# Patient Record
Sex: FEMALE | Race: WHITE | HISPANIC OR LATINO | Employment: FULL TIME | URBAN - METROPOLITAN AREA
[De-identification: names, ages, dates, MRNs, and addresses within clinical notes are randomized per-mention and may not be internally consistent; named-entity substitution may affect disease eponyms.]

---

## 2017-01-03 ENCOUNTER — APPOINTMENT (OUTPATIENT)
Dept: PHYSICAL THERAPY | Facility: CLINIC | Age: 62
End: 2017-01-03
Payer: COMMERCIAL

## 2017-01-03 ENCOUNTER — GENERIC CONVERSION - ENCOUNTER (OUTPATIENT)
Dept: OTHER | Facility: OTHER | Age: 62
End: 2017-01-03

## 2017-01-03 PROCEDURE — 97110 THERAPEUTIC EXERCISES: CPT

## 2017-01-03 PROCEDURE — 97112 NEUROMUSCULAR REEDUCATION: CPT

## 2017-01-05 ENCOUNTER — APPOINTMENT (OUTPATIENT)
Dept: PHYSICAL THERAPY | Facility: CLINIC | Age: 62
End: 2017-01-05
Payer: COMMERCIAL

## 2017-01-06 ENCOUNTER — ALLSCRIPTS OFFICE VISIT (OUTPATIENT)
Dept: OTHER | Facility: OTHER | Age: 62
End: 2017-01-06

## 2017-01-09 ENCOUNTER — APPOINTMENT (OUTPATIENT)
Dept: PHYSICAL THERAPY | Facility: CLINIC | Age: 62
End: 2017-01-09
Payer: COMMERCIAL

## 2017-01-09 PROCEDURE — 97110 THERAPEUTIC EXERCISES: CPT

## 2017-01-09 PROCEDURE — 97112 NEUROMUSCULAR REEDUCATION: CPT

## 2017-01-12 ENCOUNTER — APPOINTMENT (OUTPATIENT)
Dept: PHYSICAL THERAPY | Facility: CLINIC | Age: 62
End: 2017-01-12
Payer: COMMERCIAL

## 2017-01-13 ENCOUNTER — APPOINTMENT (OUTPATIENT)
Dept: PHYSICAL THERAPY | Facility: CLINIC | Age: 62
End: 2017-01-13
Payer: COMMERCIAL

## 2017-01-13 PROCEDURE — 97110 THERAPEUTIC EXERCISES: CPT

## 2017-01-16 ENCOUNTER — APPOINTMENT (OUTPATIENT)
Dept: PHYSICAL THERAPY | Facility: CLINIC | Age: 62
End: 2017-01-16
Payer: COMMERCIAL

## 2017-01-17 ENCOUNTER — APPOINTMENT (OUTPATIENT)
Dept: PHYSICAL THERAPY | Facility: CLINIC | Age: 62
End: 2017-01-17
Payer: COMMERCIAL

## 2017-01-19 ENCOUNTER — APPOINTMENT (OUTPATIENT)
Dept: PHYSICAL THERAPY | Facility: CLINIC | Age: 62
End: 2017-01-19
Payer: COMMERCIAL

## 2017-01-21 ENCOUNTER — APPOINTMENT (OUTPATIENT)
Dept: LAB | Facility: HOSPITAL | Age: 62
End: 2017-01-21
Payer: COMMERCIAL

## 2017-01-21 ENCOUNTER — TRANSCRIBE ORDERS (OUTPATIENT)
Dept: ADMINISTRATIVE | Facility: HOSPITAL | Age: 62
End: 2017-01-21

## 2017-01-21 DIAGNOSIS — E11.29 TYPE 2 DIABETES MELLITUS WITH OTHER DIABETIC KIDNEY COMPLICATION, WITHOUT LONG-TERM CURRENT USE OF INSULIN (HCC): ICD-10-CM

## 2017-01-21 DIAGNOSIS — E11.9 DIABETES MELLITUS WITHOUT COMPLICATION (HCC): Primary | ICD-10-CM

## 2017-01-21 DIAGNOSIS — E11.9 DIABETES MELLITUS WITHOUT COMPLICATION (HCC): ICD-10-CM

## 2017-01-21 LAB
ANION GAP SERPL CALCULATED.3IONS-SCNC: 9 MMOL/L (ref 4–13)
BUN SERPL-MCNC: 33 MG/DL (ref 5–25)
CALCIUM SERPL-MCNC: 9 MG/DL (ref 8.3–10.1)
CHLORIDE SERPL-SCNC: 102 MMOL/L (ref 100–108)
CO2 SERPL-SCNC: 26 MMOL/L (ref 21–32)
CREAT SERPL-MCNC: 1.62 MG/DL (ref 0.6–1.3)
GFR SERPL CREATININE-BSD FRML MDRD: 32.5 ML/MIN/1.73SQ M
GLUCOSE SERPL-MCNC: 81 MG/DL (ref 65–140)
POTASSIUM SERPL-SCNC: 4.7 MMOL/L (ref 3.5–5.3)
SODIUM SERPL-SCNC: 137 MMOL/L (ref 136–145)

## 2017-01-21 PROCEDURE — 80048 BASIC METABOLIC PNL TOTAL CA: CPT

## 2017-01-21 PROCEDURE — 36415 COLL VENOUS BLD VENIPUNCTURE: CPT

## 2017-01-23 ENCOUNTER — GENERIC CONVERSION - ENCOUNTER (OUTPATIENT)
Dept: OTHER | Facility: OTHER | Age: 62
End: 2017-01-23

## 2017-03-27 ENCOUNTER — ALLSCRIPTS OFFICE VISIT (OUTPATIENT)
Dept: OTHER | Facility: OTHER | Age: 62
End: 2017-03-27

## 2017-04-01 DIAGNOSIS — N18.30 CHRONIC KIDNEY DISEASE, STAGE III (MODERATE) (HCC): ICD-10-CM

## 2017-04-01 DIAGNOSIS — N83.209 CYST OF OVARY: ICD-10-CM

## 2017-04-01 DIAGNOSIS — R80.9 PROTEINURIA: ICD-10-CM

## 2017-04-01 DIAGNOSIS — N17.9 ACUTE KIDNEY FAILURE (HCC): ICD-10-CM

## 2017-04-01 DIAGNOSIS — E11.9 TYPE 2 DIABETES MELLITUS WITHOUT COMPLICATIONS (HCC): ICD-10-CM

## 2017-04-03 ENCOUNTER — APPOINTMENT (OUTPATIENT)
Dept: LAB | Facility: HOSPITAL | Age: 62
End: 2017-04-03
Payer: COMMERCIAL

## 2017-04-03 ENCOUNTER — TRANSCRIBE ORDERS (OUTPATIENT)
Dept: ADMINISTRATIVE | Facility: HOSPITAL | Age: 62
End: 2017-04-03

## 2017-04-03 DIAGNOSIS — N17.9 ACUTE KIDNEY FAILURE, UNSPECIFIED (HCC): Primary | ICD-10-CM

## 2017-04-03 DIAGNOSIS — N17.9 ACUTE KIDNEY FAILURE, UNSPECIFIED (HCC): ICD-10-CM

## 2017-04-03 DIAGNOSIS — E11.9 TYPE 2 DIABETES MELLITUS WITHOUT COMPLICATIONS (HCC): ICD-10-CM

## 2017-04-03 DIAGNOSIS — E11.9 DIABETES MELLITUS WITHOUT COMPLICATION (HCC): Primary | ICD-10-CM

## 2017-04-03 LAB
ALBUMIN SERPL BCP-MCNC: 3 G/DL (ref 3.5–5)
ALP SERPL-CCNC: 85 U/L (ref 46–116)
ALT SERPL W P-5'-P-CCNC: 26 U/L (ref 12–78)
ANION GAP SERPL CALCULATED.3IONS-SCNC: 8 MMOL/L (ref 4–13)
AST SERPL W P-5'-P-CCNC: 17 U/L (ref 5–45)
BILIRUB SERPL-MCNC: 1 MG/DL (ref 0.2–1)
BUN SERPL-MCNC: 32 MG/DL (ref 5–25)
CALCIUM SERPL-MCNC: 9 MG/DL (ref 8.3–10.1)
CHLORIDE SERPL-SCNC: 104 MMOL/L (ref 100–108)
CO2 SERPL-SCNC: 28 MMOL/L (ref 21–32)
CREAT SERPL-MCNC: 1.84 MG/DL (ref 0.6–1.3)
CREAT UR-MCNC: 110 MG/DL
ERYTHROCYTE [DISTWIDTH] IN BLOOD BY AUTOMATED COUNT: 16.3 % (ref 11.6–15.1)
EST. AVERAGE GLUCOSE BLD GHB EST-MCNC: 200 MG/DL
GFR SERPL CREATININE-BSD FRML MDRD: 28 ML/MIN/1.73SQ M
GLUCOSE P FAST SERPL-MCNC: 89 MG/DL (ref 65–99)
HBA1C MFR BLD: 8.6 % (ref 4.2–6.3)
HCT VFR BLD AUTO: 38.5 % (ref 37–47)
HGB BLD-MCNC: 12.5 G/DL (ref 12–16)
MCH RBC QN AUTO: 27.2 PG (ref 27–31)
MCHC RBC AUTO-ENTMCNC: 32.6 G/DL (ref 31.4–37.4)
MCV RBC AUTO: 84 FL (ref 82–98)
PLATELET # BLD AUTO: 196 THOUSANDS/UL (ref 130–400)
PMV BLD AUTO: 10.3 FL (ref 8.9–12.7)
POTASSIUM SERPL-SCNC: 5.1 MMOL/L (ref 3.5–5.3)
PROT SERPL-MCNC: 7.2 G/DL (ref 6.4–8.2)
PROT UR-MCNC: 574 MG/DL
PROT/CREAT UR: 5.22 MG/G{CREAT} (ref 0–0.1)
RBC # BLD AUTO: 4.6 MILLION/UL (ref 4.2–5.4)
SODIUM SERPL-SCNC: 140 MMOL/L (ref 136–145)
WBC # BLD AUTO: 8.9 THOUSAND/UL (ref 4.8–10.8)

## 2017-04-03 PROCEDURE — 82570 ASSAY OF URINE CREATININE: CPT | Performed by: INTERNAL MEDICINE

## 2017-04-03 PROCEDURE — 80053 COMPREHEN METABOLIC PANEL: CPT | Performed by: INTERNAL MEDICINE

## 2017-04-03 PROCEDURE — 84156 ASSAY OF PROTEIN URINE: CPT | Performed by: INTERNAL MEDICINE

## 2017-04-03 PROCEDURE — 83036 HEMOGLOBIN GLYCOSYLATED A1C: CPT

## 2017-04-03 PROCEDURE — 85027 COMPLETE CBC AUTOMATED: CPT

## 2017-04-03 PROCEDURE — 36415 COLL VENOUS BLD VENIPUNCTURE: CPT | Performed by: INTERNAL MEDICINE

## 2017-04-04 ENCOUNTER — GENERIC CONVERSION - ENCOUNTER (OUTPATIENT)
Dept: OTHER | Facility: OTHER | Age: 62
End: 2017-04-04

## 2017-04-13 ENCOUNTER — ALLSCRIPTS OFFICE VISIT (OUTPATIENT)
Dept: OTHER | Facility: OTHER | Age: 62
End: 2017-04-13

## 2017-04-14 ENCOUNTER — ALLSCRIPTS OFFICE VISIT (OUTPATIENT)
Dept: OTHER | Facility: OTHER | Age: 62
End: 2017-04-14

## 2017-04-29 ENCOUNTER — HOSPITAL ENCOUNTER (OUTPATIENT)
Dept: RADIOLOGY | Facility: HOSPITAL | Age: 62
Discharge: HOME | End: 2017-04-29
Payer: COMMERCIAL

## 2017-04-29 DIAGNOSIS — N83.209 CYST OF OVARY: ICD-10-CM

## 2017-04-29 PROCEDURE — 76856 US EXAM PELVIC COMPLETE: CPT

## 2017-04-29 PROCEDURE — 76830 TRANSVAGINAL US NON-OB: CPT

## 2017-05-01 ENCOUNTER — GENERIC CONVERSION - ENCOUNTER (OUTPATIENT)
Dept: OTHER | Facility: OTHER | Age: 62
End: 2017-05-01

## 2017-05-12 ENCOUNTER — GENERIC CONVERSION - ENCOUNTER (OUTPATIENT)
Dept: OTHER | Facility: OTHER | Age: 62
End: 2017-05-12

## 2017-05-15 ENCOUNTER — ALLSCRIPTS OFFICE VISIT (OUTPATIENT)
Dept: OTHER | Facility: OTHER | Age: 62
End: 2017-05-15

## 2017-05-15 ENCOUNTER — GENERIC CONVERSION - ENCOUNTER (OUTPATIENT)
Dept: OTHER | Facility: OTHER | Age: 62
End: 2017-05-15

## 2017-05-16 ENCOUNTER — GENERIC CONVERSION - ENCOUNTER (OUTPATIENT)
Dept: OTHER | Facility: OTHER | Age: 62
End: 2017-05-16

## 2017-06-02 ENCOUNTER — ALLSCRIPTS OFFICE VISIT (OUTPATIENT)
Dept: OTHER | Facility: OTHER | Age: 62
End: 2017-06-02

## 2017-06-02 DIAGNOSIS — E11.29 TYPE 2 DIABETES MELLITUS WITH OTHER DIABETIC KIDNEY COMPLICATION (HCC): ICD-10-CM

## 2017-06-02 DIAGNOSIS — E11.65 TYPE 2 DIABETES MELLITUS WITH HYPERGLYCEMIA (HCC): ICD-10-CM

## 2017-07-08 ENCOUNTER — APPOINTMENT (OUTPATIENT)
Dept: LAB | Facility: HOSPITAL | Age: 62
End: 2017-07-08
Attending: INTERNAL MEDICINE
Payer: COMMERCIAL

## 2017-07-08 ENCOUNTER — TRANSCRIBE ORDERS (OUTPATIENT)
Dept: ADMINISTRATIVE | Facility: HOSPITAL | Age: 62
End: 2017-07-08

## 2017-07-08 DIAGNOSIS — R80.9 PROTEINURIA, UNSPECIFIED TYPE: ICD-10-CM

## 2017-07-08 DIAGNOSIS — R80.9 PROTEINURIA, UNSPECIFIED TYPE: Primary | ICD-10-CM

## 2017-07-08 LAB
ANION GAP SERPL CALCULATED.3IONS-SCNC: 8 MMOL/L (ref 4–13)
BUN SERPL-MCNC: 35 MG/DL (ref 5–25)
C3 SERPL-MCNC: 130 MG/DL (ref 90–180)
C4 SERPL-MCNC: 43 MG/DL (ref 10–40)
CALCIUM SERPL-MCNC: 9.1 MG/DL (ref 8.3–10.1)
CHLORIDE SERPL-SCNC: 105 MMOL/L (ref 100–108)
CO2 SERPL-SCNC: 24 MMOL/L (ref 21–32)
CREAT SERPL-MCNC: 1.82 MG/DL (ref 0.6–1.3)
GFR SERPL CREATININE-BSD FRML MDRD: 28.3 ML/MIN/1.73SQ M
GLUCOSE P FAST SERPL-MCNC: 127 MG/DL (ref 65–99)
POTASSIUM SERPL-SCNC: 4.6 MMOL/L (ref 3.5–5.3)
SODIUM SERPL-SCNC: 137 MMOL/L (ref 136–145)

## 2017-07-08 PROCEDURE — 86038 ANTINUCLEAR ANTIBODIES: CPT

## 2017-07-08 PROCEDURE — 86162 COMPLEMENT TOTAL (CH50): CPT

## 2017-07-08 PROCEDURE — 86160 COMPLEMENT ANTIGEN: CPT

## 2017-07-08 PROCEDURE — 80048 BASIC METABOLIC PNL TOTAL CA: CPT

## 2017-07-08 PROCEDURE — 36415 COLL VENOUS BLD VENIPUNCTURE: CPT

## 2017-07-08 PROCEDURE — 84165 PROTEIN E-PHORESIS SERUM: CPT

## 2017-07-08 PROCEDURE — 86255 FLUORESCENT ANTIBODY SCREEN: CPT

## 2017-07-08 PROCEDURE — 83883 ASSAY NEPHELOMETRY NOT SPEC: CPT

## 2017-07-10 LAB
ALBUMIN SERPL ELPH-MCNC: 3.46 G/DL (ref 3.5–5)
ALBUMIN SERPL ELPH-MCNC: 53.3 % (ref 52–65)
ALPHA1 GLOB SERPL ELPH-MCNC: 0.31 G/DL (ref 0.1–0.4)
ALPHA1 GLOB SERPL ELPH-MCNC: 4.7 % (ref 2.5–5)
ALPHA2 GLOB SERPL ELPH-MCNC: 1.01 G/DL (ref 0.4–1.2)
ALPHA2 GLOB SERPL ELPH-MCNC: 15.6 % (ref 7–13)
BETA GLOB ABNORMAL SERPL ELPH-MCNC: 0.44 G/DL (ref 0.4–0.8)
BETA1 GLOB SERPL ELPH-MCNC: 6.7 % (ref 5–13)
BETA2 GLOB SERPL ELPH-MCNC: 6.8 % (ref 2–8)
BETA2+GAMMA GLOB SERPL ELPH-MCNC: 0.44 G/DL (ref 0.2–0.5)
CH50 SERPL-ACNC: 61 U/ML (ref 42–60)
GAMMA GLOB ABNORMAL SERPL ELPH-MCNC: 0.84 G/DL (ref 0.5–1.6)
GAMMA GLOB SERPL ELPH-MCNC: 12.9 % (ref 12–22)
IGG/ALB SER: 1.14 {RATIO} (ref 1.1–1.8)
PROT PATTERN SERPL ELPH-IMP: ABNORMAL
PROT SERPL-MCNC: 6.5 G/DL (ref 6.4–8.2)
RYE IGE QN: NEGATIVE

## 2017-07-11 LAB
KAPPA LC FREE SER-MCNC: 44.6 MG/L (ref 3.3–19.4)
KAPPA LC FREE/LAMBDA FREE SER: 1.04 {RATIO} (ref 0.26–1.65)
LAMBDA LC FREE SERPL-MCNC: 43 MG/L (ref 5.7–26.3)

## 2017-07-12 ENCOUNTER — ALLSCRIPTS OFFICE VISIT (OUTPATIENT)
Dept: OTHER | Facility: OTHER | Age: 62
End: 2017-07-12

## 2017-07-12 DIAGNOSIS — E11.29 TYPE 2 DIABETES MELLITUS WITH OTHER DIABETIC KIDNEY COMPLICATION (HCC): ICD-10-CM

## 2017-07-12 DIAGNOSIS — E11.65 TYPE 2 DIABETES MELLITUS WITH HYPERGLYCEMIA (HCC): ICD-10-CM

## 2017-07-12 LAB
C-ANCA TITR SER IF: NORMAL TITER
MYELOPEROXIDASE AB SER IA-ACNC: <9 U/ML (ref 0–9)
P-ANCA ATYPICAL TITR SER IF: NORMAL TITER
P-ANCA TITR SER IF: NORMAL TITER
PROTEINASE3 AB SER IA-ACNC: <3.5 U/ML (ref 0–3.5)

## 2017-09-01 DIAGNOSIS — N18.30 CHRONIC KIDNEY DISEASE, STAGE III (MODERATE) (HCC): ICD-10-CM

## 2017-09-19 ENCOUNTER — GENERIC CONVERSION - ENCOUNTER (OUTPATIENT)
Dept: OTHER | Facility: OTHER | Age: 62
End: 2017-09-19

## 2017-09-26 ENCOUNTER — GENERIC CONVERSION - ENCOUNTER (OUTPATIENT)
Dept: OTHER | Facility: OTHER | Age: 62
End: 2017-09-26

## 2017-10-18 ENCOUNTER — APPOINTMENT (OUTPATIENT)
Dept: LAB | Facility: HOSPITAL | Age: 62
End: 2017-10-18
Payer: COMMERCIAL

## 2017-10-18 ENCOUNTER — ALLSCRIPTS OFFICE VISIT (OUTPATIENT)
Dept: OTHER | Facility: OTHER | Age: 62
End: 2017-10-18

## 2017-10-18 DIAGNOSIS — E11.29 TYPE 2 DIABETES MELLITUS WITH OTHER DIABETIC KIDNEY COMPLICATION (CODE): ICD-10-CM

## 2017-10-18 DIAGNOSIS — E11.65 TYPE 2 DIABETES MELLITUS WITH HYPERGLYCEMIA (HCC): ICD-10-CM

## 2017-10-18 DIAGNOSIS — E78.5 HYPERLIPIDEMIA: ICD-10-CM

## 2017-10-18 DIAGNOSIS — N18.30 CHRONIC KIDNEY DISEASE, STAGE III (MODERATE) (HCC): ICD-10-CM

## 2017-10-18 LAB
ALBUMIN SERPL BCP-MCNC: 3 G/DL (ref 3.5–5)
ALP SERPL-CCNC: 87 U/L (ref 46–116)
ALT SERPL W P-5'-P-CCNC: 21 U/L (ref 12–78)
ANION GAP SERPL CALCULATED.3IONS-SCNC: 6 MMOL/L (ref 4–13)
AST SERPL W P-5'-P-CCNC: 14 U/L (ref 5–45)
BILIRUB SERPL-MCNC: 0.83 MG/DL (ref 0.2–1)
BUN SERPL-MCNC: 33 MG/DL (ref 5–25)
CALCIUM SERPL-MCNC: 8.6 MG/DL (ref 8.3–10.1)
CHLORIDE SERPL-SCNC: 110 MMOL/L (ref 100–108)
CHOLEST SERPL-MCNC: 172 MG/DL (ref 50–200)
CO2 SERPL-SCNC: 25 MMOL/L (ref 21–32)
CREAT SERPL-MCNC: 2 MG/DL (ref 0.6–1.3)
EST. AVERAGE GLUCOSE BLD GHB EST-MCNC: 200 MG/DL
GFR SERPL CREATININE-BSD FRML MDRD: 27 ML/MIN/1.73SQ M
GLUCOSE P FAST SERPL-MCNC: 108 MG/DL (ref 65–99)
HBA1C MFR BLD: 8.6 % (ref 4.2–6.3)
HDLC SERPL-MCNC: 69 MG/DL (ref 40–60)
LDLC SERPL CALC-MCNC: 78 MG/DL (ref 0–100)
POTASSIUM SERPL-SCNC: 4.8 MMOL/L (ref 3.5–5.3)
PROT SERPL-MCNC: 6.4 G/DL (ref 6.4–8.2)
SODIUM SERPL-SCNC: 141 MMOL/L (ref 136–145)
TRIGL SERPL-MCNC: 124 MG/DL

## 2017-10-18 PROCEDURE — 80061 LIPID PANEL: CPT

## 2017-10-18 PROCEDURE — 83036 HEMOGLOBIN GLYCOSYLATED A1C: CPT

## 2017-10-18 PROCEDURE — 36415 COLL VENOUS BLD VENIPUNCTURE: CPT

## 2017-10-18 PROCEDURE — 80053 COMPREHEN METABOLIC PANEL: CPT
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
## 2017-11-13 ENCOUNTER — ALLSCRIPTS OFFICE VISIT (OUTPATIENT)
Dept: OTHER | Facility: OTHER | Age: 62
End: 2017-11-13

## 2017-11-14 NOTE — PROGRESS NOTES
Assessment  1  Chronic kidney disease, stage 3 (585 3) (N18 3)   2  Proteinuria (791 0) (R80 9)   3  Benign hypertension with chronic kidney disease, stage III (403 10,585 3) (I12 9,N18 3)    Plan  Chronic kidney disease, stage 3    · (1) CBC/ PLT (NO DIFF); Status:Active; Requested for:01Feb2018; Perform:[facility]; QQR:87XXB5629;ERYNJBW; For:Chronic kidney disease, stage 3; Ordered By:Maria Teresa Lozano;   · (1) COMPREHENSIVE METABOLIC PANEL; Status:Active; Requested for:01Feb2018; Perform:[facility]; OVM:93WFC7755;WJEDDJJ; For:Chronic kidney disease, stage 3; Ordered By:Maria Teresa Lozano;   · (1) URINE PROTEIN CREATININE RATIO; Status:Active; Requested for:01Feb2018; Perform:[facility]; KCO:31JMX1093;KFYBJPK;YSMAZA disease, stage 3; Ordered By:Maria Teresa Lozano;   · Do not take anti-inflammatory medicines other than aspirin ; Status:Complete;   Done:13Nov2017   Ordered;kidney disease, stage 3; Ordered By:Maria Teresa Lozano;   · Restrict the salt in your diet by avoiding highly salted foods ; Status:Complete;   Done:13Nov2017   Ordered;kidney disease, stage 3; Ordered By:Maria Teresa Lozano; Discussion/Summary    1 ) Chronic kidney disease stage IIIB with proteinuriaRenal function currently stable at baseline at 2 0 mg/dL  Presumed etiology is diabetic nephropathyBaseline creatinine is between 1 7-2 2 mg/dLDiabetic and blood pressure controlContinue valsartan to 160 mg twice a dayHer complements C3 and C4 were not lowANCA titer were negativeFree light chain assay ratio was 1 04   Serum protein electrophoresis showed no monoclonal bandsANA was negativeMay need to find an alternative medication to metformin if her GFR is below 30 mL/min  ProteinuriaSee above, secondary to diabetic nephropathyThe new blood pressure and diabetic control especially with an ARBCheck a urine protein creatinine ratio at the next visit  HypertensionAim to keep systolic blood pressure less than 130 given the proteinuriaPatient is close to goal she slightly above a 138 but she is in some pain and cough that was used was smaller than her arm suggesting that it might be over estimating her blood pressureContinue valsartan 160 mg twice a dayLow 2 g sodium dietFurosemide 40 mg dailyMetoprolol 25 mg twice a day  Diabetes mellitus type 2Management as per endocrinologyHemoglobin A1c is 8 6  The patient was counseled regarding diagnostic results,-- instructions for management,-- risk factor reductions,-- prognosis,-- patient and family education,-- impressions,-- risks and benefits of treatment options,-- importance of compliance with treatment  total time of encounter was 30 minutes  Possible side effects of new medications were reviewed with the patient/guardian today  The treatment plan was reviewed with the patient/guardian  The patient/guardian understands and agrees with the treatment plan   She has no barriers to learning  CKD Teaching includes NFK Guidelines, hypertension management, Avoid nephrotoxic medication, dietician counseling, sodium restriction and fluid management  Current Patient Status: no worsening of renal function  She needs a follow up session in three months   Discussed with the patient      Reason For Visit  Chronic kidney disease      History of Present Illness  I the pleasure of seeing Isabela Mcgee today in the renal clinic for continued management of her chronic kidney disease  She has developed some paresthesias pain of right arm after a cervical epidural  She is not seeing a new orthopedic doctor who prescribed her naproxen 500 mg twice a day starting today  She has not started the dose as of yet  I explained to her given her chronic kidney disease this could cause acute kidney injury  Her most recent blood work was from October 18 and a creatinine was stable at baseline 2 mg/dL  At her last visit I increased her valsartan for better blood pressure control   Aside from her paresthesias she's feeling fine no shortness of breath no chest pain and no swelling of legs  Review of Systems   Constitutional: fatigue-- and-- recent weight gain, but-- no fever,-- no chills-- and-- no anorexia  Integumentary: No complaints of skin rash  Gastrointestinal: No complains of abdominal pain, no constipation or diarrhea, no nausea or vomiting  Respiratory: No complaints of shortness of breath, no cough, no productive sputum  Cardiovascular: lower extremity edema, but-- no orthopnea,-- no PND,-- no chest pain-- and-- no palpitations  Musculoskeletal: No complaints of joint pain or swelling  Neurological: No complaints of headache, no lightheadedness or dizziness  Genitourinary: No dysuria, no hematuria, no nocturia, no urinary frequency, no incomplete emptying of bladder, no foamy urine  Eyes: No complaints of eyesight problems or dryness of eyes  ROS reviewed  Past Medical History    The active problems and past medical history were reviewed and updated today  Surgical History    The surgical history was reviewed and updated today  Family History    The family history was reviewed and updated today  Social History  The social history was reviewed and updated today  The social history was reviewed and is unchanged  Current Meds   1  BD Pen Needle Mini U/F 31G X 5 MM Miscellaneous; use 4 per day; Therapy: 00PEP7689 to (Evaluate:43Aga1802); Last Rx:24Bmc1998 Ordered   2  Furosemide 40 MG Oral Tablet; Take 1 tablet daily; Therapy: 96KBP8205 to (Last Rx:48Stb4720)  Requested for: 96Bbg1774 Ordered   3  HumaLOG Mix 75/25 KwikPen (75-25) 100 UNIT/ML Subcutaneous Suspension Pen-injector; take 40 units before breakfast and 50 units before supper and 15 at bedtime; Therapy: (Recorded:91Rgg8606) to Recorded   4  Metoprolol Tartrate 25 MG Oral Tablet; take 1 tablet twice a day; Therapy: 18JKM6463 to (Last Rx:03Yis3561)  Requested for: 68Cbq0121 Ordered   5  OneTouch Ultra Blue In [employer];  Test 3 times daily; Therapy: 53HPV7915 to (Last YE:16XNB4146)  Requested for: 02Jan2017 Ordered   6  OneTouch Ultra System w/Device Kit; USE AS DIRECTED; Therapy: 52PJB4323 to (Last Rx:92Ant2579)  Requested for: 87Kro2933 Ordered   7  Valsartan 160 MG Oral Tablet; 1 tab twice per day *dose increased by nephrologist*; Therapy: 62JPU6362 to (Evaluate:31Oct2017)  Requested for: 99KJD1675; Last FS:34OQK5419 Ordered    The medication list was reviewed and updated today  Allergies  1  No Known Drug Allergies    Vitals  Vital Signs    Recorded: 85AXD1392 04:15PM Recorded: 35RQO9776 41:62QM   Systolic 996    Diastolic 70    Height  5 ft    Weight  229 lb    BMI Calculated  44 72   BSA Calculated  1 98       Physical Exam   Constitutional: General appearance: No acute distress, well appearing and well nourished  ENT: External ears and nose appear normal     Eyes: Anicteric sclerae  Neck: No bruit heard over either carotid  JVD:  No JVD present  Pulmonary: Respiratory effort: No increased work of breathing or signs of respiratory distress  -- Auscultation of lungs: Clear to auscultation  Cardiovascular: Auscultation of heart: Normal rate and rhythm, normal S1 and S2, without murmurs  Abdomen: Non-tender, no masses  Extremities: No cyanosis, clubbing or edema  Pulses: Dorsalis Pedis and Posterior Tibial pulses normal   Rash: No rash present  Neurologic: Non Focal     Psychiatric: Orientation to person, place, and time: Normal  -- and-- Mood and affect: Normal    Back: No CVA tenderness  Results/Data  Diagnostic Studies Reviewed: I personally reviewed the films/images/results in the office today  My interpretation follows  Health Management  Encounter for screening mammogram for malignant neoplasm of breast   * MAMMO SCREENING BILATERAL W CAD; every 2 years; Last 93GCV5066; Next Due: 44TJL0062; Active    Thank you very much for seeing this patient  If you have any questions, please do not hesitate to contact me  Future Appointments    Date/Time Provider Specialty Site   01/23/2018 11:30 AM Mary Craig [facility]       Signatures   Electronically signed by : CASSIUS Church ; Nov 13 2017  4:22PM EST                       (Author)

## 2018-01-09 NOTE — PROGRESS NOTES
History of Present Illness  Care Coordination Encounter Information:   Type of Encounter: Telephonic   Contact: Initial Contact   Last Office Visit:    Spoke to Patient  Care Coordination  Nurse [address] [place] [address] [address]:   The reason for call is to discuss outreach for follow up/needed services and coordination of meeting care plan treatment goals  63year old women who works full time, drives and is head of her household that consists of elderly mother, son and infant grand child   Nephrologist increased Valsartan to 160mg twice daily  Metformin is now to be taken with the evening Humulog as per Dr Palak Vaughan  She says this is increasing her glucose in the AM  Antwan Brewer is not taking her Lasix on most days as she is not able to use a bathroom easily at work  I have encouraged her to take this as her BP is rising 160-180/100-120  She states her nephrologist is aware of these high numbers  Have encouraged she alert him if symptoms occur  Have encouraged she eat breakfast in the AM after taking insulin, before leaving for work  Have encouraged she carry a snack  Rising glucose, BP and weight  Have provided nutrition videos  Have discussed carbs, inflammation, damage of low fat/ high carb diet  Have encouraged she call provider if symptoms occur as BP is high  We agree to speak Monday 5/15/17  Active Problems    1  Acute kidney injury (nontraumatic) (584 9) (N17 9)   2  Benign essential hypertension (401 1) (I10)   3  Benign hypertension with chronic kidney disease, stage III (403 10,585 3) (I12 9,N18 3)   4  BMI 40 0-44 9, adult (V85 41) (Z68 41)   5  Calcaneal spur (726 73) (M77 30)   6  Chronic kidney disease, stage 3 (585 3) (N18 3)   7  Cyst of ovary (620 2) (N83 209)   8  De Quervain's tenosynovitis (727 04) (M65 4)   9  Diabetes mellitus, type 2 (250 00) (E11 9)   10  Edema (782 3) (R60 9)   11  Encounter for routine pelvic examination (V72 31) (Z01 419)   12   Encounter for screening for malignant neoplasm of colon (V76 51) (Z12 11)   13  Encounter for screening mammogram for malignant neoplasm of breast (V76 12)    (Z12 31)   14  Encounter for screening mammogram for malignant neoplasm of breast (V76 12)    (Z12 31)   15  Gout (274 9) (M10 9)   16  Hyperlipidemia (272 4) (E78 5)   17  Hypothyroidism (244 9) (E03 9)   18  Long-term insulin use (V58 67) (Z79 4)   19  Lumbago (724 2) (M54 5)   20  Noncompliance with treatment plan (V15 81) (Z91 11)   21  Obesity (278 00) (E66 9)   22  Obstructive sleep apnea (327 23) (G47 33)   23  Pain in joint of left wrist (719 43) (M25 532)   24  Proteinuria (791 0) (R80 9)   25  Shortness of breath (786 05) (R06 02)   26  Type 2 diabetes, uncontrolled, with renal manifestation (250 42) (E11 29,E11 65)   27  Whiplash injury to neck (847 0) (S13 4XXA)    Past Medical History    1  Acute upper respiratory infection (465 9) (J06 9)   2  History of Difficulty breathing (786 09) (R06 89)   3  History of acute bronchitis (V12 69) (Z87 09)   4  History of shortness of breath (V13 89) (Z87 898)   5  History of shortness of breath (V13 89) (Z87 898)   6  Need for vaccination for DTP (V06 1) (Z23)   7  History of Pneumonia (V12 61)   8  History of Tracheobronchitis (490) (J40)   9  History of Urinary Tract Infection (V13 02)    Surgical History    1  History of  Section   2  History of  Section   3  History of Nose Surgery    Family History  Mother    1  Family history of Diabetes Mellitus (V18 0)   2  Family history of Generalized Anxiety Disorder   3  Family history of Hypertension (V17 49)  Father    4  Family history of Renal Failure   5  Family history of Reported Family History Of Kidney Disease  Family History    6  Family history of Heart Disease (V17 49)    Social History    · Denied: History of Alcohol Use (History)   · Caffeine Use   · Denied: History of Drug Use   · Never a smoker    Current Meds    1   Furosemide 40 MG Oral Tablet (Lasix); take one tablet by mouth every day; Therapy: 66MJF4961 to (Evaluate:10Gah3986)  Requested for: 25Mar2016; Last   Rx:25Mar2016 Ordered   2  Metoprolol Tartrate 25 MG Oral Tablet; take 1/2 tablet by mouth twice daily; Therapy: 52ALL0950 to ([phone])  Requested for: 14Apr2017 Recorded   3  Valsartan 160 MG Oral Tablet; 1 tab twice per day *dose increased by nephrologist*;   Therapy: 00LJU9263 to (Evaluate:31Oct2017)  Requested for: 84ZIZ9518; Last   WH:29MHH6555 Ordered    4  BD Pen Needle Mini U/F 31G X 5 MM Miscellaneous; use 4 per day; Therapy: 85WJW9120 to (Evaluate:30Qar1670); Last Rx:13Qgi1937 Ordered   5  MetFORMIN HCl  MG Oral Tablet Extended Release 24 Hour; TAKE 2 TABLETS   DAILY WITH THE EVENING MEAL; Therapy: 20Piq2624 to (Evaluate:44Gln2853)  Requested for: 72Msx7294; Last   Rx:18Zyr5188 Ordered   6  [place] Ultra Blue In [employer]; Test 3 times daily; Therapy: 96QXJ1158 to (Last JX:37QGV9678)  Requested for: 02Jan2017 Ordered   7  OneTouch Ultra System w/Device Kit; USE AS DIRECTED; Therapy: 61HBE4728 to (Last Rx:03Soq4685)  Requested for: 30Gcb8965 Ordered    8  HumaLOG Mix 75/25 KwikPen (75-25) 100 UNIT/ML Subcutaneous Suspension   Pen-injector; take 40 units before breakfast and 50 units before supper; Therapy: (Recorded:10Nov2016) to Recorded    Allergies    1  No Known Drug Allergies    Health Management   * MAMMO SCREENING BILATERAL W CAD; every 2 years; Last 79WDF1645; Next Due: 15SYS0301; Active    End of Encounter Meds    1  Furosemide 40 MG Oral Tablet (Lasix); take one tablet by mouth every day; Therapy: 55MVN4395 to (Evaluate:23Rog2774)  Requested for: 25Mar2016; Last   Rx:25Mar2016 Ordered   2  Metoprolol Tartrate 25 MG Oral Tablet; take 1/2 tablet by mouth twice daily; Therapy: 47YDF8805 to ([phone])  Requested for: 14Apr2017 Recorded   3   Valsartan 160 MG Oral Tablet; 1 tab twice per day *dose increased by nephrologist*;   Therapy: 11GDB3307 to (Evaluate:31Oct2017)  Requested for: 97LGN7105; Last   HZ:95YXO7607 Ordered    4  BD Pen Needle Mini U/F 31G X 5 MM Miscellaneous; use 4 per day; Therapy: 59VQE3523 to (Evaluate:66Gzn0407); Last Rx:57Zmu4090 Ordered   5  MetFORMIN HCl  MG Oral Tablet Extended Release 24 Hour; TAKE 2 TABLETS   DAILY WITH THE EVENING MEAL; Therapy: 57Erj2717 to (Evaluate:55Hof0959)  Requested for: 89Kww8605; Last   Rx:01Hlx3867 Ordered   6  OneTouch Ultra Blue In [employer]; Test 3 times daily; Therapy: 55GXF5020 to (Last DN:91QNT9323)  Requested for: 02Jan2017 Ordered   7  OneTouch Ultra System w/Device Kit; USE AS DIRECTED; Therapy: 21HHZ9861 to (Last Rx:63Hsc5054)  Requested for: 50Wpe8524 Ordered    8  HumaLOG Mix 75/25 KwikPen (75-25) 100 UNIT/ML Subcutaneous Suspension   Pen-injector; take 40 units before breakfast and 50 units before supper; Therapy: (Recorded:10Nov2016) to Recorded    Future Appointments    Date/Time Provider Specialty Site   07/12/2017 03:30 PM Bernita Kanner, [address] [facility] END[facility]CRINOLOG[facility]     Patient Care Team    Care Team Member Role Specialty Office Number   Vincent CHEEK  Specialist Endocrinology [phone]     Signatures   Electronically signed by :  Madelaine Yeh RN; May 12 2017 11:04AM EST                       (Author)

## 2018-01-11 ENCOUNTER — GENERIC CONVERSION - ENCOUNTER (OUTPATIENT)
Dept: OTHER | Facility: OTHER | Age: 63
End: 2018-01-11

## 2018-01-12 NOTE — RESULT NOTES
Message   A1C remains high at 8 6- please take insulin as directed and send glucose log for review     Verified Results  (1) HEMOGLOBIN A1C 03Apr2017 11:38AM Osbaldo Mathews Order Number: [id number]     Test Name Result Flag Reference   HEMOGLOBIN A1C 8 6 % H 4 2-6 3   EST  AVG   GLUCOSE 200 mg/dl

## 2018-01-12 NOTE — RESULT NOTES
Verified Results  * MAMMO SCREENING BILATERAL W CAD 94IMJ1873 02:16PM Skylar Bullock Order Number: [id number]    - Patient Instructions: To schedule this appointment, please contact Central Scheduling at [phone]  Do not wear any perfume, powder, lotion or deodorant on breast or underarm area  Please bring your doctors order, referral (if needed) and insurance information with you on the day of the test  Failure to bring this information may result in this test being rescheduled  Arrive 15 minutes prior to your appointment time to register  On the day of your test, please bring any prior mammogram or breast studies with you that were not performed at a [facility]  Failure to bring prior exams may result in your test needing to be rescheduled   Order Number: [id number]    - Patient Instructions: To schedule this appointment, please contact Central Scheduling at [phone]  Do not wear any perfume, powder, lotion or deodorant on breast or underarm area  Please bring your doctors order, referral (if needed) and insurance information with you on the day of the test  Failure to bring this information may result in this test being rescheduled  Arrive 15 minutes prior to your appointment time to register  On the day of your test, please bring any prior mammogram or breast studies with you that were not performed at a [facility]  Failure to bring prior exams may result in your test needing to be rescheduled  Test Name Result Flag Reference   MAMMO SCREENING BILATERAL W CAD (Report)     Patient History:   Patient is postmenopausal and is nulliparous  Family history of ovarian cancer in paternal aunt at age 48 or    over and breast cancer in maternal aunt at age 48 or over  Patient has never smoked  Patient's BMI is 43 9  Reason for exam: screening (asymptomatic)       Mammo Screening Bilateral W CAD: November 25, 2016 - Check In #:    [de-identified]   Bilateral CC and MLO view(s) were taken  Technologist: ROBBIE Arroyo   Prior study comparison: November 8, 2014, bilateral screening    mammogram performed at [place] [address]  November 8, 2012, bilateral screening mammogram performed at [place] [address]  June 11, 2011, bilateral    screening mammogram performed at [facility]  There are scattered fibroglandular densities  No new dominant    soft tissue mass, architectural distortion or suspicious    calcifications are noted  The skin and nipple structures are    within normal limits  Benign appearing calcifications are noted  No mammographic evidence of malignancy  No    significant changes when compared with prior studies  ASSESSMENT: BiRad:2 - Benign     Recommendation:   Routine screening mammogram of both breasts in 1 year  Analyzed by CAD     8-10% of cancers will be missed on mammography  Management of a    palpable abnormality must be based on clinical grounds  Patients   will be notified of their results via letter from our facility  Accredited by [employer] of Radiology and FDA  Transcription Location: [address]: [id number]     Risk Value(s):   Tyrer-Cuzick 10 Year: 5 449%, Tyrer-Cuzick Lifetime: 13 787%,    Myriad Table: 3 0%, VARUN 5 Year: 1 4%, NCI Lifetime: 7 6%   Signed by:   Carmen Lugo MD   11/25/16       Plan  Encounter for screening mammogram for malignant neoplasm of breast    · Mammo Screen B/L ; every 1 year; Last 16CBP2611;  Next 40GHW9225; Status:Active    Discussion/Summary   William Anes,   Your mammogram is normal    Dr Shivam Calles

## 2018-01-13 VITALS
DIASTOLIC BLOOD PRESSURE: 80 MMHG | SYSTOLIC BLOOD PRESSURE: 130 MMHG | HEART RATE: 72 BPM | BODY MASS INDEX: 44.57 KG/M2 | HEIGHT: 60 IN | WEIGHT: 227 LBS | RESPIRATION RATE: 18 BRPM

## 2018-01-13 VITALS
HEART RATE: 64 BPM | RESPIRATION RATE: 20 BRPM | DIASTOLIC BLOOD PRESSURE: 80 MMHG | BODY MASS INDEX: 45.35 KG/M2 | TEMPERATURE: 96.7 F | HEIGHT: 60 IN | SYSTOLIC BLOOD PRESSURE: 110 MMHG | WEIGHT: 231 LBS

## 2018-01-13 VITALS
DIASTOLIC BLOOD PRESSURE: 94 MMHG | HEART RATE: 90 BPM | SYSTOLIC BLOOD PRESSURE: 142 MMHG | BODY MASS INDEX: 45.23 KG/M2 | WEIGHT: 230.38 LBS | HEIGHT: 60 IN

## 2018-01-13 NOTE — RESULT NOTES
Message   Diabetes is still not a target  Encouraged to check her sugars and send them in to us  Add jardiance 25 mg per day to the regimen  Side effects include increased risk of urinary tract infection, yeast infection and dehydration  Be sure you are drinking enough water  Kidney function has decreased as well  Follow-up with nephrology as scheduled  Verified Results  (1) BASIC METABOLIC PROFILE 39SVK8490 04:58PM Pam Haywoodite     Test Name Result Flag Reference   GLUCOSE,RANDM 153 mg/dL H    If the patient is fasting, the ADA then defines impaired fasting glucose as > 100 mg/dL and diabetes as > or equal to 123 mg/dL  SODIUM 140 mmol/L  136-145   POTASSIUM 4 8 mmol/L  3 5-5 3   CHLORIDE 105 mmol/L  100-108   CARBON DIOXIDE 27 mmol/L  21-32   ANION GAP (CALC) 8 mmol/L  4-13   BLOOD UREA NITROGEN 37 mg/dL H 5-25   CREATININE 1 82 mg/dL H 0 60-1 30   Standardized to IDMS reference method   CALCIUM 9 1 mg/dL  8 3-10 1   eGFR Non-African American 28 4 ml/min/1 [facility] [employer] Disease Education Program recommendations are as follows:  GFR calculation is accurate only with a steady state creatinine  Chronic Kidney disease less than 60 ml/min/1 73 sq  meters  Kidney failure less than 15 ml/min/1 73 sq  meters  (1) HEMOGLOBIN A1C 20Jun2016 04:58PM Pam Favorite     Test Name Result Flag Reference   HEMOGLOBIN A1C 8 6 % H 4 2-6 3   EST  AVG   GLUCOSE 200 mg/dl

## 2018-01-13 NOTE — RESULT NOTES
Message    A1C 7 7- Goal <7   Blood sugars were both high and low  take insulin as directed and send BG readings to office  Thyroid level looks okay off the levothyroxine, can remain off the levothyroxine for now and repeat labs again in 3 months  Cholesterol looks good, continue atorvastatin  Please schedule f/u with nephrology, and schedule diabetes education  Verified Results  (1) T4, FREE 26HQD6888 04:16PM Bernita Kanner     Test Name Result Flag Reference   T4,FREE 0 98 ng/dL  0 76-1 46     (1) COMPREHENSIVE METABOLIC PANEL 80CSD2199 94:93GL Bernita Kanner   National Kidney Disease Education Program recommendations are as follows:  GFR calculation is accurate only with a steady state creatinine  Chronic Kidney disease less than 60 ml/min/1 73 sq  meters  Kidney failure less than 15 ml/min/1 73 sq  meters  Test Name Result Flag Reference   GLUCOSE,RANDM 88 mg/dL     If the patient is fasting, the ADA then defines impaired fasting glucose as > 100 mg/dL and diabetes as > or equal to 123 mg/dL     SODIUM 142 mmol/L  136-145   POTASSIUM 4 4 mmol/L  3 5-5 3   CHLORIDE 109 mmol/L H 100-108   CARBON DIOXIDE 26 mmol/L  21-32   ANION GAP (CALC) 7 mmol/L  4-13   BLOOD UREA NITROGEN 35 mg/dL H 5-25   CREATININE 1 74 mg/dL H 0 60-1 30   CALCIUM 8 4 mg/dL  8 3-10 1   BILI, TOTAL 0 79 mg/dL  0 20-1 00   ALK PHOSPHATAS 98 U/L     ALT (SGPT) 23 U/L  12-78   AST(SGOT) 16 U/L  5-45   ALBUMIN 3 0 g/dL L 3 5-5 0   TOTAL PROTEIN 6 7 g/dL  6 4-8 2   eGFR Non-African American 29 9 ml/min/1 73sq m       (1) LIPID PANEL, FASTING 51VQY8922 04:16PM Noah Rivera   Triglyceride:         Normal              <150 mg/dl       Borderline High    150-199 mg/dl       High               200-499 mg/dl       Very High          >499 mg/dl  Cholesterol:         Desirable        <200 mg/dl      Borderline High  200-239 mg/dl      High             >239 mg/dl  HDL Cholesterol:        High    >59 mg/dL Low     <41 mg/dL  LDL CALCULATED:    This screening LDL is a calculated result  It does not have the accuracy of the Direct Measured LDL in the monitoring of patients with hyperlipidemia and/or statin therapy  Direct Measure LDL (XNV679) must be ordered separately in these patients  Test Name Result Flag Reference   CHOLESTEROL 188 mg/dL     HDL,DIRECT 60 mg/dL  40-60   LDL CHOLESTEROL CALCULATED 98 mg/dL  0-100   TRIGLYCERIDES 150 mg/dL  <=150     (1) TSH 15BYV8142 04:16PM Noah Rivera   Patients undergoing fluorescein dye angiography may retain small amounts of fluorescein in the body for 48-72 hours post procedure  Samples containing fluorescein can produce falsely depressed TSH values  If the patient had this procedure,a specimen should be resubmitted post fluorescein clearance  The recommended reference ranges for TSH during pregnancy are as follows:  First trimester 0 1 to 2 5 uIU/mL  Second trimester  0 2 to 3 0 uIU/mL  Third trimester 0 3 to 3 0 uIU/m     Test Name Result Flag Reference   TSH 3 020 uIU/mL  0 358-3 740     (1) HEMOGLOBIN A1C 32QDO8389 04:16PM Noah Rivera   5 7-6 4% impaired fasting glucose  >=6 5% diagnosis of diabetes    Falsely low levels are seen in conditions linked to short RBC life span-  hemolytic anemia, and splenomegaly  Falsely elevated levels are seen in situations where there is an increased production of RBC- receipt of erythropoietin or blood transfusions  Adopted from ADA-Clinical Practice Recommendations     Test Name Result Flag Reference   HEMOGLOBIN A1C 7 7 % H 4 0-5 6   EST  AVG  GLUCOSE 174 mg/dl         Plan  Diabetes mellitus, type 2    · (1) BASIC METABOLIC PROFILE; Status:Active; Requested YKB:36MSC9140;    · (1) HEMOGLOBIN A1C; Status:Active; Requested CBR:70TCI2473;   Hypothyroidism    · (1) T4, FREE; Status:Active; Requested NANDINI:46XTL9509;    · (1) TSH; Status:Active;  Requested LIZANDRO:33CYS9961;     Signatures   Electronically signed by : NIKOLAS Pisano; Mar  7 2016  9:55AM EST                       (Author)

## 2018-01-13 NOTE — RESULT NOTES
Message   The kidney function appears to be back at baseline  Remain off the Jardiance  Follow up with nephrologist as scheduled  Glucose is 93  Please take the insulin as directed and send log for review     Verified Results  (1) Bhanu 50CVM6004 10:43AM Huey Keith Order Number: [id number]  TW Order Number: [id number]     Test Name Result Flag Reference   GLUCOSE,RANDM 93 mg/dL     If the patient is fasting, the ADA then defines impaired fasting glucose as > 100 mg/dL and diabetes as > or equal to 123 mg/dL  SODIUM 140 mmol/L  136-145   POTASSIUM 4 5 mmol/L  3 5-5 3   CHLORIDE 104 mmol/L  100-108   CARBON DIOXIDE 26 mmol/L  21-32   ANION GAP (CALC) 10 mmol/L  4-13   BLOOD UREA NITROGEN 42 mg/dL H 5-25   CREATININE 1 80 mg/dL H 0 60-1 30   Standardized to IDMS reference method   CALCIUM 9 4 mg/dL  8 3-10 1   eGFR Non-African American 28 8 ml/min/1 [facility] [employer] Disease Education Program recommendations are as follows:  GFR calculation is accurate only with a steady state creatinine  Chronic Kidney disease less than 60 ml/min/1 73 sq  meters  Kidney failure less than 15 ml/min/1 73 sq  meters

## 2018-01-13 NOTE — PROGRESS NOTES
History of Present Illness  Care Coordination Encounter Information:   Type of Encounter: Telephonic        Care Coordination  Nurse St Turnerke:   The reason for call is to discuss outreach for follow up/needed services and coordination of meeting care plan treatment goals  Her BP, glucose are high  Meaghan Dyer is c/o new onset leg cramps  She c/o dizziness episode over the weekend  Diet is out of control  Request she call nephrologist immediately to be seen  /100, glucose 400 over the weekend  Explained this is further damaging her kidneys  Active Problems    1  Acute kidney injury (nontraumatic) (584 9) (N17 9)   2  Benign essential hypertension (401 1) (I10)   3  Benign hypertension with chronic kidney disease, stage III (403 10,585 3) (I12 9,N18 3)   4  BMI 40 0-44 9, adult (V85 41) (Z68 41)   5  Calcaneal spur (726 73) (M77 30)   6  Chronic kidney disease, stage 3 (585 3) (N18 3)   7  Cyst of ovary (620 2) (N83 209)   8  De Quervain's tenosynovitis (727 04) (M65 4)   9  Diabetes mellitus, type 2 (250 00) (E11 9)   10  Edema (782 3) (R60 9)   11  Encounter for routine pelvic examination (V72 31) (Z01 419)   12  Encounter for screening for malignant neoplasm of colon (V76 51) (Z12 11)   13  Encounter for screening mammogram for malignant neoplasm of breast (V76 12)    (Z12 31)   14  Encounter for screening mammogram for malignant neoplasm of breast (V76 12)    (Z12 31)   15  Gout (274 9) (M10 9)   16  Hyperlipidemia (272 4) (E78 5)   17  Hypothyroidism (244 9) (E03 9)   18  Long-term insulin use (V58 67) (Z79 4)   19  Lumbago (724 2) (M54 5)   20  Noncompliance with treatment plan (V15 81) (Z91 11)   21  Obesity (278 00) (E66 9)   22  Obstructive sleep apnea (327 23) (G47 33)   23  Pain in joint of left wrist (719 43) (M25 532)   24  Proteinuria (791 0) (R80 9)   25  Shortness of breath (786 05) (R06 02)   26  Type 2 diabetes, uncontrolled, with renal manifestation (250 42) (E11 29,E11 65)   27   Whiplash injury to neck (847 0) (S13 4XXA)    Past Medical History    1  Acute upper respiratory infection (465 9) (J06 9)   2  History of Difficulty breathing (786 09) (R06 89)   3  History of acute bronchitis (V12 69) (Z87 09)   4  History of shortness of breath (V13 89) (Z87 898)   5  History of shortness of breath (V13 89) (Z87 898)   6  Need for vaccination for DTP (V06 1) (Z23)   7  History of Pneumonia (V12 61)   8  History of Tracheobronchitis (490) (J40)   9  History of Urinary Tract Infection (V13 02)    Surgical History    1  History of  Section   2  History of  Section   3  History of Nose Surgery    Family History  Mother    1  Family history of Diabetes Mellitus (V18 0)   2  Family history of Generalized Anxiety Disorder   3  Family history of Hypertension (V17 49)  Father    4  Family history of Renal Failure   5  Family history of Reported Family History Of Kidney Disease  Family History    6  Family history of Heart Disease (V17 49)    Social History    · Denied: History of Alcohol Use (History)   · Caffeine Use   · Denied: History of Drug Use   · Never a smoker    Current Meds    1  Furosemide 40 MG Oral Tablet (Lasix); take one tablet by mouth every day; Therapy: 61VYY7702 to (Evaluate:96Xra7506)  Requested for: 2016; Last   Rx:2016 Ordered   2  Metoprolol Tartrate 25 MG Oral Tablet; take 1/2 tablet by mouth twice daily; Therapy: 47FUJ6528 to ([phone])  Requested for: 49Qbk4606 Recorded   3  Valsartan 160 MG Oral Tablet; 1 tab twice per day *dose increased by nephrologist*;   Therapy: 74KIU4704 to (Evaluate:2017)  Requested for: 94TZK6388; Last   II:35QPC4507 Ordered    4  BD Pen Needle Mini U/F 31G X 5 MM Miscellaneous; use 4 per day; Therapy: 96RPA2925 to (Evaluate:78Vou5059); Last Rx:95Wak1708 Ordered   5  MetFORMIN HCl  MG Oral Tablet Extended Release 24 Hour; TAKE 2 TABLETS   DAILY WITH THE EVENING MEAL;    Therapy: 22Etu1217 to (Evaluate:13Fre6663) Requested for: 80Eik3842; Last   Rx:97Mud7874 Ordered   6  OneTouch Ultra Blue In [employer]; Test 3 times daily; Therapy: 68XOE3237 to (Last P53UUZ5563)  Requested for: 2017 Ordered   7  OneTouch Ultra System w/Device Kit; USE AS DIRECTED; Therapy: 98IPY3213 to (Last Rx:43Xqy6595)  Requested for: 43Rul4093 Ordered    8  HumaLOG Mix 75/25 KwikPen (75-25) 100 UNIT/ML Subcutaneous Suspension   Pen-injector; take 40 units before breakfast and 50 units before supper; Therapy: (Recorded:2016) to Recorded    Allergies    1  No Known Drug Allergies    Health Management   * MAMMO SCREENING BILATERAL W CAD; every 2 years; Last 35ADR9160; Next Due: 19BUH6151; Active    End of Encounter Meds    1  Furosemide 40 MG Oral Tablet (Lasix); take one tablet by mouth every day; Therapy: 05DAF4813 to (Evaluate:42Aes4302)  Requested for: 2016; Last   Rx:2016 Ordered   2  Metoprolol Tartrate 25 MG Oral Tablet; take 1/2 tablet by mouth twice daily; Therapy: 17BNZ4869 to (486 8704)  Requested for: 2017 Recorded   3  Valsartan 160 MG Oral Tablet; 1 tab twice per day *dose increased by nephrologist*;   Therapy: 09QXS2502 to (Evaluate:2017)  Requested for: 31UZD8801; Last   QY:41HBO8987 Ordered    4  BD Pen Needle Mini U/F 31G X 5 MM Miscellaneous; use 4 per day; Therapy: 01BGG7579 to (Evaluate:70Chy5456); Last Rx:10Ovf9728 Ordered   5  MetFORMIN HCl  MG Oral Tablet Extended Release 24 Hour; TAKE 2 TABLETS   DAILY WITH THE EVENING MEAL; Therapy: 76Pit5140 to (Evaluate:91Jfy4076)  Requested for: 99Rli3702; Last   Rx:07Pxs8431 Ordered   6  OneTouch Ultra Blue In [employer]; Test 3 times daily; Therapy: 27SHV5588 to (Last OU:22WOQ2519)  Requested for: 2017 Ordered   7  OneTouch Ultra System w/Device Kit; USE AS DIRECTED; Therapy: 90UGP6179 to (Last Rx:57Sko2592)  Requested for: 86Knk2898 Ordered    8   HumaLOG Mix 75/25 KwikPen (75-25) 100 UNIT/ML Subcutaneous Suspension Pen-injector; take 40 units before breakfast and 50 units before supper; Therapy: (Recorded:10Nov2016) to Recorded    Future Appointments    Date/Time Provider Specialty Site   07/12/2017 03:30 PM [facility]     Patient Care Team    Care Team Member Role Specialty Office Number   Torri CHEEK  Specialist Endocrinology [phone]     Signatures   Electronically signed by :  Roger Arrington RN; May 15 2017 11:13AM EST                       (Author)

## 2018-01-14 VITALS
TEMPERATURE: 98.6 F | SYSTOLIC BLOOD PRESSURE: 140 MMHG | HEIGHT: 60 IN | DIASTOLIC BLOOD PRESSURE: 90 MMHG | WEIGHT: 230 LBS | RESPIRATION RATE: 20 BRPM | HEART RATE: 72 BPM | BODY MASS INDEX: 45.16 KG/M2

## 2018-01-14 VITALS
HEIGHT: 60 IN | DIASTOLIC BLOOD PRESSURE: 86 MMHG | SYSTOLIC BLOOD PRESSURE: 130 MMHG | WEIGHT: 228.01 LBS | HEART RATE: 74 BPM | BODY MASS INDEX: 44.76 KG/M2

## 2018-01-14 VITALS
BODY MASS INDEX: 45.55 KG/M2 | SYSTOLIC BLOOD PRESSURE: 144 MMHG | DIASTOLIC BLOOD PRESSURE: 100 MMHG | HEART RATE: 88 BPM | HEIGHT: 60 IN | WEIGHT: 232.01 LBS

## 2018-01-14 VITALS
WEIGHT: 230 LBS | BODY MASS INDEX: 45.16 KG/M2 | SYSTOLIC BLOOD PRESSURE: 136 MMHG | DIASTOLIC BLOOD PRESSURE: 74 MMHG | HEIGHT: 60 IN

## 2018-01-14 VITALS
HEIGHT: 60 IN | BODY MASS INDEX: 45.35 KG/M2 | SYSTOLIC BLOOD PRESSURE: 142 MMHG | DIASTOLIC BLOOD PRESSURE: 80 MMHG | WEIGHT: 231 LBS

## 2018-01-14 NOTE — CONSULTS
I am referring Kirt Fillers to you for further evaluation  My most recent evaluation follows:      Reason For Visit  Chronic kidney disease      History of Present Illness  I the pleasure of seeing Krysten Jasmine today in the renal clinic for continued management of her chronic kidney disease  She has developed some paresthesias pain of right arm after a cervical epidural  She is not seeing a new orthopedic doctor who prescribed her naproxen 500 mg twice a day starting today  She has not started the dose as of yet  I explained to her given her chronic kidney disease this could cause acute kidney injury  Her most recent blood work was from October 18 and a creatinine was stable at baseline 2 mg/dL  At her last visit I increased her valsartan for better blood pressure control  Aside from her paresthesias she's feeling fine no shortness of breath no chest pain and no swelling of legs  Review of Systems    Constitutional: fatigue and recent weight gain, but no fever, no chills and no anorexia  Integumentary: No complaints of skin rash  Gastrointestinal: No complains of abdominal pain, no constipation or diarrhea, no nausea or vomiting  Respiratory: No complaints of shortness of breath, no cough, no productive sputum  Cardiovascular: lower extremity edema, but no orthopnea, no PND, no chest pain and no palpitations  Musculoskeletal: No complaints of joint pain or swelling  Neurological: No complaints of headache, no lightheadedness or dizziness  Genitourinary: No dysuria, no hematuria, no nocturia, no urinary frequency, no incomplete emptying of bladder, no foamy urine  Eyes: No complaints of eyesight problems or dryness of eyes  ROS reviewed  Past Medical History    The active problems and past medical history were reviewed and updated today  Surgical History    The surgical history was reviewed and updated today  Family History    The family history was reviewed and updated today  Social History  The social history was reviewed and updated today  The social history was reviewed and is unchanged  Current Meds   1  BD Pen Needle Mini U/F 31G X 5 MM Miscellaneous; use 4 per day; Therapy: 15TMQ8459 to (Evaluate:30Zei1118); Last Rx:43Brn8512 Ordered   2  Furosemide 40 MG Oral Tablet; Take 1 tablet daily; Therapy: 00IEQ0437 to (Last Rx:29Gcc8203)  Requested for: 96Xak4027 Ordered   3  HumaLOG Mix 75/25 KwikPen (75-25) 100 UNIT/ML Subcutaneous Suspension   Pen-injector; take 40 units before breakfast and 50 units before supper   and 15 at bedtime; Therapy: (Recorded:13Gxk4409) to Recorded   4  Metoprolol Tartrate 25 MG Oral Tablet; take 1 tablet twice a day; Therapy: 33PXP8120 to (Last Rx:12Rcl8979)  Requested for: 84Etk6401 Ordered   5  [employer] Blue In [employer]; Test 3 times daily; Therapy: 20HWO2855 to (Last AT:51CZR6600)  Requested for: 02Jan2017 Ordered   6  OneTouch Ultra System w/Device Kit; USE AS DIRECTED; Therapy: 00REX5724 to (Last Rx:96Hwz3281)  Requested for: 61Opg8385 Ordered   7  Valsartan 160 MG Oral Tablet; 1 tab twice per day *dose increased by nephrologist*;   Therapy: 62QPE4662 to (Evaluate:31Oct2017)  Requested for: 17YYR4050; Last   NT:72HAC7767 Ordered    The medication list was reviewed and updated today  Allergies    1  No Known Drug Allergies    Vitals   Recorded: 43TBU7936 04:15PM Recorded: 80WZB9368 10:26MZ   Systolic 048    Diastolic 70    Height  5 ft    Weight  229 lb    BMI Calculated  44 72   BSA Calculated  1 98     Physical Exam    Constitutional: General appearance: No acute distress, well appearing and well nourished  ENT: External ears and nose appear normal      Eyes: Anicteric sclerae  Neck: No bruit heard over either carotid  JVD:  No JVD present  Pulmonary: Respiratory effort: No increased work of breathing or signs of respiratory distress  Auscultation of lungs: Clear to auscultation      Cardiovascular: Auscultation of heart: Normal rate and rhythm, normal S1 and S2, without murmurs  Abdomen: Non-tender, no masses  Extremities: No cyanosis, clubbing or edema  Pulses: Dorsalis Pedis and Posterior Tibial pulses normal    Rash: No rash present  Neurologic: Non Focal      Psychiatric: Orientation to person, place, and time: Normal   and Mood and affect: Normal     Back: No CVA tenderness  Results/Data  I personally reviewed the films/images/results in the office today  My interpretation follows  Assessment    1  Chronic kidney disease, stage 3 (585 3) (N18 3)   2  Proteinuria (791 0) (R80 9)   3  Benign hypertension with chronic kidney disease, stage III (403 10,585 3) (I12 9,N18 3)    Plan  Chronic kidney disease, stage 3    · (1) CBC/ PLT (NO DIFF); Status:Active; Requested for:01Feb2018; Perform:[facility]; RTY:50OKZ8494;MUCEJLZ; For:Chronic kidney disease, stage 3; Ordered By:Maria Teresa Lozano;   · (1) COMPREHENSIVE METABOLIC PANEL; Status:Active; Requested for:01Feb2018; Perform:[facility]; TKB:08TZG9927;AKSFAOB; For:Chronic kidney disease, stage 3; Ordered By:Maria Teresa Lozano;   · (1) URINE PROTEIN CREATININE RATIO; Status:Active; Requested for:01Feb2018; Perform:[facility]; TNE:57TUF3549;EXQVOHG; For:Chronic kidney disease, stage 3; Ordered By:Maria Teresa Lozano;   · Do not take anti-inflammatory medicines other than aspirin ; Status:Complete;   Done:  15ZWO7492   Ordered; For:Chronic kidney disease, stage 3; Ordered By:Maria Teresa Lozano;   · Restrict the salt in your diet by avoiding highly salted foods ; Status:Complete;   Done:  92EVG7870   Ordered; For:Chronic kidney disease, stage 3; Ordered By:Maria Teresa Lozano; Discussion/Summary    1 ) Chronic kidney disease stage IIIB with proteinuria  - Renal function currently stable at baseline at 2 0 mg/dL    - Presumed etiology is diabetic nephropathy  - Baseline creatinine is between 1 7-2 2 mg/dL  - Diabetic and blood pressure control  - Continue valsartan to 160 mg twice a day  - Her complements C3 and C4 were not low  - ANCA titer were negative  - Free light chain assay ratio was 1 04  Serum protein electrophoresis showed no monoclonal bands  - NADEEN was negative  - May need to find an alternative medication to metformin if her GFR is below 30 mL/min    2 ) Proteinuria  - See above, secondary to diabetic nephropathy  - The new blood pressure and diabetic control especially with an ARB  - Check a urine protein creatinine ratio at the next visit    3 ) Hypertension  - Aim to keep systolic blood pressure less than 130 given the proteinuria  - Patient is close to goal she slightly above a 138 but she is in some pain and cough that was used was smaller than her arm suggesting that it might be over estimating her blood pressure  - Continue valsartan 160 mg twice a day  - Low 2 g sodium diet  - Furosemide 40 mg daily  - Metoprolol 25 mg twice a day    4 ) Diabetes mellitus type 2  - Management as per endocrinology  - Hemoglobin A1c is 8 6  The patient was counseled regarding diagnostic results, instructions for management, risk factor reductions, prognosis, patient and family education, impressions, risks and benefits of treatment options, importance of compliance with treatment  total time of encounter was 30 minutes  Possible side effects of new medications were reviewed with the patient/guardian today  The treatment plan was reviewed with the patient/guardian  The patient/guardian understands and agrees with the treatment plan   She has no barriers to learning  CKD Teaching includes NFK Guidelines, hypertension management, Avoid nephrotoxic medication, dietician counseling, sodium restriction and fluid management  Current Patient Status: no worsening of renal function  She needs a follow up session in three months   Discussed with the patient      Health Management   * MAMMO SCREENING BILATERAL W CAD; every 2 years;  Last 58VSE3468; Next Due: 41JCK2975; Active    Thank you very much for seeing this patient  If you have any questions, please do not hesitate to contact me        Future Appointments    Signatures   Electronically signed by : CASSIUS Cabezas ; Nov 13 2017  4:22PM EST                       (Author)

## 2018-01-14 NOTE — RESULT NOTES
Verified Results  ([facility]) Pap IG, HPV-hr 13GKC6089 12:00AM James, Aura Melany   No  of containers  Marialuisa Carpio 01 CYTYC Thin Prep Vial     Test Name Result Flag Reference   DIAGNOSIS: Comment     NEGATIVE FOR INTRAEPITHELIAL LESION AND MALIGNANCY  THE CYTOLOGY PROCESSING WAS PERFORMED AT THE [facility] LOCATED AT  [facility], [address] [place] [address], [address] [place] [id number]  Specimen adequacy: Comment     Satisfactory for evaluation  Endocervical and/or squamous metaplastic  cells (endocervical component) are present  Clinician provided ICD10: Comment     Z01 419   Performed by: Cherelle Avalos, Cytotechnologist (ASCP)     Marialuisa Carpio Note: Comment     The Pap smear is a screening test designed to aid in the detection of  premalignant and malignant conditions of the uterine cervix  It is not a  diagnostic procedure and should not be used as the sole means of detecting  cervical cancer  Both false-positive and false-negative reports do occur  Test Methodology: Comment     This liquid based ThinPrep(R) pap test was screened with the  use of an image guided system  HPV, high-risk Negative  Negative   This high-risk HPV test detects thirteen high-risk types  (16/18/31/33/35/39/45/51/52/56/58/59/68) without differentiation  Discussion/Summary   Rolinda Morning,   Your pap smear is normal and HPV negative     Dr Cathleen Harris

## 2018-01-15 VITALS
DIASTOLIC BLOOD PRESSURE: 70 MMHG | SYSTOLIC BLOOD PRESSURE: 138 MMHG | BODY MASS INDEX: 44.96 KG/M2 | HEIGHT: 60 IN | WEIGHT: 229 LBS

## 2018-01-15 NOTE — RESULT NOTES
Message   Creatinine is stable  Otherwise normal lab work  Verified Results  (1) BASIC METABOLIC PROFILE 51OLS8569 09:10AM Amy Edwards     Test Name Result Flag Reference   GLUCOSE,RANDM 81 mg/dL     If the patient is fasting, the ADA then defines impaired fasting glucose as > 100 mg/dL and diabetes as > or equal to 123 mg/dL  SODIUM 137 mmol/L  136-145   POTASSIUM 4 7 mmol/L  3 5-5 3   CHLORIDE 102 mmol/L  100-108   CARBON DIOXIDE 26 mmol/L  21-32   ANION GAP (CALC) 9 mmol/L  4-13   BLOOD UREA NITROGEN 33 mg/dL H 5-25   CREATININE 1 62 mg/dL H 0 60-1 30   Standardized to IDMS reference method   CALCIUM 9 0 mg/dL  8 3-10 1   eGFR Non-African American 32 5 ml/min/1 73sq [facility] [employer] Disease Education Program recommendations are as follows:  GFR calculation is accurate only with a steady state creatinine  Chronic Kidney disease less than 60 ml/min/1 73 sq  meters  Kidney failure less than 15 ml/min/1 73 sq  meters
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
## 2018-01-15 NOTE — PROGRESS NOTES
Assessment    1  Encounter for preventive health examination (V70 0) (Z00 00)   2  Encounter for routine pelvic examination (V72 31) (Z01 419)   3  Need for vaccination for DTP (V06 1) (Z23)   4  Never a smoker   5  Obesity (278 00) (E66 9)   6  Encounter for screening mammogram for malignant neoplasm of breast (V76 12)   (Z12 31)   7  Encounter for screening for malignant neoplasm of colon (V76 51) (Z12 11)    Plan  Benign essential hypertension    · Furosemide 40 MG Oral Tablet (Lasix); take one tablet by mouth every day   · Metoprolol Tartrate 25 MG Oral Tablet; TAKE ONE TABLET BY MOUTH TWICE A  DAY   · Follow-up visit in 6 months Evaluation and Treatment  Follow-up  Status: Hold For -  Scheduling  Requested for: 27UKH6984  Encounter for routine pelvic examination    · (LC) Pap IG, HPV-hr; Status: In Progress - Specimen/Data Collected;   Done: 04ZTM6192  Encounter for screening for malignant neoplasm of colon    · DIGITAL RECTAL EXAM; Status:Complete;   Done: 06LPR8162 03:54PM   · Hemoccult Screening - POC; Status:Complete;   Done: 10LWY6884 03:53PM  Encounter for screening mammogram for malignant neoplasm of breast    · * MAMMO SCREENING BILATERAL W CAD; Status:Active; Requested for:25Mar2016;   Need for vaccination for DTP    · Adacel 5-2-15 5 LF-MCG/0 5 Intramuscular Suspension  Obesity    · Begin a limited exercise program ; Status:Complete;   Done: 80QUF5525   · We recommend that you bring your body mass index down to 26 ; Status:Complete;    Done: 16GEJ3874    Discussion/Summary  health maintenance visit healthy adult female HPV and Pap Co-testing Done Today The immunizations will be given as outlined in the orders  Chief Complaint  cpe      History of Present Illness  HM, Adult Female: The patient is being seen for a health maintenance and gynecology evaluation  General Health: The patient's health since the last visit is described as good  Lifestyle:  She does not have a healthy diet   She has weight concerns  She exercises regularly  walking regularly  Reproductive health: the patient is postmenopausal    Screening:      Review of Systems    Constitutional: No fever, no chills, feels well, no tiredness, no recent weight gain or weight loss  ENT: no complaints of earache, no loss of hearing, no nose bleeds, no nasal discharge, no sore throat, no hoarseness  Cardiovascular: No complaints of slow heart rate, no fast heart rate, no chest pain, no palpitations, no leg claudication, no lower extremity edema  Musculoskeletal: No complaints of arthralgias, no myalgias, no joint swelling or stiffness, no limb pain or swelling  Integumentary: a rash  Active Problems    1  Benign essential hypertension (401 1) (I10)   2  Benign hypertension with chronic kidney disease, stage III (403 10,585 3) (I12 9,N18 3)   3  Calcaneal spur (726 73) (M77 30)   4  Chronic kidney disease, stage 3 (585 3) (N18 3)   5  De Quervain's tenosynovitis (727 04) (M65 4)   6  Diabetes mellitus, type 2 (250 00) (E11 9)   7  Edema (782 3) (R60 9)   8  Encounter for routine pelvic examination (V72 31) (Z01 419)   9  Encounter for screening for malignant neoplasm of colon (V76 51) (Z12 11)   10  Encounter for screening mammogram for malignant neoplasm of breast (V76 12)    (Z12 31)   11  Encounter for screening mammogram for malignant neoplasm of breast (V76 12)    (Z12 31)   12  Gout (274 9) (M10 9)   13  Hyperlipidemia (272 4) (E78 5)   14  Hypothyroidism (244 9) (E03 9)   15  Noncompliance with treatment plan (V15 81) (Z91 11)   16  Obesity (278 00) (E66 9)   17  Obstructive sleep apnea (327 23) (G47 33)   18  Pain in joint of left wrist (719 43) (M25 532)   19  Proteinuria (791 0) (R80 9)   20  Shortness of breath (786 05) (R06 02)   21   Type 2 diabetes, uncontrolled, with renal manifestation (250 42) (E11 29,E11 65)    Past Medical History    · History of Difficulty breathing (786 09) (R06 89)   · History of acute bronchitis (V12 69) (Z87 09)   · History of shortness of breath (V13 89) (Z87 898)   · History of shortness of breath (V13 89) (Z87 898)   · History of Pneumonia (V12 61)   · History of Tracheobronchitis (490) (J40)   · History of Urinary Tract Infection (V13 02)    Surgical History    · History of  Section   · History of  Section   · History of Nose Surgery    Family History    · Family history of Diabetes Mellitus (V18 0)   · Family history of Generalized Anxiety Disorder   · Family history of Hypertension (V17 49)    · Family history of Renal Failure   · Family history of Reported Family History Of Kidney Disease    · Family history of Heart Disease (V17 49)    Social History    · Denied: History of Alcohol Use (History)   · Caffeine Use   · Denied: History of Drug Use   · Never a smoker    Current Meds   1  Atorvastatin Calcium 20 MG Oral Tablet; Take 1 tablet daily; Therapy: 64ZMD1076 to (Evaluate:2016); Last Rx:2015 Ordered   2  BD Pen Needle Mini U/F 31G X 5 MM Miscellaneous; use 4 per day; Therapy: 15YBM0208 to (Evaluate:44Xmz5463); Last Rx:42Fdu5476 Ordered   3  Furosemide 40 MG Oral Tablet; take one tablet by mouth every day; Therapy: 44UMQ4157 to (Jacoby Rubinstein)  Requested for: 33JVH9447; Last   Rx:87Nyp0077 Ordered   4  HumaLOG Mix 75/25 KwikPen (75-25) 100 UNIT/ML Subcutaneous Suspension   Pen-injector; 25 units before breakfast and supper; Therapy: 2014 to  Requested for: 82RKB4684 Recorded   5  Metoprolol Tartrate 25 MG Oral Tablet; TAKE ONE TABLET BY MOUTH TWICE A DAY; Therapy: 47WLH3361 to (Joo Arshad)  Requested for: 94BHI4276; Last   Rx:80Udl5851 Ordered   6  [employer]; Test 3 times daily; Therapy: 90RFJ7791 to (Last Rx:93Rgg5880)  Requested for: 13Hyy8943 Ordered   7  OneTouch Ultra System w/Device Kit; USE AS DIRECTED; Therapy: 85MYI4163 to (Last Rx:14Uio1682)  Requested for: 60Rvg7307 Ordered   8   Valsartan 160 MG Oral Tablet; Take 1 tablet daily; Therapy: 63YVL5502 to (Last Rx:07Mar2016)  Requested for: 24OVU5658 Ordered    Allergies    1  No Known Drug Allergies    Vitals   Recorded: 55NGB3063 03:09PM   Temperature 98 5 F   Heart Rate 84   Respiration 20   Systolic 763   Diastolic 86   Height 5 ft    Weight 227 lb    BMI Calculated 44 33   BSA Calculated 1 96     Physical Exam    Constitutional   General appearance: No acute distress, well appearing and well nourished  Ears, Nose, Mouth, and Throat   External inspection of ears and nose: Normal     Otoscopic examination: Tympanic membranes translucent with normal light reflex  Canals patent without erythema  Oropharynx: Normal with no erythema, edema, exudate or lesions  Neck   Neck: Supple, symmetric, trachea midline, no masses  Pulmonary   Auscultation of lungs: Clear to auscultation  Cardiovascular   Auscultation of heart: Normal rate and rhythm, normal S1 and S2, no murmurs  Abdomen   Abdomen: Non-tender, no masses  Results/Data  DIGITAL RECTAL EXAM 22SGR4916 03:54PM [facility]     Test Name Result Flag Reference   DIGITAL RECTAL EXAM [id number]       Hemoccult Screening - POC 40XDX0310 03:53PM [facility]     Test Name Result Flag Reference   Hemoccult Negative       (1) T4, FREE 91VGZ4748 04:16PM Rachid Goldberg     Test Name Result Flag Reference   T4,FREE 0 98 ng/dL  0 76-1 46     (1) COMPREHENSIVE METABOLIC PANEL 02VSI0191 59:64PU Rachid Hung   National Kidney Disease Education Program recommendations are as follows:  GFR calculation is accurate only with a steady state creatinine  Chronic Kidney disease less than 60 ml/min/1 73 sq  meters  Kidney failure less than 15 ml/min/1 73 sq  meters  Test Name Result Flag Reference   GLUCOSE,RANDM 88 mg/dL     If the patient is fasting, the ADA then defines impaired fasting glucose as > 100 mg/dL and diabetes as > or equal to 123 mg/dL     SODIUM 142 mmol/L  136-145   POTASSIUM 4 4 mmol/L  3 5-5 3   CHLORIDE 109 mmol/L H 100-108   CARBON DIOXIDE 26 mmol/L  21-32   ANION GAP (CALC) 7 mmol/L  4-13   BLOOD UREA NITROGEN 35 mg/dL H 5-25   CREATININE 1 74 mg/dL H 0 60-1 30   CALCIUM 8 4 mg/dL  8 3-10 1   BILI, TOTAL 0 79 mg/dL  0 20-1 00   ALK PHOSPHATAS 98 U/L     ALT (SGPT) 23 U/L  12-78   AST(SGOT) 16 U/L  5-45   ALBUMIN 3 0 g/dL L 3 5-5 0   TOTAL PROTEIN 6 7 g/dL  6 4-8 2   eGFR Non-African American 29 9 ml/min/1 73sq m       (1) LIPID PANEL, FASTING 60MCR0425 04:16PM Noah Rivera   Triglyceride:         Normal              <150 mg/dl       Borderline High    150-199 mg/dl       High               200-499 mg/dl       Very High          >499 mg/dl  Cholesterol:         Desirable        <200 mg/dl      Borderline High  200-239 mg/dl      High             >239 mg/dl  HDL Cholesterol:        High    >59 mg/dL      Low     <41 mg/dL  LDL CALCULATED:    This screening LDL is a calculated result  It does not have the accuracy of the Direct Measured LDL in the monitoring of patients with hyperlipidemia and/or statin therapy  Direct Measure LDL (SGU844) must be ordered separately in these patients  Test Name Result Flag Reference   CHOLESTEROL 188 mg/dL     HDL,DIRECT 60 mg/dL  40-60   LDL CHOLESTEROL CALCULATED 98 mg/dL  0-100   TRIGLYCERIDES 150 mg/dL  <=150     (1) TSH 50YHO1590 04:16PM Noah Rivera   Patients undergoing fluorescein dye angiography may retain small amounts of fluorescein in the body for 48-72 hours post procedure  Samples containing fluorescein can produce falsely depressed TSH values  If the patient had this procedure,a specimen should be resubmitted post fluorescein clearance          The recommended reference ranges for TSH during pregnancy are as follows:  First trimester 0 1 to 2 5 uIU/mL  Second trimester  0 2 to 3 0 uIU/mL  Third trimester 0 3 to 3 0 uIU/m     Test Name Result Flag Reference   TSH 3 020 uIU/mL  0 358-3 740     (1) HEMOGLOBIN A1C 11YZS0804 04:16PM Kandice RiveraKeriLorene   5 7-6 4% impaired fasting glucose  >=6 5% diagnosis of diabetes    Falsely low levels are seen in conditions linked to short RBC life span-  hemolytic anemia, and splenomegaly  Falsely elevated levels are seen in situations where there is an increased production of RBC- receipt of erythropoietin or blood transfusions  Adopted from ADA-Clinical Practice Recommendations     Test Name Result Flag Reference   HEMOGLOBIN A1C 7 7 % H 4 0-5 6   EST  AVG  GLUCOSE 174 mg/dl         Procedure    Procedure: Indication: routine screening  Results: 20/25 in both eyes with corrective device, 20/25 in the right eye with corrective device, 20/30 in the left eye with corrective device   Color vision was and the results were normal       Health Management  Encounter for screening mammogram for malignant neoplasm of breast   Mammo Screen B/L; every 1 year; Last 78FDF5433; Next Due: 09ARM0504; Overdue    Future Appointments    Date/Time Provider Specialty Site   06/20/2016 10:30 AM CASSIUS Jama   Endocrinology [address] [facility] ENDOCRINOLOGY     Signatures   Electronically signed by : Paula Steele DO; Mar 25 2016  5:00PM EST                       (Author)
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
## 2018-01-17 NOTE — RESULT NOTES
Message   A1C has improved a little bit  LDL is at goal   Creatinine has increased  Recommend increasing water intake by 8 oz per day  Check BMP in 10 days  Verified Results  (1) COMPREHENSIVE METABOLIC PANEL 23PYU7517 83:37YT Shashank Beal     Test Name Result Flag Reference   GLUCOSE,RANDM 86 mg/dL     If the patient is fasting, the ADA then defines impaired fasting glucose as > 100 mg/dL and diabetes as > or equal to 123 mg/dL  SODIUM 140 mmol/L  136-145   POTASSIUM 4 4 mmol/L  3 5-5 3   CHLORIDE 104 mmol/L  100-108   CARBON DIOXIDE 27 mmol/L  21-32   ANION GAP (CALC) 9 mmol/L  4-13   BLOOD UREA NITROGEN 52 mg/dL H 5-25   CREATININE 2 52 mg/dL H 0 60-1 30   Standardized to IDMS reference method   CALCIUM 8 4 mg/dL  8 3-10 1   BILI, TOTAL 0 70 mg/dL  0 20-1 00   ALK PHOSPHATAS 91 U/L     ALT (SGPT) 26 U/L  12-78   AST(SGOT) 16 U/L  5-45   ALBUMIN 2 9 g/dL L 3 5-5 0   TOTAL PROTEIN 6 9 g/dL  6 4-8 2   eGFR Non-African American 19 5 ml/min/1 73sq m     [facility] Energy Disease Education Program recommendations are as follows:  GFR calculation is accurate only with a steady state creatinine  Chronic Kidney disease less than 60 ml/min/1 73 sq  meters  Kidney failure less than 15 ml/min/1 73 sq  meters  (1) LIPID PANEL, FASTING 77Tor3612 09:35AM Shashank Beal     Test Name Result Flag Reference   CHOLESTEROL 132 mg/dL     HDL,DIRECT 63 mg/dL H 40-60   Specimen collection should occur prior to Metamizole administration due to the potential for falsely depressed results     LDL CHOLESTEROL CALCULATED 51 mg/dL  0-100   Triglyceride:         Normal              <150 mg/dl       Borderline High    150-199 mg/dl       High               200-499 mg/dl       Very High          >499 mg/dl  Cholesterol:         Desirable        <200 mg/dl      Borderline High  200-239 mg/dl      High             >239 mg/dl  HDL Cholesterol:        High    >59 mg/dL      Low     <41 mg/dL  LDL CALCULATED:    This screening LDL is a calculated result  It does not have the accuracy of the Direct Measured LDL in the monitoring of patients with hyperlipidemia and/or statin therapy  Direct Measure LDL (QBY116) must be ordered separately in these patients  TRIGLYCERIDES 89 mg/dL  <=150   Specimen collection should occur prior to N-Acetylcysteine or Metamizole administration due to the potential for falsely depressed results  (1) HEMOGLOBIN A1C 35NKQ3470 09:35AM Shashank Beal     Test Name Result Flag Reference   HEMOGLOBIN A1C 8 4 % H 4 2-6 3   EST  AVG   GLUCOSE 194 mg/dl

## 2018-01-18 NOTE — RESULT NOTES
Discussion/Summary   Gail Snyder,   Your left ovary shows a 1 cm simple cyst and it does not require any follow up  Dr Blanca Echavarria ([address] [place] [address]) 67Rvb6062 08:08AM Eliana Masters Order Number: [id number]    - Patient Instructions: To schedule this appointment, please contact Central Scheduling at [phone]  Test Name Result Flag Reference   US PELVIS COMPLETE (TRANSABDOMINAL AND TRANSVAGINAL) (Report)     PELVIC ULTRASOUND, COMPLETE     INDICATION: 71-year-old female for follow-up ovarian cyst seen on outside study  Patient is postmenopausal      COMPARISON: None  TECHNIQUE:  Transabdominal pelvic ultrasound was performed in sagittal and transverse planes with a curvilinear transducer  Additional transvaginal imaging was performed to better evaluate the endometrium and ovaries  Imaging included volumetric    sweeps as well as traditional still imaging technique  FINDINGS:     UTERUS:   The uterus is anteverted in position, measuring 6 0 x 3 3 x 3 5 cm  Contour is normal but echotexture is diffusely heterogeneous  The cervix shows no suspicious abnormality  ENDOMETRIUM:    Normal caliber of 4 3 mm  Homogenous and normal in appearance  OVARIES/ADNEXA:   Right ovary: 2 4 x 1 5 x 2 1 cm  No suspicious right ovarian abnormality  Doppler flow within normal limits  Left ovary: 2 6 x 1 1 x 1 6 cm  No suspicious left ovarian abnormality  A tiny simple cyst in the periphery ovary measures 1 cm  According to the consensus conference statement from the Society of Radiologists in Ultrasound (Radiology:Volume 256: Number 3-September 2010  pp [phone] )    this lesion has imaging features of a simple cyst  In this late postmenopausal woman, this is almost certainly benign and requires no followup  Doppler flow within normal limits  No suspicious adnexal mass or loculated collections  There is no free fluid  IMPRESSION:        1  Heterogeneous uterine echotexture with no discrete masses  2  Normal endometrium  3  1 cm simple left ovarian cyst, a benign finding  No further surveillance required            Workstation performed: [id number]     Signed by:   Jovan Patel MD   5/1/17

## 2018-01-24 VITALS
BODY MASS INDEX: 43.59 KG/M2 | TEMPERATURE: 98 F | HEART RATE: 86 BPM | DIASTOLIC BLOOD PRESSURE: 90 MMHG | HEIGHT: 60 IN | WEIGHT: 222 LBS | RESPIRATION RATE: 20 BRPM | SYSTOLIC BLOOD PRESSURE: 146 MMHG

## 2018-01-29 DIAGNOSIS — E11.65 TYPE 2 DIABETES MELLITUS WITH HYPERGLYCEMIA, WITH LONG-TERM CURRENT USE OF INSULIN (HCC): Primary | ICD-10-CM

## 2018-01-29 DIAGNOSIS — Z79.4 TYPE 2 DIABETES MELLITUS WITH HYPERGLYCEMIA, WITH LONG-TERM CURRENT USE OF INSULIN (HCC): Primary | ICD-10-CM

## 2018-01-30 RX ORDER — INSULIN LISPRO 100 [IU]/ML
INJECTION, SUSPENSION SUBCUTANEOUS
Qty: 150 ML | Refills: 2 | Status: SHIPPED | OUTPATIENT
Start: 2018-01-30 | End: 2018-02-07 | Stop reason: SDUPTHER

## 2018-02-01 DIAGNOSIS — N18.30 CHRONIC KIDNEY DISEASE, STAGE III (MODERATE) (HCC): ICD-10-CM

## 2018-02-07 DIAGNOSIS — Z79.4 TYPE 2 DIABETES MELLITUS WITH HYPERGLYCEMIA, WITH LONG-TERM CURRENT USE OF INSULIN (HCC): ICD-10-CM

## 2018-02-07 DIAGNOSIS — E11.65 TYPE 2 DIABETES MELLITUS WITH HYPERGLYCEMIA, WITH LONG-TERM CURRENT USE OF INSULIN (HCC): ICD-10-CM

## 2018-02-07 RX ORDER — INSULIN LISPRO 100 [IU]/ML
40 INJECTION, SUSPENSION SUBCUTANEOUS EVERY 6 HOURS
Qty: 150 ML | Refills: 5 | Status: SHIPPED | OUTPATIENT
Start: 2018-02-07 | End: 2018-02-08 | Stop reason: SDUPTHER

## 2018-02-08 ENCOUNTER — TELEPHONE (OUTPATIENT)
Dept: ENDOCRINOLOGY | Facility: CLINIC | Age: 63
End: 2018-02-08

## 2018-02-08 DIAGNOSIS — Z79.4 TYPE 2 DIABETES MELLITUS WITH HYPERGLYCEMIA, WITH LONG-TERM CURRENT USE OF INSULIN (HCC): ICD-10-CM

## 2018-02-08 DIAGNOSIS — E11.65 TYPE 2 DIABETES MELLITUS WITH HYPERGLYCEMIA, WITH LONG-TERM CURRENT USE OF INSULIN (HCC): ICD-10-CM

## 2018-02-08 RX ORDER — INSULIN LISPRO 100 [IU]/ML
40 INJECTION, SUSPENSION SUBCUTANEOUS EVERY 6 HOURS
Qty: 150 ML | Refills: 0 | Status: SHIPPED | OUTPATIENT
Start: 2018-02-08 | End: 2018-02-15 | Stop reason: SDUPTHER

## 2018-02-08 NOTE — TELEPHONE ENCOUNTER
PT CALLED YESTERDAY FOR REFILL ON HUMOLOG 75/25 AND WAS SENT TO [facility]  74  IT SHOULD BE SENT TO [facility]

## 2018-02-08 NOTE — TELEPHONE ENCOUNTER
I [facility] earlier today when you sent me the msg this morning  Can you let patient know?  Thanks

## 2018-02-15 DIAGNOSIS — Z79.4 TYPE 2 DIABETES MELLITUS WITH HYPERGLYCEMIA, WITH LONG-TERM CURRENT USE OF INSULIN (HCC): ICD-10-CM

## 2018-02-15 DIAGNOSIS — E11.65 TYPE 2 DIABETES MELLITUS WITH HYPERGLYCEMIA, WITH LONG-TERM CURRENT USE OF INSULIN (HCC): ICD-10-CM

## 2018-02-15 RX ORDER — INSULIN LISPRO 100 [IU]/ML
40 INJECTION, SUSPENSION SUBCUTANEOUS EVERY 6 HOURS
Qty: 150 ML | Refills: 0 | Status: ON HOLD | OUTPATIENT
Start: 2018-02-15 | End: 2018-07-15

## 2018-02-15 NOTE — TELEPHONE ENCOUNTER
Patient called back stating [facility] never received the refill  I verified that we made a mistake and sent the script to print and not electronic submission  Let patient know that we rectified the refill and offered samples in the meantime

## 2018-03-09 DIAGNOSIS — N18.31 CHRONIC KIDNEY DISEASE (CKD) STAGE G3A/A2, MODERATELY DECREASED GLOMERULAR FILTRATION RATE (GFR) BETWEEN 45-59 ML/MIN/1.73 SQUARE METER AND ALBUMINURIA CREATININE RATIO BETWEEN 30-299 MG/G (HCC): Primary | ICD-10-CM

## 2018-03-09 DIAGNOSIS — R80.9 PROTEINURIA, UNSPECIFIED TYPE: ICD-10-CM

## 2018-03-13 ENCOUNTER — TELEPHONE (OUTPATIENT)
Dept: FAMILY MEDICINE CLINIC | Facility: CLINIC | Age: 63
End: 2018-03-13

## 2018-03-13 DIAGNOSIS — Z12.31 SCREENING MAMMOGRAM, ENCOUNTER FOR: Primary | ICD-10-CM

## 2018-03-13 DIAGNOSIS — I10 HYPERTENSION, UNSPECIFIED TYPE: Primary | ICD-10-CM

## 2018-03-13 RX ORDER — VALSARTAN 160 MG/1
TABLET ORAL
Qty: 180 TABLET | Refills: 1 | Status: SHIPPED | OUTPATIENT
Start: 2018-03-13 | End: 2018-04-13 | Stop reason: SDUPTHER

## 2018-03-31 ENCOUNTER — APPOINTMENT (OUTPATIENT)
Dept: LAB | Facility: HOSPITAL | Age: 63
End: 2018-03-31
Attending: INTERNAL MEDICINE
Payer: COMMERCIAL

## 2018-03-31 ENCOUNTER — TRANSCRIBE ORDERS (OUTPATIENT)
Dept: ADMINISTRATIVE | Facility: HOSPITAL | Age: 63
End: 2018-03-31

## 2018-03-31 DIAGNOSIS — N18.30 CHRONIC KIDNEY DISEASE, STAGE III (MODERATE) (HCC): ICD-10-CM

## 2018-03-31 DIAGNOSIS — R80.9 PROTEINURIA, UNSPECIFIED TYPE: ICD-10-CM

## 2018-03-31 DIAGNOSIS — N18.30 CHRONIC KIDNEY DISEASE, STAGE III (MODERATE) (HCC): Primary | ICD-10-CM

## 2018-03-31 LAB
ANION GAP SERPL CALCULATED.3IONS-SCNC: 9 MMOL/L (ref 4–13)
BUN SERPL-MCNC: 37 MG/DL (ref 5–25)
CALCIUM SERPL-MCNC: 9.1 MG/DL (ref 8.3–10.1)
CHLORIDE SERPL-SCNC: 105 MMOL/L (ref 100–108)
CO2 SERPL-SCNC: 27 MMOL/L (ref 21–32)
CREAT SERPL-MCNC: 2.48 MG/DL (ref 0.6–1.3)
CREAT UR-MCNC: 92.4 MG/DL
ERYTHROCYTE [DISTWIDTH] IN BLOOD BY AUTOMATED COUNT: 16.4 % (ref 11.6–15.1)
GFR SERPL CREATININE-BSD FRML MDRD: 20 ML/MIN/1.73SQ M
GLUCOSE P FAST SERPL-MCNC: 98 MG/DL (ref 65–99)
HCT VFR BLD AUTO: 38 % (ref 37–47)
HGB BLD-MCNC: 12.2 G/DL (ref 12–16)
MCH RBC QN AUTO: 26.9 PG (ref 27–31)
MCHC RBC AUTO-ENTMCNC: 32.2 G/DL (ref 31.4–37.4)
MCV RBC AUTO: 84 FL (ref 82–98)
PLATELET # BLD AUTO: 205 THOUSANDS/UL (ref 130–400)
PMV BLD AUTO: 9.9 FL (ref 8.9–12.7)
POTASSIUM SERPL-SCNC: 4.1 MMOL/L (ref 3.5–5.3)
PROT UR-MCNC: 606 MG/DL
PROT/CREAT UR: 6.56 MG/G{CREAT} (ref 0–0.1)
RBC # BLD AUTO: 4.55 MILLION/UL (ref 4.2–5.4)
SODIUM SERPL-SCNC: 141 MMOL/L (ref 136–145)
WBC # BLD AUTO: 7.4 THOUSAND/UL (ref 4.8–10.8)

## 2018-03-31 PROCEDURE — 80048 BASIC METABOLIC PNL TOTAL CA: CPT

## 2018-03-31 PROCEDURE — 84156 ASSAY OF PROTEIN URINE: CPT

## 2018-03-31 PROCEDURE — 36415 COLL VENOUS BLD VENIPUNCTURE: CPT

## 2018-03-31 PROCEDURE — 85027 COMPLETE CBC AUTOMATED: CPT

## 2018-03-31 PROCEDURE — 82570 ASSAY OF URINE CREATININE: CPT

## 2018-04-07 ENCOUNTER — HOSPITAL ENCOUNTER (OUTPATIENT)
Dept: RADIOLOGY | Facility: HOSPITAL | Age: 63
Discharge: HOME | End: 2018-04-07
Payer: COMMERCIAL

## 2018-04-07 DIAGNOSIS — Z12.31 SCREENING MAMMOGRAM, ENCOUNTER FOR: ICD-10-CM

## 2018-04-07 PROCEDURE — 77067 SCR MAMMO BI INCL CAD: CPT

## 2018-04-13 ENCOUNTER — OFFICE VISIT (OUTPATIENT)
Dept: NEPHROLOGY | Facility: CLINIC | Age: 63
End: 2018-04-13
Payer: COMMERCIAL

## 2018-04-13 VITALS
WEIGHT: 221 LBS | SYSTOLIC BLOOD PRESSURE: 118 MMHG | HEIGHT: 60 IN | BODY MASS INDEX: 43.39 KG/M2 | DIASTOLIC BLOOD PRESSURE: 68 MMHG

## 2018-04-13 DIAGNOSIS — R80.9 NEPHROTIC RANGE PROTEINURIA: ICD-10-CM

## 2018-04-13 DIAGNOSIS — N18.30 CKD (CHRONIC KIDNEY DISEASE) STAGE 3, GFR 30-59 ML/MIN (HCC): ICD-10-CM

## 2018-04-13 DIAGNOSIS — I10 ESSENTIAL HYPERTENSION: Primary | ICD-10-CM

## 2018-04-13 DIAGNOSIS — R79.89 ELEVATED SERUM CREATININE: ICD-10-CM

## 2018-04-13 PROCEDURE — 99214 OFFICE O/P EST MOD 30 MIN: CPT | Performed by: INTERNAL MEDICINE

## 2018-04-13 RX ORDER — VALSARTAN 160 MG/1
1 TABLET ORAL 2 TIMES DAILY
COMMUNITY
Start: 2015-06-26 | End: 2018-07-15 | Stop reason: HOSPADM

## 2018-04-13 RX ORDER — ALBUTEROL SULFATE 90 UG/1
2 AEROSOL, METERED RESPIRATORY (INHALATION) EVERY 4 HOURS PRN
COMMUNITY
Start: 2017-06-02 | End: 2018-07-05 | Stop reason: SDUPTHER

## 2018-04-13 RX ORDER — FUROSEMIDE 40 MG/1
1 TABLET ORAL AS NEEDED
COMMUNITY
Start: 2014-09-22 | End: 2018-07-15 | Stop reason: HOSPADM

## 2018-04-13 RX ORDER — INSULIN LISPRO 100 [IU]/ML
INJECTION, SUSPENSION SUBCUTANEOUS
COMMUNITY
End: 2018-04-13 | Stop reason: ALTCHOICE

## 2018-04-13 NOTE — PATIENT INSTRUCTIONS
1  )  Low 2 g sodium diet    2 )  Monitor weights at home    3 )  Avoid NSAIDs    4 )  Monitor blood pressure at home, call if blood pressure greater than 150/90 persistently    5 ) Repeat Blood work and urine test in few weeks

## 2018-04-13 NOTE — PROGRESS NOTES
NEPHROLOGY OFFICE VISIT   Cornelio Flowers 66 y o  female MRN: [id number]  4/13/2018    Reason for Visit: CKD    ASSESSMENT and PLAN:    I had the pleasure of seeing [facility] today in the renal clinic for the continued management of chronic kidney disease  Since our last visit, there has been no ER visits or hospitilizations  He currently has no complaints at this time and is feeling well  Patient denies any chest pain, shortness of breath and swelling  The last blood work was done on March 2018, which we have reviewed together  She is currently having a cough with some congestion which she thinks may be a viral illness  Currently not on any antibiotics  No fevers  I reviewed her last endocrinology evaluation which was done last year where her diabetes was not controlled and thought to be secondary to noncompliance  The patient does admit to not being compliant with her medications but has been better at doing so in the last couple of weeks  Diet is not completely an ADA diet  She does report she is compliant with the valsartan  She does check her blood pressure at home but reports may not be accurate as it is high at home but well controlled at other physician's office  Her swelling has improved and she has lost 8 lb since her last visit  1 ) Chronic kidney disease stage IIIB with proteinuria  · Renal function is slightly worse with a creatinine up to 2 4, with worsening proteinuria  · Presumed etiology is diabetic nephropathy  I did discuss with her about obtaining a renal biopsy  She will think about it and will discussed at the next visit  · Baseline creatinine is between 1 7-2 2 mg/dL  · Diabetic and blood pressure control  · Continue valsartan to 160 mg twice a day  · Her complements C3 and C4 were not low  · ANCA titer were negative  · Free light chain assay ratio was 1 04   Serum protein electrophoresis showed no monoclonal bands  · NADEEN was negative  · Avoid metformin if the GFR is below 30 cc/min  · Repeat urine protein creatinine ratio and basic metabolic panel in 2 weeks     2 )  Nephrotic range Proteinuria  · Presumed to be secondary to diabetic nephropathy  Serologies have been negative  · Blood pressure at target  · Worsening proteinuria  Repeat  Discussed about obtaining a renal biopsy to confirm the diagnosis and to rule out any other renal pathologies     3 ) Hypertension  · Aim to keep systolic blood pressure less than 130 given the proteinuria  · Blood pressure at goal  · Continue valsartan 160 mg twice a day  · Low 2 g sodium diet  · Furosemide 40 mg daily  · Metoprolol 25 mg twice a day     4 ) Diabetes mellitus type 2  · Management as per endocrinology  · Hemoglobin A1c is 8 6  · Not at goal, medical noncompliance    PATIENT INSTRUCTIONS:    Patient Instructions   1 )  Low 2 g sodium diet    2 )  Monitor weights at home    3 )  Avoid NSAIDs    4 )  Monitor blood pressure at home, call if blood pressure greater than 150/90 persistently    5 ) Repeat Blood work and urine test in few weeks          Orders Placed This Encounter   Procedures    Protein / creatinine ratio, urine     Standing Status:   Future     Standing Expiration Date:   4/13/2019    Basic metabolic panel     This is a patient instruction: Patient fasting for 8 hours or longer recommended  Standing Status:   Future     Standing Expiration Date:   4/13/2019       OBJECTIVE:  Current Weight: Weight - Scale: 100 kg (221 lb)  Vitals:    04/13/18 1347   BP: 118/68   Weight: 100 kg (221 lb)   Height: 5' (1 524 m)    Body mass index is 43 16 kg/m²  REVIEW OF SYSTEMS:    Review of Systems   Constitutional: Positive for fatigue  Negative for activity change and fever  Respiratory: Positive for cough  Negative for chest tightness, shortness of breath and wheezing  Cardiovascular: Negative for chest pain and leg swelling  Gastrointestinal: Negative for abdominal pain, diarrhea, nausea and vomiting     Endocrine: Negative for polyuria  Genitourinary: Negative for difficulty urinating, dysuria, flank pain, frequency and urgency  Skin: Negative for rash  Neurological: Negative for dizziness, syncope, light-headedness and headaches  PHYSICAL EXAM:      Physical Exam   Constitutional: She is oriented to person, place, and time  She appears well-developed and well-nourished  No distress  HENT:   Head: Normocephalic and atraumatic  Eyes: Pupils are equal, round, and reactive to light  No scleral icterus  Neck: Normal range of motion  Neck supple  Cardiovascular: Normal rate, regular rhythm and normal heart sounds  Exam reveals no gallop and no friction rub  No murmur heard  Pulmonary/Chest: Effort normal  No respiratory distress  She has wheezes  She has no rales  She exhibits no tenderness  Abdominal: Soft  Bowel sounds are normal  She exhibits no distension  There is no tenderness  There is no rebound  Musculoskeletal: Normal range of motion  She exhibits edema  Neurological: She is alert and oriented to person, place, and time  Skin: No rash noted  She is not diaphoretic  Psychiatric: She has a normal mood and affect         Medications:    Current Outpatient Prescriptions:     albuterol (PROAIR HFA) 90 mcg/act inhaler, Inhale 2 puffs every 4 (four) hours as needed, Disp: , Rfl:     furosemide (LASIX) 40 mg tablet, Take 1 tablet by mouth as needed, Disp: , Rfl:     Insulin Lispro Prot & Lispro (HUMALOG MIX 75/25 KWIKPEN) (75-25) 100 UNIT/ML SUPN, Inject 40 Units as directed every 6 (six) hours, Disp: 150 mL, Rfl: 0    metoprolol tartrate (LOPRESSOR) 25 mg tablet, Take 1 tablet by mouth 2 (two) times a day, Disp: , Rfl:     ONE TOUCH ULTRA TEST test strip, , Disp: , Rfl:     valsartan (DIOVAN) 160 mg tablet, Take 1 tablet by mouth 2 (two) times a day, Disp: , Rfl:     Laboratory Results:        Results for orders placed or performed in visit on 28/78/54   Basic metabolic panel   Result Value Ref Range    Sodium 141 136 - 145 mmol/L    Potassium 4 1 3 5 - 5 3 mmol/L    Chloride 105 100 - 108 mmol/L    CO2 27 21 - 32 mmol/L    Anion Gap 9 4 - 13 mmol/L    BUN 37 (H) 5 - 25 mg/dL    Creatinine 2 48 (H) 0 60 - 1 30 mg/dL    Glucose, Fasting 98 65 - 99 mg/dL    Calcium 9 1 8 3 - 10 1 mg/dL    eGFR 20 ml/min/1 73sq m   CBC and Platelet   Result Value Ref Range    WBC 7 40 4 80 - 10 80 Thousand/uL    RBC 4 55 4 20 - 5 40 Million/uL    Hemoglobin 12 2 12 0 - 16 0 g/dL    Hematocrit 38 0 37 0 - 47 0 %    MCV 84 82 - 98 fL    MCH 26 9 (L) 27 0 - 31 0 pg    MCHC 32 2 31 4 - 37 4 g/dL    RDW 16 4 (H) 11 6 - 15 1 %    Platelets 786 423 - 650 Thousands/uL    MPV 9 9 8 9 - 12 7 fL   Protein / creatinine ratio, urine   Result Value Ref Range    Creatinine, Ur 92 4 mg/dL    Protein Urine Random 606 mg/dL    Prot/Creat Ratio, Ur 6 56 (H) 0 00 - 0 10

## 2018-07-05 ENCOUNTER — HOSPITAL ENCOUNTER (INPATIENT)
Facility: HOSPITAL | Age: 63
LOS: 10 days | Discharge: HOME | DRG: 871 | End: 2018-07-15
Attending: EMERGENCY MEDICINE | Admitting: INTERNAL MEDICINE
Payer: COMMERCIAL

## 2018-07-05 ENCOUNTER — TELEPHONE (OUTPATIENT)
Dept: FAMILY MEDICINE CLINIC | Facility: CLINIC | Age: 63
End: 2018-07-05

## 2018-07-05 ENCOUNTER — TRANSCRIBE ORDERS (OUTPATIENT)
Dept: ADMINISTRATIVE | Facility: HOSPITAL | Age: 63
End: 2018-07-05

## 2018-07-05 ENCOUNTER — HOSPITAL ENCOUNTER (OUTPATIENT)
Dept: RADIOLOGY | Facility: HOSPITAL | Age: 63
Discharge: HOME | DRG: 871 | End: 2018-07-05
Payer: COMMERCIAL

## 2018-07-05 ENCOUNTER — OFFICE VISIT (OUTPATIENT)
Dept: FAMILY MEDICINE CLINIC | Facility: CLINIC | Age: 63
End: 2018-07-05
Payer: COMMERCIAL

## 2018-07-05 VITALS
DIASTOLIC BLOOD PRESSURE: 100 MMHG | WEIGHT: 218 LBS | HEIGHT: 60 IN | TEMPERATURE: 100.2 F | RESPIRATION RATE: 20 BRPM | HEART RATE: 100 BPM | SYSTOLIC BLOOD PRESSURE: 178 MMHG | BODY MASS INDEX: 42.8 KG/M2

## 2018-07-05 DIAGNOSIS — N17.9 ACUTE RENAL FAILURE (HCC): ICD-10-CM

## 2018-07-05 DIAGNOSIS — R50.9 FEVER, UNSPECIFIED FEVER CAUSE: ICD-10-CM

## 2018-07-05 DIAGNOSIS — Z79.4 TYPE 2 DIABETES MELLITUS WITH HYPERGLYCEMIA, WITH LONG-TERM CURRENT USE OF INSULIN (HCC): ICD-10-CM

## 2018-07-05 DIAGNOSIS — E11.65 TYPE 2 DIABETES MELLITUS WITH HYPERGLYCEMIA, WITH LONG-TERM CURRENT USE OF INSULIN (HCC): ICD-10-CM

## 2018-07-05 DIAGNOSIS — R06.02 SHORTNESS OF BREATH: ICD-10-CM

## 2018-07-05 DIAGNOSIS — N17.9 ACUTE RENAL FAILURE (ARF) (HCC): ICD-10-CM

## 2018-07-05 DIAGNOSIS — R00.0 TACHYCARDIA: ICD-10-CM

## 2018-07-05 DIAGNOSIS — J20.9 ACUTE BRONCHITIS, UNSPECIFIED ORGANISM: Primary | ICD-10-CM

## 2018-07-05 DIAGNOSIS — N18.30 CKD (CHRONIC KIDNEY DISEASE) STAGE 3, GFR 30-59 ML/MIN (HCC): ICD-10-CM

## 2018-07-05 DIAGNOSIS — I26.99 PULMONARY EMBOLISM (HCC): ICD-10-CM

## 2018-07-05 DIAGNOSIS — R50.9 FEVER: Primary | ICD-10-CM

## 2018-07-05 PROBLEM — N83.209 CYST OF OVARY: Status: ACTIVE | Noted: 2017-04-13

## 2018-07-05 PROBLEM — E11.9 DIABETES (HCC): Status: ACTIVE | Noted: 2018-07-05

## 2018-07-05 PROBLEM — R79.89 ELEVATED BRAIN NATRIURETIC PEPTIDE (BNP) LEVEL: Status: ACTIVE | Noted: 2018-07-05

## 2018-07-05 PROBLEM — A41.9 SEPSIS (HCC): Status: ACTIVE | Noted: 2018-07-05

## 2018-07-05 PROBLEM — R06.00 DYSPNEA: Status: ACTIVE | Noted: 2018-07-05

## 2018-07-05 LAB
ALBUMIN SERPL BCP-MCNC: 2.9 G/DL (ref 3.5–5)
ALP SERPL-CCNC: 108 U/L (ref 46–116)
ALT SERPL W P-5'-P-CCNC: 21 U/L (ref 12–78)
ANION GAP SERPL CALCULATED.3IONS-SCNC: 7 MMOL/L (ref 4–13)
APTT PPP: 29 SECONDS (ref 24–33)
AST SERPL W P-5'-P-CCNC: 17 U/L (ref 5–45)
BACTERIA UR QL AUTO: ABNORMAL /HPF
BASOPHILS # BLD AUTO: 0.03 THOUSANDS/ΜL (ref 0–0.1)
BASOPHILS NFR BLD AUTO: 0 % (ref 0–1)
BILIRUB SERPL-MCNC: 1.4 MG/DL (ref 0.2–1)
BILIRUB UR QL STRIP: NEGATIVE
BUN SERPL-MCNC: 33 MG/DL (ref 5–25)
CALCIUM SERPL-MCNC: 8.8 MG/DL (ref 8.3–10.1)
CHLORIDE SERPL-SCNC: 102 MMOL/L (ref 100–108)
CLARITY UR: CLEAR
CO2 SERPL-SCNC: 28 MMOL/L (ref 21–32)
COLOR UR: ABNORMAL
CREAT SERPL-MCNC: 2.92 MG/DL (ref 0.6–1.3)
EOSINOPHIL # BLD AUTO: 0.17 THOUSAND/ΜL (ref 0–0.61)
EOSINOPHIL NFR BLD AUTO: 2 % (ref 0–6)
ERYTHROCYTE [DISTWIDTH] IN BLOOD BY AUTOMATED COUNT: 15.5 % (ref 11.6–15.1)
GFR SERPL CREATININE-BSD FRML MDRD: 17 ML/MIN/1.73SQ M
GLUCOSE SERPL-MCNC: 212 MG/DL (ref 65–140)
GLUCOSE UR STRIP-MCNC: ABNORMAL MG/DL
HCT VFR BLD AUTO: 40 % (ref 34.8–46.1)
HGB BLD-MCNC: 12.5 G/DL (ref 11.5–15.4)
HGB UR QL STRIP.AUTO: ABNORMAL
IMM GRANULOCYTES # BLD AUTO: 0.02 THOUSAND/UL (ref 0–0.2)
IMM GRANULOCYTES NFR BLD AUTO: 0 % (ref 0–2)
INR PPP: 1.02 (ref 0.86–1.16)
KETONES UR STRIP-MCNC: NEGATIVE MG/DL
LACTATE SERPL-SCNC: 1.1 MMOL/L (ref 0.5–2)
LEUKOCYTE ESTERASE UR QL STRIP: NEGATIVE
LYMPHOCYTES # BLD AUTO: 0.63 THOUSANDS/ΜL (ref 0.6–4.47)
LYMPHOCYTES NFR BLD AUTO: 9 % (ref 14–44)
MCH RBC QN AUTO: 26.9 PG (ref 26.8–34.3)
MCHC RBC AUTO-ENTMCNC: 31.3 G/DL (ref 31.4–37.4)
MCV RBC AUTO: 86 FL (ref 82–98)
MONOCYTES # BLD AUTO: 1.25 THOUSAND/ΜL (ref 0.17–1.22)
MONOCYTES NFR BLD AUTO: 17 % (ref 4–12)
NEUTROPHILS # BLD AUTO: 5.3 THOUSANDS/ΜL (ref 1.85–7.62)
NEUTS SEG NFR BLD AUTO: 72 % (ref 43–75)
NITRITE UR QL STRIP: NEGATIVE
NON-SQ EPI CELLS URNS QL MICRO: ABNORMAL /HPF
NRBC BLD AUTO-RTO: 0 /100 WBCS
NT-PROBNP SERPL-MCNC: 1873 PG/ML
PH UR STRIP.AUTO: 7 [PH] (ref 5–9)
PLATELET # BLD AUTO: 188 THOUSANDS/UL (ref 149–390)
PMV BLD AUTO: 11.9 FL (ref 8.9–12.7)
POTASSIUM SERPL-SCNC: 3.9 MMOL/L (ref 3.5–5.3)
PROT SERPL-MCNC: 7.2 G/DL (ref 6.4–8.2)
PROT UR STRIP-MCNC: ABNORMAL MG/DL
PROTHROMBIN TIME: 10.7 SECONDS (ref 9.4–11.7)
RBC # BLD AUTO: 4.64 MILLION/UL (ref 3.81–5.12)
RBC #/AREA URNS AUTO: ABNORMAL /HPF
S PYO AG THROAT QL: NEGATIVE
SODIUM SERPL-SCNC: 137 MMOL/L (ref 136–145)
SP GR UR STRIP.AUTO: 1.02 (ref 1–1.03)
TROPONIN I SERPL-MCNC: <0.02 NG/ML
TROPONIN I SERPL-MCNC: <0.02 NG/ML
TSH SERPL DL<=0.05 MIU/L-ACNC: 3.03 UIU/ML (ref 0.36–3.74)
UROBILINOGEN UR QL STRIP.AUTO: 0.2 E.U./DL
WBC # BLD AUTO: 7.4 THOUSAND/UL (ref 4.31–10.16)
WBC #/AREA URNS AUTO: ABNORMAL /HPF

## 2018-07-05 PROCEDURE — 99214 OFFICE O/P EST MOD 30 MIN: CPT | Performed by: NURSE PRACTITIONER

## 2018-07-05 PROCEDURE — A9540 TC99M MAA: HCPCS

## 2018-07-05 PROCEDURE — 87633 RESP VIRUS 12-25 TARGETS: CPT | Performed by: STUDENT IN AN ORGANIZED HEALTH CARE EDUCATION/TRAINING PROGRAM

## 2018-07-05 PROCEDURE — 99223 1ST HOSP IP/OBS HIGH 75: CPT | Performed by: STUDENT IN AN ORGANIZED HEALTH CARE EDUCATION/TRAINING PROGRAM

## 2018-07-05 PROCEDURE — 83880 ASSAY OF NATRIURETIC PEPTIDE: CPT | Performed by: EMERGENCY MEDICINE

## 2018-07-05 PROCEDURE — 83605 ASSAY OF LACTIC ACID: CPT | Performed by: EMERGENCY MEDICINE

## 2018-07-05 PROCEDURE — 94760 N-INVAS EAR/PLS OXIMETRY 1: CPT

## 2018-07-05 PROCEDURE — 93005 ELECTROCARDIOGRAM TRACING: CPT

## 2018-07-05 PROCEDURE — 87081 CULTURE SCREEN ONLY: CPT | Performed by: STUDENT IN AN ORGANIZED HEALTH CARE EDUCATION/TRAINING PROGRAM

## 2018-07-05 PROCEDURE — 96360 HYDRATION IV INFUSION INIT: CPT

## 2018-07-05 PROCEDURE — 85610 PROTHROMBIN TIME: CPT | Performed by: EMERGENCY MEDICINE

## 2018-07-05 PROCEDURE — 87430 STREP A AG IA: CPT | Performed by: EMERGENCY MEDICINE

## 2018-07-05 PROCEDURE — 36415 COLL VENOUS BLD VENIPUNCTURE: CPT | Performed by: EMERGENCY MEDICINE

## 2018-07-05 PROCEDURE — 84145 PROCALCITONIN (PCT): CPT | Performed by: STUDENT IN AN ORGANIZED HEALTH CARE EDUCATION/TRAINING PROGRAM

## 2018-07-05 PROCEDURE — 83036 HEMOGLOBIN GLYCOSYLATED A1C: CPT | Performed by: STUDENT IN AN ORGANIZED HEALTH CARE EDUCATION/TRAINING PROGRAM

## 2018-07-05 PROCEDURE — A9539 TC99M PENTETATE: HCPCS

## 2018-07-05 PROCEDURE — 87449 NOS EACH ORGANISM AG IA: CPT | Performed by: STUDENT IN AN ORGANIZED HEALTH CARE EDUCATION/TRAINING PROGRAM

## 2018-07-05 PROCEDURE — 94664 DEMO&/EVAL PT USE INHALER: CPT

## 2018-07-05 PROCEDURE — 81001 URINALYSIS AUTO W/SCOPE: CPT | Performed by: EMERGENCY MEDICINE

## 2018-07-05 PROCEDURE — 84484 ASSAY OF TROPONIN QUANT: CPT | Performed by: EMERGENCY MEDICINE

## 2018-07-05 PROCEDURE — 99285 EMERGENCY DEPT VISIT HI MDM: CPT

## 2018-07-05 PROCEDURE — 85730 THROMBOPLASTIN TIME PARTIAL: CPT | Performed by: EMERGENCY MEDICINE

## 2018-07-05 PROCEDURE — 71046 X-RAY EXAM CHEST 2 VIEWS: CPT

## 2018-07-05 PROCEDURE — 84484 ASSAY OF TROPONIN QUANT: CPT | Performed by: STUDENT IN AN ORGANIZED HEALTH CARE EDUCATION/TRAINING PROGRAM

## 2018-07-05 PROCEDURE — 80053 COMPREHEN METABOLIC PANEL: CPT | Performed by: EMERGENCY MEDICINE

## 2018-07-05 PROCEDURE — 85025 COMPLETE CBC W/AUTO DIFF WBC: CPT | Performed by: EMERGENCY MEDICINE

## 2018-07-05 PROCEDURE — 78582 LUNG VENTILAT&PERFUS IMAGING: CPT

## 2018-07-05 PROCEDURE — 1111F DSCHRG MED/CURRENT MED MERGE: CPT | Performed by: NURSE PRACTITIONER

## 2018-07-05 PROCEDURE — 87040 BLOOD CULTURE FOR BACTERIA: CPT | Performed by: EMERGENCY MEDICINE

## 2018-07-05 PROCEDURE — 84443 ASSAY THYROID STIM HORMONE: CPT | Performed by: STUDENT IN AN ORGANIZED HEALTH CARE EDUCATION/TRAINING PROGRAM

## 2018-07-05 RX ORDER — IPRATROPIUM BROMIDE AND ALBUTEROL SULFATE 2.5; .5 MG/3ML; MG/3ML
3 SOLUTION RESPIRATORY (INHALATION) EVERY 4 HOURS PRN
Status: DISCONTINUED | OUTPATIENT
Start: 2018-07-05 | End: 2018-07-06

## 2018-07-05 RX ORDER — VALSARTAN 160 MG/1
160 TABLET ORAL 2 TIMES DAILY
Status: DISCONTINUED | OUTPATIENT
Start: 2018-07-05 | End: 2018-07-12

## 2018-07-05 RX ORDER — IPRATROPIUM BROMIDE AND ALBUTEROL SULFATE 2.5; .5 MG/3ML; MG/3ML
3 SOLUTION RESPIRATORY (INHALATION)
Status: DISCONTINUED | OUTPATIENT
Start: 2018-07-05 | End: 2018-07-06

## 2018-07-05 RX ORDER — HEPARIN SODIUM 10000 [USP'U]/100ML
3-30 INJECTION, SOLUTION INTRAVENOUS
Status: DISCONTINUED | OUTPATIENT
Start: 2018-07-05 | End: 2018-07-12

## 2018-07-05 RX ORDER — ONDANSETRON 2 MG/ML
4 INJECTION INTRAMUSCULAR; INTRAVENOUS EVERY 6 HOURS PRN
Status: DISCONTINUED | OUTPATIENT
Start: 2018-07-05 | End: 2018-07-15 | Stop reason: HOSPADM

## 2018-07-05 RX ORDER — AZITHROMYCIN 250 MG/1
500 TABLET, FILM COATED ORAL EVERY 24 HOURS
Status: DISCONTINUED | OUTPATIENT
Start: 2018-07-05 | End: 2018-07-07

## 2018-07-05 RX ORDER — HEPARIN SODIUM 1000 [USP'U]/ML
8000 INJECTION, SOLUTION INTRAVENOUS; SUBCUTANEOUS ONCE
Status: COMPLETED | OUTPATIENT
Start: 2018-07-05 | End: 2018-07-05

## 2018-07-05 RX ORDER — IPRATROPIUM BROMIDE AND ALBUTEROL SULFATE 2.5; .5 MG/3ML; MG/3ML
3 SOLUTION RESPIRATORY (INHALATION) ONCE
Status: DISCONTINUED | OUTPATIENT
Start: 2018-07-05 | End: 2018-07-05 | Stop reason: HOSPADM

## 2018-07-05 RX ORDER — ACETAMINOPHEN 325 MG/1
650 TABLET ORAL EVERY 6 HOURS PRN
Status: DISCONTINUED | OUTPATIENT
Start: 2018-07-05 | End: 2018-07-15 | Stop reason: HOSPADM

## 2018-07-05 RX ORDER — DOXYCYCLINE HYCLATE 100 MG/1
100 CAPSULE ORAL EVERY 12 HOURS SCHEDULED
Qty: 20 CAPSULE | Refills: 0 | Status: SHIPPED | OUTPATIENT
Start: 2018-07-05 | End: 2018-07-05

## 2018-07-05 RX ORDER — FUROSEMIDE 10 MG/ML
40 INJECTION INTRAMUSCULAR; INTRAVENOUS ONCE
Status: COMPLETED | OUTPATIENT
Start: 2018-07-05 | End: 2018-07-05

## 2018-07-05 RX ORDER — ALBUTEROL SULFATE 90 UG/1
2 AEROSOL, METERED RESPIRATORY (INHALATION) EVERY 4 HOURS PRN
Qty: 1 INHALER | Refills: 0 | Status: SHIPPED | OUTPATIENT
Start: 2018-07-05 | End: 2018-11-12 | Stop reason: SDUPTHER

## 2018-07-05 RX ORDER — ACETAMINOPHEN 325 MG/1
650 TABLET ORAL ONCE
Status: COMPLETED | OUTPATIENT
Start: 2018-07-05 | End: 2018-07-05

## 2018-07-05 RX ORDER — METHYLPREDNISOLONE SODIUM SUCCINATE 40 MG/ML
40 INJECTION, POWDER, LYOPHILIZED, FOR SOLUTION INTRAMUSCULAR; INTRAVENOUS EVERY 8 HOURS
Status: DISCONTINUED | OUTPATIENT
Start: 2018-07-05 | End: 2018-07-06

## 2018-07-05 RX ADMIN — ACETAMINOPHEN 650 MG: 325 TABLET ORAL at 19:22

## 2018-07-05 RX ADMIN — AZITHROMYCIN MONOHYDRATE 500 MG: 250 TABLET ORAL at 22:47

## 2018-07-05 RX ADMIN — VALSARTAN 160 MG: 160 TABLET ORAL at 22:47

## 2018-07-05 RX ADMIN — HEPARIN SODIUM 8000 UNITS: 1000 INJECTION, SOLUTION INTRAVENOUS; SUBCUTANEOUS at 20:43

## 2018-07-05 RX ADMIN — FUROSEMIDE 40 MG: 10 INJECTION, SOLUTION INTRAMUSCULAR; INTRAVENOUS at 22:47

## 2018-07-05 RX ADMIN — METHYLPREDNISOLONE SODIUM SUCCINATE 40 MG: 40 INJECTION, POWDER, FOR SOLUTION INTRAMUSCULAR; INTRAVENOUS at 22:47

## 2018-07-05 RX ADMIN — HEPARIN SODIUM AND DEXTROSE 18 UNITS/KG/HR: 10000; 5 INJECTION INTRAVENOUS at 20:42

## 2018-07-05 RX ADMIN — IPRATROPIUM BROMIDE AND ALBUTEROL SULFATE 3 ML: .5; 3 SOLUTION RESPIRATORY (INHALATION) at 22:59

## 2018-07-05 RX ADMIN — CEFTRIAXONE 1000 MG: 1 INJECTION, SOLUTION INTRAVENOUS at 22:48

## 2018-07-05 RX ADMIN — IPRATROPIUM BROMIDE AND ALBUTEROL SULFATE 3 ML: 2.5; .5 SOLUTION RESPIRATORY (INHALATION) at 14:49

## 2018-07-05 RX ADMIN — SODIUM CHLORIDE 1000 ML: 0.9 INJECTION, SOLUTION INTRAVENOUS at 19:22

## 2018-07-05 NOTE — TELEPHONE ENCOUNTER
Radiology called with a reading on patient's V/Q scan  This was read as intermediate probability of PE  Patient was sent to the ER for evaluation

## 2018-07-05 NOTE — ED PROVIDER NOTES
History  Chief Complaint   Patient presents with    Shortness of Breath     short of breath for a few days, seen a PMD, sent for out patient ct scan, pt has blood clot in her lung  pt recently flew home from [facility]  pt has a sore throat and chills      Patient is a 82-year-old female who presents from her primary care doctor's office  Patient had a recent trip to [place] when she came home she has been complained about increasing shortness of breath and dyspnea on exertion  Patient was sent for outpatient V/Q scan study because he has underlying kidney disease  The results showed a moderate possibility of a PE  Patient also complaining of increasing swelling of her lower extremities for approximately 1 week  Patient has a fever in the emergency room which she was unaware of  Patient states she does have a sore throat, a mild cough that is nonproductive  Patient denies any other sick contacts  Patient denies any chest pain, no abdominal pain, no nausea vomiting or diarrhea  Patient denies any urinary symptoms  Prior to Admission Medications   Prescriptions Last Dose Informant Patient Reported? Taking?    Insulin Lispro Prot & Lispro (HUMALOG MIX 75/25 KWIKPEN) (75-25) 100 UNIT/ML SUPN  Self No No   Sig: Inject 40 Units as directed every 6 (six) hours   ONE TOUCH ULTRA TEST test strip  Self Yes No   albuterol (PROAIR HFA) 90 mcg/act inhaler   No No   Sig: Inhale 2 puffs every 4 (four) hours as needed for wheezing or shortness of breath   furosemide (LASIX) 40 mg tablet  Self Yes No   Sig: Take 1 tablet by mouth as needed   valsartan (DIOVAN) 160 mg tablet  Self Yes No   Sig: Take 1 tablet by mouth 2 (two) times a day      Facility-Administered Medications Last Administration Doses Remaining   ipratropium-albuterol (DUO-NEB) 0 5-2 5 mg/3 mL inhalation solution 3 mL 7/5/2018  2:49 PM 0          Past Medical History:   Diagnosis Date    Cardiac disease     Diabetes mellitus ([facility] [address] )     Hyperlipidemia     Hypertension     Renal disorder        History reviewed  No pertinent surgical history  Family History   Problem Relation Age of Onset    No Known Problems Mother     Kidney disease Father     Ulcerative colitis Sister      I have reviewed and agree with the history as documented  Social History   Substance Use Topics    Smoking status: Never Smoker    Smokeless tobacco: Never Used    Alcohol use No        Review of Systems   Constitutional: Positive for fatigue and fever  Negative for chills  HENT: Positive for sore throat  Negative for facial swelling, rhinorrhea and trouble swallowing  Eyes: Negative for photophobia, pain and discharge  Respiratory: Positive for cough and shortness of breath  Negative for chest tightness  Cardiovascular: Positive for leg swelling  Negative for chest pain  Gastrointestinal: Negative for abdominal pain, nausea and vomiting  Genitourinary: Negative for difficulty urinating, dysuria and flank pain  Musculoskeletal: Negative for back pain and neck pain  Skin: Negative  Neurological: Negative for weakness and numbness  Hematological: Negative  Psychiatric/Behavioral: Negative  Physical Exam  Physical Exam   Constitutional: She is oriented to person, place, and time  She appears well-developed and well-nourished  She appears distressed  HENT:   Head: Normocephalic and atraumatic  Eyes: EOM are normal  Pupils are equal, round, and reactive to light  Neck: Normal range of motion  Cardiovascular: Regular rhythm  Tachycardia present  Pulmonary/Chest: She is in respiratory distress  She has decreased breath sounds in the right lower field and the left lower field  Abdominal: Soft  Bowel sounds are normal  She exhibits no distension  There is no tenderness  Musculoskeletal: Normal range of motion  She exhibits edema  Neurological: She is alert and oriented to person, place, and time  Skin: Skin is warm  Psychiatric: She has a normal mood and affect  Nursing note and vitals reviewed        Vital Signs  ED Triage Vitals   Temperature Pulse Respirations Blood Pressure SpO2   07/05/18 1838 07/05/18 1838 07/05/18 1838 07/05/18 1846 07/05/18 1838   (!) 101 9 °F (38 8 °C) (!) 110 20 (!) 191/94 93 %      Temp Source Heart Rate Source Patient Position - Orthostatic VS BP Location FiO2 (%)   07/05/18 1838 07/05/18 1838 07/05/18 1838 07/05/18 1838 07/05/18 2200   Tympanic Monitor Sitting Right arm 2      Pain Score       07/05/18 1838       4           Vitals:    07/06/18 2322 07/07/18 0407 07/07/18 0735 07/07/18 1120   BP: 167/80 135/75 159/88 132/70   Pulse: 100 98 81 76   Patient Position - Orthostatic VS: Lying Lying Sitting Sitting       Visual Acuity      ED Medications  Medications   heparin (porcine) 25,000 units in 250 mL infusion (premix) (13 Units/kg/hr × 100 kg (Order-Specific) Intravenous New Bag 7/7/18 0925)   valsartan (DIOVAN) tablet 160 mg (160 mg Oral Given 7/7/18 0931)   acetaminophen (TYLENOL) tablet 650 mg ( Oral MAR Unhold 7/6/18 2036)   ondansetron (ZOFRAN) injection 4 mg ( Intravenous MAR Unhold 7/6/18 2036)   cefTRIAXone (ROCEPHIN) IVPB (premix) 1,000 mg (1,000 mg Intravenous New Bag 7/6/18 2118)     And   azithromycin (ZITHROMAX) tablet 500 mg (500 mg Oral Given 7/6/18 2256)   insulin aspart protamine-insulin aspart (NovoLOG 70/30) 100 units/mL subcutaneous injection 40 Units (40 Units Subcutaneous Given 7/7/18 0928)   methylPREDNISolone sodium succinate (Solu-MEDROL) injection 20 mg (20 mg Intravenous Given 7/7/18 0602)   ipratropium-albuterol (DUO-NEB) 0 5-2 5 mg/3 mL inhalation solution 3 mL (3 mL Nebulization Given 7/7/18 0735)     And   ipratropium-albuterol (DUO-NEB) 0 5-2 5 mg/3 mL inhalation solution 3 mL (not administered)   insulin lispro (HumaLOG) 100 units/mL subcutaneous injection 1-5 Units (5 Units Subcutaneous Given 7/7/18 5874)   insulin lispro (HumaLOG) 100 units/mL subcutaneous injection 1-5 Units (not administered)   sodium chloride 0 9 % bolus 1,000 mL (0 mL Intravenous Stopped 7/5/18 2130)   acetaminophen (TYLENOL) tablet 650 mg (650 mg Oral Given 7/5/18 1922)   heparin (porcine) injection 8,000 Units (8,000 Units Intravenous Given 7/5/18 2043)   furosemide (LASIX) injection 40 mg (40 mg Intravenous Given 7/5/18 2247)   magnesium sulfate 2 g/50 mL IVPB (premix) 2 g (0 g Intravenous Stopped 7/6/18 1249)   insulin lispro (HumaLOG) 100 units/mL subcutaneous injection 10 Units (10 Units Subcutaneous Given 7/7/18 0410)       Diagnostic Studies  Results Reviewed     Procedure Component Value Units Date/Time    Blood culture #1 [[id number]] Collected:  07/05/18 1916    Lab Status:  Preliminary result Specimen:  Blood from Arm, Left Updated:  07/06/18 2201     Blood Culture No Growth at 24 hrs  Blood culture #2 [[id number]] Collected:  07/05/18 1916    Lab Status:  Preliminary result Specimen:  Blood from Arm, Left Updated:  07/06/18 2201     Blood Culture No Growth at 24 hrs  Strep Pneumoniae, Urine [[id number]]  (Normal) Collected:  07/05/18 1933    Lab Status:  Final result Specimen:  Urine from Urine, Clean Catch Updated:  07/06/18 1114     Strep pneumoniae antigen, urine Negative    TSH, 3rd generation [[id number]]  (Normal) Collected:  07/05/18 1916    Lab Status:  Final result Specimen:  Blood from Arm, Left Updated:  07/05/18 2237     TSH 3RD GENERATON 3 026 uIU/mL     Narrative:         Patients undergoing fluorescein dye angiography may retain small amounts of fluorescein in the body for 48-72 hours post procedure  Samples containing fluorescein can produce falsely depressed TSH values  If the patient had this procedure,a specimen should be resubmitted post fluorescein clearance            The recommended reference ranges for TSH during pregnancy are as follows:  First trimester 0 1 to 2 5 uIU/mL  Second trimester  0 2 to 3 0 uIU/mL  Third trimester 0 3 to 3 0 uIU/m      Urine Microscopic [[id number]]  (Abnormal) Collected:  07/05/18 1933    Lab Status:  Final result Specimen:  Urine from Urine, Clean Catch Updated:  07/05/18 1948     RBC, UA 2-4 (A) /hpf      WBC, UA 1-2 (A) /hpf      Epithelial Cells Occasional /hpf      Bacteria, UA None Seen /hpf     B-type natriuretic peptide [[id number]]  (Abnormal) Collected:  07/05/18 1916    Lab Status:  Final result Specimen:  Blood from Arm, Left Updated:  07/05/18 1946     NT-proBNP 1,873 (H) pg/mL     Troponin I [[id number]]  (Normal) Collected:  07/05/18 1916    Lab Status:  Final result Specimen:  Blood from Arm, Left Updated:  07/05/18 1944     Troponin I <0 02 ng/mL     Lactic acid, plasma [[id number]]  (Normal) Collected:  07/05/18 1916    Lab Status:  Final result Specimen:  Blood from Arm, Left Updated:  07/05/18 1944     LACTIC ACID 1 1 mmol/L     Narrative:         Result may be elevated if tourniquet was used during collection  Comprehensive metabolic panel [[id number]]  (Abnormal) Collected:  07/05/18 1916    Lab Status:  Final result Specimen:  Blood from Arm, Left Updated:  07/05/18 1940     Sodium 137 mmol/L      Potassium 3 9 mmol/L      Chloride 102 mmol/L      CO2 28 mmol/L      Anion Gap 7 mmol/L      BUN 33 (H) mg/dL      Creatinine 2 92 (H) mg/dL      Glucose 212 (H) mg/dL      Calcium 8 8 mg/dL      AST 17 U/L      ALT 21 U/L      Alkaline Phosphatase 108 U/L      Total Protein 7 2 g/dL      Albumin 2 9 (L) g/dL      Total Bilirubin 1 40 (H) mg/dL      eGFR 17 ml/min/1 73sq m     Narrative:         National Kidney Disease Education Program recommendations are as follows:  GFR calculation is accurate only with a steady state creatinine  Chronic Kidney disease less than 60 ml/min/1 73 sq  meters  Kidney failure less than 15 ml/min/1 73 sq  meters      Joanna Stinson [[id number]]  (Normal) Collected:  07/05/18 1916    Lab Status:  Final result Specimen:  Blood from Arm, Left Updated:  07/05/18 1940     Protime 10 7 seconds      INR 1 02 APTT [[id number]]  (Normal) Collected:  07/05/18 1916    Lab Status:  Final result Specimen:  Blood from Arm, Left Updated:  07/05/18 1940     PTT 29 seconds     UA w Reflex to Microscopic w Reflex to Culture [[id number]]  (Abnormal) Collected:  07/05/18 1933    Lab Status:  Final result Specimen:  Urine from Urine, Clean Catch Updated:  07/05/18 1939     Color, UA Light Yellow     Clarity, UA Clear     Specific [place], UA 1 020     pH, UA 7 0     Leukocytes, UA Negative     Nitrite, UA Negative     Protein,  (2+) (A) mg/dl      Glucose,  (1/10%) (A) mg/dl      Ketones, UA Negative mg/dl      Urobilinogen, UA 0 2 E U /dl      Bilirubin, UA Negative     Blood, UA Moderate (A)    Rapid Strep A Screen Only, Adults [[id number]]  (Normal) Collected:  07/05/18 1925    Lab Status:  Final result Specimen:  Throat from Throat Updated:  07/05/18 1936     Rapid Strep A Screen Negative    CBC and differential [[id number]]  (Abnormal) Collected:  07/05/18 1916    Lab Status:  Final result Specimen:  Blood from Arm, Left Updated:  07/05/18 1922     WBC 7 40 Thousand/uL      RBC 4 64 Million/uL      Hemoglobin 12 5 g/dL      Hematocrit 40 0 %      MCV 86 fL      MCH 26 9 pg      MCHC 31 3 (L) g/dL      RDW 15 5 (H) %      MPV 11 9 fL      Platelets 504 Thousands/uL      nRBC 0 /100 WBCs      Neutrophils Relative 72 %      Immat GRANS % 0 %      Lymphocytes Relative 9 (L) %      Monocytes Relative 17 (H) %      Eosinophils Relative 2 %      Basophils Relative 0 %      Neutrophils Absolute 5 30 Thousands/µL      Immature Grans Absolute 0 02 Thousand/uL      Lymphocytes Absolute 0 63 Thousands/µL      Monocytes Absolute 1 25 (H) Thousand/µL      Eosinophils Absolute 0 17 Thousand/µL      Basophils Absolute 0 03 Thousands/µL                  CT chest wo contrast   Final Result by Vince Brown MD (07/07 1207)      No acute pathology  Enlarged main pulmonary artery suggestive of pulmonary hypertension        Mosaic attenuation in the lungs, indicative of small airways or small vessel disease  Workstation performed: [id number]         VAS lower limb venous duplex study, complete bilateral   Final Result by Pj Juarez MD (07/06 4900)                 Procedures  ECG 12 Lead Documentation  Date/Time: 7/5/2018 7:24 PM  Performed by: Blayne Mays by: Gisella Cox     Indications / Diagnosis:  Shortness of breath  Patient location:  ED  Previous ECG:     Previous ECG:  Unavailable  Interpretation:     Interpretation: abnormal    Rate:     ECG rate:  104    ECG rate assessment: tachycardic    Rhythm:     Rhythm: sinus rhythm    Ectopy:     Ectopy: none    QRS:     QRS axis:  Left  Conduction:     Conduction: normal    ST segments:     ST segments:  Normal  T waves:     T waves: normal             Phone Contacts  ED Phone Contact    ED Course                               MDM  Number of Diagnoses or Management Options  Acute renal failure (ARF) ([facility] [address] ):   Fever:   Pulmonary embolism ([facility] [address] ):   Shortness of breath:   Diagnosis management comments: Patient's symptoms could be multifactorial   Given the fact the patient was not able to get a CAT scan want to go by the PET results  Patient was started on heparin for prophylaxis for PE  Patient also shows possible infectious process because the fever  Patient also might have some fluid overload or congestive heart failure issues  Patient is in a worsening of her chronic kidney condition  Patient needs to be admitted to the hospital for further workup and evaluation and treatment  Patient states understanding is in agreement the assessment plan         Amount and/or Complexity of Data Reviewed  Clinical lab tests: ordered and reviewed  Tests in the radiology section of CPT®: ordered and reviewed      The patient presented with a condition in which there was a high probability of imminent or life-threatening deterioration, and critical care services (excluding separately billable procedures) totalled 30-74 minutes  Disposition  Final diagnoses:   Fever   Shortness of breath   Pulmonary embolism ([facility] [address] )   Acute renal failure (ARF) ([facility] [address] )     Time reflects when diagnosis was documented in both MDM as applicable and the Disposition within this note     Time User Action Codes Description Comment    7/5/2018  8:31 PM Jacqulin Osman Add [R50 9] Fever     7/5/2018  8:31 PM Jacqulin Osman Add [R06 02] Shortness of breath     7/5/2018  8:31 PM Jacqulin Osman Add [I26 99] Pulmonary embolism ([facility] [address] )     7/5/2018  8:31 PM Jacqulin Osman Add [N17 9] Acute renal failure (ARF) [facility])       ED Disposition     ED Disposition Condition Comment    Admit  Case was discussed with Dr Rajan Hsu and the patient's admission status was agreed to be Admission Status: inpatient status to the service of Dr Rajan Hsu     Follow-up Information    None         Current Discharge Medication List      CONTINUE these medications which have NOT CHANGED    Details   albuterol (PROAIR HFA) 90 mcg/act inhaler Inhale 2 puffs every 4 (four) hours as needed for wheezing or shortness of breath  Qty: 1 Inhaler, Refills: 0    Associated Diagnoses: Acute bronchitis, unspecified organism      furosemide (LASIX) 40 mg tablet Take 1 tablet by mouth as needed      Insulin Lispro Prot & Lispro (HUMALOG MIX 75/25 KWIKPEN) (75-25) 100 UNIT/ML SUPN Inject 40 Units as directed every 6 (six) hours  Qty: 150 mL, Refills: 0    Associated Diagnoses: Type 2 diabetes mellitus with hyperglycemia, with long-term current use of insulin ([facility])      ONE TOUCH ULTRA TEST test strip       valsartan (DIOVAN) 160 mg tablet Take 1 tablet by mouth 2 (two) times a day           No discharge procedures on file      ED Provider  Electronically Signed by           Rm Seymour MD  07/07/18 8249

## 2018-07-05 NOTE — PROGRESS NOTES
Assessment/Plan:    SpO2 92%   V/Q scan ordered to r/o PE in the setting of shortness of breath, fever, and tachycardia  SpO2 not improved after nebulizer  Problem List Items Addressed This Visit        Genitourinary    CKD (chronic kidney disease) stage 3, GFR 30-59 ml/min      Other Visit Diagnoses     Acute bronchitis, unspecified organism    -  Primary    Relevant Medications    ipratropium-albuterol (DUO-NEB) 0 5-2 5 mg/3 mL inhalation solution 3 mL (Completed)    Shortness of breath        Relevant Orders    NM lung ventilation / perfusion    XR chest pa & lateral    Fever, unspecified fever cause        Relevant Orders    NM lung ventilation / perfusion    XR chest pa & lateral    Tachycardia        Relevant Orders    NM lung ventilation / perfusion    XR chest pa & lateral          There are no Patient Instructions on file for this visit  Return if symptoms worsen or fail to improve  Subjective:      Patient ID: Josy Kaplan is a 66 y o  female  Chief Complaint   Patient presents with    Nasal Congestion      for 3 days prcma    Fever    Shortness of Breath     for  2 months        She has had a cough and shortness of breath  This started after she returned home from a trip to [place]  She is wheezing, especially at night  Now congested with a sore throat, chills, and low grade fever  Not taking anything OTC for symptoms  She ran out of her inhaler  LE edema, which is improved from norm  Felt slightly better after Lasix  No calf pain  No prior history of DVT or PE  The following portions of the patient's history were reviewed and updated as appropriate: allergies, current medications, past family history, past medical history, past social history, past surgical history and problem list     Review of Systems   Constitutional: Positive for chills and fatigue  Negative for fever  HENT: Positive for congestion and sore throat   Negative for ear pain, postnasal drip, rhinorrhea and sinus pressure  Respiratory: Positive for cough, shortness of breath and wheezing  Cardiovascular: Negative for chest pain  Gastrointestinal: Negative for abdominal pain, diarrhea, nausea and vomiting  Musculoskeletal: Negative for arthralgias  Skin: Negative for rash  Neurological: Negative for headaches  Current Outpatient Prescriptions   Medication Sig Dispense Refill    furosemide (LASIX) 40 mg tablet Take 1 tablet by mouth as needed      Insulin Lispro Prot & Lispro (HUMALOG MIX 75/25 KWIKPEN) (75-25) 100 UNIT/ML SUPN Inject 40 Units as directed every 6 (six) hours 150 mL 0    ONE TOUCH ULTRA TEST test strip       valsartan (DIOVAN) 160 mg tablet Take 1 tablet by mouth 2 (two) times a day      albuterol (PROAIR HFA) 90 mcg/act inhaler Inhale 2 puffs every 4 (four) hours as needed       No current facility-administered medications for this visit  Objective:    BP (!) 178/100   Pulse 100   Temp 100 2 °F (37 9 °C)   Resp 20   Ht 5' (1 524 m)   Wt 98 9 kg (218 lb)   BMI 42 58 kg/m²        Physical Exam   Constitutional: She appears well-developed and well-nourished  HENT:   Right Ear: Tympanic membrane, external ear and ear canal normal    Left Ear: Tympanic membrane, external ear and ear canal normal    Nose: Mucosal edema and rhinorrhea present  Mouth/Throat: Oropharynx is clear and moist and mucous membranes are normal    Eyes: Conjunctivae are normal    Cardiovascular: Normal rate, regular rhythm and normal heart sounds  Pulmonary/Chest: She has no decreased breath sounds  She has wheezes  Appears short of breath   Abdominal: Bowel sounds are normal  She exhibits no distension  There is no splenomegaly or hepatomegaly  There is no tenderness  Lymphadenopathy:        Right cervical: No superficial cervical adenopathy present  Left cervical: No superficial cervical adenopathy present  Skin: No rash noted     Psychiatric: She has a normal mood and affect  Nursing note and vitals reviewed               Esteban Revel

## 2018-07-06 ENCOUNTER — APPOINTMENT (INPATIENT)
Dept: RADIOLOGY | Facility: HOSPITAL | Age: 63
DRG: 871 | End: 2018-07-06
Payer: COMMERCIAL

## 2018-07-06 ENCOUNTER — APPOINTMENT (INPATIENT)
Dept: NON INVASIVE DIAGNOSTICS | Facility: HOSPITAL | Age: 63
DRG: 871 | End: 2018-07-06
Payer: COMMERCIAL

## 2018-07-06 LAB
ANION GAP SERPL CALCULATED.3IONS-SCNC: 13 MMOL/L (ref 4–13)
APTT PPP: 107 SECONDS (ref 24–36)
APTT PPP: 84 SECONDS (ref 24–36)
APTT PPP: >153 SECONDS (ref 24–33)
ATRIAL RATE: 104 BPM
BASOPHILS # BLD AUTO: 0.03 THOUSANDS/ΜL (ref 0–0.1)
BASOPHILS NFR BLD AUTO: 1 % (ref 0–1)
BUN SERPL-MCNC: 31 MG/DL (ref 5–25)
CALCIUM SERPL-MCNC: 8.6 MG/DL (ref 8.3–10.1)
CHLORIDE SERPL-SCNC: 102 MMOL/L (ref 100–108)
CO2 SERPL-SCNC: 24 MMOL/L (ref 21–32)
CREAT SERPL-MCNC: 2.81 MG/DL (ref 0.6–1.3)
EOSINOPHIL # BLD AUTO: 0.07 THOUSAND/ΜL (ref 0–0.61)
EOSINOPHIL NFR BLD AUTO: 1 % (ref 0–6)
ERYTHROCYTE [DISTWIDTH] IN BLOOD BY AUTOMATED COUNT: 15.6 % (ref 11.6–15.1)
EST. AVERAGE GLUCOSE BLD GHB EST-MCNC: 206 MG/DL
GFR SERPL CREATININE-BSD FRML MDRD: 17 ML/MIN/1.73SQ M
GLUCOSE SERPL-MCNC: 190 MG/DL (ref 65–140)
GLUCOSE SERPL-MCNC: 260 MG/DL (ref 65–140)
GLUCOSE SERPL-MCNC: 398 MG/DL (ref 65–140)
GLUCOSE SERPL-MCNC: 499 MG/DL (ref 65–140)
GLUCOSE SERPL-MCNC: >500 MG/DL (ref 65–140)
HBA1C MFR BLD: 8.8 % (ref 4.2–6.3)
HCT VFR BLD AUTO: 39 % (ref 34.8–46.1)
HGB BLD-MCNC: 12.2 G/DL (ref 11.5–15.4)
IMM GRANULOCYTES # BLD AUTO: 0.02 THOUSAND/UL (ref 0–0.2)
IMM GRANULOCYTES NFR BLD AUTO: 0 % (ref 0–2)
L PNEUMO1 AG UR QL IA.RAPID: NEGATIVE
LYMPHOCYTES # BLD AUTO: 0.53 THOUSANDS/ΜL (ref 0.6–4.47)
LYMPHOCYTES NFR BLD AUTO: 8 % (ref 14–44)
MAGNESIUM SERPL-MCNC: 1.6 MG/DL (ref 1.6–2.6)
MCH RBC QN AUTO: 27.3 PG (ref 26.8–34.3)
MCHC RBC AUTO-ENTMCNC: 31.3 G/DL (ref 31.4–37.4)
MCV RBC AUTO: 87 FL (ref 82–98)
MONOCYTES # BLD AUTO: 0.53 THOUSAND/ΜL (ref 0.17–1.22)
MONOCYTES NFR BLD AUTO: 8 % (ref 4–12)
NEUTROPHILS # BLD AUTO: 5.17 THOUSANDS/ΜL (ref 1.85–7.62)
NEUTS SEG NFR BLD AUTO: 82 % (ref 43–75)
NRBC BLD AUTO-RTO: 0 /100 WBCS
P AXIS: 70 DEGREES
PLATELET # BLD AUTO: 174 THOUSANDS/UL (ref 149–390)
PMV BLD AUTO: 12.7 FL (ref 8.9–12.7)
POTASSIUM SERPL-SCNC: 3.7 MMOL/L (ref 3.5–5.3)
PR INTERVAL: 136 MS
PROCALCITONIN SERPL-MCNC: 0.14 NG/ML
QRS AXIS: -35 DEGREES
QRSD INTERVAL: 88 MS
QT INTERVAL: 348 MS
QTC INTERVAL: 457 MS
RBC # BLD AUTO: 4.47 MILLION/UL (ref 3.81–5.12)
S PNEUM AG UR QL: NEGATIVE
SODIUM SERPL-SCNC: 139 MMOL/L (ref 136–145)
T WAVE AXIS: 60 DEGREES
VENTRICULAR RATE: 104 BPM
WBC # BLD AUTO: 6.35 THOUSAND/UL (ref 4.31–10.16)

## 2018-07-06 PROCEDURE — 94640 AIRWAY INHALATION TREATMENT: CPT

## 2018-07-06 PROCEDURE — 93010 ELECTROCARDIOGRAM REPORT: CPT | Performed by: INTERNAL MEDICINE

## 2018-07-06 PROCEDURE — 94760 N-INVAS EAR/PLS OXIMETRY 1: CPT

## 2018-07-06 PROCEDURE — 80048 BASIC METABOLIC PNL TOTAL CA: CPT | Performed by: STUDENT IN AN ORGANIZED HEALTH CARE EDUCATION/TRAINING PROGRAM

## 2018-07-06 PROCEDURE — 82948 REAGENT STRIP/BLOOD GLUCOSE: CPT

## 2018-07-06 PROCEDURE — 83735 ASSAY OF MAGNESIUM: CPT | Performed by: STUDENT IN AN ORGANIZED HEALTH CARE EDUCATION/TRAINING PROGRAM

## 2018-07-06 PROCEDURE — 99232 SBSQ HOSP IP/OBS MODERATE 35: CPT | Performed by: FAMILY MEDICINE

## 2018-07-06 PROCEDURE — 93970 EXTREMITY STUDY: CPT | Performed by: SURGERY

## 2018-07-06 PROCEDURE — 87449 NOS EACH ORGANISM AG IA: CPT | Performed by: STUDENT IN AN ORGANIZED HEALTH CARE EDUCATION/TRAINING PROGRAM

## 2018-07-06 PROCEDURE — 93306 TTE W/DOPPLER COMPLETE: CPT

## 2018-07-06 PROCEDURE — 85730 THROMBOPLASTIN TIME PARTIAL: CPT | Performed by: STUDENT IN AN ORGANIZED HEALTH CARE EDUCATION/TRAINING PROGRAM

## 2018-07-06 PROCEDURE — 85025 COMPLETE CBC W/AUTO DIFF WBC: CPT | Performed by: STUDENT IN AN ORGANIZED HEALTH CARE EDUCATION/TRAINING PROGRAM

## 2018-07-06 PROCEDURE — 93970 EXTREMITY STUDY: CPT

## 2018-07-06 PROCEDURE — 82947 ASSAY GLUCOSE BLOOD QUANT: CPT | Performed by: FAMILY MEDICINE

## 2018-07-06 PROCEDURE — 85730 THROMBOPLASTIN TIME PARTIAL: CPT | Performed by: FAMILY MEDICINE

## 2018-07-06 PROCEDURE — 94762 N-INVAS EAR/PLS OXIMTRY CONT: CPT

## 2018-07-06 RX ORDER — IPRATROPIUM BROMIDE AND ALBUTEROL SULFATE 2.5; .5 MG/3ML; MG/3ML
3 SOLUTION RESPIRATORY (INHALATION)
Status: DISCONTINUED | OUTPATIENT
Start: 2018-07-07 | End: 2018-07-15

## 2018-07-06 RX ORDER — MAGNESIUM SULFATE HEPTAHYDRATE 40 MG/ML
2 INJECTION, SOLUTION INTRAVENOUS ONCE
Status: COMPLETED | OUTPATIENT
Start: 2018-07-06 | End: 2018-07-06

## 2018-07-06 RX ORDER — IPRATROPIUM BROMIDE AND ALBUTEROL SULFATE 2.5; .5 MG/3ML; MG/3ML
3 SOLUTION RESPIRATORY (INHALATION) EVERY 4 HOURS PRN
Status: DISCONTINUED | OUTPATIENT
Start: 2018-07-06 | End: 2018-07-15

## 2018-07-06 RX ORDER — METHYLPREDNISOLONE SODIUM SUCCINATE 40 MG/ML
20 INJECTION, POWDER, LYOPHILIZED, FOR SOLUTION INTRAMUSCULAR; INTRAVENOUS EVERY 8 HOURS
Status: DISCONTINUED | OUTPATIENT
Start: 2018-07-06 | End: 2018-07-07

## 2018-07-06 RX ORDER — INSULIN ASPART 100 [IU]/ML
40 INJECTION, SUSPENSION SUBCUTANEOUS
Status: DISCONTINUED | OUTPATIENT
Start: 2018-07-06 | End: 2018-07-07

## 2018-07-06 RX ADMIN — IPRATROPIUM BROMIDE AND ALBUTEROL SULFATE 3 ML: .5; 3 SOLUTION RESPIRATORY (INHALATION) at 07:20

## 2018-07-06 RX ADMIN — VALSARTAN 160 MG: 160 TABLET ORAL at 08:52

## 2018-07-06 RX ADMIN — IPRATROPIUM BROMIDE AND ALBUTEROL SULFATE 3 ML: .5; 3 SOLUTION RESPIRATORY (INHALATION) at 02:14

## 2018-07-06 RX ADMIN — IPRATROPIUM BROMIDE AND ALBUTEROL SULFATE 3 ML: .5; 3 SOLUTION RESPIRATORY (INHALATION) at 14:58

## 2018-07-06 RX ADMIN — METHYLPREDNISOLONE SODIUM SUCCINATE 40 MG: 40 INJECTION, POWDER, FOR SOLUTION INTRAMUSCULAR; INTRAVENOUS at 13:46

## 2018-07-06 RX ADMIN — INSULIN ASPART 40 UNITS: 100 INJECTION, SUSPENSION SUBCUTANEOUS at 17:14

## 2018-07-06 RX ADMIN — IPRATROPIUM BROMIDE AND ALBUTEROL SULFATE 3 ML: .5; 3 SOLUTION RESPIRATORY (INHALATION) at 20:00

## 2018-07-06 RX ADMIN — METHYLPREDNISOLONE SODIUM SUCCINATE 20 MG: 40 INJECTION, POWDER, FOR SOLUTION INTRAMUSCULAR; INTRAVENOUS at 21:11

## 2018-07-06 RX ADMIN — HEPARIN SODIUM AND DEXTROSE 13 UNITS/KG/HR: 10000; 5 INJECTION INTRAVENOUS at 13:43

## 2018-07-06 RX ADMIN — INSULIN LISPRO 3 UNITS: 100 INJECTION, SOLUTION INTRAVENOUS; SUBCUTANEOUS at 08:51

## 2018-07-06 RX ADMIN — METHYLPREDNISOLONE SODIUM SUCCINATE 40 MG: 40 INJECTION, POWDER, FOR SOLUTION INTRAMUSCULAR; INTRAVENOUS at 05:03

## 2018-07-06 RX ADMIN — VALSARTAN 160 MG: 160 TABLET ORAL at 18:19

## 2018-07-06 RX ADMIN — AZITHROMYCIN MONOHYDRATE 500 MG: 250 TABLET ORAL at 22:56

## 2018-07-06 RX ADMIN — CEFTRIAXONE 1000 MG: 1 INJECTION, SOLUTION INTRAVENOUS at 21:18

## 2018-07-06 RX ADMIN — INSULIN LISPRO 6 UNITS: 100 INJECTION, SOLUTION INTRAVENOUS; SUBCUTANEOUS at 12:09

## 2018-07-06 RX ADMIN — MAGNESIUM SULFATE HEPTAHYDRATE 2 G: 40 INJECTION, SOLUTION INTRAVENOUS at 10:49

## 2018-07-06 RX ADMIN — INSULIN LISPRO 6 UNITS: 100 INJECTION, SOLUTION INTRAVENOUS; SUBCUTANEOUS at 17:14

## 2018-07-06 NOTE — ASSESSMENT & PLAN NOTE
Status post 1 dose of IV Lasix  Does not appear to be in overt heart failure  Leg edema at baseline as per patient  Echo showed EF of 55 % to 60 % with no regional wall motion abnormalities    Edema improved with Lasix 40 milligram p o  daily

## 2018-07-06 NOTE — ASSESSMENT & PLAN NOTE
· Multifactorial, from acute respiratory infection, possible heart failure and PE  · Treatment as noted  · Tele monitor  · Serial troponins  · Query CHANDRA with sleep study once acute issues resolved

## 2018-07-06 NOTE — PROGRESS NOTES
[facility] Progress Note  Patient: Shruthi Pena 66 y o  female   MRN: [id number]  PCP: Johnathan Ortiz DO  Unit/Bed#: ICU 07 Encounter: [id number]  Date Of Visit: 07/06/18    Problem List:    Principal Problem:    PE (pulmonary thromboembolism) ([facility] )  Active Problems:    Acute bronchitis    Elevated brain natriuretic peptide (BNP) level    Sepsis ([facility] )    Essential hypertension    CKD (chronic kidney disease) stage 3, GFR 30-59 ml/min    Type 2 diabetes, uncontrolled, with renal manifestation ([facility] )    Dyspnea    Hypothyroidism      Assessment & Plan:    * PE (pulmonary thromboembolism) ([facility] )   Assessment & Plan    · Patient with recent airplane travel  Moderate probability as noted on V/Q scan done today as outpt  Cannot get CTA given poor renal function  · has low GFR so lovenox not a good option  continue heparin gtt  · bilateral LE dopplers negative for DVTs  · Discussed with patient regarding Coumadin versus NOAC on discharge  Pros and cons of both discussed with patient and patient chooses to be started on NOAC on discharge        Sepsis ([facility] )   Assessment & Plan    · Likely SIRS response from PE  · CXR showed NAD  · UA bland  · Blood cx pending  · Rapid strep negative  · Respiratory panel ordered  · Proclination 0 14        Elevated brain natriuretic peptide (BNP) level   Assessment & Plan    Status post 1 dose of IV Lasix   Follow up pending echo        Acute bronchitis   Assessment & Plan    Rapid strep negative  · respiratory panel pending  urine strep and legionella Ag negative  procalcitonin 0 14  · Continue IV rocephin/Azithro for now nebulizers  · IS and respiratory protocol        Dyspnea   Assessment & Plan    · Multifactorial from acute respiratory infection, possible PE  · Breathing is improving  Patient stable on room air  · troponins negative  · Query CHANDRA with sleep study once acute issues resolved           Type 2 diabetes, uncontrolled, with renal manifestation ([facility])   Assessment & Plan    Lab Results   Component Value Date    HGBA1C 8 8 (H) 2018       Recent Labs      18   0722  18   1143   POCGLU  260*  398*       Blood Sugar Average: Last 72 hrs:  (P) 329     · Blood sugars are elevated partly due to steroid use  Patient is on Humalog 75/25 at home b i d and uses about 35-50 units     Patient started on NovoLog 70/30 40 units b i d  continue sliding scale insulin  · HA1c 8 8        CKD (chronic kidney disease) stage 3, GFR 30-59 ml/min   Assessment & Plan    · Baseline creatinine 1 7-2 2 per nephrology outpt note from this year, but creatinine on 3/2018 was 2 4 so CKD may be progressing  · Status post 1 dose of IV Lasix   · Repeat lab work in the a m  Essential hypertension   Assessment & Plan    Cont Diovan        Hypothyroidism   Assessment & Plan    TSH within normal limits  Not on any medication               VTE Pharmacologic Prophylaxis:   Pharmacologic: Heparin Drip  Mechanical VTE Prophylaxis in Place: No    Patient Centered Rounds: I have performed bedside rounds with nursing staff today  Discussions with Specialists or Other Care Team Provider: Yes    Education and Discussions with Family / Patient:Yes    Time Spent for Care: 30 minutes  More than 50% of total time spent on counseling and coordination of care as described above  Current Length of Stay: 1 day(s)    Current Patient Status: Inpatient     Discharge Plan: Home    Code Status: Level 1 - Full Code    Certification Statement: The patient will continue to require additional inpatient hospital stay due to PE      Subjective:   Feels much better today  Reports improvement in her breathing    Objective:     Vitals:   Temp (24hrs), Av 8 °F (36 6 °C), Min:97 3 °F (36 3 °C), Max:98 1 °F (36 7 °C)    HR:  [] 101  Resp:  [14-32] 22  BP: (139-176)/() 157/74  SpO2:  [91 %-99 %] 93 %  Body mass index is 42 41 kg/m²  Input and Output Summary (last 24 hours):        Intake/Output Summary (Last 24 hours) at 07/06/18 1922  Last data filed at 07/06/18 1801   Gross per 24 hour   Intake           464 68 ml   Output                0 ml   Net           464 68 ml       Physical Exam:     Physical Exam   Constitutional: She is oriented to person, place, and time  She appears well-developed and well-nourished  No distress  HENT:   Head: Normocephalic and atraumatic  Mouth/Throat: Oropharynx is clear and moist    Eyes: EOM are normal  Right eye exhibits no discharge  Left eye exhibits no discharge  No scleral icterus  Neck: Neck supple  No tracheal deviation present  Cardiovascular: Normal rate and regular rhythm  Pulmonary/Chest: Effort normal  No respiratory distress  She has no wheezes  She has no rales  Decreased breath sounds   Abdominal: Soft  Bowel sounds are normal  She exhibits no distension  There is no tenderness  Musculoskeletal: She exhibits edema (mild - at baseline as per patient)  Neurological: She is alert and oriented to person, place, and time  No cranial nerve deficit  Skin: Skin is dry  She is not diaphoretic  Psychiatric: She has a normal mood and affect         Additional Data:     Labs:      Results from last 7 days  Lab Units 07/06/18  0428   WBC Thousand/uL 6 35   HEMOGLOBIN g/dL 12 2   HEMATOCRIT % 39 0   PLATELETS Thousands/uL 174   NEUTROS PCT % 82*   LYMPHS PCT % 8*   MONOS PCT % 8   EOS PCT % 1       Results from last 7 days  Lab Units 07/06/18  1650 07/06/18  0302 07/05/18 1916   SODIUM mmol/L  --  139 137   POTASSIUM mmol/L  --  3 7 3 9   CHLORIDE mmol/L  --  102 102   CO2 mmol/L  --  24 28   BUN mg/dL  --  31* 33*   CREATININE mg/dL  --  2 81* 2 92*   CALCIUM mg/dL  --  8 6 8 8   TOTAL PROTEIN g/dL  --   --  7 2   BILIRUBIN TOTAL mg/dL  --   --  1 40*   ALK PHOS U/L  --   --  108   ALT U/L  --   --  21   AST U/L  --   --  17   GLUCOSE RANDOM mg/dL 499* 190* 212*       Results from last 7 days  Lab Units 07/05/18 1916   INR  1 02       * I Have Reviewed All Lab Data Listed Above  * Additional Pertinent Lab Tests Reviewed: [facility] Admission Reviewed    Imaging:  Xr Chest Pa & Lateral    Result Date: 7/5/2018  Narrative: CHEST INDICATION:   R06 02: Shortness of breath R50 9: Fever, unspecified R00 0: Tachycardia, unspecified  COMPARISON:  Chest radiograph 12/27/2013 EXAM PERFORMED/VIEWS:  XR CHEST PA & LATERAL FINDINGS: Mild cardiomegaly is unchanged  Perivascular is within normal limits    The lungs are clear  No pneumothorax or pleural effusion  Osseous structures appear within normal limits for patient age  Impression: No acute cardiopulmonary disease  Workstation performed: [id number]     Nm Lung Ventilation / Perfusion    Result Date: 7/5/2018  Narrative: VENTILATION AND PERFUSION SCAN INDICATION:  Chest pain, shortness of breath  R06 02: Shortness of breath  R50 9: Fever, unspecified  R00 0: Tachycardia, unspecified  COMPARISON:  Chest radiograph  also on this date TECHNIQUE:  Posterior ventilation imaging was performed after the inhalation of 33 mCi Xe-133 gas  Multiplanar perfusion imaging was next performed following the intravenous administration of 4 2 mCi Tc-99m labeled MAA  FINDINGS:  Ventilation imaging demonstrates heterogeneous distribution of radiopharmaceutical throughout both lungs with some clumping of radiotracer noted in the central airways  Perfusion imaging demonstrates a moderate to large matched perfusion/ventilation defect in the posterior left upper lung zone  There is probably also a linear subsegmental matched defect in the posterior left mid to lower lung zone  Subsegmental matched defect in the right lower lung zone  Impression: Intermediate probability for pulmonary embolus  The study was marked in [facility] for immediate notification   Workstation performed: [id number]     Vas Lower Limb Venous Duplex Study, Complete Bilateral    Result Date: 7/6/2018  Narrative:  [facility] REPORT CLINICAL: Indications: Patient presents with recent discovery of pulmonary embolism and physician wants to determine potential source  Patient reports having left calf tenderness 1 day ago  Risk Factors The patient has history of Obesity, HTN, Diabetes (Yes) and CKD  The patient current BMI is 42 38, Weight is 217 lb and height is 60 in  CONCLUSION:  Impression: RIGHT LOWER LIMB: No evidence of acute or chronic deep vein thrombosis  No evidence of superficial thrombophlebitis noted  Doppler evaluation shows a normal response to augmentation maneuvers  Popliteal, posterior tibial and anterior tibial arterial Doppler waveforms are triphasic  LEFT LOWER LIMB: No evidence of acute or chronic deep vein thrombosis  No evidence of superficial thrombophlebitis noted  Doppler evaluation shows a normal response to augmentation maneuvers  Popliteal, posterior tibial and anterior tibial arterial Doppler waveforms are triphasic  Tech note: Bilateral calf veins were not well visualized secondary to body habitus    Technical findings were given to Jana May RN at 9:15 am   SIGNATURE: Electronically Signed by: Kelly Friedman MD on 2018-07-06 01:57:08 PM    Imaging Reports Reviewed by myself    Cultures:   Blood Culture: No results found for: Willie Powell  Urine Culture: No results found for: URINECX  Sputum Culture: No components found for: SPUTUMCX  Wound Culture: No results found for: WOUNDCULT    Last 24 Hours Medication List:     Current Facility-Administered Medications:  acetaminophen 650 mg Oral Q6H PRN Clarice Edwards MD    cefTRIAXone 1,000 mg Intravenous Q24H Clarice Edwards MD Last Rate: Stopped (07/05/18 2318)   And        azithromycin 500 mg Oral Q24H Clarice Edwards MD    heparin (porcine) 3-30 Units/kg/hr (Order-Specific) Intravenous Titrated Larisa Patino MD Last Rate: 13 Units/kg/hr (07/06/18 1343)   insulin aspart protamine-insulin aspart 40 Units Subcutaneous BID AC Danni Rob DO    insulin lispro 1-6 Units Subcutaneous TID AC Macy William MD    ipratropium-albuterol 3 mL Nebulization Q6H Macy William MD    And        ipratropium-albuterol 3 mL Nebulization Q4H PRN Macy William MD    methylPREDNISolone sodium succinate 20 mg Intravenous Q8H Danni Rob DO    ondansetron 4 mg Intravenous Q6H PRN Macy William MD    valsartan 160 mg Oral BID Macy William MD         Today, Patient Was Seen By: Juliana Craig DO    ** Please Note: Dragon 360 Dictation voice to text software may have been used in the creation of this document   **

## 2018-07-06 NOTE — ASSESSMENT & PLAN NOTE
· Chronic kidney disease stage III   · Baseline creatinine 1 7-2 2 per nephrology outpt note from this year, but creatinine on 3/2018 was 2 4 so CKD may be progressing     · Creatinine continues to remain stable

## 2018-07-06 NOTE — ASSESSMENT & PLAN NOTE
· Patient with recent airplane travel  Moderate probability as noted on V/Q scan done today as outpt   Cannot get CTA given poor renal function  · has low GFR so lovenox not a good option, Started on heparin gtt  · Check bilateral LE dopplers for DVTs  · Check 2D echo for right heart strain  · Supplemental O2

## 2018-07-06 NOTE — ASSESSMENT & PLAN NOTE
· Likely SIRS response from PE, viral infection  Fevers resolved  Antibiotics discontinued  · CXR showed NAD  CT chest with no signs of pneumonia or pleural effusions  · UA bland  Blood cx negative  · Rapid strep negative    Respiratory panel was positive for rhino virus

## 2018-07-06 NOTE — ASSESSMENT & PLAN NOTE
Lab Results   Component Value Date    HGBA1C 8 8 (H) 07/05/2018       Recent Labs      07/08/18   1612  07/08/18   2043  07/09/18   0718  07/09/18   1108   POCGLU  409*  401*  134  190*       Blood Sugar Average: Last 72 hrs:  (P) 329     · Blood sugars improved today  Patient is on Humalog 75/25 at home b i d and uses about 35-50 units  Continue NovoLog 70/30, 50 units b i d  continue sliding scale insulin  Start Lantus 12 units at night   Patient received a dose last night  · HA1c 8 8

## 2018-07-06 NOTE — CASE MANAGEMENT
Initial Clinical Review    Admission: Date/Time/Statement: 7/5/18 @ 2033     Orders Placed This Encounter   Procedures    Inpatient Admission (expected length of stay for this patient is greater than two midnights)     Standing Status:   Standing     Number of Occurrences:   1     Order Specific Question:   Admitting Physician     Answer:   Alessandro Márquez     Order Specific Question:   Level of Care     Answer:   Med Surg [16]     Order Specific Question:   Estimated length of stay     Answer:   More than 2 Midnights     Order Specific Question:   Certification     Answer:   I certify that inpatient services are medically necessary for this patient for a duration of greater than two midnights  See H&P and MD Progress Notes for additional information about the patient's course of treatment  ED: Date/Time/Mode of Arrival:   ED Arrival Information     Expected Arrival Acuity Means of Arrival Escorted By Service Admission Type    - 7/5/2018 18:30 Emergent Wheelchair Other General Medicine Emergency    Arrival Complaint    abnormal lung scan          Chief Complaint:   Chief Complaint   Patient presents with    Shortness of Breath     short of breath for a few days, seen a PMD, sent for out patient ct scan, pt has blood clot in her lung  pt recently flew home from [facility]  pt has a sore throat and chills        History of Illness: Alexander Bowie is a 66 y o  female with a PMH of T2DM on insulin, CKD III, HTN, peripheral edema on lasix, noncompliance who presents with acute on chronic dyspnea  States shes had SOB for several months, but last week flew to [place] and when she was there developed a worsening in her SOB  It occurs with exertion and now at rest, she also has orthopnea  She has  LE edema  No calf pain  Last week she also developed a dry cough and fevers on return from her trip  No known sick contacts  Has nausea with coughing fits  No emesis  Has chronic peripheral edema on lasix   Denies any known heart failure  Acutely ill appearing She appears distressed  Pulmonary/Chest: Accessory muscle usage present  Tachypnea noted  She is in respiratory distress  She has wheezes  She has rales  Increased work of breathing, conversational dyspnea She is diaphoretic  ED Vital Signs:   ED Triage Vitals   Temperature Pulse Respirations Blood Pressure SpO2   07/05/18 1838 07/05/18 1838 07/05/18 1838 07/05/18 1846 07/05/18 1838   (!) 101 9 °F (38 8 °C) (!) 110 20 (!) 191/94 93 %      Temp Source Heart Rate Source Patient Position - Orthostatic VS BP Location FiO2 (%)   07/05/18 1838 07/05/18 1838 07/05/18 1838 07/05/18 1838 07/05/18 2200   Tympanic Monitor Sitting Right arm 2      Pain Score       07/05/18 1838       4        Wt Readings from Last 1 Encounters:   07/06/18 98 5 kg (217 lb 2 5 oz)         Abnormal Labs/Diagnostic Test Results:   BUN mg/dL 33*   CREATININE mg/dL 2 92*     BILIRUBIN TOTAL mg/dL 1 40*     GLUCOSE RANDOM mg/dL 212*     Albumin 3 5 - 5 0 g/dL 2 9   L      NT-proBNP <125 pg/mL 1,873   H      VQ SCAN  Intermediate probability for pulmonary embolus  CXR No acute cardiopulmonary disease               ED Treatment:   Medication Administration from 07/05/2018 1830 to 07/05/2018 2145       Date/Time Order Dose Route Action Action by Comments     07/05/2018 2130 sodium chloride 0 9 % bolus 1,000 mL 0 mL Intravenous Stopped Katie Gimenez RN      07/05/2018 2030 sodium chloride 0 9 % bolus 1,000 mL 0 mL Intravenous Stopped Jenny Varghese RN      07/05/2018 1922 sodium chloride 0 9 % bolus 1,000 mL 1,000 mL Intravenous Devyn 37 Megha Rodgers RN      07/05/2018 1922 acetaminophen (TYLENOL) tablet 650 mg 650 mg Oral Given Megha Rodgers  9     07/05/2018 2043 heparin (porcine) injection 8,000 Units 8,000 Units Intravenous Given Albert Pete RN      07/05/2018 2042 heparin (porcine) 25,000 units in 250 mL infusion (premix) 18 Units/kg/hr Intravenous New Bag Albert Pete RN Past Medical/Surgical History: Active Ambulatory Problems     Diagnosis Date Noted    Essential hypertension 04/13/2018    CKD (chronic kidney disease) stage 3, GFR 30-59 ml/min 04/13/2018    Nephrotic range proteinuria 04/13/2018    Cyst of ovary 04/13/2017    Hypothyroidism 12/31/2013    Hyperlipidemia 01/31/2014    Type 2 diabetes, uncontrolled, with renal manifestation ([place] [address] ) 06/05/2013    Obstructive sleep apnea 09/12/2012     Resolved Ambulatory Problems     Diagnosis Date Noted    No Resolved Ambulatory Problems     Past Medical History:   Diagnosis Date    Cardiac disease     Diabetes mellitus ([place] [address] )     Hyperlipidemia     Hypertension     Renal disorder        Admitting Diagnosis: Shortness of breath [R06 02]  Pulmonary embolism (HCC) [I26 99]  Fever [R50 9]  Acute renal failure (ARF) (HCC) [N17 9]    Age/Sex: 66 y o  female    Assessment/Plan:   PE (pulmonary thromboembolism) ([place] [address] )   Assessment & Plan     · Patient with recent airplane travel  Moderate probability as noted on V/Q scan done today as outpt  Cannot get CTA given poor renal function  · has low GFR so lovenox not a good option, Started on heparin gtt  · Check bilateral LE dopplers for DVTs  · Check 2D echo for right heart strain  · Supplemental O2   Sepsis ([place] [address] )   Assessment & Plan     · As evidence by fever and tachycardia  May be 2/2 bronchitis/PNA or may be SIRS response from PE  · CXR showed NAD  · UA bland  · Blood cx pending  · Rapid strep negative  · Respiratory panel ordered  · Urine strep/legionalla ordered  · Proclination pending  · Otherwise Treatment as noted   Elevated brain natriuretic peptide (BNP) level   Assessment & Plan     · Hx of peripheral edema, on lasix at home  Elevation may be from HR but patient also has CKD  · No echo on file  · CXR shows NAD  · Appears fluid overloaded on PE   Will give one time IV lasix now  · monitor I/O and daily weights  · Check 2D echo      Acute bronchitis   Assessment & Plan     · Acute bronchitis vs early PNA  As evidence by SOB, cough, fever, and rhonchi/wheezing on PE    · Rapid strep negative  · Check respiratory panel, urine strep and legionella Ag and procalcionin  · Start empiric IV steroids, rocephin/Azithro, nebulizers  · IS and respiratory protocol   Dyspnea   Assessment & Plan     · Multifactorial, from acute respiratory infection, possible heart failure and PE  · Treatment as noted  · Tele monitor  · Serial troponins  · Query CHANDRA with sleep study once acute issues resolved  Type 2 diabetes, uncontrolled, with renal manifestation (HCC)   Assessment & Plan             Lab Results   Component Value Date     HGBA1C 8 6 (H) 10/18/2017        Hypothyroidism   Assessment & Plan     Check TSH  Not on any medication    CKD (chronic kidney disease) stage 3, GFR 30-59 ml/min   Assessment & Plan     · Baseline creatinine 1 7-2 2 per nephrology outpt note from this year, but creatinine on 3/2018 was 2 4 so CKD may be progressing  · Appears fluid overloaded  · Lasix for diuresis tonight  · Serial labs to monitor  · Monitor UOP   Essential hypertension   Assessment & Plan     Cont home med        VTE Prophylaxis: Heparin Drip  / sequential compression device   Code Status: FULL CODE  Anticipated Length of Stay:  Patient will be admitted on an Inpatient basis with an anticipated length of stay of  > 2 midnights     Justification for Hospital Stay: IV anticoagulation, IV diuretics, IV steroids and IV Abx         Admission Orders:  TELE MON  RESPIRATORY PROTOCOL  DAILY WEIGHT I/O  ELEVATE HOB  VAS LOER LIMB VENOUS DUPLEX STUDY  APTT Q6  PROCALCITONIN  ECHO  Scheduled Meds:   Current Facility-Administered Medications:  acetaminophen 650 mg Oral Q6H PRN Yuriy Moraes MD    cefTRIAXone 1,000 mg Intravenous Q24H Yuriy Moraes MD Last Rate: Stopped (07/05/18 3540)   And        azithromycin 500 mg Oral Q24H Yuriy Moraes MD    heparin (porcine) 3-30 Units/kg/hr (Order-Specific) Intravenous Titrated Herb Larson MD Last Rate: 15 Units/kg/hr (07/06/18 0456)   insulin lispro 1-6 Units Subcutaneous TID AC Radha Lee MD    ipratropium-albuterol 3 mL Nebulization Q6H Radha Lee MD    And        ipratropium-albuterol 3 mL Nebulization Q4H PRN Radha Lee MD    methylPREDNISolone sodium succinate 40 mg Intravenous Q8H Radha Lee MD    ondansetron 4 mg Intravenous Q6H PRN Radha Lee MD    valsartan 160 mg Oral BID Radha Lee MD      Continuous Infusions:   heparin (porcine) 3-30 Units/kg/hr (Order-Specific) Last Rate: 15 Units/kg/hr (07/06/18 0456)     PRN Meds:   acetaminophen    ipratropium-albuterol **AND** ipratropium-albuterol    ondansetron

## 2018-07-06 NOTE — ASSESSMENT & PLAN NOTE
· As evidence by fever and tachycardia  No leukocytosis   May be 2/2 bronchitis/PNA or may be SIRS response from PE  · CXR showed NAD  · UA bland  · Blood cx pending  · Rapid strep negative  · Respiratory panel ordered  · Urine strep/legionalla ordered  · Proclination pending  · Otherwise Treatment as noted

## 2018-07-06 NOTE — ASSESSMENT & PLAN NOTE
Viral in nature  respiratory panel positive for rhino virus  urine strep and legionella Ag negative  procalcitonin 0 14 and on repeat is 0 21  Rapid strep negative  Symptoms have improved    discontinued IV rocephin/Azithro  Continue prednisone taper

## 2018-07-06 NOTE — CASE MANAGEMENT
Initial Clinical Review    Admission: Date/Time/Statement: 7/5/18 @ 2033     Orders Placed This Encounter   Procedures    Inpatient Admission (expected length of stay for this patient is greater than two midnights)     Standing Status:   Standing     Number of Occurrences:   1     Order Specific Question:   Admitting Physician     Answer:   Monica Arvizu     Order Specific Question:   Level of Care     Answer:   Med Surg [16]     Order Specific Question:   Estimated length of stay     Answer:   More than 2 Midnights     Order Specific Question:   Certification     Answer:   I certify that inpatient services are medically necessary for this patient for a duration of greater than two midnights  See H&P and MD Progress Notes for additional information about the patient's course of treatment  ED: Date/Time/Mode of Arrival:   ED Arrival Information     Expected Arrival Acuity Means of Arrival Escorted By Service Admission Type    - 7/5/2018 18:30 Emergent Wheelchair Other General Medicine Emergency    Arrival Complaint    abnormal lung scan          Chief Complaint:   Chief Complaint   Patient presents with    Shortness of Breath     short of breath for a few days, seen a PMD, sent for out patient ct scan, pt has blood clot in her lung  pt recently flew home from [facility]  pt has a sore throat and chills        History of Illness: Cornelio Flowers is a 66 y o  female with a PMH of T2DM on insulin, CKD III, HTN, peripheral edema on lasix, noncompliance who presents with acute on chronic dyspnea  States shes had SOB for several months, but last week flew to [place] and when she was there developed a worsening in her SOB  It occurs with exertion and now at rest, she also has orthopnea  She has  LE edema  No calf pain  Last week she also developed a dry cough and fevers on return from her trip  No known sick contacts  Has nausea with coughing fits  No emesis  Has chronic peripheral edema on lasix   Denies any known heart failure  Acutely ill appearing She appears distressed  Pulmonary/Chest: Accessory muscle usage present  Tachypnea noted  She is in respiratory distress  She has wheezes  She has rales  Increased work of breathing, conversational dyspnea She is diaphoretic  ED Vital Signs:   ED Triage Vitals   Temperature Pulse Respirations Blood Pressure SpO2   07/05/18 1838 07/05/18 1838 07/05/18 1838 07/05/18 1846 07/05/18 1838   (!) 101 9 °F (38 8 °C) (!) 110 20 (!) 191/94 93 %      Temp Source Heart Rate Source Patient Position - Orthostatic VS BP Location FiO2 (%)   07/05/18 1838 07/05/18 1838 07/05/18 1838 07/05/18 1838 07/05/18 2200   Tympanic Monitor Sitting Right arm 2      Pain Score       07/05/18 1838       4          Wt Readings from Last 1 Encounters:   07/06/18 98 5 kg (217 lb 2 5 oz)         Abnormal Labs/Diagnostic Test Results:   BUN mg/dL 33*   CREATININE mg/dL 2 92*     BILIRUBIN TOTAL mg/dL 1 40*     GLUCOSE RANDOM mg/dL 212*     Albumin 3 5 - 5 0 g/dL 2 9   L      NT-proBNP <125 pg/mL 1,873   H      VQ SCAN  Intermediate probability for pulmonary embolus  CXR No acute cardiopulmonary disease               ED Treatment:   Medication Administration from 07/05/2018 1830 to 07/05/2018 2145       Date/Time Order Dose Route Action Action by Comments     07/05/2018 2130 sodium chloride 0 9 % bolus 1,000 mL 0 mL Intravenous Stopped Orlando Alatorre RN      07/05/2018 2030 sodium chloride 0 9 % bolus 1,000 mL 0 mL Intravenous Stopped Ellen Golden RN      07/05/2018 1922 sodium chloride 0 9 % bolus 1,000 mL 1,000 mL Intravenous Devyn 37 Maura Up RN      07/05/2018 1922 acetaminophen (TYLENOL) tablet 650 mg 650 mg Oral Given Maura Up  9     07/05/2018 2043 heparin (porcine) injection 8,000 Units 8,000 Units Intravenous Given Fany Alejandra RN      07/05/2018 2042 heparin (porcine) 25,000 units in 250 mL infusion (premix) 18 Units/kg/hr Intravenous New Bag Fany Alejandra RN Past Medical/Surgical History: Active Ambulatory Problems     Diagnosis Date Noted    Essential hypertension 04/13/2018    CKD (chronic kidney disease) stage 3, GFR 30-59 ml/min 04/13/2018    Nephrotic range proteinuria 04/13/2018    Cyst of ovary 04/13/2017    Hypothyroidism 12/31/2013    Hyperlipidemia 01/31/2014    Type 2 diabetes, uncontrolled, with renal manifestation ([place] [id number] ) 06/05/2013    Obstructive sleep apnea 09/12/2012     Resolved Ambulatory Problems     Diagnosis Date Noted    No Resolved Ambulatory Problems     Past Medical History:   Diagnosis Date    Cardiac disease     Diabetes mellitus ([place] [address] )     Hyperlipidemia     Hypertension     Renal disorder        Admitting Diagnosis: Shortness of breath [R06 02]  Pulmonary embolism (HCC) [I26 99]  Fever [R50 9]  Acute renal failure (ARF) (HCC) [N17 9]    Age/Sex: 66 y o  female    Assessment/Plan:   PE (pulmonary thromboembolism) ([place] [address] )   Assessment & Plan     · Patient with recent airplane travel  Moderate probability as noted on V/Q scan done today as outpt  Cannot get CTA given poor renal function  · has low GFR so lovenox not a good option, Started on heparin gtt  · Check bilateral LE dopplers for DVTs  · Check 2D echo for right heart strain  · Supplemental O2   Sepsis ([place] [address] )   Assessment & Plan     · As evidence by fever and tachycardia  May be 2/2 bronchitis/PNA or may be SIRS response from PE  · CXR showed NAD  · UA bland  · Blood cx pending  · Rapid strep negative  · Respiratory panel ordered  · Urine strep/legionalla ordered  · Proclination pending  · Otherwise Treatment as noted   Elevated brain natriuretic peptide (BNP) level   Assessment & Plan     · Hx of peripheral edema, on lasix at home  Elevation may be from HR but patient also has CKD  · No echo on file  · CXR shows NAD  · Appears fluid overloaded on PE   Will give one time IV lasix now  · monitor I/O and daily weights  · Check 2D echo      Acute bronchitis   Assessment & Plan     · Acute bronchitis vs early PNA  As evidence by SOB, cough, fever, and rhonchi/wheezing on PE    · Rapid strep negative  · Check respiratory panel, urine strep and legionella Ag and procalcionin  · Start empiric IV steroids, rocephin/Azithro, nebulizers  · IS and respiratory protocol   Dyspnea   Assessment & Plan     · Multifactorial, from acute respiratory infection, possible heart failure and PE  · Treatment as noted  · Tele monitor  · Serial troponins  · Query CHANDRA with sleep study once acute issues resolved  Type 2 diabetes, uncontrolled, with renal manifestation (HCC)   Assessment & Plan             Lab Results   Component Value Date     HGBA1C 8 6 (H) 10/18/2017        Hypothyroidism   Assessment & Plan     Check TSH  Not on any medication    CKD (chronic kidney disease) stage 3, GFR 30-59 ml/min   Assessment & Plan     · Baseline creatinine 1 7-2 2 per nephrology outpt note from this year, but creatinine on 3/2018 was 2 4 so CKD may be progressing  · Appears fluid overloaded  · Lasix for diuresis tonight  · Serial labs to monitor  · Monitor UOP   Essential hypertension   Assessment & Plan     Cont home med        VTE Prophylaxis: Heparin Drip  / sequential compression device   Code Status: FULL CODE  Anticipated Length of Stay:  Patient will be admitted on an Inpatient basis with an anticipated length of stay of  > 2 midnights     Justification for Hospital Stay: IV anticoagulation, IV diuretics, IV steroids and IV Abx         Admission Orders:  TELE MON  RESPIRATORY PROTOCOL  DAILY WEIGHT I/O  ELEVATE HOB  VAS LOER LIMB VENOUS DUPLEX STUDY  APTT Q6  PROCALCITONIN  ECHO  Scheduled Meds:     Current Facility-Administered Medications:  acetaminophen 650 mg Oral Q6H PRN Kika Acevedo MD    cefTRIAXone 1,000 mg Intravenous Q24H Kika Acevedo MD Last Rate: Stopped (07/05/18 4252)   And        azithromycin 500 mg Oral Q24H Kika Acevedo MD    heparin (porcine) 3-30 Units/kg/hr (Order-Specific) Intravenous Titrated Larisa Patino MD Last Rate: 15 Units/kg/hr (07/06/18 0456)   insulin lispro 1-6 Units Subcutaneous TID AC Clarice Edwards MD    ipratropium-albuterol 3 mL Nebulization Q6H Clarice Edwards MD    And        ipratropium-albuterol 3 mL Nebulization Q4H PRN Clarice Edwards MD    methylPREDNISolone sodium succinate 40 mg Intravenous Q8H Clarice Edwards MD    ondansetron 4 mg Intravenous Q6H PRN Clarice Edwards MD    valsartan 160 mg Oral BID Clarice Edwards MD      Continuous Infusions:     heparin (porcine) 3-30 Units/kg/hr (Order-Specific) Last Rate: 15 Units/kg/hr (07/06/18 0456)     PRN Meds:   acetaminophen    ipratropium-albuterol **AND** ipratropium-albuterol    ondansetron    Pending Lauren Warren [phone]

## 2018-07-06 NOTE — ASSESSMENT & PLAN NOTE
· Acute bronchitis vs early PNA   As evidence by SOB, cough, fever, and rhonchi/wheezing on PE    · Rapid strep negative  · Check respiratory panel, urine strep and legionella Ag and procalcionin  · Start empiric IV steroids, rocephin/Azithro, nebulizers  · IS and respiratory protocol

## 2018-07-06 NOTE — ASSESSMENT & PLAN NOTE
· Multifactorial from acute respiratory infection, possible PE  · Breathing improving  Patient stable on room air  · troponins negative    · Query CHANDRA  sleep study once acute issues resolved

## 2018-07-06 NOTE — PLAN OF CARE
DISCHARGE PLANNING     Discharge to home or other facility with appropriate resources Progressing        INFECTION - ADULT     Absence or prevention of progression during hospitalization Progressing     Absence of fever/infection during neutropenic period Progressing        Potential for Falls     Patient will remain free of falls Progressing        RESPIRATORY - ADULT     Achieves optimal ventilation and oxygenation Progressing        SAFETY ADULT     Maintain or return to baseline ADL function Progressing     Maintain or return mobility status to optimal level Progressing

## 2018-07-06 NOTE — ASSESSMENT & PLAN NOTE
· Possible PE  Patient with recent airplane travel, desk job at work, complaining of dyspnea, tachycardic in the ER which fits the picture of PE  Moderate probability as noted on V/Q scan done  Patient reports improvement in her symptoms on after heparin drip started  Cannot get CTA given poor renal function  · Continue heparin gtt/coumadin  · bilateral LE dopplers negative for DVTs  · Due to patient's GFR, patient started on Coumadin     · INR is up to with Coumadin 10 milligram p o  daily

## 2018-07-06 NOTE — ASSESSMENT & PLAN NOTE
Lab Results   Component Value Date    HGBA1C 8 6 (H) 10/18/2017       No results for input(s): POCGLU in the last 72 hours      Blood Sugar Average: Last 72 hrs:     ·

## 2018-07-06 NOTE — ASSESSMENT & PLAN NOTE
· Baseline creatinine 1 7-2 2 per nephrology outpt note from this year, but creatinine on 3/2018 was 2 4 so CKD may be progressing  · Appears fluid overloaded     · Lasix for diuresis tonight  · Serial labs to monitor  · Monitor UOP

## 2018-07-06 NOTE — MALNUTRITION/BMI
This medical record reflects one or more clinical indicators suggestive of morbid obesity  BMI Findings:  BMI Classifications: Morbid Obesity 40-44 9     Body mass index is 42 41 kg/m²  See Nutrition note dated 7/6/18 for additional details  Completed nutrition assessment is viewable in the nutrition documentation

## 2018-07-06 NOTE — H&P
H&P- Humera Nassar 1955, 66 y o  female MRN: [id number]    Unit/Bed#: ED 07 Encounter: [id number]    Primary Care Provider: Bishop Schwab DO   Date and time admitted to hospital: 7/5/2018  6:52 PM        * PE (pulmonary thromboembolism) ([facility] )   Assessment & Plan    · Patient with recent airplane travel  Moderate probability as noted on V/Q scan done today as outpt  Cannot get CTA given poor renal function  · has low GFR so lovenox not a good option, Started on heparin gtt  · Check bilateral LE dopplers for DVTs  · Check 2D echo for right heart strain  · Supplemental O2        Sepsis ([facility] )   Assessment & Plan    · As evidence by fever and tachycardia  No leukocytosis  May be 2/2 bronchitis/PNA or may be SIRS response from PE  · CXR showed NAD  · UA bland  · Blood cx pending  · Rapid strep negative  · Respiratory panel ordered  · Urine strep/legionalla ordered  · Proclination pending  · Otherwise Treatment as noted        Elevated brain natriuretic peptide (BNP) level   Assessment & Plan    · Hx of peripheral edema, on lasix at home  Elevation may be from HR but patient also has CKD  · No echo on file  · CXR shows NAD  · Appears fluid overloaded on PE  Will give one time IV lasix now  · monitor I/O and daily weights  · Check 2D echo        Acute bronchitis   Assessment & Plan    · Acute bronchitis vs early PNA  As evidence by SOB, cough, fever, and rhonchi/wheezing on PE    · Rapid strep negative  · Check respiratory panel, urine strep and legionella Ag and procalcionin  · Start empiric IV steroids, rocephin/Azithro, nebulizers  · IS and respiratory protocol        Dyspnea   Assessment & Plan    · Multifactorial, from acute respiratory infection, possible heart failure and PE  · Treatment as noted  · Tele monitor  · Serial troponins  · Query CHANDRA with sleep study once acute issues resolved           Type 2 diabetes, uncontrolled, with renal manifestation [facility])   Assessment & Plan    Lab Results   Component Value Date    HGBA1C 8 6 (H) 10/18/2017       No results for input(s): POCGLU in the last 72 hours  Blood Sugar Average: Last 72 hrs:       · Patient on short acting insulin but has no specific regimen, guesses based on what she thinks she needs  Takes ~30 units with breakfast and 30-50 with dinner  · For now place on ISS and hold her home insulin  Titrate her regimen based on her POC  · Check HA1c, last one was in 2017        Hypothyroidism   Assessment & Plan    Check TSH  Not on any medication         CKD (chronic kidney disease) stage 3, GFR 30-59 ml/min   Assessment & Plan    · Baseline creatinine 1 7-2 2 per nephrology outpt note from this year, but creatinine on 3/2018 was 2 4 so CKD may be progressing  · Appears fluid overloaded  · Lasix for diuresis tonight  · Serial labs to monitor  · Monitor UOP        Essential hypertension   Assessment & Plan    Cont home med              VTE Prophylaxis: Heparin Drip  / sequential compression device   Code Status: FULL CODE    Anticipated Length of Stay:  Patient will be admitted on an Inpatient basis with an anticipated length of stay of  > 2 midnights  Justification for Hospital Stay: IV anticoagulation, IV diuretics, IV steroids and IV Abx    Total Time for Visit, including Counseling / Coordination of Care: 1 hour  Greater than 50% of this total time spent on direct patient counseling and coordination of care  Chief Complaint:   Shortness of Breath (short of breath for a few days, seen a PMD, sent for out patient ct scan, pt has blood clot in her lung  pt recently flew home from [facility]  pt has a sore throat and chills )      History of Present Illness:    Beatriz Robbins is a 66 y o  female with a PMH of T2DM on insulin, CKD III, HTN, peripheral edema on lasix, noncompliance who presents with acute on chronic dyspnea  States shes had SOB for several months, but last week flew to [place] and when she was there developed a worsening in her SOB   It occurs with exertion and now at rest, she also has orthopnea  She has chronic LE edema, no change from baseline  No calf pain  Last week she also developed a dry cough and fevers on return from her trip  No known sick contacts  Has nausea with coughing fits  No emesis  Has chronic peripheral edema on lasix  Denies any known heart failure  She went to her PCP today who did a V/Q scan (coulg not get CTA given CKD) which showed moderate risk of PE so she was sent to ED for eval  In the ED she had fever of 101 9  CXR showed NAD  BNP elevated at 1873  WBC is wnl  Rapid strep negative  Review of Systems:    Review of Systems   Constitutional: Positive for chills and fever  Negative for appetite change and unexpected weight change  HENT: Positive for sore throat  Negative for postnasal drip, rhinorrhea, sinus pain, sinus pressure, trouble swallowing and voice change  Eyes: Negative  Respiratory: Positive for cough and shortness of breath  Negative for choking and chest tightness  Cardiovascular: Positive for leg swelling  Negative for chest pain and palpitations  Gastrointestinal: Positive for constipation and nausea (with coughing fits)  Negative for abdominal pain, diarrhea and vomiting  Endocrine: Negative  Genitourinary: Negative for decreased urine volume, dysuria, flank pain, frequency and urgency  Musculoskeletal: Positive for back pain  Skin: Negative  Neurological: Negative for syncope, speech difficulty, weakness, light-headedness and headaches  Psychiatric/Behavioral: Negative  Past Medical and Surgical History:     Past Medical History:   Diagnosis Date    Cardiac disease     Diabetes mellitus ([address] )     Hyperlipidemia     Hypertension     Renal disorder        History reviewed  No pertinent surgical history  Meds/Allergies:    Prior to Admission medications    Medication Sig Start Date End Date Taking?  Authorizing Provider   albuterol [facility] HFA) 90 mcg/act inhaler Inhale 2 puffs every 4 (four) hours as needed for wheezing or shortness of breath 7/5/18   MICHAEL Weber   doxycycline hyclate (VIBRAMYCIN) 100 mg capsule Take 1 capsule (100 mg total) by mouth every 12 (twelve) hours for 10 days 7/5/18 7/15/18  MICHAEL Weber   furosemide (LASIX) 40 mg tablet Take 1 tablet by mouth as needed 9/22/14   Historical Provider, MD   Insulin Lispro Prot & Lispro (HUMALOG MIX 75/25 KWIKPEN) (75-25) 100 UNIT/ML SUPN Inject 40 Units as directed every 6 (six) hours 2/15/18   Noah Rivera PA-C   ONE TOUCH ULTRA TEST test strip  1/19/18   Historical Provider, MD   valsartan (DIOVAN) 160 mg tablet Take 1 tablet by mouth 2 (two) times a day 6/26/15   Historical Provider, MD   albuterol (PROAIR HFA) 90 mcg/act inhaler Inhale 2 puffs every 4 (four) hours as needed 6/2/17 7/5/18  Historical Provider, MD   metoprolol tartrate (LOPRESSOR) 25 mg tablet Take 1 tablet by mouth 2 (two) times a day 10/28/14 7/5/18  Historical Provider, MD       Allergies: No Known Allergies    Social History:     Marital Status: Single   Substance Use History:   History   Alcohol Use No     History   Smoking Status    Never Smoker   Smokeless Tobacco    Never Used     History   Drug Use No       Family History:    non-contributory    Physical Exam:     Vitals:   Blood Pressure: (!) 173/85 (07/05/18 2048)  Pulse: 88 (07/05/18 2048)  Temperature: (!) 101 9 °F (38 8 °C) (07/05/18 1838)  Temp Source: Tympanic (07/05/18 1838)  Respirations: (!) 24 (07/05/18 2048)  Weight - Scale: 98 9 kg (218 lb) (07/05/18 1840)  SpO2: 95 % (07/05/18 2048)    Physical Exam   Constitutional: She is oriented to person, place, and time  She appears well-developed  She appears distressed  Acutely ill appearing   HENT:   Head: Normocephalic and atraumatic  Mouth/Throat: No oropharyngeal exudate  Eyes: Conjunctivae and EOM are normal  Pupils are equal, round, and reactive to light  No scleral icterus  Neck: JVD present  No thyromegaly present  Cardiovascular: Normal rate, regular rhythm and normal heart sounds  No murmur heard  Pulmonary/Chest: Accessory muscle usage present  Tachypnea noted  She is in respiratory distress  She has wheezes  She has rales  Increased work of breathing, conversational dyspnea   Abdominal: Soft  Bowel sounds are normal  She exhibits no distension  There is no tenderness  There is no rebound and no guarding  Musculoskeletal: She exhibits edema  She exhibits no tenderness or deformity  Negative homans sign, or calf TTP   Lymphadenopathy:     She has no cervical adenopathy  Neurological: She is alert and oriented to person, place, and time  Skin: Skin is warm  She is diaphoretic  Psychiatric: Her behavior is normal    anxious   Nursing note and vitals reviewed  Additional Data:     Lab Results: I have personally reviewed pertinent reports  Results from last 7 days  Lab Units 07/05/18 1916   WBC Thousand/uL 7 40   HEMOGLOBIN g/dL 12 5   HEMATOCRIT % 40 0   PLATELETS Thousands/uL 188   NEUTROS PCT % 72   LYMPHS PCT % 9*   MONOS PCT % 17*   EOS PCT % 2       Results from last 7 days  Lab Units 07/05/18 1916   SODIUM mmol/L 137   POTASSIUM mmol/L 3 9   CHLORIDE mmol/L 102   CO2 mmol/L 28   BUN mg/dL 33*   CREATININE mg/dL 2 92*   CALCIUM mg/dL 8 8   TOTAL PROTEIN g/dL 7 2   BILIRUBIN TOTAL mg/dL 1 40*   ALK PHOS U/L 108   ALT U/L 21   AST U/L 17   GLUCOSE RANDOM mg/dL 212*       Results from last 7 days  Lab Units 07/05/18 1916   INR  1 02       Imaging: I have personally reviewed pertinent reports  No orders to display       No orders to display       EKG, Pathology, and Other Studies Reviewed on Admission:   · EKG: sinus tachycardia, 104 bpm, no ST elevations    Allscripts / Epic Records Reviewed: Yes     ** Please Note: This note has been constructed using a voice recognition system   **

## 2018-07-06 NOTE — SOCIAL WORK
DASH discussion completed  Discussed goals of making sure pt's needs are met upon discharge, pt's preferences are taken into account, pt understands her health condition, medications and symptoms to watch for after returning home and pt is aware of any follow up appointments recommended by hospital physician  Spoke to the pt at the bedside  Pt lives with her family and has a cane but no needs to use it  She is generally independent with her own care, no HHC at this time  Pt does drive and noted that she may have a neb machine but is not sure how old it is  Pt used the [facility] for her medications and noted that she has [facility] in [place], [place] for her one time meds

## 2018-07-06 NOTE — ASSESSMENT & PLAN NOTE
Lab Results   Component Value Date    HGBA1C 8 6 (H) 10/18/2017       No results for input(s): POCGLU in the last 72 hours  Blood Sugar Average: Last 72 hrs:       · Patient on short acting insulin but has no specific regimen, guesses based on what she thinks she needs  Takes ~30 units with breakfast and 30-50 with dinner  · For now place on ISS and hold her home insulin  Titrate her regimen based on her POC     · Check HA1c, last one was in 2017

## 2018-07-06 NOTE — ASSESSMENT & PLAN NOTE
· Hx of peripheral edema, on lasix at home  Elevation may be from HR but patient also has CKD  · No echo on file  · CXR shows NAD  · Appears fluid overloaded on PE   Will give one time IV lasix now  · monitor I/O and daily weights  · Check 2D echo

## 2018-07-07 ENCOUNTER — APPOINTMENT (INPATIENT)
Dept: RADIOLOGY | Facility: HOSPITAL | Age: 63
DRG: 871 | End: 2018-07-07
Payer: COMMERCIAL

## 2018-07-07 PROBLEM — N17.9 ACUTE KIDNEY INJURY SUPERIMPOSED ON CKD (HCC): Status: ACTIVE | Noted: 2018-04-13

## 2018-07-07 PROBLEM — R65.10 SIRS (SYSTEMIC INFLAMMATORY RESPONSE SYNDROME) (HCC): Status: ACTIVE | Noted: 2018-07-05

## 2018-07-07 PROBLEM — N18.9 ACUTE KIDNEY INJURY SUPERIMPOSED ON CKD (HCC): Status: ACTIVE | Noted: 2018-04-13

## 2018-07-07 LAB
ANION GAP SERPL CALCULATED.3IONS-SCNC: 13 MMOL/L (ref 4–13)
APTT PPP: 72 SECONDS (ref 24–36)
BUN SERPL-MCNC: 48 MG/DL (ref 5–25)
CALCIUM SERPL-MCNC: 8.6 MG/DL (ref 8.3–10.1)
CHLORIDE SERPL-SCNC: 98 MMOL/L (ref 100–108)
CO2 SERPL-SCNC: 21 MMOL/L (ref 21–32)
CREAT SERPL-MCNC: 2.84 MG/DL (ref 0.6–1.3)
GFR SERPL CREATININE-BSD FRML MDRD: 17 ML/MIN/1.73SQ M
GLUCOSE SERPL-MCNC: 344 MG/DL (ref 65–140)
GLUCOSE SERPL-MCNC: 371 MG/DL (ref 65–140)
GLUCOSE SERPL-MCNC: 388 MG/DL (ref 65–140)
GLUCOSE SERPL-MCNC: 413 MG/DL (ref 65–140)
GLUCOSE SERPL-MCNC: 436 MG/DL (ref 65–140)
GLUCOSE SERPL-MCNC: 441 MG/DL (ref 65–140)
GLUCOSE SERPL-MCNC: >500 MG/DL (ref 65–140)
MAGNESIUM SERPL-MCNC: 2.3 MG/DL (ref 1.6–2.6)
MRSA NOSE QL CULT: NORMAL
POTASSIUM SERPL-SCNC: 4.6 MMOL/L (ref 3.5–5.3)
PROCALCITONIN SERPL-MCNC: 0.21 NG/ML
SODIUM SERPL-SCNC: 132 MMOL/L (ref 136–145)

## 2018-07-07 PROCEDURE — 84145 PROCALCITONIN (PCT): CPT | Performed by: FAMILY MEDICINE

## 2018-07-07 PROCEDURE — 83735 ASSAY OF MAGNESIUM: CPT | Performed by: FAMILY MEDICINE

## 2018-07-07 PROCEDURE — 85730 THROMBOPLASTIN TIME PARTIAL: CPT | Performed by: FAMILY MEDICINE

## 2018-07-07 PROCEDURE — 82948 REAGENT STRIP/BLOOD GLUCOSE: CPT

## 2018-07-07 PROCEDURE — 93306 TTE W/DOPPLER COMPLETE: CPT | Performed by: INTERNAL MEDICINE

## 2018-07-07 PROCEDURE — 94640 AIRWAY INHALATION TREATMENT: CPT

## 2018-07-07 PROCEDURE — 71250 CT THORAX DX C-: CPT

## 2018-07-07 PROCEDURE — 94760 N-INVAS EAR/PLS OXIMETRY 1: CPT

## 2018-07-07 PROCEDURE — 99232 SBSQ HOSP IP/OBS MODERATE 35: CPT | Performed by: FAMILY MEDICINE

## 2018-07-07 PROCEDURE — 80048 BASIC METABOLIC PNL TOTAL CA: CPT | Performed by: FAMILY MEDICINE

## 2018-07-07 RX ORDER — METHYLPREDNISOLONE SODIUM SUCCINATE 40 MG/ML
20 INJECTION, POWDER, LYOPHILIZED, FOR SOLUTION INTRAMUSCULAR; INTRAVENOUS EVERY 12 HOURS SCHEDULED
Status: DISCONTINUED | OUTPATIENT
Start: 2018-07-07 | End: 2018-07-08

## 2018-07-07 RX ORDER — INSULIN ASPART 100 [IU]/ML
45 INJECTION, SUSPENSION SUBCUTANEOUS
Status: DISCONTINUED | OUTPATIENT
Start: 2018-07-07 | End: 2018-07-08

## 2018-07-07 RX ADMIN — METHYLPREDNISOLONE SODIUM SUCCINATE 20 MG: 40 INJECTION, POWDER, FOR SOLUTION INTRAMUSCULAR; INTRAVENOUS at 21:23

## 2018-07-07 RX ADMIN — IPRATROPIUM BROMIDE AND ALBUTEROL SULFATE 3 ML: .5; 3 SOLUTION RESPIRATORY (INHALATION) at 14:27

## 2018-07-07 RX ADMIN — INSULIN ASPART 45 UNITS: 100 INJECTION, SUSPENSION SUBCUTANEOUS at 16:52

## 2018-07-07 RX ADMIN — METHYLPREDNISOLONE SODIUM SUCCINATE 20 MG: 40 INJECTION, POWDER, FOR SOLUTION INTRAMUSCULAR; INTRAVENOUS at 06:02

## 2018-07-07 RX ADMIN — INSULIN LISPRO 12 UNITS: 100 INJECTION, SOLUTION INTRAVENOUS; SUBCUTANEOUS at 21:22

## 2018-07-07 RX ADMIN — IPRATROPIUM BROMIDE AND ALBUTEROL SULFATE 3 ML: .5; 3 SOLUTION RESPIRATORY (INHALATION) at 07:35

## 2018-07-07 RX ADMIN — IPRATROPIUM BROMIDE AND ALBUTEROL SULFATE 3 ML: .5; 3 SOLUTION RESPIRATORY (INHALATION) at 20:16

## 2018-07-07 RX ADMIN — INSULIN LISPRO 4 UNITS: 100 INJECTION, SOLUTION INTRAVENOUS; SUBCUTANEOUS at 16:52

## 2018-07-07 RX ADMIN — VALSARTAN 160 MG: 160 TABLET ORAL at 09:31

## 2018-07-07 RX ADMIN — INSULIN LISPRO 5 UNITS: 100 INJECTION, SOLUTION INTRAVENOUS; SUBCUTANEOUS at 11:54

## 2018-07-07 RX ADMIN — INSULIN LISPRO 10 UNITS: 100 INJECTION, SOLUTION INTRAVENOUS; SUBCUTANEOUS at 04:10

## 2018-07-07 RX ADMIN — INSULIN LISPRO 4 UNITS: 100 INJECTION, SOLUTION INTRAVENOUS; SUBCUTANEOUS at 09:29

## 2018-07-07 RX ADMIN — HEPARIN SODIUM AND DEXTROSE 13 UNITS/KG/HR: 10000; 5 INJECTION INTRAVENOUS at 09:25

## 2018-07-07 RX ADMIN — INSULIN ASPART 40 UNITS: 100 INJECTION, SUSPENSION SUBCUTANEOUS at 09:28

## 2018-07-07 RX ADMIN — VALSARTAN 160 MG: 160 TABLET ORAL at 18:04

## 2018-07-07 NOTE — PLAN OF CARE
DISCHARGE PLANNING     Discharge to home or other facility with appropriate resources Progressing        DISCHARGE PLANNING - CARE MANAGEMENT     Discharge to post-acute care or home with appropriate resources Progressing        INFECTION - ADULT     Absence or prevention of progression during hospitalization Progressing     Absence of fever/infection during neutropenic period Progressing        Potential for Falls     Patient will remain free of falls Progressing        RESPIRATORY - ADULT     Achieves optimal ventilation and oxygenation Progressing        SAFETY ADULT     Maintain or return to baseline ADL function Progressing     Maintain or return mobility status to optimal level Progressing

## 2018-07-08 LAB
ANION GAP SERPL CALCULATED.3IONS-SCNC: 10 MMOL/L (ref 4–13)
APTT PPP: 70 SECONDS (ref 24–36)
BUN SERPL-MCNC: 57 MG/DL (ref 5–25)
CALCIUM SERPL-MCNC: 8.3 MG/DL (ref 8.3–10.1)
CHLORIDE SERPL-SCNC: 101 MMOL/L (ref 100–108)
CO2 SERPL-SCNC: 22 MMOL/L (ref 21–32)
CREAT SERPL-MCNC: 2.92 MG/DL (ref 0.6–1.3)
GFR SERPL CREATININE-BSD FRML MDRD: 17 ML/MIN/1.73SQ M
GLUCOSE SERPL-MCNC: 304 MG/DL (ref 65–140)
GLUCOSE SERPL-MCNC: 304 MG/DL (ref 65–140)
GLUCOSE SERPL-MCNC: 305 MG/DL (ref 65–140)
GLUCOSE SERPL-MCNC: 328 MG/DL (ref 65–140)
GLUCOSE SERPL-MCNC: 401 MG/DL (ref 65–140)
GLUCOSE SERPL-MCNC: 409 MG/DL (ref 65–140)
POTASSIUM SERPL-SCNC: 4.8 MMOL/L (ref 3.5–5.3)
SODIUM SERPL-SCNC: 133 MMOL/L (ref 136–145)

## 2018-07-08 PROCEDURE — 82948 REAGENT STRIP/BLOOD GLUCOSE: CPT

## 2018-07-08 PROCEDURE — 80048 BASIC METABOLIC PNL TOTAL CA: CPT | Performed by: FAMILY MEDICINE

## 2018-07-08 PROCEDURE — 99232 SBSQ HOSP IP/OBS MODERATE 35: CPT | Performed by: FAMILY MEDICINE

## 2018-07-08 PROCEDURE — 94760 N-INVAS EAR/PLS OXIMETRY 1: CPT

## 2018-07-08 PROCEDURE — 85730 THROMBOPLASTIN TIME PARTIAL: CPT | Performed by: FAMILY MEDICINE

## 2018-07-08 PROCEDURE — 94640 AIRWAY INHALATION TREATMENT: CPT

## 2018-07-08 RX ORDER — INSULIN ASPART 100 [IU]/ML
50 INJECTION, SUSPENSION SUBCUTANEOUS
Status: DISCONTINUED | OUTPATIENT
Start: 2018-07-09 | End: 2018-07-15 | Stop reason: HOSPADM

## 2018-07-08 RX ORDER — FUROSEMIDE 40 MG/1
40 TABLET ORAL DAILY
Refills: 0 | Status: CANCELLED
Start: 2018-07-08

## 2018-07-08 RX ORDER — FUROSEMIDE 40 MG/1
40 TABLET ORAL DAILY
Status: DISCONTINUED | OUTPATIENT
Start: 2018-07-09 | End: 2018-07-12

## 2018-07-08 RX ORDER — WARFARIN SODIUM 5 MG/1
10 TABLET ORAL
Status: DISCONTINUED | OUTPATIENT
Start: 2018-07-08 | End: 2018-07-11

## 2018-07-08 RX ORDER — PREDNISONE 20 MG/1
40 TABLET ORAL DAILY
Status: DISCONTINUED | OUTPATIENT
Start: 2018-07-09 | End: 2018-07-11

## 2018-07-08 RX ORDER — INSULIN LISPRO 100 [IU]/ML
45 INJECTION, SUSPENSION SUBCUTANEOUS 2 TIMES DAILY WITH MEALS
Qty: 150 ML | Refills: 0 | Status: CANCELLED
Start: 2018-07-08

## 2018-07-08 RX ORDER — INSULIN GLARGINE 100 [IU]/ML
12 INJECTION, SOLUTION SUBCUTANEOUS
Status: COMPLETED | OUTPATIENT
Start: 2018-07-08 | End: 2018-07-08

## 2018-07-08 RX ADMIN — INSULIN LISPRO 5 UNITS: 100 INJECTION, SOLUTION INTRAVENOUS; SUBCUTANEOUS at 17:11

## 2018-07-08 RX ADMIN — HEPARIN SODIUM AND DEXTROSE 13 UNITS/KG/HR: 10000; 5 INJECTION INTRAVENOUS at 07:02

## 2018-07-08 RX ADMIN — IPRATROPIUM BROMIDE AND ALBUTEROL SULFATE 3 ML: .5; 3 SOLUTION RESPIRATORY (INHALATION) at 07:42

## 2018-07-08 RX ADMIN — VALSARTAN 160 MG: 160 TABLET ORAL at 17:18

## 2018-07-08 RX ADMIN — METHYLPREDNISOLONE SODIUM SUCCINATE 20 MG: 40 INJECTION, POWDER, FOR SOLUTION INTRAMUSCULAR; INTRAVENOUS at 09:05

## 2018-07-08 RX ADMIN — INSULIN LISPRO 3 UNITS: 100 INJECTION, SOLUTION INTRAVENOUS; SUBCUTANEOUS at 09:05

## 2018-07-08 RX ADMIN — WARFARIN SODIUM 10 MG: 5 TABLET ORAL at 17:15

## 2018-07-08 RX ADMIN — INSULIN LISPRO 4 UNITS: 100 INJECTION, SOLUTION INTRAVENOUS; SUBCUTANEOUS at 00:21

## 2018-07-08 RX ADMIN — VALSARTAN 160 MG: 160 TABLET ORAL at 09:05

## 2018-07-08 RX ADMIN — INSULIN ASPART 45 UNITS: 100 INJECTION, SUSPENSION SUBCUTANEOUS at 17:12

## 2018-07-08 RX ADMIN — IPRATROPIUM BROMIDE AND ALBUTEROL SULFATE 3 ML: .5; 3 SOLUTION RESPIRATORY (INHALATION) at 19:43

## 2018-07-08 RX ADMIN — IPRATROPIUM BROMIDE AND ALBUTEROL SULFATE 3 ML: .5; 3 SOLUTION RESPIRATORY (INHALATION) at 13:02

## 2018-07-08 RX ADMIN — INSULIN LISPRO 5 UNITS: 100 INJECTION, SOLUTION INTRAVENOUS; SUBCUTANEOUS at 23:27

## 2018-07-08 RX ADMIN — INSULIN LISPRO 3 UNITS: 100 INJECTION, SOLUTION INTRAVENOUS; SUBCUTANEOUS at 12:03

## 2018-07-08 RX ADMIN — INSULIN ASPART 45 UNITS: 100 INJECTION, SUSPENSION SUBCUTANEOUS at 09:06

## 2018-07-08 RX ADMIN — INSULIN GLARGINE 12 UNITS: 100 INJECTION, SOLUTION SUBCUTANEOUS at 23:43

## 2018-07-08 NOTE — PROGRESS NOTES
[address] Internal Medicine Progress Note  Patient: Tiki Bhat 66 y o  female   MRN: [id number]  PCP: Adebayo Chan DO  Unit/Bed#: [address] [place] [address] Encounter: [id number]  Date Of Visit: 07/08/18    Problem List:    Principal Problem:    PE (pulmonary thromboembolism) ([facility] )  Active Problems:    Acute bronchitis    Elevated brain natriuretic peptide (BNP) level    SIRS (systemic inflammatory response syndrome) ([place] [address] )    Essential hypertension    Acute kidney injury superimposed on CKD ([place] [address] )    Type 2 diabetes, uncontrolled, with renal manifestation (HCC)    Dyspnea    Hypothyroidism      Assessment & Plan:    * PE (pulmonary thromboembolism) ([facility] )   Assessment & Plan    · Patient with recent airplane travel  She was complaining of dyspnea and was tachycardic in the ER  Moderate probability as noted on V/Q scan done  Cannot get CTA given poor renal function  · Continue heparin gtt  · bilateral LE dopplers negative for DVTs  · Due to patient's GFR, patient started on Coumadin  Patient understands she will need periodic blood work while on it        SIRS (systemic inflammatory response syndrome) ([facility])   Assessment & Plan    · Likely SIRS response from PE  Fevers resolved  Antibiotics discontinued  · CXR showed NAD  CT chest with no signs of pneumonia or pleural effusions  · UA bland  Blood cx negative  · Rapid strep negative  · Respiratory panel pending  Elevated brain natriuretic peptide (BNP) level   Assessment & Plan    Status post 1 dose of IV Lasix  Does not appear to be in overt heart failure  Echo showed EF of 55 % to 60 % with no regional wall motion abnormalities  Restart home Lasix           Acute bronchitis   Assessment & Plan    Rapid strep negative  respiratory panel pending  urine strep and legionella Ag negative  procalcitonin 0 14 and on repeat is 0 21  Likely viral in nature  Symptoms have improved    discontinued Continue IV rocephin/Azithro  IV Solu-Medrol discontinued and patient started on prednisone  IS and respiratory protocol        Dyspnea   Assessment & Plan    · Multifactorial from acute respiratory infection, possible PE  · Breathing improving  Patient stable on room air  · troponins negative  · Query CHANDRA  sleep study once acute issues resolved  Type 2 diabetes, uncontrolled, with renal manifestation [facility])   Assessment & Plan    Lab Results   Component Value Date    HGBA1C 8 8 (H) 07/05/2018       Recent Labs      07/08/18   0000  07/08/18   0734  07/08/18   1200  07/08/18   1612   POCGLU  328*  305*  304*  409*       Blood Sugar Average: Last 72 hrs:  (P) 329     · Blood sugars are elevated partly due to steroid use  Patient is on Humalog 75/25 at home b i d and uses about 35-50 units  Increase NovoLog 70/30 to 50 units b i d  continue sliding scale insulin  Start Lantus 12 units at night  · HA1c 8 8        Acute kidney injury superimposed on CKD [facility])   Assessment & Plan    · Chronic kidney disease stage III   · Baseline creatinine 1 7-2 2 per nephrology outpt note from this year, but creatinine on 3/2018 was 2 4 so CKD may be progressing  · Creatinine during this admission is elevated compared to prior value  Unclear if this is acute in nature or progression of her chronic kidney disease  Creatinine trended up today likely due to receiving IV Lasix  · Repeat lab work in the a m  Essential hypertension   Assessment & Plan    Continue Diovan  Hypothyroidism   Assessment & Plan    TSH within normal limits  Not on any medication              VTE Pharmacologic Prophylaxis:   Pharmacologic: Heparin Drip  Mechanical VTE Prophylaxis in Place: No    Patient Centered Rounds: I have performed bedside rounds with nursing staff today  Discussions with Specialists or Other Care Team Provider: Yes - daughter, mother    Education and Discussions with Family / Patient:Yes    Time Spent for Care: 35 min    More than 50% of total time spent on counseling and coordination of care as described above  Current Length of Stay: 3 day(s)    Current Patient Status: Inpatient     Discharge Plan: Home    Code Status: Level 1 - Full Code    Certification Statement: The patient will continue to require additional inpatient hospital stay due to PE      Subjective:     Reports improvement in her breathing    Objective:     Vitals:   Temp (24hrs), Av 6 °F (36 4 °C), Min:97 5 °F (36 4 °C), Max:97 8 °F (36 6 °C)    HR:  [88-98] 94  Resp:  [16-18] 18  BP: (134-160)/(65-81) 156/80  SpO2:  [90 %-99 %] 99 %  Body mass index is 43 4 kg/m²  Input and Output Summary (last 24 hours):     No intake or output data in the 24 hours ending 18    Physical Exam:     Physical Exam   Constitutional: She is oriented to person, place, and time  She appears well-developed and well-nourished  No distress  HENT:   Head: Normocephalic and atraumatic  Mouth/Throat: Oropharynx is clear and moist    Eyes: Conjunctivae are normal  Right eye exhibits no discharge  Left eye exhibits no discharge  Neck: Neck supple  No tracheal deviation present  Cardiovascular: Normal rate and regular rhythm  Pulmonary/Chest: Effort normal  No respiratory distress  She has no wheezes  She has no rales  Abdominal: Soft  Bowel sounds are normal  She exhibits no distension  There is no tenderness  Musculoskeletal: She exhibits edema (mild - at baseline as per patient)  Neurological: She is alert and oriented to person, place, and time  No cranial nerve deficit  Skin: Skin is dry  She is not diaphoretic  Psychiatric: She has a normal mood and affect         Additional Data:     Labs:      Results from last 7 days  Lab Units 18  0428   WBC Thousand/uL 6 35   HEMOGLOBIN g/dL 12 2   HEMATOCRIT % 39 0   PLATELETS Thousands/uL 174   NEUTROS PCT % 82*   LYMPHS PCT % 8*   MONOS PCT % 8   EOS PCT % 1       Results from last 7 days  Lab Units 18  0654  18  1916   SODIUM mmol/L 133*  < > 137 POTASSIUM mmol/L 4 8  < > 3 9   CHLORIDE mmol/L 101  < > 102   CO2 mmol/L 22  < > 28   BUN mg/dL 57*  < > 33*   CREATININE mg/dL 2 92*  < > 2 92*   CALCIUM mg/dL 8 3  < > 8 8   TOTAL PROTEIN g/dL  --   --  7 2   BILIRUBIN TOTAL mg/dL  --   --  1 40*   ALK PHOS U/L  --   --  108   ALT U/L  --   --  21   AST U/L  --   --  17   GLUCOSE RANDOM mg/dL 304*  < > 212*   < > = values in this interval not displayed  Results from last 7 days  Lab Units 07/05/18  1916   INR  1 02       * I Have Reviewed All Lab Data Listed Above  * Additional Pertinent Lab Tests Reviewed: [address] [facility] Admission Reviewed    Imaging:  Xr Chest Pa & Lateral    Result Date: 7/5/2018  Narrative: CHEST INDICATION:   R06 02: Shortness of breath R50 9: Fever, unspecified R00 0: Tachycardia, unspecified  COMPARISON:  Chest radiograph 12/27/2013 EXAM PERFORMED/VIEWS:  XR CHEST PA & LATERAL FINDINGS: Mild cardiomegaly is unchanged  Perivascular is within normal limits    The lungs are clear  No pneumothorax or pleural effusion  Osseous structures appear within normal limits for patient age  Impression: No acute cardiopulmonary disease  Workstation performed: [id number]     Nm Lung Ventilation / Perfusion    Result Date: 7/5/2018  Narrative: VENTILATION AND PERFUSION SCAN INDICATION:  Chest pain, shortness of breath  R06 02: Shortness of breath  R50 9: Fever, unspecified  R00 0: Tachycardia, unspecified  COMPARISON:  Chest radiograph  also on this date TECHNIQUE:  Posterior ventilation imaging was performed after the inhalation of 33 mCi Xe-133 gas  Multiplanar perfusion imaging was next performed following the intravenous administration of 4 2 mCi Tc-99m labeled MAA  FINDINGS:  Ventilation imaging demonstrates heterogeneous distribution of radiopharmaceutical throughout both lungs with some clumping of radiotracer noted in the central airways   Perfusion imaging demonstrates a moderate to large matched perfusion/ventilation defect in the posterior left upper lung zone  There is probably also a linear subsegmental matched defect in the posterior left mid to lower lung zone  Subsegmental matched defect in the right lower lung zone  Impression: Intermediate probability for pulmonary embolus  The study was marked in [facility] for immediate notification  Workstation performed: [id number]     Vas Lower Limb Venous Duplex Study, Complete Bilateral    Result Date: 7/6/2018  Narrative:  [facility] REPORT CLINICAL: Indications: Patient presents with recent discovery of pulmonary embolism and physician wants to determine potential source  Patient reports having left calf tenderness 1 day ago  Risk Factors The patient has history of Obesity, HTN, Diabetes (Yes) and CKD  The patient current BMI is 42 38, Weight is 217 lb and height is 60 in  CONCLUSION:  Impression: RIGHT LOWER LIMB: No evidence of acute or chronic deep vein thrombosis  No evidence of superficial thrombophlebitis noted  Doppler evaluation shows a normal response to augmentation maneuvers  Popliteal, posterior tibial and anterior tibial arterial Doppler waveforms are triphasic  LEFT LOWER LIMB: No evidence of acute or chronic deep vein thrombosis  No evidence of superficial thrombophlebitis noted  Doppler evaluation shows a normal response to augmentation maneuvers  Popliteal, posterior tibial and anterior tibial arterial Doppler waveforms are triphasic  Tech note: Bilateral calf veins were not well visualized secondary to body habitus  Technical findings were given to Hector Gale RN at 9:15 am   SIGNATURE: Electronically Signed by: Kirsten Ramirez MD on 2018-07-06 01:57:08 PM    Imaging Reports Reviewed by myself    Cultures:   Blood Culture:   Lab Results   Component Value Date    BLOODCX No Growth at 48 hrs  07/05/2018    BLOODCX No Growth at 48 hrs   07/05/2018     Urine Culture: No results found for: URINECX  Sputum Culture: No components found for: SPUTUMCX  Wound Culture: No results found for: WOUNDCULT    Last 24 Hours Medication List:     Current Facility-Administered Medications:  acetaminophen 650 mg Oral Q6H PRN Luiz Stroud MD    [START ON 7/9/2018] furosemide 40 mg Oral Daily Danni Thayerar, DO    heparin (porcine) 3-30 Units/kg/hr (Order-Specific) Intravenous Titrated Selina Polanco MD Last Rate: 13 Units/kg/hr (07/08/18 0702)   [START ON 7/9/2018] insulin aspart protamine-insulin aspart 50 Units Subcutaneous BID AC Danni Rob, DO    insulin lispro 1-5 Units Subcutaneous TID AC Luiz Stroud MD    insulin lispro 1-5 Units Subcutaneous HS Luiz Stroud MD    insulin lispro 12 Units Subcutaneous HS Danni Rob DO    ipratropium-albuterol 3 mL Nebulization TID Danni Rob DO    And        ipratropium-albuterol 3 mL Nebulization Q4H PRN Danni Rob,     ondansetron 4 mg Intravenous Q6H PRN Luiz Stroud MD    [START ON 7/9/2018] predniSONE 40 mg Oral Daily Danni Rob,     valsartan 160 mg Oral BID Luiz Stroud MD    warfarin 10 mg Oral Daily (warfarin) Danni Rob DO         Today, Patient Was Seen By: Dana Hernandez DO    ** Please Note: Dragon 360 Dictation voice to text software may have been used in the creation of this document   **

## 2018-07-09 LAB
ADENOVIRUS: NOT DETECTED
ANION GAP SERPL CALCULATED.3IONS-SCNC: 11 MMOL/L (ref 4–13)
APTT PPP: 72 SECONDS (ref 24–33)
BASOPHILS # BLD AUTO: 0.02 THOUSANDS/ΜL (ref 0–0.1)
BASOPHILS NFR BLD AUTO: 0 % (ref 0–1)
BUN SERPL-MCNC: 58 MG/DL (ref 5–25)
C PNEUM DNA SPEC QL NAA+PROBE: NOT DETECTED
CALCIUM SERPL-MCNC: 8.4 MG/DL (ref 8.3–10.1)
CHLORIDE SERPL-SCNC: 103 MMOL/L (ref 100–108)
CO2 SERPL-SCNC: 22 MMOL/L (ref 21–32)
CREAT SERPL-MCNC: 2.76 MG/DL (ref 0.6–1.3)
EOSINOPHIL # BLD AUTO: 0.04 THOUSAND/ΜL (ref 0–0.61)
EOSINOPHIL NFR BLD AUTO: 0 % (ref 0–6)
ERYTHROCYTE [DISTWIDTH] IN BLOOD BY AUTOMATED COUNT: 15.2 % (ref 11.6–15.1)
FLUAV H1 RNA SPEC QL NAA+PROBE: NOT DETECTED
FLUAV H3 RNA SPEC QL NAA+PROBE: NOT DETECTED
FLUAV RNA SPEC QL NAA+PROBE: NOT DETECTED
FLUBV RNA SPEC QL NAA+PROBE: NOT DETECTED
GFR SERPL CREATININE-BSD FRML MDRD: 18 ML/MIN/1.73SQ M
GLUCOSE SERPL-MCNC: 134 MG/DL (ref 65–140)
GLUCOSE SERPL-MCNC: 146 MG/DL (ref 65–140)
GLUCOSE SERPL-MCNC: 190 MG/DL (ref 65–140)
GLUCOSE SERPL-MCNC: 378 MG/DL (ref 65–140)
GLUCOSE SERPL-MCNC: 480 MG/DL (ref 65–140)
HBOV DNA SPEC QL NAA+PROBE: NOT DETECTED
HCOV 229E RNA SPEC QL NAA+PROBE: NOT DETECTED
HCOV HKU1 RNA SPEC QL NAA+PROBE: NOT DETECTED
HCOV NL63 RNA SPEC QL NAA+PROBE: NOT DETECTED
HCOV OC43 RNA SPEC QL NAA+PROBE: NOT DETECTED
HCT VFR BLD AUTO: 37.7 % (ref 34.8–46.1)
HGB BLD-MCNC: 11.7 G/DL (ref 11.5–15.4)
HPIV1 RNA SPEC QL NAA+PROBE: NOT DETECTED
HPIV2 RNA SPEC QL NAA+PROBE: NOT DETECTED
HPIV3 RNA SPEC QL NAA+PROBE: NOT DETECTED
HPIV4 RNA SPEC QL NAA+PROBE: NOT DETECTED
IMM GRANULOCYTES # BLD AUTO: 0.11 THOUSAND/UL (ref 0–0.2)
IMM GRANULOCYTES NFR BLD AUTO: 1 % (ref 0–2)
INR PPP: 1 (ref 0.86–1.16)
LYMPHOCYTES # BLD AUTO: 2.14 THOUSANDS/ΜL (ref 0.6–4.47)
LYMPHOCYTES NFR BLD AUTO: 18 % (ref 14–44)
M PNEUMO DNA SPEC QL NAA+PROBE: NOT DETECTED
MCH RBC QN AUTO: 26.8 PG (ref 26.8–34.3)
MCHC RBC AUTO-ENTMCNC: 31 G/DL (ref 31.4–37.4)
MCV RBC AUTO: 86 FL (ref 82–98)
METAPNEUMOVIRUS: NOT DETECTED
MONOCYTES # BLD AUTO: 1.15 THOUSAND/ΜL (ref 0.17–1.22)
MONOCYTES NFR BLD AUTO: 9 % (ref 4–12)
NEUTROPHILS # BLD AUTO: 8.74 THOUSANDS/ΜL (ref 1.85–7.62)
NEUTS SEG NFR BLD AUTO: 72 % (ref 43–75)
NRBC BLD AUTO-RTO: 0 /100 WBCS
PLATELET # BLD AUTO: 156 THOUSANDS/UL (ref 149–390)
PMV BLD AUTO: 11.7 FL (ref 8.9–12.7)
POTASSIUM SERPL-SCNC: 4.2 MMOL/L (ref 3.5–5.3)
PROTHROMBIN TIME: 10.5 SECONDS (ref 9.4–11.7)
RBC # BLD AUTO: 4.37 MILLION/UL (ref 3.81–5.12)
RHINOVIRUS RNA SPEC QL NAA+PROBE: DETECTED
RSV A RNA SPEC QL NAA+PROBE: NOT DETECTED
RSV B RNA SPEC QL NAA+PROBE: NOT DETECTED
SODIUM SERPL-SCNC: 136 MMOL/L (ref 136–145)
WBC # BLD AUTO: 12.2 THOUSAND/UL (ref 4.31–10.16)

## 2018-07-09 PROCEDURE — 82948 REAGENT STRIP/BLOOD GLUCOSE: CPT

## 2018-07-09 PROCEDURE — 85025 COMPLETE CBC W/AUTO DIFF WBC: CPT | Performed by: STUDENT IN AN ORGANIZED HEALTH CARE EDUCATION/TRAINING PROGRAM

## 2018-07-09 PROCEDURE — 80048 BASIC METABOLIC PNL TOTAL CA: CPT | Performed by: FAMILY MEDICINE

## 2018-07-09 PROCEDURE — 94760 N-INVAS EAR/PLS OXIMETRY 1: CPT

## 2018-07-09 PROCEDURE — 85730 THROMBOPLASTIN TIME PARTIAL: CPT | Performed by: FAMILY MEDICINE

## 2018-07-09 PROCEDURE — 85610 PROTHROMBIN TIME: CPT | Performed by: FAMILY MEDICINE

## 2018-07-09 PROCEDURE — 99232 SBSQ HOSP IP/OBS MODERATE 35: CPT | Performed by: FAMILY MEDICINE

## 2018-07-09 PROCEDURE — 94640 AIRWAY INHALATION TREATMENT: CPT

## 2018-07-09 RX ORDER — INSULIN GLARGINE 100 [IU]/ML
12 INJECTION, SOLUTION SUBCUTANEOUS
Status: DISCONTINUED | OUTPATIENT
Start: 2018-07-09 | End: 2018-07-12

## 2018-07-09 RX ADMIN — HEPARIN SODIUM AND DEXTROSE 13 UNITS/KG/HR: 10000; 5 INJECTION INTRAVENOUS at 02:29

## 2018-07-09 RX ADMIN — HEPARIN SODIUM AND DEXTROSE 13 UNITS/KG/HR: 10000; 5 INJECTION INTRAVENOUS at 21:49

## 2018-07-09 RX ADMIN — INSULIN GLARGINE 12 UNITS: 100 INJECTION, SOLUTION SUBCUTANEOUS at 21:49

## 2018-07-09 RX ADMIN — INSULIN ASPART 50 UNITS: 100 INJECTION, SUSPENSION SUBCUTANEOUS at 08:36

## 2018-07-09 RX ADMIN — FUROSEMIDE 40 MG: 40 TABLET ORAL at 09:06

## 2018-07-09 RX ADMIN — VALSARTAN 160 MG: 160 TABLET ORAL at 17:41

## 2018-07-09 RX ADMIN — WARFARIN SODIUM 10 MG: 5 TABLET ORAL at 17:41

## 2018-07-09 RX ADMIN — VALSARTAN 160 MG: 160 TABLET ORAL at 09:06

## 2018-07-09 RX ADMIN — IPRATROPIUM BROMIDE AND ALBUTEROL SULFATE 3 ML: .5; 3 SOLUTION RESPIRATORY (INHALATION) at 08:00

## 2018-07-09 RX ADMIN — INSULIN ASPART 50 UNITS: 100 INJECTION, SUSPENSION SUBCUTANEOUS at 16:14

## 2018-07-09 RX ADMIN — IPRATROPIUM BROMIDE AND ALBUTEROL SULFATE 3 ML: .5; 3 SOLUTION RESPIRATORY (INHALATION) at 19:36

## 2018-07-09 RX ADMIN — INSULIN LISPRO 5 UNITS: 100 INJECTION, SOLUTION INTRAVENOUS; SUBCUTANEOUS at 16:14

## 2018-07-09 RX ADMIN — PREDNISONE 40 MG: 20 TABLET ORAL at 09:06

## 2018-07-09 RX ADMIN — INSULIN LISPRO 4 UNITS: 100 INJECTION, SOLUTION INTRAVENOUS; SUBCUTANEOUS at 21:49

## 2018-07-09 RX ADMIN — INSULIN LISPRO 1 UNITS: 100 INJECTION, SOLUTION INTRAVENOUS; SUBCUTANEOUS at 11:45

## 2018-07-09 RX ADMIN — IPRATROPIUM BROMIDE AND ALBUTEROL SULFATE 3 ML: .5; 3 SOLUTION RESPIRATORY (INHALATION) at 13:54

## 2018-07-09 NOTE — PLAN OF CARE
DISCHARGE PLANNING     Discharge to home or other facility with appropriate resources Progressing        DISCHARGE PLANNING - CARE MANAGEMENT     Discharge to post-acute care or home with appropriate resources Progressing        GASTROINTESTINAL - ADULT     Maintains adequate nutritional intake Progressing        INFECTION - ADULT     Absence or prevention of progression during hospitalization Progressing     Absence of fever/infection during neutropenic period Progressing        METABOLIC, FLUID AND ELECTROLYTES - ADULT     Electrolytes maintained within normal limits Progressing     Fluid balance maintained Progressing        Potential for Falls     Patient will remain free of falls Progressing        RESPIRATORY - ADULT     Achieves optimal ventilation and oxygenation Progressing        SAFETY ADULT     Maintain or return to baseline ADL function Progressing     Maintain or return mobility status to optimal level Progressing

## 2018-07-09 NOTE — CASE MANAGEMENT
Continued Stay Review    Date: 7/9/18    Vital Signs: /85 (BP Location: Right arm)   Pulse 83   Temp (!) 97 4 °F (36 3 °C) (Tympanic)   Resp 18   Ht 5' (1 524 m)   Wt 99 6 kg (219 lb 9 3 oz)   SpO2 99%   BMI 42 88 kg/m²       aptt   Bmp    Medications:   Scheduled Meds:   Current Facility-Administered Medications:  acetaminophen 650 mg Oral Q6H PRN Cindy Luna MD    furosemide 40 mg Oral Daily Danni Revdaniellear, DO    heparin (porcine) 3-30 Units/kg/hr (Order-Specific) Intravenous Titrated Dianne San MD Last Rate: 13 Units/kg/hr (07/09/18 0229)   insulin aspart protamine-insulin aspart 50 Units Subcutaneous BID AC Danni Revankar, DO    insulin glargine 12 Units Subcutaneous HS Danni Silvaankar, DO    insulin lispro 1-5 Units Subcutaneous TID AC Cindy Luna MD    insulin lispro 1-5 Units Subcutaneous HS Cindy Luna MD    ipratropium-albuterol 3 mL Nebulization TID Danni Rob, DO    And        ipratropium-albuterol 3 mL Nebulization Q4H PRN Danni Rob, DO    ondansetron 4 mg Intravenous Q6H PRN Cindy Luna MD    predniSONE 40 mg Oral Daily Danni Rob, DO    valsartan 160 mg Oral BID Cindy Luna MD    warfarin 10 mg Oral Daily (warfarin) Danni Rob, DO      Continuous Infusions:   heparin (porcine) 3-30 Units/kg/hr (Order-Specific) Last Rate: 13 Units/kg/hr (07/09/18 0229)     PRN Meds:   acetaminophen    ipratropium-albuterol **AND** ipratropium-albuterol    ondansetron    Abnormal Labs/Diagnostic Results: bun/cr 58/2 76 glucose 146 wbc 12 20   gfr 18 pt/inr 10 5/1 00    Age/Sex: 66 y o  female     ATTENDING  * PE (pulmonary thromboembolism) (HCC)  ·  Moderate probability as noted on V/Q scan done  Patient reports improvement in her symptoms on after heparin drip started  Cannot get CTA given poor renal function  · Continue heparin gtt/coumadin  · bilateral LE dopplers negative for DVTs  · Due to patient's GFR, patient started on Coumadin    Patient understands she will need periodic blood work while on it    PER MD GFR LOW WILL NEED TO TRANSITION TO COUMADIN , PT INR STILL SUBTHERAPEUTIC 10 5/1 00 CONTINUE HEP COUMADIN   WBC ELEVATED TODAY WILL CONTINUE TO MONITOR FEVER AND CBC

## 2018-07-09 NOTE — PROGRESS NOTES
[facility] Progress Note  Patient: Negar Valladares 66 y o  female   MRN: [id number]  PCP: Archie Umana DO  Unit/Bed#: [address] [place] [address] Encounter: [id number]  Date Of Visit: 07/09/18    Problem List:    Principal Problem:    PE (pulmonary thromboembolism) ([place] [id number] )  Active Problems:    Acute bronchitis    Elevated brain natriuretic peptide (BNP) level    SIRS (systemic inflammatory response syndrome) ([place] [address] )    Essential hypertension    Acute kidney injury superimposed on CKD ([place] [address] )    Type 2 diabetes, uncontrolled, with renal manifestation (HCC)    Dyspnea    Hypothyroidism      Assessment & Plan:    * PE (pulmonary thromboembolism) ([place] [address] )   Assessment & Plan    · Possible PE  Patient with recent airplane travel, desk job at work, complaining of dyspnea, tachycardic in the ER which fits the picture of PE  Moderate probability as noted on V/Q scan done  Patient reports improvement in her symptoms on after heparin drip started  Cannot get CTA given poor renal function  · Continue heparin gtt/coumadin  · bilateral LE dopplers negative for DVTs  · Due to patient's GFR, patient started on Coumadin  Patient understands she will need periodic blood work while on it        SIRS (systemic inflammatory response syndrome) ([facility])   Assessment & Plan    · Likely SIRS response from PE, viral infection  Fevers resolved  Antibiotics discontinued  · CXR showed NAD  CT chest with no signs of pneumonia or pleural effusions  · UA bland  Blood cx negative  · Rapid strep negative        Elevated brain natriuretic peptide (BNP) level   Assessment & Plan    Status post 1 dose of IV Lasix  Does not appear to be in overt heart failure  Leg edema at baseline as per patient  Echo showed EF of 55 % to 60 % with no regional wall motion abnormalities  Restarted home Lasix           Acute bronchitis   Assessment & Plan    Viral in nature  respiratory panel positive for rhino virus  urine strep and legionella Ag negative    procalcitonin 0 14 and on repeat is 0 21  Rapid strep negative  Symptoms have improved  discontinued IV rocephin/Azithro  IV Solu-Medrol discontinued and patient started on prednisone  IS and respiratory protocol        Dyspnea   Assessment & Plan    · Multifactorial from acute respiratory infection, possible PE  · Breathing improving  Patient stable on room air  · troponins negative  · Query CHANDRA  sleep study once acute issues resolved        Type 2 diabetes, uncontrolled, with renal manifestation [facility])   Assessment & Plan    Lab Results   Component Value Date    HGBA1C 8 8 (H) 07/05/2018       Recent Labs      07/08/18   1612  07/08/18   2043  07/09/18   0718  07/09/18   1108   POCGLU  409*  401*  134  190*       Blood Sugar Average: Last 72 hrs:  (P) 329     · Blood sugars improved today  Patient is on Humalog 75/25 at home b i d and uses about 35-50 units  Continue NovoLog 70/30, 50 units b i d  continue sliding scale insulin  Start Lantus 12 units at night  Patient received a dose last night  · HA1c 8 8        Acute kidney injury superimposed on CKD [facility])   Assessment & Plan    · Chronic kidney disease stage III   · Baseline creatinine 1 7-2 2 per nephrology outpt note from this year, but creatinine on 3/2018 was 2 4 so CKD may be progressing  · Creatinine during this admission is elevated compared to prior value  Unclear if this is acute in nature or progression of her chronic kidney disease  Creatinine trending down today  · Repeat lab work in the a m  Essential hypertension   Assessment & Plan    Continue Diovan        Hypothyroidism   Assessment & Plan    TSH within normal limits  Not on any medication              VTE Pharmacologic Prophylaxis:   Pharmacologic: Heparin Drip  Mechanical VTE Prophylaxis in Place: No    Patient Centered Rounds: I have performed bedside rounds with nursing staff today      Discussions with Specialists or Other Care Team Provider: Yes     Education and Discussions with Family / Patient:Yes    Time Spent for Care: 35 min  More than 50% of total time spent on counseling and coordination of care as described above  Current Length of Stay: 4 day(s)    Current Patient Status: Inpatient     Discharge Plan: Home    Code Status: Level 1 - Full Code    Certification Statement: The patient will continue to require additional inpatient hospital stay due to PE      Subjective:     States breathing has improved compared to before  Objective:     Vitals:   Temp (24hrs), Av 6 °F (36 4 °C), Min:97 4 °F (36 3 °C), Max:97 8 °F (36 6 °C)    HR:  [83-94] 83  Resp:  [18] 18  BP: (111-163)/(74-85) 163/85  SpO2:  [94 %-100 %] 100 %  Body mass index is 42 88 kg/m²  Input and Output Summary (last 24 hours):     No intake or output data in the 24 hours ending 18 1259    Physical Exam:     Physical Exam   Constitutional: She is oriented to person, place, and time  She appears well-developed and well-nourished  No distress  HENT:   Head: Normocephalic and atraumatic  Mouth/Throat: Oropharynx is clear and moist    Eyes: Conjunctivae are normal  Right eye exhibits no discharge  Left eye exhibits no discharge  Neck: Neck supple  No tracheal deviation present  Cardiovascular: Normal rate and regular rhythm  Pulmonary/Chest: Effort normal  No respiratory distress  She has no wheezes  She has no rales  Abdominal: Soft  Bowel sounds are normal  She exhibits no distension  There is no tenderness  Musculoskeletal: She exhibits edema (mild - at baseline as per patient)  Neurological: She is alert and oriented to person, place, and time  No cranial nerve deficit  Skin: Skin is dry  She is not diaphoretic  Psychiatric: She has a normal mood and affect         Additional Data:     Labs:      Results from last 7 days  Lab Units 18  0628   WBC Thousand/uL 12 20*   HEMOGLOBIN g/dL 11 7   HEMATOCRIT % 37 7   PLATELETS Thousands/uL 156   NEUTROS PCT % 72   LYMPHS PCT % 18   MONOS PCT % 9   EOS PCT % 0       Results from last 7 days  Lab Units 07/09/18 0628 07/05/18  1916   SODIUM mmol/L 136  < > 137   POTASSIUM mmol/L 4 2  < > 3 9   CHLORIDE mmol/L 103  < > 102   CO2 mmol/L 22  < > 28   BUN mg/dL 58*  < > 33*   CREATININE mg/dL 2 76*  < > 2 92*   CALCIUM mg/dL 8 4  < > 8 8   TOTAL PROTEIN g/dL  --   --  7 2   BILIRUBIN TOTAL mg/dL  --   --  1 40*   ALK PHOS U/L  --   --  108   ALT U/L  --   --  21   AST U/L  --   --  17   GLUCOSE RANDOM mg/dL 146*  < > 212*   < > = values in this interval not displayed  Results from last 7 days  Lab Units 07/09/18 0628   INR  1 00       * I Have Reviewed All Lab Data Listed Above  * Additional Pertinent Lab Tests Reviewed: [facility] Admission Reviewed    Imaging:  Xr Chest Pa & Lateral    Result Date: 7/5/2018  Narrative: CHEST INDICATION:   R06 02: Shortness of breath R50 9: Fever, unspecified R00 0: Tachycardia, unspecified  COMPARISON:  Chest radiograph 12/27/2013 EXAM PERFORMED/VIEWS:  XR CHEST PA & LATERAL FINDINGS: Mild cardiomegaly is unchanged  Perivascular is within normal limits    The lungs are clear  No pneumothorax or pleural effusion  Osseous structures appear within normal limits for patient age  Impression: No acute cardiopulmonary disease  Workstation performed: [id number]     Nm Lung Ventilation / Perfusion    Result Date: 7/5/2018  Narrative: VENTILATION AND PERFUSION SCAN INDICATION:  Chest pain, shortness of breath  R06 02: Shortness of breath  R50 9: Fever, unspecified  R00 0: Tachycardia, unspecified  COMPARISON:  Chest radiograph  also on this date TECHNIQUE:  Posterior ventilation imaging was performed after the inhalation of 33 mCi Xe-133 gas  Multiplanar perfusion imaging was next performed following the intravenous administration of 4 2 mCi Tc-99m labeled MAA   FINDINGS:  Ventilation imaging demonstrates heterogeneous distribution of radiopharmaceutical throughout both lungs with some clumping of radiotracer noted in the central airways  Perfusion imaging demonstrates a moderate to large matched perfusion/ventilation defect in the posterior left upper lung zone  There is probably also a linear subsegmental matched defect in the posterior left mid to lower lung zone  Subsegmental matched defect in the right lower lung zone  Impression: Intermediate probability for pulmonary embolus  The study was marked in [facility] for immediate notification  Workstation performed: [id number]     Vas Lower Limb Venous Duplex Study, Complete Bilateral    Result Date: 7/6/2018  Narrative:  [facility] REPORT CLINICAL: Indications: Patient presents with recent discovery of pulmonary embolism and physician wants to determine potential source  Patient reports having left calf tenderness 1 day ago  Risk Factors The patient has history of Obesity, HTN, Diabetes (Yes) and CKD  The patient current BMI is 42 38, Weight is 217 lb and height is 60 in  CONCLUSION:  Impression: RIGHT LOWER LIMB: No evidence of acute or chronic deep vein thrombosis  No evidence of superficial thrombophlebitis noted  Doppler evaluation shows a normal response to augmentation maneuvers  Popliteal, posterior tibial and anterior tibial arterial Doppler waveforms are triphasic  LEFT LOWER LIMB: No evidence of acute or chronic deep vein thrombosis  No evidence of superficial thrombophlebitis noted  Doppler evaluation shows a normal response to augmentation maneuvers  Popliteal, posterior tibial and anterior tibial arterial Doppler waveforms are triphasic  Tech note: Bilateral calf veins were not well visualized secondary to body habitus    Technical findings were given to Casey Hendricks RN at 9:15 am   SIGNATURE: Electronically Signed by: Herlinda Neff MD on 2018-07-06 01:57:08 PM    Imaging Reports Reviewed by myself    Cultures:   Blood Culture:   Lab Results   Component Value Date    BLOODCX No Growth at 72 hrs  07/05/2018    BLOODCX No Growth at 72 hrs  07/05/2018     Urine Culture: No results found for: URINECX  Sputum Culture: No components found for: SPUTUMCX  Wound Culture: No results found for: WOUNDCULT    Last 24 Hours Medication List:     Current Facility-Administered Medications:  acetaminophen 650 mg Oral Q6H PRN Cindy Luna, MD    furosemide 40 mg Oral Daily Danni Revankar, DO    heparin (porcine) 3-30 Units/kg/hr (Order-Specific) Intravenous Titrated Dianne San MD Last Rate: 13 Units/kg/hr (07/09/18 0229)   insulin aspart protamine-insulin aspart 50 Units Subcutaneous BID AC Danni Revankar, DO    insulin glargine 12 Units Subcutaneous HS Danni Rob, DO    insulin lispro 1-5 Units Subcutaneous TID AC Cindy Luna MD    insulin lispro 1-5 Units Subcutaneous HS Cindy Luna MD    ipratropium-albuterol 3 mL Nebulization TID Danni Rob DO    And        ipratropium-albuterol 3 mL Nebulization Q4H PRN Danni Rob,     ondansetron 4 mg Intravenous Q6H PRN Cindy Luna MD    predniSONE 40 mg Oral Daily Danni Rob,     valsartan 160 mg Oral BID Cindy Luna MD    warfarin 10 mg Oral Daily (warfarin) Makayla Britt DO         Today, Patient Was Seen By: Makayla Britt DO    ** Please Note: Dragon 360 Dictation voice to text software may have been used in the creation of this document   **

## 2018-07-10 LAB
ANION GAP SERPL CALCULATED.3IONS-SCNC: 10 MMOL/L (ref 4–13)
APTT PPP: 111 SECONDS (ref 24–36)
APTT PPP: 77 SECONDS (ref 24–36)
BACTERIA BLD CULT: NORMAL
BACTERIA BLD CULT: NORMAL
BUN SERPL-MCNC: 59 MG/DL (ref 5–25)
CALCIUM SERPL-MCNC: 8.2 MG/DL (ref 8.3–10.1)
CHLORIDE SERPL-SCNC: 103 MMOL/L (ref 100–108)
CO2 SERPL-SCNC: 23 MMOL/L (ref 21–32)
CREAT SERPL-MCNC: 2.6 MG/DL (ref 0.6–1.3)
GFR SERPL CREATININE-BSD FRML MDRD: 19 ML/MIN/1.73SQ M
GLUCOSE SERPL-MCNC: 126 MG/DL (ref 65–140)
GLUCOSE SERPL-MCNC: 149 MG/DL (ref 65–140)
GLUCOSE SERPL-MCNC: 356 MG/DL (ref 65–140)
GLUCOSE SERPL-MCNC: 362 MG/DL (ref 65–140)
GLUCOSE SERPL-MCNC: 99 MG/DL (ref 65–140)
INR PPP: 1.56 (ref 0.86–1.16)
POTASSIUM SERPL-SCNC: 4.5 MMOL/L (ref 3.5–5.3)
PROTHROMBIN TIME: 16 SECONDS (ref 9.4–11.7)
SODIUM SERPL-SCNC: 136 MMOL/L (ref 136–145)

## 2018-07-10 PROCEDURE — 85610 PROTHROMBIN TIME: CPT | Performed by: INTERNAL MEDICINE

## 2018-07-10 PROCEDURE — 85730 THROMBOPLASTIN TIME PARTIAL: CPT | Performed by: STUDENT IN AN ORGANIZED HEALTH CARE EDUCATION/TRAINING PROGRAM

## 2018-07-10 PROCEDURE — 99232 SBSQ HOSP IP/OBS MODERATE 35: CPT | Performed by: INTERNAL MEDICINE

## 2018-07-10 PROCEDURE — 82948 REAGENT STRIP/BLOOD GLUCOSE: CPT

## 2018-07-10 PROCEDURE — 94760 N-INVAS EAR/PLS OXIMETRY 1: CPT

## 2018-07-10 PROCEDURE — 80048 BASIC METABOLIC PNL TOTAL CA: CPT | Performed by: FAMILY MEDICINE

## 2018-07-10 PROCEDURE — 94640 AIRWAY INHALATION TREATMENT: CPT

## 2018-07-10 RX ADMIN — IPRATROPIUM BROMIDE AND ALBUTEROL SULFATE 3 ML: .5; 3 SOLUTION RESPIRATORY (INHALATION) at 19:22

## 2018-07-10 RX ADMIN — INSULIN ASPART 50 UNITS: 100 INJECTION, SUSPENSION SUBCUTANEOUS at 17:34

## 2018-07-10 RX ADMIN — IPRATROPIUM BROMIDE AND ALBUTEROL SULFATE 3 ML: .5; 3 SOLUTION RESPIRATORY (INHALATION) at 13:38

## 2018-07-10 RX ADMIN — IPRATROPIUM BROMIDE AND ALBUTEROL SULFATE 3 ML: .5; 3 SOLUTION RESPIRATORY (INHALATION) at 07:46

## 2018-07-10 RX ADMIN — INSULIN LISPRO 4 UNITS: 100 INJECTION, SOLUTION INTRAVENOUS; SUBCUTANEOUS at 22:15

## 2018-07-10 RX ADMIN — VALSARTAN 160 MG: 160 TABLET ORAL at 17:41

## 2018-07-10 RX ADMIN — HEPARIN SODIUM AND DEXTROSE 13 UNITS/KG/HR: 10000; 5 INJECTION INTRAVENOUS at 18:19

## 2018-07-10 RX ADMIN — FUROSEMIDE 40 MG: 40 TABLET ORAL at 10:07

## 2018-07-10 RX ADMIN — WARFARIN SODIUM 10 MG: 5 TABLET ORAL at 17:41

## 2018-07-10 RX ADMIN — INSULIN LISPRO 4 UNITS: 100 INJECTION, SOLUTION INTRAVENOUS; SUBCUTANEOUS at 17:41

## 2018-07-10 RX ADMIN — VALSARTAN 160 MG: 160 TABLET ORAL at 10:07

## 2018-07-10 RX ADMIN — INSULIN ASPART 50 UNITS: 100 INJECTION, SUSPENSION SUBCUTANEOUS at 10:07

## 2018-07-10 RX ADMIN — INSULIN GLARGINE 12 UNITS: 100 INJECTION, SOLUTION SUBCUTANEOUS at 22:14

## 2018-07-10 RX ADMIN — PREDNISONE 40 MG: 20 TABLET ORAL at 10:06

## 2018-07-10 NOTE — CASE MANAGEMENT
Continued Stay Review    Date: 7/10/18      Vital Signs: /87 (BP Location: Left arm)   Pulse 79   Temp 97 9 °F (36 6 °C) (Temporal)   Resp 18   Ht 5' (1 524 m)   Wt 102 kg (224 lb 6 9 oz)   SpO2 95%   BMI 43 83 kg/m²       ACUTE LOC  APTT  PTINE  Medications:   Scheduled Meds:   Current Facility-Administered Medications:  acetaminophen 650 mg Oral Q6H PRN Verna Hinojosa MD    furosemide 40 mg Oral Daily Danni Revankar, DO    heparin (porcine) 3-30 Units/kg/hr (Order-Specific) Intravenous Titrated Latonya Hernandez MD Last Rate: 13 Units/kg/hr (07/09/18 2149)   insulin aspart protamine-insulin aspart 50 Units Subcutaneous BID AC Danni Revankar, DO    insulin glargine 12 Units Subcutaneous HS Danni Revankar, DO    insulin lispro 1-5 Units Subcutaneous TID AC Verna Hinojosa MD    insulin lispro 1-5 Units Subcutaneous HS Verna Hinojosa MD    ipratropium-albuterol 3 mL Nebulization TID Danni Revankar, DO    And        ipratropium-albuterol 3 mL Nebulization Q4H PRN Danni Revankar, DO    ondansetron 4 mg Intravenous Q6H PRN Verna Hinojosa MD    predniSONE 40 mg Oral Daily Danni Revankar, DO    valsartan 160 mg Oral BID Verna Hinojosa MD    warfarin 10 mg Oral Daily (warfarin) Danni Revankar, DO      Continuous Infusions:   heparin (porcine) 3-30 Units/kg/hr (Order-Specific) Last Rate: 13 Units/kg/hr (07/09/18 2149)     PRN Meds:   acetaminophen    ipratropium-albuterol **AND** ipratropium-albuterol    ondansetron    Abnormal Labs/Diagnostic Results: BUN/CR 59/2 60 GLUCOSE 149   PT/INR 16/1 56    Age/Sex: 66 y o  female     ATTENDING  PE (pulmonary thromboembolism) (HCC)  · Continue heparin gtt/coumadin  · bilateral LE dopplers negative for DVTs  · Due to patient's GFR, patient started on Coumadin     · INR is up to with Coumadin 10 milligram p o  daily   Acute bronchitis  Continue prednisone taper  Certification Statement: The patient will continue to require additional inpatient hospital stay due to Possible PE with subtherapeutic INR

## 2018-07-10 NOTE — PROGRESS NOTES
Progress Note - Annamarie Marquez 1955, 66 y o  female MRN: [id number]    Unit/Bed#: [address] [place] [address] Encounter: [id number]    Primary Care Provider: Jessica Pearson DO   Date and time admitted to hospital: 7/5/2018  6:52 PM        * PE (pulmonary thromboembolism) ([address] )   Assessment & Plan    · Possible PE  Patient with recent airplane travel, desk job at work, complaining of dyspnea, tachycardic in the ER which fits the picture of PE  Moderate probability as noted on V/Q scan done  Patient reports improvement in her symptoms on after heparin drip started  Cannot get CTA given poor renal function  · Continue heparin gtt/coumadin  · bilateral LE dopplers negative for DVTs  · Due to patient's GFR, patient started on Coumadin  · INR is up to with Coumadin 10 milligram p o  daily         Type 2 diabetes, uncontrolled, with renal manifestation [facility])   Assessment & Plan    Lab Results   Component Value Date    HGBA1C 8 8 (H) 07/05/2018       Recent Labs      07/08/18   1612  07/08/18   2043  07/09/18   0718  07/09/18   1108   POCGLU  409*  401*  134  190*       Blood Sugar Average: Last 72 hrs:  (P) 329     · Blood sugars improved today  Patient is on Humalog 75/25 at home b i d and uses about 35-50 units  Continue NovoLog 70/30, 50 units b i d  continue sliding scale insulin  Start Lantus 12 units at night  Patient received a dose last night  · HA1c 8 8        Acute kidney injury superimposed on CKD [facility])   Assessment & Plan    · Chronic kidney disease stage III   · Baseline creatinine 1 7-2 2 per nephrology outpt note from this year, but creatinine on 3/2018 was 2 4 so CKD may be progressing  · Creatinine continues to remain stable        Dyspnea   Assessment & Plan    · Multifactorial from acute respiratory infection, possible PE  · Breathing improving  Patient stable on room air  · troponins negative    · Query CHANDRA  sleep study once acute issues resolved        Acute bronchitis   Assessment & Plan    Viral in nature  respiratory panel positive for rhino virus  urine strep and legionella Ag negative  procalcitonin 0 14 and on repeat is 0 21  Rapid strep negative  Symptoms have improved  discontinued IV rocephin/Azithro  Continue prednisone taper        Essential hypertension   Assessment & Plan    Continue Diovan        Hypothyroidism   Assessment & Plan    TSH within normal limits  Not on any medication        Elevated brain natriuretic peptide (BNP) level   Assessment & Plan    Status post 1 dose of IV Lasix  Does not appear to be in overt heart failure  Leg edema at baseline as per patient  Echo showed EF of 55 % to 60 % with no regional wall motion abnormalities  Edema improved with Lasix 40 milligram p o  daily          SIRS (systemic inflammatory response syndrome) (HCC)   Assessment & Plan    · Likely SIRS response from PE, viral infection  Fevers resolved  Antibiotics discontinued  · CXR showed NAD  CT chest with no signs of pneumonia or pleural effusions  · UA bland  Blood cx negative  · Rapid strep negative  Respiratory panel was positive for rhino virus            VTE Pharmacologic Prophylaxis:   Pharmacologic: Heparin Drip  Mechanical VTE Prophylaxis in Place: Yes    Patient Centered Rounds: I have performed bedside rounds with nursing staff today  Discussions with Specialists or Other Care Team Provider:     Education and Discussions with Family / Patient: yes    Time Spent for Care: 30 minutes  More than 50% of total time spent on counseling and coordination of care as described above  Current Length of Stay: 5 day(s)    Current Patient Status: Inpatient   Certification Statement: The patient will continue to require additional inpatient hospital stay due to Possible PE with subtherapeutic INR    Discharge Plan: Home    Code Status: Level 1 - Full Code      Subjective:   Patient denies any shortness of breath, chest pain  Improved leg swelling      Objective:     Vitals:   Temp (24hrs), Av °F (36 7 °C), Min:97 8 °F (36 6 °C), Max:98 2 °F (36 8 °C)    HR:  [76-88] 88  Resp:  [18-20] 20  BP: (126-163)/(66-87) 126/66  SpO2:  [92 %-98 %] 92 %  Body mass index is 43 83 kg/m²  Input and Output Summary (last 24 hours): Intake/Output Summary (Last 24 hours) at 07/10/18 1819  Last data filed at 07/10/18 0827   Gross per 24 hour   Intake                0 ml   Output             3000 ml   Net            -3000 ml       Physical Exam:     Physical Exam   Constitutional: No distress  HENT:   Head: Normocephalic and atraumatic  Nose: Nose normal    Eyes: Conjunctivae and EOM are normal  Pupils are equal, round, and reactive to light  Neck: Normal range of motion  Neck supple  No JVD present  Cardiovascular: Normal rate, regular rhythm and normal heart sounds  Exam reveals no gallop and no friction rub  No murmur heard  Pulmonary/Chest: Effort normal and breath sounds normal  No respiratory distress  She has no wheezes  She has no rales  She exhibits no tenderness  Abdominal: Soft  Bowel sounds are normal  She exhibits no distension  There is no tenderness  There is no rebound and no guarding  Musculoskeletal: She exhibits edema  Neurological: She is alert  No cranial nerve deficit  Skin: Skin is warm and dry  No rash noted  Psychiatric: She has a normal mood and affect         Additional Data:     Labs:      Results from last 7 days  Lab Units 18  0628   WBC Thousand/uL 12 20*   HEMOGLOBIN g/dL 11 7   HEMATOCRIT % 37 7   PLATELETS Thousands/uL 156   NEUTROS PCT % 72   LYMPHS PCT % 18   MONOS PCT % 9   EOS PCT % 0       Results from last 7 days  Lab Units 07/10/18  0617  18  1916   SODIUM mmol/L 136  < > 137   POTASSIUM mmol/L 4 5  < > 3 9   CHLORIDE mmol/L 103  < > 102   CO2 mmol/L 23  < > 28   BUN mg/dL 59*  < > 33*   CREATININE mg/dL 2 60*  < > 2 92*   CALCIUM mg/dL 8 2*  < > 8 8   TOTAL PROTEIN g/dL  --   --  7 2   BILIRUBIN TOTAL mg/dL  --   --  1 40*   ALK PHOS U/L  --   --  108   ALT U/L  --   --  21   AST U/L  --   --  17   GLUCOSE RANDOM mg/dL 149*  < > 212*   < > = values in this interval not displayed  Results from last 7 days  Lab Units 07/10/18  1115   INR  1 56*       * I Have Reviewed All Lab Data Listed Above  * Additional Pertinent Lab Tests Reviewed: [facility] Admission Reviewed        Recent Cultures (last 7 days):       Results from last 7 days  Lab Units 07/06/18  0001 07/05/18  1916   BLOOD CULTURE   --  No Growth After 4 Days  No Growth After 4 Days  LEGIONELLA URINARY ANTIGEN  Negative  --        Last 24 Hours Medication List:     Current Facility-Administered Medications:  acetaminophen 650 mg Oral Q6H PRN Siva Bobo MD    furosemide 40 mg Oral Daily Danni Rob, DO    heparin (porcine) 3-30 Units/kg/hr (Order-Specific) Intravenous Titrated Gina Womack MD Last Rate: 13 Units/kg/hr (07/09/18 2149)   insulin aspart protamine-insulin aspart 50 Units Subcutaneous BID AC Danni Rob, DO    insulin glargine 12 Units Subcutaneous HS Danni Rob, DO    insulin lispro 1-5 Units Subcutaneous TID AC Siva Bobo MD    insulin lispro 1-5 Units Subcutaneous HS Siva Bobo MD    ipratropium-albuterol 3 mL Nebulization TID Danni Rob DO    And        ipratropium-albuterol 3 mL Nebulization Q4H PRN Danni Rob,     ondansetron 4 mg Intravenous Q6H PRN Siva Bobo MD    predniSONE 40 mg Oral Daily Danni Rob, DO    valsartan 160 mg Oral BID Siva Bobo MD    warfarin 10 mg Oral Daily (warfarin) Keara Snell DO         Today, Patient Was Seen By: Belinda Del Cid MD    ** Please Note: Dictation voice to text software may have been used in the creation of this document   **

## 2018-07-11 LAB
APTT PPP: 112 SECONDS (ref 24–33)
APTT PPP: 70 SECONDS (ref 24–33)
GLUCOSE SERPL-MCNC: 100 MG/DL (ref 65–140)
GLUCOSE SERPL-MCNC: 358 MG/DL (ref 65–140)
GLUCOSE SERPL-MCNC: 382 MG/DL (ref 65–140)
GLUCOSE SERPL-MCNC: 447 MG/DL (ref 65–140)
GLUCOSE SERPL-MCNC: 93 MG/DL (ref 65–140)
INR PPP: 2.97 (ref 0.86–1.16)
PROTHROMBIN TIME: 29.3 SECONDS (ref 9.4–11.7)

## 2018-07-11 PROCEDURE — 85730 THROMBOPLASTIN TIME PARTIAL: CPT | Performed by: INTERNAL MEDICINE

## 2018-07-11 PROCEDURE — 82948 REAGENT STRIP/BLOOD GLUCOSE: CPT

## 2018-07-11 PROCEDURE — 85730 THROMBOPLASTIN TIME PARTIAL: CPT | Performed by: STUDENT IN AN ORGANIZED HEALTH CARE EDUCATION/TRAINING PROGRAM

## 2018-07-11 PROCEDURE — 99232 SBSQ HOSP IP/OBS MODERATE 35: CPT | Performed by: INTERNAL MEDICINE

## 2018-07-11 PROCEDURE — 94640 AIRWAY INHALATION TREATMENT: CPT

## 2018-07-11 PROCEDURE — 85610 PROTHROMBIN TIME: CPT | Performed by: INTERNAL MEDICINE

## 2018-07-11 PROCEDURE — 94760 N-INVAS EAR/PLS OXIMETRY 1: CPT

## 2018-07-11 RX ORDER — FUROSEMIDE 10 MG/ML
40 INJECTION INTRAMUSCULAR; INTRAVENOUS ONCE
Status: COMPLETED | OUTPATIENT
Start: 2018-07-11 | End: 2018-07-11

## 2018-07-11 RX ORDER — ECHINACEA PURPUREA EXTRACT 125 MG
1 TABLET ORAL AS NEEDED
Status: DISCONTINUED | OUTPATIENT
Start: 2018-07-11 | End: 2018-07-15 | Stop reason: HOSPADM

## 2018-07-11 RX ORDER — PREDNISONE 20 MG/1
20 TABLET ORAL DAILY
Status: DISCONTINUED | OUTPATIENT
Start: 2018-07-12 | End: 2018-07-13

## 2018-07-11 RX ADMIN — VALSARTAN 160 MG: 160 TABLET ORAL at 17:13

## 2018-07-11 RX ADMIN — IPRATROPIUM BROMIDE AND ALBUTEROL SULFATE 3 ML: .5; 3 SOLUTION RESPIRATORY (INHALATION) at 13:49

## 2018-07-11 RX ADMIN — PREDNISONE 40 MG: 20 TABLET ORAL at 08:23

## 2018-07-11 RX ADMIN — FUROSEMIDE 40 MG: 40 TABLET ORAL at 08:23

## 2018-07-11 RX ADMIN — INSULIN LISPRO 4 UNITS: 100 INJECTION, SOLUTION INTRAVENOUS; SUBCUTANEOUS at 17:14

## 2018-07-11 RX ADMIN — IPRATROPIUM BROMIDE AND ALBUTEROL SULFATE 3 ML: .5; 3 SOLUTION RESPIRATORY (INHALATION) at 20:11

## 2018-07-11 RX ADMIN — INSULIN ASPART 50 UNITS: 100 INJECTION, SUSPENSION SUBCUTANEOUS at 08:23

## 2018-07-11 RX ADMIN — FUROSEMIDE 40 MG: 10 INJECTION, SOLUTION INTRAMUSCULAR; INTRAVENOUS at 12:35

## 2018-07-11 RX ADMIN — VALSARTAN 160 MG: 160 TABLET ORAL at 08:23

## 2018-07-11 RX ADMIN — INSULIN GLARGINE 12 UNITS: 100 INJECTION, SOLUTION SUBCUTANEOUS at 21:45

## 2018-07-11 RX ADMIN — INSULIN ASPART 50 UNITS: 100 INJECTION, SUSPENSION SUBCUTANEOUS at 17:12

## 2018-07-11 RX ADMIN — IPRATROPIUM BROMIDE AND ALBUTEROL SULFATE 3 ML: .5; 3 SOLUTION RESPIRATORY (INHALATION) at 07:46

## 2018-07-11 RX ADMIN — INSULIN LISPRO 4 UNITS: 100 INJECTION, SOLUTION INTRAVENOUS; SUBCUTANEOUS at 21:46

## 2018-07-11 RX ADMIN — HEPARIN SODIUM AND DEXTROSE 10 UNITS/KG/HR: 10000; 5 INJECTION INTRAVENOUS at 16:17

## 2018-07-11 NOTE — ASSESSMENT & PLAN NOTE
Lab Results   Component Value Date    HGBA1C 8 8 (H) 07/05/2018       Recent Labs      07/10/18   2212  07/11/18   0728  07/11/18   1125  07/11/18   1626   POCGLU  356*  93  100  358*       Blood Sugar Average: Last 72 hrs:  (P) 276 4375     · Blood sugars are labile  Patient is on Humalog 75/25 at home b i d and uses about 35-50 units  Continue NovoLog 70/30, 50 units b i d  continue sliding scale insulin     Monitor blood sugars with decreased dose of prednisone  · HA1c 8 8

## 2018-07-11 NOTE — CASE MANAGEMENT
Continued Stay Review  Date: 7/11/18  Vital Signs: /83 (BP Location: Left arm)   Pulse 84   Temp 97 8 °F (36 6 °C) (Oral)   Resp 18   Ht 5' (1 524 m)   Wt 102 kg (224 lb 3 3 oz)   SpO2 95%   BMI 43 79 kg/m²   Medications:   Scheduled Meds:   Current Facility-Administered Medications:  acetaminophen 650 mg Oral Q6H PRN Katrina Patel MD    furosemide 40 mg Oral Daily Danni Revankar, DO    heparin (porcine) 3-30 Units/kg/hr (Order-Specific) Intravenous Titrated Anna Marie Potter MD Last Rate: Stopped (07/11/18 1022)   insulin aspart protamine-insulin aspart 50 Units Subcutaneous BID AC Danni Rob, DO    insulin glargine 12 Units Subcutaneous HS Danni Rob, DO    insulin lispro 1-5 Units Subcutaneous TID AC Katrina Patel MD    insulin lispro 1-5 Units Subcutaneous HS Katrina Patel MD    ipratropium-albuterol 3 mL Nebulization TID Dannilidia Rob, DO    And        ipratropium-albuterol 3 mL Nebulization Q4H PRN Danni Rob, DO    ondansetron 4 mg Intravenous Q6H PRN Katrina Patel MD    predniSONE 40 mg Oral Daily Danni Rob, DO    valsartan 160 mg Oral BID Katrina Patel MD    Continuous Infusions:   heparin (porcine) 3-30 Units/kg/hr (Order-Specific) Last Rate: Stopped (07/11/18 1022)   PRN Meds:   acetaminophen    ipratropium-albuterol **AND** ipratropium-albuterol    ondansetron  Abnormal Labs/Diagnostic Results:    PT 29 3 INR 2 97   Age/Sex: 66 y o  female   Assessment/Plan:   ANTICOAGULATION WITH IV HEPARIN GTT FOR PE  INR JUMP FROM 1 5-2 9, COUMADIN ON HOLD WITH IV HEPARIN OVERLAP  BLE EDEMA @2+ PERSISTS  Discharge Plan: TBD  Thank you,  [address]  Network Utilization Review Department  Phone: [phone]; Fax [phone]  ATTENTION: The Network Utilization Review Department is now centralized for our 9 Facilities  Make a note that we have a new phone and fax numbers for our Department   Please call with any questions or concerns to [phone] and carefully follow the prompts so that you are directed to the right person  All voicemails are confidential  Fax any determinations, approvals, denials, and requests for initial or continue stay review clinical to [phone]  Due to HIGH CALL volume, it would be easier if you could please send faxed requests to expedite your requests and in part, help us provide discharge notifications faster

## 2018-07-11 NOTE — ASSESSMENT & PLAN NOTE
· Possible PE  Patient with recent airplane travel, desk job at work, complaining of dyspnea, tachycardic in the ER which fits the picture of PE  Moderate probability as noted on V/Q scan done  Patient reports improvement in her symptoms on after heparin drip started  Cannot get CTA given poor renal function  · Continue heparin gtt/coumadin  · bilateral LE dopplers negative for DVTs  · Due to patient's GFR, patient started on Coumadin     · INR jumped from 1 5-2 9  · Continue heparin drip for overlap  · Hold Coumadin today

## 2018-07-11 NOTE — PROGRESS NOTES
Progress Note - Lasha Martines 1955, 66 y o  female MRN: [id number]    Unit/Bed#: [address] [place] [address] Encounter: [id number]    Primary Care Provider: Harjinder Dewitt DO   Date and time admitted to hospital: 7/5/2018  6:52 PM        * PE (pulmonary thromboembolism) ([address] )   Assessment & Plan    · Possible PE  Patient with recent airplane travel, desk job at work, complaining of dyspnea, tachycardic in the ER which fits the picture of PE  Moderate probability as noted on V/Q scan done  Patient reports improvement in her symptoms on after heparin drip started  Cannot get CTA given poor renal function  · Continue heparin gtt/coumadin  · bilateral LE dopplers negative for DVTs  · Due to patient's GFR, patient started on Coumadin  · INR jumped from 1 5-2 9  · Continue heparin drip for overlap  · Hold Coumadin today        Type 2 diabetes, uncontrolled, with renal manifestation [facility])   Assessment & Plan    Lab Results   Component Value Date    HGBA1C 8 8 (H) 07/05/2018       Recent Labs      07/10/18   2212  07/11/18   0728  07/11/18   1125  07/11/18   1626   POCGLU  356*  93  100  358*       Blood Sugar Average: Last 72 hrs:  (P) 276 4375     · Blood sugars are labile  Patient is on Humalog 75/25 at home b i d and uses about 35-50 units  Continue NovoLog 70/30, 50 units b i d  continue sliding scale insulin  Monitor blood sugars with decreased dose of prednisone  · HA1c 8 8        Acute kidney injury superimposed on CKD [facility])   Assessment & Plan    · Chronic kidney disease stage III   · Baseline creatinine 1 7-2 2 per nephrology outpt note from this year, but creatinine on 3/2018 was 2 4 so CKD may be progressing  · Creatinine continues to remain stable        Dyspnea   Assessment & Plan    · Multifactorial from acute respiratory infection, possible PE  · Breathing improving  Patient stable on room air  · troponins negative    · Query CHANDRA  sleep study once acute issues resolved        Acute bronchitis   Assessment & Plan    Viral in nature  respiratory panel positive for rhino virus  urine strep and legionella Ag negative  procalcitonin 0 14 and on repeat is 0 21  Rapid strep negative  Symptoms have improved  discontinued IV rocephin/Azithro  Continue prednisone taper        Essential hypertension   Assessment & Plan    Continue Diovan and Lasix 40 milligram p o  daily        Hypothyroidism   Assessment & Plan    TSH within normal limits  Not on any medication        SIRS (systemic inflammatory response syndrome) (HCC)   Assessment & Plan    · Likely SIRS response from PE, viral infection  Fevers resolved  Antibiotics discontinued  · CXR showed NAD  CT chest with no signs of pneumonia or pleural effusions  · UA bland  Blood cx negative  · Rapid strep negative  Respiratory panel was positive for rhino virus            VTE Pharmacologic Prophylaxis:   Pharmacologic: Warfarin (Coumadin)  Mechanical VTE Prophylaxis in Place: No    Patient Centered Rounds: I have performed bedside rounds with nursing staff today  Discussions with Specialists or Other Care Team Provider:     Education and Discussions with Family / Patient: yes    Time Spent for Care: 30 minutes  More than 50% of total time spent on counseling and coordination of care as described above  Current Length of Stay: 6 day(s)    Current Patient Status: Inpatient   Certification Statement: The patient will continue to require additional inpatient hospital stay due to Possible PE    Discharge Plan: Home    Code Status: Level 1 - Full Code      Subjective:   Patient denies any shortness of breath or cough  Patient reports some stuffiness and dryness of the nose  Objective:     Vitals:   Temp (24hrs), Av 8 °F (36 6 °C), Min:97 3 °F (36 3 °C), Max:98 4 °F (36 9 °C)    HR:  [76-99] 83  Resp:  [18] 18  BP: (120-185)/(68-88) 120/68  SpO2:  [94 %-97 %] 94 %  Body mass index is 43 79 kg/m²  Input and Output Summary (last 24 hours):        Intake/Output Summary (Last 24 hours) at 07/11/18 1722  Last data filed at 07/11/18 1619   Gross per 24 hour   Intake                0 ml   Output             1900 ml   Net            -1900 ml       Physical Exam:     Physical Exam   Constitutional: No distress  HENT:   Head: Normocephalic and atraumatic  Nose: Nose normal    Eyes: Conjunctivae and EOM are normal  Pupils are equal, round, and reactive to light  Neck: Normal range of motion  Neck supple  No JVD present  Cardiovascular: Normal rate, regular rhythm and normal heart sounds  Exam reveals no gallop and no friction rub  No murmur heard  Pulmonary/Chest: Effort normal and breath sounds normal  No respiratory distress  She has no wheezes  She has no rales  She exhibits no tenderness  Abdominal: Soft  Bowel sounds are normal  She exhibits no distension  There is no tenderness  There is no rebound and no guarding  Musculoskeletal: She exhibits edema  Bilateral +2 pedal edema   Neurological: She is alert  No cranial nerve deficit  Skin: Skin is warm and dry  No rash noted  Psychiatric: She has a normal mood and affect  Additional Data:     Labs:      Results from last 7 days  Lab Units 07/09/18  0628   WBC Thousand/uL 12 20*   HEMOGLOBIN g/dL 11 7   HEMATOCRIT % 37 7   PLATELETS Thousands/uL 156   NEUTROS PCT % 72   LYMPHS PCT % 18   MONOS PCT % 9   EOS PCT % 0       Results from last 7 days  Lab Units 07/10/18  0617  07/05/18  1916   SODIUM mmol/L 136  < > 137   POTASSIUM mmol/L 4 5  < > 3 9   CHLORIDE mmol/L 103  < > 102   CO2 mmol/L 23  < > 28   BUN mg/dL 59*  < > 33*   CREATININE mg/dL 2 60*  < > 2 92*   CALCIUM mg/dL 8 2*  < > 8 8   TOTAL PROTEIN g/dL  --   --  7 2   BILIRUBIN TOTAL mg/dL  --   --  1 40*   ALK PHOS U/L  --   --  108   ALT U/L  --   --  21   AST U/L  --   --  17   GLUCOSE RANDOM mg/dL 149*  < > 212*   < > = values in this interval not displayed      Results from last 7 days  Lab Units 07/11/18  0628   INR  2 97*       * I Have Reviewed All Lab Data Listed Above  * Additional Pertinent Lab Tests Reviewed: [facility] Admission Reviewed        Recent Cultures (last 7 days):       Results from last 7 days  Lab Units 07/06/18  0001 07/05/18  1916   BLOOD CULTURE   --  No Growth After 5 Days  No Growth After 5 Days  LEGIONELLA URINARY ANTIGEN  Negative  --        Last 24 Hours Medication List:     Current Facility-Administered Medications:  acetaminophen 650 mg Oral Q6H PRN Ander Pearson MD    furosemide 40 mg Oral Daily Danni Revankar, DO    heparin (porcine) 3-30 Units/kg/hr (Order-Specific) Intravenous Titrated Ridge Bhat MD Last Rate: 10 Units/kg/hr (07/11/18 1617)   insulin aspart protamine-insulin aspart 50 Units Subcutaneous BID AC Danni Rob, DO    insulin glargine 12 Units Subcutaneous HS Danni Revdaniellear, DO    insulin lispro 1-5 Units Subcutaneous TID AC Ander Pearson MD    insulin lispro 1-5 Units Subcutaneous HS Ander Pearson MD    ipratropium-albuterol 3 mL Nebulization TID Danni Revdaniellear, DO    And        ipratropium-albuterol 3 mL Nebulization Q4H PRN Danni Thayerar, DO    ondansetron 4 mg Intravenous Q6H PRN Ander Pearson MD    [START ON 7/12/2018] predniSONE 20 mg Oral Daily Joel Sam MD    sodium chloride 1 spray Each Nare PRN Joel Sam MD    valsartan 160 mg Oral BID Ander Pearson MD         Today, Patient Was Seen By: Joel Sam MD    ** Please Note: Dictation voice to text software may have been used in the creation of this document   **

## 2018-07-12 LAB
ANION GAP SERPL CALCULATED.3IONS-SCNC: 11 MMOL/L (ref 4–13)
APTT PPP: 62 SECONDS (ref 24–33)
APTT PPP: 65 SECONDS (ref 24–36)
BACTERIA UR QL AUTO: ABNORMAL /HPF
BILIRUB UR QL STRIP: NEGATIVE
BUN SERPL-MCNC: 77 MG/DL (ref 5–25)
CALCIUM SERPL-MCNC: 8.6 MG/DL (ref 8.3–10.1)
CHLORIDE SERPL-SCNC: 102 MMOL/L (ref 100–108)
CLARITY UR: CLEAR
CO2 SERPL-SCNC: 25 MMOL/L (ref 21–32)
COLOR UR: YELLOW
CREAT SERPL-MCNC: 3.1 MG/DL (ref 0.6–1.3)
GFR SERPL CREATININE-BSD FRML MDRD: 16 ML/MIN/1.73SQ M
GLUCOSE SERPL-MCNC: 128 MG/DL (ref 65–140)
GLUCOSE SERPL-MCNC: 362 MG/DL (ref 65–140)
GLUCOSE SERPL-MCNC: 433 MG/DL (ref 65–140)
GLUCOSE SERPL-MCNC: 60 MG/DL (ref 65–140)
GLUCOSE SERPL-MCNC: 93 MG/DL (ref 65–140)
GLUCOSE UR STRIP-MCNC: NEGATIVE MG/DL
HGB UR QL STRIP.AUTO: ABNORMAL
INR PPP: 3.72 (ref 0.86–1.16)
KETONES UR STRIP-MCNC: NEGATIVE MG/DL
LEUKOCYTE ESTERASE UR QL STRIP: NEGATIVE
NITRITE UR QL STRIP: NEGATIVE
NON-SQ EPI CELLS URNS QL MICRO: ABNORMAL /HPF
PH UR STRIP.AUTO: 5.5 [PH] (ref 5–9)
POTASSIUM SERPL-SCNC: 4.8 MMOL/L (ref 3.5–5.3)
PROT UR STRIP-MCNC: ABNORMAL MG/DL
PROTHROMBIN TIME: 36.3 SECONDS (ref 9.4–11.7)
RBC #/AREA URNS AUTO: ABNORMAL /HPF
SODIUM SERPL-SCNC: 138 MMOL/L (ref 136–145)
SP GR UR STRIP.AUTO: 1.02 (ref 1–1.03)
UROBILINOGEN UR QL STRIP.AUTO: 0.2 E.U./DL
WBC #/AREA URNS AUTO: ABNORMAL /HPF

## 2018-07-12 PROCEDURE — 99232 SBSQ HOSP IP/OBS MODERATE 35: CPT | Performed by: INTERNAL MEDICINE

## 2018-07-12 PROCEDURE — 85610 PROTHROMBIN TIME: CPT | Performed by: INTERNAL MEDICINE

## 2018-07-12 PROCEDURE — 85730 THROMBOPLASTIN TIME PARTIAL: CPT | Performed by: INTERNAL MEDICINE

## 2018-07-12 PROCEDURE — 94760 N-INVAS EAR/PLS OXIMETRY 1: CPT

## 2018-07-12 PROCEDURE — 94640 AIRWAY INHALATION TREATMENT: CPT

## 2018-07-12 PROCEDURE — 80048 BASIC METABOLIC PNL TOTAL CA: CPT | Performed by: INTERNAL MEDICINE

## 2018-07-12 PROCEDURE — 99255 IP/OBS CONSLTJ NEW/EST HI 80: CPT | Performed by: INTERNAL MEDICINE

## 2018-07-12 PROCEDURE — 81001 URINALYSIS AUTO W/SCOPE: CPT | Performed by: INTERNAL MEDICINE

## 2018-07-12 PROCEDURE — 82948 REAGENT STRIP/BLOOD GLUCOSE: CPT

## 2018-07-12 RX ORDER — FUROSEMIDE 10 MG/ML
40 INJECTION INTRAMUSCULAR; INTRAVENOUS
Status: DISCONTINUED | OUTPATIENT
Start: 2018-07-12 | End: 2018-07-14

## 2018-07-12 RX ADMIN — FUROSEMIDE 40 MG: 40 TABLET ORAL at 09:05

## 2018-07-12 RX ADMIN — INSULIN LISPRO 4 UNITS: 100 INJECTION, SOLUTION INTRAVENOUS; SUBCUTANEOUS at 17:04

## 2018-07-12 RX ADMIN — INSULIN ASPART 50 UNITS: 100 INJECTION, SUSPENSION SUBCUTANEOUS at 17:00

## 2018-07-12 RX ADMIN — INSULIN LISPRO 4 UNITS: 100 INJECTION, SOLUTION INTRAVENOUS; SUBCUTANEOUS at 21:42

## 2018-07-12 RX ADMIN — FUROSEMIDE 40 MG: 10 INJECTION, SOLUTION INTRAMUSCULAR; INTRAVENOUS at 17:02

## 2018-07-12 RX ADMIN — IPRATROPIUM BROMIDE AND ALBUTEROL SULFATE 3 ML: .5; 3 SOLUTION RESPIRATORY (INHALATION) at 08:00

## 2018-07-12 RX ADMIN — IPRATROPIUM BROMIDE AND ALBUTEROL SULFATE 3 ML: .5; 3 SOLUTION RESPIRATORY (INHALATION) at 20:09

## 2018-07-12 RX ADMIN — VALSARTAN 160 MG: 160 TABLET ORAL at 09:05

## 2018-07-12 RX ADMIN — IPRATROPIUM BROMIDE AND ALBUTEROL SULFATE 3 ML: .5; 3 SOLUTION RESPIRATORY (INHALATION) at 14:31

## 2018-07-12 RX ADMIN — PREDNISONE 20 MG: 20 TABLET ORAL at 09:05

## 2018-07-12 NOTE — ASSESSMENT & PLAN NOTE
Lab Results   Component Value Date    HGBA1C 8 8 (H) 07/05/2018       Recent Labs      07/11/18   2144  07/12/18   0705  07/12/18   1113  07/12/18   1553   POCGLU  382*  60*  128  362*       Blood Sugar Average: Last 72 hrs:  (P) 253 4375     · Blood sugars are labile  Patient is on Humalog 75/25 at home b i d and uses about 35-50 units  Continue NovoLog 70/30, 50 units b i d  continue sliding scale insulin     Monitor blood sugars with decreased dose of prednisone  · HA1c 8 8

## 2018-07-12 NOTE — ASSESSMENT & PLAN NOTE
· Chronic kidney disease stage III   · Baseline creatinine 1 7-2 2 per nephrology outpt note from this year, but has raised to 3 1 today  Nephrology consult was requested  · Likely secondary to effective decrease in circulating blood volume  · Patient appears to be fluid overloaded  · Continue Lasix 40 milligram IV q 12 hours  · Hold ARB  · Follow-up BMP in a m

## 2018-07-12 NOTE — PROGRESS NOTES
Progress Note - Jose De Oliveira 1955, 66 y o  female MRN: [id number]    Unit/Bed#: [address] [place] [address] Encounter: [id number]    Primary Care Provider: Aundrea Mena DO   Date and time admitted to hospital: 7/5/2018  6:52 PM        * PE (pulmonary thromboembolism) ([facility] )   Assessment & Plan    · Possible PE  Patient with recent airplane travel, desk job at work, complaining of dyspnea, tachycardic in the ER which fits the picture of PE  Moderate probability as noted on V/Q scan done  Patient reports improvement in her symptoms on after heparin drip started  Cannot get CTA given poor renal function  · bilateral LE dopplers negative for DVTs  · Due to patient's GFR, patient started on Coumadin  · INR is continuing to raise  · Hold Coumadin and will also stop heparin drip        Type 2 diabetes, uncontrolled, with renal manifestation [facility])   Assessment & Plan    Lab Results   Component Value Date    HGBA1C 8 8 (H) 07/05/2018       Recent Labs      07/11/18   2144  07/12/18   0705  07/12/18   1113  07/12/18   1553   POCGLU  382*  60*  128  362*       Blood Sugar Average: Last 72 hrs:  (P) 253 4375     · Blood sugars are labile  Patient is on Humalog 75/25 at home b i d and uses about 35-50 units  Continue NovoLog 70/30, 50 units b i d  continue sliding scale insulin  Monitor blood sugars with decreased dose of prednisone  · HA1c 8 8        Acute kidney injury superimposed on CKD (HCC)   Assessment & Plan    · Chronic kidney disease stage III   · Baseline creatinine 1 7-2 2 per nephrology outpt note from this year, but has raised to 3 1 today  Nephrology consult was requested  · Likely secondary to effective decrease in circulating blood volume  · Patient appears to be fluid overloaded  · Continue Lasix 40 milligram IV q 12 hours  · Hold ARB  · Follow-up BMP in a m  Dyspnea   Assessment & Plan    · Multifactorial from acute respiratory infection, possible PE  · Breathing improving    Patient stable on room air  · troponins negative  · Query CHANDRA  sleep study once acute issues resolved        Acute bronchitis   Assessment & Plan    Viral in nature  respiratory panel positive for rhino virus  urine strep and legionella Ag negative  procalcitonin 0 14 and on repeat is 0 21  Rapid strep negative  Symptoms have improved  discontinued IV rocephin/Azithro  Continue prednisone taper        Essential hypertension   Assessment & Plan    Diovan was held secondary to acute kidney injury  Continue Lasix        Hypothyroidism   Assessment & Plan    TSH within normal limits  Not on any medication            VTE Pharmacologic Prophylaxis:   Pharmacologic: Warfarin (Coumadin)  Mechanical VTE Prophylaxis in Place: Yes    Patient Centered Rounds: I have performed bedside rounds with nursing staff today  Discussions with Specialists or Other Care Team Provider: Dr Robbin Hernandez    Education and Discussions with Family / Patient: yes    Time Spent for Care: 30 minutes  More than 50% of total time spent on counseling and coordination of care as described above  Current Length of Stay: 7 day(s)    Current Patient Status: Inpatient   Certification Statement: The patient will continue to require additional inpatient hospital stay due to [address]    Discharge Plan: Home    Code Status: Level 1 - Full Code      Subjective:   Patient denies any shortness of breath, cough or chest pain  The patient still has this persistent bilateral leg swelling but better than before  Objective:     Vitals:   Temp (24hrs), Av 5 °F (36 4 °C), Min:97 2 °F (36 2 °C), Max:97 8 °F (36 6 °C)    HR:  [72-86] 73  Resp:  [18] 18  BP: (109-174)/(58-88) 109/58  SpO2:  [93 %-99 %] 93 %  Body mass index is 43 44 kg/m²  Input and Output Summary (last 24 hours):        Intake/Output Summary (Last 24 hours) at 18 1616  Last data filed at 18 1201   Gross per 24 hour   Intake                0 ml   Output             2825 ml   Net            -2825 ml Physical Exam:     Physical Exam   Constitutional: No distress  HENT:   Head: Normocephalic and atraumatic  Nose: Nose normal    Eyes: Conjunctivae and EOM are normal  Pupils are equal, round, and reactive to light  Neck: Normal range of motion  Neck supple  No JVD present  Cardiovascular: Normal rate, regular rhythm and normal heart sounds  Exam reveals no gallop and no friction rub  No murmur heard  Pulmonary/Chest: Effort normal and breath sounds normal  No respiratory distress  She has no wheezes  She has no rales  She exhibits no tenderness  Abdominal: Soft  Bowel sounds are normal  She exhibits no distension  There is no tenderness  There is no rebound and no guarding  Musculoskeletal: She exhibits edema  Bilateral +2 edema   Neurological: She is alert  No cranial nerve deficit  Skin: Skin is warm and dry  No rash noted  Psychiatric: She has a normal mood and affect  Additional Data:     Labs:      Results from last 7 days  Lab Units 07/09/18  0628   WBC Thousand/uL 12 20*   HEMOGLOBIN g/dL 11 7   HEMATOCRIT % 37 7   PLATELETS Thousands/uL 156   NEUTROS PCT % 72   LYMPHS PCT % 18   MONOS PCT % 9   EOS PCT % 0       Results from last 7 days  Lab Units 07/12/18  0519  07/05/18  1916   SODIUM mmol/L 138  < > 137   POTASSIUM mmol/L 4 8  < > 3 9   CHLORIDE mmol/L 102  < > 102   CO2 mmol/L 25  < > 28   BUN mg/dL 77*  < > 33*   CREATININE mg/dL 3 10*  < > 2 92*   CALCIUM mg/dL 8 6  < > 8 8   TOTAL PROTEIN g/dL  --   --  7 2   BILIRUBIN TOTAL mg/dL  --   --  1 40*   ALK PHOS U/L  --   --  108   ALT U/L  --   --  21   AST U/L  --   --  17   GLUCOSE RANDOM mg/dL 93  < > 212*   < > = values in this interval not displayed  Results from last 7 days  Lab Units 07/12/18  0519   INR  3 72*       * I Have Reviewed All Lab Data Listed Above  * Additional Pertinent Lab Tests Reviewed:  [facility]        Recent Cultures (last 7 days):       Results from last 7 days  Lab Units 07/06/18  0001 07/05/18  1916   BLOOD CULTURE   --  No Growth After 5 Days  No Growth After 5 Days  LEGIONELLA URINARY ANTIGEN  Negative  --        Last 24 Hours Medication List:     Current Facility-Administered Medications:  acetaminophen 650 mg Oral Q6H PRN Merline Sans, MD   furosemide 40 mg Intravenous BID (diuretic) Jamal Cabrera MD   insulin aspart protamine-insulin aspart 50 Units Subcutaneous BID AC Danni Rob DO   insulin glargine 12 Units Subcutaneous HS Danni Rob, DO   insulin lispro 1-5 Units Subcutaneous TID AC Merline Sans, MD   insulin lispro 1-5 Units Subcutaneous HS Emily Duff MD   ipratropium-albuterol 3 mL Nebulization TID Danni Rob,    And       ipratropium-albuterol 3 mL Nebulization Q4H PRN Danni Rob, DO   ondansetron 4 mg Intravenous Q6H PRN Merline Sans, MD   predniSONE 20 mg Oral Daily Mt Pinzon MD   sodium chloride 1 spray Each Nare PRN Mt Pinzon MD        Today, Patient Was Seen By: Mt Pinzon MD    ** Please Note: Dictation voice to text software may have been used in the creation of this document   **

## 2018-07-12 NOTE — CASE MANAGEMENT
Continued Stay Review    Date: 7/12/18    Vital Signs: /63 (BP Location: Left arm)   Pulse 78   Temp (!) 97 2 °F (36 2 °C) (Temporal)   Resp 18   Ht 5' (1 524 m)   Wt 101 kg (222 lb 7 1 oz)   SpO2 98%   BMI 43 44 kg/m²       GMF  RESPIRATORY PROTOCOL  DAILY WEIGHT I/O  IS  Medications:   Scheduled Meds:   Current Facility-Administered Medications:  acetaminophen 650 mg Oral Q6H PRN Nba Bar MD   furosemide 40 mg Intravenous BID (diuretic) Khloe Santiago MD   insulin aspart protamine-insulin aspart 50 Units Subcutaneous BID AC Danni Revankar, DO   insulin glargine 12 Units Subcutaneous HS Danni Revankar, DO   insulin lispro 1-5 Units Subcutaneous TID AC Nba Bar MD   insulin lispro 1-5 Units Subcutaneous HS Ama Smith MD   ipratropium-albuterol 3 mL Nebulization TID Danni Revankar, DO   And       ipratropium-albuterol 3 mL Nebulization Q4H PRN Danni Revankar, DO   ondansetron 4 mg Intravenous Q6H PRN Nba Bar MD   predniSONE 20 mg Oral Daily Lianna Beebe MD   sodium chloride 1 spray Each Nare PRN Lianna Beebe MD     Continuous Infusions:    PRN Meds:   acetaminophen    ipratropium-albuterol **AND** ipratropium-albuterol    ondansetron    sodium chloride    Abnormal Labs/Diagnostic Results: BUN/CR 77/3 10 PT INR 36 3/3 72 PTT 65 GLUCOSE 60    Age/Sex: 66 y o  female     NEPHROLOGY  1  Creatinine bumped up 3 10 today  At this time, she appears fluid overloaded and there could be a prerenal component on the basis of decreased effective circulating volume  Will check a UA and a PVR to explore intrarenal and postrenal etiologies  A renal US might be necessary if there is no improvement  Lastly, there could be an element of progression of renal disease in the setting of nephrotic range proteinuria  Will stop Valsartan for now and start IV diuresis  2  CKD III: Purported baseline is 1 7 to 2 2 but likely closer to 2 5   Follows Dr Anthony Ray    3  Volume overload: Start Furosemide 40 mg IV BID  Keep O>I  Continue daily weights  4  PE: on anticoagulation  5  HTN: BP acceptable  6  MBD: check phos  ATTENDING  * PE (pulmonary thromboembolism) ([facility])   Assessment & Plan     · Possible PE  Patient with recent airplane travel, desk job at work, complaining of dyspnea, tachycardic in the ER which fits the picture of PE  Moderate probability as noted on V/Q scan done  Patient reports improvement in her symptoms on after heparin drip started  Cannot get CTA given poor renal function  · bilateral LE dopplers negative for DVTs  · Due to patient's GFR, patient started on Coumadin  · INR is continuing to raise  · Hold Coumadin and will also stop heparin drip   Type 2 diabetes, uncontrolled, with renal manifestation   · Blood sugars are labile  Patient is on Humalog 75/25 at home b i d and uses about 35-50 units  Continue NovoLog 70/30, 50 units b i d  continue sliding scale insulin  Monitor blood sugars with decreased dose of prednisone  · HA1c 8 8  Acute kidney injury superimposed on CKD ([facility])   Assessment & Plan     · Chronic kidney disease stage III   · Baseline creatinine 1 7-2 2 per nephrology outpt note from this year, but has raised to 3 1 today  Nephrology consult was requested  · Likely secondary to effective decrease in circulating blood volume  · Patient appears to be fluid overloaded  · Continue Lasix 40 milligram IV q 12 hours  · Hold ARB  · Follow-up BMP in a m     Certification Statement: The patient will continue to require additional inpatient hospital stay due to [address]  ·

## 2018-07-12 NOTE — ASSESSMENT & PLAN NOTE
· Possible PE  Patient with recent airplane travel, desk job at work, complaining of dyspnea, tachycardic in the ER which fits the picture of PE  Moderate probability as noted on V/Q scan done  Patient reports improvement in her symptoms on after heparin drip started  Cannot get CTA given poor renal function  · bilateral LE dopplers negative for DVTs  · Due to patient's GFR, patient started on Coumadin     · INR is continuing to raise  · Hold Coumadin and will also stop heparin drip

## 2018-07-13 ENCOUNTER — APPOINTMENT (INPATIENT)
Dept: RADIOLOGY | Facility: HOSPITAL | Age: 63
DRG: 871 | End: 2018-07-13
Payer: COMMERCIAL

## 2018-07-13 LAB
ALBUMIN SERPL BCP-MCNC: 2.8 G/DL (ref 3.5–5)
ALP SERPL-CCNC: 75 U/L (ref 46–116)
ALT SERPL W P-5'-P-CCNC: 51 U/L (ref 12–78)
ANION GAP SERPL CALCULATED.3IONS-SCNC: 12 MMOL/L (ref 4–13)
AST SERPL W P-5'-P-CCNC: 18 U/L (ref 5–45)
BILIRUB SERPL-MCNC: 0.4 MG/DL (ref 0.2–1)
BUN SERPL-MCNC: 80 MG/DL (ref 5–25)
CALCIUM SERPL-MCNC: 8.4 MG/DL (ref 8.3–10.1)
CHLORIDE SERPL-SCNC: 99 MMOL/L (ref 100–108)
CO2 SERPL-SCNC: 24 MMOL/L (ref 21–32)
CREAT SERPL-MCNC: 3.19 MG/DL (ref 0.6–1.3)
ERYTHROCYTE [DISTWIDTH] IN BLOOD BY AUTOMATED COUNT: 15.6 % (ref 11.6–15.1)
GFR SERPL CREATININE-BSD FRML MDRD: 15 ML/MIN/1.73SQ M
GLUCOSE SERPL-MCNC: 159 MG/DL (ref 65–140)
GLUCOSE SERPL-MCNC: 269 MG/DL (ref 65–140)
GLUCOSE SERPL-MCNC: 275 MG/DL (ref 65–140)
GLUCOSE SERPL-MCNC: 74 MG/DL (ref 65–140)
GLUCOSE SERPL-MCNC: 77 MG/DL (ref 65–140)
HCT VFR BLD AUTO: 39.6 % (ref 34.8–46.1)
HGB BLD-MCNC: 12.3 G/DL (ref 11.5–15.4)
INR PPP: 2.05 (ref 0.86–1.16)
MAGNESIUM SERPL-MCNC: 2 MG/DL (ref 1.6–2.6)
MCH RBC QN AUTO: 26.4 PG (ref 26.8–34.3)
MCHC RBC AUTO-ENTMCNC: 31.1 G/DL (ref 31.4–37.4)
MCV RBC AUTO: 85 FL (ref 82–98)
PHOSPHATE SERPL-MCNC: 5.7 MG/DL (ref 2.3–4.1)
PLATELET # BLD AUTO: 226 THOUSANDS/UL (ref 149–390)
PMV BLD AUTO: 12.4 FL (ref 8.9–12.7)
POTASSIUM SERPL-SCNC: 4.5 MMOL/L (ref 3.5–5.3)
PROT SERPL-MCNC: 6.5 G/DL (ref 6.4–8.2)
PROTHROMBIN TIME: 20.7 SECONDS (ref 9.4–11.7)
RBC # BLD AUTO: 4.66 MILLION/UL (ref 3.81–5.12)
SODIUM SERPL-SCNC: 135 MMOL/L (ref 136–145)
WBC # BLD AUTO: 15.75 THOUSAND/UL (ref 4.31–10.16)

## 2018-07-13 PROCEDURE — 94640 AIRWAY INHALATION TREATMENT: CPT

## 2018-07-13 PROCEDURE — 80053 COMPREHEN METABOLIC PANEL: CPT | Performed by: INTERNAL MEDICINE

## 2018-07-13 PROCEDURE — 84100 ASSAY OF PHOSPHORUS: CPT | Performed by: INTERNAL MEDICINE

## 2018-07-13 PROCEDURE — 99232 SBSQ HOSP IP/OBS MODERATE 35: CPT | Performed by: INTERNAL MEDICINE

## 2018-07-13 PROCEDURE — 82948 REAGENT STRIP/BLOOD GLUCOSE: CPT

## 2018-07-13 PROCEDURE — 85610 PROTHROMBIN TIME: CPT | Performed by: INTERNAL MEDICINE

## 2018-07-13 PROCEDURE — 83735 ASSAY OF MAGNESIUM: CPT | Performed by: INTERNAL MEDICINE

## 2018-07-13 PROCEDURE — 76770 US EXAM ABDO BACK WALL COMP: CPT

## 2018-07-13 PROCEDURE — 85027 COMPLETE CBC AUTOMATED: CPT | Performed by: INTERNAL MEDICINE

## 2018-07-13 PROCEDURE — 94760 N-INVAS EAR/PLS OXIMETRY 1: CPT

## 2018-07-13 RX ORDER — WARFARIN SODIUM 4 MG/1
4 TABLET ORAL
Status: DISCONTINUED | OUTPATIENT
Start: 2018-07-13 | End: 2018-07-14

## 2018-07-13 RX ADMIN — FUROSEMIDE 40 MG: 10 INJECTION, SOLUTION INTRAMUSCULAR; INTRAVENOUS at 08:23

## 2018-07-13 RX ADMIN — IPRATROPIUM BROMIDE AND ALBUTEROL SULFATE 3 ML: .5; 3 SOLUTION RESPIRATORY (INHALATION) at 14:09

## 2018-07-13 RX ADMIN — INSULIN ASPART 50 UNITS: 100 INJECTION, SUSPENSION SUBCUTANEOUS at 11:47

## 2018-07-13 RX ADMIN — INSULIN LISPRO 3 UNITS: 100 INJECTION, SOLUTION INTRAVENOUS; SUBCUTANEOUS at 17:19

## 2018-07-13 RX ADMIN — INSULIN LISPRO 2 UNITS: 100 INJECTION, SOLUTION INTRAVENOUS; SUBCUTANEOUS at 21:29

## 2018-07-13 RX ADMIN — FUROSEMIDE 40 MG: 10 INJECTION, SOLUTION INTRAMUSCULAR; INTRAVENOUS at 16:41

## 2018-07-13 RX ADMIN — IPRATROPIUM BROMIDE AND ALBUTEROL SULFATE 3 ML: .5; 3 SOLUTION RESPIRATORY (INHALATION) at 19:29

## 2018-07-13 RX ADMIN — IPRATROPIUM BROMIDE AND ALBUTEROL SULFATE 3 ML: .5; 3 SOLUTION RESPIRATORY (INHALATION) at 07:45

## 2018-07-13 RX ADMIN — WARFARIN SODIUM 4 MG: 4 TABLET ORAL at 17:44

## 2018-07-13 RX ADMIN — PREDNISONE 20 MG: 20 TABLET ORAL at 08:23

## 2018-07-13 RX ADMIN — INSULIN ASPART 50 UNITS: 100 INJECTION, SUSPENSION SUBCUTANEOUS at 17:18

## 2018-07-13 RX ADMIN — INSULIN LISPRO 1 UNITS: 100 INJECTION, SOLUTION INTRAVENOUS; SUBCUTANEOUS at 11:49

## 2018-07-13 NOTE — ASSESSMENT & PLAN NOTE
· Chronic kidney disease stage III   · Baseline creatinine 1 7-2 2 per nephrology outpt note from this year, but has raised to 3 2 today  Likely secondary to effective decrease in circulating blood volume versus progression of chronic kidney disease  · Patient appears to be fluid overloaded  · Continue Lasix 40 milligram IV q 12 hours with which patient is having good diuresis  · Continue to hold Diovan  · Follow-up BMP in [facility]

## 2018-07-13 NOTE — PROGRESS NOTES
Progress Note - Amy Clamp 1955, 66 y o  female MRN: [id number]    Unit/Bed#: [address] [place] [address] Encounter: [id number]    Primary Care Provider: Nusrat Barnett,    Date and time admitted to hospital: 7/5/2018  6:52 PM        * PE (pulmonary thromboembolism) ([facility] [address] )   Assessment & Plan    · Possible PE  Patient with recent airplane travel, desk job at work, complaining of dyspnea, tachycardic in the ER which fits the picture of PE  Moderate probability as noted on V/Q scan done  Patient reports improvement in her symptoms on after heparin drip started  Cannot get CTA given poor renal function  · bilateral LE dopplers negative for DVTs  · Due to patient's GFR, patient started on Coumadin  · INR is therapeutic  · Resume Coumadin at lower dose 4 milligram p o  daily        Type 2 diabetes, uncontrolled, with renal manifestation [facility])   Assessment & Plan    Lab Results   Component Value Date    HGBA1C 8 8 (H) 07/05/2018       Recent Labs      07/12/18   2042  07/13/18   0820  07/13/18   1141  07/13/18   1648   POCGLU  433*  77  159*  275*       Blood Sugar Average: Last 72 hrs:  (P) 238 5625     · Blood sugars are labile  Patient is on Humalog 75/25 at home b i d and uses about 35-50 units  Continue NovoLog 70/30, 50 units b i d  continue sliding scale insulin  Monitor blood sugars with decreased dose of prednisone  · HA1c 8 8        Acute kidney injury superimposed on CKD (HCC)   Assessment & Plan    · Chronic kidney disease stage III   · Baseline creatinine 1 7-2 2 per nephrology outpt note from this year, but has raised to 3 2 today  Likely secondary to effective decrease in circulating blood volume versus progression of chronic kidney disease  · Patient appears to be fluid overloaded  · Continue Lasix 40 milligram IV q 12 hours with which patient is having good diuresis  · Continue to hold Diovan  · Follow-up BMP in a m            Dyspnea   Assessment & Plan    · Multifactorial from acute respiratory infection, possible PE  · Breathing improving  Patient stable on room air  · troponins negative  · Query CHANDRA  sleep study once acute issues resolved        Acute bronchitis   Assessment & Plan    Viral in nature  respiratory panel positive for rhino virus  urine strep and legionella Ag negative  procalcitonin 0 14 and on repeat is 0 21  Rapid strep negative  Symptoms have improved  discontinued IV rocephin/Azithro  Patient received prednisone taper        Essential hypertension   Assessment & Plan    Diovan was held secondary to acute kidney injury  Continue Lasix 40 milligram IV q 12 hours        Hypothyroidism   Assessment & Plan    TSH within normal limits  Not on any medication        SIRS (systemic inflammatory response syndrome) (HCC)   Assessment & Plan    · Likely SIRS response from PE, viral infection  Fevers resolved  Antibiotics discontinued  · CXR showed NAD  CT chest with no signs of pneumonia or pleural effusions  · UA bland  Blood cx negative  · Rapid strep negative  Respiratory panel was positive for rhino virus            VTE Pharmacologic Prophylaxis:   Pharmacologic: Warfarin (Coumadin)  Mechanical VTE Prophylaxis in Place: No    Patient Centered Rounds: I have performed bedside rounds with nursing staff today  Discussions with Specialists or Other Care Team Provider:     Education and Discussions with Family / Patient: Yes    Time Spent for Care: 30 minutes  More than 50% of total time spent on counseling and coordination of care as described above  Current Length of Stay: 8 day(s)    Current Patient Status: Inpatient   Certification Statement: The patient will continue to require additional inpatient hospital stay due to Acute kidney injury, fluid overload    Discharge Plan: Home    Code Status: Level 1 - Full Code      Subjective:   Patient is still having some leg swelling  Patient denies any shortness of breath, chest pain, palpitations    Objective:     Vitals:   Temp (24hrs), Av 5 °F (36 4 °C), Min:97 5 °F (36 4 °C), Max:97 5 °F (36 4 °C)    HR:  [75-78] 75  Resp:  [14-18] 18  BP: (126-151)/(75-79) 126/75  SpO2:  [96 %-98 %] 96 %  Body mass index is 42 93 kg/m²  Input and Output Summary (last 24 hours): Intake/Output Summary (Last 24 hours) at 18 1729  Last data filed at 18 1638   Gross per 24 hour   Intake                0 ml   Output             1000 ml   Net            -1000 ml       Physical Exam:     Physical Exam   Constitutional: No distress  HENT:   Head: Normocephalic and atraumatic  Nose: Nose normal    Eyes: Conjunctivae and EOM are normal  Pupils are equal, round, and reactive to light  Neck: Normal range of motion  Neck supple  No JVD present  Cardiovascular: Normal rate, regular rhythm and normal heart sounds  Exam reveals no gallop and no friction rub  No murmur heard  Pulmonary/Chest: Effort normal and breath sounds normal  No respiratory distress  She has no wheezes  She has no rales  She exhibits no tenderness  Abdominal: Soft  Bowel sounds are normal  She exhibits no distension  There is no tenderness  There is no rebound and no guarding  Musculoskeletal: She exhibits edema  Bilateral +2 pedal edema   Neurological: She is alert  No cranial nerve deficit  Skin: Skin is warm and dry  No rash noted  Psychiatric: She has a normal mood and affect         Additional Data:     Labs:      Results from last 7 days  Lab Units 18  0636 18  0628   WBC Thousand/uL 15 75* 12 20*   HEMOGLOBIN g/dL 12 3 11 7   HEMATOCRIT % 39 6 37 7   PLATELETS Thousands/uL 226 156   NEUTROS PCT %  --  72   LYMPHS PCT %  --  18   MONOS PCT %  --  9   EOS PCT %  --  0       Results from last 7 days  Lab Units 18  0636   SODIUM mmol/L 135*   POTASSIUM mmol/L 4 5   CHLORIDE mmol/L 99*   CO2 mmol/L 24   BUN mg/dL 80*   CREATININE mg/dL 3 19*   CALCIUM mg/dL 8 4   TOTAL PROTEIN g/dL 6 5   BILIRUBIN TOTAL mg/dL 0 40   ALK PHOS U/L 75 ALT U/L 51   AST U/L 18   GLUCOSE RANDOM mg/dL 74       Results from last 7 days  Lab Units 07/13/18  0636   INR  2 05*       * I Have Reviewed All Lab Data Listed Above  * Additional Pertinent Lab Tests Reviewed: Dar[facility] Admission Reviewed        Recent Cultures (last 7 days):           Last 24 Hours Medication List:     Current Facility-Administered Medications:  acetaminophen 650 mg Oral Q6H PRN Jacob Walker MD   furosemide 40 mg Intravenous BID (diuretic) Delma Monteiro MD   insulin aspart protamine-insulin aspart 50 Units Subcutaneous BID AC Danni Revankar, DO   insulin lispro 1-5 Units Subcutaneous TID AC Jacob Walker MD   insulin lispro 1-5 Units Subcutaneous HS Becca Robledo MD   ipratropium-albuterol 3 mL Nebulization TID Danni Revankar, DO   And       ipratropium-albuterol 3 mL Nebulization Q4H PRN Danni Revankar, DO   ondansetron 4 mg Intravenous Q6H PRN Jacob Walker MD   sodium chloride 1 spray Each Nare PRN Jocelin Marrufo MD   warfarin 4 mg Oral Daily (warfarin) Jocelin Marrufo MD        Today, Patient Was Seen By: Jocelin Marrufo MD    ** Please Note: Dictation voice to text software may have been used in the creation of this document   **

## 2018-07-13 NOTE — ASSESSMENT & PLAN NOTE
· Possible PE  Patient with recent airplane travel, desk job at work, complaining of dyspnea, tachycardic in the ER which fits the picture of PE  Moderate probability as noted on V/Q scan done  Patient reports improvement in her symptoms on after heparin drip started  Cannot get CTA given poor renal function  · bilateral LE dopplers negative for DVTs  · Due to patient's GFR, patient started on Coumadin     · INR is therapeutic  · Resume Coumadin at lower dose 4 milligram p o  daily

## 2018-07-13 NOTE — CASE MANAGEMENT
Continued Stay Review    Date: 7/13/18    Vital Signs: /75 (BP Location: Right arm)   Pulse 75   Temp 97 5 °F (36 4 °C) (Oral)   Resp 18   Ht 5' (1 524 m)   Wt 99 7 kg (219 lb 12 8 oz)   SpO2 98%   BMI 42 93 kg/m²       PT INR  US KIDNEY BLADDER   BMP  Medications:   Scheduled Meds:   Current Facility-Administered Medications:  acetaminophen 650 mg Oral Q6H PRN Elias Escalera MD   furosemide 40 mg Intravenous BID (diuretic) Vana Mcardle, MD   insulin aspart protamine-insulin aspart 50 Units Subcutaneous BID AC Danni Revankar, DO   insulin lispro 1-5 Units Subcutaneous TID AC Elias Escalera MD   insulin lispro 1-5 Units Subcutaneous HS Clemetine MD Wong   ipratropium-albuterol 3 mL Nebulization TID Danni Revankar, DO   And       ipratropium-albuterol 3 mL Nebulization Q4H PRN Danni Revdaniellear, DO   ondansetron 4 mg Intravenous Q6H PRN Elias Escalera MD   predniSONE 20 mg Oral Daily Joanna Roland MD   sodium chloride 1 spray Each Nare PRN Joanna Roland MD   warfarin 4 mg Oral Daily (warfarin) Joanna Roland MD     Continuous Infusions:    PRN Meds:   acetaminophen    ipratropium-albuterol **AND** ipratropium-albuterol    ondansetron    sodium chloride    Abnormal Labs/Diagnostic Results:  CL 99 BUN/CR80/3 19 ALB 2 8 PHOS 5 7 WBC 15 75 PT/INR 20 7/2  05 GLUCOSE 675>86>944  US KIDNEY BLADDER No hydronephrosis      Minimal bladder distention  Age/Sex: 66 y o  female     ATTENDING  * PE (pulmonary thromboembolism) ([facility])   Assessment & Plan     · Possible PE  Patient with recent airplane travel, desk job at work, complaining of dyspnea, tachycardic in the ER which fits the picture of PE  Moderate probability as noted on V/Q scan done  Patient reports improvement in her symptoms on after heparin drip started  Cannot get CTA given poor renal function  · bilateral LE dopplers negative for DVTs  · Due to patient's GFR, patient started on Coumadin     · INR is therapeutic  · Resume Coumadin at lower dose 4 milligram p o  daily   · Blood sugars are labile  Patient is on Humalog 75/25 at home b i d and uses about 35-50 units  Continue NovoLog 70/30, 50 units b i d  continue sliding scale insulin  Monitor blood sugars with decreased dose of prednisone  · HA1c 8 8  Acute kidney injury superimposed on CKD (HCC)   Assessment & Plan     · Chronic kidney disease stage III   · Baseline creatinine 1 7-2 2 per nephrology outpt note from this year, but has raised to 3 2 today  Likely secondary to effective decrease in circulating blood volume versus progression of chronic kidney disease  · Patient appears to be fluid overloaded  · Continue Lasix 40 milligram IV q 12 hours with which patient is having good diuresis  · Continue to hold Diovan  · Follow-up BMP in a m     Essential hypertension   Assessment & Plan     Diovan was held secondary to acute kidney injury  Continue Lasix 40 milligram IV C 44 hours      Certification Statement: The patient will continue to require additional inpatient hospital stay due to Acute kidney injury, fluid overload    NEPHROLOGY  ASSESSMENT and PLAN:  1  PORSHA: True PORSHA vs progression of disease  Creatinine is slightly up to 3 2 today with appropriate diuresis  UA is bland  PVR is pending  Will check renal US for completeness  Continue Furosemide 40 mg IV BID to keep O>I    2  CKD III: Purported baseline is 1 7 to 2 2 but likely closer to 2 5 based on March 2018 labs  Follows Dr Carmen Everett    3  Volume overload: Continue Furosemide 40 mg IV BID  Keep O>I  Losing weight  4  PE: on anticoagulation  5  HTN: BP acceptable  6  MBD: Phos is slightly up - low phos diet       SUMMARY OF RECOMMENDATIONS:  · Continue Furosemide 40 mg IV BID  · Check renal US  · Check PVR  · Low phos diet

## 2018-07-13 NOTE — PROGRESS NOTES
In the computer the last charted IV was on the 5th from the ED, but according to IV label  And patient statement the IV was change on the 9th by Yadiel ESCOBEDO RN  Procedure for IV change and insertion explained  Patient agreeable and verbalized understanding  Old IV removed from patient's right hand and new IV started using aseptic technique with 1 attempt to the left AC using number 20 gauge needle  Patient tolerated procedure well

## 2018-07-13 NOTE — ASSESSMENT & PLAN NOTE
Lab Results   Component Value Date    HGBA1C 8 8 (H) 07/05/2018       Recent Labs      07/12/18   2042  07/13/18   0820  07/13/18   1141  07/13/18   1648   POCGLU  433*  77  159*  275*       Blood Sugar Average: Last 72 hrs:  (P) 238 5625     · Blood sugars are labile  Patient is on Humalog 75/25 at home b i d and uses about 35-50 units  Continue NovoLog 70/30, 50 units b i d  continue sliding scale insulin     Monitor blood sugars with decreased dose of prednisone  · HA1c 8 8

## 2018-07-13 NOTE — ASSESSMENT & PLAN NOTE
Viral in nature  respiratory panel positive for rhino virus  urine strep and legionella Ag negative  procalcitonin 0 14 and on repeat is 0 21  Rapid strep negative  Symptoms have improved    discontinued IV rocephin/Azithro  Patient received prednisone taper

## 2018-07-13 NOTE — PROGRESS NOTES
[address] [facility] NOTE   Jacqueline Eller 66 y o  female MRN: [id number]  Unit/Bed#: [address] [place] [address] Encounter: [id number]  Reason for Consult: PORSHA, CKD    ASSESSMENT and PLAN:  1  PORSHA: True PORSHA vs progression of disease  Creatinine is slightly up to 3 2 today with appropriate diuresis  UA is bland  PVR is pending  Will check renal US for completeness  Continue Furosemide 40 mg IV BID to keep O>I    2  CKD III: Purported baseline is 1 7 to 2 2 but likely closer to 2 5 based on March 2018 labs  Follows Dr Avril Mckeon    3  Volume overload: Continue Furosemide 40 mg IV BID  Keep O>I  Losing weight  4  PE: on anticoagulation  5  HTN: BP acceptable  6  MBD: Phos is slightly up - low phos diet  SUMMARY OF RECOMMENDATIONS:  · Continue Furosemide 40 mg IV BID  · Check renal US  · Check PVR  · Low phos diet  Discussed with Dr Espana Early  SUBJECTIVE / INTERVAL HISTORY:  Good UO  Breathing stable  Losing weight  No bladder scan done? OBJECTIVE:  Current Weight: Weight - Scale: 99 7 kg (219 lb 12 8 oz)  Vitals:    07/13/18 0044 07/13/18 0600 07/13/18 0733 07/13/18 0814   BP: 151/79   126/75   BP Location: Left arm   Right arm   Pulse: 78  77 75   Resp: 18  14 18   Temp: 97 5 °F (36 4 °C)   97 5 °F (36 4 °C)   TempSrc: Temporal   Oral   SpO2: 96%  98% 98%   Weight:  99 7 kg (219 lb 12 8 oz)     Height:         No intake or output data in the 24 hours ending 07/13/18 1450  General: conscious, coherent, cooperative, no distress  Chest/Lungs: crackles on the R base  Occasional ronchi  CVS: distinct heart sounds, normal rate  Abdomen: soft  Extremities: (+) bilateral leg edema       Medications:    Current Facility-Administered Medications:     acetaminophen (TYLENOL) tablet 650 mg, 650 mg, Oral, Q6H PRN, Ander Pearson MD    furosemide (LASIX) injection 40 mg, 40 mg, Intravenous, BID (diuretic), Cali Anna MD, 40 mg at 07/13/18 0823    insulin aspart protamine-insulin aspart (NovoLOG 70/30) 100 units/mL subcutaneous injection 50 Units, 50 Units, Subcutaneous, BID AC, Danni Rob DO, 50 Units at 07/13/18 1147    insulin lispro (HumaLOG) 100 units/mL subcutaneous injection 1-5 Units, 1-5 Units, Subcutaneous, TID AC, 1 Units at 07/13/18 1149 **AND** Fingerstick Glucose (POCT), , , TID AC, Siva Bobo MD    insulin lispro (HumaLOG) 100 units/mL subcutaneous injection 1-5 Units, 1-5 Units, Subcutaneous, HS, Radha Borrego MD, 4 Units at 07/12/18 2142    ipratropium-albuterol (DUO-NEB) 0 5-2 5 mg/3 mL inhalation solution 3 mL, 3 mL, Nebulization, TID, 3 mL at 07/13/18 1409 **AND** ipratropium-albuterol (DUO-NEB) 0 5-2 5 mg/3 mL inhalation solution 3 mL, 3 mL, Nebulization, Q4H PRN, Danni Rob DO    ondansetron (ZOFRAN) injection 4 mg, 4 mg, Intravenous, Q6H PRN, Siva Bobo MD    predniSONE tablet 20 mg, 20 mg, Oral, Daily, Belinda Del Cid MD, 20 mg at 07/13/18 0823    sodium chloride (OCEAN) 0 65 % nasal spray 1 spray, 1 spray, Each Nare, PRN, Belinda Del Cid MD    warfarin (COUMADIN) tablet 4 mg, 4 mg, Oral, Daily (warfarin), Belinda Del Cid MD    Laboratory Results:    Results from last 7 days  Lab Units 07/13/18  0636 07/12/18  0519 07/10/18  0617 07/09/18  8587 07/08/18  0654 07/07/18  0555 07/06/18  1650   WBC Thousand/uL 15 75*  --   --  12 20*  --   --   --    HEMOGLOBIN g/dL 12 3  --   --  11 7  --   --   --    HEMATOCRIT % 39 6  --   --  37 7  --   --   --    PLATELETS Thousands/uL 226  --   --  156  --   --   --    SODIUM mmol/L 135* 138 136 136 133* 132*  --    POTASSIUM mmol/L 4 5 4 8 4 5 4 2 4 8 4 6  --    CHLORIDE mmol/L 99* 102 103 103 101 98*  --    CO2 mmol/L 24 25 23 22 22 21  --    BUN mg/dL 80* 77* 59* 58* 57* 48*  --    CREATININE mg/dL 3 19* 3 10* 2 60* 2 76* 2 92* 2 84*  --    CALCIUM mg/dL 8 4 8 6 8 2* 8 4 8 3 8 6  --    MAGNESIUM mg/dL 2 0  --   --   --   --  2 3  --    PHOSPHORUS mg/dL 5 7*  --   --   --   --   --   --    TOTAL PROTEIN g/dL 6 5  --   --   --   -- --   --    GLUCOSE RANDOM mg/dL 74 93 149* 146* 304* 371* 499*     Previous work up:

## 2018-07-14 LAB
ANION GAP SERPL CALCULATED.3IONS-SCNC: 12 MMOL/L (ref 4–13)
BUN SERPL-MCNC: 94 MG/DL (ref 5–25)
CALCIUM SERPL-MCNC: 8.5 MG/DL (ref 8.3–10.1)
CHLORIDE SERPL-SCNC: 100 MMOL/L (ref 100–108)
CO2 SERPL-SCNC: 26 MMOL/L (ref 21–32)
CREAT SERPL-MCNC: 3.46 MG/DL (ref 0.6–1.3)
GFR SERPL CREATININE-BSD FRML MDRD: 14 ML/MIN/1.73SQ M
GLUCOSE SERPL-MCNC: 106 MG/DL (ref 65–140)
GLUCOSE SERPL-MCNC: 152 MG/DL (ref 65–140)
GLUCOSE SERPL-MCNC: 233 MG/DL (ref 65–140)
GLUCOSE SERPL-MCNC: 68 MG/DL (ref 65–140)
GLUCOSE SERPL-MCNC: 74 MG/DL (ref 65–140)
INR PPP: 1.57 (ref 0.86–1.16)
POTASSIUM SERPL-SCNC: 4.5 MMOL/L (ref 3.5–5.3)
PROTHROMBIN TIME: 16.1 SECONDS (ref 9.4–11.7)
SODIUM SERPL-SCNC: 138 MMOL/L (ref 136–145)

## 2018-07-14 PROCEDURE — 82948 REAGENT STRIP/BLOOD GLUCOSE: CPT

## 2018-07-14 PROCEDURE — 99232 SBSQ HOSP IP/OBS MODERATE 35: CPT | Performed by: INTERNAL MEDICINE

## 2018-07-14 PROCEDURE — 94640 AIRWAY INHALATION TREATMENT: CPT

## 2018-07-14 PROCEDURE — 80048 BASIC METABOLIC PNL TOTAL CA: CPT | Performed by: INTERNAL MEDICINE

## 2018-07-14 PROCEDURE — 85610 PROTHROMBIN TIME: CPT | Performed by: INTERNAL MEDICINE

## 2018-07-14 PROCEDURE — 94760 N-INVAS EAR/PLS OXIMETRY 1: CPT

## 2018-07-14 RX ORDER — WARFARIN SODIUM 6 MG/1
6 TABLET ORAL
Status: COMPLETED | OUTPATIENT
Start: 2018-07-14 | End: 2018-07-14

## 2018-07-14 RX ADMIN — IPRATROPIUM BROMIDE AND ALBUTEROL SULFATE 3 ML: .5; 3 SOLUTION RESPIRATORY (INHALATION) at 07:47

## 2018-07-14 RX ADMIN — IPRATROPIUM BROMIDE AND ALBUTEROL SULFATE 3 ML: .5; 3 SOLUTION RESPIRATORY (INHALATION) at 14:27

## 2018-07-14 RX ADMIN — IPRATROPIUM BROMIDE AND ALBUTEROL SULFATE 3 ML: .5; 3 SOLUTION RESPIRATORY (INHALATION) at 19:45

## 2018-07-14 RX ADMIN — INSULIN ASPART 50 UNITS: 100 INJECTION, SUSPENSION SUBCUTANEOUS at 13:13

## 2018-07-14 RX ADMIN — WARFARIN SODIUM 6 MG: 6 TABLET ORAL at 17:37

## 2018-07-14 RX ADMIN — INSULIN LISPRO 2 UNITS: 100 INJECTION, SOLUTION INTRAVENOUS; SUBCUTANEOUS at 21:31

## 2018-07-14 RX ADMIN — INSULIN LISPRO 1 UNITS: 100 INJECTION, SOLUTION INTRAVENOUS; SUBCUTANEOUS at 13:11

## 2018-07-14 NOTE — ASSESSMENT & PLAN NOTE
Lab Results   Component Value Date    HGBA1C 8 8 (H) 07/05/2018       Recent Labs      07/13/18   1648  07/13/18   2057  07/14/18   0737  07/14/18   1120   POCGLU  275*  269*  74  152*       Blood Sugar Average: Last 72 hrs:  (P) 224 6     · Blood sugars are better off steroids  Patient is on Humalog 75/25 at home b i d and uses about 35-50 units  Continue NovoLog 70/30, 50 units b i d  continue sliding scale insulin     Monitor blood sugars with decreased dose of prednisone  · HA1c 8 8

## 2018-07-14 NOTE — ASSESSMENT & PLAN NOTE
Diovan was held secondary to acute kidney injury  Hold Lasix and possible transition to [facility] in am

## 2018-07-14 NOTE — ASSESSMENT & PLAN NOTE
· Likely SIRS response from PE, viral infection  Fevers resolved  Antibiotics discontinued  · CXR showed NAD  CT chest with no signs of pneumonia or pleural effusions  · UA bland  Blood cx negative  · Rapid strep negative  Respiratory panel was positive for rhino virus  · White count was slightly high yesterday which is likely secondary to steroids  · Follow-up CBC in a m

## 2018-07-14 NOTE — PROGRESS NOTES
Progress Note - Kasandra Carter 1955, 66 y o  female MRN: [id number]    Unit/Bed#: [address] [place] [address] Encounter: [id number]    Primary Care Provider: Ilene Beard DO   Date and time admitted to hospital: 7/5/2018  6:52 PM        * PE (pulmonary thromboembolism) ([facility] [address] )   Assessment & Plan    · Possible PE  Patient with recent airplane travel, desk job at work, complaining of dyspnea, tachycardic in the ER which fits the picture of PE  Moderate probability as noted on V/Q scan done  Patient reports improvement in her symptoms on after heparin drip started  Cannot get CTA given poor renal function  · bilateral LE dopplers negative for DVTs  · Due to patient's GFR, patient started on Coumadin  · INR is subtherapeutic  · Coumadin 6 milligram p o  today        Type 2 diabetes, uncontrolled, with renal manifestation [facility])   Assessment & Plan    Lab Results   Component Value Date    HGBA1C 8 8 (H) 07/05/2018       Recent Labs      07/13/18   1648  07/13/18   2057  07/14/18   0737  07/14/18   1120   POCGLU  275*  269*  74  152*       Blood Sugar Average: Last 72 hrs:  (P) 224 6     · Blood sugars are better off steroids  Patient is on Humalog 75/25 at home b i d and uses about 35-50 units  Continue NovoLog 70/30, 50 units b i d  continue sliding scale insulin  Monitor blood sugars with decreased dose of prednisone  · HA1c 8 8        Acute kidney injury superimposed on CKD (HCC)   Assessment & Plan    · Chronic kidney disease stage III   · Baseline creatinine 1 7-2 2 per nephrology outpt note from this year, but has raised to 3 2 today   Likely secondary to effective decrease in circulating blood volume versus progression of chronic kidney disease  Patient was given IV Lasix for fluid overload and creatinine is slightly worse at 3 4 today  Fluid status is better  Stop IV Lasix  Possible discharge on torsemide 20 milligram p o  daily creatinine remains stable tomorrow        Dyspnea   Assessment & Plan · Multifactorial from acute respiratory infection, possible PE  · Breathing improving  Patient stable on room air  · troponins negative  · Query CHANDRA  sleep study once acute issues resolved        Acute bronchitis   Assessment & Plan    Viral in nature  respiratory panel positive for rhino virus  urine strep and legionella Ag negative  procalcitonin 0 14 and on repeat is 0 21  Rapid strep negative  Symptoms have improved  discontinued IV rocephin/Azithro  Patient received prednisone taper        Essential hypertension   Assessment & Plan    Diovan was held secondary to acute kidney injury  Hold Lasix and possible transition to Demadex in am        Hypothyroidism   Assessment & Plan    TSH within normal limits  Not on any medication        SIRS (systemic inflammatory response syndrome) (HCC)   Assessment & Plan    · Likely SIRS response from PE, viral infection  Fevers resolved  Antibiotics discontinued  · CXR showed NAD  CT chest with no signs of pneumonia or pleural effusions  · UA bland  Blood cx negative  · Rapid strep negative  Respiratory panel was positive for rhino virus  · White count was slightly high yesterday which is likely secondary to steroids  · Follow-up CBC in a m  VTE Pharmacologic Prophylaxis:   Pharmacologic: Warfarin (Coumadin)  Mechanical VTE Prophylaxis in Place: No    Patient Centered Rounds: I have performed bedside rounds with nursing staff today  Discussions with Specialists or Other Care Team Provider: Dr Jihan Majano    Education and Discussions with Family / Patient: Yes    Time Spent for Care: 30 minutes  More than 50% of total time spent on counseling and coordination of care as described above      Current Length of Stay: 9 day(s)    Current Patient Status: Inpatient   Certification Statement: The patient will continue to require additional inpatient hospital stay due to Acute kidney injury    Discharge Plan: Home    Code Status: Level 1 - Full Code      Subjective: Patient has improved leg swelling  Patient denies any chest pain, shortness of breath, abdominal pain, nausea or vomiting  Objective:     Vitals:   Temp (24hrs), Av 1 °F (36 7 °C), Min:97 4 °F (36 3 °C), Max:98 7 °F (37 1 °C)    HR:  [71-86] 81  Resp:  [18] 18  BP: (108-130)/(57-78) 109/63  SpO2:  [95 %-99 %] 95 %  Body mass index is 42 5 kg/m²  Input and Output Summary (last 24 hours): Intake/Output Summary (Last 24 hours) at 18 1527  Last data filed at 18 1301   Gross per 24 hour   Intake              550 ml   Output             2300 ml   Net            -1750 ml       Physical Exam:     Physical Exam   Constitutional: No distress  HENT:   Head: Normocephalic and atraumatic  Nose: Nose normal    Eyes: Conjunctivae and EOM are normal  Pupils are equal, round, and reactive to light  Neck: Normal range of motion  Neck supple  No JVD present  Cardiovascular: Normal rate, regular rhythm and normal heart sounds  Exam reveals no gallop and no friction rub  No murmur heard  Pulmonary/Chest: Effort normal and breath sounds normal  No respiratory distress  She has no wheezes  She has no rales  She exhibits no tenderness  Abdominal: Soft  Bowel sounds are normal  She exhibits no distension  There is no tenderness  There is no rebound and no guarding  Musculoskeletal: She exhibits edema  Bilateral +1 pedal edema   Neurological: She is alert  No cranial nerve deficit  Skin: Skin is warm and dry  No rash noted  Psychiatric: She has a normal mood and affect         Additional Data:     Labs:      Results from last 7 days  Lab Units 18  0636 18  0628   WBC Thousand/uL 15 75* 12 20*   HEMOGLOBIN g/dL 12 3 11 7   HEMATOCRIT % 39 6 37 7   PLATELETS Thousands/uL 226 156   NEUTROS PCT %  --  72   LYMPHS PCT %  --  18   MONOS PCT %  --  9   EOS PCT %  --  0       Results from last 7 days  Lab Units 18  0615 18  0636   SODIUM mmol/L 138 135*   POTASSIUM mmol/L 4 5 4 5   CHLORIDE mmol/L 100 99*   CO2 mmol/L 26 24   BUN mg/dL 94* 80*   CREATININE mg/dL 3 46* 3 19*   CALCIUM mg/dL 8 5 8 4   TOTAL PROTEIN g/dL  --  6 5   BILIRUBIN TOTAL mg/dL  --  0 40   ALK PHOS U/L  --  75   ALT U/L  --  51   AST U/L  --  18   GLUCOSE RANDOM mg/dL 68 74       Results from last 7 days  Lab Units 07/14/18  0615   INR  1 57*       * I Have Reviewed All Lab Data Listed Above  * Additional Pertinent Lab Tests Reviewed: Darcy [facility] Admission Reviewed      Recent Cultures (last 7 days):           Last 24 Hours Medication List:     Current Facility-Administered Medications:  acetaminophen 650 mg Oral Q6H PRN Jacky Mijares MD   insulin aspart protamine-insulin aspart 50 Units Subcutaneous BID AC Danni Revankar, DO   insulin lispro 1-5 Units Subcutaneous TID AC Jacky Mijares MD   insulin lispro 1-5 Units Subcutaneous HS Mary Shaikh MD   ipratropium-albuterol 3 mL Nebulization TID Danni Revankar, DO   And       ipratropium-albuterol 3 mL Nebulization Q4H PRN Danni Revankar, DO   ondansetron 4 mg Intravenous Q6H PRN Jacky Mijares MD   sodium chloride 1 spray Each Nare PRN Angelita Duff MD   warfarin 6 mg Oral Once (warfarin) Angelita Duff MD        Today, Patient Was Seen By: Angelita Duff MD    ** Please Note: Dictation voice to text software may have been used in the creation of this document   **

## 2018-07-14 NOTE — ASSESSMENT & PLAN NOTE
· Possible PE  Patient with recent airplane travel, desk job at work, complaining of dyspnea, tachycardic in the ER which fits the picture of PE  Moderate probability as noted on V/Q scan done  Patient reports improvement in her symptoms on after heparin drip started  Cannot get CTA given poor renal function  · bilateral LE dopplers negative for DVTs  · Due to patient's GFR, patient started on Coumadin     · INR is subtherapeutic  · Coumadin 6 milligram p o  today

## 2018-07-14 NOTE — ASSESSMENT & PLAN NOTE
· Chronic kidney disease stage III   · Baseline creatinine 1 7-2 2 per nephrology outpt note from this year, but has raised to 3 2 today   Likely secondary to effective decrease in circulating blood volume versus progression of chronic kidney disease  Patient was given IV Lasix for fluid overload and creatinine is slightly worse at 3 4 today  Fluid status is better  Stop IV Lasix  Possible discharge on torsemide 20 milligram p o  daily creatinine remains stable tomorrow

## 2018-07-14 NOTE — PROGRESS NOTES
[address] [facility] NOTE   Anabel Olsen 66 y o  female MRN: [id number]  Unit/Bed#: [address] [place] [address] Encounter: [id number]  Reason for Consult: PORSHA, CKD    ASSESSMENT and PLAN:  1  PORSHA: True PORSHA vs progression of disease  Creatinine is worse today in the face of diuresis  Volume status is better and will hold Furosemide for now  UA is bland  PVR is negative  Renal US is negative  2  CKD III: Purported baseline is 1 7 to 2 2 but likely closer to 2 5 based on March 2018 labs  Follows Dr Eduardo Toledo    3  Volume overload: Stop IV Furosemide  Possible Torsemide 20 mg daily on discharge  4  PE: on anticoagulation  5  HTN: BP acceptable  6  MBD: Low phos diet for hyperphosphatemia  SUMMARY OF RECOMMENDATIONS:  · Stop Furosemide  · Will plan for Torsemide 20 mg daily on discharge  Discussed with Dr Effie Epstein and RN  SUBJECTIVE / INTERVAL HISTORY:  Feels better overall  Edema is better  OBJECTIVE:  Current Weight: Weight - Scale: 98 7 kg (217 lb 9 5 oz)  Vitals:    07/14/18 0057 07/14/18 0600 07/14/18 0719 07/14/18 0747   BP: 125/65  130/78    BP Location: Right arm  Right arm    Pulse: 83  75 71   Resp: 18 18 18   Temp: 98 °F (36 7 °C)  98 2 °F (36 8 °C)    TempSrc: Oral  Oral    SpO2: 95%  97% 97%   Weight:  98 7 kg (217 lb 9 5 oz)     Height:           Intake/Output Summary (Last 24 hours) at 07/14/18 0841  Last data filed at 07/14/18 0540   Gross per 24 hour   Intake              250 ml   Output             1950 ml   Net            -1700 ml     General: conscious, coherent, cooperative, no distress  Chest/Lungs: clear breath sounds  CVS: distinct heart sounds, normal rate, regular  Abdomen: soft  Extremities: improved edema  : no west catheter  Neuro: awake, alert       Medications:    Current Facility-Administered Medications:     acetaminophen (TYLENOL) tablet 650 mg, 650 mg, Oral, Q6H PRN, Concepción Adkins MD    furosemide (LASIX) injection 40 mg, 40 mg, Intravenous, BID (diuretic), [facility] Matheus Sue MD, 40 mg at 07/14/18 0825    insulin aspart protamine-insulin aspart (NovoLOG 70/30) 100 units/mL subcutaneous injection 50 Units, 50 Units, Subcutaneous, BID AC, Danni Rob DO, 50 Units at 07/13/18 1718    insulin lispro (HumaLOG) 100 units/mL subcutaneous injection 1-5 Units, 1-5 Units, Subcutaneous, TID AC, 3 Units at 07/13/18 1719 **AND** Fingerstick Glucose (POCT), , , TID AC, Tyrel Reyes MD    insulin lispro (HumaLOG) 100 units/mL subcutaneous injection 1-5 Units, 1-5 Units, Subcutaneous, HS, Memory Kawasaki, MD, 2 Units at 07/13/18 2129    ipratropium-albuterol (DUO-NEB) 0 5-2 5 mg/3 mL inhalation solution 3 mL, 3 mL, Nebulization, TID, 3 mL at 07/14/18 0747 **AND** ipratropium-albuterol (DUO-NEB) 0 5-2 5 mg/3 mL inhalation solution 3 mL, 3 mL, Nebulization, Q4H PRN, Danni Rob DO    ondansetron (ZOFRAN) injection 4 mg, 4 mg, Intravenous, Q6H PRN, Tyrel Reyes MD    sodium chloride (OCEAN) 0 65 % nasal spray 1 spray, 1 spray, Each Nare, PRN, Viktoriya Pires MD    warfarin (COUMADIN) tablet 4 mg, 4 mg, Oral, Daily (warfarin), Viktoriya Pires MD, 4 mg at 07/13/18 1744    Laboratory Results:    Results from last 7 days  Lab Units 07/14/18  0615 07/13/18  0636 07/12/18  0519 07/10/18  0617 07/09/18  0628 07/08/18  0654   WBC Thousand/uL  --  15 75*  --   --  12 20*  --    HEMOGLOBIN g/dL  --  12 3  --   --  11 7  --    HEMATOCRIT %  --  39 6  --   --  37 7  --    PLATELETS Thousands/uL  --  226  --   --  156  --    SODIUM mmol/L 138 135* 138 136 136 133*   POTASSIUM mmol/L 4 5 4 5 4 8 4 5 4 2 4 8   CHLORIDE mmol/L 100 99* 102 103 103 101   CO2 mmol/L 26 24 25 23 22 22   BUN mg/dL 94* 80* 77* 59* 58* 57*   CREATININE mg/dL 3 46* 3 19* 3 10* 2 60* 2 76* 2 92*   CALCIUM mg/dL 8 5 8 4 8 6 8 2* 8 4 8 3   MAGNESIUM mg/dL  --  2 0  --   --   --   --    PHOSPHORUS mg/dL  --  5 7*  --   --   --   --    TOTAL PROTEIN g/dL  --  6 5  --   --   --   --    GLUCOSE RANDOM mg/dL 68 74 93 149* 146* 304*     Previous work up:

## 2018-07-15 VITALS
TEMPERATURE: 98.1 F | RESPIRATION RATE: 16 BRPM | HEIGHT: 60 IN | DIASTOLIC BLOOD PRESSURE: 91 MMHG | BODY MASS INDEX: 43.11 KG/M2 | SYSTOLIC BLOOD PRESSURE: 158 MMHG | HEART RATE: 84 BPM | WEIGHT: 219.58 LBS | OXYGEN SATURATION: 98 %

## 2018-07-15 PROBLEM — R65.10 SIRS (SYSTEMIC INFLAMMATORY RESPONSE SYNDROME) (HCC): Status: RESOLVED | Noted: 2018-07-05 | Resolved: 2018-07-15

## 2018-07-15 LAB
ANION GAP SERPL CALCULATED.3IONS-SCNC: 9 MMOL/L (ref 4–13)
BASOPHILS # BLD MANUAL: 0 THOUSAND/UL (ref 0–0.1)
BASOPHILS NFR MAR MANUAL: 0 % (ref 0–1)
BUN SERPL-MCNC: 93 MG/DL (ref 5–25)
CALCIUM SERPL-MCNC: 8.3 MG/DL (ref 8.3–10.1)
CHLORIDE SERPL-SCNC: 104 MMOL/L (ref 100–108)
CO2 SERPL-SCNC: 25 MMOL/L (ref 21–32)
CREAT SERPL-MCNC: 3.33 MG/DL (ref 0.6–1.3)
EOSINOPHIL # BLD MANUAL: 0.25 THOUSAND/UL (ref 0–0.4)
EOSINOPHIL NFR BLD MANUAL: 2 % (ref 0–6)
ERYTHROCYTE [DISTWIDTH] IN BLOOD BY AUTOMATED COUNT: 16 % (ref 11.6–15.1)
GFR SERPL CREATININE-BSD FRML MDRD: 14 ML/MIN/1.73SQ M
GLUCOSE SERPL-MCNC: 175 MG/DL (ref 65–140)
GLUCOSE SERPL-MCNC: 86 MG/DL (ref 65–140)
GLUCOSE SERPL-MCNC: 89 MG/DL (ref 65–140)
HCT VFR BLD AUTO: 39.3 % (ref 34.8–46.1)
HGB BLD-MCNC: 12.2 G/DL (ref 11.5–15.4)
INR PPP: 1.73 (ref 0.86–1.16)
LYMPHOCYTES # BLD AUTO: 2.64 THOUSAND/UL (ref 0.6–4.47)
LYMPHOCYTES # BLD AUTO: 21 % (ref 14–44)
MCH RBC QN AUTO: 27 PG (ref 26.8–34.3)
MCHC RBC AUTO-ENTMCNC: 31 G/DL (ref 31.4–37.4)
MCV RBC AUTO: 87 FL (ref 82–98)
MONOCYTES # BLD AUTO: 1.64 THOUSAND/UL (ref 0–1.22)
MONOCYTES NFR BLD: 13 % (ref 4–12)
NEUTROPHILS # BLD MANUAL: 8.06 THOUSAND/UL (ref 1.85–7.62)
NEUTS BAND NFR BLD MANUAL: 5 % (ref 0–8)
NEUTS SEG NFR BLD AUTO: 59 % (ref 43–75)
NRBC BLD AUTO-RTO: 0 /100 WBCS
PLATELET # BLD AUTO: 174 THOUSANDS/UL (ref 149–390)
PLATELET BLD QL SMEAR: ADEQUATE
PMV BLD AUTO: 12.5 FL (ref 8.9–12.7)
POTASSIUM SERPL-SCNC: 4.9 MMOL/L (ref 3.5–5.3)
PROTHROMBIN TIME: 17.6 SECONDS (ref 9.4–11.7)
RBC # BLD AUTO: 4.52 MILLION/UL (ref 3.81–5.12)
RBC MORPH BLD: NORMAL
SODIUM SERPL-SCNC: 138 MMOL/L (ref 136–145)
TOTAL CELLS COUNTED SPEC: 100
WBC # BLD AUTO: 12.59 THOUSAND/UL (ref 4.31–10.16)

## 2018-07-15 PROCEDURE — 80048 BASIC METABOLIC PNL TOTAL CA: CPT | Performed by: INTERNAL MEDICINE

## 2018-07-15 PROCEDURE — 85027 COMPLETE CBC AUTOMATED: CPT | Performed by: INTERNAL MEDICINE

## 2018-07-15 PROCEDURE — 85007 BL SMEAR W/DIFF WBC COUNT: CPT | Performed by: INTERNAL MEDICINE

## 2018-07-15 PROCEDURE — 99239 HOSP IP/OBS DSCHRG MGMT >30: CPT | Performed by: INTERNAL MEDICINE

## 2018-07-15 PROCEDURE — 82948 REAGENT STRIP/BLOOD GLUCOSE: CPT

## 2018-07-15 PROCEDURE — 85610 PROTHROMBIN TIME: CPT | Performed by: INTERNAL MEDICINE

## 2018-07-15 PROCEDURE — 94760 N-INVAS EAR/PLS OXIMETRY 1: CPT

## 2018-07-15 PROCEDURE — 99232 SBSQ HOSP IP/OBS MODERATE 35: CPT | Performed by: INTERNAL MEDICINE

## 2018-07-15 RX ORDER — TORSEMIDE 20 MG/1
20 TABLET ORAL DAILY
Qty: 30 TABLET | Refills: 0 | Status: SHIPPED | OUTPATIENT
Start: 2018-07-17 | End: 2018-08-20 | Stop reason: SDUPTHER

## 2018-07-15 RX ORDER — INSULIN LISPRO 100 [IU]/ML
30 INJECTION, SUSPENSION SUBCUTANEOUS 2 TIMES DAILY WITH MEALS
Qty: 150 ML | Refills: 0
Start: 2018-07-15 | End: 2018-12-06 | Stop reason: SDUPTHER

## 2018-07-15 RX ORDER — IPRATROPIUM BROMIDE AND ALBUTEROL SULFATE 2.5; .5 MG/3ML; MG/3ML
3 SOLUTION RESPIRATORY (INHALATION) EVERY 6 HOURS PRN
Status: DISCONTINUED | OUTPATIENT
Start: 2018-07-15 | End: 2018-07-15 | Stop reason: HOSPADM

## 2018-07-15 RX ORDER — WARFARIN SODIUM 3 MG/1
TABLET ORAL
Qty: 30 TABLET | Refills: 0 | Status: ON HOLD | OUTPATIENT
Start: 2018-07-15 | End: 2020-07-19 | Stop reason: SDUPTHER

## 2018-07-15 RX ORDER — WARFARIN SODIUM 2 MG/1
TABLET ORAL
Qty: 30 TABLET | Refills: 0 | Status: SHIPPED | OUTPATIENT
Start: 2018-07-15 | End: 2018-09-10 | Stop reason: SDUPTHER

## 2018-07-15 RX ORDER — WARFARIN SODIUM 6 MG/1
6 TABLET ORAL
Status: DISCONTINUED | OUTPATIENT
Start: 2018-07-15 | End: 2018-07-15

## 2018-07-15 RX ORDER — WARFARIN SODIUM 6 MG/1
6 TABLET ORAL
Status: COMPLETED | OUTPATIENT
Start: 2018-07-15 | End: 2018-07-15

## 2018-07-15 RX ADMIN — WARFARIN SODIUM 6 MG: 6 TABLET ORAL at 14:12

## 2018-07-15 RX ADMIN — INSULIN LISPRO 1 UNITS: 100 INJECTION, SOLUTION INTRAVENOUS; SUBCUTANEOUS at 14:12

## 2018-07-15 NOTE — DISCHARGE SUMMARY
Discharge- Beatriz [facility] 1955, 66 y o  female MRN: [id number]    Unit/Bed#: [address] [place] [address] Encounter: [id number]    Primary Care Provider: Dona Miller DO   Date and time admitted to hospital: 7/5/2018  6:52 PM        * PE (pulmonary thromboembolism) ([place] [address] )   Assessment & Plan    · Possible PE  Patient with recent airplane travel, desk job at work, complaining of dyspnea, tachycardic in the ER which fits the picture of PE  Moderate probability as noted on V/Q scan done  Patient reports improvement in her symptoms on after heparin drip started  Cannot get CTA given poor renal function  · bilateral LE dopplers negative for DVTs  · Due to patient's GFR, patient started on Coumadin  · INR is subtherapeutic but increasing to 1 7  · Patient was given 6 milligram of Coumadin  · Patient will continue on 5 milligram Coumadin for the next 2 days with follow-up PT/INR in 2 days  Type 2 diabetes, uncontrolled, with renal manifestation [facility])   Assessment & Plan    Lab Results   Component Value Date    HGBA1C 8 8 (H) 07/05/2018       Recent Labs      07/14/18   1705  07/14/18   2129  07/15/18   0725  07/15/18   1113   POCGLU  106  233*  86  175*       Blood Sugar Average: Last 72 hrs:  (P) 184 [id number]     · Blood sugars are better off steroids  Patient will be discharged back on NovoLog 75/35 30 units subcutaneously b i d  advised to monitor blood sugars before each meal and at bed time  · HA1c 8 8        Acute kidney injury superimposed on CKD ([place] [address] )   Assessment & Plan    · Chronic kidney disease stage III   · Baseline creatinine 1 7-2 2 per nephrology outpt note from this year, but has raised to 3 2 today  Likely secondary to effective decrease in circulating blood volume versus progression of chronic kidney disease  Patient was given IV Lasix for fluid overload    Creatinine continues to improve off diuretics  Hold diuretics for the next 2 days and patient will be started on Demadex from 717/18  Follow BMP in 1 week and follow up outpatient with Dr Bailey Dallas    · Multifactorial from acute respiratory infection, possible PE  · Breathing improving  Patient stable on room air  · troponins negative  · Query CHANDRA  sleep study once acute issues resolved        Acute bronchitis   Assessment & Plan    Viral in nature  respiratory panel positive for rhino virus  urine strep and legionella Ag negative  procalcitonin 0 14 and on repeat is 0 21  Rapid strep negative  Symptoms have improved  discontinued IV rocephin/Azithro  Patient received prednisone taper        Essential hypertension   Assessment & Plan    Diovan was held secondary to acute kidney injury  Patient will be on Demadex from 07/17 /18        Hypothyroidism   Assessment & Plan    TSH within normal limits  Not on any medication              Discharging Physician / Practitioner: Portillo Batres MD  PCP: Luisito Swain DO  Admission Date:   Admission Orders     Ordered        07/05/18 2040  Inpatient Admission (expected length of stay for this patient is greater than two midnights)  Once             Discharge Date: 07/15/18    Resolved Problems  Date Reviewed: 7/15/2018          Resolved    SIRS (systemic inflammatory response syndrome) ([facility] [address] ) 7/15/2018     Resolved by  Portillo Batres MD          Consultations During Hospital Stay:  · Dr Nubia Covarrubias    Procedures Performed:     · Renal ultrasound was normal  · Echo showed EF of 50 for 60%, no regional wall motion abnormalities  · Lower extremity venous Dopplers were negative for DVT  · V/Q scan showed intermediate probability for PE       Outpatient Tests Requested:  · PT/INR on July 17, 2018  BMP in 1 week  · Follow-up with PCP and Dr Edaurdo Toledo    Complications:  None    Reason for Admission:  Shortness of breath    Hospital Course:      Anabel Olsen is a 66 y o  female patient with past medical diabetes, chronic kidney disease stage 3, hypertension who originally presented to the hospital on 7/5/2018 due to shortness of breath  Patient had a V/Q scan as outpatient which showed intermediate probability for PE  In the ED patient had temperature of 101 9° and also noted elevated BNP  Patient admitted hospital for possible PE, acute bronchitis  Patient was initially started on IV steroids, empiric Rocephin and azithromycin  Patient was initially started on heparin drip  Patient had lower extremity venous Dopplers which showed no evidence of DVT  Later patient also had echo which showed no evidence of pulmonary hypertension  Due to patient having high pretest probability of PE with V/Q scan showing intermediate probability patient was continued on heparin drip and was later transitioned to Coumadin as patient also had chronic kidney disease  Patient's prednisone was tapered and eventually antibiotics were stopped as respiratory pathogen panel grew rhinovirus  Later patient was noted to have bilateral leg swelling and patient was given Lasix with worsening creatinine  Patient was seen in consultation with Nephrology who continued Lasix and creatinine slightly worsened  Patient's diuretics well held and patient's creatinine continued to improve  During hospital stay patient's INR became supratherapeutic and later became subtherapeutic and was increasing appropriately before discharge  Patient will be discharged on Coumadin 5 milligram p o  daily with follow-up PT/INR in 2 days  Patient will also need a BMP in 1 week and need close outpatient follow-up with Nephrology  Please see above list of diagnoses and related plan for additional information  Condition at Discharge: stable     Discharge Day Visit / Exam:     Subjective:  Patient denies any chest pain, shortness of breath, abdominal pain, nausea or vomiting  Improved leg swelling    Vitals: Blood Pressure: 158/91 (07/15/18 0800)  Pulse: 84 (07/15/18 0800)  Temperature: 98 1 °F (36 7 °C) (07/15/18 0800)  Temp Source: Oral (07/15/18 0800)  Respirations: 16 (07/15/18 0800)  Height: 5' (152 4 cm) (07/05/18 2300)  Weight - Scale: 99 6 kg (219 lb 9 3 oz) (07/15/18 0600)  SpO2: 98 % (07/15/18 3743)  Exam:   Physical Exam   Constitutional: No distress  HENT:   Head: Normocephalic and atraumatic  Nose: Nose normal    Eyes: Conjunctivae and EOM are normal  Pupils are equal, round, and reactive to light  Neck: Normal range of motion  Neck supple  No JVD present  Cardiovascular: Normal rate, regular rhythm and normal heart sounds  Exam reveals no gallop and no friction rub  No murmur heard  Pulmonary/Chest: Effort normal and breath sounds normal  No respiratory distress  She has no wheezes  She has no rales  She exhibits no tenderness  Abdominal: Soft  Bowel sounds are normal  She exhibits no distension  There is no tenderness  There is no rebound and no guarding  Musculoskeletal: She exhibits edema  Bilateral +1 pedal edema   Neurological: She is alert  No cranial nerve deficit  Skin: Skin is warm and dry  No rash noted  Psychiatric: She has a normal mood and affect  Discharge instructions/Information to patient and family:   See after visit summary for information provided to patient and family  Provisions for Follow-Up Care:  See after visit summary for information related to follow-up care and any pertinent home health orders  Disposition:     Home    For Discharges to [facility] SNF:   · Not Applicable to this Patient - Not Applicable to this Patient    Planned Readmission: No     Discharge Statement:  I spent 40 minutes discharging the patient  This time was spent on the day of discharge  I had direct contact with the patient on the day of discharge  Greater than 50% of the total time was spent examining patient, answering all patient questions, arranging and discussing plan of care with patient as well as directly providing post-discharge instructions    Additional time then spent on discharge activities  Discharge Medications:  See after visit summary for reconciled discharge medications provided to patient and family        ** Please Note: This note has been constructed using a voice recognition system **

## 2018-07-15 NOTE — ASSESSMENT & PLAN NOTE
· Possible PE  Patient with recent airplane travel, desk job at work, complaining of dyspnea, tachycardic in the ER which fits the picture of PE  Moderate probability as noted on V/Q scan done  Patient reports improvement in her symptoms on after heparin drip started  Cannot get CTA given poor renal function  · bilateral LE dopplers negative for DVTs  · Due to patient's GFR, patient started on Coumadin  · INR is subtherapeutic but increasing to 1 7  · Patient was given 6 milligram of Coumadin  · Patient will continue on 5 milligram Coumadin for the next 2 days with follow-up PT/INR in 2 days

## 2018-07-15 NOTE — ASSESSMENT & PLAN NOTE
· Chronic kidney disease stage III   · Baseline creatinine 1 7-2 2 per nephrology outpt note from this year, but has raised to 3 2 today  Likely secondary to effective decrease in circulating blood volume versus progression of chronic kidney disease  Patient was given IV Lasix for fluid overload    Creatinine continues to improve off diuretics  Hold diuretics for the next 2 days and patient will be started on Demadex from 717/18  Follow BMP in 1 week and follow up outpatient with Dr April San

## 2018-07-15 NOTE — PROGRESS NOTES
[address] [facility] NOTE   Josy Kaplan 66 y o  female MRN: [id number]  Unit/Bed#: [address] [place] [address] Encounter: [id number]  Reason for Consult: PORSHA, CKD    ASSESSMENT and PLAN:  1  PORSHA (POA):  · Admitted with creatinine of 2 92 on 7/5/18 and worsened with diuretics to a peak of 3 46 on 7/14/18  Diuretics were stopped on 7/14/18  Creatinine is better today and down to 3 33    · UA is bland  PVR is negative  Renal US is negative  · Unclear whether true PORSHA vs progression of disease  · Renal function improved today  2  CKD III: Purported baseline is 1 7 to 2 2 but likely closer to 2 5 based on March 2018 labs  Follows Dr Hiren Shine  Etiology felt to be DM nephropathy given nephrotic range proteinuria  3  Volume overload: Resolved  Recommend Torsemide 20 mg daily on discharge  4  PE: on anticoagulation  5  HTN: BP acceptable despite stopping Valsartan  6  MBD: Low phos diet for hyperphosphatemia  SUMMARY OF RECOMMENDATIONS:  · Continue to hold diuretics today  · May be discharged from renal standpoint  · Recommend not restarting Valsartan and starting Torsemide 20 mg daily on Tuesday, 7/17/18    Discussed with Dr Lulu Amos  SUBJECTIVE / INTERVAL HISTORY:  No new acute issues  Breathing is stable  OBJECTIVE:  Current Weight: Weight - Scale: 99 6 kg (219 lb 9 3 oz)  Vitals:    07/14/18 1947 07/14/18 1953 07/15/18 0122 07/15/18 0600   BP:  100/60 119/70    BP Location:  Right arm Left arm    Pulse: 84 73 86    Resp: 18 18 18    Temp:  (!) 97 1 °F (36 2 °C) (!) 97 4 °F (36 3 °C)    TempSrc:  Temporal Oral    SpO2: 96% 100% 95%    Weight:    99 6 kg (219 lb 9 3 oz)   Height:           Intake/Output Summary (Last 24 hours) at 07/15/18 0758  Last data filed at 07/14/18 2211   Gross per 24 hour   Intake              400 ml   Output             1650 ml   Net            -1250 ml     General: conscious, coherent, cooperative, no distress  Chest/Lungs: clear breath sounds    CVS: distinct heart sounds, normal rate  Abdomen: soft  Extremities: trace edema  : no west catheter  Neuro: awake, alert       Medications:    Current Facility-Administered Medications:     acetaminophen (TYLENOL) tablet 650 mg, 650 mg, Oral, Q6H PRN, Merline Sans, MD    insulin aspart protamine-insulin aspart (NovoLOG 70/30) 100 units/mL subcutaneous injection 50 Units, 50 Units, Subcutaneous, BID AC, Danni Rob, , 50 Units at 07/14/18 1313    insulin lispro (HumaLOG) 100 units/mL subcutaneous injection 1-5 Units, 1-5 Units, Subcutaneous, TID AC, 1 Units at 07/14/18 1311 **AND** Fingerstick Glucose (POCT), , , TID AC, Merline Sans, MD    insulin lispro (HumaLOG) 100 units/mL subcutaneous injection 1-5 Units, 1-5 Units, Subcutaneous, HS, Emily Duff MD, 2 Units at 07/14/18 2131    ipratropium-albuterol (DUO-NEB) 0 5-2 5 mg/3 mL inhalation solution 3 mL, 3 mL, Nebulization, TID, 3 mL at 07/14/18 1945 **AND** ipratropium-albuterol (DUO-NEB) 0 5-2 5 mg/3 mL inhalation solution 3 mL, 3 mL, Nebulization, Q4H PRN, Danni Rob DO    ondansetron (ZOFRAN) injection 4 mg, 4 mg, Intravenous, Q6H PRN, Merline Sans, MD    sodium chloride (OCEAN) 0 65 % nasal spray 1 spray, 1 spray, Each Nare, PRN, Mt Pinzon MD    Laboratory Results:    Results from last 7 days  Lab Units 07/15/18  0628 07/14/18  0615 07/13/18  0636 07/12/18  0519 07/10/18  0617 07/09/18  0628   WBC Thousand/uL 12 59*  --  15 75*  --   --  12 20*   HEMOGLOBIN g/dL 12 2  --  12 3  --   --  11 7   HEMATOCRIT % 39 3  --  39 6  --   --  37 7   PLATELETS Thousands/uL 174  --  226  --   --  156   SODIUM mmol/L 138 138 135* 138 136 136   POTASSIUM mmol/L 4 9 4 5 4 5 4 8 4 5 4 2   CHLORIDE mmol/L 104 100 99* 102 103 103   CO2 mmol/L 25 26 24 25 23 22   BUN mg/dL 93* 94* 80* 77* 59* 58*   CREATININE mg/dL 3 33* 3 46* 3 19* 3 10* 2 60* 2 76*   CALCIUM mg/dL 8 3 8 5 8 4 8 6 8 2* 8 4   MAGNESIUM mg/dL  --   --  2 0  --   --   --    PHOSPHORUS mg/dL  --   --  5 7*  -- --   --    TOTAL PROTEIN g/dL  --   --  6 5  --   --   --    GLUCOSE RANDOM mg/dL 89 68 74 93 149* 146*     Previous work up:

## 2018-07-16 ENCOUNTER — TRANSITIONAL CARE MANAGEMENT (OUTPATIENT)
Dept: FAMILY MEDICINE CLINIC | Facility: CLINIC | Age: 63
End: 2018-07-16

## 2018-07-16 ENCOUNTER — ANTICOAG VISIT (OUTPATIENT)
Dept: FAMILY MEDICINE CLINIC | Facility: CLINIC | Age: 63
End: 2018-07-16

## 2018-07-16 DIAGNOSIS — I26.99 OTHER ACUTE PULMONARY EMBOLISM WITHOUT ACUTE COR PULMONALE (HCC): Primary | ICD-10-CM

## 2018-07-17 ENCOUNTER — APPOINTMENT (OUTPATIENT)
Dept: LAB | Facility: HOSPITAL | Age: 63
End: 2018-07-17
Attending: INTERNAL MEDICINE
Payer: COMMERCIAL

## 2018-07-17 ENCOUNTER — TRANSCRIBE ORDERS (OUTPATIENT)
Dept: ADMINISTRATIVE | Facility: HOSPITAL | Age: 63
End: 2018-07-17

## 2018-07-17 DIAGNOSIS — I26.99 PULMONARY EMBOLISM (HCC): ICD-10-CM

## 2018-07-17 DIAGNOSIS — N17.9 ACUTE RENAL FAILURE (HCC): ICD-10-CM

## 2018-07-17 LAB
ANION GAP SERPL CALCULATED.3IONS-SCNC: 8 MMOL/L (ref 4–13)
BUN SERPL-MCNC: 60 MG/DL (ref 5–25)
CALCIUM SERPL-MCNC: 9 MG/DL (ref 8.3–10.1)
CHLORIDE SERPL-SCNC: 101 MMOL/L (ref 100–108)
CO2 SERPL-SCNC: 27 MMOL/L (ref 21–32)
CREAT SERPL-MCNC: 3.44 MG/DL (ref 0.6–1.3)
GFR SERPL CREATININE-BSD FRML MDRD: 14 ML/MIN/1.73SQ M
GLUCOSE P FAST SERPL-MCNC: 72 MG/DL (ref 65–99)
INR PPP: 2.26 (ref 0.86–1.16)
POTASSIUM SERPL-SCNC: 5 MMOL/L (ref 3.5–5.3)
PROTHROMBIN TIME: 22.7 SECONDS (ref 9.4–11.7)
SODIUM SERPL-SCNC: 136 MMOL/L (ref 136–145)

## 2018-07-17 PROCEDURE — 80048 BASIC METABOLIC PNL TOTAL CA: CPT

## 2018-07-17 PROCEDURE — 36415 COLL VENOUS BLD VENIPUNCTURE: CPT

## 2018-07-17 PROCEDURE — 85610 PROTHROMBIN TIME: CPT

## 2018-07-18 ENCOUNTER — TELEPHONE (OUTPATIENT)
Dept: FAMILY MEDICINE CLINIC | Facility: CLINIC | Age: 63
End: 2018-07-18

## 2018-07-18 ENCOUNTER — ANTICOAG VISIT (OUTPATIENT)
Dept: FAMILY MEDICINE CLINIC | Facility: CLINIC | Age: 63
End: 2018-07-18

## 2018-07-18 ENCOUNTER — OFFICE VISIT (OUTPATIENT)
Dept: FAMILY MEDICINE CLINIC | Facility: CLINIC | Age: 63
End: 2018-07-18
Payer: COMMERCIAL

## 2018-07-18 VITALS
TEMPERATURE: 97.6 F | BODY MASS INDEX: 42.41 KG/M2 | SYSTOLIC BLOOD PRESSURE: 140 MMHG | RESPIRATION RATE: 18 BRPM | WEIGHT: 216 LBS | HEIGHT: 60 IN | HEART RATE: 86 BPM | DIASTOLIC BLOOD PRESSURE: 78 MMHG

## 2018-07-18 DIAGNOSIS — IMO0002 UNCONTROLLED TYPE 2 DIABETES MELLITUS WITH STAGE 3 CHRONIC KIDNEY DISEASE, WITH LONG-TERM CURRENT USE OF INSULIN: ICD-10-CM

## 2018-07-18 DIAGNOSIS — I26.99 PE (PULMONARY THROMBOEMBOLISM) (HCC): Primary | ICD-10-CM

## 2018-07-18 DIAGNOSIS — I10 ESSENTIAL HYPERTENSION: ICD-10-CM

## 2018-07-18 PROCEDURE — 99496 TRANSJ CARE MGMT HIGH F2F 7D: CPT | Performed by: NURSE PRACTITIONER

## 2018-07-18 NOTE — LETTER
July 18, 2018     Patient: Tiki Bhat   YOB: 1955   Date of Visit: 7/18/2018       To Whom it May Concern:    Tiki Bhat is under my professional care  She was seen in my office on 7/18/2018  She may return to work on 7/30/18  If you have any questions or concerns, please don't hesitate to call           Sincerely,          MICHAEL Wesley        CC: No Recipients

## 2018-07-18 NOTE — ASSESSMENT & PLAN NOTE
Lab Results   Component Value Date    HGBA1C 8 8 (H) 07/05/2018       No results for input(s): POCGLU in the last 72 hours  Blood Sugar Average: Last 72 hrs:   stable    She will schedule an appt with endocrinologist

## 2018-07-18 NOTE — ASSESSMENT & PLAN NOTE
Baseline is stage 3, however recent kidney function is more like stage 4  She has a f/u with her nephrologist scheduled  Repeat BMP ordered for next week

## 2018-07-18 NOTE — PROGRESS NOTES
Assessment/Plan:    Problem List Items Addressed This Visit        Endocrine    Type 2 diabetes, uncontrolled, with renal manifestation (HCC)     Lab Results   Component Value Date    HGBA1C 8 8 (H) 07/05/2018       No results for input(s): POCGLU in the last 72 hours  Blood Sugar Average: Last 72 hrs:   stable  She will schedule an appt with endocrinologist           Relevant Orders    Basic metabolic panel       Respiratory    PE (pulmonary thromboembolism) ([address] ) - Primary     On Coumadin therapy  Will continue Coumadin 5mg daily with recheck 7/23/18  Relevant Orders    Protime-INR    Protime-INR       Cardiovascular and Mediastinum    Essential hypertension     Stable  Continue same meds               Patient Instructions   Continue Coumadin 5mg daily      Return in about 1 month (around 8/18/2018) for 1 month follow up with Dr Kelby Najjar  Subjective:      Patient ID: Chester Gomez is a 66 y o  female  Chief Complaint   Patient presents with    Transition of Care Management     [address] [place] [address] for a PE  [id number]       Here today for hospital f/u  She was discharged 3 days ago  She was admitted for SOB and cough that worsened after she flew on a plane  She had a VQ scan to r/o PE d/t poor renal function, which read intermediate probability for PE  She was initiated on a Heparin drip and transitioned to Coumadin  She has acute on chronic renal failure, was diuresed, and started on Torsemide  Her creatinine has been in the 3 4 range  Pt reports BLE edema has improved during her hospitalization  Her breathing is much better  She has f/u with nephrology, has not scheduled with endo yet  INR drawn yesterday  FBS improving since her discharge  She is watching her diet more now          Date and time hospital follow up call was made:  7/16/2018 11:23 AM  Hospital care reviewed:  Records reviewed  Patient [facility]ital[facility] at:  [facility]  Date of admission:  7/5/18  Date of discharge:  7/15/18  Diagnosis:  PE  Disposition:  Home  Were the patients medicaitons reviewed and updated:  Yes  Current symptoms:  (Comment: pt denied any SOB,nausea,vomittng,chest pain or palpatations  pt  did mentioned  her legs are sore but feel better when  walking  af/rma )  Post hospital issues:  None  Should patient be enrolled in anticoag monitoring?:  Yes  Scheduled for follow up?:  Yes  Patients specialists:  Nephrologist  Nephrologist's Name:  Dr Elizabeth Samuel   Did you obtain your prescribed medications:  Yes  Do you need help managing your perscriptions or medications:  No  Is transportation to your appointments needed:  No  I have advised the patient to call PCP with any new or worsening symptoms (please type in name along with any credentials):  Vivien Hamman   Living Arrangements:  Alone  Are you recieving outpatient services:  No  Counseling:  Patient  Counseling topics:  instructions for management, patient and family education, Importance of RX compliance  Comments:  Spoke to pt aware to call office if symptoms arise there is a provider on 24/7 and  if expiriencing SOB or chest pain head straight to ER    medications were reviewed and reconciled, follow up appointments were made for nephrology on 8/20/18  af/rma        The following portions of the patient's history were reviewed and updated as appropriate: allergies, current medications, past family history, past medical history, past social history, past surgical history and problem list     Review of Systems   Constitutional: Negative for chills, fatigue and fever  Respiratory: Negative for cough, shortness of breath and wheezing  Cardiovascular: Negative for chest pain, palpitations and leg swelling  Gastrointestinal: Negative for abdominal pain, diarrhea, nausea and vomiting  Skin: Negative for rash  Neurological: Negative for dizziness and headaches  All other systems reviewed and are negative          Current Outpatient Prescriptions   Medication Sig Dispense Refill    albuterol (PROAIR HFA) 90 mcg/act inhaler Inhale 2 puffs every 4 (four) hours as needed for wheezing or shortness of breath 1 Inhaler 0    Insulin Lispro Prot & Lispro (HUMALOG MIX 75/25 KWIKPEN) (75-25) 100 units/mL injection pen Inject 30 Units under the skin 2 (two) times a day with meals 150 mL 0    ONE TOUCH ULTRA TEST test strip       torsemide (DEMADEX) 20 mg tablet Take 1 tablet (20 mg total) by mouth daily 30 tablet 0    warfarin (COUMADIN) 2 mg tablet Take as directed 30 tablet 0    warfarin (COUMADIN) 3 mg tablet Take 5 milligrams on 7/16/18, July 17, 2018 and get PT/INR on July 17, 2018 30 tablet 0     No current facility-administered medications for this visit  Objective:    /78   Pulse 86   Temp 97 6 °F (36 4 °C)   Resp 18   Ht 5' (1 524 m)   Wt 98 kg (216 lb)   BMI 42 18 kg/m²        Physical Exam   Constitutional: She appears well-developed and well-nourished  HENT:   Right Ear: Tympanic membrane, external ear and ear canal normal    Left Ear: Tympanic membrane, external ear and ear canal normal    Nose: No mucosal edema  Mouth/Throat: Oropharynx is clear and moist and mucous membranes are normal    Eyes: Conjunctivae are normal    Cardiovascular: Normal rate, regular rhythm, normal heart sounds and intact distal pulses  Trace LE edema   Pulmonary/Chest: Effort normal and breath sounds normal    Abdominal: Bowel sounds are normal  She exhibits no distension  There is no splenomegaly or hepatomegaly  There is no tenderness  Lymphadenopathy:        Right cervical: No superficial cervical adenopathy present  Left cervical: No superficial cervical adenopathy present  Skin: No rash noted  Psychiatric: She has a normal mood and affect  Nursing note and vitals reviewed               Duyen Bland

## 2018-07-23 ENCOUNTER — TRANSCRIBE ORDERS (OUTPATIENT)
Dept: ADMINISTRATIVE | Facility: HOSPITAL | Age: 63
End: 2018-07-23

## 2018-07-23 ENCOUNTER — APPOINTMENT (OUTPATIENT)
Dept: LAB | Facility: HOSPITAL | Age: 63
End: 2018-07-23
Payer: COMMERCIAL

## 2018-07-23 ENCOUNTER — TELEPHONE (OUTPATIENT)
Dept: FAMILY MEDICINE CLINIC | Facility: CLINIC | Age: 63
End: 2018-07-23

## 2018-07-23 ENCOUNTER — ANTICOAG VISIT (OUTPATIENT)
Dept: FAMILY MEDICINE CLINIC | Facility: CLINIC | Age: 63
End: 2018-07-23

## 2018-07-23 DIAGNOSIS — I26.99 PE (PULMONARY THROMBOEMBOLISM) (HCC): Primary | ICD-10-CM

## 2018-07-23 DIAGNOSIS — IMO0002 UNCONTROLLED TYPE 2 DIABETES MELLITUS WITH STAGE 3 CHRONIC KIDNEY DISEASE, WITH LONG-TERM CURRENT USE OF INSULIN: ICD-10-CM

## 2018-07-23 DIAGNOSIS — I26.99 PE (PULMONARY THROMBOEMBOLISM) (HCC): ICD-10-CM

## 2018-07-23 LAB
ANION GAP SERPL CALCULATED.3IONS-SCNC: 8 MMOL/L (ref 4–13)
BUN SERPL-MCNC: 56 MG/DL (ref 5–25)
CALCIUM SERPL-MCNC: 8.9 MG/DL (ref 8.3–10.1)
CHLORIDE SERPL-SCNC: 102 MMOL/L (ref 100–108)
CO2 SERPL-SCNC: 26 MMOL/L (ref 21–32)
CREAT SERPL-MCNC: 3.09 MG/DL (ref 0.6–1.3)
GFR SERPL CREATININE-BSD FRML MDRD: 16 ML/MIN/1.73SQ M
GLUCOSE P FAST SERPL-MCNC: 108 MG/DL (ref 65–99)
INR PPP: 7.48 (ref 0.86–1.16)
POTASSIUM SERPL-SCNC: 4 MMOL/L (ref 3.5–5.3)
PROTHROMBIN TIME: 70.1 SECONDS (ref 9.4–11.7)
SODIUM SERPL-SCNC: 136 MMOL/L (ref 136–145)

## 2018-07-23 PROCEDURE — 36415 COLL VENOUS BLD VENIPUNCTURE: CPT

## 2018-07-23 PROCEDURE — 85610 PROTHROMBIN TIME: CPT

## 2018-07-23 PROCEDURE — 80048 BASIC METABOLIC PNL TOTAL CA: CPT

## 2018-07-23 NOTE — TELEPHONE ENCOUNTER
New instructions discussed with Lena Cardenas standing order done    Message complete  Dona Miller, DO

## 2018-07-23 NOTE — TELEPHONE ENCOUNTER
Crystal Beckford out pt  Aman called states INR  Is 7 48, pt  was not in the lab at the time of call  Informed DR Ramirez of results,advised if pt in non symptomatic to take eat lots of greens today and  Hold coumadin for today and  Recheck tomorrow 7/24/18  Called  Pt informed her of   Instructions  pt denied any SOB, nausea, dizziness, bleeding, any recent  Cuts or bruising   af/rma

## 2018-07-24 ENCOUNTER — ANTICOAG VISIT (OUTPATIENT)
Dept: FAMILY MEDICINE CLINIC | Facility: CLINIC | Age: 63
End: 2018-07-24

## 2018-07-24 ENCOUNTER — TRANSCRIBE ORDERS (OUTPATIENT)
Dept: ADMINISTRATIVE | Facility: HOSPITAL | Age: 63
End: 2018-07-24

## 2018-07-24 ENCOUNTER — APPOINTMENT (OUTPATIENT)
Dept: LAB | Facility: HOSPITAL | Age: 63
End: 2018-07-24
Payer: COMMERCIAL

## 2018-07-24 DIAGNOSIS — I26.99 PE (PULMONARY THROMBOEMBOLISM) (HCC): ICD-10-CM

## 2018-07-24 LAB
INR PPP: 5.4 (ref 0.86–1.16)
PROTHROMBIN TIME: 51.5 SECONDS (ref 9.4–11.7)

## 2018-07-24 PROCEDURE — 36415 COLL VENOUS BLD VENIPUNCTURE: CPT

## 2018-07-24 PROCEDURE — 85610 PROTHROMBIN TIME: CPT

## 2018-07-25 ENCOUNTER — APPOINTMENT (OUTPATIENT)
Dept: LAB | Facility: HOSPITAL | Age: 63
End: 2018-07-25
Payer: COMMERCIAL

## 2018-07-25 ENCOUNTER — TELEPHONE (OUTPATIENT)
Dept: FAMILY MEDICINE CLINIC | Facility: CLINIC | Age: 63
End: 2018-07-25

## 2018-07-25 ENCOUNTER — ANTICOAG VISIT (OUTPATIENT)
Dept: FAMILY MEDICINE CLINIC | Facility: CLINIC | Age: 63
End: 2018-07-25

## 2018-07-25 DIAGNOSIS — I26.99 PE (PULMONARY THROMBOEMBOLISM) (HCC): ICD-10-CM

## 2018-07-25 DIAGNOSIS — I26.99 PE (PULMONARY THROMBOEMBOLISM) (HCC): Primary | ICD-10-CM

## 2018-07-25 LAB
INR PPP: 2.84 (ref 0.86–1.16)
PROTHROMBIN TIME: 28.1 SECONDS (ref 9.4–11.7)

## 2018-07-25 PROCEDURE — 85610 PROTHROMBIN TIME: CPT

## 2018-07-25 PROCEDURE — 36415 COLL VENOUS BLD VENIPUNCTURE: CPT

## 2018-07-25 RX ORDER — WARFARIN SODIUM 1 MG/1
TABLET ORAL
Qty: 30 TABLET | Refills: 1 | Status: SHIPPED | OUTPATIENT
Start: 2018-07-25 | End: 2019-08-30 | Stop reason: SDUPTHER

## 2018-07-25 NOTE — TELEPHONE ENCOUNTER
----- Message from Ramonita Helms MD sent at 7/25/2018  1:41 PM EDT -----  2 5 mg daily, check Monday 738

## 2018-07-25 NOTE — TELEPHONE ENCOUNTER
Spoke with pt regarding her INR results  She understands and will recheck on Monday AM  Pt will need warfarin 1 mg sent to [address] [place] [address],[address] in [place] for her  Pt also would like to know from Dr Lisa Narayan when she is able to return to work  She stated Jamey Holloway told her the 31st but she is not sure due to having to get blood work done frequently  Pt also is looking to see if we received the short term disability form that her insurance faxed over to us  I am going to call central faxing to see if they can locate the form  Pt states her insurance sent it over on the 20th   [place], [place]

## 2018-07-25 NOTE — TELEPHONE ENCOUNTER
L/M for pt to call back  Her forms were completed yesterday and faxed  I need to speak with her regarding her return to work date    Esteban Davis

## 2018-07-26 NOTE — TELEPHONE ENCOUNTER
Spoke w/ pt  She is concerned about commuting to work with an elevated INR  She is requesting an additional week from work to recuperate from her hospitalization and may need frequent lab draws next week  Agreeable to extend work excuse to return on 8/6/18  I did advise her that she cannot be out on disability d/t frequent laboratory testing    Claire Adamson

## 2018-07-26 NOTE — TELEPHONE ENCOUNTER
Please fax work note to Tesfaye Rivera [phone] #[phone]    I do not see the letter done yet?   Thanks

## 2018-07-30 ENCOUNTER — TRANSCRIBE ORDERS (OUTPATIENT)
Dept: ADMINISTRATIVE | Facility: HOSPITAL | Age: 63
End: 2018-07-30

## 2018-07-30 ENCOUNTER — APPOINTMENT (OUTPATIENT)
Dept: LAB | Facility: HOSPITAL | Age: 63
End: 2018-07-30
Payer: COMMERCIAL

## 2018-07-30 ENCOUNTER — TELEPHONE (OUTPATIENT)
Dept: FAMILY MEDICINE CLINIC | Facility: CLINIC | Age: 63
End: 2018-07-30

## 2018-07-30 ENCOUNTER — ANTICOAG VISIT (OUTPATIENT)
Dept: FAMILY MEDICINE CLINIC | Facility: CLINIC | Age: 63
End: 2018-07-30

## 2018-07-30 DIAGNOSIS — I26.99 PE (PULMONARY THROMBOEMBOLISM) (HCC): ICD-10-CM

## 2018-07-30 LAB
INR PPP: 1.39 (ref 0.86–1.16)
PROTHROMBIN TIME: 14.3 SECONDS (ref 9.4–11.7)

## 2018-07-30 PROCEDURE — 36415 COLL VENOUS BLD VENIPUNCTURE: CPT

## 2018-07-30 PROCEDURE — 85610 PROTHROMBIN TIME: CPT

## 2018-07-30 NOTE — TELEPHONE ENCOUNTER
Please advise  , when were forms faxed so we can locate them to have them resigned? Also regarding a new note for her  I do see in a previous task the number to fax note too   Daniele Evans, [address] [facility] Danya Hennessy

## 2018-07-30 NOTE — TELEPHONE ENCOUNTER
NEEDS HER FMLA PAPERWORK RE SIGNED THEY WON'T ACCEPT RENEA'S SIGNATURE, THEY WANT IT TO BE EITHER DR Bustillos [facility]   PLEASE REFAX, ALSO PT DISCUSSED WITH RENEA EXTENDED HER WORK NOTE TO GO BACK TO WORK Monday AUGUST THE 6TH, SO NEEDS NEW WORK NOTE  PT STATED SHE CALLED HERE ON Friday AND GAVE THE FAX NUMBER TO THAT PERSON BUT I DONOT SEE A  NOTE THAT HAS A FAX OR NUMBER ATTACHED AND PT DOES NOT HAVE THAT WITH HER NOW

## 2018-07-31 NOTE — TELEPHONE ENCOUNTER
I put in a note for her to return to work on 8/6/18  I am not sure of the exact date that her forms were faxed, but I believe it was either 7/23 or 7/24    Rasheeda Haddad

## 2018-08-02 ENCOUNTER — APPOINTMENT (OUTPATIENT)
Dept: LAB | Facility: HOSPITAL | Age: 63
End: 2018-08-02
Payer: COMMERCIAL

## 2018-08-02 ENCOUNTER — TRANSCRIBE ORDERS (OUTPATIENT)
Dept: ADMINISTRATIVE | Facility: HOSPITAL | Age: 63
End: 2018-08-02

## 2018-08-02 ENCOUNTER — ANTICOAG VISIT (OUTPATIENT)
Dept: FAMILY MEDICINE CLINIC | Facility: CLINIC | Age: 63
End: 2018-08-02

## 2018-08-02 LAB — INR PPP: 1.36 (ref 0.86–1.17)

## 2018-08-02 NOTE — TELEPHONE ENCOUNTER
I received another fax for Jacquie's paperwork  Can someone please try and locate these forms that were faxed, this needs to be taken care of  Thanks!   Esteban Davis

## 2018-08-02 NOTE — TELEPHONE ENCOUNTER
I looked for disability forms I was unable to locate  I called [employer] and they are going to fax us over another copy of the original form that was filled out  I will call central faxing in a little to see if they received the form  I also faxed to new work note to the number provided in a previous message  Awaiting confirmation   Elodia North

## 2018-08-03 NOTE — TELEPHONE ENCOUNTER
I changed her return to work date on the form and Dr Genevieve Redmond cosigned this  The form is at the nurse's desk  Please fax    Ag Young

## 2018-08-06 ENCOUNTER — ANTICOAG VISIT (OUTPATIENT)
Dept: FAMILY MEDICINE CLINIC | Facility: CLINIC | Age: 63
End: 2018-08-06

## 2018-08-06 ENCOUNTER — APPOINTMENT (OUTPATIENT)
Dept: LAB | Facility: HOSPITAL | Age: 63
End: 2018-08-06
Payer: COMMERCIAL

## 2018-08-06 ENCOUNTER — TRANSCRIBE ORDERS (OUTPATIENT)
Dept: ADMINISTRATIVE | Facility: HOSPITAL | Age: 63
End: 2018-08-06

## 2018-08-06 DIAGNOSIS — I26.99 PE (PULMONARY THROMBOEMBOLISM) (HCC): ICD-10-CM

## 2018-08-06 LAB
INR PPP: 1.84 (ref 0.86–1.16)
PROTHROMBIN TIME: 18.7 SECONDS (ref 9.4–11.7)

## 2018-08-06 PROCEDURE — 85610 PROTHROMBIN TIME: CPT

## 2018-08-06 PROCEDURE — 36415 COLL VENOUS BLD VENIPUNCTURE: CPT

## 2018-08-13 ENCOUNTER — APPOINTMENT (OUTPATIENT)
Dept: LAB | Facility: HOSPITAL | Age: 63
End: 2018-08-13
Payer: COMMERCIAL

## 2018-08-13 DIAGNOSIS — I26.99 PE (PULMONARY THROMBOEMBOLISM) (HCC): ICD-10-CM

## 2018-08-13 LAB
INR PPP: 1.92 (ref 0.86–1.16)
PROTHROMBIN TIME: 19.4 SECONDS (ref 9.4–11.7)

## 2018-08-13 PROCEDURE — 36415 COLL VENOUS BLD VENIPUNCTURE: CPT

## 2018-08-13 PROCEDURE — 85610 PROTHROMBIN TIME: CPT

## 2018-08-14 ENCOUNTER — ANTICOAG VISIT (OUTPATIENT)
Dept: FAMILY MEDICINE CLINIC | Facility: CLINIC | Age: 63
End: 2018-08-14

## 2018-08-20 ENCOUNTER — TRANSCRIBE ORDERS (OUTPATIENT)
Dept: ADMINISTRATIVE | Facility: HOSPITAL | Age: 63
End: 2018-08-20

## 2018-08-20 ENCOUNTER — OFFICE VISIT (OUTPATIENT)
Dept: NEPHROLOGY | Facility: CLINIC | Age: 63
End: 2018-08-20
Payer: COMMERCIAL

## 2018-08-20 ENCOUNTER — APPOINTMENT (OUTPATIENT)
Dept: LAB | Facility: HOSPITAL | Age: 63
End: 2018-08-20
Payer: COMMERCIAL

## 2018-08-20 ENCOUNTER — TELEPHONE (OUTPATIENT)
Dept: FAMILY MEDICINE CLINIC | Facility: CLINIC | Age: 63
End: 2018-08-20

## 2018-08-20 ENCOUNTER — ANTICOAG VISIT (OUTPATIENT)
Dept: FAMILY MEDICINE CLINIC | Facility: CLINIC | Age: 63
End: 2018-08-20

## 2018-08-20 VITALS
WEIGHT: 219 LBS | SYSTOLIC BLOOD PRESSURE: 130 MMHG | DIASTOLIC BLOOD PRESSURE: 68 MMHG | BODY MASS INDEX: 43 KG/M2 | HEIGHT: 60 IN

## 2018-08-20 DIAGNOSIS — R80.9 NEPHROTIC RANGE PROTEINURIA: ICD-10-CM

## 2018-08-20 DIAGNOSIS — I26.99 PE (PULMONARY THROMBOEMBOLISM) (HCC): ICD-10-CM

## 2018-08-20 DIAGNOSIS — I10 ESSENTIAL HYPERTENSION: ICD-10-CM

## 2018-08-20 DIAGNOSIS — N17.9 AKI (ACUTE KIDNEY INJURY) (HCC): ICD-10-CM

## 2018-08-20 DIAGNOSIS — N18.30 STAGE 3 CHRONIC KIDNEY DISEASE (HCC): Primary | ICD-10-CM

## 2018-08-20 LAB
INR PPP: 2.72 (ref 0.86–1.16)
PROTHROMBIN TIME: 27 SECONDS (ref 9.4–11.7)

## 2018-08-20 PROCEDURE — 85610 PROTHROMBIN TIME: CPT

## 2018-08-20 PROCEDURE — 36415 COLL VENOUS BLD VENIPUNCTURE: CPT

## 2018-08-20 PROCEDURE — 99214 OFFICE O/P EST MOD 30 MIN: CPT | Performed by: INTERNAL MEDICINE

## 2018-08-20 RX ORDER — TORSEMIDE 20 MG/1
20 TABLET ORAL DAILY
Qty: 100 TABLET | Refills: 5 | Status: SHIPPED | OUTPATIENT
Start: 2018-08-20 | End: 2018-11-28 | Stop reason: SDUPTHER

## 2018-08-20 NOTE — PATIENT INSTRUCTIONS
1  )  Low 2 g sodium diet    2 )  Monitor weights at home    3 )  Avoid NSAIDs    4 )  Monitor blood pressure at home, call if blood pressure greater than 150/90 persistently    5 ) Take extra dose Torsemide for swelling

## 2018-08-20 NOTE — PROGRESS NOTES
NEPHROLOGY OFFICE VISIT   Sky Mcnamara 66 y o  female MRN: [id number]  8/20/2018    Reason for Visit: PORSHA, hospital follow up    ASSESSMENT and PLAN:    I had the pleasure of seeing   Erica Bedoya today in the renal clinic for the continued management of  Acute kidney injury with underlying chronic kidney disease  Since our last visit,  She was admitted to [facility] back in mid July and was discharged July 15th  She had been treated for volume overload and pulmonary embolism  She is now on anticoagulation with Coumadin  We were seeing her in hospital for acute kidney injury  She was diuresed and her creatinine had been peaked at 3 5 but slowly improved and then she got discharged with a creatinine of around 3 2  Creatinine did increase shortly afterward to 3 4 but the last blood work shows on July 23rd creatinine to improved to 3 09  Her swelling which had improved during hospitalization has worsened a little bit since discharge  She is having some swelling of the legs worse upon standing  More so on the left leg than the right leg  She underwent a ultrasound of the lower legs which showed no evidence of DVT  1 ) Chronic kidney disease stage IIIB/IV with proteinuria  ·   New baseline creatinine appears to be between 2 5-3 to keep better volume overload  ·  I did discuss with her about a renal biopsy, at that time she declined  · Diabetic and blood pressure control  ·  off ARB due to recent acute kidney injury  · Her complements C3 and C4 were not low  · ANCA titer were negative  · Free light chain assay ratio was 1 04  Serum protein electrophoresis showed no monoclonal bands  · NADEEN was negative  · Avoid metformin if the GFR is below 30 cc/min  · Repeat urine protein creatinine ratio and basic metabolic panel in 2 weeks     2 )  Nephrotic range Proteinuria  · Presumed to be secondary to diabetic nephropathy    Serologies have been negative  · Blood pressure at target  ·  declined kidney biopsy     3 ) Hypertension  · Aim to keep systolic blood pressure less than 130 given the proteinuria  · Blood pressure at goal  ·  off ARB due to recent acute kidney injury  · Low 2 g sodium diet  ·  continue torsemide 20 mg daily  Increased to 40 mg for increased swelling and weight gain  ·  no longer on metoprolol     4 ) Diabetes mellitus type 2  · Management as per endocrinology  · Hemoglobin A1c is 8 6  · Not at goal, medical noncompliance    5 )  Acute kidney injury  - appears to be resolving  - may need to be accept a higher baseline creatinine to keep better volume overload    6 )  Pulmonary embolism  -  Ultrasound of the lower extremities were negative for DVT  - on anticoagulation with Coumadin      PATIENT INSTRUCTIONS:    Patient Instructions   1 )  Low 2 g sodium diet    2 )  Monitor weights at home    3 )  Avoid NSAIDs    4 )  Monitor blood pressure at home, call if blood pressure greater than 150/90 persistently    5 ) Take extra dose Torsemide for swelling          Orders Placed This Encounter   Procedures    Basic metabolic panel     This is a patient instruction: Patient fasting for 8 hours or longer recommended  Standing Status:   Future     Standing Expiration Date:   8/20/2019    Comprehensive metabolic panel     This is a patient instruction: Patient fasting for 8 hours or longer recommended  Standing Status:   Future     Standing Expiration Date:   8/20/2019    CBC     Standing Status:   Future     Standing Expiration Date:   8/20/2019    Protein / creatinine ratio, urine     Standing Status:   Future     Standing Expiration Date:   8/20/2019       OBJECTIVE:  Current Weight: Weight - Scale: 99 3 kg (219 lb)  Vitals:    08/20/18 1532   BP: 130/68   Weight: 99 3 kg (219 lb)   Height: 5' (1 524 m)    Body mass index is 42 77 kg/m²  REVIEW OF SYSTEMS:    Review of Systems   Constitutional: Negative for activity change and fever     Respiratory: Negative for cough, chest tightness, shortness of breath and wheezing  Cardiovascular: Positive for leg swelling  Negative for chest pain  Gastrointestinal: Negative for abdominal pain, diarrhea, nausea and vomiting  Endocrine: Negative for polyuria  Genitourinary: Negative for difficulty urinating, dysuria, flank pain, frequency and urgency  Skin: Negative for rash  Neurological: Negative for dizziness, syncope, light-headedness and headaches  PHYSICAL EXAM:      Physical Exam   Constitutional: She is oriented to person, place, and time  She appears well-developed and well-nourished  No distress  HENT:   Head: Normocephalic and atraumatic  Eyes: Pupils are equal, round, and reactive to light  No scleral icterus  Neck: Normal range of motion  Neck supple  Cardiovascular: Normal rate, regular rhythm and normal heart sounds  Exam reveals no gallop and no friction rub  No murmur heard  Pulmonary/Chest: Effort normal and breath sounds normal  No respiratory distress  She has no wheezes  She has no rales  She exhibits no tenderness  Abdominal: Soft  Bowel sounds are normal  She exhibits no distension  There is no tenderness  There is no rebound  Musculoskeletal: Normal range of motion  She exhibits edema  Neurological: She is alert and oriented to person, place, and time  Skin: No rash noted  She is not diaphoretic  Psychiatric: She has a normal mood and affect  Nursing note and vitals reviewed        Medications:    Current Outpatient Prescriptions:     albuterol (PROAIR HFA) 90 mcg/act inhaler, Inhale 2 puffs every 4 (four) hours as needed for wheezing or shortness of breath, Disp: 1 Inhaler, Rfl: 0    Insulin Lispro Prot & Lispro (HUMALOG MIX 75/25 KWIKPEN) (75-25) 100 units/mL injection pen, Inject 30 Units under the skin 2 (two) times a day with meals, Disp: 150 mL, Rfl: 0    ONE TOUCH ULTRA TEST test strip, , Disp: , Rfl:     torsemide (DEMADEX) 20 mg tablet, Take 1 tablet (20 mg total) by mouth daily Take 1 tablet per day, can take additional tablet for weight gain or worsening swelling, Disp: 100 tablet, Rfl: 5    warfarin (COUMADIN) 1 mg tablet, One po daily, Disp: 30 tablet, Rfl: 1    warfarin (COUMADIN) 2 mg tablet, Take as directed, Disp: 30 tablet, Rfl: 0    warfarin (COUMADIN) 3 mg tablet, Take 5 milligrams on 7/16/18, July 17, 2018 and get PT/INR on July 17, 2018, Disp: 30 tablet, Rfl: 0    Laboratory Results:        Results for orders placed or performed in visit on 08/20/18   Protime-INR   Result Value Ref Range    Protime 27 0 (H) 9 4 - 11 7 seconds    INR 2 72 (H) 0 86 - 1 16

## 2018-08-21 ENCOUNTER — OFFICE VISIT (OUTPATIENT)
Dept: FAMILY MEDICINE CLINIC | Facility: CLINIC | Age: 63
End: 2018-08-21
Payer: COMMERCIAL

## 2018-08-21 VITALS
SYSTOLIC BLOOD PRESSURE: 132 MMHG | TEMPERATURE: 98.2 F | BODY MASS INDEX: 42.8 KG/M2 | RESPIRATION RATE: 20 BRPM | WEIGHT: 218 LBS | DIASTOLIC BLOOD PRESSURE: 76 MMHG | HEIGHT: 60 IN | HEART RATE: 84 BPM

## 2018-08-21 DIAGNOSIS — I26.99 PE (PULMONARY THROMBOEMBOLISM) (HCC): Primary | ICD-10-CM

## 2018-08-21 DIAGNOSIS — R60.0 LEG EDEMA, LEFT: ICD-10-CM

## 2018-08-21 DIAGNOSIS — I10 ESSENTIAL HYPERTENSION: ICD-10-CM

## 2018-08-21 DIAGNOSIS — IMO0002 UNCONTROLLED TYPE 2 DIABETES MELLITUS WITH STAGE 3 CHRONIC KIDNEY DISEASE, WITH LONG-TERM CURRENT USE OF INSULIN: ICD-10-CM

## 2018-08-21 PROCEDURE — 99214 OFFICE O/P EST MOD 30 MIN: CPT | Performed by: FAMILY MEDICINE

## 2018-08-21 PROCEDURE — 3078F DIAST BP <80 MM HG: CPT | Performed by: FAMILY MEDICINE

## 2018-08-21 PROCEDURE — 3075F SYST BP GE 130 - 139MM HG: CPT | Performed by: FAMILY MEDICINE

## 2018-08-21 PROCEDURE — 3008F BODY MASS INDEX DOCD: CPT | Performed by: FAMILY MEDICINE

## 2018-08-21 RX ORDER — WARFARIN SODIUM 5 MG/1
5 TABLET ORAL
Qty: 90 TABLET | Refills: 1 | Status: SHIPPED | OUTPATIENT
Start: 2018-08-21 | End: 2019-11-11 | Stop reason: SDUPTHER

## 2018-08-21 NOTE — ASSESSMENT & PLAN NOTE
Coumadin is therapeutic   Continue for at least the next 3 months and will recheck CT scan without contrast

## 2018-08-21 NOTE — ASSESSMENT & PLAN NOTE
Worsening, torsemide increased yesterday  Had recent ultrasound that was negative for DVT  Support sock/stocking recommended  Elevate leg

## 2018-08-21 NOTE — PROGRESS NOTES
Assessment/Plan:    Problem List Items Addressed This Visit     Essential hypertension     Well controlled         Type 2 diabetes, uncontrolled, with renal manifestation ([facility] )     Lab Results   Component Value Date    HGBA1C 8 8 (H) 07/05/2018       No results for input(s): POCGLU in the last 72 hours  Blood Sugar Average: Last 72 hrs:    Not controlled  Encouraged to follow up with her endocrinologist             PE (pulmonary thromboembolism) ([facility] ) - Primary     Coumadin is therapeutic   Continue for at least the next 3 months and will recheck CT scan without contrast          Relevant Medications    warfarin (COUMADIN) 5 mg tablet    Other Relevant Orders    CT chest wo contrast    Leg edema, left     Worsening, torsemide increased yesterday  Had recent ultrasound that was negative for DVT  Support sock/stocking recommended  Elevate leg               There are no Patient Instructions on file for this visit  Return in about 3 months (around 11/21/2018) for Next scheduled follow up  Subjective:      Patient ID: Negar Valladares is a 66 y o  female  Chief Complaint   Patient presents with    Follow-up     from PE-       She is back working since August 6th  She had pain in her left leg and saw her kidney doctor yesterday  He increased her torsemide  She is trying to elevated her leg            The following portions of the patient's history were reviewed and updated as appropriate:  past social history    Review of Systems      Current Outpatient Prescriptions   Medication Sig Dispense Refill    albuterol (PROAIR HFA) 90 mcg/act inhaler Inhale 2 puffs every 4 (four) hours as needed for wheezing or shortness of breath 1 Inhaler 0    Insulin Lispro Prot & Lispro (HUMALOG MIX 75/25 KWIKPEN) (75-25) 100 units/mL injection pen Inject 30 Units under the skin 2 (two) times a day with meals 150 mL 0    ONE TOUCH ULTRA TEST test strip       torsemide (DEMADEX) 20 mg tablet Take 1 tablet (20 mg total) by mouth daily Take 1 tablet per day, can take additional tablet for weight gain or worsening swelling 100 tablet 5    warfarin (COUMADIN) 1 mg tablet One po daily 30 tablet 1    warfarin (COUMADIN) 2 mg tablet Take as directed 30 tablet 0    warfarin (COUMADIN) 3 mg tablet Take 5 milligrams on 7/16/18, July 17, 2018 and get PT/INR on July 17, 2018 30 tablet 0    warfarin (COUMADIN) 5 mg tablet Take 1 tablet (5 mg total) by mouth daily 90 tablet 1     No current facility-administered medications for this visit  Objective:    /76   Pulse 84   Temp 98 2 °F (36 8 °C)   Resp 20   Ht 5' (1 524 m)   Wt 98 9 kg (218 lb)   BMI 42 58 kg/m²        Physical Exam   Constitutional: She appears well-developed and well-nourished  HENT:   Head: Normocephalic and atraumatic  Right Ear: External ear normal    Left Ear: External ear normal    Mouth/Throat: Oropharynx is clear and moist    Cardiovascular: Normal rate, regular rhythm and normal heart sounds  Exam reveals no friction rub  No murmur heard  Pulses:       Dorsalis pedis pulses are 2+ on the right side, and 2+ on the left side  Posterior tibial pulses are 2+ on the right side, and 2+ on the left side  Pulmonary/Chest: Effort normal and breath sounds normal  No respiratory distress  She has no wheezes  She has no rales  Musculoskeletal: She exhibits no edema or deformity  Feet:   Right Foot:   Skin Integrity: Negative for ulcer, skin breakdown, erythema, warmth, callus or dry skin  Left Foot:   Skin Integrity: Negative for ulcer, skin breakdown, erythema, warmth, callus or dry skin  Nursing note and vitals reviewed  Patient's shoes and socks removed  Right Foot/Ankle   Right Foot Inspection  Skin Exam: skin normal skin not intact, no dry skin, no warmth, no callus, no erythema, no maceration, no abnormal color, no pre-ulcer, no ulcer and no callus                          Toe Exam: ROM and strength within normal limits  Sensory Monofilament testing: intact  Vascular  Capillary refills: < 3 seconds  The right DP pulse is 2+  The right PT pulse is 2+  Left Foot/Ankle  Left Foot Inspection  Skin Exam: skin normalskin not intact, no dry skin, no warmth, no erythema, no maceration, normal color, no pre-ulcer, no ulcer and no callus                         Toe Exam: ROM and strength within normal limits                   Sensory       Monofilament: intact  Vascular  Capillary refills: < 3 seconds  The left DP pulse is 2+  The left PT pulse is 2+     Assign Risk Category:  No deformity present; ;        Risk: 0       Dony Dolan DO

## 2018-08-21 NOTE — ASSESSMENT & PLAN NOTE
Lab Results   Component Value Date    HGBA1C 8 8 (H) 07/05/2018       No results for input(s): POCGLU in the last 72 hours      Blood Sugar Average: Last 72 hrs:    Not controlled  Encouraged to follow up with her endocrinologist

## 2018-09-06 ENCOUNTER — ANTICOAG VISIT (OUTPATIENT)
Dept: FAMILY MEDICINE CLINIC | Facility: CLINIC | Age: 63
End: 2018-09-06

## 2018-09-06 ENCOUNTER — TELEPHONE (OUTPATIENT)
Dept: FAMILY MEDICINE CLINIC | Facility: CLINIC | Age: 63
End: 2018-09-06

## 2018-09-06 ENCOUNTER — APPOINTMENT (OUTPATIENT)
Dept: LAB | Facility: HOSPITAL | Age: 63
End: 2018-09-06
Payer: COMMERCIAL

## 2018-09-06 DIAGNOSIS — N17.9 AKI (ACUTE KIDNEY INJURY) (HCC): ICD-10-CM

## 2018-09-06 LAB
ANION GAP SERPL CALCULATED.3IONS-SCNC: 9 MMOL/L (ref 4–13)
BUN SERPL-MCNC: 38 MG/DL (ref 5–25)
CALCIUM SERPL-MCNC: 8.9 MG/DL (ref 8.3–10.1)
CHLORIDE SERPL-SCNC: 100 MMOL/L (ref 100–108)
CO2 SERPL-SCNC: 29 MMOL/L (ref 21–32)
CREAT SERPL-MCNC: 2.73 MG/DL (ref 0.6–1.3)
GFR SERPL CREATININE-BSD FRML MDRD: 18 ML/MIN/1.73SQ M
GLUCOSE P FAST SERPL-MCNC: 95 MG/DL (ref 65–99)
INTERNATIONAL NORMALIZATION RATIO, POC: 4.17
POTASSIUM SERPL-SCNC: 3.8 MMOL/L (ref 3.5–5.3)
SODIUM SERPL-SCNC: 138 MMOL/L (ref 136–145)

## 2018-09-06 PROCEDURE — 80048 BASIC METABOLIC PNL TOTAL CA: CPT

## 2018-09-06 NOTE — TELEPHONE ENCOUNTER
Please have her call Dr Gayle Broussard (her nephrologist) for instruction regarding her water pill  She may need more medication    Neal Ramirez,

## 2018-09-06 NOTE — TELEPHONE ENCOUNTER
Called to speak to patient to give INR instructions while on phone patient wanted me to tell Dr Juan Luis Mckinney that today she noticed her legs were swelling she had difficulty walking into lab to have blood work, said she took her water pill wanted to know if should should increase the pill or willing to come in for appointment

## 2018-09-06 NOTE — TELEPHONE ENCOUNTER
Patient advised of previous message, agreed to call Dr Morenita Naylor    No further actions required   Josue Phillips, PRICE

## 2018-09-10 ENCOUNTER — ANTICOAG VISIT (OUTPATIENT)
Dept: FAMILY MEDICINE CLINIC | Facility: CLINIC | Age: 63
End: 2018-09-10

## 2018-09-10 ENCOUNTER — APPOINTMENT (OUTPATIENT)
Dept: LAB | Facility: HOSPITAL | Age: 63
End: 2018-09-10
Payer: COMMERCIAL

## 2018-09-10 DIAGNOSIS — I26.99 OTHER PULMONARY EMBOLISM WITHOUT ACUTE COR PULMONALE, UNSPECIFIED CHRONICITY (HCC): ICD-10-CM

## 2018-09-10 LAB — INR PPP: 2.26 (ref 0.86–1.17)

## 2018-09-10 RX ORDER — WARFARIN SODIUM 2 MG/1
TABLET ORAL
Qty: 100 TABLET | Refills: 1 | Status: SHIPPED | OUTPATIENT
Start: 2018-09-10 | End: 2018-11-28 | Stop reason: SDUPTHER

## 2018-09-11 NOTE — NURSING NOTE
Pt received instructional education on new drug coumadin and Torsemide with AVS before discharge  Pt knows to follow up with Pt-INR appointment and dr as per Dr Joo Constantino instructions  Pt left with full knowledge of all discharge instructions and was given tonight's dose of coumadin 6mg per doctor's instruction  Pt left with all personal belongings  I personally wheeled pt to front of building where daughter was there to  and take home  IV was discontinued 
unremarkable, midline spinous process pain upon palpation, worst with changing position, less likely disc/myofascial etiology but more OA changes, hold off on imaging, stop aleve due to melena and start Tylenol arthritis as needed, start back conditioning program    Melena  Stop Aleve and follow up with Dr. Bony Godinez if the stool does not normalize after stopping Aleve, Avoid NSAIDs such as Aleve, Advil, Motrin, Ibuprofen. Current Outpatient Prescriptions on File Prior to Visit   Medication Sig Dispense Refill    atorvastatin (LIPITOR) 80 MG tablet TAKE 1 TABLET BY MOUTH DAILY 90 tablet 3    carvedilol (COREG) 12.5 MG tablet TAKE 1 TABLET BY MOUTH TWICE DAILY 180 tablet 3    clopidogrel (PLAVIX) 75 MG tablet TAKE 1 TABLET BY MOUTH DAILY 90 tablet 5    NIFEdipine (PROCARDIA XL) 30 MG extended release tablet TAKE 1 TABLET BY MOUTH DAILY 90 tablet 1    irbesartan (AVAPRO) 300 MG tablet TAKE 1 TABLET BY MOUTH EVERY DAY 90 tablet 1    Cholecalciferol (VITAMIN D) 2000 units CAPS capsule Take 1 capsule by mouth daily      aspirin 81 MG tablet Take 81 mg by mouth daily      loperamide (RA ANTI-DIARRHEAL) 2 MG capsule Take 2 mg by mouth daily as needed for Diarrhea        No current facility-administered medications on file prior to visit.         Past Medical History:   Diagnosis Date    Abdominal aortic atherosclerosis ([address]     Atherosclerosis of superior mesenteric artery ([address]     CAD (coronary artery disease)     Cerebral artery occlusion with cerebral infarction (HCC)     Chronic kidney disease     Colon polyps     COPD (chronic obstructive pulmonary disease) (HCC)     CVA (cerebral infarction) 5/2013    Multiple, occipital and Cerebellar     DDD (degenerative disc disease)     Cerivical DDD    Epicondylitis elbow, medial     GERD (gastroesophageal reflux disease)     Hyperlipidemia     Hypertension     Ischemic colitis ([address] 11/2016    Osteoarthritis, hand     PVD (peripheral vascular disease)

## 2018-09-14 DIAGNOSIS — IMO0002 UNCONTROLLED TYPE 2 DIABETES MELLITUS WITH STAGE 3 CHRONIC KIDNEY DISEASE, WITH LONG-TERM CURRENT USE OF INSULIN: Primary | ICD-10-CM

## 2018-09-14 RX ORDER — BLOOD-GLUCOSE METER
EACH MISCELLANEOUS
Qty: 1 EACH | Refills: 0 | Status: SHIPPED | OUTPATIENT
Start: 2018-09-14

## 2018-10-01 ENCOUNTER — TRANSCRIBE ORDERS (OUTPATIENT)
Dept: ADMINISTRATIVE | Facility: HOSPITAL | Age: 63
End: 2018-10-01

## 2018-10-01 ENCOUNTER — ANTICOAG VISIT (OUTPATIENT)
Dept: FAMILY MEDICINE CLINIC | Facility: CLINIC | Age: 63
End: 2018-10-01

## 2018-10-01 ENCOUNTER — APPOINTMENT (OUTPATIENT)
Dept: LAB | Facility: HOSPITAL | Age: 63
End: 2018-10-01
Payer: COMMERCIAL

## 2018-10-01 DIAGNOSIS — I26.99 PE (PULMONARY THROMBOEMBOLISM) (HCC): ICD-10-CM

## 2018-10-01 LAB
INR PPP: 2.38 (ref 0.86–1.16)
PROTHROMBIN TIME: 23.8 SECONDS (ref 9.4–11.7)

## 2018-10-01 PROCEDURE — 36415 COLL VENOUS BLD VENIPUNCTURE: CPT

## 2018-10-01 PROCEDURE — 85610 PROTHROMBIN TIME: CPT

## 2018-11-01 ENCOUNTER — TELEPHONE (OUTPATIENT)
Dept: FAMILY MEDICINE CLINIC | Facility: CLINIC | Age: 63
End: 2018-11-01

## 2018-11-01 ENCOUNTER — HOSPITAL ENCOUNTER (OUTPATIENT)
Dept: RADIOLOGY | Facility: HOSPITAL | Age: 63
Discharge: HOME | End: 2018-11-01
Payer: COMMERCIAL

## 2018-11-01 DIAGNOSIS — I27.20 PULMONARY HYPERTENSION (HCC): Primary | ICD-10-CM

## 2018-11-01 DIAGNOSIS — I26.99 PE (PULMONARY THROMBOEMBOLISM) (HCC): ICD-10-CM

## 2018-11-01 DIAGNOSIS — I27.20 PULMONARY HYPERTENSION (HCC): ICD-10-CM

## 2018-11-01 PROCEDURE — 71250 CT THORAX DX C-: CPT

## 2018-11-07 ENCOUNTER — TELEPHONE (OUTPATIENT)
Dept: FAMILY MEDICINE CLINIC | Facility: CLINIC | Age: 63
End: 2018-11-07

## 2018-11-07 NOTE — TELEPHONE ENCOUNTER
Spoke with Pt, stated that she is in [place] right now and will get INR on Monday Nov 12 2018  Pt stated that she hit her toe on a cement step, was bleeding and stopped taking her warfarin  Pt has scheduled an appointment for Monday Nov 12 2018  with Tia Fallow to address toe issues        [address] [facility] can you please reroute this message to clinical team so we can follow INR    Amy Cisse MA

## 2018-11-12 ENCOUNTER — OFFICE VISIT (OUTPATIENT)
Dept: FAMILY MEDICINE CLINIC | Facility: CLINIC | Age: 63
End: 2018-11-12
Payer: COMMERCIAL

## 2018-11-12 VITALS
HEART RATE: 84 BPM | RESPIRATION RATE: 16 BRPM | SYSTOLIC BLOOD PRESSURE: 158 MMHG | HEIGHT: 60 IN | WEIGHT: 223 LBS | BODY MASS INDEX: 43.78 KG/M2 | DIASTOLIC BLOOD PRESSURE: 98 MMHG

## 2018-11-12 DIAGNOSIS — S99.921A INJURY OF TOENAIL OF RIGHT FOOT, INITIAL ENCOUNTER: Primary | ICD-10-CM

## 2018-11-12 DIAGNOSIS — J20.9 ACUTE BRONCHITIS, UNSPECIFIED ORGANISM: ICD-10-CM

## 2018-11-12 PROBLEM — R03.0 ELEVATED BLOOD PRESSURE READING: Status: ACTIVE | Noted: 2018-11-12

## 2018-11-12 PROCEDURE — 99213 OFFICE O/P EST LOW 20 MIN: CPT | Performed by: NURSE PRACTITIONER

## 2018-11-12 PROCEDURE — 3008F BODY MASS INDEX DOCD: CPT | Performed by: NURSE PRACTITIONER

## 2018-11-12 RX ORDER — ALBUTEROL SULFATE 90 UG/1
2 AEROSOL, METERED RESPIRATORY (INHALATION) EVERY 4 HOURS PRN
Qty: 1 INHALER | Refills: 0 | Status: SHIPPED | OUTPATIENT
Start: 2018-11-12 | End: 2019-12-01 | Stop reason: SDUPTHER

## 2018-11-12 NOTE — PROGRESS NOTES
Assessment/Plan:    As she is a diabetic, I recommended she podiatry to have the nail cut back  For now, she will continue Epsom salt soaks and antibiotic ointment  She will monitor her BP at home and bring readings to her follow visit in 2 weeks  Problem List Items Addressed This Visit        Respiratory    Acute bronchitis    Relevant Medications    albuterol (PROAIR HFA) 90 mcg/act inhaler      Other Visit Diagnoses     Injury of toenail of right foot, initial encounter    -  Primary    Relevant Orders    Ambulatory referral to Podiatry          There are no Patient Instructions on file for this visit  Return if symptoms worsen or fail to improve  Subjective:      Patient ID: Damion Age is a 66 y o  female  Chief Complaint   Patient presents with    Pain     right foot big toe injury--lj       About a week ago she injured her right great toe on a cement block  The nail cracked and she was bleeding  She has not taken her coumadin in a week  Cleaning it with peroxide and Neosporin  Notes clear colored drainage at times   Pain improved  Requesting refill on albuterol inhaler        The following portions of the patient's history were reviewed and updated as appropriate: allergies, current medications, past family history, past medical history, past social history, past surgical history and problem list     Review of Systems   Constitutional: Negative      Skin:        See HPI         Current Outpatient Prescriptions   Medication Sig Dispense Refill    albuterol (PROAIR HFA) 90 mcg/act inhaler Inhale 2 puffs every 4 (four) hours as needed for wheezing or shortness of breath 1 Inhaler 0    Blood Glucose Monitoring Suppl (ONE TOUCH ULTRA 2) w/Device KIT Test glucose 3 times daily 1 each 0    Insulin Lispro Prot & Lispro (HUMALOG MIX 75/25 KWIKPEN) (75-25) 100 units/mL injection pen Inject 30 Units under the skin 2 (two) times a day with meals 150 mL 0    ONE TOUCH ULTRA TEST test strip Test glucose 3 times daily 300 each 1    torsemide (DEMADEX) 20 mg tablet Take 1 tablet (20 mg total) by mouth daily Take 1 tablet per day, can take additional tablet for weight gain or worsening swelling 100 tablet 5    warfarin (COUMADIN) 1 mg tablet One po daily 30 tablet 1    warfarin (COUMADIN) 2 mg tablet Take as directed 100 tablet 1    warfarin (COUMADIN) 3 mg tablet Take 5 milligrams on 7/16/18, July 17, 2018 and get PT/INR on July 17, 2018 30 tablet 0    warfarin (COUMADIN) 5 mg tablet Take 1 tablet (5 mg total) by mouth daily 90 tablet 1     No current facility-administered medications for this visit  Objective:    /98   Pulse 84   Resp 16   Ht 5' (1 524 m)   Wt 101 kg (223 lb)   Breastfeeding? No   BMI 43 55 kg/m²        Physical Exam   Constitutional: She appears well-developed and well-nourished  Cardiovascular: Normal rate, regular rhythm and normal heart sounds  No murmur heard  Pulmonary/Chest: Effort normal and breath sounds normal    Neurological: She is alert  Skin: Skin is warm and dry  Right toenail  from nailbed  No erythema, swelling, or discharge  Psychiatric: She has a normal mood and affect  Nursing note and vitals reviewed               Rosmery Yao

## 2018-11-13 ENCOUNTER — OFFICE VISIT (OUTPATIENT)
Dept: PODIATRY | Facility: CLINIC | Age: 63
End: 2018-11-13
Payer: COMMERCIAL

## 2018-11-13 VITALS
BODY MASS INDEX: 43.78 KG/M2 | RESPIRATION RATE: 17 BRPM | HEART RATE: 89 BPM | DIASTOLIC BLOOD PRESSURE: 96 MMHG | WEIGHT: 223 LBS | SYSTOLIC BLOOD PRESSURE: 148 MMHG | HEIGHT: 60 IN

## 2018-11-13 DIAGNOSIS — S92.424A CLOSED NONDISPLACED FRACTURE OF DISTAL PHALANX OF RIGHT GREAT TOE, INITIAL ENCOUNTER: Primary | ICD-10-CM

## 2018-11-13 DIAGNOSIS — M79.671 RIGHT FOOT PAIN: ICD-10-CM

## 2018-11-13 PROCEDURE — 99203 OFFICE O/P NEW LOW 30 MIN: CPT | Performed by: PODIATRIST

## 2018-11-13 PROCEDURE — 73620 X-RAY EXAM OF FOOT: CPT | Performed by: PODIATRIST

## 2018-11-13 NOTE — PROGRESS NOTES
Assessment/Plan:  Right hallux contusion  Paronychia  Digital fracture    Plan  X-rays taken  Patient will bandage daily  She return 2 weeks for x-ray  No problem-specific Assessment & Plan notes found for this encounter  There are no diagnoses linked to this encounter  Subjective:  Patient has history of injury  Patient injured right big toe  She [place] approximately 2 [place] prior  She had significant pain and bleeding  However since that time it is improved      Past Medical History:   Diagnosis Date    Acute asthmatic bronchitis     last assessed: 2017    Cardiac disease     [facility] mellitus [facility]     Hyperlipidemia     Hypertension     Pneumonia     last assessed: 2013    Renal disorder        Past Surgical History:   Procedure Laterality Date     SECTION       SECTION      NOSE SURGERY         No Known Allergies      Current Outpatient Prescriptions:     albuterol (PROAIR HFA) 90 mcg/act inhaler, Inhale 2 puffs every 4 (four) hours as needed for wheezing or shortness of breath, Disp: 1 Inhaler, Rfl: 0    Blood Glucose Monitoring Suppl (ONE TOUCH ULTRA 2) w/Device KIT, Test glucose 3 times daily, Disp: 1 each, Rfl: 0    Insulin Lispro Prot & Lispro (HUMALOG MIX 75/25 KWIKPEN) (75-25) 100 units/mL injection pen, Inject 30 Units under the skin 2 (two) times a day with meals, Disp: 150 mL, Rfl: 0    ONE TOUCH ULTRA TEST test strip, Test glucose 3 times daily, Disp: 300 each, Rfl: 1    torsemide (DEMADEX) 20 mg tablet, Take 1 tablet (20 mg total) by mouth daily Take 1 tablet per day, can take additional tablet for weight gain or worsening swelling, Disp: 100 tablet, Rfl: 5    warfarin (COUMADIN) 1 mg tablet, One po daily, Disp: 30 tablet, Rfl: 1    warfarin (COUMADIN) 2 mg tablet, Take as directed, Disp: 100 tablet, Rfl: 1    warfarin (COUMADIN) 3 mg tablet, Take 5 milligrams on 18, 2018 and get PT/INR on July 17, 2018, Disp: 30 tablet, Rfl: 0    warfarin (COUMADIN) 5 mg tablet, Take 1 tablet (5 mg total) by mouth daily, Disp: 90 tablet, Rfl: 1    Patient Active Problem List   Diagnosis    Essential hypertension    Stage 3 chronic kidney disease (HCC)    Nephrotic range proteinuria    Cyst of ovary    Hypothyroidism    Hyperlipidemia    Type 2 diabetes, uncontrolled, with renal manifestation (HCC)    Obstructive sleep apnea    Dyspnea    Acute bronchitis    PE (pulmonary thromboembolism) (HCC)    Elevated brain natriuretic peptide (BNP) level    Leg edema, left    Pulmonary hypertension (HCC)    Elevated blood pressure reading          Patient ID: Marlene Dacosta is a 66 y o  female  HPI    The following portions of the patient's history were reviewed and updated as appropriate: allergies, current medications, past family history, past medical history, past social history, past surgical history and problem list     Review of Systems      Objective:  Patient's shoes and socks removed  Foot Exam    General  General Appearance: appears stated age and healthy   Orientation: alert and oriented to person, place, and time   Affect: appropriate   Gait: antalgic       Right Foot/Ankle     Inspection and Palpation  Ecchymosis: dorsum and first toe  Tenderness: great toe interphalangeal joint   Swelling: great toe interphalangeal joint   Arch: pes planus  Claw Toes: fifth toe  Hallux valgus: no  Hallux limitus: yes  Skin Exam: maceration, skin changes and erythema (Of right hallux distal aspect); Neurovascular  Dorsalis pedis: 2+  Posterior tibial: 2+      Left Foot/Ankle      Inspection and Palpation  Ecchymosis: none  Tenderness: none   Swelling: none   Arch: pes planus  Claw toes: fifth toe  Hallux valgus: no  Hallux limitus: yes    Neurovascular  Dorsalis pedis: 2+  Posterior tibial: 2+        Physical Exam   Constitutional: She appears well-developed and well-nourished     Cardiovascular: Normal rate and regular rhythm  Pulses:       Dorsalis pedis pulses are 2+ on the right side, and 2+ on the left side  Posterior tibial pulses are 2+ on the right side, and 2+ on the left side  Musculoskeletal:   Distal aspect right hallux demonstrates ecchymosis and edema  Hallux is rectus  X-ray demonstrates probable crush injury of distal phalanx  There appears to be a fracture line in the midbody of the distal phalanx  Capital fragment aligned   Feet:   Right Foot:   Skin Integrity: Positive for skin breakdown and erythema (Of right hallux distal aspect)  Skin:   Right hallux demonstrates erythema around the nail plate  No evidence of abscess  No evidence of nail lysis  Nursing note and vitals reviewed

## 2018-11-19 ENCOUNTER — ANTICOAG VISIT (OUTPATIENT)
Dept: FAMILY MEDICINE CLINIC | Facility: CLINIC | Age: 63
End: 2018-11-19

## 2018-11-19 ENCOUNTER — APPOINTMENT (OUTPATIENT)
Dept: LAB | Facility: HOSPITAL | Age: 63
End: 2018-11-19
Attending: INTERNAL MEDICINE
Payer: COMMERCIAL

## 2018-11-19 ENCOUNTER — TRANSCRIBE ORDERS (OUTPATIENT)
Dept: ADMINISTRATIVE | Facility: HOSPITAL | Age: 63
End: 2018-11-19

## 2018-11-19 ENCOUNTER — TELEPHONE (OUTPATIENT)
Dept: NEPHROLOGY | Facility: CLINIC | Age: 63
End: 2018-11-19

## 2018-11-19 DIAGNOSIS — R79.89 ELEVATED SERUM CREATININE: Primary | ICD-10-CM

## 2018-11-19 DIAGNOSIS — I26.99 PE (PULMONARY THROMBOEMBOLISM) (HCC): ICD-10-CM

## 2018-11-19 DIAGNOSIS — R80.9 NEPHROTIC RANGE PROTEINURIA: ICD-10-CM

## 2018-11-19 DIAGNOSIS — N18.30 CKD (CHRONIC KIDNEY DISEASE) STAGE 3, GFR 30-59 ML/MIN (HCC): ICD-10-CM

## 2018-11-19 DIAGNOSIS — N18.30 STAGE 3 CHRONIC KIDNEY DISEASE (HCC): ICD-10-CM

## 2018-11-19 DIAGNOSIS — R79.89 ELEVATED SERUM CREATININE: ICD-10-CM

## 2018-11-19 LAB
ALBUMIN SERPL BCP-MCNC: 3.1 G/DL (ref 3.5–5)
ALP SERPL-CCNC: 110 U/L (ref 46–116)
ALT SERPL W P-5'-P-CCNC: 29 U/L (ref 12–78)
ANION GAP SERPL CALCULATED.3IONS-SCNC: 11 MMOL/L (ref 4–13)
AST SERPL W P-5'-P-CCNC: 16 U/L (ref 5–45)
BILIRUB SERPL-MCNC: 0.7 MG/DL (ref 0.2–1)
BUN SERPL-MCNC: 61 MG/DL (ref 5–25)
CALCIUM SERPL-MCNC: 9 MG/DL (ref 8.3–10.1)
CHLORIDE SERPL-SCNC: 101 MMOL/L (ref 100–108)
CO2 SERPL-SCNC: 28 MMOL/L (ref 21–32)
CREAT SERPL-MCNC: 3.42 MG/DL (ref 0.6–1.3)
CREAT UR-MCNC: 65.3 MG/DL
ERYTHROCYTE [DISTWIDTH] IN BLOOD BY AUTOMATED COUNT: 14.8 % (ref 11.6–15.1)
GFR SERPL CREATININE-BSD FRML MDRD: 14 ML/MIN/1.73SQ M
GLUCOSE P FAST SERPL-MCNC: 93 MG/DL (ref 65–99)
HCT VFR BLD AUTO: 40.4 % (ref 34.8–46.1)
HGB BLD-MCNC: 12.3 G/DL (ref 11.5–15.4)
INR PPP: 1.84 (ref 0.86–1.16)
MCH RBC QN AUTO: 26.5 PG (ref 26.8–34.3)
MCHC RBC AUTO-ENTMCNC: 30.4 G/DL (ref 31.4–37.4)
MCV RBC AUTO: 87 FL (ref 82–98)
PLATELET # BLD AUTO: 219 THOUSANDS/UL (ref 149–390)
PMV BLD AUTO: 11.9 FL (ref 8.9–12.7)
POTASSIUM SERPL-SCNC: 3.8 MMOL/L (ref 3.5–5.3)
PROT SERPL-MCNC: 7.5 G/DL (ref 6.4–8.2)
PROT UR-MCNC: 274 MG/DL
PROT/CREAT UR: 4.2 MG/G{CREAT} (ref 0–0.1)
PROTHROMBIN TIME: 18.7 SECONDS (ref 9.4–11.7)
RBC # BLD AUTO: 4.65 MILLION/UL (ref 3.81–5.12)
SODIUM SERPL-SCNC: 140 MMOL/L (ref 136–145)
WBC # BLD AUTO: 7.76 THOUSAND/UL (ref 4.31–10.16)

## 2018-11-19 PROCEDURE — 82570 ASSAY OF URINE CREATININE: CPT

## 2018-11-19 PROCEDURE — 80053 COMPREHEN METABOLIC PANEL: CPT

## 2018-11-19 PROCEDURE — 85027 COMPLETE CBC AUTOMATED: CPT

## 2018-11-19 PROCEDURE — 85610 PROTHROMBIN TIME: CPT

## 2018-11-19 PROCEDURE — 84156 ASSAY OF PROTEIN URINE: CPT

## 2018-11-19 PROCEDURE — 36415 COLL VENOUS BLD VENIPUNCTURE: CPT

## 2018-11-19 NOTE — TELEPHONE ENCOUNTER
I called patient about her lab tests  She gained about 4-5 pounds in the past 2 weeks  She has still continued to take the torsemide 40mg daily  She still urinates well  She states her LE edema is fluctuant  It is better in the morning and worsens throughout the day  I will have increase torsemide to 40mg AM and 20mg PM and then weigh daily and record  She will go for a repeat BMP on Monday December 3rd  She has an appointment with Dr Stefany Fierro on 12/6/18

## 2018-11-27 ENCOUNTER — OFFICE VISIT (OUTPATIENT)
Dept: PODIATRY | Facility: CLINIC | Age: 63
End: 2018-11-27
Payer: COMMERCIAL

## 2018-11-27 VITALS
SYSTOLIC BLOOD PRESSURE: 132 MMHG | RESPIRATION RATE: 17 BRPM | WEIGHT: 223 LBS | HEART RATE: 78 BPM | HEIGHT: 60 IN | BODY MASS INDEX: 43.78 KG/M2 | DIASTOLIC BLOOD PRESSURE: 84 MMHG

## 2018-11-27 DIAGNOSIS — B35.1 ONYCHOMYCOSIS: ICD-10-CM

## 2018-11-27 DIAGNOSIS — IMO0002 TYPE 2 DIABETES, UNCONTROLLED, WITH RENAL MANIFESTATION: ICD-10-CM

## 2018-11-27 DIAGNOSIS — S92.424D CLOSED NONDISPLACED FRACTURE OF DISTAL PHALANX OF RIGHT GREAT TOE WITH ROUTINE HEALING, SUBSEQUENT ENCOUNTER: Primary | ICD-10-CM

## 2018-11-27 DIAGNOSIS — M79.671 RIGHT FOOT PAIN: ICD-10-CM

## 2018-11-27 PROCEDURE — 99213 OFFICE O/P EST LOW 20 MIN: CPT | Performed by: PODIATRIST

## 2018-11-27 RX ORDER — TERBINAFINE HYDROCHLORIDE 250 MG/1
TABLET ORAL
Qty: 15 TABLET | Refills: 0 | Status: SHIPPED | OUTPATIENT
Start: 2018-11-27 | End: 2018-12-27

## 2018-11-27 RX ORDER — TRAMADOL HYDROCHLORIDE 50 MG/1
TABLET ORAL
Qty: 10 TABLET | Refills: 0 | Status: SHIPPED | OUTPATIENT
Start: 2018-11-27 | End: 2018-12-11

## 2018-11-27 NOTE — PROGRESS NOTES
Assessment/Plan:      There are no diagnoses linked to this encounter  Subjective:   Patient ID: Srinivas Hughes is a 66 y o  female      Past Medical History:   Diagnosis Date    Acute asthmatic bronchitis     last assessed: 2017    Cardiac disease     Diabetes [facility]     Hyperlipidemia     Hypertension     Pneumonia     last assessed: 2013    Renal disorder        Past Surgical History:   Procedure Laterality Date     SECTION       SECTION      NOSE SURGERY         No Known Allergies      Current Outpatient Prescriptions:     albuterol (PROAIR HFA) 90 mcg/act inhaler, Inhale 2 puffs every 4 (four) hours as needed for wheezing or shortness of breath, Disp: 1 Inhaler, Rfl: 0    Blood Glucose Monitoring Suppl (ONE TOUCH ULTRA 2) w/Device KIT, Test glucose 3 times daily, Disp: 1 each, Rfl: 0    Insulin Lispro Prot & Lispro (HUMALOG MIX 75/25 KWIKPEN) (75-25) 100 units/mL injection pen, Inject 30 Units under the skin 2 (two) times a day with meals, Disp: 150 mL, Rfl: 0    ONE TOUCH ULTRA TEST test strip, Test glucose 3 times daily, Disp: 300 each, Rfl: 1    torsemide (DEMADEX) 20 mg tablet, Take 1 tablet (20 mg total) by mouth daily Take 1 tablet per day, can take additional tablet for weight gain or worsening swelling, Disp: 100 tablet, Rfl: 5    warfarin (COUMADIN) 1 mg tablet, One po daily, Disp: 30 tablet, Rfl: 1    warfarin (COUMADIN) 2 mg tablet, Take as directed, Disp: 100 tablet, Rfl: 1    warfarin (COUMADIN) 3 mg tablet, Take 5 milligrams on 18, 2018 and get PT/INR on 2018, Disp: 30 tablet, Rfl: 0    warfarin (COUMADIN) 5 mg tablet, Take 1 tablet (5 mg total) by mouth daily, Disp: 90 tablet, Rfl: 1    Patient Active Problem List   Diagnosis    Essential hypertension    Stage 3 chronic kidney disease (HCC)    Nephrotic range proteinuria    Cyst of ovary    Hypothyroidism    Hyperlipidemia    Type 2 diabetes, uncontrolled, with renal manifestation ([facility] )    Obstructive sleep apnea    Dyspnea    Acute bronchitis    PE (pulmonary thromboembolism) (HCC)    Elevated brain natriuretic peptide (BNP) level    Leg edema, left    Pulmonary hypertension (HCC)    Elevated blood pressure reading    Closed nondisplaced fracture of distal phalanx of right great toe    Right foot pain       HPI    The following portions of the patient's history were reviewed and updated as appropriate: allergies, current medications, past family history, past medical history, past social history, past surgical history and problem list     Review of Systems      Historical Information   Past Medical History:   Diagnosis Date    Acute asthmatic bronchitis     last assessed: 2017    Cardiac disease     Diabetes mellitus ([facility]     Hyperlipidemia     Hypertension     Pneumonia     last assessed: 2013    Renal disorder      Past Surgical History:   Procedure Laterality Date     SECTION       SECTION      NOSE SURGERY       Social History   History   Alcohol Use No     History   Drug Use No     Family History:   Family History   Problem Relation Age of Onset    Diabetes Mother         mellitus    Anxiety disorder Mother         generalized anxiety disorder    Hypertension Mother     Kidney disease Father     Kidney failure Father         renal failure    Ulcerative colitis Sister     Heart disease Family        Meds/Allergies   No Known Allergies    Current Outpatient Prescriptions on File Prior to Visit   Medication Sig Dispense Refill    albuterol (PROAIR HFA) 90 mcg/act inhaler Inhale 2 puffs every 4 (four) hours as needed for wheezing or shortness of breath 1 Inhaler 0    Blood Glucose Monitoring Suppl (ONE TOUCH ULTRA 2) w/Device KIT Test glucose 3 times daily 1 each 0    Insulin Lispro Prot & Lispro (HUMALOG MIX 75/25 KWIKPEN) (75-25) 100 units/mL injection pen Inject 30 Units under the skin 2 (two) times a day with meals 150 mL 0    ONE TOUCH ULTRA TEST test strip Test glucose 3 times daily 300 each 1    torsemide (DEMADEX) 20 mg tablet Take 1 tablet (20 mg total) by mouth daily Take 1 tablet per day, can take additional tablet for weight gain or worsening swelling 100 tablet 5    warfarin (COUMADIN) 1 mg tablet One po daily 30 tablet 1    warfarin (COUMADIN) 2 mg tablet Take as directed 100 tablet 1    warfarin (COUMADIN) 3 mg tablet Take 5 milligrams on 7/16/18, July 17, 2018 and get PT/INR on July 17, 2018 30 tablet 0    warfarin (COUMADIN) 5 mg tablet Take 1 tablet (5 mg total) by mouth daily 90 tablet 1     No current facility-administered medications on file prior to visit  Objective:  Patient's shoes and socks removed     Foot ExamPhysical Exam

## 2018-11-27 NOTE — PROGRESS NOTES
Assessment/Plan:  Contusion  Crush injury  Paronychia  Pain  Left hallux  Plan  Nails debrided  Area debrided  Will add tramadol  Will do an empiric course of antifungal   No problem-specific Assessment & Plan notes found for this encounter  There are no diagnoses linked to this encounter  Subjective:  Patient is doing better but she has continued pain  Her family is bandaging toe in rectus position as directed      Past Medical History:   Diagnosis Date    Acute asthmatic bronchitis     last assessed: 2017    Cardiac disease     Diabetes mellitus ([facility] [address] )     Hyperlipidemia     Hypertension     Pneumonia     last assessed: 2013    Renal disorder        Past Surgical History:   Procedure Laterality Date     SECTION       SECTION      NOSE SURGERY         No Known Allergies      Current Outpatient Prescriptions:     albuterol (PROAIR HFA) 90 mcg/act inhaler, Inhale 2 puffs every 4 (four) hours as needed for wheezing or shortness of breath, Disp: 1 Inhaler, Rfl: 0    Blood Glucose Monitoring Suppl (ONE TOUCH ULTRA 2) w/Device KIT, Test glucose 3 times daily, Disp: 1 each, Rfl: 0    Insulin Lispro Prot & Lispro (HUMALOG MIX 75/25 KWIKPEN) (75-25) 100 units/mL injection pen, Inject 30 Units under the skin 2 (two) times a day with meals, Disp: 150 mL, Rfl: 0    ONE TOUCH ULTRA TEST test strip, Test glucose 3 times daily, Disp: 300 each, Rfl: 1    torsemide (DEMADEX) 20 mg tablet, Take 1 tablet (20 mg total) by mouth daily Take 1 tablet per day, can take additional tablet for weight gain or worsening swelling, Disp: 100 tablet, Rfl: 5    warfarin (COUMADIN) 1 mg tablet, One po daily, Disp: 30 tablet, Rfl: 1    warfarin (COUMADIN) 2 mg tablet, Take as directed, Disp: 100 tablet, Rfl: 1    warfarin (COUMADIN) 3 mg tablet, Take 5 milligrams on 18, 2018 and get PT/INR on 2018, Disp: 30 tablet, Rfl: 0    warfarin (COUMADIN) 5 mg tablet, Take 1 tablet (5 mg total) by mouth daily, Disp: 90 tablet, Rfl: 1    Patient Active Problem List   Diagnosis    Essential hypertension    Stage 3 chronic kidney disease (HCC)    Nephrotic range proteinuria    Cyst of ovary    Hypothyroidism    Hyperlipidemia    Type 2 diabetes, uncontrolled, with renal manifestation (HCC)    Obstructive sleep apnea    Dyspnea    Acute bronchitis    PE (pulmonary thromboembolism) (HCC)    Elevated brain natriuretic peptide (BNP) level    Leg edema, left    Pulmonary hypertension (HCC)    Elevated blood pressure reading    Closed nondisplaced fracture of distal phalanx of right great toe    Right foot pain          Patient ID: Bita García is a 66 y o  female  HPI    The following portions of the patient's history were reviewed and updated as appropriate: allergies, current medications, past family history, past medical history, past social history, past surgical history and problem list     Review of Systems      Objective:  Patient's shoes and socks removed  Foot ExamPhysical Exam        Objective:  Patient's shoes and socks removed     Foot Exam     General  General Appearance: appears stated age and healthy   Orientation: alert and oriented to person, place, and time   Affect: appropriate   Gait: antalgic         Right Foot/Ankle      Inspection and Palpation  Ecchymosis: dorsum and first toe  Tenderness: great toe interphalangeal joint   Swelling: great toe interphalangeal joint   Arch: pes planus  Claw Toes: fifth toe  Hallux valgus: no  Hallux limitus: yes  Skin Exam: maceration, skin changes and erythema (Of right hallux distal aspect);      Neurovascular  Dorsalis pedis: 2+  Posterior tibial: 2+        Left Foot/Ankle       Inspection and Palpation  Ecchymosis: none  Tenderness: none   Swelling: none   Arch: pes planus  Claw toes: fifth toe  Hallux valgus: no  Hallux limitus: yes     Neurovascular  Dorsalis pedis: 2+  Posterior tibial: 2+        Physical Exam Constitutional: She appears well-developed and well-nourished  Cardiovascular: Normal rate and regular rhythm  Pulses:       Dorsalis pedis pulses are 2+ on the right side, and 2+ on the left side  Of dried subungual hematoma noted  Incision and drainage done  Posterior tibial pulses are 2+ on the right side, and 2+ on the left side  Musculoskeletal:   Distal aspect right hallux demonstrates ecchymosis and edema  Hallux is rectus  X-ray demonstrates probable crush injury of distal phalanx  There appears to be a fracture line in the midbody of the distal phalanx  Capital fragment aligned   Feet:   Right Foot:   Skin Integrity: Positive for skin breakdown and erythema (Of right hallux distal aspect)  Skin:   Right hallux demonstrates erythema around the nail plate  No evidence of abscess  No evidence of nail lysis  small area dried subungual hematoma noted     Negative sinus  Negative occult pus    Nursing note and vitals reviewed

## 2018-11-28 DIAGNOSIS — I26.99 OTHER PULMONARY EMBOLISM WITHOUT ACUTE COR PULMONALE, UNSPECIFIED CHRONICITY (HCC): ICD-10-CM

## 2018-11-28 DIAGNOSIS — N18.30 STAGE 3 CHRONIC KIDNEY DISEASE (HCC): ICD-10-CM

## 2018-11-28 RX ORDER — TORSEMIDE 20 MG/1
20 TABLET ORAL DAILY
Qty: 100 TABLET | Refills: 3 | Status: SHIPPED | OUTPATIENT
Start: 2018-11-28 | End: 2018-12-21 | Stop reason: SDUPTHER

## 2018-11-28 RX ORDER — WARFARIN SODIUM 2 MG/1
TABLET ORAL
Qty: 100 TABLET | Refills: 1 | Status: SHIPPED | OUTPATIENT
Start: 2018-11-28 | End: 2018-11-29 | Stop reason: SDUPTHER

## 2018-11-29 ENCOUNTER — ANTICOAG VISIT (OUTPATIENT)
Dept: FAMILY MEDICINE CLINIC | Facility: CLINIC | Age: 63
End: 2018-11-29

## 2018-11-29 ENCOUNTER — TELEPHONE (OUTPATIENT)
Dept: FAMILY MEDICINE CLINIC | Facility: CLINIC | Age: 63
End: 2018-11-29

## 2018-11-29 ENCOUNTER — TRANSCRIBE ORDERS (OUTPATIENT)
Dept: ADMINISTRATIVE | Facility: HOSPITAL | Age: 63
End: 2018-11-29

## 2018-11-29 ENCOUNTER — APPOINTMENT (OUTPATIENT)
Dept: LAB | Facility: HOSPITAL | Age: 63
End: 2018-11-29
Payer: COMMERCIAL

## 2018-11-29 DIAGNOSIS — Z79.01 LONG TERM (CURRENT) USE OF ANTICOAGULANTS: Primary | ICD-10-CM

## 2018-11-29 DIAGNOSIS — I26.99 OTHER PULMONARY EMBOLISM WITHOUT ACUTE COR PULMONALE, UNSPECIFIED CHRONICITY (HCC): ICD-10-CM

## 2018-11-29 DIAGNOSIS — R79.89 ELEVATED SERUM CREATININE: ICD-10-CM

## 2018-11-29 DIAGNOSIS — Z79.01 LONG TERM (CURRENT) USE OF ANTICOAGULANTS: ICD-10-CM

## 2018-11-29 LAB
ANION GAP SERPL CALCULATED.3IONS-SCNC: 11 MMOL/L (ref 4–13)
BUN SERPL-MCNC: 63 MG/DL (ref 5–25)
CALCIUM SERPL-MCNC: 9 MG/DL (ref 8.3–10.1)
CHLORIDE SERPL-SCNC: 99 MMOL/L (ref 100–108)
CO2 SERPL-SCNC: 28 MMOL/L (ref 21–32)
CREAT SERPL-MCNC: 3.2 MG/DL (ref 0.6–1.3)
GFR SERPL CREATININE-BSD FRML MDRD: 15 ML/MIN/1.73SQ M
GLUCOSE P FAST SERPL-MCNC: 101 MG/DL (ref 65–99)
POTASSIUM SERPL-SCNC: 3.7 MMOL/L (ref 3.5–5.3)
SODIUM SERPL-SCNC: 138 MMOL/L (ref 136–145)

## 2018-11-29 PROCEDURE — 80048 BASIC METABOLIC PNL TOTAL CA: CPT

## 2018-11-29 NOTE — TELEPHONE ENCOUNTER
Spoke to patient/ stated she will hold coumadin today, but will wait until she see's Dr Falguni Thomas tomorrow to recheck INR  She thinks her broken toe might have something to do with her elevated INR   In the meantime, she would like [facility] MA

## 2018-11-29 NOTE — TELEPHONE ENCOUNTER
Please call pt  Make sure she has no s/s bleeding  Hold Coumadin today and recheck tomorrow morning    [facility] listed [facility]. preferred pharmacy has been set up and listed below

## 2018-11-30 ENCOUNTER — OFFICE VISIT (OUTPATIENT)
Dept: FAMILY MEDICINE CLINIC | Facility: CLINIC | Age: 63
End: 2018-11-30
Payer: COMMERCIAL

## 2018-11-30 VITALS
BODY MASS INDEX: 42.8 KG/M2 | WEIGHT: 218 LBS | TEMPERATURE: 96.5 F | RESPIRATION RATE: 16 BRPM | DIASTOLIC BLOOD PRESSURE: 78 MMHG | SYSTOLIC BLOOD PRESSURE: 136 MMHG | HEART RATE: 84 BPM | HEIGHT: 60 IN

## 2018-11-30 DIAGNOSIS — E78.2 MIXED HYPERLIPIDEMIA: ICD-10-CM

## 2018-11-30 DIAGNOSIS — I26.99 PE (PULMONARY THROMBOEMBOLISM) (HCC): ICD-10-CM

## 2018-11-30 DIAGNOSIS — Z11.59 NEED FOR HEPATITIS C SCREENING TEST: ICD-10-CM

## 2018-11-30 DIAGNOSIS — E03.9 PRIMARY HYPOTHYROIDISM: ICD-10-CM

## 2018-11-30 DIAGNOSIS — I10 ESSENTIAL HYPERTENSION: ICD-10-CM

## 2018-11-30 DIAGNOSIS — IMO0002 TYPE 2 DIABETES, UNCONTROLLED, WITH RENAL MANIFESTATION: Primary | ICD-10-CM

## 2018-11-30 PROBLEM — J20.9 ACUTE BRONCHITIS: Status: RESOLVED | Noted: 2018-07-05 | Resolved: 2018-11-30

## 2018-11-30 PROCEDURE — 3075F SYST BP GE 130 - 139MM HG: CPT | Performed by: FAMILY MEDICINE

## 2018-11-30 PROCEDURE — 3078F DIAST BP <80 MM HG: CPT | Performed by: FAMILY MEDICINE

## 2018-11-30 PROCEDURE — 99214 OFFICE O/P EST MOD 30 MIN: CPT | Performed by: FAMILY MEDICINE

## 2018-11-30 PROCEDURE — 1036F TOBACCO NON-USER: CPT | Performed by: FAMILY MEDICINE

## 2018-11-30 RX ORDER — WARFARIN SODIUM 2 MG/1
TABLET ORAL
Qty: 100 TABLET | Refills: 0 | Status: SHIPPED | OUTPATIENT
Start: 2018-11-30 | End: 2018-12-04 | Stop reason: SDUPTHER

## 2018-11-30 NOTE — PROGRESS NOTES
Assessment/Plan:    Problem List Items Addressed This Visit     Essential hypertension     Well controlled         Relevant Orders    CBC    Comprehensive metabolic panel    Mixed hyperlipidemia    Relevant Orders    Lipid Panel with Direct LDL reflex    PE (pulmonary thromboembolism) ([facility] [address] )     Will continue anticoagulation for another 3 months  Reviewed CT scan of chest         Primary hypothyroidism     Due for TSH   Will check labs         Relevant Orders    TSH, 3rd generation    T4, free    Type 2 diabetes, uncontrolled, with renal manifestation ([facility] [address] ) - Primary     Lab Results   Component Value Date    HGBA1C 8 8 (H) 07/05/2018       No results for input(s): POCGLU in the last 72 hours  Blood Sugar Average: Last 72 hrs: Will recheck sugar         Relevant Orders    Comprehensive metabolic panel    Hemoglobin A1C      Other Visit Diagnoses     Need for hepatitis C screening test        Relevant Orders    Hepatitis C antibody          There are no Patient Instructions on file for this visit  Return in about 3 months (around 2/28/2019) for Next scheduled follow up  Subjective:      Patient ID: Bita García is a 66 y o  female  Chief Complaint   Patient presents with    Follow-up     ct scan-lj       She broke a toe recently  She is seeing the kidney doctor regularly  Diabetes - she has not had any low blood sugars  She is checking sugars regularly  Hypertension   This is a chronic problem  The current episode started more than 1 year ago  The problem is unchanged  The problem is controlled  Pertinent negatives include no headaches  Risk factors for coronary artery disease include diabetes mellitus  Past treatments include diuretics  The current treatment provides moderate improvement  There are no compliance problems  The following portions of the patient's history were reviewed and updated as appropriate:  past social history    Review of Systems   Respiratory: Negative  Cardiovascular: Negative  Neurological: Negative for headaches  Current Outpatient Prescriptions   Medication Sig Dispense Refill    albuterol (PROAIR HFA) 90 mcg/act inhaler Inhale 2 puffs every 4 (four) hours as needed for wheezing or shortness of breath 1 Inhaler 0    Blood Glucose Monitoring Suppl (ONE TOUCH ULTRA 2) w/Device KIT Test glucose 3 times daily 1 each 0    Insulin Lispro Prot & Lispro (HUMALOG MIX 75/25 KWIKPEN) (75-25) 100 units/mL injection pen Inject 30 Units under the skin 2 (two) times a day with meals 150 mL 0    ONE TOUCH ULTRA TEST test strip Test glucose 3 times daily 300 each 1    terbinafine (LamISIL) 250 mg tablet 1 tab p o  every other day  15 tablet 0    torsemide (DEMADEX) 20 mg tablet Take 1 tablet (20 mg total) by mouth daily Take 1 tablet per day, can take additional tablet for weight gain or worsening swelling 100 tablet 3    traMADol (ULTRAM) 50 mg tablet 1 tablet twice daily as needed for left foot pain  10 tablet 0    warfarin (COUMADIN) 1 mg tablet One po daily 30 tablet 1    warfarin (COUMADIN) 2 mg tablet Take as directed 100 tablet 0    warfarin (COUMADIN) 3 mg tablet Take 5 milligrams on 7/16/18, July 17, 2018 and get PT/INR on July 17, 2018 30 tablet 0    warfarin (COUMADIN) 5 mg tablet Take 1 tablet (5 mg total) by mouth daily 90 tablet 1     No current facility-administered medications for this visit  Objective:    /78   Pulse 84   Temp (!) 96 5 °F (35 8 °C)   Resp 16   Ht 5' (1 524 m)   Wt 98 9 kg (218 lb)   BMI 42 58 kg/m²        Physical Exam   Constitutional: She appears well-developed and well-nourished  HENT:   Head: Normocephalic and atraumatic  Right Ear: External ear normal    Left Ear: External ear normal    Mouth/Throat: Oropharynx is clear and moist    Cardiovascular: Normal rate, regular rhythm and normal heart sounds  Exam reveals no friction rub  No murmur heard    Pulmonary/Chest: Effort normal and breath sounds normal  No respiratory distress  She has no wheezes  She has no rales  Musculoskeletal: She exhibits tenderness (foot)  She exhibits no edema or deformity  Slow gait    Psychiatric: She has a normal mood and affect  Her behavior is normal  Thought content normal    Nursing note and vitals reviewed               Elizabeth Osman DO

## 2018-11-30 NOTE — ASSESSMENT & PLAN NOTE
Lab Results   Component Value Date    HGBA1C 8 8 (H) 07/05/2018       No results for input(s): POCGLU in the last 72 hours  Blood Sugar Average: Last 72 hrs:        Will recheck sugar

## 2018-12-04 ENCOUNTER — TELEPHONE (OUTPATIENT)
Dept: FAMILY MEDICINE CLINIC | Facility: CLINIC | Age: 63
End: 2018-12-04

## 2018-12-04 DIAGNOSIS — Z79.4 TYPE 2 DIABETES MELLITUS WITH HYPERGLYCEMIA, WITH LONG-TERM CURRENT USE OF INSULIN (HCC): ICD-10-CM

## 2018-12-04 DIAGNOSIS — E11.65 TYPE 2 DIABETES MELLITUS WITH HYPERGLYCEMIA, WITH LONG-TERM CURRENT USE OF INSULIN (HCC): ICD-10-CM

## 2018-12-04 DIAGNOSIS — I26.99 OTHER PULMONARY EMBOLISM WITHOUT ACUTE COR PULMONALE, UNSPECIFIED CHRONICITY (HCC): ICD-10-CM

## 2018-12-04 RX ORDER — WARFARIN SODIUM 2 MG/1
TABLET ORAL
Qty: 180 TABLET | Refills: 0 | Status: SHIPPED | OUTPATIENT
Start: 2018-12-04 | End: 2020-01-06

## 2018-12-05 DIAGNOSIS — E11.65 TYPE 2 DIABETES MELLITUS WITH HYPERGLYCEMIA, WITH LONG-TERM CURRENT USE OF INSULIN (HCC): ICD-10-CM

## 2018-12-05 DIAGNOSIS — Z79.4 TYPE 2 DIABETES MELLITUS WITH HYPERGLYCEMIA, WITH LONG-TERM CURRENT USE OF INSULIN (HCC): ICD-10-CM

## 2018-12-05 DIAGNOSIS — IMO0002 TYPE 2 DIABETES, UNCONTROLLED, WITH RENAL MANIFESTATION: Primary | ICD-10-CM

## 2018-12-05 RX ORDER — INSULIN LISPRO 100 [IU]/ML
INJECTION, SUSPENSION SUBCUTANEOUS
Qty: 150 ML | Refills: 0 | OUTPATIENT
Start: 2018-12-05

## 2018-12-05 NOTE — TELEPHONE ENCOUNTER
Patient needs appt for more refills  When appt has been made, let me know when appt is and will rx supply to last until appt

## 2018-12-05 NOTE — TELEPHONE ENCOUNTER
Can you confirm dosing---RX says 40 every 6 hours but chart says 30 units twice per day  We haven't seen her in a while  Thanks!

## 2018-12-06 ENCOUNTER — OFFICE VISIT (OUTPATIENT)
Dept: ENDOCRINOLOGY | Facility: CLINIC | Age: 63
End: 2018-12-06
Payer: COMMERCIAL

## 2018-12-06 VITALS
SYSTOLIC BLOOD PRESSURE: 148 MMHG | DIASTOLIC BLOOD PRESSURE: 90 MMHG | HEART RATE: 90 BPM | WEIGHT: 225 LBS | BODY MASS INDEX: 44.17 KG/M2 | HEIGHT: 60 IN

## 2018-12-06 DIAGNOSIS — IMO0002 TYPE 2 DIABETES, UNCONTROLLED, WITH RENAL MANIFESTATION: ICD-10-CM

## 2018-12-06 DIAGNOSIS — Z79.4 TYPE 2 DIABETES MELLITUS WITH HYPERGLYCEMIA, WITH LONG-TERM CURRENT USE OF INSULIN (HCC): ICD-10-CM

## 2018-12-06 DIAGNOSIS — E11.65 TYPE 2 DIABETES MELLITUS WITH HYPERGLYCEMIA, WITH LONG-TERM CURRENT USE OF INSULIN (HCC): ICD-10-CM

## 2018-12-06 DIAGNOSIS — Z79.4 INSULIN LONG-TERM USE (HCC): Primary | ICD-10-CM

## 2018-12-06 DIAGNOSIS — E03.9 PRIMARY HYPOTHYROIDISM: ICD-10-CM

## 2018-12-06 DIAGNOSIS — I10 ESSENTIAL HYPERTENSION: ICD-10-CM

## 2018-12-06 DIAGNOSIS — E78.2 MIXED HYPERLIPIDEMIA: ICD-10-CM

## 2018-12-06 PROCEDURE — 99215 OFFICE O/P EST HI 40 MIN: CPT | Performed by: PHYSICIAN ASSISTANT

## 2018-12-06 RX ORDER — INSULIN LISPRO 100 [IU]/ML
30 INJECTION, SUSPENSION SUBCUTANEOUS 2 TIMES DAILY WITH MEALS
Qty: 150 ML | Refills: 1 | Status: SHIPPED | OUTPATIENT
Start: 2018-12-06 | End: 2019-08-02

## 2018-12-06 NOTE — PROGRESS NOTES
Established Patient Progress Note      Chief Complaint   Patient presents with    Diabetes Type 2          History of Present Illness:   Khloe Meade is a 66 y o  female with a history of type 2 diabetes with long term use of insulin since many years ago  Reports complications of CKD  Hasn't been to office in over a year  She has history of noncompliance with diet, meds, glucose monitoring, suggestions for diabetes education, and attending office visits on a regular basis  Insulin refill was denied yesterday by our office  Since last visit, was in hospital with PE, on coumadin  Denies recent severe hypoglycemic or severe hyperglycemic episodes  Denies any issues with her current regimen  home glucose monitoring: are performed regularly by report  Home blood glucose readings:   Before breakfast: 90-150s  Before lunch: not checking  Before dinner: 200-300s  Bedtime:  varies    Current regimen: Humalog 75/25--   30-40 before breakfast and dinner depending on glucose reading then if high will take 20 units at bedtime  (if BG normal- doesn't take so usually misses morning dose     Last Eye Exam: unknown  Last Foot Exam: seeing podiatry for broken toe, appt next week  Has hypertension: Taking diuretics only  Has been off valsartan since recall and PORSHA in the summer  Has been off metoprolol- unsure why  She thinks Dr Tressa Gonzalez stopped however I don't see any documentation of that in chart  Was on medication at 4/2018 nephrology visit, was on not med at 8/2018 visit  Has hyperlipidemia: No longer on statin    Thyroid disorders: previously treated with levothyroxine for hypothyroidism, self discontinued a few years back  Thyroid function remains normal      CKD-- nephrolgy appt next week   Follows with Dr Tressa Gonzalez      Patient Active Problem List   Diagnosis    Essential hypertension    Stage 3 chronic kidney disease ([address] )    Nephrotic range proteinuria    Cyst of ovary    Primary hypothyroidism    Mixed hyperlipidemia    Type 2 diabetes, uncontrolled, with renal manifestation (HCC)    Obstructive sleep apnea    Dyspnea    PE (pulmonary thromboembolism) (HCC)    Elevated brain natriuretic peptide (BNP) level    Leg edema, left    Pulmonary hypertension (HCC)    Elevated blood pressure reading    Closed nondisplaced fracture of distal phalanx of right great toe    Right foot pain    Onychomycosis      Past Medical History:   Diagnosis Date    Acute asthmatic bronchitis     last assessed: 2017    Cardiac disease     Diabetes mellitus ([facility] [address] )     Hyperlipidemia     Hypertension     Pneumonia     last assessed: 2013    Renal disorder       Past Surgical History:   Procedure Laterality Date     SECTION       SECTION      NOSE SURGERY        Family History   Problem Relation Age of Onset    Diabetes Mother         mellitus    Anxiety disorder Mother         generalized anxiety disorder    Hypertension Mother     Kidney disease Father     Kidney failure Father         renal failure    Ulcerative colitis Sister     Heart disease Family      Social History   Substance Use Topics    Smoking status: Never Smoker    Smokeless tobacco: Never Used    Alcohol use No     No Known Allergies      Current Outpatient Prescriptions:     albuterol (PROAIR HFA) 90 mcg/act inhaler, Inhale 2 puffs every 4 (four) hours as needed for wheezing or shortness of breath, Disp: 1 Inhaler, Rfl: 0    Blood Glucose Monitoring Suppl (ONE TOUCH ULTRA 2) w/Device KIT, Test glucose 3 times daily, Disp: 1 each, Rfl: 0    Insulin Lispro Prot & Lispro (HUMALOG MIX 75/25 KWIKPEN) (75-25) 100 units/mL injection pen, Inject 30 Units under the skin 2 (two) times a day with meals for 250 days, Disp: 150 mL, Rfl: 1    ONE TOUCH ULTRA TEST test strip, Test glucose 3 times daily, Disp: 300 each, Rfl: 1    terbinafine (LamISIL) 250 mg tablet, 1 tab p o  every other day , Disp: 15 tablet, Rfl: 0    torsemide (DEMADEX) 20 mg tablet, Take 1 tablet (20 mg total) by mouth daily Take 1 tablet per day, can take additional tablet for weight gain or worsening swelling, Disp: 100 tablet, Rfl: 3    traMADol (ULTRAM) 50 mg tablet, 1 tablet twice daily as needed for left foot pain , Disp: 10 tablet, Rfl: 0    warfarin (COUMADIN) 1 mg tablet, One po daily, Disp: 30 tablet, Rfl: 1    warfarin (COUMADIN) 2 mg tablet, 4mg daily or as directed, Disp: 180 tablet, Rfl: 0    warfarin (COUMADIN) 3 mg tablet, Take 5 milligrams on 7/16/18, July 17, 2018 and get PT/INR on July 17, 2018, Disp: 30 tablet, Rfl: 0    warfarin (COUMADIN) 5 mg tablet, Take 1 tablet (5 mg total) by mouth daily, Disp: 90 tablet, Rfl: 1    Review of Systems   Constitutional: Negative for activity change, appetite change, chills, diaphoresis, fatigue, fever and unexpected weight change  HENT: Negative for trouble swallowing and voice change  Eyes: Negative for visual disturbance  Respiratory: Negative for shortness of breath  Cardiovascular: Negative for chest pain and palpitations  Gastrointestinal: Negative for abdominal pain, constipation and diarrhea  Endocrine: Negative for cold intolerance, heat intolerance, polydipsia, polyphagia and polyuria  Genitourinary: Negative for frequency and menstrual problem  Musculoskeletal: Negative for arthralgias and myalgias  Skin: Negative for rash  Allergic/Immunologic: Negative for food allergies  Neurological: Negative for dizziness and tremors  Hematological: Negative for adenopathy  Psychiatric/Behavioral: Negative for sleep disturbance  All other systems reviewed and are negative  Physical Exam:  Body mass index is 43 94 kg/m²    /90   Pulse 90   Ht 5' (1 524 m)   Wt 102 kg (225 lb)   BMI 43 94 kg/m²    Wt Readings from Last 3 Encounters:   12/06/18 102 kg (225 lb)   11/30/18 98 9 kg (218 lb)   11/27/18 101 kg (223 lb)       Physical Exam   Constitutional: She is oriented to person, place, and time  She appears well-developed and well-nourished  No distress  HENT:   Head: Normocephalic and atraumatic  Eyes: Pupils are equal, round, and reactive to light  Conjunctivae are normal    Neck: Normal range of motion  Neck supple  No thyromegaly present  Cardiovascular: Normal rate, regular rhythm and normal heart sounds  Pulmonary/Chest: Effort normal and breath sounds normal  No respiratory distress  She has no wheezes  She has no rales  Abdominal: Soft  Bowel sounds are normal  She exhibits no distension  There is no tenderness  Musculoskeletal: Normal range of motion  She exhibits no edema  Neurological: She is alert and oriented to person, place, and time  Skin: Skin is warm and dry  Psychiatric: She has a normal mood and affect  Vitals reviewed      Diabetic Foot Exam  Not performed today, follows with podiatry regularly    Labs:     Lab Results   Component Value Date    HGBA1C 8 8 (H) 07/05/2018     No components found for: GLU  Lab Results   Component Value Date    HGBA1C 8 8 (H) 07/05/2018       Lab Results   Component Value Date    CREATININE 3 20 (H) 11/29/2018    CREATININE 3 42 (H) 11/19/2018    CREATININE 2 73 (H) 09/06/2018    BUN 63 (H) 11/29/2018     12/11/2014    K 3 7 11/29/2018    CL 99 (L) 11/29/2018    CO2 28 11/29/2018     eGFR   Date Value Ref Range Status   11/29/2018 15 ml/min/1 73sq m Final       Lab Results   Component Value Date    HDL 69 (H) 10/18/2017    TRIG 124 10/18/2017       Lab Results   Component Value Date    ALT 29 11/19/2018    AST 16 11/19/2018    ALKPHOS 110 11/19/2018    BILITOT 1 08 (H) 12/11/2014       Lab Results   Component Value Date    YNT8RVWZMGVM 3 026 07/05/2018    WAG4VBVUNUIU 2 400 09/22/2016    AGV1RJBMIRZK 3 020 03/02/2016     Lab Results   Component Value Date    FREET4 1 04 09/22/2016       Impression & Plan:    Problem List Items Addressed This Visit     Essential hypertension     Not at goal   No longer on metoprolol she is unsure why  Reviewed chart since last visit including nephrology/pcp notes and can not see that metoprolol was discontinued  Heart rate is 90 and bradycardia has not been an issue  I would suggest resuming metoprolol  She would prefer to wait until nephrology appt next week prior to adjustment medications  Primary hypothyroidism     Self discontinued levothyroxine in the past and thyroid function has remained normal  Complete testing as ordered by PCP to monitor thyroid function  Mixed hyperlipidemia     Complete lipid panel as ordered by PCP  Type 2 diabetes, uncontrolled, with renal manifestation (HCC)     Poorly controlled and not at goal  Has not been to office in over a year and has not been compliant with medications  Reports morning glucose readings in target so doesn't take her insulin, then blood sugars much higher as day goes on then takes insulin before dinner and if needed at bedtime  Discussed importance of taking her insulin BEFORE breakfast to prevent hyperlgycemia later in day  For now, take consistent dose of Humalog 75/25 before breakfast and dinner  Check BG 3-4x per day and send log for review in two weeks  Discussed need for better control of Diabetes to prevent worsening of kidney function  Relevant Medications    Insulin Lispro Prot & Lispro (HUMALOG MIX 75/25 KWIKPEN) (75-25) 100 units/mL injection pen      Other Visit Diagnoses     Insulin long-term use (HCC)    -  Primary    Type 2 diabetes mellitus with hyperglycemia, with long-term current use of insulin (HCC)        Relevant Medications    Insulin Lispro Prot & Lispro (HUMALOG MIX 75/25 KWIKPEN) (75-25) 100 units/mL injection pen          No orders of the defined types were placed in this encounter  There are no Patient Instructions on file for this visit  Discussed with the patient and all questioned fully answered   She will call me if any problems arise       Follow-up appointment in 3 months       Counseled patient on diagnostic results, prognosis, risk and benefit of treatment options, instruction for management, importance of treatment compliance, Risk  factor reduction and impressions      Shereen Mccauley PA-C

## 2018-12-06 NOTE — LETTER
December 7, 2018     Mukul Dalton,   [address] [address]    Patient: Lashonda Greenberg   YOB: 1955   Date of Visit: 12/6/2018       Dear Dr Adrian Wisdom: Thank you for referring Lashonda Greenberg to me for evaluation  Below are my notes for this consultation  If you have questions, please do not hesitate to call me  I look forward to following your patient along with you  Sincerely,        Kumar Ball PA-C        CC: No Recipients  Kumar Ball PA-C  12/7/2018 11:32 AM  Sign at close encounter      Established Patient Progress Note      Chief Complaint   Patient presents with    Diabetes Type 2          History of Present Illness:   Lashonda Greenberg is a 66 y o  female with a history of type 2 diabetes with long term use of insulin since many years ago  Reports complications of CKD  Hasn't been to office in over a year  She has history of noncompliance with diet, meds, glucose monitoring, suggestions for diabetes education, and attending office visits on a regular basis  Insulin refill was denied yesterday by our office  Since last visit, was in hospital with PE, on coumadin  Denies recent severe hypoglycemic or severe hyperglycemic episodes  Denies any issues with her current regimen  home glucose monitoring: are performed regularly by report  Home blood glucose readings:   Before breakfast: 90-150s  Before lunch: not checking  Before dinner: 200-300s  Bedtime:  varies    Current regimen: Humalog 75/25--   30-40 before breakfast and dinner depending on glucose reading then if high will take 20 units at bedtime  (if BG normal- doesn't take so usually misses morning dose     Last Eye Exam: unknown  Last Foot Exam: seeing podiatry for broken toe, appt next week  Has hypertension: Taking diuretics only  Has been off valsartan since recall and PORSHA in the summer  Has been off metoprolol- unsure why    She thinks Dr Ever Guerrero stopped however I don't see any documentation of that in chart  Was on medication at 2018 nephrology visit, was on not med at 2018 visit  Has hyperlipidemia: No longer on statin    Thyroid disorders: previously treated with levothyroxine for hypothyroidism, self discontinued a few years back  Thyroid function remains normal      CKD-- nephrolgy appt next week   Follows with Dr Clare Hussein      Patient Active Problem List   Diagnosis    Essential hypertension    Stage 3 chronic kidney disease ([facility] [address] )    Nephrotic range proteinuria    Cyst of ovary    Primary hypothyroidism    Mixed hyperlipidemia    Type 2 diabetes, uncontrolled, with renal manifestation (HCC)    Obstructive sleep apnea    Dyspnea    PE (pulmonary thromboembolism) (HCC)    Elevated brain natriuretic peptide (BNP) level    Leg edema, left    Pulmonary hypertension (HCC)    Elevated blood pressure reading    Closed nondisplaced fracture of distal phalanx of right great toe    Right foot pain    Onychomycosis      Past Medical History:   Diagnosis Date    Acute asthmatic bronchitis     last assessed: 2017    Cardiac disease     [facility]     Hyperlipidemia     Hypertension     Pneumonia     last assessed: 2013    Renal disorder       Past Surgical History:   Procedure Laterality Date     SECTION       SECTION      NOSE SURGERY        Family History   Problem Relation Age of Onset    Diabetes Mother         mellitus    Anxiety disorder Mother         generalized anxiety disorder    Hypertension Mother     Kidney disease Father     Kidney failure Father         renal failure    Ulcerative colitis Sister     Heart disease Family      Social History   Substance Use Topics    Smoking status: Never Smoker    Smokeless tobacco: Never Used    Alcohol use No     No Known Allergies      Current Outpatient Prescriptions:     albuterol (PROAIR HFA) 90 mcg/act inhaler, Inhale 2 puffs every 4 (four) hours as needed for wheezing or shortness of breath, Disp: 1 Inhaler, Rfl: 0    Blood Glucose Monitoring Suppl (ONE TOUCH ULTRA 2) w/Device KIT, Test glucose 3 times daily, Disp: 1 each, Rfl: 0    Insulin Lispro Prot & Lispro (HUMALOG MIX 75/25 KWIKPEN) (75-25) 100 units/mL injection pen, Inject 30 Units under the skin 2 (two) times a day with meals for 250 days, Disp: 150 mL, Rfl: 1    ONE TOUCH ULTRA TEST test strip, Test glucose 3 times daily, Disp: 300 each, Rfl: 1    terbinafine (LamISIL) 250 mg tablet, 1 tab p o  every other day , Disp: 15 tablet, Rfl: 0    torsemide (DEMADEX) 20 mg tablet, Take 1 tablet (20 mg total) by mouth daily Take 1 tablet per day, can take additional tablet for weight gain or worsening swelling, Disp: 100 tablet, Rfl: 3    traMADol (ULTRAM) 50 mg tablet, 1 tablet twice daily as needed for left foot pain , Disp: 10 tablet, Rfl: 0    warfarin (COUMADIN) 1 mg tablet, One po daily, Disp: 30 tablet, Rfl: 1    warfarin (COUMADIN) 2 mg tablet, 4mg daily or as directed, Disp: 180 tablet, Rfl: 0    warfarin (COUMADIN) 3 mg tablet, Take 5 milligrams on 7/16/18, July 17, 2018 and get PT/INR on July 17, 2018, Disp: 30 tablet, Rfl: 0    warfarin (COUMADIN) 5 mg tablet, Take 1 tablet (5 mg total) by mouth daily, Disp: 90 tablet, Rfl: 1    Review of Systems   Constitutional: Negative for activity change, appetite change, chills, diaphoresis, fatigue, fever and unexpected weight change  HENT: Negative for trouble swallowing and voice change  Eyes: Negative for visual disturbance  Respiratory: Negative for shortness of breath  Cardiovascular: Negative for chest pain and palpitations  Gastrointestinal: Negative for abdominal pain, constipation and diarrhea  Endocrine: Negative for cold intolerance, heat intolerance, polydipsia, polyphagia and polyuria  Genitourinary: Negative for frequency and menstrual problem  Musculoskeletal: Negative for arthralgias and myalgias  Skin: Negative for rash     Allergic/Immunologic: Negative for food allergies  Neurological: Negative for dizziness and tremors  Hematological: Negative for adenopathy  Psychiatric/Behavioral: Negative for sleep disturbance  All other systems reviewed and are negative  Physical Exam:  Body mass index is 43 94 kg/m²  /90   Pulse 90   Ht 5' (1 524 m)   Wt 102 kg (225 lb)   BMI 43 94 kg/m²     Wt Readings from Last 3 Encounters:   12/06/18 102 kg (225 lb)   11/30/18 98 9 kg (218 lb)   11/27/18 101 kg (223 lb)       Physical Exam   Constitutional: She is oriented to person, place, and time  She appears well-developed and well-nourished  No distress  HENT:   Head: Normocephalic and atraumatic  Eyes: Pupils are equal, round, and reactive to light  Conjunctivae are normal    Neck: Normal range of motion  Neck supple  No thyromegaly present  Cardiovascular: Normal rate, regular rhythm and normal heart sounds  Pulmonary/Chest: Effort normal and breath sounds normal  No respiratory distress  She has no wheezes  She has no rales  Abdominal: Soft  Bowel sounds are normal  She exhibits no distension  There is no tenderness  Musculoskeletal: Normal range of motion  She exhibits no edema  Neurological: She is alert and oriented to person, place, and time  Skin: Skin is warm and dry  Psychiatric: She has a normal mood and affect  Vitals reviewed      Diabetic Foot Exam  Not performed today, follows with podiatry regularly    Labs:     Lab Results   Component Value Date    HGBA1C 8 8 (H) 07/05/2018     No components found for: GLU  Lab Results   Component Value Date    HGBA1C 8 8 (H) 07/05/2018       Lab Results   Component Value Date    CREATININE 3 20 (H) 11/29/2018    CREATININE 3 42 (H) 11/19/2018    CREATININE 2 73 (H) 09/06/2018    BUN 63 (H) 11/29/2018     12/11/2014    K 3 7 11/29/2018    CL 99 (L) 11/29/2018    CO2 28 11/29/2018     eGFR   Date Value Ref Range Status   11/29/2018 15 ml/min/1 73sq m Final       Lab Results Component Value Date    HDL 69 (H) 10/18/2017    TRIG 124 10/18/2017       Lab Results   Component Value Date    ALT 29 11/19/2018    AST 16 11/19/2018    ALKPHOS 110 11/19/2018    BILITOT 1 08 (H) 12/11/2014       Lab Results   Component Value Date    JDS9GCVLQFXS 3 026 07/05/2018    VHE2RGKRLSQM 2 400 09/22/2016    EQC3AIOFYVYR 3 020 03/02/2016     Lab Results   Component Value Date    FREET4 1 04 09/22/2016       Impression & Plan:    Problem List Items Addressed This Visit     Essential hypertension     Not at goal   No longer on metoprolol she is unsure why  I would suggest resuming metoprolol  She would prefer to wait until nephrology appt next week prior to adjustment medications  Primary hypothyroidism     Self discontinued levothyroxine in the past and thyroid function has remained normal  Complete testing as ordered by PCP to monitor thyroid function  Mixed hyperlipidemia     Complete lipid panel as ordered by PCP  Type 2 diabetes, uncontrolled, with renal manifestation (HCC)     Poorly controlled and not at goal  Has not been to office in over a year and has not been compliant with medications  Reports morning glucose readings in target so doesn't take her insulin, then blood sugars much higher as day goes on then takes insulin before dinner and if needed at bedtime  Discussed importance of taking her insulin BEFORE breakfast to prevent hyperlgycemia later in day  For now, take consistent dose of Humalog 75/25 before breakfast and dinner  Check BG 3-4x per day and send log for review in two weeks  Discussed need for better control of Diabetes to prevent worsening of kidney function              Relevant Medications    Insulin Lispro Prot & Lispro (HUMALOG MIX 75/25 KWIKPEN) (75-25) 100 units/mL injection pen      Other Visit Diagnoses     Insulin long-term use (HCC)    -  Primary    Type 2 diabetes mellitus with hyperglycemia, with long-term current use of insulin ([address] ) Relevant Medications    Insulin Lispro Prot & Lispro (HUMALOG MIX 75/25 KWIKPEN) (75-25) 100 units/mL injection pen          No orders of the defined types were placed in this encounter  There are no Patient Instructions on file for this visit  Discussed with the patient and all questioned fully answered  She will call me if any problems arise  Follow-up appointment in 3 months       Counseled patient on diagnostic results, prognosis, risk and benefit of treatment options, instruction for management, importance of treatment compliance, Risk  factor reduction and impressions      Shereen Mccauley PA-C

## 2018-12-07 NOTE — ASSESSMENT & PLAN NOTE
Not at goal   No longer on metoprolol she is unsure why  Reviewed chart since last visit including nephrology/pcp notes and can not see that metoprolol was discontinued  Heart rate is 90 and bradycardia has not been an issue  I would suggest resuming metoprolol  She would prefer to wait until nephrology appt next week prior to adjustment medications

## 2018-12-07 NOTE — ASSESSMENT & PLAN NOTE
Poorly controlled and not at goal  Has not been to office in over a year and has not been compliant with medications  Reports morning glucose readings in target so doesn't take her insulin, then blood sugars much higher as day goes on then takes insulin before dinner and if needed at bedtime  Discussed importance of taking her insulin BEFORE breakfast to prevent hyperlgycemia later in day  For now, take consistent dose of Humalog 75/25 before breakfast and dinner  Check BG 3-4x per day and send log for review in two weeks  Discussed need for better control of Diabetes to prevent worsening of kidney function

## 2018-12-07 NOTE — ASSESSMENT & PLAN NOTE
Self discontinued levothyroxine in the past and thyroid function has remained normal  Complete testing as ordered by PCP to monitor thyroid function

## 2018-12-11 ENCOUNTER — TELEPHONE (OUTPATIENT)
Dept: FAMILY MEDICINE CLINIC | Facility: CLINIC | Age: 63
End: 2018-12-11

## 2018-12-11 ENCOUNTER — OFFICE VISIT (OUTPATIENT)
Dept: PODIATRY | Facility: CLINIC | Age: 63
End: 2018-12-11
Payer: COMMERCIAL

## 2018-12-11 VITALS
HEIGHT: 60 IN | BODY MASS INDEX: 44.17 KG/M2 | SYSTOLIC BLOOD PRESSURE: 148 MMHG | WEIGHT: 225 LBS | DIASTOLIC BLOOD PRESSURE: 90 MMHG | HEART RATE: 90 BPM | RESPIRATION RATE: 17 BRPM

## 2018-12-11 DIAGNOSIS — B35.3 TINEA PEDIS OF BOTH FEET: Primary | ICD-10-CM

## 2018-12-11 DIAGNOSIS — M79.671 PAIN IN BOTH FEET: ICD-10-CM

## 2018-12-11 DIAGNOSIS — M79.671 RIGHT FOOT PAIN: ICD-10-CM

## 2018-12-11 DIAGNOSIS — S92.424D CLOSED NONDISPLACED FRACTURE OF DISTAL PHALANX OF RIGHT GREAT TOE WITH ROUTINE HEALING, SUBSEQUENT ENCOUNTER: ICD-10-CM

## 2018-12-11 DIAGNOSIS — M79.672 PAIN IN BOTH FEET: ICD-10-CM

## 2018-12-11 PROCEDURE — 73620 X-RAY EXAM OF FOOT: CPT | Performed by: PODIATRIST

## 2018-12-11 PROCEDURE — 99213 OFFICE O/P EST LOW 20 MIN: CPT | Performed by: PODIATRIST

## 2018-12-11 NOTE — TELEPHONE ENCOUNTER
Mendy Amezquita was due for an INR on 12/3 and we don't have the result yet   We need to f/u with her [facility]

## 2018-12-11 NOTE — PROGRESS NOTES
Procedures   Foot Exam    Left Foot/Ankle      Comments  Foot bilateral demonstrates xerosis of skin secondary to chronic moccasin tinea pedis  Assessment/Plan:  Contusion  Crush injury  Paronychia  Pain  Left hallux  Carnation tinea pedis      Plan  Nails debrided  Area debrided  Will add tramadol  Will do an empiric course of antifungal   No problem-specific Assessment & Plan notes found for this encounter          There are no diagnoses linked to this encounter        Subjective:  Patient is doing better but she has continued pain    Her family is bandaging toe in rectus position as directed           Past Medical History:   Diagnosis Date    Acute asthmatic bronchitis       last assessed: 2017    Cardiac disease      Diabetes mellitus ([facility] [address] [facility]      Hyperlipidemia      Hypertension      Pneumonia       last assessed: 2013    Renal disorder                 Past Surgical History:   Procedure Laterality Date     SECTION         SECTION        NOSE SURGERY             No Known Allergies        Current Outpatient Prescriptions:     albuterol (PROAIR HFA) 90 mcg/act inhaler, Inhale 2 puffs every 4 (four) hours as needed for wheezing or shortness of breath, Disp: 1 Inhaler, Rfl: 0    Blood Glucose Monitoring Suppl (ONE TOUCH ULTRA 2) w/Device KIT, Test glucose 3 times daily, Disp: 1 each, Rfl: 0    Insulin Lispro Prot & Lispro (HUMALOG MIX 75/25 KWIKPEN) (75-25) 100 units/mL injection pen, Inject 30 Units under the skin 2 (two) times a day with meals, Disp: 150 mL, Rfl: 0    ONE TOUCH ULTRA TEST test strip, Test glucose 3 times daily, Disp: 300 each, Rfl: 1    torsemide (DEMADEX) 20 mg tablet, Take 1 tablet (20 mg total) by mouth daily Take 1 tablet per day, can take additional tablet for weight gain or worsening swelling, Disp: 100 tablet, Rfl: 5    warfarin (COUMADIN) 1 mg tablet, One po daily, Disp: 30 tablet, Rfl: 1    warfarin (COUMADIN) 2 mg tablet, Take as directed, Disp: 100 tablet, Rfl: 1    warfarin (COUMADIN) 3 mg tablet, Take 5 milligrams on 7/16/18, July 17, 2018 and get PT/INR on July 17, 2018, Disp: 30 tablet, Rfl: 0    warfarin (COUMADIN) 5 mg tablet, Take 1 tablet (5 mg total) by mouth daily, Disp: 90 tablet, Rfl: 1         Patient Active Problem List   Diagnosis    Essential hypertension    Stage 3 chronic kidney disease (HCC)    Nephrotic range proteinuria    Cyst of ovary    Hypothyroidism    Hyperlipidemia    Type 2 diabetes, uncontrolled, with renal manifestation (HCC)    Obstructive sleep apnea    Dyspnea    Acute bronchitis    PE (pulmonary thromboembolism) (HCC)    Elevated brain natriuretic peptide (BNP) level    Leg edema, left    Pulmonary hypertension (HCC)    Elevated blood pressure reading    Closed nondisplaced fracture of distal phalanx of right great toe    Right foot pain             Patient ID: Aria Ho is a 66 y o  female      HPI     The following portions of the patient's history were reviewed and updated as appropriate: allergies, current medications, past family history, past medical history, past social history, past surgical history and problem list      Review of Systems       Objective:  Patient's shoes and socks removed     Foot ExamPhysical Exam          Objective:  Patient's shoes and socks removed    Foot Exam     General  General Appearance: appears stated age and healthy   Orientation: alert and oriented to person, place, and time   Affect: appropriate   Gait: antalgic         Right Foot/Ankle      Inspection and Palpation  Ecchymosis: dorsum and first toe  Tenderness: great toe interphalangeal joint   Swelling: great toe interphalangeal joint   Arch: pes planus  Claw Toes: fifth toe  Hallux valgus: no  Hallux limitus: yes  Skin Exam: maceration, skin changes and erythema (Of right hallux distal aspect);      Neurovascular  Dorsalis pedis: 2+  Posterior tibial: 2+        Left Foot/Ankle       Inspection and Palpation  Ecchymosis: none  Tenderness: none   Swelling: none   Arch: pes planus  Claw toes: fifth toe  Hallux valgus: no  Hallux limitus: yes     Neurovascular  Dorsalis pedis: 2+  Posterior tibial: 2+        Physical Exam   Constitutional: She appears well-developed and well-nourished  Cardiovascular: Normal rate and regular rhythm     Pulses:       Dorsalis pedis pulses are 2+ on the right side, and 2+ on the left side  Of dried subungual hematoma noted  Incision and drainage done        Posterior tibial pulses are 2+ on the right side, and 2+ on the left side  Musculoskeletal:   Distal aspect right hallux demonstrates ecchymosis and edema   Hallux is rectus  X-ray demonstrates probable crush injury of distal phalanx   There appears to be a fracture line in the midbody of the distal phalanx   Capital fragment aligned    Healing nicely  Feet:   Right Foot:   Skin Integrity: Positive for skin breakdown and erythema (Of right hallux distal aspect)  Skin:   Right hallux demonstrates erythema around the nail plate   No evidence of abscess   No evidence of nail lysis    small area dried subungual hematoma noted     Negative sinus  Negative occult pus    Nursing note and vitals reviewed

## 2018-12-17 ENCOUNTER — APPOINTMENT (OUTPATIENT)
Dept: LAB | Facility: HOSPITAL | Age: 63
End: 2018-12-17
Payer: COMMERCIAL

## 2018-12-17 ENCOUNTER — ANTICOAG VISIT (OUTPATIENT)
Dept: FAMILY MEDICINE CLINIC | Facility: CLINIC | Age: 63
End: 2018-12-17

## 2018-12-17 DIAGNOSIS — I26.99 PE (PULMONARY THROMBOEMBOLISM) (HCC): Primary | ICD-10-CM

## 2018-12-17 DIAGNOSIS — IMO0002 TYPE 2 DIABETES, UNCONTROLLED, WITH RENAL MANIFESTATION: ICD-10-CM

## 2018-12-17 DIAGNOSIS — E78.2 MIXED HYPERLIPIDEMIA: ICD-10-CM

## 2018-12-17 DIAGNOSIS — I26.99 PE (PULMONARY THROMBOEMBOLISM) (HCC): ICD-10-CM

## 2018-12-17 DIAGNOSIS — Z11.59 NEED FOR HEPATITIS C SCREENING TEST: ICD-10-CM

## 2018-12-17 DIAGNOSIS — E03.9 PRIMARY HYPOTHYROIDISM: ICD-10-CM

## 2018-12-17 DIAGNOSIS — I10 ESSENTIAL HYPERTENSION: ICD-10-CM

## 2018-12-17 LAB
ALBUMIN SERPL BCP-MCNC: 3 G/DL (ref 3.5–5)
ALP SERPL-CCNC: 104 U/L (ref 46–116)
ALT SERPL W P-5'-P-CCNC: 25 U/L (ref 12–78)
ANION GAP SERPL CALCULATED.3IONS-SCNC: 10 MMOL/L (ref 4–13)
AST SERPL W P-5'-P-CCNC: 19 U/L (ref 5–45)
BILIRUB SERPL-MCNC: 0.7 MG/DL (ref 0.2–1)
BUN SERPL-MCNC: 53 MG/DL (ref 5–25)
CALCIUM SERPL-MCNC: 8.8 MG/DL (ref 8.3–10.1)
CHLORIDE SERPL-SCNC: 99 MMOL/L (ref 100–108)
CHOLEST SERPL-MCNC: 206 MG/DL (ref 50–200)
CO2 SERPL-SCNC: 27 MMOL/L (ref 21–32)
CREAT SERPL-MCNC: 3.47 MG/DL (ref 0.6–1.3)
ERYTHROCYTE [DISTWIDTH] IN BLOOD BY AUTOMATED COUNT: 14.3 % (ref 11.6–15.1)
EST. AVERAGE GLUCOSE BLD GHB EST-MCNC: 220 MG/DL
GFR SERPL CREATININE-BSD FRML MDRD: 14 ML/MIN/1.73SQ M
GLUCOSE P FAST SERPL-MCNC: 158 MG/DL (ref 65–99)
HBA1C MFR BLD: 9.3 % (ref 4.2–6.3)
HCT VFR BLD AUTO: 40.3 % (ref 34.8–46.1)
HDLC SERPL-MCNC: 63 MG/DL (ref 40–60)
HGB BLD-MCNC: 12.6 G/DL (ref 11.5–15.4)
INR PPP: 1.54 (ref 0.86–1.16)
LDLC SERPL CALC-MCNC: 115 MG/DL (ref 0–100)
MCH RBC QN AUTO: 26.7 PG (ref 26.8–34.3)
MCHC RBC AUTO-ENTMCNC: 31.3 G/DL (ref 31.4–37.4)
MCV RBC AUTO: 85 FL (ref 82–98)
PLATELET # BLD AUTO: 246 THOUSANDS/UL (ref 149–390)
PMV BLD AUTO: 12 FL (ref 8.9–12.7)
POTASSIUM SERPL-SCNC: 3.8 MMOL/L (ref 3.5–5.3)
PROT SERPL-MCNC: 7.4 G/DL (ref 6.4–8.2)
PROTHROMBIN TIME: 15.8 SECONDS (ref 9.4–11.7)
RBC # BLD AUTO: 4.72 MILLION/UL (ref 3.81–5.12)
SODIUM SERPL-SCNC: 136 MMOL/L (ref 136–145)
T4 FREE SERPL-MCNC: 1.03 NG/DL (ref 0.76–1.46)
TRIGL SERPL-MCNC: 142 MG/DL
TSH SERPL DL<=0.05 MIU/L-ACNC: 3.16 UIU/ML (ref 0.36–3.74)
WBC # BLD AUTO: 8.43 THOUSAND/UL (ref 4.31–10.16)

## 2018-12-17 PROCEDURE — 85027 COMPLETE CBC AUTOMATED: CPT

## 2018-12-17 PROCEDURE — 85610 PROTHROMBIN TIME: CPT

## 2018-12-17 PROCEDURE — 83036 HEMOGLOBIN GLYCOSYLATED A1C: CPT

## 2018-12-17 PROCEDURE — 84443 ASSAY THYROID STIM HORMONE: CPT

## 2018-12-17 PROCEDURE — 80053 COMPREHEN METABOLIC PANEL: CPT

## 2018-12-17 PROCEDURE — 80061 LIPID PANEL: CPT

## 2018-12-17 PROCEDURE — 86803 HEPATITIS C AB TEST: CPT

## 2018-12-17 PROCEDURE — 36415 COLL VENOUS BLD VENIPUNCTURE: CPT

## 2018-12-17 PROCEDURE — 84439 ASSAY OF FREE THYROXINE: CPT

## 2018-12-18 LAB — HCV AB SER QL: NORMAL

## 2018-12-21 ENCOUNTER — OFFICE VISIT (OUTPATIENT)
Dept: NEPHROLOGY | Facility: CLINIC | Age: 63
End: 2018-12-21
Payer: COMMERCIAL

## 2018-12-21 VITALS
SYSTOLIC BLOOD PRESSURE: 150 MMHG | HEART RATE: 80 BPM | WEIGHT: 218 LBS | HEIGHT: 60 IN | BODY MASS INDEX: 42.8 KG/M2 | DIASTOLIC BLOOD PRESSURE: 90 MMHG

## 2018-12-21 DIAGNOSIS — I12.9 BENIGN HYPERTENSION WITH CKD (CHRONIC KIDNEY DISEASE) STAGE IV (HCC): ICD-10-CM

## 2018-12-21 DIAGNOSIS — N18.4 STAGE 4 CHRONIC KIDNEY DISEASE (HCC): Primary | ICD-10-CM

## 2018-12-21 DIAGNOSIS — N18.4 BENIGN HYPERTENSION WITH CKD (CHRONIC KIDNEY DISEASE) STAGE IV (HCC): ICD-10-CM

## 2018-12-21 DIAGNOSIS — R60.0 LEG EDEMA, LEFT: ICD-10-CM

## 2018-12-21 DIAGNOSIS — N18.30 STAGE 3 CHRONIC KIDNEY DISEASE (HCC): ICD-10-CM

## 2018-12-21 DIAGNOSIS — R80.9 NEPHROTIC RANGE PROTEINURIA: ICD-10-CM

## 2018-12-21 PROCEDURE — 99214 OFFICE O/P EST MOD 30 MIN: CPT | Performed by: PHYSICIAN ASSISTANT

## 2018-12-21 PROCEDURE — 3066F NEPHROPATHY DOC TX: CPT | Performed by: PHYSICIAN ASSISTANT

## 2018-12-21 RX ORDER — TORSEMIDE 20 MG/1
TABLET ORAL
Qty: 100 TABLET | Refills: 0 | Status: SHIPPED | OUTPATIENT
Start: 2018-12-21 | End: 2019-06-21

## 2018-12-21 RX ORDER — METOPROLOL SUCCINATE 25 MG/1
25 TABLET, EXTENDED RELEASE ORAL DAILY
Qty: 90 TABLET | Refills: 3 | Status: SHIPPED | OUTPATIENT
Start: 2018-12-21 | End: 2019-03-11 | Stop reason: SDUPTHER

## 2018-12-21 NOTE — PROGRESS NOTES
Assessment and Plan:    Diagnoses and all orders for this visit:    Stage 4 chronic kidney disease ([facility] [address] )  -     Ambulatory referral to ckd education program; Future  -     metoprolol succinate (TOPROL-XL) 25 mg 24 hr tablet; Take 1 tablet (25 mg total) by mouth daily  -     Basic metabolic panel; Future  -     Basic metabolic panel; Future  -     Magnesium; Future  -     Phosphorus; Future  -     CBC; Future  -     Protein / creatinine ratio, urine; Future  -     PTH, intact; Future  -     Vitamin D 25 hydroxy; Future    Benign hypertension with CKD (chronic kidney disease) stage IV (HCC)    Nephrotic range proteinuria    Leg edema, left    Stage 3 chronic kidney disease (HCC)  -     torsemide (DEMADEX) 20 mg tablet; Take 2 tablet per day in the morning and take an additional tablet in the evening for weight gain or worsening swelling       Chronic Kidney Disease stage IV- Baseline creatinine appears to have progressed into the low 3s  Kidney Smart: will schedule  Modality: reviewed, I think PD would be a great option for her    Volume Status- She takes 40mg in the morning and if weight gain, she takes 20mg in the evening  Her normal weight is 215 to 218 pounds  She takes the evening dose about twice a week  Nephrotic Range Proteinuria- Likely secondary to diabetic nephropathy  Serologies negative in the past   Patient declined renal biopsy in the past     Hypertension- Goal blood pressure is <130/80  Antihypertensive regimen includes torsemide 40mg AM and 20mg PM a few times a week  Avoid salt/sodium in your diet  Stay active  Avoid nonsteroidals  Blood pressure is above goal   Please add metoprolol 25mg daily  Check your blood pressure at home and record  Bone Mineral Disorder- Check studies before next visit  Follow up with Dr Maxim Quezada in 3 months  Please call the office with any questions or concerns  Reason for Visit: No chief complaint on file      HPI: Piotr Jett is a 66 y o  female who is here for follow up of chronic kidney disease  She last saw Dr Terell Murphy in August   In November she called the office complaining of weight gain so I increased her torsemide to 40mg AM and 20mg PM as needed for weight gain  Since then, her lower extremity edema is improving but she still has dyspnea on exertion  She also has a pulmonary embolism which could be playing a role  She is on warfarin which is managed by Dr Sonny Galvez  She follows with Dr Khushbu Orellana office for her diabetes  Her HbA1C is 9 3       ROS: A complete review of systems was performed and was negative unless otherwise noted in the history of present illness  Allergies:   Patient has no known allergies      Medications:     Current Outpatient Prescriptions:     albuterol (PROAIR HFA) 90 mcg/act inhaler, Inhale 2 puffs every 4 (four) hours as needed for wheezing or shortness of breath, Disp: 1 Inhaler, Rfl: 0    Blood Glucose Monitoring Suppl (ONE TOUCH ULTRA 2) w/Device KIT, Test glucose 3 times daily, Disp: 1 each, Rfl: 0    ciclopirox (LOPROX) 0 77 % cream, Apply topically 2 (two) times a day for 20 days, Disp: 45 g, Rfl: 2    Insulin Lispro Prot & Lispro (HUMALOG MIX 75/25 KWIKPEN) (75-25) 100 units/mL injection pen, Inject 30 Units under the skin 2 (two) times a day with meals for 250 days, Disp: 150 mL, Rfl: 1    ONE TOUCH ULTRA TEST test strip, Test glucose 3 times daily, Disp: 300 each, Rfl: 1    torsemide (DEMADEX) 20 mg tablet, Take 2 tablet per day in the morning and take an additional tablet in the evening for weight gain or worsening swelling, Disp: 100 tablet, Rfl: 0    warfarin (COUMADIN) 1 mg tablet, One po daily, Disp: 30 tablet, Rfl: 1    warfarin (COUMADIN) 2 mg tablet, 4mg daily or as directed, Disp: 180 tablet, Rfl: 0    warfarin (COUMADIN) 3 mg tablet, Take 5 milligrams on 7/16/18, July 17, 2018 and get PT/INR on July 17, 2018, Disp: 30 tablet, Rfl: 0    warfarin (COUMADIN) 5 mg tablet, Take 1 tablet (5 mg total) by mouth daily, Disp: 90 tablet, Rfl: 1    metoprolol succinate (TOPROL-XL) 25 mg 24 hr tablet, Take 1 tablet (25 mg total) by mouth daily, Disp: 90 tablet, Rfl: 3    terbinafine (LamISIL) 250 mg tablet, 1 tab p o  every other day  (Patient not taking: Reported on 2018 ), Disp: 15 tablet, Rfl: 0    Past Medical History:   Diagnosis Date    Acute asthmatic bronchitis     last assessed: 2017    Cardiac disease     Diabetes mellitus ([facility] [address] [facility]     Hyperlipidemia     Hypertension     Pneumonia     last assessed: 2013    Renal disorder      Past Surgical History:   Procedure Laterality Date     SECTION       SECTION      NOSE SURGERY       Family History   Problem Relation Age of Onset    Diabetes Mother         mellitus    Anxiety disorder Mother         generalized anxiety disorder    Hypertension Mother     Kidney disease Father     Kidney failure Father         renal failure    Ulcerative colitis Sister     Heart disease Family       reports that she has never smoked  She has never used smokeless tobacco  She reports that she does not drink alcohol or use drugs  Physical Exam:   Vitals:    18 0946 18 1012   BP:  150/90   BP Location:  Right arm   Patient Position:  Sitting   Cuff Size:  Standard   Pulse:  80   Weight: 98 9 kg (218 lb)    Height: 5' (1 524 m)      Body mass index is 42 58 kg/m²  General: NAD  Neuro: AAO  Neck: supple  Skin: no rash  Heart: RRR  Lungs: CTAB  Abdomen: soft nt nd  Extremities: + bilateral edema    Procedure:  No results found for this or any previous visit  Labs reviewed      Lab Results   Component Value Date    GLUCOSE 133 2014    CALCIUM 8 8 2018     2014    K 3 8 2018    CO2 27 2018    CL 99 (L) 2018    BUN 53 (H) 2018    CREATININE 3 47 (H) 2018         Results from last 7 days  Lab Units 18  0952   WBC Thousand/uL 8 43   HEMOGLOBIN g/dL 12 6   HEMATOCRIT % 40 3 PLATELETS Thousands/uL 246   POTASSIUM mmol/L 3 8   CHLORIDE mmol/L 99*   CO2 mmol/L 27   BUN mg/dL 53*   CREATININE mg/dL 3 47*   CALCIUM mg/dL 8 8

## 2018-12-21 NOTE — PATIENT INSTRUCTIONS
Chronic Kidney Disease stage IV- Baseline creatinine appears to have progressed into the low 3s  Kidney Smart: will schedule  Modality: reviewed    Volume Status- She takes 40mg in the morning and if weight gain, she takes 20mg in the evening  Her normal weight is 215 to 218 pounds  She takes the evening dose about twice a week  Nephrotic Range Proteinuria- Likely secondary to diabetic nephropathy  Serologies negative in the past   Patient declined renal biopsy in the past     Hypertension- Goal blood pressure is <130/80  Antihypertensive regimen includes torsemide 40mg AM and 20mg PM a few times a week  Avoid salt/sodium in your diet  Stay active  Avoid nonsteroidals  Blood pressure is above goal   Please add metoprolol 25mg daily  Check your blood pressure at home  Bone Mineral Disorder- Check studies before next visit  Follow up with Dr Lukas Hollingsworth in 3 months  Please call the office with any questions or concerns

## 2018-12-21 NOTE — LETTER
December 21, 2018     Natalia Hughes DO  [address] [address]    Patient: Guanaco Hayes   YOB: 1955   Date of Visit: 12/21/2018       Dear Dr Nuha Polanco: Thank you for referring Guanaco Hayes to me for evaluation  Below are my notes for this consultation  If you have questions, please do not hesitate to call me  I look forward to following your patient along with you  Sincerely,        Zack Riley PA-C        CC: No Recipients  Zack Riley [place]  12/21/2018 10:21 AM  Sign at close encounter  Assessment and Plan:    Diagnoses and all orders for this visit:    Stage 4 chronic kidney disease ([facility] [address] )  -     Ambulatory referral to ckd education program; Future  -     metoprolol succinate (TOPROL-XL) 25 mg 24 hr tablet; Take 1 tablet (25 mg total) by mouth daily  -     Basic metabolic panel; Future  -     Basic metabolic panel; Future  -     Magnesium; Future  -     Phosphorus; Future  -     CBC; Future  -     Protein / creatinine ratio, urine; Future  -     PTH, intact; Future  -     Vitamin D 25 hydroxy; Future    Benign hypertension with CKD (chronic kidney disease) stage IV (HCC)    Nephrotic range proteinuria    Leg edema, left    Stage 3 chronic kidney disease (HCC)  -     torsemide (DEMADEX) 20 mg tablet; Take 2 tablet per day in the morning and take an additional tablet in the evening for weight gain or worsening swelling       Chronic Kidney Disease stage IV- Baseline creatinine appears to have progressed into the low 3s  Kidney Smart: will schedule  Modality: reviewed, I think PD would be a great option for her    Volume Status- She takes 40mg in the morning and if weight gain, she takes 20mg in the evening  Her normal weight is 215 to 218 pounds  She takes the evening dose about twice a week  Nephrotic Range Proteinuria- Likely secondary to diabetic nephropathy    Serologies negative in the past   Patient declined renal biopsy in the past     Hypertension- Goal blood pressure is <130/80  Antihypertensive regimen includes torsemide 40mg AM and 20mg PM a few times a week  Avoid salt/sodium in your diet  Stay active  Avoid nonsteroidals  Blood pressure is above goal   Please add metoprolol 25mg daily  Check your blood pressure at home and record  Bone Mineral Disorder- Check studies before next visit  Follow up with Dr Ever Guerrero in 3 months  Please call the office with any questions or concerns  Reason for Visit: No chief complaint on file  HPI: Lashonda Greenberg is a 66 y o  female who is here for follow up of chronic kidney disease  She last saw Dr Ever Guerrero in August   In November she called the office complaining of weight gain so I increased her torsemide to 40mg AM and 20mg PM as needed for weight gain  Since then, her lower extremity edema is improving but she still has dyspnea on exertion  She also has a pulmonary embolism which could be playing a role  She is on warfarin which is managed by Dr Adrian Wisdom  She follows with Dr Angely Lopez office for her diabetes  Her HbA1C is 9 3       ROS: A complete review of systems was performed and was negative unless otherwise noted in the history of present illness  Allergies:   Patient has no known allergies      Medications:     Current Outpatient Prescriptions:     albuterol (PROAIR HFA) 90 mcg/act inhaler, Inhale 2 puffs every 4 (four) hours as needed for wheezing or shortness of breath, Disp: 1 Inhaler, Rfl: 0    Blood Glucose Monitoring Suppl (ONE TOUCH ULTRA 2) w/Device KIT, Test glucose 3 times daily, Disp: 1 each, Rfl: 0    ciclopirox (LOPROX) 0 77 % cream, Apply topically 2 (two) times a day for 20 days, Disp: 45 g, Rfl: 2    Insulin Lispro Prot & Lispro (HUMALOG MIX 75/25 KWIKPEN) (75-25) 100 units/mL injection pen, Inject 30 Units under the skin 2 (two) times a day with meals for 250 days, Disp: 150 mL, Rfl: 1    ONE TOUCH ULTRA TEST test strip, Test glucose 3 times daily, Disp: 300 each, Rfl: 1   torsemide (DEMADEX) 20 mg tablet, Take 2 tablet per day in the morning and take an additional tablet in the evening for weight gain or worsening swelling, Disp: 100 tablet, Rfl: 0    warfarin (COUMADIN) 1 mg tablet, One po daily, Disp: 30 tablet, Rfl: 1    warfarin (COUMADIN) 2 mg tablet, 4mg daily or as directed, Disp: 180 tablet, Rfl: 0    warfarin (COUMADIN) 3 mg tablet, Take 5 milligrams on 18, 2018 and get PT/INR on 2018, Disp: 30 tablet, Rfl: 0    warfarin (COUMADIN) 5 mg tablet, Take 1 tablet (5 mg total) by mouth daily, Disp: 90 tablet, Rfl: 1    metoprolol succinate (TOPROL-XL) 25 mg 24 hr tablet, Take 1 tablet (25 mg total) by mouth daily, Disp: 90 tablet, Rfl: 3    terbinafine (LamISIL) 250 mg tablet, 1 tab p o  every other day  (Patient not taking: Reported on 2018 ), Disp: 15 tablet, Rfl: 0    Past Medical History:   Diagnosis Date    Acute asthmatic bronchitis     last assessed: 2017    Cardiac disease     Diabetes mellitus ([facility] [address] )     Hyperlipidemia     Hypertension     Pneumonia     last assessed: 2013    Renal disorder      Past Surgical History:   Procedure Laterality Date     SECTION       SECTION      NOSE SURGERY       Family History   Problem Relation Age of Onset    Diabetes Mother         mellitus    Anxiety disorder Mother         generalized anxiety disorder    Hypertension Mother     Kidney disease Father     Kidney failure Father         renal failure    Ulcerative colitis Sister     Heart disease Family       reports that she has never smoked  She has never used smokeless tobacco  She reports that she does not drink alcohol or use drugs  Physical Exam:   Vitals:    18 0946 18 1012   BP:  150/90   BP Location:  Right arm   Patient Position:  Sitting   Cuff Size:  Standard   Pulse:  80   Weight: 98 9 kg (218 lb)    Height: 5' (1 524 m)      Body mass index is 42 58 kg/m²      General: NAD  Neuro: AAO  Neck: supple  Skin: no rash  Heart: RRR  Lungs: CTAB  Abdomen: soft nt nd  Extremities: + bilateral edema    Procedure:  No results found for this or any previous visit  Labs reviewed      Lab Results   Component Value Date    GLUCOSE 133 12/11/2014    CALCIUM 8 8 12/17/2018     12/11/2014    K 3 8 12/17/2018    CO2 27 12/17/2018    CL 99 (L) 12/17/2018    BUN 53 (H) 12/17/2018    CREATININE 3 47 (H) 12/17/2018         Results from last 7 days  Lab Units 12/17/18  0952   WBC Thousand/uL 8 43   HEMOGLOBIN g/dL 12 6   HEMATOCRIT % 40 3   PLATELETS Thousands/uL 246   POTASSIUM mmol/L 3 8   CHLORIDE mmol/L 99*   CO2 mmol/L 27   BUN mg/dL 53*   CREATININE mg/dL 3 47*   CALCIUM mg/dL 8 8

## 2019-01-16 ENCOUNTER — TELEPHONE (OUTPATIENT)
Dept: FAMILY MEDICINE CLINIC | Facility: CLINIC | Age: 64
End: 2019-01-16

## 2019-01-16 NOTE — TELEPHONE ENCOUNTER
We need to call Jameson Schneider   She is overdue for her INR which was due on 12/24 [facility]

## 2019-01-16 NOTE — TELEPHONE ENCOUNTER
Spoke with pt,stated will be going Friday for her INR and asking if she needs a standing order for INR ?   Rico Almeida MA

## 2019-01-18 ENCOUNTER — OFFICE VISIT (OUTPATIENT)
Dept: FAMILY MEDICINE CLINIC | Facility: CLINIC | Age: 64
End: 2019-01-18
Payer: COMMERCIAL

## 2019-01-18 VITALS
HEIGHT: 60 IN | TEMPERATURE: 97.4 F | WEIGHT: 218 LBS | DIASTOLIC BLOOD PRESSURE: 90 MMHG | RESPIRATION RATE: 16 BRPM | HEART RATE: 76 BPM | SYSTOLIC BLOOD PRESSURE: 140 MMHG | BODY MASS INDEX: 42.8 KG/M2

## 2019-01-18 DIAGNOSIS — M10.071 ACUTE IDIOPATHIC GOUT OF RIGHT FOOT: Primary | ICD-10-CM

## 2019-01-18 PROCEDURE — 99213 OFFICE O/P EST LOW 20 MIN: CPT | Performed by: FAMILY MEDICINE

## 2019-01-18 RX ORDER — PROBENECID AND COLCHICINE 500; .5 MG/1; MG/1
1 TABLET ORAL 2 TIMES DAILY
Qty: 10 TABLET | Refills: 0 | Status: SHIPPED | OUTPATIENT
Start: 2019-01-18 | End: 2019-02-28 | Stop reason: ALTCHOICE

## 2019-01-18 NOTE — PROGRESS NOTES
Assessment/Plan:    Problem List Items Addressed This Visit     Acute idiopathic gout of right foot - Primary     New  elevatation and ice recommended         Relevant Medications    colchicine-probenecid 0 5-500 MG per tablet    Other Relevant Orders    Uric acid          There are no Patient Instructions on file for this visit  Return if symptoms worsen or fail to improve  Subjective:      Patient ID: Lisa Santiago is a 66 y o  female  Chief Complaint   Patient presents with    foot pain     left foot       She broke her toe a few months ago  Then the past her whole right foot distally hurts  She can't flex her toes  Her big toe is swollen and red  The following portions of the patient's history were reviewed and updated as appropriate:  past social history    Review of Systems   Musculoskeletal: Positive for arthralgias           Current Outpatient Prescriptions   Medication Sig Dispense Refill    albuterol (PROAIR HFA) 90 mcg/act inhaler Inhale 2 puffs every 4 (four) hours as needed for wheezing or shortness of breath 1 Inhaler 0    Blood Glucose Monitoring Suppl (ONE TOUCH ULTRA 2) w/Device KIT Test glucose 3 times daily 1 each 0    ciclopirox (LOPROX) 0 77 % cream Apply topically 2 (two) times a day for 20 days 45 g 2    Insulin Lispro Prot & Lispro (HUMALOG MIX 75/25 KWIKPEN) (75-25) 100 units/mL injection pen Inject 30 Units under the skin 2 (two) times a day with meals for 250 days 150 mL 1    metoprolol succinate (TOPROL-XL) 25 mg 24 hr tablet Take 1 tablet (25 mg total) by mouth daily 90 tablet 3    ONE TOUCH ULTRA TEST test strip Test glucose 3 times daily 300 each 1    torsemide (DEMADEX) 20 mg tablet Take 2 tablet per day in the morning and take an additional tablet in the evening for weight gain or worsening swelling 100 tablet 0    warfarin (COUMADIN) 1 mg tablet One po daily 30 tablet 1    warfarin (COUMADIN) 2 mg tablet 4mg daily or as directed 180 tablet 0    warfarin (COUMADIN) 3 mg tablet Take 5 milligrams on 7/16/18, July 17, 2018 and get PT/INR on July 17, 2018 30 tablet 0    warfarin (COUMADIN) 5 mg tablet Take 1 tablet (5 mg total) by mouth daily 90 tablet 1    colchicine-probenecid 0 5-500 MG per tablet Take 1 tablet by mouth 2 (two) times a day for 5 days 10 tablet 0     No current facility-administered medications for this visit  Objective:    /90   Pulse 76   Temp (!) 97 4 °F (36 3 °C)   Resp 16   Ht 5' (1 524 m)   Wt 98 9 kg (218 lb)   BMI 42 58 kg/m²        Physical Exam   Constitutional: She appears well-developed and well-nourished  HENT:   Mouth/Throat: Oropharynx is clear and moist    Cardiovascular: Normal rate, regular rhythm and normal heart sounds  Exam reveals no friction rub  No murmur heard  Pulmonary/Chest: Effort normal and breath sounds normal  No respiratory distress  She has no wheezes  She has no rales  Musculoskeletal: She exhibits tenderness (right distal foot, erythema and edema of great toe)  Nursing note and vitals reviewed               Lui Mathew DO

## 2019-01-19 ENCOUNTER — ANTICOAG VISIT (OUTPATIENT)
Dept: FAMILY MEDICINE CLINIC | Facility: CLINIC | Age: 64
End: 2019-01-19

## 2019-01-19 ENCOUNTER — APPOINTMENT (OUTPATIENT)
Dept: LAB | Facility: HOSPITAL | Age: 64
End: 2019-01-19
Payer: COMMERCIAL

## 2019-01-19 ENCOUNTER — TRANSCRIBE ORDERS (OUTPATIENT)
Dept: ADMINISTRATIVE | Facility: HOSPITAL | Age: 64
End: 2019-01-19

## 2019-01-19 DIAGNOSIS — M10.071 ACUTE IDIOPATHIC GOUT OF RIGHT FOOT: ICD-10-CM

## 2019-01-19 DIAGNOSIS — I26.99 PULMONARY INFARCTION (HCC): ICD-10-CM

## 2019-01-19 DIAGNOSIS — I26.99 PULMONARY INFARCTION (HCC): Primary | ICD-10-CM

## 2019-01-19 DIAGNOSIS — T81.718S IATROGENIC PULMONARY EMBOLISM AND INFARCTION, SEQUELA (HCC): ICD-10-CM

## 2019-01-19 DIAGNOSIS — I26.99 IATROGENIC PULMONARY EMBOLISM AND INFARCTION, SEQUELA (HCC): ICD-10-CM

## 2019-01-19 LAB
INR PPP: 3.42 (ref 0.86–1.16)
PROTHROMBIN TIME: 33.5 SECONDS (ref 9.4–11.7)
URATE SERPL-MCNC: 10.4 MG/DL (ref 2–6.8)

## 2019-01-19 PROCEDURE — 36415 COLL VENOUS BLD VENIPUNCTURE: CPT

## 2019-01-19 PROCEDURE — 85610 PROTHROMBIN TIME: CPT

## 2019-01-19 PROCEDURE — 84550 ASSAY OF BLOOD/URIC ACID: CPT

## 2019-01-21 ENCOUNTER — TELEPHONE (OUTPATIENT)
Dept: FAMILY MEDICINE CLINIC | Facility: CLINIC | Age: 64
End: 2019-01-21

## 2019-01-21 NOTE — TELEPHONE ENCOUNTER
1/21/2019 9:03 AM spoke with Ailyn Gonzalez  We discussed her results  Her pharmacy didn't have the gout medication  She is going to start the medication today  If she doesn't improve will follow up in the office      Message complete  Sam Reed DO

## 2019-01-21 NOTE — TELEPHONE ENCOUNTER
1/21/2019 8:19 AM Called Maxine Gonzalez to discuss her Uric Acid results  Message left on her voice mail to call the office  Her uric acid is elevated at 10 4      Nick Dewitt DO

## 2019-01-28 ENCOUNTER — APPOINTMENT (OUTPATIENT)
Dept: LAB | Facility: HOSPITAL | Age: 64
End: 2019-01-28
Payer: COMMERCIAL

## 2019-01-28 ENCOUNTER — ANTICOAG VISIT (OUTPATIENT)
Dept: FAMILY MEDICINE CLINIC | Facility: CLINIC | Age: 64
End: 2019-01-28

## 2019-01-28 DIAGNOSIS — I26.99 IATROGENIC PULMONARY EMBOLISM AND INFARCTION, SEQUELA (HCC): ICD-10-CM

## 2019-01-28 DIAGNOSIS — T81.718S IATROGENIC PULMONARY EMBOLISM AND INFARCTION, SEQUELA (HCC): ICD-10-CM

## 2019-01-28 LAB
INR PPP: 1.5 (ref 0.86–1.16)
PROTHROMBIN TIME: 15.4 SECONDS (ref 9.4–11.7)

## 2019-01-28 PROCEDURE — 36415 COLL VENOUS BLD VENIPUNCTURE: CPT

## 2019-01-28 PROCEDURE — 85610 PROTHROMBIN TIME: CPT

## 2019-02-04 ENCOUNTER — TRANSCRIBE ORDERS (OUTPATIENT)
Dept: ADMINISTRATIVE | Facility: HOSPITAL | Age: 64
End: 2019-02-04

## 2019-02-04 ENCOUNTER — TELEPHONE (OUTPATIENT)
Dept: FAMILY MEDICINE CLINIC | Facility: CLINIC | Age: 64
End: 2019-02-04

## 2019-02-04 ENCOUNTER — APPOINTMENT (OUTPATIENT)
Dept: LAB | Facility: HOSPITAL | Age: 64
End: 2019-02-04
Payer: COMMERCIAL

## 2019-02-04 ENCOUNTER — ANTICOAG VISIT (OUTPATIENT)
Dept: FAMILY MEDICINE CLINIC | Facility: CLINIC | Age: 64
End: 2019-02-04

## 2019-02-04 DIAGNOSIS — I26.99 IATROGENIC PULMONARY EMBOLISM AND INFARCTION, SUBSEQUENT ENCOUNTER (HCC): ICD-10-CM

## 2019-02-04 DIAGNOSIS — T81.718D IATROGENIC PULMONARY EMBOLISM AND INFARCTION, SUBSEQUENT ENCOUNTER (HCC): ICD-10-CM

## 2019-02-04 DIAGNOSIS — I26.99 IATROGENIC PULMONARY EMBOLISM AND INFARCTION, SUBSEQUENT ENCOUNTER (HCC): Primary | ICD-10-CM

## 2019-02-04 DIAGNOSIS — T81.718D IATROGENIC PULMONARY EMBOLISM AND INFARCTION, SUBSEQUENT ENCOUNTER (HCC): Primary | ICD-10-CM

## 2019-02-04 LAB
INR PPP: 2.2 (ref 0.86–1.16)
PROTHROMBIN TIME: 22.1 SECONDS (ref 9.4–11.7)

## 2019-02-04 PROCEDURE — 36415 COLL VENOUS BLD VENIPUNCTURE: CPT

## 2019-02-04 PROCEDURE — 85610 PROTHROMBIN TIME: CPT

## 2019-02-04 NOTE — TELEPHONE ENCOUNTER
----- Message from Kraig Hendrickson DO sent at 2/4/2019 11:37 AM EST -----  Same dose recheck in 2 weeks  Kraig Hendrickson DO

## 2019-02-28 ENCOUNTER — OFFICE VISIT (OUTPATIENT)
Dept: FAMILY MEDICINE CLINIC | Facility: CLINIC | Age: 64
End: 2019-02-28
Payer: COMMERCIAL

## 2019-02-28 VITALS
DIASTOLIC BLOOD PRESSURE: 96 MMHG | RESPIRATION RATE: 16 BRPM | SYSTOLIC BLOOD PRESSURE: 154 MMHG | TEMPERATURE: 98.6 F | WEIGHT: 219.2 LBS | HEART RATE: 80 BPM | BODY MASS INDEX: 43.04 KG/M2 | HEIGHT: 60 IN

## 2019-02-28 DIAGNOSIS — E66.01 MORBID OBESITY (HCC): ICD-10-CM

## 2019-02-28 DIAGNOSIS — I12.9 BENIGN HYPERTENSION WITH CKD (CHRONIC KIDNEY DISEASE) STAGE IV (HCC): ICD-10-CM

## 2019-02-28 DIAGNOSIS — IMO0002 TYPE 2 DIABETES, UNCONTROLLED, WITH RENAL MANIFESTATION: Primary | ICD-10-CM

## 2019-02-28 DIAGNOSIS — E78.2 MIXED HYPERLIPIDEMIA: ICD-10-CM

## 2019-02-28 DIAGNOSIS — I26.99 PE (PULMONARY THROMBOEMBOLISM) (HCC): ICD-10-CM

## 2019-02-28 DIAGNOSIS — N18.4 BENIGN HYPERTENSION WITH CKD (CHRONIC KIDNEY DISEASE) STAGE IV (HCC): ICD-10-CM

## 2019-02-28 DIAGNOSIS — Z12.11 COLON CANCER SCREENING: ICD-10-CM

## 2019-02-28 PROBLEM — R03.0 ELEVATED BLOOD PRESSURE READING: Status: RESOLVED | Noted: 2018-11-12 | Resolved: 2019-02-28

## 2019-02-28 PROCEDURE — 99214 OFFICE O/P EST MOD 30 MIN: CPT | Performed by: FAMILY MEDICINE

## 2019-02-28 RX ORDER — ATORVASTATIN CALCIUM 20 MG/1
20 TABLET, FILM COATED ORAL DAILY
Qty: 90 TABLET | Refills: 1 | Status: SHIPPED | OUTPATIENT
Start: 2019-02-28 | End: 2020-09-08 | Stop reason: SDUPTHER

## 2019-02-28 NOTE — ASSESSMENT & PLAN NOTE
Lab Results   Component Value Date    HGBA1C 9 3 (H) 12/17/2018       No results for input(s): POCGLU in the last 72 hours      Blood Sugar Average: Last 72 hrs:    Not controlled  Needs follow up with endocrinology

## 2019-02-28 NOTE — PROGRESS NOTES
Assessment/Plan:    Problem List Items Addressed This Visit     Benign hypertension with CKD (chronic kidney disease) stage IV (HCC)     Not controlled, has follow up with Dr Ever Guerrero next week          Mixed hyperlipidemia     Not controlled   Atorvastatin 20 mg started         Relevant Medications    atorvastatin (LIPITOR) 20 mg tablet    Morbid obesity (HCC)     Obesity is worsening  Weight loss discussed  Exercise encouraged  PE (pulmonary thromboembolism) ([facility] )     Questioning long term coumadin use  Relevant Orders    Ambulatory referral to Hematology / Oncology    Type 2 diabetes, uncontrolled, with renal manifestation ([facility] ) - Primary     Lab Results   Component Value Date    HGBA1C 9 3 (H) 12/17/2018       No results for input(s): POCGLU in the last 72 hours  Blood Sugar Average: Last 72 hrs:    Not controlled  Needs follow up with endocrinology           Relevant Medications    atorvastatin (LIPITOR) 20 mg tablet      Other Visit Diagnoses     Colon cancer screening        Relevant Orders    Ambulatory referral to Gastroenterology          BMI Counseling: Body mass index is 42 81 kg/m²  Discussed with patient's BMI with her  The BMI is above average  BMI counseling and education was provided to the patient  Nutrition recommendations include moderation in carbohydrate intake  Exercise advised as well  There are no Patient Instructions on file for this visit  Return in about 3 months (around 5/28/2019) for Next scheduled follow up  Subjective:      Patient ID: Lashonda Greenberg is a 60 y o  female  Chief Complaint   Patient presents with    Follow-up      noe present for 3 month follow up on medications  af/rma        Hyperlipidemia - has not been on a statin before  She is not following a low cholesterol diet  Diabetes - she has been taking her insulin  She has not had any low blood sugars  She is not watching her diet  Hypertension   This is a chronic problem   The current episode started more than 1 year ago  The problem has been gradually worsening since onset  The problem is uncontrolled  Associated symptoms include peripheral edema  Pertinent negatives include no headaches or palpitations  Past treatments include beta blockers  The current treatment provides moderate improvement  Compliance problems include exercise and diet  The following portions of the patient's history were reviewed and updated as appropriate:  past social history    Review of Systems   Cardiovascular: Positive for leg swelling  Negative for palpitations  Neurological: Negative for headaches           Current Outpatient Medications   Medication Sig Dispense Refill    albuterol (PROAIR HFA) 90 mcg/act inhaler Inhale 2 puffs every 4 (four) hours as needed for wheezing or shortness of breath 1 Inhaler 0    Blood Glucose Monitoring Suppl (ONE TOUCH ULTRA 2) w/Device KIT Test glucose 3 times daily 1 each 0    Insulin Lispro Prot & Lispro (HUMALOG MIX 75/25 KWIKPEN) (75-25) 100 units/mL injection pen Inject 30 Units under the skin 2 (two) times a day with meals for 250 days 150 mL 1    metoprolol succinate (TOPROL-XL) 25 mg 24 hr tablet Take 1 tablet (25 mg total) by mouth daily (Patient taking differently: Take 50 mg by mouth daily ) 90 tablet 3    ONE TOUCH ULTRA TEST test strip Test glucose 3 times daily 300 each 1    torsemide (DEMADEX) 20 mg tablet Take 2 tablet per day in the morning and take an additional tablet in the evening for weight gain or worsening swelling 100 tablet 0    warfarin (COUMADIN) 1 mg tablet One po daily 30 tablet 1    warfarin (COUMADIN) 2 mg tablet 4mg daily or as directed 180 tablet 0    warfarin (COUMADIN) 3 mg tablet Take 5 milligrams on 7/16/18, July 17, 2018 and get PT/INR on July 17, 2018 30 tablet 0    warfarin (COUMADIN) 5 mg tablet Take 1 tablet (5 mg total) by mouth daily 90 tablet 1    atorvastatin (LIPITOR) 20 mg tablet Take 1 tablet (20 mg total) by mouth daily 90 tablet 1     No current facility-administered medications for this visit  Objective:    /96 (BP Location: Right arm, Patient Position: Sitting, Cuff Size: Large)   Pulse 80   Temp 98 6 °F (37 °C)   Resp 16   Ht 5' (1 524 m)   Wt 99 4 kg (219 lb 3 2 oz)   BMI 42 81 kg/m²        Physical Exam   Constitutional: She appears well-developed and well-nourished  HENT:   Head: Normocephalic and atraumatic  Right Ear: External ear normal    Left Ear: External ear normal    Mouth/Throat: Oropharynx is clear and moist    Cardiovascular: Normal rate, regular rhythm and normal heart sounds  Exam reveals no friction rub  No murmur heard  Pulses:       Dorsalis pedis pulses are 2+ on the right side, and 2+ on the left side  Posterior tibial pulses are 2+ on the right side, and 2+ on the left side  Pulmonary/Chest: Effort normal and breath sounds normal  No respiratory distress  She has no wheezes  She has no rales  Musculoskeletal: She exhibits edema  She exhibits no deformity  Feet:   Right Foot:   Skin Integrity: Negative for ulcer, skin breakdown, erythema, warmth, callus or dry skin  Left Foot:   Skin Integrity: Negative for ulcer, skin breakdown, erythema, warmth, callus or dry skin  Nursing note and vitals reviewed  Patient's shoes and socks removed  Right Foot/Ankle   Right Foot Inspection  Skin Exam: skin normal skin not intact, no dry skin, no warmth, no callus, no erythema, no maceration, no abnormal color, no pre-ulcer, no ulcer and no callus                          Toe Exam: ROM and strength within normal limits  Sensory       Monofilament testing: intact  Vascular  Capillary refills: < 3 seconds  The right DP pulse is 2+  The right PT pulse is 2+       Left Foot/Ankle  Left Foot Inspection  Skin Exam: skin normalskin not intact, no dry skin, no warmth, no erythema, no maceration, normal color, no pre-ulcer, no ulcer and no callus Toe Exam: ROM and strength within normal limits                   Sensory       Monofilament: intact  Vascular  Capillary refills: < 3 seconds  The left DP pulse is 2+  The left PT pulse is 2+     Assign Risk Category:  No deformity present; ;        Risk: 0       Lillian Ba DO

## 2019-03-01 ENCOUNTER — ANTICOAG VISIT (OUTPATIENT)
Dept: FAMILY MEDICINE CLINIC | Facility: CLINIC | Age: 64
End: 2019-03-01

## 2019-03-01 ENCOUNTER — APPOINTMENT (OUTPATIENT)
Dept: LAB | Facility: HOSPITAL | Age: 64
End: 2019-03-01
Payer: COMMERCIAL

## 2019-03-01 DIAGNOSIS — T81.718D IATROGENIC PULMONARY EMBOLISM AND INFARCTION, SUBSEQUENT ENCOUNTER (HCC): ICD-10-CM

## 2019-03-01 DIAGNOSIS — I26.99 IATROGENIC PULMONARY EMBOLISM AND INFARCTION, SUBSEQUENT ENCOUNTER (HCC): ICD-10-CM

## 2019-03-01 LAB
INR PPP: 3.64 (ref 0.86–1.16)
PROTHROMBIN TIME: 35.5 SECONDS (ref 9.4–11.7)

## 2019-03-01 PROCEDURE — 36415 COLL VENOUS BLD VENIPUNCTURE: CPT

## 2019-03-01 PROCEDURE — 85610 PROTHROMBIN TIME: CPT

## 2019-03-08 ENCOUNTER — TRANSCRIBE ORDERS (OUTPATIENT)
Dept: ADMINISTRATIVE | Facility: HOSPITAL | Age: 64
End: 2019-03-08

## 2019-03-08 ENCOUNTER — APPOINTMENT (OUTPATIENT)
Dept: LAB | Facility: HOSPITAL | Age: 64
End: 2019-03-08
Payer: COMMERCIAL

## 2019-03-08 DIAGNOSIS — N18.4 STAGE 4 CHRONIC KIDNEY DISEASE (HCC): ICD-10-CM

## 2019-03-08 DIAGNOSIS — I26.99 PULMONARY THROMBOSIS (HCC): Primary | ICD-10-CM

## 2019-03-08 LAB
25(OH)D3 SERPL-MCNC: 15.9 NG/ML (ref 30–100)
ANION GAP SERPL CALCULATED.3IONS-SCNC: 7 MMOL/L (ref 4–13)
BUN SERPL-MCNC: 59 MG/DL (ref 5–25)
CALCIUM SERPL-MCNC: 9 MG/DL (ref 8.3–10.1)
CHLORIDE SERPL-SCNC: 101 MMOL/L (ref 100–108)
CO2 SERPL-SCNC: 28 MMOL/L (ref 21–32)
CREAT SERPL-MCNC: 3.36 MG/DL (ref 0.6–1.3)
CREAT UR-MCNC: 76.4 MG/DL
ERYTHROCYTE [DISTWIDTH] IN BLOOD BY AUTOMATED COUNT: 15.4 % (ref 11.6–15.1)
GFR SERPL CREATININE-BSD FRML MDRD: 14 ML/MIN/1.73SQ M
GLUCOSE P FAST SERPL-MCNC: 165 MG/DL (ref 65–99)
HCT VFR BLD AUTO: 39.9 % (ref 34.8–46.1)
HGB BLD-MCNC: 12.2 G/DL (ref 11.5–15.4)
INR PPP: 2.89 (ref 0.86–1.16)
MAGNESIUM SERPL-MCNC: 2.1 MG/DL (ref 1.6–2.6)
MCH RBC QN AUTO: 26.2 PG (ref 26.8–34.3)
MCHC RBC AUTO-ENTMCNC: 30.6 G/DL (ref 31.4–37.4)
MCV RBC AUTO: 86 FL (ref 82–98)
PHOSPHATE SERPL-MCNC: 4.3 MG/DL (ref 2.3–4.1)
PLATELET # BLD AUTO: 238 THOUSANDS/UL (ref 149–390)
PMV BLD AUTO: 11.9 FL (ref 8.9–12.7)
POTASSIUM SERPL-SCNC: 4.1 MMOL/L (ref 3.5–5.3)
PROT UR-MCNC: 314 MG/DL
PROT/CREAT UR: 4.11 MG/G{CREAT} (ref 0–0.1)
PROTHROMBIN TIME: 28.6 SECONDS (ref 9.4–11.7)
PTH-INTACT SERPL-MCNC: 561.5 PG/ML (ref 18.4–80.1)
RBC # BLD AUTO: 4.65 MILLION/UL (ref 3.81–5.12)
SODIUM SERPL-SCNC: 136 MMOL/L (ref 136–145)
WBC # BLD AUTO: 8.83 THOUSAND/UL (ref 4.31–10.16)

## 2019-03-08 PROCEDURE — 84100 ASSAY OF PHOSPHORUS: CPT

## 2019-03-08 PROCEDURE — 83735 ASSAY OF MAGNESIUM: CPT

## 2019-03-08 PROCEDURE — 83970 ASSAY OF PARATHORMONE: CPT

## 2019-03-08 PROCEDURE — 85610 PROTHROMBIN TIME: CPT

## 2019-03-08 PROCEDURE — 82306 VITAMIN D 25 HYDROXY: CPT

## 2019-03-08 PROCEDURE — 82570 ASSAY OF URINE CREATININE: CPT

## 2019-03-08 PROCEDURE — 85027 COMPLETE CBC AUTOMATED: CPT

## 2019-03-08 PROCEDURE — 84156 ASSAY OF PROTEIN URINE: CPT

## 2019-03-08 PROCEDURE — 36415 COLL VENOUS BLD VENIPUNCTURE: CPT

## 2019-03-08 PROCEDURE — 80048 BASIC METABOLIC PNL TOTAL CA: CPT

## 2019-03-11 ENCOUNTER — ANTICOAG VISIT (OUTPATIENT)
Dept: FAMILY MEDICINE CLINIC | Facility: CLINIC | Age: 64
End: 2019-03-11

## 2019-03-11 ENCOUNTER — OFFICE VISIT (OUTPATIENT)
Dept: NEPHROLOGY | Facility: CLINIC | Age: 64
End: 2019-03-11
Payer: COMMERCIAL

## 2019-03-11 VITALS
WEIGHT: 215.6 LBS | HEIGHT: 60 IN | DIASTOLIC BLOOD PRESSURE: 84 MMHG | SYSTOLIC BLOOD PRESSURE: 140 MMHG | BODY MASS INDEX: 42.33 KG/M2 | HEART RATE: 80 BPM

## 2019-03-11 DIAGNOSIS — N18.4 STAGE 4 CHRONIC KIDNEY DISEASE (HCC): Primary | ICD-10-CM

## 2019-03-11 DIAGNOSIS — N18.4 BENIGN HYPERTENSION WITH CKD (CHRONIC KIDNEY DISEASE) STAGE IV (HCC): ICD-10-CM

## 2019-03-11 DIAGNOSIS — R80.9 NEPHROTIC RANGE PROTEINURIA: ICD-10-CM

## 2019-03-11 DIAGNOSIS — E55.9 VITAMIN D DEFICIENCY: ICD-10-CM

## 2019-03-11 DIAGNOSIS — N25.81 SECONDARY HYPERPARATHYROIDISM OF RENAL ORIGIN (HCC): ICD-10-CM

## 2019-03-11 DIAGNOSIS — I12.9 BENIGN HYPERTENSION WITH CKD (CHRONIC KIDNEY DISEASE) STAGE IV (HCC): ICD-10-CM

## 2019-03-11 PROCEDURE — 99214 OFFICE O/P EST MOD 30 MIN: CPT | Performed by: PHYSICIAN ASSISTANT

## 2019-03-11 RX ORDER — CALCITRIOL 0.25 UG/1
0.25 CAPSULE, LIQUID FILLED ORAL DAILY
Qty: 90 CAPSULE | Refills: 3 | Status: SHIPPED | OUTPATIENT
Start: 2019-03-11 | End: 2019-08-02

## 2019-03-11 RX ORDER — ERGOCALCIFEROL 1.25 MG/1
50000 CAPSULE ORAL WEEKLY
Qty: 12 CAPSULE | Refills: 0 | Status: SHIPPED | OUTPATIENT
Start: 2019-03-11 | End: 2019-06-21

## 2019-03-11 RX ORDER — METOPROLOL SUCCINATE 25 MG/1
TABLET, EXTENDED RELEASE ORAL
Qty: 90 TABLET | Refills: 3 | Status: SHIPPED | OUTPATIENT
Start: 2019-03-11 | End: 2020-04-21 | Stop reason: SDUPTHER

## 2019-03-11 NOTE — PATIENT INSTRUCTIONS
Chronic Kidney Disease stage IV- Baseline creatinine appears to have progressed into the low 3s  Creatinine and electrolytes stable  Kidney Smart: completed 1/7/19  Modality: reviewed, I think PD would be a great option for her  Transplant: will schedule for evaluation     Volume Status- She takes torsemide 40mg in the morning and if weight gain, she takes 20mg in the evening but has not needed this recently  Her normal weight is 215 to 218 pounds  Nephrotic Range Proteinuria- UPC ratio is 4  11  Likely secondary to diabetic nephropathy  Serologies negative in the past   Patient declined renal biopsy in the past      Hypertension- Goal blood pressure is <130/80  Antihypertensive regimen includes metoprolol 50mg daily and torsemide 40mg AM and 20mg PM a few times a week  Avoid salt/sodium in your diet  Stay active  Avoid nonsteroidals  Blood pressure is still above goal   Check your blood pressure at home and record  Increase metoprolol to 50mg AM and 25mg PM        Vitamin D Deficiency- start ergocalciferol 50,000 units weekly x12 weeks  Once you finish this dose, take vitamin D, 2000 units daily  Secondary Hyperparathyroidism- start calcitriol 0 25mcg daily  Follow up with Dr Anthony Boothe in 3 months  Please call the office with any questions or concerns

## 2019-03-11 NOTE — PROGRESS NOTES
Assessment and Plan:    Mendy Amezquita was seen today for follow-up and chronic kidney disease  Diagnoses and all orders for this visit:    Stage 4 chronic kidney disease (HCC)  -     metoprolol succinate (TOPROL-XL) 25 mg 24 hr tablet; Take 50mg AM & 25mg PM  -     Basic metabolic panel; Future  -     Magnesium; Future  -     Phosphorus; Future  -     PTH, intact; Future  -     CBC; Future  -     Ambulatory referral to renal transplant evaluation; Future    Benign hypertension with CKD (chronic kidney disease) stage IV (HCC)    Nephrotic range proteinuria    Vitamin D deficiency  -     ergocalciferol (VITAMIN D2) 50,000 units; Take 1 capsule (50,000 Units total) by mouth once a week    Secondary hyperparathyroidism of renal origin ([facility] [address] )  -     calcitriol (ROCALTROL) 0 25 mcg capsule; Take 1 capsule (0 25 mcg total) by mouth daily        Chronic Kidney Disease stage IV- Baseline creatinine appears to have progressed into the low 3s  Creatinine and electrolytes stable  Kidney Smart: completed 1/7/19  Modality: reviewed, I think PD would be a great option for her  Transplant: will schedule for evaluation     Volume Status- She takes torsemide 40mg in the morning and if weight gain, she takes 20mg in the evening but has not needed this recently  Her normal weight is 215 to 218 pounds  Nephrotic Range Proteinuria- UPC ratio is 4  11  Likely secondary to diabetic nephropathy  Serologies negative in the past   Patient declined renal biopsy in the past      Hypertension- Goal blood pressure is <130/80  Antihypertensive regimen includes metoprolol 50mg daily and torsemide 40mg AM and 20mg PM a few times a week  Avoid salt/sodium in your diet  Stay active  Avoid nonsteroidals  Blood pressure is still above goal   Check your blood pressure at home and record  Increase metoprolol to 50mg AM and 25mg PM        Vitamin D Deficiency- start ergocalciferol 50,000 units weekly x12 weeks    Once you finish this dose, take vitamin D, 2000 units daily  Secondary Hyperparathyroidism- start calcitriol 0 25mcg daily  Follow up with Dr Estelita Hendricks in 3 months  Please call the office with any questions or concerns  Reason for Visit: Follow-up and Chronic Kidney Disease    HPI: Dani Goodell is a 60 y o  female who is here for follow up of advanced chronic kidney disease  She last saw me about 3 months ago  She has a cold currently which she got over the weekend  She has not been eating much the past weekend  Her blood pressure at home is elevated to 327-801 systolic  She actually increased her metoprolol to 50mg daily on her own about two weeks ago  She denies SOB  Her weight is at the high end of her range but stable  She does not eat a healthy diet usually  She is nervous about transplant and dialysis  We discussed the differences between them and she is agreeable to proceed with a transplant evaluation  ROS: A complete review of systems was performed and was negative unless otherwise noted in the history of present illness  Allergies:   Patient has no known allergies      Medications:     Current Outpatient Medications:     albuterol (PROAIR HFA) 90 mcg/act inhaler, Inhale 2 puffs every 4 (four) hours as needed for wheezing or shortness of breath, Disp: 1 Inhaler, Rfl: 0    atorvastatin (LIPITOR) 20 mg tablet, Take 1 tablet (20 mg total) by mouth daily, Disp: 90 tablet, Rfl: 1    Blood Glucose Monitoring Suppl (ONE TOUCH ULTRA 2) w/Device KIT, Test glucose 3 times daily, Disp: 1 each, Rfl: 0    Insulin Lispro Prot & Lispro (HUMALOG MIX 75/25 KWIKPEN) (75-25) 100 units/mL injection pen, Inject 30 Units under the skin 2 (two) times a day with meals for 250 days, Disp: 150 mL, Rfl: 1    metoprolol succinate (TOPROL-XL) 25 mg 24 hr tablet, Take 50mg AM & 25mg PM, Disp: 90 tablet, Rfl: 3    ONE TOUCH ULTRA TEST test strip, Test glucose 3 times daily, Disp: 300 each, Rfl: 1    torsemide (DEMADEX) 20 mg tablet, Take 2 tablet per day in the morning and take an additional tablet in the evening for weight gain or worsening swelling, Disp: 100 tablet, Rfl: 0    warfarin (COUMADIN) 2 mg tablet, 4mg daily or as directed, Disp: 180 tablet, Rfl: 0    calcitriol (ROCALTROL) 0 25 mcg capsule, Take 1 capsule (0 25 mcg total) by mouth daily, Disp: 90 capsule, Rfl: 3    ergocalciferol (VITAMIN D2) 50,000 units, Take 1 capsule (50,000 Units total) by mouth once a week, Disp: 12 capsule, Rfl: 0    warfarin (COUMADIN) 1 mg tablet, One po daily (Patient not taking: Reported on 3/11/2019), Disp: 30 tablet, Rfl: 1    warfarin (COUMADIN) 3 mg tablet, Take 5 milligrams on 18, 2018 and get PT/INR on 2018 (Patient not taking: Reported on 3/11/2019), Disp: 30 tablet, Rfl: 0    warfarin (COUMADIN) 5 mg tablet, Take 1 tablet (5 mg total) by mouth daily (Patient not taking: Reported on 3/11/2019), Disp: 90 tablet, Rfl: 1    Past Medical History:   Diagnosis Date    Acute asthmatic bronchitis     last assessed: 2017    Cardiac disease     Diabetes mellitus ([facility] [address] )     Hyperlipidemia     Hypertension     Pneumonia     last assessed: 2013    Renal disorder      Past Surgical History:   Procedure Laterality Date     SECTION       SECTION      NOSE SURGERY       Family History   Problem Relation Age of Onset    Diabetes Mother         mellitus    Anxiety disorder Mother         generalized anxiety disorder    Hypertension Mother     Kidney disease Father     Kidney failure Father         renal failure    Ulcerative colitis Sister     Heart disease Family       reports that she has never smoked  She has never used smokeless tobacco  She reports that she does not drink alcohol or use drugs      Physical Exam:   Vitals:    19 1100 19 1133   BP:  140/84   BP Location:  Right arm   Patient Position:  Sitting   Cuff Size:  Large   Pulse:  80   Weight: 97 8 kg (215 lb 9 6 oz)    Height: 5' (1 524 m)      Body mass index is 42 11 kg/m²  General: NAD  Neuro: AAO  Neck: supple  Skin: no rash  Heart: RRR  Lungs: decreased  Abdomen: soft nt nd  Extremities: no edema    Procedure:  No results found for this or any previous visit  Labs reviewed      Lab Results   Component Value Date    GLUCOSE 133 12/11/2014    CALCIUM 9 0 03/08/2019     12/11/2014    K 4 1 03/08/2019    CO2 28 03/08/2019     03/08/2019    BUN 59 (H) 03/08/2019    CREATININE 3 36 (H) 03/08/2019       Results from last 7 days   Lab Units 03/08/19  0731   WBC Thousand/uL 8 83   HEMOGLOBIN g/dL 12 2   HEMATOCRIT % 39 9   PLATELETS Thousands/uL 238   POTASSIUM mmol/L 4 1   CHLORIDE mmol/L 101   CO2 mmol/L 28   BUN mg/dL 59*   CREATININE mg/dL 3 36*   CALCIUM mg/dL 9 0   MAGNESIUM mg/dL 2 1   PHOSPHORUS mg/dL 4 3*

## 2019-03-15 ENCOUNTER — CONSULT (OUTPATIENT)
Dept: HEMATOLOGY ONCOLOGY | Facility: MEDICAL CENTER | Age: 64
End: 2019-03-15
Payer: COMMERCIAL

## 2019-03-15 VITALS
DIASTOLIC BLOOD PRESSURE: 80 MMHG | HEART RATE: 79 BPM | SYSTOLIC BLOOD PRESSURE: 142 MMHG | HEIGHT: 60 IN | BODY MASS INDEX: 42.6 KG/M2 | WEIGHT: 217 LBS | TEMPERATURE: 98.3 F | OXYGEN SATURATION: 93 % | RESPIRATION RATE: 18 BRPM

## 2019-03-15 DIAGNOSIS — I26.99 PE (PULMONARY THROMBOEMBOLISM) (HCC): Primary | ICD-10-CM

## 2019-03-15 PROCEDURE — 99204 OFFICE O/P NEW MOD 45 MIN: CPT | Performed by: INTERNAL MEDICINE

## 2019-03-15 NOTE — PROGRESS NOTES
Dominga Kauffman  1955  [address] [facility] [address] [place] [phone]  HEMATOLOGY/ONCOLOGY CONSULTATION REPORT    DISCUSSION/SUMMARY:    72-year-old female with a number of medical problems diagnosed with pulmonary embolism approximately 8 months ago  Patient could not have a CTA/chest (chronic renal insufficiency); V/Q scan was intermediate  Patient has been on Coumadin; has tolerated the treatments relatively well without additional respiratory issues or excessive bruising/bleeding  Mrs Smith feels okay; prior respiratory complaints have resolved  Is difficult to say if patient did or did not have the PE  There is no family history of thrombosis and patient has never had a prior DVT or PE  We discussed options  The recent CT scans have demonstrated small vessel or airway disease and pulmonary hypertension  I am wondering if this has anything to do with the previous respiratory issues  Patient has been referred to Pulmonary  As long as there are no pulmonary concerns for discontinuing the Coumadin, the medication can then be stopped  We also discussed performing a thrombophilia evaluation  Patient is not very keen on this and I am not entirely convinced that it is necessary anyway  For the time being, patient is to continue the Coumadin; PCP is monitoring the INR  We talked about the possible eventual discontinuation of the Coumadin  Mrs Smith demonstrated a very good understanding of the situation and agreed that it is impossible to predict the future  Once off Coumadin, patient may suffer another thrombotic complication  Mrs Smith is to be seen 1 week after she has been seen by Pulmonary  Patient knows to call the hematology/oncology office if there are any other questions or concerns  Diabetes control is ongoing; patient will also follow up with Dr Alysha Dunne, nephrology as directed      Carefully review your medication list and verify that the list is accurate and up-to-date  Please call the hematology/oncology office if there are medications missing from the list, medications on the list that you are not currently taking or if there is a dosage or instruction that is different from how you're taking that medication  Patient goals and areas of care:  Pulmonary evaluation, continue Coumadin for now  Barriers to care:  None  Patient is able to self-care   ______________________________________________________________________________________    Chief Complaint   Patient presents with    Follow-up     History of PE on Coumadin     History of Present Illness:    62-year-old female referred for evaluation of need for Coumadin  Mrs Smith began to have worsening shortness of breath and dyspnea on exertion with chest pain in the summer of 2018  Because of progression, patient underwent workup  Mrs Erin Freeman has had diabetes for approximately 20 years and has chronic renal insufficiency  Patient could not have contrast material and a V/Q scan was read as intermediate probability for PE  Patient was placed on Coumadin and has been treated for approximately 8+ months  Presently Mrs Smith states feeling okay, baseline  No shortness of breath or dyspnea on exertion  No chest pain or pressure  No cough, sputum or hemoptysis  No headaches, blurred vision or dizziness, no syncopal episodes  No GI,  or GYN issues; patient is trying to lose weight  Activities are baseline  Patient states that she occasionally sees bruises but otherwise has no bleeding issues  Patient states needing Coumadin dose manipulation recently  Review of Systems   Constitutional: Negative  HENT: Negative  Eyes: Negative  Respiratory: Negative  Cardiovascular: Negative  Gastrointestinal: Negative  Endocrine: Negative  Genitourinary: Negative  Musculoskeletal: Negative  Skin: Negative      Allergic/Immunologic: Negative  Neurological: Negative  Hematological: Bruises/bleeds easily  Psychiatric/Behavioral: Negative  All other systems reviewed and are negative      Patient Active Problem List   Diagnosis    Benign hypertension with CKD (chronic kidney disease) stage IV (HCC)    Stage 4 chronic kidney disease (HCC)    Nephrotic range proteinuria    Cyst of ovary    Primary hypothyroidism    Mixed hyperlipidemia    Type 2 diabetes, uncontrolled, with renal manifestation (HCC)    Obstructive sleep apnea    Dyspnea    PE [facility]pulmonary thromboembolism) ([facility])    Elevated brain natriuretic peptide (BNP) level    Leg edema, left    Pulmonary hypertension (HCC)    Closed nondisplaced fracture of distal phalanx of right great toe    Right foot pain    Onychomycosis    Tinea pedis of both feet    Acute idiopathic gout of right foot    Morbid obesity ([facility] [address] )    Vitamin D deficiency    Secondary hyperparathyroidism of renal origin ([facility] [address] )     Past Medical History:   Diagnosis Date    Acute asthmatic bronchitis     last assessed: 2017    Cardiac disease     Diabetes mellitus ([facility] [address] )     Hyperlipidemia     Hypertension     Pneumonia     last assessed: 2013    Renal disorder      Past Surgical History:   Procedure Laterality Date     SECTION       SECTION      NOSE SURGERY       Family History   Problem Relation Age of Onset    Diabetes Mother         mellitus    Anxiety disorder Mother         generalized anxiety disorder    Hypertension Mother     Kidney disease Father     Kidney failure Father         renal failure    Ulcerative colitis Sister     Heart disease Family     Ovarian cancer Maternal Aunt      Social History     Socioeconomic History    Marital status: Single     Spouse name: Not on file    Number of children: Not on file    Years of education: Not on file    Highest education level: Not on file   Occupational History    Not on file   Social Needs    Financial resource strain: Not on file    Food insecurity:     Worry: Not on file     Inability: Not on file    Transportation needs:     Medical: Not on file     Non-medical: Not on file   Tobacco Use    Smoking status: Never Smoker    Smokeless tobacco: Never Used   Substance and Sexual Activity    Alcohol use: No    Drug use: No    Sexual activity: Not on file   Lifestyle    Physical activity:     Days per week: Not on file     Minutes per session: Not on file    Stress: Not on file   Relationships    Social connections:     Talks on phone: Not on file     Gets together: Not on file     Attends [employer] service: Not on file     Active member of club or organization: Not on file     Attends meetings of clubs or organizations: Not on file     Relationship status: Not on file    Intimate partner violence:     Fear of current or ex partner: Not on file     Emotionally abused: Not on file     Physically abused: Not on file     Forced sexual activity: Not on file   Other Topics Concern    Not on file   Social History Narrative    Caffeine use       Current Outpatient Medications:     albuterol (PROAIR HFA) 90 mcg/act inhaler, Inhale 2 puffs every 4 (four) hours as needed for wheezing or shortness of breath, Disp: 1 Inhaler, Rfl: 0    atorvastatin (LIPITOR) 20 mg tablet, Take 1 tablet (20 mg total) by mouth daily, Disp: 90 tablet, Rfl: 1    Blood Glucose Monitoring Suppl (ONE TOUCH ULTRA 2) w/Device KIT, Test glucose 3 times daily, Disp: 1 each, Rfl: 0    calcitriol (ROCALTROL) 0 25 mcg capsule, Take 1 capsule (0 25 mcg total) by mouth daily, Disp: 90 capsule, Rfl: 3    ergocalciferol (VITAMIN D2) 50,000 units, Take 1 capsule (50,000 Units total) by mouth once a week, Disp: 12 capsule, Rfl: 0    Insulin Lispro Prot & Lispro (HUMALOG MIX 75/25 KWIKPEN) (75-25) 100 units/mL injection pen, Inject 30 Units under the skin 2 (two) times a day with meals for 250 days, Disp: 150 mL, Rfl: 1    metoprolol succinate (TOPROL-XL) 25 mg 24 hr tablet, Take 50mg AM & 25mg PM, Disp: 90 tablet, Rfl: 3    ONE TOUCH ULTRA TEST test strip, Test glucose 3 times daily, Disp: 300 each, Rfl: 1    torsemide (DEMADEX) 20 mg tablet, Take 2 tablet per day in the morning and take an additional tablet in the evening for weight gain or worsening swelling, Disp: 100 tablet, Rfl: 0    warfarin (COUMADIN) 2 mg tablet, 4mg daily or as directed, Disp: 180 tablet, Rfl: 0    warfarin (COUMADIN) 1 mg tablet, One po daily (Patient not taking: Reported on 3/11/2019), Disp: 30 tablet, Rfl: 1    warfarin (COUMADIN) 3 mg tablet, Take 5 milligrams on 7/16/18, July 17, 2018 and get PT/INR on July 17, 2018 (Patient not taking: Reported on 3/11/2019), Disp: 30 tablet, Rfl: 0    warfarin (COUMADIN) 5 mg tablet, Take 1 tablet (5 mg total) by mouth daily (Patient not taking: Reported on 3/11/2019), Disp: 90 tablet, Rfl: 1    No Known Allergies    Vitals:    03/15/19 0928   BP: 142/80   Pulse: 79   Resp: 18   Temp: 98 3 °F (36 8 °C)   SpO2: 93%     Physical Exam   Constitutional: She is oriented to person, place, and time  She appears well-developed and well-nourished  Obese female, no respiratory distress   HENT:   Head: Normocephalic and atraumatic  Right Ear: External ear normal    Left Ear: External ear normal    Mouth/Throat: Oropharynx is clear and moist    Eyes: Pupils are equal, round, and reactive to light  Conjunctivae and EOM are normal    Neck: Normal range of motion  Neck supple  Supple, no JVD   Cardiovascular: Normal rate, regular rhythm, normal heart sounds and intact distal pulses  Pulmonary/Chest: Effort normal and breath sounds normal    Clear bilaterally   Abdominal: Soft  Bowel sounds are normal    Soft, +bowel sounds, obese, no rigidity or rebound, no guarding, cannot palpate liver or spleen   Musculoskeletal: Normal range of motion  Neurological: She is alert and oriented to person, place, and time   She has normal reflexes  Skin: Skin is warm  Warm, moist, good color, no petechiae or ecchymoses, no hematomas, no active bleeding   Psychiatric: She has a normal mood and affect  Her behavior is normal  Judgment and thought content normal    Extremities:  1+ bilateral lower extremity edema, no cords, pulses are 1+  Lymphatics:  No adenopathy the neck, supraclavicular region, axilla and groin bilaterally    Labs    03/08/2019 WBC = 8 8 hemoglobin = 12 2 hematocrit = 40 MCV = 6 platelet = 647 BUN = 59 creatinine = 3 36 calcium = 9 0    Imaging    11/01/2018 CT scan of the chest without contrast    Unchanged enlargement of the main pulmonary artery suggesting pulmonary hypertension  Persistent mosaic attenuation in the lungs, indicative of  chronic small vessel or small airways disease      07/06/2018 vascular lower limb venous duplex study bilateral    RIGHT LOWER LIMB:  No evidence of acute or chronic deep vein thrombosis  No evidence of superficial thrombophlebitis noted  Doppler evaluation shows a normal response to augmentation maneuvers  Popliteal, posterior tibial and anterior tibial arterial Doppler waveforms are  triphasic  LEFT LOWER LIMB:  No evidence of acute or chronic deep vein thrombosis  No evidence of superficial thrombophlebitis noted  Doppler evaluation shows a normal response to augmentation maneuvers  Popliteal, posterior tibial and anterior tibial arterial Doppler waveforms are  triphasic  Tech note: Bilateral calf veins were not well visualized secondary to body  Habitus  07/05/2018 lung ventilation/perfusion scan impression stated intermediate probability for pulmonary embolus

## 2019-03-27 ENCOUNTER — OFFICE VISIT (OUTPATIENT)
Dept: NEPHROLOGY | Facility: CLINIC | Age: 64
End: 2019-03-27
Payer: COMMERCIAL

## 2019-03-27 VITALS
HEIGHT: 60 IN | DIASTOLIC BLOOD PRESSURE: 80 MMHG | BODY MASS INDEX: 42.96 KG/M2 | SYSTOLIC BLOOD PRESSURE: 140 MMHG | WEIGHT: 218.8 LBS

## 2019-03-27 DIAGNOSIS — N18.4 STAGE 4 CHRONIC KIDNEY DISEASE (HCC): ICD-10-CM

## 2019-03-27 PROCEDURE — 99215 OFFICE O/P EST HI 40 MIN: CPT | Performed by: INTERNAL MEDICINE

## 2019-03-27 PROCEDURE — 3066F NEPHROPATHY DOC TX: CPT | Performed by: INTERNAL MEDICINE
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
## 2019-03-27 NOTE — PATIENT INSTRUCTIONS
1) please call if you do not hear from [facility] in 3 weeks  2) Please call Dr John Jules once it is determined when your colonoscopy is  3) Please talk to your Physician regarding updating pap/mammogram  4) Please start to work on weight loss - goal should be eat smaller portions and walk 20-30 minutes daily; monitor your sugar levels closely and call your Endocrinologist if any issues arise

## 2019-04-01 ENCOUNTER — APPOINTMENT (OUTPATIENT)
Dept: LAB | Facility: HOSPITAL | Age: 64
End: 2019-04-01
Payer: COMMERCIAL

## 2019-04-01 ENCOUNTER — OFFICE VISIT (OUTPATIENT)
Dept: PULMONOLOGY | Facility: MEDICAL CENTER | Age: 64
End: 2019-04-01
Payer: COMMERCIAL

## 2019-04-01 ENCOUNTER — ANTICOAG VISIT (OUTPATIENT)
Dept: FAMILY MEDICINE CLINIC | Facility: CLINIC | Age: 64
End: 2019-04-01

## 2019-04-01 ENCOUNTER — TRANSCRIBE ORDERS (OUTPATIENT)
Dept: ADMINISTRATIVE | Facility: HOSPITAL | Age: 64
End: 2019-04-01

## 2019-04-01 VITALS
HEART RATE: 78 BPM | WEIGHT: 221 LBS | TEMPERATURE: 98.4 F | HEIGHT: 60 IN | DIASTOLIC BLOOD PRESSURE: 84 MMHG | SYSTOLIC BLOOD PRESSURE: 150 MMHG | OXYGEN SATURATION: 92 % | RESPIRATION RATE: 16 BRPM | BODY MASS INDEX: 43.39 KG/M2

## 2019-04-01 DIAGNOSIS — I26.99 IATROGENIC PULMONARY EMBOLISM AND INFARCTION, SUBSEQUENT ENCOUNTER (HCC): ICD-10-CM

## 2019-04-01 DIAGNOSIS — T81.718D IATROGENIC PULMONARY EMBOLISM AND INFARCTION, SUBSEQUENT ENCOUNTER (HCC): Primary | ICD-10-CM

## 2019-04-01 DIAGNOSIS — I26.99 PE (PULMONARY THROMBOEMBOLISM) (HCC): ICD-10-CM

## 2019-04-01 DIAGNOSIS — N18.5 STAGE 5 CHRONIC KIDNEY DISEASE NOT ON CHRONIC DIALYSIS (HCC): ICD-10-CM

## 2019-04-01 DIAGNOSIS — G47.33 OBSTRUCTIVE SLEEP APNEA: ICD-10-CM

## 2019-04-01 DIAGNOSIS — T81.718D IATROGENIC PULMONARY EMBOLISM AND INFARCTION, SUBSEQUENT ENCOUNTER (HCC): ICD-10-CM

## 2019-04-01 DIAGNOSIS — R09.02 EXERCISE HYPOXEMIA: ICD-10-CM

## 2019-04-01 DIAGNOSIS — I34.2 NON-RHEUMATIC MITRAL VALVE STENOSIS: ICD-10-CM

## 2019-04-01 DIAGNOSIS — R06.00 DOE (DYSPNEA ON EXERTION): ICD-10-CM

## 2019-04-01 DIAGNOSIS — R06.00 DYSPNEA ON EXERTION: Primary | ICD-10-CM

## 2019-04-01 DIAGNOSIS — I26.99 IATROGENIC PULMONARY EMBOLISM AND INFARCTION, SUBSEQUENT ENCOUNTER (HCC): Primary | ICD-10-CM

## 2019-04-01 PROBLEM — R06.09 DYSPNEA ON EXERTION: Status: ACTIVE | Noted: 2018-07-05

## 2019-04-01 LAB
INR PPP: 2.07 (ref 0.86–1.16)
INR PPP: 2.07 (ref 0.86–1.17)
PROTHROMBIN TIME: 20.9 SECONDS (ref 9.4–11.7)

## 2019-04-01 PROCEDURE — 99214 OFFICE O/P EST MOD 30 MIN: CPT | Performed by: INTERNAL MEDICINE

## 2019-04-01 PROCEDURE — 85610 PROTHROMBIN TIME: CPT

## 2019-04-01 PROCEDURE — 36415 COLL VENOUS BLD VENIPUNCTURE: CPT

## 2019-04-01 PROCEDURE — 94010 BREATHING CAPACITY TEST: CPT | Performed by: INTERNAL MEDICINE

## 2019-04-01 RX ORDER — FLUTICASONE FUROATE AND VILANTEROL 100; 25 UG/1; UG/1
1 POWDER RESPIRATORY (INHALATION) DAILY
Qty: 1 INHALER | Refills: 6 | Status: SHIPPED | OUTPATIENT
Start: 2019-04-01 | End: 2020-07-19 | Stop reason: HOSPADM

## 2019-04-05 ENCOUNTER — TELEPHONE (OUTPATIENT)
Dept: NEPHROLOGY | Facility: CLINIC | Age: 64
End: 2019-04-05

## 2019-04-08 ENCOUNTER — OFFICE VISIT (OUTPATIENT)
Dept: HEMATOLOGY ONCOLOGY | Facility: MEDICAL CENTER | Age: 64
End: 2019-04-08
Payer: COMMERCIAL

## 2019-04-08 VITALS
HEIGHT: 60 IN | BODY MASS INDEX: 43.39 KG/M2 | TEMPERATURE: 98.7 F | WEIGHT: 221 LBS | RESPIRATION RATE: 20 BRPM | SYSTOLIC BLOOD PRESSURE: 128 MMHG | HEART RATE: 91 BPM | OXYGEN SATURATION: 90 % | DIASTOLIC BLOOD PRESSURE: 78 MMHG

## 2019-04-08 DIAGNOSIS — I26.99 PE (PULMONARY THROMBOEMBOLISM) (HCC): Primary | ICD-10-CM

## 2019-04-08 PROCEDURE — 99213 OFFICE O/P EST LOW 20 MIN: CPT | Performed by: INTERNAL MEDICINE

## 2019-04-19 ENCOUNTER — TELEPHONE (OUTPATIENT)
Dept: NEPHROLOGY | Facility: CLINIC | Age: 64
End: 2019-04-19

## 2019-04-22 ENCOUNTER — OFFICE VISIT (OUTPATIENT)
Dept: CARDIOLOGY CLINIC | Facility: CLINIC | Age: 64
End: 2019-04-22
Payer: COMMERCIAL

## 2019-04-22 VITALS
OXYGEN SATURATION: 95 % | DIASTOLIC BLOOD PRESSURE: 80 MMHG | HEIGHT: 60 IN | BODY MASS INDEX: 42.78 KG/M2 | SYSTOLIC BLOOD PRESSURE: 140 MMHG | WEIGHT: 217.9 LBS | HEART RATE: 73 BPM

## 2019-04-22 DIAGNOSIS — I34.2 NON-RHEUMATIC MITRAL VALVE STENOSIS: ICD-10-CM

## 2019-04-22 DIAGNOSIS — R06.00 DYSPNEA ON EXERTION: ICD-10-CM

## 2019-04-22 PROCEDURE — 99244 OFF/OP CNSLTJ NEW/EST MOD 40: CPT | Performed by: INTERNAL MEDICINE

## 2019-04-22 PROCEDURE — 93000 ELECTROCARDIOGRAM COMPLETE: CPT | Performed by: INTERNAL MEDICINE

## 2019-05-01 ENCOUNTER — APPOINTMENT (OUTPATIENT)
Dept: LAB | Facility: HOSPITAL | Age: 64
End: 2019-05-01
Payer: COMMERCIAL

## 2019-05-01 ENCOUNTER — TRANSCRIBE ORDERS (OUTPATIENT)
Dept: ADMINISTRATIVE | Facility: HOSPITAL | Age: 64
End: 2019-05-01

## 2019-05-01 ENCOUNTER — HOSPITAL ENCOUNTER (OUTPATIENT)
Dept: NON INVASIVE DIAGNOSTICS | Facility: HOSPITAL | Age: 64
Discharge: HOME | End: 2019-05-01
Attending: INTERNAL MEDICINE
Payer: COMMERCIAL

## 2019-05-01 ENCOUNTER — ANTICOAG VISIT (OUTPATIENT)
Dept: FAMILY MEDICINE CLINIC | Facility: CLINIC | Age: 64
End: 2019-05-01

## 2019-05-01 DIAGNOSIS — I26.99 IATROGENIC PULMONARY EMBOLISM AND INFARCTION, SEQUELA (HCC): Primary | ICD-10-CM

## 2019-05-01 DIAGNOSIS — I34.2 NON-RHEUMATIC MITRAL VALVE STENOSIS: ICD-10-CM

## 2019-05-01 DIAGNOSIS — T81.718S IATROGENIC PULMONARY EMBOLISM AND INFARCTION, SEQUELA (HCC): Primary | ICD-10-CM

## 2019-05-01 DIAGNOSIS — R06.00 DYSPNEA ON EXERTION: ICD-10-CM

## 2019-05-01 LAB
CHEST PAIN STATEMENT: NORMAL
INR PPP: 1.12 (ref 0.86–1.16)
MAX DIASTOLIC BP: 84 MMHG
MAX HEART RATE: 116 BPM
MAX PREDICTED HEART RATE: 158 BPM
MAX. SYSTOLIC BP: 164 MMHG
PROTHROMBIN TIME: 11.7 SECONDS (ref 9.4–11.7)
PROTOCOL NAME: NORMAL
TARGET HR FORMULA: NORMAL
TEST INDICATION: NORMAL
TIME IN EXERCISE PHASE: NORMAL

## 2019-05-01 PROCEDURE — C8929 TTE W OR WO FOL WCON,DOPPLER: HCPCS

## 2019-05-01 PROCEDURE — 36415 COLL VENOUS BLD VENIPUNCTURE: CPT | Performed by: FAMILY MEDICINE

## 2019-05-01 PROCEDURE — 85610 PROTHROMBIN TIME: CPT | Performed by: FAMILY MEDICINE

## 2019-05-01 PROCEDURE — 93017 CV STRESS TEST TRACING ONLY: CPT

## 2019-05-01 RX ADMIN — PERFLUTREN 1 ML/MIN: 6.52 INJECTION, SUSPENSION INTRAVENOUS at 09:14

## 2019-05-02 PROCEDURE — 93308 TTE F-UP OR LMTD: CPT | Performed by: INTERNAL MEDICINE

## 2019-05-02 PROCEDURE — 93018 CV STRESS TEST I&R ONLY: CPT | Performed by: INTERNAL MEDICINE

## 2019-05-02 PROCEDURE — 93325 DOPPLER ECHO COLOR FLOW MAPG: CPT | Performed by: INTERNAL MEDICINE

## 2019-05-02 PROCEDURE — 93016 CV STRESS TEST SUPVJ ONLY: CPT | Performed by: INTERNAL MEDICINE

## 2019-05-02 PROCEDURE — 93321 DOPPLER ECHO F-UP/LMTD STD: CPT | Performed by: INTERNAL MEDICINE

## 2019-05-03 ENCOUNTER — TELEPHONE (OUTPATIENT)
Dept: CARDIOLOGY CLINIC | Facility: CLINIC | Age: 64
End: 2019-05-03

## 2019-05-06 ENCOUNTER — TELEPHONE (OUTPATIENT)
Dept: NEPHROLOGY | Facility: CLINIC | Age: 64
End: 2019-05-06

## 2019-05-11 ENCOUNTER — OFFICE VISIT (OUTPATIENT)
Dept: FAMILY MEDICINE CLINIC | Facility: CLINIC | Age: 64
End: 2019-05-11
Payer: COMMERCIAL

## 2019-05-11 VITALS
DIASTOLIC BLOOD PRESSURE: 86 MMHG | HEART RATE: 84 BPM | HEIGHT: 59 IN | TEMPERATURE: 97.9 F | SYSTOLIC BLOOD PRESSURE: 136 MMHG | WEIGHT: 212 LBS | RESPIRATION RATE: 20 BRPM | BODY MASS INDEX: 42.74 KG/M2

## 2019-05-11 DIAGNOSIS — J01.00 ACUTE NON-RECURRENT MAXILLARY SINUSITIS: Primary | ICD-10-CM

## 2019-05-11 PROCEDURE — 99213 OFFICE O/P EST LOW 20 MIN: CPT | Performed by: FAMILY MEDICINE

## 2019-05-11 RX ORDER — AMOXICILLIN AND CLAVULANATE POTASSIUM 875; 125 MG/1; MG/1
1 TABLET, FILM COATED ORAL EVERY 12 HOURS SCHEDULED
Qty: 20 TABLET | Refills: 0 | Status: SHIPPED | OUTPATIENT
Start: 2019-05-11 | End: 2019-05-21

## 2019-05-13 ENCOUNTER — APPOINTMENT (OUTPATIENT)
Dept: LAB | Facility: HOSPITAL | Age: 64
End: 2019-05-13
Payer: COMMERCIAL

## 2019-05-13 ENCOUNTER — ANTICOAG VISIT (OUTPATIENT)
Dept: FAMILY MEDICINE CLINIC | Facility: CLINIC | Age: 64
End: 2019-05-13

## 2019-05-13 DIAGNOSIS — T81.718S IATROGENIC PULMONARY EMBOLISM AND INFARCTION, SEQUELA (HCC): ICD-10-CM

## 2019-05-13 DIAGNOSIS — I26.99 IATROGENIC PULMONARY EMBOLISM AND INFARCTION, SEQUELA (HCC): ICD-10-CM

## 2019-05-13 LAB
INR PPP: 1.57 (ref 0.86–1.16)
PROTHROMBIN TIME: 16.1 SECONDS (ref 9.4–11.7)

## 2019-05-13 PROCEDURE — 36415 COLL VENOUS BLD VENIPUNCTURE: CPT

## 2019-05-13 PROCEDURE — 85610 PROTHROMBIN TIME: CPT

## 2019-05-22 ENCOUNTER — OFFICE VISIT (OUTPATIENT)
Dept: PULMONOLOGY | Facility: MEDICAL CENTER | Age: 64
End: 2019-05-22
Payer: COMMERCIAL

## 2019-05-22 VITALS
HEART RATE: 84 BPM | RESPIRATION RATE: 12 BRPM | OXYGEN SATURATION: 94 % | WEIGHT: 217 LBS | SYSTOLIC BLOOD PRESSURE: 136 MMHG | DIASTOLIC BLOOD PRESSURE: 84 MMHG | BODY MASS INDEX: 43.75 KG/M2 | HEIGHT: 59 IN | TEMPERATURE: 99.2 F

## 2019-05-22 DIAGNOSIS — G47.33 OBSTRUCTIVE SLEEP APNEA: ICD-10-CM

## 2019-05-22 DIAGNOSIS — I27.20 PULMONARY HYPERTENSION (HCC): Primary | ICD-10-CM

## 2019-05-22 DIAGNOSIS — R06.00 DYSPNEA ON EXERTION: ICD-10-CM

## 2019-05-22 PROCEDURE — 99214 OFFICE O/P EST MOD 30 MIN: CPT | Performed by: INTERNAL MEDICINE

## 2019-05-28 ENCOUNTER — OFFICE VISIT (OUTPATIENT)
Dept: FAMILY MEDICINE CLINIC | Facility: CLINIC | Age: 64
End: 2019-05-28
Payer: COMMERCIAL

## 2019-05-28 ENCOUNTER — TELEPHONE (OUTPATIENT)
Dept: FAMILY MEDICINE CLINIC | Facility: CLINIC | Age: 64
End: 2019-05-28

## 2019-05-28 VITALS
DIASTOLIC BLOOD PRESSURE: 84 MMHG | SYSTOLIC BLOOD PRESSURE: 124 MMHG | WEIGHT: 217 LBS | RESPIRATION RATE: 16 BRPM | TEMPERATURE: 98 F | HEART RATE: 84 BPM | HEIGHT: 59 IN | BODY MASS INDEX: 43.75 KG/M2

## 2019-05-28 DIAGNOSIS — E03.9 PRIMARY HYPOTHYROIDISM: ICD-10-CM

## 2019-05-28 DIAGNOSIS — I12.9 BENIGN HYPERTENSION WITH CKD (CHRONIC KIDNEY DISEASE) STAGE IV (HCC): ICD-10-CM

## 2019-05-28 DIAGNOSIS — Z12.31 SCREENING MAMMOGRAM, ENCOUNTER FOR: ICD-10-CM

## 2019-05-28 DIAGNOSIS — IMO0002 TYPE 2 DIABETES, UNCONTROLLED, WITH RENAL MANIFESTATION: Primary | ICD-10-CM

## 2019-05-28 DIAGNOSIS — N18.4 BENIGN HYPERTENSION WITH CKD (CHRONIC KIDNEY DISEASE) STAGE IV (HCC): ICD-10-CM

## 2019-05-28 DIAGNOSIS — E11.319 DIABETIC RETINOPATHY OF BOTH EYES ASSOCIATED WITH TYPE 2 DIABETES MELLITUS, MACULAR EDEMA PRESENCE UNSPECIFIED, UNSPECIFIED RETINOPATHY SEVERITY (HCC): Primary | ICD-10-CM

## 2019-05-28 LAB
LEFT EYE DIABETIC RETINOPATHY: ABNORMAL
LEFT EYE IMAGE QUALITY: ABNORMAL
LEFT EYE MACULAR EDEMA: ABNORMAL
LEFT EYE OTHER RETINOPATHY: ABNORMAL
RIGHT EYE DIABETIC RETINOPATHY: ABNORMAL
RIGHT EYE IMAGE QUALITY: ABNORMAL
RIGHT EYE MACULAR EDEMA: ABNORMAL
RIGHT EYE OTHER RETINOPATHY: ABNORMAL
SEVERITY (EYE EXAM): ABNORMAL

## 2019-05-28 PROCEDURE — 99214 OFFICE O/P EST MOD 30 MIN: CPT | Performed by: FAMILY MEDICINE

## 2019-05-28 PROCEDURE — 2022F DILAT RTA XM EVC RTNOPTHY: CPT | Performed by: FAMILY MEDICINE

## 2019-06-07 ENCOUNTER — TELEPHONE (OUTPATIENT)
Dept: FAMILY MEDICINE CLINIC | Facility: CLINIC | Age: 64
End: 2019-06-07

## 2019-06-08 ENCOUNTER — APPOINTMENT (OUTPATIENT)
Dept: LAB | Facility: HOSPITAL | Age: 64
End: 2019-06-08
Payer: COMMERCIAL

## 2019-06-08 ENCOUNTER — ANTICOAG VISIT (OUTPATIENT)
Dept: FAMILY MEDICINE CLINIC | Facility: CLINIC | Age: 64
End: 2019-06-08

## 2019-06-08 ENCOUNTER — TRANSCRIBE ORDERS (OUTPATIENT)
Dept: ADMINISTRATIVE | Facility: HOSPITAL | Age: 64
End: 2019-06-08

## 2019-06-08 DIAGNOSIS — IMO0002 TYPE 2 DIABETES, UNCONTROLLED, WITH RENAL MANIFESTATION: ICD-10-CM

## 2019-06-08 DIAGNOSIS — I26.99 PULMONARY INFARCTION (HCC): Primary | ICD-10-CM

## 2019-06-08 DIAGNOSIS — E03.9 PRIMARY HYPOTHYROIDISM: ICD-10-CM

## 2019-06-08 DIAGNOSIS — I12.9 BENIGN HYPERTENSION WITH CKD (CHRONIC KIDNEY DISEASE) STAGE IV (HCC): ICD-10-CM

## 2019-06-08 DIAGNOSIS — N18.4 STAGE 4 CHRONIC KIDNEY DISEASE (HCC): ICD-10-CM

## 2019-06-08 DIAGNOSIS — N18.4 BENIGN HYPERTENSION WITH CKD (CHRONIC KIDNEY DISEASE) STAGE IV (HCC): ICD-10-CM

## 2019-06-08 DIAGNOSIS — I26.99 PULMONARY INFARCTION (HCC): ICD-10-CM

## 2019-06-08 LAB
ALBUMIN SERPL BCP-MCNC: 2.9 G/DL (ref 3.5–5)
ALP SERPL-CCNC: 84 U/L (ref 46–116)
ALT SERPL W P-5'-P-CCNC: 24 U/L (ref 12–78)
ANION GAP SERPL CALCULATED.3IONS-SCNC: 9 MMOL/L (ref 4–13)
AST SERPL W P-5'-P-CCNC: 17 U/L (ref 5–45)
BILIRUB SERPL-MCNC: 0.5 MG/DL (ref 0.2–1)
BUN SERPL-MCNC: 60 MG/DL (ref 5–25)
CALCIUM SERPL-MCNC: 8.6 MG/DL (ref 8.3–10.1)
CHLORIDE SERPL-SCNC: 101 MMOL/L (ref 100–108)
CHOLEST SERPL-MCNC: 137 MG/DL (ref 50–200)
CO2 SERPL-SCNC: 28 MMOL/L (ref 21–32)
CREAT SERPL-MCNC: 3.31 MG/DL (ref 0.6–1.3)
ERYTHROCYTE [DISTWIDTH] IN BLOOD BY AUTOMATED COUNT: 14.7 % (ref 11.6–15.1)
EST. AVERAGE GLUCOSE BLD GHB EST-MCNC: 235 MG/DL
GFR SERPL CREATININE-BSD FRML MDRD: 14 ML/MIN/1.73SQ M
GLUCOSE P FAST SERPL-MCNC: 101 MG/DL (ref 65–99)
HBA1C MFR BLD: 9.8 % (ref 4.2–6.3)
HCT VFR BLD AUTO: 39.3 % (ref 34.8–46.1)
HDLC SERPL-MCNC: 49 MG/DL (ref 40–60)
HGB BLD-MCNC: 12.3 G/DL (ref 11.5–15.4)
INR PPP: 1.54 (ref 0.86–1.16)
LDLC SERPL CALC-MCNC: 65 MG/DL (ref 0–100)
MAGNESIUM SERPL-MCNC: 2 MG/DL (ref 1.6–2.6)
MCH RBC QN AUTO: 27 PG (ref 26.8–34.3)
MCHC RBC AUTO-ENTMCNC: 31.3 G/DL (ref 31.4–37.4)
MCV RBC AUTO: 86 FL (ref 82–98)
PHOSPHATE SERPL-MCNC: 4.3 MG/DL (ref 2.3–4.1)
PLATELET # BLD AUTO: 203 THOUSANDS/UL (ref 149–390)
PMV BLD AUTO: 11.7 FL (ref 8.9–12.7)
POTASSIUM SERPL-SCNC: 4.3 MMOL/L (ref 3.5–5.3)
PROT SERPL-MCNC: 7.4 G/DL (ref 6.4–8.2)
PROTHROMBIN TIME: 15.8 SECONDS (ref 9.4–11.7)
PTH-INTACT SERPL-MCNC: 435.8 PG/ML (ref 18.4–80.1)
RBC # BLD AUTO: 4.56 MILLION/UL (ref 3.81–5.12)
SODIUM SERPL-SCNC: 138 MMOL/L (ref 136–145)
T4 FREE SERPL-MCNC: 1.09 NG/DL (ref 0.76–1.46)
TRIGL SERPL-MCNC: 115 MG/DL
TSH SERPL DL<=0.05 MIU/L-ACNC: 3.67 UIU/ML (ref 0.36–3.74)
WBC # BLD AUTO: 9.58 THOUSAND/UL (ref 4.31–10.16)

## 2019-06-08 PROCEDURE — 84100 ASSAY OF PHOSPHORUS: CPT

## 2019-06-08 PROCEDURE — 84439 ASSAY OF FREE THYROXINE: CPT

## 2019-06-08 PROCEDURE — 85610 PROTHROMBIN TIME: CPT

## 2019-06-08 PROCEDURE — 85027 COMPLETE CBC AUTOMATED: CPT

## 2019-06-08 PROCEDURE — 83735 ASSAY OF MAGNESIUM: CPT

## 2019-06-08 PROCEDURE — 83036 HEMOGLOBIN GLYCOSYLATED A1C: CPT

## 2019-06-08 PROCEDURE — 80061 LIPID PANEL: CPT

## 2019-06-08 PROCEDURE — 83970 ASSAY OF PARATHORMONE: CPT

## 2019-06-08 PROCEDURE — 36415 COLL VENOUS BLD VENIPUNCTURE: CPT

## 2019-06-08 PROCEDURE — 84443 ASSAY THYROID STIM HORMONE: CPT

## 2019-06-08 PROCEDURE — 80053 COMPREHEN METABOLIC PANEL: CPT

## 2019-06-12 ENCOUNTER — TELEPHONE (OUTPATIENT)
Dept: SLEEP CENTER | Facility: CLINIC | Age: 64
End: 2019-06-12

## 2019-06-14 ENCOUNTER — TELEPHONE (OUTPATIENT)
Dept: PULMONOLOGY | Facility: MEDICAL CENTER | Age: 64
End: 2019-06-14

## 2019-06-14 ENCOUNTER — HOSPITAL ENCOUNTER (OUTPATIENT)
Dept: SLEEP CENTER | Facility: CLINIC | Age: 64
Discharge: HOME | End: 2019-06-14
Payer: COMMERCIAL

## 2019-06-14 DIAGNOSIS — G47.33 OBSTRUCTIVE SLEEP APNEA (ADULT) (PEDIATRIC): ICD-10-CM

## 2019-06-14 DIAGNOSIS — G47.33 OBSTRUCTIVE SLEEP APNEA (ADULT) (PEDIATRIC): Primary | ICD-10-CM

## 2019-06-14 PROCEDURE — 95811 POLYSOM 6/>YRS CPAP 4/> PARM: CPT

## 2019-06-18 DIAGNOSIS — G47.33 OBSTRUCTIVE SLEEP APNEA: Primary | ICD-10-CM

## 2019-06-21 ENCOUNTER — TELEPHONE (OUTPATIENT)
Dept: ENDOCRINOLOGY | Facility: CLINIC | Age: 64
End: 2019-06-21

## 2019-06-21 ENCOUNTER — OFFICE VISIT (OUTPATIENT)
Dept: NEPHROLOGY | Facility: CLINIC | Age: 64
End: 2019-06-21
Payer: COMMERCIAL

## 2019-06-21 ENCOUNTER — OFFICE VISIT (OUTPATIENT)
Dept: ENDOCRINOLOGY | Facility: CLINIC | Age: 64
End: 2019-06-21
Payer: COMMERCIAL

## 2019-06-21 VITALS
WEIGHT: 216 LBS | SYSTOLIC BLOOD PRESSURE: 130 MMHG | HEART RATE: 73 BPM | DIASTOLIC BLOOD PRESSURE: 78 MMHG | HEIGHT: 59 IN | BODY MASS INDEX: 43.55 KG/M2

## 2019-06-21 VITALS
BODY MASS INDEX: 44.11 KG/M2 | HEIGHT: 59 IN | SYSTOLIC BLOOD PRESSURE: 140 MMHG | DIASTOLIC BLOOD PRESSURE: 80 MMHG | HEART RATE: 68 BPM | WEIGHT: 218.8 LBS

## 2019-06-21 DIAGNOSIS — IMO0002 TYPE 2 DIABETES, UNCONTROLLED, WITH RENAL MANIFESTATION: ICD-10-CM

## 2019-06-21 DIAGNOSIS — Z79.4 INSULIN LONG-TERM USE (HCC): Primary | ICD-10-CM

## 2019-06-21 DIAGNOSIS — E55.9 VITAMIN D DEFICIENCY: ICD-10-CM

## 2019-06-21 DIAGNOSIS — N18.30 STAGE 3 CHRONIC KIDNEY DISEASE (HCC): ICD-10-CM

## 2019-06-21 DIAGNOSIS — R80.9 NEPHROTIC RANGE PROTEINURIA: ICD-10-CM

## 2019-06-21 DIAGNOSIS — N25.81 SECONDARY HYPERPARATHYROIDISM OF RENAL ORIGIN (HCC): ICD-10-CM

## 2019-06-21 DIAGNOSIS — E11.65 TYPE 2 DIABETES MELLITUS WITH HYPERGLYCEMIA, WITH LONG-TERM CURRENT USE OF INSULIN (HCC): ICD-10-CM

## 2019-06-21 DIAGNOSIS — Z79.4 TYPE 2 DIABETES MELLITUS WITH HYPERGLYCEMIA, WITH LONG-TERM CURRENT USE OF INSULIN (HCC): ICD-10-CM

## 2019-06-21 DIAGNOSIS — N18.4 BENIGN HYPERTENSION WITH CKD (CHRONIC KIDNEY DISEASE) STAGE IV (HCC): ICD-10-CM

## 2019-06-21 DIAGNOSIS — I12.9 BENIGN HYPERTENSION WITH CKD (CHRONIC KIDNEY DISEASE) STAGE IV (HCC): ICD-10-CM

## 2019-06-21 DIAGNOSIS — I10 ESSENTIAL HYPERTENSION: ICD-10-CM

## 2019-06-21 DIAGNOSIS — N18.5 STAGE 5 CHRONIC KIDNEY DISEASE NOT ON CHRONIC DIALYSIS (HCC): Primary | ICD-10-CM

## 2019-06-21 DIAGNOSIS — E78.2 MIXED HYPERLIPIDEMIA: ICD-10-CM

## 2019-06-21 DIAGNOSIS — E03.9 PRIMARY HYPOTHYROIDISM: ICD-10-CM

## 2019-06-21 PROCEDURE — 99215 OFFICE O/P EST HI 40 MIN: CPT | Performed by: INTERNAL MEDICINE

## 2019-06-21 PROCEDURE — 99214 OFFICE O/P EST MOD 30 MIN: CPT | Performed by: PHYSICIAN ASSISTANT

## 2019-06-21 RX ORDER — TORSEMIDE 20 MG/1
TABLET ORAL
Qty: 90 TABLET | Refills: 3 | Status: ON HOLD
Start: 2019-06-21 | End: 2020-07-13 | Stop reason: ALTCHOICE

## 2019-06-26 ENCOUNTER — TELEPHONE (OUTPATIENT)
Dept: FAMILY MEDICINE CLINIC | Facility: CLINIC | Age: 64
End: 2019-06-26

## 2019-06-26 NOTE — TELEPHONE ENCOUNTER
Left message on machine requesting a call back  Pt is overdue for her INR  She was supposed to have this done on 6/13/19   Elodia Eddy

## 2019-07-05 ENCOUNTER — TRANSCRIBE ORDERS (OUTPATIENT)
Dept: ADMINISTRATIVE | Facility: HOSPITAL | Age: 64
End: 2019-07-05

## 2019-07-05 ENCOUNTER — APPOINTMENT (OUTPATIENT)
Dept: LAB | Facility: HOSPITAL | Age: 64
End: 2019-07-05
Payer: COMMERCIAL

## 2019-07-05 ENCOUNTER — TELEPHONE (OUTPATIENT)
Dept: FAMILY MEDICINE CLINIC | Facility: CLINIC | Age: 64
End: 2019-07-05

## 2019-07-05 ENCOUNTER — ANTICOAG VISIT (OUTPATIENT)
Dept: FAMILY MEDICINE CLINIC | Facility: CLINIC | Age: 64
End: 2019-07-05

## 2019-07-05 DIAGNOSIS — T81.718S IATROGENIC PULMONARY EMBOLISM AND INFARCTION, SEQUELA (HCC): Primary | ICD-10-CM

## 2019-07-05 DIAGNOSIS — R06.00 DYSPNEA ON EXERTION: ICD-10-CM

## 2019-07-05 DIAGNOSIS — I26.99 IATROGENIC PULMONARY EMBOLISM AND INFARCTION, SEQUELA (HCC): Primary | ICD-10-CM

## 2019-07-05 LAB
CRP SERPL QL: 17 MG/L
INR PPP: 1.67 (ref 0.86–1.16)
PROTHROMBIN TIME: 17 SECONDS (ref 9.4–11.7)

## 2019-07-05 PROCEDURE — 36415 COLL VENOUS BLD VENIPUNCTURE: CPT | Performed by: FAMILY MEDICINE

## 2019-07-05 PROCEDURE — 86140 C-REACTIVE PROTEIN: CPT

## 2019-07-05 PROCEDURE — 85610 PROTHROMBIN TIME: CPT | Performed by: FAMILY MEDICINE

## 2019-07-05 PROCEDURE — 86255 FLUORESCENT ANTIBODY SCREEN: CPT

## 2019-07-05 NOTE — PROGRESS NOTES
Patient informed of change and she stated she is going on vacation  I gave her the hrs of the lab at [facility] she stated "I will try to squeeze it in"    No further action  Elodia Li

## 2019-07-05 NOTE — TELEPHONE ENCOUNTER
Patient informed of change and she stated she is going on vacation  I gave her the hrs of the lab at [facility] she stated "I will try to squeeze it in"    No further action  Clifton Estes [place]

## 2019-07-08 ENCOUNTER — TELEPHONE (OUTPATIENT)
Dept: GASTROENTEROLOGY | Facility: CLINIC | Age: 64
End: 2019-07-08

## 2019-07-08 NOTE — TELEPHONE ENCOUNTER
Pt returned call stating she does not have any past GI records to bring in  Pt states she had a colonoscopy many yrs ago but do not have the records and can not remember when or where it was done

## 2019-07-08 NOTE — TELEPHONE ENCOUNTER
Called pt to ask that she bring any prior gi records to office visit     [facility] for pt to call back

## 2019-07-11 ENCOUNTER — CONSULT (OUTPATIENT)
Dept: CARDIOLOGY CLINIC | Facility: CLINIC | Age: 64
End: 2019-07-11
Payer: COMMERCIAL

## 2019-07-11 VITALS
WEIGHT: 215.4 LBS | HEART RATE: 80 BPM | DIASTOLIC BLOOD PRESSURE: 76 MMHG | BODY MASS INDEX: 43.42 KG/M2 | SYSTOLIC BLOOD PRESSURE: 124 MMHG | HEIGHT: 59 IN | OXYGEN SATURATION: 96 %

## 2019-07-11 DIAGNOSIS — I27.20 PULMONARY HYPERTENSION (HCC): ICD-10-CM

## 2019-07-11 PROCEDURE — 99214 OFFICE O/P EST MOD 30 MIN: CPT | Performed by: INTERNAL MEDICINE

## 2019-07-11 NOTE — LETTER
July 11, 2019     Harini العلي DO  [address] [address]    Patient: Julian Encarnacion   YOB: 1955   Date of Visit: 7/11/2019       Dear Dr Niranjan Bryant: Thank you for referring Julian Encarnacion to me for evaluation  Below are my notes for this consultation  If you have questions, please do not hesitate to call me  I look forward to following your patient along with you           Sincerely,        Rajwinder Osuna MD        CC: DO Mehdi Gupta MD Johana Brand, MD  7/11/2019  4:40 PM  Sign at close encounter  Advanced Heart Failure/Pulmonary Hypertension Outpatient Consult Note - Julian Encarnacion 60 y o  female MRN: [id number]    @ Encounter: [id number]  Assessment/Plan:    Patient Active Problem List    Diagnosis Date Noted    Diabetic retinopathy associated with type 2 diabetes mellitus ([address] ) 05/28/2019    Non-rheumatic mitral valve stenosis 04/01/2019    Exercise hypoxemia 04/01/2019    Vitamin D deficiency 03/11/2019    Secondary hyperparathyroidism of renal origin ([address] ) 03/11/2019    Morbid obesity ([facility] ) 02/28/2019    Acute idiopathic gout of right foot 01/18/2019    Tinea pedis of both feet 12/11/2018    Onychomycosis 11/27/2018    Closed nondisplaced fracture of distal phalanx of right great toe 11/13/2018    Right foot pain 11/13/2018    Pulmonary hypertension ([facility] ) 11/01/2018    Leg edema, left 08/21/2018    Dyspnea on exertion 07/05/2018    PE (pulmonary thromboembolism) ([facility] ) 07/05/2018    Elevated brain natriuretic peptide (BNP) level 07/05/2018    Benign hypertension with CKD (chronic kidney disease) stage IV ([address] ) 04/13/2018    Stage 5 chronic kidney disease not on chronic dialysis ([facility] [facility] ) 04/13/2018    Nephrotic range proteinuria 04/13/2018    Cyst of ovary 04/13/2017    Mixed hyperlipidemia 01/31/2014    Primary hypothyroidism 12/31/2013    Type 2 diabetes, uncontrolled, with renal manifestation ([facility] ) 06/05/2013    Obstructive sleep apnea (adult) (pediatric) 09/12/2012     Plan:  --Repeat V/Q scan to compare to prior to assess for CTEPH  --NADEEN, HIV, RF, full PFTs to assess DLCO, LFTs  --Start AutoPAP  --Will plan for repeat TTE +/- stress echo once on CPAP for @ least 3 months  --Mildly elevated R sided filling pressures on exam  Increase torsemide to 40 mg PO BID x 3 days  --Discussed importance of weight loss  --Can consider cardiopulmonary rehab    Assessment:  # Pulmonary HTN w/ preserved RVSF:   Diag:  --V/Q 7/5/2018: Moderate prob PE    --TTE 7/6/2018: LVEF>55%  LVIDd 5 cm  Abnormal AcT  Normal RV size and function  No clear RVOT notching  --Spirometry 4/1/19: Severe obstruction    --PSG split study 6/15/19: As below  Now on CPAP  --Stress test 5/1/19: Equivocal 2/2 to submax study  --TTE 5/1/19: LVEF 50-55%  Mod cLVH  Grade Ii diastology  +RVH  Mild ARCELIA  Mod MAC w/ mild MS (mean gradient 4 mmHg  Mild MR  Abnormal AcT w/ mid systolic RVOT notching  PASP>80 mmHg w/ definity contrast  No clear IVS flattening  Impression: Most likely driven by Group II (HFpEF, mild MS), Group III (Severe CHANDRA), and obesity  There is e/o pulmonary vascular remodeling based on noninvasive assessment that may have progressed from 2018 to the present  As there is no clear e/o RV-PA uncoupling, would recommend maximal medical management of above with TTE re-assessment in 3-6 months  If no improvement, can consider RHC at that time   However, would do the following testing in the interim:    To do:  --NADEEN, HIV, RF, PFTs, LFTs  --Repeat V/Q scan w/ CXR to assess for CTE(PH)  --Stress echo to assess RV response to exercise after she has been on CPAP for @ least 3 months    Therapeutic:  --MRA precluded by renal dysfunction  --Increase torsemide to 40 mg PO BID x 3 days, then back to 40 mg PO daily    # Mild Mitral stenosis  # Morbid Obesity: Discussed importance of weight loss  # Chronic HFpEF: As above  # Severe CHANDRA/OHS now on AutoPAP 14-17 cmH2O  # DMII  # HLD  # Hx of PE  # Chronic bronchitis w/ Asthma  # Stage V kidney disease not on HD    RTC in 2 months    HPI: Very pleasant 60 y o  woman w/ PMHx as noted above who is referred for [facility] assessment  She is followed by pulmonary, Dr Melissa Juarez who noted elevated PASP to >80 mmHg by TTE  His team also diagnosed severe CHANDRA, for which she has started CPAP  She states that one year ago she was admitted with SOB  She was diagnosed with PE based on V/Q scan of moderate probability  She was started on heparin gtt after which she felt better  She was transitioned to coumadin  She states that initially it got better  However, she never returned to her baseline  She states that she has varying levels of HOBSON  Sometimes she feels well and does not feel limited, and other times she can't walk very far  Past Medical History:   Diagnosis Date    Acute asthmatic bronchitis     last assessed: 6/2/2017    Cardiac disease     Diabetes mellitus ([facility] [facility] )     Hyperlipidemia     Hypertension     Pneumonia     last assessed: 6/5/2013    Renal disorder      Review of Systems - 12 point ROS was done and is negative, except as noted above       No Known Allergies      Current Outpatient Medications:     albuterol (PROAIR HFA) 90 mcg/act inhaler, Inhale 2 puffs every 4 (four) hours as needed for wheezing or shortness of breath, Disp: 1 Inhaler, Rfl: 0    atorvastatin (LIPITOR) 20 mg tablet, Take 1 tablet (20 mg total) by mouth daily, Disp: 90 tablet, Rfl: 1    Blood Glucose Monitoring Suppl (ONE TOUCH ULTRA 2) w/Device KIT, Test glucose 3 times daily, Disp: 1 each, Rfl: 0    calcitriol (ROCALTROL) 0 25 mcg capsule, Take 1 capsule (0 25 mcg total) by mouth daily, Disp: 90 capsule, Rfl: 3    cholecalciferol 2000 units TABS, Take 1 tablet (2,000 Units total) by mouth daily, Disp: 30 tablet, Rfl: 0    fluticasone-vilanterol (BREO ELLIPTA) 100-25 mcg/inh inhaler, Inhale 1 puff daily Rinse mouth after use , Disp: 1 Inhaler, Rfl: 6   Insulin Lispro Prot & Lispro (HUMALOG MIX 75/25 KWIKPEN) (75-25) 100 units/mL injection pen, Inject 30 Units under the skin 2 (two) times a day with meals for 250 days, Disp: 150 mL, Rfl: 1    linaGLIPtin (TRADJENTA) 5 MG TABS, Take 5 mg by mouth daily, Disp: 90 tablet, Rfl: 3    metoprolol succinate (TOPROL-XL) 25 mg 24 hr tablet, Take 50mg AM & 25mg PM, Disp: 90 tablet, Rfl: 3    ONE TOUCH ULTRA TEST test strip, Test glucose 3 times daily, Disp: 300 each, Rfl: 1    torsemide (DEMADEX) 20 mg tablet, Take 2 tablet per day in the morning and take 1 tablets in the evening every night, Disp: 90 tablet, Rfl: 3    warfarin (COUMADIN) 1 mg tablet, One po daily (Patient taking differently: 1 mg One po daily), Disp: 30 tablet, Rfl: 1    warfarin (COUMADIN) 2 mg tablet, 4mg daily or as directed, Disp: 180 tablet, Rfl: 0    warfarin (COUMADIN) 3 mg tablet, Take 5 milligrams on 7/16/18, July 17, 2018 and get PT/INR on July 17, 2018, Disp: 30 tablet, Rfl: 0    warfarin (COUMADIN) 5 mg tablet, Take 1 tablet (5 mg total) by mouth daily, Disp: 90 tablet, Rfl: 1    Social History     Socioeconomic History    Marital status: Single     Spouse name: Not on file    Number of children: Not on file    Years of education: Not on file    Highest education level: Not on file   Occupational History    Not on file   Social Needs    Financial resource strain: Not on file    Food insecurity:     Worry: Not on file     Inability: Not on file    Transportation needs:     Medical: Not on file     Non-medical: Not on file   Tobacco Use    Smoking status: Never Smoker    Smokeless tobacco: Never Used   Substance and Sexual Activity    Alcohol use: No    Drug use: No    Sexual activity: Not on file   Lifestyle    Physical activity:     Days per week: Not on file     Minutes per session: Not on file    Stress: Not on file   Relationships    Social connections:     Talks on phone: Not on file     Gets together: Not on file Attends Taoist service: Not on file     Active member of club or organization: Not on file     Attends meetings of clubs or organizations: Not on file     Relationship status: Not on file    Intimate partner violence:     Fear of current or ex partner: Not on file     Emotionally abused: Not on file     Physically abused: Not on file     Forced sexual activity: Not on file   Other Topics Concern    Not on file   Social History Narrative    Caffeine use       Family History   Problem Relation Age of Onset    Diabetes Mother         mellitus    Anxiety disorder Mother         generalized anxiety disorder    Hypertension Mother     Kidney disease Father     Kidney failure Father         renal failure    Ulcerative colitis Sister     Heart disease Family     Ovarian cancer Maternal Aunt      Physical Exam:    Vitals: Blood pressure 124/76, pulse 80, height 4' 11" (1 499 m), weight 97 7 kg (215 lb 6 4 oz), SpO2 96 %, not currently breastfeeding , Body mass index is 43 51 kg/m² ,   Wt Readings from Last 3 Encounters:   07/11/19 97 7 kg (215 lb 6 4 oz)   06/21/19 99 2 kg (218 lb 12 8 oz)   06/21/19 98 kg (216 lb)     Vitals:    07/11/19 1601   BP: 124/76   BP Location: Right arm   Patient Position: Sitting   Cuff Size: Large   Pulse: 80   SpO2: 96%   Weight: 97 7 kg (215 lb 6 4 oz)   Height: 4' 11" (1 499 m)       GEN: Jacqueline Eller appears well, alert and oriented x 3, pleasant and cooperative   HEENT: pupils equal, round, and reactive to light; extraocular muscles intact  NECK: supple, no carotid bruits   HEART: regular rhythm, normal S1 and S2, no murmurs, clicks, gallops or rubs, JVP is    LUNGS: clear to auscultation bilaterally; no wheezes, rales, or rhonchi   ABDOMEN: normal bowel sounds, soft, no tenderness, no distention  EXTREMITIES: peripheral pulses normal; no clubbing, cyanosis, or edema  NEURO: no focal findings   SKIN: normal without suspicious lesions on exposed skin    Labs & Results:  Lab Results   Component Value Date    WBC 9 58 06/08/2019    HGB 12 3 06/08/2019    HCT 39 3 06/08/2019    MCV 86 06/08/2019     06/08/2019       Chemistry        Component Value Date/Time     12/11/2014 1713    K 4 3 06/08/2019 0810    K 4 0 12/11/2014 1713     06/08/2019 0810     12/11/2014 1713    CO2 28 06/08/2019 0810    CO2 32 12/11/2014 1713    BUN 60 (H) 06/08/2019 0810    BUN 26 (H) 12/11/2014 1713    CREATININE 3 31 (H) 06/08/2019 0810    CREATININE 1 46 (H) 12/11/2014 1713        Component Value Date/Time    CALCIUM 8 6 06/08/2019 0810    CALCIUM 8 8 12/11/2014 1713    ALKPHOS 84 06/08/2019 0810    ALKPHOS 118 12/11/2014 1713    AST 17 06/08/2019 0810    AST 18 12/11/2014 1713    ALT 24 06/08/2019 0810    ALT 22 12/11/2014 1713    BILITOT 1 08 (H) 12/11/2014 1713        Lab Results   Component Value Date    INR 1 67 (H) 07/05/2019    INR 1 54 (H) 06/08/2019    INR 1 57 (H) 05/13/2019    PROTIME 17 0 (H) 07/05/2019    PROTIME 15 8 (H) 06/08/2019    PROTIME 16 1 (H) 05/13/2019     EKG personally reviewed by Anamaria Caputo MD      Counseling / Coordination of Care  Total floor / unit time spent today 40 minutes  Greater than 50% of total time was spent with the patient and / or family counseling and / or coordination of care  A description of the counseling / coordination of care: 25 min  Thank you for the opportunity to participate in the care of this patient      Anamaria Caputo MD, PhD   [facility]

## 2019-07-11 NOTE — PROGRESS NOTES
Advanced Heart Failure/Pulmonary Hypertension Outpatient Consult Note - Sung Lanes 60 y o  female MRN: [id number]    @ Encounter: [id number]  Assessment/Plan:    Patient Active Problem List    Diagnosis Date Noted    Diabetic retinopathy associated with type 2 diabetes mellitus ([address] [address] ) 05/28/2019    Non-rheumatic mitral valve stenosis 04/01/2019    Exercise hypoxemia 04/01/2019    Vitamin D deficiency 03/11/2019    Secondary hyperparathyroidism of renal origin ([address] [address] ) 03/11/2019    Morbid obesity ([address] [address] ) 02/28/2019    Acute idiopathic gout of right foot 01/18/2019    Tinea pedis of both feet 12/11/2018    Onychomycosis 11/27/2018    Closed nondisplaced fracture of distal phalanx of right great toe 11/13/2018    Right foot pain 11/13/2018    Pulmonary hypertension ([address] [address] ) 11/01/2018    Leg edema, left 08/21/2018    Dyspnea on exertion 07/05/2018    PE (pulmonary thromboembolism) ([address] [address] ) 07/05/2018    Elevated brain natriuretic peptide (BNP) level 07/05/2018    Benign hypertension with CKD (chronic kidney disease) stage IV ([address] [address] ) 04/13/2018    Stage 5 chronic kidney disease not on chronic dialysis ([address] [address] ) 04/13/2018    Nephrotic range proteinuria 04/13/2018    Cyst of ovary 04/13/2017    Mixed hyperlipidemia 01/31/2014    Primary hypothyroidism 12/31/2013    Type 2 diabetes, uncontrolled, with renal manifestation ([address] [address] ) 06/05/2013    Obstructive sleep apnea (adult) (pediatric) 09/12/2012     Plan:  --Repeat V/Q scan to compare to prior to assess for CTEPH  --NADEEN, HIV, RF, full PFTs to assess DLCO, LFTs  --Start AutoPAP  --Will plan for repeat TTE +/- stress echo once on CPAP for @ least 3 months  --Mildly elevated R sided filling pressures on exam  Increase torsemide to 40 mg PO BID x 3 days  --Discussed importance of weight loss  --Can consider cardiopulmonary rehab    Assessment:  # Pulmonary HTN w/ preserved RVSF:   Diag:  --V/Q 7/5/2018: Moderate prob PE    --TTE 7/6/2018: LVEF>55%  LVIDd 5 cm   Abnormal AcT  Normal RV size and function  No clear RVOT notching  --Spirometry 4/1/19: Severe obstruction    --PSG split study 6/15/19: As below  Now on CPAP  --Stress test 5/1/19: Equivocal 2/2 to submax study  --TTE 5/1/19: LVEF 50-55%  Mod cLVH  Grade Ii diastology  +RVH  Mild ARCELIA  Mod MAC w/ mild MS (mean gradient 4 mmHg  Mild MR  Abnormal AcT w/ mid systolic RVOT notching  PASP>80 mmHg w/ definity contrast  No clear IVS flattening  Impression: Most likely driven by Group II (HFpEF, mild MS), Group III (Severe CHANDRA), and obesity  There is e/o pulmonary vascular remodeling based on noninvasive assessment that may have progressed from 2018 to the present, however, there is no clear e/o RV-PA uncoupling, would recommend maximal medical management of above with TTE re-assessment in 3-6 months  If no improvement, can consider RHC at that time  However, would do the following testing in the interim:    To do:  --NADEEN, HIV, RF, PFTs, LFTs  --Repeat V/Q scan w/ CXR to assess for CTE(PH)  --Stress echo to assess RV response to exercise after she has been on CPAP for @ least 3 months    Therapeutic:  --MRA precluded by renal dysfunction  --Increase torsemide to 40 mg PO BID x 3 days, then back to 40 mg PO daily    # Mild Mitral stenosis  # Morbid Obesity: Discussed importance of weight loss  # Chronic HFpEF: As above  # Severe CHANDRA/OHS now on AutoPAP 14-17 cmH2O  # DMII  # HLD  # Hx of PE  # Chronic bronchitis w/ Asthma  # Stage V kidney disease not on HD    RTC in 2 months    HPI: Very pleasant 60 y o  woman w/ PMHx as noted above who is referred for [facility] assessment  She is followed by pulmonary, Dr Eboni Fernández who noted elevated PASP to >80 mmHg by TTE  His team also diagnosed severe CHANDRA, for which she has started CPAP  She states that one year ago she was admitted with SOB  She was diagnosed with PE based on V/Q scan of moderate probability  She was started on heparin gtt after which she felt better   She was transitioned to coumadin  She states that initially it got better  However, she never returned to her baseline  She states that she has varying levels of HOBSON  Sometimes she feels well and does not feel limited, and other times she can't walk very far  Past Medical History:   Diagnosis Date    Acute asthmatic bronchitis     last assessed: 6/2/2017    Cardiac disease     Diabetes mellitus ([facility] )     Hyperlipidemia     Hypertension     Pneumonia     last assessed: 6/5/2013    Renal disorder      Review of Systems - 12 point ROS was done and is negative, except as noted above       No Known Allergies      Current Outpatient Medications:     albuterol (PROAIR HFA) 90 mcg/act inhaler, Inhale 2 puffs every 4 (four) hours as needed for wheezing or shortness of breath, Disp: 1 Inhaler, Rfl: 0    atorvastatin (LIPITOR) 20 mg tablet, Take 1 tablet (20 mg total) by mouth daily, Disp: 90 tablet, Rfl: 1    Blood Glucose Monitoring Suppl (ONE TOUCH ULTRA 2) w/Device KIT, Test glucose 3 times daily, Disp: 1 each, Rfl: 0    calcitriol (ROCALTROL) 0 25 mcg capsule, Take 1 capsule (0 25 mcg total) by mouth daily, Disp: 90 capsule, Rfl: 3    cholecalciferol 2000 units TABS, Take 1 tablet (2,000 Units total) by mouth daily, Disp: 30 tablet, Rfl: 0    fluticasone-vilanterol (BREO ELLIPTA) 100-25 mcg/inh inhaler, Inhale 1 puff daily Rinse mouth after use , Disp: 1 Inhaler, Rfl: 6    Insulin Lispro Prot & Lispro (HUMALOG MIX 75/25 KWIKPEN) (75-25) 100 units/mL injection pen, Inject 30 Units under the skin 2 (two) times a day with meals for 250 days, Disp: 150 mL, Rfl: 1    linaGLIPtin (TRADJENTA) 5 MG TABS, Take 5 mg by mouth daily, Disp: 90 tablet, Rfl: 3    metoprolol succinate (TOPROL-XL) 25 mg 24 hr tablet, Take 50mg AM & 25mg PM, Disp: 90 tablet, Rfl: 3    ONE TOUCH ULTRA TEST test strip, Test glucose 3 times daily, Disp: 300 each, Rfl: 1    torsemide (DEMADEX) 20 mg tablet, Take 2 tablet per day in the morning and take 1 tablets in the evening every night, Disp: 90 tablet, Rfl: 3    warfarin (COUMADIN) 1 mg tablet, One po daily (Patient taking differently: 1 mg One po daily), Disp: 30 tablet, Rfl: 1    warfarin (COUMADIN) 2 mg tablet, 4mg daily or as directed, Disp: 180 tablet, Rfl: 0    warfarin (COUMADIN) 3 mg tablet, Take 5 milligrams on 7/16/18, July 17, 2018 and get PT/INR on July 17, 2018, Disp: 30 tablet, Rfl: 0    warfarin (COUMADIN) 5 mg tablet, Take 1 tablet (5 mg total) by mouth daily, Disp: 90 tablet, Rfl: 1    Social History     Socioeconomic History    Marital status: Single     Spouse name: Not on file    Number of children: Not on file    Years of education: Not on file    Highest education level: Not on file   Occupational History    Not on file   Social Needs    Financial resource strain: Not on file    Food insecurity:     Worry: Not on file     Inability: Not on file    Transportation needs:     Medical: Not on file     Non-medical: Not on file   Tobacco Use    Smoking status: Never Smoker    Smokeless tobacco: Never Used   Substance and Sexual Activity    Alcohol use: No    Drug use: No    Sexual activity: Not on file   Lifestyle    Physical activity:     Days per week: Not on file     Minutes per session: Not on file    Stress: Not on file   Relationships    Social connections:     Talks on phone: Not on file     Gets together: Not on file     Attends Sabianism service: Not on file     Active member of club or organization: Not on file     Attends meetings of clubs or organizations: Not on file     Relationship status: Not on file    Intimate partner violence:     Fear of current or ex partner: Not on file     Emotionally abused: Not on file     Physically abused: Not on file     Forced sexual activity: Not on file   Other Topics Concern    Not on file   Social History Narrative    Caffeine use       Family History   Problem Relation Age of Onset    Diabetes Mother         mellitus    Anxiety disorder Mother         generalized anxiety disorder    Hypertension Mother     Kidney disease Father     Kidney failure Father         renal failure    Ulcerative colitis Sister     Heart disease Family     Ovarian cancer Maternal Aunt      Physical Exam:    Vitals: Blood pressure 124/76, pulse 80, height 4' 11" (1 499 m), weight 97 7 kg (215 lb 6 4 oz), SpO2 96 %, not currently breastfeeding , Body mass index is 43 51 kg/m² ,   Wt Readings from Last 3 Encounters:   07/11/19 97 7 kg (215 lb 6 4 oz)   06/21/19 99 2 kg (218 lb 12 8 oz)   06/21/19 98 kg (216 lb)     Vitals:    07/11/19 1601   BP: 124/76   BP Location: Right arm   Patient Position: Sitting   Cuff Size: Large   Pulse: 80   SpO2: 96%   Weight: 97 7 kg (215 lb 6 4 oz)   Height: 4' 11" (1 499 m)       GEN: Sung Lanes appears well, alert and oriented x 3, pleasant and cooperative   HEENT: pupils equal, round, and reactive to light; extraocular muscles intact  NECK: supple, no carotid bruits   HEART: regular rhythm, normal S1 and S2, no murmurs, clicks, gallops or rubs, JVP is    LUNGS: clear to auscultation bilaterally; no wheezes, rales, or rhonchi   ABDOMEN: normal bowel sounds, soft, no tenderness, no distention  EXTREMITIES: peripheral pulses normal; no clubbing, cyanosis, or edema  NEURO: no focal findings   SKIN: normal without suspicious lesions on exposed skin    Labs & Results:  Lab Results   Component Value Date    WBC 9 58 06/08/2019    HGB 12 3 06/08/2019    HCT 39 3 06/08/2019    MCV 86 06/08/2019     06/08/2019       Chemistry        Component Value Date/Time     12/11/2014 1713    K 4 3 06/08/2019 0810    K 4 0 12/11/2014 1713     06/08/2019 0810     12/11/2014 1713    CO2 28 06/08/2019 0810    CO2 32 12/11/2014 1713    BUN 60 (H) 06/08/2019 0810    BUN 26 (H) 12/11/2014 1713    CREATININE 3 31 (H) 06/08/2019 0810    CREATININE 1 46 (H) 12/11/2014 1713        Component Value Date/Time    CALCIUM 8 6 06/08/2019 0810    CALCIUM 8 8 12/11/2014 1713    ALKPHOS 84 06/08/2019 0810    ALKPHOS 118 12/11/2014 1713    AST 17 06/08/2019 0810    AST 18 12/11/2014 1713    ALT 24 06/08/2019 0810    ALT 22 12/11/2014 1713    BILITOT 1 08 (H) 12/11/2014 1713        Lab Results   Component Value Date    INR 1 67 (H) 07/05/2019    INR 1 54 (H) 06/08/2019    INR 1 57 (H) 05/13/2019    PROTIME 17 0 (H) 07/05/2019    PROTIME 15 8 (H) 06/08/2019    PROTIME 16 1 (H) 05/13/2019     EKG personally reviewed by Kathleen Arnold MD      Counseling / Coordination of Care  Total floor / unit time spent today 40 minutes  Greater than 50% of total time was spent with the patient and / or family counseling and / or coordination of care  A description of the counseling / coordination of care: 25 min  Thank you for the opportunity to participate in the care of this patient      Kathleen Arnold MD, PhD   [place]

## 2019-07-12 ENCOUNTER — CONSULT (OUTPATIENT)
Dept: GASTROENTEROLOGY | Facility: CLINIC | Age: 64
End: 2019-07-12
Payer: COMMERCIAL

## 2019-07-12 ENCOUNTER — APPOINTMENT (OUTPATIENT)
Dept: LAB | Facility: HOSPITAL | Age: 64
End: 2019-07-12
Payer: COMMERCIAL

## 2019-07-12 ENCOUNTER — TRANSCRIBE ORDERS (OUTPATIENT)
Dept: ADMINISTRATIVE | Facility: HOSPITAL | Age: 64
End: 2019-07-12

## 2019-07-12 ENCOUNTER — ANTICOAG VISIT (OUTPATIENT)
Dept: FAMILY MEDICINE CLINIC | Facility: CLINIC | Age: 64
End: 2019-07-12

## 2019-07-12 ENCOUNTER — TELEPHONE (OUTPATIENT)
Dept: GASTROENTEROLOGY | Facility: CLINIC | Age: 64
End: 2019-07-12

## 2019-07-12 ENCOUNTER — TELEPHONE (OUTPATIENT)
Dept: GASTROENTEROLOGY | Facility: MEDICAL CENTER | Age: 64
End: 2019-07-12

## 2019-07-12 VITALS
TEMPERATURE: 98.7 F | HEIGHT: 59 IN | RESPIRATION RATE: 18 BRPM | DIASTOLIC BLOOD PRESSURE: 70 MMHG | SYSTOLIC BLOOD PRESSURE: 128 MMHG | BODY MASS INDEX: 43.02 KG/M2 | HEART RATE: 76 BPM | WEIGHT: 213.4 LBS

## 2019-07-12 DIAGNOSIS — I27.20 PULMONARY HYPERTENSION (HCC): ICD-10-CM

## 2019-07-12 DIAGNOSIS — I27.20 PROGRESSIVE PULMONARY HYPERTENSION (HCC): Primary | ICD-10-CM

## 2019-07-12 DIAGNOSIS — Z12.11 COLON CANCER SCREENING: Primary | ICD-10-CM

## 2019-07-12 DIAGNOSIS — I27.20 PROGRESSIVE PULMONARY HYPERTENSION (HCC): ICD-10-CM

## 2019-07-12 LAB
ALBUMIN SERPL BCP-MCNC: 3.2 G/DL (ref 3.5–5)
ALP SERPL-CCNC: 87 U/L (ref 46–116)
ALT SERPL W P-5'-P-CCNC: 26 U/L (ref 12–78)
AST SERPL W P-5'-P-CCNC: 16 U/L (ref 5–45)
BILIRUB DIRECT SERPL-MCNC: 0.2 MG/DL (ref 0–0.2)
BILIRUB SERPL-MCNC: 0.6 MG/DL (ref 0.2–1)
INR PPP: 2.37 (ref 0.86–1.16)
PROT SERPL-MCNC: 7.8 G/DL (ref 6.4–8.2)
PROTHROMBIN TIME: 23.7 SECONDS (ref 9.4–11.7)

## 2019-07-12 PROCEDURE — 80076 HEPATIC FUNCTION PANEL: CPT | Performed by: INTERNAL MEDICINE

## 2019-07-12 PROCEDURE — 36415 COLL VENOUS BLD VENIPUNCTURE: CPT

## 2019-07-12 PROCEDURE — 85610 PROTHROMBIN TIME: CPT | Performed by: FAMILY MEDICINE

## 2019-07-12 PROCEDURE — 86038 ANTINUCLEAR ANTIBODIES: CPT | Performed by: INTERNAL MEDICINE

## 2019-07-12 PROCEDURE — 99244 OFF/OP CNSLTJ NEW/EST MOD 40: CPT | Performed by: INTERNAL MEDICINE

## 2019-07-12 PROCEDURE — 87389 HIV-1 AG W/HIV-1&-2 AB AG IA: CPT

## 2019-07-12 NOTE — TELEPHONE ENCOUNTER
7/12/2019 10:16 AM Called Veronica Vásquez regarding stopping her coumadin for her colonoscopy  I would like her to bridge with Lovenox before the procedure  Message left on machine to call the office    Dona Miller, DO

## 2019-07-12 NOTE — PROGRESS NOTES
Consultation - [facility]  Beatriz Robbins 60 y o  female MRN: [id number]  Unit/Bed#:  Encounter: [id number]        Consults    ASSESSMENT/PLAN:       1  Colon cancer screening-average risk, no change in bowel habits or hematochezia  No family history of colon cancer  Patient is high risk for procedure from anesthesia standpoint given history of severe sleep apnea, pulmonary hypertension and due to history of a PE  Patient is scheduled for repeat echocardiogram and V/Q scan as per recommendations by Cardiology yesterday  Would need clearance from Cardiology in regards to proceeding with colonoscopy  Furthermore, will need Coumadin held 5 days prior to the procedure  This was discussed with the patient and she is agreeable with the plan  -furthermore, she has history of chronic kidney disease therefore will proceed with GoLYTELY prep  - Patient was explained about  the risks and benefits of the procedure  Risks including but not limited to bleeding, infection, perforation were explained in detail  Also explained about less than 100% sensitivity with the exam and other alternatives  ______________________________________________________________________    Reason for Consult / Principal Problem: [unfilled]    HPI: Beatriz Robbins is a 60y o  year old female with history of diabetes, hypertension, hyperlipidemia, CAD, CKD, obesity, obstructive sleep apnea was ordered CPAP machine which she has not been using it, history of pulmonary hypertension, PE on Coumadin for the past 1 year, presents for colon cancer screening evaluation  Patient states that she had undergone colonoscopy many years ago, unable to recall the details of the procedure or exactly how long ago it was  She denies change in bowel habits, states that she has mild constipation at baseline  Denies hematochezia, unintentional weight loss or abdominal pain  She denies acid reflux symptoms    She denies hematemesis, coffee-ground emesis or melena  She denies family history of colon cancer  Denies regular NSAID use  She endorses that some days she feels dyspnea on exertion while other days she feels well  She states that she was seen by cardiologist yesterday and is scheduled for repeat echocardiogram and V/Q scan next week  Review of Systems: The remainder of the review of systems was negative except for the pertinent positives noted in HPI  Historical Information   Past Medical History:   Diagnosis Date    Acute asthmatic bronchitis     last assessed: 2017    Cardiac disease     Diabetes mellitus ([facility] [address]     Hyperlipidemia     Hypertension     Pneumonia     last assessed: 2013    Renal disorder      Past Surgical History:   Procedure Laterality Date     SECTION       x 1    NOSE SURGERY      fractured nose - septoplasty     Social History   Social History     Substance and Sexual Activity   Alcohol Use No     Social History     Substance and Sexual Activity   Drug Use No     Social History     Tobacco Use   Smoking Status Never Smoker   Smokeless Tobacco Never Used     Family History   Problem Relation Age of Onset    Diabetes Mother         mellitus    Anxiety disorder Mother         generalized anxiety disorder    Hypertension Mother     Kidney disease Father     Kidney failure Father         renal failure    Ulcerative colitis Sister     Heart disease Family     Ovarian cancer Maternal Aunt        Meds/Allergies       (Not in a hospital admission)  No current facility-administered medications for this visit  No Known Allergies    Objective     Blood pressure 128/70, pulse 76, temperature 98 7 °F (37 1 °C), temperature source Tympanic, resp  rate 18, height 4' 11" (1 499 m), weight 96 8 kg (213 lb 6 4 oz), not currently breastfeeding      [unfilled]    PHYSICAL EXAM     GEN: well nourished, well developed, no acute distress  HEENT: anicteric, MMM, no cervical or supraclavicular lymphadenopathy  CV: RRR, no m/r/g  CHEST:  Slightly coarse breath sounds bilaterally at the bases  ABD: +BS, soft, NT/ND, no hepatosplenomegaly  EXT: no c/c/e  SKIN: no rashes,  NEURO: aaox3    Lab Results:   No visits with results within 1 Day(s) from this visit     Latest known visit with results is:   Appointment on 07/05/2019   Component Date Value    CRP 07/05/2019 17 0*    C-ANCA 07/05/2019 <1:20     Atypical pANCA 07/05/2019 <1:20     MPO AB 07/05/2019 <9 0     MI-3 AB 07/05/2019 <3 5     P-ANCA 07/05/2019 <1:20      Imaging Studies: I have personally reviewed pertinent films in PACS

## 2019-07-12 NOTE — LETTER
July 12, 2019     Devika Davey, DO  [address] [address]    Patient: Jacqueline Eller   YOB: 1955   Date of Visit: 7/12/2019       Dear Dr Nikita Tavares: Thank you for referring Jacqueline Eller to me for evaluation  Below are my notes for this consultation  If you have questions, please do not hesitate to call me  I look forward to following your patient along with you  Sincerely,        Lesley Hernandez MD        CC: No Recipients  Lesley Hernandez MD  7/12/2019  8:33 AM  Sign at close encounter  Consultation - [facility]  Jacqueline Eller 60 y o  female MRN: [id number]  Unit/Bed#:  Encounter: [id number]        Consults    ASSESSMENT/PLAN:       1  Colon cancer screening-average risk, no change in bowel habits or hematochezia  No family history of colon cancer  Patient is high risk for procedure from anesthesia standpoint given history of severe sleep apnea, pulmonary hypertension and due to history of a PE  Patient is scheduled for repeat echocardiogram and V/Q scan as per recommendations by Cardiology yesterday  Would need clearance from Cardiology in regards to proceeding with colonoscopy  Furthermore, will need Coumadin held 5 days prior to the procedure  This was discussed with the patient and she is agreeable with the plan  -furthermore, she has history of chronic kidney disease therefore will proceed with GoLYTELY prep  - Patient was explained about  the risks and benefits of the procedure  Risks including but not limited to bleeding, infection, perforation were explained in detail  Also explained about less than 100% sensitivity with the exam and other alternatives            ______________________________________________________________________    Reason for Consult / Principal Problem: [unfilled]    HPI: Jacqueline Eller is a 60y o  year old female with history of diabetes, hypertension, hyperlipidemia, CAD, CKD, obesity, obstructive sleep apnea was ordered CPAP machine which she has not been using it, history of pulmonary hypertension, PE on Coumadin for the past 1 year, presents for colon cancer screening evaluation  Patient states that she had undergone colonoscopy many years ago, unable to recall the details of the procedure or exactly how long ago it was  She denies change in bowel habits, states that she has mild constipation at baseline  Denies hematochezia, unintentional weight loss or abdominal pain  She denies acid reflux symptoms  She denies hematemesis, coffee-ground emesis or melena  She denies family history of colon cancer  Denies regular NSAID use  She endorses that some days she feels dyspnea on exertion while other days she feels well  She states that she was seen by cardiologist yesterday and is scheduled for repeat echocardiogram and V/Q scan next week  Review of Systems: The remainder of the review of systems was negative except for the pertinent positives noted in HPI       Historical Information   Past Medical History:   Diagnosis Date    Acute asthmatic bronchitis     last assessed: 2017    Cardiac disease     Diabetes mellitus ([facility] [address] [facility]     Hyperlipidemia     Hypertension     Pneumonia     last assessed: 2013    Renal disorder      Past Surgical History:   Procedure Laterality Date     SECTION       x 1    NOSE SURGERY      fractured nose - septoplasty     Social History   Social History     Substance and Sexual Activity   Alcohol Use No     Social History     Substance and Sexual Activity   Drug Use No     Social History     Tobacco Use   Smoking Status Never Smoker   Smokeless Tobacco Never Used     Family History   Problem Relation Age of Onset    Diabetes Mother         mellitus    Anxiety disorder Mother         generalized anxiety disorder    Hypertension Mother     Kidney disease Father     Kidney failure Father         renal failure    Ulcerative colitis Sister     Heart disease Family     Ovarian cancer Maternal Aunt        Meds/Allergies       (Not in a hospital admission)  No current facility-administered medications for this visit  No Known Allergies    Objective     Blood pressure 128/70, pulse 76, temperature 98 7 °F (37 1 °C), temperature source Tympanic, resp  rate 18, height 4' 11" (1 499 m), weight 96 8 kg (213 lb 6 4 oz), not currently breastfeeding  [unfilled]    PHYSICAL EXAM     GEN: well nourished, well developed, no acute distress  HEENT: anicteric, MMM, no cervical or supraclavicular lymphadenopathy  CV: RRR, no m/r/g  CHEST:  Slightly coarse breath sounds bilaterally at the bases  ABD: +BS, soft, NT/ND, no hepatosplenomegaly  EXT: no c/c/e  SKIN: no rashes,  NEURO: aaox3    Lab Results:   No visits with results within 1 Day(s) from this visit     Latest known visit with results is:   Appointment on 07/05/2019   Component Date Value    CRP 07/05/2019 17 0*    C-ANCA 07/05/2019 <1:20     Atypical pANCA 07/05/2019 <1:20     MPO AB 07/05/2019 <9 0     MI-3 AB 07/05/2019 <3 5     P-ANCA 07/05/2019 <1:20      Imaging Studies: I have personally reviewed pertinent films in PACS

## 2019-07-12 NOTE — TELEPHONE ENCOUNTER
Our mutual patient is scheduled for procedure: Colonoscopy     On: 8/6/2019       With: Dr Tonya Ma       She is taking the following blood thinner:  COUMADIN       Can this be stopped _5_ days prior to the procedure?          Physician Approving clearance:       ________________________

## 2019-07-12 NOTE — TELEPHONE ENCOUNTER
[facility] pharmacy called looking to speak with someone regarding golytely generic brand  She would like to know if generic brand is ok   [facility] [phone]

## 2019-07-12 NOTE — TELEPHONE ENCOUNTER
7/12/2019 4:54 PM tried Jaspal Heads again  Message left on voice mail to call the office    Newton Briggs,

## 2019-07-15 LAB
HIV 1+2 AB+HIV1 P24 AG SERPL QL IA: NORMAL
RYE IGE QN: NEGATIVE

## 2019-07-15 NOTE — TELEPHONE ENCOUNTER
7/15/2019 5:12 PM Called Amanda Calles  Message left on her voice mail to call the office    Terence Velasquez DO

## 2019-07-16 ENCOUNTER — HOSPITAL ENCOUNTER (OUTPATIENT)
Dept: PULMONOLOGY | Facility: HOSPITAL | Age: 64
Discharge: HOME | End: 2019-07-16
Payer: COMMERCIAL

## 2019-07-16 ENCOUNTER — TELEPHONE (OUTPATIENT)
Dept: FAMILY MEDICINE CLINIC | Facility: CLINIC | Age: 64
End: 2019-07-16

## 2019-07-16 DIAGNOSIS — I27.20 PULMONARY HYPERTENSION (HCC): ICD-10-CM

## 2019-07-16 DIAGNOSIS — I26.99 PE (PULMONARY THROMBOEMBOLISM) (HCC): Primary | ICD-10-CM

## 2019-07-16 PROCEDURE — 94729 DIFFUSING CAPACITY: CPT

## 2019-07-16 PROCEDURE — 94060 EVALUATION OF WHEEZING: CPT | Performed by: INTERNAL MEDICINE

## 2019-07-16 PROCEDURE — 94726 PLETHYSMOGRAPHY LUNG VOLUMES: CPT

## 2019-07-16 PROCEDURE — 94726 PLETHYSMOGRAPHY LUNG VOLUMES: CPT | Performed by: INTERNAL MEDICINE

## 2019-07-16 PROCEDURE — 94729 DIFFUSING CAPACITY: CPT | Performed by: INTERNAL MEDICINE

## 2019-07-16 PROCEDURE — 94060 EVALUATION OF WHEEZING: CPT

## 2019-07-16 PROCEDURE — 94760 N-INVAS EAR/PLS OXIMETRY 1: CPT

## 2019-07-16 RX ORDER — ALBUTEROL SULFATE 2.5 MG/3ML
2.5 SOLUTION RESPIRATORY (INHALATION) ONCE
Status: DISCONTINUED | OUTPATIENT
Start: 2019-07-16 | End: 2019-07-20 | Stop reason: HOSPADM

## 2019-07-16 NOTE — TELEPHONE ENCOUNTER
7/16/2019 12:28 PM returned call to patient we discussed her need for coverage for her Coumadin while being stopped for her upcoming colonoscopy  She agreed to do the bridge therapy with Lovenox  New prescription sent to her pharmacy  We discussed proper dosing  She was advised to stop it 24 hours before her colonoscopy      Message complete  Tl Sibley, DO

## 2019-07-16 NOTE — TELEPHONE ENCOUNTER
7/16/2019 11:46 AM Called Ana María Muhammad to discuss her coumadin  Message left on voice mail to call the office    Roshan Michelle, DO

## 2019-07-16 NOTE — TELEPHONE ENCOUNTER
Dr Lashonda Luna,  I did speak with Ronak Johnston and she will bridge with lovenox while being off of coumadin    Thank you,  Dr Rajesh Duffy

## 2019-07-16 NOTE — TELEPHONE ENCOUNTER
7/16/2019 12:31 PM spoke with Nancy Juárez  She agreed to do bridge therapy with lovenox  New prescription sent to her pharmacy  Advised to stop coumadin 5 days before her procedure      Deb Vargas DO

## 2019-07-16 NOTE — TELEPHONE ENCOUNTER
Thank you for the update  Staff- please make sure the patient has the correct date for procedure so that coumadin can be held on appropriate dates     Thank you

## 2019-07-16 NOTE — TELEPHONE ENCOUNTER
Dr Unique Arshad-  Patient called and stated you two are playing phone tag  I have can not see a telephone encounter that says you were calling her? She said "she is working so Dr Unique Arshad can leave a message on her voicemail "    Please advise

## 2019-07-17 NOTE — TELEPHONE ENCOUNTER
I called the pt and went over her instructions and date of service, etc  Pt is aware also, to bridge w/ Lovenox per Dr Peggy Rader (Dr Peggy Rader spoke to the pt w/ specific Instructions re: bridging

## 2019-08-02 ENCOUNTER — TRANSCRIBE ORDERS (OUTPATIENT)
Dept: ADMINISTRATIVE | Facility: HOSPITAL | Age: 64
End: 2019-08-02

## 2019-08-02 ENCOUNTER — APPOINTMENT (OUTPATIENT)
Dept: LAB | Facility: HOSPITAL | Age: 64
End: 2019-08-02
Attending: INTERNAL MEDICINE
Payer: COMMERCIAL

## 2019-08-02 ENCOUNTER — OFFICE VISIT (OUTPATIENT)
Dept: ENDOCRINOLOGY | Facility: CLINIC | Age: 64
End: 2019-08-02
Payer: COMMERCIAL

## 2019-08-02 VITALS
BODY MASS INDEX: 42.94 KG/M2 | HEIGHT: 59 IN | DIASTOLIC BLOOD PRESSURE: 70 MMHG | WEIGHT: 213 LBS | SYSTOLIC BLOOD PRESSURE: 122 MMHG | HEART RATE: 80 BPM

## 2019-08-02 DIAGNOSIS — Z79.4 TYPE 2 DIABETES MELLITUS WITH COMPLICATION, WITH LONG-TERM CURRENT USE OF INSULIN (HCC): Primary | ICD-10-CM

## 2019-08-02 DIAGNOSIS — E11.641 UNCONTROLLED TYPE 2 DIABETES MELLITUS WITH HYPOGLYCEMIA AND COMA (HCC): ICD-10-CM

## 2019-08-02 DIAGNOSIS — N18.5 STAGE 5 CHRONIC KIDNEY DISEASE NOT ON CHRONIC DIALYSIS (HCC): ICD-10-CM

## 2019-08-02 DIAGNOSIS — N25.81 SECONDARY HYPERPARATHYROIDISM OF RENAL ORIGIN (HCC): ICD-10-CM

## 2019-08-02 DIAGNOSIS — E66.01 MORBID OBESITY (HCC): ICD-10-CM

## 2019-08-02 DIAGNOSIS — IMO0002 UNCONTROLLED TYPE 2 DIABETES MELLITUS WITH STAGE 3 CHRONIC KIDNEY DISEASE, WITH LONG-TERM CURRENT USE OF INSULIN: ICD-10-CM

## 2019-08-02 DIAGNOSIS — E11.8 TYPE 2 DIABETES MELLITUS WITH COMPLICATION, WITH LONG-TERM CURRENT USE OF INSULIN (HCC): Primary | ICD-10-CM

## 2019-08-02 DIAGNOSIS — E11.319 DIABETIC RETINOPATHY OF BOTH EYES ASSOCIATED WITH TYPE 2 DIABETES MELLITUS, MACULAR EDEMA PRESENCE UNSPECIFIED, UNSPECIFIED RETINOPATHY SEVERITY (HCC): ICD-10-CM

## 2019-08-02 DIAGNOSIS — I10 ESSENTIAL HYPERTENSION: ICD-10-CM

## 2019-08-02 DIAGNOSIS — Z79.4 TYPE 2 DIABETES MELLITUS WITH HYPERGLYCEMIA, WITH LONG-TERM CURRENT USE OF INSULIN (HCC): ICD-10-CM

## 2019-08-02 DIAGNOSIS — E55.9 VITAMIN D DEFICIENCY: ICD-10-CM

## 2019-08-02 DIAGNOSIS — E78.2 MIXED HYPERLIPIDEMIA: Primary | ICD-10-CM

## 2019-08-02 DIAGNOSIS — E11.65 TYPE 2 DIABETES MELLITUS WITH HYPERGLYCEMIA, WITH LONG-TERM CURRENT USE OF INSULIN (HCC): ICD-10-CM

## 2019-08-02 DIAGNOSIS — E78.2 MIXED HYPERLIPIDEMIA: ICD-10-CM

## 2019-08-02 LAB
ANION GAP SERPL CALCULATED.3IONS-SCNC: 12 MMOL/L (ref 4–13)
BUN SERPL-MCNC: 51 MG/DL (ref 5–25)
CALCIUM SERPL-MCNC: 8.3 MG/DL (ref 8.3–10.1)
CHLORIDE SERPL-SCNC: 104 MMOL/L (ref 100–108)
CHOLEST SERPL-MCNC: 134 MG/DL (ref 50–200)
CO2 SERPL-SCNC: 22 MMOL/L (ref 21–32)
CREAT SERPL-MCNC: 3.31 MG/DL (ref 0.6–1.3)
EST. AVERAGE GLUCOSE BLD GHB EST-MCNC: 189 MG/DL
GFR SERPL CREATININE-BSD FRML MDRD: 14 ML/MIN/1.73SQ M
GLUCOSE P FAST SERPL-MCNC: 98 MG/DL (ref 65–99)
HBA1C MFR BLD: 8.2 % (ref 4.2–6.3)
HDLC SERPL-MCNC: 49 MG/DL (ref 40–60)
LDLC SERPL CALC-MCNC: 60 MG/DL (ref 0–100)
NONHDLC SERPL-MCNC: 85 MG/DL
POTASSIUM SERPL-SCNC: 3.8 MMOL/L (ref 3.5–5.3)
PTH-INTACT SERPL-MCNC: 409.1 PG/ML (ref 18.4–80.1)
SODIUM SERPL-SCNC: 138 MMOL/L (ref 136–145)
TRIGL SERPL-MCNC: 125 MG/DL

## 2019-08-02 PROCEDURE — 83970 ASSAY OF PARATHORMONE: CPT

## 2019-08-02 PROCEDURE — 3074F SYST BP LT 130 MM HG: CPT | Performed by: INTERNAL MEDICINE

## 2019-08-02 PROCEDURE — 80048 BASIC METABOLIC PNL TOTAL CA: CPT

## 2019-08-02 PROCEDURE — 99215 OFFICE O/P EST HI 40 MIN: CPT | Performed by: INTERNAL MEDICINE

## 2019-08-02 PROCEDURE — 83036 HEMOGLOBIN GLYCOSYLATED A1C: CPT | Performed by: INTERNAL MEDICINE

## 2019-08-02 PROCEDURE — 80061 LIPID PANEL: CPT | Performed by: INTERNAL MEDICINE

## 2019-08-02 PROCEDURE — 3078F DIAST BP <80 MM HG: CPT | Performed by: INTERNAL MEDICINE

## 2019-08-02 PROCEDURE — 36415 COLL VENOUS BLD VENIPUNCTURE: CPT

## 2019-08-02 RX ORDER — CALCITRIOL 0.5 UG/1
0.5 CAPSULE, LIQUID FILLED ORAL DAILY
Qty: 90 EACH | Refills: 3 | Status: SHIPPED | OUTPATIENT
Start: 2019-08-02 | End: 2020-04-21

## 2019-08-02 RX ORDER — INSULIN LISPRO 100 [IU]/ML
32 INJECTION, SUSPENSION SUBCUTANEOUS 2 TIMES DAILY WITH MEALS
Qty: 150 ML | Refills: 3 | Status: ON HOLD | OUTPATIENT
Start: 2019-08-02 | End: 2020-07-19

## 2019-08-02 NOTE — PATIENT INSTRUCTIONS
Continue tradjenta 5 mg orally once a day   Increase insulin 75/25 to 32 units twice a day with breakfast and dinner     Please send me your blood sugar log for review in 2-3 weeks     Follow up in 3 months

## 2019-08-02 NOTE — PROGRESS NOTES
Alonso Pacheco 60 y o  female MRN: [id number]    Encounter: [id number]      Assessment/Plan     Assessment: This is a 60y o -year-old female with type 2 diabetes mellitus, hypertension, hyperlipidemia, CAD, retinopathy, CHANDRA, vitamin-D deficiency    Plan:  1  Type 2 diabetes mellitus  Improvement in A1c to 8 2% however still above goal  Emphasized importance of good glycemic control, compliance with medications    Recommend the following at this time  Continue tradjenta 5 mg orally once a day   Increase insulin 75/25 to 32 units twice a day with breakfast and dinner   Patient to send blood sugar log for review in 2-3 weeks    Recommend diabetes education/ nutrition therpay  - patient refused  Consistent carb diet, exercise as discussed    Repeat A1c, BMP, lipid panel in 3 months    2  Hyperlipidemia  - continue statin therapy    3  Hypertension  Blood pressure at goal  - continue current medications    4  CKD-creatinine stable  Repeat BMP in 3 months  Follow-up with Nephrology    5  CAD-follow-up with cardiology    6  Hyperparathyroidism  Likely secondary to CKD and Vitamin-D deficiency  Continue vitamin-D supplementation  Check vitamin-D level    7  Obesity-diet and lifestyle as discussed    CC: Diabetes    History of Present Illness     HPI:  Alonso Pacheco is a 60 y o  female presents for a follow-up visit regarding diabetes management  DM history:   Diagnosed > 15 years ago   complications of CAD, CHF, CKD  No CVA  Last Eye exam: mild retinopathy     Current regimen:   Tradjenta 5 mg orally once a day  Insulin 75/25 30 units subcutaneously twice a day  Occasional forgets insulin     Statin:  Lipitor 20  ACE-I/ARB: --     Says that she has a stomach flu this last week, was in [place] - PO intake was low and did not take any medications   Water, jello, ginger ale  Better since yesterday    BG were high during her vacation  149 mg-417 mg/dl   Prior to that says BG were mostly < 200mg/dl  109-230s    Was taking Vit D3 2000 iu po daily     Appetite is good  Denies recent weight gain/ loss  Denies numbness or tingling in the hands or feet  Denies changes in vision  No chest pain  Occasional SOB - will be following up with pulmonology; better than last year   No urinary complaints  Exercise:  None     All other systems were reviewed and are negative      Review of Systems      Historical Information   Past Medical History:   Diagnosis Date    Acute asthmatic bronchitis     last assessed: 2017    Cardiac disease     Diabetes mellitus ([address] )     Hyperlipidemia     Hypertension     Pneumonia     last assessed: 2013    Renal disorder      Past Surgical History:   Procedure Laterality Date     SECTION       x 1    NOSE SURGERY      fractured nose - septoplasty     Social History   Social History     Substance and Sexual Activity   Alcohol Use No     Social History     Substance and Sexual Activity   Drug Use No     Social History     Tobacco Use   Smoking Status Never Smoker   Smokeless Tobacco Never Used     Family History:   Family History   Problem Relation Age of Onset    Diabetes Mother         mellitus    Anxiety disorder Mother         generalized anxiety disorder    Hypertension Mother     Kidney disease Father     Kidney failure Father         renal failure    Ulcerative colitis Sister     Heart disease Family     Ovarian cancer Maternal Aunt        Meds/Allergies   Current Outpatient Medications   Medication Sig Dispense Refill    albuterol (PROAIR HFA) 90 mcg/act inhaler Inhale 2 puffs every 4 (four) hours as needed for wheezing or shortness of breath 1 Inhaler 0    atorvastatin (LIPITOR) 20 mg tablet Take 1 tablet (20 mg total) by mouth daily 90 tablet 1    Blood Glucose Monitoring Suppl (ONE TOUCH ULTRA 2) w/Device KIT Test glucose 3 times daily 1 each 0    calcitriol (ROCALTROL) 0 25 mcg capsule Take 1 capsule (0 25 mcg total) by mouth daily 90 capsule 3    cholecalciferol 2000 units TABS Take 1 tablet (2,000 Units total) by mouth daily 30 tablet 0    enoxaparin (LOVENOX) 80 mg/0 8 mL Inject 0 8 mL (80 mg total) under the skin every 12 (twelve) hours for 4 days 8 Syringe 0    fluticasone-vilanterol (BREO ELLIPTA) 100-25 mcg/inh inhaler Inhale 1 puff daily Rinse mouth after use  1 Inhaler 6    Insulin Lispro Prot & Lispro (HUMALOG MIX 75/25 KWIKPEN) (75-25) 100 units/mL injection pen Inject 30 Units under the skin 2 (two) times a day with meals for 250 days 150 mL 1    linaGLIPtin (TRADJENTA) 5 MG TABS Take 5 mg by mouth daily 90 tablet 3    metoprolol succinate (TOPROL-XL) 25 mg 24 hr tablet Take 50mg AM & 25mg PM 90 tablet 3    ONE TOUCH ULTRA TEST test strip Test glucose 3 times daily 300 each 1    torsemide (DEMADEX) 20 mg tablet Take 2 tablet per day in the morning and take 1 tablets in the evening every night 90 tablet 3    warfarin (COUMADIN) 1 mg tablet One po daily (Patient taking differently: 1 mg One po daily) 30 tablet 1    warfarin (COUMADIN) 2 mg tablet 4mg daily or as directed 180 tablet 0    warfarin (COUMADIN) 3 mg tablet Take 5 milligrams on 7/16/18, July 17, 2018 and get PT/INR on July 17, 2018 30 tablet 0    warfarin (COUMADIN) 5 mg tablet Take 1 tablet (5 mg total) by mouth daily 90 tablet 1    polyethylene glycol (GOLYTELY) 4000 mL solution Take 4,000 mL by mouth once for 1 dose 4000 mL 0     No current facility-administered medications for this visit  No Known Allergies    Objective   Vitals: Blood pressure 122/70, pulse 80, height 4' 11" (1 499 m), weight 96 6 kg (213 lb), not currently breastfeeding  Physical Exam   Constitutional: She is oriented to person, place, and time  She appears well-developed and well-nourished  HENT:   Head: Normocephalic and atraumatic  Eyes: Pupils are equal, round, and reactive to light  Conjunctivae and EOM are normal    Neck: Normal range of motion  Neck supple  No thyromegaly present  Cardiovascular: Normal rate, regular rhythm and normal heart sounds  No murmur heard  Pulmonary/Chest: Effort normal and breath sounds normal  She has no wheezes  Abdominal: Soft  She exhibits no distension  There is no tenderness  Obese   Musculoskeletal: Normal range of motion  She exhibits no edema  Neurological: She is alert and oriented to person, place, and time  Skin: Skin is warm and dry  Psychiatric: She has a normal mood and affect  Her behavior is normal    Vitals reviewed  The history was obtained from the review of the chart, patient      Lab Results:   Lab Results   Component Value Date/Time    Hemoglobin A1C 8 2 (H) 08/02/2019 07:19 AM    Hemoglobin A1C 9 8 (H) 06/08/2019 08:10 AM    Hemoglobin A1C 9 3 (H) 12/17/2018 09:52 AM    WBC 9 58 06/08/2019 08:10 AM    WBC 8 83 03/08/2019 07:31 AM    WBC 8 43 12/17/2018 09:52 AM    Hemoglobin 12 3 06/08/2019 08:10 AM    Hemoglobin 12 2 03/08/2019 07:31 AM    Hemoglobin 12 6 12/17/2018 09:52 AM    Hematocrit 39 3 06/08/2019 08:10 AM    Hematocrit 39 9 03/08/2019 07:31 AM    Hematocrit 40 3 12/17/2018 09:52 AM    MCV 86 06/08/2019 08:10 AM    MCV 86 03/08/2019 07:31 AM    MCV 85 12/17/2018 09:52 AM    Platelets 501 98/69/2236 08:10 AM    Platelets 899 81/95/0511 07:31 AM    Platelets 422 78/71/1133 09:52 AM    BUN 51 (H) 08/02/2019 07:19 AM    BUN 60 (H) 06/08/2019 08:10 AM    BUN 59 (H) 03/08/2019 07:31 AM    Potassium 3 8 08/02/2019 07:19 AM    Potassium 4 3 06/08/2019 08:10 AM    Potassium 4 1 03/08/2019 07:31 AM    Chloride 104 08/02/2019 07:19 AM    Chloride 101 06/08/2019 08:10 AM    Chloride 101 03/08/2019 07:31 AM    CO2 22 08/02/2019 07:19 AM    CO2 28 06/08/2019 08:10 AM    CO2 28 03/08/2019 07:31 AM    Creatinine 3 31 (H) 08/02/2019 07:19 AM    Creatinine 3 31 (H) 06/08/2019 08:10 AM    Creatinine 3 36 (H) 03/08/2019 07:31 AM    AST 16 07/12/2019 09:04 AM    AST 17 06/08/2019 08:10 AM    AST 19 12/17/2018 09:52 AM    ALT 26 07/12/2019 09:04 AM    ALT 24 06/08/2019 08:10 AM    ALT 25 12/17/2018 09:52 AM    Albumin 3 2 (L) 07/12/2019 09:04 AM    Albumin 2 9 (L) 06/08/2019 08:10 AM    Albumin 3 0 (L) 12/17/2018 09:52 AM    HDL, Direct 49 08/02/2019 07:19 AM    HDL, Direct 49 06/08/2019 08:10 AM    HDL, Direct 63 (H) 12/17/2018 09:52 AM    Triglycerides 125 08/02/2019 07:19 AM    Triglycerides 115 06/08/2019 08:10 AM    Triglycerides 142 12/17/2018 09:52 AM           Imaging Studies: I have personally reviewed pertinent reports  Portions of the record may have been created with voice recognition software  Occasional wrong word or "sound a like" substitutions may have occurred due to the inherent limitations of voice recognition software  Read the chart carefully and recognize, using context, where substitutions have occurred

## 2019-08-06 ENCOUNTER — TELEPHONE (OUTPATIENT)
Dept: NEPHROLOGY | Facility: CLINIC | Age: 64
End: 2019-08-06

## 2019-08-06 NOTE — TELEPHONE ENCOUNTER
Pt advised  Follow up visit scheduled for Aki Zhang office for 10/18/19  She will get labs done in September  ----- Message from [place], [place] sent at 8/2/2019 10:41 AM EDT -----  Please call patient and have her increase calcitriol to 0 5mcg daily (double dose)  PTH level is still elevated  We will recheck this in September with her other blood work before she sees Dr Maradiaga Jersey   Please make sure she has an appointment scheduled for end of September or early October

## 2019-08-07 ENCOUNTER — TELEPHONE (OUTPATIENT)
Dept: FAMILY MEDICINE CLINIC | Facility: CLINIC | Age: 64
End: 2019-08-07

## 2019-08-07 NOTE — TELEPHONE ENCOUNTER
Left message on machine requesting a call back  Pt is overdue for her INR  According to our records she was supposed to go 7/26/2019  I do see a message from 7/16/19 where Dr Severo Callaway stated that she was going to bridge with lovenox for her colonoscopy  I am not sure if pt had this done yet? I do not see a recent colonoscopy report  Will need to ask pt when she calls back   Elodia Eddy

## 2019-08-12 ENCOUNTER — ANTICOAG VISIT (OUTPATIENT)
Dept: FAMILY MEDICINE CLINIC | Facility: CLINIC | Age: 64
End: 2019-08-12

## 2019-08-12 ENCOUNTER — TELEPHONE (OUTPATIENT)
Dept: FAMILY MEDICINE CLINIC | Facility: CLINIC | Age: 64
End: 2019-08-12

## 2019-08-12 ENCOUNTER — TRANSCRIBE ORDERS (OUTPATIENT)
Dept: ADMINISTRATIVE | Facility: HOSPITAL | Age: 64
End: 2019-08-12

## 2019-08-12 ENCOUNTER — APPOINTMENT (OUTPATIENT)
Dept: LAB | Facility: HOSPITAL | Age: 64
End: 2019-08-12
Payer: COMMERCIAL

## 2019-08-12 DIAGNOSIS — I26.99 IATROGENIC PULMONARY EMBOLISM AND INFARCTION, SEQUELA (HCC): Primary | ICD-10-CM

## 2019-08-12 DIAGNOSIS — T81.718S IATROGENIC PULMONARY EMBOLISM AND INFARCTION, SEQUELA (HCC): Primary | ICD-10-CM

## 2019-08-12 LAB
INR PPP: 3.6 (ref 0.86–1.16)
PROTHROMBIN TIME: 35.2 SECONDS (ref 9.4–11.7)

## 2019-08-12 PROCEDURE — 36415 COLL VENOUS BLD VENIPUNCTURE: CPT | Performed by: FAMILY MEDICINE

## 2019-08-12 PROCEDURE — 85610 PROTHROMBIN TIME: CPT | Performed by: FAMILY MEDICINE

## 2019-08-12 NOTE — TELEPHONE ENCOUNTER
----- Message from [place] Wisam sent at 8/12/2019 10:18 AM EDT -----      ----- Message -----  From: Lab, Background User  Sent: 8/12/2019   9:04 AM EDT  To: [id number] [facility]

## 2019-08-28 ENCOUNTER — TELEPHONE (OUTPATIENT)
Dept: FAMILY MEDICINE CLINIC | Facility: CLINIC | Age: 64
End: 2019-08-28

## 2019-08-28 NOTE — TELEPHONE ENCOUNTER
Spoke with pts daughter, she will let pt know she needs to have her labs drawn for her INR  Will keep this message open to track labs   Elodia Townsend

## 2019-08-30 ENCOUNTER — ANTICOAG VISIT (OUTPATIENT)
Dept: FAMILY MEDICINE CLINIC | Facility: CLINIC | Age: 64
End: 2019-08-30

## 2019-08-30 ENCOUNTER — OFFICE VISIT (OUTPATIENT)
Dept: FAMILY MEDICINE CLINIC | Facility: CLINIC | Age: 64
End: 2019-08-30
Payer: COMMERCIAL

## 2019-08-30 ENCOUNTER — APPOINTMENT (OUTPATIENT)
Dept: LAB | Facility: HOSPITAL | Age: 64
End: 2019-08-30
Payer: COMMERCIAL

## 2019-08-30 VITALS
RESPIRATION RATE: 16 BRPM | BODY MASS INDEX: 42.6 KG/M2 | WEIGHT: 217 LBS | TEMPERATURE: 98.1 F | HEART RATE: 84 BPM | SYSTOLIC BLOOD PRESSURE: 136 MMHG | HEIGHT: 60 IN | DIASTOLIC BLOOD PRESSURE: 88 MMHG

## 2019-08-30 DIAGNOSIS — I26.99 PE (PULMONARY THROMBOEMBOLISM) (HCC): ICD-10-CM

## 2019-08-30 DIAGNOSIS — E11.319 DIABETIC RETINOPATHY OF BOTH EYES ASSOCIATED WITH TYPE 2 DIABETES MELLITUS, MACULAR EDEMA PRESENCE UNSPECIFIED, UNSPECIFIED RETINOPATHY SEVERITY (HCC): ICD-10-CM

## 2019-08-30 DIAGNOSIS — E66.01 MORBID OBESITY WITH BMI OF 40.0-44.9, ADULT (HCC): ICD-10-CM

## 2019-08-30 DIAGNOSIS — N18.4 BENIGN HYPERTENSION WITH CKD (CHRONIC KIDNEY DISEASE) STAGE IV (HCC): Primary | ICD-10-CM

## 2019-08-30 DIAGNOSIS — I12.9 BENIGN HYPERTENSION WITH CKD (CHRONIC KIDNEY DISEASE) STAGE IV (HCC): Primary | ICD-10-CM

## 2019-08-30 LAB
INR PPP: 2.45 (ref 0.84–1.19)
INR PPP: 2.45 (ref 0.91–1.09)
PROTHROMBIN TIME: 25.8 SECONDS (ref 9.8–12)

## 2019-08-30 PROCEDURE — 85610 PROTHROMBIN TIME: CPT | Performed by: FAMILY MEDICINE

## 2019-08-30 PROCEDURE — 36415 COLL VENOUS BLD VENIPUNCTURE: CPT | Performed by: FAMILY MEDICINE

## 2019-08-30 PROCEDURE — 99214 OFFICE O/P EST MOD 30 MIN: CPT | Performed by: FAMILY MEDICINE

## 2019-08-30 RX ORDER — WARFARIN SODIUM 1 MG/1
TABLET ORAL
Qty: 90 TABLET | Refills: 1 | Status: SHIPPED | OUTPATIENT
Start: 2019-08-30 | End: 2020-07-19 | Stop reason: HOSPADM

## 2019-08-30 NOTE — ASSESSMENT & PLAN NOTE
Lab Results   Component Value Date    HGBA1C 8 2 (H) 08/02/2019       No results for input(s): POCGLU in the last 72 hours      Blood Sugar Average: Last 72 hrs:    Sugars are improving  Following with Dr Corinne Guard

## 2019-08-30 NOTE — PROGRESS NOTES
Assessment/Plan:    Problem List Items Addressed This Visit     Benign hypertension with CKD (chronic kidney disease) stage IV ([place] [address] ) - Primary     Following with nephrology  Reviewed GFR         Diabetic retinopathy associated with type 2 diabetes mellitus ([place] [address] )     Lab Results   Component Value Date    HGBA1C 8 2 (H) 08/02/2019       No results for input(s): POCGLU in the last 72 hours  Blood Sugar Average: Last 72 hrs:    Sugars are improving  Following with Dr Luis Miguel Maldonado obesity with BMI of 40 0-44 9, adult ([place] [address] )     worsening         PE (pulmonary thromboembolism) (HCC)     INR drawn this morning           Relevant Medications    warfarin (COUMADIN) 1 mg tablet          BMI Counseling: Body mass index is 43 1 kg/m²  Discussed the patient's BMI with her  The BMI is above average  BMI counseling and education was provided to the patient  Exercise recommendations include exercising 3-5 times per week  There are no Patient Instructions on file for this visit  Return in about 3 months (around 11/30/2019) for Annual physical     Subjective:      Patient ID: Gretta Kenny is a 60 y o  female  Chief Complaint   Patient presents with    Follow-up     Diabetes follow up [facility]       She is feeling well  She is following the nephrology, cardiology, endocrinology and pulmonary  Her sugars have been improving since starting tradjenta  Hypertension   This is a chronic problem  The current episode started more than 1 year ago  The problem is controlled  Associated symptoms include peripheral edema  Pertinent negatives include no palpitations  Past treatments include beta blockers and diuretics  The current treatment provides moderate improvement  Compliance problems include exercise and diet  The following portions of the patient's history were reviewed and updated as appropriate:  past social history    Review of Systems   Cardiovascular: Negative for palpitations  Current Outpatient Medications   Medication Sig Dispense Refill    albuterol (PROAIR HFA) 90 mcg/act inhaler Inhale 2 puffs every 4 (four) hours as needed for wheezing or shortness of breath 1 Inhaler 0    atorvastatin (LIPITOR) 20 mg tablet Take 1 tablet (20 mg total) by mouth daily 90 tablet 1    Blood Glucose Monitoring Suppl (ONE TOUCH ULTRA 2) w/Device KIT Test glucose 3 times daily 1 each 0    calcitriol (ROCALTROL) 0 5 MCG capsule Take 1 capsule (0 5 mcg total) by mouth daily (Patient taking differently: Take 0 5 mcg by mouth 2 (two) times a day ) 90 each 3    fluticasone-vilanterol (BREO ELLIPTA) 100-25 mcg/inh inhaler Inhale 1 puff daily Rinse mouth after use   1 Inhaler 6    Insulin Lispro Prot & Lispro (HUMALOG MIX 75/25 KWIKPEN) (75-25) 100 units/mL injection pen Inject 32 Units under the skin 2 (two) times a day with meals for 250 days 150 mL 3    linaGLIPtin (TRADJENTA) 5 MG TABS Take 5 mg by mouth daily 90 tablet 3    metoprolol succinate (TOPROL-XL) 25 mg 24 hr tablet Take 50mg AM & 25mg PM (Patient taking differently: 2 (two) times a day ) 90 tablet 3    ONE TOUCH ULTRA TEST test strip TEST GLUCOSE THREE TIMES A  each 3    torsemide (DEMADEX) 20 mg tablet Take 2 tablet per day in the morning and take 1 tablets in the evening every night 90 tablet 3    warfarin (COUMADIN) 1 mg tablet One daily as directed 90 tablet 1    warfarin (COUMADIN) 2 mg tablet 4mg daily or as directed 180 tablet 0    warfarin (COUMADIN) 3 mg tablet Take 5 milligrams on 7/16/18, July 17, 2018 and get PT/INR on July 17, 2018 30 tablet 0    warfarin (COUMADIN) 5 mg tablet Take 1 tablet (5 mg total) by mouth daily 90 tablet 1    cholecalciferol 2000 units TABS Take 1 tablet (2,000 Units total) by mouth daily (Patient not taking: Reported on 8/30/2019) 30 tablet 0    enoxaparin (LOVENOX) 80 mg/0 8 mL Inject 0 8 mL (80 mg total) under the skin every 12 (twelve) hours for 4 days (Patient not taking: Reported on 8/30/2019) 8 Syringe 0    polyethylene glycol (GOLYTELY) 4000 mL solution Take 4,000 mL by mouth once for 1 dose (Patient not taking: Reported on 8/30/2019) 4000 mL 0     No current facility-administered medications for this visit  Objective:    /88   Pulse 84   Temp 98 1 °F (36 7 °C)   Resp 16   Ht 4' 11 5" (1 511 m)   Wt 98 4 kg (217 lb)   BMI 43 10 kg/m²        Physical Exam   Constitutional: She appears well-developed and well-nourished  HENT:   Head: Normocephalic and atraumatic  Right Ear: External ear normal    Left Ear: External ear normal    Mouth/Throat: Oropharynx is clear and moist    Cardiovascular: Normal rate, regular rhythm and normal heart sounds  Exam reveals no friction rub  No murmur heard  Pulses:       Dorsalis pedis pulses are 1+ on the right side, and 1+ on the left side  Posterior tibial pulses are 1+ on the right side, and 1+ on the left side  Pulmonary/Chest: Effort normal and breath sounds normal  No respiratory distress  She has no wheezes  She has no rales  Musculoskeletal: She exhibits no edema or deformity  Feet:   Right Foot:   Skin Integrity: Negative for ulcer, skin breakdown, erythema, warmth, callus or dry skin  Left Foot:   Skin Integrity: Negative for ulcer, skin breakdown, erythema, warmth, callus or dry skin  Nursing note and vitals reviewed  Patient's shoes and socks removed  Right Foot/Ankle   Right Foot Inspection  Skin Exam: skin normal skin not intact, no dry skin, no warmth, no callus, no erythema, no maceration, no abnormal color, no pre-ulcer, no ulcer and no callus                          Toe Exam: ROM and strength within normal limits  Sensory       Monofilament testing: diminished  Vascular  Capillary refills: < 3 seconds  The right DP pulse is 1+  The right PT pulse is 1+       Left Foot/Ankle  Left Foot Inspection  Skin Exam: skin normalskin not intact, no dry skin, no warmth, no erythema, no maceration, normal color, no pre-ulcer, no ulcer and no callus                         Toe Exam: ROM and strength within normal limits                   Sensory       Monofilament: diminished  Vascular  Capillary refills: < 3 seconds  The left DP pulse is 1+  The left PT pulse is 1+  Assign Risk Category:  No deformity present;  Loss of protective sensation;        Risk: [facility],

## 2019-09-13 NOTE — PRE-PROCEDURE INSTRUCTIONS
Pre-Surgery Instructions:   Medication Instructions    albuterol (PROAIR HFA) 90 mcg/act inhaler Instructed patient per Anesthesia Guidelines   atorvastatin (LIPITOR) 20 mg tablet Patient was instructed by Physician and understands   Blood Glucose Monitoring Suppl (ONE TOUCH ULTRA 2) w/Device KIT Patient was instructed by Physician and understands   calcitriol (ROCALTROL) 0 5 MCG capsule Patient was instructed by Physician and understands   cholecalciferol 2000 units TABS Patient was instructed by Physician and understands   enoxaparin (LOVENOX) 80 mg/0 8 mL Patient was instructed by Physician and understands   fluticasone-vilanterol (BREO ELLIPTA) 100-25 mcg/inh inhaler Instructed patient per Anesthesia Guidelines   Insulin Lispro Prot & Lispro (HUMALOG MIX 75/25 KWIKPEN) (75-25) 100 units/mL injection pen Patient was instructed by Physician and understands   linaGLIPtin (TRADJENTA) 5 MG TABS Patient was instructed by Physician and understands   metoprolol succinate (TOPROL-XL) 25 mg 24 hr tablet Patient was instructed by Physician and understands   ONE TOUCH ULTRA TEST test strip Patient was instructed by Physician and understands   torsemide (DEMADEX) 20 mg tablet Patient was instructed by Physician and understands   warfarin (COUMADIN) 1 mg tablet Patient was instructed by Physician and understands   warfarin (COUMADIN) 2 mg tablet Patient was instructed by Physician and understands   warfarin (COUMADIN) 3 mg tablet Patient was instructed by Physician and understands   warfarin (COUMADIN) 5 mg tablet Patient was instructed by Physician and understands  Pt alternates coumadin dose based on INR's currently is on 6mg QD as of 09/13/19

## 2019-09-17 ENCOUNTER — HOSPITAL ENCOUNTER (OUTPATIENT)
Dept: GASTROENTEROLOGY | Facility: AMBULARY SURGERY CENTER | Age: 64
Setting detail: OUTPATIENT SURGERY
Discharge: HOME | End: 2019-09-17
Attending: INTERNAL MEDICINE | Admitting: INTERNAL MEDICINE
Payer: COMMERCIAL

## 2019-09-17 ENCOUNTER — ANESTHESIA (OUTPATIENT)
Dept: GASTROENTEROLOGY | Facility: AMBULARY SURGERY CENTER | Age: 64
End: 2019-09-17

## 2019-09-17 ENCOUNTER — ANESTHESIA EVENT (OUTPATIENT)
Dept: GASTROENTEROLOGY | Facility: AMBULARY SURGERY CENTER | Age: 64
End: 2019-09-17

## 2019-09-17 VITALS
HEIGHT: 60 IN | OXYGEN SATURATION: 98 % | HEART RATE: 72 BPM | DIASTOLIC BLOOD PRESSURE: 65 MMHG | SYSTOLIC BLOOD PRESSURE: 125 MMHG | BODY MASS INDEX: 42.6 KG/M2 | RESPIRATION RATE: 18 BRPM | TEMPERATURE: 97.9 F | WEIGHT: 217 LBS

## 2019-09-17 DIAGNOSIS — Z12.11 COLON CANCER SCREENING: ICD-10-CM

## 2019-09-17 LAB — GLUCOSE SERPL-MCNC: 95 MG/DL (ref 65–140)

## 2019-09-17 PROCEDURE — 88305 TISSUE EXAM BY PATHOLOGIST: CPT | Performed by: PATHOLOGY

## 2019-09-17 PROCEDURE — 45380 COLONOSCOPY AND BIOPSY: CPT | Performed by: INTERNAL MEDICINE

## 2019-09-17 PROCEDURE — 82948 REAGENT STRIP/BLOOD GLUCOSE: CPT

## 2019-09-17 PROCEDURE — 45385 COLONOSCOPY W/LESION REMOVAL: CPT | Performed by: INTERNAL MEDICINE

## 2019-09-17 RX ORDER — LIDOCAINE HYDROCHLORIDE 10 MG/ML
INJECTION, SOLUTION INFILTRATION; PERINEURAL AS NEEDED
Status: DISCONTINUED | OUTPATIENT
Start: 2019-09-17 | End: 2019-09-17 | Stop reason: SURG

## 2019-09-17 RX ORDER — LIDOCAINE HYDROCHLORIDE 10 MG/ML
0.5 INJECTION, SOLUTION EPIDURAL; INFILTRATION; INTRACAUDAL; PERINEURAL ONCE AS NEEDED
Status: DISCONTINUED | OUTPATIENT
Start: 2019-09-17 | End: 2019-09-21 | Stop reason: HOSPADM

## 2019-09-17 RX ORDER — SODIUM CHLORIDE 9 MG/ML
25 INJECTION, SOLUTION INTRAVENOUS CONTINUOUS
Status: DISCONTINUED | OUTPATIENT
Start: 2019-09-17 | End: 2019-09-21 | Stop reason: HOSPADM

## 2019-09-17 RX ORDER — PROPOFOL 10 MG/ML
INJECTION, EMULSION INTRAVENOUS AS NEEDED
Status: DISCONTINUED | OUTPATIENT
Start: 2019-09-17 | End: 2019-09-17 | Stop reason: SURG

## 2019-09-17 RX ADMIN — PROPOFOL 50 MG: 10 INJECTION, EMULSION INTRAVENOUS at 09:10

## 2019-09-17 RX ADMIN — LIDOCAINE HYDROCHLORIDE 50 MG: 10 INJECTION, SOLUTION INFILTRATION; PERINEURAL at 09:00

## 2019-09-17 RX ADMIN — PROPOFOL 100 MG: 10 INJECTION, EMULSION INTRAVENOUS at 09:00

## 2019-09-17 RX ADMIN — PROPOFOL 25 MG: 10 INJECTION, EMULSION INTRAVENOUS at 09:14

## 2019-09-17 RX ADMIN — PROPOFOL 50 MG: 10 INJECTION, EMULSION INTRAVENOUS at 09:02

## 2019-09-17 RX ADMIN — SODIUM CHLORIDE 25 ML/HR: 0.9 INJECTION, SOLUTION INTRAVENOUS at 08:51

## 2019-09-17 RX ADMIN — PROPOFOL 50 MG: 10 INJECTION, EMULSION INTRAVENOUS at 09:05

## 2019-09-17 NOTE — H&P
History and Physical -  [facility]  Chester Gomez 60 y o  female MRN: [id number]    HPI: Chester Gomez is a 60y o  year old female who presents for colon cancer screening  Review of Systems    Historical Information   Past Medical History:   Diagnosis Date    Acute asthmatic bronchitis     last assessed: 2017    Cardiac disease     Colon polyp     Diabetes mellitus ([facility] [address] )     Hyperlipidemia     Hypertension     Pneumonia     last assessed: 2013    Renal disorder     possible clot noted in kidney, thus blood thinners currently     Past Surgical History:   Procedure Laterality Date     SECTION       x 1    NOSE SURGERY      fractured nose - septoplasty     Social History   Social History     Substance and Sexual Activity   Alcohol Use No     Social History     Substance and Sexual Activity   Drug Use No     Social History     Tobacco Use   Smoking Status Never Smoker   Smokeless Tobacco Never Used     Family History   Problem Relation Age of Onset    Diabetes Mother         mellitus    Anxiety disorder Mother         generalized anxiety disorder    Hypertension Mother     Kidney disease Father     Kidney failure Father         renal failure    Ulcerative colitis Sister     Heart disease Family     Ovarian cancer Maternal Aunt        Meds/Allergies       (Not in a hospital admission)    No Known Allergies    Objective     BP (!) 172/89   Pulse 72   Temp 97 9 °F (36 6 °C) (Tympanic)   Resp 16   Ht 4' 11 5" (1 511 m)   Wt 98 4 kg (217 lb)   SpO2 96%   BMI 43 10 kg/m²       PHYSICAL EXAM    Gen: NAD  CV: RRR  CHEST: Clear  ABD: soft, NT/ND  EXT: no edema  Neuro: AAO      ASSESSMENT/PLAN:  This is a 60y o  year old female here for colon cancer screening  PLAN:   Procedure:  Colonoscopy

## 2019-09-17 NOTE — ANESTHESIA POSTPROCEDURE EVALUATION
Post-Op Assessment Note    CV Status:  Stable    Pain management: adequate     Mental Status:  Alert   Hydration Status:  Euvolemic   PONV Controlled:  Controlled   Airway Patency:  Patent   Post Op Vitals Reviewed: Yes      Staff: CRNA           BP     Temp      Pulse     Resp      SpO2

## 2019-09-17 NOTE — ANESTHESIA PREPROCEDURE EVALUATION
Review of Systems/Medical History  Patient summary reviewed  Chart reviewed  No history of anesthetic complications     Cardiovascular  Hyperlipidemia, Hypertension , CAD ,   Pulmonary hypertension (Estimated PASP > 90 mmHg) Pulmonary  Asthma , Sleep apnea ,        GI/Hepatic    Bowel prep  Comment: Confirmed NPO appropriate       Chronic kidney disease ,        Endo/Other  Diabetes (8/2/19 HgbA1c 8 2% ) type 2 ,      GYN       Hematology    Coagulation disorder currently taking oral anticoagulants,   Comment: PE Musculoskeletal    Arthritis     Neurology   Psychology           Physical Exam    Airway    Mallampati score: III  TM Distance: >3 FB  Neck ROM: full     Dental       Cardiovascular  Rhythm: regular,     Pulmonary      Other Findings        5/1/19 TTE:  LEFT VENTRICLE:  Systolic function was at the lower limits of normal when viewed with definity  Ejection fraction was estimated in the range of 50 % to 55 %  There were no regional wall motion abnormalities  Wall thickness was moderately increased  Features were consistent with a pseudonormal left ventricular filling pattern, with concomitant abnormal relaxation and increased filling pressure (grade 2 diastolic dysfunction)      RIGHT VENTRICLE:  Wall thickness was increased      LEFT ATRIUM:  The atrium was mildly dilated      RIGHT ATRIUM:  The atrium was mildly dilated      MITRAL VALVE:  There was moderate annular calcification  Transmitral velocity was increased due to valvular stenosis  There was very mild stenosis with mean gradient 4mmHg     TRICUSPID VALVE:  There was mild regurgitation      IVC, HEPATIC VEINS:  The inferior vena cava was dilated  Anesthesia Plan  ASA Score- 4     Anesthesia Type- IV sedation with anesthesia with ASA Monitors     Additional Monitors:   Airway Plan:     Comment: I discussed the risks and benefits of IV sedation anesthesia including the possibility of the need to convert to general anesthesia and the potential risk of awareness    Plan Factors-    Induction- intravenous  Postoperative Plan-     Informed Consent- Anesthetic plan and risks discussed with patient

## 2019-09-23 ENCOUNTER — OFFICE VISIT (OUTPATIENT)
Dept: PULMONOLOGY | Facility: MEDICAL CENTER | Age: 64
End: 2019-09-23
Payer: COMMERCIAL

## 2019-09-23 VITALS
WEIGHT: 214 LBS | TEMPERATURE: 96.9 F | HEIGHT: 60 IN | HEART RATE: 76 BPM | RESPIRATION RATE: 12 BRPM | OXYGEN SATURATION: 96 % | DIASTOLIC BLOOD PRESSURE: 82 MMHG | SYSTOLIC BLOOD PRESSURE: 124 MMHG | BODY MASS INDEX: 42.01 KG/M2

## 2019-09-23 DIAGNOSIS — I27.20 PULMONARY HYPERTENSION (HCC): ICD-10-CM

## 2019-09-23 DIAGNOSIS — G47.33 OBSTRUCTIVE SLEEP APNEA OF ADULT: Primary | ICD-10-CM

## 2019-09-23 DIAGNOSIS — R06.00 DYSPNEA ON EXERTION: ICD-10-CM

## 2019-09-23 DIAGNOSIS — R09.02 HYPOXEMIA: ICD-10-CM

## 2019-09-23 PROCEDURE — 99214 OFFICE O/P EST MOD 30 MIN: CPT | Performed by: INTERNAL MEDICINE

## 2019-09-23 NOTE — ASSESSMENT & PLAN NOTE
Severe pulmonary hypertension  Echocardiogram done May 1st showed estimated PA pressure of 90 mm Hg  Patient likely with group 2 and 3 pulmonary hypertension  She will be initiating CPAP therapy hopefully this week once her equipment is lipid by Anisa Jean-Baptiste  Also I did advise weight loss

## 2019-09-23 NOTE — ASSESSMENT & PLAN NOTE
I reviewed pulmonary function test was done July 16 with the patient  Complete PFT is as follows:    FVC - 1 34 L    50% of predicted  FEV1 - 1 13 L  53%  FEV1/FVC% - 84%  RV - 1 59 L   87%  TLC - 2 92 L  66%  DLCO cor - 29%    Fingerstick hemoglobin 11 2    This shows mild to moderate restrictive impairment likely due to her obesity  No airflow obstruction    Diffusion capacity is severely decreased which may be due to her pulmonary hypertension

## 2019-09-23 NOTE — ASSESSMENT & PLAN NOTE
Antwan Brewer had split night sleep study done June 14, 2018 [facility]  The initial diagnostic portion lasted 128 minutes showed evidence of moderate to severe CHANDRA  Overall AHI was 31 3 and this increased to 63 during REM sleep  She had 3 central apneas, 6 obstructive apneas, and 58 hypopneas  She spent the entire time in the supine position  Average O2 saturation was 87% and nusrat 67%  For the CPAP titration portion she had 270 minutes of sleep time  Latency to sleep onset was brief at 7 minutes  She was titrated to maximal CPAP pressure of 14 cm H2O  A small F20 Resmed mask was used  At this setting she still had 10 obstructive hypopneas but AHI was reduced to 10 0  Her mean O2 saturation was 94% with nusrat 83%  She is hoping to receive her equipment from [email or web address] this week  I did discussed again the diagnosis and treatment of CHANDRA with her  She will be placed on auto CPAP at range of 14-17 cm water as at on 14 cm water pressure she was still having some respiratory events  I did tell her to contact our office if she had any problem tolerating her CPAP machine  Also I told her to contact [email or web address] for a different mask if she does not like the initial mask to give her  I did review the compliance criteria she need to meet to keep the CPAP machine and told her to do her best to meet this  When she received her equipment she will contact our office so we can arrange for a 30 day compliance visit  At some [facility] she can tolerate to CPAP of do nocturnal pulse oximetry recording with her using his CPAP to see if there is any oxygen desaturation  As she has severe pulmonary hypertension I told was very important that she uses CPAP therapy as this would help this problem    Also I did advise her to lose weight

## 2019-09-23 NOTE — ASSESSMENT & PLAN NOTE
Zi Cole has sleep-related hypoxemia secondary to her moderate to severe CHANDRA and also possibly due to element of alveolar hypoventilation from obesity  For now she will continue to use oxygen 3 liters/minute with activity I recommend she use it at bedtime until she starts her CPAP therapy  She is post to receive her CPAP equipment this week  After she starts using the CPAP and she can't tolerate then I will do nocturnal oximetry recording with using his CPAP to determine if she still having any oxygen desaturation

## 2019-09-23 NOTE — PROGRESS NOTES
Assessment/Plan        Problem List Items Addressed This Visit        Respiratory    Obstructive sleep apnea of adult - Primary     Misti Tolliver had split night sleep study done June 14, 2018 [facility]  The initial diagnostic portion lasted 128 minutes showed evidence of moderate to severe CHANDRA  Overall AHI was 31 3 and this increased to 63 during REM sleep  She had 3 central apneas, 6 obstructive apneas, and 58 hypopneas  She spent the entire time in the supine position  Average O2 saturation was 87% and nusrat 67%  For the CPAP titration portion she had 270 minutes of sleep time  Latency to sleep onset was brief at 7 minutes  She was titrated to maximal CPAP pressure of 14 cm H2O  A small F20 Resmed mask was used  At this setting she still had 10 obstructive hypopneas but AHI was reduced to 10 0  Her mean O2 saturation was 94% with nusrat 83%  She is hoping to receive her equipment from [facility] this week  I did discussed again the diagnosis and treatment of CHANDRA with her  She will be placed on auto CPAP at range of 14-17 cm water as at on 14 cm water pressure she was still having some respiratory events  I did tell her to contact our office if she had any problem tolerating her CPAP machine  Also I told her to contact [place] for a different mask if she does not like the initial mask to give her  I did review the compliance criteria she need to meet to keep the CPAP machine and told her to do her best to meet this  When she received her equipment she will contact our office so we can arrange for a 30 day compliance visit  At some [facility] she can tolerate to CPAP of do nocturnal pulse oximetry recording with her using his CPAP to see if there is any oxygen desaturation  As she has severe pulmonary hypertension I told was very important that she uses CPAP therapy as this would help this problem    Also I did advise her to lose weight            Cardiovascular and Mediastinum    Pulmonary hypertension (HCC)     Severe pulmonary hypertension  Echocardiogram done May 1st showed estimated PA pressure of 90 mm Hg  Patient likely with group 2 and 3 pulmonary hypertension  She will be initiating CPAP therapy hopefully this week once her equipment is lipid by [id number]  Also I did advise weight loss  Other    Dyspnea on exertion     I reviewed pulmonary function test was done July 16 with the patient  Complete PFT is as follows:    FVC - 1 34 L    50% of predicted  FEV1 - 1 13 L  53%  FEV1/FVC% - 84%  RV - 1 59 L   87%  TLC - 2 92 L  66%  DLCO cor - 29%    Fingerstick hemoglobin 11 2    This shows mild to moderate restrictive impairment likely due to her obesity  No airflow obstruction  Diffusion capacity is severely decreased which may be due to her pulmonary hypertension         Hypoxemia     Isabela Mcgee has sleep-related hypoxemia secondary to her moderate to severe CHANDRA and also possibly due to element of alveolar hypoventilation from obesity  For now she will continue to use oxygen 3 liters/minute with activity I recommend she use it at bedtime until she starts her CPAP therapy  She is post to receive her CPAP equipment this week  After she starts using the CPAP and she can't tolerate then I will do nocturnal oximetry recording with using his CPAP to determine if she still having any oxygen desaturation  Shortness of Breath (pt states a little better  no cough/wheeze)      HPI     Isabela Mcgee has obstructive sleep apnea  Diagnostic sleep study done September 8, 2011 when she weighed 205 lb showed overall AHI of 14 but this increased to 53 during REM sleep time  Her mean O2 saturation was 94% with oxygen nusrat of 77%  Isabela Mcgee is waiting to receive her auto CPAP machine from [facility]    She was ordered auto CPAP with pressure range of 14-17    She is waiting to receive it this week and will be using a fullface mask      She does have home oxygen and uses 3 liters/minute with activity  Her oxygen company is [address]  She does have oxygen from then and will use at 3 liters/minute with activity at  She does not always use the oxygen  She does have severe pulmonary artery pressure  Echocardiogram done May 1st of this year showed estimated pulmonary pressure greater 90 mm Hg  She does have mild oxygen desaturation with activity  She has a follow-up appoint with cardiologist, Dr Anderson Sarkar, later this week  She also has diabetes mellitus type 2 and hypertension  She did have appointment with cardiologist Dr Alex Mane regarding her pulmonary artery hypertension  Serum NADEEN test was negative    Severe pulmonary hypertension likely  group 2 and group 3    Past Medical History:   Diagnosis Date    Acute asthmatic bronchitis     last assessed: 2017    Cardiac disease     Colon polyp     Diabetes mellitus ([facility] [address] )     Hyperlipidemia     Hypertension     Pneumonia     last assessed: 2013    Renal disorder     possible clot noted in kidney, thus blood thinners currently       Past Surgical History:   Procedure Laterality Date     SECTION       x 1    NOSE SURGERY      fractured nose - septoplasty         Current Outpatient Medications:     albuterol (PROAIR HFA) 90 mcg/act inhaler, Inhale 2 puffs every 4 (four) hours as needed for wheezing or shortness of breath, Disp: 1 Inhaler, Rfl: 0    atorvastatin (LIPITOR) 20 mg tablet, Take 1 tablet (20 mg total) by mouth daily (Patient taking differently: Take 20 mg by mouth every other day ), Disp: 90 tablet, Rfl: 1    Blood Glucose Monitoring Suppl (ONE TOUCH ULTRA 2) w/Device KIT, Test glucose 3 times daily, Disp: 1 each, Rfl: 0    calcitriol (ROCALTROL) 0 5 MCG capsule, Take 1 capsule (0 5 mcg total) by mouth daily (Patient taking differently: Take 0 5 mcg by mouth 2 (two) times a day ), Disp: 90 each, Rfl: 3    cholecalciferol 2000 units TABS, Take 1 tablet (2,000 Units total) by mouth daily, Disp: 30 tablet, Rfl: 0    fluticasone-vilanterol (BREO ELLIPTA) 100-25 mcg/inh inhaler, Inhale 1 puff daily Rinse mouth after use , Disp: 1 Inhaler, Rfl: 6    Insulin Lispro Prot & Lispro (HUMALOG MIX 75/25 KWIKPEN) (75-25) 100 units/mL injection pen, Inject 32 Units under the skin 2 (two) times a day with meals for 250 days, Disp: 150 mL, Rfl: 3    linaGLIPtin (TRADJENTA) 5 MG TABS, Take 5 mg by mouth daily, Disp: 90 tablet, Rfl: 3    metoprolol succinate (TOPROL-XL) 25 mg 24 hr tablet, Take 50mg AM & 25mg PM (Patient taking differently: 2 (two) times a day ), Disp: 90 tablet, Rfl: 3    ONE TOUCH ULTRA TEST test strip, TEST GLUCOSE THREE TIMES A DAY, Disp: 300 each, Rfl: 3    warfarin (COUMADIN) 1 mg tablet, One daily as directed, Disp: 90 tablet, Rfl: 1    warfarin (COUMADIN) 2 mg tablet, 4mg daily or as directed, Disp: 180 tablet, Rfl: 0    warfarin (COUMADIN) 3 mg tablet, Take 5 milligrams on 7/16/18, July 17, 2018 and get PT/INR on July 17, 2018, Disp: 30 tablet, Rfl: 0    warfarin (COUMADIN) 5 mg tablet, Take 1 tablet (5 mg total) by mouth daily, Disp: 90 tablet, Rfl: 1    polyethylene glycol (GOLYTELY) 4000 mL solution, Take 4,000 mL by mouth once for 1 dose (Patient not taking: Reported on 8/30/2019), Disp: 4000 mL, Rfl: 0    torsemide (DEMADEX) 20 mg tablet, Take 2 tablet per day in the morning and take 1 tablets in the evening every night (Patient not taking: Reported on 9/23/2019), Disp: 90 tablet, Rfl: 3    No Known Allergies    Social History     Tobacco Use    Smoking status: Never Smoker    Smokeless tobacco: Never Used   Substance Use Topics    Alcohol use: No         Family History   Problem Relation Age of Onset    Diabetes Mother         mellitus    Anxiety disorder Mother         generalized anxiety disorder    Hypertension Mother     Kidney disease Father     Kidney failure Father         renal failure    Ulcerative colitis Sister     Heart disease Family  Ovarian cancer Maternal Aunt        Review of Systems   Constitutional: Negative for activity change, appetite change and fever  HENT: Negative for congestion  Eyes: Negative for pain and redness  Cardiovascular: Negative for chest pain  Gastrointestinal: Negative for abdominal distention  Endocrine: Negative for polydipsia and polyphagia  Genitourinary: Negative for hematuria  Musculoskeletal: Negative for joint swelling  Neurological: Negative for light-headedness  Psychiatric/Behavioral: Negative for confusion  Vitals:    09/23/19 0907   BP: 124/82   Pulse: 76   Resp: 12   Temp: (!) 96 9 °F (36 1 °C)   SpO2: 96%           Physical Exam   Constitutional: She is oriented to person, place, and time  She appears well-developed and well-nourished  No distress  Mallampati score is 4    On room air at rest O2 saturation was 96% and with 2 L of oxygen continuous was 98%   HENT:   Head: Normocephalic  Nose: Nose normal    Mouth/Throat: Oropharynx is clear and moist  No oropharyngeal exudate  Eyes: Pupils are equal, round, and reactive to light  Conjunctivae are normal    Cardiovascular: Normal rate, regular rhythm and normal heart sounds  Pulmonary/Chest: Effort normal    Lung sounds are clear to auscultation  No wheezes, crackles or rhonchi   Abdominal: Soft  She exhibits no distension  There is no tenderness  Musculoskeletal:   There is trace to +1 edema of lower tibial surfaces  No clubbing   Neurological: She is alert and oriented to person, place, and time  Skin: Skin is warm and dry  Psychiatric: She has a normal mood and affect         Pulse oximetry testing:  Room air O2 saturation at rest was 97% and with ambulating up a flight of stairs was 85%  On 2 L of oxygen at rest O2 saturation was 97% and with ambulating 300 ft on level ground was 93%

## 2019-09-25 ENCOUNTER — TELEPHONE (OUTPATIENT)
Dept: GASTROENTEROLOGY | Facility: AMBULARY SURGERY CENTER | Age: 64
End: 2019-09-25

## 2019-09-25 NOTE — TELEPHONE ENCOUNTER
----- Message from Shirlene Long MD sent at 9/25/2019 11:01 AM EDT -----  Please inform the patient that 2 of the polyps removed were benign, the other was tubulovillous adenoma which is in advanced precancerous polyp, would recommend repeat colonoscopy in 3 years  There is no evidence of high-grade dysplasia

## 2019-09-25 NOTE — TELEPHONE ENCOUNTER
Spoke to patient informed her that 2 of the polyps removed were benign, the other was tubulovillous adenoma which is in advanced precancerous polyp,   would recommend repeat colonoscopy in 3 years           There is no evidence of high-grade dysplasia

## 2019-10-16 ENCOUNTER — TELEPHONE (OUTPATIENT)
Dept: FAMILY MEDICINE CLINIC | Facility: CLINIC | Age: 64
End: 2019-10-16

## 2019-10-16 ENCOUNTER — ANTICOAG VISIT (OUTPATIENT)
Dept: FAMILY MEDICINE CLINIC | Facility: CLINIC | Age: 64
End: 2019-10-16

## 2019-10-16 ENCOUNTER — APPOINTMENT (OUTPATIENT)
Dept: LAB | Facility: HOSPITAL | Age: 64
End: 2019-10-16
Payer: COMMERCIAL

## 2019-10-16 ENCOUNTER — TRANSCRIBE ORDERS (OUTPATIENT)
Dept: ADMINISTRATIVE | Facility: HOSPITAL | Age: 64
End: 2019-10-16

## 2019-10-16 DIAGNOSIS — T81.718S IATROGENIC PULMONARY EMBOLISM AND INFARCTION, SEQUELA (HCC): Primary | ICD-10-CM

## 2019-10-16 DIAGNOSIS — N25.81 SECONDARY HYPERPARATHYROIDISM OF RENAL ORIGIN (HCC): ICD-10-CM

## 2019-10-16 DIAGNOSIS — I26.99 IATROGENIC PULMONARY EMBOLISM AND INFARCTION, SEQUELA (HCC): Primary | ICD-10-CM

## 2019-10-16 DIAGNOSIS — N18.5 STAGE 5 CHRONIC KIDNEY DISEASE NOT ON CHRONIC DIALYSIS (HCC): ICD-10-CM

## 2019-10-16 LAB
25(OH)D3 SERPL-MCNC: 35.3 NG/ML (ref 30–100)
ALBUMIN SERPL BCP-MCNC: 2.9 G/DL (ref 3.5–5)
ALP SERPL-CCNC: 76 U/L (ref 46–116)
ALT SERPL W P-5'-P-CCNC: 21 U/L (ref 12–78)
ANION GAP SERPL CALCULATED.3IONS-SCNC: 8 MMOL/L (ref 4–13)
AST SERPL W P-5'-P-CCNC: 19 U/L (ref 5–45)
BACTERIA UR QL AUTO: ABNORMAL /HPF
BILIRUB SERPL-MCNC: 0.7 MG/DL (ref 0.2–1)
BILIRUB UR QL STRIP: NEGATIVE
BUN SERPL-MCNC: 59 MG/DL (ref 5–25)
CALCIUM SERPL-MCNC: 8.5 MG/DL (ref 8.3–10.1)
CHLORIDE SERPL-SCNC: 105 MMOL/L (ref 100–108)
CHOLEST SERPL-MCNC: 115 MG/DL (ref 50–200)
CLARITY UR: CLEAR
CO2 SERPL-SCNC: 25 MMOL/L (ref 21–32)
COLOR UR: YELLOW
CREAT SERPL-MCNC: 3.45 MG/DL (ref 0.6–1.3)
CREAT UR-MCNC: 80.5 MG/DL
ERYTHROCYTE [DISTWIDTH] IN BLOOD BY AUTOMATED COUNT: 14.5 % (ref 11.6–15.1)
EST. AVERAGE GLUCOSE BLD GHB EST-MCNC: 189 MG/DL
GFR SERPL CREATININE-BSD FRML MDRD: 14 ML/MIN/1.73SQ M
GLUCOSE P FAST SERPL-MCNC: 102 MG/DL (ref 65–99)
GLUCOSE UR STRIP-MCNC: NEGATIVE MG/DL
HBA1C MFR BLD: 8.2 % (ref 4.2–6.3)
HCT VFR BLD AUTO: 34.9 % (ref 34.8–46.1)
HDLC SERPL-MCNC: 53 MG/DL (ref 40–60)
HGB BLD-MCNC: 10.6 G/DL (ref 11.5–15.4)
HGB UR QL STRIP.AUTO: ABNORMAL
INR PPP: 6.07 (ref 0.91–1.09)
KETONES UR STRIP-MCNC: NEGATIVE MG/DL
LDLC SERPL CALC-MCNC: 47 MG/DL (ref 0–100)
LEUKOCYTE ESTERASE UR QL STRIP: ABNORMAL
MAGNESIUM SERPL-MCNC: 2.1 MG/DL (ref 1.6–2.6)
MCH RBC QN AUTO: 26.8 PG (ref 26.8–34.3)
MCHC RBC AUTO-ENTMCNC: 30.4 G/DL (ref 31.4–37.4)
MCV RBC AUTO: 88 FL (ref 82–98)
NITRITE UR QL STRIP: NEGATIVE
NON-SQ EPI CELLS URNS QL MICRO: ABNORMAL /HPF
NONHDLC SERPL-MCNC: 62 MG/DL
PH UR STRIP.AUTO: 5.5 [PH]
PHOSPHATE SERPL-MCNC: 4 MG/DL (ref 2.3–4.1)
PLATELET # BLD AUTO: 193 THOUSANDS/UL (ref 149–390)
PMV BLD AUTO: 11.9 FL (ref 8.9–12.7)
POTASSIUM SERPL-SCNC: 4.5 MMOL/L (ref 3.5–5.3)
PROT SERPL-MCNC: 6.9 G/DL (ref 6.4–8.2)
PROT UR STRIP-MCNC: >=300 MG/DL
PROT UR-MCNC: 277 MG/DL
PROT/CREAT UR: 3.44 MG/G{CREAT} (ref 0–0.1)
PROTHROMBIN TIME: 63.3 SECONDS (ref 9.8–12)
PTH-INTACT SERPL-MCNC: 332 PG/ML (ref 18.4–80.1)
RBC # BLD AUTO: 3.96 MILLION/UL (ref 3.81–5.12)
RBC #/AREA URNS AUTO: ABNORMAL /HPF
SODIUM SERPL-SCNC: 138 MMOL/L (ref 136–145)
SP GR UR STRIP.AUTO: 1.02 (ref 1–1.03)
TRIGL SERPL-MCNC: 74 MG/DL
UROBILINOGEN UR QL STRIP.AUTO: 0.2 E.U./DL
WBC # BLD AUTO: 8.6 THOUSAND/UL (ref 4.31–10.16)
WBC #/AREA URNS AUTO: ABNORMAL /HPF

## 2019-10-16 PROCEDURE — 82570 ASSAY OF URINE CREATININE: CPT

## 2019-10-16 PROCEDURE — 80061 LIPID PANEL: CPT | Performed by: INTERNAL MEDICINE

## 2019-10-16 PROCEDURE — 83735 ASSAY OF MAGNESIUM: CPT

## 2019-10-16 PROCEDURE — 83036 HEMOGLOBIN GLYCOSYLATED A1C: CPT

## 2019-10-16 PROCEDURE — 80053 COMPREHEN METABOLIC PANEL: CPT

## 2019-10-16 PROCEDURE — 85610 PROTHROMBIN TIME: CPT | Performed by: FAMILY MEDICINE

## 2019-10-16 PROCEDURE — 83970 ASSAY OF PARATHORMONE: CPT | Performed by: INTERNAL MEDICINE

## 2019-10-16 PROCEDURE — 81001 URINALYSIS AUTO W/SCOPE: CPT

## 2019-10-16 PROCEDURE — 85027 COMPLETE CBC AUTOMATED: CPT

## 2019-10-16 PROCEDURE — 84100 ASSAY OF PHOSPHORUS: CPT

## 2019-10-16 PROCEDURE — 84156 ASSAY OF PROTEIN URINE: CPT

## 2019-10-16 PROCEDURE — 36415 COLL VENOUS BLD VENIPUNCTURE: CPT

## 2019-10-16 PROCEDURE — 82306 VITAMIN D 25 HYDROXY: CPT | Performed by: INTERNAL MEDICINE

## 2019-10-16 NOTE — TELEPHONE ENCOUNTER
Spoke with pt Pt will hold warfarin for 2 days and recheck INR in 2 days   Pt will watch for any bleeding  Task Complete rmklpn

## 2019-10-16 NOTE — TELEPHONE ENCOUNTER
Lab called stated INR was 6 07   PTT 63 3  Pt has been called aware not to take the warfarin today  Pt aware other instructions to follow    Called pt on [phone]  [facility]

## 2019-10-18 ENCOUNTER — OFFICE VISIT (OUTPATIENT)
Dept: NEPHROLOGY | Facility: CLINIC | Age: 64
End: 2019-10-18
Payer: COMMERCIAL

## 2019-10-18 VITALS
DIASTOLIC BLOOD PRESSURE: 80 MMHG | BODY MASS INDEX: 42.37 KG/M2 | SYSTOLIC BLOOD PRESSURE: 118 MMHG | HEIGHT: 60 IN | WEIGHT: 215.8 LBS

## 2019-10-18 DIAGNOSIS — E11.319 DIABETIC RETINOPATHY OF BOTH EYES ASSOCIATED WITH TYPE 2 DIABETES MELLITUS, MACULAR EDEMA PRESENCE UNSPECIFIED, UNSPECIFIED RETINOPATHY SEVERITY (HCC): Primary | ICD-10-CM

## 2019-10-18 DIAGNOSIS — I27.20 PULMONARY HYPERTENSION (HCC): ICD-10-CM

## 2019-10-18 DIAGNOSIS — R71.0 DROP IN HEMOGLOBIN: ICD-10-CM

## 2019-10-18 DIAGNOSIS — Z79.4 TYPE 2 DIABETES MELLITUS WITH COMPLICATION, WITH LONG-TERM CURRENT USE OF INSULIN (HCC): ICD-10-CM

## 2019-10-18 DIAGNOSIS — N25.81 SECONDARY HYPERPARATHYROIDISM OF RENAL ORIGIN (HCC): ICD-10-CM

## 2019-10-18 DIAGNOSIS — I10 ESSENTIAL HYPERTENSION: ICD-10-CM

## 2019-10-18 DIAGNOSIS — R80.9 NEPHROTIC RANGE PROTEINURIA: ICD-10-CM

## 2019-10-18 DIAGNOSIS — I12.9 BENIGN HYPERTENSION WITH CKD (CHRONIC KIDNEY DISEASE) STAGE IV (HCC): ICD-10-CM

## 2019-10-18 DIAGNOSIS — N18.4 BENIGN HYPERTENSION WITH CKD (CHRONIC KIDNEY DISEASE) STAGE IV (HCC): ICD-10-CM

## 2019-10-18 DIAGNOSIS — E11.8 TYPE 2 DIABETES MELLITUS WITH COMPLICATION, WITH LONG-TERM CURRENT USE OF INSULIN (HCC): ICD-10-CM

## 2019-10-18 PROCEDURE — 99214 OFFICE O/P EST MOD 30 MIN: CPT | Performed by: INTERNAL MEDICINE

## 2019-10-18 PROCEDURE — 3074F SYST BP LT 130 MM HG: CPT | Performed by: INTERNAL MEDICINE

## 2019-10-18 PROCEDURE — 3079F DIAST BP 80-89 MM HG: CPT | Performed by: INTERNAL MEDICINE

## 2019-10-18 RX ORDER — MULTIVITAMIN WITH IRON
1 TABLET ORAL DAILY
COMMUNITY

## 2019-10-18 NOTE — PATIENT INSTRUCTIONS
1  )  Low 2 g sodium diet    2 )  Monitor weights at home    3 )  Avoid NSAIDs    4 )  Monitor blood pressure at home, call if blood pressure greater than 150/90 persistently    5 ) Repeat hemoglobin and check iron levels with next INR

## 2019-10-18 NOTE — PROGRESS NOTES
NEPHROLOGY OFFICE VISIT   Tera Bah 60 y o  female MRN: [id number]  10/18/2019    Reason for Visit:  Chronic kidney disease    ASSESSMENT and PLAN:    I had the pleasure of seeing Vahid Medrano today in the renal clinic for the continued management of chronic kidney disease  Since our last visit, there has been no ER visits or hospitilizations  He currently has no complaints at this time and is feeling well  Patient denies any chest pain, shortness of breath and swelling  The last blood work was done on October 2019, which we have reviewed together  1 ) Chronic kidney disease stage IV/V with proteinuria  · Baseline creatinine 3-3 5 mg/dL  · Declined renal biopsy  Serologies were negative  Presumed etiology is diabetic nephropathy  · Diabetic and blood pressure control  · Most recent urine protein creatinine ratio has improved to 3 4 it was 6 5 last year  As it is trending down we can continue to monitor especially given that her blood pressure is under excellent control  I do not have a strong objection if we needed to add a low-dose ACE-inhibitor or ARB if her proteinuria or blood pressure worsens  · Her complements C3 and C4 were not low  · ANCA titer were negative  · Free light chain assay ratio was 1 04  Serum protein electrophoresis showed no monoclonal bands  · NADEEN was negative  · She attended Kidney Smart class/Chronic Kidney Disease education  · Evaluated by [facility] transplant team by Dr Flor Nelson in March of 2019  · Preferred modality of dialysis would be a home modality, home hemodialysis versus peritoneal dialysis but it appears that peritoneal dialysis may be more favorable for her  · Will see if we can set her up for PD tour at the OS office  · Renal function is currently stable    No urgent indication for renal replacement therapy at this time     2 )  Nephrotic range Proteinuria  · Presumed to be secondary to diabetic nephropathy   Serologies have been negative  · Blood pressure at target  · declined kidney biopsy  · Urine protein creatinine ratio 3 44  · Currently off ACE-inhibitor or ARB  No objections to restarting in the future if needed     3 ) Hypertension  · Aim to keep systolic blood pressure less than 130 given the proteinuria  · Blood pressure at goal  · Low 2 g sodium diet  · Torsemide 20 mg daily  · Metoprolol 25 mg twice a day     4 ) Diabetes mellitus type 2  · Management as per endocrinology-Dr Rachel Cardoso  · Hemoglobin A1c is 8 2  · Not at goal, medical noncompliance  · Tradjenta 5 mg daily  · Insulin lispro 32 units twice a day     5 ) Mineral bone disorder-chronic kidney disease  - secondary hyperparathyroidism of renal origin  - calcitriol 0 5 mcg twice a day  -PTH is trending down now at 332  -vitamin-D 25 hydroxy level has improved to 32  -phosphorus and calcium at goal     6 )  Pulmonary embolism  -  Ultrasound of the lower extremities were negative for DVT  - on anticoagulation with Coumadin  -most recent INR was elevated she is going for repeat INR    7 ) Drop in hemoglobin  -her baseline hemoglobin has always been 12, most recently is 10 6    She reports no bruising or blood in her stool  -check a repeat hemoglobin along with iron studies when she checks the INR      PATIENT INSTRUCTIONS:    Patient Instructions   1 )  Low 2 g sodium diet    2 )  Monitor weights at home    3 )  Avoid NSAIDs    4 )  Monitor blood pressure at home, call if blood pressure greater than 150/90 persistently    5 ) Repeat hemoglobin and check iron levels with next INR          Orders Placed This Encounter   Procedures    Hemoglobin and hematocrit, blood     Standing Status:   Future     Standing Expiration Date:   10/18/2020    PTH, intact     Standing Status:   Future     Standing Expiration Date:   10/18/2020    CBC     Standing Status:   Future     Standing Expiration Date:   10/18/2020    Comprehensive metabolic panel     Standing Status:   Future     Standing Expiration Date:   10/18/2020    Phosphorus Standing Status:   Future     Standing Expiration Date:   10/18/2020    Protein / creatinine ratio, urine     Standing Status:   Future     Standing Expiration Date:   10/18/2020    Iron Saturation %     Standing Status:   Future     Standing Expiration Date:   10/18/2020    Ferritin     Standing Status:   Future     Standing Expiration Date:   10/18/2020       OBJECTIVE:  Current Weight: Weight - Scale: 97 9 kg (215 lb 12 8 oz)  Vitals:    10/18/19 0855 10/18/19 0917   BP:  118/80   Weight: 97 9 kg (215 lb 12 8 oz)    Height: 4' 11 5" (1 511 m)     Body mass index is 42 86 kg/m²  REVIEW OF SYSTEMS:    Review of Systems   Constitutional: Negative for activity change and fever  Respiratory: Negative for cough, chest tightness, shortness of breath and wheezing  Cardiovascular: Negative for chest pain and leg swelling  Gastrointestinal: Negative for abdominal pain, diarrhea, nausea and vomiting  Endocrine: Negative for polyuria  Genitourinary: Negative for difficulty urinating, dysuria, flank pain, frequency and urgency  Skin: Negative for rash  Neurological: Negative for dizziness, syncope, light-headedness and headaches  PHYSICAL EXAM:      Physical Exam   Constitutional: She is oriented to person, place, and time  She appears well-developed and well-nourished  No distress  HENT:   Head: Normocephalic and atraumatic  Eyes: Pupils are equal, round, and reactive to light  No scleral icterus  Neck: Normal range of motion  Neck supple  Cardiovascular: Normal rate, regular rhythm and normal heart sounds  Exam reveals no gallop and no friction rub  No murmur heard  Pulmonary/Chest: Effort normal and breath sounds normal  No respiratory distress  She has no wheezes  She has no rales  She exhibits no tenderness  Abdominal: Soft  Bowel sounds are normal  She exhibits no distension  There is no tenderness  There is no rebound  Musculoskeletal: Normal range of motion   She exhibits no edema  Neurological: She is alert and oriented to person, place, and time  Skin: No rash noted  She is not diaphoretic  Psychiatric: She has a normal mood and affect  Nursing note and vitals reviewed        Medications:    Current Outpatient Medications:     albuterol (PROAIR HFA) 90 mcg/act inhaler, Inhale 2 puffs every 4 (four) hours as needed for wheezing or shortness of breath, Disp: 1 Inhaler, Rfl: 0    atorvastatin (LIPITOR) 20 mg tablet, Take 1 tablet (20 mg total) by mouth daily (Patient taking differently: Take 20 mg by mouth every other day ), Disp: 90 tablet, Rfl: 1    B Complex-C (B-COMPLEX WITH VITAMIN C) tablet, Take 1 tablet by mouth daily, Disp: , Rfl:     Blood Glucose Monitoring Suppl (ONE TOUCH ULTRA 2) w/Device KIT, Test glucose 3 times daily, Disp: 1 each, Rfl: 0    calcitriol (ROCALTROL) 0 5 MCG capsule, Take 1 capsule (0 5 mcg total) by mouth daily (Patient taking differently: Take 0 5 mcg by mouth 2 (two) times a day ), Disp: 90 each, Rfl: 3    cholecalciferol 2000 units TABS, Take 1 tablet (2,000 Units total) by mouth daily, Disp: 30 tablet, Rfl: 0    fluticasone-vilanterol (BREO ELLIPTA) 100-25 mcg/inh inhaler, Inhale 1 puff daily Rinse mouth after use , Disp: 1 Inhaler, Rfl: 6    Insulin Lispro Prot & Lispro (HUMALOG MIX 75/25 KWIKPEN) (75-25) 100 units/mL injection pen, Inject 32 Units under the skin 2 (two) times a day with meals for 250 days, Disp: 150 mL, Rfl: 3    linaGLIPtin (TRADJENTA) 5 MG TABS, Take 5 mg by mouth daily, Disp: 90 tablet, Rfl: 3    metoprolol succinate (TOPROL-XL) 25 mg 24 hr tablet, Take 50mg AM & 25mg PM (Patient taking differently: 2 (two) times a day ), Disp: 90 tablet, Rfl: 3    MULTIPLE VITAMIN PO, Take 1 tablet by mouth daily, Disp: , Rfl:     ONE TOUCH ULTRA TEST test strip, TEST GLUCOSE THREE TIMES A DAY, Disp: 300 each, Rfl: 3    torsemide (DEMADEX) 20 mg tablet, Take 2 tablet per day in the morning and take 1 tablets in the evening every night, Disp: 90 tablet, Rfl: 3    warfarin (COUMADIN) 1 mg tablet, One daily as directed, Disp: 90 tablet, Rfl: 1    warfarin (COUMADIN) 2 mg tablet, 4mg daily or as directed, Disp: 180 tablet, Rfl: 0    warfarin (COUMADIN) 3 mg tablet, Take 5 milligrams on 7/16/18, July 17, 2018 and get PT/INR on July 17, 2018, Disp: 30 tablet, Rfl: 0    warfarin (COUMADIN) 5 mg tablet, Take 1 tablet (5 mg total) by mouth daily, Disp: 90 tablet, Rfl: 1    polyethylene glycol (GOLYTELY) 4000 mL solution, Take 4,000 mL by mouth once for 1 dose (Patient not taking: Reported on 8/30/2019), Disp: 4000 mL, Rfl: 0    Laboratory Results:  Results from last 7 days   Lab Units 10/16/19  0657   WBC Thousand/uL 8 60   HEMOGLOBIN g/dL 10 6*   HEMATOCRIT % 34 9   PLATELETS Thousands/uL 193   POTASSIUM mmol/L 4 5   CHLORIDE mmol/L 105   CO2 mmol/L 25   BUN mg/dL 59*   CREATININE mg/dL 3 45*   CALCIUM mg/dL 8 5   MAGNESIUM mg/dL 2 1   PHOSPHORUS mg/dL 4 0       Results for orders placed or performed in visit on 10/16/19   Comprehensive metabolic panel   Result Value Ref Range    Sodium 138 136 - 145 mmol/L    Potassium 4 5 3 5 - 5 3 mmol/L    Chloride 105 100 - 108 mmol/L    CO2 25 21 - 32 mmol/L    ANION GAP 8 4 - 13 mmol/L    BUN 59 (H) 5 - 25 mg/dL    Creatinine 3 45 (H) 0 60 - 1 30 mg/dL    Glucose, Fasting 102 (H) 65 - 99 mg/dL    Calcium 8 5 8 3 - 10 1 mg/dL    AST 19 5 - 45 U/L    ALT 21 12 - 78 U/L    Alkaline Phosphatase 76 46 - 116 U/L    Total Protein 6 9 6 4 - 8 2 g/dL    Albumin 2 9 (L) 3 5 - 5 0 g/dL    Total Bilirubin 0 70 0 20 - 1 00 mg/dL    eGFR 14 ml/min/1 73sq m   CBC   Result Value Ref Range    WBC 8 60 4 31 - 10 16 Thousand/uL    RBC 3 96 3 81 - 5 12 Million/uL    Hemoglobin 10 6 (L) 11 5 - 15 4 g/dL    Hematocrit 34 9 34 8 - 46 1 %    MCV 88 82 - 98 fL    MCH 26 8 26 8 - 34 3 pg    MCHC 30 4 (L) 31 4 - 37 4 g/dL    RDW 14 5 11 6 - 15 1 %    Platelets 891 498 - 236 Thousands/uL    MPV 11 9 8 9 - 12 7 fL   Magnesium Result Value Ref Range    Magnesium 2 1 1 6 - 2 6 mg/dL   Phosphorus   Result Value Ref Range    Phosphorus 4 0 2 3 - 4 1 mg/dL   Protein / creatinine ratio, urine   Result Value Ref Range    Creatinine, Ur 80 5 mg/dL    Protein Urine Random 277 mg/dL    Prot/Creat Ratio, Ur 3 44 (H) 0 00 - 0 10   Hemoglobin A1C   Result Value Ref Range    Hemoglobin A1C 8 2 (H) 4 2 - 6 3 %     mg/dl   UA (URINE) with reflex to Microscopic   Result Value Ref Range    Color, UA Yellow     Clarity, UA Clear     Specific Gravity, UA 1 020 1 000 - 1 030    pH, UA 5 5 5 0, 5 5, 6 0, 6 5, 7 0, 7 5, 8 0, 8 5, 9 0    Leukocytes, UA Trace (A) Negative    Nitrite, UA Negative Negative    Protein, UA >=300 (A) Negative mg/dl    Glucose, UA Negative Negative mg/dl    Ketones, UA Negative Negative mg/dl    Urobilinogen, UA 0 2 0 2, 1 0 E U /dl E U /dl    Bilirubin, UA Negative Negative    Blood, UA Moderate (A) Negative   Urine Microscopic   Result Value Ref Range    RBC, UA 0-1 (A) None Seen, 0-5 /hpf    WBC, UA 4-10 (A) None Seen, 0-5, 5-55, 5-65 /hpf    Epithelial Cells Occasional None Seen, Occasional /hpf    Bacteria, UA Occasional None Seen, Occasional /hpf

## 2019-10-19 ENCOUNTER — APPOINTMENT (OUTPATIENT)
Dept: LAB | Facility: HOSPITAL | Age: 64
End: 2019-10-19
Attending: INTERNAL MEDICINE
Payer: COMMERCIAL

## 2019-10-19 DIAGNOSIS — I26.99 IATROGENIC PULMONARY EMBOLISM AND INFARCTION, SEQUELA (HCC): ICD-10-CM

## 2019-10-19 DIAGNOSIS — R71.0 DROP IN HEMOGLOBIN: ICD-10-CM

## 2019-10-19 DIAGNOSIS — T81.718S IATROGENIC PULMONARY EMBOLISM AND INFARCTION, SEQUELA (HCC): ICD-10-CM

## 2019-10-19 LAB
FERRITIN SERPL-MCNC: 156 NG/ML (ref 8–388)
HCT VFR BLD AUTO: 38.8 % (ref 34.8–46.1)
HGB BLD-MCNC: 12.1 G/DL (ref 11.5–15.4)
INR PPP: 2.32 (ref 0.91–1.09)
IRON SATN MFR SERPL: 19 %
IRON SERPL-MCNC: 51 UG/DL (ref 50–170)
PROTHROMBIN TIME: 24.4 SECONDS (ref 9.8–12)
TIBC SERPL-MCNC: 264 UG/DL (ref 250–450)

## 2019-10-19 PROCEDURE — 85014 HEMATOCRIT: CPT

## 2019-10-19 PROCEDURE — 83540 ASSAY OF IRON: CPT

## 2019-10-19 PROCEDURE — 82728 ASSAY OF FERRITIN: CPT

## 2019-10-19 PROCEDURE — 85018 HEMOGLOBIN: CPT

## 2019-10-19 PROCEDURE — 85610 PROTHROMBIN TIME: CPT

## 2019-10-19 PROCEDURE — 36415 COLL VENOUS BLD VENIPUNCTURE: CPT

## 2019-10-19 PROCEDURE — 83550 IRON BINDING TEST: CPT

## 2019-10-21 ENCOUNTER — ANTICOAG VISIT (OUTPATIENT)
Dept: FAMILY MEDICINE CLINIC | Facility: CLINIC | Age: 64
End: 2019-10-21

## 2019-10-21 ENCOUNTER — TELEPHONE (OUTPATIENT)
Dept: FAMILY MEDICINE CLINIC | Facility: CLINIC | Age: 64
End: 2019-10-21

## 2019-10-21 NOTE — TELEPHONE ENCOUNTER
Patient informed of note  She stated that she wont be able to make it out till Friday because she works from home    No further action  Sending to Lorene Mcdonald for Priscila Cabral MA

## 2019-10-21 NOTE — TELEPHONE ENCOUNTER
----- Message from Josey Fernandez sent at 10/19/2019 11:31 AM EDT -----  5mg daily, recheck in 3 days   Josey Fernandez

## 2019-10-25 ENCOUNTER — TRANSCRIBE ORDERS (OUTPATIENT)
Dept: ADMINISTRATIVE | Facility: HOSPITAL | Age: 64
End: 2019-10-25

## 2019-10-25 ENCOUNTER — APPOINTMENT (OUTPATIENT)
Dept: LAB | Facility: HOSPITAL | Age: 64
End: 2019-10-25
Payer: COMMERCIAL

## 2019-10-25 ENCOUNTER — ANTICOAG VISIT (OUTPATIENT)
Dept: FAMILY MEDICINE CLINIC | Facility: CLINIC | Age: 64
End: 2019-10-25

## 2019-10-25 DIAGNOSIS — I26.99 PULMONARY INFARCTION (HCC): Primary | ICD-10-CM

## 2019-10-25 DIAGNOSIS — I26.99 PULMONARY INFARCTION (HCC): ICD-10-CM

## 2019-10-25 LAB
INR PPP: 1.73 (ref 0.84–1.19)
INR PPP: 1.73 (ref 0.91–1.09)
PROTHROMBIN TIME: 18.3 SECONDS (ref 9.8–12)

## 2019-10-25 PROCEDURE — 36415 COLL VENOUS BLD VENIPUNCTURE: CPT

## 2019-10-25 PROCEDURE — 85610 PROTHROMBIN TIME: CPT

## 2019-11-04 ENCOUNTER — OFFICE VISIT (OUTPATIENT)
Dept: ENDOCRINOLOGY | Facility: CLINIC | Age: 64
End: 2019-11-04
Payer: COMMERCIAL

## 2019-11-04 VITALS
WEIGHT: 217.7 LBS | DIASTOLIC BLOOD PRESSURE: 72 MMHG | SYSTOLIC BLOOD PRESSURE: 130 MMHG | HEIGHT: 60 IN | HEART RATE: 90 BPM | BODY MASS INDEX: 42.74 KG/M2

## 2019-11-04 DIAGNOSIS — N18.4 BENIGN HYPERTENSION WITH CKD (CHRONIC KIDNEY DISEASE) STAGE IV (HCC): ICD-10-CM

## 2019-11-04 DIAGNOSIS — I10 ESSENTIAL HYPERTENSION: ICD-10-CM

## 2019-11-04 DIAGNOSIS — E11.319 DIABETIC RETINOPATHY OF BOTH EYES ASSOCIATED WITH TYPE 2 DIABETES MELLITUS, MACULAR EDEMA PRESENCE UNSPECIFIED, UNSPECIFIED RETINOPATHY SEVERITY (HCC): ICD-10-CM

## 2019-11-04 DIAGNOSIS — E78.2 MIXED HYPERLIPIDEMIA: ICD-10-CM

## 2019-11-04 DIAGNOSIS — N25.81 SECONDARY HYPERPARATHYROIDISM OF RENAL ORIGIN (HCC): ICD-10-CM

## 2019-11-04 DIAGNOSIS — E66.01 MORBID OBESITY WITH BMI OF 40.0-44.9, ADULT (HCC): ICD-10-CM

## 2019-11-04 DIAGNOSIS — E55.9 VITAMIN D DEFICIENCY: ICD-10-CM

## 2019-11-04 DIAGNOSIS — I12.9 BENIGN HYPERTENSION WITH CKD (CHRONIC KIDNEY DISEASE) STAGE IV (HCC): ICD-10-CM

## 2019-11-04 DIAGNOSIS — Z79.4 TYPE 2 DIABETES MELLITUS WITH COMPLICATION, WITH LONG-TERM CURRENT USE OF INSULIN (HCC): Primary | ICD-10-CM

## 2019-11-04 DIAGNOSIS — E11.8 TYPE 2 DIABETES MELLITUS WITH COMPLICATION, WITH LONG-TERM CURRENT USE OF INSULIN (HCC): Primary | ICD-10-CM

## 2019-11-04 PROCEDURE — 99214 OFFICE O/P EST MOD 30 MIN: CPT | Performed by: INTERNAL MEDICINE

## 2019-11-04 NOTE — PATIENT INSTRUCTIONS
increase insulin 75/25 to 38 units with breakfast and continue 35 units with dinner   Try to eat consistent meals and not skip meals   Send BG log in 2- 3 weeks     Follow up in 2 months

## 2019-11-04 NOTE — PROGRESS NOTES
Vamsi Soliz 60 y o  female MRN: [id number]    Encounter: [id number]      Assessment/Plan     Assessment: This is a 60y o -year-old female with type 2 diabetes mellitus, hypertension, hyperlipidemia, CKD, CAD, retinopathy, vitamin-D deficiency    Plan:  1  Type 2 diabetes mellitus on long-term insulin therapy with complications  F9P has remained stable high at 8 2%, with hyperglycemia based on recall of blood sugars  Does not eat consistent meals, varies dose of 75/25 based on blood sugars  In this situation, mixed insulins are not ideal as there is carb to insulin mismatch leading to variability in blood sugars  Does not want to switch to basal bolus insulin due to convenience of twice a day dosing of 75/25    Recommend the following at this time  Continue Tradjenta 5 mg orally daily  increase insulin 75/25 to 38 units with breakfast and continue 35 units with dinner   Advised to eat consistent meals and not skip meals   Send BG log in 2- 3 weeks; based on log, may start mealtime insulin at lunch    Repeat A1c, BMP, lipid panel in 3 months    2  CKD - stable  Follow-up with Nephrology  Repeat BMP in 3 months      3  Hyperlipidemia  - continue statin therapy    4  Hypertension  Blood pressure at goal  - continue current medications  5  CAD-follow-up with cardiology  6  Hyperparathyroidism, vitamin-D deficiency  Recent Vitamin-D level within normal limits-continue supplementation  7  Obesity-diet and lifestyle as discussed  CC: Diabetes    History of Present Illness     HPI:  Vamsi Soliz is a 60 y o  female presents for a follow-up visit regarding diabetes management        DM history:   Diagnosed over 15 years ago  Has complications of CAD, CHF, CKD, retinopathy  No history of CVA    Current regimen:   Tradjenta 5 mg orally daily  Insulin 75/25 35 units twice a day - increased herself 2 weeks ago since she noticed higher blood sugars  Occasionally varies dinnertime dose based on blood sugars    Sometimes skips dinner if lunch was late/ heavy   Drinks water, diet soda     Appetite is good  Weight fluctuates   Denies numbness or tingling in the hands or feet  Denies changes in vision  No known retinopathy  No chest pain/ SOB  No diarrhea/ constipation  No urinary complaints  Exercise:  No, walks at work     Home glucose monitoring: on recall   Before breakfast: 101-189 mg/dl   Before lunch:  200s  Before dinner:  234 yesterday, mostly in the 200s  Bedtime: sometimes upto 200 -300s  No low BG   Better BG in the morning when she has skipped dinner   Has cheese often   snacks - pretzels,  Chips, fruits    All other systems were reviewed and are negative      Review of Systems      Historical Information   Past Medical History:   Diagnosis Date    Acute asthmatic bronchitis     last assessed: 2017    Cardiac disease     Colon polyp     [facility] [address]     Hyperlipidemia     Hypertension     Pneumonia     last assessed: 2013    Renal disorder     possible clot noted in kidney, thus blood thinners currently     Past Surgical History:   Procedure Laterality Date     SECTION       x 1    NOSE SURGERY      fractured nose - septoplasty     Social History   Social History     Substance and Sexual Activity   Alcohol Use No     Social History     Substance and Sexual Activity   Drug Use No     Social History     Tobacco Use   Smoking Status Never Smoker   Smokeless Tobacco Never Used     Family History:   Family History   Problem Relation Age of Onset    Diabetes Mother         mellitus    Anxiety disorder Mother         generalized anxiety disorder    Hypertension Mother     Kidney disease Father     Kidney failure Father         renal failure    Ulcerative colitis Sister     Heart disease Family     Ovarian cancer Maternal Aunt        Meds/Allergies   Current Outpatient Medications   Medication Sig Dispense Refill    albuterol (PROAIR HFA) 90 mcg/act inhaler Inhale 2 puffs every 4 (four) hours as needed for wheezing or shortness of breath 1 Inhaler 0    atorvastatin (LIPITOR) 20 mg tablet Take 1 tablet (20 mg total) by mouth daily (Patient taking differently: Take 20 mg by mouth every other day ) 90 tablet 1    B Complex-C (B-COMPLEX WITH VITAMIN C) tablet Take 1 tablet by mouth daily      Blood Glucose Monitoring Suppl (ONE TOUCH ULTRA 2) w/Device KIT Test glucose 3 times daily 1 each 0    calcitriol (ROCALTROL) 0 5 MCG capsule Take 1 capsule (0 5 mcg total) by mouth daily (Patient taking differently: Take 0 5 mcg by mouth 2 (two) times a day ) 90 each 3    cholecalciferol 2000 units TABS Take 1 tablet (2,000 Units total) by mouth daily 30 tablet 0    fluticasone-vilanterol (BREO ELLIPTA) 100-25 mcg/inh inhaler Inhale 1 puff daily Rinse mouth after use   1 Inhaler 6    Insulin Lispro Prot & Lispro (HUMALOG MIX 75/25 KWIKPEN) (75-25) 100 units/mL injection pen Inject 32 Units under the skin 2 (two) times a day with meals for 250 days 150 mL 3    linaGLIPtin (TRADJENTA) 5 MG TABS Take 5 mg by mouth daily 90 tablet 3    metoprolol succinate (TOPROL-XL) 25 mg 24 hr tablet Take 50mg AM & 25mg PM (Patient taking differently: 2 (two) times a day ) 90 tablet 3    MULTIPLE VITAMIN PO Take 1 tablet by mouth daily      ONE TOUCH ULTRA TEST test strip TEST GLUCOSE THREE TIMES A  each 3    torsemide (DEMADEX) 20 mg tablet Take 2 tablet per day in the morning and take 1 tablets in the evening every night 90 tablet 3    warfarin (COUMADIN) 1 mg tablet One daily as directed 90 tablet 1    warfarin (COUMADIN) 2 mg tablet 4mg daily or as directed 180 tablet 0    warfarin (COUMADIN) 3 mg tablet Take 5 milligrams on 7/16/18, July 17, 2018 and get PT/INR on July 17, 2018 30 tablet 0    warfarin (COUMADIN) 5 mg tablet Take 1 tablet (5 mg total) by mouth daily 90 tablet 1    polyethylene glycol (GOLYTELY) 4000 mL solution Take 4,000 mL by mouth once for 1 dose (Patient not taking: Reported on 8/30/2019) 4000 mL 0     No current facility-administered medications for this visit  No Known Allergies    Objective   Vitals: Blood pressure 130/72, pulse 90, height 4' 11 5" (1 511 m), weight 98 7 kg (217 lb 11 2 oz), not currently breastfeeding  Physical Exam   Constitutional: She is oriented to person, place, and time  She appears well-developed and well-nourished  HENT:   Head: Normocephalic and atraumatic  Eyes: Pupils are equal, round, and reactive to light  Conjunctivae and EOM are normal    Neck: Normal range of motion  Neck supple  No thyromegaly present  Cardiovascular: Normal rate, regular rhythm and normal heart sounds  No murmur heard  Pulmonary/Chest: Effort normal and breath sounds normal  She has no wheezes  Abdominal: Soft  She exhibits no distension  There is no tenderness  Musculoskeletal: Normal range of motion  She exhibits no edema  Neurological: She is alert and oriented to person, place, and time  Skin: Skin is warm and dry  Psychiatric: She has a normal mood and affect  Her behavior is normal    Vitals reviewed  The history was obtained from the review of the chart, patient      Lab Results:   Lab Results   Component Value Date/Time    Hemoglobin A1C 8 2 (H) 10/16/2019 06:57 AM    Hemoglobin A1C 8 2 (H) 08/02/2019 07:19 AM    Hemoglobin A1C 9 8 (H) 06/08/2019 08:10 AM    WBC 8 60 10/16/2019 06:57 AM    WBC 9 58 06/08/2019 08:10 AM    WBC 8 83 03/08/2019 07:31 AM    Hemoglobin 12 1 10/19/2019 08:44 AM    Hemoglobin 10 6 (L) 10/16/2019 06:57 AM    Hemoglobin 12 3 06/08/2019 08:10 AM    Hematocrit 38 8 10/19/2019 08:44 AM    Hematocrit 34 9 10/16/2019 06:57 AM    Hematocrit 39 3 06/08/2019 08:10 AM    MCV 88 10/16/2019 06:57 AM    MCV 86 06/08/2019 08:10 AM    MCV 86 03/08/2019 07:31 AM    Platelets 795 59/65/2708 06:57 AM    Platelets 208 76/28/4429 08:10 AM    Platelets 880 80/91/2119 07:31 AM    BUN 59 (H) 10/16/2019 06:57 AM    BUN 51 (H) 08/02/2019 07:19 AM    BUN 60 (H) 06/08/2019 08:10 AM    Potassium 4 5 10/16/2019 06:57 AM    Potassium 3 8 08/02/2019 07:19 AM    Potassium 4 3 06/08/2019 08:10 AM    Chloride 105 10/16/2019 06:57 AM    Chloride 104 08/02/2019 07:19 AM    Chloride 101 06/08/2019 08:10 AM    CO2 25 10/16/2019 06:57 AM    CO2 22 08/02/2019 07:19 AM    CO2 28 06/08/2019 08:10 AM    Creatinine 3 45 (H) 10/16/2019 06:57 AM    Creatinine 3 31 (H) 08/02/2019 07:19 AM    Creatinine 3 31 (H) 06/08/2019 08:10 AM    AST 19 10/16/2019 06:57 AM    AST 16 07/12/2019 09:04 AM    AST 17 06/08/2019 08:10 AM    ALT 21 10/16/2019 06:57 AM    ALT 26 07/12/2019 09:04 AM    ALT 24 06/08/2019 08:10 AM    Albumin 2 9 (L) 10/16/2019 06:57 AM    Albumin 3 2 (L) 07/12/2019 09:04 AM    Albumin 2 9 (L) 06/08/2019 08:10 AM    HDL, Direct 53 10/16/2019 06:57 AM    HDL, Direct 49 08/02/2019 07:19 AM    HDL, Direct 49 06/08/2019 08:10 AM    Triglycerides 74 10/16/2019 06:57 AM    Triglycerides 125 08/02/2019 07:19 AM    Triglycerides 115 06/08/2019 08:10 AM         Imaging Studies: I have personally reviewed pertinent reports  Portions of the record may have been created with voice recognition software  Occasional wrong word or "sound a like" substitutions may have occurred due to the inherent limitations of voice recognition software  Read the chart carefully and recognize, using context, where substitutions have occurred

## 2019-11-06 ENCOUNTER — TELEPHONE (OUTPATIENT)
Dept: PULMONOLOGY | Facility: MEDICAL CENTER | Age: 64
End: 2019-11-06

## 2019-11-06 DIAGNOSIS — I27.20 PULMONARY HYPERTENSION (HCC): Primary | ICD-10-CM

## 2019-11-06 NOTE — PROGRESS NOTES
I discussed with patient at length the importance of having nocturnal pulse oximetry on CPAP  Patient has not yet used the CPAP for sufficient time for compliance visit  She refuses to continue to pay for the supplemental oxygen and wants a discontinuation order  She is aware that deferring nocturnal pulse oximetry on room air could be detrimental to her health  I will place order

## 2019-11-08 ENCOUNTER — APPOINTMENT (OUTPATIENT)
Dept: LAB | Facility: HOSPITAL | Age: 64
End: 2019-11-08
Payer: COMMERCIAL

## 2019-11-08 ENCOUNTER — TELEPHONE (OUTPATIENT)
Dept: FAMILY MEDICINE CLINIC | Facility: CLINIC | Age: 64
End: 2019-11-08

## 2019-11-08 ENCOUNTER — ANTICOAG VISIT (OUTPATIENT)
Dept: FAMILY MEDICINE CLINIC | Facility: CLINIC | Age: 64
End: 2019-11-08

## 2019-11-08 DIAGNOSIS — N18.30 STAGE 3 CHRONIC KIDNEY DISEASE (HCC): ICD-10-CM

## 2019-11-08 DIAGNOSIS — I26.99 PULMONARY INFARCTION (HCC): ICD-10-CM

## 2019-11-08 LAB
INR PPP: 1.15 (ref 0.84–1.19)
INR PPP: 1.15 (ref 0.91–1.09)
PROTHROMBIN TIME: 12.4 SECONDS (ref 9.8–12)

## 2019-11-08 PROCEDURE — 36415 COLL VENOUS BLD VENIPUNCTURE: CPT

## 2019-11-08 PROCEDURE — 85610 PROTHROMBIN TIME: CPT

## 2019-11-08 NOTE — TELEPHONE ENCOUNTER
Jarad Carlin is overdue for her INR   [facility]

## 2019-11-08 NOTE — TELEPHONE ENCOUNTER
----- Message from Orin Hedrick DO sent at 11/8/2019  2:30 PM EST -----  Increase to 7 mg a day  recheck in 1 week  Orin Hedrick DO

## 2019-11-11 DIAGNOSIS — I26.99 PE (PULMONARY THROMBOEMBOLISM) (HCC): ICD-10-CM

## 2019-11-11 RX ORDER — WARFARIN SODIUM 5 MG/1
5 TABLET ORAL
Qty: 90 TABLET | Refills: 1 | Status: SHIPPED | OUTPATIENT
Start: 2019-11-11 | End: 2020-07-19 | Stop reason: HOSPADM

## 2019-11-11 RX ORDER — TORSEMIDE 20 MG/1
TABLET ORAL
Qty: 100 TABLET | Refills: 11 | Status: SHIPPED | OUTPATIENT
Start: 2019-11-11 | End: 2019-11-13 | Stop reason: SDUPTHER

## 2019-11-13 DIAGNOSIS — N18.30 STAGE 3 CHRONIC KIDNEY DISEASE (HCC): ICD-10-CM

## 2019-11-13 RX ORDER — TORSEMIDE 20 MG/1
TABLET ORAL
Qty: 270 TABLET | Refills: 3 | Status: ON HOLD | OUTPATIENT
Start: 2019-11-13 | End: 2020-07-19 | Stop reason: SDUPTHER

## 2019-11-25 ENCOUNTER — TELEPHONE (OUTPATIENT)
Dept: FAMILY MEDICINE CLINIC | Facility: CLINIC | Age: 64
End: 2019-11-25

## 2019-11-25 NOTE — TELEPHONE ENCOUNTER
Left message on machine requesting a call back  Pt is overdue for her INR, due on 11/15/19   Elodia Kenyon

## 2019-11-26 NOTE — TELEPHONE ENCOUNTER
Spoke with pt, she states she has a real bad cold and will go as soon as she starts feeling better  Will keep this open to keep track of results   Elodia Rawsl

## 2019-12-01 ENCOUNTER — OFFICE VISIT (OUTPATIENT)
Dept: URGENT CARE | Facility: CLINIC | Age: 64
End: 2019-12-01
Payer: COMMERCIAL

## 2019-12-01 VITALS
DIASTOLIC BLOOD PRESSURE: 90 MMHG | TEMPERATURE: 97.7 F | WEIGHT: 210.8 LBS | HEART RATE: 99 BPM | OXYGEN SATURATION: 96 % | BODY MASS INDEX: 41.39 KG/M2 | SYSTOLIC BLOOD PRESSURE: 130 MMHG | RESPIRATION RATE: 20 BRPM | HEIGHT: 60 IN

## 2019-12-01 DIAGNOSIS — J20.9 ACUTE BRONCHITIS, UNSPECIFIED ORGANISM: Primary | ICD-10-CM

## 2019-12-01 PROCEDURE — 99213 OFFICE O/P EST LOW 20 MIN: CPT | Performed by: PHYSICIAN ASSISTANT

## 2019-12-01 RX ORDER — BENZONATATE 100 MG/1
100 CAPSULE ORAL 3 TIMES DAILY PRN
Qty: 20 CAPSULE | Refills: 0 | Status: SHIPPED | OUTPATIENT
Start: 2019-12-01 | End: 2020-07-13 | Stop reason: ALTCHOICE

## 2019-12-01 RX ORDER — ALBUTEROL SULFATE 90 UG/1
2 AEROSOL, METERED RESPIRATORY (INHALATION) EVERY 4 HOURS PRN
Qty: 1 INHALER | Refills: 0 | Status: SHIPPED | OUTPATIENT
Start: 2019-12-01 | End: 2020-08-26 | Stop reason: ALTCHOICE

## 2019-12-01 NOTE — PROGRESS NOTES
[facility]        NAME: Hilda Short is a 60 y o  female  : 1955    MRN: [id number]  DATE: 2019  TIME: 10:14 PM    Assessment and Plan   Acute bronchitis, unspecified organism [J20 9]  1  Acute bronchitis, unspecified organism  albuterol (PROAIR HFA) 90 mcg/act inhaler    benzonatate (TESSALON PERLES) 100 mg capsule     Patient Instructions   Acute bronchitis  Rx albuterol every 4-6 hours as needed  rx tessalon perls three times daily as needed for cough  Breathing steam and humidifier in room at night  Rest, fluids and supportive care  Follow up with PCP in 3-5 days  Proceed to  ER if symptoms worsen  Chief Complaint     Chief Complaint   Patient presents with    Cough     Patient complains of cough, congestion, ear pain, chills for over a week  Has tried over the counter medication without relieft   Nasal Congestion         History of Present Illness       Margareth Eli is a 79-year-old female who presents to clinic complaining of nasal congestion x1 week  She is also complaining a a nonproductive cough and fatigue  She states the cough is worse when she lays down at night  She denies any fever, chills, sinus pain, ear pain, sore throat, myalgias, or sick contacts  She is using various over-the-counter products with mild to moderate relief and states that her symptoms have been improving over the last week  Review of Systems   Review of Systems   Constitutional: Positive for fatigue  Negative for chills and fever  HENT: Positive for congestion  Negative for ear pain, sinus pressure, sinus pain and sore throat  Respiratory: Positive for cough  Negative for shortness of breath and wheezing  Musculoskeletal: Negative for myalgias  Neurological: Negative for headaches           Current Medications       Current Outpatient Medications:     albuterol (PROAIR HFA) 90 mcg/act inhaler, Inhale 2 puffs every 4 (four) hours as needed for wheezing or shortness of breath, Disp: 1 Inhaler, Rfl: 0    B Complex-C (B-COMPLEX WITH VITAMIN C) tablet, Take 1 tablet by mouth daily, Disp: , Rfl:     Blood Glucose Monitoring Suppl (ONE TOUCH ULTRA 2) w/Device KIT, Test glucose 3 times daily, Disp: 1 each, Rfl: 0    cholecalciferol 2000 units TABS, Take 1 tablet (2,000 Units total) by mouth daily, Disp: 30 tablet, Rfl: 0    fluticasone-vilanterol (BREO ELLIPTA) 100-25 mcg/inh inhaler, Inhale 1 puff daily Rinse mouth after use , Disp: 1 Inhaler, Rfl: 6    Insulin Lispro Prot & Lispro (HUMALOG MIX 75/25 KWIKPEN) (75-25) 100 units/mL injection pen, Inject 32 Units under the skin 2 (two) times a day with meals for 250 days, Disp: 150 mL, Rfl: 3    linaGLIPtin (TRADJENTA) 5 MG TABS, Take 5 mg by mouth daily, Disp: 90 tablet, Rfl: 3    MULTIPLE VITAMIN PO, Take 1 tablet by mouth daily, Disp: , Rfl:     ONE TOUCH ULTRA TEST test strip, TEST GLUCOSE THREE TIMES A DAY, Disp: 300 each, Rfl: 3    torsemide (DEMADEX) 20 mg tablet, Take 2 tablet per day in the morning and take 1 tablets in the evening every night, Disp: 90 tablet, Rfl: 3    torsemide (DEMADEX) 20 mg tablet, TAKE 2 TABLETS DAILY IN THE MORNING AND TAKE AN ADDITIONAL TABLET IN THE EVENING FOR WEIGHT GAIN OR WORSENING SWELLING, Disp: 270 tablet, Rfl: 3    warfarin (COUMADIN) 1 mg tablet, One daily as directed, Disp: 90 tablet, Rfl: 1    warfarin (COUMADIN) 2 mg tablet, 4mg daily or as directed, Disp: 180 tablet, Rfl: 0    warfarin (COUMADIN) 3 mg tablet, Take 5 milligrams on 7/16/18, July 17, 2018 and get PT/INR on July 17, 2018, Disp: 30 tablet, Rfl: 0    warfarin (COUMADIN) 5 mg tablet, Take 1 tablet (5 mg total) by mouth daily, Disp: 90 tablet, Rfl: 1    atorvastatin (LIPITOR) 20 mg tablet, Take 1 tablet (20 mg total) by mouth daily (Patient taking differently: Take 20 mg by mouth every other day ), Disp: 90 tablet, Rfl: 1    benzonatate (TESSALON PERLES) 100 mg capsule, Take 1 capsule (100 mg total) by mouth 3 (three) times a day as needed for cough, Disp: 20 capsule, Rfl: 0    calcitriol (ROCALTROL) 0 5 MCG capsule, Take 1 capsule (0 5 mcg total) by mouth daily (Patient taking differently: Take 0 5 mcg by mouth 2 (two) times a day ), Disp: 90 each, Rfl: 3    metoprolol succinate (TOPROL-XL) 25 mg 24 hr tablet, Take 50mg AM & 25mg PM (Patient taking differently: 2 (two) times a day ), Disp: 90 tablet, Rfl: 3    polyethylene glycol (GOLYTELY) 4000 mL solution, Take 4,000 mL by mouth once for 1 dose (Patient not taking: Reported on 2019), Disp: 4000 mL, Rfl: 0    Current Allergies     Allergies as of 2019    (No Known Allergies)            The following portions of the patient's history were reviewed and updated as appropriate: allergies, current medications, past family history, past medical history, past social history, past surgical history and problem list      Past Medical History:   Diagnosis Date    Acute asthmatic bronchitis     last assessed: 2017    Cardiac disease     Colon polyp     [facility] mellitus [facility] [address]     Hyperlipidemia     Hypertension     Pneumonia     last assessed: 2013    Renal disorder     possible clot noted in kidney, thus blood thinners currently       Past Surgical History:   Procedure Laterality Date     SECTION       x 1    NOSE SURGERY      fractured nose - septoplasty       Family History   Problem Relation Age of Onset    Diabetes Mother         mellitus    Anxiety disorder Mother         generalized anxiety disorder    Hypertension Mother     Kidney disease Father     Kidney failure Father         renal failure    Ulcerative colitis Sister     Heart disease Family     Ovarian cancer Maternal Aunt          Medications have been verified          Objective   /90   Pulse 99   Temp 97 7 °F (36 5 °C)   Resp 20   Ht 5' (1 524 m)   Wt 95 6 kg (210 lb 12 8 oz)   SpO2 96%   BMI 41 17 kg/m²        Physical Exam     Physical Exam   Constitutional: She is oriented to person, place, and time  She appears well-developed and well-nourished  No distress  HENT:   Right Ear: Hearing, tympanic membrane, external ear and ear canal normal    Left Ear: Hearing, tympanic membrane, external ear and ear canal normal    Nose: Mucosal edema present  Right sinus exhibits no maxillary sinus tenderness and no frontal sinus tenderness  Left sinus exhibits no maxillary sinus tenderness and no frontal sinus tenderness  Mouth/Throat: Uvula is midline and mucous membranes are normal  Posterior oropharyngeal erythema present  Tonsils are 0 on the right  Tonsils are 0 on the left  No tonsillar exudate  Cardiovascular: Normal rate, regular rhythm and normal heart sounds  Pulmonary/Chest: Effort normal and breath sounds normal    Lymphadenopathy:     She has no cervical adenopathy  Neurological: She is alert and oriented to person, place, and time  Psychiatric: She has a normal mood and affect  Nursing note and vitals reviewed

## 2019-12-01 NOTE — PATIENT INSTRUCTIONS
Acute bronchitis  Rx albuterol every 4-6 hours as needed  rx tessalon perls three times daily as needed for cough  Breathing steam and humidifier in room at night  Rest, fluids and supportive care  Follow up with PCP in 3-5 days  Proceed to  ER if symptoms worsen  Acute Bronchitis   WHAT YOU NEED TO KNOW:   Acute bronchitis is swelling and irritation in the air passages of your lungs  This irritation may cause you to cough or have other breathing problems  Acute bronchitis often starts because of another illness, such as a cold or the flu  The illness spreads from your nose and throat to your windpipe and airways  Bronchitis is often called a chest cold  Acute bronchitis lasts about 3 to 6 weeks and is usually not a serious illness  Your cough can last for several weeks  DISCHARGE INSTRUCTIONS:   Return to the emergency department if:   · You cough up blood  · Your lips or fingernails turn blue  · You feel like you are not getting enough air when you breathe  Contact your healthcare provider if:   · You have a fever  · Your breathing problems do not go away or get worse  · Your cough does not get better within 4 weeks  · You have questions or concerns about your condition or care  Self-care:   · Get more rest   Rest helps your body to heal  Slowly start to do more each day  Rest when you feel it is needed  · Avoid irritants in the air  Avoid chemicals, fumes, and dust  Wear a face mask if you must work around dust or fumes  Stay inside on days when air pollution levels are high  If you have allergies, stay inside when pollen counts are high  Do not use aerosol products, such as spray-on deodorant, bug spray, and hair spray  · Do not smoke or be around others who smoke  Nicotine and other chemicals in cigarettes and cigars damages the cilia that move mucus out of your lungs  Ask your healthcare provider for information if you currently smoke and need help to quit   E-cigarettes or smokeless tobacco still contain nicotine  Talk to your healthcare provider before you use these products  · Drink liquids as directed  Liquids help keep your air passages moist and help you cough up mucus  You may need to drink more liquids when you have acute bronchitis  Ask how much liquid to drink each day and which liquids are best for you  · Use a humidifier or vaporizer  Use a cool mist humidifier or a vaporizer to increase air moisture in your home  This may make it easier for you to breathe and help decrease your cough  Decrease risk for acute bronchitis:   · Get the vaccinations you need  Ask your healthcare provider if you should get vaccinated against the flu or pneumonia  · Prevent the spread of germs  You can decrease your risk of acute bronchitis and other illnesses by doing the following:     [facility] your hands often with soap and water  Carry germ-killing hand lotion or gel with you  You can use the lotion or gel to clean your hands when soap and water are not available  ¨ Do not touch your eyes, nose, or mouth unless you have washed your hands first     ¨ Always cover your mouth when you cough to prevent the spread of germs  It is best to cough into a tissue or your shirt sleeve instead of into your hand  Ask those around you cover their mouths when they cough  ¨ Try to avoid people who have a cold or the flu  If you are sick, stay away from others as much as possible  Medicines: Your healthcare provider may  give you any of the following:  · Ibuprofen or acetaminophen  are medicines that help lower your fever  They are available without a doctor's order  Ask your healthcare provider which medicine is right for you  Ask how much to take and how often to take it  Follow directions  These medicines can cause stomach bleeding if not taken correctly  Ibuprofen can cause kidney damage  Do not take ibuprofen if you have kidney disease, an ulcer, or allergies to aspirin   Acetaminophen can cause liver damage  Do not take more than 4,000 milligrams in 24 hours  · Decongestants  help loosen mucus in your lungs and make it easier to cough up  This can help you breathe easier  · Cough suppressants  decrease your urge to cough  If your cough produces mucus, do not take a cough suppressant unless your healthcare provider tells you to  Your healthcare provider may suggest that you take a cough suppressant at night so you can rest     · Inhalers  may be given  Your healthcare provider may give you one or more inhalers to help you breathe easier and cough less  An inhaler gives your medicine to open your airways  Ask your healthcare provider to show you how to use your inhaler correctly  · Take your medicine as directed  Contact your healthcare provider if you think your medicine is not helping or if you have side effects  Tell him of her if you are allergic to any medicine  Keep a list of the medicines, vitamins, and herbs you take  Include the amounts, and when and why you take them  Bring the list or the pill bottles to follow-up visits  Carry your medicine list with you in case of an emergency  Follow up with your healthcare provider as directed:  Write down questions you have so you will remember to ask them during your follow-up visits  © 2017 [employer]  Information is for End User's use only and may not be sold, redistributed or otherwise used for commercial purposes  All illustrations and images included in CareNotes® are the copyrighted property of [employer]  or [employer]  The above information is an  only  It is not intended as medical advice for individual conditions or treatments  Talk to your doctor, nurse or pharmacist before following any medical regimen to see if it is safe and effective for you

## 2019-12-02 NOTE — TELEPHONE ENCOUNTER
Spoke with pt, stated has bronchitis and has not gone for INR   Pt coming in with Dr Eliana Gaviria on Friday 12/6/2019      Alesha Milian, [address]

## 2019-12-06 ENCOUNTER — OFFICE VISIT (OUTPATIENT)
Dept: FAMILY MEDICINE CLINIC | Facility: CLINIC | Age: 64
End: 2019-12-06
Payer: COMMERCIAL

## 2019-12-06 VITALS
WEIGHT: 214 LBS | TEMPERATURE: 97.6 F | BODY MASS INDEX: 42.01 KG/M2 | HEART RATE: 92 BPM | SYSTOLIC BLOOD PRESSURE: 120 MMHG | DIASTOLIC BLOOD PRESSURE: 86 MMHG | OXYGEN SATURATION: 92 % | RESPIRATION RATE: 18 BRPM | HEIGHT: 60 IN

## 2019-12-06 DIAGNOSIS — I12.9 BENIGN HYPERTENSION WITH CKD (CHRONIC KIDNEY DISEASE) STAGE IV (HCC): ICD-10-CM

## 2019-12-06 DIAGNOSIS — Z79.4 TYPE 2 DIABETES MELLITUS WITH COMPLICATION, WITH LONG-TERM CURRENT USE OF INSULIN (HCC): ICD-10-CM

## 2019-12-06 DIAGNOSIS — N18.4 BENIGN HYPERTENSION WITH CKD (CHRONIC KIDNEY DISEASE) STAGE IV (HCC): ICD-10-CM

## 2019-12-06 DIAGNOSIS — E11.8 TYPE 2 DIABETES MELLITUS WITH COMPLICATION, WITH LONG-TERM CURRENT USE OF INSULIN (HCC): ICD-10-CM

## 2019-12-06 DIAGNOSIS — Z12.31 SCREENING MAMMOGRAM, ENCOUNTER FOR: ICD-10-CM

## 2019-12-06 DIAGNOSIS — Z00.00 ANNUAL PHYSICAL EXAM: Primary | ICD-10-CM

## 2019-12-06 PROCEDURE — 99396 PREV VISIT EST AGE 40-64: CPT | Performed by: FAMILY MEDICINE

## 2019-12-06 NOTE — TELEPHONE ENCOUNTER
Patient seen by Dr Jasmyn Miles today, do I need to do anything further with this note? Was INR discussed with patient today? Or should I close it?  Thank you Chelsie Hughes MA

## 2019-12-06 NOTE — TELEPHONE ENCOUNTER
She had stopped her coumadin and just restarted it  She will get her labs done next week  Can you postpone the overdue message by a week?   Taty Miles, DO

## 2019-12-06 NOTE — PROGRESS NOTES
FAMILY PRACTICE HEALTH MAINTENANCE OFFICE VISIT  [facility]    NAME: Mike Lewis  AGE: 60 y o  SEX: female  : 1955     DATE: 2019    Assessment and Plan     1  Annual physical exam    2  Benign hypertension with CKD (chronic kidney disease) stage IV ([address] )    3  Type 2 diabetes mellitus with complication, with long-term current use of insulin ([facility])  Assessment & Plan:    Lab Results   Component Value Date    HGBA1C 8 2 (H) 10/16/2019       Sugar was 149 this morning  She is still following with her endocrinologist      Diabetic eye exam form given for her to bring with her  4  Screening mammogram, encounter for  -     Mammo screening bilateral w cad; Future; Expected date: 2019      · Patient Counseling:   · Nutrition: Stressed importance of a well balanced diet, moderation of sodium/saturated fat, caloric balance and sufficient intake of fiber  · Exercise: Stressed the importance of regular exercise with a goal of 150 minutes per week  · Dental Health: Discussed daily flossing and brushing and regular dental visits     · Immunizations reviewed: Up To Date  · Discussed benefits of:  Mammogram     BMI Counseling: Body mass index is 41 79 kg/m²  Discussed with patient's BMI with her  The BMI is above normal  Exercise recommendations include exercising 3-5 times per week  Return in about 6 months (around 2020) for Next scheduled follow up  Chief Complaint     Chief Complaint   Patient presents with    Physical Exam     wmcma       History of Present Illness     She has been sick for the past 2 weeks  She has been having head congestion  She has been monitoring her sugar  She is back on track with taking her medication  She even missed some days of her blood thinner          Well Adult Physical   Patient here for a comprehensive physical exam       Diet and Physical Activity  Diet: poor diet  Exercise: never      Depression Screen  PHQ-9 Depression Screening    PHQ-9:    Frequency of the following problems over the past two weeks:       Little interest or pleasure in doing things:  0 - not at all  Feeling down, depressed, or hopeless:  0 - not at all  PHQ-2 Score:  0          General Health  Hearing: Normal:  bilateral  Vision: wears glasses  Dental: regular dental visits    Reproductive Health  Post-menopausal       The following portions of the patient's history were reviewed and updated as appropriate: allergies, current medications, past family history, past medical history, past social history, past surgical history and problem list     Review of Systems     Review of Systems   Constitutional: Negative  Respiratory: Positive for cough (slowly improving)          Past Medical History     Past Medical History:   Diagnosis Date    Acute asthmatic bronchitis     last assessed: 2017    Cardiac disease     Colon polyp     Diabetes mellitus ([facility] [address] [facility]     Hyperlipidemia     Hypertension     Pneumonia     last assessed: 2013    Renal disorder     possible clot noted in kidney, thus blood thinners currently       Past Surgical History     Past Surgical History:   Procedure Laterality Date     SECTION       x 1    NOSE SURGERY      fractured nose - septoplasty       Social History     Social History     Socioeconomic History    Marital status: Single     Spouse name: None    Number of children: None    Years of education: None    Highest education level: None   Occupational History    None   Social Needs    Financial resource strain: None    Food insecurity:     Worry: None     Inability: None    Transportation needs:     Medical: None     Non-medical: None   Tobacco Use    Smoking status: Never Smoker    Smokeless tobacco: Never Used   Substance and Sexual Activity    Alcohol use: No    Drug use: No    Sexual activity: None   Lifestyle    Physical activity:     Days per week: None     Minutes per session: None    Stress: None   Relationships    Social connections:     Talks on phone: None     Gets together: None     Attends Protestant service: None     Active member of club or organization: None     Attends meetings of clubs or organizations: None     Relationship status: None    Intimate partner violence:     Fear of current or ex partner: None     Emotionally abused: None     Physically abused: None     Forced sexual activity: None   Other Topics Concern    None   Social History Narrative    Caffeine use       Family History     Family History   Problem Relation Age of Onset    Diabetes Mother         mellitus    Anxiety disorder Mother         generalized anxiety disorder    Hypertension Mother     Kidney disease Father     Kidney failure Father         renal failure    Ulcerative colitis Sister     Heart disease Family     Ovarian cancer Maternal Aunt        Current Medications       Current Outpatient Medications:     albuterol (PROAIR HFA) 90 mcg/act inhaler, Inhale 2 puffs every 4 (four) hours as needed for wheezing or shortness of breath, Disp: 1 Inhaler, Rfl: 0    atorvastatin (LIPITOR) 20 mg tablet, Take 1 tablet (20 mg total) by mouth daily (Patient taking differently: Take 20 mg by mouth every other day ), Disp: 90 tablet, Rfl: 1    B Complex-C (B-COMPLEX WITH VITAMIN C) tablet, Take 1 tablet by mouth daily, Disp: , Rfl:     benzonatate (TESSALON PERLES) 100 mg capsule, Take 1 capsule (100 mg total) by mouth 3 (three) times a day as needed for cough, Disp: 20 capsule, Rfl: 0    Blood Glucose Monitoring Suppl (ONE TOUCH ULTRA 2) w/Device KIT, Test glucose 3 times daily, Disp: 1 each, Rfl: 0    calcitriol (ROCALTROL) 0 5 MCG capsule, Take 1 capsule (0 5 mcg total) by mouth daily (Patient taking differently: Take 0 5 mcg by mouth 2 (two) times a day ), Disp: 90 each, Rfl: 3    cholecalciferol 2000 units TABS, Take 1 tablet (2,000 Units total) by mouth daily, Disp: 30 tablet, Rfl: 0    fluticasone-vilanterol (BREO ELLIPTA) 100-25 mcg/inh inhaler, Inhale 1 puff daily Rinse mouth after use , Disp: 1 Inhaler, Rfl: 6    Insulin Lispro Prot & Lispro (HUMALOG MIX 75/25 KWIKPEN) (75-25) 100 units/mL injection pen, Inject 32 Units under the skin 2 (two) times a day with meals for 250 days, Disp: 150 mL, Rfl: 3    linaGLIPtin (TRADJENTA) 5 MG TABS, Take 5 mg by mouth daily, Disp: 90 tablet, Rfl: 3    metoprolol succinate (TOPROL-XL) 25 mg 24 hr tablet, Take 50mg AM & 25mg PM (Patient taking differently: 2 (two) times a day ), Disp: 90 tablet, Rfl: 3    MULTIPLE VITAMIN PO, Take 1 tablet by mouth daily, Disp: , Rfl:     ONE TOUCH ULTRA TEST test strip, TEST GLUCOSE THREE TIMES A DAY, Disp: 300 each, Rfl: 3    torsemide (DEMADEX) 20 mg tablet, Take 2 tablet per day in the morning and take 1 tablets in the evening every night, Disp: 90 tablet, Rfl: 3    torsemide (DEMADEX) 20 mg tablet, TAKE 2 TABLETS DAILY IN THE MORNING AND TAKE AN ADDITIONAL TABLET IN THE EVENING FOR WEIGHT GAIN OR WORSENING SWELLING, Disp: 270 tablet, Rfl: 3    warfarin (COUMADIN) 1 mg tablet, One daily as directed, Disp: 90 tablet, Rfl: 1    warfarin (COUMADIN) 2 mg tablet, 4mg daily or as directed, Disp: 180 tablet, Rfl: 0    warfarin (COUMADIN) 3 mg tablet, Take 5 milligrams on 7/16/18, July 17, 2018 and get PT/INR on July 17, 2018, Disp: 30 tablet, Rfl: 0    warfarin (COUMADIN) 5 mg tablet, Take 1 tablet (5 mg total) by mouth daily, Disp: 90 tablet, Rfl: 1     Allergies     No Known Allergies    Objective     /86   Pulse 92   Temp 97 6 °F (36 4 °C)   Resp 18   Ht 5' (1 524 m)   Wt 97 1 kg (214 lb)   SpO2 92%   BMI 41 79 kg/m²      Physical Exam   Constitutional: She is oriented to person, place, and time  She appears well-developed and well-nourished  HENT:   Head: Normocephalic and atraumatic     Right Ear: External ear normal    Left Ear: External ear normal    Mouth/Throat: Oropharynx is clear and moist    Eyes: Pupils are equal, round, and reactive to light  Neck: Normal range of motion  Cardiovascular: Normal rate, regular rhythm and normal heart sounds  Exam reveals no friction rub  No murmur heard  Pulmonary/Chest: Effort normal and breath sounds normal  No respiratory distress  She has no wheezes  She has no rales  Abdominal: Soft  Bowel sounds are normal  She exhibits no distension and no mass  There is no tenderness  There is no rebound and no guarding  Musculoskeletal: Normal range of motion  She exhibits no edema  Neurological: She is alert and oriented to person, place, and time  She has normal reflexes  Skin: Skin is warm  Nursing note and vitals reviewed           Visual Acuity Screening    Right eye Left eye Both eyes   Without correction: 20/40 20/50 20/40   With correction:              Desiree Blackwell, [address]

## 2019-12-06 NOTE — ASSESSMENT & PLAN NOTE
Lab Results   Component Value Date    HGBA1C 8 2 (H) 10/16/2019       Sugar was 149 this morning  She is still following with her endocrinologist      Diabetic eye exam form given for her to bring with her

## 2019-12-20 ENCOUNTER — TRANSCRIBE ORDERS (OUTPATIENT)
Dept: ADMINISTRATIVE | Facility: HOSPITAL | Age: 64
End: 2019-12-20

## 2019-12-20 ENCOUNTER — APPOINTMENT (OUTPATIENT)
Dept: LAB | Facility: HOSPITAL | Age: 64
End: 2019-12-20
Payer: COMMERCIAL

## 2019-12-20 ENCOUNTER — TELEPHONE (OUTPATIENT)
Dept: FAMILY MEDICINE CLINIC | Facility: CLINIC | Age: 64
End: 2019-12-20

## 2019-12-20 ENCOUNTER — ANTICOAG VISIT (OUTPATIENT)
Dept: FAMILY MEDICINE CLINIC | Facility: CLINIC | Age: 64
End: 2019-12-20

## 2019-12-20 DIAGNOSIS — I26.99 PULMONARY INFARCTION (HCC): Primary | ICD-10-CM

## 2019-12-20 DIAGNOSIS — I26.99 PULMONARY INFARCTION (HCC): ICD-10-CM

## 2019-12-20 LAB
INR PPP: 2.69 (ref 0.91–1.09)
PROTHROMBIN TIME: 28.5 SECONDS (ref 9.8–12)

## 2019-12-20 PROCEDURE — 36415 COLL VENOUS BLD VENIPUNCTURE: CPT

## 2019-12-20 PROCEDURE — 85610 PROTHROMBIN TIME: CPT

## 2019-12-20 NOTE — TELEPHONE ENCOUNTER
----- Message from Selena Delacruz DO sent at 12/20/2019 12:12 PM EST -----  Same dose  Repeat INR in 1 month  Selena Delacruz DO

## 2020-01-02 ENCOUNTER — TELEPHONE (OUTPATIENT)
Dept: ENDOCRINOLOGY | Facility: CLINIC | Age: 65
End: 2020-01-02

## 2020-01-04 ENCOUNTER — APPOINTMENT (OUTPATIENT)
Dept: LAB | Facility: HOSPITAL | Age: 65
End: 2020-01-04
Attending: INTERNAL MEDICINE
Payer: COMMERCIAL

## 2020-01-04 ENCOUNTER — TRANSCRIBE ORDERS (OUTPATIENT)
Dept: ADMINISTRATIVE | Facility: HOSPITAL | Age: 65
End: 2020-01-04

## 2020-01-04 DIAGNOSIS — I12.9 BENIGN HYPERTENSION WITH CKD (CHRONIC KIDNEY DISEASE) STAGE IV (HCC): ICD-10-CM

## 2020-01-04 DIAGNOSIS — N25.81 SECONDARY HYPERPARATHYROIDISM OF RENAL ORIGIN (HCC): ICD-10-CM

## 2020-01-04 DIAGNOSIS — N18.4 BENIGN HYPERTENSION WITH CKD (CHRONIC KIDNEY DISEASE) STAGE IV (HCC): ICD-10-CM

## 2020-01-04 LAB
ALBUMIN SERPL BCP-MCNC: 3.2 G/DL (ref 3.5–5)
ALP SERPL-CCNC: 71 U/L (ref 46–116)
ALT SERPL W P-5'-P-CCNC: 24 U/L (ref 12–78)
ANION GAP SERPL CALCULATED.3IONS-SCNC: 10 MMOL/L (ref 4–13)
AST SERPL W P-5'-P-CCNC: 15 U/L (ref 5–45)
BILIRUB SERPL-MCNC: 1.2 MG/DL (ref 0.2–1)
BUN SERPL-MCNC: 53 MG/DL (ref 5–25)
CALCIUM SERPL-MCNC: 9.1 MG/DL (ref 8.3–10.1)
CHLORIDE SERPL-SCNC: 104 MMOL/L (ref 100–108)
CO2 SERPL-SCNC: 25 MMOL/L (ref 21–32)
CREAT SERPL-MCNC: 3.56 MG/DL (ref 0.6–1.3)
CREAT UR-MCNC: 111 MG/DL
ERYTHROCYTE [DISTWIDTH] IN BLOOD BY AUTOMATED COUNT: 15.9 % (ref 11.6–15.1)
GFR SERPL CREATININE-BSD FRML MDRD: 13 ML/MIN/1.73SQ M
GLUCOSE P FAST SERPL-MCNC: 70 MG/DL (ref 65–99)
HCT VFR BLD AUTO: 36.2 % (ref 34.8–46.1)
HGB BLD-MCNC: 11.3 G/DL (ref 11.5–15.4)
MCH RBC QN AUTO: 26.3 PG (ref 26.8–34.3)
MCHC RBC AUTO-ENTMCNC: 31.2 G/DL (ref 31.4–37.4)
MCV RBC AUTO: 84 FL (ref 82–98)
PHOSPHATE SERPL-MCNC: 3.9 MG/DL (ref 2.3–4.1)
PLATELET # BLD AUTO: 225 THOUSANDS/UL (ref 149–390)
PMV BLD AUTO: 11.4 FL (ref 8.9–12.7)
POTASSIUM SERPL-SCNC: 3.9 MMOL/L (ref 3.5–5.3)
PROT SERPL-MCNC: 7.5 G/DL (ref 6.4–8.2)
PROT UR-MCNC: 507 MG/DL
PROT/CREAT UR: 4.57 MG/G{CREAT} (ref 0–0.1)
PTH-INTACT SERPL-MCNC: 399 PG/ML (ref 18.4–80.1)
RBC # BLD AUTO: 4.29 MILLION/UL (ref 3.81–5.12)
SODIUM SERPL-SCNC: 139 MMOL/L (ref 136–145)
WBC # BLD AUTO: 10.05 THOUSAND/UL (ref 4.31–10.16)

## 2020-01-04 PROCEDURE — 84100 ASSAY OF PHOSPHORUS: CPT

## 2020-01-04 PROCEDURE — 3066F NEPHROPATHY DOC TX: CPT | Performed by: INTERNAL MEDICINE

## 2020-01-04 PROCEDURE — 85027 COMPLETE CBC AUTOMATED: CPT

## 2020-01-04 PROCEDURE — 36415 COLL VENOUS BLD VENIPUNCTURE: CPT

## 2020-01-04 PROCEDURE — 82570 ASSAY OF URINE CREATININE: CPT

## 2020-01-04 PROCEDURE — 83970 ASSAY OF PARATHORMONE: CPT

## 2020-01-04 PROCEDURE — 80053 COMPREHEN METABOLIC PANEL: CPT

## 2020-01-04 PROCEDURE — 84156 ASSAY OF PROTEIN URINE: CPT

## 2020-01-06 ENCOUNTER — OFFICE VISIT (OUTPATIENT)
Dept: ENDOCRINOLOGY | Facility: CLINIC | Age: 65
End: 2020-01-06
Payer: COMMERCIAL

## 2020-01-06 VITALS
SYSTOLIC BLOOD PRESSURE: 136 MMHG | DIASTOLIC BLOOD PRESSURE: 72 MMHG | HEART RATE: 103 BPM | HEIGHT: 60 IN | BODY MASS INDEX: 41.64 KG/M2 | WEIGHT: 212.1 LBS

## 2020-01-06 DIAGNOSIS — Z79.4 TYPE 2 DIABETES MELLITUS WITH COMPLICATION, WITH LONG-TERM CURRENT USE OF INSULIN (HCC): Primary | ICD-10-CM

## 2020-01-06 DIAGNOSIS — N25.81 SECONDARY HYPERPARATHYROIDISM OF RENAL ORIGIN (HCC): ICD-10-CM

## 2020-01-06 DIAGNOSIS — I26.99 OTHER PULMONARY EMBOLISM WITHOUT ACUTE COR PULMONALE, UNSPECIFIED CHRONICITY (HCC): ICD-10-CM

## 2020-01-06 DIAGNOSIS — N18.4 BENIGN HYPERTENSION WITH CKD (CHRONIC KIDNEY DISEASE) STAGE IV (HCC): ICD-10-CM

## 2020-01-06 DIAGNOSIS — E11.319 DIABETIC RETINOPATHY OF BOTH EYES ASSOCIATED WITH TYPE 2 DIABETES MELLITUS, MACULAR EDEMA PRESENCE UNSPECIFIED, UNSPECIFIED RETINOPATHY SEVERITY (HCC): ICD-10-CM

## 2020-01-06 DIAGNOSIS — Z79.4 ENCOUNTER FOR LONG-TERM (CURRENT) USE OF INSULIN (HCC): ICD-10-CM

## 2020-01-06 DIAGNOSIS — I12.9 BENIGN HYPERTENSION WITH CKD (CHRONIC KIDNEY DISEASE) STAGE IV (HCC): ICD-10-CM

## 2020-01-06 DIAGNOSIS — E66.01 MORBID OBESITY WITH BMI OF 40.0-44.9, ADULT (HCC): ICD-10-CM

## 2020-01-06 DIAGNOSIS — E11.8 TYPE 2 DIABETES MELLITUS WITH COMPLICATION, WITH LONG-TERM CURRENT USE OF INSULIN (HCC): Primary | ICD-10-CM

## 2020-01-06 DIAGNOSIS — E55.9 VITAMIN D DEFICIENCY: ICD-10-CM

## 2020-01-06 DIAGNOSIS — I10 ESSENTIAL HYPERTENSION: ICD-10-CM

## 2020-01-06 DIAGNOSIS — E78.2 MIXED HYPERLIPIDEMIA: ICD-10-CM

## 2020-01-06 PROCEDURE — 3075F SYST BP GE 130 - 139MM HG: CPT | Performed by: INTERNAL MEDICINE

## 2020-01-06 PROCEDURE — 99214 OFFICE O/P EST MOD 30 MIN: CPT | Performed by: INTERNAL MEDICINE

## 2020-01-06 PROCEDURE — 3078F DIAST BP <80 MM HG: CPT | Performed by: INTERNAL MEDICINE

## 2020-01-06 RX ORDER — WARFARIN SODIUM 2 MG/1
TABLET ORAL
Qty: 180 TABLET | Refills: 0 | Status: SHIPPED | OUTPATIENT
Start: 2020-01-06 | End: 2020-07-19 | Stop reason: HOSPADM

## 2020-01-06 RX ORDER — ERGOCALCIFEROL 1.25 MG/1
CAPSULE ORAL
Qty: 12 CAPSULE | Refills: 0 | OUTPATIENT
Start: 2020-01-06

## 2020-01-06 NOTE — PROGRESS NOTES
Vamsi Soliz 60 y o  female MRN: [id number]    Encounter: [id number]      Assessment/Plan     Assessment: This is a 60y o -year-old female with  Type 2 diabetes mellitus, hypertension, hyperlipidemia, CAD, CKD, retinopathy, CHANDRA, vitamin-D deficiency    Plan:  1  Type 2 diabetes mellitus on long-term insulin therapy  Poorly controlled with last A1c 8 2%, noted to have hyperglycemia on review of blood sugar log  Has been taking variable amounts of insulin, does not always take evening dose  Recommend the following at this time  Continue  Tradjenta 5 mg orally daily   advised to take a fixed amount of insulin  75/25, take 35 units with breakfast, and dinner   Start Humalog 5 units with lunch   start sliding scale insulin     advised patient to send blood sugar log for review in 1-2 weeks  Should keep a log of amount of insulin taken  - repeat A1c, check fructosamine level  -  Follow up with Ophthalmology    2  CKD-following up with Nephrology     3  Hyperlipidemia  - continue statin therapy    4  Hypertension  Blood pressure at goal  - continue  Current medications    5  Obesity- recommended diet, lifestyle changes, start exercise  6  Hyperparathyroidism, history of vitamin-D deficiency  Most recent vitamin-D level within normal limits  Continue supplementation   7  CAD-following up with cardiology      CC: Diabetes    History of Present Illness     HPI:  Vamsi Soliz is a 60 y o  female presents for a follow-up visit regarding diabetes management  DM history:   Diagnosed over 15 years ago  Has complications of CAD, CHF, CKD, retinopathy    Current regimen:   Tradjenta 5 mg orally daily  Insulin 75/25 30-35 units with breakfast, dinner  Did not increase dose of insulin as was recommended last time, has been taking variable amounts of insulin depending on blood sugar   a blood sugars are at goal, does not take insulin    Appetite is good  Last 5 lb  Denies numbness or tingling in the hands or feet     Denies changes in vision  No chest pain  Occasional SOB on exertion not worsened  No diarrhea/ constipation  No urinary complaints  Exercise:  none    Home glucose monitoring: scanned oin to chart   Has a low at 68 mg/dl yesterday in the morning, then ate and overcompensated to the 300s     Symptoms of hypoglycemia :  dizziness    All other systems were reviewed and are negative      Review of Systems      Historical Information   Past Medical History:   Diagnosis Date    Acute asthmatic bronchitis     last assessed: 2017    Cardiac disease     Colon polyp     Diabetes mellitus ([facility] [address] )     Hyperlipidemia     Hypertension     Pneumonia     last assessed: 2013    Renal disorder     possible clot noted in kidney, thus blood thinners currently     Past Surgical History:   Procedure Laterality Date     SECTION       x 1    NOSE SURGERY      fractured nose - septoplasty     Social History   Social History     Substance and Sexual Activity   Alcohol Use No     Social History     Substance and Sexual Activity   Drug Use No     Social History     Tobacco Use   Smoking Status Never Smoker   Smokeless Tobacco Never Used     Family History:   Family History   Problem Relation Age of Onset    Diabetes Mother         mellitus    Anxiety disorder Mother         generalized anxiety disorder    Hypertension Mother     Kidney disease Father     Kidney failure Father         renal failure    Ulcerative colitis Sister     Heart disease Family     Ovarian cancer Maternal Aunt        Meds/Allergies   Current Outpatient Medications   Medication Sig Dispense Refill    albuterol (PROAIR HFA) 90 mcg/act inhaler Inhale 2 puffs every 4 (four) hours as needed for wheezing or shortness of breath 1 Inhaler 0    atorvastatin (LIPITOR) 20 mg tablet Take 1 tablet (20 mg total) by mouth daily (Patient taking differently: Take 20 mg by mouth every other day ) 90 tablet 1    B Complex-C (B-COMPLEX WITH VITAMIN C) tablet Take 1 tablet by mouth daily      benzonatate (TESSALON PERLES) 100 mg capsule Take 1 capsule (100 mg total) by mouth 3 (three) times a day as needed for cough 20 capsule 0    Blood Glucose Monitoring Suppl (ONE TOUCH ULTRA 2) w/Device KIT Test glucose 3 times daily 1 each 0    calcitriol (ROCALTROL) 0 5 MCG capsule Take 1 capsule (0 5 mcg total) by mouth daily (Patient taking differently: Take 0 5 mcg by mouth 2 (two) times a day ) 90 each 3    cholecalciferol 2000 units TABS Take 1 tablet (2,000 Units total) by mouth daily 30 tablet 0    fluticasone-vilanterol (BREO ELLIPTA) 100-25 mcg/inh inhaler Inhale 1 puff daily Rinse mouth after use  1 Inhaler 6    Insulin Lispro Prot & Lispro (HUMALOG MIX 75/25 KWIKPEN) (75-25) 100 units/mL injection pen Inject 32 Units under the skin 2 (two) times a day with meals for 250 days 150 mL 3    linaGLIPtin (TRADJENTA) 5 MG TABS Take 5 mg by mouth daily 90 tablet 3    metoprolol succinate (TOPROL-XL) 25 mg 24 hr tablet Take 50mg AM & 25mg PM (Patient taking differently: 2 (two) times a day ) 90 tablet 3    MULTIPLE VITAMIN PO Take 1 tablet by mouth daily      ONE TOUCH ULTRA TEST test strip TEST GLUCOSE THREE TIMES A  each 3    torsemide (DEMADEX) 20 mg tablet Take 2 tablet per day in the morning and take 1 tablets in the evening every night 90 tablet 3    torsemide (DEMADEX) 20 mg tablet TAKE 2 TABLETS DAILY IN THE MORNING AND TAKE AN ADDITIONAL TABLET IN THE EVENING FOR WEIGHT GAIN OR WORSENING SWELLING 270 tablet 3    warfarin (COUMADIN) 1 mg tablet One daily as directed 90 tablet 1    warfarin (COUMADIN) 2 mg tablet TAKE 2 TABLETS (4 MG) DAILY OR AS DIRECTED 180 tablet 0    warfarin (COUMADIN) 3 mg tablet Take 5 milligrams on 7/16/18, July 17, 2018 and get PT/INR on July 17, 2018 30 tablet 0    warfarin (COUMADIN) 5 mg tablet Take 1 tablet (5 mg total) by mouth daily 90 tablet 1     No current facility-administered medications for this visit  No Known Allergies    Objective   Vitals: Blood pressure 136/72, pulse 103, height 5' (1 524 m), weight 96 2 kg (212 lb 1 6 oz), not currently breastfeeding  Physical Exam   Constitutional: She is oriented to person, place, and time  She appears well-developed and well-nourished  HENT:   Head: Normocephalic and atraumatic  Eyes: Pupils are equal, round, and reactive to light  Conjunctivae and EOM are normal    Neck: Normal range of motion  Neck supple  No thyromegaly present  Cardiovascular: Normal rate, regular rhythm and normal heart sounds  No murmur heard  Pulmonary/Chest: Effort normal and breath sounds normal  She has no wheezes  Abdominal: Soft  She exhibits no distension  There is no tenderness  Musculoskeletal: Normal range of motion  Trace edema   Neurological: She is alert and oriented to person, place, and time  Skin: Skin is warm and dry  Psychiatric: She has a normal mood and affect  Her behavior is normal    Vitals reviewed  The history was obtained from the review of the chart, patient      Lab Results:   Lab Results   Component Value Date/Time    Hemoglobin A1C 8 2 (H) 10/16/2019 06:57 AM    Hemoglobin A1C 8 2 (H) 08/02/2019 07:19 AM    Hemoglobin A1C 9 8 (H) 06/08/2019 08:10 AM    WBC 10 05 01/04/2020 09:45 AM    WBC 8 60 10/16/2019 06:57 AM    WBC 9 58 06/08/2019 08:10 AM    Hemoglobin 11 3 (L) 01/04/2020 09:45 AM    Hemoglobin 12 1 10/19/2019 08:44 AM    Hemoglobin 10 6 (L) 10/16/2019 06:57 AM    Hematocrit 36 2 01/04/2020 09:45 AM    Hematocrit 38 8 10/19/2019 08:44 AM    Hematocrit 34 9 10/16/2019 06:57 AM    MCV 84 01/04/2020 09:45 AM    MCV 88 10/16/2019 06:57 AM    MCV 86 06/08/2019 08:10 AM    Platelets 121 69/45/2563 09:45 AM    Platelets 852 10/54/6380 06:57 AM    Platelets 509 69/86/1926 08:10 AM    BUN 53 (H) 01/04/2020 09:45 AM    BUN 59 (H) 10/16/2019 06:57 AM    BUN 51 (H) 08/02/2019 07:19 AM    Potassium 3 9 01/04/2020 09:45 AM    Potassium 4 5 10/16/2019 06:57 AM    Potassium 3 8 08/02/2019 07:19 AM    Chloride 104 01/04/2020 09:45 AM    Chloride 105 10/16/2019 06:57 AM    Chloride 104 08/02/2019 07:19 AM    CO2 25 01/04/2020 09:45 AM    CO2 25 10/16/2019 06:57 AM    CO2 22 08/02/2019 07:19 AM    Creatinine 3 56 (H) 01/04/2020 09:45 AM    Creatinine 3 45 (H) 10/16/2019 06:57 AM    Creatinine 3 31 (H) 08/02/2019 07:19 AM    AST 15 01/04/2020 09:45 AM    AST 19 10/16/2019 06:57 AM    AST 16 07/12/2019 09:04 AM    ALT 24 01/04/2020 09:45 AM    ALT 21 10/16/2019 06:57 AM    ALT 26 07/12/2019 09:04 AM    Albumin 3 2 (L) 01/04/2020 09:45 AM    Albumin 2 9 (L) 10/16/2019 06:57 AM    Albumin 3 2 (L) 07/12/2019 09:04 AM    HDL, Direct 53 10/16/2019 06:57 AM    HDL, Direct 49 08/02/2019 07:19 AM    HDL, Direct 49 06/08/2019 08:10 AM    Triglycerides 74 10/16/2019 06:57 AM    Triglycerides 125 08/02/2019 07:19 AM    Triglycerides 115 06/08/2019 08:10 AM         Imaging Studies: I have personally reviewed pertinent reports  Portions of the record may have been created with voice recognition software  Occasional wrong word or "sound a like" substitutions may have occurred due to the inherent limitations of voice recognition software  Read the chart carefully and recognize, using context, where substitutions have occurred

## 2020-01-06 NOTE — PATIENT INSTRUCTIONS
Continue  Tradjenta   Please take a fixed amount of insulin  75/25, take 35 units with breakfast, and dinner   Start Humalog 5 units with lunch  In addition you may use sliding scale insulin per the following    In addition, please use humalog insulin per the following sliding scale to correct high blood sugars:  BG  151-200: 1 unit  201-250: 2 units  251-300: 3 units  301-350: 4 units  > 350: 5 units  Do not correct bed time highs unless > 200 mg/dl      please send me a blood sugar log for review in 2 weeks  Please make a note of insulin dose taken at all you meals   Follow-up in 2-3 months

## 2020-01-21 ENCOUNTER — TELEPHONE (OUTPATIENT)
Dept: NEPHROLOGY | Facility: CLINIC | Age: 65
End: 2020-01-21

## 2020-01-21 NOTE — TELEPHONE ENCOUNTER
Patient called back to schedule and informed me that she received a letter from \[facility]\"" mid December that she was taken off the list  I did not schedule follow up at this time

## 2020-01-21 NOTE — TELEPHONE ENCOUNTER
I left a message for patient to schedule a pre transplant follow up with Dr Debra Wetzel in our [facility] in March

## 2020-01-27 ENCOUNTER — OFFICE VISIT (OUTPATIENT)
Dept: NEPHROLOGY | Facility: CLINIC | Age: 65
End: 2020-01-27
Payer: COMMERCIAL

## 2020-01-27 VITALS
HEIGHT: 60 IN | SYSTOLIC BLOOD PRESSURE: 148 MMHG | BODY MASS INDEX: 41.82 KG/M2 | WEIGHT: 213 LBS | DIASTOLIC BLOOD PRESSURE: 80 MMHG

## 2020-01-27 DIAGNOSIS — N18.5 STAGE 5 CHRONIC KIDNEY DISEASE NOT ON CHRONIC DIALYSIS (HCC): ICD-10-CM

## 2020-01-27 DIAGNOSIS — N25.81 SECONDARY HYPERPARATHYROIDISM OF RENAL ORIGIN (HCC): ICD-10-CM

## 2020-01-27 DIAGNOSIS — E11.22 CKD STAGE 5 DUE TO TYPE 2 DIABETES MELLITUS (HCC): ICD-10-CM

## 2020-01-27 DIAGNOSIS — E66.01 MORBID OBESITY WITH BMI OF 40.0-44.9, ADULT (HCC): ICD-10-CM

## 2020-01-27 DIAGNOSIS — R80.9 NEPHROTIC RANGE PROTEINURIA: ICD-10-CM

## 2020-01-27 DIAGNOSIS — E11.319 DIABETIC RETINOPATHY OF BOTH EYES ASSOCIATED WITH TYPE 2 DIABETES MELLITUS, MACULAR EDEMA PRESENCE UNSPECIFIED, UNSPECIFIED RETINOPATHY SEVERITY (HCC): Primary | ICD-10-CM

## 2020-01-27 DIAGNOSIS — N18.5 CKD STAGE 5 DUE TO TYPE 2 DIABETES MELLITUS (HCC): ICD-10-CM

## 2020-01-27 PROBLEM — I12.0 CKD STAGE 5 SECONDARY TO HYPERTENSION (HCC): Status: ACTIVE | Noted: 2018-04-13

## 2020-01-27 PROCEDURE — 99215 OFFICE O/P EST HI 40 MIN: CPT | Performed by: INTERNAL MEDICINE

## 2020-01-27 NOTE — PROGRESS NOTES
NEPHROLOGY OFFICE VISIT   Roberto Estrella 60 y o  female MRN: [id number]  1/27/2020    Reason for Visit:  Chronic kidney disease stage 5    ASSESSMENT and PLAN:    I had the pleasure of seeing Adrian Weiss today in the renal clinic for the continued management of chronic kidney disease stage 5  Since our last visit, there has been no ER visits or hospitilizations  He currently has no complaints at this time and is feeling well  Patient denies any chest pain, shortness of breath and swelling  The last blood work was done on 1/4/2020, which we have reviewed together  No uremic symptoms  Currently on a trial of CPAP for obstructive sleep apnea with another appointment with Pulmonary to re-evaluate her pulmonary hypertension  1 ) Chronic kidney disease stage V with proteinuria  · Baseline creatinine 3-3 5 mg/dL  · Declined renal biopsy  Serologies were negative  Presumed etiology is diabetic nephropathy  · Diabetic and blood pressure control  · Urine protein creatinine ratios back up to 4  I do not have a strong objection if we needed to add a low-dose ACE-inhibitor or ARB if her proteinuria or blood pressure worsens  At this time it is being held to see if we can preserve her underlying renal dysfunction but I do not think this would be a contraindication and this may be even beneficial for her  I will re-evaluate at the next visit her blood pressure and proteinuria if it continues to worsen I likely will start a low dose  · Her complements C3 and C4 were not low  · ANCA titer were negative  · Free light chain assay ratio was 1 04   Serum protein electrophoresis showed no monoclonal bands  · NADEEN was negative  · She attended Kidney Smart class/Chronic Kidney Disease education  · Evaluated by [facility] transplant team by Dr Terell Álvarez in March of 2019  · Preferred modality of dialysis would be a home modality, home hemodialysis versus peritoneal dialysis but it appears that peritoneal dialysis may be more favorable for her  · Transplant status:  Evaluated at [facility]  Was declined due to weight but they are willing to re-evaluate her once she gets her weight down  We will need to try to aim to keep her BMI 40 or less  · Renal function is currently stable  No urgent indication for renal replacement therapy at this time     2 )  Nephrotic range Proteinuria  · Presumed to be secondary to diabetic nephropathy   Serologies have been negative  · Blood pressure target less than 130/80  · declined kidney biopsy  · Urine protein creatinine ratio 4  · Currently off ACE-inhibitor or ARB  No objections to restarting in the future if needed     3 ) Hypertension  · Aim to keep systolic blood pressure less than 130 given the proteinuria  · Blood pressure normally well controlled but elevated today in the office  · Educated on an exercise program for weight reduction  · Low 2 g sodium diet  · Torsemide 20 mg daily  · Metoprolol 25 mg twice a day     4 ) Diabetes mellitus type 2  -hemoglobin A1c: 8 2 (A1C values may be falsely elevated or decreased in those with CKD, assay method should be certified by [employer])  Target A1C < 7  -current medications:  Insulin lispro, linagliptin  -proteinuria:  Yes, nephrotic range  -retinopathy:  Not reported  -neuropathy:  Not reported  -please follow with an ophthalmologist and podiatrist every year  Continued follow-up with endocrinology  -continued self monitoring of blood glucose at home  -Treatment Management in CKD Recommendations:   Metformin:  Avoid if creatinine clearance is less than 30 cc/min (concern for lactic acidosis)  Sodium-Glucose Cotransporter-2 (SGLT2) Inhibitors: May see an acute drop in the eGFR initially when starting the medication but then a stabilization of the renal function, with slower loss of renal function as compared to placebo    Relative risk of end-stage renal disease, doubling of serum creatinine or death from renal causes were also found to be lower as compared to placebo  (CREDENCE)  Would recommend starting at 100 mg daily, I would avoid use in patients with eGFR < 30 cc/min    If no contraindications exist I would recommend this medication added to the diabetic regiment  Sulfonylureas:  Preferred short-acting (glipizide, glimepiride, repaglinide), relatively safe in patients with non dialysis CKD  Thiazolidinedones/Alpha-Gluosidase inhibitors/Dipeptidyl peptidase-4 inhibitors:  Generally not considered first-line agents in CKD (limited data in long-term safety and efficacy)  Insulin:  Starting dose may need to be lower than what would ordinarily be used, as there is a decrease metabolism of insulin (no dose adjustment if the GFR > 50 mL/min, dose should be reduced to 75% of baseline if GFR 10-50 mL/min, and 50% baseline if GFR < 10 mL/min)       5 ) Mineral bone disorder-chronic kidney disease  - secondary hyperparathyroidism of renal origin  - calcitriol 0 5 mcg twice a day  -check PTH at the next visit  -vitamin-D 25 hydroxy level has improved to 32  -phosphorus and calcium at goal     6 )  Pulmonary embolism  -  Ultrasound of the lower extremities were negative for DVT  - on anticoagulation with Coumadin  -most recent INR was elevated she is going for repeat INR     7 )  obesity  --BMI 41 6  --will need to keep her BMI 40 or less to be evaluated for transplant  --strongly encourage diet exercise program      PATIENT INSTRUCTIONS:    Patient Instructions   1 )  Low 2 g sodium diet    2 )  Monitor weights at home    3 )  Avoid NSAIDs ((ibuprofen, Motrin, Advil, Aleve, naproxen)    4 )  Monitor blood pressure at home, call if blood pressure greater than 150/90 persistently    5 ) Weight loss -is goal now          Orders Placed This Encounter   Procedures    Protein / creatinine ratio, urine     Standing Status:   Future     Standing Expiration Date:   1/27/2021    CBC     Standing Status:   Future     Standing Expiration Date: 1/27/2021    Iron Saturation %     Standing Status:   Future     Standing Expiration Date:   1/27/2021    Ferritin     Standing Status:   Future     Standing Expiration Date:   1/27/2021    PTH, intact     Standing Status:   Future     Standing Expiration Date:   1/27/2021    Phosphorus     Standing Status:   Future     Standing Expiration Date:   1/27/2021    Comprehensive metabolic panel     Standing Status:   Future     Standing Expiration Date:   1/27/2021       OBJECTIVE:  Current Weight: Weight - Scale: 96 6 kg (213 lb)  Vitals:    01/27/20 1306   Weight: 96 6 kg (213 lb)   Height: 5' (1 524 m)    Body mass index is 41 6 kg/m²  REVIEW OF SYSTEMS:    Review of Systems   Constitutional: Negative for activity change and fever  Respiratory: Negative for cough, chest tightness, shortness of breath and wheezing  Cardiovascular: Negative for chest pain and leg swelling  Gastrointestinal: Negative for abdominal pain, diarrhea, nausea and vomiting  Endocrine: Negative for polyuria  Genitourinary: Negative for difficulty urinating, dysuria, flank pain, frequency and urgency  Skin: Negative for rash  Neurological: Negative for dizziness, syncope, light-headedness and headaches  PHYSICAL EXAM:      Physical Exam   Constitutional: She is oriented to person, place, and time  She appears well-developed and well-nourished  No distress  HENT:   Head: Normocephalic and atraumatic  Eyes: Pupils are equal, round, and reactive to light  No scleral icterus  Neck: Normal range of motion  Neck supple  Cardiovascular: Normal rate, regular rhythm and normal heart sounds  Exam reveals no gallop and no friction rub  No murmur heard  Pulmonary/Chest: Effort normal and breath sounds normal  No respiratory distress  She has no wheezes  She has no rales  She exhibits no tenderness  Abdominal: Soft  Bowel sounds are normal  She exhibits no distension  There is no tenderness  There is no rebound  Musculoskeletal: Normal range of motion  She exhibits edema  Neurological: She is alert and oriented to person, place, and time  Skin: No rash noted  She is not diaphoretic  Psychiatric: She has a normal mood and affect  Medications:    Current Outpatient Medications:     albuterol (PROAIR HFA) 90 mcg/act inhaler, Inhale 2 puffs every 4 (four) hours as needed for wheezing or shortness of breath, Disp: 1 Inhaler, Rfl: 0    atorvastatin (LIPITOR) 20 mg tablet, Take 1 tablet (20 mg total) by mouth daily (Patient taking differently: Take 20 mg by mouth every other day ), Disp: 90 tablet, Rfl: 1    B Complex-C (B-COMPLEX WITH VITAMIN C) tablet, Take 1 tablet by mouth daily, Disp: , Rfl:     calcitriol (ROCALTROL) 0 5 MCG capsule, Take 1 capsule (0 5 mcg total) by mouth daily (Patient taking differently: Take 0 5 mcg by mouth 2 (two) times a day ), Disp: 90 each, Rfl: 3    cholecalciferol 2000 units TABS, Take 1 tablet (2,000 Units total) by mouth daily, Disp: 30 tablet, Rfl: 0    fluticasone-vilanterol (BREO ELLIPTA) 100-25 mcg/inh inhaler, Inhale 1 puff daily Rinse mouth after use   (Patient taking differently: Inhale 1 puff as needed Rinse mouth after use ), Disp: 1 Inhaler, Rfl: 6    Insulin Lispro Prot & Lispro (HUMALOG MIX 75/25 KWIKPEN) (75-25) 100 units/mL injection pen, Inject 32 Units under the skin 2 (two) times a day with meals for 250 days, Disp: 150 mL, Rfl: 3    insuln lispro (HUMALOG KWIKPEN) 200 units/mL CONCENTRATED U-200 injection pen, Inject 5 Units under the skin daily before lunch, Disp: 5 pen, Rfl: 4    linaGLIPtin (TRADJENTA) 5 MG TABS, Take 5 mg by mouth daily, Disp: 90 tablet, Rfl: 3    metoprolol succinate (TOPROL-XL) 25 mg 24 hr tablet, Take 50mg AM & 25mg PM (Patient taking differently: 2 (two) times a day ), Disp: 90 tablet, Rfl: 3    MULTIPLE VITAMIN PO, Take 1 tablet by mouth daily, Disp: , Rfl:     torsemide (DEMADEX) 20 mg tablet, Take 2 tablet per day in the morning and take 1 tablets in the evening every night (Patient taking differently: 20 mg 2 (two) times a day Take 2 tablet per day in the morning and take 1 tablets in the evening every night), Disp: 90 tablet, Rfl: 3    warfarin (COUMADIN) 1 mg tablet, One daily as directed, Disp: 90 tablet, Rfl: 1    benzonatate (TESSALON PERLES) 100 mg capsule, Take 1 capsule (100 mg total) by mouth 3 (three) times a day as needed for cough, Disp: 20 capsule, Rfl: 0    Blood Glucose Monitoring Suppl (ONE TOUCH ULTRA 2) w/Device KIT, Test glucose 3 times daily, Disp: 1 each, Rfl: 0    ONE TOUCH ULTRA TEST test strip, TEST GLUCOSE THREE TIMES A DAY, Disp: 300 each, Rfl: 3    torsemide (DEMADEX) 20 mg tablet, TAKE 2 TABLETS DAILY IN THE MORNING AND TAKE AN ADDITIONAL TABLET IN THE EVENING FOR WEIGHT GAIN OR WORSENING SWELLING, Disp: 270 tablet, Rfl: 3    warfarin (COUMADIN) 2 mg tablet, TAKE 2 TABLETS (4 MG) DAILY OR AS DIRECTED, Disp: 180 tablet, Rfl: 0    warfarin (COUMADIN) 3 mg tablet, Take 5 milligrams on 7/16/18, July 17, 2018 and get PT/INR on July 17, 2018, Disp: 30 tablet, Rfl: 0    warfarin (COUMADIN) 5 mg tablet, Take 1 tablet (5 mg total) by mouth daily, Disp: 90 tablet, Rfl: 1    Laboratory Results:        Invalid input(s): ALBUMIN    Results for orders placed or performed in visit on 01/04/20   PTH, intact   Result Value Ref Range     0 (H) 18 4 - 80 1 pg/mL   CBC   Result Value Ref Range    WBC 10 05 4 31 - 10 16 Thousand/uL    RBC 4 29 3 81 - 5 12 Million/uL    Hemoglobin 11 3 (L) 11 5 - 15 4 g/dL    Hematocrit 36 2 34 8 - 46 1 %    MCV 84 82 - 98 fL    MCH 26 3 (L) 26 8 - 34 3 pg    MCHC 31 2 (L) 31 4 - 37 4 g/dL    RDW 15 9 (H) 11 6 - 15 1 %    Platelets 287 947 - 820 Thousands/uL    MPV 11 4 8 9 - 12 7 fL   Comprehensive metabolic panel   Result Value Ref Range    Sodium 139 136 - 145 mmol/L    Potassium 3 9 3 5 - 5 3 mmol/L    Chloride 104 100 - 108 mmol/L    CO2 25 21 - 32 mmol/L    ANION GAP 10 4 - 13 mmol/L    BUN 53 (H) 5 - 25 mg/dL    Creatinine 3 56 (H) 0 60 - 1 30 mg/dL    Glucose, Fasting 70 65 - 99 mg/dL    Calcium 9 1 8 3 - 10 1 mg/dL    AST 15 5 - 45 U/L    ALT 24 12 - 78 U/L    Alkaline Phosphatase 71 46 - 116 U/L    Total Protein 7 5 6 4 - 8 2 g/dL    Albumin 3 2 (L) 3 5 - 5 0 g/dL    Total Bilirubin 1 20 (H) 0 20 - 1 00 mg/dL    eGFR 13 ml/min/1 73sq m   Phosphorus   Result Value Ref Range    Phosphorus 3 9 2 3 - 4 1 mg/dL   Protein / creatinine ratio, urine   Result Value Ref Range    Creatinine, Ur 111 0 mg/dL    Protein Urine Random 507 mg/dL    Prot/Creat Ratio, Ur 4 57 (H) 0 00 - 0 10

## 2020-01-27 NOTE — PATIENT INSTRUCTIONS
1  )  Low 2 g sodium diet    2 )  Monitor weights at home    3 )  Avoid NSAIDs ((ibuprofen, Motrin, Advil, Aleve, naproxen)    4 )  Monitor blood pressure at home, call if blood pressure greater than 150/90 persistently    5 ) Weight loss -is goal now

## 2020-03-26 ENCOUNTER — TELEPHONE (OUTPATIENT)
Dept: FAMILY MEDICINE CLINIC | Facility: CLINIC | Age: 65
End: 2020-03-26

## 2020-03-26 DIAGNOSIS — I26.99 PE (PULMONARY THROMBOEMBOLISM) (HCC): Primary | ICD-10-CM

## 2020-04-03 ENCOUNTER — DOCUMENTATION (OUTPATIENT)
Dept: NEPHROLOGY | Facility: CLINIC | Age: 65
End: 2020-04-03

## 2020-04-08 ENCOUNTER — APPOINTMENT (OUTPATIENT)
Dept: LAB | Facility: CLINIC | Age: 65
End: 2020-04-08
Payer: COMMERCIAL

## 2020-04-08 ENCOUNTER — TRANSCRIBE ORDERS (OUTPATIENT)
Dept: LAB | Facility: CLINIC | Age: 65
End: 2020-04-08

## 2020-04-08 ENCOUNTER — TELEPHONE (OUTPATIENT)
Dept: FAMILY MEDICINE CLINIC | Facility: CLINIC | Age: 65
End: 2020-04-08

## 2020-04-08 ENCOUNTER — ANTICOAG VISIT (OUTPATIENT)
Dept: FAMILY MEDICINE CLINIC | Facility: CLINIC | Age: 65
End: 2020-04-08

## 2020-04-08 DIAGNOSIS — E11.22 CKD STAGE 5 DUE TO TYPE 2 DIABETES MELLITUS (HCC): ICD-10-CM

## 2020-04-08 DIAGNOSIS — N18.5 CKD STAGE 5 DUE TO TYPE 2 DIABETES MELLITUS (HCC): ICD-10-CM

## 2020-04-08 DIAGNOSIS — N18.5 STAGE 5 CHRONIC KIDNEY DISEASE NOT ON CHRONIC DIALYSIS (HCC): ICD-10-CM

## 2020-04-08 LAB
ALBUMIN SERPL BCP-MCNC: 3.2 G/DL (ref 3.5–5)
ALP SERPL-CCNC: 104 U/L (ref 46–116)
ALT SERPL W P-5'-P-CCNC: 28 U/L (ref 12–78)
ANION GAP SERPL CALCULATED.3IONS-SCNC: 7 MMOL/L (ref 4–13)
AST SERPL W P-5'-P-CCNC: 16 U/L (ref 5–45)
BILIRUB SERPL-MCNC: 0.72 MG/DL (ref 0.2–1)
BUN SERPL-MCNC: 56 MG/DL (ref 5–25)
CALCIUM SERPL-MCNC: 9.1 MG/DL (ref 8.3–10.1)
CHLORIDE SERPL-SCNC: 109 MMOL/L (ref 100–108)
CO2 SERPL-SCNC: 23 MMOL/L (ref 21–32)
CREAT SERPL-MCNC: 3.87 MG/DL (ref 0.6–1.3)
CREAT UR-MCNC: 94.2 MG/DL
ERYTHROCYTE [DISTWIDTH] IN BLOOD BY AUTOMATED COUNT: 16.8 % (ref 11.6–15.1)
EST. AVERAGE GLUCOSE BLD GHB EST-MCNC: 171 MG/DL
FERRITIN SERPL-MCNC: 111 NG/ML (ref 8–388)
GFR SERPL CREATININE-BSD FRML MDRD: 12 ML/MIN/1.73SQ M
GLUCOSE P FAST SERPL-MCNC: 117 MG/DL (ref 65–99)
HBA1C MFR BLD: 7.6 %
HCT VFR BLD AUTO: 38.5 % (ref 34.8–46.1)
HGB BLD-MCNC: 11.4 G/DL (ref 11.5–15.4)
INR PPP: 1.37 (ref 0.84–1.19)
INR PPP: 1.37 (ref 0.84–1.19)
IRON SATN MFR SERPL: 13 %
IRON SERPL-MCNC: 42 UG/DL (ref 50–170)
MCH RBC QN AUTO: 25.4 PG (ref 26.8–34.3)
MCHC RBC AUTO-ENTMCNC: 29.6 G/DL (ref 31.4–37.4)
MCV RBC AUTO: 86 FL (ref 82–98)
PHOSPHATE SERPL-MCNC: 5.4 MG/DL (ref 2.3–4.1)
PLATELET # BLD AUTO: 212 THOUSANDS/UL (ref 149–390)
PMV BLD AUTO: 12.6 FL (ref 8.9–12.7)
POTASSIUM SERPL-SCNC: 4.6 MMOL/L (ref 3.5–5.3)
PROT SERPL-MCNC: 7.3 G/DL (ref 6.4–8.2)
PROT UR-MCNC: 491 MG/DL
PROT/CREAT UR: 5.21 MG/G{CREAT} (ref 0–0.1)
PROTHROMBIN TIME: 16.4 SECONDS (ref 11.6–14.5)
RBC # BLD AUTO: 4.49 MILLION/UL (ref 3.81–5.12)
SODIUM SERPL-SCNC: 139 MMOL/L (ref 136–145)
TIBC SERPL-MCNC: 330 UG/DL (ref 250–450)
WBC # BLD AUTO: 8.35 THOUSAND/UL (ref 4.31–10.16)

## 2020-04-08 PROCEDURE — 83540 ASSAY OF IRON: CPT

## 2020-04-08 PROCEDURE — 85610 PROTHROMBIN TIME: CPT

## 2020-04-08 PROCEDURE — 82728 ASSAY OF FERRITIN: CPT

## 2020-04-08 PROCEDURE — 80053 COMPREHEN METABOLIC PANEL: CPT

## 2020-04-08 PROCEDURE — 84100 ASSAY OF PHOSPHORUS: CPT

## 2020-04-08 PROCEDURE — 83550 IRON BINDING TEST: CPT

## 2020-04-08 PROCEDURE — 3062F POS MACROALBUMINURIA REV: CPT | Performed by: INTERNAL MEDICINE

## 2020-04-08 PROCEDURE — 84156 ASSAY OF PROTEIN URINE: CPT

## 2020-04-08 PROCEDURE — 85027 COMPLETE CBC AUTOMATED: CPT

## 2020-04-08 PROCEDURE — 36415 COLL VENOUS BLD VENIPUNCTURE: CPT

## 2020-04-08 PROCEDURE — 82570 ASSAY OF URINE CREATININE: CPT

## 2020-04-08 PROCEDURE — 83036 HEMOGLOBIN GLYCOSYLATED A1C: CPT

## 2020-04-10 ENCOUNTER — TELEMEDICINE (OUTPATIENT)
Dept: ENDOCRINOLOGY | Facility: CLINIC | Age: 65
End: 2020-04-10
Payer: COMMERCIAL

## 2020-04-10 DIAGNOSIS — I12.9 BENIGN HYPERTENSION WITH CKD (CHRONIC KIDNEY DISEASE) STAGE IV (HCC): ICD-10-CM

## 2020-04-10 DIAGNOSIS — N18.4 BENIGN HYPERTENSION WITH CKD (CHRONIC KIDNEY DISEASE) STAGE IV (HCC): ICD-10-CM

## 2020-04-10 DIAGNOSIS — N25.81 SECONDARY HYPERPARATHYROIDISM OF RENAL ORIGIN (HCC): ICD-10-CM

## 2020-04-10 DIAGNOSIS — Z79.4 TYPE 2 DIABETES MELLITUS WITH COMPLICATION, WITH LONG-TERM CURRENT USE OF INSULIN (HCC): Primary | ICD-10-CM

## 2020-04-10 DIAGNOSIS — E11.8 TYPE 2 DIABETES MELLITUS WITH COMPLICATION, WITH LONG-TERM CURRENT USE OF INSULIN (HCC): Primary | ICD-10-CM

## 2020-04-10 DIAGNOSIS — E78.2 MIXED HYPERLIPIDEMIA: ICD-10-CM

## 2020-04-10 DIAGNOSIS — E55.9 VITAMIN D DEFICIENCY: ICD-10-CM

## 2020-04-10 DIAGNOSIS — Z79.4 ENCOUNTER FOR LONG-TERM (CURRENT) USE OF INSULIN (HCC): ICD-10-CM

## 2020-04-10 PROCEDURE — 99214 OFFICE O/P EST MOD 30 MIN: CPT | Performed by: INTERNAL MEDICINE

## 2020-04-16 DIAGNOSIS — N18.4 STAGE 4 CHRONIC KIDNEY DISEASE (HCC): ICD-10-CM

## 2020-04-16 DIAGNOSIS — N25.81 SECONDARY HYPERPARATHYROIDISM OF RENAL ORIGIN (HCC): ICD-10-CM

## 2020-04-21 ENCOUNTER — TELEMEDICINE (OUTPATIENT)
Dept: NEPHROLOGY | Facility: CLINIC | Age: 65
End: 2020-04-21
Payer: COMMERCIAL

## 2020-04-21 VITALS — WEIGHT: 211 LBS | HEIGHT: 60 IN | BODY MASS INDEX: 41.43 KG/M2

## 2020-04-21 DIAGNOSIS — N18.4 STAGE 4 CHRONIC KIDNEY DISEASE (HCC): ICD-10-CM

## 2020-04-21 DIAGNOSIS — N18.5 CKD STAGE 5 DUE TO TYPE 2 DIABETES MELLITUS (HCC): Primary | ICD-10-CM

## 2020-04-21 DIAGNOSIS — E11.22 CKD STAGE 5 DUE TO TYPE 2 DIABETES MELLITUS (HCC): Primary | ICD-10-CM

## 2020-04-21 DIAGNOSIS — Z79.4 TYPE 2 DIABETES MELLITUS WITH COMPLICATION, WITH LONG-TERM CURRENT USE OF INSULIN (HCC): ICD-10-CM

## 2020-04-21 DIAGNOSIS — E11.8 TYPE 2 DIABETES MELLITUS WITH COMPLICATION, WITH LONG-TERM CURRENT USE OF INSULIN (HCC): ICD-10-CM

## 2020-04-21 DIAGNOSIS — N25.81 SECONDARY HYPERPARATHYROIDISM OF RENAL ORIGIN (HCC): ICD-10-CM

## 2020-04-21 DIAGNOSIS — N18.4 CKD (CHRONIC KIDNEY DISEASE) STAGE 4, GFR 15-29 ML/MIN (HCC): Primary | ICD-10-CM

## 2020-04-21 PROCEDURE — 99213 OFFICE O/P EST LOW 20 MIN: CPT | Performed by: INTERNAL MEDICINE

## 2020-04-21 RX ORDER — CALCITRIOL 0.25 UG/1
CAPSULE, LIQUID FILLED ORAL
Qty: 90 CAPSULE | Refills: 3 | OUTPATIENT
Start: 2020-04-21

## 2020-04-21 RX ORDER — METOPROLOL SUCCINATE 25 MG/1
25 TABLET, EXTENDED RELEASE ORAL 2 TIMES DAILY
Qty: 180 TABLET | Refills: 3 | Status: ON HOLD | OUTPATIENT
Start: 2020-04-21 | End: 2020-07-19 | Stop reason: SDUPTHER

## 2020-04-21 RX ORDER — CALCITRIOL 0.5 UG/1
0.5 CAPSULE, LIQUID FILLED ORAL DAILY
Qty: 90 CAPSULE | Refills: 3 | Status: SHIPPED | OUTPATIENT
Start: 2020-04-21 | End: 2020-10-02 | Stop reason: ALTCHOICE

## 2020-04-21 RX ORDER — METOPROLOL SUCCINATE 25 MG/1
25 TABLET, EXTENDED RELEASE ORAL 2 TIMES DAILY
Qty: 180 TABLET | Refills: 3 | OUTPATIENT
Start: 2020-04-21

## 2020-05-06 ENCOUNTER — TELEMEDICINE (OUTPATIENT)
Dept: ENDOCRINOLOGY | Facility: CLINIC | Age: 65
End: 2020-05-06
Payer: COMMERCIAL

## 2020-05-06 ENCOUNTER — TELEPHONE (OUTPATIENT)
Dept: FAMILY MEDICINE CLINIC | Facility: CLINIC | Age: 65
End: 2020-05-06

## 2020-05-06 DIAGNOSIS — E78.2 MIXED HYPERLIPIDEMIA: ICD-10-CM

## 2020-05-06 DIAGNOSIS — N18.4 BENIGN HYPERTENSION WITH CKD (CHRONIC KIDNEY DISEASE) STAGE IV (HCC): ICD-10-CM

## 2020-05-06 DIAGNOSIS — Z79.4 ENCOUNTER FOR LONG-TERM (CURRENT) USE OF INSULIN (HCC): ICD-10-CM

## 2020-05-06 DIAGNOSIS — Z79.4 TYPE 2 DIABETES MELLITUS WITH COMPLICATION, WITH LONG-TERM CURRENT USE OF INSULIN (HCC): Primary | ICD-10-CM

## 2020-05-06 DIAGNOSIS — E11.65 TYPE 2 DIABETES MELLITUS WITH HYPERGLYCEMIA, WITH LONG-TERM CURRENT USE OF INSULIN (HCC): ICD-10-CM

## 2020-05-06 DIAGNOSIS — E11.319 DIABETIC RETINOPATHY OF BOTH EYES ASSOCIATED WITH TYPE 2 DIABETES MELLITUS, MACULAR EDEMA PRESENCE UNSPECIFIED, UNSPECIFIED RETINOPATHY SEVERITY (HCC): ICD-10-CM

## 2020-05-06 DIAGNOSIS — E11.8 TYPE 2 DIABETES MELLITUS WITH COMPLICATION, WITH LONG-TERM CURRENT USE OF INSULIN (HCC): Primary | ICD-10-CM

## 2020-05-06 DIAGNOSIS — Z79.4 TYPE 2 DIABETES MELLITUS WITH HYPERGLYCEMIA, WITH LONG-TERM CURRENT USE OF INSULIN (HCC): ICD-10-CM

## 2020-05-06 DIAGNOSIS — I12.9 BENIGN HYPERTENSION WITH CKD (CHRONIC KIDNEY DISEASE) STAGE IV (HCC): ICD-10-CM

## 2020-05-06 PROCEDURE — 99214 OFFICE O/P EST MOD 30 MIN: CPT | Performed by: INTERNAL MEDICINE

## 2020-05-28 ENCOUNTER — APPOINTMENT (OUTPATIENT)
Dept: LAB | Facility: CLINIC | Age: 65
End: 2020-05-28
Payer: COMMERCIAL

## 2020-05-29 ENCOUNTER — ANTICOAG VISIT (OUTPATIENT)
Dept: FAMILY MEDICINE CLINIC | Facility: CLINIC | Age: 65
End: 2020-05-29

## 2020-05-29 ENCOUNTER — TELEPHONE (OUTPATIENT)
Dept: FAMILY MEDICINE CLINIC | Facility: CLINIC | Age: 65
End: 2020-05-29

## 2020-05-29 LAB — INR PPP: 1.43 (ref 0.84–1.19)

## 2020-06-05 ENCOUNTER — TELEPHONE (OUTPATIENT)
Dept: FAMILY MEDICINE CLINIC | Facility: CLINIC | Age: 65
End: 2020-06-05

## 2020-06-05 ENCOUNTER — OFFICE VISIT (OUTPATIENT)
Dept: FAMILY MEDICINE CLINIC | Facility: CLINIC | Age: 65
End: 2020-06-05
Payer: COMMERCIAL

## 2020-06-05 VITALS
RESPIRATION RATE: 18 BRPM | TEMPERATURE: 98.8 F | DIASTOLIC BLOOD PRESSURE: 89 MMHG | HEIGHT: 60 IN | BODY MASS INDEX: 41.39 KG/M2 | WEIGHT: 210.8 LBS | OXYGEN SATURATION: 93 % | SYSTOLIC BLOOD PRESSURE: 140 MMHG | HEART RATE: 90 BPM

## 2020-06-05 DIAGNOSIS — E78.2 MIXED HYPERLIPIDEMIA: ICD-10-CM

## 2020-06-05 DIAGNOSIS — E11.319 DIABETIC RETINOPATHY OF BOTH EYES ASSOCIATED WITH TYPE 2 DIABETES MELLITUS, MACULAR EDEMA PRESENCE UNSPECIFIED, UNSPECIFIED RETINOPATHY SEVERITY (HCC): Primary | ICD-10-CM

## 2020-06-05 DIAGNOSIS — N18.5 BENIGN HYPERTENSION WITH CKD (CHRONIC KIDNEY DISEASE) STAGE V (HCC): ICD-10-CM

## 2020-06-05 DIAGNOSIS — R06.00 DYSPNEA ON EXERTION: ICD-10-CM

## 2020-06-05 DIAGNOSIS — I12.0 BENIGN HYPERTENSION WITH CKD (CHRONIC KIDNEY DISEASE) STAGE V (HCC): ICD-10-CM

## 2020-06-05 PROCEDURE — 99214 OFFICE O/P EST MOD 30 MIN: CPT | Performed by: FAMILY MEDICINE

## 2020-06-05 PROCEDURE — 3066F NEPHROPATHY DOC TX: CPT | Performed by: FAMILY MEDICINE

## 2020-06-05 PROCEDURE — 3051F HG A1C>EQUAL 7.0%<8.0%: CPT | Performed by: FAMILY MEDICINE

## 2020-06-05 PROCEDURE — 3008F BODY MASS INDEX DOCD: CPT | Performed by: FAMILY MEDICINE

## 2020-06-05 PROCEDURE — 1036F TOBACCO NON-USER: CPT | Performed by: FAMILY MEDICINE

## 2020-06-08 ENCOUNTER — TELEPHONE (OUTPATIENT)
Dept: ENDOCRINOLOGY | Facility: CLINIC | Age: 65
End: 2020-06-08

## 2020-06-09 ENCOUNTER — OFFICE VISIT (OUTPATIENT)
Dept: ENDOCRINOLOGY | Facility: CLINIC | Age: 65
End: 2020-06-09
Payer: COMMERCIAL

## 2020-06-09 VITALS
HEART RATE: 84 BPM | TEMPERATURE: 97.1 F | SYSTOLIC BLOOD PRESSURE: 160 MMHG | WEIGHT: 208 LBS | BODY MASS INDEX: 40.84 KG/M2 | DIASTOLIC BLOOD PRESSURE: 102 MMHG | HEIGHT: 60 IN

## 2020-06-09 DIAGNOSIS — E66.01 MORBID OBESITY WITH BMI OF 40.0-44.9, ADULT (HCC): ICD-10-CM

## 2020-06-09 DIAGNOSIS — E11.8 TYPE 2 DIABETES MELLITUS WITH COMPLICATION, WITH LONG-TERM CURRENT USE OF INSULIN (HCC): Primary | ICD-10-CM

## 2020-06-09 DIAGNOSIS — Z79.4 TYPE 2 DIABETES MELLITUS WITH COMPLICATION, WITH LONG-TERM CURRENT USE OF INSULIN (HCC): Primary | ICD-10-CM

## 2020-06-09 DIAGNOSIS — E11.319 DIABETIC RETINOPATHY OF BOTH EYES ASSOCIATED WITH TYPE 2 DIABETES MELLITUS, MACULAR EDEMA PRESENCE UNSPECIFIED, UNSPECIFIED RETINOPATHY SEVERITY (HCC): ICD-10-CM

## 2020-06-09 DIAGNOSIS — Z79.4 TYPE 2 DIABETES MELLITUS WITH HYPERGLYCEMIA, WITH LONG-TERM CURRENT USE OF INSULIN (HCC): ICD-10-CM

## 2020-06-09 DIAGNOSIS — N18.5 STAGE 5 CHRONIC KIDNEY DISEASE NOT ON CHRONIC DIALYSIS (HCC): ICD-10-CM

## 2020-06-09 DIAGNOSIS — E78.2 MIXED HYPERLIPIDEMIA: ICD-10-CM

## 2020-06-09 DIAGNOSIS — I10 ESSENTIAL HYPERTENSION: ICD-10-CM

## 2020-06-09 DIAGNOSIS — E11.65 TYPE 2 DIABETES MELLITUS WITH HYPERGLYCEMIA, WITH LONG-TERM CURRENT USE OF INSULIN (HCC): ICD-10-CM

## 2020-06-09 PROCEDURE — 3051F HG A1C>EQUAL 7.0%<8.0%: CPT | Performed by: INTERNAL MEDICINE

## 2020-06-09 PROCEDURE — 3066F NEPHROPATHY DOC TX: CPT | Performed by: INTERNAL MEDICINE

## 2020-06-09 PROCEDURE — 3008F BODY MASS INDEX DOCD: CPT | Performed by: INTERNAL MEDICINE

## 2020-06-09 PROCEDURE — 99215 OFFICE O/P EST HI 40 MIN: CPT | Performed by: INTERNAL MEDICINE

## 2020-06-09 PROCEDURE — 1036F TOBACCO NON-USER: CPT | Performed by: INTERNAL MEDICINE

## 2020-07-08 ENCOUNTER — TELEPHONE (OUTPATIENT)
Dept: FAMILY MEDICINE CLINIC | Facility: CLINIC | Age: 65
End: 2020-07-08

## 2020-07-08 NOTE — TELEPHONE ENCOUNTER
Tried to call pt, there was no answer and the mailbox was full so I was unable to leave a message  bchurch lpn

## 2020-07-13 ENCOUNTER — APPOINTMENT (EMERGENCY)
Dept: RADIOLOGY | Facility: HOSPITAL | Age: 65
DRG: 291 | End: 2020-07-13
Payer: COMMERCIAL

## 2020-07-13 ENCOUNTER — HOSPITAL ENCOUNTER (INPATIENT)
Facility: HOSPITAL | Age: 65
LOS: 6 days | Discharge: HOME | DRG: 291 | End: 2020-07-19
Attending: EMERGENCY MEDICINE | Admitting: INTERNAL MEDICINE
Payer: COMMERCIAL

## 2020-07-13 ENCOUNTER — TELEMEDICINE (OUTPATIENT)
Dept: FAMILY MEDICINE CLINIC | Facility: CLINIC | Age: 65
End: 2020-07-13
Payer: COMMERCIAL

## 2020-07-13 ENCOUNTER — TELEPHONE (OUTPATIENT)
Dept: FAMILY MEDICINE CLINIC | Facility: CLINIC | Age: 65
End: 2020-07-13

## 2020-07-13 DIAGNOSIS — E11.8 TYPE 2 DIABETES MELLITUS WITH COMPLICATION, WITH LONG-TERM CURRENT USE OF INSULIN (HCC): ICD-10-CM

## 2020-07-13 DIAGNOSIS — N18.5 STAGE 5 CHRONIC KIDNEY DISEASE NOT ON CHRONIC DIALYSIS (HCC): ICD-10-CM

## 2020-07-13 DIAGNOSIS — Z99.2 PERITONEAL DIALYSIS CATHETER IN PLACE (HCC): Primary | ICD-10-CM

## 2020-07-13 DIAGNOSIS — E11.65 TYPE 2 DIABETES MELLITUS WITH HYPERGLYCEMIA, WITH LONG-TERM CURRENT USE OF INSULIN (HCC): ICD-10-CM

## 2020-07-13 DIAGNOSIS — I27.20 PULMONARY HYPERTENSION (HCC): ICD-10-CM

## 2020-07-13 DIAGNOSIS — R80.9 NEPHROTIC RANGE PROTEINURIA: ICD-10-CM

## 2020-07-13 DIAGNOSIS — N18.5 BENIGN HYPERTENSION WITH CKD (CHRONIC KIDNEY DISEASE) STAGE V (HCC): ICD-10-CM

## 2020-07-13 DIAGNOSIS — N18.4 STAGE 4 CHRONIC KIDNEY DISEASE (HCC): ICD-10-CM

## 2020-07-13 DIAGNOSIS — I50.42 CHRONIC COMBINED SYSTOLIC AND DIASTOLIC CONGESTIVE HEART FAILURE (HCC): ICD-10-CM

## 2020-07-13 DIAGNOSIS — I12.0 BENIGN HYPERTENSION WITH CKD (CHRONIC KIDNEY DISEASE) STAGE V (HCC): ICD-10-CM

## 2020-07-13 DIAGNOSIS — I26.99 PE (PULMONARY THROMBOEMBOLISM) (HCC): ICD-10-CM

## 2020-07-13 DIAGNOSIS — Z79.4 TYPE 2 DIABETES MELLITUS WITH COMPLICATION, WITH LONG-TERM CURRENT USE OF INSULIN (HCC): ICD-10-CM

## 2020-07-13 DIAGNOSIS — K59.00 CONSTIPATION: ICD-10-CM

## 2020-07-13 DIAGNOSIS — I48.92 ATRIAL FLUTTER (HCC): ICD-10-CM

## 2020-07-13 DIAGNOSIS — N18.30 STAGE 3 CHRONIC KIDNEY DISEASE (HCC): ICD-10-CM

## 2020-07-13 DIAGNOSIS — Z86.711 HISTORY OF PULMONARY EMBOLUS (PE): ICD-10-CM

## 2020-07-13 DIAGNOSIS — R06.02 SHORTNESS OF BREATH: Primary | ICD-10-CM

## 2020-07-13 DIAGNOSIS — R06.00 DYSPNEA ON EXERTION: ICD-10-CM

## 2020-07-13 DIAGNOSIS — I50.9 CHF (CONGESTIVE HEART FAILURE) (HCC): ICD-10-CM

## 2020-07-13 DIAGNOSIS — E66.01 MORBID OBESITY WITH BMI OF 40.0-44.9, ADULT (HCC): ICD-10-CM

## 2020-07-13 DIAGNOSIS — Z79.4 TYPE 2 DIABETES MELLITUS WITH HYPERGLYCEMIA, WITH LONG-TERM CURRENT USE OF INSULIN (HCC): ICD-10-CM

## 2020-07-13 DIAGNOSIS — R06.00 DYSPNEA: ICD-10-CM

## 2020-07-13 DIAGNOSIS — I26.99 PULMONARY EMBOLISM (HCC): ICD-10-CM

## 2020-07-13 PROBLEM — I50.32 CHRONIC DIASTOLIC CONGESTIVE HEART FAILURE (HCC): Status: ACTIVE | Noted: 2020-07-13

## 2020-07-13 PROBLEM — G47.30 SLEEP APNEA: Status: ACTIVE | Noted: 2020-07-13

## 2020-07-13 PROBLEM — R79.89 ELEVATED TROPONIN: Status: ACTIVE | Noted: 2020-07-13

## 2020-07-13 PROBLEM — N18.9 ACUTE KIDNEY INJURY SUPERIMPOSED ON CKD (HCC): Status: ACTIVE | Noted: 2020-07-13

## 2020-07-13 PROBLEM — R77.8 ELEVATED TROPONIN: Status: ACTIVE | Noted: 2020-07-13

## 2020-07-13 PROBLEM — R79.89 ELEVATED LFTS: Status: ACTIVE | Noted: 2020-07-13

## 2020-07-13 PROBLEM — N17.9 ACUTE KIDNEY INJURY SUPERIMPOSED ON CKD (HCC): Status: ACTIVE | Noted: 2020-07-13

## 2020-07-13 LAB
ALBUMIN SERPL BCP-MCNC: 3 G/DL (ref 3.5–5)
ALP SERPL-CCNC: 171 U/L (ref 46–116)
ALT SERPL W P-5'-P-CCNC: 150 U/L (ref 12–78)
AMORPH URATE CRY URNS QL MICRO: ABNORMAL /HPF
ANION GAP SERPL CALCULATED.3IONS-SCNC: 18 MMOL/L (ref 4–13)
APTT PPP: 45 SECONDS (ref 23–37)
ARTERIAL PATENCY WRIST A: YES
AST SERPL W P-5'-P-CCNC: 155 U/L (ref 5–45)
ATRIAL RATE: 310 BPM
ATRIAL RATE: 312 BPM
ATRIAL RATE: 316 BPM
BACTERIA UR QL AUTO: ABNORMAL /HPF
BASE EXCESS BLDA CALC-SCNC: -8.8 MMOL/L
BASOPHILS # BLD AUTO: 0.07 THOUSANDS/ΜL (ref 0–0.1)
BASOPHILS NFR BLD AUTO: 1 % (ref 0–1)
BILIRUB SERPL-MCNC: 0.8 MG/DL (ref 0.2–1)
BILIRUB UR QL STRIP: NEGATIVE
BODY TEMPERATURE: 98 DEGREES FEHRENHEIT
BUN SERPL-MCNC: 95 MG/DL (ref 5–25)
CALCIUM SERPL-MCNC: 8.5 MG/DL (ref 8.3–10.1)
CHLORIDE SERPL-SCNC: 97 MMOL/L (ref 100–108)
CLARITY UR: CLEAR
CO2 SERPL-SCNC: 19 MMOL/L (ref 21–32)
COARSE GRAN CASTS URNS QL MICRO: ABNORMAL /LPF
COLOR UR: YELLOW
CREAT SERPL-MCNC: 6.32 MG/DL (ref 0.6–1.3)
EOSINOPHIL # BLD AUTO: 0.12 THOUSAND/ΜL (ref 0–0.61)
EOSINOPHIL NFR BLD AUTO: 1 % (ref 0–6)
ERYTHROCYTE [DISTWIDTH] IN BLOOD BY AUTOMATED COUNT: 18.4 % (ref 11.6–15.1)
GFR SERPL CREATININE-BSD FRML MDRD: 6 ML/MIN/1.73SQ M
GLUCOSE SERPL-MCNC: 147 MG/DL (ref 65–140)
GLUCOSE SERPL-MCNC: 176 MG/DL (ref 65–140)
GLUCOSE SERPL-MCNC: 90 MG/DL (ref 65–140)
GLUCOSE UR STRIP-MCNC: NEGATIVE MG/DL
HCO3 BLDA-SCNC: 17.2 MMOL/L (ref 22–28)
HCT VFR BLD AUTO: 39.5 % (ref 34.8–46.1)
HGB BLD-MCNC: 12.5 G/DL (ref 11.5–15.4)
HGB UR QL STRIP.AUTO: ABNORMAL
HYALINE CASTS #/AREA URNS LPF: ABNORMAL /LPF
IMM GRANULOCYTES # BLD AUTO: 0.07 THOUSAND/UL (ref 0–0.2)
IMM GRANULOCYTES NFR BLD AUTO: 1 % (ref 0–2)
INR PPP: 4.11 (ref 0.84–1.19)
KETONES UR STRIP-MCNC: NEGATIVE MG/DL
LACTATE SERPL-SCNC: 0.8 MMOL/L (ref 0.5–2)
LEUKOCYTE ESTERASE UR QL STRIP: NEGATIVE
LYMPHOCYTES # BLD AUTO: 1.81 THOUSANDS/ΜL (ref 0.6–4.47)
LYMPHOCYTES NFR BLD AUTO: 17 % (ref 14–44)
MAGNESIUM SERPL-MCNC: 2.3 MG/DL (ref 1.6–2.6)
MCH RBC QN AUTO: 26.9 PG (ref 26.8–34.3)
MCHC RBC AUTO-ENTMCNC: 31.6 G/DL (ref 31.4–37.4)
MCV RBC AUTO: 85 FL (ref 82–98)
MONOCYTES # BLD AUTO: 1.25 THOUSAND/ΜL (ref 0.17–1.22)
MONOCYTES NFR BLD AUTO: 12 % (ref 4–12)
NASAL CANNULA: 2
NEUTROPHILS # BLD AUTO: 7.3 THOUSANDS/ΜL (ref 1.85–7.62)
NEUTS SEG NFR BLD AUTO: 68 % (ref 43–75)
NITRITE UR QL STRIP: NEGATIVE
NON-SQ EPI CELLS URNS QL MICRO: ABNORMAL /HPF
NRBC BLD AUTO-RTO: 0 /100 WBCS
NT-PROBNP SERPL-MCNC: ABNORMAL PG/ML
O2 CT BLDA-SCNC: 17.4 ML/DL (ref 16–23)
OXYHGB MFR BLDA: 96 % (ref 94–97)
P AXIS: 104 DEGREES
PCO2 BLDA: 37 MM HG (ref 36–44)
PCO2 TEMP ADJ BLDA: 36.5 MM HG (ref 36–44)
PH BLD: 7.29 [PH] (ref 7.35–7.45)
PH BLDA: 7.28 [PH] (ref 7.35–7.45)
PH UR STRIP.AUTO: 5.5 [PH]
PLATELET # BLD AUTO: 278 THOUSANDS/UL (ref 149–390)
PMV BLD AUTO: 12.5 FL (ref 8.9–12.7)
PO2 BLD: 99.8 MM HG (ref 75–129)
PO2 BLDA: 101.6 MM HG (ref 75–129)
POTASSIUM SERPL-SCNC: 4.6 MMOL/L (ref 3.5–5.3)
PROT SERPL-MCNC: 7.3 G/DL (ref 6.4–8.2)
PROT UR STRIP-MCNC: >=300 MG/DL
PROTHROMBIN TIME: 39.2 SECONDS (ref 11.6–14.5)
QRS AXIS: -19 DEGREES
QRS AXIS: -26 DEGREES
QRS AXIS: -62 DEGREES
QRSD INTERVAL: 84 MS
QRSD INTERVAL: 88 MS
QRSD INTERVAL: 88 MS
QT INTERVAL: 268 MS
QT INTERVAL: 390 MS
QT INTERVAL: 400 MS
QTC INTERVAL: 430 MS
QTC INTERVAL: 447 MS
QTC INTERVAL: 456 MS
RBC # BLD AUTO: 4.65 MILLION/UL (ref 3.81–5.12)
RBC #/AREA URNS AUTO: ABNORMAL /HPF
SODIUM SERPL-SCNC: 134 MMOL/L (ref 136–145)
SP GR UR STRIP.AUTO: 1.02 (ref 1–1.03)
SPECIMEN SOURCE: ABNORMAL
T WAVE AXIS: 129 DEGREES
T WAVE AXIS: 86 DEGREES
T WAVE AXIS: 88 DEGREES
T4 FREE SERPL-MCNC: 1.3 NG/DL (ref 0.76–1.46)
TROPONIN I SERPL-MCNC: 0.02 NG/ML
TROPONIN I SERPL-MCNC: 0.03 NG/ML
TSH SERPL DL<=0.05 MIU/L-ACNC: 6.64 UIU/ML (ref 0.36–3.74)
UROBILINOGEN UR QL STRIP.AUTO: 0.2 E.U./DL
VENTRICULAR RATE: 155 BPM
VENTRICULAR RATE: 78 BPM
VENTRICULAR RATE: 79 BPM
WBC # BLD AUTO: 10.62 THOUSAND/UL (ref 4.31–10.16)
WBC #/AREA URNS AUTO: ABNORMAL /HPF

## 2020-07-13 PROCEDURE — 3066F NEPHROPATHY DOC TX: CPT | Performed by: NURSE PRACTITIONER

## 2020-07-13 PROCEDURE — 99255 IP/OBS CONSLTJ NEW/EST HI 80: CPT | Performed by: INTERNAL MEDICINE

## 2020-07-13 PROCEDURE — 83880 ASSAY OF NATRIURETIC PEPTIDE: CPT | Performed by: EMERGENCY MEDICINE

## 2020-07-13 PROCEDURE — 71045 X-RAY EXAM CHEST 1 VIEW: CPT

## 2020-07-13 PROCEDURE — 85730 THROMBOPLASTIN TIME PARTIAL: CPT | Performed by: EMERGENCY MEDICINE

## 2020-07-13 PROCEDURE — 3051F HG A1C>EQUAL 7.0%<8.0%: CPT | Performed by: NURSE PRACTITIONER

## 2020-07-13 PROCEDURE — 99214 OFFICE O/P EST MOD 30 MIN: CPT | Performed by: NURSE PRACTITIONER

## 2020-07-13 PROCEDURE — 36600 WITHDRAWAL OF ARTERIAL BLOOD: CPT

## 2020-07-13 PROCEDURE — 99223 1ST HOSP IP/OBS HIGH 75: CPT | Performed by: INTERNAL MEDICINE

## 2020-07-13 PROCEDURE — 93010 ELECTROCARDIOGRAM REPORT: CPT | Performed by: INTERNAL MEDICINE

## 2020-07-13 PROCEDURE — 94760 N-INVAS EAR/PLS OXIMETRY 1: CPT

## 2020-07-13 PROCEDURE — 82805 BLOOD GASES W/O2 SATURATION: CPT | Performed by: INTERNAL MEDICINE

## 2020-07-13 PROCEDURE — 99292 CRITICAL CARE ADDL 30 MIN: CPT | Performed by: EMERGENCY MEDICINE

## 2020-07-13 PROCEDURE — 83605 ASSAY OF LACTIC ACID: CPT | Performed by: INTERNAL MEDICINE

## 2020-07-13 PROCEDURE — 96365 THER/PROPH/DIAG IV INF INIT: CPT

## 2020-07-13 PROCEDURE — 85610 PROTHROMBIN TIME: CPT | Performed by: EMERGENCY MEDICINE

## 2020-07-13 PROCEDURE — 83735 ASSAY OF MAGNESIUM: CPT | Performed by: EMERGENCY MEDICINE

## 2020-07-13 PROCEDURE — 84439 ASSAY OF FREE THYROXINE: CPT | Performed by: EMERGENCY MEDICINE

## 2020-07-13 PROCEDURE — 36415 COLL VENOUS BLD VENIPUNCTURE: CPT | Performed by: EMERGENCY MEDICINE

## 2020-07-13 PROCEDURE — 96376 TX/PRO/DX INJ SAME DRUG ADON: CPT

## 2020-07-13 PROCEDURE — 84484 ASSAY OF TROPONIN QUANT: CPT | Performed by: NURSE PRACTITIONER

## 2020-07-13 PROCEDURE — 80053 COMPREHEN METABOLIC PANEL: CPT | Performed by: EMERGENCY MEDICINE

## 2020-07-13 PROCEDURE — 1036F TOBACCO NON-USER: CPT | Performed by: NURSE PRACTITIONER

## 2020-07-13 PROCEDURE — 99285 EMERGENCY DEPT VISIT HI MDM: CPT

## 2020-07-13 PROCEDURE — 82948 REAGENT STRIP/BLOOD GLUCOSE: CPT

## 2020-07-13 PROCEDURE — 93005 ELECTROCARDIOGRAM TRACING: CPT

## 2020-07-13 PROCEDURE — 84443 ASSAY THYROID STIM HORMONE: CPT | Performed by: EMERGENCY MEDICINE

## 2020-07-13 PROCEDURE — 96361 HYDRATE IV INFUSION ADD-ON: CPT

## 2020-07-13 PROCEDURE — 85025 COMPLETE CBC W/AUTO DIFF WBC: CPT | Performed by: EMERGENCY MEDICINE

## 2020-07-13 PROCEDURE — 99291 CRITICAL CARE FIRST HOUR: CPT | Performed by: EMERGENCY MEDICINE

## 2020-07-13 PROCEDURE — 81001 URINALYSIS AUTO W/SCOPE: CPT | Performed by: EMERGENCY MEDICINE

## 2020-07-13 PROCEDURE — 94002 VENT MGMT INPAT INIT DAY: CPT

## 2020-07-13 PROCEDURE — 84484 ASSAY OF TROPONIN QUANT: CPT | Performed by: EMERGENCY MEDICINE

## 2020-07-13 RX ORDER — SODIUM CHLORIDE 9 MG/ML
50 INJECTION, SOLUTION INTRAVENOUS CONTINUOUS
Status: DISCONTINUED | OUTPATIENT
Start: 2020-07-13 | End: 2020-07-13

## 2020-07-13 RX ORDER — MULTIVITAMIN WITH IRON
1 TABLET ORAL DAILY
Status: DISCONTINUED | OUTPATIENT
Start: 2020-07-14 | End: 2020-07-13 | Stop reason: CLARIF

## 2020-07-13 RX ORDER — SODIUM CHLORIDE 9 MG/ML
150 INJECTION, SOLUTION INTRAVENOUS CONTINUOUS
Status: DISCONTINUED | OUTPATIENT
Start: 2020-07-13 | End: 2020-07-13

## 2020-07-13 RX ORDER — MELATONIN
2000 EVERY OTHER DAY
Status: DISCONTINUED | OUTPATIENT
Start: 2020-07-13 | End: 2020-07-19 | Stop reason: HOSPADM

## 2020-07-13 RX ORDER — INSULIN ASPART 100 [IU]/ML
10 INJECTION, SUSPENSION SUBCUTANEOUS
Status: DISCONTINUED | OUTPATIENT
Start: 2020-07-13 | End: 2020-07-18

## 2020-07-13 RX ORDER — DILTIAZEM HYDROCHLORIDE 5 MG/ML
15 INJECTION INTRAVENOUS ONCE
Status: COMPLETED | OUTPATIENT
Start: 2020-07-13 | End: 2020-07-13

## 2020-07-13 RX ORDER — CALCITRIOL 0.25 UG/1
0.5 CAPSULE, LIQUID FILLED ORAL DAILY
Status: DISCONTINUED | OUTPATIENT
Start: 2020-07-14 | End: 2020-07-19 | Stop reason: HOSPADM

## 2020-07-13 RX ORDER — LEVALBUTEROL INHALATION SOLUTION 0.63 MG/3ML
0.63 SOLUTION RESPIRATORY (INHALATION) EVERY 4 HOURS PRN
Status: DISCONTINUED | OUTPATIENT
Start: 2020-07-13 | End: 2020-07-19 | Stop reason: HOSPADM

## 2020-07-13 RX ORDER — METOPROLOL SUCCINATE 25 MG/1
25 TABLET, EXTENDED RELEASE ORAL 2 TIMES DAILY
Status: DISCONTINUED | OUTPATIENT
Start: 2020-07-13 | End: 2020-07-18

## 2020-07-13 RX ORDER — ONDANSETRON 2 MG/ML
4 INJECTION INTRAMUSCULAR; INTRAVENOUS EVERY 6 HOURS PRN
Status: DISCONTINUED | OUTPATIENT
Start: 2020-07-13 | End: 2020-07-19 | Stop reason: HOSPADM

## 2020-07-13 RX ADMIN — Medication 3 MG/HR: at 20:01

## 2020-07-13 RX ADMIN — SODIUM BICARBONATE 60 ML/HR: 84 INJECTION, SOLUTION INTRAVENOUS at 19:05

## 2020-07-13 RX ADMIN — METOPROLOL SUCCINATE 25 MG: 25 TABLET, EXTENDED RELEASE ORAL at 19:05

## 2020-07-13 RX ADMIN — DILTIAZEM HYDROCHLORIDE 15 MG: 5 INJECTION INTRAVENOUS at 13:28

## 2020-07-13 RX ADMIN — INSULIN LISPRO 1 UNITS: 100 INJECTION, SOLUTION INTRAVENOUS; SUBCUTANEOUS at 22:22

## 2020-07-13 RX ADMIN — VITAMIN D, TAB 1000IU (100/BT) 2000 UNITS: 25 TAB at 22:06

## 2020-07-13 RX ADMIN — SODIUM CHLORIDE 500 ML: 0.9 INJECTION, SOLUTION INTRAVENOUS at 13:27

## 2020-07-13 RX ADMIN — DILTIAZEM HYDROCHLORIDE 5 MG/HR: 5 INJECTION INTRAVENOUS at 15:12

## 2020-07-13 NOTE — ASSESSMENT & PLAN NOTE
Lab Results   Component Value Date    HGBA1C 7 6 (H) 04/08/2020       No results for input(s): POCGLU in the last 72 hours  Blood Sugar Average: Last 72 hrs:   repeat A1c  Patient is on Lispro prot 75/25 10 units in the morning and 20 units with dinner, Tradjenta at home  Patient barely takes short acting insulin with lunch  Reports check blood sugar 3 times a day at home  Substitute Lispro prot 75/25 with Novolog 70/30 and decrease to 10 units BID    SSI  Hold Tradjenta  Diabetic diet

## 2020-07-13 NOTE — ASSESSMENT & PLAN NOTE
Acute and mild  TB normal   Suspect congestive hepatopathy    Patient denies nausea vomiting abdominal pain  Hold Lipitor for now  Trend LFT

## 2020-07-13 NOTE — CONSULTS
Consultation - Nephrology   Jovanny Pap 63 y o  female MRN: [id number]  Unit/Bed#: ED 06 Encounter: [id number]      Assessment/Plan   Pertinent labs and imaging studies:  Urinalysis small blood 3+ protein 0-1 red blood cell per high-power field 4-10 hyaline casts, 3-4 granular casts  Sodium is 134 potassium is 4 6 chloride is 97 CO2 is 19 anion gap is 18 creatinine 6 3 BUN is 95 AST is 155 ALT is 150 total bilirubin is 0 8  White blood cell count is 10 6 hemoglobin is 12 5 hematocrit 39 5 platelet count is 472  Chest x-ray shows no acute pulmonary disease  Glucose is 147  Assessment:  1  Acute kidney injury present on admission:  Diagnostic considerations include decreased effective arterial blood volume/volume depletion may be an aspect of ATN as well  Cannot rule out obstructive uropathy  With pulmonary hypertension the question is always from a volume standpoint help preload dependent the patient is or whether not there needs to be right ventricular after load reduction  The patient at home had been taking increased amounts of Demadex with decreased urine output  I am going to make the assumption here that despite the leg edema the patient may have decreased effective arterial blood volume  Her shortness of breath is likely secondary to worsening pulmonary hypertension  She is on Coumadin and her INR is therapeutic  She has a relatively benign urinalysis 6 over chronic proteinuria which she has secondary to diabetic nephropathy  Check urine sodium check creatinine check CPK level  Certainly with increased hyaline casts and granular casts likely volume depletion or ATN would be in the differential diagnosis here  Given overall pulmonary hemodynamics especially with a history to despite increased doses of Lasix she has had decreased urine output, I am going to start IV fluids very gentle at 50 cc an hour and follow urine output and kidney function levels    When she may be more any euvolemic stage would then tried to transition to diuresed but I think right now trying to forcibly diuresis patient will make her kidney function even worse with her acute kidney injury she is severely sodium avid  Patient has no uremic symptoms there is no acute need for hemodialysis  2  Stage 4 chronic kidney disease:  Baseline creatinine is in the mid 3 range  This is likely secondary to diabetic nephropathy  Patient follows in our office with Dr Lozano    3  Nephrotic range proteinuria:  Check protein creatinine ratio  4  Fluid management:  As above  5  Low bicarbonate level:  Patient has a history of obstructive sleep apnea she has got a baseline bicarbonate level of anywhere from 25-28  This may be secondary to increased anion gap acidosis in the setting of advanced chronic kidney disease with a creatinine of 6 on admission  Not sure this metabolic component may be contributing to her shortness of breath or not  Checking arterial blood gas to assess degree of acidosis  Giving a little bit of IV fluids and half normal saline with bicarbonate replacement to help with normalizing bicarbonate level little bit  Check lactic acid level as well  Plan:  As above      History of Present Illness   Physician Requesting Consult: Laisha Mckeon DO  Reason for Consult / Principal Problem:  Acute kidney injury on chronic kidney disease  Hx and PE limited by:   HPI: Jovanny Garcia is a 63y o  year old female who presents with worsening shortness of breath  Patient has a history of chronic shortness of breath  Patient had been seen apparently in the Heart failure Clinic for evaluation of chronic thromboembolic disease  She has a history of a prior echocardiogram in May 2019 which showed ejection fraction 94-16%, grade 2 diastolic dysfunction, increased right ventricular wall thickness, biatrial dilatation, mild mitral stenosis, trace mild tricuspid regurgitation    The patient was found to have RV volume overload and estimated peak PA pressures was more than 90 suggesting severe pulmonary hypertension  It was felt that this may be due to chronic thromboembolic disease/CTEPH  There also may be a component of the heel or hypoventilation/moderate to severe obstructive sleep apnea  In the emergency room, the patient was found to have rapid atrial flutter  Other past medical history includes stage 4 chronic kidney disease for sure which she follows with Dr Lozano in our office with a baseline creatinine in the mid 3 range  Other past medical history includes diabetic retinopathy, diabetes, obstructive sleep apnea as well as proteinuria  The patient states that for the past several months she has only been able to walk approximately 1 block before she would get short of breath  Inpatient consult to Nephrology  Consult performed by: Maria T March DO  Consult ordered by: Valentino Church, CRNP          General:  Increased shortness of breath with exertion  Cardiovascular:  She denies any orthopnea no PND there is shortness of breath with exertion  Respiratory:  She does any cough hemoptysis or epistaxis  Gastrointestinal:  She denies any nausea vomiting diarrhea no metallic taste no itching no frequency  Genitourinary:  No urgency frequency hematuria she states she has been voiding spontaneously without problem she denies any hematuria or frothy bubbly urine    Outside of the above review of systems, all others are essentially negative    Historical Information   Past Medical History:   Diagnosis Date    Acute asthmatic bronchitis     last assessed: 2017    Cardiac disease     Colon polyp     Diabetes mellitus ([facility] [address] )     Hyperlipidemia     Hypertension     Pneumonia     last assessed: 2013    Renal disorder     possible clot noted in kidney, thus blood thinners currently     Past Surgical History:   Procedure Laterality Date     SECTION       x 1    NOSE SURGERY      fractured nose - septoplasty     Social History   Social History     Substance and Sexual Activity   Alcohol Use No     Social History     Substance and Sexual Activity   Drug Use No     Social History     Tobacco Use   Smoking Status Never Smoker   Smokeless Tobacco Never Used     Family History   Problem Relation Age of Onset    Diabetes Mother         mellitus    Anxiety disorder Mother         generalized anxiety disorder    Hypertension Mother     Kidney disease Father     Kidney failure Father         renal failure    Ulcerative colitis Sister     Heart disease Family     Ovarian cancer Maternal Aunt        Meds/Allergies   all current active meds have been reviewed, current meds: Current Facility-Administered Medications   Medication Dose Route Frequency    metoprolol succinate (TOPROL-XL) 24 hr tablet 25 mg  25 mg Oral BID    and PTA meds:    (Not in a hospital admission)    No Known Allergies    Objective     Intake/Output Summary (Last 24 hours) at 7/13/2020 1718  Last data filed at 7/13/2020 1515  Gross per 24 hour   Intake 0 ml   Output    Net 0 ml       Invasive Devices:        General: patient in NAD  Skin:  No new rash noted  Eyes:  No scleral icterus  ENT:  Patient wearing a facemask unable to evaluate mucous membranes  Neck:  Supple no adenopathy  Chest:  Coarse breath sounds  CVS:  S1-S2  Abdomen:  Obese positive bowel sounds  Extremities:  Bilateral leg edema  Neuro:  Nonfocal      Current Weight: Weight - Scale: 94 3 kg (208 lb)  First Weight: Weight - Scale: 94 3 kg (208 lb)    Lab Results:  I have personally reviewed pertinent labs    CBC: Lab Results   Component Value Date    WBC 10 62 (H) 07/13/2020    RBC 4 65 07/13/2020     CMP: Lab Results   Component Value Date     12/11/2014    CL 97 (L) 07/13/2020     12/11/2014    CO2 19 (L) 07/13/2020    CO2 32 12/11/2014    ANIONGAP 6 12/11/2014    BUN 95 (H) 07/13/2020    BUN 26 (H) 12/11/2014    CREATININE 6 32 (H) 07/13/2020    CREATININE 1 46 (H) 12/11/2014    GLUCOSE 133 12/11/2014    CALCIUM 8 5 07/13/2020    CALCIUM 8 8 12/11/2014     (H) 07/13/2020    AST 18 12/11/2014     (H) 07/13/2020    ALT 22 12/11/2014    ALKPHOS 171 (H) 07/13/2020    ALKPHOS 118 12/11/2014    PROT 6 8 12/11/2014    BILITOT 1 08 (H) 12/11/2014    EGFR 6 07/13/2020     Phosphorus:   Lab Results   Component Value Date    PHOS 5 4 (H) 04/08/2020     Magnesium:   Lab Results   Component Value Date    MG 2 3 07/13/2020     Urinalysis: Lab Results   Component Value Date    COLORU Yellow 07/13/2020    CLARITYU Clear 07/13/2020    SPECGRAV 1 025 07/13/2020    PHUR 5 5 07/13/2020    PHUR 5 5 07/12/2018    LEUKOCYTESUR Negative 07/13/2020    NITRITE Negative 07/13/2020    GLUCOSEU Negative 07/13/2020    KETONESU Negative 07/13/2020    BILIRUBINUR Negative 07/13/2020    BLOODU Small (A) 07/13/2020     BMP: Lab Results   Component Value Date    GLUCOSE 133 12/11/2014    SODIUM 134 (L) 07/13/2020    CO2 19 (L) 07/13/2020    CO2 32 12/11/2014    BUN 95 (H) 07/13/2020    BUN 26 (H) 12/11/2014    CREATININE 6 32 (H) 07/13/2020    CREATININE 1 46 (H) 12/11/2014    CALCIUM 8 5 07/13/2020    CALCIUM 8 8 12/11/2014

## 2020-07-13 NOTE — ASSESSMENT & PLAN NOTE
On Coumadin therapy at home  Took Coumadin 8 mg p o  Daily for past month  Took 1 dose this morning at home  INR 4 11 today    Daily INR

## 2020-07-13 NOTE — PROGRESS NOTES
Virtual Regular Visit      Assessment/Plan:    Problem List Items Addressed This Visit        Endocrine    Type 2 diabetes mellitus with complication, with long-term current use of insulin ([facility] [address] )       Cardiovascular and Mediastinum    Benign hypertension with CKD (chronic kidney disease) stage V ([facility] [address] )    PE (pulmonary thromboembolism) ([facility] [address] )      Other Visit Diagnoses     Shortness of breath    -  Primary    Worsening    Relevant Orders    Ambulatory Referral to Emergency Medicine        Patient is in distress on video call  Has multiple comorbidites and referred to ER  Patient refused to call squad and stated that will go by  Herself or with some family member  Reason for visit is   Chief Complaint   Patient presents with    Shortness of Breath     chronic     Headache    Loss of Appetite    Chills     [facility]    Virtual Regular Visit        Encounter provider MICHAEL Shah    Provider located at  [address] [facility]  [address]  [place] [address]      Recent Visits  Date Type Provider Dept   07/08/20 Telephone [address] recent visits within past 7 days and meeting all other requirements     Today's Visits  Date Type Provider Dept   07/13/20 Telemedicine Stef [facility] [address]   07/13/20 Telephone [place] [id number] today's visits and meeting all other requirements     Future Appointments  Date Type Provider Dept   07/13/20 Telemedicine Stef Shah [address]   Showing future appointments within next 150 days and meeting all other requirements        The patient was identified by name and date of birth  Khloe Halina was informed that this is a telemedicine visit and that the visit is being conducted through [address] [place] [address] and patient was informed that this is not a secure, HIPAA-complaint platform  She agrees to proceed     My office door was closed  No one else was in the room  She acknowledged consent and understanding of privacy and security of the video platform  The patient has agreed to participate and understands they can discontinue the visit at any time  Patient is aware this is a billable service  Subjective  Lashonda Greenberg is a 63 y o  female       HPI   Patient stated that has chronic SOB but got worse since 07/10/2020  Stated that it is getting worse every day and not able to walk to her mail box either outside her house  Stated that also feeling chest tightness  Stated that not able to lie down on bed  Denies any fever, cough chest pain, headache and dizziness  Stated that taking medications and taking her demadex and coumadin but no improvement with her SOB  Called her pulmonologist and will not be able to see her till sept  On coumadin based on VQ scan high probability of PE in past and last INR was done in may and was not at goal and patient did not repeated it since then       Past Medical History:   Diagnosis Date    Acute asthmatic bronchitis     last assessed: 2017    Cardiac disease     Colon polyp     Diabetes mellitus ([facility] [address] [facility]     Hyperlipidemia     Hypertension     Pneumonia     last assessed: 2013    Renal disorder     possible clot noted in kidney, thus blood thinners currently       Past Surgical History:   Procedure Laterality Date     SECTION       x 1    NOSE SURGERY      fractured nose - septoplasty       Current Outpatient Medications   Medication Sig Dispense Refill    albuterol (PROAIR HFA) 90 mcg/act inhaler Inhale 2 puffs every 4 (four) hours as needed for wheezing or shortness of breath 1 Inhaler 0    atorvastatin (LIPITOR) 20 mg tablet Take 1 tablet (20 mg total) by mouth daily (Patient taking differently: Take 20 mg by mouth every other day ) 90 tablet 1    B Complex-C (B-COMPLEX WITH VITAMIN C) tablet Take 1 tablet by mouth daily      Blood Glucose Monitoring Suppl (ONE TOUCH ULTRA 2) w/Device KIT Test glucose 3 times daily 1 each 0    calcitriol (ROCALTROL) 0 5 MCG capsule Take 1 capsule (0 5 mcg total) by mouth daily 90 capsule 3    cholecalciferol 2000 units TABS Take 1 tablet (2,000 Units total) by mouth daily (Patient taking differently: Take 2,000 Units by mouth every other day ) 30 tablet 0    Insulin Lispro Prot & Lispro (HUMALOG MIX 75/25 KWIKPEN) (75-25) 100 units/mL injection pen Inject 32 Units under the skin 2 (two) times a day with meals for 250 days 150 mL 3    insuln lispro (HUMALOG KWIKPEN) 200 units/mL CONCENTRATED U-200 injection pen Inject 5 Units under the skin daily before lunch 5 pen 4    linaGLIPtin (TRADJENTA) 5 MG TABS Take 5 mg by mouth daily 90 tablet 3    metoprolol succinate (TOPROL-XL) 25 mg 24 hr tablet Take 1 tablet (25 mg total) by mouth 2 (two) times a day 180 tablet 3    MULTIPLE VITAMIN PO Take 1 tablet by mouth daily      ONE TOUCH ULTRA TEST test strip TEST GLUCOSE THREE TIMES A  each 3    torsemide (DEMADEX) 20 mg tablet TAKE 2 TABLETS DAILY IN THE MORNING AND TAKE AN ADDITIONAL TABLET IN THE EVENING FOR WEIGHT GAIN OR WORSENING SWELLING 270 tablet 3    warfarin (COUMADIN) 1 mg tablet One daily as directed 90 tablet 1    warfarin (COUMADIN) 2 mg tablet TAKE 2 TABLETS (4 MG) DAILY OR AS DIRECTED 180 tablet 0    warfarin (COUMADIN) 5 mg tablet Take 1 tablet (5 mg total) by mouth daily 90 tablet 1    fluticasone-vilanterol (BREO ELLIPTA) 100-25 mcg/inh inhaler Inhale 1 puff daily Rinse mouth after use  (Patient not taking: Reported on 7/13/2020) 1 Inhaler 6    torsemide (DEMADEX) 20 mg tablet Take 2 tablet per day in the morning and take 1 tablets in the evening every night (Patient not taking: Reported on 6/5/2020) 90 tablet 3    warfarin (COUMADIN) 3 mg tablet Take 5 milligrams on 7/16/18, July 17, 2018 and get PT/INR on July 17, 2018 (Patient not taking: Reported on 6/9/2020) 30 tablet 0     No current facility-administered medications for this visit           No Known Allergies    Review of Systems   Constitutional: Negative for chills, diaphoresis, fatigue, fever and unexpected weight change  HENT: Negative for congestion, dental problem, drooling, ear discharge, ear pain, facial swelling, hearing loss, mouth sores, nosebleeds, postnasal drip, rhinorrhea, sinus pressure, sinus pain, sneezing, sore throat, tinnitus, trouble swallowing and voice change  Respiratory: Positive for chest tightness and shortness of breath (worsening)  Negative for cough and wheezing  Gastrointestinal: Negative for abdominal pain, constipation, diarrhea, nausea and vomiting  Genitourinary: Negative for difficulty urinating  Skin: Negative  Neurological: Negative for dizziness, weakness, light-headedness and headaches  Hematological: Negative  Video Exam    There were no vitals filed for this visit  Physical Exam   Constitutional: She is oriented to person, place, and time  She appears well-developed and well-nourished  HENT:   Nose: Nose normal    Eyes: Conjunctivae are normal    Neck: Normal range of motion  Pulmonary/Chest:   Patient is in distress on video call and working hard for breathing  Not able to communicate on video call without taking pauses   Neurological: She is alert and oriented to person, place, and time  Skin:   No diaphoresis noted on video call   Psychiatric: She has a normal mood and affect  Her behavior is normal  Judgment and thought content normal         I spent 15 minutes directly with the patient during this visit      VIRTUAL VISIT DISCLAIMER    Dayne Spencer acknowledges that she has consented to an online visit or consultation  She understands that the online visit is based solely on information provided by her, and that, in the absence of a face-to-face physical evaluation by the physician, the diagnosis she receives is both limited and provisional in terms of accuracy and completeness   This is not intended to replace a full medical face-to-face evaluation by the physician  Khloe Meade understands and accepts these terms

## 2020-07-13 NOTE — TELEPHONE ENCOUNTER
Pt called stating she has Chronic SOB but recently feeling worse  Pt has a Headache Pressure in chest when laying down  Pt has been using her CPap at night  Pt has had chills but does not feel it is COVID 19  Pt did call Pulmonary who stated they could see her in September    Pt was given a virtual apt with Sherif borgesklkiara

## 2020-07-13 NOTE — CONSULTS
Consultation - Cardiology   Anayeli Reynoso 63 y o  female MRN: [id number]  Unit/Bed#: ED 06 Encounter: [id number]    Assessment/Plan     Assessment:  1  Rapid atrial flutter  2  Pulmonary hypertension concerning for CTEPH  3  Acute kidney injury on chronic kidney disease, baseline creatinine is 3 5, current creatinine 6 32  4  Diabetes with end-organ involvement  5  Abnormal LFTs with auto anticoagulation concerning for low-flow state    Plan:  Patient was seen in emergency room  1  Will hold patient's Demadex at this time  2  Will continue patient's home dose of Toprol 25 mg b i d   3  Avoid profound hypotension due to patient's history pulmonary hypertension with elevated pulmonary pressures  4  Hold statin therapy due to abnormal LFTs   5  Will hold Coumadin and heparin due to auto anticoagulation and monitor closely   6  Consult to Pulmonary for consideration of treatment of pulmonary hypertension  7  Consult Nephrology for acute kidney injury  8  At this time due to the acuity of patient's condition would hold off on TANNER guided cardioversion and follow rate control strategy with anticoagulation when acceptable  9  Continue telemetry      History of Present Illness   Physician Requesting Consult: Hayder Griggs DO  Reason for Consult / Principal Problem:  Rapid atrial flutter    HPI: Anayeli Reynoso is a 63y o  year old female who presented to the emergency room after being seen by her primary care provider for complaints of increasing and worsening shortness of breath  Patient has a longstanding history of shortness of breath and has been followed by Dr Filemon Kat in the past and also was seen in the advanced heart failure clinic in [facility] for pulmonary hypertension concerning for CTEPH  Patient was recommended for testing but never followed up  On presentation to the emergency room patient was noted be in rapid atrial flutter to 1 conduction with ventricular response of 160   She received Cardizem 15 mg IV which slowed her heart rate to a 4-1 conduction in the rates of 89  Her presenting blood pressure was 140/80 after receiving single dose of Cardizem blood pressure was in the 109/80  Patient is being admitted for further evaluation    Patient has a history of diabetes, chronic kidney disease stage 3 to 4, hypertension, diastolic heart failure, diabetic retinopathy, obstructive sleep apnea for which she uses CPAP  Patient states in 2018 she was started on Coumadin for a indeterminate V/Q scan concerning for PE as cause of shortness of breath  Her INRs have been managed by her primary care physician Dr Zulma Duval  INRs appear to have been subtherapeutic over the last 3-4 months  Patient was referred to Dr Lola Gottron for evaluation of her shortness of breath  Echocardiogram performed in May of 2019 demonstrated ejection fraction of 50-55% with moderate LVH and grade 1 diastolic dysfunction  Patient also had RV hypertrophy, with PA systolic pressure of 90 mmHg and mild mitral valve stenosis  Consult to Cardiology  Consult performed by: MICHAEL Stafford  Consult ordered by: Alli Mendoza DO          Review of Systems   Constitutional: Negative  Negative for activity change, fatigue, fever and unexpected weight change  HENT: Negative  Negative for congestion, drooling, facial swelling, nosebleeds, sinus pressure, tinnitus and voice change  Eyes: Negative  Negative for photophobia and visual disturbance  Respiratory: Positive for shortness of breath  Negative for chest tightness  Cardiovascular: Positive for leg swelling  Negative for chest pain and palpitations  Gastrointestinal: Negative  Negative for abdominal distention, blood in stool, diarrhea and vomiting  Endocrine: Negative  Negative for polydipsia, polyphagia and polyuria  Genitourinary: Positive for difficulty urinating  Musculoskeletal: Negative  Skin: Negative      Neurological: Negative for dizziness, tremors, speech difficulty, weakness and light-headedness  Hematological: Negative  Psychiatric/Behavioral: Negative  Historical Information   Past Medical History:   Diagnosis Date    Acute asthmatic bronchitis     last assessed: 2017    Cardiac disease     Colon polyp     [facility] mellitus [facility] [address] [facility]     Hyperlipidemia     Hypertension     Pneumonia     last assessed: 2013    Renal disorder     possible clot noted in kidney, thus blood thinners currently     Past Surgical History:   Procedure Laterality Date     SECTION       x 1    NOSE SURGERY      fractured nose - septoplasty     Social History     Substance and Sexual Activity   Alcohol Use No     Social History     Substance and Sexual Activity   Drug Use No     E-Cigarette/Vaping     E-Cigarette/Vaping Substances    Nicotine No     THC No     CBD No     Flavoring No     Other No     Unknown No      Social History     Tobacco Use   Smoking Status Never Smoker   Smokeless Tobacco Never Used     Family History:   Family History   Problem Relation Age of Onset    Diabetes Mother         mellitus    Anxiety disorder Mother         generalized anxiety disorder    Hypertension Mother     Kidney disease Father     Kidney failure Father         renal failure    Ulcerative colitis Sister     Heart disease Family     Ovarian cancer Maternal Aunt        Meds/Allergies   all current active meds have been reviewed, current meds:   No current facility-administered medications for this encounter  and PTA meds:   Prior to Admission Medications   Prescriptions Last Dose Informant Patient Reported? Taking?    B Complex-C (B-COMPLEX WITH VITAMIN C) tablet  Self Yes No   Sig: Take 1 tablet by mouth daily   Blood Glucose Monitoring Suppl (ONE TOUCH ULTRA 2) w/Device KIT  Self No No   Sig: Test glucose 3 times daily   Insulin Lispro Prot & Lispro (HUMALOG MIX 75/25 KWIKPEN) (75-25) 100 units/mL injection pen  Self No No   Sig: Inject 32 Units under the skin 2 (two) times a day with meals for 250 days   MULTIPLE VITAMIN PO  Self Yes No   Sig: Take 1 tablet by mouth daily   ONE TOUCH ULTRA TEST test strip  Self No No   Sig: TEST GLUCOSE THREE TIMES A DAY   albuterol (PROAIR HFA) 90 mcg/act inhaler  Self No No   Sig: Inhale 2 puffs every 4 (four) hours as needed for wheezing or shortness of breath   atorvastatin (LIPITOR) 20 mg tablet  Self No No   Sig: Take 1 tablet (20 mg total) by mouth daily   Patient taking differently: Take 20 mg by mouth every other day    calcitriol (ROCALTROL) 0 5 MCG capsule   No No   Sig: Take 1 capsule (0 5 mcg total) by mouth daily   cholecalciferol 2000 units TABS  Self No No   Sig: Take 1 tablet (2,000 Units total) by mouth daily   Patient taking differently: Take 2,000 Units by mouth every other day    fluticasone-vilanterol (BREO ELLIPTA) 100-25 mcg/inh inhaler  Self No No   Sig: Inhale 1 puff daily Rinse mouth after use     Patient not taking: Reported on 7/13/2020   insuln lispro (HUMALOG KWIKPEN) 200 units/mL CONCENTRATED U-200 injection pen  Self No No   Sig: Inject 5 Units under the skin daily before lunch   linaGLIPtin (TRADJENTA) 5 MG TABS  Self No No   Sig: Take 5 mg by mouth daily   metoprolol succinate (TOPROL-XL) 25 mg 24 hr tablet   No No   Sig: Take 1 tablet (25 mg total) by mouth 2 (two) times a day   torsemide (DEMADEX) 20 mg tablet  Self No No   Sig: Take 2 tablet per day in the morning and take 1 tablets in the evening every night   Patient not taking: Reported on 6/5/2020   torsemide (DEMADEX) 20 mg tablet  Self No No   Sig: TAKE 2 TABLETS DAILY IN THE MORNING AND TAKE AN ADDITIONAL TABLET IN THE EVENING FOR WEIGHT GAIN OR WORSENING SWELLING   warfarin (COUMADIN) 1 mg tablet  Self No No   Sig: One daily as directed   warfarin (COUMADIN) 2 mg tablet  Self No No   Sig: TAKE 2 TABLETS (4 MG) DAILY OR AS DIRECTED   warfarin (COUMADIN) 3 mg tablet  Self No No   Sig: Take 5 milligrams on 7/16/18, July 17, 2018 and get PT/INR on July 17, 2018   Patient not taking: Reported on 6/9/2020   warfarin (COUMADIN) 5 mg tablet  Self No No   Sig: Take 1 tablet (5 mg total) by mouth daily      Facility-Administered Medications: None     No Known Allergies    Objective   Vitals: Blood pressure 121/76, pulse (!) 152, temperature 98 2 °F (36 8 °C), temperature source Tympanic, resp  rate 16, weight 94 3 kg (208 lb), SpO2 100 %, not currently breastfeeding  Orthostatic Blood Pressures      Most Recent Value   Blood Pressure  121/76 filed at 07/13/2020 1515   Patient Position - Orthostatic VS  Lying filed at 07/13/2020 1306            Intake/Output Summary (Last 24 hours) at 7/13/2020 1519  Last data filed at 7/13/2020 1515  Gross per 24 hour   Intake 0 ml   Output    Net 0 ml       Invasive Devices     Peripheral Intravenous Line            Peripheral IV 07/13/20 Right Antecubital less than 1 day                Physical Exam   Constitutional: She is oriented to person, place, and time  She appears well-developed and well-nourished  No distress  HENT:   Head: Normocephalic and atraumatic  Right Ear: External ear normal    Left Ear: External ear normal    Eyes: Pupils are equal, round, and reactive to light  Conjunctivae are normal  Right eye exhibits no discharge  Left eye exhibits no discharge  No scleral icterus  Neck: Normal range of motion  Neck supple  No JVD present  No thyromegaly present  Cardiovascular: Normal rate, regular rhythm, intact distal pulses and normal pulses  Atrial flutter with 4-1 conduction   Pulmonary/Chest: Effort normal  No accessory muscle usage  No respiratory distress  She has decreased breath sounds in the right lower field and the left lower field  She has no wheezes  Coarse breath sounds   Abdominal: Soft  Bowel sounds are normal  She exhibits no distension and no mass  There is no rebound  Musculoskeletal: She exhibits edema     Trace to +1 pitting lower extremity edema, patient states this is chronic and she uses Demadex   Neurological: She is alert and oriented to person, place, and time  Skin: Skin is warm and dry  Capillary refill takes less than 2 seconds  She is not diaphoretic  Psychiatric: She has a normal mood and affect  Her behavior is normal  Judgment and thought content normal    Nursing note and vitals reviewed  Lab Results:   I have personally reviewed pertinent lab results  CBC with diff:   Results from last 7 days   Lab Units 07/13/20  1317   WBC Thousand/uL 10 62*   RBC Million/uL 4 65   HEMOGLOBIN g/dL 12 5   HEMATOCRIT % 39 5   MCV fL 85   MCH pg 26 9   MCHC g/dL 31 6   RDW % 18 4*   MPV fL 12 5   PLATELETS Thousands/uL 278     CMP:   Results from last 7 days   Lab Units 07/13/20  1317   SODIUM mmol/L 134*   POTASSIUM mmol/L 4 6   CHLORIDE mmol/L 97*   CO2 mmol/L 19*   BUN mg/dL 95*   CREATININE mg/dL 6 32*   CALCIUM mg/dL 8 5   AST U/L 155*   ALT U/L 150*   ALK PHOS U/L 171*   EGFR ml/min/1 73sq m 6     Troponin:   0   Lab Value Date/Time    TROPONINI 0 03 07/13/2020 1317    TROPONINI <0 02 07/05/2018 2225    TROPONINI <0 02 07/05/2018 1916     BNP:   Results from last 7 days   Lab Units 07/13/20  1317   POTASSIUM mmol/L 4 6   CHLORIDE mmol/L 97*   CO2 mmol/L 19*   BUN mg/dL 95*   CREATININE mg/dL 6 32*   CALCIUM mg/dL 8 5   EGFR ml/min/1 73sq m 6     Coags:   Results from last 7 days   Lab Units 07/13/20  1400   PTT seconds 45*   INR  4 11*     TSH:   Results from last 7 days   Lab Units 07/13/20  1317   TSH 3RD GENERATON uIU/mL 6 638*     Magnesium:   Results from last 7 days   Lab Units 07/13/20  1317   MAGNESIUM mg/dL 2 3     Imaging: I have personally reviewed pertinent reports  EKG:  Initial 12 lead EKG demonstrated SVT, atrial flutter with 2-1 conduction rate is 160  Patient received Cardizem 15 mg IV bolus and converted to a 2-1 flutter with rates in the 80s      VTE Prophylaxis: Heparin    Code Status: Prior  Advance Directive and Living Will:      Power of :    POLST:      [facility] [place], [facility]   Cardiology

## 2020-07-13 NOTE — ASSESSMENT & PLAN NOTE
PORSHA on CKD 4  Baseline creatinine 3 3-3 8 in past year  Patient follows Dr Aries Herrera as outpatient  On Demadex 40mg p o  Daily in AM and can take additional 20 mg p o  In the afternoon if leg edema or weight gain  Patient states she barely takes additional dose in the afternoon  Reports decreased urine output since yesterday  Creatinine 6 32 today  Anion gap 18, bicarb 19, potassium normal   ABG showed metabolic acidosis  Received NS 500ml in ED  Suspect prerenal   ProBNP 30,243  Chest X ray  NAD  Trace to 1+ edema to lower extremities on exam  Check PVR  Check renal ultrasound  Hold Demadex  Daily weight and I&Os  Avoid nephrotoxins and hypotension  Consulted nephrology, appreciate input  Recommend gentle IV hydration with 1/2NS with sodium bicarb 75 meq at 60 ml/hr  No urgent dialysis need  Check  lactic acid, CPK    Monitor BMP      Results from last 7 days   Lab Units 07/13/20  1317   BUN mg/dL 95*   CREATININE mg/dL 6 32*

## 2020-07-13 NOTE — ED PROVIDER NOTES
History  Chief Complaint   Patient presents with    Shortness of Breath     Pt reports she is nromally SOB on exertion states SOB at rest since friday   Rapid Heart Rate     HR of 160 in triage room pt SOB at rest       60-year-old female with past history of diabetes, hypertension, hyperlipidemia, PE on Coumadin, presents to the ED for evaluation shortness of breath with exertion since last night  Patient also notes some palpitation at rest   Patient denies any chest pain, fevers, chills, headaches, weakness, dizziness  Patient came to the ED for further evaluation  Patient found to be in atrial flutter rhythm with heart rate of 150s in the ED  Patient denies any previous history of atrial fibrillation or atrial flutter  History provided by:  Patient  Shortness of Breath   Associated symptoms: no abdominal pain, no chest pain, no cough, no ear pain, no fever, no headaches, no rash and no wheezing    Rapid Heart Rate   Associated symptoms: shortness of breath    Associated symptoms: no back pain, no chest pain, no cough, no dizziness, no nausea, no numbness and no weakness        Prior to Admission Medications   Prescriptions Last Dose Informant Patient Reported? Taking?    B Complex-C (B-COMPLEX WITH VITAMIN C) tablet  Self Yes No   Sig: Take 1 tablet by mouth daily   Blood Glucose Monitoring Suppl (ONE TOUCH ULTRA 2) w/Device KIT  Self No No   Sig: Test glucose 3 times daily   Insulin Lispro Prot & Lispro (HUMALOG MIX 75/25 KWIKPEN) (75-25) 100 units/mL injection pen  Self No No   Sig: Inject 32 Units under the skin 2 (two) times a day with meals for 250 days   MULTIPLE VITAMIN PO  Self Yes No   Sig: Take 1 tablet by mouth daily   ONE TOUCH ULTRA TEST test strip  Self No No   Sig: TEST GLUCOSE THREE TIMES A DAY   albuterol (PROAIR HFA) 90 mcg/act inhaler  Self No No   Sig: Inhale 2 puffs every 4 (four) hours as needed for wheezing or shortness of breath   atorvastatin (LIPITOR) 20 mg tablet  Self No No   Sig: Take 1 tablet (20 mg total) by mouth daily   Patient taking differently: Take 20 mg by mouth every other day    calcitriol (ROCALTROL) 0 5 MCG capsule   No No   Sig: Take 1 capsule (0 5 mcg total) by mouth daily   cholecalciferol 2000 units TABS  Self No No   Sig: Take 1 tablet (2,000 Units total) by mouth daily   Patient taking differently: Take 2,000 Units by mouth every other day    fluticasone-vilanterol (BREO ELLIPTA) 100-25 mcg/inh inhaler  Self No No   Sig: Inhale 1 puff daily Rinse mouth after use     Patient not taking: Reported on 7/13/2020   insuln lispro (HUMALOG KWIKPEN) 200 units/mL CONCENTRATED U-200 injection pen  Self No No   Sig: Inject 5 Units under the skin daily before lunch   linaGLIPtin (TRADJENTA) 5 MG TABS  Self No No   Sig: Take 5 mg by mouth daily   metoprolol succinate (TOPROL-XL) 25 mg 24 hr tablet   No No   Sig: Take 1 tablet (25 mg total) by mouth 2 (two) times a day   torsemide (DEMADEX) 20 mg tablet  Self No No   Sig: Take 2 tablet per day in the morning and take 1 tablets in the evening every night   Patient not taking: Reported on 6/5/2020   torsemide (DEMADEX) 20 mg tablet  Self No No   Sig: TAKE 2 TABLETS DAILY IN THE MORNING AND TAKE AN ADDITIONAL TABLET IN THE EVENING FOR WEIGHT GAIN OR WORSENING SWELLING   warfarin (COUMADIN) 1 mg tablet  Self No No   Sig: One daily as directed   warfarin (COUMADIN) 2 mg tablet  Self No No   Sig: TAKE 2 TABLETS (4 MG) DAILY OR AS DIRECTED   warfarin (COUMADIN) 3 mg tablet  Self No No   Sig: Take 5 milligrams on 7/16/18, July 17, 2018 and get PT/INR on July 17, 2018   Patient not taking: Reported on 6/9/2020   warfarin (COUMADIN) 5 mg tablet  Self No No   Sig: Take 1 tablet (5 mg total) by mouth daily      Facility-Administered Medications: None       Past Medical History:   Diagnosis Date    Acute asthmatic bronchitis     last assessed: 6/2/2017    Cardiac disease     Colon polyp     Diabetes mellitus ([facility] [address] )     Hyperlipidemia  Hypertension     Pneumonia     last assessed: 2013    Renal disorder     possible clot noted in kidney, thus blood thinners currently       Past Surgical History:   Procedure Laterality Date     SECTION       x 1    NOSE SURGERY      fractured nose - septoplasty       Family History   Problem Relation Age of Onset    Diabetes Mother         mellitus    Anxiety disorder Mother         generalized anxiety disorder    Hypertension Mother     Kidney disease Father     Kidney failure Father         renal failure    Ulcerative colitis Sister     Heart disease Family     Ovarian cancer Maternal Aunt      I have reviewed and agree with the history as documented  E-Cigarette/Vaping     E-Cigarette/Vaping Substances    Nicotine No     THC No     CBD No     Flavoring No     Other No     Unknown No      Social History     Tobacco Use    Smoking status: Never Smoker    Smokeless tobacco: Never Used   Substance Use Topics    Alcohol use: No    Drug use: No       Review of Systems   Constitutional: Negative for activity change, appetite change, chills and fever  HENT: Negative for congestion and ear pain  Eyes: Negative for pain and discharge  Respiratory: Positive for shortness of breath  Negative for cough, chest tightness, wheezing and stridor  Cardiovascular: Positive for palpitations  Negative for chest pain  Gastrointestinal: Negative for abdominal distention, abdominal pain, constipation, diarrhea and nausea  Endocrine: Negative for cold intolerance  Genitourinary: Negative for dysuria, frequency and urgency  Musculoskeletal: Negative for arthralgias and back pain  Skin: Negative for color change and rash  Allergic/Immunologic: Negative for environmental allergies and food allergies  Neurological: Negative for dizziness, weakness, numbness and headaches  Hematological: Negative for adenopathy     Psychiatric/Behavioral: Negative for agitation, behavioral problems and confusion  The patient is not nervous/anxious  All other systems reviewed and are negative  Physical Exam  Physical Exam   Constitutional: She is oriented to person, place, and time  She appears well-developed and well-nourished  HENT:   Head: Normocephalic and atraumatic  Mouth/Throat: Oropharynx is clear and moist    Eyes: Conjunctivae and EOM are normal    Neck: Normal range of motion  Neck supple  Cardiovascular: Regular rhythm, normal heart sounds and intact distal pulses  Atrial flutter rhythm on the monitor with heart rate in the 150s  Pulmonary/Chest: Effort normal and breath sounds normal    Lungs are clear to auscultation bilateral    Abdominal: Soft  Bowel sounds are normal  She exhibits no distension  There is no tenderness  Musculoskeletal: Normal range of motion  Neurological: She is alert and oriented to person, place, and time  Skin: Skin is warm and dry  Psychiatric: She has a normal mood and affect  Her behavior is normal  Judgment and thought content normal    Nursing note and vitals reviewed        Vital Signs  ED Triage Vitals   Temperature Pulse Respirations Blood Pressure SpO2   07/13/20 1301 07/13/20 1301 07/13/20 1301 07/13/20 1306 07/13/20 1301   98 2 °F (36 8 °C) (!) 160 (!) 26 144/87 94 %      Temp Source Heart Rate Source Patient Position - Orthostatic VS BP Location FiO2 (%)   07/13/20 1301 07/13/20 1301 07/13/20 1306 07/13/20 1306 --   Tympanic Monitor Lying Left arm       Pain Score       07/13/20 1301       6           Vitals:    07/13/20 1526 07/13/20 1530 07/13/20 1541 07/13/20 1550   BP: 113/83 110/77 114/84    Pulse: (!) 157 (!) 156 (!) 157 80   Patient Position - Orthostatic VS: Lying  Lying          Visual Acuity      ED Medications  Medications   metoprolol succinate (TOPROL-XL) 24 hr tablet 25 mg (has no administration in time range)   sodium chloride 0 9 % bolus 500 mL (0 mL Intravenous Stopped 7/13/20 1515)   diltiazem (CARDIZEM) injection 15 mg (15 mg Intravenous Given 7/13/20 1328)   diltiazem (CARDIZEM) 125 mg in sodium chloride 0 9 % 125 mL infusion (7 5 mg/hr Intravenous Rate/Dose Change 7/13/20 1602)       Diagnostic Studies  Results Reviewed     Procedure Component Value Units Date/Time    Urine Microscopic [[id number]]  (Abnormal) Collected:  07/13/20 1515    Lab Status:  Final result Specimen:  Urine, Clean Catch Updated:  07/13/20 1541     RBC, UA 0-1 /hpf      WBC, UA 4-10 /hpf      Epithelial Cells Occasional /hpf      Bacteria, UA Occasional /hpf      Hyaline Casts, UA 4-10 /lpf      COARSE GRANULAR CASTS 3-4 /lpf      AMORPH URATES Moderate /hpf     UA w Reflex to Microscopic w Reflex to Culture [[id number]]  (Abnormal) Collected:  07/13/20 1515    Lab Status:  Final result Specimen:  Urine, Clean Catch Updated:  07/13/20 1524     Color, UA Yellow     Clarity, UA Clear     Specific Gravity, UA 1 025     pH, UA 5 5     Leukocytes, UA Negative     Nitrite, UA Negative     Protein, UA >=300 mg/dl      Glucose, UA Negative mg/dl      Ketones, UA Negative mg/dl      Urobilinogen, UA 0 2 E U /dl      Bilirubin, UA Negative     Blood, UA Small    Troponin I [[id number]]     Lab Status:  No result Specimen:  Blood     NT-BNP PRO [[id number]]  (Abnormal) Collected:  07/13/20 1317    Lab Status:  Final result Specimen:  Blood from Arm, Right Updated:  07/13/20 1432     NT-proBNP 30,243 pg/mL     Protime-INR [[id number]]  (Abnormal) Collected:  07/13/20 1400    Lab Status:  Final result Specimen:  Blood from Arm, Right Updated:  07/13/20 1424     Protime 39 2 seconds      INR 4 11    APTT [[id number]]  (Abnormal) Collected:  07/13/20 1400    Lab Status:  Final result Specimen:  Blood from Arm, Right Updated:  07/13/20 1424     PTT 45 seconds     Comprehensive metabolic panel [[id number]]  (Abnormal) Collected:  07/13/20 1317    Lab Status:  Final result Specimen:  Blood from Arm, Right Updated:  07/13/20 1401     Sodium 134 mmol/L Potassium 4 6 mmol/L      Chloride 97 mmol/L      CO2 19 mmol/L      ANION GAP 18 mmol/L      BUN 95 mg/dL      Creatinine 6 32 mg/dL      Glucose 147 mg/dL      Calcium 8 5 mg/dL       U/L       U/L      Alkaline Phosphatase 171 U/L      Total Protein 7 3 g/dL      Albumin 3 0 g/dL      Total Bilirubin 0 80 mg/dL      eGFR 6 ml/min/1 73sq m     Narrative:       National Kidney Disease Foundation guidelines for Chronic Kidney Disease (CKD):     Stage 1 with normal or high GFR (GFR > 90 mL/min/1 73 square meters)    Stage 2 Mild CKD (GFR = 60-89 mL/min/1 73 square meters)    Stage 3A Moderate CKD (GFR = 45-59 mL/min/1 73 square meters)    Stage 3B Moderate CKD (GFR = 30-44 mL/min/1 73 square meters)    Stage 4 Severe CKD (GFR = 15-29 mL/min/1 73 square meters)    Stage 5 End Stage CKD (GFR <15 mL/min/1 73 square meters)  Note: GFR calculation is accurate only with a steady state creatinine    TSH, 3rd generation with Free T4 reflex [[id number]]  (Abnormal) Collected:  07/13/20 1317    Lab Status:  Final result Specimen:  Blood from Arm, Right Updated:  07/13/20 1401     TSH 3RD GENERATON 6 638 uIU/mL     Narrative:       Patients undergoing fluorescein dye angiography may retain small amounts of fluorescein in the body for 48-72 hours post procedure  Samples containing fluorescein can produce falsely depressed TSH values  If the patient had this procedure,a specimen should be resubmitted post fluorescein clearance  Magnesium [[id number]]  (Normal) Collected:  07/13/20 1317    Lab Status:  Final result Specimen:  Blood from Arm, Right Updated:  07/13/20 1401     Magnesium 2 3 mg/dL     T4, free [[id number]] Collected:  07/13/20 1317    Lab Status:   In process Specimen:  Blood from Arm, Right Updated:  07/13/20 1401    Troponin I [[id number]]  (Normal) Collected:  07/13/20 1317    Lab Status:  Final result Specimen:  Blood from Arm, Right Updated:  07/13/20 1358     Troponin I 0 03 ng/mL     CBC and differential [[id number]]  (Abnormal) Collected:  07/13/20 1317    Lab Status:  Final result Specimen:  Blood from Arm, Right Updated:  07/13/20 1337     WBC 10 62 Thousand/uL      RBC 4 65 Million/uL      Hemoglobin 12 5 g/dL      Hematocrit 39 5 %      MCV 85 fL      MCH 26 9 pg      MCHC 31 6 g/dL      RDW 18 4 %      MPV 12 5 fL      Platelets 920 Thousands/uL      nRBC 0 /100 WBCs      Neutrophils Relative 68 %      Immat GRANS % 1 %      Lymphocytes Relative 17 %      Monocytes Relative 12 %      Eosinophils Relative 1 %      Basophils Relative 1 %      Neutrophils Absolute 7 30 Thousands/µL      Immature Grans Absolute 0 07 Thousand/uL      Lymphocytes Absolute 1 81 Thousands/µL      Monocytes Absolute 1 25 Thousand/µL      Eosinophils Absolute 0 12 Thousand/µL      Basophils Absolute 0 07 Thousands/µL                  XR chest 1 view portable   Final Result by Pamela Fernandez MD (07/13 1517)      No acute cardiopulmonary disease  Workstation performed: [id number]                    Procedures  ECG 12 Lead Documentation Only  Date/Time: 7/13/2020 1:04 PM  Performed by: Khanh Juárez DO  Authorized by: Khanh Juárez DO     Indications / Diagnosis:  Shortness of breath  ECG reviewed by me, the ED Provider: yes    Patient location:  ED  Previous ECG:     Previous ECG:  Compared to current    Similarity:  Changes noted    Comparison to cardiac monitor: Yes    Interpretation:     Interpretation: abnormal    Comments:      Irregular rhythm, rate 155, left axis deviation, no acute ST elevations, saw shows pattern noted suggesting atrial flutter, Q-waves noted in inferior leads that is unchanged from previous study, atrial flutter with 2-1 conduction that is new compared to previous study    ECG 12 Lead Documentation Only  Date/Time: 7/13/2020 1:43 PM  Performed by: Khanh Juárez DO  Authorized by: Khanh Juárez DO     Indications / Diagnosis:  Follow-up EKG after Cardizem bolus  ECG reviewed by me, the ED Provider: yes    Patient location:  ED  Previous ECG:     Previous ECG:  Compared to current    Similarity:  Changes noted    Comparison to cardiac monitor: Yes    Interpretation:     Interpretation: abnormal    Comments:      Irregular rhythm, rate 78, saw tooth pattern noted suggesting atrial flutter with 4-1 conduction, no acute ST elevations noted, rate improved compared to arrival EKG with persistent atrial flutter  ECG 12 Lead Documentation Only  Date/Time: 7/13/2020 3:55 PM  Performed by: Fariha Yen DO  Authorized by: Fariha Yen DO     Indications / Diagnosis:  Palpitation  ECG reviewed by me, the ED Provider: yes    Patient location:  ED  Previous ECG:     Previous ECG:  Compared to current    Similarity:  No change    Comparison to cardiac monitor: Yes    Interpretation:     Interpretation: abnormal    Comments:      Irregular rhythm, rate 81, left deviation, atrial flutter noted with 4-1 conduction that is unchanged from earlier EKG    CriticalCare Time  Performed by: Fariha Yen DO  Authorized by: Fariha Yen DO     Critical care provider statement:     Critical care time (minutes):  120    Critical care was necessary to treat or prevent imminent or life-threatening deterioration of the following conditions:  Circulatory failure    Critical care was time spent personally by me on the following activities:  Blood draw for specimens, obtaining history from patient or surrogate, development of treatment plan with patient or surrogate, discussions with consultants, discussions with primary provider, evaluation of patient's response to treatment, examination of patient, review of old charts, re-evaluation of patient's condition, ordering and review of radiographic studies, ordering and review of laboratory studies, ordering and performing treatments and interventions and interpretation of cardiac output measurements  Comments:      Atrial flutter             ED Course  ED Course as of Jul 13 1610 Mon Jul 13, 2020   1446 Cardiology at bedside evaluating patient  1446 Patient evaluated by Cardiology  At this time patient will be          US AUDIT      Most Recent Value   Initial Alcohol Screen: US AUDIT-C    1  How often do you have a drink containing alcohol?  0 Filed at: 07/13/2020 1303   2  How many drinks containing alcohol do you have on a typical day you are drinking? 0 Filed at: 07/13/2020 1303   3a  Male UNDER 65: How often do you have five or more drinks on one occasion? 0 Filed at: 07/13/2020 1303   3b  FEMALE Any Age, or MALE 65+: How often do you have 4 or more drinks on one occassion? 0 Filed at: 07/13/2020 1303   Audit-C Score  0 Filed at: 07/13/2020 1303                  MARCEL/DAST-10      Most Recent Value   How many times in the past year have you    Used an illegal drug or used a prescription medication for non-medical reasons? Never Filed at: 07/13/2020 1303                                MDM  Number of Diagnoses or Management Options  Atrial flutter ([facility] [address] ): new and requires workup  CHF (congestive heart failure) ([facility] [address] ): new and requires workup  Dyspnea: new and requires workup  Diagnosis management comments: Obtain blood work, EKG, chest x-ray  Give IV fluids, Cardizem and continue to monitor closely       Amount and/or Complexity of Data Reviewed  Clinical lab tests: ordered and reviewed  Tests in the radiology section of CPT®: ordered and reviewed  Tests in the medicine section of CPT®: reviewed and ordered  Review and summarize past medical records: yes  Independent visualization of images, tracings, or specimens: yes    Risk of Complications, Morbidity, and/or Mortality  General comments: Patient felt to be in atrial flutter with heart rate of 160s in the ED  Heart rate improved with Cardizem bolus and drip  Patient denies any chest pain  Patient's INR was supratherapeutic  Patient remained in a flutter rhythm after Cardizem bolus and drip  Patient was seen by Cardiology who recommends admission for cardioversion in the morning  Patient agrees with admission plans  Heparin is held due to supratherapeutic INR  Patient Progress  Patient progress: improved        Disposition  Final diagnoses:   Atrial flutter ([facility] )   Dyspnea   CHF (congestive heart failure) ([facility]  )     Time reflects when diagnosis was documented in both MDM as applicable and the Disposition within this note     Time User Action Codes Description Comment    7/13/2020  3:00 PM Heydi Sammi Add [R06 00] Dyspnea on exertion     7/13/2020  3:00 PM Heydi Sammi Modify [R06 00] Dyspnea on exertion     7/13/2020  3:00 PM Heydi Sammi Add [I27 20] Pulmonary hypertension ([facility]  )     7/13/2020  3:04 PM Heydi Sammi Add [N18 5] Stage 5 chronic kidney disease not on chronic dialysis ([place] [address] )     7/13/2020  3:04 PM Heydi Sammi Add [R80 9] Nephrotic range proteinuria     7/13/2020  3:04 PM Heydi Sammi Add [E11 8,  Z79 4] Type 2 diabetes mellitus with complication, with long-term current use of insulin ([place] [address] )     7/13/2020  4:03 PM Delaney Arias Add [I48 92] Atrial flutter ([facility]  )     7/13/2020  4:03 PM Delaney Arias Add [R06 00] Dyspnea     7/13/2020  4:03 PM Ferdous, [address] [facility] [I50 9] CHF (congestive heart failure) [facility])       ED Disposition     ED Disposition Condition Date/Time Comment    Admit Stable Mon Jul 13, 2020  4:03 PM Case was discussed with Dr Chicho Kelly and the patient's admission status was agreed to be Admission Status: inpatient status to the service of Dr Chicho Kelly  Follow-up Information    None         Patient's Medications   Discharge Prescriptions    No medications on file     No discharge procedures on file      PDMP Review     None          ED Provider  Electronically Signed by           Xiomy Cedeno DO  07/13/20 5455

## 2020-07-13 NOTE — ASSESSMENT & PLAN NOTE
New onset a flutter with RVR  EKG in ED showed 2:1 a flutter, rate 155  Received Cardizem 15 mg IV in ED,then Cardizem drip, currently on 5mg per hour  Heart rate 70-80's on telemetry currently  Titrate off Cardizem drip  Continue metoprolol from home  Check 2D echo  Optimize electrolytes  Consulted Cardiology, appreciate input

## 2020-07-13 NOTE — ASSESSMENT & PLAN NOTE
Wt Readings from Last 3 Encounters:   07/13/20 94 kg (207 lb 3 7 oz)   06/09/20 94 3 kg (208 lb)   06/05/20 95 6 kg (210 lb 12 8 oz)     2D echo in May this year showed EF 50-55%, no RWMA,G2DD, PA pressure more than 90  No significant valvular disease  Patient reports weighing herself daily at home  No weight gain noted  ProBNP 30,000's  Chest x-ray NAD  Mild edema to lower extremities on exam   On Demadex at home  Hold Demadex  Volume management per Renal    Daily weight and I&Os

## 2020-07-13 NOTE — H&P
H&P- Matthew Brar 1955, 63 y o  female MRN: [id number]    Unit/Bed#: [address] [place] [address] Encounter: [id number]    Primary Care Provider: Alejandro Rodriguez DO   Date and time admitted to hospital: 7/13/2020 12:58 PM        * Acute kidney injury superimposed on CKD [facility])  Assessment & Plan  PORSHA on CKD 4  Baseline creatinine 3 3-3 8 in past year  Patient follows Dr Ty Oglesby as outpatient  On Demadex 40mg p o  Daily in AM and can take additional 20 mg p o  In the afternoon if leg edema or weight gain  Patient states she barely takes additional dose in the afternoon  Reports decreased urine output since yesterday  Creatinine 6 32 today  Anion gap 18, bicarb 19, potassium normal   ABG showed metabolic acidosis  Received NS 500ml in ED  Suspect prerenal   ProBNP 30,243  Chest X ray  NAD  Trace to 1+ edema to lower extremities on exam  Check PVR  Check renal ultrasound  Hold Demadex  Daily weight and I&Os  Avoid nephrotoxins and hypotension  Consulted nephrology, appreciate input  Recommend gentle IV hydration with 1/2NS with sodium bicarb 75 meq at 60 ml/hr  No urgent dialysis need  Check  lactic acid, CPK  Monitor BMP      Results from last 7 days   Lab Units 07/13/20  1317   BUN mg/dL 95*   CREATININE mg/dL 6 32*         Atrial flutter with rapid ventricular response ([address] )  Assessment & Plan  New onset a flutter with RVR  EKG in ED showed 2:1 a flutter, rate 155  Received Cardizem 15 mg IV in ED,then Cardizem drip, currently on 5mg per hour  Heart rate 70-80's on telemetry currently  Titrate off Cardizem drip  Continue metoprolol from home  Check 2D echo  Optimize electrolytes  Consulted Cardiology, appreciate input  Dyspnea  Assessment & Plan  Patient reports dyspnea at rest since Friday last week  Reports chronic dyspnea on exam   Known history of severe pulmonary hypertension  PA pressure > 90  Patient follows Dr Kevin Amador as outpatient  History of PE  Denies URI symptoms    Denies fever or chills  Chest x-ray NAD  Doubt PE as patient is on Coumadin for history of PE  INR supratherapeutic at 4 11 today  Lungs clear diminished on auscultation  Patient satting high 90s on O2 2 L  Will continue supplemental oxygen and titrate as tolerated  Update 2D echo  Consult Pulmonary          Chronic diastolic congestive heart failure [facility])  Assessment & Plan  Wt Readings from Last 3 Encounters:   07/13/20 94 kg (207 lb 3 7 oz)   06/09/20 94 3 kg (208 lb)   06/05/20 95 6 kg (210 lb 12 8 oz)     2D echo in May this year showed EF 50-55%, no RWMA,G2DD, PA pressure more than 90  No significant valvular disease  Patient reports weighing herself daily at home  No weight gain noted  ProBNP 30,000's  Chest x-ray NAD  Mild edema to lower extremities on exam   On Demadex at home  Hold Demadex  Volume management per Renal    Daily weight and I&Os  Sleep apnea  Assessment & Plan  Recently started on CPAP  Setting at 4  Continue CPAP at HS  History of pulmonary embolus (PE)  Assessment & Plan  On Coumadin therapy at home  Took Coumadin 8 mg p o  Daily for past month  Took 1 dose this morning at home  INR 4 11 today  Daily INR      Elevated LFTs  Assessment & Plan  Acute and mild  TB normal   Suspect congestive hepatopathy  Patient denies nausea vomiting abdominal pain  Hold Lipitor for now  Trend LFT            Morbid obesity with BMI of 40 0-44 9, adult ([facility] [address] )  Assessment & Plan  Body mass index is 40 47 kg/m²  Diet and lift style modification    Type 2 diabetes mellitus with complication, with long-term current use of insulin (HCC)  Assessment & Plan  Lab Results   Component Value Date    HGBA1C 7 6 (H) 04/08/2020       No results for input(s): POCGLU in the last 72 hours  Blood Sugar Average: Last 72 hrs:   repeat A1c  Patient is on Lispro prot 75/25 10 units in the morning and 20 units with dinner, Tradjenta at home  Patient barely takes short acting insulin with lunch    Reports check blood sugar 3 times a day at home  Substitute Lispro prot 75/25 with Novolog 70/30 and decrease to 10 units BID  SSI  Hold Tradjenta  Diabetic diet    Mixed hyperlipidemia  Assessment & Plan  LDL 33 in April this year  Hold Lipitor for now due to elevated LFTs    Elevated TSH  Assessment & Plan  TSH 6 638  Check free T4    Benign hypertension with CKD (chronic kidney disease) stage V (HCC)  Assessment & Plan  BP low normal   Continue metoprolol from home , monitor    VTE Prophylaxis: Warfarin (Coumadin)  / reason for no mechanical VTE prophylaxis on Coumadin   Code Status:  Full code  POLST: POLST form is not discussed and not completed at this time  Anticipated Length of Stay:  Patient will be admitted on an Inpatient basis with an anticipated length of stay of  > 2 midnights  Justification for Hospital Stay:  Acute kidney injury on CKD, new onset a flutter with RVR    Total Time for Visit, including Counseling / Coordination of Care: 1 hour  Greater than 50% of this total time spent on direct patient counseling and coordination of care  Chief Complaint:  Shortness of breath at rest since Friday  History of Present Illness:    Matthew Brar is a 63 y o  female with PMH of CKD 4, hypertension, hyperlipidemia, PE on Coumadin, CHF, type 2 diabetes, morbid obesity, sleep apnea who presents with shortness of breath at rest since Friday  Patient reports chronic dyspnea on exertion  Does not use oxygen at home  Patient denies cough, runny nose, sore throat, fever, chills  Reports some chest pain on Saturday but denies currently  Denies headache, dizziness, nausea, vomiting, diarrhea, constipation  Reports appetite was not great since Friday  Reports decreased urine output since yesterday  Reports taking Demadex 2 tabs in the morning and 1 tab in the afternoon if leg swelling or weight gain  Reports she seldom takes additional tablet in the afternoon  Reports taking Coumadin at home for PE    Has been on 8 mg for past month  Took Coumadin this morning at home  Review of Systems:    Review of Systems   Constitutional: Positive for appetite change  Respiratory: Positive for shortness of breath  Cardiovascular:        Some Chest pain on Saturday, not currently  Genitourinary: Positive for decreased urine volume  All other systems reviewed and are negative  Past Medical and Surgical History:     Past Medical History:   Diagnosis Date    Acute asthmatic bronchitis     last assessed: 2017    Cardiac disease     Colon polyp     Diabetes mellitus ([facility] [address] )     Hyperlipidemia     Hypertension     Pneumonia     last assessed: 2013    Renal disorder     possible clot noted in kidney, thus blood thinners currently       Past Surgical History:   Procedure Laterality Date     SECTION       x 1    NOSE SURGERY      fractured nose - septoplasty       Meds/Allergies:    Prior to Admission medications    Medication Sig Start Date End Date Taking?  Authorizing Provider   linaGLIPtin (TRADJENTA) 5 MG TABS Take 5 mg by mouth daily 19  Yes Sahil Moran MD   albuterol (PROAIR HFA) 90 mcg/act inhaler Inhale 2 puffs every 4 (four) hours as needed for wheezing or shortness of breath 19   Josefa Westbrook PA-C   atorvastatin (LIPITOR) 20 mg tablet Take 1 tablet (20 mg total) by mouth daily  Patient taking differently: Take 20 mg by mouth every other day  19   Jason Ashton DO   B Complex-C (B-COMPLEX WITH VITAMIN C) tablet Take 1 tablet by mouth daily    Historical Provider, MD   Blood Glucose Monitoring Suppl (ONE TOUCH ULTRA 2) w/Device KIT Test glucose 3 times daily 18   Jason Ashton DO   calcitriol (ROCALTROL) 0 5 MCG capsule Take 1 capsule (0 5 mcg total) by mouth daily 20   Tomasa Daniels MD   cholecalciferol 2000 units TABS Take 1 tablet (2,000 Units total) by mouth daily  Patient taking differently: Take 2,000 Units by mouth every other day  19   [facility] fluticasone-vilanterol (BREO ELLIPTA) 100-25 mcg/inh inhaler Inhale 1 puff daily Rinse mouth after use  Patient not taking: Reported on 7/13/2020 4/1/19   Rahat Haile DO   Insulin Lispro Prot & Lispro (HUMALOG MIX 75/25 KWIKPEN) (75-25) 100 units/mL injection pen Inject 32 Units under the skin 2 (two) times a day with meals for 250 days  Patient taking differently: Inject 32 Units under the skin 2 (two) times a day with meals Patient takes 10 units in AM with 20 units with dinner   8/2/19 7/13/20  Colby Dominguez MD   insuln lispro (HUMALOG KWIKPEN) 200 units/mL CONCENTRATED U-200 injection pen Inject 5 Units under the skin daily before lunch  Patient not taking: Reported on 7/13/2020 1/6/20   Colby Dominguez MD   metoprolol succinate (TOPROL-XL) 25 mg 24 hr tablet Take 1 tablet (25 mg total) by mouth 2 (two) times a day 4/21/20   Aakash Dai PA-C   MULTIPLE VITAMIN PO Take 1 tablet by mouth daily    Historical Provider, MD   ONE TOUCH ULTRA TEST test strip TEST GLUCOSE THREE TIMES A DAY 8/2/19   Hannah Cronin,    torsemide (DEMADEX) 20 mg tablet TAKE 2 TABLETS DAILY IN THE MORNING AND TAKE AN ADDITIONAL TABLET IN THE EVENING FOR WEIGHT GAIN OR WORSENING SWELLING 11/13/19   Aakash Dai PA-C   warfarin (COUMADIN) 1 mg tablet One daily as directed 8/30/19   Hannah Cronin DO   warfarin (COUMADIN) 2 mg tablet TAKE 2 TABLETS (4 MG) DAILY OR AS DIRECTED 1/6/20   Hannah Cronin, DO   warfarin (COUMADIN) 3 mg tablet Take 5 milligrams on 7/16/18, July 17, 2018 and get PT/INR on July 17, 2018  Patient not taking: Reported on 6/9/2020 7/15/18   Daphne Sarabia MD   warfarin (COUMADIN) 5 mg tablet Take 1 tablet (5 mg total) by mouth daily 11/11/19   Hannah Cronin,    benzonatate (TESSALON PERLES) 100 mg capsule Take 1 capsule (100 mg total) by mouth 3 (three) times a day as needed for cough  Patient not taking: Reported on 6/5/2020 12/1/19 7/13/20  Otis Curran PA-C   torsemide (DEMADEX) 20 mg tablet Take 2 tablet per day in the morning and take 1 tablets in the evening every night  Patient not taking: Reported on 6/5/2020 6/21/19 7/13/20  JUAN Garcia     I have reviewed home medications with patient personally  Allergies: No Known Allergies    Social History:     Marital Status: Single   Occupation:  Marketing  Patient Pre-hospital Living Situation:  Lives with family  Patient Pre-hospital Level of Mobility:  Independent  Patient Pre-hospital Diet Restrictions:  Diabetic diet  Substance Use History:   Social History     Substance and Sexual Activity   Alcohol Use No     Social History     Tobacco Use   Smoking Status Never Smoker   Smokeless Tobacco Never Used     Social History     Substance and Sexual Activity   Drug Use No       Family History:    Family History   Problem Relation Age of Onset    Diabetes Mother         mellitus    Anxiety disorder Mother         generalized anxiety disorder    Hypertension Mother     Kidney disease Father     Kidney failure Father         renal failure    Ulcerative colitis Sister     Heart disease Family     Ovarian cancer Maternal Aunt        Physical Exam:     Vitals:   Blood Pressure: 108/69 (07/13/20 1926)  Pulse: 80 (07/13/20 1926)  Temperature: 97 5 °F (36 4 °C) (07/13/20 1926)  Temp Source: Tympanic (07/13/20 1301)  Respirations: 18 (07/13/20 1926)  Weight - Scale: 94 kg (207 lb 3 7 oz) (07/13/20 1745)  SpO2: 97 % (07/13/20 1926)    Physical Exam   Constitutional: She is oriented to person, place, and time  She appears well-developed  Patient is morbidly obese  HENT:   Head: Normocephalic and atraumatic  Neck: Normal range of motion  Neck supple  No JVD present  No tracheal deviation present  No thyromegaly present  Cardiovascular: Normal rate and regular rhythm  No murmur heard  Pulmonary/Chest: Effort normal  No respiratory distress  She has no wheezes  She has no rales  On O2 2 L, satting high 90s    Breath sounds clear diminished bilateral    Abdominal: Soft  Bowel sounds are normal  She exhibits no distension  There is no tenderness  There is no guarding  Musculoskeletal: She exhibits edema  She exhibits no tenderness or deformity  Trace to 1+ edema to lower extremities  Neurological: She is alert and oriented to person, place, and time  Skin: Skin is warm and dry  Psychiatric: She has a normal mood and affect  Judgment normal    Nursing note and vitals reviewed  Additional Data:     Lab Results: I have personally reviewed pertinent reports  Results from last 7 days   Lab Units 07/13/20  1317   WBC Thousand/uL 10 62*   HEMOGLOBIN g/dL 12 5   HEMATOCRIT % 39 5   PLATELETS Thousands/uL 278   NEUTROS PCT % 68   LYMPHS PCT % 17   MONOS PCT % 12   EOS PCT % 1     Results from last 7 days   Lab Units 07/13/20  1317   POTASSIUM mmol/L 4 6   CHLORIDE mmol/L 97*   CO2 mmol/L 19*   BUN mg/dL 95*   CREATININE mg/dL 6 32*   CALCIUM mg/dL 8 5   ALK PHOS U/L 171*   ALT U/L 150*   AST U/L 155*     Results from last 7 days   Lab Units 07/13/20  1400   INR  4 11*       Imaging: I have personally reviewed pertinent reports  Xr Chest 1 View Portable    Result Date: 7/13/2020  Narrative: CHEST INDICATION:   sob  COMPARISON:  7/5/2018  EXAM PERFORMED/VIEWS:  XR CHEST PORTABLE FINDINGS: Cardiac silhouette is stably prominent  The lungs are clear  No pneumothorax or pleural effusion  Osseous structures appear within normal limits for patient age  Impression: No acute cardiopulmonary disease  Workstation performed: [id number]       EKG, Pathology, and Other Studies Reviewed on Admission:   · EKG:  A flutter, rate 155    All[employer] Records Reviewed: Yes     ** Please Note: Dragon 360 Dictation voice to text software may have been used in the creation of this document   **

## 2020-07-13 NOTE — ASSESSMENT & PLAN NOTE
Patient reports dyspnea at rest since Friday last week  Reports chronic dyspnea on exam   Known history of severe pulmonary hypertension  PA pressure > 90  Patient follows Dr Chip Doe as outpatient  History of PE  Denies URI symptoms  Denies fever or chills  Chest x-ray NAD  Doubt PE as patient is on Coumadin for history of PE  INR supratherapeutic at 4 11 today  Lungs clear diminished on auscultation  Patient satting high 90s on O2 2 L  Will continue supplemental oxygen and titrate as tolerated    Update 2D echo  Consult Pulmonary

## 2020-07-14 ENCOUNTER — APPOINTMENT (INPATIENT)
Dept: NON INVASIVE DIAGNOSTICS | Facility: HOSPITAL | Age: 65
DRG: 291 | End: 2020-07-14
Payer: COMMERCIAL

## 2020-07-14 ENCOUNTER — APPOINTMENT (INPATIENT)
Dept: RADIOLOGY | Facility: HOSPITAL | Age: 65
DRG: 291 | End: 2020-07-14
Payer: COMMERCIAL

## 2020-07-14 PROBLEM — J96.11 CHRONIC HYPOXEMIC RESPIRATORY FAILURE (HCC): Status: ACTIVE | Noted: 2020-07-14

## 2020-07-14 LAB
ALBUMIN SERPL BCP-MCNC: 2.7 G/DL (ref 3.5–5)
ALP SERPL-CCNC: 148 U/L (ref 46–116)
ALT SERPL W P-5'-P-CCNC: 133 U/L (ref 12–78)
ANION GAP SERPL CALCULATED.3IONS-SCNC: 14 MMOL/L (ref 4–13)
AST SERPL W P-5'-P-CCNC: 88 U/L (ref 5–45)
BILIRUB SERPL-MCNC: 0.6 MG/DL (ref 0.2–1)
BUN SERPL-MCNC: 96 MG/DL (ref 5–25)
CALCIUM SERPL-MCNC: 8.1 MG/DL (ref 8.3–10.1)
CHLORIDE SERPL-SCNC: 100 MMOL/L (ref 100–108)
CK MB SERPL-MCNC: 3.3 % (ref 0–2.5)
CK MB SERPL-MCNC: 4.4 NG/ML (ref 0–5)
CK SERPL-CCNC: 135 U/L (ref 26–192)
CO2 SERPL-SCNC: 19 MMOL/L (ref 21–32)
CREAT SERPL-MCNC: 6.22 MG/DL (ref 0.6–1.3)
CREAT UR-MCNC: 95.3 MG/DL
ERYTHROCYTE [DISTWIDTH] IN BLOOD BY AUTOMATED COUNT: 18.6 % (ref 11.6–15.1)
EST. AVERAGE GLUCOSE BLD GHB EST-MCNC: 148 MG/DL
GFR SERPL CREATININE-BSD FRML MDRD: 7 ML/MIN/1.73SQ M
GLUCOSE SERPL-MCNC: 146 MG/DL (ref 65–140)
GLUCOSE SERPL-MCNC: 153 MG/DL (ref 65–140)
GLUCOSE SERPL-MCNC: 176 MG/DL (ref 65–140)
GLUCOSE SERPL-MCNC: 180 MG/DL (ref 65–140)
GLUCOSE SERPL-MCNC: 196 MG/DL (ref 65–140)
GLUCOSE SERPL-MCNC: 210 MG/DL (ref 65–140)
HBA1C MFR BLD: 6.8 %
HCT VFR BLD AUTO: 38.3 % (ref 34.8–46.1)
HGB BLD-MCNC: 11.9 G/DL (ref 11.5–15.4)
INR PPP: 5.04 (ref 0.84–1.19)
MAGNESIUM SERPL-MCNC: 2.4 MG/DL (ref 1.6–2.6)
MCH RBC QN AUTO: 26.6 PG (ref 26.8–34.3)
MCHC RBC AUTO-ENTMCNC: 31.1 G/DL (ref 31.4–37.4)
MCV RBC AUTO: 86 FL (ref 82–98)
PLATELET # BLD AUTO: 236 THOUSANDS/UL (ref 149–390)
PMV BLD AUTO: 12.2 FL (ref 8.9–12.7)
POTASSIUM SERPL-SCNC: 4 MMOL/L (ref 3.5–5.3)
PROT SERPL-MCNC: 6.7 G/DL (ref 6.4–8.2)
PROTHROMBIN TIME: 45.9 SECONDS (ref 11.6–14.5)
RBC # BLD AUTO: 4.48 MILLION/UL (ref 3.81–5.12)
SODIUM 24H UR-SCNC: 10 MOL/L
SODIUM SERPL-SCNC: 133 MMOL/L (ref 136–145)
T4 FREE SERPL-MCNC: 1.2 NG/DL (ref 0.76–1.46)
WBC # BLD AUTO: 10.74 THOUSAND/UL (ref 4.31–10.16)

## 2020-07-14 PROCEDURE — 84300 ASSAY OF URINE SODIUM: CPT | Performed by: NURSE PRACTITIONER

## 2020-07-14 PROCEDURE — 99232 SBSQ HOSP IP/OBS MODERATE 35: CPT | Performed by: INTERNAL MEDICINE

## 2020-07-14 PROCEDURE — 94760 N-INVAS EAR/PLS OXIMETRY 1: CPT

## 2020-07-14 PROCEDURE — 82550 ASSAY OF CK (CPK): CPT | Performed by: NURSE PRACTITIONER

## 2020-07-14 PROCEDURE — 82948 REAGENT STRIP/BLOOD GLUCOSE: CPT

## 2020-07-14 PROCEDURE — 85027 COMPLETE CBC AUTOMATED: CPT | Performed by: NURSE PRACTITIONER

## 2020-07-14 PROCEDURE — 93306 TTE W/DOPPLER COMPLETE: CPT

## 2020-07-14 PROCEDURE — 84439 ASSAY OF FREE THYROXINE: CPT | Performed by: NURSE PRACTITIONER

## 2020-07-14 PROCEDURE — 99233 SBSQ HOSP IP/OBS HIGH 50: CPT | Performed by: INTERNAL MEDICINE

## 2020-07-14 PROCEDURE — 83036 HEMOGLOBIN GLYCOSYLATED A1C: CPT | Performed by: NURSE PRACTITIONER

## 2020-07-14 PROCEDURE — 80053 COMPREHEN METABOLIC PANEL: CPT | Performed by: NURSE PRACTITIONER

## 2020-07-14 PROCEDURE — 85610 PROTHROMBIN TIME: CPT | Performed by: NURSE PRACTITIONER

## 2020-07-14 PROCEDURE — 76770 US EXAM ABDO BACK WALL COMP: CPT

## 2020-07-14 PROCEDURE — 83735 ASSAY OF MAGNESIUM: CPT | Performed by: NURSE PRACTITIONER

## 2020-07-14 PROCEDURE — 99255 IP/OBS CONSLTJ NEW/EST HI 80: CPT | Performed by: INTERNAL MEDICINE

## 2020-07-14 PROCEDURE — 82553 CREATINE MB FRACTION: CPT | Performed by: NURSE PRACTITIONER

## 2020-07-14 PROCEDURE — 93005 ELECTROCARDIOGRAM TRACING: CPT

## 2020-07-14 PROCEDURE — 93306 TTE W/DOPPLER COMPLETE: CPT | Performed by: INTERNAL MEDICINE

## 2020-07-14 PROCEDURE — 94660 CPAP INITIATION&MGMT: CPT

## 2020-07-14 PROCEDURE — 3044F HG A1C LEVEL LT 7.0%: CPT | Performed by: INTERNAL MEDICINE

## 2020-07-14 PROCEDURE — 82570 ASSAY OF URINE CREATININE: CPT | Performed by: NURSE PRACTITIONER

## 2020-07-14 RX ORDER — ACETAMINOPHEN 325 MG/1
650 TABLET ORAL EVERY 6 HOURS PRN
Status: DISCONTINUED | OUTPATIENT
Start: 2020-07-14 | End: 2020-07-19 | Stop reason: HOSPADM

## 2020-07-14 RX ADMIN — INSULIN LISPRO 2 UNITS: 100 INJECTION, SOLUTION INTRAVENOUS; SUBCUTANEOUS at 13:07

## 2020-07-14 RX ADMIN — CALCITRIOL 0.5 MCG: 0.25 CAPSULE, LIQUID FILLED ORAL at 08:33

## 2020-07-14 RX ADMIN — Medication 2 MG/HR: at 06:16

## 2020-07-14 RX ADMIN — METOPROLOL SUCCINATE 25 MG: 25 TABLET, EXTENDED RELEASE ORAL at 18:15

## 2020-07-14 RX ADMIN — ACETAMINOPHEN 650 MG: 325 TABLET, FILM COATED ORAL at 06:16

## 2020-07-14 RX ADMIN — INSULIN LISPRO 1 UNITS: 100 INJECTION, SOLUTION INTRAVENOUS; SUBCUTANEOUS at 08:33

## 2020-07-14 RX ADMIN — METOPROLOL SUCCINATE 25 MG: 25 TABLET, EXTENDED RELEASE ORAL at 08:33

## 2020-07-14 RX ADMIN — SODIUM BICARBONATE 60 ML/HR: 84 INJECTION, SOLUTION INTRAVENOUS at 19:59

## 2020-07-14 RX ADMIN — INSULIN LISPRO 1 UNITS: 100 INJECTION, SOLUTION INTRAVENOUS; SUBCUTANEOUS at 23:21

## 2020-07-14 RX ADMIN — INSULIN LISPRO 1 UNITS: 100 INJECTION, SOLUTION INTRAVENOUS; SUBCUTANEOUS at 16:59

## 2020-07-14 NOTE — PROGRESS NOTES
[facility] Internal Medicine Progress Note  Patient: Yazmin eRndon 63 y o  female   MRN: [id number]  PCP: García Lemus DO  Unit/Bed#: [address] [place] [address] Encounter: [id number]  Date Of Visit: 07/14/20    Problem List:    Principal Problem:    Acute kidney injury superimposed on CKD [facility])  Active Problems:    Atrial flutter with rapid ventricular response ([address] )    Dyspnea    Type 2 diabetes mellitus with complication, with long-term current use of insulin (HCC)    Elevated LFTs    Chronic diastolic congestive heart failure (HCC)    Chronic hypoxemic respiratory failure (HCC)    Benign hypertension with CKD (chronic kidney disease) stage V (HCC)    Elevated TSH    Mixed hyperlipidemia    History of pulmonary embolus (PE)    Sleep apnea    Morbid obesity with BMI of 40 0-44 9, adult [facility])      Assessment & Plan:    Dyspnea  Assessment & Plan  History of chronic shortness of breath but worsening dyspnea at rest since Friday last week  Known history of severe pulmonary hypertension  PA pressure > 90  Patient follows Dr Rodrigue Holder as outpatient  History of suspected PE  Doubt PE as patient is on Coumadin for history of PE  INR supratherapeutic on presentation  Chest x-ray without any acute abnormality  Likely multifactorial secondary to uncontrolled a flutter, obesity, pulmonary hypertension, history of pulmonary embolism  ABG-7 30, pCO2 37, PO2 102 on 2 L  Saturating adequately on 2 L of supplemental oxygen  Pulmonary following, input appreciated  · Follow up repeat echo  · Continue supplemental oxygen  · Continue CPAP at night  · Continue anticoagulation  · Will need further workup for pulmonary hypertension    Atrial flutter with rapid ventricular response ([address] )  Assessment & Plan  New onset a flutter with RVR  EKG in ED showed 2:1 a flutter, rate 155  Received Cardizem 15 mg IV in ED, and subsequently started on Cardizem drip    Metoprolol was resumed  Likely precipitated by hypoxia, pulmonary hypertension, metabolic abnormality  Self noted to sinus rhythm overnight  · Continue metoprolol, discontinue Cardizem drip  · Follow up 2D echo  · Monitor electrolytes  · Pulmonary evaluation for pulmonary hypertension  · Supplemental oxygen  · Cardiology following, input appreciated  · Currently anticoagulated, Coumadin on hold secondary to elevated INR      * Acute kidney injury superimposed on CKD ([facility] )  Assessment & Plan  PORSHA on CKD 4  Baseline creatinine 3 3-3 8 in past year  Patient follows Dr Dennis Gr as outpatient  On Demadex 40mg p o  Daily in AM and can take additional 20 mg p o  In the afternoon if leg edema or weight gain  Patient states she barely takes additional dose in the afternoon  Reports decreased urine output since yesterday  Creatinine 6 32 on presentation with Anion gap 18, bicarb 19, potassium normal  ABG showed metabolic acidosis  No significant improvement with IV fluid, creatinine 6 2 to today  CPK within normal limit  Nephrology following, input appreciated  Likely disease progression possibly multifactorial secondary to decrease ECV due to pulmonary hypertension,?   Anticoagulated related nephropathy, progressive nephrotic range proteinuria  · Continue IV fluid  · Hold Demadex  · Daily weight and I&Os  · Avoid nephrotoxins and hypotension  · May require initiation of the dialysis soon      Results from last 7 days   Lab Units 07/14/20  0549 07/13/20  1317   BUN mg/dL 96* 95*   CREATININE mg/dL 6 22* 6 32*         Chronic hypoxemic respiratory failure ([facility] )  Assessment & Plan  Patient reported that she was previously on supplemental oxygen at home but she return did in November 2019  Continue supplemental oxygenation  Will need re qualification for home oxygen    Chronic diastolic congestive heart failure [facility])  Assessment & Plan  Wt Readings from Last 3 Encounters:   07/14/20 95 4 kg (210 lb 5 1 oz)   06/09/20 94 3 kg (208 lb)   06/05/20 95 6 kg (210 lb 12 8 oz)     2D echo in May this year showed EF 50-55%, no RWMA,G2DD, PA pressure more than 90  No significant valvular disease  Patient reports weighing herself daily at home  No weight gain noted  ProBNP 30,000's in setting of advanced kidney disease  Chest x-ray NAD  Mild edema to lower extremities on exam   On Demadex at home  · Hold Demadex  · Volume management per Renal    · Follow up repeat echo  · Daily weight and I&Os  Elevated LFTs  Assessment & Plan  Acute and mild  TB normal   Suspect congestive hepatopathy  Patient denies nausea vomiting abdominal pain  Hold Lipitor for now  Trend LFT            Type 2 diabetes mellitus with complication, with long-term current use of insulin [facility])  Assessment & Plan  Lab Results   Component Value Date    HGBA1C 6 8 (H) 07/14/2020       Recent Labs     07/14/20  1058 07/14/20  1642 07/14/20 2026 07/14/20  2319   POCGLU 210* 176* 180* 196*       Blood Sugar Average: Last 72 hrs:  (P) 147 [id number]     Patient is on Lispro prot 75/25 10 units in the morning and 20 units with dinner, Tradjenta at home  Patient barely takes short acting insulin with lunch  Reports check blood sugar 3 times a day at home  Substitute Lispro prot 75/25 with Novolog 70/30 and decrease to 10 units BID  SSI  Hold Tradjenta  Diabetic diet    Sleep apnea  Assessment & Plan  Recently started on CPAP  Setting at 4  Continue CPAP at HS  History of pulmonary embolus (PE)  Assessment & Plan  On Coumadin therapy at home  Took Coumadin 8 mg p o  Daily for past month, no recent INR check since May  INR 4 11 presentation, remains elevated weight 5 0 today  · No evidence of bleeding  · Hold Coumadin  · Continue to monitor      Mixed hyperlipidemia  Assessment & Plan  LDL 33 in April this year  Hold Lipitor for now due to elevated LFTs    Elevated TSH  Assessment & Plan  TSH 6 638 normal free T4    Benign hypertension with CKD (chronic kidney disease) stage V (HCC)  Assessment & Plan  BP low normal   Continue metoprolol from home  , monitor    Morbid obesity with BMI of 40 0-44 9, adult [facility])  Assessment & Plan  Body mass index is 41 08 kg/m²  Diet and lift style modification        VTE Pharmacologic Prophylaxis:   Pharmacologic: Warfarin (Coumadin) inward secondary to elevated INR  Mechanical VTE Prophylaxis in Place: Yes    Patient Centered Rounds: I have performed bedside rounds with nursing staff today  Discussions with Specialists or Other Care Team Provider:  Nephrology, Cardiology, Pulmonary    Education and Discussions with Family / Patient:  Daughter over the phone    Time Spent for Care: 45 minutes  More than 50% of total time spent on counseling and coordination of care as described above  Current Length of Stay: 1 day(s)    Current Patient Status: Inpatient   Certification Statement: The patient will continue to require additional inpatient hospital stay due to Further management for acute kidney injury requiring dialysis and stabilization of the cardiopulmonary status    Discharge Plan:  Pending at present    Code Status: Level 1 - Full Code      Subjective:   Converted back to sinus rhythm  Reports her breathing is better  Still with shortness of breath with activity  Denies any chest pain or cough      Objective:     Vitals:   Temp (24hrs), Av 4 °F (36 3 °C), Min:97 °F (36 1 °C), Max:98 °F (36 7 °C)    Temp:  [97 °F (36 1 °C)-98 °F (36 7 °C)] 97 4 °F (36 3 °C)  HR:  [] 68  Resp:  [13-22] 15  BP: (107-126)/(69-85) 124/80  SpO2:  [94 %-100 %] 99 %  Body mass index is 41 08 kg/m²  Input and Output Summary (last 24 hours): Intake/Output Summary (Last 24 hours) at 2020 1328  Last data filed at 2020 0800  Gross per 24 hour   Intake 120 ml   Output 350 ml   Net -230 ml       Physical Exam:     Physical Exam   Constitutional: No distress  Chronically ill   HENT:   Head: Normocephalic and atraumatic  Eyes: Pupils are equal, round, and reactive to light  Neck: Neck supple     Cardiovascular: Normal rate and regular rhythm  Murmur heard  Pulmonary/Chest: Effort normal and breath sounds normal  No respiratory distress  She has no wheezes  She has no rales  Diminished   Abdominal: Soft  Bowel sounds are normal  She exhibits no distension  There is no tenderness  There is no rebound and no guarding  Musculoskeletal: She exhibits edema (Mild bilateral lower extremity)  Neurological: She is alert  No cranial nerve deficit  Skin: Skin is warm and dry  No rash noted  Additional Data:     Labs:    Results from last 7 days   Lab Units 07/14/20  0549 07/13/20  1317   WBC Thousand/uL 10 74* 10 62*   HEMOGLOBIN g/dL 11 9 12 5   HEMATOCRIT % 38 3 39 5   PLATELETS Thousands/uL 236 278   NEUTROS PCT %  --  68   LYMPHS PCT %  --  17   MONOS PCT %  --  12   EOS PCT %  --  1     Results from last 7 days   Lab Units 07/14/20  0549   POTASSIUM mmol/L 4 0   CHLORIDE mmol/L 100   CO2 mmol/L 19*   BUN mg/dL 96*   CREATININE mg/dL 6 22*   CALCIUM mg/dL 8 1*   ALK PHOS U/L 148*   ALT U/L 133*   AST U/L 88*     Results from last 7 days   Lab Units 07/14/20  0549   INR  5 04*       * I Have Reviewed All Lab Data Listed Above  * Additional Pertinent Lab Tests Reviewed:  All Labs Within Last 24 Hours Reviewed      Imaging:  Imaging Reports Reviewed Today Include:  X-ray    Recent Cultures (last 7 days):           Last 24 Hours Medication List:     Current Facility-Administered Medications:  acetaminophen 650 mg Oral Q6H PRN Carie Acosta PA-C    calcitriol 0 5 mcg Oral Daily MICHAEL Padron    cholecalciferol 2,000 Units Oral Every Other Day MICHAEL Padron    diltiazem 2 mg/hr Intravenous Continuous Carie Acosta PA-C Last Rate: 2 mg/hr (07/14/20 0616)   insulin aspart protamine-insulin aspart 10 Units Subcutaneous BID AC MICHAEL Padron    insulin lispro 1-5 Units Subcutaneous TID AC MICHAEL Padron    insulin lispro 1-5 Units Subcutaneous HS MICHAEL Padron    levalbuterol 0 63 mg Nebulization Q4H PRN Cuiyin Yurik, CRNP    metoprolol succinate 25 mg Oral BID Cuiyin Yurik, CRNP    ondansetron 4 mg Intravenous Q6H PRN Cuiyin Yurik, CRNP    sodium bicarbonate infusion 60 mL/hr Intravenous Continuous Cuiyin Yurik, CRNP Last Rate: 60 mL/hr (07/13/20 2004)          Today, Patient Was Seen By: Mariluz Denise MD    ** Please Note: "This note has been constructed using a voice recognition system  Therefore there may be syntax, spelling, and/or grammatical errors   Please call if you have any questions  "**

## 2020-07-14 NOTE — PROGRESS NOTES
[address] [facility] NOTE   Evette Palmer 63 y o  female MRN: [id number]  Unit/Bed#: [address] [place] [address] Encounter: [id number]  Reason for Consult: PORSHA, CKD    ASSESSMENT and PLAN:  1  PORSHA (POA):  · Admission creatinine of 6 32 on 7/13/20  · Creatinine essentially unchanged from yesterday - 6 22 today  · Differential diagnosis: Decreased ECV in the setting of RV failure, ATN (granular casts on UA), progression of CKD, anti-coagulant related nephropathy  Suspect that the PORSHA is probably a combination of all these  · Will need to check renal US to rule out obstruction  · I would not be surprised that she has progression of disease given nephrotic range proteinuria for some time now  · Although not needed urgently, I think that she may need to start dialysis  · She is interested in doing PD at home and we can elect to do urgent start PD  However, we cannot do this at [facility] and she will need to be transferred to [facility] if she elects to do this  The other option would be to start her on iHD and change her over to PD as an outpatient  2  CKD IV:  · Baseline creatinine in the mid to high 3s and has been worsening as an outpatient  · Follows with Dr Kushal Houston    · Etiology felt to be diabetic nephropathy  3  HTN:  · BP is controlled  · Continue same meds  4  PAF:  · Now back to SR  · Cardiology input appreciated  5  Metabolic acidosis: Continue IVF with bicarbonate  6  Pulm HTN  7  Anemia  8  Nephrotic range proteinuria  9  MBD: continue Calcitriol 0 5 mcg daily  DISPOSITION:  · No improvement in renal function with IVF  · I believe it is reasonable to continue with IVF x 1 more day  · Overall, I do feel that she is going to need to start dialysis as I think she has a significant component of disease progression  · Options for dialysis: Urgent start PD now (will need transfer to [facility] for this) vs iHD now and change to PD as an outpatient     · The patient will think about above options and I will discuss with her again tomorrow  · For now, will check IR availability at [facility] to place urgent PD catheter  · Follow renal US  The above plan was discussed with Dr Elinor Edwards and Dr Green Blizzard  SUBJECTIVE / INTERVAL HISTORY:  She reports feeling much better overall  Less SOB  No CP  Appetite is okay  OBJECTIVE:  Current Weight: Weight - Scale: 95 4 kg (210 lb 5 1 oz)  Vitals:    07/14/20 0354 07/14/20 0600 07/14/20 0755 07/14/20 1100   BP: 121/75  125/79 124/80   Pulse: 73  72 68   Resp: 16  15    Temp: (!) 97 °F (36 1 °C)   (!) 97 4 °F (36 3 °C)   TempSrc:       SpO2: 100%  96% 99%   Weight:  95 4 kg (210 lb 5 1 oz)         Intake/Output Summary (Last 24 hours) at 7/14/2020 1205  Last data filed at 7/14/2020 0800  Gross per 24 hour   Intake 120 ml   Output 350 ml   Net -230 ml     General: conscious, coherent, cooperative, no distress  Skin: dry, no rash  Eyes: pink conjunctivae, no scleral icterus  ENT: moist mucous membranes  Chest/Lungs: equal chest expansion, clear breath sounds  CVS: distinct heart sounds, normal rate, regular rhythm, no rub  Abdomen: soft, non tender, non distended, normal bowel sounds  Extremities: (+) bilateral leg edema  : no west catheter  Neuro: awake, alert     Psych: appropriate affect    Medications:    Current Facility-Administered Medications:     acetaminophen (TYLENOL) tablet 650 mg, 650 mg, Oral, Q6H PRN, Stiven Callahan PA-C, 650 mg at 07/14/20 0616    calcitriol (ROCALTROL) capsule 0 5 mcg, 0 5 mcg, Oral, Daily, MICHAEL Padron, 0 5 mcg at 07/14/20 8843    cholecalciferol (VITAMIN D3) tablet 2,000 Units, 2,000 Units, Oral, Every Other Day, MICHAEL Padron, 2,000 Units at 07/13/20 2206    diltiazem (CARDIZEM) 125 mg in sodium chloride 0 9 % 125 mL infusion, 2 mg/hr, Intravenous, Continuous, [place]enn [place], PA-C, Last Rate: 2 mL/hr at 07/14/20 0616, 2 mg/hr at 07/14/20 0616    insulin aspart protamine-insulin aspart (NovoLOG 70/30) 100 units/mL subcutaneous injection 10 Units, 10 Units, Subcutaneous, BID AC, Brody Enriquezrialexis, CRNP    insulin lispro (HumaLOG) 100 units/mL subcutaneous injection 1-5 Units, 1-5 Units, Subcutaneous, TID AC, 1 Units at 07/14/20 0833 **AND** Fingerstick Glucose (POCT), , , TID AC, Brody Enriquezrik, CRNP    insulin lispro (HumaLOG) 100 units/mL subcutaneous injection 1-5 Units, 1-5 Units, Subcutaneous, HS, Brody Enriquezrialexis, ARISTEONP, 1 Units at 07/13/20 2222    levalbuterol (XOPENEX) inhalation solution 0 63 mg, 0 63 mg, Nebulization, Q4H PRN, MICHAEL Padron    metoprolol succinate (TOPROL-XL) 24 hr tablet 25 mg, 25 mg, Oral, BID, Brody Maya, MICHAEL, 25 mg at 07/14/20 0833    ondansetron (ZOFRAN) injection 4 mg, 4 mg, Intravenous, Q6H PRN, MICHAEL Padron    sodium bicarbonate 75 mEq in sodium chloride 0 45 % 1,000 mL infusion, 60 mL/hr, Intravenous, Continuous, MICHAEL Padron, Last Rate: 60 mL/hr at 07/13/20 2004, 60 mL/hr at 07/13/20 2004    Laboratory Results:  Results from last 7 days   Lab Units 07/14/20  0549 07/13/20  1317   WBC Thousand/uL 10 74* 10 62*   HEMOGLOBIN g/dL 11 9 12 5   HEMATOCRIT % 38 3 39 5   PLATELETS Thousands/uL 236 278   POTASSIUM mmol/L 4 0 4 6   CHLORIDE mmol/L 100 97*   CO2 mmol/L 19* 19*   BUN mg/dL 96* 95*   CREATININE mg/dL 6 22* 6 32*   CALCIUM mg/dL 8 1* 8 5   MAGNESIUM mg/dL 2 4 2 3

## 2020-07-14 NOTE — RESPIRATORY THERAPY NOTE
RT Protocol Note  Abby Adan 63 y o  female MRN: [id number]  Unit/Bed#: [address] Encounter: [id number]    Assessment    Principal Problem:    Acute kidney injury superimposed on CKD [facility])  Active Problems:    Benign hypertension with CKD (chronic kidney disease) stage V (HCC)    Elevated TSH    Mixed hyperlipidemia    Type 2 diabetes mellitus with complication, with long-term current use of insulin (HCC)    Morbid obesity with BMI of 40 0-44 9, adult ([facility])    Atrial flutter with rapid ventricular response ([facility])    Elevated LFTs    History of pulmonary embolus (PE)    Dyspnea    Sleep apnea    Chronic diastolic congestive heart failure ([facility] )      Home Pulmonary Medications:  ProAir inhaler PRN  Home Devices/Therapy: BiPAP/CPAP    Past Medical History:   Diagnosis Date    Acute asthmatic bronchitis     last assessed: 6/2/2017    Cardiac disease     Colon polyp     Diabetes mellitus ([facility] )     Hyperlipidemia     Hypertension     Pneumonia     last assessed: 6/5/2013    Renal disorder     possible clot noted in kidney, thus blood thinners currently     Social History     Socioeconomic History    Marital status: Single     Spouse name: None    Number of children: None    Years of education: None    Highest education level: None   Occupational History    None   Social Needs    Financial resource strain: None    Food insecurity:     Worry: None     Inability: None    Transportation needs:     Medical: None     Non-medical: None   Tobacco Use    Smoking status: Never Smoker    Smokeless tobacco: Never Used   Substance and Sexual Activity    Alcohol use: Never     Frequency: Never    Drug use: No    Sexual activity: None   Lifestyle    Physical activity:     Days per week: None     Minutes per session: None    Stress: None   Relationships    Social connections:     Talks on phone: None     Gets together: None     Attends Mosque service: None     Active member of club or organization: None Attends meetings of clubs or organizations: None     Relationship status: None    Intimate partner violence:     Fear of current or ex partner: None     Emotionally abused: None     Physically abused: None     Forced sexual activity: None   Other Topics Concern    None   Social History Narrative    Caffeine use       Subjective         Objective    Physical Exam:   Assessment Type: Assess only  General Appearance: Awake, Alert  Respiratory Pattern: Normal  Bilateral Breath Sounds: Clear, Diminished  Cough: None  O2 Device: NC    Vitals:  Blood pressure 108/69, pulse 80, temperature 97 5 °F (36 4 °C), resp  rate 20, weight 94 kg (207 lb 3 7 oz), SpO2 94 %, not currently breastfeeding      Results from last 7 days   Lab Units 07/13/20  1821   PH ART  7 284*   PCO2 ART mm Hg 37 0   PO2 ART mm Hg 101 6   HCO3 ART mmol/L 17 2*   BASE EXC ART mmol/L -8 8   O2 CONTENT ART mL/dL 17 4   O2 HGB, ARTERIAL % 96 0   ABG SOURCE  Brachial, Right   NONI TEST  Yes           O2 Device: NC     Plan    Respiratory Plan: Vent/NIV/HFNC, Home Bronchodilator Patient pathway        Resp Comments: pt only uses inhaler at home PRN

## 2020-07-14 NOTE — UTILIZATION REVIEW
Notification of Inpatient Admission/Inpatient Authorization Request   This is a Notification of Inpatient Admission for [address]  Be advised that this patient was admitted to our facility under Inpatient Status  Contact Beth Cruz at [phone] for additional admission information  [address]  DEPT  DEDICATED -[phone]  Patient Name:   Vida Blackwell   YOB: 1955       [address]   [address]:   [address]  Tax ID: [id number]  NPI: [id number] Attending Provider/NPI:  Phone:  Address: [place] [[id number]]  [phone]  Same as GREGG/Karla Lin 1106 of Service Code: 24 Place of Service Name:  [address] [place] [address]   Start Date: 7/13/20 1604 Discharge Date & Time: No discharge date for patient encounter  Type of Admission: Inpatient Status Discharge Disposition (if discharged): Home/Self Care   Patient Diagnoses: Atrial flutter ([address] ) [I48 92]  Shortness of breath [R06 02]  Rapid heart beat [R00 0]  CHF (congestive heart failure) ([address] ) [I50 9]  Dyspnea [R06 00]  Dyspnea on exertion [R06 00]  Pulmonary hypertension (HCC) [I27 20]  Nephrotic range proteinuria [R80 9]  Type 2 diabetes mellitus with complication, with long-term current use of insulin (HCC) [E11 8, Z79 4]  Stage 5 chronic kidney disease not on chronic dialysis [facility]) [N18 5]     Orders: Admission Orders (From admission, onward)     Ordered        07/13/20 1604  Inpatient Admission  Once                    Assigned Utilization Review Contact: Thornell Hominy  Best  Utilization   Network Utilization Review Department  Phone: [phone]; Fax [phone]  Email: [email or web address]   ATTENTION PAYERS: Please call the assigned Utilization  directly with any questions or concerns ALL voicemails in the department are confidential  Send all requests for admission clinical reviews, approved or denied determinations and any other requests to dedicated fax number belonging to the campus where the patient is receiving treatment

## 2020-07-14 NOTE — PROGRESS NOTES
[facility] Progress Note - Junie Litten 63 y o  female MRN: [id number]    Unit/Bed#: [address] 410[address]01 Encounter: [id number]      Assessment/Plan:    1  New onset atrial flutter with rapid ventricular rate  Patient in sinus rhythm now  Continue Metoprolol  Hold coumadin- INR elevated  ECHO today  Continue with telemetry monitoring  Nuclear test planned for tomorrow      2  Acute kidney injury - appears to be progressing chronic disease, worsening  Nephrology on board     3  Severe Pulmonary hypertension - Pulmonology on board  Patient appears to have elevated Pulmonary pressure for the past year  Encourage use of CPAP at night    4  Abnormal LFTs with elevated INR - will hold statin  Continue to monitor daily CMP     5  Diabetes Mellitus with end organ - management per primary medical team      6  Dyslipidemia - management per primary medical team      Subjective:   Patient seen and examined at bedside  Patient appears comfortable  Not on oxygen supplementation  Patient reports some difficulty breathing, especially when she walks around  Objective:     Vitals: Blood pressure 125/79, pulse 72, temperature (!) 97 °F (36 1 °C), resp  rate 15, weight 95 4 kg (210 lb 5 1 oz), SpO2 96 %, not currently breastfeeding  ,Body mass index is 41 08 kg/m²    Wt Readings from Last 3 Encounters:   07/14/20 95 4 kg (210 lb 5 1 oz)   06/09/20 94 3 kg (208 lb)   06/05/20 95 6 kg (210 lb 12 8 oz)       Intake/Output Summary (Last 24 hours) at 7/14/2020 0857  Last data filed at 7/14/2020 0140  Gross per 24 hour   Intake 0 ml   Output 350 ml   Net -350 ml       Physical Exam: General appearance: alert and oriented, in no acute distress  Head: Normocephalic, without obvious abnormality, atraumatic  Throat: lips, mucosa, and tongue normal; teeth and gums normal  Neck: no JVD and supple, symmetrical, trachea midline  Lungs: clear to auscultation bilaterally  Heart: regular rate and rhythm  Abdomen: soft, non-tender; bowel sounds normal; no masses,  no organomegaly  Extremities: trace edema bilateral of lower extremities   Skin: Skin color, texture, turgor normal  No rashes or lesions     Recent Results (from the past 24 hour(s))   ECG 12 lead    Collection Time: 07/13/20  1:04 PM   Result Value Ref Range    Ventricular Rate 155 BPM    Atrial Rate 310 BPM    VT Interval  ms    QRSD Interval 84 ms    QT Interval 268 ms    QTC Interval 430 ms    P Axis 104 degrees    QRS Axis -62 degrees    T Wave Axis 129 degrees   CBC and differential    Collection Time: 07/13/20  1:17 PM   Result Value Ref Range    WBC 10 62 (H) 4 31 - 10 16 Thousand/uL    RBC 4 65 3 81 - 5 12 Million/uL    Hemoglobin 12 5 11 5 - 15 4 g/dL    Hematocrit 39 5 34 8 - 46 1 %    MCV 85 82 - 98 fL    MCH 26 9 26 8 - 34 3 pg    MCHC 31 6 31 4 - 37 4 g/dL    RDW 18 4 (H) 11 6 - 15 1 %    MPV 12 5 8 9 - 12 7 fL    Platelets 043 249 - 936 Thousands/uL    nRBC 0 /100 WBCs    Neutrophils Relative 68 43 - 75 %    Immat GRANS % 1 0 - 2 %    Lymphocytes Relative 17 14 - 44 %    Monocytes Relative 12 4 - 12 %    Eosinophils Relative 1 0 - 6 %    Basophils Relative 1 0 - 1 %    Neutrophils Absolute 7 30 1 85 - 7 62 Thousands/µL    Immature Grans Absolute 0 07 0 00 - 0 20 Thousand/uL    Lymphocytes Absolute 1 81 0 60 - 4 47 Thousands/µL    Monocytes Absolute 1 25 (H) 0 17 - 1 22 Thousand/µL    Eosinophils Absolute 0 12 0 00 - 0 61 Thousand/µL    Basophils Absolute 0 07 0 00 - 0 10 Thousands/µL   Comprehensive metabolic panel    Collection Time: 07/13/20  1:17 PM   Result Value Ref Range    Sodium 134 (L) 136 - 145 mmol/L    Potassium 4 6 3 5 - 5 3 mmol/L    Chloride 97 (L) 100 - 108 mmol/L    CO2 19 (L) 21 - 32 mmol/L    ANION GAP 18 (H) 4 - 13 mmol/L    BUN 95 (H) 5 - 25 mg/dL    Creatinine 6 32 (H) 0 60 - 1 30 mg/dL    Glucose 147 (H) 65 - 140 mg/dL    Calcium 8 5 8 3 - 10 1 mg/dL     (H) 5 - 45 U/L     (H) 12 - 78 U/L    Alkaline Phosphatase 171 (H) 46 - 116 U/L    Total Protein 7 3 6 4 - 8 2 g/dL    Albumin 3 0 (L) 3 5 - 5 0 g/dL    Total Bilirubin 0 80 0 20 - 1 00 mg/dL    eGFR 6 ml/min/1 73sq m   TSH, 3rd generation with Free T4 reflex    Collection Time: 07/13/20  1:17 PM   Result Value Ref Range    TSH 3RD GENERATON 6 638 (H) 0 358 - 3 740 uIU/mL   Magnesium    Collection Time: 07/13/20  1:17 PM   Result Value Ref Range    Magnesium 2 3 1 6 - 2 6 mg/dL   Troponin I    Collection Time: 07/13/20  1:17 PM   Result Value Ref Range    Troponin I 0 03 <=0 04 ng/mL   NT-BNP PRO    Collection Time: 07/13/20  1:17 PM   Result Value Ref Range    NT-proBNP 30,243 (H) <125 pg/mL   T4, free    Collection Time: 07/13/20  1:17 PM   Result Value Ref Range    Free T4 1 30 0 76 - 1 46 ng/dL   ECG 12 lead    Collection Time: 07/13/20  1:43 PM   Result Value Ref Range    Ventricular Rate 78 BPM    Atrial Rate 312 BPM    KS Interval  ms    QRSD Interval 88 ms    QT Interval 400 ms    QTC Interval 456 ms    P Axis  degrees    QRS Axis -26 degrees    T Wave Axis 88 degrees   ECG 12 lead    Collection Time: 07/13/20  1:44 PM   Result Value Ref Range    Ventricular Rate 79 BPM    Atrial Rate 316 BPM    KS Interval  ms    QRSD Interval 88 ms    QT Interval 390 ms    QTC Interval 447 ms    P Axis  degrees    QRS Axis -19 degrees    T Wave Axis 86 degrees   Protime-INR    Collection Time: 07/13/20  2:00 PM   Result Value Ref Range    Protime 39 2 (H) 11 6 - 14 5 seconds    INR 4 11 (H) 0 84 - 1 19   APTT    Collection Time: 07/13/20  2:00 PM   Result Value Ref Range    PTT 45 (H) 23 - 37 seconds   UA w Reflex to Microscopic w Reflex to Culture    Collection Time: 07/13/20  3:15 PM   Result Value Ref Range    Color, UA Yellow     Clarity, UA Clear     Specific Gravity, UA 1 025 1 000 - 1 030    pH, UA 5 5 5 0, 5 5, 6 0, 6 5, 7 0, 7 5, 8 0, 8 5, 9 0    Leukocytes, UA Negative Negative    Nitrite, UA Negative Negative    Protein, UA >=300 (A) Negative mg/dl    Glucose, UA Negative Negative mg/dl    Ketones, UA Negative Negative mg/dl    Urobilinogen, UA 0 2 0 2, 1 0 E U /dl E U /dl    Bilirubin, UA Negative Negative    Blood, UA Small (A) Negative   Urine Microscopic    Collection Time: 07/13/20  3:15 PM   Result Value Ref Range    RBC, UA 0-1 (A) None Seen, 0-5 /hpf    WBC, UA 4-10 (A) None Seen, 0-5, 5-55, 5-65 /hpf    Epithelial Cells Occasional None Seen, Occasional /hpf    Bacteria, UA Occasional None Seen, Occasional /hpf    Hyaline Casts, UA 4-10 (A) (none) /lpf    COARSE GRANULAR CASTS 3-4 /lpf    AMORPH URATES Moderate /hpf   Troponin I    Collection Time: 07/13/20  5:25 PM   Result Value Ref Range    Troponin I 0 02 <=0 04 ng/mL   Blood gas, arterial    Collection Time: 07/13/20  6:21 PM   Result Value Ref Range    pH, Arterial 7 284 (L) 7 350 - 7 450    PH ART TC 7 288 (L) 7 350 - 7 450    pCO2, Arterial 37 0 36 0 - 44 0 mm Hg    PCO2 (TC) Arterial 36 5 36 0 - 44 0 mm Hg    pO2, Arterial 101 6 75 0 - 129 0 mm Hg    PO2 (TC) Arterial 99 8 75 0 - 129 0 mm Hg    HCO3, Arterial 17 2 (L) 22 0 - 28 0 mmol/L    Base Excess, Arterial -8 8 mmol/L    O2 Content, Arterial 17 4 16 0 - 23 0 mL/dL    O2 HGB,Arterial  96 0 94 0 - 97 0 %    SOURCE Brachial, Right     NONI TEST Yes     Temperature 98 0 Degrees Fehrenheit    Nasal Cannula 2    Lactic acid, plasma    Collection Time: 07/13/20  7:04 PM   Result Value Ref Range    LACTIC ACID 0 8 0 5 - 2 0 mmol/L   Fingerstick Glucose (POCT)    Collection Time: 07/13/20  8:08 PM   Result Value Ref Range    POC Glucose 90 65 - 140 mg/dl   Fingerstick Glucose (POCT)    Collection Time: 07/13/20 10:06 PM   Result Value Ref Range    POC Glucose 176 (H) 65 - 140 mg/dl   CK    Collection Time: 07/14/20  5:49 AM   Result Value Ref Range    Total  26 - 192 U/L   Comprehensive metabolic panel    Collection Time: 07/14/20  5:49 AM   Result Value Ref Range    Sodium 133 (L) 136 - 145 mmol/L    Potassium 4 0 3 5 - 5 3 mmol/L    Chloride 100 100 - 108 mmol/L    CO2 19 (L) 21 - 32 mmol/L    ANION GAP 14 (H) 4 - 13 mmol/L    BUN 96 (H) 5 - 25 mg/dL    Creatinine 6 22 (H) 0 60 - 1 30 mg/dL    Glucose 146 (H) 65 - 140 mg/dL    Calcium 8 1 (L) 8 3 - 10 1 mg/dL    AST 88 (H) 5 - 45 U/L     (H) 12 - 78 U/L    Alkaline Phosphatase 148 (H) 46 - 116 U/L    Total Protein 6 7 6 4 - 8 2 g/dL    Albumin 2 7 (L) 3 5 - 5 0 g/dL    Total Bilirubin 0 60 0 20 - 1 00 mg/dL    eGFR 7 ml/min/1 73sq m   Magnesium    Collection Time: 07/14/20  5:49 AM   Result Value Ref Range    Magnesium 2 4 1 6 - 2 6 mg/dL   CBC    Collection Time: 07/14/20  5:49 AM   Result Value Ref Range    WBC 10 74 (H) 4 31 - 10 16 Thousand/uL    RBC 4 48 3 81 - 5 12 Million/uL    Hemoglobin 11 9 11 5 - 15 4 g/dL    Hematocrit 38 3 34 8 - 46 1 %    MCV 86 82 - 98 fL    MCH 26 6 (L) 26 8 - 34 3 pg    MCHC 31 1 (L) 31 4 - 37 4 g/dL    RDW 18 6 (H) 11 6 - 15 1 %    Platelets 972 120 - 453 Thousands/uL    MPV 12 2 8 9 - 12 7 fL   Protime-INR    Collection Time: 07/14/20  5:49 AM   Result Value Ref Range    Protime 45 9 (H) 11 6 - 14 5 seconds    INR 5 04 (HH) 0 84 - 1 19   CKMB    Collection Time: 07/14/20  5:49 AM   Result Value Ref Range    CK-MB Index 3 3 (H) 0 0 - 2 5 %    CK-MB 4 4 0 0 - 5 0 ng/mL   Fingerstick Glucose (POCT)    Collection Time: 07/14/20  7:04 AM   Result Value Ref Range    POC Glucose 153 (H) 65 - 140 mg/dl       Current Facility-Administered Medications   Medication Dose Route Frequency Provider Last Rate Last Dose    acetaminophen (TYLENOL) tablet 650 mg  650 mg Oral Q6H PRN Nba Bragg PA-C   650 mg at 07/14/20 0616    calcitriol (ROCALTROL) capsule 0 5 mcg  0 5 mcg Oral Daily MICHAEL Padron   0 5 mcg at 07/14/20 8362    cholecalciferol (VITAMIN D3) tablet 2,000 Units  2,000 Units Oral Every Other Day MICHAEL Padron   2,000 Units at 07/13/20 2200    diltiazem (CARDIZEM) 125 mg in sodium chloride 0 9 % 125 mL infusion  2 mg/hr Intravenous Continuous Nba Bragg PA-C 2 mL/hr at 07/14/20 0616 2 mg/hr at 07/14/20 0616    insulin aspart protamine-insulin aspart (NovoLOG 70/30) 100 units/mL subcutaneous injection 10 Units  10 Units Subcutaneous BID AC MICHAEL Padron        insulin lispro (HumaLOG) 100 units/mL subcutaneous injection 1-5 Units  1-5 Units Subcutaneous TID AC MICHAEL Padron   1 Units at 07/14/20 0833    insulin lispro (HumaLOG) 100 units/mL subcutaneous injection 1-5 Units  1-5 Units Subcutaneous HS MICHAEL Padron   1 Units at 07/13/20 2222    levalbuterol (XOPENEX) inhalation solution 0 63 mg  0 63 mg Nebulization Q4H PRN MICHAEL Padron        metoprolol succinate (TOPROL-XL) 24 hr tablet 25 mg  25 mg Oral BID MICHAEL Padron   25 mg at 07/14/20 0833    ondansetron (ZOFRAN) injection 4 mg  4 mg Intravenous Q6H PRN MICHAEL Padron        sodium bicarbonate 75 mEq in sodium chloride 0 45 % 1,000 mL infusion  60 mL/hr Intravenous Continuous MICHAEL Padron 60 mL/hr at 07/13/20 2004 60 mL/hr at 07/13/20 2004       Invasive Devices     Peripheral Intravenous Line            Peripheral IV 07/13/20 Left Antecubital less than 1 day    Peripheral IV 07/13/20 Right Antecubital less than 1 day                Lab, Imaging and other studies: I have personally reviewed pertinent reports      VTE Pharmacologic Prophylaxis: Reason for no pharmacologic prophylaxis INR elevated   VTE Mechanical Prophylaxis: sequential compression device    Albertina Lundberg DO

## 2020-07-14 NOTE — UTILIZATION REVIEW
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  Will need to be on statun therapy when LFTs better  BMI 41   Continue coumadin ,metoprolol   Stress test planned for tomorrow  Encouraged by pulmonary compliance with cpap  Per nephrology will continue IVF x1 more day , d/w patient disease progression and probable need for dialysis   Will discuss again tomorrow  For now, will check IR availability at [facility] to place urgent PD catheter  Pulmonary needs to be requalified for oxygen therapy has beed off since nov 2019   Sleep papnea sever CHANDRA continue 14cm of water CPAP encouraged compliance  Pertinent Labs/Diagnostic Results:   7/14 us kidney bladder No evidence of obstruction     Results from last 7 days   Lab Units 07/14/20  0549 07/13/20  1317   WBC Thousand/uL 10 74* 10 62*   HEMOGLOBIN g/dL 11 9 12 5   HEMATOCRIT % 38 3 39 5   PLATELETS Thousands/uL 236 278   NEUTROS ABS Thousands/µL  --  7 30     Results from last 7 days   Lab Units 07/14/20  0549 07/13/20  1317   SODIUM mmol/L 133* 134*   POTASSIUM mmol/L 4 0 4 6   CHLORIDE mmol/L 100 97*   CO2 mmol/L 19* 19*   ANION GAP mmol/L 14* 18*   BUN mg/dL 96* 95*   CREATININE mg/dL 6 22* 6 32*   EGFR ml/min/1 73sq m 7 6   CALCIUM mg/dL 8 1* 8 5   MAGNESIUM mg/dL 2 4 2 3     Results from last 7 days   Lab Units 07/14/20  0549 07/13/20  1317   AST U/L 88* 155*   ALT U/L 133* 150*   ALK PHOS U/L 148* 171*   TOTAL PROTEIN g/dL 6 7 7 3   ALBUMIN g/dL 2  7* 3 0*   TOTAL BILIRUBIN mg/dL 0 60 0 80     Results from last 7 days   Lab Units 07/14/20  1058 07/14/20  0704 07/13/20  2206 07/13/20  2008   POC GLUCOSE mg/dl 210* 153* 176* 90     Results from last 7 days   Lab Units 07/14/20  0549 07/13/20  1317   GLUCOSE RANDOM mg/dL 146* 147*     Results from last 7 days   Lab Units 07/14/20  0549   HEMOGLOBIN A1C % 6 8*   EAG mg/dl 148     No results found for: BETA-HYDROXYBUTYRATE   Results from last 7 days   Lab Units 07/13/20  1821   PH ART  7 284*   PCO2 ART mm Hg 37 0   PO2 ART mm Hg 101 6   HCO3 ART mmol/L 17 2*   BASE EXC ART mmol/L -8 8   O2 CONTENT ART mL/dL 17 4   O2 HGB, ARTERIAL % 96 0   ABG SOURCE  Brachial, Right     Results from last 7 days   Lab Units 07/14/20  0549   CK TOTAL U/L 135   CK MB INDEX % 3 3*   CK MB ng/mL 4 4     Results from last 7 days   Lab Units 07/13/20  1725 07/13/20  1317   TROPONIN I ng/mL 0 02 0 03     Results from last 7 days   Lab Units 07/14/20  0549 07/13/20  1400   PROTIME seconds 45 9* 39 2*   INR  5 04* 4 11*   PTT seconds  --  45*     Results from last 7 days   Lab Units 07/13/20  1317   TSH 3RD GENERATON uIU/mL 6 638*     Results from last 7 days   Lab Units 07/13/20  1904   LACTIC ACID mmol/L 0 8     Results from last 7 days   Lab Units 07/13/20  1317   NT-PRO BNP pg/mL 30,243*     Results from last 7 days   Lab Units 07/14/20  0136 07/13/20  1515   CLARITY UA   --  Clear   COLOR UA   --  Yellow   SPEC GRAV UA   --  1 025   PH UA   --  5 5   GLUCOSE UA mg/dl  --  Negative   KETONES UA mg/dl  --  Negative   BLOOD UA   --  Small*   PROTEIN UA mg/dl  --  >=300*   NITRITE UA   --  Negative   BILIRUBIN UA   --  Negative   UROBILINOGEN UA E U /dl  --  0 2   LEUKOCYTES UA   --  Negative   WBC UA /hpf  --  4-10*   RBC UA /hpf  --  0-1*   BACTERIA UA /hpf  --  Occasional   EPITHELIAL CELLS WET PREP /hpf  --  Occasional   SODIUM UR  10  --    CREATININE UR mg/dL 95 3  --      Vital Signs:   14/20 11:00:46  97 4 °F (36 3 °C)Abnormal 68    124/80  95  99 %           07/14/20 1100  97 4 °F (36 3 °C)Abnormal   74    124/80  95  99 %           07/14/20 0900              28  2 L/min           Tele mon  Oxygen 2L  Daily weight I/o  Bmp   Phos   Pt inr  Respiratory protocol    Medications:   Scheduled Medications:    Medications:  calcitriol 0 5 mcg Oral Daily   cholecalciferol 2,000 Units Oral Every Other Day   insulin aspart protamine-insulin aspart 10 Units Subcutaneous BID AC   insulin lispro 1-5 Units Subcutaneous TID AC   insulin lispro 1-5 Units Subcutaneous HS   metoprolol succinate 25 mg Oral BID     Continuous IV Infusions:    diltiazem 2 mg/hr Intravenous Continuous   sodium bicarbonate infusion 60 mL/hr Intravenous Continuous     PRN Meds:    acetaminophen 650 mg Oral Q6H PRN   levalbuterol 0 63 mg Nebulization Q4H PRN   ondansetron 4 mg Intravenous Q6H PRN       Discharge Plan: tbd    Network Utilization Review Department  [email or web address]  ATTENTION: Please call with any questions or concerns to [phone] and carefully listen to the prompts so that you are directed to the right person  All voicemails are confidential   Candia Siemens all requests for admission clinical reviews, approved or denied determinations and any other requests to dedicated fax number below belonging to the campus where the patient is receiving treatment   List of dedicated fax numbers for the Facilities:  FACILITY NAME UR FAX NUMBER   ADMISSION DENIALS (Administrative/Medical Necessity) [phone]   [address] [place] [address] (Maternity/NICU/Pediatrics) [phone]   Don Christianson [phone]   Xi Quiroz [phone]   Paulo [place] [phone]   [place] [address] [place] [address] [address] [facility] [phone]   [address] [place] [address] [phone]

## 2020-07-14 NOTE — ASSESSMENT & PLAN NOTE
Gurjitunately I qualified her in a backwards fashion  She adequately oxygenates on 2 L NC  Will get overnight pulse oximetry on RA tonight to qualify for 02

## 2020-07-14 NOTE — ASSESSMENT & PLAN NOTE
Prior history of intermediately positive nuclear medicine test  On Coumadin anticoagulation therapy  Consideration for contribution to pulmonary HTN

## 2020-07-14 NOTE — CONSULTS
Consultation - Pulmonary Medicine  Piotr Jett 63 y o  female MRN: [id number]  Unit/Bed#: [address] [place] [address] Encounter: [id number]  Code Status: Level 1 - Full Code    Assessment/Plan:  Chronic hypoxemic respiratory failure ([facility] )  Assessment & Plan  At one time patient was on chronic oxygen therapy, however she minimally used it  She returned her oxygen back in November 2019  Needs to be requalified for oxygen therapy    Sleep apnea  Assessment & Plan  History of severe CHANDRA  Normally on auto CPAP 14-17 cm of water  Continue 14 cm of water CPAP in the hospital  Encourage compliance in the outpatient setting for improvement of nocturnal hypoxemia and therefore pulmonary HTN, as well as otherwise symptomatic therapy    Dyspnea  Assessment & Plan  Markedly improved  Currently not resting hypoxemic  Chronic dyspnea with even 4-500 feet of ambulation  We discussed her chronic contributory condition, CHF, arrhythmias, pulmonary HTN, fluid overload  Discussed compliance with oxygen as well as Pap therapy    History of pulmonary embolus (PE)  Assessment & Plan  Prior history of intermediately positive nuclear medicine test  On Coumadin anticoagulation therapy  Consideration for contribution to pulmonary HTN    Morbid obesity with BMI of 40 0-44 9, adult ([facility] )  Assessment & Plan  Weight loss recommended for improvement of chronic comorbid conditions      D/C and Follow up Needs: Follow up in the outpatient setting, ideally in next 1-2 weeks for follow up  Later Scheduling may be required due to scheduling restraints    Items to follow up :  Oxygen therapy compliance  CPAP compliance  Outpatient need for PAH follow up       Thank you for this consultation; we will be happy to follow with you     ______________________________________________________________________      History of Present Illness   Physician Requesting Consult: Ksenia Serrano MD  Reason for Consult / Principal Problem: Shortness of breath  HX and PE limited by: HPI:  Srinivas Hughes is a 63 y o  female  has a past medical history of Acute asthmatic bronchitis, Cardiac disease, Chronic diastolic (congestive) heart failure ([facility] ), Colon polyp, Diabetes mellitus ([facility] ), Hyperlipidemia, Hypertension, Medical non-compliance, Obstructive sleep apnea, Pneumonia, Pulmonary embolism ([facility] ), Renal disorder, and Severe pulmonary arterial systolic hypertension ([facility] )  who presents with Chief Complaint of :  Illness of breath  Srinivas Hughes has a significant past medical history of type 2 diabetes, stage 5 chronic kidney disease, HTN, hyperlipidemia, chronic diastolic heart failure, coronary artery disease, history of pulmonary embolus on Coumadin therapy, chronic hypoxemic respiratory failure with medical noncompliance on oxygen therapy, severe obstructive sleep apnea with noncompliance of CPAP  She has known prior follow-up with Dr Chante Ramos of [place] heart failure specialists for pulmonary HTN with prior recommendations for secondary disease management modification and was encouraged to trial sildenafil as well as follow-up with right heart catheterization for which the patient declined  She thought it was odd that the physician would recommend that she be placed on Viagra  Here and this hospitalization the patient presented with a several-day history of progressive shortness of breath starting the Friday prior to her presentation  She presented to the hospital on Monday June 13th with shortness of breath and reduced ability to ambulate at home  She reports she normally has difficulty ambulating 500 ft without significant dyspnea at home, this while not utilizing oxygen  She presented to the emergency department and found to be in rapid atrial flutter with heart rates in the 150s to 160 range with an acute injury on chronic kidney disease and creatinine range in the 6 3 / 95 BUN range up from her normal 3 4-3 5/50s range    On initial presentation she had a uremic acidosis with thoughts that her severe pulmonary HTN with PA pressures in the 90s limits forward vascular flow in may be a significant contributing cause to her progressive renal failure  As mentioned previously, she has had follow-up in the pulmonary HTN clinic with a heart failure specialists with concern for multifactorial secondary HTN and room for optimization for suspected WHO categories 2, 3, and ? Group 4 with ongoing chronic hypoxemia with oxygen noncompliance, CPAP noncompliance, and possible contributing pulmonary HTN secondary to her PE  Regarding her pulmonary embolus she prior had difficulty achieving a therapeutic status, however, on hospital admission her INR was 4+ in the supra therapeutic range  Initially on presentation she was in new onset atrial flutter and received Cardizem in the emergency department as well as placed on a Cardizem infusion and ultimately did return back to normal sinus rhythm  Pulmonology was consulted regarding her chronic shortness of breath and heart failure  I did discuss with the patient that she likely has ongoing chronic ambulatory hypoxemia that is untreated which also can contribute to her pulmonary HTN  We also discussed that CPAP noncompliance certainly can add to chronic hypoxemia in the nocturnal setting, and all of which affects her pulmonary HTN and overall breathing status  She is agreeable to requalify for oxygen as well as to continue trial CPAP at night with goals for greater compliance  Overall, we discussed need for return to the pulmonary HTN clinic for further follow-up        Smoking history:  None never  Occupational history:  Deferred  Environmental History:  No exposure history  Travel history:  No recent travel  Respiratory History:  History of severe CHANDRA/chronic hypoxemic respiratory failure/pulmonary embolus/pulmonary HTN  Oxygen Therapy: History of 02 dependence of 3 L   PAP Therapy: prior auto CPAP / non compliance  DME Company:[employer]  Rx Insurance: BC/BS      Review of Systems   Constitutional: Negative for activity change, appetite change, chills, fatigue and fever  HENT: Negative for postnasal drip, rhinorrhea, sinus pressure and sinus pain  Eyes: Negative for visual disturbance  Respiratory: Positive for shortness of breath  Negative for apnea, cough, chest tightness, wheezing and stridor  Cardiovascular: Negative for chest pain and leg swelling  Gastrointestinal: Negative for diarrhea, nausea and vomiting  Endocrine: Negative for cold intolerance and heat intolerance  Musculoskeletal: Negative for arthralgias, back pain, joint swelling and myalgias  Skin: Negative for color change  Neurological: Negative for syncope and light-headedness  Hematological: Negative for adenopathy  Psychiatric/Behavioral: Negative for confusion and sleep disturbance         Past Medical/Surgical History  Past Medical History:   Diagnosis Date    Acute asthmatic bronchitis     last assessed: 2017    Cardiac disease     Colon polyp     Diabetes mellitus ([facility] )     Hyperlipidemia     Hypertension     Pneumonia     last assessed: 2013    Renal disorder     possible clot noted in kidney, thus blood thinners currently     Past Surgical History:   Procedure Laterality Date     SECTION       x 1    NOSE SURGERY      fractured nose - septoplasty       Social History  Social History     Substance and Sexual Activity   Alcohol Use Never    Frequency: Never     Social History     Substance and Sexual Activity   Drug Use No     Social History     Tobacco Use   Smoking Status Never Smoker   Smokeless Tobacco Never Used       Family History  Family History   Problem Relation Age of Onset    Diabetes Mother         mellitus    Anxiety disorder Mother         generalized anxiety disorder    Hypertension Mother     Kidney disease Father     Kidney failure Father         renal failure    Ulcerative colitis Sister     Heart disease Family     Ovarian cancer Maternal Aunt        Allergies  No Known Allergies    Home Meds:   Medications Prior to Admission   Medication Sig Dispense Refill Last Dose    albuterol (PROAIR HFA) 90 mcg/act inhaler Inhale 2 puffs every 4 (four) hours as needed for wheezing or shortness of breath 1 Inhaler 0 7/12/2020 at Unknown time    atorvastatin (LIPITOR) 20 mg tablet Take 1 tablet (20 mg total) by mouth daily (Patient taking differently: Take 20 mg by mouth every other day ) 90 tablet 1 7/12/2020 at Unknown time    B Complex-C (B-COMPLEX WITH VITAMIN C) tablet Take 1 tablet by mouth daily   7/12/2020 at Unknown time    calcitriol (ROCALTROL) 0 5 MCG capsule Take 1 capsule (0 5 mcg total) by mouth daily 90 capsule 3 7/13/2020 at Unknown time    Insulin Lispro Prot & Lispro (HUMALOG MIX 75/25 KWIKPEN) (75-25) 100 units/mL injection pen Inject 32 Units under the skin 2 (two) times a day with meals for 250 days (Patient taking differently: Inject 32 Units under the skin 2 (two) times a day with meals Patient takes 10 units in AM with 20 units with dinner ) 150 mL 3 7/13/2020 at Unknown time    linaGLIPtin (TRADJENTA) 5 MG TABS Take 5 mg by mouth daily 90 tablet 3 7/13/2020 at Unknown time    metoprolol succinate (TOPROL-XL) 25 mg 24 hr tablet Take 1 tablet (25 mg total) by mouth 2 (two) times a day 180 tablet 3 7/13/2020 at 0700    MULTIPLE VITAMIN PO Take 1 tablet by mouth daily   7/12/2020 at Unknown time    ONE TOUCH ULTRA TEST test strip TEST GLUCOSE THREE TIMES A  each 3 7/13/2020 at Unknown time    torsemide (DEMADEX) 20 mg tablet TAKE 2 TABLETS DAILY IN THE MORNING AND TAKE AN ADDITIONAL TABLET IN THE EVENING FOR WEIGHT GAIN OR WORSENING SWELLING 270 tablet 3 7/12/2020 at Unknown time    warfarin (COUMADIN) 1 mg tablet One daily as directed 90 tablet 1 Past Week at Unknown time    warfarin (COUMADIN) 2 mg tablet TAKE 2 TABLETS (4 MG) DAILY OR AS DIRECTED 180 tablet 0 7/13/2020 at Unknown time    Blood Glucose Monitoring Suppl (ONE TOUCH ULTRA 2) w/Device KIT Test glucose 3 times daily 1 each 0 Taking    cholecalciferol 2000 units TABS Take 1 tablet (2,000 Units total) by mouth daily (Patient taking differently: Take 2,000 Units by mouth every other day ) 30 tablet 0 Taking    fluticasone-vilanterol (BREO ELLIPTA) 100-25 mcg/inh inhaler Inhale 1 puff daily Rinse mouth after use   (Patient not taking: Reported on 7/13/2020) 1 Inhaler 6 Not Taking at Unknown time    insuln lispro (HUMALOG KWIKPEN) 200 units/mL CONCENTRATED U-200 injection pen Inject 5 Units under the skin daily before lunch (Patient not taking: Reported on 7/13/2020) 5 pen 4 Not Taking at Unknown time    warfarin (COUMADIN) 3 mg tablet Take 5 milligrams on 7/16/18, July 17, 2018 and get PT/INR on July 17, 2018 (Patient not taking: Reported on 6/9/2020) 30 tablet 0 Not Taking at Unknown time    warfarin (COUMADIN) 5 mg tablet Take 1 tablet (5 mg total) by mouth daily (Patient not taking: Reported on 7/13/2020) 90 tablet 1 Not Taking at Unknown time     Current Meds:   Scheduled Meds:  Current Facility-Administered Medications:  acetaminophen 650 mg Oral Q6H PRN Mauricio Narayanan PA-C    calcitriol 0 5 mcg Oral Daily MICHAEL Padron    cholecalciferol 2,000 Units Oral Every Other Day MICHAEL Padron    diltiazem 2 mg/hr Intravenous Continuous Mauricio Narayanan PA-C Last Rate: 2 mg/hr (07/14/20 0616)   insulin aspart protamine-insulin aspart 10 Units Subcutaneous BID AC MICHAEL Padron    insulin lispro 1-5 Units Subcutaneous TID AC MICHAEL Padron    insulin lispro 1-5 Units Subcutaneous HS MICHAEL Padron    levalbuterol 0 63 mg Nebulization Q4H PRN MICHAEL Padron    metoprolol succinate 25 mg Oral BID MICHAEL Padron    ondansetron 4 mg Intravenous Q6H PRN Cuiyin Yurik, CRNP    sodium bicarbonate infusion 60 mL/hr Intravenous Continuous MICHAEL Padron Last Rate: 60 mL/hr (07/13/20 )     PRN Meds:  acetaminophen 650 mg Q6H PRN   levalbuterol 0 63 mg Q4H PRN   ondansetron 4 mg Q6H PRN       ____________________________________________________________________    Objective   Vitals:   Temp:  [97 °F (36 1 °C)-98 2 °F (36 8 °C)] 97 4 °F (36 3 °C)  HR:  [] 68  Resp:  [13-26] 15  BP: (107-144)/(69-87) 124/80  Weight (last 2 days)     Date/Time   Weight    20 0600   95 4 (210 32)    20 1923   94 (207 23)    20 17:45:31   94 (207 23)    20 1301   94 3 (208)            Vitals:    20 0354 20 0600 20 0755 20 1100   BP: 121/75  125/79 124/80   Pulse: 73  72 68   Resp: 16  15    Temp: (!) 97 °F (36 1 °C)   (!) 97 4 °F (36 3 °C)   TempSrc:       SpO2: 100%  96% 99%   Weight:  95 4 kg (210 lb 5 1 oz)       Temp (24hrs), Av 6 °F (36 4 °C), Min:97 °F (36 1 °C), Max:98 2 °F (36 8 °C)  Current: Temperature: (!) 97 4 °F (36 3 °C)        SpO2: SpO2: 99 %, SpO2 Activity: SpO2 Activity: At Rest, SpO2 Device: O2 Device: Nasal cannula       IV Infusions:     diltiazem 2 mg/hr Last Rate: 2 mg/hr (20)   sodium bicarbonate infusion 60 mL/hr Last Rate: 60 mL/hr (20)       Nutrition:        Diet Orders   (From admission, onward)             Start     Ordered    20  Room Service  Once     Question:  Type of Service  Answer:  Room Service-Appropriate    20  Diet Renal; Renal [place]; No; No; Sodium 2 Gm, Consistent Carbohydrate Diet Level 2 (5 carb servings/75 grams CHO/meal)  Diet effective now     Question Answer Comment   Diet Type Renal    Renal Renal [place]    Should patient have a fluid restriction? No    Should patient have a protein modifier? No    Other Restriction(s): Sodium 2 Gm    Other Restriction(s): Consistent Carbohydrate Diet Level 2 (5 carb servings/75 grams CHO/meal)    RD to adjust diet per protocol?  Yes        20                  Ins/Outs:   I/O       701 -  0700 07/13 0701 - 07/14 0700 07/14 0701 - 07/15 0700    P  O    120    IV Piggyback  0     Total Intake(mL/kg)  0 (0) 120 (1 3)    Urine (mL/kg/hr)  350     Total Output  350     Net  -350 +120                   Lines/Drains:  Invasive Devices     Peripheral Intravenous Line            Peripheral IV 07/13/20 Left Antecubital less than 1 day    Peripheral IV 07/13/20 Right Antecubital less than 1 day                ____________________________________________________________________      Physical Exam   Constitutional: She is oriented to person, place, and time  She appears well-developed and well-nourished  Morbidly obese body habitus   HENT:   Head: Normocephalic and atraumatic  Eyes: Pupils are equal, round, and reactive to light  Conjunctivae are normal    Neck: Normal range of motion  Neck supple  Cardiovascular: Normal rate, regular rhythm and normal heart sounds  Pulmonary/Chest: Effort normal  No respiratory distress  She has decreased breath sounds in the right upper field, the right middle field, the right lower field, the left upper field, the left middle field and the left lower field  She has no wheezes  She has no rales  She exhibits no tenderness  94% RA resting   Abdominal: Soft  Bowel sounds are normal    Musculoskeletal: Normal range of motion  She exhibits no edema  Mild chronic brawny edema   Neurological: She is alert and oriented to person, place, and time  Skin: Skin is warm and dry     Psychiatric: She has a normal mood and affect        ____________________________________________________________________    Invasive/non-invasive ventilation settings   Respiratory    Lab Data (Last 4 hours)    None         O2/Vent Data (Last 4 hours)    None                Laboratory and Diagnostics:  Results from last 7 days   Lab Units 07/14/20  0549 07/13/20  1317   WBC Thousand/uL 10 74* 10 62*   HEMOGLOBIN g/dL 11 9 12 5   HEMATOCRIT % 38 3 39 5   PLATELETS Thousands/uL 236 278   NEUTROS PCT % --  68   MONOS PCT %  --  12     Results from last 7 days   Lab Units 20  0549 20  1317   SODIUM mmol/L 133* 134*   POTASSIUM mmol/L 4 0 4 6   CHLORIDE mmol/L 100 97*   CO2 mmol/L 19* 19*   ANION GAP mmol/L 14* 18*   BUN mg/dL 96* 95*   CREATININE mg/dL 6 22* 6 32*   CALCIUM mg/dL 8 1* 8 5   GLUCOSE RANDOM mg/dL 146* 147*   ALT U/L 133* 150*   AST U/L 88* 155*   ALK PHOS U/L 148* 171*   ALBUMIN g/dL 2 7* 3 0*   TOTAL BILIRUBIN mg/dL 0 60 0 80     Results from last 7 days   Lab Units 20  0549 20  1317   MAGNESIUM mg/dL 2 4 2 3      Results from last 7 days   Lab Units 20  0549 20  1400   INR  5 04* 4 11*   PTT seconds  --  45*      Results from last 7 days   Lab Units 20  1725 20  1317   TROPONIN I ng/mL 0 02 0 03     Results from last 7 days   Lab Units 20  1904   LACTIC ACID mmol/L 0 8     ABG:  Results from last 7 days   Lab Units 20  1821   PH ART  7 284*   PCO2 ART mm Hg 37 0   PO2 ART mm Hg 101 6   HCO3 ART mmol/L 17 2*   BASE EXC ART mmol/L -8 8   ABG SOURCE  Brachial, Right     VBG:  Results from last 7 days   Lab Units 20  1821   ABG SOURCE  Brachial, Right           Micro        Imaging:   XR chest 1 view portable   Final Result by Jose Dawn MD ([id number])      No acute cardiopulmonary disease  Workstation performed: [id number]          kidney and bladder    (Results Pending)       PFT:    Pulmonary Function Test Report  Marlene Dacosta 60 y o  female MRN: [id number]     Date of Testin2019     Date of Interpretation: 2019     Requesting Physician: Dr Hallie Guzman     Reason for Testing: Exertional SOB, cough     Reference set for interpretation: [id number]     Procedure: The patient was taken to pulmonary function testing laboratory  The patient demonstrated good effort and cooperation  The results of this test meet ATS standards for acceptability and repeatability  Data set appears appropriate for interpretation   The results of this test appear to be valid, although the ATS standard for three acceptable maneuvers was not met      Post bronchodilator testing performed after the administration of 2 5mg albuterol in 3cc normal saline administered via nebulizer per bronchodilator protocol      Results:  FEV1/FVC Ratio: 84 %  Forced Vital Capacity: 1 34 L    50 % predicted  FEV1: 1 13 L     53 % predicted     Lung volumes by body plethysmography:   Total Lung Capacity 66 % predicted   Residual volume 87 % predicted     DLCO corrected for patients hemoglobin level: 29 %     Interpretation:     · No obstructive airflow defect      · No significant response to the administration to bronchodilator per ATS Standards     · Moderate restrictive lung disease as indicated by decreased TLC     · Severely decreased diffusion capacity     HAM Arredondo  [facility]    EKG/ECHO:     Echo 2020 pending      [address]  [address] [facility]  [phone]     Transthoracic Echocardiogram  2D, M-mode, Doppler, and Color Doppler     Study date:  01-May-2019     Patient: Zohra Fam  MR number: [id number]  Account number: [de-identified]  : 1955  Age: 60 years  Gender: Female  Status: Outpatient  Location: Echo lab  Height: 60 in  Weight: 216 5 lb  BP: 158/ 85 mmHg     Indications: Dyspnea     Diagnoses: R06 00 - Dyspnea, unspecified     Sonographer:  JESUS Vila  Primary Physician:  Mukul Dalton DO  Referring Physician:  Prince Erwin MD  Group:  [facility]  Interpreting Physician:  Eliceo Beck MD     SUMMARY     SUMMARY:  Compared with echo, mitral gradient are decreased, and pulmonic pressures are measured as severe     LEFT VENTRICLE:  Systolic function was at the lower limits of normal when viewed with definity  Ejection fraction was estimated in the range of 50 % to 55 %    There were no regional wall motion abnormalities  Wall thickness was moderately increased  Features were consistent with a pseudonormal left ventricular filling pattern, with concomitant abnormal relaxation and increased filling pressure (grade 2 diastolic dysfunction)      RIGHT VENTRICLE:  Wall thickness was increased      LEFT ATRIUM:  The atrium was mildly dilated      RIGHT ATRIUM:  The atrium was mildly dilated      MITRAL VALVE:  There was moderate annular calcification  Transmitral velocity was increased due to valvular stenosis  There was very mild stenosis with mean gradient 4mmHg     TRICUSPID VALVE:  There was mild regurgitation      IVC, HEPATIC VEINS:  The inferior vena cava was dilated      HISTORY: PRIOR HISTORY: Hypothyroidism, Diabetes, Obstructive sleep apnea, Pulmonary Thromboembolism, Pulmonary HTN,Chronic kidney Disease      PROCEDURE: The procedure was performed in the echo lab  This was a routine study  The transthoracic approach was used  The study included complete 2D imaging, M-mode, limited spectral Doppler, and color Doppler  The heart rate was 85 bpm,  at the start of the study  Images were obtained from the parasternal, apical, subcostal, and suprasternal notch acoustic windows  Intravenous contrast (Definity solution [1 3 ml Definity/8 7ml normal saline solution], 5 ml) was  administered  Echocardiographic views were limited due to poor acoustic window availability, decreased penetration, and lung interference  This was a technically difficult study      LEFT VENTRICLE: Size was normal  Systolic function was at the lower limits of normal when viewed with definity  Ejection fraction was estimated in the range of 50 % to 55 %  There were no regional wall motion abnormalities  Wall thickness  was moderately increased  No evidence of apical thrombus   DOPPLER: Features were consistent with a pseudonormal left ventricular filling pattern, with concomitant abnormal relaxation and increased filling pressure (grade 2 diastolic  dysfunction)      VENTRICULAR SEPTUM: There was mild systolic flattening  These changes are consistent with RV pressure overload      RIGHT VENTRICLE: The size was normal  Systolic function was normal  Wall thickness was increased      LEFT ATRIUM: The atrium was mildly dilated      RIGHT ATRIUM: The atrium was mildly dilated      MITRAL VALVE: There was moderate annular calcification  There was mildly reduced leaflet separation  DOPPLER: Transmitral velocity was increased due to valvular stenosis  There was very mild stenosis with mean gradient 4mmHg There was no  significant regurgitation      AORTIC VALVE: The valve was trileaflet  Leaflets exhibited mildly increased thickness, mild calcification, normal cuspal separation, and sclerosis  DOPPLER: Transaortic velocity was within the normal range  There was no evidence for  stenosis  There was no significant regurgitation      TRICUSPID VALVE: The valve structure was normal  There was normal leaflet separation  DOPPLER: The transtricuspid velocity was within the normal range  There was no evidence for stenosis  There was mild regurgitation  Estimated peak PA  pressure was > 90 mmHg- unable to obtain optimal tricuspid regurgitation jet  The findings suggest severe pulmonary hypertension      PULMONIC VALVE: Leaflets exhibited normal thickness, no calcification, and normal cuspal separation  DOPPLER: The transpulmonic velocity was within the normal range  There was no significant regurgitation      PERICARDIUM: There was no pericardial effusion  The pericardium was normal in appearance      AORTA: The root exhibited normal size      SYSTEMIC VEINS: IVC: The inferior vena cava was dilated  Micro: Lab Results   Component Value Date    BLOODCX No Growth After 5 Days  07/05/2018    BLOODCX No Growth After 5 Days   07/05/2018    MRSACULTURE  07/05/2018     No Methicillin Resistant Staphlyococcus aureus (MRSA) isolated __________________________________________________________________

## 2020-07-14 NOTE — ASSESSMENT & PLAN NOTE
Stable  Currently not resting hypoxemic  Chronic dyspnea with even 4-500 feet of ambulation  We discussed her chronic contributory condition, CHF, arrhythmias, pulmonary HTN, fluid overload  Discussed compliance with oxygen as well as Pap therapy, however, she does not wish to use CPAP > discussed possibility of using oxygen alone if she adequately oxygenates  Reemphasized need for initiation of PAH follow up @ [facility] w/ Eileen Coello and Cheryl Agarwal  She is agreeable to following up with them

## 2020-07-14 NOTE — UTILIZATION REVIEW
Initial Clinical Review    Admission: Date/Time/Statement: Admission Orders (From admission, onward)     Ordered        07/13/20 1604  Inpatient Admission  Once                   Orders Placed This Encounter   Procedures    Inpatient Admission     Standing Status:   Standing     Number of Occurrences:   1     Order Specific Question:   Admitting Physician     Answer:   Brandy Leiva     Order Specific Question:   Level of Care     Answer:   Med Surg [16]     Order Specific Question:   Estimated length of stay     Answer:   More than 2 Midnights     Order Specific Question:   Certification     Answer:   I certify that inpatient services are medically necessary for this patient for a duration of greater than two midnights  See H&P and MD Progress Notes for additional information about the patient's course of treatment  ED Arrival Information     Expected Arrival Acuity Means of Arrival Escorted By Service Admission Type    - 7/13/2020 12:53 Emergent Walk-In Family Member Hospitalist Emergency    Arrival Complaint    shortness of breath        Chief Complaint   Patient presents with    Shortness of Breath     Pt reports she is nromally SOB on exertion states SOB at rest since friday   Rapid Heart Rate     HR of 160 in triage room pt SOB at rest       Assessment/Plan: 63 y o  female with PMH of CKD 4, hypertension, hyperlipidemia, PE on Coumadin, CHF, type 2 diabetes, morbid obesity, sleep apnea who presents with shortness of breath at rest since Friday  reports decreased urine output since yesterday  In ED morbidly obese ,1+ edema lower extremities  patient on 8mg coumadin for few months  Bun/cr 95/6 32, anion gap 18 ,bicarb 19  ABG SHOWING METABOLIC ACIDOSIS   BNP 30,243  alk phos 171 ast 150 ast 155 ,inr 4 11  ekg a flutter rate 155  Pt admitted inpatient for PORSHA superimposed on CKD   D/W Nephrology IV1/2ns with madeleine [email or web address] and consulted    Dean with rvr recieved IV Cardizem then cardizem gtt started 5mg hr  Consulted cardiology  Dyspnea on oxygen 2l continue   chf hold demadex volume management renal  Elevated LFTs suspect congestive hepatopathy  NEPHROLOGY CONSULT  Acute kidney injury present on admission: Given overall pulmonary hemodynamics especially with a history to despite increased doses of Lasix she has had decreased urine output, I am going to start IV fluids very gentle at 50 cc an hour and follow urine output and kidney function levels  When she may be more any euvolemic stage would then tried to transition to diuresed but I think right now trying to forcibly diuresis patient will make her kidney function even worse with her acute kidney injury she is severely sodium avid  Patient has no uremic symptoms there is no acute need for hemodialysis  CARDIOLOGY CONSULT   New onset atrial flutter with rvr  Hr better with IV Cardizem will switch to metoprolol  BOBBY nephrology consult  Pt with known severe pulmonary htn  abnormal lft with elevated inr  Minimally elevated troponin most likely non MI elevation, 2/2 volume overload  Bobby  Hold coumadin as inr high  Check echo     ED Triage Vitals   Temperature Pulse Respirations Blood Pressure SpO2   07/13/20 1301 07/13/20 1301 07/13/20 1301 07/13/20 1306 07/13/20 1301   98 2 °F (36 8 °C) (!) 160 (!) 26 144/87 94 %      Temp Source Heart Rate Source Patient Position - Orthostatic VS BP Location FiO2 (%)   07/13/20 1301 07/13/20 1301 07/13/20 1306 07/13/20 1306 --   Tympanic Monitor Lying Left arm       Pain Score       07/13/20 1301       6        Wt Readings from Last 1 Encounters:   07/14/20 95 4 kg (210 lb 5 1 oz)     Additional Vital Signs:   07/13/20 19:26:52  97 5 °F (36 4 °C)  80  18  108/69  82  97 %           07/13/20 17:45:31  98 °F (36 7 °C)  83  18  123/76  92  100 %           07/13/20 1715    84  22  119/73  89  99 %        Lying   07/13/20 1700    80  20  115/79  93  99 %           07/13/20 1550    80                  07/13/20 1541    157Abnormal   18  114/84    100 %        Lying   07/13/20 1530    156Abnormal   20  110/77  94  100 %           07/13/20 1526    157Abnormal     113/83    100 %      None (Room air)  Lying   07/13/20 1521    156Abnormal   18  109/81    100 %           07/13/20 1515    152Abnormal   16  121/76    100 %           07/13/20 1445    96  16  107/85  93  99 %             Pertinent Labs/Diagnostic Test Results:   7/13 CXR No acute cardiopulmonary disease    Results from last 7 days   Lab Units 07/14/20  0549 07/13/20  1317   WBC Thousand/uL 10 74* 10 62*   HEMOGLOBIN g/dL 11 9 12 5   HEMATOCRIT % 38 3 39 5   PLATELETS Thousands/uL 236 278   NEUTROS ABS Thousands/µL  --  7 30     Results from last 7 days   Lab Units 07/14/20  0549 07/13/20  1317   SODIUM mmol/L 133* 134*   POTASSIUM mmol/L 4 0 4 6   CHLORIDE mmol/L 100 97*   CO2 mmol/L 19* 19*   ANION GAP mmol/L 14* 18*   BUN mg/dL 96* 95*   CREATININE mg/dL 6 22* 6 32*   EGFR ml/min/1 73sq m 7 6   CALCIUM mg/dL 8 1* 8 5   MAGNESIUM mg/dL 2 4 2 3     Results from last 7 days   Lab Units 07/14/20  0549 07/13/20  1317   AST U/L 88* 155*   ALT U/L 133* 150*   ALK PHOS U/L 148* 171*   TOTAL PROTEIN g/dL 6 7 7 3   ALBUMIN g/dL 2 7* 3 0*   TOTAL BILIRUBIN mg/dL 0 60 0 80     Results from last 7 days   Lab Units 07/14/20  1058 07/14/20  0704 07/13/20  2206 07/13/20 2008   POC GLUCOSE mg/dl 210* 153* 176* 90     Results from last 7 days   Lab Units 07/14/20  0549 07/13/20  1317   GLUCOSE RANDOM mg/dL 146* 147*     Results from last 7 days   Lab Units 07/14/20  0549   HEMOGLOBIN A1C % 6 8*   EAG mg/dl 148      Results from last 7 days   Lab Units 07/13/20  1821   PH ART  7 284*   PCO2 ART mm Hg 37 0   PO2 ART mm Hg 101 6   HCO3 ART mmol/L 17 2*   BASE EXC ART mmol/L -8 8   O2 CONTENT ART mL/dL 17 4   O2 HGB, ARTERIAL % 96 0   ABG SOURCE  Brachial, Right     Results from last 7 days   Lab Units 07/14/20  0549   CK TOTAL U/L 135   CK MB INDEX % 3 3*   CK MB ng/mL 4 4     Results from last 7 days   Lab Units 07/13/20  1725 07/13/20  1317   TROPONIN I ng/mL 0 02 0 03     Results from last 7 days   Lab Units 07/14/20  0549 07/13/20  1400   PROTIME seconds 45 9* 39 2*   INR  5 04* 4 11*   PTT seconds  --  45*     Results from last 7 days   Lab Units 07/13/20  1317   TSH 3RD GENERATON uIU/mL 6 638*     Results from last 7 days   Lab Units 07/13/20  1904   LACTIC ACID mmol/L 0 8     Results from last 7 days   Lab Units 07/13/20  1317   NT-PRO BNP pg/mL 30,243*     Results from last 7 days   Lab Units 07/14/20  0136 07/13/20  1515   CLARITY UA   --  Clear   COLOR UA   --  Yellow   SPEC GRAV UA   --  1 025   PH UA   --  5 5   GLUCOSE UA mg/dl  --  Negative   KETONES UA mg/dl  --  Negative   BLOOD UA   --  Small*   PROTEIN UA mg/dl  --  >=300*   NITRITE UA   --  Negative   BILIRUBIN UA   --  Negative   UROBILINOGEN UA E U /dl  --  0 2   LEUKOCYTES UA   --  Negative   WBC UA /hpf  --  4-10*   RBC UA /hpf  --  0-1*   BACTERIA UA /hpf  --  Occasional   EPITHELIAL CELLS WET PREP /hpf  --  Occasional   SODIUM UR  10  --    CREATININE UR mg/dL 95 3  --      ED Treatment:   Medication Administration from 07/13/2020 1253 to 07/13/2020 1736       Date/Time Order Dose Route Action Action by Comments     07/13/2020 1515 sodium chloride 0 9 % bolus 500 mL 0 mL Intravenous Stopped [employer], RN      07/13/2020 1327 sodium chloride 0 9 % bolus 500 mL 500 mL Intravenous New Bag Yane Andrade, JJ      07/13/2020 1328 diltiazem (CARDIZEM) injection 15 mg 15 mg Intravenous Given Yane Andrade, JJ      07/13/2020 1602 diltiazem (CARDIZEM) 125 mg in sodium chloride 0 9 % 125 mL infusion 7 5 mg/hr Intravenous Rate/Dose Change [employer], RN      07/13/2020 1538 diltiazem (CARDIZEM) 125 mg in sodium chloride 0 9 % 125 mL infusion 10 mg/hr Intravenous Rate/Dose Change [employer], RN      07/13/2020 1527 diltiazem (CARDIZEM) 125 mg in sodium chloride 0 9 % 125 mL infusion 7 5 mg/hr Intravenous Rate/Dose Change Duane Boll, RN      07/13/2020 1512 diltiazem (CARDIZEM) 125 mg in sodium chloride 0 9 % 125 mL infusion 5 mg/hr Intravenous New Bag Duane Boll, RN pt went into rapid A flutter with HR in 150s     07/13/2020 1345 diltiazem (CARDIZEM) 125 mg in sodium chloride 0 9 % 125 mL infusion 0 mg/hr Intravenous Hold Duane Boll, RN         Past Medical History:   Diagnosis Date    Acute asthmatic bronchitis     last assessed: 6/2/2017    Cardiac disease     Colon polyp     Diabetes mellitus ([facility] )     Hyperlipidemia     Hypertension     Pneumonia     last assessed: 6/5/2013    Renal disorder     possible clot noted in kidney, thus blood thinners currently     Present on Admission:   Benign hypertension with CKD (chronic kidney disease) stage V ([facility])   Mixed hyperlipidemia   Elevated TSH      Admitting Diagnosis: Atrial flutter (HCC) [I48 92]  Shortness of breath [R06 02]  Rapid heart beat [R00 0]  CHF (congestive heart failure) (HCC) [I50 9]  Dyspnea [R06 00]  Dyspnea on exertion [R06 00]  Pulmonary hypertension (HCC) [I27 20]  Nephrotic range proteinuria [R80 9]  Type 2 diabetes mellitus with complication, with long-term current use of insulin ([facility]) [E11 8, Z79 4]  Stage 5 chronic kidney disease not on chronic dialysis ([place] [id number] ) [N18 5]  Age/Sex: 63 y o  female  Admission Orders:  telem on  Daily weight I/o  Echo  cpap   Respiratory protocol  Oxygen 2L nc  Pt inr   Scheduled Medications:    Medications:  calcitriol 0 5 mcg Oral Daily   cholecalciferol 2,000 Units Oral Every Other Day   insulin aspart protamine-insulin aspart 10 Units Subcutaneous BID AC   insulin lispro 1-5 Units Subcutaneous TID AC   insulin lispro 1-5 Units Subcutaneous HS   metoprolol succinate 25 mg Oral BID     Continuous IV Infusions:    diltiazem 2 mg/hr Intravenous Continuous   sodium bicarbonate infusion 60 mL/hr Intravenous Continuous     PRN Meds:    acetaminophen 650 mg Oral Q6H PRN   levalbuterol 0 63 mg Nebulization Q4H PRN   ondansetron 4 mg Intravenous Q6H PRN       IP CONSULT TO CARDIOLOGY  IP CONSULT TO PULMONOLOGY  IP CONSULT TO NEPHROLOGY    Network Utilization Review Department  [email or web address]  ATTENTION: Please call with any questions or concerns to [phone] and carefully listen to the prompts so that you are directed to the right person  All voicemails are confidential   [facility] all requests for admission clinical reviews, approved or denied determinations and any other requests to dedicated fax number below belonging to the campus where the patient is receiving treatment   List of dedicated fax numbers for the Facilities:  [address] DENIALS (Administrative/Medical Necessity) [phone]   [address] (Maternity/NICU/Pediatrics) [phone] 5400 [facility] [phone]   Karolyn [facility] [phone]   [facility] Mary Kay [phone]   Jacqui Calderon [phone]   [address] [facility] [address] [facility] [phone]   Levi Preston [phone]   [address]  [address] [facility] [address] [facility] [phone]

## 2020-07-14 NOTE — TELEPHONE ENCOUNTER
Dr Maryjane Jack, patient had her INR done at the ER today  She is currently admitted  INR this morning was 5 4   Sending to you as Wanda Godoy MA

## 2020-07-14 NOTE — PLAN OF CARE
Problem: Potential for Falls  Goal: Patient will remain free of falls  Description  INTERVENTIONS:  - Assess patient frequently for physical needs  -  Identify cognitive and physical deficits and behaviors that affect risk of falls  -  [place] fall precautions as indicated by assessment   - Educate patient/family on patient safety including physical limitations  - Instruct patient to call for assistance with activity based on assessment  - Modify environment to reduce risk of injury  - Consider OT/PT consult to assist with strengthening/mobility  Outcome: Progressing     Problem: RESPIRATORY - ADULT  Goal: Achieves optimal ventilation and oxygenation  Description  INTERVENTIONS:  - Assess for changes in respiratory status  - Assess for changes in mentation and behavior  - Position to facilitate oxygenation and minimize respiratory effort  - Oxygen administered by appropriate delivery if ordered  - Initiate smoking cessation education as indicated  - Encourage broncho-pulmonary hygiene including cough, deep breathe, Incentive Spirometry  - Assess the need for suctioning and aspirate as needed  - Assess and instruct to report SOB or any respiratory difficulty  - Respiratory Therapy support as indicated  Outcome: Progressing     Problem: Nutrition/Hydration-ADULT  Goal: Nutrient/Hydration intake appropriate for improving, restoring or maintaining nutritional needs  Description  Monitor and assess patient's nutrition/hydration status for malnutrition  Collaborate with interdisciplinary team and initiate plan and interventions as ordered  Monitor patient's weight and dietary intake as ordered or per policy  Utilize nutrition screening tool and intervene as necessary  Determine patient's food preferences and provide high-protein, high-caloric foods as appropriate       INTERVENTIONS:  - Monitor oral intake, urinary output, labs, and treatment plans  - Assess nutrition and hydration status and recommend course of action  - Evaluate amount of meals eaten  - Assist patient with eating if necessary   - Allow adequate time for meals  - Recommend/ encourage appropriate diets, oral nutritional supplements, and vitamin/mineral supplements  - Order, calculate, and assess calorie counts as needed  - Recommend, monitor, and adjust tube feedings and TPN/PPN based on assessed needs  - Assess need for intravenous fluids  - Provide specific nutrition/hydration education as appropriate  - Include patient/family/caregiver in decisions related to nutrition  Outcome: Progressing     Problem: CARDIOVASCULAR - ADULT  Goal: Maintains optimal cardiac output and hemodynamic stability  Description  INTERVENTIONS:  - Monitor I/O, vital signs and rhythm  - Monitor for S/S and trends of decreased cardiac output  - Administer and titrate ordered vasoactive medications to optimize hemodynamic stability  - Assess quality of pulses, skin color and temperature  - Assess for signs of decreased coronary artery perfusion  - Instruct patient to report change in severity of symptoms  Outcome: Progressing  Goal: Absence of cardiac dysrhythmias or at baseline rhythm  Description  INTERVENTIONS:  - Continuous cardiac monitoring, vital signs, obtain 12 lead EKG if ordered  - Administer antiarrhythmic and heart rate control medications as ordered  - Monitor electrolytes and administer replacement therapy as ordered  Outcome: Progressing     Problem: PAIN - ADULT  Goal: Verbalizes/displays adequate comfort level or baseline comfort level  Description  Interventions:  - Encourage patient to monitor pain and request assistance  - Assess pain using appropriate pain scale  - Administer analgesics based on type and severity of pain and evaluate response  - Implement non-pharmacological measures as appropriate and evaluate response  - Consider cultural and social influences on pain and pain management  - Notify physician/advanced practitioner if interventions unsuccessful or patient reports new pain  Outcome: Progressing     Problem: INFECTION - ADULT  Goal: Absence or prevention of progression during hospitalization  Description  INTERVENTIONS:  - Assess and monitor for signs and symptoms of infection  - Monitor lab/diagnostic results  - Monitor all insertion sites, i e  indwelling lines, tubes, and drains  - [place] appropriate cooling/warming therapies per order  - Administer medications as ordered  - Instruct and encourage patient and family to use good hand hygiene technique  - Identify and instruct in appropriate isolation precautions for identified infection/condition   Outcome: Progressing     Problem: SAFETY ADULT  Goal: Maintain or return to baseline ADL function  Description  INTERVENTIONS:  -  Assess patient's ability to carry out ADLs; assess patient's baseline for ADL function and identify physical deficits which impact ability to perform ADLs (bathing, care of mouth/teeth, toileting, grooming, dressing, etc )  - Assess/evaluate cause of self-care deficits   - Assess range of motion  - Assess patient's mobility; develop plan if impaired  - Assess patient's need for assistive devices and provide as appropriate  - Encourage maximum independence but intervene and supervise when necessary  - Involve family in performance of ADLs  - Assess for home care needs following discharge   - Consider OT consult to assist with ADL evaluation and planning for discharge  - Provide patient education as appropriate  Outcome: Progressing  Goal: Maintain or return mobility status to optimal level  Description  INTERVENTIONS:  - Assess patient's baseline mobility status (ambulation, transfers, stairs, etc )    - Identify cognitive and physical deficits and behaviors that affect mobility  - Identify mobility aids required to assist with transfers and/or ambulation (gait belt, sit-to-stand, lift, walker, cane, etc )  - [place] fall precautions as indicated by assessment  - Record patient progress and toleration of activity level on Mobility SBAR; progress patient to next Phase/Stage  - Instruct patient to call for assistance with activity based on assessment  - Consider rehabilitation consult to assist with strengthening/weightbearing, etc   Outcome: Progressing     Problem: DISCHARGE PLANNING  Goal: Discharge to home or other facility with appropriate resources  Description  INTERVENTIONS:  - Identify barriers to discharge w/patient and caregiver  - Arrange for needed discharge resources and transportation as appropriate  - Identify discharge learning needs (meds, wound care, etc )  - Arrange for interpretive services to assist at discharge as needed  - Refer to Case Management Department for coordinating discharge planning if the patient needs post-hospital services based on physician/advanced practitioner order or complex needs related to functional status, cognitive ability, or social support system  Outcome: Progressing     Problem: Knowledge Deficit  Goal: Patient/family/caregiver demonstrates understanding of disease process, treatment plan, medications, and discharge instructions  Description  Complete learning assessment and assess knowledge base    Interventions:  - Provide teaching at level of understanding  - Provide teaching via preferred learning methods  Outcome: Progressing

## 2020-07-15 ENCOUNTER — TELEPHONE (OUTPATIENT)
Dept: FAMILY MEDICINE CLINIC | Facility: CLINIC | Age: 65
End: 2020-07-15

## 2020-07-15 ENCOUNTER — APPOINTMENT (INPATIENT)
Dept: RADIOLOGY | Facility: HOSPITAL | Age: 65
DRG: 291 | End: 2020-07-15
Payer: COMMERCIAL

## 2020-07-15 ENCOUNTER — APPOINTMENT (INPATIENT)
Dept: NON INVASIVE DIAGNOSTICS | Facility: HOSPITAL | Age: 65
DRG: 291 | End: 2020-07-15
Payer: COMMERCIAL

## 2020-07-15 PROBLEM — I50.42 CHRONIC COMBINED SYSTOLIC AND DIASTOLIC CONGESTIVE HEART FAILURE (HCC): Status: ACTIVE | Noted: 2020-07-13

## 2020-07-15 LAB
ANION GAP SERPL CALCULATED.3IONS-SCNC: 15 MMOL/L (ref 4–13)
BUN SERPL-MCNC: 100 MG/DL (ref 5–25)
CALCIUM SERPL-MCNC: 8.1 MG/DL (ref 8.3–10.1)
CHEST PAIN STATEMENT: NORMAL
CHLORIDE SERPL-SCNC: 98 MMOL/L (ref 100–108)
CO2 SERPL-SCNC: 20 MMOL/L (ref 21–32)
CREAT SERPL-MCNC: 6.19 MG/DL (ref 0.6–1.3)
ERYTHROCYTE [DISTWIDTH] IN BLOOD BY AUTOMATED COUNT: 18.1 % (ref 11.6–15.1)
GFR SERPL CREATININE-BSD FRML MDRD: 7 ML/MIN/1.73SQ M
GLUCOSE SERPL-MCNC: 134 MG/DL (ref 65–140)
GLUCOSE SERPL-MCNC: 145 MG/DL (ref 65–140)
GLUCOSE SERPL-MCNC: 158 MG/DL (ref 65–140)
GLUCOSE SERPL-MCNC: 166 MG/DL (ref 65–140)
GLUCOSE SERPL-MCNC: 173 MG/DL (ref 65–140)
HCT VFR BLD AUTO: 36.8 % (ref 34.8–46.1)
HGB BLD-MCNC: 11.4 G/DL (ref 11.5–15.4)
INR PPP: 6.84 (ref 0.84–1.19)
MAX DIASTOLIC BP: 80 MMHG
MAX HEART RATE: 82 BPM
MAX PREDICTED HEART RATE: 157 BPM
MAX. SYSTOLIC BP: 128 MMHG
MCH RBC QN AUTO: 26.5 PG (ref 26.8–34.3)
MCHC RBC AUTO-ENTMCNC: 31 G/DL (ref 31.4–37.4)
MCV RBC AUTO: 85 FL (ref 82–98)
PHOSPHATE SERPL-MCNC: 6.3 MG/DL (ref 2.3–4.1)
PLATELET # BLD AUTO: 214 THOUSANDS/UL (ref 149–390)
PMV BLD AUTO: 11.8 FL (ref 8.9–12.7)
POTASSIUM SERPL-SCNC: 4 MMOL/L (ref 3.5–5.3)
PROTHROMBIN TIME: 58.2 SECONDS (ref 11.6–14.5)
PROTOCOL NAME: NORMAL
RBC # BLD AUTO: 4.31 MILLION/UL (ref 3.81–5.12)
REASON FOR TERMINATION: NORMAL
SARS-COV-2 RNA RESP QL NAA+PROBE: NEGATIVE
SODIUM SERPL-SCNC: 133 MMOL/L (ref 136–145)
TARGET HR FORMULA: NORMAL
TIME IN EXERCISE PHASE: NORMAL
WBC # BLD AUTO: 9.66 THOUSAND/UL (ref 4.31–10.16)

## 2020-07-15 PROCEDURE — 84100 ASSAY OF PHOSPHORUS: CPT | Performed by: INTERNAL MEDICINE

## 2020-07-15 PROCEDURE — 80048 BASIC METABOLIC PNL TOTAL CA: CPT | Performed by: INTERNAL MEDICINE

## 2020-07-15 PROCEDURE — 78452 HT MUSCLE IMAGE SPECT MULT: CPT | Performed by: INTERNAL MEDICINE

## 2020-07-15 PROCEDURE — 93016 CV STRESS TEST SUPVJ ONLY: CPT | Performed by: INTERNAL MEDICINE

## 2020-07-15 PROCEDURE — 85027 COMPLETE CBC AUTOMATED: CPT | Performed by: INTERNAL MEDICINE

## 2020-07-15 PROCEDURE — 93018 CV STRESS TEST I&R ONLY: CPT | Performed by: INTERNAL MEDICINE

## 2020-07-15 PROCEDURE — 99233 SBSQ HOSP IP/OBS HIGH 50: CPT | Performed by: INTERNAL MEDICINE

## 2020-07-15 PROCEDURE — 94760 N-INVAS EAR/PLS OXIMETRY 1: CPT

## 2020-07-15 PROCEDURE — 87635 SARS-COV-2 COVID-19 AMP PRB: CPT | Performed by: INTERNAL MEDICINE

## 2020-07-15 PROCEDURE — A9502 TC99M TETROFOSMIN: HCPCS

## 2020-07-15 PROCEDURE — 99232 SBSQ HOSP IP/OBS MODERATE 35: CPT | Performed by: INTERNAL MEDICINE

## 2020-07-15 PROCEDURE — 82948 REAGENT STRIP/BLOOD GLUCOSE: CPT

## 2020-07-15 PROCEDURE — 78452 HT MUSCLE IMAGE SPECT MULT: CPT

## 2020-07-15 PROCEDURE — 93017 CV STRESS TEST TRACING ONLY: CPT

## 2020-07-15 PROCEDURE — 85610 PROTHROMBIN TIME: CPT | Performed by: INTERNAL MEDICINE

## 2020-07-15 RX ADMIN — METOPROLOL SUCCINATE 25 MG: 25 TABLET, EXTENDED RELEASE ORAL at 18:26

## 2020-07-15 RX ADMIN — INSULIN LISPRO 1 UNITS: 100 INJECTION, SOLUTION INTRAVENOUS; SUBCUTANEOUS at 22:07

## 2020-07-15 RX ADMIN — METOPROLOL SUCCINATE 25 MG: 25 TABLET, EXTENDED RELEASE ORAL at 11:35

## 2020-07-15 RX ADMIN — INSULIN ASPART 10 UNITS: 100 INJECTION, SUSPENSION SUBCUTANEOUS at 09:23

## 2020-07-15 RX ADMIN — INSULIN ASPART 10 UNITS: 100 INJECTION, SUSPENSION SUBCUTANEOUS at 18:26

## 2020-07-15 RX ADMIN — CALCITRIOL 0.5 MCG: 0.25 CAPSULE, LIQUID FILLED ORAL at 09:18

## 2020-07-15 RX ADMIN — VITAMIN D, TAB 1000IU (100/BT) 2000 UNITS: 25 TAB at 09:23

## 2020-07-15 RX ADMIN — REGADENOSON 0.4 MG: 0.08 INJECTION, SOLUTION INTRAVENOUS at 09:52

## 2020-07-15 RX ADMIN — INSULIN LISPRO 1 UNITS: 100 INJECTION, SOLUTION INTRAVENOUS; SUBCUTANEOUS at 18:26

## 2020-07-15 RX ADMIN — INSULIN LISPRO 1 UNITS: 100 INJECTION, SOLUTION INTRAVENOUS; SUBCUTANEOUS at 12:28

## 2020-07-15 NOTE — PROGRESS NOTES
[facility] Progress Note  Patient: Andrew Alvarez 63 y o  female   MRN: [id number]  PCP: Gregory Baltazar DO  Unit/Bed#: [address] [place] [address] Encounter: [id number]  Date Of Visit: 07/15/20    Problem List:    Principal Problem:    Acute kidney injury superimposed on CKD [facility])  Active Problems:    Atrial flutter with rapid ventricular response ([address] )    Dyspnea    Type 2 diabetes mellitus with complication, with long-term current use of insulin (HCC)    Elevated LFTs    Chronic combined systolic and diastolic congestive heart failure (HCC)    Chronic hypoxemic respiratory failure (HCC)    Benign hypertension with CKD (chronic kidney disease) stage V (HCC)    Elevated TSH    Mixed hyperlipidemia    History of pulmonary embolus (PE)    Sleep apnea    Morbid obesity with BMI of 40 0-44 9, adult [facility])      Assessment & Plan:    Dyspnea  Assessment & Plan  History of chronic shortness of breath but worsening dyspnea at rest since Friday last week  Known history of severe pulmonary hypertension  PA pressure > 90  Patient follows Dr Anne-Marie eMndoza as outpatient  History of suspected PE  Doubt PE as patient is on Coumadin for history of PE  INR supratherapeutic on presentation  Chest x-ray without any acute abnormality  Likely multifactorial secondary to uncontrolled a flutter, obesity, CHF, pulmonary hypertension, history of pulmonary embolism  ABG-7 30, pCO2 37, PO2 102 on 2 L    Saturating adequately on 2 L of supplemental oxygen  Repeat echo with lower EF 56%, grade 2 diastolic dysfunction, dilated RV and IVC, PA pressure 60 mm Hg likely underestimated  Pulmonary following, input appreciated  · Continue supplemental oxygen  · Continue CPAP at night  · Nocturnal pulse oximetry with 2 L of supplemental oxygen  · Continue anticoagulation  · Will require continue follow-up with Pulmonary after discharge    Atrial flutter with rapid ventricular response ([address] )  Assessment & Plan  New onset a flutter with RVR  EKG in ED showed 2:1 a flutter, rate 155  Received Cardizem 15 mg IV in ED, and subsequently started on Cardizem drip  Metoprolol was resumed  Likely precipitated by hypoxia, pulmonary hypertension, metabolic abnormality  Self converted and remains in sinus rhythm   2D echo with EF 80%, grade 2 diastolic dysfunction, dilated RV and IVC  PA pressure 60 mmHg but possible underestimation due to eccentric TR jet  · Continue metoprolol  · Monitor electrolytes  · Pulmonary following for pulmonary hypertension  · Continue Supplemental oxygen  · Cardiology following, input appreciated  · Currently anticoagulated, Coumadin on hold secondary to elevated INR      * Acute kidney injury superimposed on CKD ([facility] )  Assessment & Plan  PORSHA on CKD 4  Baseline creatinine 3 3-3 8 in past year  Patient follows Dr Figueroa Pedersen as outpatient  On Demadex 40mg p o  Daily in AM and can take additional 20 mg p o  In the afternoon if leg edema or weight gain  Patient states she barely takes additional dose in the afternoon  Creatinine 6 32 on presentation with Anion gap 18, bicarb 19, potassium normal  ABG showed metabolic acidosis  No significant improvement with IV fluid, creatinine remains 6 2  CPK within normal limit  Nephrology following, input appreciated  Likely disease progression possibly multifactorial secondary to decrease ECV due to pulmonary hypertension,?   Anticoagulated related nephropathy, progressive nephrotic range proteinuria  Discontinue IV fluid  · IR evaluation for PD catheter placement  · Will require INR reversal patient is agreeable for catheter placement  · Hold Demadex  · Daily weight and I&Os  · Follow-up labs  · Avoid nephrotoxins and hypotension  · May require initiation of the dialysis soon      Results from last 7 days   Lab Units 07/14/20  0549 07/13/20  1317   BUN mg/dL 96* 95*   CREATININE mg/dL 6 22* 6 32*         Chronic hypoxemic respiratory failure [facility])  Assessment & Plan  Patient reported that she was previously on supplemental oxygen at home but she return did in November 2019  Continue supplemental oxygenation  Will need re qualification for home oxygen  Follow-up nocturnal pulse oximetry    Chronic combined systolic and diastolic congestive heart failure [facility])  Assessment & Plan  Wt Readings from Last 3 Encounters:   07/15/20 97 6 kg (215 lb 2 7 oz)   06/09/20 94 3 kg (208 lb)   06/05/20 95 6 kg (210 lb 12 8 oz)     2D echo in May this year showed EF 50-55%, no RWMA,G2DD, PA pressure more than 90  No significant valvular disease  Patient reports weighing herself daily at home  No weight gain noted  ProBNP 30,000's in setting of advanced kidney disease  Chest x-ray NAD  Repeat echo with EF 69%, grade 2 diastolic dysfunction, dilated RV and IVC, PA pressure 60 mmHg likely underestimation  Mild edema to lower extremities on exam   On Demadex at home  · Holding Demadex  · Volume management per Renal    · Monitor volume status closely  · Daily weight and I&Os  Elevated LFTs  Assessment & Plan  Acute and mild  TB normal   Suspect congestive hepatopathy  Patient denies nausea vomiting abdominal pain  Hold Lipitor for now  Improving  · Monitor            Type 2 diabetes mellitus with complication, with long-term current use of insulin [facility])  Assessment & Plan  Lab Results   Component Value Date    HGBA1C 6 8 (H) 07/14/2020       Recent Labs     07/15/20  0745 07/15/20  1123 07/15/20  1645 07/15/20  2015   POCGLU 134 173* 166* 158*       Blood Sugar Average: Last 72 hrs:  (P) 164 [id number]     Patient is on Lispro prot 75/25 10 units in the morning and 20 units with dinner, Tradjenta at home  Patient barely takes short acting insulin with lunch  Reports check blood sugar 3 times a day at home  Substitute Lispro prot 75/25 with Novolog 70/30 and decrease to 10 units BID  SSI  Hold Tradjenta  Diabetic diet    Sleep apnea  Assessment & Plan  Recently started on CPAP  Continue CPAP at HS        History of pulmonary embolus (PE)  Assessment & Plan  On Coumadin therapy at home  Took Coumadin 8 mg p o  Daily for past month, no recent INR check since May  INR 4 11 presentation, remains with elevated INR  · No evidence of bleeding  · Continue to hold Coumadin  · Will reverse INR with vitamin K for anticipated PD catheter placement on 07/17 if patient is agreeable  · Continue to monitor      Mixed hyperlipidemia  Assessment & Plan  LDL 33 in April this year  Hold Lipitor for now due to elevated LFTs    Elevated TSH  Assessment & Plan  TSH 6 638 normal free T4    Benign hypertension with CKD (chronic kidney disease) stage V ([facility])  Assessment & Plan  BP stable  Continue metoprolol from home , monitor    Morbid obesity with BMI of 40 0-44 9, adult ([facility])  Assessment & Plan  Body mass index is 41 08 kg/m²  Diet and lift style modification        VTE Pharmacologic Prophylaxis:   Pharmacologic: Warfarin (Coumadin) on hold secondary to elevated INR  Mechanical VTE Prophylaxis in Place: Yes    Patient Centered Rounds: I have performed bedside rounds with nursing staff today  Discussions with Specialists or Other Care Team Provider:  Nephrology, Cardiology, Pulmonary, IR    Education and Discussions with Family / Patient:  Daughter over the phone    Time Spent for Care: 45 minutes  More than 50% of total time spent on counseling and coordination of care as described above      Current Length of Stay: 2 day(s)    Current Patient Status: Inpatient   Certification Statement: The patient will continue to require additional inpatient hospital stay due to Further management for acute kidney injury requiring dialysis and stabilization of the cardiopulmonary status    Discharge Plan:  Pending at present    Code Status: Level 1 - Full Code      Subjective:   Underwent stress test earlier  Reports that her breathing is better, mild shortness of breath with activity  Denies any dizziness or cough  Understands that she is nearing dialysis but concerned about social and financial implication after she starts dialysis  Wants to discuss with nephrology prior to catheter placement    Objective:     Vitals:   Temp (24hrs), Av 6 °F (36 4 °C), Min:97 4 °F (36 3 °C), Max:97 7 °F (36 5 °C)    Temp:  [97 4 °F (36 3 °C)-97 7 °F (36 5 °C)] 97 4 °F (36 3 °C)  HR:  [73-77] 75  Resp:  [14-18] 18  BP: (112-125)/(61-79) 118/77  SpO2:  [95 %-100 %] 99 %  Body mass index is 42 02 kg/m²  Input and Output Summary (last 24 hours): Intake/Output Summary (Last 24 hours) at 7/15/2020 1338  Last data filed at 7/15/2020 0900  Gross per 24 hour   Intake    Output 200 ml   Net -200 ml       Physical Exam:     Physical Exam   Constitutional: She is oriented to person, place, and time  No distress  Chronically ill   HENT:   Head: Normocephalic and atraumatic  Eyes: Pupils are equal, round, and reactive to light  Neck: Neck supple  Cardiovascular: Normal rate and regular rhythm  Murmur heard  Pulmonary/Chest: Effort normal  No respiratory distress  She has no wheezes  She has no rales  Diminished   Abdominal: Soft  Bowel sounds are normal  She exhibits no distension  There is no tenderness  There is no rebound and no guarding  Musculoskeletal: She exhibits edema (Mild lower extremities)  Neurological: She is alert and oriented to person, place, and time  No cranial nerve deficit  Skin: Skin is warm and dry  No rash noted  Additional Data:     Labs:    Results from last 7 days   Lab Units 07/15/20  0615  20  1317   WBC Thousand/uL 9 66   < > 10 62*   HEMOGLOBIN g/dL 11 4*   < > 12 5   HEMATOCRIT % 36 8   < > 39 5   PLATELETS Thousands/uL 214   < > 278   NEUTROS PCT %  --   --  68   LYMPHS PCT %  --   --  17   MONOS PCT %  --   --  12   EOS PCT %  --   --  1    < > = values in this interval not displayed       Results from last 7 days   Lab Units 07/15/20  0615 20  0549   POTASSIUM mmol/L 4 0 4 0   CHLORIDE mmol/L 98* 100   CO2 mmol/L 20* 19*   BUN mg/dL 100* 96*   CREATININE mg/dL 6 19* 6 22*   CALCIUM mg/dL 8 1* 8 1*   ALK PHOS U/L  --  148*   ALT U/L  --  133*   AST U/L  --  88*     Results from last 7 days   Lab Units 07/15/20  0615   INR  6 84*       * I Have Reviewed All Lab Data Listed Above  * Additional Pertinent Lab Tests Reviewed: All Labs Within Last 24 Hours Reviewed      Imaging:  Imaging Reports Reviewed Today Include:  X-ray    Recent Cultures (last 7 days):           Last 24 Hours Medication List:     Current Facility-Administered Medications:  acetaminophen 650 mg Oral Q6H PRN Hiram Miller PA-C   calcitriol 0 5 mcg Oral Daily MICHAEL Padron   cholecalciferol 2,000 Units Oral Every Other Day MICHAEL Padron   insulin aspart protamine-insulin aspart 10 Units Subcutaneous BID AC MICHAEL Padron   insulin lispro 1-5 Units Subcutaneous TID AC MICHAEL Padron   insulin lispro 1-5 Units Subcutaneous HS MICHAEL Padron   levalbuterol 0 63 mg Nebulization Q4H PRN MICHAEL Padron   metoprolol succinate 25 mg Oral BID MICHAEL Padron   ondansetron 4 mg Intravenous Q6H PRN MICHAEL Padron          Today, Patient Was Seen By: Sun Robertson MD    ** Please Note: "This note has been constructed using a voice recognition system  Therefore there may be syntax, spelling, and/or grammatical errors   Please call if you have any questions  "**

## 2020-07-15 NOTE — ASSESSMENT & PLAN NOTE
Lab Results   Component Value Date    HGBA1C 6 8 (H) 07/14/2020       Recent Labs     07/18/20  1630 07/18/20  2048 07/19/20  0729 07/19/20  1123   POCGLU 139 190* 160* 159*       Blood Sugar Average: Last 72 hrs:  (P) 162 [id number]     Patient is on Lispro prot 75/25 10 units in the morning and 20 units with dinner, Tradjenta at home  Patient barely takes short acting insulin with lunch  Reports check blood sugar 3 times a day at home  Substitute Lispro prot 75/25 with Novolog 70/30 10 units BID  Appetite fair secondary to abdominal discomfort  Will continue 10 units b i d   At discharge with Tradjenta   follow-up with endocrinology  Diabetic diet

## 2020-07-15 NOTE — ASSESSMENT & PLAN NOTE
Wt Readings from Last 3 Encounters:   07/16/20 98 kg (216 lb 0 8 oz)   06/09/20 94 3 kg (208 lb)   06/05/20 95 6 kg (210 lb 12 8 oz)     2D echo in May this year showed EF 50-55%, no RWMA,G2DD, PA pressure more than 90  No significant valvular disease  Patient reports weighing herself daily at home  No weight gain noted  ProBNP 30,000's in setting of advanced kidney disease  Chest x-ray NAD  Repeat echo with EF 99%, grade 2 diastolic dysfunction, dilated RV and IVC, PA pressure 60 mmHg likely underestimation  Stress test without any evidence of ischemia  Mild edema to lower extremities on exam   · Continue Demadex  · Continue metoprolol,  · Not candidate for ACE-inhibitor/ARB at present  · Continue hydralazine plus isosorbide  · Monitor volume status closely  · Daily weight and I&Os    · Consider ACE-inhibitor/ARB as outpatient after initiation of PD  · Follow-up with Cardiology outpatient

## 2020-07-15 NOTE — ASSESSMENT & PLAN NOTE
On Coumadin therapy at home  Took Coumadin 8 mg p o   Daily for past month, no recent INR check since May  INR 4 11 presentation, remains with elevated INR subsequently  INR was reversed for PD catheter placement  · No evidence of bleeding  · Resumed Coumadin   · Discussed with Cardiology, no need for bridging given  [id number] - 3  · PT INR in 2 days after discharge

## 2020-07-15 NOTE — PROGRESS NOTES
Progress Note - Pulmonary   Srinivas Hughes 63 y o  female MRN: [id number]  Unit/Bed#: [address] [place] [address] Encounter: [id number]  Code Status: Level 1 - Full Code    Tony Roldan is a 63 y o   Past Medical History:   Diagnosis Date    Acute asthmatic bronchitis     last assessed: 2017    Cardiac disease     Chronic diastolic (congestive) heart failure (HCC)     Colon polyp     Diabetes mellitus ([facility] )     Hyperlipidemia     Hypertension     Medical non-compliance     Pneumonia     last assessed: 2013    Pulmonary embolism ([facility] )     Renal disorder     possible clot noted in kidney, thus blood thinners currently    Severe obstructive sleep apnea     Severe pulmonary arterial systolic hypertension (HCC)        Assessment/Plan:   Chronic hypoxemic respiratory failure (HCC)  Assessment & Plan  At one time patient was on chronic oxygen therapy, however she minimally used it  She returned her oxygen back in 2019  Needs to be requalified for oxygen therapy  Muiltifactorial etiology with PAH as contributing medical condition  Has nocturna hypoxemia and used 2 L last night  Will get overnight POX on 2 L to ensure adequacy of oxygenation     Sleep apnea  Assessment & Plan  History of severe CHANDRA  Not entirely explanative of nocturnal hypoxemia / PAH contributes  Normally on auto CPAP 14-17 cm of water  Did not use CPAP last night       Dyspnea  Assessment & Plan  Stable  Currently not resting hypoxemic  Chronic dyspnea with even 4-500 feet of ambulation  We discussed her chronic contributory condition, CHF, arrhythmias, pulmonary HTN, fluid overload  Discussed compliance with oxygen as well as Pap therapy    History of pulmonary embolus (PE)  Assessment & Plan  Prior history of intermediately positive nuclear medicine test  On Coumadin anticoagulation therapy  Consideration for contribution to pulmonary HTN    Morbid obesity with BMI of 40 0-44 9, adult ([facility] )  Assessment & Plan  Weight loss recommended for improvement of chronic comorbid conditions      D/C and Follow up Needs: Follow up in the outpatient setting, ideally in next 1-2 weeks for follow up  Later Scheduling may be required due to scheduling restraints  Items to follow up :  Symptoms management  Overnight oxygen monitoring for adequacy of oxygenation  2 L HS overnight    ______________________________________________________________________    Subjective: Pt seen and examined at bedside    Chief Complaint: "I'm doing ok    I'm feeling a little overwhelmed with all of my conditions "    Smoking history:  None never  Occupational history:  Deferred  Environmental History:  No exposure history  Travel history:  No recent travel  Respiratory History:  History of severe CHANDRA/chronic hypoxemic respiratory failure/pulmonary embolus/pulmonary HTN  Oxygen Therapy: History of 02 dependence of 3 L   PAP Therapy: prior auto CPAP / non compliance  DME Company:[employer] Insurance: [employer]/    Tele Events:     Vitals:   Temp:  [97 4 °F (36 3 °C)-97 7 °F (36 5 °C)] 97 4 °F (36 3 °C)  HR:  [73-77] 75  Resp:  [14-18] 18  BP: (112-125)/(61-79) 118/77  Weight (last 2 days)     Date/Time   Weight    07/15/20 0900   97 6 (215 17)    20 0600   95 4 (210 32)    20 1923   94 (207 23)    20 17:45:31   94 (207 23)    20 1301   94 3 (208)            Vitals:    07/15/20 0428 07/15/20 0900 07/15/20 1025 07/15/20 1133   BP: 112/75   118/77   Pulse: 73   75   Resp: 14   18   Temp: (!) 97 4 °F (36 3 °C)   (!) 97 4 °F (36 3 °C)   TempSrc:       SpO2: 98%  98% 99%   Weight:  97 6 kg (215 lb 2 7 oz)       Temp (24hrs), Av 6 °F (36 4 °C), Min:97 4 °F (36 3 °C), Max:97 7 °F (36 5 °C)  Current: Temperature: (!) 97 4 °F (36 3 °C)        SpO2: SpO2: 99 %, SpO2 Activity: SpO2 Activity: At Rest, SpO2 Device: O2 Device: Nasal cannula       IV Infusions:       Nutrition:        Diet Orders   (From admission, onward)             Start     Ordered    07/15/20 1338  Diet Jeronimo/CHO Controlled; Consistent Carbohydrate Diet Level 2 (5 carb servings/75 grams CHO/meal); Renal Restrictive; No; No  Diet effective midnight     Comments:  *If patient is having a stress test, no caffeine, decaf, or chocolate is to be given*   Question Answer Comment   Diet Type Jeronimo/CHO Controlled    Jeronimo/CHO Controlled Consistent Carbohydrate Diet Level 2 (5 carb servings/75 grams CHO/meal)    Other Restriction(s): Renal Restrictive    Should patient have a fluid restriction? No    Should patient have a protein modifier? No    RD to adjust diet per protocol? Yes        07/15/20 1337    07/13/20 1954  Room Service  Once     Question:  Type of Service  Answer:  Room Service-Appropriate    07/13/20 1954                Ins/Outs:   I/O       07/13 0701 - 07/14 0700 07/14 0701 - 07/15 0700 07/15 0701 - 07/16 0700    P  O   120     IV Piggyback 0      Total Intake(mL/kg) 0 (0) 120 (1 3)     Urine (mL/kg/hr) 350  200 (0 2)    Total Output 350  200    Net -350 +120 -200                 Lines/Drains:  Invasive Devices     Peripheral Intravenous Line            Peripheral IV 07/13/20 Right Antecubital 2 days                 Active medications:  Scheduled Meds:  Current Facility-Administered Medications:  acetaminophen 650 mg Oral Q6H PRN Domonique Barragan PA-C   calcitriol 0 5 mcg Oral Daily MICHAEL Padron   cholecalciferol 2,000 Units Oral Every Other Day MICHAEL Padron   insulin aspart protamine-insulin aspart 10 Units Subcutaneous BID AC MICHAEL Padron   insulin lispro 1-5 Units Subcutaneous TID AC MICHAEL Padron   insulin lispro 1-5 Units Subcutaneous HS MICHAEL Padron   levalbuterol 0 63 mg Nebulization Q4H PRN MICHAEL Padron   metoprolol succinate 25 mg Oral BID MICHAEL Padron   ondansetron 4 mg Intravenous Q6H PRN MICHAEL Padron     PRN Meds:  acetaminophen 650 mg Q6H PRN   levalbuterol 0 63 mg Q4H PRN   ondansetron 4 mg Q6H PRN ____________________________________________________________________    Physical Exam   Constitutional: She is oriented to person, place, and time  She appears well-developed and well-nourished  Morbidly obese   HENT:   Head: Normocephalic and atraumatic  Eyes: Pupils are equal, round, and reactive to light  Conjunctivae are normal    Neck: Normal range of motion  Neck supple  Cardiovascular: Normal rate, regular rhythm and normal heart sounds  Pulmonary/Chest: Effort normal  No respiratory distress  She has decreased breath sounds in the right upper field, the right middle field, the right lower field, the left upper field, the left middle field and the left lower field  She has no wheezes  She has no rales  She exhibits no tenderness  97% on RA     Mild to moderately decreased breath sounds    Abdominal: Soft  Bowel sounds are normal    Musculoskeletal: Normal range of motion  She exhibits no edema  Neurological: She is alert and oriented to person, place, and time  Skin: Skin is warm and dry     Psychiatric: She has a normal mood and affect      ____________________________________________________________________    Invasive/non-invasive ventilation settings   Respiratory    Lab Data (Last 4 hours)    None         O2/Vent Data (Last 4 hours)    None                Laboratory and Diagnostics:  Results from last 7 days   Lab Units 07/15/20  0615 07/14/20  0549 07/13/20  1317   WBC Thousand/uL 9 66 10 74* 10 62*   HEMOGLOBIN g/dL 11 4* 11 9 12 5   HEMATOCRIT % 36 8 38 3 39 5   PLATELETS Thousands/uL 214 236 278   NEUTROS PCT %  --   --  68   MONOS PCT %  --   --  12     Results from last 7 days   Lab Units 07/15/20  0615 07/14/20  0549 07/13/20  1317   SODIUM mmol/L 133* 133* 134*   POTASSIUM mmol/L 4 0 4 0 4 6   CHLORIDE mmol/L 98* 100 97*   CO2 mmol/L 20* 19* 19*   ANION GAP mmol/L 15* 14* 18*   BUN mg/dL 100* 96* 95*   CREATININE mg/dL 6 19* 6 22* 6 32*   CALCIUM mg/dL 8 1* 8 1* 8 5   GLUCOSE RANDOM mg/dL 145* 146* 147*   ALT U/L  --  133* 150*   AST U/L  --  88* 155*   ALK PHOS U/L  --  148* 171*   ALBUMIN g/dL  --  2 7* 3 0*   TOTAL BILIRUBIN mg/dL  --  0 60 0 80     Results from last 7 days   Lab Units 07/15/20  0615 07/14/20  0549 07/13/20  1317   MAGNESIUM mg/dL  --  2 4 2 3   PHOSPHORUS mg/dL 6 3*  --   --       Results from last 7 days   Lab Units 07/15/20  0615 07/14/20  0549 07/13/20  1400   INR  6 84* 5 04* 4 11*   PTT seconds  --   --  45*      Results from last 7 days   Lab Units 07/13/20  1725 07/13/20  1317   TROPONIN I ng/mL 0 02 0 03     Results from last 7 days   Lab Units 07/13/20  1904   LACTIC ACID mmol/L 0 8     ABG:  Results from last 7 days   Lab Units 07/13/20  1821   PH ART  7 284*   PCO2 ART mm Hg 37 0   PO2 ART mm Hg 101 6   HCO3 ART mmol/L 17 2*   BASE EXC ART mmol/L -8 8   ABG SOURCE  Brachial, Right     VBG:  Results from last 7 days   Lab Units 07/13/20  1821   ABG SOURCE  Brachial, Right           Micro        Imaging:   US kidney and bladder   Final Result by Rosangela Rodriguez MD (07/14 1248)      No evidence of obstruction  Workstation performed: [id number]         XR chest 1 view portable   Final Result by Sheeba Stevenson MD (07/13 4137)      No acute cardiopulmonary disease  Workstation performed: [id number]         IR other    (Results Pending)       Micro: Lab Results   Component Value Date    BLOODCX No Growth After 5 Days  07/05/2018    BLOODCX No Growth After 5 Days   07/05/2018    MRSACULTURE  07/05/2018     No Methicillin Resistant Staphlyococcus aureus (MRSA) isolated            Invalid input(s): Lewis Blackwell

## 2020-07-15 NOTE — PROGRESS NOTES
[facility] [place], [place] Progress Note - Simón Monroe 63 y o  female MRN: [id number]    Unit/Bed#: [address] 410-01 Encounter: [id number]      Assessment/Plan:    1  New onset atrial flutter with rapid ventricular rate- Patient spontaneously converted back to sinus rhythm  Will continue Metoprolol  ECHO- EF 35%, moderate diffuse hypokinesis , grade 2 diastolic dysfunction  Nuclear stress - EF 35, no ischemia, cardiomyopathy noted  Will hold coumadin as INR elevated at 6 84 today      2  Acute kidney injury with history of nephrotic range proteinuria- kidney functioning worsening  Nephrology on board  Patient will need to start dialysis  IR consult placed for PD catheter placement  3  History of severe pulmonary hypertension- PA pressure has been high around 80-90  Remains elevated around the same level on current ECHO  Pulmonology on board     4  Diabetes mellitus - patient has diabetes mellitus with nephrotic range proteinuria as well as retinopathy  Needs to resume statin therapy when LFTs improve     5  Obesity with BMI around 41- advised patient on importance of weight loss     6  History of Obstructive sleep apnea- not using CPAP regularly     7  Minimally elevated troponin, likely non-MI related troponin secondary to acute kidney injury and history of hypertension  Jj Littlejohn scheduled for today, will follow up       Subjective:   Patient seen and examined at bedside  Patient reports mild shortness of breath today, but does not appear to be in distress  Patient has been having discussions with the specialists, especially Pulmonology and Nephrology regarding her care  Objective:     Vitals: Blood pressure 118/77, pulse 75, temperature (!) 97 4 °F (36 3 °C), resp  rate 18, weight 95 4 kg (210 lb 5 1 oz), SpO2 99 %, not currently breastfeeding  ,Body mass index is 41 08 kg/m²    Wt Readings from Last 3 Encounters:   07/14/20 95 4 kg (210 lb 5 1 oz)   06/09/20 94 3 kg (208 lb)   06/05/20 95 6 kg (210 lb 12 8 oz) Intake/Output Summary (Last 24 hours) at 7/15/2020 1228  Last data filed at 7/15/2020 0900  Gross per 24 hour   Intake    Output 200 ml   Net -200 ml       Physical Exam: General appearance: alert and oriented, in no acute distress  Ears: right and left external ear normal  Neck: no JVD and supple, symmetrical, trachea midline  Lungs: no respiratory distress  Equal airway entry   no wheeze  Heart: regular rate and rhythm  Abdomen: soft, mildly distended, obese, non tender   Extremities: edema noted of bilateral lower extremities   Skin: Skin color, texture, turgor normal  No rashes or lesions  Neurologic: Grossly normal     Recent Results (from the past 24 hour(s))   Fingerstick Glucose (POCT)    Collection Time: 07/14/20  4:42 PM   Result Value Ref Range    POC Glucose 176 (H) 65 - 140 mg/dl   Fingerstick Glucose (POCT)    Collection Time: 07/14/20  8:26 PM   Result Value Ref Range    POC Glucose 180 (H) 65 - 140 mg/dl   Fingerstick Glucose (POCT)    Collection Time: 07/14/20 11:19 PM   Result Value Ref Range    POC Glucose 196 (H) 65 - 140 mg/dl   Basic metabolic panel    Collection Time: 07/15/20  6:15 AM   Result Value Ref Range    Sodium 133 (L) 136 - 145 mmol/L    Potassium 4 0 3 5 - 5 3 mmol/L    Chloride 98 (L) 100 - 108 mmol/L    CO2 20 (L) 21 - 32 mmol/L    ANION GAP 15 (H) 4 - 13 mmol/L     (H) 5 - 25 mg/dL    Creatinine 6 19 (H) 0 60 - 1 30 mg/dL    Glucose 145 (H) 65 - 140 mg/dL    Calcium 8 1 (L) 8 3 - 10 1 mg/dL    eGFR 7 ml/min/1 73sq m   Phosphorus    Collection Time: 07/15/20  6:15 AM   Result Value Ref Range    Phosphorus 6 3 (H) 2 3 - 4 1 mg/dL   Protime-INR    Collection Time: 07/15/20  6:15 AM   Result Value Ref Range    Protime 58 2 (H) 11 6 - 14 5 seconds    INR 6 84 (HH) 0 84 - 1 19   CBC    Collection Time: 07/15/20  6:15 AM   Result Value Ref Range    WBC 9 66 4 31 - 10 16 Thousand/uL    RBC 4 31 3 81 - 5 12 Million/uL    Hemoglobin 11 4 (L) 11 5 - 15 4 g/dL    Hematocrit 36 8 34 8 - 46 1 %    MCV 85 82 - 98 fL    MCH 26 5 (L) 26 8 - 34 3 pg    MCHC 31 0 (L) 31 4 - 37 4 g/dL    RDW 18 1 (H) 11 6 - 15 1 %    Platelets 077 882 - 331 Thousands/uL    MPV 11 8 8 9 - 12 7 fL   Fingerstick Glucose (POCT)    Collection Time: 07/15/20  7:45 AM   Result Value Ref Range    POC Glucose 134 65 - 140 mg/dl   Fingerstick Glucose (POCT)    Collection Time: 07/15/20 11:23 AM   Result Value Ref Range    POC Glucose 173 (H) 65 - 140 mg/dl       Current Facility-Administered Medications   Medication Dose Route Frequency Provider Last Rate Last Dose    acetaminophen (TYLENOL) tablet 650 mg  650 mg Oral Q6H PRN Valorie Regalado PA-C   650 mg at 07/14/20 0616    calcitriol (ROCALTROL) capsule 0 5 mcg  0 5 mcg Oral Daily MICHAEL Padron   0 5 mcg at 07/15/20 0918    cholecalciferol (VITAMIN D3) tablet 2,000 Units  2,000 Units Oral Every Other Day MICHAEL Padron   2,000 Units at 07/15/20 3536    insulin aspart protamine-insulin aspart (NovoLOG 70/30) 100 units/mL subcutaneous injection 10 Units  10 Units Subcutaneous BID AC MICHAEL Padron   10 Units at 07/15/20 0923    insulin lispro (HumaLOG) 100 units/mL subcutaneous injection 1-5 Units  1-5 Units Subcutaneous TID AC MICHAEL Padron   1 Units at 07/14/20 1659    insulin lispro (HumaLOG) 100 units/mL subcutaneous injection 1-5 Units  1-5 Units Subcutaneous HS MICHAEL Padron   1 Units at 07/14/20 2321    levalbuterol (XOPENEX) inhalation solution 0 63 mg  0 63 mg Nebulization Q4H PRN MICHAEL Padron        metoprolol succinate (TOPROL-XL) 24 hr tablet 25 mg  25 mg Oral BID MICHAEL Padron   25 mg at 07/15/20 1135    ondansetron (ZOFRAN) injection 4 mg  4 mg Intravenous Q6H PRN MICHAEL Padron           Invasive Devices     Peripheral Intravenous Line            Peripheral IV 07/13/20 Right Antecubital 1 day                Lab, Imaging and other studies: I have personally reviewed pertinent reports      VTE Pharmacologic Prophylaxis: Reason for no pharmacologic prophylaxis elevated INR   VTE Mechanical Prophylaxis: foot pump applied    Tayla Patel DO

## 2020-07-15 NOTE — TREATMENT PLAN
RENAL NOTE   Matthew Brar 63 y o  female MRN: [id number]  Unit/Bed#: [address] [place] [address] Encounter: [id number]    · Patient was undergoing cardiac testing at the time of my rounding  I was unable to see her  · Labs reviewed and creatinine still at 6  19    · No improvement despite IVF - will stop IVF  · Echo reviewed and EF is worse - now at 35% with G2DD  · RV and IVC are dilated  · Given renal failure which is likely progression of disease, she will need to start dialysis  · I will place consult with IR for PD catheter placement - discussed with IR - potentially Friday  · Case discussed with Dr Gala Gusman and Dr Navneet Wilson

## 2020-07-15 NOTE — ASSESSMENT & PLAN NOTE
New onset a flutter with RVR  EKG in ED showed 2:1 a flutter, rate 155  Received Cardizem 15 mg IV in ED, and subsequently started on Cardizem drip  Metoprolol was resumed  Likely precipitated by hypoxia, pulmonary hypertension, metabolic abnormality  Self converted on day 1st and subsequently remain in sinus rhythm, reverted back to AFib last night  2D echo with EF 03%, grade 2 diastolic dysfunction, dilated RV and IVC  PA pressure 60 mmHg but possible underestimation due to eccentric TR jet  Cardiology following, input appreciated  Converted back to sinus rhythm  · Continue metoprolol XL 50 mg q 12 hours  · Monitor electrolytes  · Recommended compliance with CPAP, patient is agreeable    · Pulmonary following for pulmonary hypertension  · Continue Supplemental oxygen  · Resume Coumadin, 7 5 mg x 1 again today and subsequently will discharge on 5 mg tomorrow, repeat PT INR on 07/21

## 2020-07-15 NOTE — ASSESSMENT & PLAN NOTE
Acute and mild  TB normal   Suspect congestive hepatopathy    Patient denies nausea vomiting abdominal pain  Improving  Resume Lipitor

## 2020-07-15 NOTE — TELEPHONE ENCOUNTER
Spoke with daughter  Asking to please have Dr Sanya Antunez call to discuss pt's health conditions and possible dialysis  Daughter is very concerned about her mothers health        Aisha Witt MA

## 2020-07-15 NOTE — TELEPHONE ENCOUNTER
7/15/2020 12:27 PM returned call to Kristen Marrufo  I checked her communication consent and I have permission to discuss Jacquie's care with her  Her daughter is concerned because her mother needs dialysis, but doesn't want to do it  I recommended that she have her mom talk to a  about the cost and management of dialysis      Message complete  Lui Mathew, DO

## 2020-07-15 NOTE — UTILIZATION REVIEW
Continued Stay Review    Date: 7/15/20                          Current Patient Class: inpatient     Current Level of Care: acute    Assessment/Plan:   Per nephrology,unable to see 2/2 cardiac teting  Creatinine still 6 19 , no improvement despite  IVF, will stop IVF  Echo rviewed and EF is worse now 35% with G2DD RV and IVC are dilated  Given renal failure which is likely progression of disease ,she will need to start dialysis  Will place consult with IR for PD catheter placement possible Friday  Per cardiology New onset atrial flutter with rapid ventricular rate- Patient spontaneously converted back to sinus rhythm  Will continue Metoprolol  ECHO- EF 35%, moderate diffuse hypokinesis , grade 2 diastolic dysfunction  Nuclear stress - EF 35, no ischemia, cardiomyopathy noted  Will hold coumadin as INR elevated at 6 84 today   History of severe pulmonary hypertension- PA pressure has been high around 80-90  Remains elevated around the same level on current ECHO  Pulmonology on board Pulmonary Muiltifactorial etiology with PAH as contributing medical conditionHas nocturna hypoxemia and used 2 L last night  Will get overnight POX on 2 L to ensure adequacy of oxygenation   Did not use CPAP last night   Pertinent Labs/Diagnostic Results:   7/15/stress test Abnormal study after pharmacologic vasodilation without reproduction of symptoms  Myocardial perfusion imaging was normal at rest and with stress  No ischemia noted  EF 36%  RV dilated   Left ventricular systolic function was  reduced, without distinct regional wall motion abnormalities  Results from last 7 days   Lab Units 07/15/20  0615 07/14/20  0549 07/13/20  1317   WBC Thousand/uL 9 66 10 74* 10 62*   HEMOGLOBIN g/dL 11 4* 11 9 12 5   HEMATOCRIT % 36 8 38 3 39 5   PLATELETS Thousands/uL 214 236 278   NEUTROS ABS Thousands/µL  --   --  7 30     Results from last 7 days   Lab Units 07/15/20  0615 07/14/20  0549 07/13/20  1317   SODIUM mmol/L 133* 133* 134* POTASSIUM mmol/L 4 0 4 0 4 6   CHLORIDE mmol/L 98* 100 97*   CO2 mmol/L 20* 19* 19*   ANION GAP mmol/L 15* 14* 18*   BUN mg/dL 100* 96* 95*   CREATININE mg/dL 6 19* 6 22* 6 32*   EGFR ml/min/1 73sq m 7 7 6   CALCIUM mg/dL 8 1* 8 1* 8 5   MAGNESIUM mg/dL  --  2 4 2 3   PHOSPHORUS mg/dL 6 3*  --   --      Results from last 7 days   Lab Units 07/14/20  0549 07/13/20  1317   AST U/L 88* 155*   ALT U/L 133* 150*   ALK PHOS U/L 148* 171*   TOTAL PROTEIN g/dL 6 7 7 3   ALBUMIN g/dL 2 7* 3 0*   TOTAL BILIRUBIN mg/dL 0 60 0 80     Results from last 7 days   Lab Units 07/15/20  1123 07/15/20  0745 07/14/20  2319 07/14/20  2026 07/14/20  1642 07/14/20  1058 07/14/20  0704 07/13/20  2206 07/13/20 2008   POC GLUCOSE mg/dl 173* 134 196* 180* 176* 210* 153* 176* 90     Results from last 7 days   Lab Units 07/15/20  0615 07/14/20  0549 07/13/20  1317   GLUCOSE RANDOM mg/dL 145* 146* 147*     Results from last 7 days   Lab Units 07/14/20  0549   HEMOGLOBIN A1C % 6 8*   EAG mg/dl 148     Results from last 7 days   Lab Units 07/13/20  1821   PH ART  7 284*   PCO2 ART mm Hg 37 0   PO2 ART mm Hg 101 6   HCO3 ART mmol/L 17 2*   BASE EXC ART mmol/L -8 8   O2 CONTENT ART mL/dL 17 4   O2 HGB, ARTERIAL % 96 0   ABG SOURCE  Brachial, Right     Results from last 7 days   Lab Units 07/14/20  0549   CK TOTAL U/L 135   CK MB INDEX % 3 3*   CK MB ng/mL 4 4     Results from last 7 days   Lab Units 07/13/20  1725 07/13/20  1317   TROPONIN I ng/mL 0 02 0 03     Results from last 7 days   Lab Units 07/15/20  0615 07/14/20  0549 07/13/20  1400   PROTIME seconds 58 2* 45 9* 39 2*   INR  6 84* 5 04* 4 11*   PTT seconds  --   --  45*     Vital Signs:   /20 11:33:17  97 4 °F (36 3 °C)Abnormal   75  18  118/77  91  99 %           07/15/20 1026              28  2 L/min  Nasal cannula     07/15/20 1025            98 %             Tele mon  Stress test  Respiratory protocol  cpap hs   Pt inr  IR   Daily weight I/o  Medications:   Scheduled Medications:    Medications:  calcitriol 0 5 mcg Oral Daily   cholecalciferol 2,000 Units Oral Every Other Day   insulin aspart protamine-insulin aspart 10 Units Subcutaneous BID AC   insulin lispro 1-5 Units Subcutaneous TID AC   insulin lispro 1-5 Units Subcutaneous HS   metoprolol succinate 25 mg Oral BID     Continuous IV Infusions:     PRN Meds:    acetaminophen 650 mg Oral Q6H PRN   levalbuterol 0 63 mg Nebulization Q4H PRN   ondansetron 4 mg Intravenous Q6H PRN       Discharge Plan: tbd  Network Utilization Review Department  [email or web address]  ATTENTION: Please call with any questions or concerns to [phone] and carefully listen to the prompts so that you are directed to the right person  All voicemails are confidential   Sudie Crigler all requests for admission clinical reviews, approved or denied determinations and any other requests to dedicated fax number below belonging to the campus where the patient is receiving treatment   List of dedicated fax numbers for the Facilities:  [address] [place] [address] DENIALS (Administrative/Medical Necessity) [phone]   [address] (Maternity/NICU/Pediatrics) [phone]   Israel Min [phone]   Aga Sheets [phone]   Shanika Juniper [phone]   [address]  [phone]   [address] [facility] [address] [place] [address] [phone]   [facility] [phone]   [address] [facility]  [address] [facility] [address] [facility] [phone]

## 2020-07-15 NOTE — TELEPHONE ENCOUNTER
DR STYLES    Patients daughter Zohaib Fontanez is asking if you can call her back about her mothers condition? She thinks her mother is hiding something from her? Please advise

## 2020-07-15 NOTE — PLAN OF CARE
Problem: Potential for Falls  Goal: Patient will remain free of falls  Description  INTERVENTIONS:  - Assess patient frequently for physical needs  -  Identify cognitive and physical deficits and behaviors that affect risk of falls  -  [place] fall precautions as indicated by assessment   - Educate patient/family on patient safety including physical limitations  - Instruct patient to call for assistance with activity based on assessment  - Modify environment to reduce risk of injury  - Consider OT/PT consult to assist with strengthening/mobility  Outcome: Progressing     Problem: RESPIRATORY - ADULT  Goal: Achieves optimal ventilation and oxygenation  Description  INTERVENTIONS:  - Assess for changes in respiratory status  - Assess for changes in mentation and behavior  - Position to facilitate oxygenation and minimize respiratory effort  - Oxygen administered by appropriate delivery if ordered  - Initiate smoking cessation education as indicated  - Encourage broncho-pulmonary hygiene including cough, deep breathe, Incentive Spirometry  - Assess the need for suctioning and aspirate as needed  - Assess and instruct to report SOB or any respiratory difficulty  - Respiratory Therapy support as indicated  Outcome: Progressing     Problem: Nutrition/Hydration-ADULT  Goal: Nutrient/Hydration intake appropriate for improving, restoring or maintaining nutritional needs  Description  Monitor and assess patient's nutrition/hydration status for malnutrition  Collaborate with interdisciplinary team and initiate plan and interventions as ordered  Monitor patient's weight and dietary intake as ordered or per policy  Utilize nutrition screening tool and intervene as necessary  Determine patient's food preferences and provide high-protein, high-caloric foods as appropriate       INTERVENTIONS:  - Monitor oral intake, urinary output, labs, and treatment plans  - Assess nutrition and hydration status and recommend course of action  - Evaluate amount of meals eaten  - Assist patient with eating if necessary   - Allow adequate time for meals  - Recommend/ encourage appropriate diets, oral nutritional supplements, and vitamin/mineral supplements  - Order, calculate, and assess calorie counts as needed  - Assess need for intravenous fluids  - Provide specific nutrition/hydration education as appropriate  - Include patient/family/caregiver in decisions related to nutrition   Outcome: Progressing     Problem: CARDIOVASCULAR - ADULT  Goal: Maintains optimal cardiac output and hemodynamic stability  Description  INTERVENTIONS:  - Monitor I/O, vital signs and rhythm  - Monitor for S/S and trends of decreased cardiac output  - Administer and titrate ordered vasoactive medications to optimize hemodynamic stability  - Assess quality of pulses, skin color and temperature  - Assess for signs of decreased coronary artery perfusion  - Instruct patient to report change in severity of symptoms  Outcome: Progressing  Goal: Absence of cardiac dysrhythmias or at baseline rhythm  Description  INTERVENTIONS:  - Continuous cardiac monitoring, vital signs, obtain 12 lead EKG if ordered  - Administer antiarrhythmic and heart rate control medications as ordered  - Monitor electrolytes and administer replacement therapy as ordered  Outcome: Progressing     Problem: PAIN - ADULT  Goal: Verbalizes/displays adequate comfort level or baseline comfort level  Description  Interventions:  - Encourage patient to monitor pain and request assistance  - Assess pain using appropriate pain scale  - Administer analgesics based on type and severity of pain and evaluate response  - Implement non-pharmacological measures as appropriate and evaluate response  - Consider cultural and social influences on pain and pain management  - Notify physician/advanced practitioner if interventions unsuccessful or patient reports new pain  Outcome: Progressing     Problem: INFECTION - ADULT  Goal: Absence or prevention of progression during hospitalization  Description  INTERVENTIONS:  - Assess and monitor for signs and symptoms of infection  - Monitor lab/diagnostic results  - Monitor all insertion sites, i e  indwelling lines, tubes, and drains  - [place] appropriate cooling/warming therapies per order  - Administer medications as ordered  - Instruct and encourage patient and family to use good hand hygiene technique  - Identify and instruct in appropriate isolation precautions for identified infection/condition   Outcome: Progressing     Problem: SAFETY ADULT  Goal: Maintain or return to baseline ADL function  Description  INTERVENTIONS:  -  Assess patient's ability to carry out ADLs; assess patient's baseline for ADL function and identify physical deficits which impact ability to perform ADLs (bathing, care of mouth/teeth, toileting, grooming, dressing, etc )  - Assess/evaluate cause of self-care deficits   - Assess range of motion  - Assess patient's mobility; develop plan if impaired  - Assess patient's need for assistive devices and provide as appropriate  - Encourage maximum independence but intervene and supervise when necessary  - Involve family in performance of ADLs  - Assess for home care needs following discharge   - Consider OT consult to assist with ADL evaluation and planning for discharge  - Provide patient education as appropriate  Outcome: Progressing  Goal: Maintain or return mobility status to optimal level  Description  INTERVENTIONS:  - Assess patient's baseline mobility status (ambulation, transfers, stairs, etc )    - Identify cognitive and physical deficits and behaviors that affect mobility  - Identify mobility aids required to assist with transfers and/or ambulation (gait belt, sit-to-stand, lift, walker, cane, etc )  - [place] fall precautions as indicated by assessment  - Record patient progress and toleration of activity level on Mobility SBAR; progress patient to next Phase/Stage  - Instruct patient to call for assistance with activity based on assessment  - Consider rehabilitation consult to assist with strengthening/weightbearing, etc   Outcome: Progressing     Problem: DISCHARGE PLANNING  Goal: Discharge to home or other facility with appropriate resources  Description  INTERVENTIONS:  - Identify barriers to discharge w/patient and caregiver  - Arrange for needed discharge resources and transportation as appropriate  - Identify discharge learning needs (meds, wound care, etc )  - Arrange for interpretive services to assist at discharge as needed  - Refer to Case Management Department for coordinating discharge planning if the patient needs post-hospital services based on physician/advanced practitioner order or complex needs related to functional status, cognitive ability, or social support system  Outcome: Progressing     Problem: Knowledge Deficit  Goal: Patient/family/caregiver demonstrates understanding of disease process, treatment plan, medications, and discharge instructions  Description  Complete learning assessment and assess knowledge base    Interventions:  - Provide teaching at level of understanding  - Provide teaching via preferred learning methods  Outcome: Progressing

## 2020-07-15 NOTE — ASSESSMENT & PLAN NOTE
History of chronic shortness of breath but worsening dyspnea at rest since Friday last week  Known history of severe pulmonary hypertension  PA pressure > 90  Patient follows Dr Eduardo Momin as outpatient  History of suspected PE  Doubt PE as patient is on Coumadin for history of PE  INR supratherapeutic on presentation  Chest x-ray without any acute abnormality  Likely multifactorial secondary to uncontrolled a flutter, obesity, CHF, pulmonary hypertension, history of pulmonary embolism  ABG-7 30, pCO2 37, PO2 102 on 2 L    Saturating adequately on 2 L of supplemental oxygen  Repeat echo with lower EF 24%, grade 2 diastolic dysfunction, dilated RV and IVC, PA pressure 60 mm Hg likely underestimated  Pulmonary following, input appreciated  Symptoms have improved  Ambulating well with physical therapy but noted to have oxygen 88% with activity, requiring 2 L of supplemental oxygen with activity  · Resumed anticoagulation post PD catheter placement, continue Coumadin  · Continue 2L of supplementation oxygen with activity  · Follow-up with Pulmonary after discharge

## 2020-07-15 NOTE — ASSESSMENT & PLAN NOTE
Patient reported that she was previously on supplemental oxygen at home but she return did in November 2019  Patient with history of severe CHANDRA, likely associated sleep-related hypoxemia secondary to alveolar hypoventilation  Patient underwent nocturnal pulse oximetry during hospitalization  Seen and followed by Pulmonary, input appreciated  · Encouraged to comply with CPAP as tolerated    Patient agrees to comply  · Follow-up with Pulmonary after discharge

## 2020-07-15 NOTE — PLAN OF CARE
Problem: Potential for Falls  Goal: Patient will remain free of falls  Description  INTERVENTIONS:  - Assess patient frequently for physical needs  -  Identify cognitive and physical deficits and behaviors that affect risk of falls  -  [place]son fall precautions as indicated by assessment   - Educate patient/family on patient safety including physical limitations  - Instruct patient to call for assistance with activity based on assessment  - Modify environment to reduce risk of injury  - Consider OT/PT consult to assist with strengthening/mobility  Outcome: Progressing     Problem: RESPIRATORY - ADULT  Goal: Achieves optimal ventilation and oxygenation  Description  INTERVENTIONS:  - Assess for changes in respiratory status  - Assess for changes in mentation and behavior  - Position to facilitate oxygenation and minimize respiratory effort  - Oxygen administered by appropriate delivery if ordered  - Initiate smoking cessation education as indicated  - Encourage broncho-pulmonary hygiene including cough, deep breathe, Incentive Spirometry  - Assess the need for suctioning and aspirate as needed  - Assess and instruct to report SOB or any respiratory difficulty  - Respiratory Therapy support as indicated  Outcome: Progressing     Problem: Nutrition/Hydration-ADULT  Goal: Nutrient/Hydration intake appropriate for improving, restoring or maintaining nutritional needs  Description  Monitor and assess patient's nutrition/hydration status for malnutrition  Collaborate with interdisciplinary team and initiate plan and interventions as ordered  Monitor patient's weight and dietary intake as ordered or per policy  Utilize nutrition screening tool and intervene as necessary  Determine patient's food preferences and provide high-protein, high-caloric foods as appropriate       INTERVENTIONS:  - Monitor oral intake, urinary output, labs, and treatment plans  - Assess nutrition and hydration status and recommend course of action  - Evaluate amount of meals eaten  - Assist patient with eating if necessary   - Allow adequate time for meals  - Recommend/ encourage appropriate diets, oral nutritional supplements, and vitamin/mineral supplements  - Order, calculate, and assess calorie counts as needed  - Assess need for intravenous fluids  - Provide specific nutrition/hydration education as appropriate  - Include patient/family/caregiver in decisions related to nutrition   Outcome: Progressing     Problem: CARDIOVASCULAR - ADULT  Goal: Maintains optimal cardiac output and hemodynamic stability  Description  INTERVENTIONS:  - Monitor I/O, vital signs and rhythm  - Monitor for S/S and trends of decreased cardiac output  - Administer and titrate ordered vasoactive medications to optimize hemodynamic stability  - Assess quality of pulses, skin color and temperature  - Assess for signs of decreased coronary artery perfusion  - Instruct patient to report change in severity of symptoms  Outcome: Progressing  Goal: Absence of cardiac dysrhythmias or at baseline rhythm  Description  INTERVENTIONS:  - Continuous cardiac monitoring, vital signs, obtain 12 lead EKG if ordered  - Administer antiarrhythmic and heart rate control medications as ordered  - Monitor electrolytes and administer replacement therapy as ordered  Outcome: Progressing     Problem: PAIN - ADULT  Goal: Verbalizes/displays adequate comfort level or baseline comfort level  Description  Interventions:  - Encourage patient to monitor pain and request assistance  - Assess pain using appropriate pain scale  - Administer analgesics based on type and severity of pain and evaluate response  - Implement non-pharmacological measures as appropriate and evaluate response  - Consider cultural and social influences on pain and pain management  - Notify physician/advanced practitioner if interventions unsuccessful or patient reports new pain  Outcome: Progressing     Problem: INFECTION - ADULT  Goal: Absence or prevention of progression during hospitalization  Description  INTERVENTIONS:  - Assess and monitor for signs and symptoms of infection  - Monitor lab/diagnostic results  - Monitor all insertion sites, i e  indwelling lines, tubes, and drains  - Indian appropriate cooling/warming therapies per order  - Administer medications as ordered  - Instruct and encourage patient and family to use good hand hygiene technique  - Identify and instruct in appropriate isolation precautions for identified infection/condition   Outcome: Progressing     Problem: SAFETY ADULT  Goal: Maintain or return to baseline ADL function  Description  INTERVENTIONS:  -  Assess patient's ability to carry out ADLs; assess patient's baseline for ADL function and identify physical deficits which impact ability to perform ADLs (bathing, care of mouth/teeth, toileting, grooming, dressing, etc )  - Assess/evaluate cause of self-care deficits   - Assess range of motion  - Assess patient's mobility; develop plan if impaired  - Assess patient's need for assistive devices and provide as appropriate  - Encourage maximum independence but intervene and supervise when necessary  - Involve family in performance of ADLs  - Assess for home care needs following discharge   - Consider OT consult to assist with ADL evaluation and planning for discharge  - Provide patient education as appropriate  Outcome: Progressing  Goal: Maintain or return mobility status to optimal level  Description  INTERVENTIONS:  - Assess patient's baseline mobility status (ambulation, transfers, stairs, etc )    - Identify cognitive and physical deficits and behaviors that affect mobility  - Identify mobility aids required to assist with transfers and/or ambulation (gait belt, sit-to-stand, lift, walker, cane, etc )  - Indian fall precautions as indicated by assessment  - Record patient progress and toleration of activity level on Mobility SBAR; progress patient to next Phase/Stage  - Instruct patient to call for assistance with activity based on assessment  - Consider rehabilitation consult to assist with strengthening/weightbearing, etc   Outcome: Progressing     Problem: DISCHARGE PLANNING  Goal: Discharge to home or other facility with appropriate resources  Description  INTERVENTIONS:  - Identify barriers to discharge w/patient and caregiver  - Arrange for needed discharge resources and transportation as appropriate  - Identify discharge learning needs (meds, wound care, etc )  - Arrange for interpretive services to assist at discharge as needed  - Refer to Case Management Department for coordinating discharge planning if the patient needs post-hospital services based on physician/advanced practitioner order or complex needs related to functional status, cognitive ability, or social support system  Outcome: Progressing     Problem: Knowledge Deficit  Goal: Patient/family/caregiver demonstrates understanding of disease process, treatment plan, medications, and discharge instructions  Description  Complete learning assessment and assess knowledge base    Interventions:  - Provide teaching at level of understanding  - Provide teaching via preferred learning methods  Outcome: Progressing

## 2020-07-16 PROBLEM — N18.6 ESRD (END STAGE RENAL DISEASE) (HCC): Status: ACTIVE | Noted: 2020-01-27

## 2020-07-16 LAB
ABO GROUP BLD: NORMAL
ABO GROUP BLD: NORMAL
ANION GAP SERPL CALCULATED.3IONS-SCNC: 14 MMOL/L (ref 4–13)
ATRIAL RATE: 324 BPM
ATRIAL RATE: 73 BPM
BASOPHILS # BLD AUTO: 0.08 THOUSANDS/ΜL (ref 0–0.1)
BASOPHILS NFR BLD AUTO: 1 % (ref 0–1)
BLD GP AB SCN SERPL QL: NEGATIVE
BUN SERPL-MCNC: 101 MG/DL (ref 5–25)
CALCIUM SERPL-MCNC: 8.2 MG/DL (ref 8.3–10.1)
CHLORIDE SERPL-SCNC: 99 MMOL/L (ref 100–108)
CO2 SERPL-SCNC: 22 MMOL/L (ref 21–32)
CREAT SERPL-MCNC: 6.1 MG/DL (ref 0.6–1.3)
EOSINOPHIL # BLD AUTO: 0.3 THOUSAND/ΜL (ref 0–0.61)
EOSINOPHIL NFR BLD AUTO: 3 % (ref 0–6)
ERYTHROCYTE [DISTWIDTH] IN BLOOD BY AUTOMATED COUNT: 18.6 % (ref 11.6–15.1)
GFR SERPL CREATININE-BSD FRML MDRD: 7 ML/MIN/1.73SQ M
GLUCOSE SERPL-MCNC: 115 MG/DL (ref 65–140)
GLUCOSE SERPL-MCNC: 119 MG/DL (ref 65–140)
GLUCOSE SERPL-MCNC: 176 MG/DL (ref 65–140)
GLUCOSE SERPL-MCNC: 205 MG/DL (ref 65–140)
GLUCOSE SERPL-MCNC: 236 MG/DL (ref 65–140)
HCT VFR BLD AUTO: 37.4 % (ref 34.8–46.1)
HGB BLD-MCNC: 11.8 G/DL (ref 11.5–15.4)
IMM GRANULOCYTES # BLD AUTO: 0.05 THOUSAND/UL (ref 0–0.2)
IMM GRANULOCYTES NFR BLD AUTO: 1 % (ref 0–2)
INR PPP: 2.19 (ref 0.84–1.19)
INR PPP: 2.28 (ref 0.84–1.19)
INR PPP: 2.81 (ref 0.84–1.19)
INR PPP: 4.15 (ref 0.84–1.19)
LYMPHOCYTES # BLD AUTO: 1.43 THOUSANDS/ΜL (ref 0.6–4.47)
LYMPHOCYTES NFR BLD AUTO: 15 % (ref 14–44)
MCH RBC QN AUTO: 26.8 PG (ref 26.8–34.3)
MCHC RBC AUTO-ENTMCNC: 31.6 G/DL (ref 31.4–37.4)
MCV RBC AUTO: 85 FL (ref 82–98)
MONOCYTES # BLD AUTO: 1.12 THOUSAND/ΜL (ref 0.17–1.22)
MONOCYTES NFR BLD AUTO: 12 % (ref 4–12)
NEUTROPHILS # BLD AUTO: 6.31 THOUSANDS/ΜL (ref 1.85–7.62)
NEUTS SEG NFR BLD AUTO: 68 % (ref 43–75)
NRBC BLD AUTO-RTO: 0 /100 WBCS
P AXIS: 109 DEGREES
P AXIS: 49 DEGREES
PHOSPHATE SERPL-MCNC: 6.4 MG/DL (ref 2.3–4.1)
PLATELET # BLD AUTO: 223 THOUSANDS/UL (ref 149–390)
PMV BLD AUTO: 11.7 FL (ref 8.9–12.7)
POTASSIUM SERPL-SCNC: 4.3 MMOL/L (ref 3.5–5.3)
PR INTERVAL: 170 MS
PROTHROMBIN TIME: 24 SECONDS (ref 11.6–14.5)
PROTHROMBIN TIME: 24.8 SECONDS (ref 11.6–14.5)
PROTHROMBIN TIME: 29.2 SECONDS (ref 11.6–14.5)
PROTHROMBIN TIME: 39.5 SECONDS (ref 11.6–14.5)
QRS AXIS: -41 DEGREES
QRS AXIS: -41 DEGREES
QRSD INTERVAL: 90 MS
QRSD INTERVAL: 96 MS
QT INTERVAL: 392 MS
QT INTERVAL: 412 MS
QTC INTERVAL: 453 MS
QTC INTERVAL: 455 MS
RBC # BLD AUTO: 4.4 MILLION/UL (ref 3.81–5.12)
RH BLD: NEGATIVE
RH BLD: NEGATIVE
SODIUM SERPL-SCNC: 135 MMOL/L (ref 136–145)
SPECIMEN EXPIRATION DATE: NORMAL
T WAVE AXIS: 19 DEGREES
T WAVE AXIS: 67 DEGREES
VENTRICULAR RATE: 73 BPM
VENTRICULAR RATE: 81 BPM
WBC # BLD AUTO: 9.29 THOUSAND/UL (ref 4.31–10.16)

## 2020-07-16 PROCEDURE — 94760 N-INVAS EAR/PLS OXIMETRY 1: CPT

## 2020-07-16 PROCEDURE — 93010 ELECTROCARDIOGRAM REPORT: CPT | Performed by: INTERNAL MEDICINE

## 2020-07-16 PROCEDURE — NC001 PR NO CHARGE: Performed by: RADIOLOGY

## 2020-07-16 PROCEDURE — 86850 RBC ANTIBODY SCREEN: CPT | Performed by: INTERNAL MEDICINE

## 2020-07-16 PROCEDURE — 85610 PROTHROMBIN TIME: CPT | Performed by: INTERNAL MEDICINE

## 2020-07-16 PROCEDURE — 99233 SBSQ HOSP IP/OBS HIGH 50: CPT | Performed by: INTERNAL MEDICINE

## 2020-07-16 PROCEDURE — 86900 BLOOD TYPING SEROLOGIC ABO: CPT | Performed by: INTERNAL MEDICINE

## 2020-07-16 PROCEDURE — 86901 BLOOD TYPING SEROLOGIC RH(D): CPT | Performed by: INTERNAL MEDICINE

## 2020-07-16 PROCEDURE — 99232 SBSQ HOSP IP/OBS MODERATE 35: CPT | Performed by: INTERNAL MEDICINE

## 2020-07-16 PROCEDURE — 85025 COMPLETE CBC W/AUTO DIFF WBC: CPT | Performed by: INTERNAL MEDICINE

## 2020-07-16 PROCEDURE — 84100 ASSAY OF PHOSPHORUS: CPT | Performed by: INTERNAL MEDICINE

## 2020-07-16 PROCEDURE — 80048 BASIC METABOLIC PNL TOTAL CA: CPT | Performed by: INTERNAL MEDICINE

## 2020-07-16 PROCEDURE — 94762 N-INVAS EAR/PLS OXIMTRY CONT: CPT

## 2020-07-16 PROCEDURE — 82948 REAGENT STRIP/BLOOD GLUCOSE: CPT

## 2020-07-16 RX ORDER — PHYTONADIONE 5 MG/1
5 TABLET ORAL ONCE
Status: COMPLETED | OUTPATIENT
Start: 2020-07-16 | End: 2020-07-16

## 2020-07-16 RX ORDER — TORSEMIDE 20 MG/1
40 TABLET ORAL DAILY
Status: DISCONTINUED | OUTPATIENT
Start: 2020-07-16 | End: 2020-07-19 | Stop reason: HOSPADM

## 2020-07-16 RX ADMIN — INSULIN LISPRO 1 UNITS: 100 INJECTION, SOLUTION INTRAVENOUS; SUBCUTANEOUS at 17:18

## 2020-07-16 RX ADMIN — PHYTONADIONE 5 MG: 5 TABLET ORAL at 01:47

## 2020-07-16 RX ADMIN — INSULIN LISPRO 2 UNITS: 100 INJECTION, SOLUTION INTRAVENOUS; SUBCUTANEOUS at 13:30

## 2020-07-16 RX ADMIN — INSULIN ASPART 10 UNITS: 100 INJECTION, SUSPENSION SUBCUTANEOUS at 09:01

## 2020-07-16 RX ADMIN — CALCITRIOL 0.5 MCG: 0.25 CAPSULE, LIQUID FILLED ORAL at 09:01

## 2020-07-16 RX ADMIN — INSULIN ASPART 10 UNITS: 100 INJECTION, SUSPENSION SUBCUTANEOUS at 17:18

## 2020-07-16 RX ADMIN — INSULIN LISPRO 1 UNITS: 100 INJECTION, SOLUTION INTRAVENOUS; SUBCUTANEOUS at 21:41

## 2020-07-16 RX ADMIN — METOPROLOL SUCCINATE 25 MG: 25 TABLET, EXTENDED RELEASE ORAL at 17:18

## 2020-07-16 RX ADMIN — METOPROLOL SUCCINATE 25 MG: 25 TABLET, EXTENDED RELEASE ORAL at 09:01

## 2020-07-16 RX ADMIN — TORSEMIDE 40 MG: 20 TABLET ORAL at 10:41

## 2020-07-16 NOTE — ASSESSMENT & PLAN NOTE
Jacquie's CKD has progressed to ESRD  See Nephrology progress note  She is for IR placement of PD catheter for tomorrow

## 2020-07-16 NOTE — PROGRESS NOTES
[facility] Internal Medicine Progress Note  Patient: Junie Litten 63 y o  female   MRN: [id number]  PCP: Bailey Maher DO  Unit/Bed#: [address] [place] [address] Encounter: [id number]  Date Of Visit: 07/16/20    Problem List:    Principal Problem:    Acute kidney injury superimposed on CKD [facility])  Active Problems:    Atrial flutter with rapid ventricular response ([facility] )    Dyspnea    Type 2 diabetes mellitus with complication, with long-term current use of insulin (HCC)    Elevated LFTs    Chronic combined systolic and diastolic congestive heart failure (HCC)    Chronic hypoxemic respiratory failure (HCC)    Benign hypertension with CKD (chronic kidney disease) stage V ([facility])    Elevated TSH    Mixed hyperlipidemia    History of pulmonary embolus (PE)    Sleep apnea    Pulmonary hypertension ([facility] )    Morbid obesity with BMI of 40 0-44 9, adult ([facility] )    ESRD (end stage renal disease) ([facility] )      Assessment & Plan:    Dyspnea  Assessment & Plan  History of chronic shortness of breath but worsening dyspnea at rest since Friday last week  Known history of severe pulmonary hypertension  PA pressure > 90  Patient follows Dr Alicia Browne as outpatient  History of suspected PE  Doubt PE as patient is on Coumadin for history of PE  INR supratherapeutic on presentation  Chest x-ray without any acute abnormality  Likely multifactorial secondary to uncontrolled a flutter, obesity, CHF, pulmonary hypertension, history of pulmonary embolism  ABG-7 30, pCO2 37, PO2 102 on 2 L    Saturating adequately on 2 L of supplemental oxygen  Repeat echo with lower EF 12%, grade 2 diastolic dysfunction, dilated RV and IVC, PA pressure 60 mm Hg likely underestimated  Pulmonary following, input appreciated  Symptoms have improved  · Continue supplemental oxygen  · Nocturnal pulse oximetry , patient prefers supplemental oxygen at night compared to CPAP  · Resume anticoagulation post PD catheter placement  · Will require continue follow-up with Pulmonary after discharge    Atrial flutter with rapid ventricular response ([address] )  Assessment & Plan  New onset a flutter with RVR  EKG in ED showed 2:1 a flutter, rate 155  Received Cardizem 15 mg IV in ED, and subsequently started on Cardizem drip  Metoprolol was resumed  Likely precipitated by hypoxia, pulmonary hypertension, metabolic abnormality  Self converted and remains in sinus rhythm   2D echo with EF 57%, grade 2 diastolic dysfunction, dilated RV and IVC  PA pressure 60 mmHg but possible underestimation due to eccentric TR jet  · Continue metoprolol  · Monitor electrolytes  · Pulmonary following for pulmonary hypertension  · Continue Supplemental oxygen  · Cardiology following, input appreciated  · Currently anticoagulated, Coumadin on hold secondary to elevated INR  Cautious INR reversal for PD catheter placement      * Acute kidney injury superimposed on CKD [facility])  Assessment & Plan  PORSHA on CKD 4  Baseline creatinine 3 3-3 8 in past year  Patient follows Dr Terell Murphy as outpatient  On Demadex 40mg p o  Daily in AM and can take additional 20 mg p o  In the afternoon if leg edema or weight gain  Patient states she barely takes additional dose in the afternoon  Creatinine 6 32 on presentation with Anion gap 18, bicarb 19, potassium normal  ABG showed metabolic acidosis  No significant improvement with IV fluid, creatinine remains 6 1  Now deemed ESRD  CPK within normal limit  Nephrology following, input appreciated  Likely disease progression possibly multifactorial secondary to decrease ECV due to pulmonary hypertension,? Anticoagulated related nephropathy, progressive nephrotic range proteinuria  Discontinue IV fluid  · IR evaluation for PD catheter placement, scheduled for tomorrow  · INR reversal patient is agreeable for catheter placement, downtrending after vitamin K   Consider repeat dose of vitamin K plus/minus FFP as needed   · Resume Demadex  · Daily weight and I&Os  · Follow-up labs  · Avoid nephrotoxins and hypotension  · Plan to initiate PD next week as outpatient      Results from last 7 days   Lab Units 07/16/20  0557 07/15/20  0615 07/14/20  0549 07/13/20  1317   BUN mg/dL 101* 100* 96* 95*   CREATININE mg/dL 6 10* 6 19* 6 22* 6 32*         Chronic hypoxemic respiratory failure (HCC)  Assessment & Plan  Patient reported that she was previously on supplemental oxygen at home but she return did in November 2019  Continue supplemental oxygenation  Will need re qualification for home oxygen  Follow-up nocturnal pulse oximetry    Chronic combined systolic and diastolic congestive heart failure (HCC)  Assessment & Plan  Wt Readings from Last 3 Encounters:   07/16/20 98 kg (216 lb 0 8 oz)   06/09/20 94 3 kg (208 lb)   06/05/20 95 6 kg (210 lb 12 8 oz)     2D echo in May this year showed EF 50-55%, no RWMA,G2DD, PA pressure more than 90  No significant valvular disease  Patient reports weighing herself daily at home  No weight gain noted  ProBNP 30,000's in setting of advanced kidney disease  Chest x-ray NAD  Repeat echo with EF 50%, grade 2 diastolic dysfunction, dilated RV and IVC, PA pressure 60 mmHg likely underestimation  Stress test without any evidence of ischemia  Mild edema to lower extremities on exam   · Resume Demadex  · Continue metoprolol,  · Not candidate for ACE-inhibitor/ARB at present  · Consider addition of hydralazine plus isosorbide if tolerated  · Monitor volume status closely  · Daily weight and I&Os  Elevated LFTs  Assessment & Plan  Acute and mild  TB normal   Suspect congestive hepatopathy    Patient denies nausea vomiting abdominal pain  Hold Lipitor for now  Improving  · Monitor            Type 2 diabetes mellitus with complication, with long-term current use of insulin [facility])  Assessment & Plan  Lab Results   Component Value Date    HGBA1C 6 8 (H) 07/14/2020       Recent Labs     07/16/20  0720 07/16/20  1123 07/16/20  1535 07/16/20  2040   POCGLU 119 236* 176* 205*       Blood Sugar Average: Last 72 hrs:  (P) 113 [id number]     Patient is on Lispro prot 75/25 10 units in the morning and 20 units with dinner, Tradjenta at home  Patient barely takes short acting insulin with lunch  Reports check blood sugar 3 times a day at home  Substitute Lispro prot 75/25 with Novolog 70/30 and decrease to 10 units BID  SSI  Hold Tradjenta  Diabetic diet    Sleep apnea  Assessment & Plan  Recently started on CPAP  Continue CPAP at HS  History of pulmonary embolus (PE)  Assessment & Plan  On Coumadin therapy at home  Took Coumadin 8 mg p o  Daily for past month, no recent INR check since May  INR 4 11 presentation, remains with elevated INR  INR is downtrending after receiving vitamin K 5 mg yesterday  · No evidence of bleeding  · Continue to hold Coumadin  · Will consider another dose of vitamin K plus/minus FFP as needed to reverse INR for catheter placement  · Continue to monitor      Mixed hyperlipidemia  Assessment & Plan  LDL 33 in April this year  Hold Lipitor for now due to elevated LFTs    Elevated TSH  Assessment & Plan  TSH 6 638 normal free T4    Benign hypertension with CKD (chronic kidney disease) stage V (HCC)  Assessment & Plan  BP stable  Continue metoprolol from home , monitor    Morbid obesity with BMI of 40 0-44 9, adult ([facility] )  Assessment & Plan  Body mass index is 41 08 kg/m²  Diet and lift style modification        VTE Pharmacologic Prophylaxis:   Pharmacologic: Warfarin (Coumadin) on hold secondary to elevated INR  Mechanical VTE Prophylaxis in Place: Yes    Patient Centered Rounds: I have performed bedside rounds with nursing staff today  Discussions with Specialists or Other Care Team Provider:  Cardiology    Education and Discussions with Family / Patient:  Daughter at bedside    Time Spent for Care: 45 minutes  More than 50% of total time spent on counseling and coordination of care as described above      Current Length of Stay: 3 day(s)    Current Patient Status: Inpatient   Certification Statement: The patient will continue to require additional inpatient hospital stay due to Further management for acute kidney injury requiring dialysis and stabilization of the cardiopulmonary status    Discharge Plan:  Home in next 48-72 hours    Code Status: Level 1 - Full Code      Subjective:   Continues to feel better  Denies any shortness of breath or dizziness  Denies any chest pain or cough  Denies any abdominal pain  Remains in sinus rhythm    Appetite good denies any nausea or abdominal pain    Objective:     Vitals:   Temp (24hrs), Av 6 °F (36 4 °C), Min:97 3 °F (36 3 °C), Max:97 9 °F (36 6 °C)    Temp:  [97 3 °F (36 3 °C)-97 9 °F (36 6 °C)] 97 3 °F (36 3 °C)  HR:  [66-77] 73  Resp:  [18-19] 19  BP: (119-148)/(74-92) 148/92  SpO2:  [94 %-100 %] 96 %  Body mass index is 42 19 kg/m²  Input and Output Summary (last 24 hours): Intake/Output Summary (Last 24 hours) at 2020 1634  Last data filed at 2020 0700  Gross per 24 hour   Intake 690 ml   Output 650 ml   Net 40 ml       Physical Exam:     Physical Exam   Constitutional: She is oriented to person, place, and time  No distress  Chronically ill   HENT:   Head: Normocephalic and atraumatic  Eyes: Pupils are equal, round, and reactive to light  Neck: Neck supple  Cardiovascular: Normal rate and regular rhythm  Murmur heard  Pulmonary/Chest: Effort normal and breath sounds normal  No respiratory distress  She has no wheezes  She has no rales  Diminished but clear   Abdominal: Soft  Bowel sounds are normal  She exhibits no distension  There is no tenderness  There is no rebound and no guarding  Musculoskeletal: She exhibits edema  Neurological: She is alert and oriented to person, place, and time  No cranial nerve deficit  Skin: Skin is warm and dry  No rash noted         Additional Data:     Labs:    Results from last 7 days   Lab Units 20  0557   WBC Thousand/uL 9 29   HEMOGLOBIN g/dL 11 8   HEMATOCRIT % 37 4   PLATELETS Thousands/uL 223   NEUTROS PCT % 68   LYMPHS PCT % 15   MONOS PCT % 12   EOS PCT % 3     Results from last 7 days   Lab Units 07/16/20  0557  07/14/20  0549   POTASSIUM mmol/L 4 3   < > 4 0   CHLORIDE mmol/L 99*   < > 100   CO2 mmol/L 22   < > 19*   BUN mg/dL 101*   < > 96*   CREATININE mg/dL 6 10*   < > 6 22*   CALCIUM mg/dL 8 2*   < > 8 1*   ALK PHOS U/L  --   --  148*   ALT U/L  --   --  133*   AST U/L  --   --  88*    < > = values in this interval not displayed  Results from last 7 days   Lab Units 07/16/20  1226   INR  2 81*       * I Have Reviewed All Lab Data Listed Above  * Additional Pertinent Lab Tests Reviewed: All Labs Within Last 24 Hours Reviewed      Imaging:  Imaging Reports Reviewed Today Include:  X-ray    Recent Cultures (last 7 days):           Last 24 Hours Medication List:     Current Facility-Administered Medications:  acetaminophen 650 mg Oral Q6H PRN Lyssa Dean PA-C   calcitriol 0 5 mcg Oral Daily MICHAEL Padron   cholecalciferol 2,000 Units Oral Every Other Day MICHAEL Padron   insulin aspart protamine-insulin aspart 10 Units Subcutaneous BID AC Brody Maya, MICHAEL   insulin lispro 1-5 Units Subcutaneous TID AC Brody Maya, MICHAEL   insulin lispro 1-5 Units Subcutaneous HS MICHAEL Padron   levalbuterol 0 63 mg Nebulization Q4H PRN Brody Maya, MICHAEL   metoprolol succinate 25 mg Oral BID CuiMICHAEL Montesinos   ondansetron 4 mg Intravenous Q6H PRN Brody Maya, MICHAEL   torsemide 40 mg Oral Daily Kyra Gipson MD          Today, Patient Was Seen By: Ksenia Serrano MD    ** Please Note: "This note has been constructed using a voice recognition system  Therefore there may be syntax, spelling, and/or grammatical errors   Please call if you have any questions  "**

## 2020-07-16 NOTE — PROGRESS NOTES
Progress Note - Pulmonary   Lashonda Greenberg 63 y o  female MRN: [id number]  Unit/Bed#: [address] [place] [address] Encounter: [id number]  Code Status: Level 1 - Full Code    Bruna Rodriguez is a 63 y o  Past Medical History:   Diagnosis Date    Acute asthmatic bronchitis     last assessed: 6/2/2017    Cardiac disease     Chronic diastolic (congestive) heart failure (HCC)     Colon polyp     Diabetes mellitus ([facility] )     Hyperlipidemia     Hypertension     Medical non-compliance     Pneumonia     last assessed: 6/5/2013    Pulmonary embolism ([facility] )     Renal disorder     possible clot noted in kidney, thus blood thinners currently    Severe obstructive sleep apnea     Severe pulmonary arterial systolic hypertension (HCC)        Assessment/Plan:   Chronic hypoxemic respiratory failure ([facility] )  Assessment & Plan  Ufortunately I qualified her in a backwards fashion  She adequately oxygenates on 2 L NC  Will get overnight pulse oximetry on RA tonight to qualify for 02    Sleep apnea  Assessment & Plan  History of severe CHANDRA  Not entirely explanative of nocturnal hypoxemia / PAH contributes  Does not wish to use CPAP  Only uses for an hour or so while at home and in hospital  Will d/c in hospital CPAP use    Dyspnea  Assessment & Plan  Stable  Currently not resting hypoxemic  Chronic dyspnea with even 4-500 feet of ambulation  We discussed her chronic contributory condition, CHF, arrhythmias, pulmonary HTN, fluid overload  Discussed compliance with oxygen as well as Pap therapy, however, she does not wish to use CPAP > discussed possibility of using oxygen alone if she adequately oxygenates  Reemphasized need for initiation of PAH follow up @ [facility] w/ Eileen White and Kirk Feliz  She is agreeable to following up with them    History of pulmonary embolus (PE)  Assessment & Plan  Prior history of intermediately positive nuclear medicine test  On Coumadin anticoagulation therapy  Consideration for contribution to pulmonary HTN    ESRD (end stage renal disease) ([facility] )  Assessment & Plan  Jacquie's CKD has progressed to ESRD  See Nephrology progress note  She is for IR placement of PD catheter for tomorrow      Morbid obesity with BMI of 40 0-44 9, adult ([facility] )  Assessment & Plan  Weight loss recommended for improvement of chronic comorbid conditions    Pulmonary hypertension (HCC)  Assessment & Plan  Severe PAH:  Multifactorial w/ WHO group 2, 3, and possibly 4 components  Room for optimization  Will follow up w/ Eileen Jackson in outpatient setting      D/C and Follow up Needs: Follow up in the outpatient setting, ideally in next 1-2 weeks for follow up  Later Scheduling may be required due to scheduling restraints    Items to follow up :  Symptoms management  Overnight pulse ox on RA to requalify for nocturnal oxygen  Overall 2 L is adequate for overnight oxygen delivery    ______________________________________________________________________    Subjective: Pt seen and examined at bedside    Chief Complaint: "Im just tired "    Smoking history:  None never  Occupational history:  Deferred  Environmental History:  No exposure history  Travel history:  No recent travel  Respiratory History:  History of severe CHANDRA/chronic hypoxemic respiratory failure/pulmonary embolus/pulmonary HTN  Oxygen Therapy: History of 02 dependence of 3 L   PAP Therapy: prior auto CPAP / non compliance  DME Company:[employer] Insurance: BC/    Tele Events:     Vitals:   Temp:  [97 6 °F (36 4 °C)-97 9 °F (36 6 °C)] 97 7 °F (36 5 °C)  HR:  [66-77] 66  Resp:  [18-19] 19  BP: (119-140)/(74-87) 119/74  Weight (last 2 days)     Date/Time   Weight    07/16/20 0600   98 (216 05)    07/15/20 0900   97 6 (215 17)    07/14/20 0600   95 4 (210 32)            Vitals:    07/15/20 2235 07/16/20 0059 07/16/20 0600 07/16/20 0851   BP:  125/78  119/74   Pulse:  73  66   Resp:       Temp:  97 7 °F (36 5 °C)     TempSrc:       SpO2: 98% 99%  95%   Weight:   98 kg (216 lb 0 8 oz)      Temp (24hrs), Av 7 °F (36 5 °C), Min:97 6 °F (36 4 °C), Max:97 9 °F (36 6 °C)  Current: Temperature: 97 7 °F (36 5 °C)        SpO2: SpO2: 95 %, SpO2 Activity: SpO2 Activity: At Rest, SpO2 Device: O2 Device: Nasal cannula       IV Infusions:       Nutrition:        Diet Orders   (From admission, onward)             Start     Ordered    20 0001  Diet NPO; Sips with meds  Diet effective midnight     Question Answer Comment   Diet Type NPO    NPO Except: Sips with meds    RD to adjust diet per protocol? Yes        20 0900    07/15/20 1338  Diet Jeronimo/CHO Controlled; Consistent Carbohydrate Diet Level 2 (5 carb servings/75 grams CHO/meal); Renal Restrictive; No; No  Diet effective midnight     Comments:  *If patient is having a stress test, no caffeine, decaf, or chocolate is to be given*   Question Answer Comment   Diet Type Jeronimo/CHO Controlled    Jeronimo/CHO Controlled Consistent Carbohydrate Diet Level 2 (5 carb servings/75 grams CHO/meal)    Other Restriction(s): Renal Restrictive    Should patient have a fluid restriction? No    Should patient have a protein modifier? No    RD to adjust diet per protocol? Yes        07/15/20 1337    20  Room Service  Once     Question:  Type of Service  Answer:  Room Service-Appropriate    20                Ins/Outs:   I/O        07 - 07/15 0700 07/15 07 -  0700  07 -  0700    P  O  120 1220     I V  (mL/kg)  10 (0 1)     IV Piggyback       Total Intake(mL/kg) 120 (1 3) 1230 (12 6)     Urine (mL/kg/hr)  1100 (0 5)     Total Output  1100     Net +120 +130            Unmeasured Urine Occurrence  1 x           Lines/Drains:  Invasive Devices     Peripheral Intravenous Line            Peripheral IV 20 Right Hand less than 1 day                 Active medications:  Scheduled Meds:  Current Facility-Administered Medications:  acetaminophen 650 mg Oral Q6H PRN Valorie Regalado PA-C   calcitriol 0 5 mcg Oral Daily MICHAEL Tavares cholecalciferol 2,000 Units Oral Every Other Day Brody Maya, ARISTEONP   insulin aspart protamine-insulin aspart 10 Units Subcutaneous BID AC Brody Maya, CRNP   insulin lispro 1-5 Units Subcutaneous TID AC Tiffanieijr Enriquezrik, CRNP   insulin lispro 1-5 Units Subcutaneous HS Cucarlosse Zoerik, CRNP   levalbuterol 0 63 mg Nebulization Q4H PRN Tiffanieijr Zoeortega, CRNP   metoprolol succinate 25 mg Oral BID Cuijr Zoerialexis, CRNP   ondansetron 4 mg Intravenous Q6H PRN Brody Maya, CRNP   torsemide 40 mg Oral Daily Crissy Pickett MD     PRN Meds:  acetaminophen 650 mg Q6H PRN   levalbuterol 0 63 mg Q4H PRN   ondansetron 4 mg Q6H PRN     ____________________________________________________________________    Physical Exam   Constitutional: She is oriented to person, place, and time  She appears well-developed and well-nourished  HENT:   Head: Normocephalic and atraumatic  Eyes: Pupils are equal, round, and reactive to light  Conjunctivae are normal    Neck: Normal range of motion  Neck supple  Cardiovascular: Normal rate, regular rhythm and normal heart sounds  Pulmonary/Chest: Effort normal  No respiratory distress  She has decreased breath sounds in the right upper field, the right middle field, the right lower field, the left upper field, the left middle field and the left lower field  She has no wheezes  She has no rales  She exhibits no tenderness  92% RA resting   Abdominal: Soft  Bowel sounds are normal    Musculoskeletal: Normal range of motion  She exhibits no edema  Neurological: She is alert and oriented to person, place, and time  Skin: Skin is warm and dry     Psychiatric: She has a normal mood and affect      ____________________________________________________________________    Invasive/non-invasive ventilation settings   Respiratory    Lab Data (Last 4 hours)    None         O2/Vent Data (Last 4 hours)    None                Laboratory and Diagnostics:  Results from last 7 days   Lab Units 07/16/20  0506 07/15/20  0615 07/14/20  0549 07/13/20  1317   WBC Thousand/uL 9 29 9 66 10 74* 10 62*   HEMOGLOBIN g/dL 11 8 11 4* 11 9 12 5   HEMATOCRIT % 37 4 36 8 38 3 39 5   PLATELETS Thousands/uL 223 214 236 278   NEUTROS PCT % 68  --   --  68   MONOS PCT % 12  --   --  12     Results from last 7 days   Lab Units 07/16/20  0557 07/15/20  0615 07/14/20  0549 07/13/20  1317   SODIUM mmol/L 135* 133* 133* 134*   POTASSIUM mmol/L 4 3 4 0 4 0 4 6   CHLORIDE mmol/L 99* 98* 100 97*   CO2 mmol/L 22 20* 19* 19*   ANION GAP mmol/L 14* 15* 14* 18*   BUN mg/dL 101* 100* 96* 95*   CREATININE mg/dL 6 10* 6 19* 6 22* 6 32*   CALCIUM mg/dL 8 2* 8 1* 8 1* 8 5   GLUCOSE RANDOM mg/dL 115 145* 146* 147*   ALT U/L  --   --  133* 150*   AST U/L  --   --  88* 155*   ALK PHOS U/L  --   --  148* 171*   ALBUMIN g/dL  --   --  2 7* 3 0*   TOTAL BILIRUBIN mg/dL  --   --  0 60 0 80     Results from last 7 days   Lab Units 07/16/20  0557 07/15/20  0615 07/14/20  0549 07/13/20  1317   MAGNESIUM mg/dL  --   --  2 4 2 3   PHOSPHORUS mg/dL 6 4* 6 3*  --   --       Results from last 7 days   Lab Units 07/16/20  1226 07/16/20  0557 07/15/20  0615 07/14/20  0549 07/13/20  1400   INR  2 81* 4 15* 6 84* 5 04* 4 11*   PTT seconds  --   --   --   --  45*      Results from last 7 days   Lab Units 07/13/20  1725 07/13/20  1317   TROPONIN I ng/mL 0 02 0 03     Results from last 7 days   Lab Units 07/13/20  1904   LACTIC ACID mmol/L 0 8     ABG:  Results from last 7 days   Lab Units 07/13/20  1821   PH ART  7 284*   PCO2 ART mm Hg 37 0   PO2 ART mm Hg 101 6   HCO3 ART mmol/L 17 2*   BASE EXC ART mmol/L -8 8   ABG SOURCE  Brachial, Right     VBG:  Results from last 7 days   Lab Units 07/13/20  1821   ABG SOURCE  Brachial, Right           Micro        Imaging:           US kidney and bladder   Final Result by Jermaine Knapp MD (07/14 5265)      No evidence of obstruction         Workstation performed: [id number]         XR chest 1 view portable   Final Result by Jazlyn Payne, MD (07/13 1517)      No acute cardiopulmonary disease  Workstation performed: [id number]         IR other    (Results Pending)       Micro: Lab Results   Component Value Date    BLOODCX No Growth After 5 Days  07/05/2018    BLOODCX No Growth After 5 Days   07/05/2018    MRSACULTURE  07/05/2018     No Methicillin Resistant Staphlyococcus aureus (MRSA) isolated            Invalid input(s): Nilo Zarate

## 2020-07-16 NOTE — PROGRESS NOTES
[address] [facility] NOTE   Urban Eason 63 y o  female MRN: [id number]  Unit/Bed#: [address] [place] [address] Encounter: [id number]  Reason for Consult: PORSHA, CKD    ASSESSMENT and PLAN:  1  PORSHA (POA):  · Admission creatinine of 6 32 on 7/13/20  · No significant improvement despite IVF - SCr 6 1 today  · Differential diagnosis: Decreased ECV in the setting of RV failure, ATN (granular casts on UA), progression of CKD, anti-coagulant related nephropathy  · Given advanced renal disease at baseline and new significant cardiac issues, I doubt that she will recover renal function  She will need to start peritoneal dialysis soon  · She is now deemed ESRD  · Plan for PD catheter insertion tomorrow if INR okay then plan to start urgent PD next week at [place] [address]  CKD IV:  · Baseline creatinine in the mid to high 3s and has been worsening as an outpatient  · Follows with Dr Ewa Conde    · Etiology felt to be diabetic nephropathy  3  CHF:  · Echo showed drop in EF to 35%  · Cardiology input appreciated  · Restart Torsemide 40 mg daily  4  HTN:  · BP is controlled on Metoprolol succ 25 mg BID  5  PAF   6  Pulm HTN  7  Anemia: Hgb stable  8  Nephrotic range proteinuria  9  MBD: continue Calcitriol 0 5 mcg daily  DISPOSITION:  · Restart Torsemide 40 mg daily  · Now deemed ESRD  · Plan for PD catheter insertion in AM and start PD at [facility] next week  SUBJECTIVE / INTERVAL HISTORY:  She reports feeling much better overall  Less SOB  No CP  Appetite is okay       OBJECTIVE:  Current Weight: Weight - Scale: 98 kg (216 lb 0 8 oz)  Vitals:    07/15/20 2235 07/16/20 0059 07/16/20 0600 07/16/20 0851   BP:  125/78  119/74   Pulse:  73  66   Resp:       Temp:  97 7 °F (36 5 °C)     TempSrc:       SpO2: 98% 99%  95%   Weight:   98 kg (216 lb 0 8 oz)        Intake/Output Summary (Last 24 hours) at 7/16/2020 0947  Last data filed at 7/16/2020 0700  Gross per 24 hour   Intake 1050 ml   Output 900 ml Net 150 ml     General: conscious, coherent, cooperative, no distress  Skin: dry, no rash  Eyes: pink conjunctivae, no scleral icterus  ENT: moist mucous membranes  Chest/Lungs: equal chest expansion, clear breath sounds  CVS: distinct heart sounds, normal rate, regular rhythm, no rub  Abdomen: soft, non tender, non distended, normal bowel sounds  Extremities: (+) bilateral leg edema  : no west catheter  Neuro: awake, alert     Psych: appropriate affect    Medications:    Current Facility-Administered Medications:     acetaminophen (TYLENOL) tablet 650 mg, 650 mg, Oral, Q6H PRN, Jerjoaquín Edmondson PA-C, 650 mg at 07/14/20 0616    calcitriol (ROCALTROL) capsule 0 5 mcg, 0 5 mcg, Oral, Daily, MICHAEL Padron, 0 5 mcg at 07/16/20 0901    cholecalciferol (VITAMIN D3) tablet 2,000 Units, 2,000 Units, Oral, Every Other Day, MICHAEL Padron, 2,000 Units at 07/15/20 0923    insulin aspart protamine-insulin aspart (NovoLOG 70/30) 100 units/mL subcutaneous injection 10 Units, 10 Units, Subcutaneous, BID AC, MICHAEL Padron, 10 Units at 07/16/20 0901    insulin lispro (HumaLOG) 100 units/mL subcutaneous injection 1-5 Units, 1-5 Units, Subcutaneous, TID AC, 1 Units at 07/15/20 1826 **AND** Fingerstick Glucose (POCT), , , TID AC, MICHAEL Padron    insulin lispro (HumaLOG) 100 units/mL subcutaneous injection 1-5 Units, 1-5 Units, Subcutaneous, HS, MICHAEL Padron, 1 Units at 07/15/20 2207    levalbuterol (XOPENEX) inhalation solution 0 63 mg, 0 63 mg, Nebulization, Q4H PRN, MICHAEL Padron    metoprolol succinate (TOPROL-XL) 24 hr tablet 25 mg, 25 mg, Oral, BID, MICHAEL Padron, 25 mg at 07/16/20 0901    ondansetron (ZOFRAN) injection 4 mg, 4 mg, Intravenous, Q6H PRN, MICHAEL Padron    Laboratory Results:  Results from last 7 days   Lab Units 07/16/20  0557 07/15/20  0615 07/14/20  0549 07/13/20  1317   WBC Thousand/uL 9 29 9 66 10 74* 10 62*   HEMOGLOBIN g/dL 11 8 11 4* 11 9 12 5 HEMATOCRIT % 37 4 36 8 38 3 39 5   PLATELETS Thousands/uL 223 214 236 278   POTASSIUM mmol/L 4 3 4 0 4 0 4 6   CHLORIDE mmol/L 99* 98* 100 97*   CO2 mmol/L 22 20* 19* 19*   BUN mg/dL 101* 100* 96* 95*   CREATININE mg/dL 6 10* 6 19* 6 22* 6 32*   CALCIUM mg/dL 8 2* 8 1* 8 1* 8 5   MAGNESIUM mg/dL  --   --  2 4 2 3   PHOSPHORUS mg/dL 6 4* 6 3*  --   --

## 2020-07-16 NOTE — CONSULTS
Interventional Radiology  Consultation 7/16/2020     Junie Litten   1955   [id number]      Assessment/Plan:  Patient is a candidate for Peritoneal dialysis catheter placement  Will perform tomorrow pending INR  NPO past midnight    Problem List     * (Principal) Acute kidney injury superimposed on CKD ([facility] )    Benign hypertension with CKD (chronic kidney disease) stage V ([place] [address] )    Stage 5 chronic kidney disease not on chronic dialysis ([place] [address] )    Nephrotic range proteinuria    Cyst of ovary    Elevated TSH    Mixed hyperlipidemia    Type 2 diabetes mellitus with complication, with long-term current use of insulin ([place] [address] )    Overview Addendum 8/21/2018  2:34 PM by Bailey Maher,      Follows with endocrinologist at [place] [address]         Lab Results   Component Value Date    HGBA1C 6 8 (H) 07/14/2020       Recent Labs     07/15/20  1645 07/15/20  2015 07/16/20  0720 07/16/20  1123   POCGLU 166* 158* 119 236*       Blood Sugar Average: Last 72 hrs:  (P) 303 [id number]        Obstructive sleep apnea of adult    Dyspnea on exertion    PE (pulmonary thromboembolism) (HCC)    Elevated brain natriuretic peptide (BNP) level    Leg edema, left    Pulmonary hypertension (HCC)    Closed nondisplaced fracture of distal phalanx of right great toe    Right foot pain    Onychomycosis    Tinea pedis of both feet    Acute idiopathic gout of right foot    Morbid obesity with BMI of 40 0-44 9, adult (HCC)    Vitamin D deficiency    Secondary hyperparathyroidism of renal origin ([place] [address] )    Non-rheumatic mitral valve stenosis    Exercise hypoxemia    Diabetic retinopathy of both eyes associated with type 2 diabetes mellitus (HCC)    Lab Results   Component Value Date    HGBA1C 6 8 (H) 07/14/2020       Recent Labs     07/15/20  1645 07/15/20  2015 07/16/20  0720 07/16/20  1123   POCGLU 166* 158* 119 236*       Blood Sugar Average: Last 72 hrs:  (P) 008 [id number]        Hypoxemia    Drop in hemoglobin    Encounter for long-term (current) use of insulin (HCC)    ESRD (end stage renal disease) (HCC)    Atrial flutter with rapid ventricular response (HCC)    Elevated LFTs    History of pulmonary embolus (PE)    Dyspnea    Sleep apnea    Chronic combined systolic and diastolic congestive heart failure (HCC)    Wt Readings from Last 3 Encounters:   20 98 kg (216 lb 0 8 oz)   20 94 3 kg (208 lb)   20 95 6 kg (210 lb 12 8 oz)                 Chronic hypoxemic respiratory failure (HCC)            Subjective:     Patient ID: Matthew Brar is a 63 y o  female  History of Present Illness  The patient has renal failure and is in need of urgent start PD  She cannot perform hemodialysis since she cannot get a ride to dialysis 3 times per week  I explained to her the risks and benefits of peritoneal dialysis catheter placement and she wishes to proceed  We will perform the procedure tomorrow 8am pending INR since she is on coumadin and her INR was still very high yesterday  She has a history of  but no other reason for abdominal adhesions      Review of Systems      Past Medical History:   Diagnosis Date    Acute asthmatic bronchitis     last assessed: 2017    Cardiac disease     Chronic diastolic (congestive) heart failure (HCC)     Colon polyp     Diabetes mellitus ([address] )     Hyperlipidemia     Hypertension     Medical non-compliance     Pneumonia     last assessed: 2013    Pulmonary embolism ([facility]     Renal disorder     possible clot noted in kidney, thus blood thinners currently    Severe obstructive sleep apnea     Severe pulmonary arterial systolic hypertension (HCC)         Past Surgical History:   Procedure Laterality Date     SECTION       x 1    NOSE SURGERY      fractured nose - septoplasty        Social History     Tobacco Use   Smoking Status Never Smoker   Smokeless Tobacco Never Used        Social History     Substance and Sexual Activity   Alcohol Use Never    Frequency: Never        Social History     Substance and Sexual Activity   Drug Use No        No Known Allergies    Current Facility-Administered Medications   Medication Dose Route Frequency Provider Last Rate Last Dose    acetaminophen (TYLENOL) tablet 650 mg  650 mg Oral Q6H PRN Domonique Barragan PA-C   650 mg at 07/14/20 0616    calcitriol (ROCALTROL) capsule 0 5 mcg  0 5 mcg Oral Daily MICHAEL Padron   0 5 mcg at 07/16/20 0901    cholecalciferol (VITAMIN D3) tablet 2,000 Units  2,000 Units Oral Every Other Day MICHAEL Padron   2,000 Units at 07/15/20 7015    insulin aspart protamine-insulin aspart (NovoLOG 70/30) 100 units/mL subcutaneous injection 10 Units  10 Units Subcutaneous BID AC MICHAEL Padron   10 Units at 07/16/20 0901    insulin lispro (HumaLOG) 100 units/mL subcutaneous injection 1-5 Units  1-5 Units Subcutaneous TID AC MICHAEL Padron   2 Units at 07/16/20 1330    insulin lispro (HumaLOG) 100 units/mL subcutaneous injection 1-5 Units  1-5 Units Subcutaneous HS MICHAEL Padron   1 Units at 07/15/20 2207    levalbuterol (XOPENEX) inhalation solution 0 63 mg  0 63 mg Nebulization Q4H PRN MICHAEL Padron        metoprolol succinate (TOPROL-XL) 24 hr tablet 25 mg  25 mg Oral BID MICHAEL Pdaron   25 mg at 07/16/20 0901    ondansetron (ZOFRAN) injection 4 mg  4 mg Intravenous Q6H PRN IMCHAEL Padron        torsemide (DEMADEX) tablet 40 mg  40 mg Oral Daily Andressa Valentino MD   40 mg at 07/16/20 1041          Objective:    Vitals:    07/15/20 2235 07/16/20 0059 07/16/20 0600 07/16/20 0851   BP:  125/78  119/74   Pulse:  73  66   Resp:       Temp:  97 7 °F (36 5 °C)     TempSrc:       SpO2: 98% 99%  95%   Weight:   98 kg (216 lb 0 8 oz)         Physical Exam      No results found for: BNP   Lab Results   Component Value Date    WBC 9 29 07/16/2020    HGB 11 8 07/16/2020    HCT 37 4 07/16/2020    MCV 85 07/16/2020     07/16/2020     Lab Results   Component Value Date INR 2 81 (H) 07/16/2020    INR 4 15 (H) 07/16/2020    INR 6 84 (HH) 07/15/2020    PROTIME 29 2 (H) 07/16/2020    PROTIME 39 5 (H) 07/16/2020    PROTIME 58 2 (H) 07/15/2020     Lab Results   Component Value Date    PTT 45 (H) 07/13/2020         I have personally reviewed pertinent imaging and laboratory results  This procedure has been fully reviewed with the patient and written informed consent has been obtained  Code Status: Level 1 - Full Code  Advance Directive and Living Will:      Power of :    POLST:      IR has been consulted to evaluate the patient, determine the appropriate procedure, and whether or not a procedure can and should be performed  I have spent 25 minutes with Patient  today in which greater than 50% of this time was spent in counseling/coordination of care regarding Risks and benefits of tx options  Thank you for allowing me to participate in the care of Vida Blackwell  Please don't hesitate to call, text, email, or TigerText with any questions  This text is generated with voice recognition software  There may be translation, syntax,  or grammatical errors  If you have any questions, please contact the dictating provider

## 2020-07-16 NOTE — ASSESSMENT & PLAN NOTE
Severe PAH:  Multifactorial w/ WHO group 2, 3, and possibly 4 components  Room for optimization  Will follow up w/ Eileen Valerio in outpatient setting

## 2020-07-16 NOTE — PROGRESS NOTES
[facility] Progress Note - Lindsey Arroyo 63 y o  female MRN: [id number]    Unit/Bed#: [address] 410[address]01 Encounter: [id number]      Assessment/Plan:    1  New onset atrial flutter with rapid ventricular rate- Patient in sinus rhythm now  Will continue Metoprolol  ECHO- EF 35%, moderate diffuse hypokinesis, grade 2 diastolic dysfunction  Nuclear test - EF 36%, no ischemia, cardiomyopathy noted  Continue to hold coumadin as INR remains elevated- today 4 11    2  Acute kidney injury with history of nephrotic range proteinuria- patient ESRD  Nephrology on board  IR consulted with plan for placement of PD catheter if INR is okay  Plan to start urgent PD next week at [facility]    3  History of severe pulmonary hypertension- PA pressure has been high around 80-90  Remains elevated around the same level on current ECHO  Pulmonary on board     4  Diabetes Mellitus - patient has diabetes mellitus with nephrotic range proteinuria as well as retinopathy  Needs to resume statin therapy when LFTs improve     5  Obesity with BMI around 41 - advised on importance of weight loss     6  History of Obstructive Sleep Apnea- not using CPAP regularly     7  Minimally elevated troponin, likely non-MI related troponin secondary to acute kidney injury and history of hypertension  Nuclear-  EF36% , no ischemia noted, Left ventricular systolic function reduced  Will stare ACE when patient is on HD/PD     Subjective:   Patient seen and examined at bedside  No acute events overnight  Denies any chest pain or shortness of breath  Objective:     Vitals: Blood pressure 119/74, pulse 66, temperature 97 7 °F (36 5 °C), resp  rate 19, weight 98 kg (216 lb 0 8 oz), SpO2 95 %, not currently breastfeeding  ,Body mass index is 42 19 kg/m²    Wt Readings from Last 3 Encounters:   07/16/20 98 kg (216 lb 0 8 oz)   06/09/20 94 3 kg (208 lb)   06/05/20 95 6 kg (210 lb 12 8 oz)       Intake/Output Summary (Last 24 hours) at 7/16/2020 1317  Last data filed at 7/16/2020 0700  Gross per 24 hour   Intake 690 ml   Output 650 ml   Net 40 ml       Physical Exam: General appearance: alert and oriented, in no acute distress  Ears: right and left extrenal ear normal   Throat: lips, mucosa, and tongue normal; teeth and gums normal  Lungs: no respiratory distress   Decreased breath sounds in bilateral lower lung field   Heart: regular rate and rhythm  Abdomen: soft, non-tender; bowel sounds normal; no masses,  no organomegaly  Extremities: bilateral edema of lower extremities   Skin: Skin color, texture, turgor normal  No rashes or lesions  Neurologic: Grossly normal     Recent Results (from the past 24 hour(s))   Fingerstick Glucose (POCT)    Collection Time: 07/15/20  4:45 PM   Result Value Ref Range    POC Glucose 166 (H) 65 - 140 mg/dl   Fingerstick Glucose (POCT)    Collection Time: 07/15/20  8:15 PM   Result Value Ref Range    POC Glucose 158 (H) 65 - 140 mg/dl   Novel Coronavirus (Covid-19),PCR [facility]    Collection Time: 07/15/20 10:05 PM   Result Value Ref Range    SARS-CoV-2 Negative Negative   Protime-INR    Collection Time: 07/16/20  5:57 AM   Result Value Ref Range    Protime 39 5 (H) 11 6 - 14 5 seconds    INR 4 15 (H) 0 84 - 1 19   CBC and differential    Collection Time: 07/16/20  5:57 AM   Result Value Ref Range    WBC 9 29 4 31 - 10 16 Thousand/uL    RBC 4 40 3 81 - 5 12 Million/uL    Hemoglobin 11 8 11 5 - 15 4 g/dL    Hematocrit 37 4 34 8 - 46 1 %    MCV 85 82 - 98 fL    MCH 26 8 26 8 - 34 3 pg    MCHC 31 6 31 4 - 37 4 g/dL    RDW 18 6 (H) 11 6 - 15 1 %    MPV 11 7 8 9 - 12 7 fL    Platelets 455 351 - 461 Thousands/uL    nRBC 0 /100 WBCs    Neutrophils Relative 68 43 - 75 %    Immat GRANS % 1 0 - 2 %    Lymphocytes Relative 15 14 - 44 %    Monocytes Relative 12 4 - 12 %    Eosinophils Relative 3 0 - 6 %    Basophils Relative 1 0 - 1 %    Neutrophils Absolute 6 31 1 85 - 7 62 Thousands/µL    Immature Grans Absolute 0 05 0 00 - 0 20 Thousand/uL Lymphocytes Absolute 1 43 0 60 - 4 47 Thousands/µL    Monocytes Absolute 1 12 0 17 - 1 22 Thousand/µL    Eosinophils Absolute 0 30 0 00 - 0 61 Thousand/µL    Basophils Absolute 0 08 0 00 - 0 10 Thousands/µL   Basic metabolic panel    Collection Time: 07/16/20  5:57 AM   Result Value Ref Range    Sodium 135 (L) 136 - 145 mmol/L    Potassium 4 3 3 5 - 5 3 mmol/L    Chloride 99 (L) 100 - 108 mmol/L    CO2 22 21 - 32 mmol/L    ANION GAP 14 (H) 4 - 13 mmol/L     (H) 5 - 25 mg/dL    Creatinine 6 10 (H) 0 60 - 1 30 mg/dL    Glucose 115 65 - 140 mg/dL    Calcium 8 2 (L) 8 3 - 10 1 mg/dL    eGFR 7 ml/min/1 73sq m   Phosphorus    Collection Time: 07/16/20  5:57 AM   Result Value Ref Range    Phosphorus 6 4 (H) 2 3 - 4 1 mg/dL   Type and screen    Collection Time: 07/16/20  5:57 AM   Result Value Ref Range    ABO Grouping A     Rh Factor Negative     Antibody Screen Negative     Specimen Expiration Date 20200719    Fingerstick Glucose (POCT)    Collection Time: 07/16/20  7:20 AM   Result Value Ref Range    POC Glucose 119 65 - 140 mg/dl   Fingerstick Glucose (POCT)    Collection Time: 07/16/20 11:23 AM   Result Value Ref Range    POC Glucose 236 (H) 65 - 140 mg/dl   Protime-INR    Collection Time: 07/16/20 12:26 PM   Result Value Ref Range    Protime 29 2 (H) 11 6 - 14 5 seconds    INR 2 81 (H) 0 84 - 1 19       Current Facility-Administered Medications   Medication Dose Route Frequency Provider Last Rate Last Dose    acetaminophen (TYLENOL) tablet 650 mg  650 mg Oral Q6H PRN Hiram Miller PA-C   650 mg at 07/14/20 0616    calcitriol (ROCALTROL) capsule 0 5 mcg  0 5 mcg Oral Daily MICHAEL Padron   0 5 mcg at 07/16/20 0901    cholecalciferol (VITAMIN D3) tablet 2,000 Units  2,000 Units Oral Every Other Day MICHAEL Padron   2,000 Units at 07/15/20 0923    insulin aspart protamine-insulin aspart (NovoLOG 70/30) 100 units/mL subcutaneous injection 10 Units  10 Units Subcutaneous BID  MICHAEL Padron   10 Units at 07/16/20 0901    insulin lispro (HumaLOG) 100 units/mL subcutaneous injection 1-5 Units  1-5 Units Subcutaneous TID AC MICHAEL Padron   1 Units at 07/15/20 1826    insulin lispro (HumaLOG) 100 units/mL subcutaneous injection 1-5 Units  1-5 Units Subcutaneous HS MICHAEL Padron   1 Units at 07/15/20 2207    levalbuterol (XOPENEX) inhalation solution 0 63 mg  0 63 mg Nebulization Q4H PRN MICHAEL Padron        metoprolol succinate (TOPROL-XL) 24 hr tablet 25 mg  25 mg Oral BID MICHAEL Padron   25 mg at 07/16/20 0901    ondansetron (ZOFRAN) injection 4 mg  4 mg Intravenous Q6H PRN MICHAEL Padron        torsemide (DEMADEX) tablet 40 mg  40 mg Oral Daily Joo Chow MD   40 mg at 07/16/20 1041       Invasive Devices     Peripheral Intravenous Line            Peripheral IV 07/16/20 Right Hand less than 1 day                Lab, Imaging and other studies: I have personally reviewed pertinent reports      VTE Pharmacologic Prophylaxis: Sequential compression device (Venodyne)   VTE Mechanical Prophylaxis: sequential compression device    Albertina Lundberg DO

## 2020-07-16 NOTE — PLAN OF CARE
Problem: Potential for Falls  Goal: Patient will remain free of falls  Description  INTERVENTIONS:  - Assess patient frequently for physical needs  -  Identify cognitive and physical deficits and behaviors that affect risk of falls  -  [place] fall precautions as indicated by assessment   - Educate patient/family on patient safety including physical limitations  - Instruct patient to call for assistance with activity based on assessment  - Modify environment to reduce risk of injury  - Consider OT/PT consult to assist with strengthening/mobility  Outcome: Progressing     Problem: RESPIRATORY - ADULT  Goal: Achieves optimal ventilation and oxygenation  Description  INTERVENTIONS:  - Assess for changes in respiratory status  - Assess for changes in mentation and behavior  - Position to facilitate oxygenation and minimize respiratory effort  - Oxygen administered by appropriate delivery if ordered  - Initiate smoking cessation education as indicated  - Encourage broncho-pulmonary hygiene including cough, deep breathe, Incentive Spirometry  - Assess the need for suctioning and aspirate as needed  - Assess and instruct to report SOB or any respiratory difficulty  - Respiratory Therapy support as indicated  Outcome: Progressing     Problem: Nutrition/Hydration-ADULT  Goal: Nutrient/Hydration intake appropriate for improving, restoring or maintaining nutritional needs  Description  Monitor and assess patient's nutrition/hydration status for malnutrition  Collaborate with interdisciplinary team and initiate plan and interventions as ordered  Monitor patient's weight and dietary intake as ordered or per policy  Utilize nutrition screening tool and intervene as necessary  Determine patient's food preferences and provide high-protein, high-caloric foods as appropriate       INTERVENTIONS:  - Monitor oral intake, urinary output, labs, and treatment plans  - Assess nutrition and hydration status and recommend course of action  - Evaluate amount of meals eaten  - Assist patient with eating if necessary   - Allow adequate time for meals  - Recommend/ encourage appropriate diets, oral nutritional supplements, and vitamin/mineral supplements  - Order, calculate, and assess calorie counts as needed  - Assess need for intravenous fluids  - Provide specific nutrition/hydration education as appropriate  - Include patient/family/caregiver in decisions related to nutrition   Outcome: Progressing     Problem: CARDIOVASCULAR - ADULT  Goal: Maintains optimal cardiac output and hemodynamic stability  Description  INTERVENTIONS:  - Monitor I/O, vital signs and rhythm  - Monitor for S/S and trends of decreased cardiac output  - Administer and titrate ordered vasoactive medications to optimize hemodynamic stability  - Assess quality of pulses, skin color and temperature  - Assess for signs of decreased coronary artery perfusion  - Instruct patient to report change in severity of symptoms  Outcome: Progressing  Goal: Absence of cardiac dysrhythmias or at baseline rhythm  Description  INTERVENTIONS:  - Continuous cardiac monitoring, vital signs, obtain 12 lead EKG if ordered  - Administer antiarrhythmic and heart rate control medications as ordered  - Monitor electrolytes and administer replacement therapy as ordered  Outcome: Progressing     Problem: PAIN - ADULT  Goal: Verbalizes/displays adequate comfort level or baseline comfort level  Description  Interventions:  - Encourage patient to monitor pain and request assistance  - Assess pain using appropriate pain scale  - Administer analgesics based on type and severity of pain and evaluate response  - Implement non-pharmacological measures as appropriate and evaluate response  - Consider cultural and social influences on pain and pain management  - Notify physician/advanced practitioner if interventions unsuccessful or patient reports new pain  Outcome: Progressing     Problem: INFECTION - ADULT  Goal: Absence or prevention of progression during hospitalization  Description  INTERVENTIONS:  - Assess and monitor for signs and symptoms of infection  - Monitor lab/diagnostic results  - Monitor all insertion sites, i e  indwelling lines, tubes, and drains  - [place] appropriate cooling/warming therapies per order  - Administer medications as ordered  - Instruct and encourage patient and family to use good hand hygiene technique  - Identify and instruct in appropriate isolation precautions for identified infection/condition   Outcome: Progressing     Problem: SAFETY ADULT  Goal: Maintain or return to baseline ADL function  Description  INTERVENTIONS:  -  Assess patient's ability to carry out ADLs; assess patient's baseline for ADL function and identify physical deficits which impact ability to perform ADLs (bathing, care of mouth/teeth, toileting, grooming, dressing, etc )  - Assess/evaluate cause of self-care deficits   - Assess range of motion  - Assess patient's mobility; develop plan if impaired  - Assess patient's need for assistive devices and provide as appropriate  - Encourage maximum independence but intervene and supervise when necessary  - Involve family in performance of ADLs  - Assess for home care needs following discharge   - Consider OT consult to assist with ADL evaluation and planning for discharge  - Provide patient education as appropriate  Outcome: Progressing  Goal: Maintain or return mobility status to optimal level  Description  INTERVENTIONS:  - Assess patient's baseline mobility status (ambulation, transfers, stairs, etc )    - Identify cognitive and physical deficits and behaviors that affect mobility  - Identify mobility aids required to assist with transfers and/or ambulation (gait belt, sit-to-stand, lift, walker, cane, etc )  - [place] fall precautions as indicated by assessment  - Record patient progress and toleration of activity level on Mobility SBAR; progress patient to next Phase/Stage  - Instruct patient to call for assistance with activity based on assessment  - Consider rehabilitation consult to assist with strengthening/weightbearing, etc   Outcome: Progressing     Problem: DISCHARGE PLANNING  Goal: Discharge to home or other facility with appropriate resources  Description  INTERVENTIONS:  - Identify barriers to discharge w/patient and caregiver  - Arrange for needed discharge resources and transportation as appropriate  - Identify discharge learning needs (meds, wound care, etc )  - Arrange for interpretive services to assist at discharge as needed  - Refer to Case Management Department for coordinating discharge planning if the patient needs post-hospital services based on physician/advanced practitioner order or complex needs related to functional status, cognitive ability, or social support system  Outcome: Progressing     Problem: Knowledge Deficit  Goal: Patient/family/caregiver demonstrates understanding of disease process, treatment plan, medications, and discharge instructions  Description  Complete learning assessment and assess knowledge base    Interventions:  - Provide teaching at level of understanding  - Provide teaching via preferred learning methods  Outcome: Progressing

## 2020-07-17 ENCOUNTER — ANESTHESIA EVENT (INPATIENT)
Dept: NON INVASIVE DIAGNOSTICS | Facility: HOSPITAL | Age: 65
DRG: 291 | End: 2020-07-17
Payer: COMMERCIAL

## 2020-07-17 ENCOUNTER — APPOINTMENT (INPATIENT)
Dept: NON INVASIVE DIAGNOSTICS | Facility: HOSPITAL | Age: 65
DRG: 291 | End: 2020-07-17
Attending: INTERNAL MEDICINE
Payer: COMMERCIAL

## 2020-07-17 LAB
ABO GROUP BLD BPU: NORMAL
ALBUMIN SERPL BCP-MCNC: 2.6 G/DL (ref 3.5–5)
ALP SERPL-CCNC: 146 U/L (ref 46–116)
ALT SERPL W P-5'-P-CCNC: 80 U/L (ref 12–78)
ANION GAP SERPL CALCULATED.3IONS-SCNC: 13 MMOL/L (ref 4–13)
AST SERPL W P-5'-P-CCNC: 27 U/L (ref 5–45)
BASOPHILS # BLD AUTO: 0.06 THOUSANDS/ΜL (ref 0–0.1)
BASOPHILS NFR BLD AUTO: 1 % (ref 0–1)
BILIRUB SERPL-MCNC: 0.7 MG/DL (ref 0.2–1)
BPU ID: NORMAL
BUN SERPL-MCNC: 100 MG/DL (ref 5–25)
CALCIUM SERPL-MCNC: 7.9 MG/DL (ref 8.3–10.1)
CHLORIDE SERPL-SCNC: 102 MMOL/L (ref 100–108)
CO2 SERPL-SCNC: 23 MMOL/L (ref 21–32)
CREAT SERPL-MCNC: 6.06 MG/DL (ref 0.6–1.3)
EOSINOPHIL # BLD AUTO: 0.31 THOUSAND/ΜL (ref 0–0.61)
EOSINOPHIL NFR BLD AUTO: 3 % (ref 0–6)
ERYTHROCYTE [DISTWIDTH] IN BLOOD BY AUTOMATED COUNT: 18.7 % (ref 11.6–15.1)
GFR SERPL CREATININE-BSD FRML MDRD: 7 ML/MIN/1.73SQ M
GLUCOSE SERPL-MCNC: 121 MG/DL (ref 65–140)
GLUCOSE SERPL-MCNC: 131 MG/DL (ref 65–140)
GLUCOSE SERPL-MCNC: 131 MG/DL (ref 65–140)
GLUCOSE SERPL-MCNC: 218 MG/DL (ref 65–140)
GLUCOSE SERPL-MCNC: 97 MG/DL (ref 65–140)
HBV CORE AB SER QL: NORMAL
HBV CORE IGM SER QL: NORMAL
HBV SURFACE AB SER-ACNC: <3.1 MIU/ML
HBV SURFACE AG SER QL: NORMAL
HCT VFR BLD AUTO: 35.7 % (ref 34.8–46.1)
HCV AB SER QL: NORMAL
HGB BLD-MCNC: 11.2 G/DL (ref 11.5–15.4)
IMM GRANULOCYTES # BLD AUTO: 0.05 THOUSAND/UL (ref 0–0.2)
IMM GRANULOCYTES NFR BLD AUTO: 1 % (ref 0–2)
INR PPP: 1.78 (ref 0.84–1.19)
LYMPHOCYTES # BLD AUTO: 1.34 THOUSANDS/ΜL (ref 0.6–4.47)
LYMPHOCYTES NFR BLD AUTO: 15 % (ref 14–44)
MCH RBC QN AUTO: 26.6 PG (ref 26.8–34.3)
MCHC RBC AUTO-ENTMCNC: 31.4 G/DL (ref 31.4–37.4)
MCV RBC AUTO: 85 FL (ref 82–98)
MONOCYTES # BLD AUTO: 1.25 THOUSAND/ΜL (ref 0.17–1.22)
MONOCYTES NFR BLD AUTO: 14 % (ref 4–12)
NEUTROPHILS # BLD AUTO: 5.99 THOUSANDS/ΜL (ref 1.85–7.62)
NEUTS SEG NFR BLD AUTO: 66 % (ref 43–75)
NRBC BLD AUTO-RTO: 0 /100 WBCS
PHOSPHATE SERPL-MCNC: 6 MG/DL (ref 2.3–4.1)
PLATELET # BLD AUTO: 211 THOUSANDS/UL (ref 149–390)
PMV BLD AUTO: 11.5 FL (ref 8.9–12.7)
POTASSIUM SERPL-SCNC: 3.7 MMOL/L (ref 3.5–5.3)
PROT SERPL-MCNC: 6.5 G/DL (ref 6.4–8.2)
PROTHROMBIN TIME: 20.5 SECONDS (ref 11.6–14.5)
RBC # BLD AUTO: 4.21 MILLION/UL (ref 3.81–5.12)
SODIUM SERPL-SCNC: 138 MMOL/L (ref 136–145)
UNIT DISPENSE STATUS: NORMAL
UNIT PRODUCT CODE: NORMAL
UNIT RH: NORMAL
WBC # BLD AUTO: 9 THOUSAND/UL (ref 4.31–10.16)

## 2020-07-17 PROCEDURE — 49418 INSERT TUN IP CATH PERC: CPT | Performed by: RADIOLOGY

## 2020-07-17 PROCEDURE — 87340 HEPATITIS B SURFACE AG IA: CPT | Performed by: INTERNAL MEDICINE

## 2020-07-17 PROCEDURE — 86705 HEP B CORE ANTIBODY IGM: CPT | Performed by: INTERNAL MEDICINE

## 2020-07-17 PROCEDURE — 94762 N-INVAS EAR/PLS OXIMTRY CONT: CPT

## 2020-07-17 PROCEDURE — 86706 HEP B SURFACE ANTIBODY: CPT | Performed by: INTERNAL MEDICINE

## 2020-07-17 PROCEDURE — C1750 CATH, HEMODIALYSIS,LONG-TERM: HCPCS

## 2020-07-17 PROCEDURE — 0WHG33Z INSERTION OF INFUSION DEVICE INTO PERITONEAL CAVITY, PERCUTANEOUS APPROACH: ICD-10-PCS | Performed by: RADIOLOGY

## 2020-07-17 PROCEDURE — 86704 HEP B CORE ANTIBODY TOTAL: CPT | Performed by: INTERNAL MEDICINE

## 2020-07-17 PROCEDURE — 93005 ELECTROCARDIOGRAM TRACING: CPT

## 2020-07-17 PROCEDURE — C1769 GUIDE WIRE: HCPCS

## 2020-07-17 PROCEDURE — 99232 SBSQ HOSP IP/OBS MODERATE 35: CPT | Performed by: INTERNAL MEDICINE

## 2020-07-17 PROCEDURE — 82948 REAGENT STRIP/BLOOD GLUCOSE: CPT

## 2020-07-17 PROCEDURE — 80053 COMPREHEN METABOLIC PANEL: CPT | Performed by: INTERNAL MEDICINE

## 2020-07-17 PROCEDURE — 49418 INSERT TUN IP CATH PERC: CPT

## 2020-07-17 PROCEDURE — 86803 HEPATITIS C AB TEST: CPT | Performed by: INTERNAL MEDICINE

## 2020-07-17 PROCEDURE — 85610 PROTHROMBIN TIME: CPT | Performed by: INTERNAL MEDICINE

## 2020-07-17 PROCEDURE — 85025 COMPLETE CBC W/AUTO DIFF WBC: CPT | Performed by: INTERNAL MEDICINE

## 2020-07-17 PROCEDURE — 84100 ASSAY OF PHOSPHORUS: CPT | Performed by: INTERNAL MEDICINE

## 2020-07-17 RX ORDER — HYDRALAZINE HYDROCHLORIDE 10 MG/1
10 TABLET, FILM COATED ORAL EVERY 8 HOURS SCHEDULED
Status: DISCONTINUED | OUTPATIENT
Start: 2020-07-17 | End: 2020-07-19 | Stop reason: HOSPADM

## 2020-07-17 RX ORDER — CEFAZOLIN SODIUM 2 G/50ML
SOLUTION INTRAVENOUS AS NEEDED
Status: DISCONTINUED | OUTPATIENT
Start: 2020-07-17 | End: 2020-07-17 | Stop reason: SURG

## 2020-07-17 RX ORDER — FENTANYL CITRATE/PF 50 MCG/ML
50 SYRINGE (ML) INJECTION
Status: DISCONTINUED | OUTPATIENT
Start: 2020-07-17 | End: 2020-07-18

## 2020-07-17 RX ORDER — METOPROLOL TARTRATE 5 MG/5ML
2.5 INJECTION INTRAVENOUS ONCE
Status: COMPLETED | OUTPATIENT
Start: 2020-07-17 | End: 2020-07-17

## 2020-07-17 RX ORDER — LIDOCAINE HYDROCHLORIDE 10 MG/ML
INJECTION, SOLUTION EPIDURAL; INFILTRATION; INTRACAUDAL; PERINEURAL AS NEEDED
Status: DISCONTINUED | OUTPATIENT
Start: 2020-07-17 | End: 2020-07-17 | Stop reason: SURG

## 2020-07-17 RX ORDER — PHYTONADIONE 5 MG/1
2.5 TABLET ORAL ONCE
Status: COMPLETED | OUTPATIENT
Start: 2020-07-17 | End: 2020-07-17

## 2020-07-17 RX ORDER — NEOSTIGMINE METHYLSULFATE 1 MG/ML
INJECTION INTRAVENOUS AS NEEDED
Status: DISCONTINUED | OUTPATIENT
Start: 2020-07-17 | End: 2020-07-17 | Stop reason: SURG

## 2020-07-17 RX ORDER — PROPOFOL 10 MG/ML
INJECTION, EMULSION INTRAVENOUS AS NEEDED
Status: DISCONTINUED | OUTPATIENT
Start: 2020-07-17 | End: 2020-07-17

## 2020-07-17 RX ORDER — OXYCODONE HYDROCHLORIDE 5 MG/1
2.5 TABLET ORAL EVERY 6 HOURS PRN
Status: DISCONTINUED | OUTPATIENT
Start: 2020-07-17 | End: 2020-07-19 | Stop reason: HOSPADM

## 2020-07-17 RX ORDER — GLYCOPYRROLATE 0.2 MG/ML
INJECTION INTRAMUSCULAR; INTRAVENOUS AS NEEDED
Status: DISCONTINUED | OUTPATIENT
Start: 2020-07-17 | End: 2020-07-17 | Stop reason: SURG

## 2020-07-17 RX ORDER — SODIUM CHLORIDE 9 MG/ML
30 INJECTION, SOLUTION INTRAVENOUS CONTINUOUS
Status: DISCONTINUED | OUTPATIENT
Start: 2020-07-17 | End: 2020-07-18

## 2020-07-17 RX ORDER — ONDANSETRON 2 MG/ML
INJECTION INTRAMUSCULAR; INTRAVENOUS AS NEEDED
Status: DISCONTINUED | OUTPATIENT
Start: 2020-07-17 | End: 2020-07-17 | Stop reason: SURG

## 2020-07-17 RX ORDER — FENTANYL CITRATE 50 UG/ML
INJECTION, SOLUTION INTRAMUSCULAR; INTRAVENOUS AS NEEDED
Status: DISCONTINUED | OUTPATIENT
Start: 2020-07-17 | End: 2020-07-17 | Stop reason: SURG

## 2020-07-17 RX ORDER — EPHEDRINE SULFATE 50 MG/ML
INJECTION INTRAVENOUS AS NEEDED
Status: DISCONTINUED | OUTPATIENT
Start: 2020-07-17 | End: 2020-07-17 | Stop reason: SURG

## 2020-07-17 RX ORDER — LIDOCAINE HYDROCHLORIDE 10 MG/ML
INJECTION, SOLUTION EPIDURAL; INFILTRATION; INTRACAUDAL; PERINEURAL AS NEEDED
Status: DISCONTINUED | OUTPATIENT
Start: 2020-07-17 | End: 2020-07-17

## 2020-07-17 RX ORDER — SODIUM CHLORIDE 9 MG/ML
INJECTION, SOLUTION INTRAVENOUS CONTINUOUS PRN
Status: DISCONTINUED | OUTPATIENT
Start: 2020-07-17 | End: 2020-07-17 | Stop reason: SURG

## 2020-07-17 RX ORDER — ROCURONIUM BROMIDE 10 MG/ML
INJECTION, SOLUTION INTRAVENOUS AS NEEDED
Status: DISCONTINUED | OUTPATIENT
Start: 2020-07-17 | End: 2020-07-17 | Stop reason: SURG

## 2020-07-17 RX ORDER — ONDANSETRON 2 MG/ML
4 INJECTION INTRAMUSCULAR; INTRAVENOUS ONCE AS NEEDED
Status: DISCONTINUED | OUTPATIENT
Start: 2020-07-17 | End: 2020-07-19 | Stop reason: HOSPADM

## 2020-07-17 RX ORDER — PROPOFOL 10 MG/ML
INJECTION, EMULSION INTRAVENOUS AS NEEDED
Status: DISCONTINUED | OUTPATIENT
Start: 2020-07-17 | End: 2020-07-17 | Stop reason: SURG

## 2020-07-17 RX ORDER — DILTIAZEM HYDROCHLORIDE 5 MG/ML
10 INJECTION INTRAVENOUS ONCE
Status: COMPLETED | OUTPATIENT
Start: 2020-07-17 | End: 2020-07-17

## 2020-07-17 RX ORDER — ISOSORBIDE MONONITRATE 30 MG/1
30 TABLET, EXTENDED RELEASE ORAL DAILY
Status: DISCONTINUED | OUTPATIENT
Start: 2020-07-17 | End: 2020-07-19 | Stop reason: HOSPADM

## 2020-07-17 RX ORDER — LIDOCAINE HYDROCHLORIDE AND EPINEPHRINE 10; 10 MG/ML; UG/ML
INJECTION, SOLUTION INFILTRATION; PERINEURAL CODE/TRAUMA/SEDATION MEDICATION
Status: COMPLETED | OUTPATIENT
Start: 2020-07-17 | End: 2020-07-17

## 2020-07-17 RX ADMIN — IOHEXOL 30 ML: 350 INJECTION, SOLUTION INTRAVENOUS at 12:21

## 2020-07-17 RX ADMIN — CALCITRIOL 0.5 MCG: 0.25 CAPSULE, LIQUID FILLED ORAL at 18:08

## 2020-07-17 RX ADMIN — GLYCOPYRROLATE 0.4 MG: 0.2 INJECTION, SOLUTION INTRAMUSCULAR; INTRAVENOUS at 11:35

## 2020-07-17 RX ADMIN — METOPROLOL TARTRATE 2.5 MG: 5 INJECTION INTRAVENOUS at 20:18

## 2020-07-17 RX ADMIN — CEFAZOLIN SODIUM 2000 MG: 2 SOLUTION INTRAVENOUS at 09:00

## 2020-07-17 RX ADMIN — ISOSORBIDE MONONITRATE 30 MG: 30 TABLET, EXTENDED RELEASE ORAL at 14:28

## 2020-07-17 RX ADMIN — SODIUM CHLORIDE: 0.9 INJECTION, SOLUTION INTRAVENOUS at 08:39

## 2020-07-17 RX ADMIN — VITAMIN D, TAB 1000IU (100/BT) 2000 UNITS: 25 TAB at 18:08

## 2020-07-17 RX ADMIN — LIDOCAINE HYDROCHLORIDE AND EPINEPHRINE 5 ML: 10; 10 INJECTION, SOLUTION INFILTRATION; PERINEURAL at 09:35

## 2020-07-17 RX ADMIN — NEOSTIGMINE METHYLSULFATE 3 MG: 1 INJECTION INTRAVENOUS at 11:35

## 2020-07-17 RX ADMIN — PROPOFOL 100 MG: 10 INJECTION, EMULSION INTRAVENOUS at 08:44

## 2020-07-17 RX ADMIN — ROCURONIUM BROMIDE 50 MG: 10 INJECTION, SOLUTION INTRAVENOUS at 10:28

## 2020-07-17 RX ADMIN — LIDOCAINE HYDROCHLORIDE AND EPINEPHRINE 10 ML: 10; 10 INJECTION, SOLUTION INFILTRATION; PERINEURAL at 11:10

## 2020-07-17 RX ADMIN — PHYTONADIONE 2.5 MG: 5 TABLET ORAL at 01:15

## 2020-07-17 RX ADMIN — EPHEDRINE SULFATE 10 MG: 50 INJECTION, SOLUTION INTRAVENOUS at 09:53

## 2020-07-17 RX ADMIN — HYDRALAZINE HYDROCHLORIDE 10 MG: 10 TABLET, FILM COATED ORAL at 14:28

## 2020-07-17 RX ADMIN — LIDOCAINE HYDROCHLORIDE 50 MG: 10 INJECTION, SOLUTION EPIDURAL; INFILTRATION; INTRACAUDAL; PERINEURAL at 08:44

## 2020-07-17 RX ADMIN — PHENYLEPHRINE HYDROCHLORIDE 50 MCG/MIN: 10 INJECTION INTRAVENOUS at 08:46

## 2020-07-17 RX ADMIN — SODIUM CHLORIDE 30 ML/HR: 0.9 INJECTION, SOLUTION INTRAVENOUS at 12:57

## 2020-07-17 RX ADMIN — ONDANSETRON 4 MG: 2 INJECTION INTRAMUSCULAR; INTRAVENOUS at 09:40

## 2020-07-17 RX ADMIN — TORSEMIDE 40 MG: 20 TABLET ORAL at 17:44

## 2020-07-17 RX ADMIN — FENTANYL CITRATE 25 MCG: 50 INJECTION, SOLUTION INTRAMUSCULAR; INTRAVENOUS at 09:40

## 2020-07-17 RX ADMIN — FENTANYL CITRATE 25 MCG: 50 INJECTION, SOLUTION INTRAMUSCULAR; INTRAVENOUS at 09:47

## 2020-07-17 RX ADMIN — INSULIN LISPRO 1 UNITS: 100 INJECTION, SOLUTION INTRAVENOUS; SUBCUTANEOUS at 22:36

## 2020-07-17 RX ADMIN — OXYCODONE HYDROCHLORIDE 2.5 MG: 5 TABLET ORAL at 20:18

## 2020-07-17 RX ADMIN — INSULIN ASPART 10 UNITS: 100 INJECTION, SUSPENSION SUBCUTANEOUS at 17:44

## 2020-07-17 RX ADMIN — METOPROLOL SUCCINATE 25 MG: 25 TABLET, EXTENDED RELEASE ORAL at 17:44

## 2020-07-17 RX ADMIN — DILTIAZEM HYDROCHLORIDE 10 MG: 5 INJECTION INTRAVENOUS at 23:42

## 2020-07-17 RX ADMIN — SUGAMMADEX 400 MG: 100 INJECTION, SOLUTION INTRAVENOUS at 11:42

## 2020-07-17 RX ADMIN — HYDRALAZINE HYDROCHLORIDE 10 MG: 10 TABLET, FILM COATED ORAL at 22:36

## 2020-07-17 NOTE — PROGRESS NOTES
[facility] Internal Medicine Progress Note  Patient: Srinivas Hughes 63 y o  female   MRN: [id number]  PCP: Francheska Ballard DO  Unit/Bed#: [address] [place] [address] Encounter: [id number]  Date Of Visit: 07/17/20    Problem List:    Principal Problem:    Acute kidney injury superimposed on CKD [facility])  Active Problems:    Atrial flutter with rapid ventricular response ([facility] )    Dyspnea    Type 2 diabetes mellitus with complication, with long-term current use of insulin (HCC)    Elevated LFTs    Chronic combined systolic and diastolic congestive heart failure (HCC)    Chronic hypoxemic respiratory failure (HCC)    Benign hypertension with CKD (chronic kidney disease) stage V ([facility])    Elevated TSH    Mixed hyperlipidemia    History of pulmonary embolus (PE)    Sleep apnea    Pulmonary hypertension ([facility] )    Morbid obesity with BMI of 40 0-44 9, adult ([facility] )    ESRD (end stage renal disease) ([facility] )      Assessment & Plan:    Dyspnea  Assessment & Plan  History of chronic shortness of breath but worsening dyspnea at rest since Friday last week  Known history of severe pulmonary hypertension  PA pressure > 90  Patient follows Dr Marcel Tijerina as outpatient  History of suspected PE  Doubt PE as patient is on Coumadin for history of PE  INR supratherapeutic on presentation  Chest x-ray without any acute abnormality  Likely multifactorial secondary to uncontrolled a flutter, obesity, CHF, pulmonary hypertension, history of pulmonary embolism  ABG-7 30, pCO2 37, PO2 102 on 2 L    Saturating adequately on 2 L of supplemental oxygen  Repeat echo with lower EF 24%, grade 2 diastolic dysfunction, dilated RV and IVC, PA pressure 60 mm Hg likely underestimated  Pulmonary following, input appreciated  Symptoms have improved  · Resume anticoagulation post PD catheter placement  · Will require continue follow-up with Pulmonary after discharge    Atrial flutter with rapid ventricular response ([facility] )  Assessment & Plan  New onset a flutter with RVR  EKG in ED showed 2:1 a flutter, rate 155  Received Cardizem 15 mg IV in ED, and subsequently started on Cardizem drip  Metoprolol was resumed  Likely precipitated by hypoxia, pulmonary hypertension, metabolic abnormality  Self converted and remains in sinus rhythm   2D echo with EF 40%, grade 2 diastolic dysfunction, dilated RV and IVC  PA pressure 60 mmHg but possible underestimation due to eccentric TR jet  · Continue metoprolol  · Monitor electrolytes  · Pulmonary following for pulmonary hypertension  · Continue Supplemental oxygen  · Cardiology following, input appreciated  · Resume Coumadin in a m  * Acute kidney injury superimposed on CKD [facility])  Assessment & Plan  PORSHA on CKD 4  Baseline creatinine 3 3-3 8 in past year  Patient follows Dr Nacho Mcguire as outpatient  On Demadex 40mg p o  Daily in AM and can take additional 20 mg p o  In the afternoon if leg edema or weight gain  Patient states she barely takes additional dose in the afternoon  Creatinine 6 32 on presentation with Anion gap 18, bicarb 19, potassium normal  ABG showed metabolic acidosis  No significant improvement with IV fluid, creatinine remains 6 1  Now deemed ESRD  CPK within normal limit  Nephrology following, input appreciated  Likely disease progression possibly multifactorial secondary to decrease ECV due to pulmonary hypertension,? Anticoagulated related nephropathy, progressive nephrotic range proteinuria  Status post IR guided PD catheter placement on 07/17  · Continue Demadex  · Daily weight and I&Os  · Follow-up labs  · Avoid nephrotoxins and hypotension  · Plan to initiate PD next week as outpatient, scheduled [facility] 7[facility]20 at 2:30 p [facility]      Results from last 7 days   Lab Units 07/16/20  0557 07/15/20  0615 07/14/20  0549 07/13/20  1317   BUN mg/dL 101* 100* 96* 95*   CREATININE mg/dL 6 10* 6 19* 6 22* 6 32*         Chronic hypoxemic respiratory failure [facility])  Assessment & Plan  Patient reported that she was previously on supplemental oxygen at home but she return did in November 2019  Patient with history of severe CHANDRA, likely associated sleep-related hypoxemia secondary to alveolar hypoventilation  Patient underwent nocturnal pulse oximetry during hospitalization  Seen and followed by Pulmonary, input appreciated  · Encouraged to comply with CPAP as tolerated  · Follow-up with Pulmonary after discharge    Chronic combined systolic and diastolic congestive heart failure [facility])  Assessment & Plan  Wt Readings from Last 3 Encounters:   07/16/20 98 kg (216 lb 0 8 oz)   06/09/20 94 3 kg (208 lb)   06/05/20 95 6 kg (210 lb 12 8 oz)     2D echo in May this year showed EF 50-55%, no RWMA,G2DD, PA pressure more than 90  No significant valvular disease  Patient reports weighing herself daily at home  No weight gain noted  ProBNP 30,000's in setting of advanced kidney disease  Chest x-ray NAD  Repeat echo with EF 05%, grade 2 diastolic dysfunction, dilated RV and IVC, PA pressure 60 mmHg likely underestimation  Stress test without any evidence of ischemia  Mild edema to lower extremities on exam   · Continue Demadex  · Continue metoprolol,  · Not candidate for ACE-inhibitor/ARB at present  · Started hydralazine plus isosorbide if tolerated  · Monitor volume status closely  · Daily weight and I&Os  Elevated LFTs  Assessment & Plan  Acute and mild  TB normal   Suspect congestive hepatopathy  Patient denies nausea vomiting abdominal pain  Improving  Resume Lipitor          Type 2 diabetes mellitus with complication, with long-term current use of insulin [facility])  Assessment & Plan  Lab Results   Component Value Date    HGBA1C 6 8 (H) 07/14/2020       Recent Labs     07/17/20  1205 07/17/20  1256 07/17/20  1641 07/17/20  2119   POCGLU 121 131 131 218*       Blood Sugar Average: Last 72 hrs:  (P) 164     Patient is on Lispro prot 75/25 10 units in the morning and 20 units with dinner, Tradjenta at home  Patient barely takes short acting insulin with lunch  Reports check blood sugar 3 times a day at home  Substitute Lispro prot 75/25 with Novolog 70/30 and decrease to 10 units BID  SSI  Hold Tradjenta  Diabetic diet    Sleep apnea  Assessment & Plan  Recently started on CPAP  Continue CPAP at HS  History of pulmonary embolus (PE)  Assessment & Plan  On Coumadin therapy at home  Took Coumadin 8 mg p o  Daily for past month, no recent INR check since May  INR 4 11 presentation, remains with elevated INR subsequently  INR was reversed for PD catheter placement  · No evidence of bleeding  · Resume Coumadin in a m  · Discussed with Cardiology and Pulmonary, no need for bridging  · Continue to monitor      Mixed hyperlipidemia  Assessment & Plan  Continue Lipitor    Elevated TSH  Assessment & Plan  TSH 6 638 normal free T4    Benign hypertension with CKD (chronic kidney disease) stage V (HCC)  Assessment & Plan  BP stable  Continue metoprolol from home , monitor    Morbid obesity with BMI of 40 0-44 9, adult ([facility] )  Assessment & Plan  Body mass index is 41 08 kg/m²  Diet and lift style modification        VTE Pharmacologic Prophylaxis:   Pharmacologic: Warfarin (Coumadin) on hold   Mechanical VTE Prophylaxis in Place: Yes    Patient Centered Rounds: I have performed bedside rounds with nursing staff today  Discussions with Specialists or Other Care Team Provider:  Cardiology, Nephrology    Education and Discussions with Family / Patient:  Daughter at bedside    Time Spent for Care: 45 minutes  More than 50% of total time spent on counseling and coordination of care as described above      Current Length of Stay: 4 day(s)    Current Patient Status: Inpatient   Certification Statement: The patient will continue to require additional inpatient hospital stay due to Further management for acute kidney injury requiring dialysis and stabilization of the cardiopulmonary status    Discharge Plan:  Home in next 48-72 hours    Code Status: Level 1 - Full Code      Subjective:   Underwent PD placement earlier today  Noted to be drowsy but arousable after procedure,    Reports mild discomfort at catheter site  Offers no other complaints    Objective:     Vitals:   Temp (24hrs), Av 4 °F (36 3 °C), Min:97 °F (36 1 °C), Max:97 9 °F (36 6 °C)    Temp:  [97 °F (36 1 °C)-97 9 °F (36 6 °C)] 97 3 °F (36 3 °C)  HR:  [65-75] 65  Resp:  [14-19] 14  BP: (123-148)/(71-92) 125/75  SpO2:  [93 %-100 %] 99 %  Body mass index is 42 19 kg/m²  Input and Output Summary (last 24 hours): Intake/Output Summary (Last 24 hours) at 2020 1453  Last data filed at 2020 1230  Gross per 24 hour   Intake 1235 ml   Output 0 ml   Net 1235 ml       Physical Exam:     Physical Exam   Constitutional: She is oriented to person, place, and time  No distress  Chronically ill   HENT:   Head: Normocephalic and atraumatic  Nose: Nose normal    Eyes: Pupils are equal, round, and reactive to light  Neck: Neck supple  Cardiovascular: Normal rate and regular rhythm  Murmur heard  Pulmonary/Chest: Effort normal and breath sounds normal  No respiratory distress  She has no wheezes  She has no rales  Diminished but clear   Abdominal: Soft  Bowel sounds are normal  She exhibits no distension  There is no tenderness  There is no rebound and no guarding  Dressing C/D/I   Musculoskeletal: She exhibits edema  Neurological: She is alert and oriented to person, place, and time  No cranial nerve deficit  Skin: Skin is warm and dry  No rash noted         Additional Data:     Labs:    Results from last 7 days   Lab Units 20  0454   WBC Thousand/uL 9 00   HEMOGLOBIN g/dL 11 2*   HEMATOCRIT % 35 7   PLATELETS Thousands/uL 211   NEUTROS PCT % 66   LYMPHS PCT % 15   MONOS PCT % 14*   EOS PCT % 3     Results from last 7 days   Lab Units 20  0454   POTASSIUM mmol/L 3 7   CHLORIDE mmol/L 102   CO2 mmol/L 23   BUN mg/dL 100*   CREATININE mg/dL 6 06*   CALCIUM mg/dL 7 9*   ALK PHOS U/L 146*   ALT U/L 80*   AST U/L 27     Results from last 7 days   Lab Units 07/17/20  0454   INR  1 78*       * I Have Reviewed All Lab Data Listed Above  * Additional Pertinent Lab Tests Reviewed: All Labs Within Last 24 Hours Reviewed      Imaging:  Imaging Reports Reviewed Today Include:  X-ray    Recent Cultures (last 7 days):           Last 24 Hours Medication List:     Current Facility-Administered Medications:  acetaminophen 650 mg Oral Q6H PRN Herrera Feliz PA-C    calcitriol 0 5 mcg Oral Daily Cuiyin Yurik, CRNP    cholecalciferol 2,000 Units Oral Every Other Day Cuiyin Yurik, CRNP    fentaNYL 50 mcg Intravenous Q10 Min PRN Arvin Sheridan MD    hydrALAZINE 10 mg Oral Q8H [address] Brittany Giles MD    insulin aspart protamine-insulin aspart 10 Units Subcutaneous BID AC Cuiyin Yurik, CRNP    insulin lispro 1-5 Units Subcutaneous TID AC Cuiyin Yurik, CRNP    insulin lispro 1-5 Units Subcutaneous HS Cuiyin Yurik, CRNP    isosorbide mononitrate 30 mg Oral Daily Brittany Giles MD    levalbuterol 0 63 mg Nebulization Q4H PRN Cuiyin Yurik, CRNP    metoprolol succinate 25 mg Oral BID Cuiyin Yurik, CRNP    ondansetron 4 mg Intravenous Q6H PRN Cuiyin Yurik, CRNP    ondansetron 4 mg Intravenous Once PRN Arvin Sheridan MD    sodium chloride 30 mL/hr Intravenous Continuous Arvin Sheridan MD Last Rate: 30 mL/hr (07/17/20 1257)   torsemide 40 mg Oral Daily Ender Ta MD           Today, Patient Was Seen By: Vanna Archibald MD    ** Please Note: "This note has been constructed using a voice recognition system  Therefore there may be syntax, spelling, and/or grammatical errors   Please call if you have any questions  "**

## 2020-07-17 NOTE — SOCIAL WORK
Patient has a sleep screen- patient in need of home 02 at night  Patient currently has a CPAP however never wears it  SW sent a referral to [address] where patient received her CPAP  Per [address], with a diagnosis of CHANDRA, the patient can only qualify for oxygen if he/she has an in-lab titration sleep study wearing a cpap and SATS remain below 88% for 5 collective minutes OR during the day (at rest or with ambulation)  [address] can offer the equipment on a direct pay basis of $115 per month if she is interested  This was discussed with patient  Patient does not wish to pay out of pocket costs and states she will just use her CPAP at night time at this time

## 2020-07-17 NOTE — PROGRESS NOTES
[facility] Progress Note - Roc Alarcon 63 y o  female MRN: [id number]    Unit/Bed#: [address] Encounter: [id number]      Assessment/Plan:    1  New onset atrial fibrillation-now back in sinus rhythm  Patient found to have cardiomyopathy with EF around 35%, no ischemia on nuclear  Patient has grade 2 diastolic dysfunction  Patient is not a good candidate for ACE-inhibitor until her kidney function is improved or until she starts dialysis  Blood pressure stable now, continue Toprol-XL 25 mg PO BID for now  2  Severe pulmonary hypertension- patient known to have severe pulmonary hypertension  She has been followed by pulmonology  Patient has seen advanced heart failure team in the past   She will follow-up as an outpatient    3  To a history of sleep apnea- not on CPAP    4  Acute Kidney injury with history of nephrotic range proteinuria- kidney function is now much worse  Patient is scheduled to have a PD catheter placed today she does not want hemodialysis  5  Minimally elevated troponin most likely secondary to Acute Kidney Injury and hypertension  Secondary to non-MI related increase in troponin      Subjective:   Patient seen and examined at bedside  Patient appears to be recovering from PD Cath placement  No acute events overnight  Denies chest pain or shortness of breath at this time  Objective:     Vitals: Blood pressure 123/74, pulse 75, temperature 97 9 °F (36 6 °C), resp  rate 19, weight 98 kg (216 lb 0 8 oz), SpO2 93 %, not currently breastfeeding  ,Body mass index is 42 19 kg/m²    Wt Readings from Last 3 Encounters:   07/16/20 98 kg (216 lb 0 8 oz)   06/09/20 94 3 kg (208 lb)   06/05/20 95 6 kg (210 lb 12 8 oz)       Intake/Output Summary (Last 24 hours) at 7/17/2020 1113  Last data filed at 7/16/2020 2020  Gross per 24 hour   Intake 210 ml   Output    Net 210 ml       Physical Exam: General appearance: alert and oriented, in no acute distress  Ears: right and left external ear normal   Neck: no JVD, supple, symmetrical, trachea midline and thyroid not enlarged, symmetric, no tenderness/mass/nodules  Lungs: no respiratory distress  equal airway entry   no wheeze   Heart: regular rate and rhythm  Abdomen: soft, non-tender; bowel sounds normal; no masses,  no organomegaly  Extremities: bilateral edema of lower extremities  Skin: Skin color, texture, turgor normal  No rashes or lesions  Neurologic: Grossly normal     Recent Results (from the past 24 hour(s))   Fingerstick Glucose (POCT)    Collection Time: 07/16/20 11:23 AM   Result Value Ref Range    POC Glucose 236 (H) 65 - 140 mg/dl   Protime-INR    Collection Time: 07/16/20 12:26 PM   Result Value Ref Range    Protime 29 2 (H) 11 6 - 14 5 seconds    INR 2 81 (H) 0 84 - 1 19   Fingerstick Glucose (POCT)    Collection Time: 07/16/20  3:35 PM   Result Value Ref Range    POC Glucose 176 (H) 65 - 140 mg/dl   Protime-INR    Collection Time: 07/16/20  4:51 PM   Result Value Ref Range    Protime 24 8 (H) 11 6 - 14 5 seconds    INR 2 28 (H) 0 84 - 1 19   Fingerstick Glucose (POCT)    Collection Time: 07/16/20  8:40 PM   Result Value Ref Range    POC Glucose 205 (H) 65 - 140 mg/dl   Protime-INR    Collection Time: 07/16/20  9:54 PM   Result Value Ref Range    Protime 24 0 (H) 11 6 - 14 5 seconds    INR 2 19 (H) 0 84 - 1 19   Protime-INR    Collection Time: 07/17/20  4:54 AM   Result Value Ref Range    Protime 20 5 (H) 11 6 - 14 5 seconds    INR 1 78 (H) 0 84 - 1 19   Phosphorus    Collection Time: 07/17/20  4:54 AM   Result Value Ref Range    Phosphorus 6 0 (H) 2 3 - 4 1 mg/dL   CBC and differential    Collection Time: 07/17/20  4:54 AM   Result Value Ref Range    WBC 9 00 4 31 - 10 16 Thousand/uL    RBC 4 21 3 81 - 5 12 Million/uL    Hemoglobin 11 2 (L) 11 5 - 15 4 g/dL    Hematocrit 35 7 34 8 - 46 1 %    MCV 85 82 - 98 fL    MCH 26 6 (L) 26 8 - 34 3 pg    MCHC 31 4 31 4 - 37 4 g/dL    RDW 18 7 (H) 11 6 - 15 1 %    MPV 11 5 8 9 - 12 7 fL    Platelets 602 665 - 603 Thousands/uL    nRBC 0 /100 WBCs    Neutrophils Relative 66 43 - 75 %    Immat GRANS % 1 0 - 2 %    Lymphocytes Relative 15 14 - 44 %    Monocytes Relative 14 (H) 4 - 12 %    Eosinophils Relative 3 0 - 6 %    Basophils Relative 1 0 - 1 %    Neutrophils Absolute 5 99 1 85 - 7 62 Thousands/µL    Immature Grans Absolute 0 05 0 00 - 0 20 Thousand/uL    Lymphocytes Absolute 1 34 0 60 - 4 47 Thousands/µL    Monocytes Absolute 1 25 (H) 0 17 - 1 22 Thousand/µL    Eosinophils Absolute 0 31 0 00 - 0 61 Thousand/µL    Basophils Absolute 0 06 0 00 - 0 10 Thousands/µL   Comprehensive metabolic panel    Collection Time: 07/17/20  4:54 AM   Result Value Ref Range    Sodium 138 136 - 145 mmol/L    Potassium 3 7 3 5 - 5 3 mmol/L    Chloride 102 100 - 108 mmol/L    CO2 23 21 - 32 mmol/L    ANION GAP 13 4 - 13 mmol/L     (H) 5 - 25 mg/dL    Creatinine 6 06 (H) 0 60 - 1 30 mg/dL    Glucose 97 65 - 140 mg/dL    Calcium 7 9 (L) 8 3 - 10 1 mg/dL    AST 27 5 - 45 U/L    ALT 80 (H) 12 - 78 U/L    Alkaline Phosphatase 146 (H) 46 - 116 U/L    Total Protein 6 5 6 4 - 8 2 g/dL    Albumin 2 6 (L) 3 5 - 5 0 g/dL    Total Bilirubin 0 70 0 20 - 1 00 mg/dL    eGFR 7 ml/min/1 73sq m   Hepatitis Panel (IP Renal Unit)    Collection Time: 07/17/20  5:00 AM   Result Value Ref Range    Hepatitis B Surface Ag Non-reactive Non-reactive, NonReactive - Confirmed    Hep B S Ab <3 10 mIU/mL    Hepatitis C Ab Non-reactive Non-reactive    Hep B C IgM Non-reactive Non-reactive    Hep B Core Total Ab Non-reactive Non-reactive   Prepare fresh frozen plasma: 1 Units    Collection Time: 07/17/20  6:47 AM   Result Value Ref Range    Unit Product Code [id number]     Unit Number [id number]     Unit ABO A     Unit RH NEG     Unit Dispense Status Crossmatched        Current Facility-Administered Medications   Medication Dose Route Frequency Provider Last Rate Last Dose    acetaminophen (TYLENOL) tablet 650 mg  650 mg Oral Q6H PRN Montana Ivory PA-C   650 mg at 07/14/20 4042    calcitriol (ROCALTROL) capsule 0 5 mcg  0 5 mcg Oral Daily MICHAEL Padron   0 5 mcg at 07/16/20 0901    cholecalciferol (VITAMIN D3) tablet 2,000 Units  2,000 Units Oral Every Other Day MICHAEL Padron   2,000 Units at 07/15/20 3052    insulin aspart protamine-insulin aspart (NovoLOG 70/30) 100 units/mL subcutaneous injection 10 Units  10 Units Subcutaneous BID AC MICHAEL Padron   10 Units at 07/16/20 1718    insulin lispro (HumaLOG) 100 units/mL subcutaneous injection 1-5 Units  1-5 Units Subcutaneous TID AC MICHAEL Padron   1 Units at 07/16/20 1718    insulin lispro (HumaLOG) 100 units/mL subcutaneous injection 1-5 Units  1-5 Units Subcutaneous HS MICHAEL Padron   1 Units at 07/16/20 2141    levalbuterol (XOPENEX) inhalation solution 0 63 mg  0 63 mg Nebulization Q4H PRN MICHAEL Padron        lidocaine-epinephrine (XYLOCAINE/EPINEPHRINE) 1 %-1:100,000 injection    Code/Trauma/Sedation Med Katlyn Jarvis MD   10 mL at 07/17/20 1110    metoprolol succinate (TOPROL-XL) 24 hr tablet 25 mg  25 mg Oral BID MICHAEL Padron   25 mg at 07/16/20 1718    ondansetron (ZOFRAN) injection 4 mg  4 mg Intravenous Q6H PRN MICHAEL Padron        torsemide (DEMADEX) tablet 40 mg  40 mg Oral Daily Dar Doe MD   40 mg at 07/16/20 1041     Facility-Administered Medications Ordered in Other Encounters   Medication Dose Route Frequency Provider Last Rate Last Dose    ceFAZolin (ANCEF) IVPB (premix)    PRN Ester Councilman, CRNA   2,000 mg at 07/17/20 0900    ePHEDrine injection   Intravenous PRN Ester Councilman, CRNA   10 mg at 07/17/20 0953    fentanyl citrate (PF) 100 MCG/2ML    PRN Ester Councilman, CRNA   25 mcg at 07/17/20 0947    lidocaine (PF) (XYLOCAINE-MPF) 1 % injection    PRN Jael Councilman, CRNA   50 mg at 07/17/20 0844    ondansetron (ZOFRAN) injection   Intravenous PRN Jael Councilman, CRNA   4 mg at 07/17/20 0940    phenylephrine (TIMOTHY-SYNEPHRINE) 100 mg (DOUBLE CONCENTRATION) IV in sodium chloride 0 9% 250 mL    Continuous PRN Cloyde Binta, CRNA   Stopped at 07/17/20 1032    propofol (DIPRIVAN) 200 MG/20ML bolus injection    PRN Cloyde Holland, CRNA   100 mg at 07/17/20 0844    ROCuronium (ZEMURON) injection    PRN Cloyde Holland, CRNA   50 mg at 07/17/20 1028    sodium chloride 0 9 % infusion    Continuous PRN Cloyde Binta, CRNA           Invasive Devices     Peripheral Intravenous Line            Peripheral IV 07/16/20 Right Hand 1 day          Airway            ETT  Cuffed;Oral;Straight; Inflated 7 mm less than 1 day                Lab, Imaging and other studies: I have personally reviewed pertinent reports      VTE Pharmacologic Prophylaxis: Sequential compression device (Venodyne)   VTE Mechanical Prophylaxis: foot pump applied    Albertina Lundberg DO

## 2020-07-17 NOTE — BRIEF OP NOTE (RAD/CATH)
INTERVENTIONAL RADIOLOGY PROCEDURE NOTE    Date: 7/17/2020    Procedure: PD Catheter placement    Preoperative diagnosis:   1  Dyspnea on exertion    2  Pulmonary hypertension (HCC)    3  Stage 5 chronic kidney disease not on chronic dialysis ([facility] )    4  Nephrotic range proteinuria    5  Type 2 diabetes mellitus with complication, with long-term current use of insulin ([facility] )    6  Atrial flutter ([facility] )    7  Dyspnea    8  CHF (congestive heart failure) (HCC)         Postoperative diagnosis: Same  Surgeon: Jacquie Carolina MD     Assistant: None  No qualified resident was available  Blood loss: minimal    Specimens: none    Findings: Successful Peritoneal Dialysis Catheter placement  May use immediately  Plan:  May discharge home today or tomorrow if that is the plan  Follow-up with PD nurse  May use immediately for urgent start if needed at low volumes  IR clinic virtual visit next weel  Complications: None immediate      Anesthesia: general anesthesia

## 2020-07-17 NOTE — UTILIZATION REVIEW
Continued Stay Review    Date: 7/17/20                          Current Patient Class: inpatient    Current Level of Care: acute    Assessment/Plan:   OP note  Procedure: PD Catheter placement  Findings: Successful Peritoneal Dialysis Catheter placement  May use immediately  Plan:  May discharge home today or tomorrow if that is the plan  Follow-up with PD nurse  May use immediately for urgent start if needed at low volumes  IR clinic virtual visit next weel  Complications: None immediate  Per Nephrology Given advanced renal disease at baseline and new significant cardiac issues, I doubt that she will recover renal function  She will need to start peritoneal dialysis soon  She is now deemed ESRD  Plan for PD catheter insertion tomorrow if INR okay then plan to start urgent PD next week at [facility]  Restart Torsemide 40 mg daily  Now deemed ESRD  Plan for PD catheter insertion in AM and start PD at [facility] next week  Per cardiology New onset atrial fibrillation-now back in sinus rhythm  Patient found to have cardiomyopathy with EF around 35%, no ischemia on nuclear  Patient has grade 2 diastolic dysfunction  Patient is not a good candidate for ACE-inhibitor until her kidney function is improved or until she starts dialysis  Blood pressure stable now, continue Toprol-XL 25 mg PO BID for now  Severe pulmonary hypertension- patient known to have severe pulmonary hypertension  She has been followed by pulmonology    Patient has seen advanced heart failure team in the past   She will follow-up as an outpatient  Pertinent Labs/Diagnostic Results:   Results from last 7 days   Lab Units 07/15/20  2205   SARS-COV-2  Negative     Results from last 7 days   Lab Units 07/17/20  0454 07/16/20  0557 07/15/20  0615 07/14/20  0549 07/13/20  1317   WBC Thousand/uL 9 00 9 29 9 66 10 74* 10 62*   HEMOGLOBIN g/dL 11 2* 11 8 11 4* 11 9 12 5   HEMATOCRIT % 35 7 37 4 36 8 38 3 39 5   PLATELETS Thousands/uL 211 223 214 236 278   NEUTROS ABS Thousands/µL 5 99 6 31  --   --  7 30     Results from last 7 days   Lab Units 07/17/20  0454 07/16/20  0557 07/15/20  0615 07/14/20  0549 07/13/20  1317   SODIUM mmol/L 138 135* 133* 133* 134*   POTASSIUM mmol/L 3 7 4 3 4 0 4 0 4 6   CHLORIDE mmol/L 102 99* 98* 100 97*   CO2 mmol/L 23 22 20* 19* 19*   ANION GAP mmol/L 13 14* 15* 14* 18*   BUN mg/dL 100* 101* 100* 96* 95*   CREATININE mg/dL 6 06* 6 10* 6 19* 6 22* 6 32*   EGFR ml/min/1 73sq m 7 7 7 7 6   CALCIUM mg/dL 7 9* 8 2* 8 1* 8 1* 8 5   MAGNESIUM mg/dL  --   --   --  2 4 2 3   PHOSPHORUS mg/dL 6 0* 6 4* 6 3*  --   --      Results from last 7 days   Lab Units 07/17/20  0454 07/14/20  0549 07/13/20  1317   AST U/L 27 88* 155*   ALT U/L 80* 133* 150*   ALK PHOS U/L 146* 148* 171*   TOTAL PROTEIN g/dL 6 5 6 7 7 3   ALBUMIN g/dL 2 6* 2 7* 3 0*   TOTAL BILIRUBIN mg/dL 0 70 0 60 0 80     Results from last 7 days   Lab Units 07/17/20  1256 07/17/20  1205 07/16/20  2040 07/16/20  1535 07/16/20  1123 07/16/20  0720 07/15/20  2015 07/15/20  1645 07/15/20  1123 07/15/20  0745 07/14/20  2319 07/14/20  2026   POC GLUCOSE mg/dl 131 121 205* 176* 236* 119 158* 166* 173* 134 196* 180*     Results from last 7 days   Lab Units 07/17/20  0454 07/16/20  0557 07/15/20  0615 07/14/20  0549 07/13/20  1317   GLUCOSE RANDOM mg/dL 97 115 145* 146* 147*     Results from last 7 days   Lab Units 07/17/20  0500   HEP B S AG  Non-reactive   HEP C AB  Non-reactive   HEP B C IGM  Non-reactive   HEP B C TOTAL AB  Non-reactive     Vital Signs:   07/17/20 12:52:46  97 3 °F (36 3 °C)Abnormal   65  14  125/75  92  99 %  26  1 5 L/min  Nasal cannula   07/17/20 1230  97 °F (36 1 °C)Abnormal   68  16  124/78    98 %  28  2 L/min  Nasal cannula   07/17/20 1215    68  16  124/71    100 %  32  3 L/min  Simple mask   07/17/20 1200  97 °F (36 1 °C)Abnormal   74  16  134/75    100 %  40  5 L/min  Simple mask   07/17/20 00:13:54  97 9 °F (36 6 °C)  75  19  123/74  90  93 %            Tele mon  aparna pulse ox  Transfuse FFP 1 u   Npo  IR PD cath  Medications:   Scheduled Medications:    Medications:  calcitriol 0 5 mcg Oral Daily   cholecalciferol 2,000 Units Oral Every Other Day   hydrALAZINE 10 mg Oral Q8H [address]   insulin aspart protamine-insulin aspart 10 Units Subcutaneous BID AC   insulin lispro 1-5 Units Subcutaneous TID AC   insulin lispro 1-5 Units Subcutaneous HS   isosorbide mononitrate 30 mg Oral Daily   metoprolol succinate 25 mg Oral BID   torsemide 40 mg Oral Daily     Continuous IV Infusions:    sodium chloride 30 mL/hr Intravenous Continuous     PRN Meds:    acetaminophen 650 mg Oral Q6H PRN   fentaNYL 50 mcg Intravenous Q10 Min PRN   levalbuterol 0 63 mg Nebulization Q4H PRN   ondansetron 4 mg Intravenous Q6H PRN   ondansetron 4 mg Intravenous Once PRN       Discharge Plan: tbd    Network Utilization Review Department  [email or web address]  ATTENTION: Please call with any questions or concerns to [phone] and carefully listen to the prompts so that you are directed to the right person  All voicemails are confidential   Juan Jose Bernal all requests for admission clinical reviews, approved or denied determinations and any other requests to dedicated fax number below belonging to the campus where the patient is receiving treatment   List of dedicated fax numbers for the Facilities:  FACILITY NAME UR FAX NUMBER   ADMISSION DENIALS (Administrative/Medical Necessity) [phone]   [address] (Maternity/NICU/Pediatrics) [phone]   [facility] [phone]   Jamas Piggs [phone]   Sue Dress [phone]   [place] [address] [facility] [phone]   [facility] [address] [facility] [address]  [place] [phone]

## 2020-07-17 NOTE — SEDATION DOCUMENTATION
Procedure ended  Peritoneal dialysis catheter placed without incident  Dressing is clean, dry, and intact  Patient tolerated well and transferred to PACU in stable condition accompanied by anesthesia   Report given to primary RN

## 2020-07-17 NOTE — ANESTHESIA POSTPROCEDURE EVALUATION
Post-Op Assessment Note    CV Status:  Stable  Pain Score: 0    Pain management: adequate     Mental Status:  Sleepy   Hydration Status:  Stable   PONV Controlled:  Controlled   Airway Patency:  Patent   Post Op Vitals Reviewed: Yes      Staff: CRNA           BP      Temp     Pulse     Resp      SpO2

## 2020-07-17 NOTE — ANESTHESIA PREPROCEDURE EVALUATION
Review of Systems/Medical History  Patient summary reviewed  Chart reviewed  No history of anesthetic complications     Cardiovascular  EKG reviewed, Exercise tolerance (METS): >4,  Hyperlipidemia, Hypertension , Dysrhythmias , atrial flutter, CHF ,   Comment: SUMMARY     LEFT VENTRICLE:  Systolic function was moderately to markedly reduced  Ejection fraction was estimated to be 35 %  There was moderate diffuse hypokinesis  Wall thickness was mildly increased  There was mild concentric hypertrophy  Features were consistent with a pseudonormal left ventricular filling pattern, with concomitant abnormal relaxation and increased filling pressure (grade 2 diastolic dysfunction)      RIGHT VENTRICLE:  The ventricle was mildly dilated      LEFT ATRIUM:  The atrium was mildly dilated      RIGHT ATRIUM:  The atrium was mildly to moderately dilated      MITRAL VALVE:  There was mild to moderate annular calcification  There was mild to moderate regurgitation      AORTIC VALVE:  There was mild regurgitation      TRICUSPID VALVE:  There was at least moderate regurgitation  Estimated peak PA pressure was 60 mmHg  May be underestimated due to eccentric jet of TR     PULMONIC VALVE:  There was mild regurgitation      IVC, HEPATIC VEINS:  The inferior vena cava was mildly dilated  The respirophasic change in diameter was less than 50%  , Pulmonary hypertension Pulmonary  Pneumonia, Shortness of breath, Sleep apnea ,        GI/Hepatic  Negative GI/hepatic ROS          Kidney disease ARF,        Endo/Other  Diabetes ,   Obesity    GYN       Hematology   Musculoskeletal       Neurology   Psychology           Physical Exam    Airway    Mallampati score: II  TM Distance: >3 FB  Neck ROM: full     Dental   No notable dental hx     Cardiovascular  Rhythm: regular, Rate: normal,     Pulmonary  Breath sounds clear to auscultation,     Other Findings        Anesthesia Plan  ASA Score- 4     Anesthesia Type- general with ASA Monitors  Additional Monitors:   Airway Plan:         Plan Factors-    Induction- intravenous  Postoperative Plan- Plan for postoperative opioid use  Planned trial extubation    Informed Consent- Anesthetic plan and risks discussed with patient  I personally reviewed this patient with the CRNA  Discussed and agreed on the Anesthesia Plan with the NACHO Bhatia

## 2020-07-17 NOTE — PROGRESS NOTES
[address] [facility] NOTE   Aria Ho 63 y o  female MRN: [id number]  Unit/Bed#: [address] [place] [address] Encounter: [id number]  Reason for Consult:  Acute kidney injury, CKD    ASSESSMENT and PLAN:  1  Acute kidney injury (POA):  · Creatinine 6 32 on admission 07/13/2020  No significant improvement in renal function despite IV IV fluids  · No clear etiology but in the differential is decreased effective circulating volume in the setting of RV failure, ATN, progression of underlying disease, anticoagulant related nephropathy  · Now ESRD  · PD catheter placed to start PD next week  · Plan:  · Rosi Fay aware of discharge with plan to start urgent PD next week  · Patient had no questions during my visit  She understands plan of care  Family member in attendance  2  Chronic kidney disease, stage IV with nephrotic range proteinuria  · Baseline creatinine mid to high threes  · Progression of disease noted  · Follows with Dr Heather Gleason for management  · Etiology diabetic nephropathy  3  CHF:  Echocardiogram showed a decline in ejection fraction at 35%  Cardiology following  Torsemide 40 mg daily restarted  Patient tells me that diuretic has increased urine output  4  Hypertension:  Blood pressure acceptable  On metoprolol b i d   5  CKD MBD:    · Continue calcitriol  · Hyperphosphatemia:  Phosphorus level 6 0   6  Anemia:  Hemoglobin at goal for ESRD  7  Other:  Pulmonary hypertension, PAF  DISPOSITION:  Outpatient follow-up planned    SUBJECTIVE / INTERVAL HISTORY:  No acute complaints  Seen following PD catheter insertion    She had no questions regarding plan of care with start of urgent PD next week PD    OBJECTIVE:  Current Weight: Weight - Scale: 98 kg (216 lb 0 8 oz)  Vitals:    07/17/20 1200 07/17/20 1215 07/17/20 1230 07/17/20 1252   BP: 134/75 124/71 124/78 125/75   Pulse: 74 68 68 65   Resp: 16 16 16 14   Temp: (!) 97 °F (36 1 °C)  (!) 97 °F (36 1 °C) (!) 97 3 °F (36 3 °C)   TempSrc: SpO2: 100% 100% 98% 99%   Weight:           Intake/Output Summary (Last 24 hours) at 7/17/2020 1429  Last data filed at 7/17/2020 1230  Gross per 24 hour   Intake 1235 ml   Output 0 ml   Net 1235 ml     General: NAD, lying in bed  Appears slightly uncomfortable due to recent procedure    Skin: no rash  Eyes: anicteric sclera  ENT: moist mucous membrane  Neck: supple  Chest: CTA b/l, no ronchii, no wheeze, no rubs, no rales  CVS: s1s2, no murmur, no gallop, no rub  Abdomen:  Abdomen rounded, tender  Extremities:  Trace/mild edema LE b/l  : no west  Neuro: AAOX3  Psych: normal affect  Medications:    Current Facility-Administered Medications:     acetaminophen (TYLENOL) tablet 650 mg, 650 mg, Oral, Q6H PRN, Josiane Prim, PA-C, 650 mg at 07/14/20 3319    calcitriol (ROCALTROL) capsule 0 5 mcg, 0 5 mcg, Oral, Daily, MICHAEL Padron, Stopped at 07/17/20 1255    cholecalciferol (VITAMIN D3) tablet 2,000 Units, 2,000 Units, Oral, Every Other Day, MICHAEL Padron, Stopped at 07/17/20 1255    fentaNYL (SUBLIMAZE) injection 50 mcg, 50 mcg, Intravenous, Q10 Min PRN, Efrem Card MD    hydrALAZINE (APRESOLINE) tablet 10 mg, 10 mg, Oral, Q8H [address], Marino Vizcarra MD, 10 mg at 07/17/20 1428    insulin aspart protamine-insulin aspart (NovoLOG 70/30) 100 units/mL subcutaneous injection 10 Units, 10 Units, Subcutaneous, BID AC, MICHAEL Padron, 10 Units at 07/16/20 1718    insulin lispro (HumaLOG) 100 units/mL subcutaneous injection 1-5 Units, 1-5 Units, Subcutaneous, TID AC, 1 Units at 07/16/20 1718 **AND** Fingerstick Glucose (POCT), , , TID AC, MICHAEL Padron    insulin lispro (HumaLOG) 100 units/mL subcutaneous injection 1-5 Units, 1-5 Units, Subcutaneous, HS, MICHAEL Padron, 1 Units at 07/16/20 2141    isosorbide mononitrate (IMDUR) 24 hr tablet 30 mg, 30 mg, Oral, Daily, Marino Vizcarra MD, 30 mg at 07/17/20 1428    levalbuterol (XOPENEX) inhalation solution 0 63 mg, 0 63 mg, Nebulization, Q4H PRN, MICHAEL Padron    metoprolol succinate (TOPROL-XL) 24 hr tablet 25 mg, 25 mg, Oral, BID, MICHAEL Padron, Stopped at 07/17/20 1255    ondansetron (ZOFRAN) injection 4 mg, 4 mg, Intravenous, Q6H PRN, MICHAEL Padron    ondansetron (ZOFRAN) injection 4 mg, 4 mg, Intravenous, Once PRN, Efrem Card MD    sodium chloride 0 9 % infusion, 30 mL/hr, Intravenous, Continuous, Efrem Card MD, Last Rate: 30 mL/hr at 07/17/20 1257, 30 mL/hr at 07/17/20 1257    torsemide (DEMADEX) tablet 40 mg, 40 mg, Oral, Daily, Karyn Fregoso MD, Stopped at 07/17/20 1256    Laboratory Results:  Results from last 7 days   Lab Units 07/17/20  0454 07/16/20  0557 07/15/20  0615 07/14/20  0549 07/13/20  1317   WBC Thousand/uL 9 00 9 29 9 66 10 74* 10 62*   HEMOGLOBIN g/dL 11 2* 11 8 11 4* 11 9 12 5   HEMATOCRIT % 35 7 37 4 36 8 38 3 39 5   PLATELETS Thousands/uL 211 223 214 236 278   POTASSIUM mmol/L 3 7 4 3 4 0 4 0 4 6   CHLORIDE mmol/L 102 99* 98* 100 97*   CO2 mmol/L 23 22 20* 19* 19*   BUN mg/dL 100* 101* 100* 96* 95*   CREATININE mg/dL 6 06* 6 10* 6 19* 6 22* 6 32*   CALCIUM mg/dL 7 9* 8 2* 8 1* 8 1* 8 5   MAGNESIUM mg/dL  --   --   --  2 4 2 3   PHOSPHORUS mg/dL 6 0* 6 4* 6 3*  --   --

## 2020-07-17 NOTE — QUICK NOTE
One unit of FFP ordered to achieve goal of INR less than 1 5 for procedure this AM   Consent obtained

## 2020-07-17 NOTE — PLAN OF CARE
Problem: Potential for Falls  Goal: Patient will remain free of falls  Description  INTERVENTIONS:  - Assess patient frequently for physical needs  -  Identify cognitive and physical deficits and behaviors that affect risk of falls  -  [place] fall precautions as indicated by assessment   - Educate patient/family on patient safety including physical limitations  - Instruct patient to call for assistance with activity based on assessment  - Modify environment to reduce risk of injury  - Consider OT/PT consult to assist with strengthening/mobility  Outcome: Progressing     Problem: RESPIRATORY - ADULT  Goal: Achieves optimal ventilation and oxygenation  Description  INTERVENTIONS:  - Assess for changes in respiratory status  - Assess for changes in mentation and behavior  - Position to facilitate oxygenation and minimize respiratory effort  - Oxygen administered by appropriate delivery if ordered  - Initiate smoking cessation education as indicated  - Encourage broncho-pulmonary hygiene including cough, deep breathe, Incentive Spirometry  - Assess the need for suctioning and aspirate as needed  - Assess and instruct to report SOB or any respiratory difficulty  - Respiratory Therapy support as indicated  Outcome: Progressing     Problem: Nutrition/Hydration-ADULT  Goal: Nutrient/Hydration intake appropriate for improving, restoring or maintaining nutritional needs  Description  Monitor and assess patient's nutrition/hydration status for malnutrition  Collaborate with interdisciplinary team and initiate plan and interventions as ordered  Monitor patient's weight and dietary intake as ordered or per policy  Utilize nutrition screening tool and intervene as necessary  Determine patient's food preferences and provide high-protein, high-caloric foods as appropriate       INTERVENTIONS:  - Monitor oral intake, urinary output, labs, and treatment plans  - Assess nutrition and hydration status and recommend course of action  - Evaluate amount of meals eaten  - Assist patient with eating if necessary   - Allow adequate time for meals  - Recommend/ encourage appropriate diets, oral nutritional supplements, and vitamin/mineral supplements  - Order, calculate, and assess calorie counts as needed  - Assess need for intravenous fluids  - Provide specific nutrition/hydration education as appropriate  - Include patient/family/caregiver in decisions related to nutrition   Outcome: Progressing     Problem: CARDIOVASCULAR - ADULT  Goal: Maintains optimal cardiac output and hemodynamic stability  Description  INTERVENTIONS:  - Monitor I/O, vital signs and rhythm  - Monitor for S/S and trends of decreased cardiac output  - Administer and titrate ordered vasoactive medications to optimize hemodynamic stability  - Assess quality of pulses, skin color and temperature  - Assess for signs of decreased coronary artery perfusion  - Instruct patient to report change in severity of symptoms  Outcome: Progressing  Goal: Absence of cardiac dysrhythmias or at baseline rhythm  Description  INTERVENTIONS:  - Continuous cardiac monitoring, vital signs, obtain 12 lead EKG if ordered  - Administer antiarrhythmic and heart rate control medications as ordered  - Monitor electrolytes and administer replacement therapy as ordered  Outcome: Progressing     Problem: PAIN - ADULT  Goal: Verbalizes/displays adequate comfort level or baseline comfort level  Description  Interventions:  - Encourage patient to monitor pain and request assistance  - Assess pain using appropriate pain scale  - Administer analgesics based on type and severity of pain and evaluate response  - Implement non-pharmacological measures as appropriate and evaluate response  - Consider cultural and social influences on pain and pain management  - Notify physician/advanced practitioner if interventions unsuccessful or patient reports new pain  Outcome: Progressing     Problem: INFECTION - ADULT  Goal: Absence or prevention of progression during hospitalization  Description  INTERVENTIONS:  - Assess and monitor for signs and symptoms of infection  - Monitor lab/diagnostic results  - Monitor all insertion sites, i e  indwelling lines, tubes, and drains  - [place] appropriate cooling/warming therapies per order  - Administer medications as ordered  - Instruct and encourage patient and family to use good hand hygiene technique  - Identify and instruct in appropriate isolation precautions for identified infection/condition   Outcome: Progressing     Problem: SAFETY ADULT  Goal: Maintain or return to baseline ADL function  Description  INTERVENTIONS:  -  Assess patient's ability to carry out ADLs; assess patient's baseline for ADL function and identify physical deficits which impact ability to perform ADLs (bathing, care of mouth/teeth, toileting, grooming, dressing, etc )  - Assess/evaluate cause of self-care deficits   - Assess range of motion  - Assess patient's mobility; develop plan if impaired  - Assess patient's need for assistive devices and provide as appropriate  - Encourage maximum independence but intervene and supervise when necessary  - Involve family in performance of ADLs  - Assess for home care needs following discharge   - Consider OT consult to assist with ADL evaluation and planning for discharge  - Provide patient education as appropriate  Outcome: Progressing  Goal: Maintain or return mobility status to optimal level  Description  INTERVENTIONS:  - Assess patient's baseline mobility status (ambulation, transfers, stairs, etc )    - Identify cognitive and physical deficits and behaviors that affect mobility  - Identify mobility aids required to assist with transfers and/or ambulation (gait belt, sit-to-stand, lift, walker, cane, etc )  - [place] fall precautions as indicated by assessment  - Record patient progress and toleration of activity level on Mobility SBAR; progress patient to next Phase/Stage  - Instruct patient to call for assistance with activity based on assessment  - Consider rehabilitation consult to assist with strengthening/weightbearing, etc   Outcome: Progressing     Problem: DISCHARGE PLANNING  Goal: Discharge to home or other facility with appropriate resources  Description  INTERVENTIONS:  - Identify barriers to discharge w/patient and caregiver  - Arrange for needed discharge resources and transportation as appropriate  - Identify discharge learning needs (meds, wound care, etc )  - Arrange for interpretive services to assist at discharge as needed  - Refer to Case Management Department for coordinating discharge planning if the patient needs post-hospital services based on physician/advanced practitioner order or complex needs related to functional status, cognitive ability, or social support system  Outcome: Progressing     Problem: Knowledge Deficit  Goal: Patient/family/caregiver demonstrates understanding of disease process, treatment plan, medications, and discharge instructions  Description  Complete learning assessment and assess knowledge base    Interventions:  - Provide teaching at level of understanding  - Provide teaching via preferred learning methods  Outcome: Progressing

## 2020-07-17 NOTE — PROGRESS NOTES
Progress Note - Pulmonary   Lawence Abbot 63 y o  female MRN: [id number]  Unit/Bed#: [address] [place] [address] Encounter: [id number]    Assessment:  Dyspnea improved  Severe obstructive sleep apnea  Sleep-related hypoxemia [facility] and possibly some alveolar hypoventilation from obesity  Nocturnal pulse oximetry done last night with sleep screen on room air showed average O2 saturation of 89% with nusrat of 76%  She spent 235 minutes less than or equal to 88%  She did have 33 classified apneas, 4 obstructive apneas 2 central apneas and 131 hypopneas for overall AHI of 24 2    Plan:  I encourage patient to resume using her CPAP at home  She wanted oxygen at nighttime she would have to pay out-of-pocket 115 hours to rent an oxygen concentrator  She does want to she will contact my office  I told her CPAP probably would benefit her most she will be due peritoneal dialysis at nighttime starting next week      Subjective:   Patient is comfortable  Had peritoneal dialysis insertion earlier today  Not have any difficulty breathing at rest    Objective:     Vitals: Blood pressure 141/84, pulse 66, temperature (!) 97 3 °F (36 3 °C), resp  rate 18, weight 98 kg (216 lb 0 8 oz), SpO2 94 %, not currently breastfeeding  ,Body mass index is 42 19 kg/m²  Intake/Output Summary (Last 24 hours) at 7/17/2020 1644  Last data filed at 7/17/2020 1230  Gross per 24 hour   Intake 1235 ml   Output 0 ml   Net 1235 ml       Physical Exam: Physical Exam   Constitutional: She is oriented to person, place, and time  She appears well-developed and well-nourished  No distress  Room air O2 saturation 96%  No conversational dyspnea   HENT:   Head: Normocephalic  Nose: Nose normal    Mouth/Throat: Oropharynx is clear and moist  No oropharyngeal exudate  Eyes: Pupils are equal, round, and reactive to light  Conjunctivae are normal    Neck: Neck supple  No JVD present  No tracheal deviation present     Cardiovascular: Normal rate, regular rhythm and normal heart sounds  Pulmonary/Chest: Effort normal    Lung sounds are clear  No wheezes crackles or rhonchi   Abdominal: Soft  She exhibits no distension  There is no tenderness  There is no guarding  Musculoskeletal: She exhibits no edema  Lymphadenopathy:     She has no cervical adenopathy  Neurological: She is alert and oriented to person, place, and time  Skin: Skin is warm and dry  No rash noted  Psychiatric: She has a normal mood and affect  Her behavior is normal  Thought content normal         Labs: I have personally reviewed pertinent lab results  , ABG:   Lab Results   Component Value Date    PHART 7 284 (L) 07/13/2020    VAB4EFK 37 0 07/13/2020    PO2ART 101 6 07/13/2020    [id number] 17 2 (L) 07/13/2020    BEART -8 8 07/13/2020    SOURCE Brachial, Right 07/13/2020   , BNP: No results found for: BNP, CBC:   Lab Results   Component Value Date    WBC 9 00 07/17/2020    HGB 11 2 (L) 07/17/2020    HCT 35 7 07/17/2020    MCV 85 07/17/2020     07/17/2020    MCH 26 6 (L) 07/17/2020    MCHC 31 4 07/17/2020    RDW 18 7 (H) 07/17/2020    MPV 11 5 07/17/2020    NRBC 0 07/17/2020   , CMP:   Lab Results   Component Value Date     12/11/2014    K 3 7 07/17/2020    K 4 0 12/11/2014     07/17/2020     12/11/2014    CO2 23 07/17/2020    CO2 32 12/11/2014    ANIONGAP 6 12/11/2014     (H) 07/17/2020    BUN 26 (H) 12/11/2014    CREATININE 6 06 (H) 07/17/2020    CREATININE 1 46 (H) 12/11/2014    GLUCOSE 133 12/11/2014    CALCIUM 7 9 (L) 07/17/2020    CALCIUM 8 8 12/11/2014    AST 27 07/17/2020    AST 18 12/11/2014    ALT 80 (H) 07/17/2020    ALT 22 12/11/2014    ALKPHOS 146 (H) 07/17/2020    ALKPHOS 118 12/11/2014    PROT 6 8 12/11/2014    BILITOT 1 08 (H) 12/11/2014    EGFR 7 07/17/2020   , PT/INR:   Lab Results   Component Value Date    INR 1 78 (H) 07/17/2020   , Troponin: No results found for: TROPONIN    Imaging and other studies: I have personally reviewed pertinent reports  and I have personally reviewed pertinent films in PACS

## 2020-07-17 NOTE — QUICK NOTE
Notified by nursing of patient's tachycardia  Patient seen and examined  She is resting comfortably in bed  She complains of mild procedural pain in her right side of her abdomen  She has some baseline shortness of breath with exertion but no exacerbation of her symptoms  She is chest pain-free  Patient is noted to be in AFib with RVR  She was given a dose of Lopressor with little improvement  Will add an additional dose of Cardizem  She did have an episode of AFib with RVR that spontaneously converted earlier in her hospital stay  Cardiology is following  Will continue to closely monitor

## 2020-07-18 LAB
ANION GAP SERPL CALCULATED.3IONS-SCNC: 9 MMOL/L (ref 4–13)
BUN SERPL-MCNC: 93 MG/DL (ref 5–25)
CALCIUM SERPL-MCNC: 8 MG/DL (ref 8.3–10.1)
CHLORIDE SERPL-SCNC: 102 MMOL/L (ref 100–108)
CO2 SERPL-SCNC: 26 MMOL/L (ref 21–32)
CREAT SERPL-MCNC: 5.95 MG/DL (ref 0.6–1.3)
ERYTHROCYTE [DISTWIDTH] IN BLOOD BY AUTOMATED COUNT: 18.9 % (ref 11.6–15.1)
GFR SERPL CREATININE-BSD FRML MDRD: 7 ML/MIN/1.73SQ M
GLUCOSE SERPL-MCNC: 139 MG/DL (ref 65–140)
GLUCOSE SERPL-MCNC: 144 MG/DL (ref 65–140)
GLUCOSE SERPL-MCNC: 148 MG/DL (ref 65–140)
GLUCOSE SERPL-MCNC: 172 MG/DL (ref 65–140)
GLUCOSE SERPL-MCNC: 190 MG/DL (ref 65–140)
HCT VFR BLD AUTO: 36 % (ref 34.8–46.1)
HGB BLD-MCNC: 10.9 G/DL (ref 11.5–15.4)
INR PPP: 1.66 (ref 0.84–1.19)
MAGNESIUM SERPL-MCNC: 2.2 MG/DL (ref 1.6–2.6)
MCH RBC QN AUTO: 26.6 PG (ref 26.8–34.3)
MCHC RBC AUTO-ENTMCNC: 30.3 G/DL (ref 31.4–37.4)
MCV RBC AUTO: 88 FL (ref 82–98)
PLATELET # BLD AUTO: 223 THOUSANDS/UL (ref 149–390)
PMV BLD AUTO: 11.5 FL (ref 8.9–12.7)
POTASSIUM SERPL-SCNC: 4.6 MMOL/L (ref 3.5–5.3)
PROTHROMBIN TIME: 19.4 SECONDS (ref 11.6–14.5)
RBC # BLD AUTO: 4.1 MILLION/UL (ref 3.81–5.12)
SODIUM SERPL-SCNC: 137 MMOL/L (ref 136–145)
WBC # BLD AUTO: 10.81 THOUSAND/UL (ref 4.31–10.16)

## 2020-07-18 PROCEDURE — 99232 SBSQ HOSP IP/OBS MODERATE 35: CPT | Performed by: INTERNAL MEDICINE

## 2020-07-18 PROCEDURE — 94760 N-INVAS EAR/PLS OXIMETRY 1: CPT

## 2020-07-18 PROCEDURE — 94002 VENT MGMT INPAT INIT DAY: CPT

## 2020-07-18 PROCEDURE — 85610 PROTHROMBIN TIME: CPT | Performed by: NURSE PRACTITIONER

## 2020-07-18 PROCEDURE — 80048 BASIC METABOLIC PNL TOTAL CA: CPT | Performed by: NURSE PRACTITIONER

## 2020-07-18 PROCEDURE — 82948 REAGENT STRIP/BLOOD GLUCOSE: CPT

## 2020-07-18 PROCEDURE — 99233 SBSQ HOSP IP/OBS HIGH 50: CPT | Performed by: INTERNAL MEDICINE

## 2020-07-18 PROCEDURE — 85027 COMPLETE CBC AUTOMATED: CPT | Performed by: NURSE PRACTITIONER

## 2020-07-18 PROCEDURE — 83735 ASSAY OF MAGNESIUM: CPT | Performed by: INTERNAL MEDICINE

## 2020-07-18 RX ORDER — METOPROLOL SUCCINATE 25 MG/1
25 TABLET, EXTENDED RELEASE ORAL 2 TIMES DAILY
Status: DISCONTINUED | OUTPATIENT
Start: 2020-07-18 | End: 2020-07-18

## 2020-07-18 RX ORDER — METOPROLOL SUCCINATE 25 MG/1
25 TABLET, EXTENDED RELEASE ORAL ONCE
Status: COMPLETED | OUTPATIENT
Start: 2020-07-18 | End: 2020-07-18

## 2020-07-18 RX ORDER — OXYCODONE AND ACETAMINOPHEN 7.5; 325 MG/1; MG/1
1 TABLET ORAL EVERY 4 HOURS PRN
Qty: 15 TABLET | Refills: 0 | Status: SHIPPED | OUTPATIENT
Start: 2020-07-18 | End: 2020-07-19 | Stop reason: HOSPADM

## 2020-07-18 RX ORDER — METOPROLOL SUCCINATE 50 MG/1
50 TABLET, EXTENDED RELEASE ORAL 2 TIMES DAILY
Status: DISCONTINUED | OUTPATIENT
Start: 2020-07-18 | End: 2020-07-19 | Stop reason: HOSPADM

## 2020-07-18 RX ORDER — METOPROLOL TARTRATE 5 MG/5ML
5 INJECTION INTRAVENOUS ONCE
Status: COMPLETED | OUTPATIENT
Start: 2020-07-18 | End: 2020-07-18

## 2020-07-18 RX ORDER — METOPROLOL TARTRATE 5 MG/5ML
5 INJECTION INTRAVENOUS EVERY 6 HOURS PRN
Status: DISCONTINUED | OUTPATIENT
Start: 2020-07-18 | End: 2020-07-19 | Stop reason: HOSPADM

## 2020-07-18 RX ORDER — INSULIN ASPART 100 [IU]/ML
10 INJECTION, SUSPENSION SUBCUTANEOUS
Status: DISCONTINUED | OUTPATIENT
Start: 2020-07-19 | End: 2020-07-19 | Stop reason: HOSPADM

## 2020-07-18 RX ORDER — INSULIN ASPART 100 [IU]/ML
14 INJECTION, SUSPENSION SUBCUTANEOUS
Status: DISCONTINUED | OUTPATIENT
Start: 2020-07-19 | End: 2020-07-19 | Stop reason: HOSPADM

## 2020-07-18 RX ORDER — WARFARIN SODIUM 7.5 MG/1
7.5 TABLET ORAL
Status: COMPLETED | OUTPATIENT
Start: 2020-07-18 | End: 2020-07-18

## 2020-07-18 RX ADMIN — INSULIN LISPRO 1 UNITS: 100 INJECTION, SOLUTION INTRAVENOUS; SUBCUTANEOUS at 21:15

## 2020-07-18 RX ADMIN — METOPROLOL TARTRATE 5 MG: 5 INJECTION INTRAVENOUS at 03:19

## 2020-07-18 RX ADMIN — INSULIN ASPART 10 UNITS: 100 INJECTION, SUSPENSION SUBCUTANEOUS at 17:54

## 2020-07-18 RX ADMIN — INSULIN ASPART 10 UNITS: 100 INJECTION, SUSPENSION SUBCUTANEOUS at 08:30

## 2020-07-18 RX ADMIN — HYDRALAZINE HYDROCHLORIDE 10 MG: 10 TABLET, FILM COATED ORAL at 14:51

## 2020-07-18 RX ADMIN — ACETAMINOPHEN 650 MG: 325 TABLET, FILM COATED ORAL at 20:14

## 2020-07-18 RX ADMIN — METOPROLOL SUCCINATE 50 MG: 50 TABLET, EXTENDED RELEASE ORAL at 17:54

## 2020-07-18 RX ADMIN — METOPROLOL SUCCINATE 25 MG: 25 TABLET, EXTENDED RELEASE ORAL at 06:21

## 2020-07-18 RX ADMIN — METOPROLOL TARTRATE 5 MG: 5 INJECTION INTRAVENOUS at 14:51

## 2020-07-18 RX ADMIN — METOPROLOL SUCCINATE 25 MG: 25 TABLET, EXTENDED RELEASE ORAL at 08:30

## 2020-07-18 RX ADMIN — WARFARIN SODIUM 7.5 MG: 7.5 TABLET ORAL at 17:54

## 2020-07-18 RX ADMIN — CALCITRIOL 0.5 MCG: 0.25 CAPSULE, LIQUID FILLED ORAL at 08:30

## 2020-07-18 RX ADMIN — METOPROLOL TARTRATE 5 MG: 5 INJECTION INTRAVENOUS at 20:14

## 2020-07-18 RX ADMIN — ISOSORBIDE MONONITRATE 30 MG: 30 TABLET, EXTENDED RELEASE ORAL at 08:30

## 2020-07-18 RX ADMIN — HYDRALAZINE HYDROCHLORIDE 10 MG: 10 TABLET, FILM COATED ORAL at 21:15

## 2020-07-18 RX ADMIN — TORSEMIDE 40 MG: 20 TABLET ORAL at 08:30

## 2020-07-18 NOTE — NURSING NOTE
HR noted to be elevated  110's-120's bpm   07/18/20 03:00:28  97 6 °F (36 4 °C)  116Abnormal   18  109/74  86  97 %     Remonia MICHAEL Rodrigues notified and made aware  Metoprolol (Lopressor) injection 5 mg administered as ordered  Tachycardia persistent, which was communicated to the CRNP  As per CRNP's communications as orders, hydralazine (Apersoline) tablet was held, and Toprolol-XL 24 hr tablet 25 mg was administered  Will continue to monitor  Nursing handoff will be provided to [facility] RN

## 2020-07-18 NOTE — PROGRESS NOTES
Progress Note - Cardiology   Piotr Jett 63 y o  female MRN: [id number]  Unit/Bed#: [address] [place] [address] Encounter: [id number]    Assessment:  Paroxysmal atrial fibrillation  Acute on CKD  Nonischemic cardiomyopathy  Hypertension  Severe pulmonary hypertension  CHANDRA    Plan:  -increase Toprol to 50 mg p o  B i d ; may give IV Lopressor every 4 hours p r n  To optimize rate control and will titrate Toprol-XL accordingly  Avoid calcium channel blockers for rate control in the setting of LV dysfunction  -keep K above 4, Mag above 2  -pain control per primary team; see had the PD catheter placed  -[id number] is at least 3, restart Coumadin when feasible  -rest per clinical course    Subjective/Objective   Patient seen and examined  She went back into atrial fibrillation overnight, remains asymptomatic  Tele shows heart rate in the 120s      Vitals: /89   Pulse (!) 129   Temp 97 5 °F (36 4 °C) (Tympanic)   Resp 20   Wt 98 kg (216 lb 0 8 oz)   SpO2 98%   BMI 42 19 kg/m²   Vitals:    07/15/20 0900 07/16/20 0600   Weight: 97 6 kg (215 lb 2 7 oz) 98 kg (216 lb 0 8 oz)     Orthostatic Blood Pressures      Most Recent Value   Blood Pressure  129/89 filed at 07/18/2020 8365   Patient Position - Orthostatic VS  Lying filed at 07/13/2020 1715            Intake/Output Summary (Last 24 hours) at 7/18/2020 1203  Last data filed at 7/18/2020 0133  Gross per 24 hour   Intake 396 5 ml   Output 650 ml   Net -253 5 ml       Invasive Devices     Peripheral Intravenous Line            Peripheral IV 07/16/20 Right Hand 2 days                Review of Systems:  Negative except above    Physical Exam: /89   Pulse (!) 129   Temp 97 5 °F (36 4 °C) (Tympanic)   Resp 20   Wt 98 kg (216 lb 0 8 oz)   SpO2 98%   BMI 42 19 kg/m²   General appearance: alert and oriented, in no acute distress  Neck: no JVD  Lungs: diminished breath sounds  Heart: irregularly irregular rhythm  Abdomen: soft, non-tender; bowel sounds normal; no masses, no organomegaly  Extremities: extremities normal, warm and well-perfused; no cyanosis, clubbing, or edema  Skin: Skin color, texture, turgor normal  No rashes or lesions  Neurologic: Grossly normal    Lab Results:   I have personally reviewed pertinent lab results  CBC with diff:   Results from last 7 days   Lab Units 07/18/20  0546   WBC Thousand/uL 10 81*   RBC Million/uL 4 10   HEMOGLOBIN g/dL 10 9*   HEMATOCRIT % 36 0   MCV fL 88   MCH pg 26 6*   MCHC g/dL 30 3*   RDW % 18 9*   MPV fL 11 5   PLATELETS Thousands/uL 223     CMP:   Results from last 7 days   Lab Units 07/18/20  0546 07/17/20  0454   SODIUM mmol/L 137 138   POTASSIUM mmol/L 4 6 3 7   CHLORIDE mmol/L 102 102   CO2 mmol/L 26 23   BUN mg/dL 93* 100*   CREATININE mg/dL 5 95* 6 06*   CALCIUM mg/dL 8 0* 7 9*   AST U/L  --  27   ALT U/L  --  80*   ALK PHOS U/L  --  146*   EGFR ml/min/1 73sq m 7 7     Troponin:   0   Lab Value Date/Time    TROPONINI 0 02 07/13/2020 1725    TROPONINI 0 03 07/13/2020 1317    TROPONINI <0 02 07/05/2018 2225    TROPONINI <0 02 07/05/2018 1916     BNP:   Results from last 7 days   Lab Units 07/18/20  0546   POTASSIUM mmol/L 4 6   CHLORIDE mmol/L 102   CO2 mmol/L 26   BUN mg/dL 93*   CREATININE mg/dL 5 95*   CALCIUM mg/dL 8 0*   EGFR ml/min/1 73sq m 7     Coags:   Results from last 7 days   Lab Units 07/18/20  0555  07/13/20  1400   PTT seconds  --   --  45*   INR  1 66*   < > 4 11*    < > = values in this interval not displayed  TSH:   Results from last 7 days   Lab Units 07/13/20  1317   TSH 3RD GENERATON uIU/mL 6 638*     Magnesium:   Results from last 7 days   Lab Units 07/18/20  0546   MAGNESIUM mg/dL 2 2     Lipid Profile:     Imaging: I have personally reviewed pertinent reports  EKG:  Atrial fibrillation  VTE Pharmacologic Prophylaxis: Sequential compression device (Venodyne)   VTE Mechanical Prophylaxis: sequential compression device    Counseling / Coordination of Care  Total time spent today 20 minutes  Greater than 50% of total time was spent with the patient and / or family counseling and / or coordination of care

## 2020-07-18 NOTE — PROGRESS NOTES
[address] [facility] NOTE   Aileen Caro 63 y o  female MRN: [id number]  Unit/Bed#: [address] [place] [address] Encounter: [id number]  Reason for Consult: PORSHA, CKD    ASSESSMENT and PLAN:  1  PORSHA (POA):  · Admission creatinine of 6 32 on 7/13/20  · No significant improvement despite IVF - SCr 6 1 today  · Felt to be more due to progression renal disease  · She has been deemed ESRD  · PD catheter was placed on July 17, 2020  · She is scheduled to start urgent PD at [facility] on July 20, 2020 at 2:30 p m  2  CKD IV:  · Baseline creatinine in the mid to high 3s and has been worsening as an outpatient  · Follows with Dr oJse Hughes    · Etiology felt to be diabetic nephropathy  3  CHF:  · Echo this admission showed drop in EF to 35%  · Continue Torsemide 40 mg daily  4  HTN:  · BP is controlled on Metoprolol succ 50 mg BID  5  PAF: Now back in atrial fibrillation  Defer to cardiology  6  Pulm HTN: on anticoagulation  7  Anemia  8  Nephrotic range proteinuria  9  MBD: continue Calcitriol 0 5 mcg daily  DISPOSITION:  · HR control per cardiology and SLIM  · She is scheduled to start urgent PD at [facility] on 7/20/20 at 2:30 pm      The above plan was discussed with Dr Shirley Sung  SUBJECTIVE / INTERVAL HISTORY:  Went back into afib overnight  HR is now uncontrolled  No SOB       OBJECTIVE:  Current Weight: Weight - Scale: 98 kg (216 lb 0 8 oz)  Vitals:    07/17/20 2336 07/18/20 0300 07/18/20 0536 07/18/20 0722   BP: 132/91 109/74 119/73 129/89   Pulse: (!) 107 (!) 116 (!) 113 (!) 129   Resp: 17 18 20 20   Temp:  97 6 °F (36 4 °C)  97 5 °F (36 4 °C)   TempSrc:    Tympanic   SpO2: 98% 97% 96% 98%   Weight:           Intake/Output Summary (Last 24 hours) at 7/18/2020 1105  Last data filed at 7/18/2020 0133  Gross per 24 hour   Intake 1396 5 ml   Output 650 ml   Net 746 5 ml     General: conscious, cooperative, no distress  Skin: dry  Eyes: pink conjunctivae  ENT: moist mucous membranes  Chest/Lungs: equal chest expansion, clear breath sounds  CVS: distinct heart sounds, tachy, irregular rhythm, no rub  Abdomen: soft, non tender, non distended, normal bowel sounds  Extremities: (+) leg edema  : no west catheter  Neuro: awake, alert     Psych: appropriate affect    Medications:    Current Facility-Administered Medications:     acetaminophen (TYLENOL) tablet 650 mg, 650 mg, Oral, Q6H PRN, Lyssa Dean PA-C, 650 mg at 07/14/20 8426    calcitriol (ROCALTROL) capsule 0 5 mcg, 0 5 mcg, Oral, Daily, MICHAEL Padron, 0 5 mcg at 07/18/20 0830    cholecalciferol (VITAMIN D3) tablet 2,000 Units, 2,000 Units, Oral, Every Other Day, MICHAEL Padron, 2,000 Units at 07/17/20 1808    hydrALAZINE (APRESOLINE) tablet 10 mg, 10 mg, Oral, Q8H [address], Willa Serra MD, Stopped at 07/18/20 0617    insulin aspart protamine-insulin aspart (NovoLOG 70/30) 100 units/mL subcutaneous injection 10 Units, 10 Units, Subcutaneous, BID AC, MICHAEL Padron, 10 Units at 07/18/20 0830    insulin lispro (HumaLOG) 100 units/mL subcutaneous injection 1-5 Units, 1-5 Units, Subcutaneous, TID AC, 1 Units at 07/16/20 1718 **AND** Fingerstick Glucose (POCT), , , TID AC, MICHAEL Padron    insulin lispro (HumaLOG) 100 units/mL subcutaneous injection 1-5 Units, 1-5 Units, Subcutaneous, HS, MICHAEL Padron, 1 Units at 07/17/20 2236    isosorbide mononitrate (IMDUR) 24 hr tablet 30 mg, 30 mg, Oral, Daily, Willa Serra MD, 30 mg at 07/18/20 0830    levalbuterol (XOPENEX) inhalation solution 0 63 mg, 0 63 mg, Nebulization, Q4H PRN, MICHAEL Padron    metoprolol succinate (TOPROL-XL) 24 hr tablet 50 mg, 50 mg, Oral, BID, Papa Dixon MD    ondansetron (ZOFRAN) injection 4 mg, 4 mg, Intravenous, Q6H PRN, MICHAEL Padron    ondansetron (ZOFRAN) injection 4 mg, 4 mg, Intravenous, Once PRN, Jacqueline Stout MD    oxyCODONE (ROXICODONE) IR tablet 2 5 mg, 2 5 mg, Oral, Q6H PRN, Kirk Cruz CRNP, 2 5 mg at 07/17/20 2018    torsemide (DEMADEX) tablet 40 mg, 40 mg, Oral, Daily, Elis Benites MD, 40 mg at 07/18/20 0830    Laboratory Results:  Results from last 7 days   Lab Units 07/18/20  0546 07/17/20  0454 07/16/20  0557 07/15/20  0615 07/14/20  0549 07/13/20  1317   WBC Thousand/uL 10 81* 9 00 9 29 9 66 10 74* 10 62*   HEMOGLOBIN g/dL 10 9* 11 2* 11 8 11 4* 11 9 12 5   HEMATOCRIT % 36 0 35 7 37 4 36 8 38 3 39 5   PLATELETS Thousands/uL 223 211 223 214 236 278   POTASSIUM mmol/L 4 6 3 7 4 3 4 0 4 0 4 6   CHLORIDE mmol/L 102 102 99* 98* 100 97*   CO2 mmol/L 26 23 22 20* 19* 19*   BUN mg/dL 93* 100* 101* 100* 96* 95*   CREATININE mg/dL 5 95* 6 06* 6 10* 6 19* 6 22* 6 32*   CALCIUM mg/dL 8 0* 7 9* 8 2* 8 1* 8 1* 8 5   MAGNESIUM mg/dL 2 2  --   --   --  2 4 2 3   PHOSPHORUS mg/dL  --  6 0* 6 4* 6 3*  --   --

## 2020-07-18 NOTE — PROGRESS NOTES
[facility] Internal Medicine Progress Note  Patient: Urban Eason 63 y o  female   MRN: [id number]  PCP: Yudy Musa DO  Unit/Bed#: [address] [place] [address] Encounter: [id number]  Date Of Visit: 07/18/20    Problem List:    Principal Problem:    Acute kidney injury superimposed on CKD [facility])  Active Problems:    Atrial flutter with rapid ventricular response ([facility] )    Dyspnea    Type 2 diabetes mellitus with complication, with long-term current use of insulin (HCC)    Elevated LFTs    Chronic combined systolic and diastolic congestive heart failure (HCC)    Chronic hypoxemic respiratory failure (HCC)    Benign hypertension with CKD (chronic kidney disease) stage V (HCC)    Elevated TSH    Mixed hyperlipidemia    History of pulmonary embolus (PE)    Sleep apnea    Pulmonary hypertension ([facility] )    Morbid obesity with BMI of 40 0-44 9, adult ([facility] )    ESRD (end stage renal disease) ([facility] )      Assessment & Plan:    Dyspnea  Assessment & Plan  History of chronic shortness of breath but worsening dyspnea at rest since Friday last week  Known history of severe pulmonary hypertension  PA pressure > 90  Patient follows Dr Kennedy Celestin as outpatient  History of suspected PE  Doubt PE as patient is on Coumadin for history of PE  INR supratherapeutic on presentation  Chest x-ray without any acute abnormality  Likely multifactorial secondary to uncontrolled a flutter, obesity, CHF, pulmonary hypertension, history of pulmonary embolism  ABG-7 30, pCO2 37, PO2 102 on 2 L    Saturating adequately on 2 L of supplemental oxygen  Repeat echo with lower EF 26%, grade 2 diastolic dysfunction, dilated RV and IVC, PA pressure 60 mm Hg likely underestimated  Pulmonary following, input appreciated  Symptoms have improved  · Resumed anticoagulation post PD catheter placement today  · Will require continue follow-up with Pulmonary after discharge    Atrial flutter with rapid ventricular response ([facility] )  Assessment & Plan  New onset a flutter with RVR  EKG in ED showed 2:1 a flutter, rate 155  Received Cardizem 15 mg IV in ED, and subsequently started on Cardizem drip  Metoprolol was resumed  Likely precipitated by hypoxia, pulmonary hypertension, metabolic abnormality  Self converted on day 1st and subsequently remain in sinus rhythm, reverted back to AFib last night  2D echo with EF 04%, grade 2 diastolic dysfunction, dilated RV and IVC  PA pressure 60 mmHg but possible underestimation due to eccentric TR jet  Cardiology following, input appreciated  · Continue metoprolol, increased to 50 mg q 12 hours  · Monitor electrolytes  · Recommended compliance with CPAP  · Pulmonary following for pulmonary hypertension  · Continue Supplemental oxygen  · Resume Coumadin, 7 5 mg x 1 today    * Acute kidney injury superimposed on CKD ([facility] )  Assessment & Plan  PORSHA on CKD 4  Baseline creatinine 3 3-3 8 in past year  Patient follows Dr Jo Weinstein as outpatient  On Demadex 40mg p o  Daily in AM and can take additional 20 mg p o  In the afternoon if leg edema or weight gain  Patient states she barely takes additional dose in the afternoon  Creatinine 6 32 on presentation with Anion gap 18, bicarb 19, potassium normal  ABG showed metabolic acidosis  No significant improvement with IV fluid, creatinine remains 6 1  Now deemed ESRD  CPK within normal limit  Nephrology following, input appreciated  Likely disease progression possibly multifactorial secondary to decrease ECV due to pulmonary hypertension,? Anticoagulated related nephropathy, progressive nephrotic range proteinuria  Status post IR guided PD catheter placement on 07/17  · Continue Demadex  · Daily weight and I&Os  · Follow-up labs  · Avoid nephrotoxins and hypotension  · Plan to initiate PD next week as outpatient, scheduled for 7/20 at 2:30 p m   At [facility]      Results from last 7 days   Lab Units 07/18/20  0546 07/17/20  0454 07/16/20  0557 07/15/20  0615 07/14/20  0549 07/13/20  1317   BUN mg/dL 93* 100* 101* 100* 96* 95*   CREATININE mg/dL 5 95* 6 06* 6 10* 6 19* 6 22* 6 32*         Chronic hypoxemic respiratory failure (HCC)  Assessment & Plan  Patient reported that she was previously on supplemental oxygen at home but she return did in November 2019  Patient with history of severe CHANDRA, likely associated sleep-related hypoxemia secondary to alveolar hypoventilation  Patient underwent nocturnal pulse oximetry during hospitalization  Seen and followed by Pulmonary, input appreciated  · Encouraged to comply with CPAP as tolerated  · Follow-up with Pulmonary after discharge    Chronic combined systolic and diastolic congestive heart failure [facility])  Assessment & Plan  Wt Readings from Last 3 Encounters:   07/16/20 98 kg (216 lb 0 8 oz)   06/09/20 94 3 kg (208 lb)   06/05/20 95 6 kg (210 lb 12 8 oz)     2D echo in May this year showed EF 50-55%, no RWMA,G2DD, PA pressure more than 90  No significant valvular disease  Patient reports weighing herself daily at home  No weight gain noted  ProBNP 30,000's in setting of advanced kidney disease  Chest x-ray NAD  Repeat echo with EF 62%, grade 2 diastolic dysfunction, dilated RV and IVC, PA pressure 60 mmHg likely underestimation  Stress test without any evidence of ischemia  Mild edema to lower extremities on exam   · Continue Demadex  · Continue metoprolol,  · Not candidate for ACE-inhibitor/ARB at present  · Continue hydralazine plus isosorbide as tolerated  · Monitor volume status closely  · Daily weight and I&Os  Elevated LFTs  Assessment & Plan  Acute and mild  TB normal   Suspect congestive hepatopathy    Patient denies nausea vomiting abdominal pain  Improving  Resume Lipitor          Type 2 diabetes mellitus with complication, with long-term current use of insulin [facility])  Assessment & Plan  Lab Results   Component Value Date    HGBA1C 6 8 (H) 07/14/2020       Recent Labs     07/17/20  1205 07/17/20  1256 07/17/20  1641 07/17/20  2119   POCGLU 121 131 131 218* Blood Sugar Average: Last 72 hrs:  (P) 164     Patient is on Lispro prot 75/25 10 units in the morning and 20 units with dinner, Tradjenta at home  Patient barely takes short acting insulin with lunch  Reports check blood sugar 3 times a day at home  Substitute Lispro prot 75/25 with Novolog 70/30 10 units BID  Will increase evening dose to 14 units  SSI  Hold Tradjenta  Diabetic diet    Sleep apnea  Assessment & Plan  Recently started on CPAP  Continue CPAP at HS  History of pulmonary embolus (PE)  Assessment & Plan  On Coumadin therapy at home  Took Coumadin 8 mg p o  Daily for past month, no recent INR check since May  INR 4 11 presentation, remains with elevated INR subsequently  INR was reversed for PD catheter placement  · No evidence of bleeding  · Resumed Coumadin   · Discussed with Cardiology, no need for bridging given  [id number] - 3  · Continue to monitor      Mixed hyperlipidemia  Assessment & Plan  Continue Lipitor    Elevated TSH  Assessment & Plan  TSH 6 638 normal free T4    Benign hypertension with CKD (chronic kidney disease) stage V (HCC)  Assessment & Plan  BP stable  Continue metoprolol from home , monitor    Morbid obesity with BMI of 40 0-44 9, adult ([facility] )  Assessment & Plan  Body mass index is 41 08 kg/m²  Diet and lift style modification        VTE Pharmacologic Prophylaxis:   Pharmacologic: Warfarin (Coumadin)   Mechanical VTE Prophylaxis in Place: Yes    Patient Centered Rounds: I have performed bedside rounds with nursing staff today  Discussions with Specialists or Other Care Team Provider:  Cardiology, Nephrology    Education and Discussions with Family / Patient:  Daughter at bedside    Time Spent for Care: 45 minutes  More than 50% of total time spent on counseling and coordination of care as described above      Current Length of Stay: 5 day(s)    Current Patient Status: Inpatient   Certification Statement: The patient will continue to require additional inpatient hospital stay due to Further management for acute kidney injury requiring dialysis and stabilization of the cardiopulmonary status    Discharge Plan:  Home in next 48-72 hours    Code Status: Level 1 - Full Code      Subjective:   Reverted back to AFib with RVR overnight  Denies any chest pain or shortness of breath  Reported mild dizziness on standing up  Reports mild pain associated with catheter placement    Discuss regarding compliance with CPAP, patient is willing to try    Objective:     Vitals:   Temp (24hrs), Av 7 °F (36 5 °C), Min:97 5 °F (36 4 °C), Max:97 9 °F (36 6 °C)    Temp:  [97 5 °F (36 4 °C)-97 9 °F (36 6 °C)] 97 5 °F (36 4 °C)  HR:  [] 125  Resp:  [16-20] 20  BP: ()/(61-94) 131/89  SpO2:  [94 %-99 %] 98 %  Body mass index is 42 19 kg/m²  Input and Output Summary (last 24 hours): Intake/Output Summary (Last 24 hours) at 2020 1456  Last data filed at 2020 0133  Gross per 24 hour   Intake 371 5 ml   Output 650 ml   Net -278 5 ml       Physical Exam:     Physical Exam   Constitutional: She is oriented to person, place, and time  No distress  HENT:   Head: Normocephalic and atraumatic  Eyes: Pupils are equal, round, and reactive to light  Neck: Neck supple  Cardiovascular: Regular rhythm  Tachycardia present  Murmur heard  Pulmonary/Chest: Effort normal  No respiratory distress  She has no wheezes  She has no rales  Diminished   Abdominal: Soft  Bowel sounds are normal  She exhibits no distension  There is no tenderness  There is no rebound and no guarding  Dressing C/D/I   Musculoskeletal: She exhibits edema  Neurological: She is alert and oriented to person, place, and time  No cranial nerve deficit  Skin: Skin is warm and dry  No rash noted         Additional Data:     Labs:    Results from last 7 days   Lab Units 20  0546 20  0454   WBC Thousand/uL 10 81* 9 00   HEMOGLOBIN g/dL 10 9* 11 2*   HEMATOCRIT % 36 0 35 7   PLATELETS Thousands/uL 223 211   NEUTROS PCT %  --  66   LYMPHS PCT %  --  15   MONOS PCT %  --  14*   EOS PCT %  --  3     Results from last 7 days   Lab Units 07/18/20  0546 07/17/20  0454   POTASSIUM mmol/L 4 6 3 7   CHLORIDE mmol/L 102 102   CO2 mmol/L 26 23   BUN mg/dL 93* 100*   CREATININE mg/dL 5 95* 6 06*   CALCIUM mg/dL 8 0* 7 9*   ALK PHOS U/L  --  146*   ALT U/L  --  80*   AST U/L  --  27     Results from last 7 days   Lab Units 07/18/20  0555   INR  1 66*       * I Have Reviewed All Lab Data Listed Above  * Additional Pertinent Lab Tests Reviewed: All Labs Within Last 24 Hours Reviewed      Imaging:  Imaging Reports Reviewed Today Include:  X-ray    Recent Cultures (last 7 days):           Last 24 Hours Medication List:     Current Facility-Administered Medications:  acetaminophen 650 mg Oral Q6H PRN Brandy Strickland PA-C   calcitriol 0 5 mcg Oral Daily MICHAEL Padron   cholecalciferol 2,000 Units Oral Every Other Day MICHAEL Padron   hydrALAZINE 10 mg Oral Q8H [address] Alysha Leigh MD   insulin aspart protamine-insulin aspart 10 Units Subcutaneous BID AC MICHAEL Padron   insulin lispro 1-5 Units Subcutaneous TID AC MICHAEL Padron   insulin lispro 1-5 Units Subcutaneous HS MICHAEL Padron   isosorbide mononitrate 30 mg Oral Daily Alysha Leigh MD   levalbuterol 0 63 mg Nebulization Q4H PRN MICHAEL Padron   metoprolol 5 mg Intravenous Q6H PRN Martha Hill MD   metoprolol succinate 50 mg Oral BID Martha Hill MD   ondansetron 4 mg Intravenous Q6H PRN MICHAEL Padron   ondansetron 4 mg Intravenous Once PRN Kosta Novak MD   oxyCODONE 2 5 mg Oral Q6H PRN MICHAEL Mendoza   torsemide 40 mg Oral Daily Joao Morales MD   warfarin 7 5 mg Oral Once (warfarin) Vonda Patel MD          Today, Patient Was Seen By: Vonda Patel MD    ** Please Note: "This note has been constructed using a voice recognition system  Therefore there may be syntax, spelling, and/or grammatical errors  Please call if you have any questions  "**

## 2020-07-18 NOTE — NURSING NOTE
HR noted to be elevated  (120 bpm)  MICHAEL Ruiz notified and made aware  As per CRNP's orders, EKG completed which showed Atrial Fibrillation with RVR  Metoprolol 2 5 mg intravenous administered as ordered  Persistent tachycardia was noted and communicated to MICHAEL  Cardiazem 10 mg injection was administered as ordered  At this time, HR noted to range between 100's-110's bpm   Patient denies any presence of palpitations or chest pain/pressure, resting in bed  Will continue to monitor

## 2020-07-19 VITALS
RESPIRATION RATE: 12 BRPM | HEART RATE: 76 BPM | DIASTOLIC BLOOD PRESSURE: 76 MMHG | TEMPERATURE: 98.1 F | OXYGEN SATURATION: 88 % | SYSTOLIC BLOOD PRESSURE: 130 MMHG | HEIGHT: 60 IN | WEIGHT: 221.34 LBS | BODY MASS INDEX: 43.46 KG/M2

## 2020-07-19 PROBLEM — R06.00 DYSPNEA: Status: RESOLVED | Noted: 2020-07-13 | Resolved: 2020-07-19

## 2020-07-19 PROBLEM — R79.89 ELEVATED LFTS: Status: RESOLVED | Noted: 2020-07-13 | Resolved: 2020-07-19

## 2020-07-19 LAB
ANION GAP SERPL CALCULATED.3IONS-SCNC: 10 MMOL/L (ref 4–13)
BASOPHILS # BLD AUTO: 0.06 THOUSANDS/ΜL (ref 0–0.1)
BASOPHILS NFR BLD AUTO: 1 % (ref 0–1)
BUN SERPL-MCNC: 98 MG/DL (ref 5–25)
CALCIUM SERPL-MCNC: 8 MG/DL (ref 8.3–10.1)
CHLORIDE SERPL-SCNC: 100 MMOL/L (ref 100–108)
CO2 SERPL-SCNC: 23 MMOL/L (ref 21–32)
CREAT SERPL-MCNC: 6.45 MG/DL (ref 0.6–1.3)
EOSINOPHIL # BLD AUTO: 0.43 THOUSAND/ΜL (ref 0–0.61)
EOSINOPHIL NFR BLD AUTO: 4 % (ref 0–6)
ERYTHROCYTE [DISTWIDTH] IN BLOOD BY AUTOMATED COUNT: 18.7 % (ref 11.6–15.1)
GFR SERPL CREATININE-BSD FRML MDRD: 6 ML/MIN/1.73SQ M
GLUCOSE SERPL-MCNC: 155 MG/DL (ref 65–140)
GLUCOSE SERPL-MCNC: 159 MG/DL (ref 65–140)
GLUCOSE SERPL-MCNC: 160 MG/DL (ref 65–140)
HCT VFR BLD AUTO: 34.3 % (ref 34.8–46.1)
HGB BLD-MCNC: 10.5 G/DL (ref 11.5–15.4)
IMM GRANULOCYTES # BLD AUTO: 0.05 THOUSAND/UL (ref 0–0.2)
IMM GRANULOCYTES NFR BLD AUTO: 1 % (ref 0–2)
INR PPP: 1.59 (ref 0.84–1.19)
LYMPHOCYTES # BLD AUTO: 0.82 THOUSANDS/ΜL (ref 0.6–4.47)
LYMPHOCYTES NFR BLD AUTO: 8 % (ref 14–44)
MAGNESIUM SERPL-MCNC: 2.1 MG/DL (ref 1.6–2.6)
MCH RBC QN AUTO: 27.1 PG (ref 26.8–34.3)
MCHC RBC AUTO-ENTMCNC: 30.6 G/DL (ref 31.4–37.4)
MCV RBC AUTO: 88 FL (ref 82–98)
MONOCYTES # BLD AUTO: 1.62 THOUSAND/ΜL (ref 0.17–1.22)
MONOCYTES NFR BLD AUTO: 16 % (ref 4–12)
NEUTROPHILS # BLD AUTO: 7.08 THOUSANDS/ΜL (ref 1.85–7.62)
NEUTS SEG NFR BLD AUTO: 70 % (ref 43–75)
NRBC BLD AUTO-RTO: 0 /100 WBCS
PLATELET # BLD AUTO: 208 THOUSANDS/UL (ref 149–390)
PMV BLD AUTO: 11.4 FL (ref 8.9–12.7)
POTASSIUM SERPL-SCNC: 4.2 MMOL/L (ref 3.5–5.3)
PROTHROMBIN TIME: 18.8 SECONDS (ref 11.6–14.5)
RBC # BLD AUTO: 3.88 MILLION/UL (ref 3.81–5.12)
SODIUM SERPL-SCNC: 133 MMOL/L (ref 136–145)
WBC # BLD AUTO: 10.06 THOUSAND/UL (ref 4.31–10.16)

## 2020-07-19 PROCEDURE — 97162 PT EVAL MOD COMPLEX 30 MIN: CPT

## 2020-07-19 PROCEDURE — 94761 N-INVAS EAR/PLS OXIMETRY MLT: CPT

## 2020-07-19 PROCEDURE — 99232 SBSQ HOSP IP/OBS MODERATE 35: CPT | Performed by: INTERNAL MEDICINE

## 2020-07-19 PROCEDURE — 93005 ELECTROCARDIOGRAM TRACING: CPT

## 2020-07-19 PROCEDURE — 85610 PROTHROMBIN TIME: CPT | Performed by: INTERNAL MEDICINE

## 2020-07-19 PROCEDURE — 83735 ASSAY OF MAGNESIUM: CPT | Performed by: INTERNAL MEDICINE

## 2020-07-19 PROCEDURE — 99233 SBSQ HOSP IP/OBS HIGH 50: CPT | Performed by: INTERNAL MEDICINE

## 2020-07-19 PROCEDURE — 94760 N-INVAS EAR/PLS OXIMETRY 1: CPT

## 2020-07-19 PROCEDURE — 82948 REAGENT STRIP/BLOOD GLUCOSE: CPT

## 2020-07-19 PROCEDURE — 97530 THERAPEUTIC ACTIVITIES: CPT

## 2020-07-19 PROCEDURE — 99239 HOSP IP/OBS DSCHRG MGMT >30: CPT | Performed by: INTERNAL MEDICINE

## 2020-07-19 PROCEDURE — 80048 BASIC METABOLIC PNL TOTAL CA: CPT | Performed by: INTERNAL MEDICINE

## 2020-07-19 PROCEDURE — 85025 COMPLETE CBC W/AUTO DIFF WBC: CPT | Performed by: INTERNAL MEDICINE

## 2020-07-19 RX ORDER — ISOSORBIDE MONONITRATE 30 MG/1
30 TABLET, EXTENDED RELEASE ORAL DAILY
Qty: 30 TABLET | Refills: 0 | Status: SHIPPED | OUTPATIENT
Start: 2020-07-20 | End: 2020-08-26 | Stop reason: SDDI

## 2020-07-19 RX ORDER — WARFARIN SODIUM 5 MG/1
TABLET ORAL
Start: 2020-07-19 | End: 2020-09-08 | Stop reason: SDUPTHER

## 2020-07-19 RX ORDER — DOCUSATE SODIUM 100 MG/1
100 CAPSULE, LIQUID FILLED ORAL 2 TIMES DAILY
Status: DISCONTINUED | OUTPATIENT
Start: 2020-07-19 | End: 2020-07-19 | Stop reason: HOSPADM

## 2020-07-19 RX ORDER — DOCUSATE SODIUM 100 MG/1
100 CAPSULE, LIQUID FILLED ORAL 2 TIMES DAILY
Status: DISCONTINUED | OUTPATIENT
Start: 2020-07-19 | End: 2020-07-19

## 2020-07-19 RX ORDER — WARFARIN SODIUM 7.5 MG/1
7.5 TABLET ORAL
Status: DISCONTINUED | OUTPATIENT
Start: 2020-07-19 | End: 2020-07-19

## 2020-07-19 RX ORDER — POLYETHYLENE GLYCOL 3350 17 G/17G
17 POWDER, FOR SOLUTION ORAL ONCE
Status: COMPLETED | OUTPATIENT
Start: 2020-07-19 | End: 2020-07-19

## 2020-07-19 RX ORDER — HYDRALAZINE HYDROCHLORIDE 10 MG/1
10 TABLET, FILM COATED ORAL EVERY 8 HOURS SCHEDULED
Qty: 90 TABLET | Refills: 0 | Status: SHIPPED | OUTPATIENT
Start: 2020-07-19 | End: 2020-08-26 | Stop reason: SDUPTHER

## 2020-07-19 RX ORDER — DOCUSATE SODIUM 100 MG/1
100 CAPSULE, LIQUID FILLED ORAL 2 TIMES DAILY
Qty: 10 CAPSULE | Refills: 0 | Status: SHIPPED | OUTPATIENT
Start: 2020-07-20

## 2020-07-19 RX ORDER — POLYETHYLENE GLYCOL 3350 17 G/17G
17 POWDER, FOR SOLUTION ORAL DAILY PRN
Qty: 14 EACH | Refills: 0 | Status: SHIPPED | OUTPATIENT
Start: 2020-07-19 | End: 2021-05-06 | Stop reason: ALTCHOICE

## 2020-07-19 RX ORDER — OXYCODONE HYDROCHLORIDE 5 MG/1
2.5 TABLET ORAL EVERY 6 HOURS PRN
Qty: 6 TABLET | Refills: 0 | Status: SHIPPED | OUTPATIENT
Start: 2020-07-19 | End: 2020-07-23

## 2020-07-19 RX ORDER — WARFARIN SODIUM 7.5 MG/1
7.5 TABLET ORAL
Status: COMPLETED | OUTPATIENT
Start: 2020-07-19 | End: 2020-07-19

## 2020-07-19 RX ORDER — TORSEMIDE 20 MG/1
TABLET ORAL
Qty: 270 TABLET | Refills: 0 | Status: SHIPPED | OUTPATIENT
Start: 2020-07-19 | End: 2020-12-15 | Stop reason: SDUPTHER

## 2020-07-19 RX ORDER — INSULIN LISPRO 100 [IU]/ML
10 INJECTION, SUSPENSION SUBCUTANEOUS 2 TIMES DAILY WITH MEALS
Start: 2020-07-19 | End: 2020-08-10

## 2020-07-19 RX ORDER — METOPROLOL SUCCINATE 25 MG/1
50 TABLET, EXTENDED RELEASE ORAL 2 TIMES DAILY
Qty: 180 TABLET | Refills: 0 | Status: SHIPPED | OUTPATIENT
Start: 2020-07-19 | End: 2020-10-05 | Stop reason: SDUPTHER

## 2020-07-19 RX ADMIN — INSULIN LISPRO 1 UNITS: 100 INJECTION, SOLUTION INTRAVENOUS; SUBCUTANEOUS at 08:38

## 2020-07-19 RX ADMIN — DOCUSATE SODIUM 100 MG: 100 CAPSULE, LIQUID FILLED ORAL at 15:00

## 2020-07-19 RX ADMIN — METOPROLOL SUCCINATE 50 MG: 50 TABLET, EXTENDED RELEASE ORAL at 05:01

## 2020-07-19 RX ADMIN — INSULIN LISPRO 1 UNITS: 100 INJECTION, SOLUTION INTRAVENOUS; SUBCUTANEOUS at 13:24

## 2020-07-19 RX ADMIN — INSULIN ASPART 10 UNITS: 100 INJECTION, SUSPENSION SUBCUTANEOUS at 08:38

## 2020-07-19 RX ADMIN — TORSEMIDE 40 MG: 20 TABLET ORAL at 10:31

## 2020-07-19 RX ADMIN — POLYETHYLENE GLYCOL 3350 17 G: 17 POWDER, FOR SOLUTION ORAL at 13:31

## 2020-07-19 RX ADMIN — ISOSORBIDE MONONITRATE 30 MG: 30 TABLET, EXTENDED RELEASE ORAL at 10:30

## 2020-07-19 RX ADMIN — HYDRALAZINE HYDROCHLORIDE 10 MG: 10 TABLET, FILM COATED ORAL at 05:01

## 2020-07-19 RX ADMIN — VITAMIN D, TAB 1000IU (100/BT) 2000 UNITS: 25 TAB at 08:39

## 2020-07-19 RX ADMIN — CALCITRIOL 0.5 MCG: 0.25 CAPSULE, LIQUID FILLED ORAL at 08:39

## 2020-07-19 RX ADMIN — HYDRALAZINE HYDROCHLORIDE 10 MG: 10 TABLET, FILM COATED ORAL at 13:24

## 2020-07-19 RX ADMIN — WARFARIN SODIUM 7.5 MG: 7.5 TABLET ORAL at 15:01

## 2020-07-19 NOTE — PLAN OF CARE
Problem: Potential for Falls  Goal: Patient will remain free of falls  Description  INTERVENTIONS:  - Assess patient frequently for physical needs  -  Identify cognitive and physical deficits and behaviors that affect risk of falls  -  [place] fall precautions as indicated by assessment   - Educate patient/family on patient safety including physical limitations  - Instruct patient to call for assistance with activity based on assessment  - Modify environment to reduce risk of injury  - Consider OT/PT consult to assist with strengthening/mobility  Outcome: Progressing     Problem: RESPIRATORY - ADULT  Goal: Achieves optimal ventilation and oxygenation  Description  INTERVENTIONS:  - Assess for changes in respiratory status  - Assess for changes in mentation and behavior  - Position to facilitate oxygenation and minimize respiratory effort  - Oxygen administered by appropriate delivery if ordered  - Initiate smoking cessation education as indicated  - Encourage broncho-pulmonary hygiene including cough, deep breathe, Incentive Spirometry  - Assess the need for suctioning and aspirate as needed  - Assess and instruct to report SOB or any respiratory difficulty  - Respiratory Therapy support as indicated  Outcome: Progressing     Problem: Nutrition/Hydration-ADULT  Goal: Nutrient/Hydration intake appropriate for improving, restoring or maintaining nutritional needs  Description  Monitor and assess patient's nutrition/hydration status for malnutrition  Collaborate with interdisciplinary team and initiate plan and interventions as ordered  Monitor patient's weight and dietary intake as ordered or per policy  Utilize nutrition screening tool and intervene as necessary  Determine patient's food preferences and provide high-protein, high-caloric foods as appropriate       INTERVENTIONS:  - Monitor oral intake, urinary output, labs, and treatment plans  - Assess nutrition and hydration status and recommend course of action  - Evaluate amount of meals eaten  - Assist patient with eating if necessary   - Allow adequate time for meals  - Recommend/ encourage appropriate diets, oral nutritional supplements, and vitamin/mineral supplements  - Order, calculate, and assess calorie counts as needed  - Assess need for intravenous fluids  - Provide specific nutrition/hydration education as appropriate  - Include patient/family/caregiver in decisions related to nutrition   Outcome: Progressing     Problem: CARDIOVASCULAR - ADULT  Goal: Maintains optimal cardiac output and hemodynamic stability  Description  INTERVENTIONS:  - Monitor I/O, vital signs and rhythm  - Monitor for S/S and trends of decreased cardiac output  - Administer and titrate ordered vasoactive medications to optimize hemodynamic stability  - Assess quality of pulses, skin color and temperature  - Assess for signs of decreased coronary artery perfusion  - Instruct patient to report change in severity of symptoms  Outcome: Progressing  Goal: Absence of cardiac dysrhythmias or at baseline rhythm  Description  INTERVENTIONS:  - Continuous cardiac monitoring, vital signs, obtain 12 lead EKG if ordered  - Administer antiarrhythmic and heart rate control medications as ordered  - Monitor electrolytes and administer replacement therapy as ordered  Outcome: Progressing     Problem: PAIN - ADULT  Goal: Verbalizes/displays adequate comfort level or baseline comfort level  Description  Interventions:  - Encourage patient to monitor pain and request assistance  - Assess pain using appropriate pain scale  - Administer analgesics based on type and severity of pain and evaluate response  - Implement non-pharmacological measures as appropriate and evaluate response  - Consider cultural and social influences on pain and pain management  - Notify physician/advanced practitioner if interventions unsuccessful or patient reports new pain  Outcome: Progressing     Problem: INFECTION - ADULT  Goal: Absence or prevention of progression during hospitalization  Description  INTERVENTIONS:  - Assess and monitor for signs and symptoms of infection  - Monitor lab/diagnostic results  - Monitor all insertion sites, i e  indwelling lines, tubes, and drains  - [place] appropriate cooling/warming therapies per order  - Administer medications as ordered  - Instruct and encourage patient and family to use good hand hygiene technique  - Identify and instruct in appropriate isolation precautions for identified infection/condition   Outcome: Progressing     Problem: SAFETY ADULT  Goal: Maintain or return to baseline ADL function  Description  INTERVENTIONS:  -  Assess patient's ability to carry out ADLs; assess patient's baseline for ADL function and identify physical deficits which impact ability to perform ADLs (bathing, care of mouth/teeth, toileting, grooming, dressing, etc )  - Assess/evaluate cause of self-care deficits   - Assess range of motion  - Assess patient's mobility; develop plan if impaired  - Assess patient's need for assistive devices and provide as appropriate  - Encourage maximum independence but intervene and supervise when necessary  - Involve family in performance of ADLs  - Assess for home care needs following discharge   - Consider OT consult to assist with ADL evaluation and planning for discharge  - Provide patient education as appropriate  Outcome: Progressing  Goal: Maintain or return mobility status to optimal level  Description  INTERVENTIONS:  - Assess patient's baseline mobility status (ambulation, transfers, stairs, etc )    - Identify cognitive and physical deficits and behaviors that affect mobility  - Identify mobility aids required to assist with transfers and/or ambulation (gait belt, sit-to-stand, lift, walker, cane, etc )  - [place] fall precautions as indicated by assessment  - Record patient progress and toleration of activity level on Mobility SBAR; progress patient to next Phase/Stage  - Instruct patient to call for assistance with activity based on assessment  - Consider rehabilitation consult to assist with strengthening/weightbearing, etc   Outcome: Progressing     Problem: DISCHARGE PLANNING  Goal: Discharge to home or other facility with appropriate resources  Description  INTERVENTIONS:  - Identify barriers to discharge w/patient and caregiver  - Arrange for needed discharge resources and transportation as appropriate  - Identify discharge learning needs (meds, wound care, etc )  - Arrange for interpretive services to assist at discharge as needed  - Refer to Case Management Department for coordinating discharge planning if the patient needs post-hospital services based on physician/advanced practitioner order or complex needs related to functional status, cognitive ability, or social support system  Outcome: Progressing     Problem: Knowledge Deficit  Goal: Patient/family/caregiver demonstrates understanding of disease process, treatment plan, medications, and discharge instructions  Description  Complete learning assessment and assess knowledge base    Interventions:  - Provide teaching at level of understanding  - Provide teaching via preferred learning methods  Outcome: Progressing

## 2020-07-19 NOTE — DISCHARGE SUMMARY
[facility]    Patient Information: Dominga Kauffman 63 y o  female MRN: [id number]  Unit/Bed#: [address] [place] [address] Encounter: [id number]    Discharging Physician / Practitioner: Neha Walker MD  PCP: Hannah Cronin DO  Admission Date: 7/13/2020  Discharge Date: 07/19/20    Reason for Admission: Shortness of Breath (Pt reports she is nromally SOB on exertion states SOB at rest since friday  ) and Rapid Heart Rate (HR of 160 in triage room pt SOB at rest  )      Discharge Diagnoses:     Principal Problem:    Acute kidney injury superimposed on CKD ([facility] )  Active Problems:    Atrial flutter with rapid ventricular response ([facility] )    Dyspnea    Type 2 diabetes mellitus with complication, with long-term current use of insulin (HCC)    Elevated LFTs    Chronic combined systolic and diastolic congestive heart failure (HCC)    Chronic hypoxemic respiratory failure (HCC)    Benign hypertension with CKD (chronic kidney disease) stage V (HCC)    Elevated TSH    Mixed hyperlipidemia    History of pulmonary embolus (PE)    Sleep apnea    Pulmonary hypertension ([facility] )    Morbid obesity with BMI of 40 0-44 9, adult ([facility] )    ESRD (end stage renal disease) ([facility] )  Resolved Problems:    * No resolved hospital problems  *        Dyspnea  Assessment & Plan  History of chronic shortness of breath but worsening dyspnea at rest since Friday last week  Known history of severe pulmonary hypertension  PA pressure > 90  Patient follows Dr Manisha Ferreira as outpatient  History of suspected PE  Doubt PE as patient is on Coumadin for history of PE  INR supratherapeutic on presentation  Chest x-ray without any acute abnormality  Likely multifactorial secondary to uncontrolled a flutter, obesity, CHF, pulmonary hypertension, history of pulmonary embolism  ABG-7 30, pCO2 37, PO2 102 on 2 L    Saturating adequately on 2 L of supplemental oxygen  Repeat echo with lower EF 42%, grade 2 diastolic dysfunction, dilated RV and IVC, PA pressure 60 mm Hg likely underestimated  Pulmonary following, input appreciated  Symptoms have improved  Ambulating well with physical therapy but noted to have oxygen 88% with activity, requiring 2 L of supplemental oxygen with activity  · Resumed anticoagulation post PD catheter placement, continue Coumadin  · Continue 2L of supplementation oxygen with activity  · Follow-up with Pulmonary after discharge    Atrial flutter with rapid ventricular response ([address] )  Assessment & Plan  New onset a flutter with RVR  EKG in ED showed 2:1 a flutter, rate 155  Received Cardizem 15 mg IV in ED, and subsequently started on Cardizem drip  Metoprolol was resumed  Likely precipitated by hypoxia, pulmonary hypertension, metabolic abnormality  Self converted on day 1st and subsequently remain in sinus rhythm, reverted back to AFib last night  2D echo with EF 86%, grade 2 diastolic dysfunction, dilated RV and IVC  PA pressure 60 mmHg but possible underestimation due to eccentric TR jet  Cardiology following, input appreciated  Converted back to sinus rhythm  · Continue metoprolol XL 50 mg q 12 hours  · Monitor electrolytes  · Recommended compliance with CPAP, patient is agreeable  · Pulmonary following for pulmonary hypertension  · Continue Supplemental oxygen  · Resume Coumadin, 7 5 mg x 1 again today and subsequently will discharge on 5 mg tomorrow, repeat PT INR on 07/21    * Acute kidney injury superimposed on CKD [facility])  Assessment & Plan  PORSHA on CKD 4  Baseline creatinine 3 3-3 8 in past year  Patient follows Dr Kushal Houston as outpatient  On Demadex 40mg p o  Daily in AM and can take additional 20 mg p o  In the afternoon if leg edema or weight gain  Patient states she barely takes additional dose in the afternoon  Creatinine 6 32 on presentation with Anion gap 18, bicarb 19, potassium normal  ABG showed metabolic acidosis  No significant improvement with IV fluid, creatinine remains 6 1    Now deemed ESRD  CPK within normal limit  Nephrology following, input appreciated  Likely disease progression possibly multifactorial secondary to decrease ECV due to pulmonary hypertension,? Anticoagulated related nephropathy, progressive nephrotic range proteinuria  Status post IR guided PD catheter placement on 07/17  Reports mild pain at the site of PD catheter  · Continue Demadex  · Daily weight and I&Os  · Follow-up labs  · Avoid nephrotoxins and hypotension  · Plan to initiate PD next week as outpatient, scheduled for 7/20 at 2:30 p m  At [facility]  · Follow-up with Nephrology after discharge      Results from last 7 days   Lab Units 07/19/20  0506 07/18/20  0546 07/17/20  0454 07/16/20  0557 07/15/20  0615 07/14/20  0549 07/13/20  1317   BUN mg/dL 98* 93* 100* 101* 100* 96* 95*   CREATININE mg/dL 6 45* 5 95* 6 06* 6 10* 6 19* 6 22* 6 32*         Chronic hypoxemic respiratory failure [facility])  Assessment & Plan  Patient reported that she was previously on supplemental oxygen at home but she return did in November 2019  Patient with history of severe CHANDRA, likely associated sleep-related hypoxemia secondary to alveolar hypoventilation  Patient underwent nocturnal pulse oximetry during hospitalization  Seen and followed by Pulmonary, input appreciated  · Encouraged to comply with CPAP as tolerated  Patient agrees to comply  · Follow-up with Pulmonary after discharge    Chronic combined systolic and diastolic congestive heart failure [facility])  Assessment & Plan  Wt Readings from Last 3 Encounters:   07/16/20 98 kg (216 lb 0 8 oz)   06/09/20 94 3 kg (208 lb)   06/05/20 95 6 kg (210 lb 12 8 oz)     2D echo in May this year showed EF 50-55%, no RWMA,G2DD, PA pressure more than 90  No significant valvular disease  Patient reports weighing herself daily at home  No weight gain noted    ProBNP 30,000's in setting of advanced kidney disease  Chest x-ray NAD  Repeat echo with EF 53%, grade 2 diastolic dysfunction, dilated RV and IVC, PA pressure 60 mmHg likely underestimation  Stress test without any evidence of ischemia  Mild edema to lower extremities on exam   · Continue Demadex  · Continue metoprolol,  · Not candidate for ACE-inhibitor/ARB at present  · Continue hydralazine plus isosorbide  · Monitor volume status closely  · Daily weight and I&Os  · Consider ACE-inhibitor/ARB as outpatient after initiation of PD  · Follow-up with Cardiology outpatient        Elevated LFTs  Assessment & Plan  Acute and mild  TB normal   Suspect congestive hepatopathy  Patient denies nausea vomiting abdominal pain  Improving  Resume Lipitor          Type 2 diabetes mellitus with complication, with long-term current use of insulin [facility])  Assessment & Plan  Lab Results   Component Value Date    HGBA1C 6 8 (H) 07/14/2020       Recent Labs     07/18/20  1630 07/18/20  2048 07/19/20  0729 07/19/20  1123   POCGLU 139 190* 160* 159*       Blood Sugar Average: Last 72 hrs:  (P) 162 [id number]     Patient is on Lispro prot 75/25 10 units in the morning and 20 units with dinner, Tradjenta at home  Patient barely takes short acting insulin with lunch  Reports check blood sugar 3 times a day at home  Substitute Lispro prot 75/25 with Novolog 70/30 10 units BID  Appetite fair secondary to abdominal discomfort  Will continue 10 units b i d  At discharge with Tradjenta   follow-up with endocrinology  Diabetic diet    Sleep apnea  Assessment & Plan  Recently started on CPAP  Continue CPAP at   History of pulmonary embolus (PE)  Assessment & Plan  On Coumadin therapy at home  Took Coumadin 8 mg p o   Daily for past month, no recent INR check since May  INR 4 11 presentation, remains with elevated INR subsequently  INR was reversed for PD catheter placement  · No evidence of bleeding  · Resumed Coumadin   · Discussed with Cardiology, no need for bridging given  [id number] - 3  · PT INR in 2 days after discharge      Mixed hyperlipidemia  Assessment & Plan  Continue Lipitor    Elevated TSH  Assessment & Plan  TSH 6 638 normal free T4    Benign hypertension with CKD (chronic kidney disease) stage V ([facility])  Assessment & Plan  BP stable  No orthostasis  Continue metoprolol, hydralazine , Imdur and torsemide      Morbid obesity with BMI of 40 0-44 9, adult ([facility])  Assessment & Plan  Body mass index is 41 08 kg/m²  Diet and life style modification      Consultations During Hospital Stay:  [address] TO PULMONOLOGY  IP CONSULT TO NEPHROLOGY    Procedures Performed:     · Dialysis catheter placement  · Echocardiogram  · Stress test    Significant Findings:     · Refer to hospital course and above listed diagnosis related plan for details    Imaging while in hospital:    Xr Chest 1 View Portable    Result Date: 7/13/2020  Narrative: CHEST INDICATION:   sob  COMPARISON:  7/5/2018  EXAM PERFORMED/VIEWS:  XR CHEST PORTABLE FINDINGS: Cardiac silhouette is stably prominent  The lungs are clear  No pneumothorax or pleural effusion  Osseous structures appear within normal limits for patient age  Impression: No acute cardiopulmonary disease  Workstation performed: [id number]     [facility] Bladder    Result Date: 7/14/2020  Narrative: RENAL ULTRASOUND INDICATION:   Renal insufficiency    COMPARISON: 7/13/2018  TECHNIQUE:   Ultrasound of the retroperitoneum was performed with a curvilinear transducer utilizing volumetric sweeps and still imaging techniques  FINDINGS: KIDNEYS: Symmetric and normal size  Right kidney:  10 4 x 3 3 cm  Left kidney:  8 8 x 3 9 cm  Right kidney Normal echogenicity and contour  No suspicious masses detected  No hydronephrosis  No shadowing calculi  No perinephric fluid collections  Left kidney Normal echogenicity and contour  No suspicious masses detected  No hydronephrosis  No shadowing calculi  No perinephric fluid collections  URETERS: Nonvisualized  BLADDER: Normally distended  No focal thickening or mass lesions   Bilateral ureteral jets detected  Impression: No evidence of obstruction  Workstation performed: [id number]     Ir Other    Result Date: 7/17/2020  Narrative: Peritoneal Dialysis Catheter Placement Preoperative Diagnosis:  Renal failure wishing to perform peritoneal dialysis  Postoperative Diagnosis:  Same  Procedure: 1  Ultrasound guided access of the right-sided peritoneal cavity  2   Injection of contrast into the right side of the peritoneal cavity  3   Fluoroscopically guided placement of a right-sided peritoneal dialysis catheter  4   Peritoneal dialysis catheter check  Surgeon:  Sybil Thrasher Complications:  None immediate  Anesthesia/Medications:  General Anesthesia Operative Note:  The risks, benefits and alternatives of the procedure were explained to the patient, and written informed consent was then obtained  Next, using ultrasound guidance access was gained into the right side of the peritoneal cavity with the  needle seen in the peritoneal cavity by ultrasound followed by placement of contrast and then a wire and then a micropuncture set and then an Amplatz wire Mclean catheter and then an incision was made along the catheter down to the rectus fascia and then a peel-away sheath was placed through which the catheter was placed into the pelvis and the peel-away sheath was removed and the catheter was sutured to the rectus fascia and then tunneled subcutaneously inferior and laterally and then the catheter was checked and flushed and then the pocket was sutured with two layers of 2-0 Vicryl and 3-0 Monocryl sutures followed by Exofin  A sterile dressing was applied  The patient tolerated the procedure well, including conscious sedation with no immediate complications seen  Impression: Impression:  Successful placement of a right-sided peritoneal dialysis catheter with its pigtail in the pelvis  Plan:  May use immediately for urgent start PD    IR clinic visit next week Workstation performed: [id number] Incidental Findings:   · * none    Test Results Pending at Discharge (will require follow up):   · As per After Visit Summary     Outpatient Tests Requested:  · PT/INR in 2 days    Complications:  Refer to hospital course and above listed diagnosis related plan, if any    Hospital Course: Lisa Coulter is a 63 y o  female patient with history of CKD stage 4, hypertension, dyslipidemia, CHF, diabetes mellitus, presumed PE who originally presented to the hospital on 7/13/2020 due to shortness of breath  Patient did have some chest pain 2 days ago which has since resolved  Patient denies any nausea, vomiting but does report some poor appetite  Patient has been drinking lot of fluids  Patient does take Demadex at home  Patient has decreased urine output since yesterday  Patient does have some leg swelling which is much better than previously  In the ED patient was tachycardic and EKG showed atrial flutter  Patient's O2 saturation was high 90s on room air  Workup showed mildly elevated [place] count of [address], PT/INR 39 and 4 1   BUN creatinine were elevated at 95 and 6 32  Patient's LFTs were also mildly elevated  ProBNP was 20025 and anion gap was 18 with bicarbonate level of 19  Patient was subsequently admitted for further evaluation and workup  Please see above list of diagnoses and related plan for additional information         Condition at Discharge: fair     Discharge Day Visit / Exam:     Subjective:  Sitting up in chair  Denies chest pain or shortness of breath  Denies any dizziness  Mild discomfort at PD site  Tolerating diet  Reports constipation but passing flatus    Tolerated CPAP last night    Vitals: Blood Pressure: 130/76 (07/19/20 1137)  Pulse: 76 (07/19/20 1137)  Temperature: 98 1 °F (36 7 °C) (07/19/20 1137)  Temp Source: Tympanic (07/19/20 0400)  Respirations: 12 (07/19/20 1137)  Height: 5' (152 4 cm) (07/19/20 1354)  Weight - Scale: 100 kg (221 lb 5 5 oz) (07/19/20 0546)  SpO2: (!) 88 % (07/19/20 1330) with activity  96% on room air at rest  Exam:   Physical Exam   Constitutional: She is oriented to person, place, and time  She appears well-developed  No distress  HENT:   Head: Normocephalic and atraumatic  Neck: Neck supple  Cardiovascular: Normal rate and regular rhythm  Murmur heard  Pulmonary/Chest: Effort normal and breath sounds normal  No respiratory distress  She has no wheezes  She has no rales  Diminished but clear   Abdominal: Soft  Bowel sounds are normal  She exhibits no distension  There is no tenderness  There is no rebound and no guarding  PD dressing site, C/D/I  Mild local tenderness but otherwise abdomen soft with good bowel sounds   Musculoskeletal: She exhibits edema  Neurological: She is alert and oriented to person, place, and time  No cranial nerve deficit  Skin: Skin is warm and dry  No rash noted  Psychiatric: She has a normal mood and affect  Discharge instructions/Information to patient and family:(Discharge Medications and Follow up):   See after visit summary for information provided to patient and family  Provisions for Follow-Up Care:  See after visit summary for information related to follow-up care and any pertinent home health orders  Disposition: Home , outpatient PT    Planned Readmission:  No     Discharge Statement:  I spent 55 minutes discharging the patient  This time was spent on the day of discharge  I had direct contact with the patient on the day of discharge  Greater than 50% of the total time was spent examining patient, answering all patient questions, arranging and discussing plan of care with patient as well as directly providing post-discharge instructions  Additional time then spent on discharge activities  Coordinated with Cardiology and Nephrology at the time of discharge  Discussed with patient and family at bedside at length regarding treatment plan & follow-up  Brandi Cristobal   Discussed with social Services    Discharge Medications:  See after visit summary for reconciled discharge medications provided to patient and family  ** Please Note: "This note has been constructed using a voice recognition system  Therefore there may be syntax, spelling, and/or grammatical errors   Please call if you have any questions  "**

## 2020-07-19 NOTE — PLAN OF CARE
Problem: Potential for Falls  Goal: Patient will remain free of falls  Description  INTERVENTIONS:  - Assess patient frequently for physical needs  -  Identify cognitive and physical deficits and behaviors that affect risk of falls  -  [place] fall precautions as indicated by assessment   - Educate patient/family on patient safety including physical limitations  - Instruct patient to call for assistance with activity based on assessment  - Modify environment to reduce risk of injury  - Consider OT/PT consult to assist with strengthening/mobility  Outcome: Progressing     Problem: RESPIRATORY - ADULT  Goal: Achieves optimal ventilation and oxygenation  Description  INTERVENTIONS:  - Assess for changes in respiratory status  - Assess for changes in mentation and behavior  - Position to facilitate oxygenation and minimize respiratory effort  - Oxygen administered by appropriate delivery if ordered  - Initiate smoking cessation education as indicated  - Encourage broncho-pulmonary hygiene including cough, deep breathe, Incentive Spirometry  - Assess the need for suctioning and aspirate as needed  - Assess and instruct to report SOB or any respiratory difficulty  - Respiratory Therapy support as indicated  Outcome: Progressing     Problem: Nutrition/Hydration-ADULT  Goal: Nutrient/Hydration intake appropriate for improving, restoring or maintaining nutritional needs  Description  Monitor and assess patient's nutrition/hydration status for malnutrition  Collaborate with interdisciplinary team and initiate plan and interventions as ordered  Monitor patient's weight and dietary intake as ordered or per policy  Utilize nutrition screening tool and intervene as necessary  Determine patient's food preferences and provide high-protein, high-caloric foods as appropriate       INTERVENTIONS:  - Monitor oral intake, urinary output, labs, and treatment plans  - Assess nutrition and hydration status and recommend course of action  - Evaluate amount of meals eaten  - Assist patient with eating if necessary   - Allow adequate time for meals  - Recommend/ encourage appropriate diets, oral nutritional supplements, and vitamin/mineral supplements  - Order, calculate, and assess calorie counts as needed  - Assess need for intravenous fluids  - Provide specific nutrition/hydration education as appropriate  - Include patient/family/caregiver in decisions related to nutrition   Outcome: Progressing     Problem: CARDIOVASCULAR - ADULT  Goal: Maintains optimal cardiac output and hemodynamic stability  Description  INTERVENTIONS:  - Monitor I/O, vital signs and rhythm  - Monitor for S/S and trends of decreased cardiac output  - Administer and titrate ordered vasoactive medications to optimize hemodynamic stability  - Assess quality of pulses, skin color and temperature  - Assess for signs of decreased coronary artery perfusion  - Instruct patient to report change in severity of symptoms  Outcome: Progressing  Goal: Absence of cardiac dysrhythmias or at baseline rhythm  Description  INTERVENTIONS:  - Continuous cardiac monitoring, vital signs, obtain 12 lead EKG if ordered  - Administer antiarrhythmic and heart rate control medications as ordered  - Monitor electrolytes and administer replacement therapy as ordered  Outcome: Progressing     Problem: PAIN - ADULT  Goal: Verbalizes/displays adequate comfort level or baseline comfort level  Description  Interventions:  - Encourage patient to monitor pain and request assistance  - Assess pain using appropriate pain scale  - Administer analgesics based on type and severity of pain and evaluate response  - Implement non-pharmacological measures as appropriate and evaluate response  - Consider cultural and social influences on pain and pain management  - Notify physician/advanced practitioner if interventions unsuccessful or patient reports new pain  Outcome: Progressing     Problem: INFECTION - ADULT  Goal: Absence or prevention of progression during hospitalization  Description  INTERVENTIONS:  - Assess and monitor for signs and symptoms of infection  - Monitor lab/diagnostic results  - Monitor all insertion sites, i e  indwelling lines, tubes, and drains  - [place] appropriate cooling/warming therapies per order  - Administer medications as ordered  - Instruct and encourage patient and family to use good hand hygiene technique  - Identify and instruct in appropriate isolation precautions for identified infection/condition   Outcome: Progressing     Problem: SAFETY ADULT  Goal: Maintain or return to baseline ADL function  Description  INTERVENTIONS:  -  Assess patient's ability to carry out ADLs; assess patient's baseline for ADL function and identify physical deficits which impact ability to perform ADLs (bathing, care of mouth/teeth, toileting, grooming, dressing, etc )  - Assess/evaluate cause of self-care deficits   - Assess range of motion  - Assess patient's mobility; develop plan if impaired  - Assess patient's need for assistive devices and provide as appropriate  - Encourage maximum independence but intervene and supervise when necessary  - Involve family in performance of ADLs  - Assess for home care needs following discharge   - Consider OT consult to assist with ADL evaluation and planning for discharge  - Provide patient education as appropriate  Outcome: Progressing  Goal: Maintain or return mobility status to optimal level  Description  INTERVENTIONS:  - Assess patient's baseline mobility status (ambulation, transfers, stairs, etc )    - Identify cognitive and physical deficits and behaviors that affect mobility  - Identify mobility aids required to assist with transfers and/or ambulation (gait belt, sit-to-stand, lift, walker, cane, etc )  - [place] fall precautions as indicated by assessment  - Record patient progress and toleration of activity level on Mobility SBAR; progress patient to next Phase/Stage  - Instruct patient to call for assistance with activity based on assessment  - Consider rehabilitation consult to assist with strengthening/weightbearing, etc   Outcome: Progressing     Problem: DISCHARGE PLANNING  Goal: Discharge to home or other facility with appropriate resources  Description  INTERVENTIONS:  - Identify barriers to discharge w/patient and caregiver  - Arrange for needed discharge resources and transportation as appropriate  - Identify discharge learning needs (meds, wound care, etc )  - Arrange for interpretive services to assist at discharge as needed  - Refer to Case Management Department for coordinating discharge planning if the patient needs post-hospital services based on physician/advanced practitioner order or complex needs related to functional status, cognitive ability, or social support system  Outcome: Progressing     Problem: Knowledge Deficit  Goal: Patient/family/caregiver demonstrates understanding of disease process, treatment plan, medications, and discharge instructions  Description  Complete learning assessment and assess knowledge base    Interventions:  - Provide teaching at level of understanding  - Provide teaching via preferred learning methods  Outcome: Progressing

## 2020-07-19 NOTE — PROGRESS NOTES
Progress Note - Cardiology   Damion Age 63 y o  female MRN: [id number]  Unit/Bed#: [address] [place] [address] Encounter: [id number]    Assessment:  Paroxysmal atrial fibrillation  Acute on CKD  Nonischemic cardiomyopathy  Hypertension  Severe pulmonary hypertension  CHANDRA    Plan:  -heart rate is well controlled at current dose of Toprol-XL  Continue Toprol-XL at 50 mg p o  B i d  Avoid calcium channel blockers for rate control in the setting of LV dysfunction  -keep K above 4, Mag above 2  -pain control per primary team; see had the PD catheter placed  -[id number] is at least 3, restart Coumadin when feasible  -rest per clinical course    Subjective/Objective   Patient seen and examined    Her heart rate is well controlled today    Vitals: /76   Pulse 76   Temp 98 1 °F (36 7 °C)   Resp 12   Wt 100 kg (221 lb 5 5 oz)   SpO2 96%   BMI 43 23 kg/m²   Vitals:    07/16/20 0600 07/19/20 0546   Weight: 98 kg (216 lb 0 8 oz) 100 kg (221 lb 5 5 oz)     Orthostatic Blood Pressures      Most Recent Value   Blood Pressure  130/76 filed at 07/19/2020 1137   Patient Position - Orthostatic VS  Standing for 3 minutes - Orthostatic VS filed at 07/19/2020 1025            Intake/Output Summary (Last 24 hours) at 7/19/2020 1301  Last data filed at 7/18/2020 1730  Gross per 24 hour   Intake 240 ml   Output    Net 240 ml       Invasive Devices     Peripheral Intravenous Line            Peripheral IV 07/16/20 Right Hand 3 days                Review of Systems:  Negative except above    Physical Exam: /76   Pulse 76   Temp 98 1 °F (36 7 °C)   Resp 12   Wt 100 kg (221 lb 5 5 oz)   SpO2 96%   BMI 43 23 kg/m²   General appearance: alert and oriented, in no acute distress  Neck: no JVD  Lungs: diminished breath sounds  Heart: irregularly irregular rhythm  Abdomen: soft, non-tender; bowel sounds normal; no masses,  no organomegaly  Extremities: extremities normal, warm and well-perfused; no cyanosis, clubbing, or edema  Skin: Skin color, texture, turgor normal  No rashes or lesions  Neurologic: Grossly normal    Lab Results:   I have personally reviewed pertinent lab results  CBC with diff:   Results from last 7 days   Lab Units 07/19/20  0506   WBC Thousand/uL 10 06   RBC Million/uL 3 88   HEMOGLOBIN g/dL 10 5*   HEMATOCRIT % 34 3*   MCV fL 88   MCH pg 27 1   MCHC g/dL 30 6*   RDW % 18 7*   MPV fL 11 4   PLATELETS Thousands/uL 208     CMP:   Results from last 7 days   Lab Units 07/19/20  0506  07/17/20  0454   SODIUM mmol/L 133*   < > 138   POTASSIUM mmol/L 4 2   < > 3 7   CHLORIDE mmol/L 100   < > 102   CO2 mmol/L 23   < > 23   BUN mg/dL 98*   < > 100*   CREATININE mg/dL 6 45*   < > 6 06*   CALCIUM mg/dL 8 0*   < > 7 9*   AST U/L  --   --  27   ALT U/L  --   --  80*   ALK PHOS U/L  --   --  146*   EGFR ml/min/1 73sq m 6   < > 7    < > = values in this interval not displayed  Troponin:   0   Lab Value Date/Time    TROPONINI 0 02 07/13/2020 1725    TROPONINI 0 03 07/13/2020 1317    TROPONINI <0 02 07/05/2018 2225    TROPONINI <0 02 07/05/2018 1916     BNP:   Results from last 7 days   Lab Units 07/19/20  0506   POTASSIUM mmol/L 4 2   CHLORIDE mmol/L 100   CO2 mmol/L 23   BUN mg/dL 98*   CREATININE mg/dL 6 45*   CALCIUM mg/dL 8 0*   EGFR ml/min/1 73sq m 6     Coags:   Results from last 7 days   Lab Units 07/19/20  0506  07/13/20  1400   PTT seconds  --   --  45*   INR  1 59*   < > 4 11*    < > = values in this interval not displayed  TSH:   Results from last 7 days   Lab Units 07/13/20  1317   TSH 3RD GENERATON uIU/mL 6 638*     Magnesium:   Results from last 7 days   Lab Units 07/19/20  0506   MAGNESIUM mg/dL 2 1     Lipid Profile:     Imaging: I have personally reviewed pertinent reports  EKG:  Atrial fibrillation  VTE Pharmacologic Prophylaxis: Sequential compression device (Venodyne)   VTE Mechanical Prophylaxis: sequential compression device    Counseling / Coordination of Care  Total time spent today 20 minutes   Greater than 50% of total time was spent with the patient and / or family counseling and / or coordination of care

## 2020-07-19 NOTE — SOCIAL WORK
Notified by Dr Jun Chris that pt will be discharging home today with outpatient therapy and has qualified for home oxygen  JOSE LUIS contacted [address] answering service to request fax number so additional clinical could be faxed for processing  Fax number obtained ([phone]) and nursing supervisor assisted in faxing clinical   SW followed answering service to confirm receipt of information  Was received, answering service contacted on-call service to determine delivery time  Message left, waiting for call from [address] on-call  Will follow

## 2020-07-19 NOTE — SOCIAL WORK
Call received from [facility] on-call representative, Ms Liv Kim  Provided information needed to deliver pt's oxygen  Explained that pt chose to discharge home prior to delivery of portable tank so delivery just needs to go to the home  Ms Liv Kim said she will contact pt to arrange delivery

## 2020-07-19 NOTE — PHYSICAL THERAPY NOTE
PT EVALUATION       07/19/20 0825   Note Type   Note type Eval/Treat   Pain Assessment   Pain Assessment Tool Pain Assessment not indicated - pt denies pain   Home Living   Type of [address] Two level  (3 [address])   Home Equipment   (pt's mom has a cane and walker that pt can use)   Prior Function   Level of [place] Independent with ADLs and functional mobility; Pt reports generalized weakness and SOB over the past few months  Lives With   (mom, daughter, grandkids)   ADL Assistance Independent   Vocational   (works from home for [address])   Restrictions/Precautions   Other Precautions   (pt will be starting peritoneal dialysis for acute on chronic kidney disease)   Cognition   Overall Cognitive Status WFL   RLE Assessment   RLE Assessment WFL   LLE Assessment   LLE Assessment WFL   Transfers   Sit to Stand 5  Supervision   Stand to Sit 5  Supervision   Stand pivot 5  Supervision   Ambulation/Elevation   Gait pattern   (wide ANNIKA, increased lateral trunk lean)   Gait Assistance 5  Supervision   Assistive Device   (none)   Distance 100 feet   Balance   Static Sitting Fair +   Dynamic Sitting Fair   Static Standing Fair   Dynamic Standing Fair -   Ambulatory Fair   Endurance Deficit   Endurance Deficit   (Sp02 88-89% on RA when walking)   Activity Tolerance   Activity Tolerance Patient limited by fatigue   Assessment   Prognosis Good   Problem List Decreased strength;Decreased endurance; Impaired balance;Decreased mobility   Assessment Patient seen for Physical Therapy evaluation  Patient admitted with Acute kidney injury superimposed on CKD ([facility] [address] )  Comorbidities affecting patient's physical performance include: DM, ESRD, HD, obesity, CHF  Personal factors affecting patient at time of initial evaluation include: lives in 2 story house, inability to ambulate household distances and inability to perform dynamic tasks in community   Prior to admission, patient was independent with functional mobility without assistive device and independent with ADLS  Please find objective findings from Physical Therapy assessment regarding body systems outlined above with impairments and limitations including weakness, impaired balance, decreased endurance, gait deviations, decreased activity tolerance, decreased functional mobility tolerance and SOB upon exertion  The Barthel Index was used as a functional outcome tool presenting with a score of 80 today indicating moderate limitations of functional mobility and ADLS  Patient's clinical presentation is currently evolving as seen in patient's presentation of vital sign response, new onset of impairment of functional mobility, decreased endurance and new onset of weakness  Pt would benefit from continued Physical Therapy treatment to address deficits as defined above and maximize level of functional mobility  As demonstrated by objective findings, the assigned level of complexity for this evaluation is moderate  Goals   Patient Goals "go home"   STG Expiration Date 07/26/20   Short Term Goal #1 independent bed mobility, independent transfers, independent ambulation indoor surfaces 100 feet without SOB, independent up and down 10 steps with minimal SOB so pt can get to her bedroom   LTG Expiration Date 08/02/20   Long Term Goal #1 independent ambulation outdoor surfaces 200 feet without SOB    Plan   Treatment/Interventions ADL retraining;Functional transfer training;LE strengthening/ROM; Elevations; Therapeutic exercise; Endurance training;Bed mobility;Gait training;Equipment eval/education;Patient/family training   PT Frequency 5x/wk   Recommendation   PT Discharge Recommendation Other (Comment)  (OP PT)   Equipment Recommended   (pt has a walker and cane if needed)   Barthel Index   Feeding 10   Bathing 0   Grooming Score 5   Dressing Score 5   Bladder Score 10   Bowels Score 10   Toilet Use Score 10   Transfers (Bed/Chair) Score 15   Mobility (Level Surface) Score 10 Stairs Score 5   Barthel Index Score [facility] [facility]   Licensure   [place] License Number  Issac Lunsford PT [id number]       Time In:1345  Time XGE:3753  Total Time: 10      S:  "I want to go home"  O:  Supervision up and down 3 steps with rail with standing rest break before walking 150 feet with supervision  A:  Gait is slow, generally steady  Pt fatigued after activity    Recommended to pt and MD OP PT  P:  Continue PT    Jerry Pulido, PT

## 2020-07-19 NOTE — PROGRESS NOTES
Pt explained the need for supplemental oxygen   Daughter eager to leave  Patient decided she will wait for oxygen delivery at home  Reinforced the need to avoid activity until oxygen arrives  Patient agrees  Iv removed  Telemetry box removed  Discharge instructions reviewed and understood by patient  Left in stable condition by wheelchair accompanied by nurse and daughter  Juan Serrano

## 2020-07-19 NOTE — DISCHARGE INSTR - AVS FIRST PAGE
Take 5 mg of Coumadin on 07/20, repeat PT INR on 07/21 and follow-up with PCP office for further dosing

## 2020-07-19 NOTE — RESPIRATORY THERAPY NOTE
Home Oxygen Qualifying Test       Patient name: Bita García        : 1955   Date of Test:  2020  Diagnosis:      Home Oxygen Test:    **Medicare Guidelines require item(s) 1-5 on all ambulatory patients or 1 and 2 on non-ambulatory patients  1   Baseline SPO2 on Room Air at rest 94 %  2   SPO2 during exercise on Room Air 88 %  During exercise monitor SpO2  If SPO2 increases >=89% with ambulation do not add supplemental             oxygen  If <= 88% on room air add O2 via NC and titrate patient  Patient must be ambulated with O2 and titrated to > 88% with exertion  3   SPO2 on Oxygen at rest 96 % 2 lpm     4   SPO2 during exercise on Oxygen  94% a liter flow of 2 lpm     5   Exercise performed:          walking, stairs, duration 6 (min)          [x]  Supplemental Home Oxygen is indicated  []  Client does not qualify for home oxygen        Respiratory Additional Notes-     Grisel Sheriff, RT

## 2020-07-19 NOTE — PROGRESS NOTES
[address] [facility] NOTE   Anayeli Reynoso 63 y o  female MRN: [id number]  Unit/Bed#: [address] [place] [address] Encounter: [id number]  Reason for Consult: PORSHA, CKD    ASSESSMENT and PLAN:  1  PORSHA (POA):  · Admission creatinine of 6 32 on 7/13/20  · No significant improvement despite IVF  · Felt to be more due to progression renal disease  · Creatinine has been in the range of 5 95-6 45 this hospital stay  · She has been deemed ESRD  · PD catheter was placed on July 17, 2020  · She is scheduled to start urgent PD at [facility] on July 20, 2020 at 2:30 p m  2  CKD IV:  · Baseline creatinine in the mid to high 3s and has been worsening as an outpatient  · Follows with Dr Marielena Bearden    · Etiology felt to be diabetic nephropathy  3  CHF:  · Echo this admission showed drop in EF to 35%  · Continue Torsemide 40 mg daily  4  HTN:  · BP is controlled on Metoprolol succ 50 mg BID, Hydralazine 10 mg TID and Imdur  5  PAF: Now back in SR    6  Pulm HTN: on anticoagulation  7  Anemia  8  Nephrotic range proteinuria  9  MBD: continue Calcitriol 0 5 mcg daily  DISPOSITION:  · Stable for discharge from renal standpoint  · She knows to report to [facility] on 7/20/20 at 2:30 pm to start PD  The above plan was discussed with Dr Cheryl Edwards / INTERVAL HISTORY:  Converted back to SR earlier this AM    No new acute issues       OBJECTIVE:  Current Weight: Weight - Scale: 100 kg (221 lb 5 5 oz)  Vitals:    07/19/20 0400 07/19/20 0546 07/19/20 0721 07/19/20 0835   BP: 110/73   107/61   BP Location: Right arm      Pulse: 65  71 65   Resp: 20  16    Temp: 97 8 °F (36 6 °C)   97 5 °F (36 4 °C)   TempSrc: Tympanic      SpO2:   95% 92%   Weight:  100 kg (221 lb 5 5 oz)         Intake/Output Summary (Last 24 hours) at 7/19/2020 0951  Last data filed at 7/18/2020 1730  Gross per 24 hour   Intake 540 ml   Output    Net 540 ml     General: conscious, cooperative, no distress  Skin: dry  Eyes: pink conjunctivae  ENT: moist mucous membranes  Chest/Lungs: equal chest expansion, clear breath sounds  CVS: distinct heart sounds, normal rate, regular rhythm, no rub  Abdomen: soft, non tender, non distended, normal bowel sounds  Extremities: (+) leg edema  : no west catheter  Neuro: awake, alert     Psych: appropriate affect    Medications:    Current Facility-Administered Medications:     acetaminophen (TYLENOL) tablet 650 mg, 650 mg, Oral, Q6H PRN, Ryland Carrillo PA-C, 650 mg at 07/18/20 2014    calcitriol (ROCALTROL) capsule 0 5 mcg, 0 5 mcg, Oral, Daily, MICHAEL Padron, 0 5 mcg at 07/19/20 0839    cholecalciferol (VITAMIN D3) tablet 2,000 Units, 2,000 Units, Oral, Every Other Day, MICHAEL Padron, 2,000 Units at 07/19/20 0839    hydrALAZINE (APRESOLINE) tablet 10 mg, 10 mg, Oral, Q8H [address], Henry Pollard MD, 10 mg at 07/19/20 0501    insulin aspart protamine-insulin aspart (NovoLOG 70/30) 100 units/mL subcutaneous injection 10 Units, 10 Units, Subcutaneous, Daily Before Breakfast, Wandy Alcantara MD, 10 Units at 07/19/20 0838    insulin aspart protamine-insulin aspart (NovoLOG 70/30) 100 units/mL subcutaneous injection 14 Units, 14 Units, Subcutaneous, Before Dinner, Wandy Alcantara MD    insulin lispro (HumaLOG) 100 units/mL subcutaneous injection 1-5 Units, 1-5 Units, Subcutaneous, TID AC, 1 Units at 07/19/20 0838 **AND** Fingerstick Glucose (POCT), , , TID AC, MICHAEL Padron    insulin lispro (HumaLOG) 100 units/mL subcutaneous injection 1-5 Units, 1-5 Units, Subcutaneous, HS, MICHAEL Padron, 1 Units at 07/18/20 2115    isosorbide mononitrate (IMDUR) 24 hr tablet 30 mg, 30 mg, Oral, Daily, Henry Pollard MD, Stopped at 07/19/20 0839    levalbuterol (XOPENEX) inhalation solution 0 63 mg, 0 63 mg, Nebulization, Q4H PRN, MICHAEL Padron    metoprolol (LOPRESSOR) injection 5 mg, 5 mg, Intravenous, Q6H PRN, Naila Short MD, 5 mg at 07/18/20 2014    metoprolol succinate (TOPROL-XL) 24 hr tablet 50 mg, 50 mg, Oral, BID, Dharmesh Rendon MD, 50 mg at 07/19/20 0501    ondansetron (ZOFRAN) injection 4 mg, 4 mg, Intravenous, Q6H PRN, MICHAEL Padron    ondansetron (ZOFRAN) injection 4 mg, 4 mg, Intravenous, Once PRN, Alfred Weir MD    oxyCODONE (ROXICODONE) IR tablet 2 5 mg, 2 5 mg, Oral, Q6H PRN, MICHAEL Bansal, 2 5 mg at 07/17/20 2018    torsemide (DEMADEX) tablet 40 mg, 40 mg, Oral, Daily, Christianne Vale MD, Stopped at 07/19/20 7579    Laboratory Results:  Results from last 7 days   Lab Units 07/19/20  0506 07/18/20  0546 07/17/20  0454 07/16/20  0557 07/15/20  0615 07/14/20  0549 07/13/20  1317   WBC Thousand/uL 10 06 10 81* 9 00 9 29 9 66 10 74* 10 62*   HEMOGLOBIN g/dL 10 5* 10 9* 11 2* 11 8 11 4* 11 9 12 5   HEMATOCRIT % 34 3* 36 0 35 7 37 4 36 8 38 3 39 5   PLATELETS Thousands/uL 208 223 211 223 214 236 278   POTASSIUM mmol/L 4 2 4 6 3 7 4 3 4 0 4 0 4 6   CHLORIDE mmol/L 100 102 102 99* 98* 100 97*   CO2 mmol/L 23 26 23 22 20* 19* 19*   BUN mg/dL 98* 93* 100* 101* 100* 96* 95*   CREATININE mg/dL 6 45* 5 95* 6 06* 6 10* 6 19* 6 22* 6 32*   CALCIUM mg/dL 8 0* 8 0* 7 9* 8 2* 8 1* 8 1* 8 5   MAGNESIUM mg/dL 2 1 2 2  --   --   --  2 4 2 3   PHOSPHORUS mg/dL  --   --  6 0* 6 4* 6 3*  --   --

## 2020-07-20 ENCOUNTER — TRANSITIONAL CARE MANAGEMENT (OUTPATIENT)
Dept: FAMILY MEDICINE CLINIC | Facility: CLINIC | Age: 65
End: 2020-07-20

## 2020-07-20 ENCOUNTER — TELEPHONE (OUTPATIENT)
Dept: VASCULAR SURGERY | Facility: CLINIC | Age: 65
End: 2020-07-20

## 2020-07-20 ENCOUNTER — TELEMEDICINE (OUTPATIENT)
Dept: VASCULAR SURGERY | Facility: CLINIC | Age: 65
End: 2020-07-20
Payer: COMMERCIAL

## 2020-07-20 ENCOUNTER — ANTICOAG VISIT (OUTPATIENT)
Dept: FAMILY MEDICINE CLINIC | Facility: CLINIC | Age: 65
End: 2020-07-20

## 2020-07-20 DIAGNOSIS — Z71.89 COMPLEX CARE COORDINATION: Primary | ICD-10-CM

## 2020-07-20 DIAGNOSIS — Z99.2 PERITONEAL DIALYSIS CATHETER IN PLACE (HCC): Primary | ICD-10-CM

## 2020-07-20 LAB
ATRIAL RATE: 115 BPM
ATRIAL RATE: 74 BPM
P AXIS: 54 DEGREES
PR INTERVAL: 158 MS
QRS AXIS: -29 DEGREES
QRS AXIS: -34 DEGREES
QRSD INTERVAL: 86 MS
QRSD INTERVAL: 92 MS
QT INTERVAL: 304 MS
QT INTERVAL: 414 MS
QTC INTERVAL: 409 MS
QTC INTERVAL: 459 MS
T WAVE AXIS: -11 DEGREES
T WAVE AXIS: 13 DEGREES
VENTRICULAR RATE: 109 BPM
VENTRICULAR RATE: 74 BPM

## 2020-07-20 PROCEDURE — 93010 ELECTROCARDIOGRAM REPORT: CPT | Performed by: INTERNAL MEDICINE

## 2020-07-20 PROCEDURE — 99214 OFFICE O/P EST MOD 30 MIN: CPT | Performed by: RADIOLOGY

## 2020-07-20 PROCEDURE — 1036F TOBACCO NON-USER: CPT | Performed by: RADIOLOGY

## 2020-07-20 PROCEDURE — 3066F NEPHROPATHY DOC TX: CPT | Performed by: RADIOLOGY

## 2020-07-20 PROCEDURE — 1111F DSCHRG MED/CURRENT MED MERGE: CPT | Performed by: RADIOLOGY

## 2020-07-20 PROCEDURE — 3044F HG A1C LEVEL LT 7.0%: CPT | Performed by: RADIOLOGY

## 2020-07-20 NOTE — PROGRESS NOTES
Virtual Regular Visit    Assessment/Plan:  Peritoneal Dialysis Catheter Placed 3 days ago  Patient doing well  PD catheter to be accessed tomorrow  Will call patient tomorrow afternoon to follow-up    Problem List Items Addressed This Visit     None               Reason for visit is   Chief Complaint   Patient presents with    Virtual Regular Visit        Encounter provider Sasha Austin MD    Provider located at [address] [address]  [phone]      Recent Visits  No visits were found meeting these conditions  Showing recent visits within past 7 days and meeting all other requirements     Today's Visits  Date Type Provider Dept   07/20/20 Telephone [facility]i today's visits and meeting all other requirements     Future Appointments  Date Type Provider Dept   07/20/20 Telephone [facility]   Showing future appointments within next 150 days and meeting all other requirements        The patient was identified by name and date of birth  Corrinne Lints was informed that this is a telemedicine visit and that the visit is being conducted through [employer] and patient was informed that this is not a secure, HIPAA-complaint platform  She agrees to proceed     My office door was closed  No one else was in the room  She acknowledged consent and understanding of privacy and security of the video platform  The patient has agreed to participate and understands they can discontinue the visit at any time  Patient is aware this is a billable service  Subjective  Corrinne Lints is a 63 y o  female who underwent peritoneal dialysis catheter placement 3 days ago and is now home doing well  She has no complaints  She has mild pain and is getting her perscription for oxycodone today  She is going for teaching today and will begin dialysis tomorrow    I will follow-up with her tomorrow      HPI     Past Medical History:   Diagnosis Date    Acute asthmatic bronchitis     last assessed: 2017    Cardiac disease     Chronic diastolic (congestive) heart failure (HCC)     Colon polyp     Diabetes mellitus ([facility] [address] )     Hyperlipidemia     Hypertension     Medical non-compliance     Pneumonia     last assessed: 2013    Pulmonary embolism ([facility] )     Renal disorder     possible clot noted in kidney, thus blood thinners currently    Severe obstructive sleep apnea     Severe pulmonary arterial systolic hypertension (HCC)        Past Surgical History:   Procedure Laterality Date     SECTION       x 1    NOSE SURGERY      fractured nose - septoplasty       Current Outpatient Medications   Medication Sig Dispense Refill    albuterol (PROAIR HFA) 90 mcg/act inhaler Inhale 2 puffs every 4 (four) hours as needed for wheezing or shortness of breath 1 Inhaler 0    atorvastatin (LIPITOR) 20 mg tablet Take 1 tablet (20 mg total) by mouth daily (Patient taking differently: Take 20 mg by mouth every other day ) 90 tablet 1    B Complex-C (B-COMPLEX WITH VITAMIN C) tablet Take 1 tablet by mouth daily      Blood Glucose Monitoring Suppl (ONE TOUCH ULTRA 2) w/Device KIT Test glucose 3 times daily 1 each 0    calcitriol (ROCALTROL) 0 5 MCG capsule Take 1 capsule (0 5 mcg total) by mouth daily 90 capsule 3    cholecalciferol 2000 units TABS Take 1 tablet (2,000 Units total) by mouth daily 30 tablet 0    docusate sodium (COLACE) 100 mg capsule Take 1 capsule (100 mg total) by mouth 2 (two) times a day 10 capsule 0    hydrALAZINE (APRESOLINE) 10 mg tablet Take 1 tablet (10 mg total) by mouth every 8 (eight) hours 90 tablet 0    Insulin Lispro Prot & Lispro (HumaLOG Mix 75/25 KwikPen) (75-25) 100 units/mL injection pen Inject 10 Units under the skin 2 (two) times a day with meals      isosorbide mononitrate (IMDUR) 30 mg 24 hr tablet Take 1 tablet (30 mg total) by mouth daily 30 tablet 0    linaGLIPtin (TRADJENTA) 5 MG TABS Take 5 mg by mouth daily 90 tablet 3    metoprolol succinate (TOPROL-XL) 25 mg 24 hr tablet Take 2 tablets (50 mg total) by mouth 2 (two) times a day 180 tablet 0    MULTIPLE VITAMIN PO Take 1 tablet by mouth daily      ONE TOUCH ULTRA TEST test strip TEST GLUCOSE THREE TIMES A  each 3    oxyCODONE (ROXICODONE) 5 mg immediate release tablet Take 0 5 tablets (2 5 mg total) by mouth every 6 (six) hours as needed for severe pain for up to 3 daysMax Daily Amount: 10 mg 6 tablet 0    polyethylene glycol (MIRALAX) 17 g packet Take 17 g by mouth daily as needed (Constipation) 14 each 0    torsemide (DEMADEX) 20 mg tablet TAKE 2 TABLETS DAILY IN THE MORNING AND TAKE AN ADDITIONAL TABLET IN THE EVENING FOR WEIGHT GAIN OR WORSENING SWELLING 270 tablet 0    warfarin (COUMADIN) 5 mg tablet Take 5 milligrams on 7/20/2020 and get PT/INR on July 21, 2020       No current facility-administered medications for this visit  No Known Allergies    Review of Systems    Video Exam    There were no vitals filed for this visit  Physical Exam     I spent 25 minutes with patient today in which greater than 50% of the time was spent in counseling/coordination of care regarding her peritoneal dialysis catheter placement  VIRTUAL VISIT DISCLAIMER    Jovanny Garcia acknowledges that she has consented to an online visit or consultation  She understands that the online visit is based solely on information provided by her, and that, in the absence of a face-to-face physical evaluation by the physician, the diagnosis she receives is both limited and provisional in terms of accuracy and completeness  This is not intended to replace a full medical face-to-face evaluation by the physician  Jovanny Garcia understands and accepts these terms

## 2020-07-20 NOTE — TELEPHONE ENCOUNTER
Rec'd below email  I called pt and had to leave a message asking the pt to return my call  I let Dr Zelalem Kasper know this info  From: Shahzad Calles   Sent: Friday, July 17, 2020 6:29 PM  To: Pranav Mason  Subject: can you schedule this? Can you schedule this patient for Monday virtual   I finished Elena Cousin visit      Andrew Alvarez  Female, 63 y o , 1955  MRN:   [id number]

## 2020-07-20 NOTE — LETTER
July 20, 2020     Mary Dee, [address] [facility]  [address] [address]    Patient: Andrew Alvarez   YOB: 1955   Date of Visit: 7/20/2020       Dear Dr Tk De Los Santos: Thank you for referring Andrew Alvarez to me for evaluation  Below are my notes for this consultation  If you have questions, please do not hesitate to call me  I look forward to following your patient along with you  Sincerely,        Kandice Mosqueda MD        CC: DO Kandice Mchugh MD  7/20/2020  2:28 PM  Sign at close encounter    Virtual Regular Visit    Assessment/Plan:  Peritoneal Dialysis Catheter Placed 3 days ago  Patient doing well  PD catheter to be accessed tomorrow  Will call patient tomorrow afternoon to follow-up    Problem List Items Addressed This Visit     None               Reason for visit is   Chief Complaint   Patient presents with    Virtual Regular Visit        Encounter provider Kandice Mosqueda MD    Provider located at [address] [address] [phone]      Recent Visits  No visits were found meeting these conditions  Showing recent visits within past 7 days and meeting all other requirements     Today's Visits  Date Type Provider Dept   07[facility]20 [facility]'s visits and meeting all other requirements     Future Appointments  Date Type Provider Dept   07/20/20 Telephone Linda Burdick   Showing future appointments within next 150 days and meeting all other requirements        The patient was identified by name and date of birth  Andrew Alvarez was informed that this is a telemedicine visit and that the visit is being conducted through [employer] and patient was informed that this is not a secure, HIPAA-complaint platform  She agrees to proceed     My office door was closed  No one else was in the room    She acknowledged consent and understanding of privacy and security of the video platform  The patient has agreed to participate and understands they can discontinue the visit at any time  Patient is aware this is a billable service  Subjective  Vida Blackwell is a 63 y o  female who underwent peritoneal dialysis catheter placement 3 days ago and is now home doing well  She has no complaints  She has mild pain and is getting her perscription for oxycodone today  She is going for teaching today and will begin dialysis tomorrow  I will follow-up with her tomorrow      HPI     Past Medical History:   Diagnosis Date    Acute asthmatic bronchitis     last assessed: 2017    Cardiac disease     Chronic diastolic (congestive) heart failure (HCC)     Colon polyp     Diabetes mellitus ([facility] )     Hyperlipidemia     Hypertension     Medical non-compliance     Pneumonia     last assessed: 2013    Pulmonary embolism ([facility] )     Renal disorder     possible clot noted in kidney, thus blood thinners currently    Severe obstructive sleep apnea     Severe pulmonary arterial systolic hypertension (HCC)        Past Surgical History:   Procedure Laterality Date     SECTION       x 1    NOSE SURGERY      fractured nose - septoplasty       Current Outpatient Medications   Medication Sig Dispense Refill    albuterol (PROAIR HFA) 90 mcg/act inhaler Inhale 2 puffs every 4 (four) hours as needed for wheezing or shortness of breath 1 Inhaler 0    atorvastatin (LIPITOR) 20 mg tablet Take 1 tablet (20 mg total) by mouth daily (Patient taking differently: Take 20 mg by mouth every other day ) 90 tablet 1    B Complex-C (B-COMPLEX WITH VITAMIN C) tablet Take 1 tablet by mouth daily      Blood Glucose Monitoring Suppl (ONE TOUCH ULTRA 2) w/Device KIT Test glucose 3 times daily 1 each 0    calcitriol (ROCALTROL) 0 5 MCG capsule Take 1 capsule (0 5 mcg total) by mouth daily 90 capsule 3    cholecalciferol 2000 units TABS Take 1 tablet (2,000 Units total) by mouth daily 30 tablet 0    docusate sodium (COLACE) 100 mg capsule Take 1 capsule (100 mg total) by mouth 2 (two) times a day 10 capsule 0    hydrALAZINE (APRESOLINE) 10 mg tablet Take 1 tablet (10 mg total) by mouth every 8 (eight) hours 90 tablet 0    Insulin Lispro Prot & Lispro (HumaLOG Mix 75/25 KwikPen) (75-25) 100 units/mL injection pen Inject 10 Units under the skin 2 (two) times a day with meals      isosorbide mononitrate (IMDUR) 30 mg 24 hr tablet Take 1 tablet (30 mg total) by mouth daily 30 tablet 0    linaGLIPtin (TRADJENTA) 5 MG TABS Take 5 mg by mouth daily 90 tablet 3    metoprolol succinate (TOPROL-XL) 25 mg 24 hr tablet Take 2 tablets (50 mg total) by mouth 2 (two) times a day 180 tablet 0    MULTIPLE VITAMIN PO Take 1 tablet by mouth daily      ONE TOUCH ULTRA TEST test strip TEST GLUCOSE THREE TIMES A  each 3    oxyCODONE (ROXICODONE) 5 mg immediate release tablet Take 0 5 tablets (2 5 mg total) by mouth every 6 (six) hours as needed for severe pain for up to 3 daysMax Daily Amount: 10 mg 6 tablet 0    polyethylene glycol (MIRALAX) 17 g packet Take 17 g by mouth daily as needed (Constipation) 14 each 0    torsemide (DEMADEX) 20 mg tablet TAKE 2 TABLETS DAILY IN THE MORNING AND TAKE AN ADDITIONAL TABLET IN THE EVENING FOR WEIGHT GAIN OR WORSENING SWELLING 270 tablet 0    warfarin (COUMADIN) 5 mg tablet Take 5 milligrams on 7/20/2020 and get PT/INR on July 21, 2020       No current facility-administered medications for this visit  No Known Allergies    Review of Systems    Video Exam    There were no vitals filed for this visit  Physical Exam     I spent 25 minutes with patient today in which greater than 50% of the time was spent in counseling/coordination of care regarding her peritoneal dialysis catheter placement  VIRTUAL VISIT DISCLAIMER    Jaleel Al acknowledges that she has consented to an online visit or consultation  She understands that the online visit is based solely on information provided by her, and that, in the absence of a face-to-face physical evaluation by the physician, the diagnosis she receives is both limited and provisional in terms of accuracy and completeness  This is not intended to replace a full medical face-to-face evaluation by the physician  Abby Adan understands and accepts these terms

## 2020-07-20 NOTE — SOCIAL WORK
Late note 7/20/20:  Pt was discharged home on 7/19/20  Call received the evening of 7/19/20 from [address]  Pt's oxygen was delivered to pt's home same evening

## 2020-07-22 ENCOUNTER — PATIENT OUTREACH (OUTPATIENT)
Dept: CASE MANAGEMENT | Facility: OTHER | Age: 65
End: 2020-07-22

## 2020-07-22 NOTE — PROGRESS NOTES
Patient is a HRR referral  Chart reviewed & contacted the patient  She is currently in dialysis  She states she is going [facility] this [facility]  She has next week on her calendar  She agrees to a call back at a more convenient time  Outreach scheduled

## 2020-07-23 ENCOUNTER — OFFICE VISIT (OUTPATIENT)
Dept: FAMILY MEDICINE CLINIC | Facility: CLINIC | Age: 65
End: 2020-07-23
Payer: COMMERCIAL

## 2020-07-23 VITALS
RESPIRATION RATE: 16 BRPM | HEART RATE: 89 BPM | OXYGEN SATURATION: 94 % | BODY MASS INDEX: 42.21 KG/M2 | WEIGHT: 215 LBS | DIASTOLIC BLOOD PRESSURE: 80 MMHG | HEIGHT: 60 IN | SYSTOLIC BLOOD PRESSURE: 130 MMHG | TEMPERATURE: 98.5 F

## 2020-07-23 DIAGNOSIS — I48.92 ATRIAL FLUTTER WITH RAPID VENTRICULAR RESPONSE (HCC): ICD-10-CM

## 2020-07-23 DIAGNOSIS — E78.2 MIXED HYPERLIPIDEMIA: ICD-10-CM

## 2020-07-23 DIAGNOSIS — I12.0 BENIGN HYPERTENSION WITH CKD (CHRONIC KIDNEY DISEASE) STAGE V (HCC): ICD-10-CM

## 2020-07-23 DIAGNOSIS — J96.11 CHRONIC HYPOXEMIC RESPIRATORY FAILURE (HCC): ICD-10-CM

## 2020-07-23 DIAGNOSIS — E11.8 TYPE 2 DIABETES MELLITUS WITH COMPLICATION, WITH LONG-TERM CURRENT USE OF INSULIN (HCC): ICD-10-CM

## 2020-07-23 DIAGNOSIS — G47.33 OBSTRUCTIVE SLEEP APNEA OF ADULT: ICD-10-CM

## 2020-07-23 DIAGNOSIS — N18.5 STAGE 5 CHRONIC KIDNEY DISEASE NOT ON CHRONIC DIALYSIS (HCC): Primary | ICD-10-CM

## 2020-07-23 DIAGNOSIS — Z79.4 TYPE 2 DIABETES MELLITUS WITH COMPLICATION, WITH LONG-TERM CURRENT USE OF INSULIN (HCC): ICD-10-CM

## 2020-07-23 DIAGNOSIS — N18.5 BENIGN HYPERTENSION WITH CKD (CHRONIC KIDNEY DISEASE) STAGE V (HCC): ICD-10-CM

## 2020-07-23 PROCEDURE — 1111F DSCHRG MED/CURRENT MED MERGE: CPT | Performed by: NURSE PRACTITIONER

## 2020-07-23 PROCEDURE — 99496 TRANSJ CARE MGMT HIGH F2F 7D: CPT | Performed by: NURSE PRACTITIONER

## 2020-07-23 NOTE — PATIENT INSTRUCTIONS
Will schedule follow up with cardiologist and pulmonologist   Will do INR ASAP  Supportive care discussed and advised  Advised to RTO for any worsening and no improvement  Follow up for no improvement and worsening of conditions  Patient advised and educated when to see immediate medical care

## 2020-07-23 NOTE — PROGRESS NOTES
Assessment/Plan:    1  Stage 5 chronic kidney disease not on chronic dialysis [facility])  Comments:  on PD and managed by nephrologist    2  Type 2 diabetes mellitus with complication, with long-term current use of insulin ([facility])  Comments:  managed by endo but complaint with treatment    3  Atrial flutter with rapid ventricular response ([facility])  Comments:  on metoprolol and will follow up with cardiologist  Orders:  -     Ambulatory referral to Cardiology; Future    4  Benign hypertension with CKD (chronic kidney disease) stage V ([facility])  Comments:  BP stable with current regimen    5  Chronic hypoxemic respiratory failure ([facility])  Comments:  using 2 liters as needed, and dyspnea improved  will follow up with pulmonologist    6  Obstructive sleep apnea of adult    7  Mixed hyperlipidemia          BMI Counseling: Body mass index is 41 99 kg/m²  Discussed the patient's BMI with her  The BMI is above normal  Nutrition recommendations include reducing portion sizes, decreasing overall calorie intake, 3-5 servings of fruits/vegetables daily, reducing fast food intake and consuming healthier snacks  Patient Instructions: Will schedule follow up with cardiologist and pulmonologist   Will do INR ASAP  Supportive care discussed and advised  Advised to RTO for any worsening and no improvement  Follow up for no improvement and worsening of conditions  Patient advised and educated when to see immediate medical care  Return if symptoms worsen or fail to improve  Future Appointments   Date Time Provider Hanny Segundo   8/19/2020 11:20 AM MD Koffi Rios [facility]   9/8/2020  8:45 AM DO PAT [facility]   9/30/2020  8:40 AM DO IVANNA Stevenson [place] [facility]           Subjective:      Patient ID: Damion Age is a 63 y o  female      Chief Complaint   Patient presents with    Transition of Care Management     wmcma         Vitals:  /80   Pulse 89   Temp 98 5 °F (36 9 °C)   Resp 16 Ht 5' (1 524 m)   Wt 97 5 kg (215 lb)   SpO2 94%   BMI 41 99 kg/m²     HPI  Patient is following up after recent hospitalization  Stated that feeling much better  Started on PD at this [facility] and going to PD center on Tuesday, Wednesday and Thursday and will be going today  Have not done INR yet as advised at time of discharge  Complaint with medications as advised on discharge  Had new onset of Atrial flutter with RVR during this hospitalization and on metoprolol and coumadin, will schedule follow up with cardiologist  Stated that using 2 liters of oxygen as needed but her breathing is much better and denies any sob, chest pain, dizziness at this time  TCM Call (since 6/22/2020)     Date and time call was made  7/20/2020  9:31 AM    Hospital care reviewed  Records reviewed    Patient was hospitialized at  [facility]    Date of Admission  07/13/20    Date of discharge  07/19/20    Diagnosis  Acute kidney injury superimposed on CKD    Disposition  Home    Were the patients medications reviewed and updated  Yes    Current Symptoms  Fatigue <img src='C:FILES (X86)      TCM Call (since 6/22/2020)     Post hospital issues  None    Should patient be enrolled in anticoag monitoring? Yes <img src='C:FILES (X86)    Scheduled for follow up? Yes    Patients specialists  Nephrologist; Other (comment);  Endocrinologist    Nephrologist name  Dr Ever Guerrero    Did you obtain your prescribed medications  Yes    Do you need help managing your prescriptions or medications  No    Is transportation to your appointment needed  No    I have advised the patient to call PCP with any new or worsening symptoms  [place]  Family    The type of support provided  Physical; Emotional    Do you have social support  No, not at all <img src='C:FILES (X86)    Are you recieving any outpatient services  Yes    What type of services  PT/OT    Are you recieving home care services Yes    Types of home care services  Nurse visit    Interperter language line needed  No    Counseling  Patient    Counseling topics  Activities of daily living; Importance of RX compliance; patient and family education; Home health agency benefits; instructions for management; Risk factor reduction    Comments  Dialysis starting tomorrow  First initial dialysis evaluation today  Merle Henderson states that she is still not feeling great but is over all better than she was prior to admission  No fever  She has her first dialysis tomorrow   She knows to go to the ER if any chest pain,dyspnea, weakness, dizziness, etc Zhang Choudhury              PHQ-9 Depression Screening    PHQ-9:    Frequency of the following problems over the past two weeks:       Little interest or pleasure in doing things:  0 - not at all  Feeling down, depressed, or hopeless:  0 - not at all  PHQ-2 Score:  0             The following portions of the patient's history were reviewed and updated as appropriate: allergies, current medications, past family history, past medical history, past social history, past surgical history and problem list       Review of Systems      Objective:    Social History     Tobacco Use   Smoking Status Never Smoker   Smokeless Tobacco Never Used       Allergies: No Known Allergies      Current Outpatient Medications   Medication Sig Dispense Refill    albuterol (PROAIR HFA) 90 mcg/act inhaler Inhale 2 puffs every 4 (four) hours as needed for wheezing or shortness of breath 1 Inhaler 0    atorvastatin (LIPITOR) 20 mg tablet Take 1 tablet (20 mg total) by mouth daily (Patient taking differently: Take 20 mg by mouth every other day ) 90 tablet 1    B Complex-C (B-COMPLEX WITH VITAMIN C) tablet Take 1 tablet by mouth daily      Blood Glucose Monitoring Suppl (ONE TOUCH ULTRA 2) w/Device KIT Test glucose 3 times daily 1 each 0    calcitriol (ROCALTROL) 0 5 MCG capsule Take 1 capsule (0 5 mcg total) by mouth daily 90 capsule 3    cholecalciferol 2000 units TABS Take 1 tablet (2,000 Units total) by mouth daily 30 tablet 0    docusate sodium (COLACE) 100 mg capsule Take 1 capsule (100 mg total) by mouth 2 (two) times a day 10 capsule 0    hydrALAZINE (APRESOLINE) 10 mg tablet Take 1 tablet (10 mg total) by mouth every 8 (eight) hours 90 tablet 0    Insulin Lispro Prot & Lispro (HumaLOG Mix 75/25 KwikPen) (75-25) 100 units/mL injection pen Inject 10 Units under the skin 2 (two) times a day with meals      isosorbide mononitrate (IMDUR) 30 mg 24 hr tablet Take 1 tablet (30 mg total) by mouth daily 30 tablet 0    linaGLIPtin (TRADJENTA) 5 MG TABS Take 5 mg by mouth daily 90 tablet 3    metoprolol succinate (TOPROL-XL) 25 mg 24 hr tablet Take 2 tablets (50 mg total) by mouth 2 (two) times a day 180 tablet 0    ONE TOUCH ULTRA TEST test strip TEST GLUCOSE THREE TIMES A  each 3    oxyCODONE (ROXICODONE) 5 mg immediate release tablet Take 0 5 tablets (2 5 mg total) by mouth every 6 (six) hours as needed for severe pain for up to 3 daysMax Daily Amount: 10 mg 6 tablet 0    polyethylene glycol (MIRALAX) 17 g packet Take 17 g by mouth daily as needed (Constipation) 14 each 0    torsemide (DEMADEX) 20 mg tablet TAKE 2 TABLETS DAILY IN THE MORNING AND TAKE AN ADDITIONAL TABLET IN THE EVENING FOR WEIGHT GAIN OR WORSENING SWELLING 270 tablet 0    warfarin (COUMADIN) 5 mg tablet Take 5 milligrams on 7/20/2020 and get PT/INR on July 21, 2020      MULTIPLE VITAMIN PO Take 1 tablet by mouth daily       No current facility-administered medications for this visit  Physical Exam   Constitutional: She is oriented to person, place, and time  She appears well-developed and well-nourished  Eyes: Conjunctivae are normal    Neck: Normal range of motion  Neck supple  Cardiovascular: Normal rate, regular rhythm and normal heart sounds  Pulmonary/Chest: Effort normal  She has decreased breath sounds  Abdominal: Soft   Bowel sounds are normal    Musculoskeletal: Normal range of motion  Neurological: She is alert and oriented to person, place, and time  Skin: Skin is warm and dry  Psychiatric: She has a normal mood and affect   Her behavior is normal  Judgment and thought content normal                    MICHAEL Waldron

## 2020-07-24 ENCOUNTER — PATIENT OUTREACH (OUTPATIENT)
Dept: CASE MANAGEMENT | Facility: OTHER | Age: 65
End: 2020-07-24

## 2020-07-24 NOTE — PROGRESS NOTES
Second attempt at outreach  Left a message requesting a call back  Phone number provided  Noted from recent PCP visit & note that patient tolerated dialysis well & is doing better  Outstanding INR & cardio/pulm f/u appointments still needed  Will follow up on those with patient when she returns call

## 2020-07-27 ENCOUNTER — APPOINTMENT (OUTPATIENT)
Dept: LAB | Facility: HOSPITAL | Age: 65
End: 2020-07-27
Payer: COMMERCIAL

## 2020-07-27 ENCOUNTER — TRANSCRIBE ORDERS (OUTPATIENT)
Dept: ADMINISTRATIVE | Facility: HOSPITAL | Age: 65
End: 2020-07-27

## 2020-07-27 ENCOUNTER — TELEPHONE (OUTPATIENT)
Dept: FAMILY MEDICINE CLINIC | Facility: CLINIC | Age: 65
End: 2020-07-27

## 2020-07-27 ENCOUNTER — ANTICOAG VISIT (OUTPATIENT)
Dept: FAMILY MEDICINE CLINIC | Facility: CLINIC | Age: 65
End: 2020-07-27

## 2020-07-27 DIAGNOSIS — I26.99 PE (PULMONARY THROMBOEMBOLISM) (HCC): ICD-10-CM

## 2020-07-27 LAB
INR PPP: 5.3 (ref 0.84–1.19)
PROTHROMBIN TIME: 47.7 SECONDS (ref 11.6–14.5)

## 2020-07-27 PROCEDURE — 85610 PROTHROMBIN TIME: CPT

## 2020-07-27 PROCEDURE — 36415 COLL VENOUS BLD VENIPUNCTURE: CPT

## 2020-07-27 NOTE — TELEPHONE ENCOUNTER
Bev from [address] [place] [address] called with a critical report, INR 5 30 today  Please advise!  Lindsey Medina MA

## 2020-07-27 NOTE — TELEPHONE ENCOUNTER
Spoke with pt, she is aware of new instructions  Calendar updated  No further action needed   Eloida Joshi

## 2020-07-30 ENCOUNTER — APPOINTMENT (OUTPATIENT)
Dept: LAB | Facility: HOSPITAL | Age: 65
End: 2020-07-30
Payer: COMMERCIAL

## 2020-07-30 ENCOUNTER — ANTICOAG VISIT (OUTPATIENT)
Dept: FAMILY MEDICINE CLINIC | Facility: CLINIC | Age: 65
End: 2020-07-30

## 2020-07-30 DIAGNOSIS — I26.99 PE (PULMONARY THROMBOEMBOLISM) (HCC): ICD-10-CM

## 2020-07-30 LAB
INR PPP: 2.45 (ref 0.84–1.19)
PROTHROMBIN TIME: 26.2 SECONDS (ref 11.6–14.5)

## 2020-07-30 PROCEDURE — 85610 PROTHROMBIN TIME: CPT

## 2020-07-30 PROCEDURE — 36415 COLL VENOUS BLD VENIPUNCTURE: CPT

## 2020-07-30 NOTE — UTILIZATION REVIEW
Notification of Discharge  This is a Notification of Discharge from our facility [address]  Please be advised that this patient has been discharge from our facility  Below you will find the admission and discharge date and time including the patients disposition  PRESENTATION DATE: 7/13/2020 12:58 PM  OBS ADMISSION DATE:   IP ADMISSION DATE: 7/13/20 1604   DISCHARGE DATE: 7/19/2020  4:00 PM  DISPOSITION: Home/Self Care Home/Self Care   Admission Orders listed below:  Admission Orders (From admission, onward)     Ordered        07/13/20 1604  Inpatient Admission  Once                   Please contact the UR Department if additional information is required to close this patient's authorization/case  Donte Hernandez  [facility] Utilization Review Department  Main: [phone] x carefully listen to the prompts  All voicemails are confidential   [email or web address]  Send all requests for admission clinical reviews, approved or denied determinations and any other requests to dedicated fax number below belonging to the campus where the patient is receiving treatment   List of dedicated fax numbers:  [address] DENIALS (Administrative/Medical Necessity) [phone]   [address] (Maternity/NICU/Pediatrics) [phone]   Brianna Flatten [phone]   [place] [phone]   Milena Calles [phone]   [address] [phone]   [facility]  [phone]   [address] [facility] [address] [facility]  [address] [address] [address] [facility] [phone]

## 2020-08-03 ENCOUNTER — PATIENT OUTREACH (OUTPATIENT)
Dept: CASE MANAGEMENT | Facility: OTHER | Age: 65
End: 2020-08-03

## 2020-08-03 NOTE — PROGRESS NOTES
Third attempt at outreach  Left a message requesting a call back, phone number provided  Unable to reach  Will remove this OP CM from the care team at this time

## 2020-08-03 NOTE — PROGRESS NOTES
Patient returned call  She has been driving herself to dialysis as all of her family & friends work  She states she is tolerating it well aside from occasional sharp pains when she is "filling"  Encouraged tylenol prior to dialysis  Next week she goes for training to start home dialysis  Reviewed S&S of fluid overload & when to contact nephrology  Reviewed her upcoming appointments  She states Dr Nereida Huber was rescheduled through [employer] for 8/24/20 @ 4PM in the [facility] [address] office  She manages her own meds & states there are a lot to manage  She does have pill boxes, but prefers not to use them at this time  She states she is taking all meds as ordered  At this time her insurance is adequate to cover her care  However, she will be leaving work in the near future & is waiting to hear back from HR on what benefits are available to her  She will contact this OP CM for assistance & resources if needed  Patient does not feel that care management is needed at this time  Will continue to follow in surveillance & outreach if needed

## 2020-08-08 DIAGNOSIS — I26.99 OTHER PULMONARY EMBOLISM WITHOUT ACUTE COR PULMONALE, UNSPECIFIED CHRONICITY (HCC): ICD-10-CM

## 2020-08-08 DIAGNOSIS — E11.65 TYPE 2 DIABETES MELLITUS WITH HYPERGLYCEMIA, WITH LONG-TERM CURRENT USE OF INSULIN (HCC): ICD-10-CM

## 2020-08-08 DIAGNOSIS — Z79.4 TYPE 2 DIABETES MELLITUS WITH HYPERGLYCEMIA, WITH LONG-TERM CURRENT USE OF INSULIN (HCC): ICD-10-CM

## 2020-08-08 RX ORDER — WARFARIN SODIUM 2 MG/1
TABLET ORAL
Qty: 180 TABLET | Refills: 1 | Status: SHIPPED | OUTPATIENT
Start: 2020-08-08 | End: 2020-08-26 | Stop reason: DRUGHIGH

## 2020-08-10 RX ORDER — INSULIN LISPRO 100 [IU]/ML
INJECTION, SUSPENSION SUBCUTANEOUS
Qty: 150 ML | Refills: 3 | Status: SHIPPED | OUTPATIENT
Start: 2020-08-10 | End: 2021-11-08

## 2020-08-14 ENCOUNTER — APPOINTMENT (OUTPATIENT)
Dept: LAB | Facility: HOSPITAL | Age: 65
End: 2020-08-14
Payer: COMMERCIAL

## 2020-08-14 ENCOUNTER — TELEPHONE (OUTPATIENT)
Dept: FAMILY MEDICINE CLINIC | Facility: CLINIC | Age: 65
End: 2020-08-14

## 2020-08-14 ENCOUNTER — ANTICOAG VISIT (OUTPATIENT)
Dept: FAMILY MEDICINE CLINIC | Facility: CLINIC | Age: 65
End: 2020-08-14

## 2020-08-14 DIAGNOSIS — Z79.4 TYPE 2 DIABETES MELLITUS WITH COMPLICATION, WITH LONG-TERM CURRENT USE OF INSULIN (HCC): ICD-10-CM

## 2020-08-14 DIAGNOSIS — I12.9 BENIGN HYPERTENSION WITH CKD (CHRONIC KIDNEY DISEASE) STAGE IV (HCC): ICD-10-CM

## 2020-08-14 DIAGNOSIS — E78.2 MIXED HYPERLIPIDEMIA: ICD-10-CM

## 2020-08-14 DIAGNOSIS — N18.5 CKD STAGE 5 DUE TO TYPE 2 DIABETES MELLITUS (HCC): ICD-10-CM

## 2020-08-14 DIAGNOSIS — E11.8 TYPE 2 DIABETES MELLITUS WITH COMPLICATION, WITH LONG-TERM CURRENT USE OF INSULIN (HCC): ICD-10-CM

## 2020-08-14 DIAGNOSIS — N18.4 BENIGN HYPERTENSION WITH CKD (CHRONIC KIDNEY DISEASE) STAGE IV (HCC): ICD-10-CM

## 2020-08-14 DIAGNOSIS — N25.81 SECONDARY HYPERPARATHYROIDISM OF RENAL ORIGIN (HCC): ICD-10-CM

## 2020-08-14 DIAGNOSIS — Z79.4 ENCOUNTER FOR LONG-TERM (CURRENT) USE OF INSULIN (HCC): ICD-10-CM

## 2020-08-14 DIAGNOSIS — E55.9 VITAMIN D DEFICIENCY: ICD-10-CM

## 2020-08-14 DIAGNOSIS — E11.22 CKD STAGE 5 DUE TO TYPE 2 DIABETES MELLITUS (HCC): ICD-10-CM

## 2020-08-14 NOTE — TELEPHONE ENCOUNTER
Trying to reach Sylvia Carpio and her voice mailbox is full  I called her emergency contact Zohaib Fontanez whose voice mail is also full  I was not able to leave her a message on either phone    Tiffani Fuchs LPN

## 2020-08-14 NOTE — TELEPHONE ENCOUNTER
Isadora Crowley from [facility] lab called regarding a critical INR number for pt  Her number was [phone]  Please advise   Elodia Martinez

## 2020-08-17 ENCOUNTER — ANTICOAG VISIT (OUTPATIENT)
Dept: FAMILY MEDICINE CLINIC | Facility: CLINIC | Age: 65
End: 2020-08-17

## 2020-08-17 ENCOUNTER — LAB (OUTPATIENT)
Dept: LAB | Facility: HOSPITAL | Age: 65
End: 2020-08-17
Payer: COMMERCIAL

## 2020-08-17 DIAGNOSIS — I26.99 PE (PULMONARY THROMBOEMBOLISM) (HCC): ICD-10-CM

## 2020-08-17 LAB
INR PPP: 3.37 (ref 0.84–1.19)
PROTHROMBIN TIME: 33.6 SECONDS (ref 11.6–14.5)

## 2020-08-17 PROCEDURE — 85610 PROTHROMBIN TIME: CPT

## 2020-08-17 PROCEDURE — 36415 COLL VENOUS BLD VENIPUNCTURE: CPT

## 2020-08-19 ENCOUNTER — ANTICOAG VISIT (OUTPATIENT)
Dept: FAMILY MEDICINE CLINIC | Facility: CLINIC | Age: 65
End: 2020-08-19

## 2020-08-19 ENCOUNTER — TELEPHONE (OUTPATIENT)
Dept: FAMILY MEDICINE CLINIC | Facility: CLINIC | Age: 65
End: 2020-08-19

## 2020-08-19 ENCOUNTER — APPOINTMENT (OUTPATIENT)
Dept: LAB | Facility: HOSPITAL | Age: 65
End: 2020-08-19
Payer: COMMERCIAL

## 2020-08-19 DIAGNOSIS — I26.99 PE (PULMONARY THROMBOEMBOLISM) (HCC): ICD-10-CM

## 2020-08-19 LAB
INR PPP: 1.9 (ref 0.84–1.19)
PROTHROMBIN TIME: 21.5 SECONDS (ref 11.6–14.5)

## 2020-08-19 PROCEDURE — 85610 PROTHROMBIN TIME: CPT

## 2020-08-19 PROCEDURE — 36415 COLL VENOUS BLD VENIPUNCTURE: CPT

## 2020-08-19 NOTE — TELEPHONE ENCOUNTER
----- Message from Ramo Luna DO sent at 8/19/2020  2:07 PM EDT -----  Restart coumadin at 3mg every day and INR in 1 week

## 2020-08-24 DIAGNOSIS — E11.8 TYPE 2 DIABETES MELLITUS WITH COMPLICATION, WITH LONG-TERM CURRENT USE OF INSULIN (HCC): Primary | ICD-10-CM

## 2020-08-24 DIAGNOSIS — Z79.4 TYPE 2 DIABETES MELLITUS WITH COMPLICATION, WITH LONG-TERM CURRENT USE OF INSULIN (HCC): Primary | ICD-10-CM

## 2020-08-24 DIAGNOSIS — E55.9 VITAMIN D DEFICIENCY: ICD-10-CM

## 2020-08-26 ENCOUNTER — CONSULT (OUTPATIENT)
Dept: CARDIOLOGY CLINIC | Facility: CLINIC | Age: 65
End: 2020-08-26
Payer: COMMERCIAL

## 2020-08-26 VITALS
WEIGHT: 193 LBS | DIASTOLIC BLOOD PRESSURE: 68 MMHG | SYSTOLIC BLOOD PRESSURE: 112 MMHG | OXYGEN SATURATION: 94 % | HEIGHT: 60 IN | HEART RATE: 70 BPM | TEMPERATURE: 98.1 F | BODY MASS INDEX: 37.89 KG/M2

## 2020-08-26 DIAGNOSIS — I50.42 CHRONIC COMBINED SYSTOLIC AND DIASTOLIC CONGESTIVE HEART FAILURE (HCC): ICD-10-CM

## 2020-08-26 DIAGNOSIS — E66.9 CLASS 2 OBESITY: ICD-10-CM

## 2020-08-26 DIAGNOSIS — I27.20 PULMONARY HYPERTENSION (HCC): ICD-10-CM

## 2020-08-26 DIAGNOSIS — G47.33 OBSTRUCTIVE SLEEP APNEA: ICD-10-CM

## 2020-08-26 DIAGNOSIS — I34.2 NON-RHEUMATIC MITRAL VALVE STENOSIS: Primary | ICD-10-CM

## 2020-08-26 DIAGNOSIS — I26.99 OTHER PULMONARY EMBOLISM WITHOUT ACUTE COR PULMONALE, UNSPECIFIED CHRONICITY (HCC): ICD-10-CM

## 2020-08-26 DIAGNOSIS — Z99.2: ICD-10-CM

## 2020-08-26 DIAGNOSIS — E78.2 MIXED DYSLIPIDEMIA: ICD-10-CM

## 2020-08-26 DIAGNOSIS — Z79.01 ON CONTINUOUS ORAL ANTICOAGULATION: ICD-10-CM

## 2020-08-26 DIAGNOSIS — E03.9 ACQUIRED HYPOTHYROIDISM: ICD-10-CM

## 2020-08-26 DIAGNOSIS — E11.00 TYPE 2 DIABETES MELLITUS WITH HYPEROSMOLARITY WITHOUT COMA, WITH LONG-TERM CURRENT USE OF INSULIN (HCC): ICD-10-CM

## 2020-08-26 DIAGNOSIS — Z79.4 TYPE 2 DIABETES MELLITUS WITH HYPEROSMOLARITY WITHOUT COMA, WITH LONG-TERM CURRENT USE OF INSULIN (HCC): ICD-10-CM

## 2020-08-26 DIAGNOSIS — D63.8 ANEMIA OF CHRONIC DISEASE: ICD-10-CM

## 2020-08-26 DIAGNOSIS — N18.6 END STAGE KIDNEY DISEASE (HCC): ICD-10-CM

## 2020-08-26 DIAGNOSIS — I48.92 ATRIAL FLUTTER WITH RAPID VENTRICULAR RESPONSE (HCC): ICD-10-CM

## 2020-08-26 DIAGNOSIS — Z86.711 HISTORY OF PULMONARY EMBOLISM: ICD-10-CM

## 2020-08-26 DIAGNOSIS — E55.9 VITAMIN D DEFICIENCY: ICD-10-CM

## 2020-08-26 PROCEDURE — 3008F BODY MASS INDEX DOCD: CPT | Performed by: INTERNAL MEDICINE

## 2020-08-26 PROCEDURE — 1111F DSCHRG MED/CURRENT MED MERGE: CPT | Performed by: INTERNAL MEDICINE

## 2020-08-26 PROCEDURE — 3078F DIAST BP <80 MM HG: CPT | Performed by: INTERNAL MEDICINE

## 2020-08-26 PROCEDURE — 1036F TOBACCO NON-USER: CPT | Performed by: INTERNAL MEDICINE

## 2020-08-26 PROCEDURE — 99214 OFFICE O/P EST MOD 30 MIN: CPT | Performed by: INTERNAL MEDICINE

## 2020-08-26 PROCEDURE — 3074F SYST BP LT 130 MM HG: CPT | Performed by: INTERNAL MEDICINE

## 2020-08-26 PROCEDURE — 3066F NEPHROPATHY DOC TX: CPT | Performed by: INTERNAL MEDICINE

## 2020-08-26 PROCEDURE — 3044F HG A1C LEVEL LT 7.0%: CPT | Performed by: INTERNAL MEDICINE

## 2020-08-26 PROCEDURE — 93000 ELECTROCARDIOGRAM COMPLETE: CPT | Performed by: INTERNAL MEDICINE

## 2020-08-26 RX ORDER — WARFARIN SODIUM 2 MG/1
TABLET ORAL
Qty: 180 TABLET | Refills: 1
Start: 2020-08-26 | End: 2021-12-08 | Stop reason: ALTCHOICE

## 2020-08-26 RX ORDER — HYDRALAZINE HYDROCHLORIDE 10 MG/1
10 TABLET, FILM COATED ORAL EVERY 8 HOURS SCHEDULED
Qty: 270 TABLET | Refills: 3 | Status: SHIPPED | OUTPATIENT
Start: 2020-08-26 | End: 2021-02-17

## 2020-08-26 RX ORDER — ISOSORBIDE MONONITRATE 30 MG/1
30 TABLET, EXTENDED RELEASE ORAL DAILY
Qty: 90 TABLET | Refills: 3 | Status: SHIPPED | OUTPATIENT
Start: 2020-08-26 | End: 2021-03-02 | Stop reason: ALTCHOICE

## 2020-08-26 NOTE — PATIENT INSTRUCTIONS
1  Keep a pulse oximeter device near your bedside for use at night if there is any sensation of rapid heart action or shortness of breath  2  Check with lung doctor on your next appointment regarding whether you should be on oxygen overnight  3  We renewed prescriptions for hydralazine and isosorbide mononitrate, each for 90 days with three refills, with [facility]  4  We will order an echocardiogram or ultrasound of the heart to be done in approximately two months, to determine whether the heart muscle is getting stronger, now that the heart rhythm has returned to normal   5  Continue current medication  6  Cardiology follow-up approximately one month with rhythm strip

## 2020-08-26 NOTE — PROGRESS NOTES
Office Cardiology Progress Note  Evette Palmer 63 y o  female MRN: [id number]  08/26/20  10:31 AM      ASSESSMENT:    1  Much improved dyspnea with activity and resolved dyspnea at rest with suppression of paroxysmal atrial flutter for the past five weeks  2  Controlled combined systolic and diastolic heart failure with newly identified depression of ejection fraction to 35% with moderate LVH, grade 2 diastolic dysfunction, chronic right heart dilatation and significant chronic pulmonary hypertension on recent echocardiogram 07/14/2020 with LV dysfunction possibly tachycardia-mediated  3  Nonischemic nuclear stress study with dilated right ventricle on 07/14/2020     4  Stable mild nonrheumatic mitral valve stenosis, not an active clinical problem at this time  5  Multifactorial chronic pulmonary hypertension, stable  6  End-stage kidney disease with patient on peritoneal dialysis for approximately five weeks  7  Markedly improved obesity, class 2 with weight loss of 22 lbs in one month and 28 3 lbs in approximately five weeks  8  Controlled longstanding type 2 diabetes mellitus, insulin dependent, with latest A1c 6 8% approximately six weeks ago  9  Controlled mixed dyslipidemia  10  History of severe obstructive sleep apnea, currently not being treated  Patient intolerant to CPAP and not currently using oxygen  11  Patient on continuous warfarin oral anticoagulation and presently on 3 milligrams daily  12  History of pulmonary embolism, though not totally confirmed  13  Anemia of chronic renal disease  14  Acquired hypothyroidism, not currently being treated  15  Treated vitamin-D deficiency  Plan       Patient Instructions     1  Keep a pulse oximeter device near your bedside for use at night if there is any sensation of rapid heart action or shortness of breath  2  Check with lung doctor on your next appointment regarding whether you should be on oxygen overnight      3  We renewed prescriptions for hydralazine and isosorbide mononitrate, each for 90 days with three refills, with [facility]  4  We will order an echocardiogram or ultrasound of the heart to be done in approximately two months, to determine whether the heart muscle is getting stronger, now that the heart rhythm has returned to normal   5  Continue current medication  6  Cardiology follow-up approximately one month with rhythm strip  HPI    This 63 y o  female  denies new cardiopulmonary and medical symptoms  The patient has been on home peritoneal dialysis for approximately two weeks, generally on a schedule of 10:00 p m  to 7:00 a m  daily  She has been having some discomfort during the night adjusting to the dialysis process and will be meeting with the dialysis team later today  Because of being uncomfortable, having a dry mouth and because of claustrophobia, the patient has not been using CPAP  She does have pulmonary medicine follow-up next month and will discuss this further with the pulmonary physician  The patient is currently not using oxygen, though she does have this at home, including portable oxygen  Last night, the patient developed some posterior neck and upper back pain and thought she had some rapid heart action, but she did not use pulse oximetry to document her heart rate  Otherwise, she has had much improved dyspnea since her recent hospitalization from 07/13 through 07/19/2020  She now has much better exercise tolerance and no dyspnea at rest     The patient was admitted to [facility] from 07/13 through 07/19/2020 with worsening dyspnea which began to occur at rest and worsened with activity  She was found to have rapid atrial flutter with 2:1 AV block with heart rate of 160 bpm in the emergency department  Her course was complicated by the presence of acute stage five kidney did disease superimposed on chronic stage IV kidney disease    She was deemed to be a candidate for peritoneal dialysis and had insertion of a PD catheter during her hospital stay  The patient is a known case of chronic severe pulmonary hypertension with possible prior pulmonary embolism with long-term warfarin anticoagulation  The patient had an ejection fraction of 35% by echo and nuclear stress testing, which has marked decline from her baseline of 55% in May, 2019  The patient converted back to sinus rhythm without any attempted cardioversion  She was discharged to be initiated on peritoneal dialysis on 07/20/2020  She was asked to comply with CPAP as tolerated at the time of her discharge, but has been unable to do so as mentioned above  This patient is a long-term type 2 diabetic using insulin and had recent A1c of 6 8% on 07/14/2020  She is also known to have obstructive sleep apnea, treated mixed dyslipidemia, and acquired hypothyroidism with elevated TSH of 6 638 and had prior history of morbid obesity  This patient was seen by the Advanced Heart failure team one year ago and was thought to have HFpEF,, severe obstructive sleep apnea and obesity  She was thought to have progressive pulmonary vascular remodeling since 2018 with medical management recommended  The patient is being seen also in follow-up of the below listed diagnoses  Encounter Diagnoses   Name Primary?     Atrial flutter with rapid ventricular response (HCC)     Chronic combined systolic and diastolic congestive heart failure (HCC)     Non-rheumatic mitral valve stenosis Yes    Pulmonary hypertension (HCC)     End stage kidney disease (HCC)     Dependence on continuous ambulatory peritoneal dialysis (HCC)     Class 2 obesity     Type 2 diabetes mellitus with hyperosmolarity without coma, with long-term current use of insulin (HCC)     Mixed dyslipidemia     Obstructive sleep apnea     On continuous oral anticoagulation     History of pulmonary embolism     Anemia of chronic disease     Acquired hypothyroidism     Vitamin D deficiency         Review of Systems    All other systems negative, except as noted in history of present illness    Historical Information   Past Medical History:   Diagnosis Date    Acute asthmatic bronchitis     last assessed: 2017    Cardiac disease     Chronic diastolic (congestive) heart failure (HCC)     Colon polyp     [facility]     Hyperlipidemia     Hypertension     Medical non-compliance     Pneumonia     last assessed: 2013    Pulmonary embolism ([facility] )     Renal disorder     possible clot noted in kidney, thus blood thinners currently    Severe obstructive sleep apnea     Severe pulmonary arterial systolic hypertension (HCC)      Past Surgical History:   Procedure Laterality Date     SECTION       x 1    NOSE SURGERY      fractured nose - septoplasty     Social History     Substance and Sexual Activity   Alcohol Use Never    Frequency: Never     Social History     Substance and Sexual Activity   Drug Use No     Social History     Tobacco Use   Smoking Status Never Smoker   Smokeless Tobacco Never Used       Family History:  Family History   Problem Relation Age of Onset    Diabetes Mother         mellitus    Anxiety disorder Mother         generalized anxiety disorder    Hypertension Mother     Kidney disease Father     Kidney failure Father         renal failure    Ulcerative colitis Sister     Heart disease Family     Ovarian cancer Maternal Aunt          Meds/Allergies     Prior to Admission medications    Medication Sig Start Date End Date Taking?  Authorizing Provider   atorvastatin (LIPITOR) 20 mg tablet Take 1 tablet (20 mg total) by mouth daily  Patient taking differently: Take 20 mg by mouth every other day  19  Yes García Lemus, DO   B Complex-C (B-COMPLEX WITH VITAMIN C) tablet Take 1 tablet by mouth daily   Yes Historical Provider, MD   Blood Glucose Monitoring Suppl (ONE TOUCH ULTRA 2) w/Device KIT Test glucose 3 times daily 9/14/18  Yes Natalia Hughes DO   calcitriol (ROCALTROL) 0 5 MCG capsule Take 1 capsule (0 5 mcg total) by mouth daily 4/21/20  Yes Deirdre Ferris MD   cholecalciferol 2000 units TABS Take 1 tablet (2,000 Units total) by mouth daily 6/21/19  Yes [facility], PAJARED   docusate sodium (COLACE) 100 mg capsule Take 1 capsule (100 mg total) by mouth 2 (two) times a day 7/20/20  Yes Becca Flores MD   HumaLOG Mix 75/25 KwikPen (75-25) 100 units/mL injection pen INJECT 32 UNITS UNDER THE SKIN TWICE A DAY WITH MEALS 8/10/20  Yes Modesto Florian MD   hydrALAZINE (APRESOLINE) 10 mg tablet Take 1 tablet (10 mg total) by mouth every 8 (eight) hours 8/26/20  Yes Bruce Norris MD   linaGLIPtin (TRADJENTA) 5 MG TABS Take 5 mg by mouth daily 6/21/19  Yes Modesto Florian MD   metoprolol succinate (TOPROL-XL) 25 mg 24 hr tablet Take 2 tablets (50 mg total) by mouth 2 (two) times a day 7/19/20  Yes Becca Flores MD   ONE TOUCH ULTRA TEST test strip TEST GLUCOSE THREE TIMES A DAY 8/2/19  Yes Natalia Hughes DO   polyethylene glycol (MIRALAX) 17 g packet Take 17 g by mouth daily as needed (Constipation) 7/19/20  Yes Becca Flores MD   torsemide (DEMADEX) 20 mg tablet TAKE 2 TABLETS DAILY IN THE MORNING AND TAKE AN ADDITIONAL TABLET IN THE EVENING FOR WEIGHT GAIN OR WORSENING SWELLING 7/19/20  Yes Becca Flores MD   warfarin (COUMADIN) 2 mg tablet TAKE 2 TABLETS DAILY OR AS DIRECTED 8/8/20  Yes MICHAEL Weber   warfarin (COUMADIN) 5 mg tablet Take 5 milligrams on 7/20/2020 and get PT/INR on July 21, 2020 7/19/20  Yes Becca Flores MD   hydrALAZINE (APRESOLINE) 10 mg tablet Take 1 tablet (10 mg total) by mouth every 8 (eight) hours 7/19/20 8/26/20 Yes Becca Flores MD   isosorbide mononitrate (IMDUR) 30 mg 24 hr tablet Take 1 tablet (30 mg total) by mouth daily 8/26/20   Bruce Norris MD   MULTIPLE VITAMIN PO Take 1 tablet by mouth daily    Historical Provider, MD   albuterol (PROAIR HFA) 90 mcg/act inhaler Inhale 2 puffs every 4 (four) hours as needed for wheezing or shortness of breath  Patient not taking: Reported on 8/26/2020 12/1/19 8/26/20  Sultana Remy PA-C   isosorbide mononitrate (IMDUR) 30 mg 24 hr tablet Take 1 tablet (30 mg total) by mouth daily  Patient not taking: Reported on 8/26/2020 7/20/20 8/26/20  Shakir Peña MD       No Known Allergies      Vitals:    08/26/20 0905   BP: 112/68   BP Location: Right arm   Patient Position: Sitting   Cuff Size: Large   Pulse: 70   Temp: 98 1 °F (36 7 °C)   TempSrc: Temporal   SpO2: 94%   Weight: 87 5 kg (193 lb)   Height: 5' (1 524 m)       Body mass index is 37 69 kg/m²  22 pound weight loss in approximately one month and 28 3 pound weight loss in approximately five weeks  Physical Exam:    General Appearance:  Alert, cooperative, no distress, appears stated age and is moderately obese  Head:  Normocephalic, without obvious abnormality, atraumatic   Eyes:  PERRL, conjunctiva/corneas clear, EOM's intact,   both eyes   Ears:  Normal TM's and external ear canals, both ears   Nose: Nares normal, septum midline, mucosa normal, no drainage or sinus tenderness   Throat: Lips, mucosa, and tongue normal; teeth and gums normal   Neck: Supple, symmetrical, trachea midline, no adenopathy, thyroid: not enlarged, symmetric, no tenderness/mass/nodules, no carotid bruit or JVD   Back:   Symmetric, no curvature, ROM normal, no CVA tenderness   Lungs:   Clear to auscultation bilaterally, respirations unlabored with somewhat diminished breath sounds  Chest Wall:  No tenderness or deformity   Heart:  Regular rate and rhythm, S1, S2 normal, no murmur, rub or gallop   Abdomen:   Soft, non-tender, bowel sounds active all four quadrants,  no masses, no organomegaly with moderate obesity noted  There is a peritoneal dialysis catheter in the lower central abdomen     Extremities: Extremities normal, atraumatic, no cyanosis or edema   Pulses: 1+ on the left and trace on the right pedal regions  Skin: Skin showed normal color, texture, turgor and no rashes or lesions   Lymph nodes: Cervical, supraclavicular, and axillary nodes normal   Neurologic: Normal         Cardiographics    ECG 08/26/20:    Normal sinus rhythm at 70 bpm  Poor R-wave progression  RV conduction delay  Leftward frontal plane QRS axis  Nonspecific ST-T abnormalities in the inferior leads  Suppressed PACs with new poor R-wave progression and RV conduction delay compared to 07/19/2020    IMAGING:    Portable chest x-ray 07/13/2020:    Prominent cardiac silhouette  No acute cardiopulmonary disease    Lexiscan nuclear stress study 07/14/2020:    Dilated right ventricle with no perfusion defects  Calculated LVEF 36% without distinct regional wall motion abnormalities        Transthoracic echocardiogram 07/14/2020:    35% LVEF with mild LVH and grade 2 diastolic dysfunction  Mild right ventricular and mild-to-moderate JACE  Mild LAE  Mild to moderate MAC with 1-2+ MR  1+ AI  2+ or greater tricuspid regurgitation with PA systolic pressure at least 60 millimeters Hg and possibly underestimated due to eccentric jet of TR  1+ PI  Mildly dilated IVC with diminished respirophasic change      LAB REVIEW:      Lab Results   Component Value Date    SODIUM 133 (L) 07/19/2020    K 4 2 07/19/2020     07/19/2020    CO2 23 07/19/2020    ANIONGAP 6 12/11/2014    BUN 98 (H) 07/19/2020    CREATININE 6 45 (H) 07/19/2020    GLUCOSE 133 12/11/2014    GLUF 117 (H) 04/08/2020    CALCIUM 8 0 (L) 07/19/2020    AST 27 07/17/2020    ALT 80 (H) 07/17/2020    ALKPHOS 146 (H) 07/17/2020    PROT 6 8 12/11/2014    BILITOT 1 08 (H) 12/11/2014    EGFR 6 07/19/2020   Glucose 07/19/2020:  155  ALT and ALP on 07/17/2020:  80 and 146    Lab Results   Component Value Date    CHOLESTEROL 111 04/08/2020    CHOLESTEROL 115 10/16/2019    CHOLESTEROL 134 08/02/2019     Lab Results   Component Value Date    HDL 67 04/08/2020    HDL 53 10/16/2019 HDL 49 08/02/2019     No results found for: LDLCHOLEST  Lab Results   Component Value Date    LDLCALC 33 04/08/2020    [address] 47 10/16/2019    [address] 60 08/02/2019     No components found for: [facility]  Lab Results   Component Value Date    TRIG 57 04/08/2020    TRIG 74 10/16/2019    TRIG 125 08/02/2019     INR 08/19/2020:  1 90  CBC 07/19/2020:  H/H-10 5/34 3 with normal WBC and platelets      Because of the complexity of her medical history, detailed interview process and examination, the visit time consumed 42 minutes of face-to-face contact with the patient      Erwin Purdy MD

## 2020-08-27 ENCOUNTER — PATIENT OUTREACH (OUTPATIENT)
Dept: CASE MANAGEMENT | Facility: OTHER | Age: 65
End: 2020-08-27

## 2020-08-27 NOTE — PROGRESS NOTES
Chart reviewed  Patient kept her cardio appointment & has upcoming PCP, endo, & pulmonology appointments scheduled  Per cardio, she is not tolerating her CPAP & she has PRN O2, but is not wearing it at night  She started PD, but is having discomfort which she will discuss with her team     Will outreach patient after her upcoming appointments to check in

## 2020-08-28 ENCOUNTER — TELEPHONE (OUTPATIENT)
Dept: FAMILY MEDICINE CLINIC | Facility: CLINIC | Age: 65
End: 2020-08-28

## 2020-08-28 ENCOUNTER — ANTICOAG VISIT (OUTPATIENT)
Dept: FAMILY MEDICINE CLINIC | Facility: CLINIC | Age: 65
End: 2020-08-28

## 2020-08-28 ENCOUNTER — TRANSCRIBE ORDERS (OUTPATIENT)
Dept: ADMINISTRATIVE | Facility: HOSPITAL | Age: 65
End: 2020-08-28

## 2020-08-28 ENCOUNTER — APPOINTMENT (OUTPATIENT)
Dept: LAB | Facility: HOSPITAL | Age: 65
End: 2020-08-28
Payer: COMMERCIAL

## 2020-08-28 DIAGNOSIS — I26.99 PE (PULMONARY THROMBOEMBOLISM) (HCC): ICD-10-CM

## 2020-08-28 LAB
INR PPP: 2.49 (ref 0.84–1.19)
PROTHROMBIN TIME: 26.6 SECONDS (ref 11.6–14.5)

## 2020-08-28 PROCEDURE — 85610 PROTHROMBIN TIME: CPT

## 2020-08-28 PROCEDURE — 36415 COLL VENOUS BLD VENIPUNCTURE: CPT

## 2020-08-28 NOTE — TELEPHONE ENCOUNTER
----- Message from Megan Benoit DO sent at 8/28/2020 12:50 PM EDT -----  Same dose recheck in 2 week  Megan Benoit DO

## 2020-09-03 NOTE — UTILIZATION REVIEW
Continued Stay Review    Date: 7/18/20                        Current Patient Class: inpatient  Current Level of Care: telemetry  HPI:65 y o  female initially admitted on 7/13/20  Assessment/Plan:   Reverted back to afib with rvr overnight, Iv Lopressor & IV Cardizem given, po lopressor dosing doubled to 50mg  Reports dizziness upon standing & pain associated with PD catheter placement  Lungs with diminished breath sounds  Coumadin dosing resumed  The patient will continue to require additional inpatient hospital stay due to Further management for acute kidney injury requiring dialysis and stabilization of the cardiopulmonary status  Pertinent Labs/Diagnostic Results:   Results from last 7 days   Lab Units 07/15/20  2205   SARS-COV-2   Negative              Results from last 7 days   Lab Units 07/17/20  0454 07/16/20  0557 07/15/20  0615 07/14/20  0549 07/13/20  1317   WBC Thousand/uL 9 00 9 29 9 66 10 74* 10 62*   HEMOGLOBIN g/dL 11 2* 11 8 11 4* 11 9 12 5   HEMATOCRIT % 35 7 37 4 36 8 38 3 39 5   PLATELETS Thousands/uL 211 223 214 236 278   NEUTROS ABS Thousands/µL 5 99 6 31  --   --  7 30      Ref Range & Units  7/18/20 0546   WBC  4 31 - 10 16 Thousand/uL  10  81High      RBC  3 81 - 5 12 Million/uL  4 10    Hemoglobin  11 5 - 15 4 g/dL  10 9Low      Hematocrit  34 8 - 46 1 %  36 0    MCV  82 - 98 fL  88    MCH  26 8 - 34 3 pg  26 6Low      MCHC  31 4 - 37 4 g/dL  30 3Low      RDW  11 6 - 15 1 %  18 9High      Platelets  732 - 869 Thousands/uL  223    MPV  8 9 - 12 7 fL  11 5                 Results from last 7 days   Lab Units 07/17/20  0454 07/16/20  0557 07/15/20  0615 07/14/20  0549 07/13/20  1317   SODIUM mmol/L 138 135* 133* 133* 134*   POTASSIUM mmol/L 3 7 4 3 4 0 4 0 4 6   CHLORIDE mmol/L 102 99* 98* 100 97*   CO2 mmol/L 23 22 20* 19* 19*   ANION GAP mmol/L 13 14* 15* 14* 18*   BUN mg/dL 100* 101* 100* 96* 95*   CREATININE mg/dL 6 06* 6 10* 6 19* 6 22* 6 32*   EGFR ml/min/1 73sq m 7 7 7 7 6   CALCIUM mg/dL 7 9* 8 2* 8 1* 8 1* 8 5   MAGNESIUM mg/dL  --   --   --  2 4 2 3   PHOSPHORUS mg/dL 6 0* 6 4* 6 3*  --   --       Ref Range & Units  20 0546   Sodium  136 - 145 mmol/L  137    Potassium  3 5 - 5 3 mmol/L  4 6    Chloride  100 - 108 mmol/L  102    CO2  21 - 32 mmol/L  26    ANION GAP  4 - 13 mmol/L  9    BUN  5 - 25 mg/dL  93High      Creatinine  0 60 - 1 30 mg/dL  5  95High      Glucose  65 - 140 mg/dL  172High      Calcium  8 3 - 10 1 mg/dL  8 0Low      eGFR  ml/min/1 73sq m  7       Ref Range & Units  20 0546   Magnesium  1 6 - 2 6 mg/dL  2 2             Results from last 7 days   Lab Units 20  0454 20  0549 20  1317   AST U/L 27 88* 155*   ALT U/L 80* 133* 150*   ALK PHOS U/L 146* 148* 171*   TOTAL PROTEIN g/dL 6 5 6 7 7 3   ALBUMIN g/dL 2 6* 2 7* 3 0*   TOTAL BILIRUBIN mg/dL 0 70 0 60 0 80                     Results from last 7 days   Lab Units 20  1256 20  1205 20  2040 20  1535 20  1123 20  0720 07/15/20  2015 07/15/20  1645 07/15/20  1123 07/15/20  0745 20  2319 20  2026   POC GLUCOSE mg/dl 131 121 205* 176* 236* 119 158* 166* 173* 134 196* 180*              Results from last 7 days   Lab Units 20  0454 20  0557 07/15/20  0615 20  0549 20  1317   GLUCOSE RANDOM mg/dL 97 115 145* 146* 147*          Results from last 7 days   Lab Units 20  0500   HEP B S AG   Non-reactive   HEP C AB   Non-reactive   HEP B C IGM   Non-reactive   HEP B C TOTAL AB   Non-reactive      Ref Range & Units  20 0555   Protime  11 6 - 14 5 seconds  19 4High      INR  0 84 - 1 19  1  66High       Vital Signs:   Temp (24hrs), Av 7 °F (36 5 °C), Min:97 5 °F (36 4 °C), Max:97 9 °F (36 6 °C)    Temp:  [97 5 °F (36 4 °C)-97 9 °F (36 6 °C)] 97 5 °F (36 4 °C)  HR:  [] 125  Resp:  [16-20] 20  BP: ()/(61-94) 131/89  SpO2:  [94 %-99 %] 98 %  Body mass index is 42 19 kg/m²  Medications:   acetaminophen 650 mg Oral Q6H PRN   calcitriol 0 5 mcg Oral Daily   cholecalciferol 2,000 Units Oral Every Other Day   hydrALAZINE 10 mg Oral Q8H [address]   insulin aspart protamine-insulin aspart 10 Units Subcutaneous BID AC   insulin lispro 1-5 Units Subcutaneous TID AC   insulin lispro 1-5 Units Subcutaneous HS   isosorbide mononitrate 30 mg Oral Daily   levalbuterol 0 63 mg Nebulization Q4H PRN   metoprolol 5 mg Intravenous Q6H PRN   metoprolol succinate 50 mg Oral BID   ondansetron 4 mg Intravenous Q6H PRN   ondansetron 4 mg Intravenous Once PRN   oxyCODONE 2 5 mg Oral Q6H PRN   torsemide 40 mg Oral Daily   warfarin 7 5 mg Oral Once (warfarin)     Discharge Plan: tbd  Network Utilization Review Department  [email or web address]  ATTENTION: Please call with any questions or concerns to [phone] and carefully listen to the prompts so that you are directed to the right person  All voicemails are confidential   Juana Grand all requests for admission clinical reviews, approved or denied determinations and any other requests to dedicated fax number below belonging to the campus where the patient is receiving treatment   List of dedicated fax numbers for the Facilities:  [address] DENIALS (Administrative/Medical Necessity) [phone]   [address] [place] [address] (Maternity/NICU/Pediatrics) [phone]   Sera Guan [phone]   [facility] [place] [phone]   [place] Form [phone]   Anamaria Crook [phone]   [address] [facility] [address] [place] [address] [phone]   [facility]  [phone]   [address] [facility] [address]  [address] [facility] [address] [facility] [phone]

## 2020-09-08 ENCOUNTER — APPOINTMENT (OUTPATIENT)
Dept: LAB | Facility: HOSPITAL | Age: 65
End: 2020-09-08
Attending: INTERNAL MEDICINE
Payer: COMMERCIAL

## 2020-09-08 ENCOUNTER — ANTICOAG VISIT (OUTPATIENT)
Dept: FAMILY MEDICINE CLINIC | Facility: CLINIC | Age: 65
End: 2020-09-08

## 2020-09-08 ENCOUNTER — OFFICE VISIT (OUTPATIENT)
Dept: ENDOCRINOLOGY | Facility: CLINIC | Age: 65
End: 2020-09-08
Payer: COMMERCIAL

## 2020-09-08 ENCOUNTER — OFFICE VISIT (OUTPATIENT)
Dept: FAMILY MEDICINE CLINIC | Facility: CLINIC | Age: 65
End: 2020-09-08
Payer: COMMERCIAL

## 2020-09-08 VITALS
BODY MASS INDEX: 38.09 KG/M2 | WEIGHT: 194 LBS | DIASTOLIC BLOOD PRESSURE: 74 MMHG | HEART RATE: 70 BPM | TEMPERATURE: 97.3 F | RESPIRATION RATE: 16 BRPM | OXYGEN SATURATION: 97 % | HEIGHT: 60 IN | SYSTOLIC BLOOD PRESSURE: 136 MMHG

## 2020-09-08 VITALS
SYSTOLIC BLOOD PRESSURE: 130 MMHG | HEIGHT: 60 IN | TEMPERATURE: 97 F | BODY MASS INDEX: 38.28 KG/M2 | HEART RATE: 65 BPM | WEIGHT: 195 LBS | DIASTOLIC BLOOD PRESSURE: 80 MMHG

## 2020-09-08 DIAGNOSIS — I26.99 PE (PULMONARY THROMBOEMBOLISM) (HCC): ICD-10-CM

## 2020-09-08 DIAGNOSIS — E11.8 TYPE 2 DIABETES MELLITUS WITH COMPLICATION, WITH LONG-TERM CURRENT USE OF INSULIN (HCC): ICD-10-CM

## 2020-09-08 DIAGNOSIS — I50.42 CHRONIC COMBINED SYSTOLIC AND DIASTOLIC CONGESTIVE HEART FAILURE (HCC): ICD-10-CM

## 2020-09-08 DIAGNOSIS — Z79.4 TYPE 2 DIABETES MELLITUS WITH HYPERGLYCEMIA, WITH LONG-TERM CURRENT USE OF INSULIN (HCC): ICD-10-CM

## 2020-09-08 DIAGNOSIS — Z23 NEED FOR VACCINATION: ICD-10-CM

## 2020-09-08 DIAGNOSIS — I10 HYPERTENSION GOAL BP (BLOOD PRESSURE) < 140/90: ICD-10-CM

## 2020-09-08 DIAGNOSIS — Z79.4 TYPE 2 DIABETES MELLITUS WITH COMPLICATION, WITH LONG-TERM CURRENT USE OF INSULIN (HCC): ICD-10-CM

## 2020-09-08 DIAGNOSIS — E78.2 MIXED HYPERLIPIDEMIA: ICD-10-CM

## 2020-09-08 DIAGNOSIS — E11.65 TYPE 2 DIABETES MELLITUS WITH HYPERGLYCEMIA, WITH LONG-TERM CURRENT USE OF INSULIN (HCC): ICD-10-CM

## 2020-09-08 DIAGNOSIS — R79.89 ELEVATED TSH: ICD-10-CM

## 2020-09-08 DIAGNOSIS — Z79.4 TYPE 2 DIABETES MELLITUS WITH COMPLICATION, WITH LONG-TERM CURRENT USE OF INSULIN (HCC): Primary | ICD-10-CM

## 2020-09-08 DIAGNOSIS — I26.99 PULMONARY EMBOLISM (HCC): ICD-10-CM

## 2020-09-08 DIAGNOSIS — Z12.31 SCREENING MAMMOGRAM, ENCOUNTER FOR: ICD-10-CM

## 2020-09-08 DIAGNOSIS — IMO0002 TYPE 2 DIABETES, UNCONTROLLED, WITH RENAL MANIFESTATION: Primary | ICD-10-CM

## 2020-09-08 DIAGNOSIS — E11.8 TYPE 2 DIABETES MELLITUS WITH COMPLICATION, WITH LONG-TERM CURRENT USE OF INSULIN (HCC): Primary | ICD-10-CM

## 2020-09-08 DIAGNOSIS — E55.9 VITAMIN D DEFICIENCY: ICD-10-CM

## 2020-09-08 PROBLEM — E66.01 MORBID OBESITY WITH BMI OF 40.0-44.9, ADULT (HCC): Status: RESOLVED | Noted: 2019-02-28 | Resolved: 2020-09-08

## 2020-09-08 LAB
25(OH)D3 SERPL-MCNC: 38.2 NG/ML (ref 30–100)
ANION GAP SERPL CALCULATED.3IONS-SCNC: 8 MMOL/L (ref 4–13)
BUN SERPL-MCNC: 59 MG/DL (ref 5–25)
CALCIUM SERPL-MCNC: 9.3 MG/DL (ref 8.3–10.1)
CHLORIDE SERPL-SCNC: 99 MMOL/L (ref 100–108)
CHOLEST SERPL-MCNC: 132 MG/DL (ref 50–200)
CO2 SERPL-SCNC: 30 MMOL/L (ref 21–32)
CREAT SERPL-MCNC: 5.14 MG/DL (ref 0.6–1.3)
EST. AVERAGE GLUCOSE BLD GHB EST-MCNC: 163 MG/DL
GFR SERPL CREATININE-BSD FRML MDRD: 8 ML/MIN/1.73SQ M
GLUCOSE P FAST SERPL-MCNC: 121 MG/DL (ref 65–99)
HBA1C MFR BLD: 7.3 %
HDLC SERPL-MCNC: 56 MG/DL
INR PPP: 2.14 (ref 0.84–1.19)
LDLC SERPL CALC-MCNC: 54 MG/DL (ref 0–100)
NONHDLC SERPL-MCNC: 76 MG/DL
POTASSIUM SERPL-SCNC: 4.3 MMOL/L (ref 3.5–5.3)
PROTHROMBIN TIME: 23.7 SECONDS (ref 11.6–14.5)
SODIUM SERPL-SCNC: 137 MMOL/L (ref 136–145)
TRIGL SERPL-MCNC: 110 MG/DL

## 2020-09-08 PROCEDURE — 83036 HEMOGLOBIN GLYCOSYLATED A1C: CPT

## 2020-09-08 PROCEDURE — 82306 VITAMIN D 25 HYDROXY: CPT

## 2020-09-08 PROCEDURE — 3066F NEPHROPATHY DOC TX: CPT | Performed by: INTERNAL MEDICINE

## 2020-09-08 PROCEDURE — 90471 IMMUNIZATION ADMIN: CPT

## 2020-09-08 PROCEDURE — 36415 COLL VENOUS BLD VENIPUNCTURE: CPT

## 2020-09-08 PROCEDURE — 85610 PROTHROMBIN TIME: CPT

## 2020-09-08 PROCEDURE — 99214 OFFICE O/P EST MOD 30 MIN: CPT | Performed by: FAMILY MEDICINE

## 2020-09-08 PROCEDURE — 90682 RIV4 VACC RECOMBINANT DNA IM: CPT

## 2020-09-08 PROCEDURE — 80048 BASIC METABOLIC PNL TOTAL CA: CPT

## 2020-09-08 PROCEDURE — 80061 LIPID PANEL: CPT

## 2020-09-08 PROCEDURE — 3051F HG A1C>EQUAL 7.0%<8.0%: CPT | Performed by: INTERNAL MEDICINE

## 2020-09-08 PROCEDURE — 99214 OFFICE O/P EST MOD 30 MIN: CPT | Performed by: INTERNAL MEDICINE

## 2020-09-08 RX ORDER — WARFARIN SODIUM 1 MG/1
TABLET ORAL
Qty: 90 TABLET | Refills: 1 | Status: SHIPPED | OUTPATIENT
Start: 2020-09-08 | End: 2021-12-08 | Stop reason: ALTCHOICE

## 2020-09-08 RX ORDER — LINAGLIPTIN 5 MG/1
5 TABLET, FILM COATED ORAL DAILY
Qty: 90 TABLET | Refills: 3 | Status: SHIPPED | OUTPATIENT
Start: 2020-09-08 | End: 2021-06-18 | Stop reason: SDUPTHER

## 2020-09-08 RX ORDER — ATORVASTATIN CALCIUM 20 MG/1
20 TABLET, FILM COATED ORAL DAILY
Qty: 90 TABLET | Refills: 1 | Status: SHIPPED | OUTPATIENT
Start: 2020-09-08

## 2020-09-08 RX ORDER — WARFARIN SODIUM 5 MG/1
TABLET ORAL
Qty: 90 TABLET | Refills: 1 | Status: SHIPPED | OUTPATIENT
Start: 2020-09-08

## 2020-09-08 RX ORDER — LANCETS
EACH MISCELLANEOUS
Qty: 300 EACH | Refills: 1 | Status: SHIPPED | OUTPATIENT
Start: 2020-09-08

## 2020-09-08 NOTE — PROGRESS NOTES
ENDOCRINOLOGY  FOLLOW UP VISIT      Reason for Endocrine Consult/Chief Complaint: DM management       ? Medical Decision Making:     Impression  1  Type 2 DM  2  HTN  3  HLD  4  ESRD on PD   5  PE on AC  6  Atrial fibrillation   7  CHANDRA  8  Pulmonary HTN  9  HFrEF  10  Elevated TSH      Recommendations:    Reported BGs increased in the morning related to recently started PD  Will continue 75/25 insulin at 10 unit in the morning with breakfast and increase to 24 units with dinner  Continue tradjenta 5mg qday  Instructed to send me BG log in 1-2 weeks for review of improvement  If still having overnight hyperglycemia will consider further increasing dinner dose of 75/25 insulin or adding a dose of regular insulin at 10AM when PD starts  Will follow fructosamine going forward for assessing glycemic control  ?    HTN-controlled cotninue hydralazine, imdur, BB, diuretic    HLD- continue statin LDL 54 Sept 2020    Elevated TSH- 6 638 July 2020, repeat TSH and Free T4 and TPO Ab in 2 months for re-assessment     RTC 2 months     Lam CHEEK  Endocrinology        History of Present Illness:   Mrs Luis Sim is a 63year old female who presents for DM follow up  Last saw dr Krystina Murphy in June 2020 at that time her tradjenta was continued at 5mg qday and her 75/25 insulin was modified to 10 units with breakfast and 20 units with dinner   ? PMH-DM2, HTN, HLD, CKD, CAD, PE on AC, CHANDRA, pulm HTN, HFrEF    Events since last visit:     On home PD dialysis now    On tradjenta 5mg qday    75/25 insulin on 10 units in the breakfast and 20 units at dinner     Took 20 units in the morning self adjusted     150-188 in the morning per patient    Comes down per patient later in the day     Forgot glucometer     ? Review of Systems:     Review of Systems   Constitutional: Negative for appetite change, chills, diaphoresis, fatigue, fever and unexpected weight change     HENT: Negative for congestion, ear pain, hearing loss, rhinorrhea, sinus pressure, sinus pain, sore throat, trouble swallowing and voice change  Eyes: Negative for photophobia, redness and visual disturbance  Respiratory: Negative for apnea, cough, chest tightness, shortness of breath, wheezing and stridor  Cardiovascular: Negative for chest pain, palpitations and leg swelling  Gastrointestinal: Negative for abdominal distention, abdominal pain, constipation, diarrhea, nausea and vomiting  Endocrine: Negative for cold intolerance, heat intolerance, polydipsia, polyphagia and polyuria  Genitourinary: Negative for difficulty urinating, dysuria, flank pain, frequency, hematuria and urgency  Musculoskeletal: Negative for arthralgias, back pain, gait problem, joint swelling and myalgias  Skin: Negative for color change, pallor, rash and wound  Allergic/Immunologic: Negative for immunocompromised state  Neurological: Negative for dizziness, tremors, syncope, weakness, light-headedness and headaches  Hematological: Negative for adenopathy  Does not bruise/bleed easily  Psychiatric/Behavioral: Negative for confusion and sleep disturbance  The patient is not nervous/anxious  ?   Patient History:     Past Medical History:   Diagnosis Date    Acute asthmatic bronchitis     last assessed: 2017    Cardiac disease     Chronic diastolic (congestive) heart failure (HCC)     Colon polyp     Diabetes mellitus ([address] )     Hyperlipidemia     Hypertension     Medical non-compliance     Morbid obesity with BMI of 40 0-44 9, adult ([facility] [address] ) 2019    Pneumonia     last assessed: 2013    Pulmonary embolism ([address] )     Renal disorder     possible clot noted in kidney, thus blood thinners currently    Severe obstructive sleep apnea     Severe pulmonary arterial systolic hypertension ([address] )      Past Surgical History:   Procedure Laterality Date     SECTION       x 1    NOSE SURGERY      fractured nose - septoplasty     Social History     Socioeconomic History    Marital status: Single     Spouse name: Not on file    Number of children: Not on file    Years of education: Not on file    Highest education level: Not on file   Occupational History    Not on file   Social Needs    Financial resource strain: Not on file    Food insecurity     Worry: Not on file     Inability: Not on file    Transportation needs     Medical: Not on file     Non-medical: Not on file   Tobacco Use    Smoking status: Never Smoker    Smokeless tobacco: Never Used   Substance and Sexual Activity    Alcohol use: Never     Frequency: Never    Drug use: No    Sexual activity: Not on file   Lifestyle    Physical activity     Days per week: Not on file     Minutes per session: Not on file    Stress: Not on file   Relationships    Social connections     Talks on phone: Not on file     Gets together: Not on file     Attends Alevism service: Not on file     Active member of club or organization: Not on file     Attends meetings of clubs or organizations: Not on file     Relationship status: Not on file    Intimate partner violence     Fear of current or ex partner: Not on file     Emotionally abused: Not on file     Physically abused: Not on file     Forced sexual activity: Not on file   Other Topics Concern    Not on file   Social History Narrative    Caffeine use     Family History   Problem Relation Age of Onset    Diabetes Mother         mellitus    Anxiety disorder Mother         generalized anxiety disorder    Hypertension Mother     Kidney disease Father     Kidney failure Father         renal failure    Ulcerative colitis Sister     Heart disease Family     Ovarian cancer Maternal Aunt        Current Medications: At the time this note was written these were the medications the patient was on    Current Outpatient Medications   Medication Sig Dispense Refill    atorvastatin (LIPITOR) 20 mg tablet Take 1 tablet (20 mg total) by mouth daily 90 tablet 1    B Complex-C (B-COMPLEX WITH VITAMIN C) tablet Take 1 tablet by mouth daily      Blood Glucose Monitoring Suppl (ONE TOUCH ULTRA 2) w/Device KIT Test glucose 3 times daily 1 each 0    calcitriol (ROCALTROL) 0 5 MCG capsule Take 1 capsule (0 5 mcg total) by mouth daily 90 capsule 3    cholecalciferol 2000 units TABS Take 1 tablet (2,000 Units total) by mouth daily 30 tablet 0    docusate sodium (COLACE) 100 mg capsule Take 1 capsule (100 mg total) by mouth 2 (two) times a day 10 capsule 0    HumaLOG Mix 75/25 KwikPen (75-25) 100 units/mL injection pen INJECT 32 UNITS UNDER THE SKIN TWICE A DAY WITH MEALS 150 mL 3    hydrALAZINE (APRESOLINE) 10 mg tablet Take 1 tablet (10 mg total) by mouth every 8 (eight) hours 270 tablet 3    isosorbide mononitrate (IMDUR) 30 mg 24 hr tablet Take 1 tablet (30 mg total) by mouth daily 90 tablet 3    Lancets (onetouch ultrasoft) lancets Test glucose 3 times a day; Code: E11 8 300 each 1    linaGLIPtin (TRADJENTA) 5 MG TABS Take 5 mg by mouth daily 90 tablet 3    metoprolol succinate (TOPROL-XL) 25 mg 24 hr tablet Take 2 tablets (50 mg total) by mouth 2 (two) times a day 180 tablet 0    MULTIPLE VITAMIN PO Take 1 tablet by mouth daily      ONE TOUCH ULTRA TEST test strip TEST GLUCOSE THREE TIMES A  each 3    polyethylene glycol (MIRALAX) 17 g packet Take 17 g by mouth daily as needed (Constipation) 14 each 0    torsemide (DEMADEX) 20 mg tablet TAKE 2 TABLETS DAILY IN THE MORNING AND TAKE AN ADDITIONAL TABLET IN THE EVENING FOR WEIGHT GAIN OR WORSENING SWELLING 270 tablet 0    warfarin (COUMADIN) 1 mg tablet Take daily as directed 90 tablet 1    warfarin (COUMADIN) 2 mg tablet TAKE 1-1/2 TABLETS DAILY OR AS DIRECTED 180 tablet 1    warfarin (COUMADIN) 5 mg tablet Daily as directed 90 tablet 1     No current facility-administered medications for this visit  Allergies: Patient has no known allergies      Physical Exam:   Vital Signs:   BP 130/80   Pulse 65   Temp (!) 97 °F (36 1 °C)   Ht 5' (1 524 m)   Wt 88 5 kg (195 lb)   BMI 38 08 kg/m²     Physical Exam  Vitals signs reviewed  Constitutional:       General: She is not in acute distress  Appearance: Normal appearance  She is not ill-appearing, toxic-appearing or diaphoretic  HENT:      Head: Normocephalic and atraumatic  Right Ear: External ear normal       Left Ear: External ear normal       Nose: Nose normal    Eyes:      General: No scleral icterus  Extraocular Movements: Extraocular movements intact  Conjunctiva/sclera: Conjunctivae normal    Neck:      Musculoskeletal: Normal range of motion and neck supple  No muscular tenderness  Cardiovascular:      Rate and Rhythm: Normal rate and regular rhythm  Pulses: Normal pulses  Heart sounds: Normal heart sounds  No murmur  No friction rub  No gallop  Pulmonary:      Effort: Pulmonary effort is normal       Breath sounds: Normal breath sounds  Abdominal:      General: Bowel sounds are normal  There is no distension  Palpations: Abdomen is soft  There is no mass  Tenderness: There is no abdominal tenderness  There is no guarding or rebound  Hernia: No hernia is present  Comments: +PD catheter c/d/i   Musculoskeletal: Normal range of motion  General: No swelling  Lymphadenopathy:      Cervical: No cervical adenopathy  Skin:     General: Skin is warm and dry  Coloration: Skin is not pale  Findings: No erythema or rash  Neurological:      General: No focal deficit present  Mental Status: She is alert and oriented to person, place, and time  Psychiatric:         Mood and Affect: Mood normal          Behavior: Behavior normal          Thought Content:  Thought content normal          Judgment: Judgment normal             Labs and Imaging:      Component      Latest Ref Rng & Units 9/8/2020   Sodium      136 - 145 mmol/L 137   Potassium      3 5 - 5 3 mmol/L 4 3 Chloride      100 - 108 mmol/L 99 (L)   CO2      21 - 32 mmol/L 30   Anion Gap      4 - 13 mmol/L 8   BUN      5 - 25 mg/dL 59 (H)   Creatinine      0 60 - 1 30 mg/dL 5 14 (H)   GLUCOSE FASTING      65 - 99 mg/dL 121 (H)   Calcium      8 3 - 10 1 mg/dL 9 3   eGFR      ml/min/1 73sq m 8   Cholesterol      50 - 200 mg/dL 132   Triglycerides      <=150 mg/dL 110   HDL      >=40 mg/dL 56   LDL Calculated      0 - 100 mg/dL 54   Non-HDL Cholesterol      mg/dl 76   Hemoglobin A1C      Normal 3 8-5 6%; PreDiabetic 5 7-6 4%;  Diabetic >=6 5%; Glycemic control for adults with diabetes <7 0% % 7 3 (H)   eAG, EST AVG Glucose      mg/dl 163   Protime      11 6 - 14 5 seconds 23 7 (H)   INR      0 84 - 1 19 2 14 (H)   Vit D, 25-Hydroxy      30 0 - 100 0 ng/mL 38 2

## 2020-09-08 NOTE — PATIENT INSTRUCTIONS
Continue 75/25 insulin 10 units in the morning with breakfast and increase to 24 units before dinner    Send me blood sugar log in 1 week for review    Have labs done in 2 months     Follow up in 2 months

## 2020-09-08 NOTE — ASSESSMENT & PLAN NOTE
Lab Results   Component Value Date    HGBA1C 6 8 (H) 07/14/2020     Well controlled, had A1c this morning  Has eye exam scheduled with Dr Lynn Blankenship, form given

## 2020-09-08 NOTE — PROGRESS NOTES
Assessment/Plan:    1  Type 2 diabetes, uncontrolled, with renal manifestation (HCC)  -     atorvastatin (LIPITOR) 20 mg tablet; Take 1 tablet (20 mg total) by mouth daily    2  Pulmonary embolism (HCC)  -     warfarin (COUMADIN) 5 mg tablet; Daily as directed    3  Need for vaccination  -     influenza vaccine, quadrivalent, recombinant, PF, 0 5 mL, for patients 18 yr+ (FLUBLOK)    4  PE (pulmonary thromboembolism) ([facility] [address] )  Assessment & Plan: Will need long-term Coumadin  INR is therapeutic    Orders:  -     warfarin (COUMADIN) 1 mg tablet; Take daily as directed    5  Chronic combined systolic and diastolic congestive heart failure ([facility] )  Assessment & Plan:  Has lost weight  Reviewed recent cardiology visit        6  Screening mammogram, encounter for  -     Mammo screening bilateral w 3d & cad; Future; Expected date: 09/09/2020    7  Type 2 diabetes mellitus with complication, with long-term current use of insulin [facility])  Assessment & Plan:    Lab Results   Component Value Date    HGBA1C 6 8 (H) 07/14/2020     Well controlled, had A1c this morning  Has eye exam scheduled with Dr Jennifer Moreno, form given  Orders:  -     Lancets (onetouch ultrasoft) lancets; Test glucose 3 times a day; Code: E11 8         There are no Patient Instructions on file for this visit  Return in about 3 months (around 12/8/2020) for Annual physical     Subjective:      Patient ID: Paulino Burden is a 63 y o  female  Chief Complaint   Patient presents with    Follow-up     follow up on chronic issus [id number]        Patient reports she is feeling well  She has really been watching her diet  She has been losing weight  She has been doing home peritoneal dialysis now for over a month  Diabetes-patient is following with her endocrinologist   She had her blood work done this morning  She has been monitoring her blood sugars 3 times a day  She has not had any low blood sugars      Hypertension-patient is taking her blood pressure medicine daily  She denies any problems with leg cramps  Her swelling in her legs has improved dramatically  The following portions of the patient's history were reviewed and updated as appropriate:  past social history    Review of Systems   Respiratory: Negative for shortness of breath  Cardiovascular: Negative for leg swelling           Current Outpatient Medications   Medication Sig Dispense Refill    atorvastatin (LIPITOR) 20 mg tablet Take 1 tablet (20 mg total) by mouth daily 90 tablet 1    B Complex-C (B-COMPLEX WITH VITAMIN C) tablet Take 1 tablet by mouth daily      Blood Glucose Monitoring Suppl (ONE TOUCH ULTRA 2) w/Device KIT Test glucose 3 times daily 1 each 0    calcitriol (ROCALTROL) 0 5 MCG capsule Take 1 capsule (0 5 mcg total) by mouth daily 90 capsule 3    cholecalciferol 2000 units TABS Take 1 tablet (2,000 Units total) by mouth daily 30 tablet 0    docusate sodium (COLACE) 100 mg capsule Take 1 capsule (100 mg total) by mouth 2 (two) times a day 10 capsule 0    HumaLOG Mix 75/25 KwikPen (75-25) 100 units/mL injection pen INJECT 32 UNITS UNDER THE SKIN TWICE A DAY WITH MEALS 150 mL 3    hydrALAZINE (APRESOLINE) 10 mg tablet Take 1 tablet (10 mg total) by mouth every 8 (eight) hours 270 tablet 3    isosorbide mononitrate (IMDUR) 30 mg 24 hr tablet Take 1 tablet (30 mg total) by mouth daily 90 tablet 3    linaGLIPtin (TRADJENTA) 5 MG TABS Take 5 mg by mouth daily 90 tablet 3    metoprolol succinate (TOPROL-XL) 25 mg 24 hr tablet Take 2 tablets (50 mg total) by mouth 2 (two) times a day 180 tablet 0    MULTIPLE VITAMIN PO Take 1 tablet by mouth daily      ONE TOUCH ULTRA TEST test strip TEST GLUCOSE THREE TIMES A  each 3    polyethylene glycol (MIRALAX) 17 g packet Take 17 g by mouth daily as needed (Constipation) 14 each 0    torsemide (DEMADEX) 20 mg tablet TAKE 2 TABLETS DAILY IN THE MORNING AND TAKE AN ADDITIONAL TABLET IN THE EVENING FOR WEIGHT GAIN OR WORSENING SWELLING 270 tablet 0    warfarin (COUMADIN) 2 mg tablet TAKE 1-1/2 TABLETS DAILY OR AS DIRECTED 180 tablet 1    warfarin (COUMADIN) 5 mg tablet Daily as directed 90 tablet 1    Lancets (onetouch ultrasoft) lancets Test glucose 3 times a day; Code: E11 8 300 each 1    warfarin (COUMADIN) 1 mg tablet Take daily as directed 90 tablet 1     No current facility-administered medications for this visit  Objective:    /74   Pulse 70   Temp (!) 97 3 °F (36 3 °C)   Resp 16   Ht 5' (1 524 m)   Wt 88 kg (194 lb)   SpO2 97%   BMI 37 89 kg/m²      Physical Exam  Vitals signs and nursing note reviewed  Constitutional:       Appearance: She is well-developed  HENT:      Head: Normocephalic and atraumatic  Right Ear: External ear normal       Left Ear: External ear normal    Cardiovascular:      Rate and Rhythm: Normal rate and regular rhythm  Heart sounds: Normal heart sounds  No murmur  No friction rub  Pulmonary:      Effort: Pulmonary effort is normal  No respiratory distress  Breath sounds: Normal breath sounds  No wheezing or rales  Musculoskeletal:         General: No deformity                 Britt Ceron, DO

## 2020-09-09 ENCOUNTER — TELEPHONE (OUTPATIENT)
Dept: FAMILY MEDICINE CLINIC | Facility: CLINIC | Age: 65
End: 2020-09-09

## 2020-09-09 NOTE — TELEPHONE ENCOUNTER
DR Alyx Kim    Patients daughter has questions about giving her mother a kidney and about dialysis  Please call back

## 2020-09-09 NOTE — TELEPHONE ENCOUNTER
9/9/2020 12:36 PM returned call to [place]  Her daughter is adopted and has a low chance of being able to donate to her  I recommended that she follow up with her mother's nephrologist for further information      Message complete  Omari Swift DO

## 2020-09-10 ENCOUNTER — TELEPHONE (OUTPATIENT)
Dept: NEPHROLOGY | Facility: CLINIC | Age: 65
End: 2020-09-10

## 2020-09-10 NOTE — TELEPHONE ENCOUNTER
Hello    Yes, if she wants to be reevaluated due to change in clinical state   Then is ok    Thank you    np

## 2020-09-10 NOTE — TELEPHONE ENCOUNTER
Patient called to schedule her pre transplant follow up  She told me she was taken off [facility] list in December 2019 but would like to get back on the list  She said she was taken off because of her weight and BMI  She told me she has lost weight since  She is scheduled for 9/22/20 with you in the [facility]   Is the appointment ok to have since she was taken off the list?

## 2020-09-17 ENCOUNTER — OFFICE VISIT (OUTPATIENT)
Dept: PULMONOLOGY | Facility: MEDICAL CENTER | Age: 65
End: 2020-09-17
Payer: COMMERCIAL

## 2020-09-17 VITALS
RESPIRATION RATE: 12 BRPM | DIASTOLIC BLOOD PRESSURE: 80 MMHG | WEIGHT: 194 LBS | HEART RATE: 88 BPM | SYSTOLIC BLOOD PRESSURE: 130 MMHG | OXYGEN SATURATION: 99 % | HEIGHT: 60 IN | TEMPERATURE: 97.8 F | BODY MASS INDEX: 38.09 KG/M2

## 2020-09-17 DIAGNOSIS — Z86.711 HISTORY OF PULMONARY EMBOLUS (PE): ICD-10-CM

## 2020-09-17 DIAGNOSIS — G47.33 OBSTRUCTIVE SLEEP APNEA OF ADULT: Primary | ICD-10-CM

## 2020-09-17 DIAGNOSIS — I50.42 CHRONIC COMBINED SYSTOLIC AND DIASTOLIC CONGESTIVE HEART FAILURE (HCC): ICD-10-CM

## 2020-09-17 DIAGNOSIS — N18.5 STAGE 5 CHRONIC KIDNEY DISEASE NOT ON CHRONIC DIALYSIS (HCC): ICD-10-CM

## 2020-09-17 DIAGNOSIS — G47.34 NOCTURNAL HYPOXEMIA: ICD-10-CM

## 2020-09-17 DIAGNOSIS — N18.6 ESRD (END STAGE RENAL DISEASE) (HCC): ICD-10-CM

## 2020-09-17 DIAGNOSIS — R06.00 DYSPNEA ON EXERTION: ICD-10-CM

## 2020-09-17 DIAGNOSIS — I27.20 PULMONARY HYPERTENSION (HCC): ICD-10-CM

## 2020-09-17 PROBLEM — R09.02 EXERCISE HYPOXEMIA: Status: RESOLVED | Noted: 2019-04-01 | Resolved: 2020-09-17

## 2020-09-17 PROBLEM — N18.9 ACUTE KIDNEY INJURY SUPERIMPOSED ON CKD (HCC): Status: RESOLVED | Noted: 2020-07-13 | Resolved: 2020-09-17

## 2020-09-17 PROBLEM — N17.9 ACUTE KIDNEY INJURY SUPERIMPOSED ON CKD (HCC): Status: RESOLVED | Noted: 2020-07-13 | Resolved: 2020-09-17

## 2020-09-17 PROCEDURE — 99214 OFFICE O/P EST MOD 30 MIN: CPT | Performed by: PHYSICIAN ASSISTANT

## 2020-09-17 NOTE — PATIENT INSTRUCTIONS
CHANDRA:  -will have [employer] come to mask fit and adjust     Chronic Nocturnal Hypoxemia:  -ok to use 2-3 L at night  -use overnight pulse oximetry     Chronic Dyspnea:  -improving  -secondary to CHANDRA, hypoxemia and PAH  -repeat PFT after the new year and optimization    Secondary Pulmonary Hypertension:  -will monitor CPAP use and overnight oxygen requirements  -recheck echocardiogram if all parameters are stable    Mixed CHF and ESRD on PD:  -continue Torsemide  -monitor weight for sudden increases  -continue nightly PD  -optimize fluid balances    Follow up in 6 months

## 2020-09-17 NOTE — PROGRESS NOTES
Assessment/Plan:    Problem List Items Addressed This Visit        Respiratory    Obstructive sleep apnea of adult - Primary    Relevant Orders    PAP DME Pressure Change    Nocturnal hypoxemia       Cardiovascular and Mediastinum    Pulmonary hypertension (HCC)    Chronic combined systolic and diastolic congestive heart failure (HCC)       Genitourinary    ESRD (end stage renal disease) ([facility] )    RESOLVED: Stage 5 chronic kidney disease not on chronic dialysis (HCC)       Other    Dyspnea on exertion    History of pulmonary embolus (PE)            Plan for today/next follow-up:    CHANDRA:  -will have DME company come to mask fit and adjust pressures  -will later follow up on compliance after troubleshooting  Chronic Nocturnal Hypoxemia:  -ok to use 2-3 L at night  -use overnight pulse oximetry once established on CPAP  -will     Chronic Dyspnea:  -improving  -secondary to CHANDRA, hypoxemia and PAH  -repeat PFT after the new year and optimization    Secondary Pulmonary Hypertension:  -will monitor CPAP use and overnight oxygen requirements  -recheck echocardiogram if all parameters are stable    Mixed CHF and ESRD on PD:  -continue Torsemide  -monitor weight for sudden increases  -continue nightly PD  -optimize fluid balances    Follow up in 6 months          Return in about 6 months (around 3/17/2021), or if symptoms worsen or fail to improve  All questions are answered to the patient's satisfaction and understanding  She verbalizes understanding  She is encouraged to call with any further questions or concerns      ______________________________________________________________________    Chief Complaint:   Chief Complaint   Patient presents with   [facility] follow up       Patient ID: Kaitlin Boyer is a 63 y o  y o  female has a past medical history of Acute asthmatic bronchitis, Cardiac disease, Chronic diastolic (congestive) heart failure ([facility] ), Colon polyp, Diabetes mellitus ([facility] ), Hyperlipidemia, Hypertension, Medical non-compliance, Morbid obesity with BMI of 40 0-44 9, adult ([facility] ) (2/28/2019), Pneumonia, Pulmonary embolism ([facility] ), Renal disorder, Severe obstructive sleep apnea, and Severe pulmonary arterial systolic hypertension ([facility] )  9/17/2020  Patient presents today for follow-up visit for:  CHANDRA on CPAP:    She has known ESRD and recently placed on PD  She has chronic CDCHF and chronic nocturnal hypoxemia on 2 L overnight and prior intolerance of CPAP  She has severe CHANDRA  She has an auto CPAP  cm w/ REsmed  / full face mask  She is tolerating her CPAP as well as she can but is having some difficulty getting used to the mask with issues with leaking and pressure  Additionally she feels as though she is not getting enough pressure or air when she is breathing in her CPAP machine  She has a CPAP set @ 8 cm h20 w/ an AHI of 4+  We did discuss increasing her pressure and I have placed orders for patient to have AUTO CPAP 5-20 cm h20  Will follow up on compliance thereafter pressure changes adjustments are made in the following 30-60 days  Overall she is much less short of breath recently after having started peritoneal dialysis given her ESRD  She is still on Torsemide 40 mg daily and doing well  She is no longer requiring oxygen ambulating or overnight  Will perform overnight pulse oximetry once patient is well established, treated, and compliant on CPAP  Will later repeat ECHO once dialysis is well established and routine as well as complete resolution of nocturnal events and hypoxemia is resolved  Will follow up overnight pulse ox at that time    Follow up in 6 months        Smoking history:  None never  Occupational history:  Deferred  Environmental History:  No exposure history  Travel history:  No recent travel  Respiratory History:  History of severe CHANDRA/chronic hypoxemic respiratory failure/pulmonary embolus/pulmonary HTN  Oxygen Therapy: History of 02 dependence of 3 L   PAP Therapy: prior auto CPAP / non compliance  DME Company:[employer]  Rx Insurance: BC/BS     Review of Systems   Constitutional: Negative for activity change, appetite change, chills, fatigue and fever  HENT: Negative for postnasal drip, rhinorrhea, sinus pressure and sinus pain  Eyes: Negative for visual disturbance  Respiratory: Positive for shortness of breath  Negative for apnea, cough, chest tightness, wheezing and stridor  Mild SOB on moderate to brisk exercise > markedly improved since hospitalization   Cardiovascular: Negative for chest pain and leg swelling  Gastrointestinal: Negative for diarrhea, nausea and vomiting  Endocrine: Negative for cold intolerance and heat intolerance  Musculoskeletal: Negative for arthralgias, back pain, joint swelling and myalgias  Skin: Negative for color change  Neurological: Negative for syncope and light-headedness  Hematological: Negative for adenopathy  Psychiatric/Behavioral: Negative for confusion and sleep disturbance  The following portions of the patient's history were reviewed and updated as appropriate: allergies, current medications, past family history, past medical history, past social history, past surgical history and problem list     Smoking history: She reports that she has never smoked  She has never used smokeless tobacco   Social history: She reports that she has never smoked  She has never used smokeless tobacco  She reports that she does not drink alcohol or use drugs    Past Medical History:   Diagnosis Date    Acute asthmatic bronchitis     last assessed: 6/2/2017    Cardiac disease     Chronic diastolic (congestive) heart failure (HCC)     Colon polyp     Diabetes [facility] )     Hyperlipidemia     Hypertension     Medical non-compliance     Morbid obesity with BMI of 40 0-44 9, adult ([facility] ) 2/28/2019    Pneumonia     last assessed: 6/5/2013    Pulmonary embolism ([facility] )     Renal disorder     possible clot noted in kidney, thus blood thinners currently    Severe obstructive sleep apnea     Severe pulmonary arterial systolic hypertension (HCC)      Past Surgical History:   Procedure Laterality Date     SECTION       x 1    NOSE SURGERY      fractured nose - septoplasty     Family History   Problem Relation Age of Onset    Diabetes Mother         mellitus    Anxiety disorder Mother         generalized anxiety disorder    Hypertension Mother     Kidney disease Father     Kidney failure Father         renal failure    Ulcerative colitis Sister     Heart disease Family     Ovarian cancer Maternal Aunt      Immunization History   Administered Date(s) Administered     Influenza (IM) Preservative Free 10/30/2019    INFLUENZA 10/01/2018, 10/30/2019    Influenza Quadrivalent, 6-35 Months IM 10/03/2016    Influenza TIV (IM) 2012    Influenza, recombinant, quadrivalent,injectable, preservative free 2020    Pneumococcal Polysaccharide PPV23 2012    Tdap 2016     Current Outpatient Medications   Medication Sig Dispense Refill    atorvastatin (LIPITOR) 20 mg tablet Take 1 tablet (20 mg total) by mouth daily 90 tablet 1    B Complex-C (B-COMPLEX WITH VITAMIN C) tablet Take 1 tablet by mouth daily      Blood Glucose Monitoring Suppl (ONE TOUCH ULTRA 2) w/Device KIT Test glucose 3 times daily 1 each 0    cholecalciferol 2000 units TABS Take 1 tablet (2,000 Units total) by mouth daily 30 tablet 0    docusate sodium (COLACE) 100 mg capsule Take 1 capsule (100 mg total) by mouth 2 (two) times a day 10 capsule 0    HumaLOG Mix 75/25 KwikPen (75-25) 100 units/mL injection pen INJECT 32 UNITS UNDER THE SKIN TWICE A DAY WITH MEALS 150 mL 3    hydrALAZINE (APRESOLINE) 10 mg tablet Take 1 tablet (10 mg total) by mouth every 8 (eight) hours 270 tablet 3    isosorbide mononitrate (IMDUR) 30 mg 24 hr tablet Take 1 tablet (30 mg total) by mouth daily 90 tablet 3    Lancets (onetouch ultrasoft) lancets Test glucose 3 times a day; Code: E11 8 300 each 1    linaGLIPtin (Tradjenta) 5 MG TABS Take 5 mg by mouth daily 90 tablet 3    metoprolol succinate (TOPROL-XL) 25 mg 24 hr tablet Take 2 tablets (50 mg total) by mouth 2 (two) times a day 180 tablet 0    MULTIPLE VITAMIN PO Take 1 tablet by mouth daily      ONE TOUCH ULTRA TEST test strip TEST GLUCOSE THREE TIMES A  each 3    polyethylene glycol (MIRALAX) 17 g packet Take 17 g by mouth daily as needed (Constipation) 14 each 0    torsemide (DEMADEX) 20 mg tablet TAKE 2 TABLETS DAILY IN THE MORNING AND TAKE AN ADDITIONAL TABLET IN THE EVENING FOR WEIGHT GAIN OR WORSENING SWELLING 270 tablet 0    warfarin (COUMADIN) 1 mg tablet Take daily as directed 90 tablet 1    warfarin (COUMADIN) 2 mg tablet TAKE 1-1/2 TABLETS DAILY OR AS DIRECTED 180 tablet 1    warfarin (COUMADIN) 5 mg tablet Daily as directed 90 tablet 1    calcitriol (ROCALTROL) 0 5 MCG capsule Take 1 capsule (0 5 mcg total) by mouth daily (Patient not taking: Reported on 9/17/2020) 90 capsule 3    HYDROXYUREA PO Take 20 mg/kg by mouth daily       No current facility-administered medications for this visit  Allergies: Patient has no known allergies  Objective:  Vitals:    09/17/20 0906   BP: 130/80   BP Location: Right arm   Patient Position: Sitting   Cuff Size: Standard   Pulse: 88   Resp: 12   Temp: 97 8 °F (36 6 °C)   TempSrc: Temporal   SpO2: 99%   Weight: 88 kg (194 lb)   Height: 5' (1 524 m)   Oxygen Therapy  SpO2: 99 %    Wt Readings from Last 3 Encounters:   09/17/20 88 kg (194 lb)   09/08/20 88 5 kg (195 lb)   09/08/20 88 kg (194 lb)     Body mass index is 37 89 kg/m²  Physical Exam  Constitutional:       Appearance: She is well-developed  HENT:      Head: Normocephalic and atraumatic  Eyes:      Conjunctiva/sclera: Conjunctivae normal       Pupils: Pupils are equal, round, and reactive to light  Neck:      Musculoskeletal: Normal range of motion and neck supple  Cardiovascular:      Rate and Rhythm: Normal rate and regular rhythm  Heart sounds: Normal heart sounds  Pulmonary:      Effort: Pulmonary effort is normal  No respiratory distress  Breath sounds: Normal breath sounds  No wheezing or rales  Chest:      Chest wall: No tenderness  Abdominal:      General: Bowel sounds are normal       Palpations: Abdomen is soft  Musculoskeletal: Normal range of motion  Skin:     General: Skin is warm and dry  Neurological:      Mental Status: She is alert and oriented to person, place, and time  Lab Review:   Appointment on 09/08/2020   Component Date Value    Hemoglobin A1C 09/08/2020 7 3*    EAG 09/08/2020 163     Vit D, 25-Hydroxy 09/08/2020 38 2     Cholesterol 09/08/2020 132     Triglycerides 09/08/2020 110     HDL, Direct 09/08/2020 56     LDL Calculated 09/08/2020 54     Non-HDL-Chol (CHOL-HDL) 09/08/2020 76     Sodium 09/08/2020 137     Potassium 09/08/2020 4 3     Chloride 09/08/2020 99*    CO2 09/08/2020 30     ANION GAP 09/08/2020 8     BUN 09/08/2020 59*    Creatinine 09/08/2020 5 14*    Glucose, Fasting 09/08/2020 121*    Calcium 09/08/2020 9 3     eGFR 09/08/2020 8     Protime 09/08/2020 23 7*    INR 09/08/2020 2 14*   Appointment on 08/28/2020   Component Date Value    Protime 08/28/2020 26 6*    INR 08/28/2020 2 49*   Appointment on 08/19/2020   Component Date Value    Protime 08/19/2020 21 5*    INR 08/19/2020 1 90*   Lab on 08/17/2020   Component Date Value    Protime 08/17/2020 33 6*    INR 08/17/2020 3 37*   Ancillary Orders on 08/14/2020   Component Date Value    Protime 08/14/2020 47 0*    INR 08/14/2020 5 19*   Appointment on 07/30/2020   Component Date Value    Protime 07/30/2020 26 2*    INR 07/30/2020 2 45*   Appointment on 07/27/2020   Component Date Value    Protime 07/27/2020 47 7*    INR 07/27/2020 5 30*   No results displayed because visit has over 200 results            Diagnostics:  No orders to display       PFT/DMITRY:    Pulmonary Function Test Report  Guanaco Hayes 60 y o  female MRN: [id number]     Date of Testin2019     Date of Interpretation: 2019     Requesting Physician: Dr Maria Antonia Mark     Reason for Testing: Exertional SOB, cough     Reference set for interpretation: [id number]     Procedure: The patient was taken to pulmonary function testing laboratory  The patient demonstrated good effort and cooperation  The results of this test meet ATS standards for acceptability and repeatability  Data set appears appropriate for interpretation  The results of this test appear to be valid, although the ATS standard for three acceptable maneuvers was not met      Post bronchodilator testing performed after the administration of 2 5mg albuterol in 3cc normal saline administered via nebulizer per bronchodilator protocol      Results:  FEV1/FVC Ratio: 84 %  Forced Vital Capacity: 1 34 L    50 % predicted  FEV1: 1 13 L     53 % predicted     Lung volumes by body plethysmography:   Total Lung Capacity 66 % predicted   Residual volume 87 % predicted     DLCO corrected for patients hemoglobin level: 29 %     Interpretation:     · No obstructive airflow defect      · No significant response to the administration to bronchodilator per ATS Standards     · Moderate restrictive lung disease as indicated by decreased TLC     · Severely decreased diffusion capacity     HAM Arredondo  [facility]'s Pulmonary and Critical Care    PSG:       Patient Name: Patsy Bee  Patient Details: Female, 60 years    Patient #:  [id number]  YOB: 1955    Referring Physician:  Natalia Hughes DO  Height:  59 0 in    Interpreting Physician:  Juan Phipps MD  Weight:  217 0 lbs    Study Date:  2019  B  M I :  43 8      AHI 31 3      Compliance:         ESS:    No results found      EKG/ECHO:    [address]  [address] [facility] [address]  [phone]     Transthoracic Echocardiogram  2D, M-mode, Doppler, and Color Doppler     Study date:  2020     Patient: Andressa Valadez  MR number: [id number]  Account number: [de-identified]  : 1955  Age: 63 years  Gender: Female  Status: Inpatient  Location: Bedside  Height: 60 in  Weight: 209 7 lb  BP: 129/ 66 mmHg     Indications: Atrial Flutter     Diagnoses: 427 32 - ATRIAL FLUTTER     Sonographer:  JESUS Montiel  Primary Physician:  Francheska Ballard DO  Referring Physician:  Valentino Church, CRNP  Group:  [facility]  Interpreting Physician:  Brittany Giles MD     SUMMARY     LEFT VENTRICLE:  Systolic function was moderately to markedly reduced  Ejection fraction was estimated to be 35 %  There was moderate diffuse hypokinesis  Wall thickness was mildly increased  There was mild concentric hypertrophy  Features were consistent with a pseudonormal left ventricular filling pattern, with concomitant abnormal relaxation and increased filling pressure (grade 2 diastolic dysfunction)      RIGHT VENTRICLE:  The ventricle was mildly dilated      LEFT ATRIUM:  The atrium was mildly dilated      RIGHT ATRIUM:  The atrium was mildly to moderately dilated      MITRAL VALVE:  There was mild to moderate annular calcification  There was mild to moderate regurgitation      AORTIC VALVE:  There was mild regurgitation      TRICUSPID VALVE:  There was at least moderate regurgitation  Estimated peak PA pressure was 60 mmHg  May be underestimated due to eccentric jet of TR     PULMONIC VALVE:  There was mild regurgitation      IVC, HEPATIC VEINS:  The inferior vena cava was mildly dilated  The respirophasic change in diameter was less than 50%      HISTORY: PRIOR HISTORY: HTN, CKD, DM, CHANDRA, PE, PHTN, Obesity     PROCEDURE: The procedure was performed at the bedside  This was a routine study  The transthoracic approach was used  The study included complete 2D imaging, M-mode, complete spectral Doppler, and color Doppler   The heart rate was 72 bpm,  at the start of the study  Image quality was adequate      LEFT VENTRICLE: Size was normal  Systolic function was moderately to markedly reduced  Ejection fraction was estimated to be 35 %  There was moderate diffuse hypokinesis  Wall thickness was mildly increased  There was mild concentric  hypertrophy  DOPPLER: Features were consistent with a pseudonormal left ventricular filling pattern, with concomitant abnormal relaxation and increased filling pressure (grade 2 diastolic dysfunction)      RIGHT VENTRICLE: The ventricle was mildly dilated  Systolic function was normal      LEFT ATRIUM: The atrium was mildly dilated      RIGHT ATRIUM: The atrium was mildly to moderately dilated      MITRAL VALVE: There was mild to moderate annular calcification  There was normal leaflet separation  DOPPLER: The transmitral velocity was within the normal range  There was no evidence for stenosis  There was mild to moderate  regurgitation      AORTIC VALVE: The valve was trileaflet  Leaflets exhibited mildly increased thickness, mild to moderate calcification, and mildly reduced cuspal separation  DOPPLER: Transaortic velocity was within the normal range  There was no evidence  for stenosis  There was mild regurgitation      TRICUSPID VALVE: DOPPLER: There was at least moderate regurgitation  Estimated peak PA pressure was 60 mmHg  May be underestimated due to eccentric jet of TR     PULMONIC VALVE: DOPPLER: There was mild regurgitation      PERICARDIUM: There was no thickening or calcification  There was no pericardial effusion      AORTA: The root exhibited normal size      SYSTEMIC VEINS: IVC: The inferior vena cava was mildly dilated   The respirophasic change in diameter was less than 50%      SYSTEM MEASUREMENT TABLES     2D mode  AoR Diam 2D: 2 9 cm  LA Diam (2D): 4 4 cm  LA/Ao (2D): 1 52  FS (2D Teich): 18 2 %  IVSd (2D): 1 1 cm  LVDEV: 106 cmï¾³  LVESV: 65 9 cmï¾³  LVIDd(2D): 4 77 cm  LVISd (2D): 3 9 cm  LVPWd (2D): 1 2 cm  SV (Teich): 40 1 cmï¾³     Apical four chamber  LVEF A4C: 40 %     Unspecified Scan Mode  MV Peak A Ravinder: 378 mm/s  MV Peak E Ravinder  Mean: 1180 mm/s  MVA (PHT): 4 15 cmï¾²  PHT: 53 ms  Max P mm[Hg]  Max P mm[Hg]  V Max: 3380 mm/s  Vmax: 3250 mm/s  RA Area: 15 8 cmï¾²  RA Volume: 46 9 cmï¾³  TAPSE: 1 3 cm     [employer] Accredited [employer]chocardi[facility]     Prepared and electronically signed by  Annie Esparza MD  Signed 2020 16:16:17      Portions of the record may have been created with voice recognition software  Occasional wrong word or "sound a like" substitutions may have occurred due to the inherent limitations of voice recognition software  Read the chart carefully and recognize, using context, where substitutions have occurred      Electronically Signed by Nazario Rouse PA-C

## 2020-09-21 ENCOUNTER — TELEPHONE (OUTPATIENT)
Dept: NEPHROLOGY | Facility: CLINIC | Age: 65
End: 2020-09-21

## 2020-09-22 ENCOUNTER — OFFICE VISIT (OUTPATIENT)
Dept: NEPHROLOGY | Facility: CLINIC | Age: 65
End: 2020-09-22
Payer: COMMERCIAL

## 2020-09-22 VITALS
RESPIRATION RATE: 16 BRPM | SYSTOLIC BLOOD PRESSURE: 124 MMHG | BODY MASS INDEX: 37.3 KG/M2 | HEART RATE: 66 BPM | HEIGHT: 60 IN | DIASTOLIC BLOOD PRESSURE: 70 MMHG | WEIGHT: 190 LBS

## 2020-09-22 DIAGNOSIS — Z01.818 PRE-TRANSPLANT EVALUATION FOR ESRD (END STAGE RENAL DISEASE): Primary | ICD-10-CM

## 2020-09-22 PROCEDURE — 99215 OFFICE O/P EST HI 40 MIN: CPT | Performed by: INTERNAL MEDICINE
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
## 2020-09-22 NOTE — PATIENT INSTRUCTIONS
1) Please let your Cardiologist know that you are being evaluated for renal transplant  2) we will schedule you for follow up appointment in 4 months

## 2020-09-23 ENCOUNTER — TELEPHONE (OUTPATIENT)
Dept: FAMILY MEDICINE CLINIC | Facility: CLINIC | Age: 65
End: 2020-09-23

## 2020-09-23 DIAGNOSIS — I48.92 ATRIAL FLUTTER WITH RAPID VENTRICULAR RESPONSE (HCC): Primary | ICD-10-CM

## 2020-09-23 LAB
LEFT EYE DIABETIC RETINOPATHY: NORMAL
RIGHT EYE DIABETIC RETINOPATHY: NORMAL

## 2020-09-23 NOTE — TELEPHONE ENCOUNTER
9/23/2020 1:21 PM called Mendy Amezquita and discussed her upcoming eye surgery  Advised to call her endocrinologist regarding her insulin instructions  Her chads 2 Vasc score is 6 and therefore will have patient bridged with Lovenox before her procedure  Her surgery is now 5 days away  She stopped her Coumadin this morning  She has a few Lovenox that are still good at home from her last procedure  New prescription for Lovenox as well to make sure she has enough to last until 24 hours before her procedure  We also discussed that she is high risk for surgery  Form completed and faxed as requested      Message marjan Musa DO

## 2020-09-23 NOTE — TELEPHONE ENCOUNTER
Pt dropped off forms to be filled out since she is having eye surgery on 9/28  I do not see a preop has been done  Can the forms be filled put or does she need to be seen? Placed in your folder    Pls advise

## 2020-09-28 ENCOUNTER — PATIENT OUTREACH (OUTPATIENT)
Dept: CASE MANAGEMENT | Facility: OTHER | Age: 65
End: 2020-09-28

## 2020-09-28 NOTE — PROGRESS NOTES
Chart reviewed  Patient has had follow up appointments with providers for pre-op clearance & transplant clearance  She is doing PD  Not using her CPAP, will discuss a OP RT referral with the patient after her eye surgery

## 2020-10-02 ENCOUNTER — OFFICE VISIT (OUTPATIENT)
Dept: CARDIOLOGY CLINIC | Facility: CLINIC | Age: 65
End: 2020-10-02
Payer: COMMERCIAL

## 2020-10-02 VITALS
WEIGHT: 192 LBS | OXYGEN SATURATION: 96 % | SYSTOLIC BLOOD PRESSURE: 132 MMHG | HEIGHT: 60 IN | BODY MASS INDEX: 37.69 KG/M2 | HEART RATE: 68 BPM | DIASTOLIC BLOOD PRESSURE: 78 MMHG | TEMPERATURE: 97.2 F

## 2020-10-02 DIAGNOSIS — N18.6 END STAGE KIDNEY DISEASE (HCC): ICD-10-CM

## 2020-10-02 DIAGNOSIS — I27.20 PULMONARY HYPERTENSION (HCC): ICD-10-CM

## 2020-10-02 DIAGNOSIS — E66.9 CLASS 2 OBESITY: ICD-10-CM

## 2020-10-02 DIAGNOSIS — I50.42 CHRONIC COMBINED SYSTOLIC AND DIASTOLIC CONGESTIVE HEART FAILURE (HCC): ICD-10-CM

## 2020-10-02 DIAGNOSIS — E78.2 MIXED DYSLIPIDEMIA: ICD-10-CM

## 2020-10-02 DIAGNOSIS — E11.00 TYPE 2 DIABETES MELLITUS WITH HYPEROSMOLARITY WITHOUT COMA, WITH LONG-TERM CURRENT USE OF INSULIN (HCC): ICD-10-CM

## 2020-10-02 DIAGNOSIS — I34.2 NON-RHEUMATIC MITRAL VALVE STENOSIS: ICD-10-CM

## 2020-10-02 DIAGNOSIS — I48.92 PAROXYSMAL ATRIAL FLUTTER (HCC): Primary | ICD-10-CM

## 2020-10-02 DIAGNOSIS — Z79.4 TYPE 2 DIABETES MELLITUS WITH HYPEROSMOLARITY WITHOUT COMA, WITH LONG-TERM CURRENT USE OF INSULIN (HCC): ICD-10-CM

## 2020-10-02 PROCEDURE — 93000 ELECTROCARDIOGRAM COMPLETE: CPT | Performed by: INTERNAL MEDICINE

## 2020-10-02 PROCEDURE — 3078F DIAST BP <80 MM HG: CPT | Performed by: INTERNAL MEDICINE

## 2020-10-02 PROCEDURE — 99214 OFFICE O/P EST MOD 30 MIN: CPT | Performed by: INTERNAL MEDICINE

## 2020-10-02 PROCEDURE — 1036F TOBACCO NON-USER: CPT | Performed by: INTERNAL MEDICINE

## 2020-10-02 RX ORDER — POLYMYXIN B SULFATE AND TRIMETHOPRIM 1; 10000 MG/ML; [USP'U]/ML
SOLUTION OPHTHALMIC
COMMUNITY
Start: 2020-09-25 | End: 2021-09-29 | Stop reason: ALTCHOICE

## 2020-10-02 RX ORDER — PREDNISOLONE ACETATE 10 MG/ML
SUSPENSION/ DROPS OPHTHALMIC
COMMUNITY
Start: 2020-09-25 | End: 2021-09-29 | Stop reason: ALTCHOICE

## 2020-10-05 DIAGNOSIS — Z79.4 TYPE 2 DIABETES MELLITUS WITH COMPLICATION, WITH LONG-TERM CURRENT USE OF INSULIN (HCC): Primary | ICD-10-CM

## 2020-10-05 DIAGNOSIS — N18.4 STAGE 4 CHRONIC KIDNEY DISEASE (HCC): ICD-10-CM

## 2020-10-05 DIAGNOSIS — E11.8 TYPE 2 DIABETES MELLITUS WITH COMPLICATION, WITH LONG-TERM CURRENT USE OF INSULIN (HCC): Primary | ICD-10-CM

## 2020-10-05 RX ORDER — METOPROLOL SUCCINATE 25 MG/1
50 TABLET, EXTENDED RELEASE ORAL 2 TIMES DAILY
Qty: 360 TABLET | Refills: 1 | Status: SHIPPED | OUTPATIENT
Start: 2020-10-05 | End: 2021-03-02 | Stop reason: ALTCHOICE

## 2020-10-13 ENCOUNTER — PATIENT OUTREACH (OUTPATIENT)
Dept: CASE MANAGEMENT | Facility: OTHER | Age: 65
End: 2020-10-13

## 2020-10-20 LAB
LEFT EYE DIABETIC RETINOPATHY: NORMAL
RIGHT EYE DIABETIC RETINOPATHY: NORMAL

## 2020-10-20 PROCEDURE — 2022F DILAT RTA XM EVC RTNOPTHY: CPT | Performed by: INTERNAL MEDICINE

## 2020-11-02 ENCOUNTER — HOSPITAL ENCOUNTER (OUTPATIENT)
Dept: NON INVASIVE DIAGNOSTICS | Facility: HOSPITAL | Age: 65
Discharge: HOME | End: 2020-11-02
Attending: INTERNAL MEDICINE
Payer: COMMERCIAL

## 2020-11-02 DIAGNOSIS — I27.20 PULMONARY HYPERTENSION (HCC): ICD-10-CM

## 2020-11-02 DIAGNOSIS — I50.42 CHRONIC COMBINED SYSTOLIC AND DIASTOLIC CONGESTIVE HEART FAILURE (HCC): ICD-10-CM

## 2020-11-02 PROCEDURE — 93308 TTE F-UP OR LMTD: CPT

## 2020-11-03 ENCOUNTER — TELEPHONE (OUTPATIENT)
Dept: CARDIOLOGY CLINIC | Facility: CLINIC | Age: 65
End: 2020-11-03

## 2020-11-03 PROCEDURE — 93325 DOPPLER ECHO COLOR FLOW MAPG: CPT | Performed by: INTERNAL MEDICINE

## 2020-11-03 PROCEDURE — 93321 DOPPLER ECHO F-UP/LMTD STD: CPT | Performed by: INTERNAL MEDICINE

## 2020-11-03 PROCEDURE — 93308 TTE F-UP OR LMTD: CPT | Performed by: INTERNAL MEDICINE

## 2020-11-11 ENCOUNTER — OFFICE VISIT (OUTPATIENT)
Dept: ENDOCRINOLOGY | Facility: CLINIC | Age: 65
End: 2020-11-11
Payer: COMMERCIAL

## 2020-11-11 VITALS
DIASTOLIC BLOOD PRESSURE: 78 MMHG | TEMPERATURE: 95 F | WEIGHT: 200 LBS | SYSTOLIC BLOOD PRESSURE: 140 MMHG | BODY MASS INDEX: 39.27 KG/M2 | HEIGHT: 60 IN | HEART RATE: 64 BPM

## 2020-11-11 DIAGNOSIS — E11.8 TYPE 2 DIABETES MELLITUS WITH COMPLICATION, WITH LONG-TERM CURRENT USE OF INSULIN (HCC): Primary | ICD-10-CM

## 2020-11-11 DIAGNOSIS — E78.2 MIXED HYPERLIPIDEMIA: ICD-10-CM

## 2020-11-11 DIAGNOSIS — Z79.4 TYPE 2 DIABETES MELLITUS WITH COMPLICATION, WITH LONG-TERM CURRENT USE OF INSULIN (HCC): Primary | ICD-10-CM

## 2020-11-11 DIAGNOSIS — I10 HYPERTENSION GOAL BP (BLOOD PRESSURE) < 140/90: ICD-10-CM

## 2020-11-11 PROCEDURE — 3078F DIAST BP <80 MM HG: CPT | Performed by: INTERNAL MEDICINE

## 2020-11-11 PROCEDURE — 3008F BODY MASS INDEX DOCD: CPT | Performed by: INTERNAL MEDICINE

## 2020-11-11 PROCEDURE — 1036F TOBACCO NON-USER: CPT | Performed by: INTERNAL MEDICINE

## 2020-11-11 PROCEDURE — 3077F SYST BP >= 140 MM HG: CPT | Performed by: INTERNAL MEDICINE

## 2020-11-11 PROCEDURE — 99214 OFFICE O/P EST MOD 30 MIN: CPT | Performed by: INTERNAL MEDICINE

## 2020-11-11 RX ORDER — GENTAMICIN SULFATE 1 MG/G
CREAM TOPICAL
COMMUNITY
Start: 2020-10-09 | End: 2021-09-29 | Stop reason: ALTCHOICE

## 2020-11-11 RX ORDER — INSULIN LISPRO 100 [IU]/ML
INJECTION, SUSPENSION SUBCUTANEOUS
COMMUNITY
End: 2021-09-29 | Stop reason: ALTCHOICE

## 2020-11-25 ENCOUNTER — LAB (OUTPATIENT)
Dept: LAB | Facility: HOSPITAL | Age: 65
End: 2020-11-25
Payer: COMMERCIAL

## 2020-11-25 ENCOUNTER — ANTICOAG VISIT (OUTPATIENT)
Dept: FAMILY MEDICINE CLINIC | Facility: CLINIC | Age: 65
End: 2020-11-25

## 2020-11-25 ENCOUNTER — TELEPHONE (OUTPATIENT)
Dept: FAMILY MEDICINE CLINIC | Facility: CLINIC | Age: 65
End: 2020-11-25

## 2020-11-25 ENCOUNTER — TRANSCRIBE ORDERS (OUTPATIENT)
Dept: ADMINISTRATIVE | Facility: HOSPITAL | Age: 65
End: 2020-11-25

## 2020-11-25 DIAGNOSIS — Z79.4 TYPE 2 DIABETES MELLITUS WITH COMPLICATION, WITH LONG-TERM CURRENT USE OF INSULIN (HCC): ICD-10-CM

## 2020-11-25 DIAGNOSIS — E78.2 MIXED HYPERLIPIDEMIA: ICD-10-CM

## 2020-11-25 DIAGNOSIS — I10 HYPERTENSION GOAL BP (BLOOD PRESSURE) < 140/90: ICD-10-CM

## 2020-11-25 DIAGNOSIS — E11.8 TYPE 2 DIABETES MELLITUS WITH COMPLICATION, WITH LONG-TERM CURRENT USE OF INSULIN (HCC): ICD-10-CM

## 2020-11-25 LAB
ALBUMIN SERPL BCP-MCNC: 3 G/DL (ref 3.5–5)
ALP SERPL-CCNC: 78 U/L (ref 46–116)
ALT SERPL W P-5'-P-CCNC: 26 U/L (ref 12–78)
ANION GAP SERPL CALCULATED.3IONS-SCNC: 11 MMOL/L (ref 4–13)
AST SERPL W P-5'-P-CCNC: 16 U/L (ref 5–45)
BILIRUB SERPL-MCNC: 0.6 MG/DL (ref 0.2–1)
BUN SERPL-MCNC: 63 MG/DL (ref 5–25)
CALCIUM ALBUM COR SERPL-MCNC: 9.6 MG/DL (ref 8.3–10.1)
CALCIUM SERPL-MCNC: 8.8 MG/DL (ref 8.3–10.1)
CHLORIDE SERPL-SCNC: 105 MMOL/L (ref 100–108)
CHOLEST SERPL-MCNC: 153 MG/DL (ref 50–200)
CO2 SERPL-SCNC: 24 MMOL/L (ref 21–32)
CREAT SERPL-MCNC: 5.08 MG/DL (ref 0.6–1.3)
EST. AVERAGE GLUCOSE BLD GHB EST-MCNC: 148 MG/DL
GFR SERPL CREATININE-BSD FRML MDRD: 8 ML/MIN/1.73SQ M
GLUCOSE P FAST SERPL-MCNC: 118 MG/DL (ref 65–99)
HBA1C MFR BLD: 6.8 %
HDLC SERPL-MCNC: 55 MG/DL
LDLC SERPL CALC-MCNC: 74 MG/DL (ref 0–100)
NONHDLC SERPL-MCNC: 98 MG/DL
POTASSIUM SERPL-SCNC: 4 MMOL/L (ref 3.5–5.3)
PROT SERPL-MCNC: 7.4 G/DL (ref 6.4–8.2)
SODIUM SERPL-SCNC: 140 MMOL/L (ref 136–145)
T4 FREE SERPL-MCNC: 0.76 NG/DL (ref 0.76–1.46)
TRIGL SERPL-MCNC: 122 MG/DL
TSH SERPL DL<=0.05 MIU/L-ACNC: 19.39 UIU/ML (ref 0.36–3.74)

## 2020-11-25 PROCEDURE — 84443 ASSAY THYROID STIM HORMONE: CPT

## 2020-11-25 PROCEDURE — 82985 ASSAY OF GLYCATED PROTEIN: CPT

## 2020-11-25 PROCEDURE — 86376 MICROSOMAL ANTIBODY EACH: CPT

## 2020-11-25 PROCEDURE — 3044F HG A1C LEVEL LT 7.0%: CPT | Performed by: INTERNAL MEDICINE

## 2020-11-25 PROCEDURE — 80061 LIPID PANEL: CPT

## 2020-11-25 PROCEDURE — 84439 ASSAY OF FREE THYROXINE: CPT

## 2020-11-25 PROCEDURE — 80053 COMPREHEN METABOLIC PANEL: CPT

## 2020-11-25 PROCEDURE — 83036 HEMOGLOBIN GLYCOSYLATED A1C: CPT

## 2020-11-26 ENCOUNTER — TELEPHONE (OUTPATIENT)
Dept: ENDOCRINOLOGY | Facility: CLINIC | Age: 65
End: 2020-11-26

## 2020-11-26 LAB
FRUCTOSAMINE SERPL-SCNC: 282 UMOL/L (ref 0–285)
THYROPEROXIDASE AB SERPL-ACNC: <9 IU/ML (ref 0–34)

## 2020-11-27 DIAGNOSIS — Z79.4 TYPE 2 DIABETES MELLITUS WITH COMPLICATION, WITH LONG-TERM CURRENT USE OF INSULIN (HCC): Primary | ICD-10-CM

## 2020-11-27 DIAGNOSIS — E11.8 TYPE 2 DIABETES MELLITUS WITH COMPLICATION, WITH LONG-TERM CURRENT USE OF INSULIN (HCC): Primary | ICD-10-CM

## 2020-11-27 DIAGNOSIS — R79.89 ELEVATED TSH: ICD-10-CM

## 2020-11-30 RX ORDER — LEVOTHYROXINE SODIUM 0.05 MG/1
TABLET ORAL
Qty: 90 TABLET | Refills: 2 | Status: SHIPPED | OUTPATIENT
Start: 2020-11-30 | End: 2021-03-02 | Stop reason: SDUPTHER

## 2020-12-01 ENCOUNTER — PATIENT OUTREACH (OUTPATIENT)
Dept: CASE MANAGEMENT | Facility: OTHER | Age: 65
End: 2020-12-01

## 2020-12-01 LAB
LEFT EYE DIABETIC RETINOPATHY: NORMAL
RIGHT EYE DIABETIC RETINOPATHY: NORMAL

## 2020-12-01 PROCEDURE — 2022F DILAT RTA XM EVC RTNOPTHY: CPT | Performed by: NURSE PRACTITIONER

## 2020-12-04 DIAGNOSIS — IMO0002 UNCONTROLLED TYPE 2 DIABETES MELLITUS WITH STAGE 3 CHRONIC KIDNEY DISEASE, WITH LONG-TERM CURRENT USE OF INSULIN: ICD-10-CM

## 2020-12-04 PROCEDURE — 3066F NEPHROPATHY DOC TX: CPT | Performed by: NURSE PRACTITIONER

## 2020-12-04 RX ORDER — BLOOD SUGAR DIAGNOSTIC
STRIP MISCELLANEOUS
Qty: 300 EACH | Refills: 3 | Status: SHIPPED | OUTPATIENT
Start: 2020-12-04 | End: 2022-01-29

## 2020-12-08 ENCOUNTER — OFFICE VISIT (OUTPATIENT)
Dept: FAMILY MEDICINE CLINIC | Facility: CLINIC | Age: 65
End: 2020-12-08
Payer: COMMERCIAL

## 2020-12-08 ENCOUNTER — APPOINTMENT (OUTPATIENT)
Dept: RADIOLOGY | Facility: CLINIC | Age: 65
End: 2020-12-08
Payer: COMMERCIAL

## 2020-12-08 VITALS
BODY MASS INDEX: 40.44 KG/M2 | OXYGEN SATURATION: 94 % | HEIGHT: 60 IN | HEART RATE: 61 BPM | RESPIRATION RATE: 20 BRPM | DIASTOLIC BLOOD PRESSURE: 74 MMHG | SYSTOLIC BLOOD PRESSURE: 136 MMHG | TEMPERATURE: 97.6 F | WEIGHT: 206 LBS

## 2020-12-08 DIAGNOSIS — M79.642 LEFT HAND PAIN: ICD-10-CM

## 2020-12-08 DIAGNOSIS — Z79.4 TYPE 2 DIABETES MELLITUS WITH COMPLICATION, WITH LONG-TERM CURRENT USE OF INSULIN (HCC): ICD-10-CM

## 2020-12-08 DIAGNOSIS — Z23 NEED FOR VACCINATION: ICD-10-CM

## 2020-12-08 DIAGNOSIS — I12.0 BENIGN HYPERTENSION WITH CKD (CHRONIC KIDNEY DISEASE) STAGE V (HCC): ICD-10-CM

## 2020-12-08 DIAGNOSIS — M79.632 LEFT FOREARM PAIN: ICD-10-CM

## 2020-12-08 DIAGNOSIS — I50.42 CHRONIC COMBINED SYSTOLIC AND DIASTOLIC CONGESTIVE HEART FAILURE (HCC): ICD-10-CM

## 2020-12-08 DIAGNOSIS — G47.33 OBSTRUCTIVE SLEEP APNEA OF ADULT: ICD-10-CM

## 2020-12-08 DIAGNOSIS — R20.0 LEFT ARM NUMBNESS: ICD-10-CM

## 2020-12-08 DIAGNOSIS — N18.5 BENIGN HYPERTENSION WITH CKD (CHRONIC KIDNEY DISEASE) STAGE V (HCC): ICD-10-CM

## 2020-12-08 DIAGNOSIS — E11.8 TYPE 2 DIABETES MELLITUS WITH COMPLICATION, WITH LONG-TERM CURRENT USE OF INSULIN (HCC): ICD-10-CM

## 2020-12-08 DIAGNOSIS — Z00.00 ROUTINE ADULT HEALTH MAINTENANCE: Primary | ICD-10-CM

## 2020-12-08 DIAGNOSIS — I48.92 PAROXYSMAL ATRIAL FLUTTER (HCC): ICD-10-CM

## 2020-12-08 PROCEDURE — 99396 PREV VISIT EST AGE 40-64: CPT | Performed by: NURSE PRACTITIONER

## 2020-12-08 PROCEDURE — 73130 X-RAY EXAM OF HAND: CPT

## 2020-12-08 PROCEDURE — 73090 X-RAY EXAM OF FOREARM: CPT

## 2020-12-08 PROCEDURE — 3078F DIAST BP <80 MM HG: CPT | Performed by: NURSE PRACTITIONER

## 2020-12-08 PROCEDURE — 90732 PPSV23 VACC 2 YRS+ SUBQ/IM: CPT | Performed by: NURSE PRACTITIONER

## 2020-12-08 PROCEDURE — 3075F SYST BP GE 130 - 139MM HG: CPT | Performed by: NURSE PRACTITIONER

## 2020-12-08 PROCEDURE — 1036F TOBACCO NON-USER: CPT | Performed by: NURSE PRACTITIONER

## 2020-12-08 PROCEDURE — 3725F SCREEN DEPRESSION PERFORMED: CPT | Performed by: NURSE PRACTITIONER

## 2020-12-08 PROCEDURE — 3008F BODY MASS INDEX DOCD: CPT | Performed by: NURSE PRACTITIONER

## 2020-12-08 PROCEDURE — 90471 IMMUNIZATION ADMIN: CPT | Performed by: NURSE PRACTITIONER

## 2020-12-15 DIAGNOSIS — N18.30 STAGE 3 CHRONIC KIDNEY DISEASE (HCC): ICD-10-CM

## 2020-12-15 RX ORDER — TORSEMIDE 20 MG/1
TABLET ORAL
Qty: 270 TABLET | Refills: 0 | Status: SHIPPED | OUTPATIENT
Start: 2020-12-15 | End: 2021-03-15 | Stop reason: SDUPTHER

## 2020-12-22 ENCOUNTER — OFFICE VISIT (OUTPATIENT)
Dept: CARDIOLOGY CLINIC | Facility: CLINIC | Age: 65
End: 2020-12-22
Payer: COMMERCIAL

## 2020-12-22 VITALS
BODY MASS INDEX: 39.66 KG/M2 | WEIGHT: 202 LBS | HEART RATE: 61 BPM | SYSTOLIC BLOOD PRESSURE: 128 MMHG | HEIGHT: 60 IN | DIASTOLIC BLOOD PRESSURE: 76 MMHG | OXYGEN SATURATION: 94 % | TEMPERATURE: 98.9 F

## 2020-12-22 DIAGNOSIS — N18.6 END STAGE KIDNEY DISEASE (HCC): ICD-10-CM

## 2020-12-22 DIAGNOSIS — E55.9 VITAMIN D DEFICIENCY: ICD-10-CM

## 2020-12-22 DIAGNOSIS — Z79.01 ON CONTINUOUS ORAL ANTICOAGULATION: ICD-10-CM

## 2020-12-22 DIAGNOSIS — Z79.4 TYPE 2 DIABETES MELLITUS WITH HYPEROSMOLARITY WITHOUT COMA, WITH LONG-TERM CURRENT USE OF INSULIN (HCC): ICD-10-CM

## 2020-12-22 DIAGNOSIS — I48.92 PAROXYSMAL ATRIAL FLUTTER (HCC): Primary | ICD-10-CM

## 2020-12-22 DIAGNOSIS — E78.2 MIXED DYSLIPIDEMIA: ICD-10-CM

## 2020-12-22 DIAGNOSIS — D63.8 ANEMIA OF CHRONIC DISEASE: ICD-10-CM

## 2020-12-22 DIAGNOSIS — I34.2 NON-RHEUMATIC MITRAL VALVE STENOSIS: ICD-10-CM

## 2020-12-22 DIAGNOSIS — Z99.2: ICD-10-CM

## 2020-12-22 DIAGNOSIS — I50.42 CHRONIC COMBINED SYSTOLIC AND DIASTOLIC CONGESTIVE HEART FAILURE (HCC): ICD-10-CM

## 2020-12-22 DIAGNOSIS — E11.00 TYPE 2 DIABETES MELLITUS WITH HYPEROSMOLARITY WITHOUT COMA, WITH LONG-TERM CURRENT USE OF INSULIN (HCC): ICD-10-CM

## 2020-12-22 DIAGNOSIS — E66.9 CLASS 2 OBESITY: ICD-10-CM

## 2020-12-22 DIAGNOSIS — G47.33 OBSTRUCTIVE SLEEP APNEA: ICD-10-CM

## 2020-12-22 DIAGNOSIS — I27.20 PULMONARY HYPERTENSION (HCC): ICD-10-CM

## 2020-12-22 DIAGNOSIS — E03.9 ACQUIRED HYPOTHYROIDISM: ICD-10-CM

## 2020-12-22 PROBLEM — E66.812 CLASS 2 OBESITY: Status: ACTIVE | Noted: 2020-12-22

## 2020-12-22 PROCEDURE — 3008F BODY MASS INDEX DOCD: CPT | Performed by: INTERNAL MEDICINE

## 2020-12-22 PROCEDURE — 1036F TOBACCO NON-USER: CPT | Performed by: INTERNAL MEDICINE

## 2020-12-22 PROCEDURE — 93000 ELECTROCARDIOGRAM COMPLETE: CPT | Performed by: INTERNAL MEDICINE

## 2020-12-22 PROCEDURE — 3066F NEPHROPATHY DOC TX: CPT | Performed by: INTERNAL MEDICINE

## 2020-12-22 PROCEDURE — 99214 OFFICE O/P EST MOD 30 MIN: CPT | Performed by: INTERNAL MEDICINE

## 2020-12-22 PROCEDURE — 3078F DIAST BP <80 MM HG: CPT | Performed by: INTERNAL MEDICINE

## 2020-12-22 PROCEDURE — 3074F SYST BP LT 130 MM HG: CPT | Performed by: INTERNAL MEDICINE

## 2020-12-22 RX ORDER — MELATONIN
2000 DAILY
Qty: 100 TABLET | Refills: 5
Start: 2020-12-22

## 2020-12-22 RX ORDER — CALCIUM ACETATE 667 MG/1
667 CAPSULE ORAL DAILY
COMMUNITY
Start: 2020-12-19

## 2020-12-28 ENCOUNTER — TRANSCRIBE ORDERS (OUTPATIENT)
Dept: ADMINISTRATIVE | Facility: HOSPITAL | Age: 65
End: 2020-12-28

## 2020-12-28 DIAGNOSIS — E11.649 UNCONTROLLED TYPE 2 DIABETES MELLITUS WITH HYPOGLYCEMIA, UNSPECIFIED HYPOGLYCEMIA COMA STATUS (HCC): Primary | ICD-10-CM

## 2020-12-28 DIAGNOSIS — T81.718A IATROGENIC PULMONARY EMBOLISM AND INFARCTION, INITIAL ENCOUNTER (HCC): ICD-10-CM

## 2020-12-28 DIAGNOSIS — N25.81 SECONDARY HYPERPARATHYROIDISM OF RENAL ORIGIN (HCC): ICD-10-CM

## 2020-12-28 DIAGNOSIS — M10.071 ACUTE IDIOPATHIC GOUT OF RIGHT FOOT: ICD-10-CM

## 2020-12-28 DIAGNOSIS — I26.99 IATROGENIC PULMONARY EMBOLISM AND INFARCTION, INITIAL ENCOUNTER (HCC): ICD-10-CM

## 2020-12-28 DIAGNOSIS — N18.6 END STAGE RENAL DISEASE (HCC): ICD-10-CM

## 2020-12-28 DIAGNOSIS — I50.42 CHRONIC COMBINED SYSTOLIC AND DIASTOLIC HEART FAILURE (HCC): ICD-10-CM

## 2020-12-28 DIAGNOSIS — I48.92 ATRIAL FLUTTER, UNSPECIFIED TYPE (HCC): ICD-10-CM

## 2021-01-04 ENCOUNTER — TELEPHONE (OUTPATIENT)
Dept: NEPHROLOGY | Facility: CLINIC | Age: 66
End: 2021-01-04

## 2021-01-04 NOTE — TELEPHONE ENCOUNTER
Pt called asking about the barium that she needs to take night before ct  Since she does peritoneal dialysis would it be ok to take prior to staring treatment that night?

## 2021-01-05 LAB
LEFT EYE DIABETIC RETINOPATHY: NORMAL
RIGHT EYE DIABETIC RETINOPATHY: NORMAL

## 2021-01-05 PROCEDURE — 2022F DILAT RTA XM EVC RTNOPTHY: CPT | Performed by: FAMILY MEDICINE

## 2021-01-05 NOTE — TELEPHONE ENCOUNTER
Hello    What barium?     The CT I see ordered is without contrast?    We need to see with radiology how early she can take the barium if the ct is with oral contrast    Thank you    np

## 2021-01-05 NOTE — TELEPHONE ENCOUNTER
I only see a CT without contrast ordered  If order is from [facility], sometimes it includes with oral contrast    Oral contrast is ok but not IV    But main thing for us to figure out is what radiology wants as timing to drink barium   There is usually a time frame they want is taken in orally by before scan    Thank you    np

## 2021-01-08 ENCOUNTER — HOSPITAL ENCOUNTER (OUTPATIENT)
Dept: RADIOLOGY | Facility: HOSPITAL | Age: 66
Discharge: HOME | End: 2021-01-08
Attending: INTERNAL MEDICINE
Payer: COMMERCIAL

## 2021-01-08 DIAGNOSIS — E11.649 UNCONTROLLED TYPE 2 DIABETES MELLITUS WITH HYPOGLYCEMIA, UNSPECIFIED HYPOGLYCEMIA COMA STATUS (HCC): ICD-10-CM

## 2021-01-08 DIAGNOSIS — M10.071 ACUTE IDIOPATHIC GOUT OF RIGHT FOOT: ICD-10-CM

## 2021-01-08 DIAGNOSIS — N25.81 SECONDARY HYPERPARATHYROIDISM OF RENAL ORIGIN (HCC): ICD-10-CM

## 2021-01-08 DIAGNOSIS — Z01.810 PRE-OPERATIVE CARDIOVASCULAR EXAMINATION: ICD-10-CM

## 2021-01-08 DIAGNOSIS — N18.6 END STAGE RENAL DISEASE (HCC): ICD-10-CM

## 2021-01-08 DIAGNOSIS — T81.718A IATROGENIC PULMONARY EMBOLISM AND INFARCTION, INITIAL ENCOUNTER (HCC): ICD-10-CM

## 2021-01-08 DIAGNOSIS — I50.42 CHRONIC COMBINED SYSTOLIC AND DIASTOLIC HEART FAILURE (HCC): ICD-10-CM

## 2021-01-08 DIAGNOSIS — I26.99 IATROGENIC PULMONARY EMBOLISM AND INFARCTION, INITIAL ENCOUNTER (HCC): ICD-10-CM

## 2021-01-08 DIAGNOSIS — I48.92 ATRIAL FLUTTER, UNSPECIFIED TYPE (HCC): ICD-10-CM

## 2021-01-08 PROCEDURE — 93880 EXTRACRANIAL BILAT STUDY: CPT

## 2021-01-08 PROCEDURE — 3066F NEPHROPATHY DOC TX: CPT | Performed by: FAMILY MEDICINE

## 2021-01-09 PROCEDURE — 93880 EXTRACRANIAL BILAT STUDY: CPT | Performed by: SURGERY

## 2021-01-11 ENCOUNTER — HOSPITAL ENCOUNTER (OUTPATIENT)
Dept: RADIOLOGY | Facility: HOSPITAL | Age: 66
Discharge: HOME | End: 2021-01-11
Attending: INTERNAL MEDICINE
Payer: COMMERCIAL

## 2021-01-11 DIAGNOSIS — I26.99 IATROGENIC PULMONARY EMBOLISM AND INFARCTION, INITIAL ENCOUNTER (HCC): ICD-10-CM

## 2021-01-11 DIAGNOSIS — I48.92 ATRIAL FLUTTER, UNSPECIFIED TYPE (HCC): ICD-10-CM

## 2021-01-11 DIAGNOSIS — I50.42 CHRONIC COMBINED SYSTOLIC AND DIASTOLIC HEART FAILURE (HCC): ICD-10-CM

## 2021-01-11 DIAGNOSIS — E11.649 UNCONTROLLED TYPE 2 DIABETES MELLITUS WITH HYPOGLYCEMIA, UNSPECIFIED HYPOGLYCEMIA COMA STATUS (HCC): ICD-10-CM

## 2021-01-11 DIAGNOSIS — N18.6 END STAGE RENAL DISEASE (HCC): ICD-10-CM

## 2021-01-11 DIAGNOSIS — M10.071 ACUTE IDIOPATHIC GOUT OF RIGHT FOOT: ICD-10-CM

## 2021-01-11 DIAGNOSIS — T81.718A IATROGENIC PULMONARY EMBOLISM AND INFARCTION, INITIAL ENCOUNTER (HCC): ICD-10-CM

## 2021-01-11 DIAGNOSIS — N25.81 SECONDARY HYPERPARATHYROIDISM OF RENAL ORIGIN (HCC): ICD-10-CM

## 2021-01-11 PROCEDURE — G1004 CDSM NDSC: HCPCS

## 2021-01-11 PROCEDURE — 74176 CT ABD & PELVIS W/O CONTRAST: CPT

## 2021-01-12 LAB
LEFT EYE DIABETIC RETINOPATHY: NORMAL
RIGHT EYE DIABETIC RETINOPATHY: NORMAL

## 2021-01-14 DIAGNOSIS — N25.81 SECONDARY HYPERPARATHYROIDISM OF RENAL ORIGIN (HCC): Primary | ICD-10-CM

## 2021-01-14 RX ORDER — CINACALCET 30 MG/1
30 TABLET, FILM COATED ORAL 3 TIMES WEEKLY
Qty: 36 TABLET | Refills: 3 | Status: SHIPPED | OUTPATIENT
Start: 2021-01-15 | End: 2021-03-02 | Stop reason: ALTCHOICE

## 2021-01-19 ENCOUNTER — OFFICE VISIT (OUTPATIENT)
Dept: NEPHROLOGY | Facility: CLINIC | Age: 66
End: 2021-01-19
Payer: COMMERCIAL

## 2021-01-19 VITALS
WEIGHT: 203 LBS | HEIGHT: 60 IN | DIASTOLIC BLOOD PRESSURE: 96 MMHG | HEART RATE: 68 BPM | OXYGEN SATURATION: 96 % | BODY MASS INDEX: 39.85 KG/M2 | SYSTOLIC BLOOD PRESSURE: 158 MMHG

## 2021-01-19 DIAGNOSIS — Z01.818 PRE-TRANSPLANT EVALUATION FOR ESRD (END STAGE RENAL DISEASE): Primary | ICD-10-CM

## 2021-01-19 PROCEDURE — 99215 OFFICE O/P EST HI 40 MIN: CPT | Performed by: INTERNAL MEDICINE
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
## 2021-01-26 ENCOUNTER — TELEPHONE (OUTPATIENT)
Dept: FAMILY MEDICINE CLINIC | Facility: CLINIC | Age: 66
End: 2021-01-26

## 2021-01-26 NOTE — TELEPHONE ENCOUNTER
Patient called and wanted to move up the date of her Pap  She stated that she is trying to get on a waiting list for a kidney transplant and this is one of the requirements  I let her know I woul dhave to ask Dr Zoya Purdy if this date could be moved, being as Paps are dated per insurances  Please verify that patient can be seen sooner than 3/9 for a Pap

## 2021-01-28 ENCOUNTER — OFFICE VISIT (OUTPATIENT)
Dept: FAMILY MEDICINE CLINIC | Facility: CLINIC | Age: 66
End: 2021-01-28
Payer: COMMERCIAL

## 2021-01-28 ENCOUNTER — LAB (OUTPATIENT)
Dept: LAB | Facility: HOSPITAL | Age: 66
End: 2021-01-28
Attending: INTERNAL MEDICINE
Payer: COMMERCIAL

## 2021-01-28 ENCOUNTER — TELEPHONE (OUTPATIENT)
Dept: FAMILY MEDICINE CLINIC | Facility: CLINIC | Age: 66
End: 2021-01-28

## 2021-01-28 ENCOUNTER — TELEPHONE (OUTPATIENT)
Dept: NEPHROLOGY | Facility: CLINIC | Age: 66
End: 2021-01-28

## 2021-01-28 ENCOUNTER — TRANSCRIBE ORDERS (OUTPATIENT)
Dept: ADMINISTRATIVE | Facility: HOSPITAL | Age: 66
End: 2021-01-28

## 2021-01-28 VITALS
RESPIRATION RATE: 20 BRPM | HEIGHT: 60 IN | WEIGHT: 205 LBS | DIASTOLIC BLOOD PRESSURE: 84 MMHG | BODY MASS INDEX: 40.25 KG/M2 | OXYGEN SATURATION: 96 % | SYSTOLIC BLOOD PRESSURE: 136 MMHG | HEART RATE: 64 BPM | TEMPERATURE: 97.6 F

## 2021-01-28 DIAGNOSIS — I50.42 CHRONIC COMBINED SYSTOLIC AND DIASTOLIC HEART FAILURE (HCC): ICD-10-CM

## 2021-01-28 DIAGNOSIS — N18.6 END STAGE RENAL DISEASE (HCC): ICD-10-CM

## 2021-01-28 DIAGNOSIS — M10.071 ACUTE IDIOPATHIC GOUT OF RIGHT FOOT: ICD-10-CM

## 2021-01-28 DIAGNOSIS — Z86.711 PERSONAL HISTORY OF PE (PULMONARY EMBOLISM): ICD-10-CM

## 2021-01-28 DIAGNOSIS — S46.811A STRAIN OF RIGHT TRAPEZIUS MUSCLE, INITIAL ENCOUNTER: ICD-10-CM

## 2021-01-28 DIAGNOSIS — I26.99 IATROGENIC PULMONARY EMBOLISM AND INFARCTION, SEQUELA (HCC): ICD-10-CM

## 2021-01-28 DIAGNOSIS — N18.6 ESRD (END STAGE RENAL DISEASE) (HCC): Primary | ICD-10-CM

## 2021-01-28 DIAGNOSIS — Z12.4 CERVICAL CANCER SCREENING: ICD-10-CM

## 2021-01-28 DIAGNOSIS — E11.649 UNCONTROLLED TYPE 2 DIABETES MELLITUS WITH HYPOGLYCEMIA, UNSPECIFIED HYPOGLYCEMIA COMA STATUS (HCC): ICD-10-CM

## 2021-01-28 DIAGNOSIS — I48.92 ATRIAL FLUTTER, UNSPECIFIED TYPE (HCC): ICD-10-CM

## 2021-01-28 DIAGNOSIS — T81.718S IATROGENIC PULMONARY EMBOLISM AND INFARCTION, SEQUELA (HCC): ICD-10-CM

## 2021-01-28 DIAGNOSIS — E11.29 TYPE 2 DIABETES MELLITUS WITH OTHER KIDNEY COMPLICATION, UNSPECIFIED WHETHER LONG TERM INSULIN USE (HCC): Primary | ICD-10-CM

## 2021-01-28 DIAGNOSIS — E11.29 TYPE 2 DIABETES MELLITUS WITH OTHER KIDNEY COMPLICATION, UNSPECIFIED WHETHER LONG TERM INSULIN USE (HCC): ICD-10-CM

## 2021-01-28 DIAGNOSIS — N25.81 SECONDARY HYPERPARATHYROIDISM OF RENAL ORIGIN (HCC): ICD-10-CM

## 2021-01-28 LAB
ABO GROUP BLD: NORMAL
AFP-TM SERPL-MCNC: 2.2 NG/ML (ref 0.5–8)
ALBUMIN SERPL BCP-MCNC: 3.3 G/DL (ref 3.5–5)
ALP SERPL-CCNC: 83 U/L (ref 46–116)
ALT SERPL W P-5'-P-CCNC: 29 U/L (ref 12–78)
ANION GAP SERPL CALCULATED.3IONS-SCNC: 11 MMOL/L (ref 4–13)
APTT PPP: 52 SECONDS (ref 23–37)
AST SERPL W P-5'-P-CCNC: 16 U/L (ref 5–45)
BASOPHILS # BLD AUTO: 0.06 THOUSANDS/ΜL (ref 0–0.1)
BASOPHILS NFR BLD AUTO: 1 % (ref 0–1)
BILIRUB SERPL-MCNC: 0.6 MG/DL (ref 0.2–1)
BUN SERPL-MCNC: 65 MG/DL (ref 5–25)
CALCIUM ALBUM COR SERPL-MCNC: 9.4 MG/DL (ref 8.3–10.1)
CALCIUM SERPL-MCNC: 8.8 MG/DL (ref 8.3–10.1)
CHLORIDE SERPL-SCNC: 102 MMOL/L (ref 100–108)
CHOLEST SERPL-MCNC: 154 MG/DL (ref 50–200)
CO2 SERPL-SCNC: 27 MMOL/L (ref 21–32)
CREAT SERPL-MCNC: 5.33 MG/DL (ref 0.6–1.3)
EOSINOPHIL # BLD AUTO: 0.35 THOUSAND/ΜL (ref 0–0.61)
EOSINOPHIL NFR BLD AUTO: 4 % (ref 0–6)
ERYTHROCYTE [DISTWIDTH] IN BLOOD BY AUTOMATED COUNT: 13 % (ref 11.6–15.1)
EST. AVERAGE GLUCOSE BLD GHB EST-MCNC: 146 MG/DL
GFR SERPL CREATININE-BSD FRML MDRD: 8 ML/MIN/1.73SQ M
GLUCOSE P FAST SERPL-MCNC: 70 MG/DL (ref 65–99)
HAV AB SER QL IA: NORMAL
HBA1C MFR BLD: 6.7 %
HBV SURFACE AB SER-ACNC: 114.35 MIU/ML
HCT VFR BLD AUTO: 44.1 % (ref 34.8–46.1)
HDLC SERPL-MCNC: 58 MG/DL
HGB BLD-MCNC: 13.5 G/DL (ref 11.5–15.4)
IMM GRANULOCYTES # BLD AUTO: 0.15 THOUSAND/UL (ref 0–0.2)
IMM GRANULOCYTES NFR BLD AUTO: 2 % (ref 0–2)
INR PPP: 3.67 (ref 0.84–1.19)
LDLC SERPL CALC-MCNC: 71 MG/DL (ref 0–100)
LYMPHOCYTES # BLD AUTO: 1.12 THOUSANDS/ΜL (ref 0.6–4.47)
LYMPHOCYTES NFR BLD AUTO: 12 % (ref 14–44)
MAGNESIUM SERPL-MCNC: 2.4 MG/DL (ref 1.6–2.6)
MCH RBC QN AUTO: 29.2 PG (ref 26.8–34.3)
MCHC RBC AUTO-ENTMCNC: 30.6 G/DL (ref 31.4–37.4)
MCV RBC AUTO: 95 FL (ref 82–98)
MONOCYTES # BLD AUTO: 1.14 THOUSAND/ΜL (ref 0.17–1.22)
MONOCYTES NFR BLD AUTO: 12 % (ref 4–12)
NEUTROPHILS # BLD AUTO: 6.95 THOUSANDS/ΜL (ref 1.85–7.62)
NEUTS SEG NFR BLD AUTO: 69 % (ref 43–75)
NONHDLC SERPL-MCNC: 96 MG/DL
NRBC BLD AUTO-RTO: 0 /100 WBCS
PHOSPHATE SERPL-MCNC: 5.7 MG/DL (ref 2.3–4.1)
PLATELET # BLD AUTO: 218 THOUSANDS/UL (ref 149–390)
PMV BLD AUTO: 11.7 FL (ref 8.9–12.7)
POTASSIUM SERPL-SCNC: 4 MMOL/L (ref 3.5–5.3)
PROT SERPL-MCNC: 7.7 G/DL (ref 6.4–8.2)
PROTHROMBIN TIME: 35.9 SECONDS (ref 11.6–14.5)
RBC # BLD AUTO: 4.63 MILLION/UL (ref 3.81–5.12)
RH BLD: NEGATIVE
RUBV IGG SERPL IA-ACNC: >175 IU/ML
SODIUM SERPL-SCNC: 140 MMOL/L (ref 136–145)
TRIGL SERPL-MCNC: 124 MG/DL
WBC # BLD AUTO: 9.77 THOUSAND/UL (ref 4.31–10.16)

## 2021-01-28 PROCEDURE — 86146 BETA-2 GLYCOPROTEIN ANTIBODY: CPT

## 2021-01-28 PROCEDURE — 86235 NUCLEAR ANTIGEN ANTIBODY: CPT

## 2021-01-28 PROCEDURE — 86780 TREPONEMA PALLIDUM: CPT

## 2021-01-28 PROCEDURE — 3725F SCREEN DEPRESSION PERFORMED: CPT | Performed by: FAMILY MEDICINE

## 2021-01-28 PROCEDURE — 86664 EPSTEIN-BARR NUCLEAR ANTIGEN: CPT

## 2021-01-28 PROCEDURE — 84100 ASSAY OF PHOSPHORUS: CPT

## 2021-01-28 PROCEDURE — 81241 F5 GENE: CPT

## 2021-01-28 PROCEDURE — 86765 RUBEOLA ANTIBODY: CPT

## 2021-01-28 PROCEDURE — 86787 VARICELLA-ZOSTER ANTIBODY: CPT

## 2021-01-28 PROCEDURE — 86735 MUMPS ANTIBODY: CPT

## 2021-01-28 PROCEDURE — 82105 ALPHA-FETOPROTEIN SERUM: CPT

## 2021-01-28 PROCEDURE — 82232 ASSAY OF BETA-2 PROTEIN: CPT

## 2021-01-28 PROCEDURE — 83036 HEMOGLOBIN GLYCOSYLATED A1C: CPT

## 2021-01-28 PROCEDURE — 86360 T CELL ABSOLUTE COUNT/RATIO: CPT

## 2021-01-28 PROCEDURE — 86665 EPSTEIN-BARR CAPSID VCA: CPT

## 2021-01-28 PROCEDURE — 99214 OFFICE O/P EST MOD 30 MIN: CPT | Performed by: FAMILY MEDICINE

## 2021-01-28 PROCEDURE — 3079F DIAST BP 80-89 MM HG: CPT | Performed by: FAMILY MEDICINE

## 2021-01-28 PROCEDURE — 81240 F2 GENE: CPT

## 2021-01-28 PROCEDURE — 86147 CARDIOLIPIN ANTIBODY EA IG: CPT

## 2021-01-28 PROCEDURE — 1036F TOBACCO NON-USER: CPT | Performed by: FAMILY MEDICINE

## 2021-01-28 PROCEDURE — 86762 RUBELLA ANTIBODY: CPT

## 2021-01-28 PROCEDURE — 84681 ASSAY OF C-PEPTIDE: CPT

## 2021-01-28 PROCEDURE — 3008F BODY MASS INDEX DOCD: CPT | Performed by: FAMILY MEDICINE

## 2021-01-28 PROCEDURE — 86803 HEPATITIS C AB TEST: CPT

## 2021-01-28 PROCEDURE — 85025 COMPLETE CBC W/AUTO DIFF WBC: CPT

## 2021-01-28 PROCEDURE — 86332 IMMUNE COMPLEX ASSAY: CPT

## 2021-01-28 PROCEDURE — 87624 HPV HI-RISK TYP POOLED RSLT: CPT | Performed by: FAMILY MEDICINE

## 2021-01-28 PROCEDURE — 86704 HEP B CORE ANTIBODY TOTAL: CPT

## 2021-01-28 PROCEDURE — 86359 T CELLS TOTAL COUNT: CPT

## 2021-01-28 PROCEDURE — 85610 PROTHROMBIN TIME: CPT

## 2021-01-28 PROCEDURE — 3075F SYST BP GE 130 - 139MM HG: CPT | Performed by: FAMILY MEDICINE

## 2021-01-28 PROCEDURE — 36415 COLL VENOUS BLD VENIPUNCTURE: CPT

## 2021-01-28 PROCEDURE — 85730 THROMBOPLASTIN TIME PARTIAL: CPT

## 2021-01-28 PROCEDURE — 86706 HEP B SURFACE ANTIBODY: CPT

## 2021-01-28 PROCEDURE — 86708 HEPATITIS A ANTIBODY: CPT

## 2021-01-28 PROCEDURE — G0145 SCR C/V CYTO,THINLAYER,RESCR: HCPCS | Performed by: FAMILY MEDICINE

## 2021-01-28 PROCEDURE — 85300 ANTITHROMBIN III ACTIVITY: CPT

## 2021-01-28 PROCEDURE — 3044F HG A1C LEVEL LT 7.0%: CPT | Performed by: FAMILY MEDICINE

## 2021-01-28 PROCEDURE — 86663 EPSTEIN-BARR ANTIBODY: CPT

## 2021-01-28 PROCEDURE — 80053 COMPREHEN METABOLIC PANEL: CPT

## 2021-01-28 PROCEDURE — 80061 LIPID PANEL: CPT

## 2021-01-28 PROCEDURE — 87389 HIV-1 AG W/HIV-1&-2 AB AG IA: CPT

## 2021-01-28 PROCEDURE — 86480 TB TEST CELL IMMUN MEASURE: CPT

## 2021-01-28 PROCEDURE — 85303 CLOT INHIBIT PROT C ACTIVITY: CPT

## 2021-01-28 PROCEDURE — 83735 ASSAY OF MAGNESIUM: CPT

## 2021-01-28 PROCEDURE — 85306 CLOT INHIBIT PROT S FREE: CPT

## 2021-01-28 RX ORDER — CYCLOBENZAPRINE HCL 5 MG
5 TABLET ORAL
Qty: 15 TABLET | Refills: 0 | Status: SHIPPED | OUTPATIENT
Start: 2021-01-28 | End: 2021-05-06 | Stop reason: ALTCHOICE

## 2021-01-28 NOTE — TELEPHONE ENCOUNTER
Dr Lynne Marrufo office called since the pt has outside labs done and an INR was completed  Labs were scanned into the pts chart under media   This is an FYI

## 2021-01-28 NOTE — RESULT ENCOUNTER NOTE
Hello    Patient normally is followed up by Ms Rigo Charles       Can you please find out who is managing pt INR and let their office know the INR level    Thank you    np

## 2021-01-28 NOTE — PROGRESS NOTES
Assessment/Plan:    1  ESRD (end stage renal disease) [facility])  Assessment & Plan:  Lab Results   Component Value Date    EGFR 8 11/25/2020    EGFR 8 09/08/2020    EGFR 6 07/19/2020    CREATININE 5 08 (H) 11/25/2020    CREATININE 5 14 (H) 09/08/2020    CREATININE 6 45 (H) 07/19/2020       Patient on dialysis and is hoping for kidney transplant  Requires pap smear and mammogram for transplant  2  Cervical cancer screening  Comments:   cervical cancer screening performed today  Patient  Has her mammogram scheduled  Orders:  -     Liquid-based pap, screening    3  Strain of right trapezius muscle, initial encounter  Comments:   new  Orders:  -     cyclobenzaprine (FLEXERIL) 5 mg tablet; Take 1 tablet (5 mg total) by mouth daily at bedtime as needed for muscle spasms    BMI Counseling: Body mass index is 40 04 kg/m²  The BMI is above normal  Nutrition recommendations include moderation in carbohydrate intake  There are no Patient Instructions on file for this visit  Return for March as scheduled   Subjective:      Patient ID: Yosi Richter is a 63 y o  female  Chief Complaint   Patient presents with    Needs a Pap Smear     [id number]    Neck Pain     level 8 for the past 4 days         She has been having pain in her right trapezius muscle for the past few days  It started after she had to move some of her dialysis supplies  The pain does not radiate  She has tried Tylenol with no relief  End-stage renal disease- patient continues to do home dialysis  She is doing well with the procedure  She is trying to get on a list for a kidney transplant  In ordered to be approved she needs to be up to date with her screenings  Cervical cancer screening-patient needs a Pap smear today  Her last Pap smear was about 4 years ago and she needs new screening done    She is not having any problems with abnormal bleeding      The following portions of the patient's history were reviewed and updated as appropriate: allergies, current medications, past family history, past medical history, past social history, past surgical history and problem list     Review of Systems   Respiratory: Negative  Cardiovascular: Negative  Current Outpatient Medications   Medication Sig Dispense Refill    atorvastatin (LIPITOR) 20 mg tablet Take 1 tablet (20 mg total) by mouth daily 90 tablet 1    B Complex-C (B-COMPLEX WITH VITAMIN C) tablet Take 1 tablet by mouth daily      Blood Glucose Monitoring Suppl (ONE TOUCH ULTRA 2) w/Device KIT Test glucose 3 times daily 1 each 0    calcium acetate (PHOSLO) capsule       cholecalciferol (VITAMIN D3) 1,000 units tablet Take 2 tablets (2,000 Units total) by mouth daily 100 tablet 5    cinacalcet (SENSIPAR) 30 mg tablet Take 1 tablet (30 mg total) by mouth 3 (three) times a week 36 tablet 3    docusate sodium (COLACE) 100 mg capsule Take 1 capsule (100 mg total) by mouth 2 (two) times a day 10 capsule 0    gentamicin (GARAMYCIN) 0 1 % cream       HumaLOG Mix 75/25 KwikPen (75-25) 100 units/mL injection pen INJECT 32 UNITS UNDER THE SKIN TWICE A DAY WITH MEALS 150 mL 3    hydrALAZINE (APRESOLINE) 10 mg tablet Take 1 tablet (10 mg total) by mouth every 8 (eight) hours 270 tablet 3    HYDROXYUREA PO Take 20 mg/kg by mouth daily      Insulin Lispro Prot & Lispro (HumaLOG Mix 75/25 KwikPen) (75-25) 100 units/mL injection pen Inject under the skin      Insulin Pen Needle 31G X 5 MM MISC by Does not apply route daily 100 each 1    isosorbide mononitrate (IMDUR) 30 mg 24 hr tablet Take 1 tablet (30 mg total) by mouth daily 90 tablet 3    Lancets (onetouch ultrasoft) lancets Test glucose 3 times a day; Code: E11 8 300 each 1    levothyroxine 50 mcg tablet Take one tablet (50mcg) daily in the early morning on an empty stomach   90 tablet 2    linaGLIPtin (Tradjenta) 5 MG TABS Take 5 mg by mouth daily 90 tablet 3    metoprolol succinate (TOPROL-XL) 25 mg 24 hr tablet Take 2 tablets (50 mg total) by mouth 2 (two) times a day 360 tablet 1    MULTIPLE VITAMIN PO Take 1 tablet by mouth daily      OneTouch Ultra test strip TEST GLUCOSE THREE TIMES A  each 3    polyethylene glycol (MIRALAX) 17 g packet Take 17 g by mouth daily as needed (Constipation) 14 each 0    polymyxin b-trimethoprim (POLYTRIM) ophthalmic solution       torsemide (DEMADEX) 20 mg tablet Take 3 tablets daily (60 mg) 270 tablet 0    warfarin (COUMADIN) 1 mg tablet Take daily as directed 90 tablet 1    warfarin (COUMADIN) 2 mg tablet TAKE 1-1/2 TABLETS DAILY OR AS DIRECTED 180 tablet 1    warfarin (COUMADIN) 5 mg tablet Daily as directed 90 tablet 1    cyclobenzaprine (FLEXERIL) 5 mg tablet Take 1 tablet (5 mg total) by mouth daily at bedtime as needed for muscle spasms 15 tablet 0    prednisoLONE acetate (PRED FORTE) 1 % ophthalmic suspension        No current facility-administered medications for this visit  Objective:    /84   Pulse 64   Temp 97 6 °F (36 4 °C)   Resp 20   Ht 5' (1 524 m)   Wt 93 kg (205 lb)   SpO2 96%   BMI 40 04 kg/m²        Physical Exam  Vitals signs and nursing note reviewed  Constitutional:       Appearance: She is well-developed  HENT:      Head: Normocephalic and atraumatic  Right Ear: Tympanic membrane and external ear normal       Left Ear: Tympanic membrane and external ear normal    Cardiovascular:      Rate and Rhythm: Normal rate and regular rhythm  Heart sounds: Normal heart sounds  No murmur  No friction rub  Pulmonary:      Effort: Pulmonary effort is normal  No respiratory distress  Breath sounds: Normal breath sounds  No wheezing or rales  Genitourinary:     General: Normal vulva  Vagina: No vaginal discharge  Cervix: No cervical motion tenderness  Uterus: Normal        Adnexa: Right adnexa normal and left adnexa normal    Musculoskeletal:         General: Tenderness (  Right trapezius) present        Right lower leg: No edema  Left lower leg: No edema                    Bacilio Alves DO

## 2021-01-28 NOTE — TELEPHONE ENCOUNTER
----- Message from Gibran Sam MD sent at 1/28/2021  4:45 PM EST -----  Hello    Patient normally is followed up by Ms Suazo Many       Can you please find out who is managing pt INR and let their office know the INR level    Thank you    np

## 2021-01-28 NOTE — ASSESSMENT & PLAN NOTE
Lab Results   Component Value Date    EGFR 8 11/25/2020    EGFR 8 09/08/2020    EGFR 6 07/19/2020    CREATININE 5 08 (H) 11/25/2020    CREATININE 5 14 (H) 09/08/2020    CREATININE 6 45 (H) 07/19/2020       Patient on dialysis and is hoping for kidney transplant  Requires pap smear and mammogram for transplant

## 2021-01-29 ENCOUNTER — ANTICOAG VISIT (OUTPATIENT)
Dept: FAMILY MEDICINE CLINIC | Facility: CLINIC | Age: 66
End: 2021-01-29

## 2021-01-29 ENCOUNTER — TRANSCRIBE ORDERS (OUTPATIENT)
Dept: LAB | Facility: HOSPITAL | Age: 66
End: 2021-01-29

## 2021-01-29 DIAGNOSIS — Z94.9 TRANSPLANT: Primary | ICD-10-CM

## 2021-01-29 LAB
C PEPTIDE SERPL-MCNC: 3.7 NG/ML (ref 1.1–4.4)
CARDIOLIPIN IGA SER IA-ACNC: <9 APL U/ML (ref 0–11)
CARDIOLIPIN IGG SER IA-ACNC: <9 GPL U/ML (ref 0–14)
CARDIOLIPIN IGM SER IA-ACNC: 10 MPL U/ML (ref 0–12)
DEPRECATED AT III PPP: 106 % OF NORMAL (ref 92–136)
EBV NA IGG SER IA-ACNC: >600 U/ML (ref 0–17.9)
EBV VCA IGG SER IA-ACNC: >600 U/ML (ref 0–17.9)
EBV VCA IGM SER IA-ACNC: <36 U/ML (ref 0–35.9)
GAMMA INTERFERON BACKGROUND BLD IA-ACNC: 0.01 IU/ML
HIV 1+2 AB+HIV1 P24 AG SERPL QL IA: NORMAL
INTERPRETATION: ABNORMAL
M TB IFN-G BLD-IMP: NEGATIVE
M TB IFN-G CD4+ BCKGRND COR BLD-ACNC: 0 IU/ML
M TB IFN-G CD4+ BCKGRND COR BLD-ACNC: 0 IU/ML
MITOGEN IGNF BCKGRD COR BLD-ACNC: >10 IU/ML

## 2021-01-30 LAB
ENA RNP AB SER-ACNC: <0.2 AI (ref 0–0.9)
ENA SM AB SER-ACNC: <0.2 AI (ref 0–0.9)
HPV HR 12 DNA CVX QL NAA+PROBE: NEGATIVE
HPV16 DNA CVX QL NAA+PROBE: NEGATIVE
HPV18 DNA CVX QL NAA+PROBE: NEGATIVE
MISCELLANEOUS LAB TEST RESULT: NORMAL
MISCELLANEOUS LAB TEST RESULT: NORMAL

## 2021-01-31 LAB
B2 GLYCOPROT1 IGA SER-ACNC: <9 GPI IGA UNITS (ref 0–25)
B2 GLYCOPROT1 IGG SER-ACNC: <9 GPI IGG UNITS (ref 0–20)
B2 GLYCOPROT1 IGM SER-ACNC: <9 GPI IGM UNITS (ref 0–32)

## 2021-02-01 LAB
F2 GENE MUT ANL BLD/T: NORMAL
PROT C AG ACT/NOR PPP IA: <10 % OF NORMAL (ref 60–150)
PROT S ACT/NOR PPP: 26 % (ref 68–108)

## 2021-02-02 LAB
F5 GENE MUT ANL BLD/T: NORMAL
MEV IGG SER QL: NORMAL
MISCELLANEOUS LAB TEST RESULT: NORMAL
MUV IGM SER QL: 0.94 AU (ref 0–0.79)

## 2021-02-03 LAB
LAB AP GYN PRIMARY INTERPRETATION: NORMAL
Lab: NORMAL
MUV IGG SER QL: NORMAL
SCAN RESULT: NORMAL
VZV IGG SER IA-ACNC: NORMAL

## 2021-02-10 DIAGNOSIS — Z01.818 PRE-TRANSPLANT EVALUATION FOR END STAGE RENAL DISEASE: Primary | ICD-10-CM

## 2021-02-10 PROCEDURE — 3066F NEPHROPATHY DOC TX: CPT | Performed by: INTERNAL MEDICINE

## 2021-02-10 NOTE — PROGRESS NOTES
Spoke to the patient  Pt had Mumps infection as a child  Reviewed positive IgM borderline level  Neg IgG  Also reviewed low protein C and S  Expected with coumadin but levels lower than expected    Will have pt repeat all three test      Orders placed    Pt aware

## 2021-02-17 DIAGNOSIS — I12.9 BENIGN HYPERTENSION WITH CKD (CHRONIC KIDNEY DISEASE) STAGE IV (HCC): Primary | ICD-10-CM

## 2021-02-17 DIAGNOSIS — N18.4 BENIGN HYPERTENSION WITH CKD (CHRONIC KIDNEY DISEASE) STAGE IV (HCC): Primary | ICD-10-CM

## 2021-02-17 PROCEDURE — 4010F ACE/ARB THERAPY RXD/TAKEN: CPT | Performed by: INTERNAL MEDICINE

## 2021-02-17 RX ORDER — VALSARTAN 160 MG/1
160 TABLET ORAL DAILY
Qty: 90 TABLET | Refills: 1 | Status: SHIPPED | OUTPATIENT
Start: 2021-02-17 | End: 2022-01-02

## 2021-02-22 ENCOUNTER — HOSPITAL ENCOUNTER (OUTPATIENT)
Dept: RADIOLOGY | Facility: HOSPITAL | Age: 66
Discharge: HOME | End: 2021-02-22
Payer: COMMERCIAL

## 2021-02-22 VITALS — HEIGHT: 60 IN | BODY MASS INDEX: 40.44 KG/M2 | WEIGHT: 206 LBS

## 2021-02-22 DIAGNOSIS — Z12.31 SCREENING MAMMOGRAM, ENCOUNTER FOR: ICD-10-CM

## 2021-02-22 PROCEDURE — 77063 BREAST TOMOSYNTHESIS BI: CPT

## 2021-02-22 PROCEDURE — 77067 SCR MAMMO BI INCL CAD: CPT

## 2021-02-23 ENCOUNTER — TELEPHONE (OUTPATIENT)
Dept: FAMILY MEDICINE CLINIC | Facility: CLINIC | Age: 66
End: 2021-02-23

## 2021-03-02 ENCOUNTER — OFFICE VISIT (OUTPATIENT)
Dept: CARDIOLOGY CLINIC | Facility: CLINIC | Age: 66
End: 2021-03-02
Payer: COMMERCIAL

## 2021-03-02 VITALS
OXYGEN SATURATION: 97 % | SYSTOLIC BLOOD PRESSURE: 140 MMHG | HEART RATE: 64 BPM | BODY MASS INDEX: 41.23 KG/M2 | DIASTOLIC BLOOD PRESSURE: 86 MMHG | HEIGHT: 60 IN | TEMPERATURE: 98.4 F | WEIGHT: 210 LBS

## 2021-03-02 DIAGNOSIS — E78.2 MIXED DYSLIPIDEMIA: ICD-10-CM

## 2021-03-02 DIAGNOSIS — I50.42 CHRONIC COMBINED SYSTOLIC AND DIASTOLIC CONGESTIVE HEART FAILURE (HCC): ICD-10-CM

## 2021-03-02 DIAGNOSIS — E78.2 MIXED HYPERLIPIDEMIA: ICD-10-CM

## 2021-03-02 DIAGNOSIS — E03.9 ACQUIRED HYPOTHYROIDISM: ICD-10-CM

## 2021-03-02 DIAGNOSIS — I48.92 PAROXYSMAL ATRIAL FLUTTER (HCC): Primary | ICD-10-CM

## 2021-03-02 DIAGNOSIS — Z99.2: ICD-10-CM

## 2021-03-02 DIAGNOSIS — E11.00 TYPE 2 DIABETES MELLITUS WITH HYPEROSMOLARITY WITHOUT COMA, WITH LONG-TERM CURRENT USE OF INSULIN (HCC): ICD-10-CM

## 2021-03-02 DIAGNOSIS — N18.5 BENIGN HYPERTENSION WITH CKD (CHRONIC KIDNEY DISEASE) STAGE V (HCC): ICD-10-CM

## 2021-03-02 DIAGNOSIS — D63.8 ANEMIA OF CHRONIC DISEASE: ICD-10-CM

## 2021-03-02 DIAGNOSIS — N18.6 END STAGE KIDNEY DISEASE (HCC): ICD-10-CM

## 2021-03-02 DIAGNOSIS — Z79.01 ON CONTINUOUS ORAL ANTICOAGULATION: ICD-10-CM

## 2021-03-02 DIAGNOSIS — R06.00 DYSPNEA ON EXERTION: ICD-10-CM

## 2021-03-02 DIAGNOSIS — E66.9 CLASS 2 OBESITY: ICD-10-CM

## 2021-03-02 DIAGNOSIS — I34.2 NON-RHEUMATIC MITRAL VALVE STENOSIS: ICD-10-CM

## 2021-03-02 DIAGNOSIS — G47.33 OBSTRUCTIVE SLEEP APNEA: ICD-10-CM

## 2021-03-02 DIAGNOSIS — Z79.4 TYPE 2 DIABETES MELLITUS WITH HYPEROSMOLARITY WITHOUT COMA, WITH LONG-TERM CURRENT USE OF INSULIN (HCC): ICD-10-CM

## 2021-03-02 DIAGNOSIS — I12.0 BENIGN HYPERTENSION WITH CKD (CHRONIC KIDNEY DISEASE) STAGE V (HCC): ICD-10-CM

## 2021-03-02 DIAGNOSIS — I42.0 DILATED CARDIOMYOPATHY (HCC): ICD-10-CM

## 2021-03-02 DIAGNOSIS — R79.89 ELEVATED TSH: ICD-10-CM

## 2021-03-02 DIAGNOSIS — I27.20 PULMONARY HYPERTENSION (HCC): ICD-10-CM

## 2021-03-02 PROCEDURE — 93000 ELECTROCARDIOGRAM COMPLETE: CPT | Performed by: INTERNAL MEDICINE

## 2021-03-02 PROCEDURE — 99214 OFFICE O/P EST MOD 30 MIN: CPT | Performed by: INTERNAL MEDICINE

## 2021-03-02 RX ORDER — LEVOTHYROXINE SODIUM 0.05 MG/1
TABLET ORAL
Qty: 90 TABLET | Refills: 2
Start: 2021-03-02 | End: 2021-03-12

## 2021-03-02 NOTE — PROGRESS NOTES
Office Cardiology Progress Note  Liana Chew 61 y o  female MRN: [id number]  03/02/21  11:24 AM      ASSESSMENT:       1  Much improved dyspnea with activity and resolved dyspnea at rest with suppression of paroxysmal atrial flutter for the past 6-1/2 months  2  Controlled combined systolic and diastolic heart failure with previous newly identified depression of ejection fraction to 35% with moderate LVH, grade 2 diastolic dysfunction, chronic right heart dilatation and significant chronic pulmonary hypertension on  previous echocardiogram 07/14/2020 with LV dysfunction thought possibly to be tachycardia-mediated  Improvement to LVEF of 45% on 11/02/2020 TTE  3  Nonischemic nuclear stress study with dilated right ventricle on 07/14/2020     4  Stable mild nonrheumatic mitral valve stenosis, not an active clinical problem at this time  5  Multifactorial chronic pulmonary hypertension, stable  Stable PA systolic pressure 60 mmHg as of 11/02/2020   6  End-stage kidney disease with patient on peritoneal dialysis for approximately 6-1/2 months  7  Worsened obesity, class 2 with weight gain of 18 lbs in 5 months and with previous weight loss of 13 lbs in 4-12+ months and 19 3 lbs in approximately 5 months  Question whether this could be related to inadequately treated acquired hypothyroidism  8  Controlled longstanding type 2 diabetes mellitus, insulin dependent, with latest A1c 6 7%  on 01/28/2021     9  Controlled mixed dyslipidemia  10  History of severe obstructive sleep apnea, currently not being treated   Patient intolerant to CPAP and  currently using oxygen overnight at 3 liters/minute only sporadically  11  Patient on continuous warfarin oral anticoagulation and presently on 3 milligrams daily with latest INR supratherapeutic  12  History of pulmonary embolism, though not totally confirmed  13  Anemia of chronic renal disease  14  Acquired hypothyroidism, possibly being under treated at present  13  Treated vitamin-D deficiency  Plan       Patient Instructions     1  RESUME LEVOTHYROXINE 50 MICROGRAMS ONE DAILY and see Dr Wan Kilpatrick as scheduled regarding dosing  2  Go for TSH, free T4, antimicrosomal antibody blood tests the next time you go for blood testing  3  Continue current medication as you are presently taking, except for resumption of levothyroxine as mentioned above  4  Cardiology follow-up approximately six months with updated echocardiogram August, 2021  and office ECG  HPI    This 61 y o  female  denies new cardiopulmonary and medical symptoms  She has recently noticed that her systolic blood pressure has been running in the 150-160 range  She takes her blood pressures at home twice a day on a daily basis  She contacted her nephrologist, who consulted with me and we decided to discontinue metoprolol, isosorbide mononitrate, and hydralazine and began the patient on valsartan 160 milligrams HS, which she just began last night  The patient has also been aware of progressive weight gain and actually discontinued taking her levothyroxine, thinking that that was the cause of her weight gain  Her TSH level had been approximately 19 4 as of 11/25/2020  That level has not yet been updated  The patient is on a tentative kidney transplant list but is getting tested to see if she is eligible  She is due for some repeat blood work and repeat mammogram with or without breast ultrasound to clarify equivocal results  The patient uses oxygen at home only on an as needed basis  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     Paroxysmal atrial flutter (HCC) Yes    Chronic combined systolic and diastolic congestive heart failure (HCC)     Benign hypertension with CKD (chronic kidney disease) stage V ([facility] )     Mixed hyperlipidemia     Pulmonary hypertension (HCC)     Non-rheumatic mitral valve stenosis     End stage kidney disease (HCC)     Type 2 diabetes mellitus with hyperosmolarity without coma, with long-term current use of insulin (HCC)     Mixed dyslipidemia     Class 2 obesity     Dependence on continuous ambulatory peritoneal dialysis ([facility] )     Obstructive sleep apnea     On continuous oral anticoagulation     Acquired hypothyroidism     Anemia of chronic disease     Dyspnea on exertion     Elevated TSH     Dilated cardiomyopathy ([facility] )         Review of Systems    All other systems negative, except as noted in history of present illness    Historical Information   Past Medical History:   Diagnosis Date    Acute asthmatic bronchitis     last assessed: 2017    Cardiac disease     Chronic diastolic (congestive) heart failure ([place] [address] )     Colon polyp     Diabetes mellitus ([place] [address] )     Hyperlipidemia     Hypertension     Medical non-compliance     Morbid obesity with BMI of 40 0-44 9, adult ([place] [address] ) 2019    Pneumonia     last assessed: 2013    Pulmonary embolism ([place] [address] )     Renal disorder     possible clot noted in kidney, thus blood thinners currently    Severe obstructive sleep apnea     Severe pulmonary arterial systolic hypertension (HCC)      Past Surgical History:   Procedure Laterality Date     SECTION       x 1    EYE SURGERY      NOSE SURGERY      fractured nose - septoplasty     Social History     Substance and Sexual Activity   Alcohol Use Never    Frequency: Never     Social History     Substance and Sexual Activity   Drug Use No     Social History     Tobacco Use   Smoking Status Never Smoker   Smokeless Tobacco Never Used       Family History:  Family History   Problem Relation Age of Onset    Diabetes Mother         mellitus    Anxiety disorder Mother         generalized anxiety disorder    Hypertension Mother     Kidney disease Father     Kidney failure Father         renal failure    Ulcerative colitis Sister     Heart disease Family     Ovarian cancer Maternal Aunt          Meds/Allergies     Prior to Admission medications    Medication Sig Start Date End Date Taking?  Authorizing Provider   atorvastatin (LIPITOR) 20 mg tablet Take 1 tablet (20 mg total) by mouth daily 9/8/20  Yes Mauricio Stack DO   B Complex-C (B-COMPLEX WITH VITAMIN C) tablet Take 1 tablet by mouth daily   Yes Historical Provider, MD   Blood Glucose Monitoring Suppl (ONE TOUCH ULTRA 2) w/Device KIT Test glucose 3 times daily 9/14/18  Yes Mauricio Stack DO   calcium acetate (PHOSLO) capsule  12/19/20  Yes Historical Provider, MD   cholecalciferol (VITAMIN D3) 1,000 units tablet Take 2 tablets (2,000 Units total) by mouth daily 12/22/20  Yes Fuad Morrison MD   cyclobenzaprine (FLEXERIL) 5 mg tablet Take 1 tablet (5 mg total) by mouth daily at bedtime as needed for muscle spasms 1/28/21  Yes Mauricio Stack DO   docusate sodium (COLACE) 100 mg capsule Take 1 capsule (100 mg total) by mouth 2 (two) times a day 7/20/20  Yes Nazanin Herron MD   gentamicin (GARAMYCIN) 0 1 % cream  10/9/20  Yes Historical Provider, MD   HumaLOG Mix 75/25 KwikPen (75-25) 100 units/mL injection pen INJECT 32 UNITS UNDER THE SKIN TWICE A DAY WITH MEALS 8/10/20  Yes Katherin Nugent MD   Insulin Pen Needle 31G X 5 MM MISC by Does not apply route daily 10/5/20  Yes Mauricio Stack, DO   Lancets (onetouch ultrasoft) lancets Test glucose 3 times a day; Code: E11 8 9/8/20  Yes Mauricio Stack DO   linaGLIPtin (Tradjenta) 5 MG TABS Take 5 mg by mouth daily 9/8/20  Yes Gina MARCOS MD   MULTIPLE VITAMIN PO Take 1 tablet by mouth daily   Yes Historical Provider, MD   OneTouch Ultra test strip TEST GLUCOSE THREE TIMES A DAY 12/4/20  Yes Mauricio Stack DO   polyethylene glycol (MIRALAX) 17 g packet Take 17 g by mouth daily as needed (Constipation) 7/19/20  Yes Nazanin Herron MD   torsemide (DEMADEX) 20 mg tablet Take 3 tablets daily (60 mg) 12/15/20  Yes Vanesa Nunez MD   valsartan (DIOVAN) 160 mg tablet Take 1 tablet (160 mg total) by mouth daily 2/17/21  Yes Iram Sarkar MD   warfarin (COUMADIN) 1 mg tablet Take daily as directed 9/8/20  Yes Orin Hedrick DO   warfarin (COUMADIN) 2 mg tablet TAKE 1-1/2 TABLETS DAILY OR AS DIRECTED 8/26/20  Yes Wei Godoy MD   warfarin (COUMADIN) 5 mg tablet Daily as directed 9/8/20  Yes Orin Hedrick DO   HYDROXYUREA PO Take 20 mg/kg by mouth daily    Historical Provider, MD   Insulin Lispro Prot & Lispro (HumaLOG Mix 75/25 KwikPen) (75-25) 100 units/mL injection pen Inject under the skin    Historical Provider, MD   levothyroxine 50 mcg tablet Take one tablet (50mcg) daily in the early morning on an empty stomach  3/2/21   Wei Godoy MD   polymyxin b-trimethoprim (POLYTRIM) ophthalmic solution  9/25/20   Historical Provider, MD   prednisoLONE acetate (PRED FORTE) 1 % ophthalmic suspension  9/25/20   Historical Provider, MD   cinacalcet (SENSIPAR) 30 mg tablet Take 1 tablet (30 mg total) by mouth 3 (three) times a week  Patient not taking: Reported on 3/2/2021 1/15/21 3/2/21  Iram Sarkar MD   isosorbide mononitrate (IMDUR) 30 mg 24 hr tablet Take 1 tablet (30 mg total) by mouth daily  Patient not taking: Reported on 3/2/2021 8/26/20 3/2/21  Wei Godoy MD   levothyroxine 50 mcg tablet Take one tablet (50mcg) daily in the early morning on an empty stomach    Patient not taking: Reported on 3/2/2021 11/30/20 3/2/21  Maxine Tucker MD   metoprolol succinate (TOPROL-XL) 25 mg 24 hr tablet Take 2 tablets (50 mg total) by mouth 2 (two) times a day  Patient not taking: Reported on 3/2/2021 10/5/20 3/2/21  Orin Hedrick DO       No Known Allergies      Vitals:    03/02/21 1009   BP: 140/86   BP Location: Left arm   Patient Position: Sitting   Cuff Size: Large   Pulse: 64   Temp: 98 4 °F (36 9 °C)   TempSrc: Temporal   SpO2: 97%   Weight: 95 3 kg (210 lb)   Height: 5' (1 524 m)       Body mass index is 41 01 kg/m²  8 pound weight gain in approximately 2-1/2 months and 18 pound weight gain in approximately five months    Physical Exam:    General Appearance:  Alert, cooperative, no distress, appears stated age and is morbidly obese  Head:  Normocephalic, without obvious abnormality, atraumatic   Eyes:  PERRL, conjunctiva/corneas clear, EOM's intact,   both eyes   Ears:  Normal TM's and external ear canals, both ears   Nose: Nares normal, septum midline, mucosa normal, no drainage or sinus tenderness   Throat: Lips, mucosa, and tongue normal; teeth and gums normal   Neck: Supple, symmetrical, trachea midline, no adenopathy, thyroid: not enlarged, symmetric, no tenderness/mass/nodules, no carotid bruit or JVD   Back:   Symmetric, no curvature, ROM normal, no CVA tenderness   Lungs:   Clear to auscultation bilaterally, respirations unlabored   Chest Wall:  No tenderness or deformity   Heart:  Regular rate and rhythm, S1, S2 normal, no murmur, rub or gallop , but heart sounds are very distant  Abdomen:   Soft, non-tender, bowel sounds active all four quadrants,  no masses, no organomegaly ; marked obesity is noted  Extremities: Extremities normal, atraumatic, no cyanosis or edema   Pulses: 1+ and symmetric   Skin: Skin showed normal color, texture, turgor and no rashes or lesions   Lymph nodes: Cervical, supraclavicular, and axillary nodes normal   Neurologic: Normal         Cardiographics    ECG 03/02/21:    Normal sinus rhythm at 66 bpm  LAFB   No change in rhythm or LAFB since 12/22/2020    IMAGING:    No Chest XR results available for this patient      Carotid artery   Duplex scan 01/08/2021:    Mild heterogeneous in smooth atherosclerotic plaque bilaterally with less than 50% stenosis bilaterally      LAB REVIEW:      Lab Results   Component Value Date    SODIUM 140 01/28/2021    K 4 0 01/28/2021     01/28/2021    CO2 27 01/28/2021    ANIONGAP 6 12/11/2014    BUN 65 (H) 01/28/2021    CREATININE 5 33 (H) 01/28/2021    GLUCOSE 133 12/11/2014    GLUF 70 01/28/2021    CALCIUM 8 8 01/28/2021    CORRECTEDCA 9 4 01/28/2021    AST 16 01/28/2021    ALT 29 01/28/2021    ALKPHOS 83 01/28/2021    PROT 6 8 12/11/2014    BILITOT 1 08 (H) 12/11/2014    EGFR 8 01/28/2021     Lab Results   Component Value Date    CHOLESTEROL 154 01/28/2021    CHOLESTEROL 153 11/25/2020    CHOLESTEROL 132 09/08/2020     Lab Results   Component Value Date    HDL 58 01/28/2021    HDL 55 11/25/2020    HDL 56 09/08/2020       Lab Results   Component Value Date    LDLCALC 71 01/28/2021    LDLCALC 74 11/25/2020    LDLCALC 54 09/08/2020     No components found for: [facility]  Lab Results   Component Value Date    TRIG 124 01/28/2021    TRIG 122 11/25/2020    TRIG 110 09/08/2020     INR 01/28/2021:  3 67  A1c and estimated average glucose 01/28/2021:   6 7% and [address] [facility], MD

## 2021-03-02 NOTE — PATIENT INSTRUCTIONS
1  RESUME LEVOTHYROXINE 50 MICROGRAMS ONE DAILY and see Dr Rachel Cardoso as scheduled regarding dosing  2  Go for TSH, free T4, anti microsomal antibody blood tests the next time you go for blood testing  3  Continue current medication as you are presently taking, except for resumption of levothyroxine as mentioned above  4  Cardiology follow-up approximately six months with updated echocardiogram August, 2021  and office ECG

## 2021-03-08 ENCOUNTER — LAB (OUTPATIENT)
Dept: LAB | Facility: HOSPITAL | Age: 66
End: 2021-03-08
Payer: COMMERCIAL

## 2021-03-08 ENCOUNTER — TRANSCRIBE ORDERS (OUTPATIENT)
Dept: ADMINISTRATIVE | Facility: HOSPITAL | Age: 66
End: 2021-03-08

## 2021-03-08 ENCOUNTER — ANTICOAG VISIT (OUTPATIENT)
Dept: FAMILY MEDICINE CLINIC | Facility: CLINIC | Age: 66
End: 2021-03-08

## 2021-03-08 DIAGNOSIS — M79.632 LEFT FOREARM PAIN: ICD-10-CM

## 2021-03-08 DIAGNOSIS — Z01.818 PRE-TRANSPLANT EVALUATION FOR END STAGE RENAL DISEASE: Primary | ICD-10-CM

## 2021-03-08 DIAGNOSIS — Z79.4 TYPE 2 DIABETES MELLITUS WITH COMPLICATION, WITH LONG-TERM CURRENT USE OF INSULIN (HCC): ICD-10-CM

## 2021-03-08 DIAGNOSIS — R79.89 ELEVATED TSH: ICD-10-CM

## 2021-03-08 DIAGNOSIS — E11.8 TYPE 2 DIABETES MELLITUS WITH COMPLICATION, WITH LONG-TERM CURRENT USE OF INSULIN (HCC): ICD-10-CM

## 2021-03-08 DIAGNOSIS — E78.2 MIXED HYPERLIPIDEMIA: ICD-10-CM

## 2021-03-08 DIAGNOSIS — Z94.9 TRANSPLANT: ICD-10-CM

## 2021-03-08 DIAGNOSIS — I10 HYPERTENSION GOAL BP (BLOOD PRESSURE) < 140/90: ICD-10-CM

## 2021-03-08 DIAGNOSIS — Z01.818 PRE-TRANSPLANT EVALUATION FOR END STAGE RENAL DISEASE: ICD-10-CM

## 2021-03-08 DIAGNOSIS — M79.642 LEFT HAND PAIN: ICD-10-CM

## 2021-03-08 LAB
ALBUMIN SERPL BCP-MCNC: 3.3 G/DL (ref 3.5–5)
ALP SERPL-CCNC: 96 U/L (ref 46–116)
ALT SERPL W P-5'-P-CCNC: 28 U/L (ref 12–78)
ANION GAP SERPL CALCULATED.3IONS-SCNC: 11 MMOL/L (ref 4–13)
AST SERPL W P-5'-P-CCNC: 14 U/L (ref 5–45)
BILIRUB SERPL-MCNC: 0.6 MG/DL (ref 0.2–1)
BUN SERPL-MCNC: 66 MG/DL (ref 5–25)
CALCIUM ALBUM COR SERPL-MCNC: 9.8 MG/DL (ref 8.3–10.1)
CALCIUM SERPL-MCNC: 9.2 MG/DL (ref 8.3–10.1)
CHLORIDE SERPL-SCNC: 100 MMOL/L (ref 100–108)
CHOLEST SERPL-MCNC: 154 MG/DL (ref 50–200)
CO2 SERPL-SCNC: 26 MMOL/L (ref 21–32)
CREAT SERPL-MCNC: 5.74 MG/DL (ref 0.6–1.3)
EST. AVERAGE GLUCOSE BLD GHB EST-MCNC: 148 MG/DL
GFR SERPL CREATININE-BSD FRML MDRD: 7 ML/MIN/1.73SQ M
GLUCOSE P FAST SERPL-MCNC: 181 MG/DL (ref 65–99)
HBA1C MFR BLD: 6.8 %
HDLC SERPL-MCNC: 57 MG/DL
LDLC SERPL CALC-MCNC: 68 MG/DL (ref 0–100)
NONHDLC SERPL-MCNC: 97 MG/DL
POTASSIUM SERPL-SCNC: 4.2 MMOL/L (ref 3.5–5.3)
PROT SERPL-MCNC: 7.9 G/DL (ref 6.4–8.2)
SODIUM SERPL-SCNC: 137 MMOL/L (ref 136–145)
T4 FREE SERPL-MCNC: 0.93 NG/DL (ref 0.76–1.46)
TRIGL SERPL-MCNC: 146 MG/DL
TSH SERPL DL<=0.05 MIU/L-ACNC: 14.25 UIU/ML (ref 0.36–3.74)
VIT B12 SERPL-MCNC: 1276 PG/ML (ref 100–900)

## 2021-03-08 PROCEDURE — 80061 LIPID PANEL: CPT

## 2021-03-08 PROCEDURE — 84443 ASSAY THYROID STIM HORMONE: CPT

## 2021-03-08 PROCEDURE — 80307 DRUG TEST PRSMV CHEM ANLYZR: CPT

## 2021-03-08 PROCEDURE — 83036 HEMOGLOBIN GLYCOSYLATED A1C: CPT

## 2021-03-08 PROCEDURE — 85306 CLOT INHIBIT PROT S FREE: CPT

## 2021-03-08 PROCEDURE — 82985 ASSAY OF GLYCATED PROTEIN: CPT

## 2021-03-08 PROCEDURE — 86735 MUMPS ANTIBODY: CPT

## 2021-03-08 PROCEDURE — 85305 CLOT INHIBIT PROT S TOTAL: CPT

## 2021-03-08 PROCEDURE — 82607 VITAMIN B-12: CPT

## 2021-03-08 PROCEDURE — 85303 CLOT INHIBIT PROT C ACTIVITY: CPT

## 2021-03-08 PROCEDURE — 86376 MICROSOMAL ANTIBODY EACH: CPT

## 2021-03-08 PROCEDURE — 3044F HG A1C LEVEL LT 7.0%: CPT | Performed by: INTERNAL MEDICINE

## 2021-03-08 PROCEDURE — 80053 COMPREHEN METABOLIC PANEL: CPT

## 2021-03-08 PROCEDURE — 85302 CLOT INHIBIT PROT C ANTIGEN: CPT

## 2021-03-08 PROCEDURE — 84439 ASSAY OF FREE THYROXINE: CPT

## 2021-03-09 ENCOUNTER — OFFICE VISIT (OUTPATIENT)
Dept: FAMILY MEDICINE CLINIC | Facility: CLINIC | Age: 66
End: 2021-03-09
Payer: COMMERCIAL

## 2021-03-09 VITALS
HEART RATE: 98 BPM | OXYGEN SATURATION: 95 % | TEMPERATURE: 98.4 F | SYSTOLIC BLOOD PRESSURE: 130 MMHG | HEIGHT: 60 IN | DIASTOLIC BLOOD PRESSURE: 80 MMHG | RESPIRATION RATE: 16 BRPM | BODY MASS INDEX: 40.84 KG/M2 | WEIGHT: 208 LBS

## 2021-03-09 DIAGNOSIS — E03.9 ACQUIRED HYPOTHYROIDISM: ICD-10-CM

## 2021-03-09 DIAGNOSIS — E78.2 MIXED HYPERLIPIDEMIA: ICD-10-CM

## 2021-03-09 DIAGNOSIS — E11.319 DIABETIC RETINOPATHY OF BOTH EYES ASSOCIATED WITH TYPE 2 DIABETES MELLITUS, MACULAR EDEMA PRESENCE UNSPECIFIED, UNSPECIFIED RETINOPATHY SEVERITY (HCC): Primary | ICD-10-CM

## 2021-03-09 DIAGNOSIS — I48.92 PAROXYSMAL ATRIAL FLUTTER (HCC): ICD-10-CM

## 2021-03-09 LAB
FRUCTOSAMINE SERPL-SCNC: 314 UMOL/L (ref 0–285)
THYROPEROXIDASE AB SERPL-ACNC: 10 IU/ML (ref 0–34)

## 2021-03-09 PROCEDURE — 99214 OFFICE O/P EST MOD 30 MIN: CPT | Performed by: FAMILY MEDICINE

## 2021-03-09 NOTE — PROGRESS NOTES
Assessment/Plan:    1  Diabetic retinopathy of both eyes associated with type 2 diabetes mellitus, macular edema presence unspecified, unspecified retinopathy severity ([facility] [address] )  Assessment & Plan:    Lab Results   Component Value Date    HGBA1C 6 8 (H) 03/08/2021     Well controlled       2  Acquired hypothyroidism  Assessment & Plan:  Not controlled  She is going to see her endocrinologist later this week  3  Mixed hyperlipidemia  Assessment & Plan:  Well controlled   Continue atorvastatin 20 mg a day      4  Paroxysmal atrial flutter (HCC)  Assessment & Plan:  Stable  On coumadin  Managed by cardiology           There are no Patient Instructions on file for this visit  Return in about 3 months (around 6/9/2021) for Next scheduled follow up  Subjective:      Patient ID: Majo Marin is a 61 y o  female  Chief Complaint   Patient presents with    Follow-up     wmcma        Patient reports that she has been feeling okay  She has increased the strength of her dialysis and that has helped get rid of extra fluid and lower her blood pressure again  She has been learning how to manage it better over time  She has still been working with the transplant team to get on the list for a kidney transplant  Hypothyroidism-patient has been gaining weight and has appointment with her endocrinologist on Friday  Diabetes -patient has been taking her diabetes medications regularly  She has not been having problems with low blood sugars  Hyperlipidemia - patient is taking her atorvastatin daily  She denies any problems with associated myalgias  The following portions of the patient's history were reviewed and updated as appropriate:  past social history    Review of Systems   Respiratory: Negative  Cardiovascular: Negative            Current Outpatient Medications   Medication Sig Dispense Refill    atorvastatin (LIPITOR) 20 mg tablet Take 1 tablet (20 mg total) by mouth daily 90 tablet 1    B Complex-C (B-COMPLEX WITH VITAMIN C) tablet Take 1 tablet by mouth daily      Blood Glucose Monitoring Suppl (ONE TOUCH ULTRA 2) w/Device KIT Test glucose 3 times daily 1 each 0    calcium acetate (PHOSLO) capsule       cholecalciferol (VITAMIN D3) 1,000 units tablet Take 2 tablets (2,000 Units total) by mouth daily 100 tablet 5    cyclobenzaprine (FLEXERIL) 5 mg tablet Take 1 tablet (5 mg total) by mouth daily at bedtime as needed for muscle spasms 15 tablet 0    docusate sodium (COLACE) 100 mg capsule Take 1 capsule (100 mg total) by mouth 2 (two) times a day 10 capsule 0    gentamicin (GARAMYCIN) 0 1 % cream       HumaLOG Mix 75/25 KwikPen (75-25) 100 units/mL injection pen INJECT 32 UNITS UNDER THE SKIN TWICE A DAY WITH MEALS 150 mL 3    Insulin Lispro Prot & Lispro (HumaLOG Mix 75/25 KwikPen) (75-25) 100 units/mL injection pen Inject under the skin      Insulin Pen Needle 31G X 5 MM MISC by Does not apply route daily 100 each 1    Lancets (onetouch ultrasoft) lancets Test glucose 3 times a day; Code: E11 8 300 each 1    levothyroxine 50 mcg tablet Take one tablet (50mcg) daily in the early morning on an empty stomach   90 tablet 2    linaGLIPtin (Tradjenta) 5 MG TABS Take 5 mg by mouth daily 90 tablet 3    MULTIPLE VITAMIN PO Take 1 tablet by mouth daily      OneTouch Ultra test strip TEST GLUCOSE THREE TIMES A  each 3    polyethylene glycol (MIRALAX) 17 g packet Take 17 g by mouth daily as needed (Constipation) 14 each 0    torsemide (DEMADEX) 20 mg tablet Take 3 tablets daily (60 mg) 270 tablet 0    valsartan (DIOVAN) 160 mg tablet Take 1 tablet (160 mg total) by mouth daily 90 tablet 1    warfarin (COUMADIN) 1 mg tablet Take daily as directed 90 tablet 1    warfarin (COUMADIN) 2 mg tablet TAKE 1-1/2 TABLETS DAILY OR AS DIRECTED 180 tablet 1    warfarin (COUMADIN) 5 mg tablet Daily as directed 90 tablet 1    HYDROXYUREA PO Take 20 mg/kg by mouth daily      polymyxin b-trimethoprim (POLYTRIM) ophthalmic solution       prednisoLONE acetate (PRED FORTE) 1 % ophthalmic suspension        No current facility-administered medications for this visit  Objective:    /80   Pulse 98   Temp 98 4 °F (36 9 °C)   Resp 16   Ht 5' (1 524 m)   Wt 94 3 kg (208 lb)   SpO2 95%   BMI 40 62 kg/m²      Physical Exam  Vitals signs and nursing note reviewed  Constitutional:       Appearance: She is well-developed  HENT:      Head: Normocephalic and atraumatic  Right Ear: Tympanic membrane and external ear normal       Left Ear: Tympanic membrane and external ear normal    Cardiovascular:      Rate and Rhythm: Normal rate and regular rhythm  Pulses:           Dorsalis pedis pulses are 2+ on the right side and 2+ on the left side  Posterior tibial pulses are 2+ on the right side and 2+ on the left side  Heart sounds: Normal heart sounds  No murmur  No friction rub  Pulmonary:      Effort: Pulmonary effort is normal  No respiratory distress  Breath sounds: Normal breath sounds  No wheezing or rales  Musculoskeletal:      Right lower leg: No edema  Left lower leg: No edema  Feet:      Right foot:      Skin integrity: Dry skin present  No ulcer, skin breakdown, erythema, warmth or callus  Left foot:      Skin integrity: Dry skin present  No ulcer, skin breakdown, erythema, warmth or callus  Patient's shoes and socks removed  Right Foot/Ankle   Right Foot Inspection  Skin Exam: skin normal and dry skin skin not intact, no warmth, no callus, no erythema, no maceration, no abnormal color, no pre-ulcer, no ulcer and no callus                          Toe Exam: ROM and strength within normal limits  Sensory       Monofilament testing: intact  Vascular  Capillary refills: < 3 seconds  The right DP pulse is 2+  The right PT pulse is 2+       Left Foot/Ankle  Left Foot Inspection  Skin Exam: skin normal and dry skinskin not intact, no warmth, no erythema, no maceration, normal color, no pre-ulcer, no ulcer and no callus                         Toe Exam: ROM and strength within normal limits                   Sensory       Monofilament: intact  Vascular  Capillary refills: < 3 seconds  The left DP pulse is 2+  The left PT pulse is 2+     Assign Risk Category:  No deformity present; ;        Risk: 0     Tracy Kerns DO

## 2021-03-10 ENCOUNTER — HOSPITAL ENCOUNTER (OUTPATIENT)
Dept: RADIOLOGY | Facility: HOSPITAL | Age: 66
Discharge: HOME | End: 2021-03-10
Payer: COMMERCIAL

## 2021-03-10 VITALS — HEIGHT: 60 IN | BODY MASS INDEX: 40.84 KG/M2 | WEIGHT: 208 LBS

## 2021-03-10 DIAGNOSIS — R92.8 ABNORMAL SCREENING MAMMOGRAM: ICD-10-CM

## 2021-03-10 LAB — MUV IGM SER QL: 0.91 AU (ref 0–0.79)

## 2021-03-10 PROCEDURE — 76642 ULTRASOUND BREAST LIMITED: CPT

## 2021-03-10 PROCEDURE — 77065 DX MAMMO INCL CAD UNI: CPT

## 2021-03-10 PROCEDURE — G0279 TOMOSYNTHESIS, MAMMO: HCPCS

## 2021-03-12 ENCOUNTER — OFFICE VISIT (OUTPATIENT)
Dept: ENDOCRINOLOGY | Facility: CLINIC | Age: 66
End: 2021-03-12
Payer: COMMERCIAL

## 2021-03-12 VITALS
WEIGHT: 211 LBS | HEART RATE: 73 BPM | HEIGHT: 60 IN | TEMPERATURE: 97 F | BODY MASS INDEX: 41.43 KG/M2 | DIASTOLIC BLOOD PRESSURE: 93 MMHG | SYSTOLIC BLOOD PRESSURE: 120 MMHG

## 2021-03-12 DIAGNOSIS — Z79.4 TYPE 2 DIABETES MELLITUS WITH CHRONIC KIDNEY DISEASE ON CHRONIC DIALYSIS, WITH LONG-TERM CURRENT USE OF INSULIN (HCC): ICD-10-CM

## 2021-03-12 DIAGNOSIS — E11.22 TYPE 2 DIABETES MELLITUS WITH CHRONIC KIDNEY DISEASE ON CHRONIC DIALYSIS, WITH LONG-TERM CURRENT USE OF INSULIN (HCC): ICD-10-CM

## 2021-03-12 DIAGNOSIS — N18.6 ESRD (END STAGE RENAL DISEASE) (HCC): ICD-10-CM

## 2021-03-12 DIAGNOSIS — E03.9 HYPOTHYROIDISM, UNSPECIFIED TYPE: Primary | ICD-10-CM

## 2021-03-12 DIAGNOSIS — N18.6 TYPE 2 DIABETES MELLITUS WITH CHRONIC KIDNEY DISEASE ON CHRONIC DIALYSIS, WITH LONG-TERM CURRENT USE OF INSULIN (HCC): ICD-10-CM

## 2021-03-12 DIAGNOSIS — Z99.2 TYPE 2 DIABETES MELLITUS WITH CHRONIC KIDNEY DISEASE ON CHRONIC DIALYSIS, WITH LONG-TERM CURRENT USE OF INSULIN (HCC): ICD-10-CM

## 2021-03-12 DIAGNOSIS — E78.2 MIXED HYPERLIPIDEMIA: ICD-10-CM

## 2021-03-12 LAB — MISCELLANEOUS LAB TEST RESULT: NORMAL

## 2021-03-12 PROCEDURE — 3008F BODY MASS INDEX DOCD: CPT | Performed by: INTERNAL MEDICINE

## 2021-03-12 PROCEDURE — 1036F TOBACCO NON-USER: CPT | Performed by: INTERNAL MEDICINE

## 2021-03-12 PROCEDURE — 3074F SYST BP LT 130 MM HG: CPT | Performed by: INTERNAL MEDICINE

## 2021-03-12 PROCEDURE — 3080F DIAST BP >= 90 MM HG: CPT | Performed by: INTERNAL MEDICINE

## 2021-03-12 PROCEDURE — 99214 OFFICE O/P EST MOD 30 MIN: CPT | Performed by: INTERNAL MEDICINE

## 2021-03-12 RX ORDER — LEVOTHYROXINE SODIUM 0.07 MG/1
75 TABLET ORAL DAILY
Qty: 30 TABLET | Refills: 2 | Status: SHIPPED | OUTPATIENT
Start: 2021-03-12 | End: 2021-05-26 | Stop reason: DRUGHIGH

## 2021-03-12 RX ORDER — FLASH GLUCOSE SCANNING READER
EACH MISCELLANEOUS
Qty: 1 DEVICE | Refills: 0
Start: 2021-03-12 | End: 2021-03-15 | Stop reason: SDUPTHER

## 2021-03-12 RX ORDER — FLASH GLUCOSE SENSOR
KIT MISCELLANEOUS
Qty: 6 EACH | Refills: 2
Start: 2021-03-12 | End: 2021-03-15 | Stop reason: SDUPTHER

## 2021-03-12 NOTE — PATIENT INSTRUCTIONS
Increase 75/25 insulin in the morning to 28 units continue night time 35 units   Increase levothyroxine to 75 mcg

## 2021-03-12 NOTE — PROGRESS NOTES
Progress Note  Cc: for follow up    Referring Provider  Milagro Clemons, Do  [address],  [place] [address]     History of Present Illness:   Charmayne Bathe is a 61 y o  female with a history of type 2 diabetes with long term use of insulin for many years  Last seen in the office in November 2020  Reports complications of ESRD on PD, retinopathy   Denies recent illness or hospitalizations  Denies recent severe hypoglycemic or severe hyperglycemic episodes  Denies any issues with her current regimen  home glucose monitoring: are performed regularly      Current regimen:   75/25 insulin to a standing dose of 25 units in the morning with breakfast and 34 units before dinner  Tradjenta 5 mg daily     Injects in: abdomen Rotates sites:yes     further diabetic ROS:  She denies polydipsia, neuropathy, heart attack, stroke and claudication    Home blood glucose readings:   Before breakfast: 150 -180  Before lunch: none  Before dinner: none  Bedtime: none  2 hours After dinner: 200- 220    Hypoglycemic episodes:   H/o of hypoglycemia causing hospitalization or Intervention such as glucagon injection  or ambulance call : no  Hypoglycemia symptoms: shaky, sweating,   Treatment of hypoglycemia: juice,       Diabetes education: YES  Diet: 3 meals per day, 1-2 snacks per day  Timing of meals: variable     diabetic diet compliance: yes    Activity: Daily activity:   Not physically active                  Opthamology: UTD,  Retinopathy : yes   Podiatry: KETAN  [place] vaccine: yes    Has hypertension: followed by PCP; on ARB,   Has hyperlipidemia: followed by PCP; on statin - tolerating well, no myalgias      Thyroid disorders: hypothyroidism, on levothyroxine 50 mcg daily   History of pancreatitis: no    Patient Active Problem List   Diagnosis    Benign hypertension with CKD (chronic kidney disease) stage V (HCC)    Nephrotic range proteinuria    Cyst of ovary    Mixed hyperlipidemia    Type 2 diabetes mellitus with complication, with long-term current use of insulin ([facility])    Obstructive sleep apnea of adult    Dyspnea on exertion    PE (pulmonary thromboembolism) ([facility])    Elevated brain natriuretic peptide (BNP) level    Leg edema, left    Pulmonary hypertension (HCC)    Closed nondisplaced fracture of distal phalanx of right great toe    Right foot pain    Onychomycosis    Tinea pedis of both feet    Acute idiopathic gout of right foot    Vitamin D deficiency    Secondary hyperparathyroidism of renal origin ([place] [address] )    Non-rheumatic mitral valve stenosis    Diabetic retinopathy of both eyes associated with type 2 diabetes mellitus ([place] [address] )    Hypoxemia    Drop in hemoglobin    Encounter for long-term (current) use of insulin ([place] [address] )    ESRD (end stage renal disease) ([facility])    Paroxysmal atrial flutter ([facility])    History of pulmonary embolus (PE)    Obstructive sleep apnea    Chronic combined systolic and diastolic congestive heart failure ([facility])    Nocturnal hypoxemia    Mixed dyslipidemia    Class 2 obesity    Dependence on continuous ambulatory peritoneal dialysis ([place] [address] )    On continuous oral anticoagulation    Acquired hypothyroidism    Anemia of chronic disease      Past Medical History:   Diagnosis Date    Acute asthmatic bronchitis     last assessed: 2017    Cardiac disease     Chronic diastolic (congestive) heart failure ([facility])     Colon polyp     [facility]     Hyperlipidemia     Hypertension     Medical non-compliance     Morbid obesity with BMI of 40 0-44 9, adult ([place] [address] ) 2019    Pneumonia     last assessed: 2013    Pulmonary embolism ([place] [address] )     Renal disorder     possible clot noted in kidney, thus blood thinners currently    Severe obstructive sleep apnea     Severe pulmonary arterial systolic hypertension (HCC)       Past Surgical History:   Procedure Laterality Date     SECTION       x 1    EYE SURGERY      NOSE SURGERY      fractured nose - septoplasty      Family History   Problem Relation Age of Onset    Diabetes Mother         mellitus    Anxiety disorder Mother         generalized anxiety disorder    Hypertension Mother     Kidney disease Father     Kidney failure Father         renal failure    Ulcerative colitis Sister     Heart disease Family     Ovarian cancer Maternal Aunt      Social History     Tobacco Use    Smoking status: Never Smoker    Smokeless tobacco: Never Used   Substance Use Topics    Alcohol use: Never     Frequency: Never     No Known Allergies      Current Outpatient Medications:     atorvastatin (LIPITOR) 20 mg tablet, Take 1 tablet (20 mg total) by mouth daily, Disp: 90 tablet, Rfl: 1    B Complex-C (B-COMPLEX WITH VITAMIN C) tablet, Take 1 tablet by mouth daily, Disp: , Rfl:     Blood Glucose Monitoring Suppl (ONE TOUCH ULTRA 2) w/Device KIT, Test glucose 3 times daily, Disp: 1 each, Rfl: 0    calcium acetate (PHOSLO) capsule, , Disp: , Rfl:     cholecalciferol (VITAMIN D3) 1,000 units tablet, Take 2 tablets (2,000 Units total) by mouth daily, Disp: 100 tablet, Rfl: 5    docusate sodium (COLACE) 100 mg capsule, Take 1 capsule (100 mg total) by mouth 2 (two) times a day, Disp: 10 capsule, Rfl: 0    gentamicin (GARAMYCIN) 0 1 % cream, , Disp: , Rfl:     HumaLOG Mix 75/25 KwikPen (75-25) 100 units/mL injection pen, INJECT 32 UNITS UNDER THE SKIN TWICE A DAY WITH MEALS, Disp: 150 mL, Rfl: 3    Insulin Pen Needle 31G X 5 MM MISC, by Does not apply route daily, Disp: 100 each, Rfl: 1    Lancets (onetouch ultrasoft) lancets, Test glucose 3 times a day; Code: E11 8, Disp: 300 each, Rfl: 1    levothyroxine 50 mcg tablet, Take one tablet (50mcg) daily in the early morning on an empty stomach , Disp: 90 tablet, Rfl: 2    linaGLIPtin (Tradjenta) 5 MG TABS, Take 5 mg by mouth daily, Disp: 90 tablet, Rfl: 3    MULTIPLE VITAMIN PO, Take 1 tablet by mouth daily, Disp: , Rfl:     OneTouch Ultra test strip, TEST GLUCOSE THREE TIMES A DAY, Disp: 300 each, Rfl: 3    polyethylene glycol (MIRALAX) 17 g packet, Take 17 g by mouth daily as needed (Constipation), Disp: 14 each, Rfl: 0    torsemide (DEMADEX) 20 mg tablet, Take 3 tablets daily (60 mg), Disp: 270 tablet, Rfl: 0    valsartan (DIOVAN) 160 mg tablet, Take 1 tablet (160 mg total) by mouth daily, Disp: 90 tablet, Rfl: 1    warfarin (COUMADIN) 1 mg tablet, Take daily as directed, Disp: 90 tablet, Rfl: 1    warfarin (COUMADIN) 2 mg tablet, TAKE 1-1/2 TABLETS DAILY OR AS DIRECTED, Disp: 180 tablet, Rfl: 1    warfarin (COUMADIN) 5 mg tablet, Daily as directed, Disp: 90 tablet, Rfl: 1    cyclobenzaprine (FLEXERIL) 5 mg tablet, Take 1 tablet (5 mg total) by mouth daily at bedtime as needed for muscle spasms (Patient not taking: Reported on 3/12/2021), Disp: 15 tablet, Rfl: 0    HYDROXYUREA PO, Take 20 mg/kg by mouth daily, Disp: , Rfl:     Insulin Lispro Prot & Lispro (HumaLOG Mix 75/25 KwikPen) (75-25) 100 units/mL injection pen, Inject under the skin, Disp: , Rfl:     polymyxin b-trimethoprim (POLYTRIM) ophthalmic solution, , Disp: , Rfl:     prednisoLONE acetate (PRED FORTE) 1 % ophthalmic suspension, , Disp: , Rfl:      Review of Systems   Constitutional: Positive for unexpected weight change  Negative for activity change, appetite change, chills, diaphoresis, fatigue and fever  HENT: Negative for congestion, drooling, ear discharge, ear pain, trouble swallowing and voice change  Eyes: Negative for photophobia, pain, discharge, redness, itching and visual disturbance  Respiratory: Negative for cough, chest tightness, shortness of breath and wheezing  Cardiovascular: Negative for chest pain, palpitations and leg swelling  Gastrointestinal: Negative for abdominal distention, abdominal pain, blood in stool, diarrhea, nausea and vomiting  Endocrine: Negative for cold intolerance, heat intolerance, polydipsia, polyphagia and polyuria  Genitourinary: Negative for dysuria, flank pain, frequency and hematuria  Musculoskeletal: Negative for back pain, gait problem, joint swelling, myalgias, neck pain and neck stiffness  Skin: Negative for color change, pallor, rash and wound  Neurological: Negative for dizziness, tremors, seizures, syncope, speech difficulty, weakness, numbness and headaches  Psychiatric/Behavioral: Negative for agitation  Physical Exam:  Body mass index is 41 21 kg/m²    /93   Pulse 73   Temp (!) 97 °F (36 1 °C)   Ht 5' (1 524 m)   Wt 95 7 kg (211 lb)   BMI 41 21 kg/m²    Wt Readings from Last 3 Encounters:   03/12/21 95 7 kg (211 lb)   03/10/21 94 3 kg (208 lb)   03/09/21 94 3 kg (208 lb)       GEN: NAD  E/n/m nl facies, hearing intact bilat, tongue midline, lips nl  Eyes: no stare or proptosis, nl lids and conjunctiva, EOMI  Neck: trachea midline, thyroid NT to palpation, nl in size, no nodules or neck masses noted, no cervical LAD  CV; heart reg rate s1s2 nl, no m/r/g appreciated, no ARTI  Resp: CTAB, good effort  Ab+BS  Neuro: no tremor, 2+ DTRs in BUE  MS: no c/c in digits, moves all 4 ext, nl muscle bulk, gait nl  Skin: warm and dry, no palmar erythema  Ext: no c/c in digits, no edema bilaterally, 2+ DP and PT pulses bilat, no breaks in skin/ulcers on feet, sensation intact to monofilament testing on plantar surfaces bilat  Psych: nl mood and affect, no gross lapses in memory    Labs:   No components found for: HA1C  No components found for: GLU    Lab Results   Component Value Date    CREATININE 5 74 (H) 03/08/2021    CREATININE 5 33 (H) 01/28/2021    CREATININE 5 08 (H) 11/25/2020    BUN 66 (H) 03/08/2021     12/11/2014    K 4 2 03/08/2021     03/08/2021    CO2 26 03/08/2021     eGFR   Date Value Ref Range Status   03/08/2021 7 ml/min/1 73sq m Final     No components found for: [facility]    Lab Results   Component Value Date    HDL 57 03/08/2021    TRIG 146 03/08/2021       Lab Results Component Value Date    ALT 28 03/08/2021    AST 14 03/08/2021    ALKPHOS 96 03/08/2021    BILITOT 1 08 (H) 12/11/2014       Lab Results   Component Value Date    FREET4 0 93 03/08/2021     Component      Latest Ref Rng & Units 3/8/2021   Sodium      136 - 145 mmol/L 137   Potassium      3 5 - 5 3 mmol/L 4 2   Chloride      100 - 108 mmol/L 100   CO2      21 - 32 mmol/L 26   Anion Gap      4 - 13 mmol/L 11   BUN      5 - 25 mg/dL 66 (H)   Creatinine      0 60 - 1 30 mg/dL 5 74 (H)   GLUCOSE FASTING      65 - 99 mg/dL 181 (H)   Calcium      8 3 - 10 1 mg/dL 9 2   CORRECTED CALCIUM      8 3 - 10 1 mg/dL 9 8   AST      5 - 45 U/L 14   ALT      12 - 78 U/L 28   Alkaline Phosphatase      46 - 116 U/L 96   Total Protein      6 4 - 8 2 g/dL 7 9   Albumin      3 5 - 5 0 g/dL 3 3 (L)   TOTAL BILIRUBIN      0 20 - 1 00 mg/dL 0 60   eGFR      ml/min/1 73sq m 7   Cholesterol      50 - 200 mg/dL 154   Triglycerides      <=150 mg/dL 146   HDL      >=40 mg/dL 57   LDL Calculated      0 - 100 mg/dL 68   Non-HDL Cholesterol      mg/dl 97   Hemoglobin A1C      Normal 3 8-5 6%; PreDiabetic 5 7-6 4%; Diabetic >=6 5%; Glycemic control for adults with diabetes <7 0% % 6 8 (H)   eAG, EST AVG Glucose      mg/dl 148   FRUCTOSAMINE      0 - 285 umol/L 314 (H)   TSH 3RD GENERATON      0 358 - 3 740 uIU/mL 14 248 (H)   Free T4      0 76 - 1 46 ng/dL 0 93       Impression:  1  Hypothyroidism, unspecified type    2  Type 2 diabetes mellitus with chronic kidney disease on chronic dialysis, with long-term current use of insulin ([facility] [address] )    3  Mixed hyperlipidemia    4  ESRD (end stage renal disease) ([facility] [address] )           Assessment plan:       Diagnoses and all orders for this visit:    Hypothyroidism, unspecified type  -     levothyroxine 75 mcg tablet;  Take 1 tablet (75 mcg total) by mouth daily  -     Continuous Blood Gluc  (FreeStyle [place]ner 14 Day [place]) GAGANDEEP; Use as instructed  -     Continuous Blood Gluc Sensor (FreeStyle [place] 14 Day Sensor) MISC; Use every 2 weeks on your arm as instructed  -     TSH, 3rd generation with Free T4 reflex; Future  -     T4, free Lab Collect; Future    Type 2 diabetes mellitus with chronic kidney disease on chronic dialysis, with long-term current use of insulin ([facility] ): Most recent A1c of 6 8% however fructosamine is 314 which shows A1c between 7-8%  Patient is currently on 75/25 insulin,  25 units before breakfast and 34 units before dinner she performs home glucose monitoring her blood sugars in the morning are between 140-180 mg/dL and 2 hours after dinner are between 200-220 mg/dL  Patient denies any recent hypoglycemia episode  We asked her to increase morning dose to 28 units before breakfast and continue 34 units before dinner and continue Tradjenta 5 mg once daily  Patient was asked to continue home glucose monitoring including for lunch and send us her blood sugar logs in 2-3 weeks to make any necessary changes  We discussed about continues glucose monitoring which she agreeable to use, izabella Fraire was ordered and she was at advised to call her insurance for coverage  we see her back in the office in 3 months  -     Comprehensive metabolic panel Lab Collect; Future  -     HEMOGLOBIN A1C W/ EAG ESTIMATION Lab Collect; Future  -     Lipid panel Lab Collect Lab Collect; Future      Mixed hyperlipidemia:  Currently on Lipitor 20 mg once daily  She she does not report any complication  Continue current management  Check lipid profile before next visit    ESRD (end stage renal disease) ([facility] ): On peritoneal dialysis  follow-up with nephrologist    hypothyroidism:  Patient is currently on 50 mcg levothyroxine once daily which she takes it regularly and properly  She is clinically euthyroid however her TSH is elevated at 14 248,  With normal free T4  We increased levothyroxine dose to 75 mcg once daily, will repeat TFT in 6 weeks      dvised that levotyroxine should be taken on an empty stomach  Should avoid taking medications like iron, calcium, tums, [id number], PPI at the same time that may decrease absorption of T4  Should separate administration by 4hrs  Hypertension:  currently in on valsartan 160 mg once daily  Is managed by a nephrologist       Discussed with the patient and all questioned fully answered  She will call me if any problems arise      Counseled patient on diagnostic results, prognosis, risk and benefit of treatment options, instruction for management, importance of treatment compliance, Risk  factor reduction and impressions

## 2021-03-14 NOTE — RESULT ENCOUNTER NOTE
Message sent to [facility]    Ms Anil Person - [birth year] - her mumps IgM Ab is borderline on repeat check again  Prior 0 94 and repeat is 0 91  our cut off is < 0 8     Can we see with transplant ID team if this is someone that can be seen virtually for further advice? Please let me know date of appt   please let pt know we are attempting to set up    Thank you    np

## 2021-03-15 DIAGNOSIS — N18.30 STAGE 3 CHRONIC KIDNEY DISEASE (HCC): ICD-10-CM

## 2021-03-15 DIAGNOSIS — E03.9 HYPOTHYROIDISM, UNSPECIFIED TYPE: ICD-10-CM

## 2021-03-15 RX ORDER — TORSEMIDE 100 MG/1
100 TABLET ORAL DAILY
Qty: 90 TABLET | Refills: 1 | Status: SHIPPED | OUTPATIENT
Start: 2021-03-15

## 2021-03-15 RX ORDER — FLASH GLUCOSE SCANNING READER
EACH MISCELLANEOUS
Qty: 1 DEVICE | Refills: 0 | Status: SHIPPED | OUTPATIENT
Start: 2021-03-15 | End: 2022-06-27 | Stop reason: RX

## 2021-03-15 RX ORDER — FLASH GLUCOSE SENSOR
KIT MISCELLANEOUS
Qty: 6 EACH | Refills: 2 | Status: SHIPPED | OUTPATIENT
Start: 2021-03-15 | End: 2021-05-06 | Stop reason: SDUPTHER

## 2021-03-17 LAB — MISCELLANEOUS LAB TEST RESULT: NORMAL

## 2021-03-24 ENCOUNTER — TELEPHONE (OUTPATIENT)
Dept: ENDOCRINOLOGY | Facility: CLINIC | Age: 66
End: 2021-03-24

## 2021-03-24 NOTE — TELEPHONE ENCOUNTER
Spoke to patient, she stated that sensor stop working  Advised to call Maegan Powell to see they will replace it

## 2021-04-12 ENCOUNTER — TRANSCRIBE ORDERS (OUTPATIENT)
Dept: ADMINISTRATIVE | Facility: HOSPITAL | Age: 66
End: 2021-04-12

## 2021-04-12 DIAGNOSIS — I26.99 PULMONARY INFARCTION (HCC): ICD-10-CM

## 2021-04-12 DIAGNOSIS — N25.81 SECONDARY HYPERPARATHYROIDISM OF RENAL ORIGIN (HCC): ICD-10-CM

## 2021-04-12 DIAGNOSIS — E11.22 TYPE 2 DIABETES MELLITUS WITH DIABETIC CHRONIC KIDNEY DISEASE, UNSPECIFIED CKD STAGE, UNSPECIFIED WHETHER LONG TERM INSULIN USE (HCC): Primary | ICD-10-CM

## 2021-04-12 DIAGNOSIS — N18.6 END STAGE RENAL DISEASE (HCC): ICD-10-CM

## 2021-04-12 DIAGNOSIS — I26.99 PULMONARY THROMBOSIS (HCC): ICD-10-CM

## 2021-04-12 PROCEDURE — 3066F NEPHROPATHY DOC TX: CPT | Performed by: FAMILY MEDICINE

## 2021-04-20 ENCOUNTER — HOSPITAL ENCOUNTER (OUTPATIENT)
Dept: RADIOLOGY | Facility: HOSPITAL | Age: 66
Discharge: HOME | End: 2021-04-20
Attending: INTERNAL MEDICINE
Payer: COMMERCIAL

## 2021-04-20 DIAGNOSIS — I26.99 PULMONARY THROMBOSIS (HCC): ICD-10-CM

## 2021-04-20 DIAGNOSIS — E11.22 TYPE 2 DIABETES MELLITUS WITH DIABETIC CHRONIC KIDNEY DISEASE, UNSPECIFIED CKD STAGE, UNSPECIFIED WHETHER LONG TERM INSULIN USE (HCC): ICD-10-CM

## 2021-04-20 DIAGNOSIS — N25.81 SECONDARY HYPERPARATHYROIDISM OF RENAL ORIGIN (HCC): ICD-10-CM

## 2021-04-20 DIAGNOSIS — N18.6 END STAGE RENAL DISEASE (HCC): ICD-10-CM

## 2021-04-20 PROCEDURE — 76856 US EXAM PELVIC COMPLETE: CPT

## 2021-04-20 PROCEDURE — 76700 US EXAM ABDOM COMPLETE: CPT

## 2021-04-20 PROCEDURE — 76830 TRANSVAGINAL US NON-OB: CPT

## 2021-04-24 ENCOUNTER — HOSPITAL ENCOUNTER (OUTPATIENT)
Dept: RADIOLOGY | Facility: HOSPITAL | Age: 66
Discharge: HOME | End: 2021-04-24
Attending: INTERNAL MEDICINE
Payer: COMMERCIAL

## 2021-04-24 DIAGNOSIS — E11.22 TYPE 2 DIABETES MELLITUS WITH DIABETIC CHRONIC KIDNEY DISEASE, UNSPECIFIED CKD STAGE, UNSPECIFIED WHETHER LONG TERM INSULIN USE (HCC): ICD-10-CM

## 2021-04-24 DIAGNOSIS — I26.99 PULMONARY THROMBOSIS (HCC): ICD-10-CM

## 2021-04-24 DIAGNOSIS — N18.6 END STAGE RENAL DISEASE (HCC): ICD-10-CM

## 2021-04-24 DIAGNOSIS — N25.81 SECONDARY HYPERPARATHYROIDISM OF RENAL ORIGIN (HCC): ICD-10-CM

## 2021-04-24 PROCEDURE — 71250 CT THORAX DX C-: CPT

## 2021-04-28 ENCOUNTER — TELEMEDICINE (OUTPATIENT)
Dept: FAMILY MEDICINE CLINIC | Facility: CLINIC | Age: 66
End: 2021-04-28
Payer: COMMERCIAL

## 2021-04-28 ENCOUNTER — TELEPHONE (OUTPATIENT)
Dept: FAMILY MEDICINE CLINIC | Facility: CLINIC | Age: 66
End: 2021-04-28

## 2021-04-28 VITALS — BODY MASS INDEX: 40.64 KG/M2 | HEIGHT: 60 IN | WEIGHT: 207 LBS

## 2021-04-28 DIAGNOSIS — Z20.822 EXPOSURE TO COVID-19 VIRUS: Primary | ICD-10-CM

## 2021-04-28 PROCEDURE — 99213 OFFICE O/P EST LOW 20 MIN: CPT | Performed by: FAMILY MEDICINE

## 2021-04-28 PROCEDURE — 1036F TOBACCO NON-USER: CPT | Performed by: FAMILY MEDICINE

## 2021-04-28 PROCEDURE — 3725F SCREEN DEPRESSION PERFORMED: CPT | Performed by: FAMILY MEDICINE

## 2021-04-28 PROCEDURE — 3008F BODY MASS INDEX DOCD: CPT | Performed by: FAMILY MEDICINE

## 2021-04-28 NOTE — TELEPHONE ENCOUNTER
Cristiano Mckinney is now calling back with a different issue  She just found out her daughter and her family have tested for positive for Corona virus,she was last exposed to them 3 days ago, she has no symptoms but neither did they, they were tested because someone at the child's school tested positive their tests came back positive too  She is worried because she has a lot of health issues

## 2021-04-28 NOTE — PROGRESS NOTES
Virtual Regular Visit      Assessment/Plan:    Problem List Items Addressed This Visit     None      Visit Diagnoses     Exposure to COVID-19 virus    -  Primary    Relevant Orders    Novel Coronavirus (Covid-19),PCR [facility] - Collected at Mobile Vans or [facility] Now               Reason for visit is   Chief Complaint   Patient presents with   31 [id number]     exposed to her daugher who is positive  [id number]    Virtual Regular Visit        Encounter provider Zoila Boswell DO    Provider located at  [address]  [address] [facility] [address]  [place] [address]      Recent Visits  No visits were found meeting these conditions  Showing recent visits within past 7 days and meeting all other requirements     Today's Visits  Date Type Provider Dept   04/28/21 [address] [facility]   04/28/21 Telephone Renée Aguilar, [address] today's visits and meeting all other requirements     Future Appointments  No visits were found meeting these conditions  Showing future appointments within next 150 days and meeting all other requirements        The patient was identified by name and date of birth  Alieen Caro was informed that this is a telemedicine visit and that the visit is being conducted through [address] [place] [address] and patient was informed that this is not a secure, HIPAA-compliant platform  She agrees to proceed     My office door was closed  No one else was in the room  She acknowledged consent and understanding of privacy and security of the video platform  The patient has agreed to participate and understands they can discontinue the visit at any time  Patient is aware this is a billable service  Subjective  Aileen Caro is a 61 y o  female          Pt is being seen for a same day virtual appt  Pt states her daughter and son in law and kids tested positive for covid  Pt states she was with them 4 days ago  Pt does not have any symptoms       Past Medical History:   Diagnosis Date    Acute asthmatic bronchitis     last assessed: 2017    Cardiac disease     Chronic diastolic (congestive) heart failure (HCC)     Colon polyp     Diabetes mellitus ([facility] )     Hyperlipidemia     Hypertension     Medical non-compliance     Morbid obesity with BMI of 40 0-44 9, adult ([facility] ) 2019    Pneumonia     last assessed: 2013    Pulmonary embolism ([facility] )     Renal disorder     possible clot noted in kidney, thus blood thinners currently    Severe obstructive sleep apnea     Severe pulmonary arterial systolic hypertension (HCC)        Past Surgical History:   Procedure Laterality Date     SECTION       x 1    EYE SURGERY      NOSE SURGERY      fractured nose - septoplasty       Current Outpatient Medications   Medication Sig Dispense Refill    atorvastatin (LIPITOR) 20 mg tablet Take 1 tablet (20 mg total) by mouth daily 90 tablet 1    B Complex-C (B-COMPLEX WITH VITAMIN C) tablet Take 1 tablet by mouth daily      Blood Glucose Monitoring Suppl (ONE TOUCH ULTRA 2) w/Device KIT Test glucose 3 times daily 1 each 0    calcium acetate (PHOSLO) capsule       cholecalciferol (VITAMIN D3) 1,000 units tablet Take 2 tablets (2,000 Units total) by mouth daily 100 tablet 5    Continuous Blood Gluc  (FreeStyle Conner 14 Day [place]) GAGANDEEP Use to test blood sugar daily 1 Device 0    Continuous Blood Gluc Sensor (FreeStyle Conner 14 Day Sensor) MISC Use every 2 weeks on your arm as instructed 6 each 2    docusate sodium (COLACE) 100 mg capsule Take 1 capsule (100 mg total) by mouth 2 (two) times a day 10 capsule 0    gentamicin (GARAMYCIN) 0 1 % cream       HumaLOG Mix 75/25 KwikPen (75-25) 100 units/mL injection pen INJECT 32 UNITS UNDER THE SKIN TWICE A DAY WITH MEALS 150 mL 3    HYDROXYUREA PO Take 20 mg/kg by mouth daily      Insulin Lispro Prot & Lispro (HumaLOG Mix 75/25 KwikPen) (75-25) 100 units/mL injection pen Inject under the skin      Insulin Pen Needle 31G X 5 MM MISC by Does not apply route daily 100 each 1    Lancets (onetouch ultrasoft) lancets Test glucose 3 times a day; Code: E11 8 300 each 1    levothyroxine 75 mcg tablet Take 1 tablet (75 mcg total) by mouth daily 30 tablet 2    linaGLIPtin (Tradjenta) 5 MG TABS Take 5 mg by mouth daily 90 tablet 3    MULTIPLE VITAMIN PO Take 1 tablet by mouth daily      OneTouch Ultra test strip TEST GLUCOSE THREE TIMES A  each 3    polyethylene glycol (MIRALAX) 17 g packet Take 17 g by mouth daily as needed (Constipation) 14 each 0    polymyxin b-trimethoprim (POLYTRIM) ophthalmic solution       prednisoLONE acetate (PRED FORTE) 1 % ophthalmic suspension       torsemide (DEMADEX) 100 mg tablet Take 1 tablet (100 mg total) by mouth daily 90 tablet 1    valsartan (DIOVAN) 160 mg tablet Take 1 tablet (160 mg total) by mouth daily 90 tablet 1    warfarin (COUMADIN) 1 mg tablet Take daily as directed 90 tablet 1    warfarin (COUMADIN) 2 mg tablet TAKE 1-1/2 TABLETS DAILY OR AS DIRECTED 180 tablet 1    warfarin (COUMADIN) 5 mg tablet Daily as directed 90 tablet 1    cyclobenzaprine (FLEXERIL) 5 mg tablet Take 1 tablet (5 mg total) by mouth daily at bedtime as needed for muscle spasms (Patient not taking: Reported on 3/12/2021) 15 tablet 0     No current facility-administered medications for this visit  No Known Allergies    Review of Systems   Constitutional: Negative  Negative for activity change, appetite change, chills, diaphoresis and fatigue  HENT: Negative  Negative for dental problem, ear pain, sinus pressure and sore throat  Eyes: Negative  Negative for photophobia, pain, discharge, redness, itching and visual disturbance  Respiratory: Negative for apnea and chest tightness  Cardiovascular: Negative  Negative for chest pain, palpitations and leg swelling  Gastrointestinal: Negative    Negative for abdominal distention, abdominal pain, constipation and diarrhea  Endocrine: Negative  Negative for cold intolerance and heat intolerance  Genitourinary: Negative  Negative for difficulty urinating and dyspareunia  Musculoskeletal: Negative  Negative for arthralgias and back pain  Skin: Negative  Allergic/Immunologic: Negative for environmental allergies  Neurological: Negative  Negative for dizziness  Psychiatric/Behavioral: Negative  Negative for agitation  Video Exam    Vitals:    04/28/21 1811   Weight: 93 9 kg (207 lb)   Height: 5' (1 524 m)       Physical Exam  Vitals signs reviewed  Constitutional:       General: She is not in acute distress  Appearance: She is well-developed  She is not diaphoretic  HENT:      Head: Normocephalic and atraumatic  Eyes:      General:         Right eye: No discharge  Left eye: No discharge  Conjunctiva/sclera: Conjunctivae normal    Neck:      Musculoskeletal: Normal range of motion  Pulmonary:      Effort: Pulmonary effort is normal  No respiratory distress  I spent 10 minutes directly with the patient during this visit      VIRTUAL VISIT DISCLAIMER    Nicollet Freddy acknowledges that she has consented to an online visit or consultation  She understands that the online visit is based solely on information provided by her, and that, in the absence of a face-to-face physical evaluation by the physician, the diagnosis she receives is both limited and provisional in terms of accuracy and completeness  This is not intended to replace a full medical face-to-face evaluation by the physician  Guanaco Hayes understands and accepts these terms

## 2021-04-28 NOTE — TELEPHONE ENCOUNTER
Spoke with pt, reminded her to get INR checked as she is over due   Pt stated she will go  Please keep open to track  [facility] lpn

## 2021-04-29 DIAGNOSIS — Z20.822 EXPOSURE TO COVID-19 VIRUS: ICD-10-CM

## 2021-04-29 PROCEDURE — U0003 INFECTIOUS AGENT DETECTION BY NUCLEIC ACID (DNA OR RNA); SEVERE ACUTE RESPIRATORY SYNDROME CORONAVIRUS 2 (SARS-COV-2) (CORONAVIRUS DISEASE [COVID-19]), AMPLIFIED PROBE TECHNIQUE, MAKING USE OF HIGH THROUGHPUT TECHNOLOGIES AS DESCRIBED BY CMS-2020-01-R: HCPCS | Performed by: FAMILY MEDICINE

## 2021-04-29 PROCEDURE — U0005 INFEC AGEN DETEC AMPLI PROBE: HCPCS | Performed by: FAMILY MEDICINE

## 2021-04-30 LAB — SARS-COV-2 RNA RESP QL NAA+PROBE: NEGATIVE

## 2021-05-03 ENCOUNTER — TELEPHONE (OUTPATIENT)
Dept: NEPHROLOGY | Facility: CLINIC | Age: 66
End: 2021-05-03

## 2021-05-03 ENCOUNTER — TELEPHONE (OUTPATIENT)
Dept: FAMILY MEDICINE CLINIC | Facility: CLINIC | Age: 66
End: 2021-05-03

## 2021-05-03 NOTE — RESULT ENCOUNTER NOTE
I have message the patient's transplant coordinator at [facility] to assist as I could not reach the patient when I have called    Ms Anand Camilo - 1955 - I attempted to call pt, no answer  She has completed following tests for us which have abnormalities    - CT chest -  small ground-glass attenuation similar appearing, new right lower lobe nodules most likely infectious versus inflammatory  Numerous additional pre-existing calcified granuloma of the lung    Simple appearing attenuation of the lung at the left base  - I have reached out to the patient's pulmonologist Dr Amos Tolbert to see if they can review    Pelvic u/s -  thickening of the cervix at 6 mm, endometrial thickening 7 mm     Abdominal ultrasound- mild  enlargement of the liver, normal directional hepatic vascular flow    Can you please assist in calling pt (I tried calling now, but no answer) and please have pt see her Gynecologist and please let pt know we have reached out to her Pulm she saw prior but she should also reach out to make appt    Please let me know if issues and once you are able to reach pt

## 2021-05-06 ENCOUNTER — TELEPHONE (OUTPATIENT)
Dept: NEPHROLOGY | Facility: CLINIC | Age: 66
End: 2021-05-06

## 2021-05-06 ENCOUNTER — OFFICE VISIT (OUTPATIENT)
Dept: CARDIOLOGY CLINIC | Facility: CLINIC | Age: 66
End: 2021-05-06
Payer: COMMERCIAL

## 2021-05-06 VITALS
HEART RATE: 62 BPM | OXYGEN SATURATION: 97 % | BODY MASS INDEX: 42.01 KG/M2 | RESPIRATION RATE: 18 BRPM | DIASTOLIC BLOOD PRESSURE: 80 MMHG | HEIGHT: 60 IN | SYSTOLIC BLOOD PRESSURE: 118 MMHG | WEIGHT: 214 LBS | TEMPERATURE: 98.2 F

## 2021-05-06 DIAGNOSIS — E66.9 CLASS 2 OBESITY: ICD-10-CM

## 2021-05-06 DIAGNOSIS — I48.92 PAROXYSMAL ATRIAL FLUTTER (HCC): ICD-10-CM

## 2021-05-06 DIAGNOSIS — N18.5 BENIGN HYPERTENSION WITH CKD (CHRONIC KIDNEY DISEASE) STAGE V (HCC): ICD-10-CM

## 2021-05-06 DIAGNOSIS — D63.8 ANEMIA OF CHRONIC DISEASE: ICD-10-CM

## 2021-05-06 DIAGNOSIS — N18.6 END STAGE KIDNEY DISEASE (HCC): ICD-10-CM

## 2021-05-06 DIAGNOSIS — Z79.01 ON CONTINUOUS ORAL ANTICOAGULATION: ICD-10-CM

## 2021-05-06 DIAGNOSIS — E03.9 ACQUIRED HYPOTHYROIDISM: ICD-10-CM

## 2021-05-06 DIAGNOSIS — Z79.4 TYPE 2 DIABETES MELLITUS WITH HYPEROSMOLARITY WITHOUT COMA, WITH LONG-TERM CURRENT USE OF INSULIN (HCC): ICD-10-CM

## 2021-05-06 DIAGNOSIS — I50.42 CHRONIC COMBINED SYSTOLIC AND DIASTOLIC CONGESTIVE HEART FAILURE (HCC): Primary | ICD-10-CM

## 2021-05-06 DIAGNOSIS — R93.89 ENDOMETRIAL THICKENING ON ULTRASOUND: Primary | ICD-10-CM

## 2021-05-06 DIAGNOSIS — Z99.2: ICD-10-CM

## 2021-05-06 DIAGNOSIS — E78.2 MIXED HYPERLIPIDEMIA: ICD-10-CM

## 2021-05-06 DIAGNOSIS — E11.00 TYPE 2 DIABETES MELLITUS WITH HYPEROSMOLARITY WITHOUT COMA, WITH LONG-TERM CURRENT USE OF INSULIN (HCC): ICD-10-CM

## 2021-05-06 DIAGNOSIS — I34.2 NON-RHEUMATIC MITRAL VALVE STENOSIS: ICD-10-CM

## 2021-05-06 DIAGNOSIS — I27.20 PULMONARY HYPERTENSION (HCC): ICD-10-CM

## 2021-05-06 DIAGNOSIS — I12.0 BENIGN HYPERTENSION WITH CKD (CHRONIC KIDNEY DISEASE) STAGE V (HCC): ICD-10-CM

## 2021-05-06 DIAGNOSIS — G47.33 OBSTRUCTIVE SLEEP APNEA: ICD-10-CM

## 2021-05-06 PROCEDURE — 99214 OFFICE O/P EST MOD 30 MIN: CPT | Performed by: INTERNAL MEDICINE

## 2021-05-06 PROCEDURE — 3066F NEPHROPATHY DOC TX: CPT | Performed by: FAMILY MEDICINE

## 2021-05-06 PROCEDURE — 93000 ELECTROCARDIOGRAM COMPLETE: CPT | Performed by: INTERNAL MEDICINE

## 2021-05-06 NOTE — PATIENT INSTRUCTIONS
1   Call our office with the name of the kidney transplant coordinator at [facility], including her telephone and fax numbers  2  Call the office of Dr Allie Soler of Nephrology today regarding the results of your CT scan and pelvic ultrasound  3  Keep appointment with Dr Danii Lujan as scheduled for review of diabetic medication and weight gain  Ask her if a referral to weight management is appropriate  4  Continue current medication  5  Cardiology follow-up as originally scheduled with updated echocardiogram before that visit in August, 2021

## 2021-05-06 NOTE — TELEPHONE ENCOUNTER
Spoke to the patient over the phone  Patient has appointment with Pulmonary team next month already  Reviewed the CT scan results  Patient agrees to gynecology referral with regards endometrial thickening  Referral placed

## 2021-05-06 NOTE — PROGRESS NOTES
Office Cardiology Progress Note  Evette Palmer 61 y o  female MRN: [id number]  05/06/21  11:43 AM      ASSESSMENT:    1  Much improved dyspnea with activity and resolved dyspnea at rest with suppression of paroxysmal atrial flutter for the past 8-1/2 months  2  Controlled chronic combined systolic and diastolic heart failure, initially identified in July, 2020, with previous  depression of ejection fraction to 35% with moderate LVH, grade 2 diastolic dysfunction, chronic right heart dilatation and significant chronic pulmonary hypertension on last echocardiogram 07/14/2020 with LV dysfunction thought possibly to be tachycardia-mediated  Improvement to LVEF of 45% on 11/02/2020 TTE  3  Nonischemic nuclear stress study with dilated right ventricle on 07/14/2020     4  Stable mild nonrheumatic mitral valve stenosis, not an active clinical problem at this time  5  Multifactorial chronic pulmonary hypertension, stable   Stable PA systolic pressure 60 mmHg as of 11/02/2020   6  End-stage kidney disease with patient on peritoneal dialysis for approximately 8-1/2 months  7  Worsened and progressive morbid obesity,  with weight gain of 22 lbs in 7 months and with previous weight loss of 13 lbs in 4-12+ months and 19 3 lbs in approximately 5 months  Question whether this could be related to diabetic medication and/or acquired hypothyroidism  8  Controlled longstanding type 2 diabetes mellitus, insulin dependent, with latest A1c 6 8%  on 03/08/2021     9  Controlled mixed dyslipidemia  10  History of severe obstructive sleep apnea, currently not being treated   Patient intolerant to CPAP and  currently using oxygen overnight at 3 liters/minute only sporadically  11  Patient on continuous warfarin oral anticoagulation and presently on 3 milligrams daily with latest INR subtherapeutic  12  History of pulmonary embolism, though not totally confirmed  13  Anemia of chronic renal disease    14  Acquired hypothyroidism, treated at present with levothyroxine 75 micrograms daily  15  Treated vitamin-D deficiency  Plan       Patient Instructions     1  Call our office with the name of the kidney transplant coordinator at [facility], including her telephone and fax numbers  2  Call the office of Dr Rc Olivares of Nephrology today regarding the results of your CT scan and pelvic ultrasound  3  Keep appointment with Dr Jian Velez as scheduled for review of diabetic medication and weight gain  Ask her if a referral to weight management is appropriate  4  Continue current medication  5  Cardiology follow-up as originally scheduled with updated echocardiogram before that visit in August, 2021  HPI    This 61 y o  female  denies new cardiopulmonary and medical symptoms  She is being seen earlier than scheduled because of consideration of renal transplant by team at [facility]  They have requested cardiac clearance  The patient has had no cook new cardiopulmonary symptoms since her last visit just over two months ago  She denies any dyspnea at rest or with normal activity, orthopnea, paroxysmal nocturnal dyspnea, cough, sputum production, wheezing, palpitations, dizziness, syncope, ankle or foot edema, chest discomfort  Since her last visit here, the patient was placed on levothyroxine 75 micrograms daily by endocrinology  She has an upcoming visit with endocrinology in less than one month  Nephrology has apparently been trying to reach her to discuss results of a recent CT scan of chest and pelvic ultrasound  The patient is scheduled for echocardiography to be done on 08/09/2021 at [address] [place] [address]  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     Chronic combined systolic and diastolic congestive heart failure ([facility] ) Yes    Benign hypertension with CKD (chronic kidney disease) stage V (HCC)     Paroxysmal atrial flutter (HCC)     Pulmonary hypertension (HCC)     Mixed hyperlipidemia     Non-rheumatic mitral valve stenosis     End stage kidney disease (HCC)     Type 2 diabetes mellitus with hyperosmolarity without coma, with long-term current use of insulin (HCC)     Class 2 obesity     Dependence on continuous ambulatory peritoneal dialysis [facility] )     Obstructive sleep apnea     On continuous oral anticoagulation     Acquired hypothyroidism     Anemia of chronic disease         Review of Systems    All other systems negative, except as noted in history of present illness    Historical Information   Past Medical History:   Diagnosis Date    Acute asthmatic bronchitis     last assessed: 2017    Cardiac disease     Chronic diastolic (congestive) heart failure ([facility] )     Colon polyp     Diabetes mellitus ([facility] )     Hyperlipidemia     Hypertension     Medical non-compliance     Morbid obesity with BMI of 40 0-44 9, adult ([facility] ) 2019    Pneumonia     last assessed: 2013    Pulmonary embolism ([facility] )     Renal disorder     possible clot noted in kidney, thus blood thinners currently    Severe obstructive sleep apnea     Severe pulmonary arterial systolic hypertension (HCC)      Past Surgical History:   Procedure Laterality Date     SECTION       x 1    EYE SURGERY      NOSE SURGERY      fractured nose - septoplasty     Social History     Substance and Sexual Activity   Alcohol Use Never    Frequency: Never     Social History     Substance and Sexual Activity   Drug Use No     Social History     Tobacco Use   Smoking Status Never Smoker   Smokeless Tobacco Never Used       Family History:  Family History   Problem Relation Age of Onset    Diabetes Mother         mellitus    Anxiety disorder Mother         generalized anxiety disorder    Hypertension Mother    Kirstin Wolf Kidney disease Father     Kidney failure Father         renal failure    Ulcerative colitis Sister     Heart disease Family     Ovarian cancer Maternal Aunt          Meds/Allergies     Prior to Admission medications    Medication Sig Start Date End Date Taking?  Authorizing Provider   atorvastatin (LIPITOR) 20 mg tablet Take 1 tablet (20 mg total) by mouth daily 9/8/20  Yes Alejandro Rodriguez DO   B Complex-C (B-COMPLEX WITH VITAMIN C) tablet Take 1 tablet by mouth daily   Yes Historical Provider, MD   Blood Glucose Monitoring Suppl (ONE TOUCH ULTRA 2) w/Device KIT Test glucose 3 times daily 9/14/18  Yes Alejandro Rodriguez DO   calcium acetate (PHOSLO) capsule  12/19/20  Yes Historical Provider, MD   cholecalciferol (VITAMIN D3) 1,000 units tablet Take 2 tablets (2,000 Units total) by mouth daily 12/22/20  Yes Christina Medina MD   Continuous Blood Gluc  (FreeStyle Conner 14 Day [place]) GAGANDEEP Use to test blood sugar daily 3/15/21  Yes Rupa Rivera MD   Continuous Blood Gluc Sensor (FreeStyle Conner 14 Day Sensor) MISC Use every 2 weeks on your arm as instructed 3/15/21  Yes Rupa Rivera MD   docusate sodium (COLACE) 100 mg capsule Take 1 capsule (100 mg total) by mouth 2 (two) times a day 7/20/20  Yes Loraine Bueno MD   gentamicin (GARAMYCIN) 0 1 % cream  10/9/20  Yes Historical Provider, MD   HumaLOG Mix 75/25 KwikPen (75-25) 100 units/mL injection pen INJECT 32 UNITS UNDER THE SKIN TWICE A DAY WITH MEALS 8/10/20  Yes Rupa Rivera MD   Insulin Lispro Prot & Lispro (HumaLOG Mix 75/25 KwikPen) (75-25) 100 units/mL injection pen Inject under the skin   Yes Historical Provider, MD   Insulin Pen Needle 31G X 5 MM MISC by Does not apply route daily 10/5/20  Yes Alejandro Rodriguez DO   Lancets (onetouch ultrasoft) lancets Test glucose 3 times a day; Code: E11 8 9/8/20  Yes Alejandro Rodriguez DO   levothyroxine 75 mcg tablet Take 1 tablet (75 mcg total) by mouth daily 3/12/21 6/10/21 Yes Italia Bah MD   linaGLIPtin (Tradjenta) 5 MG TABS Take 5 mg by mouth daily 9/8/20  Yes Harvey MARCOS MD   metoprolol tartrate (LOPRESSOR) 25 mg tablet Take 25 mg by mouth every 12 (twelve) hours   Yes Historical Provider, MD   MULTIPLE VITAMIN PO Take 1 tablet by mouth daily   Yes Historical Provider, MD   OneTouch Ultra test strip TEST GLUCOSE THREE TIMES A DAY 12/4/20  Yes Francheska Ballard DO   torsemide (DEMADEX) 100 mg tablet Take 1 tablet (100 mg total) by mouth daily 3/15/21  Yes Ender Ta MD   valsartan (DIOVAN) 160 mg tablet Take 1 tablet (160 mg total) by mouth daily 2/17/21  Yes Ender Ta MD   warfarin (COUMADIN) 1 mg tablet Take daily as directed 9/8/20  Yes Francheska Ballard DO   warfarin (COUMADIN) 2 mg tablet TAKE 1-1/2 TABLETS DAILY OR AS DIRECTED 8/26/20  Yes Baldo Flowers MD   warfarin (COUMADIN) 5 mg tablet Daily as directed 9/8/20  Yes Francheska Ballard DO   HYDROXYUREA PO Take 20 mg/kg by mouth daily    Historical Provider, MD   polymyxin b-trimethoprim (POLYTRIM) ophthalmic solution  9/25/20   Historical Provider, MD   prednisoLONE acetate (PRED FORTE) 1 % ophthalmic suspension  9/25/20   Historical Provider, MD   cyclobenzaprine (FLEXERIL) 5 mg tablet Take 1 tablet (5 mg total) by mouth daily at bedtime as needed for muscle spasms  Patient not taking: Reported on 3/12/2021 1/28/21 5/6/21  Francheska Ballard DO   polyethylene glycol (MIRALAX) 17 g packet Take 17 g by mouth daily as needed (Constipation)  Patient not taking: Reported on 5/6/2021 7/19/20 5/6/21  Vanna Archibald MD       No Known Allergies      Vitals:    05/06/21 1041   BP: 118/80   BP Location: Left arm   Patient Position: Sitting   Cuff Size: Large   Pulse: 62   Resp: 18   Temp: 98 2 °F (36 8 °C)   TempSrc: Temporal   SpO2: 97%   Weight: 97 1 kg (214 lb)   Height: 5' (1 524 m)       Body mass index is 41 79 kg/m²   4 pound weight gain in approximately two months and 22 pound weight gain in approximately seven months    Physical Exam:    General Appearance:  Alert, cooperative, no distress, appears stated age and is morbidly obese   Head:  Normocephalic, without obvious abnormality, atraumatic   Eyes:  PERRL, conjunctiva/corneas clear, EOM's intact,   both eyes   Ears:  Normal TM's and external ear canals, both ears   Nose: Nares normal, septum midline, mucosa normal, no drainage or sinus tenderness   Throat: Lips, mucosa, and tongue normal; teeth and gums normal   Neck: Supple, symmetrical, trachea midline, no adenopathy, thyroid: not enlarged, symmetric, no tenderness/mass/nodules, no carotid bruit or JVD   Back:   Symmetric, no curvature, ROM normal, no CVA tenderness   Lungs:   Clear to auscultation bilaterally, respirations unlabored   Chest Wall:  No tenderness or deformity   Heart:  Regular rate and rhythm, S1, S2 normal, no murmur, rub or gallop   Abdomen:   Soft, non-tender, bowel sounds active all four quadrants,  no masses, no organomegaly; marked obesity noted  Extremities: Extremities normal, atraumatic, no cyanosis or edema   Pulses: Ankle and pedal pulses are hard to palpate but both feet are equally warm  Skin: Skin showed normal color, texture, turgor and no rashes or lesions   Lymph nodes: Cervical, supraclavicular, and axillary nodes normal   Neurologic: Normal         Cardiographics    ECG 05/06/21:    Normal sinus rhythm at 62 bpm   LAFB   Prolonged QT   Minor ST abnormalities   Poor R-wave progression  Similar to 03/02/2021 and 12/22/2020    IMAGING:    No Chest XR results available for this patient  CT of chest without contrast 04/24/2021:    Multiple very small ground-glass right lower lobe nodules, new compared to prior study  Numerous pre-existing calcified granulomata of lungs  Mosaic attenuation, most evident at left lung base, often seen in setting of small vessel or small airway abnormality      LAB REVIEW:      Lab Results   Component Value Date    SODIUM 137 03/08/2021    K 4 2 03/08/2021     03/08/2021    CO2 26 03/08/2021    ANIONGAP 6 12/11/2014    BUN 66 (H) 03/08/2021    CREATININE 5 74 (H) 03/08/2021    GLUCOSE 133 12/11/2014    GLUF 181 (H) 03/08/2021    CALCIUM 9 2 03/08/2021    CORRECTEDCA 9 8 03/08/2021    AST 14 03/08/2021    ALT 28 03/08/2021    ALKPHOS 96 03/08/2021    PROT 6 8 12/11/2014    BILITOT 1 08 (H) 12/11/2014    EGFR 7 03/08/2021     Lab Results   Component Value Date    CHOLESTEROL 154 03/08/2021    CHOLESTEROL 154 01/28/2021    CHOLESTEROL 153 11/25/2020     Lab Results   Component Value Date    HDL 57 03/08/2021    HDL 58 01/28/2021    HDL 55 11/25/2020       Lab Results   Component Value Date    LDLCALC 68 03/08/2021    LDLCALC 71 01/28/2021    LDLCALC 74 11/25/2020     No components found for: [facility]  Lab Results   Component Value Date    TRIG 146 03/08/2021    TRIG 124 01/28/2021    TRIG 122 11/25/2020       INR 03/08/2021:  1 69  A1c and estimated average glucose 03/08/2021:  6 8% and 148  TSH 03/08/2021:  14 248        Starla Bach MD

## 2021-05-18 LAB
LEFT EYE DIABETIC RETINOPATHY: NORMAL
RIGHT EYE DIABETIC RETINOPATHY: NORMAL

## 2021-05-18 PROCEDURE — 2022F DILAT RTA XM EVC RTNOPTHY: CPT | Performed by: FAMILY MEDICINE

## 2021-05-19 DIAGNOSIS — N25.81 SECONDARY HYPERPARATHYROIDISM OF RENAL ORIGIN (HCC): Primary | ICD-10-CM

## 2021-05-19 RX ORDER — CINACALCET 30 MG/1
30 TABLET, FILM COATED ORAL 3 TIMES WEEKLY
Qty: 36 TABLET | Refills: 3 | Status: SHIPPED | OUTPATIENT
Start: 2021-05-19

## 2021-05-21 ENCOUNTER — ANTICOAG VISIT (OUTPATIENT)
Dept: FAMILY MEDICINE CLINIC | Facility: CLINIC | Age: 66
End: 2021-05-21

## 2021-05-21 ENCOUNTER — TRANSCRIBE ORDERS (OUTPATIENT)
Dept: ADMINISTRATIVE | Facility: HOSPITAL | Age: 66
End: 2021-05-21

## 2021-05-21 ENCOUNTER — LAB (OUTPATIENT)
Dept: LAB | Facility: HOSPITAL | Age: 66
End: 2021-05-21
Payer: COMMERCIAL

## 2021-05-21 DIAGNOSIS — Z99.2 TYPE 2 DIABETES MELLITUS WITH CHRONIC KIDNEY DISEASE ON CHRONIC DIALYSIS, WITH LONG-TERM CURRENT USE OF INSULIN (HCC): ICD-10-CM

## 2021-05-21 DIAGNOSIS — E11.22 TYPE 2 DIABETES MELLITUS WITH CHRONIC KIDNEY DISEASE ON CHRONIC DIALYSIS, WITH LONG-TERM CURRENT USE OF INSULIN (HCC): ICD-10-CM

## 2021-05-21 DIAGNOSIS — N18.5 STAGE 5 CHRONIC KIDNEY DISEASE NOT ON CHRONIC DIALYSIS (HCC): Primary | ICD-10-CM

## 2021-05-21 DIAGNOSIS — N18.6 TYPE 2 DIABETES MELLITUS WITH CHRONIC KIDNEY DISEASE ON CHRONIC DIALYSIS, WITH LONG-TERM CURRENT USE OF INSULIN (HCC): ICD-10-CM

## 2021-05-21 DIAGNOSIS — Z79.4 TYPE 2 DIABETES MELLITUS WITH CHRONIC KIDNEY DISEASE ON CHRONIC DIALYSIS, WITH LONG-TERM CURRENT USE OF INSULIN (HCC): ICD-10-CM

## 2021-05-21 DIAGNOSIS — E03.9 HYPOTHYROIDISM, UNSPECIFIED TYPE: ICD-10-CM

## 2021-05-21 LAB
ALBUMIN SERPL BCP-MCNC: 3 G/DL (ref 3.5–5)
ALP SERPL-CCNC: 88 U/L (ref 46–116)
ALT SERPL W P-5'-P-CCNC: 27 U/L (ref 12–78)
ANION GAP SERPL CALCULATED.3IONS-SCNC: 12 MMOL/L (ref 4–13)
AST SERPL W P-5'-P-CCNC: 13 U/L (ref 5–45)
BILIRUB SERPL-MCNC: 0.62 MG/DL (ref 0.2–1)
BUN SERPL-MCNC: 59 MG/DL (ref 5–25)
CALCIUM ALBUM COR SERPL-MCNC: 9.4 MG/DL (ref 8.3–10.1)
CALCIUM SERPL-MCNC: 8.6 MG/DL (ref 8.3–10.1)
CHLORIDE SERPL-SCNC: 105 MMOL/L (ref 100–108)
CHOLEST SERPL-MCNC: 134 MG/DL (ref 50–200)
CO2 SERPL-SCNC: 25 MMOL/L (ref 21–32)
CREAT SERPL-MCNC: 5.68 MG/DL (ref 0.6–1.3)
EST. AVERAGE GLUCOSE BLD GHB EST-MCNC: 163 MG/DL
GFR SERPL CREATININE-BSD FRML MDRD: 7 ML/MIN/1.73SQ M
GLUCOSE P FAST SERPL-MCNC: 163 MG/DL (ref 65–99)
HBA1C MFR BLD: 7.3 %
HDLC SERPL-MCNC: 51 MG/DL
INR PPP: 1.31 (ref 0.84–1.19)
LDLC SERPL CALC-MCNC: 68 MG/DL (ref 0–100)
NONHDLC SERPL-MCNC: 83 MG/DL
POTASSIUM SERPL-SCNC: 4.2 MMOL/L (ref 3.5–5.3)
PROT SERPL-MCNC: 7.3 G/DL (ref 6.4–8.2)
PROTHROMBIN TIME: 16.1 SECONDS (ref 11.6–14.5)
SODIUM SERPL-SCNC: 142 MMOL/L (ref 136–145)
T4 FREE SERPL-MCNC: 1.05 NG/DL (ref 0.76–1.46)
T4 FREE SERPL-MCNC: 1.06 NG/DL (ref 0.76–1.46)
TRIGL SERPL-MCNC: 73 MG/DL
TSH SERPL DL<=0.05 MIU/L-ACNC: 8.43 UIU/ML (ref 0.36–3.74)

## 2021-05-21 PROCEDURE — 83036 HEMOGLOBIN GLYCOSYLATED A1C: CPT

## 2021-05-21 PROCEDURE — 80061 LIPID PANEL: CPT

## 2021-05-21 PROCEDURE — 80053 COMPREHEN METABOLIC PANEL: CPT

## 2021-05-21 PROCEDURE — 36415 COLL VENOUS BLD VENIPUNCTURE: CPT

## 2021-05-21 PROCEDURE — 84439 ASSAY OF FREE THYROXINE: CPT

## 2021-05-21 PROCEDURE — 84443 ASSAY THYROID STIM HORMONE: CPT

## 2021-05-21 PROCEDURE — 3051F HG A1C>EQUAL 7.0%<8.0%: CPT | Performed by: FAMILY MEDICINE

## 2021-05-21 PROCEDURE — 85610 PROTHROMBIN TIME: CPT

## 2021-05-21 NOTE — PROGRESS NOTES
[facility] called, INR satnding order was   New order added   Called [facility] and confirmed  NFA needed     Heydi Kessler MA

## 2021-05-26 ENCOUNTER — TELEPHONE (OUTPATIENT)
Dept: ENDOCRINOLOGY | Facility: CLINIC | Age: 66
End: 2021-05-26

## 2021-05-26 DIAGNOSIS — E03.9 HYPOTHYROIDISM, UNSPECIFIED TYPE: Primary | ICD-10-CM

## 2021-05-26 RX ORDER — LEVOTHYROXINE SODIUM 0.1 MG/1
100 TABLET ORAL DAILY
Qty: 90 TABLET | Refills: 3 | Status: SHIPPED | OUTPATIENT
Start: 2021-05-26 | End: 2022-06-13

## 2021-05-26 NOTE — TELEPHONE ENCOUNTER
----- Message from Annabel Murillo MD sent at 5/25/2021  4:09 PM EDT -----  Please call the patient inform of results  TSH is improved however continues to be high   Recommend increasing levothyroxine to 100 mcg orally daily -please put in a new prescription   Will discuss other labs during follow-up

## 2021-05-26 NOTE — TELEPHONE ENCOUNTER
Patient informed dose adjustment for levothyroxine  Labs will be discussed at appt on 6/18/21  She verbally understood

## 2021-05-26 NOTE — TELEPHONE ENCOUNTER
Ref  Range 5/21/2021 12:06   TSH 3RD GENERATON Latest Ref Range: 0 358 - 3 740 uIU/mL 8 429 (H)   Free T4 Latest Ref Range: 0 76 - 1 46 ng/dL 1 05     TSH is 8 429 with normal free T4,currently on 75 mcg levothyroxine which she takes it regularly and properly  We increased the dose to 100 mcg daily

## 2021-06-01 ENCOUNTER — OFFICE VISIT (OUTPATIENT)
Dept: OBGYN CLINIC | Facility: CLINIC | Age: 66
End: 2021-06-01
Payer: COMMERCIAL

## 2021-06-01 VITALS
WEIGHT: 211 LBS | HEIGHT: 60 IN | DIASTOLIC BLOOD PRESSURE: 86 MMHG | SYSTOLIC BLOOD PRESSURE: 124 MMHG | BODY MASS INDEX: 41.43 KG/M2

## 2021-06-01 DIAGNOSIS — R93.89 ENDOMETRIAL THICKENING ON ULTRASOUND: Primary | ICD-10-CM

## 2021-06-01 PROCEDURE — 3079F DIAST BP 80-89 MM HG: CPT | Performed by: NURSE PRACTITIONER

## 2021-06-01 PROCEDURE — 88305 TISSUE EXAM BY PATHOLOGIST: CPT | Performed by: PATHOLOGY

## 2021-06-01 PROCEDURE — 99202 OFFICE O/P NEW SF 15 MIN: CPT | Performed by: NURSE PRACTITIONER

## 2021-06-01 PROCEDURE — 3074F SYST BP LT 130 MM HG: CPT | Performed by: NURSE PRACTITIONER

## 2021-06-01 NOTE — PROGRESS NOTES
Assessment/Plan:      Diagnoses and all orders for this visit:    Endometrial thickening on ultrasound  -     Tissue Exam    Other orders  -     Endometrial biopsy        -  Reviewed recommendation for endometrial biopsy  Patient verbalizes understanding is agreeable today  -  Signs and symptoms report reviewed  - will call with results    RTO PRN after results available     Subjective:     Patient ID: Junie Litten is a 61 y o  female  HPI G 1 P 1here to follow-up pelvic ultrasound  Pelvic ultrasound was ordered  In preparation for kidney transplant process  Patient has been postmenopausal for 10-15 years  Denies postmenopausal bleeding  Denies pelvic pain or concerns  Pelvic Ultrasound 4/20/21 FINDINGS:  UTERUS:  The uterus is anteverted in position, measuring 5 9 x 3 4 x 4 0 cm  Contour and echotexture appear normal   The cervix shows wall thickening measuring 6 mm  ENDOMETRIUM:    Normal caliber of 7 mm  Homogeneous and normal in appearance  OVARIES/ADNEXA:  Limited in evaluation  Left ovarian cyst measuring 2 4 x 1 5 x 2 6 cm poorly appreciated  No suspicious adnexal mass or loculated collections  There is no free fluid  IMPRESSION  Thickening of the cervix wall at 6 mm  Endometrium is thickened measuring 7 mm  Further evaluation as clinically indicated  Limited evaluation of the adnexa  Last pap smear 1/28/21 resulted NILM/  HR HPV negative    Last mammogram with follow-up left diagnostic and ultrasound resulted BI-RADS 2   CRC screening 9/17/19 colonoscopy    Review of Systems   Constitutional: Negative for chills, fatigue and fever  Respiratory: Negative  Cardiovascular: Negative  Genitourinary: Negative  Objective:  /86 (BP Location: Left arm, Patient Position: Sitting, Cuff Size: Extra-Large)   Ht 5' (1 524 m)   Wt 95 7 kg (211 lb)   BMI 41 21 kg/m²        Physical Exam  Constitutional:       Appearance: Normal appearance     Cardiovascular:      Rate and Rhythm: Normal rate and regular rhythm  Pulmonary:      Effort: Pulmonary effort is normal       Breath sounds: Normal breath sounds  Neurological:      Mental Status: She is alert and oriented to person, place, and time  Psychiatric:         Mood and Affect: Mood normal          Behavior: Behavior normal        Endometrial biopsy    Date/Time: 6/1/2021 9:41 AM  Performed by: MICHAEL Cullen  Authorized by: MICHAEL Cullen   Universal Protocol:  Consent: Verbal consent obtained  Written consent not obtained  Risks and benefits: risks, benefits and alternatives were discussed  Consent given by: patient  Time out: Immediately prior to procedure a "time out" was called to verify the correct patient, procedure, equipment, support staff and site/side marked as required  Timeout called at: 6/1/2021 9:30 AM   Patient understanding: patient states understanding of the procedure being performed  Patient consent: the patient's understanding of the procedure matches consent given  Procedure consent: procedure consent does not match procedure scheduled  Relevant documents: relevant documents not present or verified  Test results: test results available and properly labeled  Site marked: the operative site was not marked  Radiology Images displayed and confirmed  If images not available, report reviewed: imaging studies available  Required items: required blood products, implants, devices, and special equipment available  Patient identity confirmed: verbally with patient      Indication:     Indications: Other disorder of menstruation and other abnormal bleeding from female genital tract    Pre-procedure:     Anesthesia premedication: none    Procedure:     Procedure: endometrial biopsy with Pipelle      A bivalve speculum was placed in the vagina: yes      Cervix cleaned and prepped: yes      A paracervical block was performed: no      An intracervical block was performed: no      The cervix was dilated: no      Uterus sounded: yes      Uterus sound depth (cm):  6    Curettes used:  1    Specimen collected: specimen collected and sent to pathology      Patient tolerated procedure well with no complications: yes    Findings:     Uterus size:  Non-gravid    Cervix: normal      Adnexa: normal    Comments:     Procedure comments:   P1 here for endometrial biopsy for thickened endometrium 7 mm on ultrasound  Denies postmenopausal bleeding  Risks, benefits and alternatives reviewed  Patient verbalized understanding and desires endometrial biopsy today  Risks including but not limited to uterine perforation, bleeding, infection, insufficient sample size reviewed with patient  Tolerated procedure well    Will call with results  RTO 2 weeks results review

## 2021-06-01 NOTE — PATIENT INSTRUCTIONS
Endometrial Biopsy   WHAT YOU NEED TO KNOW:   Endometrial biopsy is a procedure to remove a tissue sample from the lining of your uterus  This procedure is done through your vagina  WHILE YOU ARE HERE:   Before your procedure:   · Informed consent  is a legal document that explains the tests, treatments, or procedures that you may need  Informed consent means you understand what will be done and can make decisions about what you want  You give your permission when you sign the consent form  You can have someone sign this form for you if you are not able to sign it  You have the right to understand your medical care in words you know  Before you sign the consent form, understand the risks and benefits of what will be done  Make sure all your questions are answered  · NSAIDs  decrease swelling and pain  You may need to take an NSAID before your procedure  Follow your healthcare provider's instruction on when to take it  During your procedure: You will be awake during the procedure  An ultrasound or hysteroscope (tube with a light and a camera on the end) may be used  This helps your healthcare provider see inside your uterus to find the best spot to get the tissue sample  He or she will then insert a speculum into your vagina  This is the same tool used during a Pap smear  The speculum allows your healthcare provider to see inside your vagina to your cervix  He or she may need to numb your cervix  Your healthcare provider will insert a small tube into your vagina and cervix to remove a piece of tissue from the lining of your uterus  The tissue sample will be sent to a lab to be tested  After your procedure:  Do not get up until your healthcare provider says it is okay  When your healthcare provider sees that you are okay, you may be able to go home  You may have mild pain, cramping, or spotting for a few days after your procedure  RISKS:   You could get an infection after your procedure   Your uterus may be damaged  Damage can cause heavy bleeding and pain  You may need surgery to repair the damage  CARE AGREEMENT:   You have the right to help plan your care  Learn about your health condition and how it may be treated  Discuss treatment options with your healthcare providers to decide what care you want to receive  You always have the right to refuse treatment  © Copyright [address] Information is for End User's use only and may not be sold, redistributed or otherwise used for commercial purposes  All illustrations and images included in CareNotes® are the copyrighted property of A D A M , Inc  or [facility]   The above information is an  only  It is not intended as medical advice for individual conditions or treatments  Talk to your doctor, nurse or pharmacist before following any medical regimen to see if it is safe and effective for you

## 2021-06-02 ENCOUNTER — OFFICE VISIT (OUTPATIENT)
Dept: PULMONOLOGY | Facility: MEDICAL CENTER | Age: 66
End: 2021-06-02
Payer: COMMERCIAL

## 2021-06-02 VITALS
HEIGHT: 60 IN | TEMPERATURE: 97.9 F | SYSTOLIC BLOOD PRESSURE: 122 MMHG | OXYGEN SATURATION: 97 % | DIASTOLIC BLOOD PRESSURE: 74 MMHG | RESPIRATION RATE: 12 BRPM | BODY MASS INDEX: 42.21 KG/M2 | HEART RATE: 75 BPM | WEIGHT: 215 LBS

## 2021-06-02 DIAGNOSIS — G47.36 NOCTURNAL HYPOXEMIA DUE TO OBESITY: Primary | ICD-10-CM

## 2021-06-02 DIAGNOSIS — E66.01 MORBID OBESITY WITH BMI OF 40.0-44.9, ADULT (HCC): ICD-10-CM

## 2021-06-02 DIAGNOSIS — G47.34 NOCTURNAL HYPOXEMIA: ICD-10-CM

## 2021-06-02 DIAGNOSIS — E66.9 NOCTURNAL HYPOXEMIA DUE TO OBESITY: Primary | ICD-10-CM

## 2021-06-02 DIAGNOSIS — I27.20 PULMONARY HYPERTENSION (HCC): ICD-10-CM

## 2021-06-02 PROCEDURE — 99214 OFFICE O/P EST MOD 30 MIN: CPT | Performed by: INTERNAL MEDICINE

## 2021-06-02 NOTE — PROGRESS NOTES
Assessment/Plan        Problem List Items Addressed This Visit        Respiratory    Nocturnal hypoxemia       Continue oxygen 3 liters/minute at bedtime  She also has CHANDRA but did not want to use CPAP therapy  I did encourage her to lose weight  Some of her hypoxemia at night is prior related to alveolar hypoventilation from obesity            Cardiovascular and Mediastinum    Pulmonary hypertension ([facility] )       She does have least moderate pulmonary hypertension  Last echocardiogram done November 2020 showed estimated PA pressure 60 mm Hg  There is grade 2 diastolic dysfunction LV systolic function was mildly decreased at 45%  Likely due to group 2 and 3 PA hypertension  Other    Morbid obesity with BMI of 40 0-44 9, adult ([facility] )      I did encourage her to lose weight and to watch her carbohydrate intake         Nocturnal hypoxemia due to obesity - Primary      I did order nocturnal pulse oximetry recording with her using 3 L of oxygen night to see if this is correct in her hypoxemia  Her medical supply company is [employer]         Relevant Orders    Pulse oximetry overnight            CC:   Doing okay      HPI      Nalini Thomas presents for follow-up of severe obstructive sleep apnea and sleep-related hypoxemia  She also has history of end-stage renal disease and is on peritoneal dialysis  She also has history of pulmonary artery hypertension likely group 2 and 3  She also has diabetes mellitus type 2 -insulin requiring and hypothyroidism  She denies any nocturnal dyspnea and has been using oxygen 3 liters/minute at bedtime  She has not been using CPAP  Last echocardiogram in November 5972 showed LV systolic function be decreased with ejection fraction 45%  There is mild-to-moderate mitral regurgitation and estimated PA pressure was 60 mm Hg  There was grade 2 diastolic dysfunction  Is only having mild exertional dyspnea    Prior PFT in July 2018 showed mild to moderate restrictive impairment with total lung capacity of 66% of predicted     Last CT scan of chest done  this year was reviewed by me shows some very slight was ache pattern of attenuation most evident in left lower lobe      Past Medical History:   Diagnosis Date    Acute asthmatic bronchitis     last assessed: 2017    Cardiac disease     Chronic diastolic (congestive) heart failure (HCC)     Colon polyp     Diabetes mellitus ([facility] )     Hyperlipidemia     Hypertension     Medical non-compliance     Morbid obesity with BMI of 40 0-44 9, adult ([facility] ) 2019    Pneumonia     last assessed: 2013    Pulmonary embolism ([facility] )     Renal disorder     possible clot noted in kidney, thus blood thinners currently    Severe obstructive sleep apnea     Severe pulmonary arterial systolic hypertension (HCC)        Past Surgical History:   Procedure Laterality Date     SECTION       x 1    EYE SURGERY      NOSE SURGERY      fractured nose - septoplasty         Current Outpatient Medications:     atorvastatin (LIPITOR) 20 mg tablet, Take 1 tablet (20 mg total) by mouth daily, Disp: 90 tablet, Rfl: 1    B Complex-C (B-COMPLEX WITH VITAMIN C) tablet, Take 1 tablet by mouth daily, Disp: , Rfl:     Blood Glucose Monitoring Suppl (ONE TOUCH ULTRA 2) w/Device KIT, Test glucose 3 times daily, Disp: 1 each, Rfl: 0    calcium acetate (PHOSLO) capsule, , Disp: , Rfl:     cholecalciferol (VITAMIN D3) 1,000 units tablet, Take 2 tablets (2,000 Units total) by mouth daily, Disp: 100 tablet, Rfl: 5    cinacalcet (SENSIPAR) 30 mg tablet, Take 1 tablet (30 mg total) by mouth 3 (three) times a week, Disp: 36 tablet, Rfl: 3    Continuous Blood Gluc  (FreeStyle Conner 14 Day [place]) GAGANDEEP, Use to test blood sugar daily, Disp: 1 Device, Rfl: 0    docusate sodium (COLACE) 100 mg capsule, Take 1 capsule (100 mg total) by mouth 2 (two) times a day, Disp: 10 capsule, Rfl: 0    gentamicin (GARAMYCIN) 0 1 % cream, , Disp: , Rfl:     HumaLOG Mix 75/25 KwikPen (75-25) 100 units/mL injection pen, INJECT 32 UNITS UNDER THE SKIN TWICE A DAY WITH MEALS, Disp: 150 mL, Rfl: 3    Insulin Lispro Prot & Lispro (HumaLOG Mix 75/25 KwikPen) (75-25) 100 units/mL injection pen, Inject under the skin, Disp: , Rfl:     Insulin Pen Needle 31G X 5 MM MISC, by Does not apply route daily, Disp: 100 each, Rfl: 1    Lancets (onetouch ultrasoft) lancets, Test glucose 3 times a day; Code: E11 8, Disp: 300 each, Rfl: 1    levothyroxine 100 mcg tablet, Take 1 tablet (100 mcg total) by mouth daily, Disp: 90 tablet, Rfl: 3    metoprolol tartrate (LOPRESSOR) 25 mg tablet, Take 25 mg by mouth every 12 (twelve) hours, Disp: , Rfl:     MULTIPLE VITAMIN PO, Take 1 tablet by mouth daily, Disp: , Rfl:     OneTouch Ultra test strip, TEST GLUCOSE THREE TIMES A DAY, Disp: 300 each, Rfl: 3    polymyxin b-trimethoprim (POLYTRIM) ophthalmic solution, , Disp: , Rfl:     prednisoLONE acetate (PRED FORTE) 1 % ophthalmic suspension, , Disp: , Rfl:     torsemide (DEMADEX) 100 mg tablet, Take 1 tablet (100 mg total) by mouth daily, Disp: 90 tablet, Rfl: 1    valsartan (DIOVAN) 160 mg tablet, Take 1 tablet (160 mg total) by mouth daily, Disp: 90 tablet, Rfl: 1    warfarin (COUMADIN) 1 mg tablet, Take daily as directed, Disp: 90 tablet, Rfl: 1    warfarin (COUMADIN) 2 mg tablet, TAKE 1-1/2 TABLETS DAILY OR AS DIRECTED, Disp: 180 tablet, Rfl: 1    warfarin (COUMADIN) 5 mg tablet, Daily as directed, Disp: 90 tablet, Rfl: 1    colchicine (COLCRYS) 0 6 mg tablet, Take one pill daily as needed for Gout flare (Patient not taking: Reported on 6/18/2021), Disp: 20 tablet, Rfl: 3    linaGLIPtin (Tradjenta) 5 MG TABS, Take 5 mg by mouth daily, Disp: 90 tablet, Rfl: 3    No Known Allergies    Social History     Tobacco Use    Smoking status: Never Smoker    Smokeless tobacco: Never Used   Substance Use Topics    Alcohol use: Never         Family History Problem Relation Age of Onset    Diabetes Mother         mellitus    Anxiety disorder Mother         generalized anxiety disorder    Hypertension Mother     Kidney disease Father     Kidney failure Father         renal failure    Ulcerative colitis Sister     Heart disease Family     Ovarian cancer Maternal Aunt        Review of Systems   Constitutional: Negative for chills, fever and unexpected weight change  HENT: Negative for congestion, rhinorrhea and sore throat  Eyes: Negative for discharge and redness  Respiratory:        No wheezing or cough   Cardiovascular: Negative for chest pain, palpitations and leg swelling  Gastrointestinal: Negative for abdominal distention, abdominal pain and nausea  Endocrine: Negative for polydipsia and polyphagia  Genitourinary: Negative for hematuria  Musculoskeletal: Negative for joint swelling and myalgias  Skin: Negative for rash  Neurological: Negative for light-headedness  Vitals:    06/02/21 1321   BP: 122/74   Pulse: 75   Resp: 12   Temp: 97 9 °F (36 6 °C)   SpO2: 97%           Physical Exam  Constitutional:       General: She is not in acute distress  Appearance: She is well-developed  She is obese  HENT:      Head: Normocephalic  Nose: Nose normal       Mouth/Throat:      Pharynx: No oropharyngeal exudate  Eyes:      Conjunctiva/sclera: Conjunctivae normal       Pupils: Pupils are equal, round, and reactive to light  Cardiovascular:      Rate and Rhythm: Normal rate and regular rhythm  Heart sounds: Normal heart sounds  Pulmonary:      Effort: Pulmonary effort is normal       Comments: Lung sounds are clear  No wheezes, crackles or rhonchi  Abdominal:      General: There is no distension  Palpations: Abdomen is soft  Tenderness: There is no abdominal tenderness  Musculoskeletal:      Cervical back: Neck supple        Comments: No edema, cyanosis or clubbing   Lymphadenopathy:      Cervical: No cervical adenopathy  Skin:     General: Skin is warm and dry  Neurological:      Mental Status: She is alert and oriented to person, place, and time  Psychiatric:         Mood and Affect: Mood normal          Behavior: Behavior normal          Thought Content:  Thought content normal

## 2021-06-02 NOTE — PATIENT INSTRUCTIONS
Low calorie bread 40 cl/slice  Name 358 or [employer]     Oxygen meter test at home one night  Continue oxygen 3 liters/minute at bedtime    Call us after you have the oxygen meter test done at home

## 2021-06-08 ENCOUNTER — OFFICE VISIT (OUTPATIENT)
Dept: FAMILY MEDICINE CLINIC | Facility: CLINIC | Age: 66
End: 2021-06-08
Payer: COMMERCIAL

## 2021-06-08 VITALS
OXYGEN SATURATION: 91 % | RESPIRATION RATE: 16 BRPM | DIASTOLIC BLOOD PRESSURE: 60 MMHG | BODY MASS INDEX: 41.62 KG/M2 | SYSTOLIC BLOOD PRESSURE: 130 MMHG | WEIGHT: 212 LBS | TEMPERATURE: 99.8 F | HEART RATE: 89 BPM | HEIGHT: 60 IN

## 2021-06-08 DIAGNOSIS — M10.371 ACUTE GOUT DUE TO RENAL IMPAIRMENT INVOLVING TOE OF RIGHT FOOT: ICD-10-CM

## 2021-06-08 DIAGNOSIS — I27.20 PULMONARY HYPERTENSION (HCC): ICD-10-CM

## 2021-06-08 DIAGNOSIS — I12.0 BENIGN HYPERTENSION WITH CKD (CHRONIC KIDNEY DISEASE) STAGE V (HCC): ICD-10-CM

## 2021-06-08 DIAGNOSIS — I48.92 PAROXYSMAL ATRIAL FLUTTER (HCC): ICD-10-CM

## 2021-06-08 DIAGNOSIS — E11.00 TYPE 2 DIABETES MELLITUS WITH HYPEROSMOLARITY WITHOUT COMA, WITH LONG-TERM CURRENT USE OF INSULIN (HCC): Primary | ICD-10-CM

## 2021-06-08 DIAGNOSIS — N18.5 BENIGN HYPERTENSION WITH CKD (CHRONIC KIDNEY DISEASE) STAGE V (HCC): ICD-10-CM

## 2021-06-08 DIAGNOSIS — Z79.4 TYPE 2 DIABETES MELLITUS WITH HYPEROSMOLARITY WITHOUT COMA, WITH LONG-TERM CURRENT USE OF INSULIN (HCC): Primary | ICD-10-CM

## 2021-06-08 PROCEDURE — 99214 OFFICE O/P EST MOD 30 MIN: CPT | Performed by: FAMILY MEDICINE

## 2021-06-08 PROCEDURE — 1036F TOBACCO NON-USER: CPT | Performed by: FAMILY MEDICINE

## 2021-06-08 RX ORDER — COLCHICINE 0.6 MG/1
TABLET ORAL
Qty: 20 TABLET | Refills: 3 | Status: SHIPPED | OUTPATIENT
Start: 2021-06-08 | End: 2021-09-29 | Stop reason: ALTCHOICE

## 2021-06-08 NOTE — ASSESSMENT & PLAN NOTE
Lab Results   Component Value Date    HGBA1C 7 3 (H) 05/21/2021     Not quite at goal  Following with endocrinologist

## 2021-06-08 NOTE — PROGRESS NOTES
Assessment/Plan:    1  Type 2 diabetes mellitus with hyperosmolarity without coma, with long-term current use of insulin [facility])  Assessment & Plan:    Lab Results   Component Value Date    HGBA1C 7 3 (H) 05/21/2021     Not quite at goal  Following with endocrinologist       2  Benign hypertension with CKD (chronic kidney disease) stage V [facility])  Assessment & Plan:  Lab Results   Component Value Date    EGFR 7 05/21/2021    EGFR 7 03/08/2021    EGFR 8 01/28/2021    CREATININE 5 68 (H) 05/21/2021    CREATININE 5 74 (H) 03/08/2021    CREATININE 5 33 (H) 01/28/2021     Blood pressure is well controlled      3  Pulmonary hypertension ([address] )  Assessment & Plan:  Stable  Following with Pulmonologist       4  Paroxysmal atrial flutter (HCC)  Assessment & Plan:  Stable  On coumadin  Following with cardiologist       5  Acute gout due to renal impairment involving toe of right foot  -     colchicine (COLCRYS) 0 6 mg tablet; Take one pill daily as needed for Gout flare          There are no Patient Instructions on file for this visit  Return in about 6 months (around 12/8/2021) for Annual physical     Subjective:      Patient ID: Melanie Burch is a 61 y o  female  Chief Complaint   Patient presents with    Follow-up     [facility]       Patient reports that she has been having pain in her right 1st toe  It was worse last week and is gradually getting better  She thinks that is gout again  Diabetes-patient reports that she has been getting some weight  She has a follow-up with her endocrinologist scheduled next week  Hypertension-patient continues have problems with some leg swelling  She is following with a nephrologist and is on dialysis  She continues to do well on home dialysis  She is very frustrated with the transplant team that she was working with    She is no longer a candidate due to her elevated BMI      The following portions of the patient's history were reviewed and updated as appropriate: allergies, current medications, past family history, past medical history, past social history, past surgical history and problem list     Review of Systems   Respiratory: Negative  Cardiovascular: Positive for leg swelling           Current Outpatient Medications   Medication Sig Dispense Refill    atorvastatin (LIPITOR) 20 mg tablet Take 1 tablet (20 mg total) by mouth daily 90 tablet 1    B Complex-C (B-COMPLEX WITH VITAMIN C) tablet Take 1 tablet by mouth daily      Blood Glucose Monitoring Suppl (ONE TOUCH ULTRA 2) w/Device KIT Test glucose 3 times daily 1 each 0    calcium acetate (PHOSLO) capsule       cholecalciferol (VITAMIN D3) 1,000 units tablet Take 2 tablets (2,000 Units total) by mouth daily 100 tablet 5    cinacalcet (SENSIPAR) 30 mg tablet Take 1 tablet (30 mg total) by mouth 3 (three) times a week 36 tablet 3    Continuous Blood Gluc  (FreeStyle Conner 14 Day [place]) GAGANDEEP Use to test blood sugar daily 1 Device 0    docusate sodium (COLACE) 100 mg capsule Take 1 capsule (100 mg total) by mouth 2 (two) times a day 10 capsule 0    gentamicin (GARAMYCIN) 0 1 % cream       HumaLOG Mix 75/25 KwikPen (75-25) 100 units/mL injection pen INJECT 32 UNITS UNDER THE SKIN TWICE A DAY WITH MEALS 150 mL 3    Insulin Lispro Prot & Lispro (HumaLOG Mix 75/25 KwikPen) (75-25) 100 units/mL injection pen Inject under the skin      Insulin Pen Needle 31G X 5 MM MISC by Does not apply route daily 100 each 1    Lancets (onetouch ultrasoft) lancets Test glucose 3 times a day; Code: E11 8 300 each 1    levothyroxine 100 mcg tablet Take 1 tablet (100 mcg total) by mouth daily 90 tablet 3    linaGLIPtin (Tradjenta) 5 MG TABS Take 5 mg by mouth daily 90 tablet 3    metoprolol tartrate (LOPRESSOR) 25 mg tablet Take 25 mg by mouth every 12 (twelve) hours      MULTIPLE VITAMIN PO Take 1 tablet by mouth daily      OneTouch Ultra test strip TEST GLUCOSE THREE TIMES A  each 3    polymyxin b-trimethoprim (POLYTRIM) ophthalmic solution       prednisoLONE acetate (PRED FORTE) 1 % ophthalmic suspension       torsemide (DEMADEX) 100 mg tablet Take 1 tablet (100 mg total) by mouth daily 90 tablet 1    valsartan (DIOVAN) 160 mg tablet Take 1 tablet (160 mg total) by mouth daily 90 tablet 1    warfarin (COUMADIN) 1 mg tablet Take daily as directed 90 tablet 1    warfarin (COUMADIN) 2 mg tablet TAKE 1-1/2 TABLETS DAILY OR AS DIRECTED 180 tablet 1    warfarin (COUMADIN) 5 mg tablet Daily as directed 90 tablet 1    colchicine (COLCRYS) 0 6 mg tablet Take one pill daily as needed for Gout flare 20 tablet 3     No current facility-administered medications for this visit  Objective:    /60   Pulse 89   Temp 99 8 °F (37 7 °C)   Resp 16   Ht 5' (1 524 m)   Wt 96 2 kg (212 lb)   SpO2 91%   BMI 41 40 kg/m²        Physical Exam  Vitals signs and nursing note reviewed  Constitutional:       Appearance: She is well-developed  HENT:      Head: Normocephalic and atraumatic  Right Ear: Tympanic membrane and external ear normal       Left Ear: Tympanic membrane and external ear normal    Cardiovascular:      Rate and Rhythm: Normal rate and regular rhythm  Pulses:           Dorsalis pedis pulses are 1+ on the right side and 1+ on the left side  Posterior tibial pulses are 1+ on the right side and 1+ on the left side  Heart sounds: No murmur  No friction rub  Pulmonary:      Effort: Pulmonary effort is normal  No respiratory distress  Breath sounds: Normal breath sounds  No wheezing or rales  Musculoskeletal:      Right lower leg: Edema (trace) present  Left lower leg: Edema (trace) present  Feet:      Right foot:      Skin integrity: No ulcer, skin breakdown, erythema, warmth, callus or dry skin  Left foot:      Skin integrity: No ulcer, skin breakdown, erythema, warmth, callus or dry skin  Patient's shoes and socks removed  Right Foot/Ankle   Right Foot Inspection  Skin Exam: skin normal skin not intact, no dry skin, no warmth, no callus, no erythema, no maceration, no abnormal color, no pre-ulcer, no ulcer and no callus                          Toe Exam: ROM and strength within normal limits  Sensory       Monofilament testing: intact  Vascular  Capillary refills: < 3 seconds  The right DP pulse is 1+  The right PT pulse is 1+  Left Foot/Ankle  Left Foot Inspection  Skin Exam: skin normalskin not intact, no dry skin, no warmth, no erythema, no maceration, normal color, no pre-ulcer, no ulcer and no callus                         Toe Exam: ROM and strength within normal limits                   Sensory       Monofilament: intact  Vascular  Capillary refills: < 3 seconds  The left DP pulse is 1+  The left PT pulse is 1+     Assign Risk Category:  No deformity present; ;        Risk: 0       Kat Haddad, DO

## 2021-06-08 NOTE — ASSESSMENT & PLAN NOTE
Lab Results   Component Value Date    EGFR 7 05/21/2021    EGFR 7 03/08/2021    EGFR 8 01/28/2021    CREATININE 5 68 (H) 05/21/2021    CREATININE 5 74 (H) 03/08/2021    CREATININE 5 33 (H) 01/28/2021     Blood pressure is well controlled

## 2021-06-18 ENCOUNTER — OFFICE VISIT (OUTPATIENT)
Dept: ENDOCRINOLOGY | Facility: CLINIC | Age: 66
End: 2021-06-18
Payer: COMMERCIAL

## 2021-06-18 VITALS
HEART RATE: 76 BPM | HEIGHT: 60 IN | DIASTOLIC BLOOD PRESSURE: 70 MMHG | SYSTOLIC BLOOD PRESSURE: 126 MMHG | BODY MASS INDEX: 41.68 KG/M2 | WEIGHT: 212.3 LBS

## 2021-06-18 DIAGNOSIS — E11.65 TYPE 2 DIABETES MELLITUS WITH HYPERGLYCEMIA, WITH LONG-TERM CURRENT USE OF INSULIN (HCC): Primary | ICD-10-CM

## 2021-06-18 DIAGNOSIS — E16.2 HYPOGLYCEMIA: ICD-10-CM

## 2021-06-18 DIAGNOSIS — E78.2 MIXED HYPERLIPIDEMIA: ICD-10-CM

## 2021-06-18 DIAGNOSIS — N18.6 ESRD (END STAGE RENAL DISEASE) (HCC): ICD-10-CM

## 2021-06-18 DIAGNOSIS — I10 ESSENTIAL HYPERTENSION: ICD-10-CM

## 2021-06-18 DIAGNOSIS — E03.9 ACQUIRED HYPOTHYROIDISM: ICD-10-CM

## 2021-06-18 DIAGNOSIS — Z79.4 TYPE 2 DIABETES MELLITUS WITH HYPERGLYCEMIA, WITH LONG-TERM CURRENT USE OF INSULIN (HCC): Primary | ICD-10-CM

## 2021-06-18 PROBLEM — G47.36 NOCTURNAL HYPOXEMIA DUE TO OBESITY: Status: ACTIVE | Noted: 2019-09-23

## 2021-06-18 PROBLEM — E66.9 NOCTURNAL HYPOXEMIA DUE TO OBESITY: Status: ACTIVE | Noted: 2019-09-23

## 2021-06-18 PROCEDURE — 99215 OFFICE O/P EST HI 40 MIN: CPT | Performed by: INTERNAL MEDICINE

## 2021-06-18 PROCEDURE — 1036F TOBACCO NON-USER: CPT | Performed by: INTERNAL MEDICINE

## 2021-06-18 PROCEDURE — 3008F BODY MASS INDEX DOCD: CPT | Performed by: FAMILY MEDICINE

## 2021-06-18 RX ORDER — LINAGLIPTIN 5 MG/1
5 TABLET, FILM COATED ORAL DAILY
Qty: 90 TABLET | Refills: 3 | Status: SHIPPED | OUTPATIENT
Start: 2021-06-18 | End: 2021-07-01 | Stop reason: ALTCHOICE

## 2021-06-18 NOTE — PROGRESS NOTES
Urban Eason 61 y o  female MRN: [id number]    Encounter: [id number]      Assessment/Plan     1  Type 2 diabetes mellitus on long term insulin therapy with hyperglycemia and hypoglycemia    2  CKD  Poorly controlled with last A1c 7 3% which has been trending up  A1c may not be a reliable marker for glycemic control given CKD  Variability in blood sugars appears to be diet related  No recent hypoglycemia     - continue current regimen   -  Consistent carb diet, regular exercise recommended  If with making the changes, patient notices fasting low/ tight blood sugars, advised to decrease dose of insulin 75/25 to 30 units with dinner  -  Plan for review of blood sugars in 2-3 weeks  - repeat A1c, BMP, will check fructosamine level in 3 months      3  Hypothyroidism  -continue levothyroxine 100 mcg orally daily   -repeat TSH, free T4 in 6-8 weeks from dose change     4  Hypertension- blood pressure at goal   Continue current regimen  5  Hyperlipidemia - levels at goal   Continue statin   Repeat fasting lipid panel in 6 months     Follow-up in 4-6 weeks    CC:  Diabetes mellitus , hypothyroidism     History of Present Illness     HPI:  Urban Eason is a 61 y o  female who is here for a follow-up of diabetes mellitus  And hypothyroidism    Also has a past medical history of hypertension, hyperlipidemia, end-stage renal disease on peritoneal dialysis, retinopathy    currently on the following regimen   Insulin 75/25 32 units before breakfast, 32 before dinner  Tradjenta 5 mg orally daily  - ran out 1 week ago     Started using freestyle [place] but says that the sensor kept falling off an recently it is "not coming on"  Has received a new one  Last used 5/30/2021   Says BG had improved: 68-150s, some in the 200s if she ate a high carbohydrate meal   Lows approx 2 times a week, fasting   No hypoglycemic symptoms when in the Bg was in the 60s     Checked fingerstick BG a few times an says that the numbers were not corresponding  All BG have been 150-250 mg/dl ; This morning 224   On review 104-438 mg/dl   No hypoglycemia     BG checked in clinic : clinic meter 92 patient's 104     For hypothyroidism, currently on levothyroxine 100 mcg orally daily   Complaint, empty stomach   no symptoms of hypo or hyperthyroidism     Weight stable ; No exercise   admits to not always following a consistent carbohydrate diet     All other systems were reviewed and are negative         Review of Systems    Historical Information   Past Medical History:   Diagnosis Date    Acute asthmatic bronchitis     last assessed: 2017    Cardiac disease     Chronic diastolic (congestive) heart failure (HCC)     Colon polyp     Diabetes mellitus ([facility] )     Hyperlipidemia     Hypertension     Medical non-compliance     Morbid obesity with BMI of 40 0-44 9, adult ([facility] ) 2019    Pneumonia     last assessed: 2013    Pulmonary embolism ([facility] )     Renal disorder     possible clot noted in kidney, thus blood thinners currently    Severe obstructive sleep apnea     Severe pulmonary arterial systolic hypertension (HCC)      Past Surgical History:   Procedure Laterality Date     SECTION       x 1    EYE SURGERY      NOSE SURGERY      fractured nose - septoplasty     Social History   Social History     Substance and Sexual Activity   Alcohol Use Never     Social History     Substance and Sexual Activity   Drug Use No     Social History     Tobacco Use   Smoking Status Never Smoker   Smokeless Tobacco Never Used     Family History:   Family History   Problem Relation Age of Onset    Diabetes Mother         mellitus    Anxiety disorder Mother         generalized anxiety disorder    Hypertension Mother     Kidney disease Father     Kidney failure Father         renal failure    Ulcerative colitis Sister     Heart disease Family     Ovarian cancer Maternal Aunt        Meds/Allergies   Current Outpatient Medications   Medication Sig Dispense Refill    docusate sodium (COLACE) 100 mg capsule Take 1 capsule (100 mg total) by mouth 2 (two) times a day 10 capsule 0    atorvastatin (LIPITOR) 20 mg tablet Take 1 tablet (20 mg total) by mouth daily 90 tablet 1    B Complex-C (B-COMPLEX WITH VITAMIN C) tablet Take 1 tablet by mouth daily      Blood Glucose Monitoring Suppl (ONE TOUCH ULTRA 2) w/Device KIT Test glucose 3 times daily 1 each 0    calcium acetate (PHOSLO) capsule       cholecalciferol (VITAMIN D3) 1,000 units tablet Take 2 tablets (2,000 Units total) by mouth daily 100 tablet 5    cinacalcet (SENSIPAR) 30 mg tablet Take 1 tablet (30 mg total) by mouth 3 (three) times a week 36 tablet 3    colchicine (COLCRYS) 0 6 mg tablet Take one pill daily as needed for Gout flare 20 tablet 3    Continuous Blood Gluc  (FreeStyle [place] 14 Day [place]) GAGANDEEP Use to test blood sugar daily 1 Device 0    gentamicin (GARAMYCIN) 0 1 % cream       HumaLOG Mix 75/25 KwikPen (75-25) 100 units/mL injection pen INJECT 32 UNITS UNDER THE SKIN TWICE A DAY WITH MEALS 150 mL 3    Insulin Lispro Prot & Lispro (HumaLOG Mix 75/25 KwikPen) (75-25) 100 units/mL injection pen Inject under the skin      Insulin Pen Needle 31G X 5 MM MISC by Does not apply route daily 100 each 1    Lancets (onetouch ultrasoft) lancets Test glucose 3 times a day; Code: E11 8 300 each 1    levothyroxine 100 mcg tablet Take 1 tablet (100 mcg total) by mouth daily 90 tablet 3    linaGLIPtin (Tradjenta) 5 MG TABS Take 5 mg by mouth daily 90 tablet 3    metoprolol tartrate (LOPRESSOR) 25 mg tablet Take 25 mg by mouth every 12 (twelve) hours      MULTIPLE VITAMIN PO Take 1 tablet by mouth daily      OneTouch Ultra test strip TEST GLUCOSE THREE TIMES A  each 3    polymyxin b-trimethoprim (POLYTRIM) ophthalmic solution       prednisoLONE acetate (PRED FORTE) 1 % ophthalmic suspension       torsemide (DEMADEX) 100 mg tablet Take 1 tablet (100 mg total) by mouth daily 90 tablet 1    valsartan (DIOVAN) 160 mg tablet Take 1 tablet (160 mg total) by mouth daily 90 tablet 1    warfarin (COUMADIN) 1 mg tablet Take daily as directed 90 tablet 1    warfarin (COUMADIN) 2 mg tablet TAKE 1-1/2 TABLETS DAILY OR AS DIRECTED 180 tablet 1    warfarin (COUMADIN) 5 mg tablet Daily as directed 90 tablet 1     No current facility-administered medications for this visit  No Known Allergies    Objective   Vitals: Blood pressure 126/70, pulse 76, height 5' (1 524 m), weight 96 3 kg (212 lb 4 8 oz), not currently breastfeeding  Physical Exam  Constitutional:       Appearance: She is well-developed  HENT:      Head: Normocephalic and atraumatic  Eyes:      Conjunctiva/sclera: Conjunctivae normal       Pupils: Pupils are equal, round, and reactive to light  Neck:      Thyroid: No thyromegaly  Cardiovascular:      Rate and Rhythm: Normal rate and regular rhythm  Heart sounds: Normal heart sounds  No murmur heard  Pulmonary:      Effort: Pulmonary effort is normal       Breath sounds: Normal breath sounds  No wheezing  Abdominal:      General: There is no distension  Palpations: Abdomen is soft  Tenderness: There is no abdominal tenderness  Musculoskeletal:         General: Normal range of motion  Cervical back: Normal range of motion and neck supple  Skin:     General: Skin is warm and dry  Neurological:      Mental Status: She is alert and oriented to person, place, and time  Psychiatric:         Behavior: Behavior normal          The history was obtained from the review of the chart, patient      Lab Results:   Lab Results   Component Value Date/Time    TSH 3RD Ninoska Mates 8 429 (H) 05/21/2021 12:06 PM    TSH 3RD CHAYA 14 248 (H) 03/08/2021 10:27 AM    TSH 3RD CHAYA 19 395 (H) 11/25/2020 09:28 AM    Free T4 1 06 05/21/2021 12:06 PM    Free T4 1 05 05/21/2021 12:06 PM    Free T4 0 93 03/08/2021 10:27 AM     Lab Results   Component Value Date WBC 9 77 01/28/2021    HGB 13 5 01/28/2021    HCT 44 1 01/28/2021    MCV 95 01/28/2021     01/28/2021     Lab Results   Component Value Date    CREATININE 5 68 (H) 05/21/2021    BUN 59 (H) 05/21/2021     12/11/2014    K 4 2 05/21/2021     05/21/2021    CO2 25 05/21/2021     Lab Results   Component Value Date    HGBA1C 7 3 (H) 05/21/2021         Imaging Studies:       I have personally reviewed pertinent reports  Portions of the record may have been created with voice recognition software  Occasional wrong word or "sound a like" substitutions may have occurred due to the inherent limitations of voice recognition software  Read the chart carefully and recognize, using context, where substitutions have occurred

## 2021-06-18 NOTE — PATIENT INSTRUCTIONS
Continue current regimen   Recommend consistent carb diet, try to incorporate some physical activity into your daily routine  Referred to weight management   Plan for review of blood sugars in 2-3 weeks   Follow-up in 4-6 weeks  Labs in 3 months

## 2021-06-19 NOTE — ASSESSMENT & PLAN NOTE
I did order nocturnal pulse oximetry recording with her using 3 L of oxygen night to see if this is correct in her hypoxemia    Her medical supply company is [employer]

## 2021-06-19 NOTE — ASSESSMENT & PLAN NOTE
She does have least moderate pulmonary hypertension  Last echocardiogram done November 2020 showed estimated PA pressure 60 mm Hg  There is grade 2 diastolic dysfunction LV systolic function was mildly decreased at 45%  Likely due to group 2 and 3 PA hypertension

## 2021-06-19 NOTE — ASSESSMENT & PLAN NOTE
Continue oxygen 3 liters/minute at bedtime  She also has CHANDRA but did not want to use CPAP therapy  I did encourage her to lose weight    Some of her hypoxemia at night is prior related to alveolar hypoventilation from obesity

## 2021-06-25 PROCEDURE — 3066F NEPHROPATHY DOC TX: CPT | Performed by: INTERNAL MEDICINE

## 2021-06-28 ENCOUNTER — TELEPHONE (OUTPATIENT)
Dept: ENDOCRINOLOGY | Facility: CLINIC | Age: 66
End: 2021-06-28

## 2021-06-28 ENCOUNTER — TELEPHONE (OUTPATIENT)
Dept: PULMONOLOGY | Facility: CLINIC | Age: 66
End: 2021-06-28

## 2021-06-28 NOTE — TELEPHONE ENCOUNTER
Patient has end-stage renal disease, metformin is contraindicated   Please put in a prior authorization

## 2021-06-28 NOTE — TELEPHONE ENCOUNTER
Pt calling for overnight pulse ox testing results  I see them scanned in media from 6/17   She can be reached at [phone]

## 2021-06-28 NOTE — TELEPHONE ENCOUNTER
[facility] called to say Veto Schilder is not covered by insurance  Can we prescribe either Metformin HCL  Metformin ER, HCL or Januvia        Call back [phone]

## 2021-06-29 NOTE — TELEPHONE ENCOUNTER
Hello,    Her HS POX results were normal on 3 L  Please call patient to inform to continue 3 L NC 02 at night  No changes  On 3 L she had zero minutes < 88% SP02

## 2021-06-29 NOTE — TELEPHONE ENCOUNTER
I spoke with Reny Malin and informed her of her hs pox results per Ray  She states she does not  use her oxygen every night  I advised her to use it as ordered and she stated understanding  She also states she wants to return her CPAP to [address] because she has not been using it

## 2021-07-01 DIAGNOSIS — Z79.4 TYPE 2 DIABETES MELLITUS WITH HYPERGLYCEMIA, WITH LONG-TERM CURRENT USE OF INSULIN (HCC): Primary | ICD-10-CM

## 2021-07-01 DIAGNOSIS — E11.65 TYPE 2 DIABETES MELLITUS WITH HYPERGLYCEMIA, WITH LONG-TERM CURRENT USE OF INSULIN (HCC): Primary | ICD-10-CM

## 2021-07-02 ENCOUNTER — TELEPHONE (OUTPATIENT)
Dept: GASTROENTEROLOGY | Facility: AMBULARY SURGERY CENTER | Age: 66
End: 2021-07-02

## 2021-07-07 ENCOUNTER — TELEPHONE (OUTPATIENT)
Dept: FAMILY MEDICINE CLINIC | Facility: CLINIC | Age: 66
End: 2021-07-07

## 2021-07-23 PROCEDURE — 3066F NEPHROPATHY DOC TX: CPT | Performed by: INTERNAL MEDICINE

## 2021-08-02 DIAGNOSIS — Z79.4 TYPE 2 DIABETES MELLITUS WITH HYPERGLYCEMIA, WITH LONG-TERM CURRENT USE OF INSULIN (HCC): ICD-10-CM

## 2021-08-02 DIAGNOSIS — E11.65 TYPE 2 DIABETES MELLITUS WITH HYPERGLYCEMIA, WITH LONG-TERM CURRENT USE OF INSULIN (HCC): ICD-10-CM

## 2021-08-09 ENCOUNTER — HOSPITAL ENCOUNTER (OUTPATIENT)
Dept: NON INVASIVE DIAGNOSTICS | Facility: HOSPITAL | Age: 66
Discharge: HOME | End: 2021-08-09
Attending: INTERNAL MEDICINE
Payer: COMMERCIAL

## 2021-08-09 ENCOUNTER — APPOINTMENT (OUTPATIENT)
Dept: LAB | Facility: HOSPITAL | Age: 66
End: 2021-08-09
Payer: COMMERCIAL

## 2021-08-09 ENCOUNTER — ANTICOAG VISIT (OUTPATIENT)
Dept: FAMILY MEDICINE CLINIC | Facility: CLINIC | Age: 66
End: 2021-08-09

## 2021-08-09 DIAGNOSIS — I27.20 PULMONARY HYPERTENSION (HCC): ICD-10-CM

## 2021-08-09 DIAGNOSIS — I50.42 CHRONIC COMBINED SYSTOLIC AND DIASTOLIC CONGESTIVE HEART FAILURE (HCC): ICD-10-CM

## 2021-08-09 DIAGNOSIS — I42.0 DILATED CARDIOMYOPATHY (HCC): ICD-10-CM

## 2021-08-09 PROCEDURE — 93306 TTE W/DOPPLER COMPLETE: CPT

## 2021-08-11 PROCEDURE — 93306 TTE W/DOPPLER COMPLETE: CPT | Performed by: INTERNAL MEDICINE

## 2021-08-18 NOTE — ASSESSMENT & PLAN NOTE
Lab Results   Component Value Date    HGBA1C 8 8 (H) 07/05/2018       Recent Labs      07/14/18   1705  07/14/18   2129  07/15/18   0725  07/15/18   1113   POCGLU  106  233*  86  175*       Blood Sugar Average: Last 72 hrs:  (P) 184 [id number]     · Blood sugars are better off steroids    Patient will be discharged back on NovoLog 75/35 30 units subcutaneously b i d  advised to monitor blood sugars before each meal and at bed time  · HA1c 8 8 14

## 2021-09-10 PROCEDURE — 3066F NEPHROPATHY DOC TX: CPT | Performed by: INTERNAL MEDICINE

## 2021-09-17 ENCOUNTER — OFFICE VISIT (OUTPATIENT)
Dept: BARIATRICS | Facility: CLINIC | Age: 66
End: 2021-09-17
Payer: COMMERCIAL

## 2021-09-17 VITALS
HEIGHT: 60 IN | HEART RATE: 80 BPM | DIASTOLIC BLOOD PRESSURE: 70 MMHG | BODY MASS INDEX: 41.23 KG/M2 | SYSTOLIC BLOOD PRESSURE: 102 MMHG | WEIGHT: 210 LBS

## 2021-09-17 DIAGNOSIS — E66.01 MORBID OBESITY WITH BMI OF 40.0-44.9, ADULT (HCC): Primary | ICD-10-CM

## 2021-09-17 DIAGNOSIS — E03.9 HYPOTHYROIDISM: ICD-10-CM

## 2021-09-17 DIAGNOSIS — I48.92 PAROXYSMAL ATRIAL FLUTTER (HCC): ICD-10-CM

## 2021-09-17 DIAGNOSIS — E78.2 MIXED HYPERLIPIDEMIA: ICD-10-CM

## 2021-09-17 DIAGNOSIS — G47.33 OBSTRUCTIVE SLEEP APNEA: ICD-10-CM

## 2021-09-17 DIAGNOSIS — E11.00 TYPE 2 DIABETES MELLITUS WITH HYPEROSMOLARITY WITHOUT COMA, WITH LONG-TERM CURRENT USE OF INSULIN (HCC): ICD-10-CM

## 2021-09-17 DIAGNOSIS — Z79.4 TYPE 2 DIABETES MELLITUS WITH HYPEROSMOLARITY WITHOUT COMA, WITH LONG-TERM CURRENT USE OF INSULIN (HCC): ICD-10-CM

## 2021-09-17 PROCEDURE — 99204 OFFICE O/P NEW MOD 45 MIN: CPT | Performed by: PHYSICIAN ASSISTANT

## 2021-09-17 NOTE — ASSESSMENT & PLAN NOTE
-Discussed options of [facility]-Intensive Lifestyle Intervention Program, Very Low Calorie Diet-VLCD, Conservative Program, Shashank-En-Y Gastric Bypass and Vertical Sleeve Gastrectomy and the role of weight loss medications   -Initial weight loss goal of 5-10% weight loss for improved health  -Screening labs   Recommend checking lab coverage before having labs drawn   -lipid, tsh, a1c, cmp reviewed from 5/21/21 all within acceptable limits except for kidney function, a1c, and tsh   -Patient is interested in pursuing [facility]Intensive Lifestyle Intervention Program  -no vlcd due to kidney function   -not a phentermine/qysmia candidate   -no topamax and metformin due to kidney function

## 2021-09-17 NOTE — ASSESSMENT & PLAN NOTE
Flutter and htn  Taking coumadin, valsartan, torsemide, lopressor, metolazone  -should improve with weight loss, dietary, and lifestyle changes  -continue management with prescribing provider

## 2021-09-17 NOTE — ASSESSMENT & PLAN NOTE
-encouraged to use CPAP machine  -may improve with 20-30% weight loss  -discussed risks of untreated sleep apnea

## 2021-09-17 NOTE — PROGRESS NOTES
Assessment/Plan: Morbid obesity with BMI of 40 0-44 9, adult ([facility] )  -Discussed options of [facility]-Intensive Lifestyle Intervention Program, Very Low Calorie Diet-VLCD, Conservative Program, Shashank-En-Y Gastric Bypass and Vertical Sleeve Gastrectomy and the role of weight loss medications   -Initial weight loss goal of 5-10% weight loss for improved health  -Screening labs  Recommend checking lab coverage before having labs drawn   -lipid, tsh, a1c, cmp reviewed from 5/21/21 all within acceptable limits except for kidney function, a1c, and tsh   -Patient is interested in pursuing [facility]Intensive Lifestyle Intervention Program  -no vlcd due to kidney function   -not a phentermine/qysmia candidate   -no topamax and metformin due to kidney function     Mixed hyperlipidemia  Taking lipitor  -should improve with weight loss, dietary, and lifestyle changes  -continue management with prescribing provider      Paroxysmal atrial flutter (HCC)  Flutter and htn  Taking coumadin, valsartan, torsemide, lopressor, metolazone  -should improve with weight loss, dietary, and lifestyle changes  -continue management with prescribing provider      Obstructive sleep apnea  -encouraged to use CPAP machine  -may improve with 20-30% weight loss  -discussed risks of untreated sleep apnea       Type 2 diabetes mellitus with hyperosmolarity without coma, with long-term current use of insulin ([facility] )  Taking humalog and januvia  -should improve with weight loss, dietary, and lifestyle changes  -continue management with prescribing provider    Lab Results   Component Value Date    HGBA1C 7 3 (H) 05/21/2021       Hypothyroidism  Taking levothyroxine   -continue management with prescribing provider        Follow up in approximately 2 weeks with Non-Surgical Dietician      Diagnoses and all orders for this visit:    Morbid obesity with BMI of 40 0-44 9, adult ([facility] )    Mixed hyperlipidemia    Paroxysmal atrial flutter (HCC)    Obstructive sleep apnea    Type 2 diabetes mellitus with hyperosmolarity without coma, with long-term current use of insulin ([facility] )    Hypothyroidism          Subjective:   Chief Complaint   Patient presents with    Consult     pt is here for new mwm consult  Patient ID: Cornelio Flowers  is a 61 y o  female with excess weight/obesity here to pursue weight management  Past Medical History:   Diagnosis Date    Acute asthmatic bronchitis     last assessed: 6/2/2017    Cardiac disease     Chronic diastolic (congestive) heart failure (HCC)     Colon polyp     Diabetes mellitus ([facility] )     Hyperlipidemia     Hypertension     Medical non-compliance     Morbid obesity with BMI of 40 0-44 9, adult ([place] [address] ) 2/28/2019    Pneumonia     last assessed: 6/5/2013    Pulmonary embolism ([place] [address] )     Renal disorder     possible clot noted in kidney, thus blood thinners currently    Severe obstructive sleep apnea     Severe pulmonary arterial systolic hypertension (HCC)        HPI:  Obesity/Excess Weight:  Severity: Severe  Onset: For the last ten years     Modifiers: Diet and Exercise and Commercial Weight Loss Programs-ie  Weight Watchers, Alexei Baptise, Nutrisystem, etc   Contributing factors: Poor Food Choices, Insufficient Physical Activity and Menopause  Associated symptoms: comorbid conditions, fatigue, decreased exercise capacity, increased shortness of breath and inability to do certain activities    Goals: 190 lbs   Hydration: 32-64 oz of water, 2 cups of soda per day, 1-2 cups of juice, 1 cup of coffee with sugar and 1% milk  Alcohol: none     Colonoscopy-Completed 09/17/2019      The following portions of the patient's history were reviewed and updated as appropriate: allergies, current medications, past family history, past medical history, past social history, past surgical history and problem list     Review of Systems   HENT: Negative for sore throat  Respiratory: Positive for shortness of breath (with extertion )  Negative for cough  Cardiovascular: Negative for chest pain and palpitations  Gastrointestinal: Negative for abdominal pain, constipation, diarrhea, nausea and vomiting  Denies GERD   Endocrine: Negative for cold intolerance and heat intolerance  Genitourinary: Negative for dysuria  Musculoskeletal: Positive for arthralgias (hip)  Negative for back pain  Skin: Negative for rash  Neurological: Negative for headaches  Psychiatric/Behavioral: Negative for suicidal ideas (denies HI)  Denies Depression and anxiety       Objective:    /70   Pulse 80   Ht 5' (1 524 m)   Wt 95 3 kg (210 lb)   BMI 41 01 kg/m²     Physical Exam  Vitals and nursing note reviewed  Constitutional   General appearance: Abnormal   well developed and morbidly obese  Eyes No conjunctival pallor  Pulmonary   Respiratory effort: No increased work of breathing or signs of respiratory distress  Abdomen   Abdomen: Abnormal   The abdomen was obese     Musculoskeletal   Gait and station: Normal     Psychiatric   Orientation to person, place and time: Normal     Affect: appropriate

## 2021-09-17 NOTE — ASSESSMENT & PLAN NOTE
Taking humalog and [place]  -should improve with weight loss, dietary, and lifestyle changes  -continue management with prescribing provider    Lab Results   Component Value Date    HGBA1C 7 3 (H) 05/21/2021

## 2021-09-18 ENCOUNTER — APPOINTMENT (OUTPATIENT)
Dept: RADIOLOGY | Facility: CLINIC | Age: 66
End: 2021-09-18
Payer: COMMERCIAL

## 2021-09-18 ENCOUNTER — OFFICE VISIT (OUTPATIENT)
Dept: URGENT CARE | Facility: CLINIC | Age: 66
End: 2021-09-18
Payer: COMMERCIAL

## 2021-09-18 VITALS
SYSTOLIC BLOOD PRESSURE: 140 MMHG | RESPIRATION RATE: 18 BRPM | BODY MASS INDEX: 41.23 KG/M2 | WEIGHT: 210 LBS | OXYGEN SATURATION: 98 % | HEIGHT: 60 IN | TEMPERATURE: 98 F | DIASTOLIC BLOOD PRESSURE: 70 MMHG | HEART RATE: 62 BPM

## 2021-09-18 DIAGNOSIS — S90.121A CONTUSION OF RIGHT FOOT INCLUDING TOES, INITIAL ENCOUNTER: Primary | ICD-10-CM

## 2021-09-18 DIAGNOSIS — S90.31XA CONTUSION OF RIGHT FOOT INCLUDING TOES, INITIAL ENCOUNTER: Primary | ICD-10-CM

## 2021-09-18 DIAGNOSIS — M79.671 RIGHT FOOT PAIN: ICD-10-CM

## 2021-09-18 PROCEDURE — 99213 OFFICE O/P EST LOW 20 MIN: CPT | Performed by: PHYSICIAN ASSISTANT

## 2021-09-18 PROCEDURE — 73630 X-RAY EXAM OF FOOT: CPT

## 2021-09-18 NOTE — PROGRESS NOTES
[facility]        NAME: Julian Encarnacion is a 61 y o  female  : 1955    MRN: [id number]  DATE: 2021  TIME: 2:04 PM    Assessment and Plan   Contusion of right foot including toes, initial encounter Emilianazully Engel, [id number]]  1  Contusion of right foot including toes, initial encounter  XR foot 3+ vw right         Patient Instructions     Patient Instructions    Xray showed no signs of fracture or dislocation  Take ibuprofen or tylenol for the pain  Rest, ice and elevate the foot  Were supportive footwear leg sneakers the next 1-2 weeks  If symptoms persist follow up with primary care doctor or orthopedist          Follow up with PCP in 3-5 days  Proceed to  ER if symptoms worsen  Chief Complaint     Chief Complaint   Patient presents with    Foot Pain     R FOOT PAIN, 3RD AND 4TH TOES         History of Present Illness       62-year-old female with a history of arthritis, DM, HTN, ESRD, and nueropathy presents for a right foot injury that occurred 4 days ago  Patient dropped the top of a shredder onto her foot  There was immediate pain but the bruising and swelling did not show up for a few days  Patient is on blood thinners and has a baseline numbness in her feet  She applied ice and has been taking 1 extra-strength Tylenol every 6 hours with minimal relief  Review of Systems   Review of Systems   Constitutional: Negative for chills and fever  HENT: Negative for rhinorrhea and sore throat  Eyes: Negative for visual disturbance  Respiratory: Negative for cough and shortness of breath  Cardiovascular: Negative for chest pain  Gastrointestinal: Negative for abdominal pain, diarrhea, nausea and vomiting  Musculoskeletal: Positive for arthralgias, joint swelling and myalgias  Negative for back pain  Skin: Positive for color change  Neurological: Negative for headaches  All other systems reviewed and are negative          Current Medications       Current Outpatient Medications:     atorvastatin (LIPITOR) 20 mg tablet, Take 1 tablet (20 mg total) by mouth daily, Disp: 90 tablet, Rfl: 1    B Complex-C (B-COMPLEX WITH VITAMIN C) tablet, Take 1 tablet by mouth daily, Disp: , Rfl:     Blood Glucose Monitoring Suppl (ONE TOUCH ULTRA 2) w/Device KIT, Test glucose 3 times daily, Disp: 1 each, Rfl: 0    calcium acetate (PHOSLO) capsule, Take 667 mg by mouth daily , Disp: , Rfl:     cholecalciferol (VITAMIN D3) 1,000 units tablet, Take 2 tablets (2,000 Units total) by mouth daily, Disp: 100 tablet, Rfl: 5    cinacalcet (SENSIPAR) 30 mg tablet, Take 1 tablet (30 mg total) by mouth 3 (three) times a week, Disp: 36 tablet, Rfl: 3    Continuous Blood Gluc  (FreeStyle [place] 14 Day [place]) GAGANDEEP, Use to test blood sugar daily, Disp: 1 Device, Rfl: 0    docusate sodium (COLACE) 100 mg capsule, Take 1 capsule (100 mg total) by mouth 2 (two) times a day, Disp: 10 capsule, Rfl: 0    gentamicin (GARAMYCIN) 0 1 % cream, , Disp: , Rfl:     HumaLOG Mix 75/25 KwikPen (75-25) 100 units/mL injection pen, INJECT 32 UNITS UNDER THE SKIN TWICE A DAY WITH MEALS, Disp: 150 mL, Rfl: 3    Insulin Lispro Prot & Lispro (HumaLOG Mix 75/25 KwikPen) (75-25) 100 units/mL injection pen, Inject under the skin , Disp: , Rfl:     Insulin Pen Needle 31G X 5 MM MISC, by Does not apply route daily, Disp: 100 each, Rfl: 1    Lancets (onetouch ultrasoft) lancets, Test glucose 3 times a day; Code: E11 8, Disp: 300 each, Rfl: 1    levothyroxine 100 mcg tablet, Take 1 tablet (100 mcg total) by mouth daily, Disp: 90 tablet, Rfl: 3    metolazone (ZAROXOLYN) 5 mg tablet, Take 5 mg by mouth daily , Disp: , Rfl:     metoprolol tartrate (LOPRESSOR) 25 mg tablet, Take 25 mg by mouth every 12 (twelve) hours, Disp: , Rfl:     MULTIPLE VITAMIN PO, Take 1 tablet by mouth daily, Disp: , Rfl:     OneTouch Ultra test strip, TEST GLUCOSE THREE TIMES A DAY, Disp: 300 each, Rfl: 3    sitaGLIPtin (JANUVIA) 25 mg tablet, Take one tablet (25mg) by mouth daily, Disp: 90 tablet, Rfl: 3    torsemide (DEMADEX) 100 mg tablet, Take 1 tablet (100 mg total) by mouth daily, Disp: 90 tablet, Rfl: 1    valsartan (DIOVAN) 160 mg tablet, Take 1 tablet (160 mg total) by mouth daily, Disp: 90 tablet, Rfl: 1    warfarin (COUMADIN) 1 mg tablet, Take daily as directed, Disp: 90 tablet, Rfl: 1    warfarin (COUMADIN) 2 mg tablet, TAKE 1-1/2 TABLETS DAILY OR AS DIRECTED, Disp: 180 tablet, Rfl: 1    warfarin (COUMADIN) 5 mg tablet, Daily as directed, Disp: 90 tablet, Rfl: 1    colchicine (COLCRYS) 0 6 mg tablet, Take one pill daily as needed for Gout flare (Patient not taking: Reported on 2021), Disp: 20 tablet, Rfl: 3    polymyxin b-trimethoprim (POLYTRIM) ophthalmic solution, , Disp: , Rfl:     prednisoLONE acetate (PRED FORTE) 1 % ophthalmic suspension, , Disp: , Rfl:     Current Allergies     Allergies as of 2021    (No Known Allergies)            The following portions of the patient's history were reviewed and updated as appropriate: allergies, current medications, past family history, past medical history, past social history, past surgical history and problem list      Past Medical History:   Diagnosis Date    Acute asthmatic bronchitis     last assessed: 2017    Cardiac disease     Chronic diastolic (congestive) heart failure ([facility] )     Colon polyp     [facility]     Hyperlipidemia     Hypertension     Medical non-compliance     Morbid obesity with BMI of 40 0-44 9, adult ([facility] ) 2019    Pneumonia     last assessed: 2013    Pulmonary embolism ([facility] )     Renal disorder     possible clot noted in kidney, thus blood thinners currently    Severe obstructive sleep apnea     Severe pulmonary arterial systolic hypertension (HCC)        Past Surgical History:   Procedure Laterality Date     SECTION       x 1    EYE SURGERY      NOSE SURGERY      fractured nose - septoplasty Family History   Problem Relation Age of Onset    Diabetes Mother         mellitus    Anxiety disorder Mother         generalized anxiety disorder    Hypertension Mother     Stroke Mother     Kidney disease Father     Kidney failure Father         renal failure    Ulcerative colitis Sister     Diabetes Sister     Heart disease Family     Ovarian cancer Maternal Aunt     Diabetes Maternal Aunt     Diabetes Maternal Uncle     Heart disease Maternal Uncle     Thyroid disease Neg Hx          Medications have been verified  Objective   /70   Pulse 62   Temp 98 °F (36 7 °C)   Resp 18   Ht 5' (1 524 m)   Wt 95 3 kg (210 lb)   SpO2 98%   BMI 41 01 kg/m²        Physical Exam     Physical Exam  Vitals and nursing note reviewed  Constitutional:       Appearance: Normal appearance  HENT:      Head: Normocephalic  Mouth/Throat:      Pharynx: No posterior oropharyngeal erythema  Eyes:      Extraocular Movements: Extraocular movements intact  Pupils: Pupils are equal, round, and reactive to light  Cardiovascular:      Rate and Rhythm: Normal rate and regular rhythm  Pulses: Normal pulses  Heart sounds: Normal heart sounds  No murmur heard  Pulmonary:      Effort: Pulmonary effort is normal  No respiratory distress  Breath sounds: Normal breath sounds  Musculoskeletal:        Legs:    Skin:     General: Skin is warm and dry  Neurological:      Mental Status: She is alert and oriented to person, place, and time     Psychiatric:         Behavior: Behavior normal

## 2021-09-19 ENCOUNTER — TELEPHONE (OUTPATIENT)
Dept: URGENT CARE | Facility: CLINIC | Age: 66
End: 2021-09-19

## 2021-09-19 DIAGNOSIS — S92.534A NONDISPLACED FRACTURE OF DISTAL PHALANX OF RIGHT LESSER TOE(S), INITIAL ENCOUNTER FOR CLOSED FRACTURE: Primary | ICD-10-CM

## 2021-09-19 NOTE — TELEPHONE ENCOUNTER
Called pt discussed final rad read of a fracture of her pinky toe  Advised she can come back in and get a post-op shoe and claudia tape  Sent referral to Dr Shannan Almeida recommend f/u in the next 2 weeks  Pt agreed and will come in for shoe

## 2021-09-29 ENCOUNTER — OFFICE VISIT (OUTPATIENT)
Dept: CARDIOLOGY CLINIC | Facility: CLINIC | Age: 66
End: 2021-09-29
Payer: COMMERCIAL

## 2021-09-29 VITALS
BODY MASS INDEX: 42.01 KG/M2 | HEART RATE: 71 BPM | HEIGHT: 60 IN | SYSTOLIC BLOOD PRESSURE: 120 MMHG | DIASTOLIC BLOOD PRESSURE: 80 MMHG | TEMPERATURE: 98.4 F | OXYGEN SATURATION: 97 % | WEIGHT: 214 LBS

## 2021-09-29 DIAGNOSIS — G47.33 OBSTRUCTIVE SLEEP APNEA: ICD-10-CM

## 2021-09-29 DIAGNOSIS — Z79.4 TYPE 2 DIABETES MELLITUS WITH HYPEROSMOLARITY WITHOUT COMA, WITH LONG-TERM CURRENT USE OF INSULIN (HCC): ICD-10-CM

## 2021-09-29 DIAGNOSIS — N18.6 END STAGE KIDNEY DISEASE (HCC): ICD-10-CM

## 2021-09-29 DIAGNOSIS — I48.92 PAROXYSMAL ATRIAL FLUTTER (HCC): ICD-10-CM

## 2021-09-29 DIAGNOSIS — I50.42 CHRONIC COMBINED SYSTOLIC AND DIASTOLIC CONGESTIVE HEART FAILURE (HCC): Primary | ICD-10-CM

## 2021-09-29 DIAGNOSIS — Z99.2: ICD-10-CM

## 2021-09-29 DIAGNOSIS — E78.2 MIXED HYPERLIPIDEMIA: ICD-10-CM

## 2021-09-29 DIAGNOSIS — I27.20 PULMONARY HYPERTENSION (HCC): ICD-10-CM

## 2021-09-29 DIAGNOSIS — Z79.01 ON CONTINUOUS ORAL ANTICOAGULATION: ICD-10-CM

## 2021-09-29 DIAGNOSIS — E03.9 ACQUIRED HYPOTHYROIDISM: ICD-10-CM

## 2021-09-29 DIAGNOSIS — I42.0 DILATED CARDIOMYOPATHY (HCC): ICD-10-CM

## 2021-09-29 DIAGNOSIS — E11.00 TYPE 2 DIABETES MELLITUS WITH HYPEROSMOLARITY WITHOUT COMA, WITH LONG-TERM CURRENT USE OF INSULIN (HCC): ICD-10-CM

## 2021-09-29 DIAGNOSIS — D63.8 ANEMIA OF CHRONIC DISEASE: ICD-10-CM

## 2021-09-29 DIAGNOSIS — E66.01 MORBID OBESITY (HCC): ICD-10-CM

## 2021-09-29 PROCEDURE — 1036F TOBACCO NON-USER: CPT | Performed by: INTERNAL MEDICINE

## 2021-09-29 PROCEDURE — 99214 OFFICE O/P EST MOD 30 MIN: CPT | Performed by: INTERNAL MEDICINE

## 2021-09-29 PROCEDURE — 3008F BODY MASS INDEX DOCD: CPT | Performed by: INTERNAL MEDICINE

## 2021-09-29 RX ORDER — FLASH GLUCOSE SENSOR
KIT MISCELLANEOUS
COMMUNITY
Start: 2021-09-28 | End: 2022-06-27 | Stop reason: RX

## 2021-09-29 NOTE — PROGRESS NOTES
Office Cardiology Progress Note  Anabel Olsen 61 y o  female MRN: [id number]  09/29/21  5:50 PM      ASSESSMENT:    1  Much improved dyspnea with activity and resolved dyspnea at rest with suppression of paroxysmal atrial flutter for the past 1+ year  2  Controlled chronic combined systolic and diastolic heart failure, initially identified in July, 2020, with previous  depression of ejection fraction to 35% with moderate LVH, grade 2 diastolic dysfunction, chronic right heart dilatation and significant chronic pulmonary hypertension on previous echocardiogram 07/14/2020 with LV dysfunction thought possibly to be tachycardia-mediated  Improvement to LVEF of 45% on 11/02/2020 TTE, unchanged on more recent TTE of 08/09/2021   3  Nonischemic nuclear stress study with dilated right ventricle on 07/14/2020     4  Stable mild nonrheumatic mitral valve stenosis, not an active clinical problem at this time  5  Multifactorial chronic pulmonary hypertension, stable   Stable PA systolic pressure 68 mmHg as of   08/09/2021   6  End-stage kidney disease with patient on peritoneal dialysis for approximately 1+ year  7  Worsened and progressive morbid obesity,  with weight gain of 22 lbs in approximately one year  and with previous weight loss of 13 lbs in 4-12+ months and 19 3 lbs in approximately 5 months  Question whether this could be related to diabetic medication and/or acquired hypothyroidism  8  Controlled longstanding type 2 diabetes mellitus, insulin dependent, with latest A1c 7 3%  on 05/21/2021     9  Controlled mixed dyslipidemia  10  History of severe obstructive sleep apnea, currently not being treated   Patient intolerant to CPAP and  currently using oxygen overnight at 3 liters/minute only sporadically  11  Patient on continuous warfarin oral anticoagulation and presently on 3 milligrams daily with latest INR subtherapeutic  12  History of pulmonary embolism, though not totally confirmed    13  Anemia of chronic renal disease  14  Acquired hypothyroidism,  treated at present with levothyroxine 75 micrograms daily  TSH still elevated as of 05/21/2021  15  Treated vitamin-D deficiency  Plan       Patient Instructions     1  Continue current medication  2  Continue with weight management program and follow up with Dr Dheeraj Cabrera of endocrinology  3  Cardiology follow-up approximately six months with EKG  HPI    This 61 y o  female  denies new cardiopulmonary and medical symptoms  The patient just began following with the [facility] weight management center and has a follow-up in approximately one week  She will be following the nutritional weight loss approach  The patient is not currently being considered for renal transplant because of controversy about her need for oxygen, which she rarely uses currently  The patient will be scheduling an appointment with endocrinology for diabetic follow-up in the near future  The patient is being seen in follow-up of the below listed diagnoses  Encounter Diagnoses   Name Primary?     Chronic combined systolic and diastolic congestive heart failure (HCC) Yes    Paroxysmal atrial flutter (HCC)     Pulmonary hypertension (HCC)     Mixed hyperlipidemia     End stage kidney disease (HCC)     Type 2 diabetes mellitus with hyperosmolarity without coma, with long-term current use of insulin (HCC)     Morbid obesity (HCC)     Dependence on continuous ambulatory peritoneal dialysis ([address] )     Obstructive sleep apnea     On continuous oral anticoagulation     Acquired hypothyroidism     Anemia of chronic disease     Dilated cardiomyopathy ([facility] )         Review of Systems    All other systems negative, except as noted in history of present illness    Historical Information   Past Medical History:   Diagnosis Date    Acute asthmatic bronchitis     last assessed: 6/2/2017    Cardiac disease     Chronic diastolic (congestive) heart failure (HCC)     Colon polyp     Diabetes mellitus ([facility] )     Hyperlipidemia     Hypertension     Medical non-compliance     Morbid obesity with BMI of 40 0-44 9, adult ([place] [address] ) 2019    Pneumonia     last assessed: 2013    Pulmonary embolism ([facility] )     Renal disorder     possible clot noted in kidney, thus blood thinners currently    Severe obstructive sleep apnea     Severe pulmonary arterial systolic hypertension (HCC)      Past Surgical History:   Procedure Laterality Date     SECTION       x 1    EYE SURGERY      NOSE SURGERY      fractured nose - septoplasty     Social History     Substance and Sexual Activity   Alcohol Use Never     Social History     Substance and Sexual Activity   Drug Use No     Social History     Tobacco Use   Smoking Status Never Smoker   Smokeless Tobacco Never Used       Family History:  Family History   Problem Relation Age of Onset    Diabetes Mother         mellitus    Anxiety disorder Mother         generalized anxiety disorder    Hypertension Mother     Stroke Mother     Kidney disease Father     Kidney failure Father         renal failure    Ulcerative colitis Sister     Diabetes Sister     Heart disease Family     Ovarian cancer Maternal Aunt     Diabetes Maternal Aunt     Diabetes Maternal Uncle     Heart disease Maternal Uncle     Thyroid disease Neg Hx          Meds/Allergies     Prior to Admission medications    Medication Sig Start Date End Date Taking?  Authorizing Provider   atorvastatin (LIPITOR) 20 mg tablet Take 1 tablet (20 mg total) by mouth daily 20  Yes Ilene Beard DO   B Complex-C (B-COMPLEX WITH VITAMIN C) tablet Take 1 tablet by mouth daily   Yes Historical Provider, MD   Blood Glucose Monitoring Suppl (ONE TOUCH ULTRA 2) w/Device KIT Test glucose 3 times daily 18  Yes Ilene Beard DO   calcium acetate (PHOSLO) capsule Take 667 mg by mouth daily  20  Yes Historical Provider, MD   cholecalciferol (VITAMIN D3) 1,000 units tablet Take 2 tablets (2,000 Units total) by mouth daily 12/22/20  Yes Alice Dumont MD   cinacalcet (SENSIPAR) 30 mg tablet Take 1 tablet (30 mg total) by mouth 3 (three) times a week 5/19/21  Yes Richa Guillory MD   Continuous Blood Gluc  ([employer] 14 Day [place]) GAGANDEEP Use to test blood sugar daily 3/15/21  Yes Marlene Chris MD   Continuous Blood Gluc Sensor (FreeStyle [place] 2 Sensor) MISC  9/28/21  Yes Historical Provider, MD   docusate sodium (COLACE) 100 mg capsule Take 1 capsule (100 mg total) by mouth 2 (two) times a day 7/20/20  Yes Peter Colon MD   HumaLOG Mix 75/25 KwikPen (75-25) 100 units/mL injection pen INJECT 32 UNITS UNDER THE SKIN TWICE A DAY WITH MEALS 8/10/20  Yes Marlene Chris MD   Insulin Pen Needle 31G X 5 MM MISC by Does not apply route daily 10/5/20  Yes Roshan Michelle,    Lancets (onetouch ultrasoft) lancets Test glucose 3 times a day; Code: E11 8 9/8/20  Yes Roshan Michelle, DO   levothyroxine 100 mcg tablet Take 1 tablet (100 mcg total) by mouth daily 5/26/21  Yes Marlene Chris MD   metolazone (ZAROXOLYN) 5 mg tablet Take 5 mg by mouth daily  7/16/21  Yes Historical Provider, MD   metoprolol tartrate (LOPRESSOR) 25 mg tablet Take 25 mg by mouth every 12 (twelve) hours   Yes Historical Provider, MD   MULTIPLE VITAMIN PO Take 1 tablet by mouth daily   Yes Historical Provider, MD   OneTouch Ultra test strip TEST GLUCOSE THREE TIMES A DAY 12/4/20  Yes Roshan Michelle,    sitaGLIPtin (JANUVIA) 25 mg tablet Take one tablet (25mg) by mouth daily 8/2/21  Yes Marlene Chris MD   torsemide (DEMADEX) 100 mg tablet Take 1 tablet (100 mg total) by mouth daily 3/15/21  Yes Richa Guillory MD   valsartan (DIOVAN) 160 mg tablet Take 1 tablet (160 mg total) by mouth daily 2/17/21  Yes Richa Guillory MD   warfarin (COUMADIN) 1 mg tablet Take daily as directed 9/8/20  Yes Roshan Michelle,    warfarin (COUMADIN) 2 mg tablet TAKE 1-1/2 TABLETS DAILY OR AS DIRECTED 8/26/20  Yes Alice Dumont MD   warfarin (COUMADIN) 5 mg tablet Daily as directed 9/8/20  Yes Adebayo Chan DO   gentamicin (GARAMYCIN) 0 1 % cream  10/9/20 9/29/21 Yes Historical Provider, MD   colchicine (COLCRYS) 0 6 mg tablet Take one pill daily as needed for Gout flare  Patient not taking: Reported on 6/18/2021 6/8/21 9/29/21  Adebayo Chan DO   Insulin Lispro Prot & Lispro (HumaLOG Mix 75/25 KwikPen) (75-25) 100 units/mL injection pen Inject under the skin   Patient not taking: Reported on 9/29/2021 9/29/21  Historical Provider, MD   polymyxin b-trimethoprim (POLYTRIM) ophthalmic solution  9/25/20 9/29/21  Historical Provider, MD   prednisoLONE acetate (PRED FORTE) 1 % ophthalmic suspension  9/25/20 9/29/21  Historical Provider, MD       No Known Allergies      Vitals:    09/29/21 1513   BP: 120/80   BP Location: Right arm   Patient Position: Sitting   Cuff Size: Large   Pulse: 71   Temp: 98 4 °F (36 9 °C)   TempSrc: Temporal   SpO2: 97%   Weight: 97 1 kg (214 lb)   Height: 5' (1 524 m)       Body mass index is 41 79 kg/m²  No change in weight in approximately five months with 22 pound weight gain in approximately one year  Physical Exam:    General Appearance:  Alert, cooperative, no distress, appears stated age and is morbidly obese     Head:  Normocephalic, without obvious abnormality, atraumatic   Eyes:  PERRL, conjunctiva/corneas clear, EOM's intact,   both eyes   Ears:  Normal TM's and external ear canals, both ears   Nose: Nares normal, septum midline, mucosa normal, no drainage or sinus tenderness   Throat: Lips, mucosa, and tongue normal; teeth and gums normal   Neck: Supple, symmetrical, trachea midline, no adenopathy, thyroid: not enlarged, symmetric, no tenderness/mass/nodules, no carotid bruit or JVD   Back:   Symmetric, no curvature, ROM normal, no CVA tenderness   Lungs:   Clear to auscultation bilaterally, respirations unlabored   Chest Wall:  No tenderness or deformity   Heart:  Regular rate and rhythm, S1, S2 normal, no murmur, rub or gallop   Abdomen:   Soft, non-tender, bowel sounds active all four quadrants,  no masses, no organomegaly  Marked obesity noted  Extremities: Extremities normal, atraumatic, no cyanosis or edema   Pulses: 2+ and symmetric   Skin: Skin showed normal color, texture, turgor and no rashes or lesions   Lymph nodes: Cervical, supraclavicular, and axillary nodes normal   Neurologic: Normal         Cardiographics    ECG 09/29/21:    No ECG done on 09/29/2021    IMAGING:    No Chest XR results available for this patient      Transthoracic echocardiogram 08/09/2021:    Estimated LVEF 45% with mild diffuse hypokinesis  Restrictive physiology indicative of of decreased LV diastolic compliance and/or increased left atrial pressure  Mild left atrial and JACE  Mild to moderate MAC with 1+ MR  Functionally bicuspid aortic valve with mild to moderate calcification and sclerosis  Tricuspid regurgitation with estimated PA systolic pressure at least 68 mmHg  1+ pulmonic regurgitation  Mildly dilated IVC      LAB REVIEW:      Lab Results   Component Value Date    SODIUM 142 05/21/2021    K 4 2 05/21/2021     05/21/2021    CO2 25 05/21/2021    ANIONGAP 6 12/11/2014    BUN 59 (H) 05/21/2021    CREATININE 5 68 (H) 05/21/2021    GLUCOSE 133 12/11/2014    GLUF 163 (H) 05/21/2021    CALCIUM 8 6 05/21/2021    CORRECTEDCA 9 4 05/21/2021    AST 13 05/21/2021    ALT 27 05/21/2021    ALKPHOS 88 05/21/2021    PROT 6 8 12/11/2014    BILITOT 1 08 (H) 12/11/2014    EGFR 7 05/21/2021     Lab Results   Component Value Date    CHOLESTEROL 134 05/21/2021    CHOLESTEROL 154 03/08/2021    CHOLESTEROL 154 01/28/2021     Lab Results   Component Value Date    HDL 51 05/21/2021    HDL 57 03/08/2021    HDL 58 01/28/2021       Lab Results   Component Value Date    LDLCALC 68 05/21/2021    LDLCALC 68 03/08/2021    LDLCALC 71 01/28/2021     No components found for: [facility]  Lab Results   Component Value Date    TRIG 73 05/21/2021    TRIG 146 03/08/2021    TRIG 124 01/28/2021     INR 08/09/2021:  1 14  A1c and estimated average glucose 05/21/2021:  7 3% and 163  TSH 05/21/2021:  8 429, decreased from 14 248 on 03/08/2021          Mike Garsia MD

## 2021-09-29 NOTE — PATIENT INSTRUCTIONS
1  Continue current medication  2  Continue with weight management program and follow up with Dr Adeline Allison of endocrinology  3  Cardiology follow-up approximately six months with EKG

## 2021-10-01 PROCEDURE — 3066F NEPHROPATHY DOC TX: CPT | Performed by: PHYSICIAN ASSISTANT

## 2021-10-05 ENCOUNTER — OFFICE VISIT (OUTPATIENT)
Dept: BARIATRICS | Facility: CLINIC | Age: 66
End: 2021-10-05

## 2021-10-05 VITALS — BODY MASS INDEX: 40.64 KG/M2 | WEIGHT: 207 LBS | HEIGHT: 60 IN

## 2021-10-05 DIAGNOSIS — R63.5 ABNORMAL WEIGHT GAIN: Primary | ICD-10-CM

## 2021-10-05 PROCEDURE — WMPRO12

## 2021-10-05 PROCEDURE — RECHECK

## 2021-10-12 ENCOUNTER — CLINICAL SUPPORT (OUTPATIENT)
Dept: BARIATRICS | Facility: CLINIC | Age: 66
End: 2021-10-12

## 2021-10-12 VITALS — WEIGHT: 211.4 LBS | BODY MASS INDEX: 41.5 KG/M2 | HEIGHT: 60 IN

## 2021-10-12 DIAGNOSIS — R63.5 ABNORMAL WEIGHT GAIN: Primary | ICD-10-CM

## 2021-10-12 PROCEDURE — RECHECK

## 2021-10-19 ENCOUNTER — CLINICAL SUPPORT (OUTPATIENT)
Dept: BARIATRICS | Facility: CLINIC | Age: 66
End: 2021-10-19

## 2021-10-19 ENCOUNTER — OFFICE VISIT (OUTPATIENT)
Dept: BARIATRICS | Facility: CLINIC | Age: 66
End: 2021-10-19

## 2021-10-19 VITALS — WEIGHT: 207 LBS | BODY MASS INDEX: 40.64 KG/M2 | HEIGHT: 60 IN

## 2021-10-19 DIAGNOSIS — R63.5 ABNORMAL WEIGHT GAIN: Primary | ICD-10-CM

## 2021-10-19 PROCEDURE — RECHECK

## 2021-10-21 DIAGNOSIS — N18.6 ESRD (END STAGE RENAL DISEASE) ON DIALYSIS (HCC): Primary | ICD-10-CM

## 2021-10-21 DIAGNOSIS — Z99.2 ESRD (END STAGE RENAL DISEASE) ON DIALYSIS (HCC): Primary | ICD-10-CM

## 2021-10-21 RX ORDER — GENTAMICIN SULFATE 1 MG/G
CREAM TOPICAL
Qty: 15 G | Refills: 11 | Status: SHIPPED | OUTPATIENT
Start: 2021-10-21

## 2021-10-22 ENCOUNTER — TELEPHONE (OUTPATIENT)
Dept: FAMILY MEDICINE CLINIC | Facility: CLINIC | Age: 66
End: 2021-10-22

## 2021-10-26 ENCOUNTER — APPOINTMENT (OUTPATIENT)
Dept: LAB | Facility: HOSPITAL | Age: 66
End: 2021-10-26
Payer: COMMERCIAL

## 2021-10-26 ENCOUNTER — CLINICAL SUPPORT (OUTPATIENT)
Dept: BARIATRICS | Facility: CLINIC | Age: 66
End: 2021-10-26

## 2021-10-26 ENCOUNTER — ANTICOAG VISIT (OUTPATIENT)
Dept: FAMILY MEDICINE CLINIC | Facility: CLINIC | Age: 66
End: 2021-10-26

## 2021-10-26 ENCOUNTER — TELEPHONE (OUTPATIENT)
Dept: FAMILY MEDICINE CLINIC | Facility: CLINIC | Age: 66
End: 2021-10-26

## 2021-10-26 VITALS — BODY MASS INDEX: 41.43 KG/M2 | HEIGHT: 60 IN | WEIGHT: 211 LBS

## 2021-10-26 DIAGNOSIS — R79.89 ELEVATED TSH: ICD-10-CM

## 2021-10-26 DIAGNOSIS — I26.99 PULMONARY EMBOLISM (HCC): ICD-10-CM

## 2021-10-26 DIAGNOSIS — R63.5 ABNORMAL WEIGHT GAIN: Primary | ICD-10-CM

## 2021-10-26 LAB
EST. AVERAGE GLUCOSE BLD GHB EST-MCNC: 186 MG/DL
HBA1C MFR BLD: 8.1 %
T4 FREE SERPL-MCNC: 1.1 NG/DL (ref 0.76–1.46)
TSH SERPL DL<=0.05 MIU/L-ACNC: 4.96 UIU/ML (ref 0.36–3.74)

## 2021-10-26 PROCEDURE — 3052F HG A1C>EQUAL 8.0%<EQUAL 9.0%: CPT | Performed by: INTERNAL MEDICINE

## 2021-10-26 PROCEDURE — 84439 ASSAY OF FREE THYROXINE: CPT

## 2021-10-26 PROCEDURE — RECHECK

## 2021-10-26 PROCEDURE — 84443 ASSAY THYROID STIM HORMONE: CPT

## 2021-10-26 PROCEDURE — 83036 HEMOGLOBIN GLYCOSYLATED A1C: CPT

## 2021-10-26 PROCEDURE — 3008F BODY MASS INDEX DOCD: CPT | Performed by: INTERNAL MEDICINE

## 2021-11-01 ENCOUNTER — TELEPHONE (OUTPATIENT)
Dept: ENDOCRINOLOGY | Facility: CLINIC | Age: 66
End: 2021-11-01

## 2021-11-02 ENCOUNTER — CLINICAL SUPPORT (OUTPATIENT)
Dept: BARIATRICS | Facility: CLINIC | Age: 66
End: 2021-11-02

## 2021-11-02 VITALS — BODY MASS INDEX: 41.95 KG/M2 | WEIGHT: 213.7 LBS | HEIGHT: 60 IN

## 2021-11-02 DIAGNOSIS — R63.5 ABNORMAL WEIGHT GAIN: Primary | ICD-10-CM

## 2021-11-02 PROCEDURE — RECHECK

## 2021-11-05 PROCEDURE — 3066F NEPHROPATHY DOC TX: CPT | Performed by: INTERNAL MEDICINE

## 2021-11-08 ENCOUNTER — OFFICE VISIT (OUTPATIENT)
Dept: ENDOCRINOLOGY | Facility: CLINIC | Age: 66
End: 2021-11-08
Payer: COMMERCIAL

## 2021-11-08 VITALS
DIASTOLIC BLOOD PRESSURE: 80 MMHG | BODY MASS INDEX: 41.82 KG/M2 | HEART RATE: 79 BPM | WEIGHT: 213 LBS | TEMPERATURE: 97 F | SYSTOLIC BLOOD PRESSURE: 122 MMHG | HEIGHT: 60 IN

## 2021-11-08 DIAGNOSIS — I42.0 DILATED CARDIOMYOPATHY (HCC): ICD-10-CM

## 2021-11-08 DIAGNOSIS — I10 ESSENTIAL HYPERTENSION: ICD-10-CM

## 2021-11-08 DIAGNOSIS — E11.65 TYPE 2 DIABETES MELLITUS WITH HYPERGLYCEMIA, WITH LONG-TERM CURRENT USE OF INSULIN (HCC): Primary | ICD-10-CM

## 2021-11-08 DIAGNOSIS — E03.9 ACQUIRED HYPOTHYROIDISM: ICD-10-CM

## 2021-11-08 DIAGNOSIS — N25.81 SECONDARY HYPERPARATHYROIDISM OF RENAL ORIGIN (HCC): ICD-10-CM

## 2021-11-08 DIAGNOSIS — N18.6 ESRD (END STAGE RENAL DISEASE) (HCC): ICD-10-CM

## 2021-11-08 DIAGNOSIS — Z79.4 TYPE 2 DIABETES MELLITUS WITH HYPERGLYCEMIA, WITH LONG-TERM CURRENT USE OF INSULIN (HCC): Primary | ICD-10-CM

## 2021-11-08 DIAGNOSIS — E78.2 MIXED HYPERLIPIDEMIA: ICD-10-CM

## 2021-11-08 PROCEDURE — 1036F TOBACCO NON-USER: CPT | Performed by: INTERNAL MEDICINE

## 2021-11-08 PROCEDURE — 99215 OFFICE O/P EST HI 40 MIN: CPT | Performed by: INTERNAL MEDICINE

## 2021-11-08 PROCEDURE — 95251 CONT GLUC MNTR ANALYSIS I&R: CPT | Performed by: INTERNAL MEDICINE

## 2021-11-08 PROCEDURE — 3079F DIAST BP 80-89 MM HG: CPT | Performed by: INTERNAL MEDICINE

## 2021-11-08 PROCEDURE — 3074F SYST BP LT 130 MM HG: CPT | Performed by: INTERNAL MEDICINE

## 2021-11-08 PROCEDURE — 3008F BODY MASS INDEX DOCD: CPT | Performed by: INTERNAL MEDICINE

## 2021-11-08 RX ORDER — INSULIN GLARGINE 100 [IU]/ML
30 INJECTION, SOLUTION SUBCUTANEOUS
Qty: 15 ML | Refills: 3 | Status: SHIPPED | OUTPATIENT
Start: 2021-11-08

## 2021-11-08 RX ORDER — INSULIN LISPRO 200 [IU]/ML
10 INJECTION, SOLUTION SUBCUTANEOUS
Qty: 9 ML | Refills: 3 | Status: SHIPPED | OUTPATIENT
Start: 2021-11-08

## 2021-11-09 ENCOUNTER — TELEPHONE (OUTPATIENT)
Dept: BARIATRICS | Facility: CLINIC | Age: 66
End: 2021-11-09

## 2021-12-03 PROCEDURE — 3066F NEPHROPATHY DOC TX: CPT | Performed by: FAMILY MEDICINE

## 2021-12-08 ENCOUNTER — OFFICE VISIT (OUTPATIENT)
Dept: FAMILY MEDICINE CLINIC | Facility: CLINIC | Age: 66
End: 2021-12-08
Payer: COMMERCIAL

## 2021-12-08 ENCOUNTER — APPOINTMENT (OUTPATIENT)
Dept: RADIOLOGY | Facility: CLINIC | Age: 66
End: 2021-12-08
Payer: COMMERCIAL

## 2021-12-08 VITALS
RESPIRATION RATE: 18 BRPM | WEIGHT: 210.4 LBS | HEIGHT: 60 IN | BODY MASS INDEX: 41.31 KG/M2 | TEMPERATURE: 99 F | HEART RATE: 64 BPM | DIASTOLIC BLOOD PRESSURE: 74 MMHG | SYSTOLIC BLOOD PRESSURE: 122 MMHG

## 2021-12-08 DIAGNOSIS — M25.552 LEFT HIP PAIN: ICD-10-CM

## 2021-12-08 DIAGNOSIS — E11.65 TYPE 2 DIABETES MELLITUS WITH HYPERGLYCEMIA, WITH LONG-TERM CURRENT USE OF INSULIN (HCC): ICD-10-CM

## 2021-12-08 DIAGNOSIS — Z00.00 ANNUAL PHYSICAL EXAM: Primary | ICD-10-CM

## 2021-12-08 DIAGNOSIS — Z79.4 TYPE 2 DIABETES MELLITUS WITH HYPERGLYCEMIA, WITH LONG-TERM CURRENT USE OF INSULIN (HCC): ICD-10-CM

## 2021-12-08 PROCEDURE — 3725F SCREEN DEPRESSION PERFORMED: CPT | Performed by: FAMILY MEDICINE

## 2021-12-08 PROCEDURE — 3078F DIAST BP <80 MM HG: CPT | Performed by: FAMILY MEDICINE

## 2021-12-08 PROCEDURE — 3008F BODY MASS INDEX DOCD: CPT | Performed by: FAMILY MEDICINE

## 2021-12-08 PROCEDURE — 1036F TOBACCO NON-USER: CPT | Performed by: FAMILY MEDICINE

## 2021-12-08 PROCEDURE — 3074F SYST BP LT 130 MM HG: CPT | Performed by: FAMILY MEDICINE

## 2021-12-08 PROCEDURE — 99396 PREV VISIT EST AGE 40-64: CPT | Performed by: FAMILY MEDICINE

## 2021-12-08 PROCEDURE — 73502 X-RAY EXAM HIP UNI 2-3 VIEWS: CPT

## 2021-12-14 ENCOUNTER — TELEPHONE (OUTPATIENT)
Dept: FAMILY MEDICINE CLINIC | Facility: CLINIC | Age: 66
End: 2021-12-14

## 2021-12-14 DIAGNOSIS — M25.552 LEFT HIP PAIN: Primary | ICD-10-CM

## 2022-01-01 DIAGNOSIS — I12.9 BENIGN HYPERTENSION WITH CKD (CHRONIC KIDNEY DISEASE) STAGE IV (HCC): ICD-10-CM

## 2022-01-01 DIAGNOSIS — N18.4 BENIGN HYPERTENSION WITH CKD (CHRONIC KIDNEY DISEASE) STAGE IV (HCC): ICD-10-CM

## 2022-01-02 PROCEDURE — 4010F ACE/ARB THERAPY RXD/TAKEN: CPT | Performed by: FAMILY MEDICINE

## 2022-01-02 RX ORDER — VALSARTAN 160 MG/1
TABLET ORAL
Qty: 90 TABLET | Refills: 3 | Status: SHIPPED | OUTPATIENT
Start: 2022-01-02

## 2022-01-04 ENCOUNTER — TELEPHONE (OUTPATIENT)
Dept: FAMILY MEDICINE CLINIC | Facility: CLINIC | Age: 67
End: 2022-01-04

## 2022-01-04 DIAGNOSIS — Z86.711 HISTORY OF PULMONARY EMBOLUS (PE): Primary | ICD-10-CM

## 2022-01-04 RX ORDER — WARFARIN SODIUM 2 MG/1
TABLET ORAL
Qty: 90 TABLET | Refills: 3 | Status: SHIPPED | OUTPATIENT
Start: 2022-01-04

## 2022-01-04 NOTE — TELEPHONE ENCOUNTER
Received a fax from [address] for a refill on Warfarin 2mg  Please send to [facility]    [employer] [place]ston, CHRISTINAN

## 2022-01-06 ENCOUNTER — TELEPHONE (OUTPATIENT)
Dept: FAMILY MEDICINE CLINIC | Facility: CLINIC | Age: 67
End: 2022-01-06

## 2022-01-06 DIAGNOSIS — B34.9 VIRAL INFECTION, UNSPECIFIED: Primary | ICD-10-CM

## 2022-01-11 ENCOUNTER — EVALUATION (OUTPATIENT)
Dept: PHYSICAL THERAPY | Facility: CLINIC | Age: 67
End: 2022-01-11
Payer: COMMERCIAL

## 2022-01-11 DIAGNOSIS — M25.552 LEFT HIP PAIN: Primary | ICD-10-CM

## 2022-01-11 PROCEDURE — 97110 THERAPEUTIC EXERCISES: CPT | Performed by: PHYSICAL THERAPIST

## 2022-01-11 PROCEDURE — 97161 PT EVAL LOW COMPLEX 20 MIN: CPT | Performed by: PHYSICAL THERAPIST

## 2022-01-11 NOTE — PROGRESS NOTES
PT Evaluation     Today's date: 2022  Patient name: Urban Eason  : 1955  MRN: [id number]  Referring provider: Don Awad DO  Dx:   Encounter Diagnosis     ICD-10-CM    1  Left hip pain  M25 552 Ambulatory referral to Physical Therapy                  Assessment  Assessment details: Urban Eason is a 61 y o  female who presents with pain, decreased strength, decreased ROM and ambulatory dysfunction  Due to these impairments, patient has difficulty performing ADL's, ambulation  Patient's clinical presentation is consistent with their referring diagnosis of Left hip pain  (primary encounter diagnosis)  Patient has been educated in home exercise program and plan of care  Patient would benefit from skilled physical therapy services to address their aforementioned functional limitations and progress towards prior level of function and independence with home exercise program      Impairments: abnormal gait, abnormal or restricted ROM, activity intolerance, impaired physical strength, lacks appropriate home exercise program, pain with function, weight-bearing intolerance and poor body mechanics  Understanding of Dx/Px/POC: good   Prognosis: fair    Goals  Short Term Goals to be accomplished in 3 weeks:  STG1: Pt will be I with HEP  STG2: Pt will demo 50% improvement in hip AROM  STG3: Pt will demo 1/2 MMT strength inc hip  STG4: Pt will demo nil gait deviations due to pain to improve ambulation in community     Long Term Goals to be accomplished in 6 weeks:   LTG1: Pt will achieve full hip AROM  LTG2: Pt will demo hip strength WNL as per PLOF  LTG3: Pt will return to household duties without pain         Plan  Plan details:  HEP development, stretching, strengthening, A/AA/PROM, joint mobilizations, posture education, STM/MI as needed to reduce muscle tension, muscle reeducation, PLOC discussed and agreed upon with patient        Patient would benefit from: PT eval and skilled physical therapy  Planned modality interventions: cryotherapy and thermotherapy: hydrocollator packs  Planned therapy interventions: manual therapy, neuromuscular re-education, self care, therapeutic activities, therapeutic exercise and home exercise program  Frequency: 2x week  Duration in weeks: 6  Treatment plan discussed with: patient        Subjective Evaluation    History of Present Illness  Mechanism of injury: Pt reports she had seen her PCP for annual and had been experiencing L hip pain approx 1 month ago  She also experiences R hip pain and R knee pain on occasion  She did experience a fall today leaving her home causing resultant flank and R knee and neck pain  She is very SOB throughout today's history which she attributes to "being out of shape " She has had imaging which has indicated arthritis  Her L hip pain is intermittent  When onset occurs she notes difficulty with xfers, bed mobility and using a SPC for mobility in her home  She was also having difficulty transferring into her bed requiring assistance from a family member  Her pain on L hip can occur on R as well  She reports her back hurts when her hip and knee falls  She works from home and sits at computer for her day  As it relates to fall today: She reports she fell forward, she feels unclear to mechanics of event  Her R knee took brunt of fall  Her family member was able to help her  She denies dizziness or LOC  She denies having bumped her head  Her last fall was estimated to be 5 years ago, she does feel occasionally she may lose her balance  She is having R UE pain which she feels was from falling into doorway  At time of eval: 7/10 neck pain  She feels this could be related to having slept "weird" yesterday  R knee pain 7/10     Quality of life: fair    Pain  Current pain ratin  At best pain ratin  At worst pain ratin          Objective     Lumbar Screen  Lumbar range of motion within normal limits with the following exceptions:Guarded t/o    Active Range of Motion     Additional Active Range of Motion Details  L hip AROM: 50% loss t/o guarded due to pain in supine, pain in L groin/anteriod hip joint  R hip AROM 15% loss t/o semi guarded    R knee AROM 25% loss guarded        Strength/Myotome Testing     Left Hip   Planes of Motion   Flexion: 3+  Extension: 3+  Abduction: 3+  Adduction: 3+    Right Hip   Planes of Motion   Flexion: 4-  Extension: 4-  Abduction: 4-  Adduction: 4-    Additional Strength Details  B knee strength 4-/5MMT    General Comments:      Hip Comments   Function:    Gait: slow adonis, major '  Xfers: Sit >sup major difficulty painful, indep  Sup> sit: Mod A             Precautions: Standard   BLOOD THINNERS (+)      Visits 1             1/11/22                                                   Neuro Re-Ed                                                                                                        Ther Ex             Glute sq 10x            Hip Add 10x            Hip Abd Blue Tband 10x            Standing hip Abd 10x ea                                                                Ther Activity                                       Gait Training                                       Modalities

## 2022-01-18 ENCOUNTER — APPOINTMENT (OUTPATIENT)
Dept: PHYSICAL THERAPY | Facility: CLINIC | Age: 67
End: 2022-01-18
Payer: COMMERCIAL

## 2022-01-18 ENCOUNTER — TELEPHONE (OUTPATIENT)
Dept: FAMILY MEDICINE CLINIC | Facility: CLINIC | Age: 67
End: 2022-01-18

## 2022-01-18 NOTE — TELEPHONE ENCOUNTER
Left message on machine requesting a call back  Pt was supposed to have her INR done 11/21   Matty Garcia LPN normal rate, regular rhythm, and no murmur.

## 2022-01-20 NOTE — TELEPHONE ENCOUNTER
Pt advised of overdue INR  She states she will try and get done by Friday, next week the latest  Keep task open to track    Darrius Rawls MA

## 2022-01-24 NOTE — TELEPHONE ENCOUNTER
Spoke to patient  She said she had a busy weekend  Says she will go for her INR this week     Fletcher Hogan MA

## 2022-01-25 ENCOUNTER — OFFICE VISIT (OUTPATIENT)
Dept: PHYSICAL THERAPY | Facility: CLINIC | Age: 67
End: 2022-01-25
Payer: COMMERCIAL

## 2022-01-25 DIAGNOSIS — M25.552 LEFT HIP PAIN: Primary | ICD-10-CM

## 2022-01-25 PROCEDURE — 97110 THERAPEUTIC EXERCISES: CPT | Performed by: PHYSICAL THERAPIST

## 2022-01-25 NOTE — PROGRESS NOTES
Daily Note     Today's date: 2022  Patient name: Lindsey Arroyo  : 1955  MRN: [id number]  Referring provider: Cassidy Cantor DO  Dx:   Encounter Diagnosis     ICD-10-CM    1  Left hip pain  M25 552                   Subjective: Pt reports she continues ot have hip pain however "it's not bad " She reports her walking is slow but limited by her breathing  She reports having only done Hep a few times  She reports today she has B hip pain today and her L foot is numb  She feels this may be related to diabetes, but historically foot numbness alternates  Objective: See treatment diary below  Mod SOB throughout today's session and following walk from parking lot to gym  Added bridging and supine march to HEP  Assessment: Tolerated treatment well  Patient demo dec activity tolerance however able to add therex  Ongoing gait dev, SOB greater limiting factor than discomfort  Plan: Continue per plan of care  Precautions: Standard   BLOOD THINNERS (+)      Visits 1 2            22                                                  Neuro Re-Ed                                       Ther Ex             Glute sq 10x 10x           Hip Add 10x 10x           Hip Abd Blue Tband 10x 10x2           Standing hip Abd 10x ea supine 10x 2           Heel slide  10x2           Bridging  5x2           Supine march  10x2                        Ther Activity                                       Gait Training                                       Modalities

## 2022-01-29 DIAGNOSIS — IMO0002 UNCONTROLLED TYPE 2 DIABETES MELLITUS WITH STAGE 3 CHRONIC KIDNEY DISEASE, WITH LONG-TERM CURRENT USE OF INSULIN: ICD-10-CM

## 2022-01-29 RX ORDER — BLOOD SUGAR DIAGNOSTIC
STRIP MISCELLANEOUS
Qty: 300 STRIP | Refills: 3 | Status: SHIPPED | OUTPATIENT
Start: 2022-01-29

## 2022-02-01 ENCOUNTER — OFFICE VISIT (OUTPATIENT)
Dept: NEPHROLOGY | Facility: CLINIC | Age: 67
End: 2022-02-01
Payer: COMMERCIAL

## 2022-02-01 VITALS
WEIGHT: 203 LBS | HEIGHT: 60 IN | BODY MASS INDEX: 39.85 KG/M2 | SYSTOLIC BLOOD PRESSURE: 122 MMHG | DIASTOLIC BLOOD PRESSURE: 70 MMHG

## 2022-02-01 DIAGNOSIS — Z01.818 PRE-TRANSPLANT EVALUATION FOR ESRD (END STAGE RENAL DISEASE): Primary | ICD-10-CM

## 2022-02-01 PROCEDURE — 3066F NEPHROPATHY DOC TX: CPT | Performed by: INTERNAL MEDICINE

## 2022-02-01 PROCEDURE — 3008F BODY MASS INDEX DOCD: CPT | Performed by: INTERNAL MEDICINE

## 2022-02-01 PROCEDURE — 3074F SYST BP LT 130 MM HG: CPT | Performed by: INTERNAL MEDICINE

## 2022-02-01 PROCEDURE — 3078F DIAST BP <80 MM HG: CPT | Performed by: INTERNAL MEDICINE

## 2022-02-01 PROCEDURE — 1036F TOBACCO NON-USER: CPT | Performed by: INTERNAL MEDICINE

## 2022-02-01 PROCEDURE — 99215 OFFICE O/P EST HI 40 MIN: CPT | Performed by: INTERNAL MEDICINE

## 2022-02-01 NOTE — PROGRESS NOTES
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, DM (diagnosed 20 years ago), no retinopathy, rare neuropathy, hypertension (20 years ago diagnosed), HPL, hypothyroid, CHANDRA, pulmonary embolism July 2018, 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[employer], non smoker (smoked rarely in 25s), rare ETOH, no drugs, seen in the Nephrology Clinic for pre-transplant kidney evaluation      In July of 2018, patient was admitted for possible pulmonary embolism after recent airplane travel  Kenan Treviño was started on anticoagulation      CT of the chest in November 2018-unchanged enlargement of the main pulmonary artery suggesting pulmonary hypertension   Persistent mosaic attenuation in the lung indicative of chronic small vessel or small airway disease       Echocardiogram in July of 2018-EF 55-60%   Trace tricuspid regurgitation, moderate stenosis of the mitral valve     ECHO 5/2019 - EF 55%; PA pressures > 90     7/2019 - colonoscopy - multiple polyps removed     ECHO 7/2020 - with EF 35% and mitral valve mild to mod annular calcification and mild to mod regurg and moderate tricuspid regurg      Nuclear stress test 7/2020 - abnormal study  EF 36%  No ischemia noted       Admitted 7/2020 - presented with SOB  new onset a flutter  New lowering of EF noted on ECHO  Started on dialysis with PD       10/2020 - saw Cardiology for f/u  Improved dyspnea       12/2020 - ECHO completed - with improvement of EF to 45%     1/2021 - dense renal vascular calcifications       April 2021-patient completed CT scan with small ground-glass attenuation and new right lower lobe nodule infectious versus inflammatory and also existing calcified granuloma of the lung   -also was noted to have pelvic ultrasound thickening of the cervix at 6 mm and endometrial thickening a 7 mm    -abdominal ultrasound was noted to have mild large amount of the liver    June 2021-patient saw gynecology was recommended to have endometrial biopsy  August 2021-echocardiogram unchanged EF 45%    September 2021-patient saw cardiologist had improved dyspnea with activity and resolved dyspnea at rest with suppression of paroxysmal a flutter       Plan     - Patient's evaluation was closed at the prior evaluation prior in June 2021  Patient is being re-evaluated today as BMI is improving as pt was advised to return once weight is improving to be reconsidered for reopening evaluation  -BMI -39, improving from 41 prior year  Overall not sig improved but is improving    -positive IgM borderline level, negative IgG --> will need repeat testing  -low protein C and low protein S - patient is on Coumadin  -patient had decreased EF which had improved on echocardiogram in 2020 and was felt to be due to tachycardia mediated  Also grade 2 diastolic dysfunction and chronic right heart dilation and significant chronic pulmonary hypertension  Repeat ECHO in 2021 with EF 45%  -severe sleep apnea with CHANDRA-does not tolerate CPAP  - pt saw Bariatric program sept 9/2022 and has lost weight by eating less  Has not had time to go back to see their program as has lost mother in December but has not had a chance to go back  - Pt completed endometrial biopsy  - pt is not on any oxygen currently  - pt is currently grieving loss of mother  - I have reached out to the patient's Pulmonary team regarding abn pulm findings on CT scan prior and ECHO findings  It was a pleasure evaluating your patient in the office today  Thank you for allowing our team to participate in the care [address] [place] [address]   Please do not hesitate to contact our team if further issues/questions shall arise in the interim      No problem-specific Assessment & Plan notes found for this encounter  HPI:    Patient recently lost mother and therefore has been grieving  Otherwise rid denies recent fevers, chills, nausea, vomiting  Currently not utilizing oxygen  Not tolerating CPAP  PATIENT INSTRUCTIONS:    Patient Instructions   1) please call in 4 weeks if you do not hear from Qi Jimenez with our committee meeting decision        OBJECTIVE:  Current Weight: Weight - Scale: 92 1 kg (203 lb)  Vitals:    02/01/22 1109   Weight: 92 1 kg (203 lb)   Height: 5' (1 524 m)    Body mass index is 39 65 kg/m²  REVIEW OF SYSTEMS:    Review of Systems   Constitutional: Positive for fatigue  HENT: Negative  Eyes: Negative  Respiratory: Negative  Negative for shortness of breath  Cardiovascular: Negative  Negative for leg swelling  Gastrointestinal: Negative  Endocrine: Negative  Genitourinary: Negative  Negative for difficulty urinating  Musculoskeletal: Negative  Skin: Negative  Allergic/Immunologic: Negative  Neurological: Negative  Hematological: Negative  Psychiatric/Behavioral: Negative  All other systems reviewed and are negative  PHYSICAL EXAM:      Physical Exam  Vitals and nursing note reviewed  Constitutional:       General: She is not in acute distress  Appearance: She is well-developed  She is not diaphoretic  HENT:      Head: Normocephalic and atraumatic  Eyes:      General: No scleral icterus  Right eye: No discharge  Left eye: No discharge  Conjunctiva/sclera: Conjunctivae normal    Neck:      Vascular: No JVD  Cardiovascular:      Rate and Rhythm: Normal rate and regular rhythm  Heart sounds: No murmur heard  No friction rub  No gallop  Pulmonary:      Effort: Pulmonary effort is normal  No respiratory distress  Breath sounds: Normal breath sounds  No wheezing or rales     Abdominal: General: Bowel sounds are normal  There is no distension  Palpations: Abdomen is soft  Tenderness: There is no abdominal tenderness  There is no rebound  Musculoskeletal:         General: No tenderness or deformity  Normal range of motion  Cervical back: Normal range of motion and neck supple  Skin:     General: Skin is warm and dry  Coloration: Skin is not pale  Findings: No erythema or rash  Neurological:      Mental Status: She is alert and oriented to person, place, and time  Coordination: Coordination normal    Psychiatric:         Behavior: Behavior normal          Thought Content:  Thought content normal          Judgment: Judgment normal          Medications:    Current Outpatient Medications:     atorvastatin (LIPITOR) 20 mg tablet, Take 1 tablet (20 mg total) by mouth daily, Disp: 90 tablet, Rfl: 1    B Complex-C (B-COMPLEX WITH VITAMIN C) tablet, Take 1 tablet by mouth daily, Disp: , Rfl:     Blood Glucose Monitoring Suppl (ONE TOUCH ULTRA 2) w/Device KIT, Test glucose 3 times daily, Disp: 1 each, Rfl: 0    calcium acetate (PHOSLO) capsule, Take 667 mg by mouth daily , Disp: , Rfl:     cholecalciferol (VITAMIN D3) 1,000 units tablet, Take 2 tablets (2,000 Units total) by mouth daily, Disp: 100 tablet, Rfl: 5    cinacalcet (SENSIPAR) 30 mg tablet, Take 1 tablet (30 mg total) by mouth 3 (three) times a week, Disp: 36 tablet, Rfl: 3    Continuous Blood Gluc  (FreeStyle Conner 14 Day Badger) GAGANDEEP, Use to test blood sugar daily, Disp: 1 Device, Rfl: 0    Continuous Blood Gluc Sensor (FreeStyle Conner 2 Sensor) MISC, , Disp: , Rfl:     docusate sodium (COLACE) 100 mg capsule, Take 1 capsule (100 mg total) by mouth 2 (two) times a day, Disp: 10 capsule, Rfl: 0    gentamicin (GARAMYCIN) 0 1 % cream, APPLY TO EXOSITE DAILY AND AS NEEDED, Disp: 15 g, Rfl: 11    insulin glargine (Lantus SoloStar) 100 units/mL injection pen, Inject 30 Units under the skin daily at bedtime, Disp: 15 mL, Rfl: 3    Insulin Pen Needle 31G X 5 MM MISC, by Does not apply route daily, Disp: 100 each, Rfl: 1    insuln lispro (HumaLOG KwikPen) 200 units/mL CONCENTRATED U-200 injection pen, Inject 10 Units under the skin 3 (three) times a day with meals, Disp: 9 mL, Rfl: 3    Lancets (onetouch ultrasoft) lancets, Test glucose 3 times a day; Code: E11 8, Disp: 300 each, Rfl: 1    levothyroxine 100 mcg tablet, Take 1 tablet (100 mcg total) by mouth daily, Disp: 90 tablet, Rfl: 3    metolazone (ZAROXOLYN) 5 mg tablet, Take 5 mg by mouth daily , Disp: , Rfl:     metoprolol tartrate (LOPRESSOR) 25 mg tablet, Take 25 mg by mouth every 12 (twelve) hours, Disp: , Rfl:     MULTIPLE VITAMIN PO, Take 1 tablet by mouth daily, Disp: , Rfl:     OneTouch Ultra test strip, TEST GLUCOSE THREE TIMES A DAY, Disp: 300 strip, Rfl: 3    sitaGLIPtin (JANUVIA) 25 mg tablet, Take one tablet (25mg) by mouth daily, Disp: 90 tablet, Rfl: 3    torsemide (DEMADEX) 100 mg tablet, Take 1 tablet (100 mg total) by mouth daily, Disp: 90 tablet, Rfl: 1    valsartan (DIOVAN) 160 mg tablet, TAKE 1 TABLET DAILY, Disp: 90 tablet, Rfl: 3    warfarin (Coumadin) 2 mg tablet, Daily as directed, Disp: 90 tablet, Rfl: 3    warfarin (COUMADIN) 5 mg tablet, Daily as directed, Disp: 90 tablet, Rfl: 1    Laboratory Results:        Invalid input(s): ALBUMIN    Results for orders placed or performed in visit on 10/26/21   T4, free Lab Collect   Result Value Ref Range    Free T4 1 10 0 76 - 1 46 ng/dL   TSH, 3rd generation Lab Collect   Result Value Ref Range    TSH 3RD GENERATON 4 963 (H) 0 358 - 3 740 uIU/mL   Hemoglobin A1C   Result Value Ref Range    Hemoglobin A1C 8 1 (H) Normal 3 8-5 6%; PreDiabetic 5 7-6 4%;  Diabetic >=6 5%; Glycemic control for adults with diabetes <7 0% %     mg/dl

## 2022-02-01 NOTE — PATIENT INSTRUCTIONS
1) please call in 4 weeks if you do not hear from Robel Maldonado with our committee meeting decision

## 2022-02-01 NOTE — LETTER
February 1, 2022     Tyler Bergman DO  [facility] [place] [address]    Patient: Cheyenne Dunn   YOB: 1955   Date of Visit: 2/1/2022       Dear Dr Antonietta Avitia: Thank you for referring Cheyenne Dunn to me for evaluation  Below are my notes for this consultation  If you have questions, please do not hesitate to call me  I look forward to following your patient along with you  Sincerely,        Bear Love MD        CC: MD Bear Owens MD  2/1/2022 11:51 AM  Sign when Signing Visit  NEPHROLOGY OFFICE VISIT   Cheyenne Dunn 61 y o  female MRN: [id number]  2/1/2022    Reason for Visit: pre renal transplant f/u    ASSESSMENT and PLAN:    I had the pleasure of seeing Ms Ayden Romero today in the renal clinic for the continued management of pre renal transplant f/u  Les Lopezy a 27 X  o  female  ESRD now on dialysis with PD since jul 2020 at [place] with Dr Chanel Waters, 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 diabetes, DM (diagnosed 20 years ago), no retinopathy, rare neuropathy, hypertension (20 years ago diagnosed), HPL, hypothyroid, CHANDRA, pulmonary embolism July 2018, 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[employer], non smoker (smoked rarely in 25s), rare ETOH, no drugs, seen in the Nephrology Clinic for pre-transplant kidney evaluation      In July of 2018, patient was admitted for possible pulmonary embolism after recent airplane travel  Henry Mejia was started on anticoagulation      CT of the chest in November 2018-unchanged enlargement of the main pulmonary artery suggesting pulmonary hypertension   Persistent mosaic attenuation in the lung indicative of chronic small vessel or small airway disease       Echocardiogram in July of 2018-EF 55-60%    Trace tricuspid regurgitation, moderate stenosis of the mitral valve     ECHO 5/2019 - EF 55%; PA pressures > 90     7/2019 - colonoscopy - multiple polyps removed     ECHO 7/2020 - with EF 35% and mitral valve mild to mod annular calcification and mild to mod regurg and moderate tricuspid regurg      Nuclear stress test 7/2020 - abnormal study  EF 36%  No ischemia noted       Admitted 7/2020 - presented with SOB  new onset a flutter  New lowering of EF noted on ECHO  Started on dialysis with PD       10/2020 - saw Cardiology for f/u  Improved dyspnea       12/2020 - ECHO completed - with improvement of EF to 45%     1/2021 - dense renal vascular calcifications  April 2021-patient completed CT scan with small ground-glass attenuation and new right lower lobe nodule infectious versus inflammatory and also existing calcified granuloma of the lung   -also was noted to have pelvic ultrasound thickening of the cervix at 6 mm and endometrial thickening a 7 mm    -abdominal ultrasound was noted to have mild large amount of the liver    June 2021-patient saw gynecology was recommended to have endometrial biopsy  August 2021-echocardiogram unchanged EF 45%    September 2021-patient saw cardiologist had improved dyspnea with activity and resolved dyspnea at rest with suppression of paroxysmal a flutter       Plan     - Patient's evaluation was closed at the prior evaluation prior in June 2021  Patient is being re-evaluated today as BMI is improving as pt was advised to return once weight is improving to be reconsidered for reopening evaluation  -BMI -39, improving from 41 prior year  Overall not sig improved but is improving    -positive IgM borderline level, negative IgG --> will need repeat testing  -low protein C and low protein S - patient is on Coumadin  -patient had decreased EF which had improved on echocardiogram in 2020 and was felt to be due to tachycardia mediated  Also grade 2 diastolic dysfunction and chronic right heart dilation and significant chronic pulmonary hypertension  Repeat ECHO in 2021 with EF 45%     -severe sleep apnea with CHANDRA-does not tolerate CPAP  - pt saw Bariatric program sept 9/2022 and has lost weight by eating less  Has not had time to go back to see their program as has lost mother in December but has not had a chance to go back  - Pt completed endometrial biopsy  - pt is not on any oxygen currently  - pt is currently grieving loss of mother  - I have reached out to the patient's Pulmonary team regarding abn pulm findings on CT scan prior and ECHO findings  It was a pleasure evaluating your patient in the office today  Thank you for allowing our team to participate in the care [address] [place] [address]  Please do not hesitate to contact our team if further issues/questions shall arise in the interim      No problem-specific Assessment & Plan notes found for this encounter  HPI:    Patient recently lost mother and therefore has been grieving  Otherwise rid denies recent fevers, chills, nausea, vomiting  Currently not utilizing oxygen  Not tolerating CPAP  PATIENT INSTRUCTIONS:    Patient Instructions   1) please call in 4 weeks if you do not hear from Walker Epstein with our committee meeting decision        OBJECTIVE:  Current Weight: Weight - Scale: 92 1 kg (203 lb)  Vitals:    02/01/22 1109   Weight: 92 1 kg (203 lb)   Height: 5' (1 524 m)    Body mass index is 39 65 kg/m²  REVIEW OF SYSTEMS:    Review of Systems   Constitutional: Positive for fatigue  HENT: Negative  Eyes: Negative  Respiratory: Negative  Negative for shortness of breath  Cardiovascular: Negative  Negative for leg swelling  Gastrointestinal: Negative  Endocrine: Negative  Genitourinary: Negative  Negative for difficulty urinating  Musculoskeletal: Negative  Skin: Negative  Allergic/Immunologic: Negative  Neurological: Negative  Hematological: Negative  Psychiatric/Behavioral: Negative  All other systems reviewed and are negative  PHYSICAL EXAM:      Physical Exam  Vitals and nursing note reviewed  Constitutional:       General: She is not in acute distress       Appearance: She is well-developed  She is not diaphoretic  HENT:      Head: Normocephalic and atraumatic  Eyes:      General: No scleral icterus  Right eye: No discharge  Left eye: No discharge  Conjunctiva/sclera: Conjunctivae normal    Neck:      Vascular: No JVD  Cardiovascular:      Rate and Rhythm: Normal rate and regular rhythm  Heart sounds: No murmur heard  No friction rub  No gallop  Pulmonary:      Effort: Pulmonary effort is normal  No respiratory distress  Breath sounds: Normal breath sounds  No wheezing or rales  Abdominal:      General: Bowel sounds are normal  There is no distension  Palpations: Abdomen is soft  Tenderness: There is no abdominal tenderness  There is no rebound  Musculoskeletal:         General: No tenderness or deformity  Normal range of motion  Cervical back: Normal range of motion and neck supple  Skin:     General: Skin is warm and dry  Coloration: Skin is not pale  Findings: No erythema or rash  Neurological:      Mental Status: She is alert and oriented to person, place, and time  Coordination: Coordination normal    Psychiatric:         Behavior: Behavior normal          Thought Content:  Thought content normal          Judgment: Judgment normal          Medications:    Current Outpatient Medications:     atorvastatin (LIPITOR) 20 mg tablet, Take 1 tablet (20 mg total) by mouth daily, Disp: 90 tablet, Rfl: 1    B Complex-C (B-COMPLEX WITH VITAMIN C) tablet, Take 1 tablet by mouth daily, Disp: , Rfl:     Blood Glucose Monitoring Suppl (ONE TOUCH ULTRA 2) w/Device KIT, Test glucose 3 times daily, Disp: 1 each, Rfl: 0    calcium acetate (PHOSLO) capsule, Take 667 mg by mouth daily , Disp: , Rfl:     cholecalciferol (VITAMIN D3) 1,000 units tablet, Take 2 tablets (2,000 Units total) by mouth daily, Disp: 100 tablet, Rfl: 5    cinacalcet (SENSIPAR) 30 mg tablet, Take 1 tablet (30 mg total) by mouth 3 (three) times a week, Disp: 36 tablet, Rfl: 3    Continuous Blood Gluc  (FreeStyle Conner 14 Day Magee) GAGANDEEP, Use to test blood sugar daily, Disp: 1 Device, Rfl: 0    Continuous Blood Gluc Sensor (FreeStyle Conner 2 Sensor) MISC, , Disp: , Rfl:     docusate sodium (COLACE) 100 mg capsule, Take 1 capsule (100 mg total) by mouth 2 (two) times a day, Disp: 10 capsule, Rfl: 0    gentamicin (GARAMYCIN) 0 1 % cream, APPLY TO EXOSITE DAILY AND AS NEEDED, Disp: 15 g, Rfl: 11    insulin glargine (Lantus SoloStar) 100 units/mL injection pen, Inject 30 Units under the skin daily at bedtime, Disp: 15 mL, Rfl: 3    Insulin Pen Needle 31G X 5 MM MISC, by Does not apply route daily, Disp: 100 each, Rfl: 1    insuln lispro (HumaLOG KwikPen) 200 units/mL CONCENTRATED U-200 injection pen, Inject 10 Units under the skin 3 (three) times a day with meals, Disp: 9 mL, Rfl: 3    Lancets (onetouch ultrasoft) lancets, Test glucose 3 times a day; Code: E11 8, Disp: 300 each, Rfl: 1    levothyroxine 100 mcg tablet, Take 1 tablet (100 mcg total) by mouth daily, Disp: 90 tablet, Rfl: 3    metolazone (ZAROXOLYN) 5 mg tablet, Take 5 mg by mouth daily , Disp: , Rfl:     metoprolol tartrate (LOPRESSOR) 25 mg tablet, Take 25 mg by mouth every 12 (twelve) hours, Disp: , Rfl:     MULTIPLE VITAMIN PO, Take 1 tablet by mouth daily, Disp: , Rfl:     OneTouch Ultra test strip, TEST GLUCOSE THREE TIMES A DAY, Disp: 300 strip, Rfl: 3    sitaGLIPtin (JANUVIA) 25 mg tablet, Take one tablet (25mg) by mouth daily, Disp: 90 tablet, Rfl: 3    torsemide (DEMADEX) 100 mg tablet, Take 1 tablet (100 mg total) by mouth daily, Disp: 90 tablet, Rfl: 1    valsartan (DIOVAN) 160 mg tablet, TAKE 1 TABLET DAILY, Disp: 90 tablet, Rfl: 3    warfarin (Coumadin) 2 mg tablet, Daily as directed, Disp: 90 tablet, Rfl: 3    warfarin (COUMADIN) 5 mg tablet, Daily as directed, Disp: 90 tablet, Rfl: 1    Laboratory Results:        Invalid input(s): ALBUMIN    Results for orders placed or performed in visit on 10/26/21   T4, free Lab Collect   Result Value Ref Range    Free T4 1 10 0 76 - 1 46 ng/dL   TSH, 3rd generation Lab Collect   Result Value Ref Range    TSH 3RD GENERATON 4 963 (H) 0 358 - 3 740 uIU/mL   Hemoglobin A1C   Result Value Ref Range    Hemoglobin A1C 8 1 (H) Normal 3 8-5 6%; PreDiabetic 5 7-6 4%;  Diabetic >=6 5%; Glycemic control for adults with diabetes <7 0% %     mg/dl

## 2022-02-03 ENCOUNTER — OFFICE VISIT (OUTPATIENT)
Dept: PHYSICAL THERAPY | Facility: CLINIC | Age: 67
End: 2022-02-03
Payer: COMMERCIAL

## 2022-02-03 DIAGNOSIS — M25.552 LEFT HIP PAIN: Primary | ICD-10-CM

## 2022-02-03 PROCEDURE — 97110 THERAPEUTIC EXERCISES: CPT | Performed by: PHYSICAL THERAPIST

## 2022-02-03 PROCEDURE — 97112 NEUROMUSCULAR REEDUCATION: CPT | Performed by: PHYSICAL THERAPIST

## 2022-02-03 NOTE — PROGRESS NOTES
Daily Note     Today's date: 2/3/2022  Patient name: Aileen Caro  : 1955  MRN: [id number]  Referring provider: Jo Cueto DO  Dx:   Encounter Diagnosis     ICD-10-CM    1  Left hip pain  M25 552                   Subjective: Pt acknowledges challenge with today's session, some pain with bridging today  She also reports no hip pain today "I haven't had an episode in a while "       Objective: See treatment diary below  Therex progression today for hip strengthening and HEP progression, added STS  Assessment: Tolerated treatment well  Patient limited due to activity tolerance dec, SOB with therex  Pt remains well motivated  Plan: Continue per plan of care  Precautions: Standard   BLOOD THINNERS (+)      Visits 1 2 3           1/11/22 1/25/22 2/3/22                                                 Neuro Re-Ed                                       Ther Ex             Glute sq 10x 10x 10x          Hip Add 10x 10x 3x10          Hip Abd Blue Tband 10x 10x2 3x10          Standing hip Abd 10x ea supine 10x 2 Standing 2x10          Heel slide  10x2           Bridging  5x2 attempted          Supine march  10x2 3x10          Standing hip ext   2x10          SLR   3x5 ea          STS   5x                                                                                        Modalities

## 2022-02-10 ENCOUNTER — OFFICE VISIT (OUTPATIENT)
Dept: PHYSICAL THERAPY | Facility: CLINIC | Age: 67
End: 2022-02-10
Payer: COMMERCIAL

## 2022-02-10 DIAGNOSIS — M25.552 LEFT HIP PAIN: Primary | ICD-10-CM

## 2022-02-10 PROCEDURE — 97110 THERAPEUTIC EXERCISES: CPT

## 2022-02-10 PROCEDURE — 97112 NEUROMUSCULAR REEDUCATION: CPT

## 2022-02-10 NOTE — PROGRESS NOTES
Daily Note     Today's date: 2/10/2022  Patient name: Urban Eason  : 1955  MRN: [id number]  Referring provider: Don Awad DO  Dx:   Encounter Diagnosis     ICD-10-CM    1  Left hip pain  M25 552                   Subjective: Patient reports she has some soreness pre treatment session  Objective: See treatment diary below      Assessment: Tolerated treatment well  Patient demonstrated fatigue and SOB throughout session requiring frequent rest breaks  Able to complete 3 minutes on nutep at level 1 in order to warmup  Patient demonstrated some difficulty with side stepping requiring verbal cues to prevent compensation from glute max  Patient exhibited good technique with therapeutic exercises and would benefit from continued PT to improve hip strength and ROM and maximize functional mobility  Plan: Continue per plan of care  Precautions: Standard   BLOOD THINNERS (+)      Visits 1 2 3 4          1/11/22 1/25/22 2/3/22 2/10/22                                                Neuro Re-Ed                                       Ther Ex             Glute sq 10x 10x 10x          Hip Add 10x 10x 3x10 30x         Hip Abd Blue Tband 10x 10x2 3x10 Supine clam blue         Standing hip Abd 10x ea supine 10x 2 Standing 2x10 standing 2x10 ea         Heel slide  10x2           Bridging  5x2 attempted 10x         Supine march  10x2 3x10 3x10         Standing hip ext   2x10 2x10 ea         SLR   3x5 ea 3x5 ea         STS   5x 2x5         Nustep warmup    3' L1         Side stepping    3 x 20 feet                                                             Modalities

## 2022-02-11 DIAGNOSIS — D50.0 IRON DEFICIENCY ANEMIA DUE TO CHRONIC BLOOD LOSS: Primary | ICD-10-CM

## 2022-02-11 RX ORDER — SODIUM CHLORIDE 9 MG/ML
20 INJECTION, SOLUTION INTRAVENOUS ONCE
Status: CANCELLED | OUTPATIENT
Start: 2022-03-01

## 2022-02-17 ENCOUNTER — TELEPHONE (OUTPATIENT)
Dept: FAMILY MEDICINE CLINIC | Facility: CLINIC | Age: 67
End: 2022-02-17

## 2022-02-17 ENCOUNTER — OFFICE VISIT (OUTPATIENT)
Dept: PHYSICAL THERAPY | Facility: CLINIC | Age: 67
End: 2022-02-17
Payer: COMMERCIAL

## 2022-02-17 ENCOUNTER — APPOINTMENT (OUTPATIENT)
Dept: LAB | Facility: HOSPITAL | Age: 67
End: 2022-02-17
Payer: COMMERCIAL

## 2022-02-17 ENCOUNTER — ANTICOAG VISIT (OUTPATIENT)
Dept: FAMILY MEDICINE CLINIC | Facility: CLINIC | Age: 67
End: 2022-02-17

## 2022-02-17 DIAGNOSIS — M25.552 LEFT HIP PAIN: Primary | ICD-10-CM

## 2022-02-17 PROCEDURE — 97112 NEUROMUSCULAR REEDUCATION: CPT | Performed by: PHYSICAL THERAPIST

## 2022-02-17 PROCEDURE — 97110 THERAPEUTIC EXERCISES: CPT | Performed by: PHYSICAL THERAPIST

## 2022-02-17 NOTE — PROGRESS NOTES
Daily Note     Today's date: 2022  Patient name: Andrew Alvarez  : 1955  MRN: [id number]  Referring provider: Rodney Castellanos DO  Dx:   Encounter Diagnosis     ICD-10-CM    1  Left hip pain  M25 552                   Subjective: Patient reports she has some soreness today      Objective: See treatment diary below      Assessment: Tolerated treatment well  She completed additional exercise and more reps this session with good tolerance  Plan: Continue per plan of care  Precautions: Standard   BLOOD THINNERS (+)      Visits 1 2 3 4 5         1/11/22 1/25/22 2/3/22 2/10/22 2/17                                               Neuro Re-Ed                                       Ther Ex             Glute sq 10x 10x 10x  20  2"        Hip Add 10x 10x 3x10 30x 20  2"        Hip Abd Blue Tband 10x 10x2 3x10 Supine clam blue 20 red loop        Standing hip Abd 10x ea supine 10x 2 Standing 2x10 standing 2x10 ea Stand  2 x 10 ea        Heel slide  10x2           Bridging  5x2 attempted 10x 2 x 5        Supine march  10x2 3x10 3x10 20        Standing hip ext   2x10 2x10 ea 2 x 10 ea        SLR   3x5 ea 3x5 ea 3 x 5        STS   5x 2x5         Nustep warmup    3' L1 4' L1        Side stepping    3 x 20 feet 5 x 6 ft        LAQ     2 x 10                                               Modalities

## 2022-02-17 NOTE — TELEPHONE ENCOUNTER
----- Message from Corrie Cantor DO sent at 2/17/2022 11:53 AM EST -----  Increase to 6 mg daily  recheck in 1 week  Corrie Cantor DO

## 2022-02-17 NOTE — TELEPHONE ENCOUNTER
Spoke to patient  She reports taking 6mg here and there  She will begin taking 6mg DAILY and recheck in one week  Calendar updated     Lee Conway MA

## 2022-02-24 ENCOUNTER — OFFICE VISIT (OUTPATIENT)
Dept: PHYSICAL THERAPY | Facility: CLINIC | Age: 67
End: 2022-02-24
Payer: COMMERCIAL

## 2022-02-24 DIAGNOSIS — M25.552 LEFT HIP PAIN: Primary | ICD-10-CM

## 2022-02-24 PROCEDURE — 97112 NEUROMUSCULAR REEDUCATION: CPT

## 2022-02-24 PROCEDURE — 97110 THERAPEUTIC EXERCISES: CPT

## 2022-02-24 NOTE — PROGRESS NOTES
Daily Note     Today's date: 2022  Patient name: Dayne Spencer  : 1955  MRN: [id number]  Referring provider: Yoselyn Regan DO  Dx:   Encounter Diagnosis     ICD-10-CM    1  Left hip pain  M25 552        Start Time: 1330  Stop Time: 1415  Total time in clinic (min): 45 minutes    Subjective: Patient denies pain in her L hip today  She had back and L hip pain yesterday, that subsided with Tylenol  Objective: See treatment diary below      Assessment: Tolerated treatment well  Patient fatigued throughout treatment session  Able to complete all exercises with minimal verbal cueing  Patient would benefit from continued PT      Plan: Continue per plan of care  Precautions: Standard   BLOOD THINNERS (+)      Visits 1 2 3 4 5 6        1/11/22 1/25/22 2/3/22 2/10/22 2/17 2/24                                              Neuro Re-Ed                                       Ther Ex             Glute sq 10x 10x 10x  20  2" 20  2"       Hip Add 10x 10x 3x10 30x 20  2" 20  2"       Hip Abd Blue Tband 10x 10x2 3x10 Supine clam blue 20 red loop 20 red loop       Standing hip Abd 10x ea supine 10x 2 Standing 2x10 standing 2x10 ea Stand  2 x 10 ea Stand  2 x 10 ea       Heel slide  10x2           Bridging  5x2 attempted 10x 2 x 5 2 x 5       Supine march  10x2 3x10 3x10 20 20       Standing hip ext   2x10 2x10 ea 2 x 10 ea 10 B       SLR   3x5 ea 3x5 ea 3 x 5 3 x 5       STS   5x 2x5  2x5       Nustep warmup    3' L1 4' L1 6' L1       Side stepping    3 x 20 feet 5 x 6 ft 5 x 6 ft       LAQ     2 x 10 2x10       HR       20                                 Modalities

## 2022-03-01 ENCOUNTER — TELEPHONE (OUTPATIENT)
Dept: FAMILY MEDICINE CLINIC | Facility: CLINIC | Age: 67
End: 2022-03-01

## 2022-03-01 NOTE — TELEPHONE ENCOUNTER
Spoke to patient, she stated she does not think she will be able to go during this week but will go as soon as possible    Jhony Mariee LPN

## 2022-03-03 ENCOUNTER — OFFICE VISIT (OUTPATIENT)
Dept: PHYSICAL THERAPY | Facility: CLINIC | Age: 67
End: 2022-03-03
Payer: COMMERCIAL

## 2022-03-03 DIAGNOSIS — M25.552 LEFT HIP PAIN: Primary | ICD-10-CM

## 2022-03-03 PROCEDURE — 97110 THERAPEUTIC EXERCISES: CPT | Performed by: PHYSICAL THERAPIST

## 2022-03-03 NOTE — PROGRESS NOTES
Daily Note     Today's date: 3/3/2022  Patient name: Guanaco Hayes  : 1955  MRN: [id number]  Referring provider: Esvin Barnett DO  Dx:   Encounter Diagnosis     ICD-10-CM    1  Left hip pain  M25 552                   Subjective: Patient denies pain in her L hip today  Objective: See treatment diary below      Assessment: Tolerated treatment well  Patient fatigued throughout treatment session  Renny Arts had excessive core flex and ext during   She needed verbal cues to perform bracing to prevent this  Plan: Continue per plan of care  Precautions: Standard   BLOOD THINNERS (+)      Visits 1 2 3 4 5 6 7       1/11/22 1/25/22 2/3/22 2/10/22 2/17 2/24 3/3                                             Neuro Re-Ed                                       Ther Ex             Glute sq 10x 10x 10x  20  2" 20  2" 20   2"      Hip Add 10x 10x 3x10 30x 20  2" 20  2" 20   2"      Hip Abd Blue Tband 10x 10x2 3x10 Supine clam blue 20 red loop 20 red loop 20 red loop      Standing hip Abd 10x ea supine 10x 2 Standing 2x10 standing 2x10 ea Stand  2 x 10 ea Stand  2 x 10 ea Stand 20      Heel slide  10x2           Bridging  5x2 attempted 10x 2 x 5 2 x 5       Supine march  10x2 3x10 3x10 20 20 20      Standing hip ext   2x10 2x10 ea 2 x 10 ea 10 B 20 Dylon      SLR   3x5 ea 3x5 ea 3 x 5 3 x 5 3 x 5      STS   5x 2x5  2x5       Nustep warmup    3' L1 4' L1 6' L1 6'  L1      Side stepping    3 x 20 feet 5 x 6 ft 5 x 6 ft 5 x 8 ft      LAQ     2 x 10 2x10 20      HR       20 20                                Modalities

## 2022-03-04 ENCOUNTER — HOSPITAL ENCOUNTER (OUTPATIENT)
Dept: INFUSION CENTER | Facility: HOSPITAL | Age: 67
Discharge: HOME | End: 2022-03-04
Attending: INTERNAL MEDICINE
Payer: COMMERCIAL

## 2022-03-04 VITALS
TEMPERATURE: 97.6 F | DIASTOLIC BLOOD PRESSURE: 73 MMHG | HEART RATE: 69 BPM | OXYGEN SATURATION: 96 % | RESPIRATION RATE: 20 BRPM | SYSTOLIC BLOOD PRESSURE: 164 MMHG

## 2022-03-04 DIAGNOSIS — D50.0 IRON DEFICIENCY ANEMIA DUE TO CHRONIC BLOOD LOSS: Primary | ICD-10-CM

## 2022-03-04 PROCEDURE — 96365 THER/PROPH/DIAG IV INF INIT: CPT

## 2022-03-04 PROCEDURE — 3066F NEPHROPATHY DOC TX: CPT | Performed by: INTERNAL MEDICINE

## 2022-03-04 RX ORDER — SODIUM CHLORIDE 9 MG/ML
20 INJECTION, SOLUTION INTRAVENOUS ONCE
Status: COMPLETED | OUTPATIENT
Start: 2022-03-04 | End: 2022-03-04

## 2022-03-04 RX ORDER — SODIUM CHLORIDE 9 MG/ML
20 INJECTION, SOLUTION INTRAVENOUS ONCE
Status: CANCELLED | OUTPATIENT
Start: 2022-04-01

## 2022-03-04 RX ADMIN — SODIUM CHLORIDE 20 ML/HR: 9 INJECTION, SOLUTION INTRAVENOUS at 12:20

## 2022-03-04 RX ADMIN — FERUMOXYTOL 510 MG: 510 INJECTION INTRAVENOUS at 12:20

## 2022-03-09 ENCOUNTER — HOSPITAL ENCOUNTER (OUTPATIENT)
Dept: NON INVASIVE DIAGNOSTICS | Facility: HOSPITAL | Age: 67
Discharge: HOME | End: 2022-03-09
Attending: INTERNAL MEDICINE
Payer: COMMERCIAL

## 2022-03-09 ENCOUNTER — APPOINTMENT (OUTPATIENT)
Dept: LAB | Facility: HOSPITAL | Age: 67
End: 2022-03-09
Attending: INTERNAL MEDICINE
Payer: COMMERCIAL

## 2022-03-09 ENCOUNTER — HOSPITAL ENCOUNTER (OUTPATIENT)
Dept: RADIOLOGY | Facility: HOSPITAL | Age: 67
Discharge: HOME | End: 2022-03-09
Attending: INTERNAL MEDICINE
Payer: COMMERCIAL

## 2022-03-09 ENCOUNTER — TELEPHONE (OUTPATIENT)
Dept: NEPHROLOGY | Facility: CLINIC | Age: 67
End: 2022-03-09

## 2022-03-09 VITALS
DIASTOLIC BLOOD PRESSURE: 75 MMHG | BODY MASS INDEX: 39.85 KG/M2 | HEIGHT: 60 IN | WEIGHT: 203 LBS | HEART RATE: 78 BPM | SYSTOLIC BLOOD PRESSURE: 154 MMHG

## 2022-03-09 DIAGNOSIS — I26.99 PE (PULMONARY THROMBOEMBOLISM) (HCC): ICD-10-CM

## 2022-03-09 DIAGNOSIS — N25.81 SECONDARY HYPERPARATHYROIDISM OF RENAL ORIGIN (HCC): ICD-10-CM

## 2022-03-09 DIAGNOSIS — I48.92 UNSPECIFIED ATRIAL FLUTTER (HCC): ICD-10-CM

## 2022-03-09 DIAGNOSIS — N18.6 END STAGE RENAL DISEASE (HCC): ICD-10-CM

## 2022-03-09 DIAGNOSIS — E11.29 TYPE 2 DIABETES MELLITUS WITH OTHER DIABETIC KIDNEY COMPLICATION (HCC): ICD-10-CM

## 2022-03-09 DIAGNOSIS — I48.92 ATRIAL FLUTTER WITH RAPID VENTRICULAR RESPONSE (HCC): ICD-10-CM

## 2022-03-09 DIAGNOSIS — E11.65 TYPE 2 DIABETES MELLITUS WITH HYPERGLYCEMIA (HCC): ICD-10-CM

## 2022-03-09 DIAGNOSIS — M10.071 IDIOPATHIC GOUT, RIGHT ANKLE AND FOOT: ICD-10-CM

## 2022-03-09 DIAGNOSIS — I26.99 OTHER PULMONARY EMBOLISM WITHOUT ACUTE COR PULMONALE (HCC): ICD-10-CM

## 2022-03-09 DIAGNOSIS — IMO0002 TYPE II DIABETES MELLITUS WITH RENAL MANIFESTATIONS, UNCONTROLLED: ICD-10-CM

## 2022-03-09 DIAGNOSIS — M10.071 ACUTE IDIOPATHIC GOUT OF RIGHT FOOT: ICD-10-CM

## 2022-03-09 LAB
AORTIC ROOT: 3 CM
AORTIC VALVE MEAN VELOCITY: 10.7 M/S
ATRIAL RATE: 67 BPM
AV AREA BY CONTINUOUS VTI: 2.1 CM2
AV AREA PEAK VELOCITY: 2 CM2
AV LVOT MEAN GRADIENT: 2 MMHG
AV LVOT PEAK GRADIENT: 4 MMHG
AV MEAN GRADIENT: 5 MMHG
AV PEAK GRADIENT: 10 MMHG
AV VALVE AREA: 2.05 CM2
AV VELOCITY RATIO: 0.64
BASELINE ST DEPRESSION: 0 MM
CHEST PAIN STATEMENT: NORMAL
DOP CALC AO PEAK VEL: 1.61 M/S
DOP CALC AO VTI: 35.58 CM
DOP CALC LVOT AREA: 3.14 CM2
DOP CALC LVOT DIAMETER: 2 CM
DOP CALC LVOT PEAK VEL VTI: 23.28 CM
DOP CALC LVOT PEAK VEL: 1.03 M/S
DOP CALC LVOT STROKE INDEX: 37.2 ML/M2
DOP CALC LVOT STROKE VOLUME: 73.1 CM3
DOP CALC MV VTI: 37.95 CM
E WAVE DECELERATION TIME: 209 MS
FRACTIONAL SHORTENING: 21 % (ref 28–44)
INTERVENTRICULAR SEPTUM IN DIASTOLE (PARASTERNAL SHORT AXIS VIEW): 1.2 CM
INTERVENTRICULAR SEPTUM: 1.2 CM (ref 0.54–1)
LAAS-AP2: 18.8 CM2
LAAS-AP4: 20.2 CM2
LEFT ATRIUM SIZE: 4.4 CM
LEFT INTERNAL DIMENSION IN SYSTOLE: 3.7 CM (ref 3–4.54)
LEFT VENTRICULAR INTERNAL DIMENSION IN DIASTOLE: 4.7 CM (ref 4.93–7.34)
LEFT VENTRICULAR POSTERIOR WALL IN END DIASTOLE: 1.2 CM (ref 0.52–0.99)
LEFT VENTRICULAR STROKE VOLUME: 41 ML
LVSV (TEICH): 41 ML
MAX DIASTOLIC BP: 80 MMHG
MAX HEART RATE: 81 BPM
MAX HR PERCENT: 52 %
MAX HR: 155 BPM
MAX PREDICTED HEART RATE: 155 BPM
MAX. SYSTOLIC BP: 144 MMHG
MV E'TISSUE VEL-LAT: 4 CM/S
MV E'TISSUE VEL-SEP: 4 CM/S
MV MEAN GRADIENT: 3 MMHG
MV PEAK A VEL: 0.77 M/S
MV PEAK E VEL: 150 CM/S
MV PEAK GRADIENT: 10 MMHG
MV STENOSIS PRESSURE HALF TIME: 61 MS
MV VALVE AREA BY CONTINUITY EQUATION: 1.93 CM2
MV VALVE AREA P 1/2 METHOD: 3.61 CM2
P AXIS: 48 DEGREES
PR INTERVAL: 160 MS
PROTOCOL NAME: NORMAL
QRS AXIS: -38 DEGREES
QRSD INTERVAL: 88 MS
QT INTERVAL: 460 MS
QTC INTERVAL: 486 MS
RATE PRESSURE PRODUCT: 9546
REASON FOR TERMINATION: NORMAL
RIGHT VENTRICLE ID DIMENSION: 3.4 CM
SL CV LEFT ATRIUM LENGTH A2C: 5.8 CM
SL CV PED ECHO LEFT VENTRICLE DIASTOLIC VOLUME (MOD BIPLANE) 2D: 101 ML
SL CV PED ECHO LEFT VENTRICLE SYSTOLIC VOLUME (MOD BIPLANE) 2D: 60 ML
SL CV REST NUCLEAR ISOTOPE DOSE: 10.98 MCI
SL CV STRESS NUCLEAR ISOTOPE DOSE: 33 MCI
SL CV STRESS RECOVERY BP: NORMAL MMHG
SL CV STRESS RECOVERY HR: 73 BPM
SL CV STRESS RECOVERY O2 SAT: 97 %
STRESS ANGINA INDEX: 0
STRESS BASELINE BP: NORMAL MMHG
STRESS BASELINE HR: 62 BPM
STRESS DUKE TREADMILL SCORE: 1
STRESS O2 SAT REST: 97 %
STRESS PEAK HR: 74 BPM
STRESS PERCENT HR: 52 %
STRESS POST ESTIMATED WORKLOAD: 1 METS
STRESS POST EXERCISE DUR MIN: 1 MIN
STRESS POST O2 SAT PEAK: 99 %
STRESS POST PEAK BP: 129 MMHG
STRESS ST DEPRESSION: 0 MM
STRESS TARGET HR: 81 BPM
T WAVE AXIS: 26 DEGREES
TARGET HR FORMULA: NORMAL
TEST INDICATION: NORMAL
TIME IN EXERCISE PHASE: NORMAL
TR MAX PG: 60 MMHG
TR PEAK VELOCITY: 3.9 M/S
TRICUSPID VALVE PEAK REGURGITATION VELOCITY: 3.87 M/S
VENTRICULAR RATE: 67 BPM
Z-SCORE OF INTERVENTRICULAR SEPTUM IN END DIASTOLE: 3.6
Z-SCORE OF LEFT VENTRICULAR DIMENSION IN END DIASTOLE: -2.47
Z-SCORE OF LEFT VENTRICULAR DIMENSION IN END SYSTOLE: 0.03
Z-SCORE OF LEFT VENTRICULAR POSTERIOR WALL IN END DIASTOLE: 3.71

## 2022-03-09 PROCEDURE — G1004 CDSM NDSC: HCPCS

## 2022-03-09 PROCEDURE — 78452 HT MUSCLE IMAGE SPECT MULT: CPT

## 2022-03-09 PROCEDURE — 93306 TTE W/DOPPLER COMPLETE: CPT

## 2022-03-09 PROCEDURE — 78452 HT MUSCLE IMAGE SPECT MULT: CPT | Performed by: INTERNAL MEDICINE

## 2022-03-09 PROCEDURE — 71046 X-RAY EXAM CHEST 2 VIEWS: CPT

## 2022-03-09 PROCEDURE — 93017 CV STRESS TEST TRACING ONLY: CPT

## 2022-03-09 PROCEDURE — 93016 CV STRESS TEST SUPVJ ONLY: CPT | Performed by: INTERNAL MEDICINE

## 2022-03-09 PROCEDURE — 93018 CV STRESS TEST I&R ONLY: CPT | Performed by: INTERNAL MEDICINE

## 2022-03-09 PROCEDURE — 93880 EXTRACRANIAL BILAT STUDY: CPT

## 2022-03-09 PROCEDURE — 93880 EXTRACRANIAL BILAT STUDY: CPT | Performed by: SURGERY

## 2022-03-09 PROCEDURE — A9502 TC99M TETROFOSMIN: HCPCS

## 2022-03-09 PROCEDURE — 93005 ELECTROCARDIOGRAM TRACING: CPT

## 2022-03-09 PROCEDURE — 71250 CT THORAX DX C-: CPT

## 2022-03-09 PROCEDURE — 93306 TTE W/DOPPLER COMPLETE: CPT | Performed by: INTERNAL MEDICINE

## 2022-03-09 RX ADMIN — REGADENOSON 0.4 MG: 0.08 INJECTION, SOLUTION INTRAVENOUS at 09:11

## 2022-03-09 NOTE — RESULT ENCOUNTER NOTE
Hello    Can you please let [facility] team no carotid artery ultrasound less than 50% stenosis bilaterally  Unchanged from prior      Thank

## 2022-03-09 NOTE — TELEPHONE ENCOUNTER
Left a message for pt to have her INR drawn  Reminded pt of the importance of having the INR drawn when needed    dayan

## 2022-03-09 NOTE — TELEPHONE ENCOUNTER
Have asked [facility] coordinator to reach out to review the echocardiogram results  Mild valvular disease noted multiple valves  Will also forward echocardiogram to cardiologist also

## 2022-03-10 ENCOUNTER — OFFICE VISIT (OUTPATIENT)
Dept: PHYSICAL THERAPY | Facility: CLINIC | Age: 67
End: 2022-03-10
Payer: COMMERCIAL

## 2022-03-10 DIAGNOSIS — M25.552 LEFT HIP PAIN: Primary | ICD-10-CM

## 2022-03-10 PROCEDURE — 97110 THERAPEUTIC EXERCISES: CPT | Performed by: PHYSICAL THERAPIST

## 2022-03-10 NOTE — PROGRESS NOTES
Daily Note     Today's date: 3/10/2022  Patient name: Melanie Burch  : 1955  MRN: [id number]  Referring provider: Jose Membreno DO  Dx:   Encounter Diagnosis     ICD-10-CM    1  Left hip pain  M25 552                   Subjective: Patient denies pain in her L hip today  Some tightness reports      Objective: See treatment diary below      Assessment: Tolerated treatment well  Patient fatigued at the end of treatment session  Piriformis and HS stretching improved left hip tightness complaints  Plan: Continue per plan of care  Precautions: Standard   BLOOD THINNERS (+)      Visits 1 2 3 4 5 6 7 8      1/11/22 1/25/22 2/3/22 2/10/22 2/17 2/24 3/3 3/10                          HS / piriform stretch 5'                  Neuro Re-Ed                                       Ther Ex             Glute sq 10x 10x 10x  20  2" 20  2" 20   2" 20     Hip Add 10x 10x 3x10 30x 20  2" 20  2" 20   2"      Hip Abd Blue Tband 10x 10x2 3x10 Supine clam blue 20 red loop 20 red loop 20 red loop 20 red loop     Standing hip Abd 10x ea supine 10x 2 Standing 2x10 standing 2x10 ea Stand  2 x 10 ea Stand  2 x 10 ea Stand 20 20 ea     Heel slide  10x2      20     Bridging  5x2 attempted 10x 2 x 5 2 x 5  20     Supine march  10x2 3x10 3x10 20 20 20 20 ea stand     Standing hip ext   2x10 2x10 ea 2 x 10 ea 10 B 20 Dylon 20 ea     SLR   3x5 ea 3x5 ea 3 x 5 3 x 5 3 x 5 3 x 5     STS   5x 2x5  2x5       Nustep warmup    3' L1 4' L1 6' L1 6'  L1 8' L1     Side stepping    3 x 20 feet 5 x 6 ft 5 x 6 ft 5 x 8 ft 4 x 10 ft     LAQ     2 x 10 2x10 20 20     HR       20 20 20                               Modalities

## 2022-03-15 NOTE — TELEPHONE ENCOUNTER
Left a message telling pt of the importance of having her INR drawn  Made pt aware she is over 2 weeks late   dayan

## 2022-03-16 NOTE — TELEPHONE ENCOUNTER
Spoke to patient, she stated that she is aware she is due for her INR and will go  She said she cannot go today due to work, but will go as soon as she can    Marielena Adrian LPN

## 2022-03-17 ENCOUNTER — OFFICE VISIT (OUTPATIENT)
Dept: PHYSICAL THERAPY | Facility: CLINIC | Age: 67
End: 2022-03-17
Payer: COMMERCIAL

## 2022-03-17 DIAGNOSIS — M25.552 LEFT HIP PAIN: Primary | ICD-10-CM

## 2022-03-17 PROCEDURE — 97110 THERAPEUTIC EXERCISES: CPT | Performed by: PHYSICAL THERAPIST

## 2022-03-17 NOTE — PROGRESS NOTES
Daily Note     Today's date: 3/17/2022  Patient name: Khloe Meade  : 1955  MRN: [id number]  Referring provider: Will Coleman DO  Dx:   Encounter Diagnosis     ICD-10-CM    1  Left hip pain  M25 552                   Subjective: Patient denies pain in her L hip today  It has felt better recently  Objective: See treatment diary below      Assessment: Tolerated treatment well  Pain level is significantly improved  She needs cues still for sore bracing with exercises  Plan: Continue per plan of care  Begin discharge discussion with patient next session if she continues to have no pain  Precautions: Standard   BLOOD THINNERS (+)      Visits 1 2 3 4 5 6 7 8 9     1/11/22 1/25/22 2/3/22 2/10/22 2/17 2/24 3/3 3/10 3/17                         HS / piriform stretch 5' HS / piriform stretch 5'                 Neuro Re-Ed                                       Ther Ex             Glute sq 10x 10x 10x  20  2" 20  2" 20   2" 20 20    Hip Add 10x 10x 3x10 30x 20  2" 20  2" 20   2"  20  3"    Hip Abd Blue Tband 10x 10x2 3x10 Supine clam blue 20 red loop 20 red loop 20 red loop 20 red loop 20  Red loop    Standing hip Abd 10x ea supine 10x 2 Standing 2x10 standing 2x10 ea Stand  2 x 10 ea Stand  2 x 10 ea Stand 20 20 ea     Heel slide  10x2      20 20    Bridging  5x2 attempted 10x 2 x 5 2 x 5  20 20    Supine march  10x2 3x10 3x10 20 20 20 20 ea stand 20 stand    Standing hip ext   2x10 2x10 ea 2 x 10 ea 10 B 20 Dylon 20 ea     SLR   3x5 ea 3x5 ea 3 x 5 3 x 5 3 x 5 3 x 5 3 x 5    STS   5x 2x5  2x5       Nustep warmup    3' L1 4' L1 6' L1 6'  L1 8' L1 8'  L2    Side stepping    3 x 20 feet 5 x 6 ft 5 x 6 ft 5 x 8 ft 4 x 10 ft 5 x 20 ft    LAQ     2 x 10 2x10 20 20 20    HR       20 20 20 --    LP         20  55#                 Modalities

## 2022-03-22 ENCOUNTER — TELEPHONE (OUTPATIENT)
Dept: NEPHROLOGY | Facility: CLINIC | Age: 67
End: 2022-03-22

## 2022-03-22 LAB
LEFT EYE DIABETIC RETINOPATHY: NORMAL
RIGHT EYE DIABETIC RETINOPATHY: NORMAL
SEVERITY (EYE EXAM): NORMAL

## 2022-03-23 ENCOUNTER — OFFICE VISIT (OUTPATIENT)
Dept: CARDIOLOGY CLINIC | Facility: CLINIC | Age: 67
End: 2022-03-23
Payer: COMMERCIAL

## 2022-03-23 ENCOUNTER — HOSPITAL ENCOUNTER (OUTPATIENT)
Dept: RADIOLOGY | Facility: HOSPITAL | Age: 67
Discharge: HOME | End: 2022-03-23
Attending: INTERNAL MEDICINE
Payer: COMMERCIAL

## 2022-03-23 VITALS
HEIGHT: 60 IN | OXYGEN SATURATION: 93 % | DIASTOLIC BLOOD PRESSURE: 90 MMHG | TEMPERATURE: 98.9 F | WEIGHT: 205 LBS | SYSTOLIC BLOOD PRESSURE: 138 MMHG | BODY MASS INDEX: 40.25 KG/M2 | HEART RATE: 61 BPM

## 2022-03-23 DIAGNOSIS — N18.6 ESRD (END STAGE RENAL DISEASE) (HCC): ICD-10-CM

## 2022-03-23 DIAGNOSIS — I48.92 PAROXYSMAL ATRIAL FLUTTER (HCC): Primary | ICD-10-CM

## 2022-03-23 DIAGNOSIS — M10.071 IDIOPATHIC GOUT, RIGHT ANKLE AND FOOT: ICD-10-CM

## 2022-03-23 DIAGNOSIS — I10 ESSENTIAL HYPERTENSION: ICD-10-CM

## 2022-03-23 DIAGNOSIS — Z79.4 TYPE 2 DIABETES MELLITUS WITH HYPEROSMOLARITY WITHOUT COMA, WITH LONG-TERM CURRENT USE OF INSULIN (HCC): ICD-10-CM

## 2022-03-23 DIAGNOSIS — E66.01 MORBID OBESITY (HCC): ICD-10-CM

## 2022-03-23 DIAGNOSIS — Z99.2: ICD-10-CM

## 2022-03-23 DIAGNOSIS — Z79.01 ON CONTINUOUS ORAL ANTICOAGULATION: ICD-10-CM

## 2022-03-23 DIAGNOSIS — I12.0 BENIGN HYPERTENSION WITH CKD (CHRONIC KIDNEY DISEASE) STAGE V (HCC): ICD-10-CM

## 2022-03-23 DIAGNOSIS — G47.33 OBSTRUCTIVE SLEEP APNEA: ICD-10-CM

## 2022-03-23 DIAGNOSIS — N18.6 END STAGE KIDNEY DISEASE (HCC): ICD-10-CM

## 2022-03-23 DIAGNOSIS — I34.2 NON-RHEUMATIC MITRAL VALVE STENOSIS: ICD-10-CM

## 2022-03-23 DIAGNOSIS — N25.81 SECONDARY HYPERPARATHYROIDISM OF RENAL ORIGIN (HCC): ICD-10-CM

## 2022-03-23 DIAGNOSIS — N18.5 BENIGN HYPERTENSION WITH CKD (CHRONIC KIDNEY DISEASE) STAGE V (HCC): ICD-10-CM

## 2022-03-23 DIAGNOSIS — Z86.711 HISTORY OF PULMONARY EMBOLISM: ICD-10-CM

## 2022-03-23 DIAGNOSIS — I50.42 CHRONIC COMBINED SYSTOLIC AND DIASTOLIC CONGESTIVE HEART FAILURE (HCC): ICD-10-CM

## 2022-03-23 DIAGNOSIS — I27.20 PULMONARY HYPERTENSION (HCC): ICD-10-CM

## 2022-03-23 DIAGNOSIS — I48.92 ATRIAL FLUTTER WITH RAPID VENTRICULAR RESPONSE (HCC): ICD-10-CM

## 2022-03-23 DIAGNOSIS — E11.00 TYPE 2 DIABETES MELLITUS WITH HYPEROSMOLARITY WITHOUT COMA, WITH LONG-TERM CURRENT USE OF INSULIN (HCC): ICD-10-CM

## 2022-03-23 DIAGNOSIS — I26.99 PE (PULMONARY THROMBOEMBOLISM) (HCC): ICD-10-CM

## 2022-03-23 DIAGNOSIS — IMO0002 TYPE 2 DIABETES, UNCONTROLLED, WITH RENAL MANIFESTATION: ICD-10-CM

## 2022-03-23 DIAGNOSIS — E78.2 MIXED HYPERLIPIDEMIA: ICD-10-CM

## 2022-03-23 DIAGNOSIS — E03.9 ACQUIRED HYPOTHYROIDISM: ICD-10-CM

## 2022-03-23 PROCEDURE — 74176 CT ABD & PELVIS W/O CONTRAST: CPT

## 2022-03-23 PROCEDURE — G1004 CDSM NDSC: HCPCS

## 2022-03-23 PROCEDURE — 3008F BODY MASS INDEX DOCD: CPT | Performed by: INTERNAL MEDICINE

## 2022-03-23 PROCEDURE — 1036F TOBACCO NON-USER: CPT | Performed by: INTERNAL MEDICINE

## 2022-03-23 PROCEDURE — 93000 ELECTROCARDIOGRAM COMPLETE: CPT | Performed by: INTERNAL MEDICINE

## 2022-03-23 PROCEDURE — 3080F DIAST BP >= 90 MM HG: CPT | Performed by: INTERNAL MEDICINE

## 2022-03-23 PROCEDURE — 3075F SYST BP GE 130 - 139MM HG: CPT | Performed by: INTERNAL MEDICINE

## 2022-03-23 PROCEDURE — 99214 OFFICE O/P EST MOD 30 MIN: CPT | Performed by: INTERNAL MEDICINE

## 2022-03-23 PROCEDURE — 71250 CT THORAX DX C-: CPT

## 2022-03-23 NOTE — PROGRESS NOTES
Office Cardiology Progress Note  Kendal Gonzalez 74 y o  female MRN: [id number]  03/23/22  2:09 PM      ASSESSMENT:    1  Resolved  dyspnea with activity and resolved dyspnea at rest with suppression of paroxysmal atrial flutter for the past 1-1/2+ years  2  Controlled chronic combined systolic and diastolic heart failure, initially identified in July, 2020, with previous  depression of ejection fraction to 35% with moderate LVH, grade 2 diastolic dysfunction, chronic right heart dilatation and significant chronic pulmonary hypertension on previous echocardiogram 07/14/2020 with LV dysfunction thought possibly to be tachycardia-mediated  Improvement to LVEF of 45% on 11/02/2020 TTE, unchanged on more recent TTE of 03/09/2022  3  Nonischemic nuclear stress study with dilated right ventricle on 03/09/2022 and previously on 07/14/2020   Nuclear stress LVEF was 54%  4  Stable mild nonrheumatic mitral valve stenosis, not an active clinical problem at this time  5  Multifactorial chronic pulmonary hypertension, stable   Stable PA systolic pressure 68 mmHg as of  03/09/2022   6  End-stage kidney disease with patient on peritoneal dialysis for approximately 1-1/2+ years  7  Worsened and progressive morbid obesity,  with weight gain of 13 lbs in approximately 1-1/2 years, currently with 9 pound weight loss in six months,  and with previous weight loss of 13 lbs in 4-12+ months and 19 3 lbs in approximately 5 months  Question whether this could be related to diabetic medication and/or acquired hypothyroidism  8  Controlled longstanding type 2 diabetes mellitus, insulin dependent, with latest A1c 8 1%  on 10/26/2021     9  Controlled mixed dyslipidemia  10  History of severe obstructive sleep apnea, currently not being treated   Patient intolerant to CPAP and  currently using oxygen overnight at 3 liters/minute only sporadically    11  Patient on continuous warfarin oral anticoagulation and presently on 3 milligrams daily with latest INR subtherapeutic  12  History of pulmonary embolism, though not totally confirmed  13  Anemia of chronic renal disease  14  Acquired hypothyroidism,  treated at present with levothyroxine 75 micrograms daily  TSH still elevated as of 10/26/2021  15  Treated vitamin-D deficiency          Plan       Patient Instructions     1  Continue current medication  2  Cardiology follow-up approximately six months with EKG  HPI    This 74 y o  female  denies new cardiopulmonary and medical symptoms  This patient continues on daily home peritoneal dialysis for 8 hours with stable renal findings  She is being considered for renal transplant listing once she loses another 10 lbs  She was seen in Nephrology follow-up on 02/01/2022  The patient is still grieving the death of her mother about three months ago and lives and works from home, and has a young niece living with her as well  The patient is being seen in follow-up of the below listed diagnoses  Encounter Diagnoses   Name Primary?     Paroxysmal atrial flutter (HCC) Yes    Chronic combined systolic and diastolic congestive heart failure (HCC)     Pulmonary hypertension (HCC)     Benign hypertension with CKD (chronic kidney disease) stage V (HCC)     History of pulmonary embolism     Essential hypertension     Non-rheumatic mitral valve stenosis     Mixed hyperlipidemia     End stage kidney disease (HCC)     Type 2 diabetes mellitus with hyperosmolarity without coma, with long-term current use of insulin (HCC)     Morbid obesity ([address] )     Dependence on continuous ambulatory peritoneal dialysis ([address] )     Obstructive sleep apnea     On continuous oral anticoagulation     Acquired hypothyroidism         Review of Systems    All other systems negative, except as noted in history of present illness    Historical Information   Past Medical History:   Diagnosis Date    Acute asthmatic bronchitis     last assessed: 6/2/2017    Cardiac disease     Chronic diastolic (congestive) heart failure (HCC)     Colon polyp     Diabetes mellitus [facility]     Hyperlipidemia     Hypertension     Medical non-compliance     Morbid obesity with BMI of 40 0-44 9, adult ([facility] ) 2019    Pneumonia     last assessed: 2013    Pulmonary embolism ([facility] )     Renal disorder     possible clot noted in kidney, thus blood thinners currently    Severe obstructive sleep apnea     Severe pulmonary arterial systolic hypertension (HCC)      Past Surgical History:   Procedure Laterality Date     SECTION       x 1    EYE SURGERY      NOSE SURGERY      fractured nose - septoplasty     Social History     Substance and Sexual Activity   Alcohol Use Never     Social History     Substance and Sexual Activity   Drug Use No     Social History     Tobacco Use   Smoking Status Never Smoker   Smokeless Tobacco Never Used       Family History:  Family History   Problem Relation Age of Onset    Diabetes Mother         mellitus    Anxiety disorder Mother         generalized anxiety disorder    Hypertension Mother     Stroke Mother     Kidney disease Father     Kidney failure Father         renal failure    Ulcerative colitis Sister     Diabetes Sister     Heart disease Family     Ovarian cancer Maternal Aunt     Diabetes Maternal Aunt     Diabetes Maternal Uncle     Heart disease Maternal Uncle     Thyroid disease Neg Hx          Meds/Allergies     Prior to Admission medications    Medication Sig Start Date End Date Taking?  Authorizing Provider   atorvastatin (LIPITOR) 20 mg tablet Take 1 tablet (20 mg total) by mouth daily 20  Yes Sam Reed DO   B Complex-C (B-COMPLEX WITH VITAMIN C) tablet Take 1 tablet by mouth daily   Yes Historical Provider, MD   Blood Glucose Monitoring Suppl (ONE TOUCH ULTRA 2) w/Device KIT Test glucose 3 times daily 18  Yes Sam Reed DO   calcium acetate (PHOSLO) capsule Take 667 mg by mouth daily  20 Yes Historical Provider, MD   cholecalciferol (VITAMIN D3) 1,000 units tablet Take 2 tablets (2,000 Units total) by mouth daily 12/22/20  Yes Krystina Patel MD   cinacalcet (SENSIPAR) 30 mg tablet Take 1 tablet (30 mg total) by mouth 3 (three) times a week 5/19/21  Yes Reba Zhu MD   Continuous Blood Gluc  (FreeStyle Dewaine Hedger 14 Day [place]) GAGANDEEP Use to test blood sugar daily 3/15/21  Yes Emily Sue MD   Continuous Blood Gluc Sensor (FreeStyle Dewaine Hedger 2 Sensor) MISC  9/28/21  Yes Historical Provider, MD   docusate sodium (COLACE) 100 mg capsule Take 1 capsule (100 mg total) by mouth 2 (two) times a day 7/20/20  Yes Beth King MD   gentamicin (GARAMYCIN) 0 1 % cream APPLY TO EXOSITE DAILY AND AS NEEDED 10/21/21  Yes Eduardo Bledsoe MD   insulin glargine (Lantus SoloStar) 100 units/mL injection pen Inject 30 Units under the skin daily at bedtime 11/8/21  Yes Emily Sue MD   Insulin Pen Needle 31G X 5 MM MISC by Does not apply route daily 10/5/20  Yes Nick Dewitt DO   insuln lispro (HumaLOG KwikPen) 200 units/mL CONCENTRATED U-200 injection pen Inject 10 Units under the skin 3 (three) times a day with meals 11/8/21  Yes Emily Sue MD   Lancets (onetouch ultrasoft) lancets Test glucose 3 times a day; Code: E11 8 9/8/20  Yes Nick Dewitt DO   levothyroxine 100 mcg tablet Take 1 tablet (100 mcg total) by mouth daily 5/26/21  Yes Emily Sue MD   metolazone (ZAROXOLYN) 5 mg tablet Take 5 mg by mouth daily  7/16/21  Yes Historical Provider, MD   metoprolol tartrate (LOPRESSOR) 25 mg tablet Take 25 mg by mouth every 12 (twelve) hours   Yes Historical Provider, MD   MULTIPLE VITAMIN PO Take 1 tablet by mouth daily   Yes Historical Provider, MD   OneTouch Ultra test strip TEST GLUCOSE THREE TIMES A DAY 1/29/22  Yes Nick Dewitt DO   sitaGLIPtin (JANUVIA) 25 mg tablet Take one tablet (25mg) by mouth daily 8/2/21  Yes Emily Sue MD   torsemide (DEMADEX) 100 mg tablet Take 1 tablet (100 mg total) by mouth daily 3/15/21  Yes Dar Doe MD   valsartan (DIOVAN) 160 mg tablet TAKE 1 TABLET DAILY 1/2/22  Yes Dar Doe MD   warfarin (Coumadin) 2 mg tablet Daily as directed 1/4/22  Yes Alejandro Rodriguez DO   warfarin (COUMADIN) 5 mg tablet Daily as directed 9/8/20  Yes Alejandro Rodriguez DO       No Known Allergies      Vitals:    03/23/22 1250   BP: 138/90   BP Location: Right arm   Patient Position: Sitting   Cuff Size: Large   Pulse: 61   Temp: 98 9 °F (37 2 °C)   SpO2: 93%   Weight: 93 kg (205 lb)   Height: 5' (1 524 m)       Body mass index is 40 04 kg/m²  9 pound weight loss in approximately six months and 13 pound weight gain in approximately 1-1/2 years    Physical Exam:    General Appearance:  Alert, cooperative, no distress, appears stated age and is markedly obese  Head:  Normocephalic, without obvious abnormality, atraumatic   Eyes:  PERRL, conjunctiva/corneas clear, EOM's intact,   both eyes   Ears:  Normal TM's and external ear canals, both ears   Nose: Nares normal, septum midline, mucosa normal, no drainage or sinus tenderness   Throat: Lips, mucosa, and tongue normal; teeth and gums normal   Neck: Supple, symmetrical, trachea midline, no adenopathy, thyroid: not enlarged, symmetric, no tenderness/mass/nodules, no carotid bruit or JVD   Back:   Symmetric, no curvature, ROM normal, no CVA tenderness   Lungs:   Clear to auscultation bilaterally, respirations unlabored   Chest Wall:  No tenderness or deformity   Heart:  Regular rate and rhythm, S1, S2 normal, no murmur, rub or gallop   Abdomen:   Soft, non-tender, bowel sounds active all four quadrants,  no masses, no organomegaly  Marked abdominal obesity noted     Extremities: Extremities normal, atraumatic, no cyanosis or edema   Pulses: 2+ and symmetric   Skin: Skin showed normal color, texture, turgor and no rashes or lesions   Lymph nodes: Cervical, supraclavicular, and axillary nodes normal   Neurologic: Normal         Cardiographics    ECG 03/23/22:    Normal sinus rhythm at 61 bpm   LAFB   Prolonged QT   Abnormal ECG, unchanged from 03/09/2022 and 03/02/2021    IMAGING:    XR chest pa & lateral 03/09/2022:    Result Date: 3/9/2022  Impression No acute cardiopulmonary disease  Workstation performed: [id number]       Carotid artery duplex scan 03/09/2022:    Bilateral less than 50% stenosis of internal carotid arteries  No significant vertebral or subclavian artery disease  No significant change from prior study dated 01/08/2021    CT scan of chest 03/09/2022:      Persistent mild right upper and right middle lobe nodularity with small areas of mosaic attenuation, likely from small airways disease  Pulmonary artery enlargement consistent with history of pulmonary hypertension   Severe coronary artery calcification    Lexiscan nuclear stress study 03/09/2022:    Normal LV post-stress function with calculated LVEF of 54%   No significant reversible ischemia  ECG was negative for ischemia    Transthoracic echocardiogram 03/09/2022:    Mild concentric LVH with estimated LVEF 45%  Mildly impaired RV systolic function with TAPSE of 1 5 centimeters   Moderate thickening and mild to moderate calcification of aortic valve leaflets with trace AI and no evidence of AS  Moderate MAC with mild mitral leaflet thickening and calcification and 1-2 +MR  2+ TR with significant elevation of PA systolic pressure to 68 mmHg  1+ PI  Compared to 08/09/2021, there is no longer biatrial enlargement with no other significant changes        LAB REVIEW:      Lab Results   Component Value Date    SODIUM 138 10/26/2021    K 4 0 10/26/2021    CL 98 (L) 10/26/2021    CO2 26 10/26/2021    ANIONGAP 6 12/11/2014    BUN 88 (H) 10/26/2021    CREATININE 7 00 (H) 10/26/2021    GLUCOSE 133 12/11/2014    GLUF 122 (H) 10/26/2021    CALCIUM 9 1 10/26/2021    CORRECTEDCA 9 4 05/21/2021    AST 13 05/21/2021    ALT 27 05/21/2021    ALKPHOS 88 05/21/2021    PROT 6 8 12/11/2014    BILITOT 1 08 (H) 12/11/2014    EGFR 6 10/26/2021     Lab Results   Component Value Date    CHOLESTEROL 134 05/21/2021    CHOLESTEROL 154 03/08/2021    CHOLESTEROL 154 01/28/2021     Lab Results   Component Value Date    HDL 51 05/21/2021    HDL 57 03/08/2021    HDL 58 01/28/2021       Lab Results   Component Value Date    LDLCALC 68 05/21/2021    LDLCALC 68 03/08/2021    LDLCALC 71 01/28/2021     No components found for: [facility]  Lab Results   Component Value Date    TRIG 73 05/21/2021    TRIG 146 03/08/2021    TRIG 124 01/28/2021       PT/INR 02/17/2022:  17 0/1 42  A1c and estimated average glucose 10/26/2021:  8 1% and 186  TSH 10/26/2021:  4 963, decreased from 19 395 on 11/25/2020        Christina Medina MD

## 2022-03-24 ENCOUNTER — APPOINTMENT (OUTPATIENT)
Dept: PHYSICAL THERAPY | Facility: CLINIC | Age: 67
End: 2022-03-24
Payer: COMMERCIAL

## 2022-03-25 ENCOUNTER — TELEPHONE (OUTPATIENT)
Dept: FAMILY MEDICINE CLINIC | Facility: CLINIC | Age: 67
End: 2022-03-25

## 2022-03-25 ENCOUNTER — APPOINTMENT (OUTPATIENT)
Dept: LAB | Facility: HOSPITAL | Age: 67
End: 2022-03-25
Payer: COMMERCIAL

## 2022-03-25 DIAGNOSIS — E03.9 ACQUIRED HYPOTHYROIDISM: ICD-10-CM

## 2022-03-25 DIAGNOSIS — I10 ESSENTIAL HYPERTENSION: ICD-10-CM

## 2022-03-25 DIAGNOSIS — Z79.4 TYPE 2 DIABETES MELLITUS WITH HYPERGLYCEMIA, WITH LONG-TERM CURRENT USE OF INSULIN (HCC): ICD-10-CM

## 2022-03-25 DIAGNOSIS — E78.2 MIXED HYPERLIPIDEMIA: ICD-10-CM

## 2022-03-25 DIAGNOSIS — N18.6 ESRD (END STAGE RENAL DISEASE) (HCC): ICD-10-CM

## 2022-03-25 DIAGNOSIS — E11.65 TYPE 2 DIABETES MELLITUS WITH HYPERGLYCEMIA, WITH LONG-TERM CURRENT USE OF INSULIN (HCC): ICD-10-CM

## 2022-03-25 LAB
CHOLEST SERPL-MCNC: 158 MG/DL
EST. AVERAGE GLUCOSE BLD GHB EST-MCNC: 154 MG/DL
HBA1C MFR BLD: 7 %
HDLC SERPL-MCNC: 57 MG/DL
LDLC SERPL CALC-MCNC: 84 MG/DL (ref 0–100)
NONHDLC SERPL-MCNC: 101 MG/DL
T4 FREE SERPL-MCNC: 1 NG/DL (ref 0.76–1.46)
TRIGL SERPL-MCNC: 84 MG/DL
TSH SERPL DL<=0.05 MIU/L-ACNC: 4.26 UIU/ML (ref 0.36–3.74)

## 2022-03-25 PROCEDURE — 83036 HEMOGLOBIN GLYCOSYLATED A1C: CPT

## 2022-03-25 PROCEDURE — 84443 ASSAY THYROID STIM HORMONE: CPT

## 2022-03-25 PROCEDURE — 84439 ASSAY OF FREE THYROXINE: CPT

## 2022-03-25 PROCEDURE — 80061 LIPID PANEL: CPT

## 2022-03-25 PROCEDURE — 3051F HG A1C>EQUAL 7.0%<8.0%: CPT | Performed by: INTERNAL MEDICINE

## 2022-03-25 NOTE — TELEPHONE ENCOUNTER
3/25/2022 11:56 AM Called patient regarding her INR testing  Last testing was done in February  Left message on her voicemail to call the office     Marian Espinoza DO

## 2022-03-25 NOTE — TELEPHONE ENCOUNTER
----- Message from Heber Medina DO sent at 3/25/2022 12:45 PM EDT -----  Increase to 7 mg a day  recheck in 1 week  Heber Medina DO

## 2022-03-25 NOTE — TELEPHONE ENCOUNTER
LMOM for a call back  Dr Siobhan Whittington would you like to call the patient?    Kate Holstein, MA

## 2022-03-28 ENCOUNTER — TELEPHONE (OUTPATIENT)
Dept: ENDOCRINOLOGY | Facility: CLINIC | Age: 67
End: 2022-03-28

## 2022-03-28 ENCOUNTER — ANTICOAG VISIT (OUTPATIENT)
Dept: FAMILY MEDICINE CLINIC | Facility: CLINIC | Age: 67
End: 2022-03-28

## 2022-03-28 NOTE — TELEPHONE ENCOUNTER
Patient aware, calender updated  Patient states she cannot guarantee she will have it rechecked on Friday  She mentioned having dialysis and work making it difficult to have her INR draws when needed  Sending to Dr Kris Clifton as Cierra Engel MA

## 2022-03-30 ENCOUNTER — TELEPHONE (OUTPATIENT)
Dept: NEPHROLOGY | Facility: CLINIC | Age: 67
End: 2022-03-30

## 2022-03-30 DIAGNOSIS — N94.9 ADNEXAL CYST: Primary | ICD-10-CM

## 2022-03-30 NOTE — TELEPHONE ENCOUNTER
[facility] in [facility] called with significant finding on CT chest abdomen pelvis wo contrast  Results are in Epic       Radiology can be reached at: [phone]

## 2022-03-31 ENCOUNTER — APPOINTMENT (OUTPATIENT)
Dept: PHYSICAL THERAPY | Facility: CLINIC | Age: 67
End: 2022-03-31
Payer: COMMERCIAL

## 2022-04-01 NOTE — TELEPHONE ENCOUNTER
Patient is scheduled for 4/18/2022 at the [facility]    She is aware this is just a follow up study

## 2022-04-06 ENCOUNTER — TELEPHONE (OUTPATIENT)
Dept: FAMILY MEDICINE CLINIC | Facility: CLINIC | Age: 67
End: 2022-04-06

## 2022-04-06 NOTE — TELEPHONE ENCOUNTER
Left message on machine requesting a call back  Pt is overdue for her INR  Was due on 4/1/22   Tony Sandoval LPN

## 2022-04-07 NOTE — TELEPHONE ENCOUNTER
Message left for pt to have INR drawn  Pt reminded of the importance of having the INR drawn when requested by the doctor    dayan

## 2022-04-11 NOTE — TELEPHONE ENCOUNTER
Spoke to patient  She says she is aware she is due but has not had time as she is working  She said she would go to the lab when she can     Gagan Shannon MA

## 2022-04-13 NOTE — TELEPHONE ENCOUNTER
[facility] pre encounter called - pt scheduled for a CT for her pre transplant eval and will need pre auth  Message was forwarded to [place] 
Influenza

## 2022-04-18 ENCOUNTER — HOSPITAL ENCOUNTER (OUTPATIENT)
Dept: RADIOLOGY | Facility: HOSPITAL | Age: 67
Discharge: HOME | End: 2022-04-18
Payer: COMMERCIAL

## 2022-04-18 DIAGNOSIS — N94.9 ADNEXAL CYST: ICD-10-CM

## 2022-04-18 PROCEDURE — 76856 US EXAM PELVIC COMPLETE: CPT

## 2022-04-18 PROCEDURE — 76830 TRANSVAGINAL US NON-OB: CPT

## 2022-04-19 ENCOUNTER — ANTICOAG VISIT (OUTPATIENT)
Dept: FAMILY MEDICINE CLINIC | Facility: CLINIC | Age: 67
End: 2022-04-19

## 2022-04-19 LAB — INR PPP: 1.5 (ref 0.84–1.19)

## 2022-04-19 NOTE — TELEPHONE ENCOUNTER
Pt went to [facility] on Friday for extensive lab work as she is on the kidney transplant list  Her INR was 1 50  Prothrombin time is 15 6  Pt is currently alternating between 6 and 8 mg daily   Jeannie Kaye LPN

## 2022-04-19 NOTE — TELEPHONE ENCOUNTER
Spoke to patient  She is aware to start taking 8mg daily and recheck in on week  Patient says she is going on vacation and will not be back until 05/02/22  She says she will check it after her vacation  She is asking we do not attempt to reach her until after this date  Calendar updated  Sending to Dr Zoltan Rodriguez as Gail Crowley MA

## 2022-04-26 ENCOUNTER — TELEPHONE (OUTPATIENT)
Dept: OTHER | Facility: HOSPITAL | Age: 67
End: 2022-04-26

## 2022-04-26 NOTE — TELEPHONE ENCOUNTER
Ms Liana Chew - [birth year] - pt had CT scan with adnexal cyst  so she underwent pelvic u/s    pelvic ultrasound with simple left ovarian cyst  can be followed yearly u/s  uterin leiomyoma  i tried to call pt now, but lines were cut off as i was telling her above  can you please reach pt to let her know above findings  she should f/u with her Ob/Gyn physician  thank you    Attempted to call the patient with the results but our phone lines were cut off  Then I spoke to the daughter  Daughter states that the patient is currently in [facility] and this is why  The daughter attempted to connect us but was unsuccessful   To protect the patient's privacy I asked the daughter just to let the patient know that there was not anything urgent on the ultrasound but the rest of the information I will tell the patient directly or our office will  Daughter stated understanding

## 2022-04-28 ENCOUNTER — TELEPHONE (OUTPATIENT)
Dept: FAMILY MEDICINE CLINIC | Facility: CLINIC | Age: 67
End: 2022-04-28

## 2022-04-28 NOTE — TELEPHONE ENCOUNTER
[place] 2nd  She stated that she will take care of it when she returns    Gavino Camacho MA  Will keep open to follow

## 2022-05-03 ENCOUNTER — OFFICE VISIT (OUTPATIENT)
Dept: FAMILY MEDICINE CLINIC | Facility: CLINIC | Age: 67
End: 2022-05-03
Payer: COMMERCIAL

## 2022-05-03 VITALS
TEMPERATURE: 97.3 F | BODY MASS INDEX: 41.27 KG/M2 | HEART RATE: 98 BPM | OXYGEN SATURATION: 90 % | WEIGHT: 210.2 LBS | HEIGHT: 60 IN | DIASTOLIC BLOOD PRESSURE: 84 MMHG | RESPIRATION RATE: 18 BRPM | SYSTOLIC BLOOD PRESSURE: 132 MMHG

## 2022-05-03 DIAGNOSIS — R05.9 COUGH: ICD-10-CM

## 2022-05-03 DIAGNOSIS — I26.99 PE (PULMONARY THROMBOEMBOLISM) (HCC): Primary | ICD-10-CM

## 2022-05-03 DIAGNOSIS — B34.9 VIRAL INFECTION, UNSPECIFIED: ICD-10-CM

## 2022-05-03 PROCEDURE — U0003 INFECTIOUS AGENT DETECTION BY NUCLEIC ACID (DNA OR RNA); SEVERE ACUTE RESPIRATORY SYNDROME CORONAVIRUS 2 (SARS-COV-2) (CORONAVIRUS DISEASE [COVID-19]), AMPLIFIED PROBE TECHNIQUE, MAKING USE OF HIGH THROUGHPUT TECHNOLOGIES AS DESCRIBED BY CMS-2020-01-R: HCPCS | Performed by: FAMILY MEDICINE

## 2022-05-03 PROCEDURE — U0005 INFEC AGEN DETEC AMPLI PROBE: HCPCS | Performed by: FAMILY MEDICINE

## 2022-05-03 PROCEDURE — 99213 OFFICE O/P EST LOW 20 MIN: CPT | Performed by: FAMILY MEDICINE

## 2022-05-03 PROCEDURE — 3288F FALL RISK ASSESSMENT DOCD: CPT | Performed by: FAMILY MEDICINE

## 2022-05-03 PROCEDURE — 1101F PT FALLS ASSESS-DOCD LE1/YR: CPT | Performed by: FAMILY MEDICINE

## 2022-05-03 RX ORDER — BENZONATATE 100 MG/1
100 CAPSULE ORAL 2 TIMES DAILY PRN
Qty: 20 CAPSULE | Refills: 0 | Status: SHIPPED | OUTPATIENT
Start: 2022-05-03 | End: 2022-06-02

## 2022-05-03 RX ORDER — AZITHROMYCIN 250 MG/1
TABLET, FILM COATED ORAL
Qty: 6 TABLET | Refills: 0 | Status: SHIPPED | OUTPATIENT
Start: 2022-05-03 | End: 2022-05-08

## 2022-05-03 NOTE — ASSESSMENT & PLAN NOTE
On long term coumadin   Recommended that she start checking her INRs at home  She has a hard time getting to lab to her time constraints from home dialysis and her employment

## 2022-05-03 NOTE — TELEPHONE ENCOUNTER
I spoke with pt, she is aware she is due for her INR, she just got back from vacation and she is not feeling well  She would like to be seen before getting her INR done  Appointment made for today at 3:00 pm with Dr Jasmyn Quinonez LPN

## 2022-05-03 NOTE — PROGRESS NOTES
Assessment/Plan:    1  PE (pulmonary thromboembolism) ([address] )  Assessment & Plan: On long term coumadin   Recommended that she start checking her INRs at home  She has a hard time getting to lab to her time constraints from home dialysis and her employment  2  Cough  -     azithromycin (ZITHROMAX) 250 mg tablet; Take 2 the first day, then take 1 daily  -     XR chest pa & lateral; Future; Expected date: 05/03/2022  -     benzonatate (TESSALON PERLES) 100 mg capsule; Take 1 capsule (100 mg total) by mouth 2 (two) times a day as needed for cough    3  Viral infection, unspecified  -     COVID Only - Office Collect           There are no Patient Instructions on file for this visit  Return for Scheduled follow up in June  Subjective:      Patient ID: Hilda Short is a 74 y o  female  Chief Complaint   Patient presents with    Cough    Sinusitis     [facility] [place]       She has had allergy symptoms for the past month  She has had nasal congestion and ear pressure  She went to [facility] on the 20th last month  She was coughing the whole time  Her glands feel swollen  The following portions of the patient's history were reviewed and updated as appropriate:  past social history    Review of Systems   Constitutional: Negative for fever  HENT: Positive for rhinorrhea and sore throat  Respiratory: Positive for cough and shortness of breath            Current Outpatient Medications   Medication Sig Dispense Refill    atorvastatin (LIPITOR) 20 mg tablet Take 1 tablet (20 mg total) by mouth daily 90 tablet 1    B Complex-C (B-COMPLEX WITH VITAMIN C) tablet Take 1 tablet by mouth daily      Blood Glucose Monitoring Suppl (ONE TOUCH ULTRA 2) w/Device KIT Test glucose 3 times daily 1 each 0    calcium acetate (PHOSLO) capsule Take 667 mg by mouth daily       cholecalciferol (VITAMIN D3) 1,000 units tablet Take 2 tablets (2,000 Units total) by mouth daily 100 tablet 5    cinacalcet (SENSIPAR) 30 mg tablet Take 1 tablet (30 mg total) by mouth 3 (three) times a week 36 tablet 3    Continuous Blood Gluc  (FreeStyle [place] 14 Day [place]) GAGANDEEP Use to test blood sugar daily 1 Device 0    Continuous Blood Gluc Sensor (FreeStyle [place] 2 Sensor) MISC       docusate sodium (COLACE) 100 mg capsule Take 1 capsule (100 mg total) by mouth 2 (two) times a day 10 capsule 0    gentamicin (GARAMYCIN) 0 1 % cream APPLY TO EXOSITE DAILY AND AS NEEDED 15 g 11    insulin glargine (Lantus SoloStar) 100 units/mL injection pen Inject 30 Units under the skin daily at bedtime 15 mL 3    Insulin Pen Needle 31G X 5 MM MISC by Does not apply route daily 100 each 1    insuln lispro (HumaLOG KwikPen) 200 units/mL CONCENTRATED U-200 injection pen Inject 10 Units under the skin 3 (three) times a day with meals 9 mL 3    Lancets (onetouch ultrasoft) lancets Test glucose 3 times a day; Code: E11 8 300 each 1    levothyroxine 100 mcg tablet Take 1 tablet (100 mcg total) by mouth daily 90 tablet 3    metolazone (ZAROXOLYN) 5 mg tablet Take 5 mg by mouth daily       metoprolol tartrate (LOPRESSOR) 25 mg tablet Take 25 mg by mouth every 12 (twelve) hours      MULTIPLE VITAMIN PO Take 1 tablet by mouth daily      OneTouch Ultra test strip TEST GLUCOSE THREE TIMES A  strip 3    sitaGLIPtin (JANUVIA) 25 mg tablet Take one tablet (25mg) by mouth daily 90 tablet 3    torsemide (DEMADEX) 100 mg tablet Take 1 tablet (100 mg total) by mouth daily 90 tablet 1    valsartan (DIOVAN) 160 mg tablet TAKE 1 TABLET DAILY 90 tablet 3    warfarin (Coumadin) 2 mg tablet Daily as directed 90 tablet 3    warfarin (COUMADIN) 5 mg tablet Daily as directed 90 tablet 1    azithromycin (ZITHROMAX) 250 mg tablet Take 2 the first day, then take 1 daily 6 tablet 0    benzonatate (TESSALON PERLES) 100 mg capsule Take 1 capsule (100 mg total) by mouth 2 (two) times a day as needed for cough 20 capsule 0     No current facility-administered medications for this visit  Objective:    /84   Pulse 98   Temp (!) 97 3 °F (36 3 °C)   Resp 18   Ht 5' (1 524 m)   Wt 95 3 kg (210 lb 3 2 oz)   SpO2 90%   BMI 41 05 kg/m²      Physical Exam  Vitals and nursing note reviewed  Constitutional:       Appearance: She is well-developed  HENT:      Head: Normocephalic and atraumatic  Right Ear: Tympanic membrane and external ear normal       Left Ear: Tympanic membrane and external ear normal    Cardiovascular:      Rate and Rhythm: Normal rate and regular rhythm  Heart sounds: Normal heart sounds  No murmur heard  No friction rub  Pulmonary:      Effort: Pulmonary effort is normal  No respiratory distress  Breath sounds: Normal breath sounds  No wheezing or rales  Musculoskeletal:      Right lower leg: No edema  Left lower leg: No edema               [facility], DO

## 2022-05-04 ENCOUNTER — TELEPHONE (OUTPATIENT)
Dept: FAMILY MEDICINE CLINIC | Facility: CLINIC | Age: 67
End: 2022-05-04

## 2022-05-04 LAB — SARS-COV-2 RNA RESP QL NAA+PROBE: NEGATIVE

## 2022-05-04 NOTE — TELEPHONE ENCOUNTER
----- Message from Keila Miner DO sent at 5/4/2022  2:52 PM EDT -----  Please call patient to let them know that their COVID test is negative    Thank you,  Keila Miner, DO

## 2022-05-05 ENCOUNTER — TELEPHONE (OUTPATIENT)
Dept: PULMONOLOGY | Facility: MEDICAL CENTER | Age: 67
End: 2022-05-05

## 2022-05-06 PROCEDURE — 3066F NEPHROPATHY DOC TX: CPT | Performed by: INTERNAL MEDICINE

## 2022-05-17 NOTE — TELEPHONE ENCOUNTER
Spoke to Pretty  She says she is having a procedure today, Cardiac cath  She was told to hold coumadin until further notice  Pretty wants to know when she should resume her coumadin  Pretty also wants to know how to proceed with a home INR testing machine  She is requesting we call her tomorrow with a response for this       Jacqueline Riggs MA

## 2022-05-18 NOTE — TELEPHONE ENCOUNTER
5/18/2022 1:39 PM Returned call to [facility]       Voice mail is full unable to leave a message  Selena Delacruz, DO

## 2022-05-18 NOTE — TELEPHONE ENCOUNTER
Lacho Coe called with her INR it was 1 9  She stated the she took 6mg on Sunday was told to hold it until today  They now informed her to started 8mg today    Braydon Alatorre, CHRISTINAN

## 2022-05-19 ENCOUNTER — OFFICE VISIT (OUTPATIENT)
Dept: FAMILY MEDICINE CLINIC | Facility: CLINIC | Age: 67
End: 2022-05-19
Payer: COMMERCIAL

## 2022-05-19 ENCOUNTER — HOSPITAL ENCOUNTER (OUTPATIENT)
Dept: RADIOLOGY | Facility: HOSPITAL | Age: 67
Discharge: HOME | End: 2022-05-19
Payer: COMMERCIAL

## 2022-05-19 ENCOUNTER — TELEPHONE (OUTPATIENT)
Dept: FAMILY MEDICINE CLINIC | Facility: CLINIC | Age: 67
End: 2022-05-19

## 2022-05-19 VITALS
WEIGHT: 204 LBS | DIASTOLIC BLOOD PRESSURE: 80 MMHG | HEART RATE: 77 BPM | BODY MASS INDEX: 40.05 KG/M2 | HEIGHT: 60 IN | TEMPERATURE: 97.5 F | SYSTOLIC BLOOD PRESSURE: 138 MMHG | RESPIRATION RATE: 16 BRPM | OXYGEN SATURATION: 98 %

## 2022-05-19 DIAGNOSIS — N18.5 BENIGN HYPERTENSION WITH CKD (CHRONIC KIDNEY DISEASE) STAGE V (HCC): ICD-10-CM

## 2022-05-19 DIAGNOSIS — R20.0 FACIAL NUMBNESS: Primary | ICD-10-CM

## 2022-05-19 DIAGNOSIS — I12.0 BENIGN HYPERTENSION WITH CKD (CHRONIC KIDNEY DISEASE) STAGE V (HCC): ICD-10-CM

## 2022-05-19 DIAGNOSIS — E11.319 DIABETIC RETINOPATHY OF BOTH EYES ASSOCIATED WITH TYPE 2 DIABETES MELLITUS, MACULAR EDEMA PRESENCE UNSPECIFIED, UNSPECIFIED RETINOPATHY SEVERITY (HCC): ICD-10-CM

## 2022-05-19 DIAGNOSIS — R20.0 FACIAL NUMBNESS: ICD-10-CM

## 2022-05-19 DIAGNOSIS — N18.5 STAGE 5 CHRONIC KIDNEY DISEASE NOT ON CHRONIC DIALYSIS (HCC): ICD-10-CM

## 2022-05-19 DIAGNOSIS — N18.6 END STAGE KIDNEY DISEASE (HCC): ICD-10-CM

## 2022-05-19 DIAGNOSIS — M10.371 ACUTE GOUT DUE TO RENAL IMPAIRMENT INVOLVING TOE OF RIGHT FOOT: ICD-10-CM

## 2022-05-19 PROCEDURE — G1004 CDSM NDSC: HCPCS

## 2022-05-19 PROCEDURE — 99214 OFFICE O/P EST MOD 30 MIN: CPT | Performed by: NURSE PRACTITIONER

## 2022-05-19 PROCEDURE — 70450 CT HEAD/BRAIN W/O DYE: CPT

## 2022-05-19 PROCEDURE — 3075F SYST BP GE 130 - 139MM HG: CPT | Performed by: NURSE PRACTITIONER

## 2022-05-19 PROCEDURE — 3079F DIAST BP 80-89 MM HG: CPT | Performed by: NURSE PRACTITIONER

## 2022-05-19 NOTE — TELEPHONE ENCOUNTER
Spoke w/ pt regarding CT, no acute abnormality  Will check labs  Advised strict ER precautions for any changes whatsoever including increased numbness/tingling, headache, vision changes, slurred speech, difficulty swallowing, weakness, etc   She expresses understanding   Kalina Galvez

## 2022-05-19 NOTE — ASSESSMENT & PLAN NOTE
Lab Results   Component Value Date    EGFR 6 03/25/2022    EGFR 6 10/26/2021    EGFR 7 05/21/2021    CREATININE 5 98 (H) 03/25/2022    CREATININE 7 00 (H) 10/26/2021    CREATININE 5 68 (H) 05/21/2021     On PD

## 2022-05-19 NOTE — ASSESSMENT & PLAN NOTE
Lab Results   Component Value Date    EGFR 6 03/25/2022    EGFR 6 10/26/2021    EGFR 7 05/21/2021    CREATININE 5 98 (H) 03/25/2022    CREATININE 7 00 (H) 10/26/2021    CREATININE 5 68 (H) 05/21/2021     BP stable

## 2022-05-19 NOTE — PROGRESS NOTES
Assessment/Plan:    Unclear etiology  Will check stat CT head and f/u with results    1  Facial numbness  -     CT head wo contrast; Future; Expected date: 05/19/2022    2  Diabetic retinopathy of both eyes associated with type 2 diabetes mellitus, macular edema presence unspecified, unspecified retinopathy severity ([address] )    3  Benign hypertension with CKD (chronic kidney disease) stage V [facility])  Assessment & Plan:  Lab Results   Component Value Date    EGFR 6 03/25/2022    EGFR 6 10/26/2021    EGFR 7 05/21/2021    CREATININE 5 98 (H) 03/25/2022    CREATININE 7 00 (H) 10/26/2021    CREATININE 5 68 (H) 05/21/2021     BP stable       4  End stage kidney disease [facility])  Assessment & Plan:  Lab Results   Component Value Date    EGFR 6 03/25/2022    EGFR 6 10/26/2021    EGFR 7 05/21/2021    CREATININE 5 98 (H) 03/25/2022    CREATININE 7 00 (H) 10/26/2021    CREATININE 5 68 (H) 05/21/2021     On PD              There are no Patient Instructions on file for this visit  Return if symptoms worsen or fail to improve  Subjective:      Patient ID: Aaron Stallings is a 74 y o  female  Chief Complaint   Patient presents with    numbness on left side     wmcma       Here today with complaints of left sided facial numbness  She had cardiac catherization two days ago with access from right IJ  Removed the right IJ site bandage this morning, no issues  This morning when she woke, the left side of her face felt numb  She feels fatigued and her eyes feel heavy  Moving slower than usual, but no balance or gait issues  Speech is normal with no difficulty swallowing  No extremity weakness or deficits reports    INR the day of her procedure was 1 9, resumed Coumadin the following day  Was treated for sinusitis approx 2 weeks ago    Sinus symptoms improved, although does have some lingering congestion        The following portions of the patient's history were reviewed and updated as appropriate: allergies, current medications, past family history, past medical history, past social history, past surgical history and problem list     Review of Systems   Constitutional: Positive for fatigue  Eyes: Negative for photophobia and visual disturbance  Respiratory: Negative  Cardiovascular: Negative  Gastrointestinal: Negative  Genitourinary: Negative  Musculoskeletal: Negative  Neurological: Positive for numbness and headaches (mild)  Psychiatric/Behavioral: Negative            Current Outpatient Medications   Medication Sig Dispense Refill    atorvastatin (LIPITOR) 20 mg tablet Take 1 tablet (20 mg total) by mouth daily 90 tablet 1    B Complex-C (B-COMPLEX WITH VITAMIN C) tablet Take 1 tablet by mouth daily      Blood Glucose Monitoring Suppl (ONE TOUCH ULTRA 2) w/Device KIT Test glucose 3 times daily 1 each 0    calcium acetate (PHOSLO) capsule Take 667 mg by mouth daily       cholecalciferol (VITAMIN D3) 1,000 units tablet Take 2 tablets (2,000 Units total) by mouth daily 100 tablet 5    cinacalcet (SENSIPAR) 30 mg tablet Take 1 tablet (30 mg total) by mouth 3 (three) times a week 36 tablet 3    docusate sodium (COLACE) 100 mg capsule Take 1 capsule (100 mg total) by mouth 2 (two) times a day 10 capsule 0    gentamicin (GARAMYCIN) 0 1 % cream APPLY TO EXOSITE DAILY AND AS NEEDED 15 g 11    insulin glargine (Lantus SoloStar) 100 units/mL injection pen Inject 30 Units under the skin daily at bedtime 15 mL 3    Insulin Pen Needle 31G X 5 MM MISC by Does not apply route daily 100 each 1    insuln lispro (HumaLOG KwikPen) 200 units/mL CONCENTRATED U-200 injection pen Inject 10 Units under the skin 3 (three) times a day with meals 9 mL 3    Lancets (onetouch ultrasoft) lancets Test glucose 3 times a day; Code: E11 8 300 each 1    levothyroxine 100 mcg tablet Take 1 tablet (100 mcg total) by mouth daily 90 tablet 3    metolazone (ZAROXOLYN) 5 mg tablet Take 5 mg by mouth daily       metoprolol tartrate (LOPRESSOR) 25 mg tablet Take 25 mg by mouth every 12 (twelve) hours      MULTIPLE VITAMIN PO Take 1 tablet by mouth daily      OneTouch Ultra test strip TEST GLUCOSE THREE TIMES A  strip 3    sitaGLIPtin (JANUVIA) 25 mg tablet Take one tablet (25mg) by mouth daily 90 tablet 3    torsemide (DEMADEX) 100 mg tablet Take 1 tablet (100 mg total) by mouth daily 90 tablet 1    valsartan (DIOVAN) 160 mg tablet TAKE 1 TABLET DAILY 90 tablet 3    warfarin (Coumadin) 2 mg tablet Daily as directed 90 tablet 3    warfarin (COUMADIN) 5 mg tablet Daily as directed 90 tablet 1    benzonatate (TESSALON PERLES) 100 mg capsule Take 1 capsule (100 mg total) by mouth 2 (two) times a day as needed for cough (Patient not taking: Reported on 5/19/2022) 20 capsule 0    Continuous Blood Gluc  (FreeStyle Conner 14 Day [place]) GAGANDEEP Use to test blood sugar daily (Patient not taking: Reported on 5/19/2022) 1 Device 0    Continuous Blood Gluc Sensor (FreeStyle Conner 2 Sensor) MISC  (Patient not taking: Reported on 5/19/2022)       No current facility-administered medications for this visit  Objective:    /80   Pulse 77   Temp 97 5 °F (36 4 °C)   Resp 16   Ht 5' (1 524 m)   Wt 92 5 kg (204 lb)   SpO2 98%   BMI 39 84 kg/m²        Physical Exam  Vitals and nursing note reviewed  Constitutional:       Appearance: Normal appearance  She is well-developed  HENT:      Right Ear: Tympanic membrane normal       Left Ear: Tympanic membrane normal    Eyes:      Extraocular Movements: Extraocular movements intact  Conjunctiva/sclera: Conjunctivae normal       Pupils: Pupils are equal, round, and reactive to light  Cardiovascular:      Rate and Rhythm: Normal rate and regular rhythm  Heart sounds: Normal heart sounds  No murmur heard  Pulmonary:      Effort: Pulmonary effort is normal       Breath sounds: Normal breath sounds     Musculoskeletal:      Cervical back: Full passive range of motion without pain and normal range of motion  Skin:     General: Skin is warm and dry  Capillary Refill: Capillary refill takes less than 2 seconds  Neurological:      General: No focal deficit present  Mental Status: She is alert and oriented to person, place, and time  Cranial Nerves: Cranial nerves are intact  Sensory: Sensation is intact  Motor: Motor function is intact  Coordination: Coordination is intact  Gait: Gait is intact     Psychiatric:         Mood and Affect: Mood normal          Behavior: Behavior normal                     MICHAEL Bhatia

## 2022-05-19 NOTE — TELEPHONE ENCOUNTER
5/19/2022 11:37 AM spoke with Heide Redmond     She had a catheterization 2 days ago  The procedure was done on the right side  Now she is getting numbness on the left side of her neck  No slurred words, no weakness  She is going to check with insurance to see who we can use to see what home INR machine we can use for her  Her INR was 1 9 when she had her cath on Tuesday and hadn't had medication the day before  Appointment made today for evaluation      Message complete  Taty Miles, DO

## 2022-05-21 ENCOUNTER — APPOINTMENT (OUTPATIENT)
Dept: LAB | Facility: HOSPITAL | Age: 67
End: 2022-05-21
Payer: COMMERCIAL

## 2022-05-21 DIAGNOSIS — N18.5 BENIGN HYPERTENSION WITH CKD (CHRONIC KIDNEY DISEASE) STAGE V (HCC): ICD-10-CM

## 2022-05-21 DIAGNOSIS — I12.0 BENIGN HYPERTENSION WITH CKD (CHRONIC KIDNEY DISEASE) STAGE V (HCC): ICD-10-CM

## 2022-05-21 DIAGNOSIS — N18.5 STAGE 5 CHRONIC KIDNEY DISEASE NOT ON CHRONIC DIALYSIS (HCC): ICD-10-CM

## 2022-05-21 DIAGNOSIS — R20.0 FACIAL NUMBNESS: ICD-10-CM

## 2022-05-21 LAB
ANION GAP SERPL CALCULATED.3IONS-SCNC: 10 MMOL/L (ref 4–13)
BASOPHILS # BLD AUTO: 0.06 THOUSANDS/ΜL (ref 0–0.1)
BASOPHILS NFR BLD AUTO: 1 % (ref 0–1)
BUN SERPL-MCNC: 88 MG/DL (ref 5–25)
CALCIUM SERPL-MCNC: 8.5 MG/DL (ref 8.3–10.1)
CHLORIDE SERPL-SCNC: 98 MMOL/L (ref 100–108)
CO2 SERPL-SCNC: 26 MMOL/L (ref 21–32)
CREAT SERPL-MCNC: 7.75 MG/DL (ref 0.6–1.3)
EOSINOPHIL # BLD AUTO: 0.26 THOUSAND/ΜL (ref 0–0.61)
EOSINOPHIL NFR BLD AUTO: 3 % (ref 0–6)
ERYTHROCYTE [DISTWIDTH] IN BLOOD BY AUTOMATED COUNT: 14.9 % (ref 11.6–15.1)
ERYTHROCYTE [SEDIMENTATION RATE] IN BLOOD: 85 MM/HOUR (ref 0–29)
GFR SERPL CREATININE-BSD FRML MDRD: 4 ML/MIN/1.73SQ M
GLUCOSE P FAST SERPL-MCNC: 117 MG/DL (ref 65–99)
HCT VFR BLD AUTO: 36.9 % (ref 34.8–46.1)
HGB BLD-MCNC: 11.8 G/DL (ref 11.5–15.4)
IMM GRANULOCYTES # BLD AUTO: 0.05 THOUSAND/UL (ref 0–0.2)
IMM GRANULOCYTES NFR BLD AUTO: 1 % (ref 0–2)
LYMPHOCYTES # BLD AUTO: 0.83 THOUSANDS/ΜL (ref 0.6–4.47)
LYMPHOCYTES NFR BLD AUTO: 10 % (ref 14–44)
MCH RBC QN AUTO: 29.1 PG (ref 26.8–34.3)
MCHC RBC AUTO-ENTMCNC: 32 G/DL (ref 31.4–37.4)
MCV RBC AUTO: 91 FL (ref 82–98)
MONOCYTES # BLD AUTO: 1.18 THOUSAND/ΜL (ref 0.17–1.22)
MONOCYTES NFR BLD AUTO: 14 % (ref 4–12)
NEUTROPHILS # BLD AUTO: 6.34 THOUSANDS/ΜL (ref 1.85–7.62)
NEUTS SEG NFR BLD AUTO: 71 % (ref 43–75)
NRBC BLD AUTO-RTO: 0 /100 WBCS
PLATELET # BLD AUTO: 193 THOUSANDS/UL (ref 149–390)
PMV BLD AUTO: 11.5 FL (ref 8.9–12.7)
POTASSIUM SERPL-SCNC: 4.7 MMOL/L (ref 3.5–5.3)
RBC # BLD AUTO: 4.05 MILLION/UL (ref 3.81–5.12)
SODIUM SERPL-SCNC: 134 MMOL/L (ref 136–145)
VIT B12 SERPL-MCNC: 909 PG/ML (ref 100–900)
WBC # BLD AUTO: 8.72 THOUSAND/UL (ref 4.31–10.16)

## 2022-05-21 PROCEDURE — 86618 LYME DISEASE ANTIBODY: CPT

## 2022-05-21 PROCEDURE — 82607 VITAMIN B-12: CPT

## 2022-05-21 PROCEDURE — 85025 COMPLETE CBC W/AUTO DIFF WBC: CPT

## 2022-05-21 PROCEDURE — 80048 BASIC METABOLIC PNL TOTAL CA: CPT

## 2022-05-21 PROCEDURE — 36415 COLL VENOUS BLD VENIPUNCTURE: CPT

## 2022-05-21 PROCEDURE — 85652 RBC SED RATE AUTOMATED: CPT

## 2022-05-23 ENCOUNTER — TELEPHONE (OUTPATIENT)
Dept: FAMILY MEDICINE CLINIC | Facility: CLINIC | Age: 67
End: 2022-05-23

## 2022-05-23 ENCOUNTER — TELEPHONE (OUTPATIENT)
Dept: ENDOCRINOLOGY | Facility: CLINIC | Age: 67
End: 2022-05-23

## 2022-05-23 ENCOUNTER — OFFICE VISIT (OUTPATIENT)
Dept: ENDOCRINOLOGY | Facility: CLINIC | Age: 67
End: 2022-05-23
Payer: COMMERCIAL

## 2022-05-23 VITALS
DIASTOLIC BLOOD PRESSURE: 80 MMHG | HEART RATE: 64 BPM | WEIGHT: 201 LBS | HEIGHT: 60 IN | SYSTOLIC BLOOD PRESSURE: 130 MMHG | BODY MASS INDEX: 39.46 KG/M2

## 2022-05-23 DIAGNOSIS — Z79.4 TYPE 2 DIABETES MELLITUS WITH HYPERGLYCEMIA, WITH LONG-TERM CURRENT USE OF INSULIN (HCC): ICD-10-CM

## 2022-05-23 DIAGNOSIS — I10 ESSENTIAL HYPERTENSION: ICD-10-CM

## 2022-05-23 DIAGNOSIS — E78.2 MIXED HYPERLIPIDEMIA: ICD-10-CM

## 2022-05-23 DIAGNOSIS — Z79.4 TYPE 2 DIABETES MELLITUS WITH HYPERGLYCEMIA, WITH LONG-TERM CURRENT USE OF INSULIN (HCC): Primary | ICD-10-CM

## 2022-05-23 DIAGNOSIS — E03.9 ACQUIRED HYPOTHYROIDISM: ICD-10-CM

## 2022-05-23 DIAGNOSIS — E16.2 HYPOGLYCEMIA: ICD-10-CM

## 2022-05-23 DIAGNOSIS — N18.6 ESRD (END STAGE RENAL DISEASE) (HCC): ICD-10-CM

## 2022-05-23 DIAGNOSIS — E11.65 TYPE 2 DIABETES MELLITUS WITH HYPERGLYCEMIA, WITH LONG-TERM CURRENT USE OF INSULIN (HCC): ICD-10-CM

## 2022-05-23 DIAGNOSIS — E11.65 TYPE 2 DIABETES MELLITUS WITH HYPERGLYCEMIA, WITH LONG-TERM CURRENT USE OF INSULIN (HCC): Primary | ICD-10-CM

## 2022-05-23 DIAGNOSIS — R20.0 FACIAL NUMBNESS: Primary | ICD-10-CM

## 2022-05-23 PROCEDURE — 99214 OFFICE O/P EST MOD 30 MIN: CPT | Performed by: INTERNAL MEDICINE

## 2022-05-23 PROCEDURE — 1036F TOBACCO NON-USER: CPT | Performed by: INTERNAL MEDICINE

## 2022-05-23 PROCEDURE — 3008F BODY MASS INDEX DOCD: CPT | Performed by: INTERNAL MEDICINE

## 2022-05-23 RX ORDER — SEMAGLUTIDE 1.34 MG/ML
0.25 INJECTION, SOLUTION SUBCUTANEOUS WEEKLY
Qty: 1.5 ML | Refills: 1 | Status: SHIPPED | OUTPATIENT
Start: 2022-05-23 | End: 2022-05-25 | Stop reason: SDUPTHER

## 2022-05-23 RX ORDER — PREDNISOLONE ACETATE 10 MG/ML
SUSPENSION/ DROPS OPHTHALMIC
COMMUNITY
Start: 2022-05-20

## 2022-05-23 NOTE — TELEPHONE ENCOUNTER
She is looking for her lab work results and she is asking for an antibiotic since her face is "still numb and swollen" She left this message on the result hotline I know her Lyme is still pending  What would you like us to tell her?  [facility]

## 2022-05-23 NOTE — TELEPHONE ENCOUNTER
Patient stated that she still has the same symptoms  And I stated that we will call her with the Lyme test results    Nahum Mesa MA

## 2022-05-23 NOTE — PROGRESS NOTES
Anil Person 74 y o  female MRN: [id number]    Encounter: [id number]      Assessment/Plan     1  Type 2 diabetes mellitus long-term insulin therapy   2  End-stage renal disease on peritoneal dialysis  3  Retinopathy   4  Obesity   Improved on recall of blood sugars as well as labs  Discussed different medications that can be used for glycemic management-oral hypoglycemic agents as well as injectable insulin and non-insulin medications (GLP 1 agonist) along with risks, benefits, side effect profile  No h/o pancreatitis/ MEN syndrome/ Medullary thyroid cancer     Recommend the following at this time  Start Ozempic 0 25 mg subcutaneously weekly   Discontinue januvia    Discontinue insulin 75/25   Start Lantus 18 units subcutaneously once a day   Start Humalog 5 units with meals 3 times a day   Send blood sugar log for review in 2-3 weeks   Try to include some physical activity into daily routine    5  Hyperlipidemia  - continue statin therapy    6  Hypertension  Blood pressure at goal, continue current medications     7  Hypothyroidism, TSH mildly elevated, advised to take levothyroxine regularly, continue current dose of levothyroxine   Follow-up in 3 months    CC: Diabetes    History of Present Illness     HPI:  Anil Person is a 74 y o  female presents for a follow-up visit regarding diabetes management     Also has history of hypertension, hyperlipidemia, hypothyroidism, end-stage renal disease on dialysis, retinopathy, obesity    Last seen in 11/2021  Last Eye exam:  Has been following up regularly, retinopathy gets anti VEGF injections    Current regimen:   still using insulin 75/25 because she had a lot and has not switched to using lantus + humalog   Varying dose of insulin she takes   Usually 20-25 units with breakfast and 30 units with dinner   januvia 25 mg daily     Levothyroxine 100 mcg orally daily   Forgets a few times a week , takes double the next day to make up     Being evaluated for kidney transplant, [place]   Had cardiac cath last week on Tuesday, has been having some nubness on the left side of the face since  Waiting to hear back from Cardiology     Trying to eat better overall    Has lost some weight   Has not been using ivan CGM because of recent procedures    Would like try ozempic     Statin:  Atorvastatin  ACE-I/ARB:  Valsartan    Home glucose monitorin-3 times a day, on recall   Before breakfast:     Before lunch:  --  Before dinner:150-216 , not many BG > 200   Bedtime:  140     Symptoms of hypoglycemia :  shireen Rob even if BG are in the 90s  Lows are not often     All other systems were reviewed and are negative      Review of Systems      Historical Information   Past Medical History:   Diagnosis Date    Acute asthmatic bronchitis     last assessed: 2017    Cardiac disease     Chronic diastolic (congestive) heart failure (HCC)     Colon polyp     Diabetes mellitus [facility]     Hyperlipidemia     Hypertension     Medical non-compliance     Morbid obesity with BMI of 40 0-44 9, adult ([facility] ) 2019    Pneumonia     last assessed: 2013    [facility]     Renal disorder     possible clot noted in kidney, thus blood thinners currently    Severe obstructive sleep apnea     Severe pulmonary arterial systolic hypertension (HCC)      Past Surgical History:   Procedure Laterality Date     SECTION       x 1    EYE SURGERY      NOSE SURGERY      fractured nose - septoplasty     Social History   Social History     Substance and Sexual Activity   Alcohol Use Never     Social History     Substance and Sexual Activity   Drug Use No     Social History     Tobacco Use   Smoking Status Never Smoker   Smokeless Tobacco Never Used     Family History:   Family History   Problem Relation Age of Onset    Diabetes Mother         mellitus    Anxiety disorder Mother         generalized anxiety disorder    Hypertension Mother     Stroke Mother     Kidney disease Father     Kidney failure Father         renal failure    Ulcerative colitis Sister     Diabetes Sister     Heart disease Family     Ovarian cancer Maternal Aunt     Diabetes Maternal Aunt     Diabetes Maternal Uncle     Heart disease Maternal Uncle     Thyroid disease Neg Hx        Meds/Allergies   Current Outpatient Medications   Medication Sig Dispense Refill    atorvastatin (LIPITOR) 20 mg tablet Take 1 tablet (20 mg total) by mouth daily 90 tablet 1    B Complex-C (B-COMPLEX WITH VITAMIN C) tablet Take 1 tablet by mouth daily      Blood Glucose Monitoring Suppl (ONE TOUCH ULTRA 2) w/Device KIT Test glucose 3 times daily 1 each 0    calcium acetate (PHOSLO) capsule Take 667 mg by mouth daily       cholecalciferol (VITAMIN D3) 1,000 units tablet Take 2 tablets (2,000 Units total) by mouth daily 100 tablet 5    cinacalcet (SENSIPAR) 30 mg tablet Take 1 tablet (30 mg total) by mouth 3 (three) times a week 36 tablet 3    docusate sodium (COLACE) 100 mg capsule Take 1 capsule (100 mg total) by mouth 2 (two) times a day 10 capsule 0    gentamicin (GARAMYCIN) 0 1 % cream APPLY TO EXOSITE DAILY AND AS NEEDED 15 g 11    insulin glargine (Lantus SoloStar) 100 units/mL injection pen Inject 30 Units under the skin daily at bedtime 15 mL 3    Insulin Pen Needle 31G X 5 MM MISC by Does not apply route daily 100 each 1    Lancets (onetouch ultrasoft) lancets Test glucose 3 times a day; Code: E11 8 300 each 1    levothyroxine 100 mcg tablet Take 1 tablet (100 mcg total) by mouth daily 90 tablet 3    metolazone (ZAROXOLYN) 5 mg tablet Take 5 mg by mouth daily       metoprolol tartrate (LOPRESSOR) 25 mg tablet Take 25 mg by mouth every 12 (twelve) hours      MULTIPLE VITAMIN PO Take 1 tablet by mouth daily      OneTouch Ultra test strip TEST GLUCOSE THREE TIMES A  strip 3    sitaGLIPtin (JANUVIA) 25 mg tablet Take one tablet (25mg) by mouth daily 90 tablet 3    torsemide (DEMADEX) 100 mg tablet Take 1 tablet (100 mg total) by mouth daily 90 tablet 1    valsartan (DIOVAN) 160 mg tablet TAKE 1 TABLET DAILY 90 tablet 3    warfarin (Coumadin) 2 mg tablet Daily as directed (Patient taking differently: 6-8 mg Daily as directed) 90 tablet 3    benzonatate (TESSALON PERLES) 100 mg capsule Take 1 capsule (100 mg total) by mouth 2 (two) times a day as needed for cough (Patient not taking: Reported on 5/19/2022) 20 capsule 0    Continuous Blood Gluc  ([employer] Conner 14 Day [place]) GAGANDEEP Use to test blood sugar daily (Patient not taking: Reported on 5/19/2022) 1 Device 0    Continuous Blood Gluc Sensor (FreeStyle Conner 2 Sensor) MISC  (Patient not taking: Reported on 5/19/2022)      insuln lispro (HumaLOG KwikPen) 200 units/mL CONCENTRATED U-200 injection pen Inject 10 Units under the skin 3 (three) times a day with meals (Patient not taking: Reported on 5/23/2022) 9 mL 3    prednisoLONE acetate (PRED FORTE) 1 % ophthalmic suspension  (Patient not taking: Reported on 5/23/2022)      warfarin (COUMADIN) 5 mg tablet Daily as directed (Patient not taking: Reported on 5/23/2022) 90 tablet 1     No current facility-administered medications for this visit  No Known Allergies    Objective   Vitals: Blood pressure 130/80, pulse 64, height 5' (1 524 m), weight 91 2 kg (201 lb), not currently breastfeeding  Physical Exam  Constitutional:       Appearance: She is well-developed  HENT:      Head: Normocephalic and atraumatic  Eyes:      Conjunctiva/sclera: Conjunctivae normal       Pupils: Pupils are equal, round, and reactive to light  Neck:      Thyroid: No thyromegaly  Cardiovascular:      Rate and Rhythm: Normal rate and regular rhythm  Heart sounds: Normal heart sounds  No murmur heard  Pulmonary:      Effort: Pulmonary effort is normal       Breath sounds: Normal breath sounds  No wheezing  Abdominal:      General: There is no distension  Palpations: Abdomen is soft  Tenderness: There is no abdominal tenderness  Musculoskeletal:         General: Normal range of motion  Cervical back: Normal range of motion and neck supple  Skin:     General: Skin is warm and dry  Neurological:      Mental Status: She is alert and oriented to person, place, and time  Psychiatric:         Behavior: Behavior normal          The history was obtained from the review of the chart, patient  Lab Results:   Lab Results   Component Value Date/Time    Hemoglobin A1C 7 0 (H) 03/25/2022 11:58 AM    Hemoglobin A1C 8 1 (H) 10/26/2021 11:46 AM    WBC 8 72 05/21/2022 08:28 AM    Hemoglobin 11 8 05/21/2022 08:28 AM    Hematocrit 36 9 05/21/2022 08:28 AM    MCV 91 05/21/2022 08:28 AM    Platelets 808 62/94/8805 08:28 AM    BUN 88 (H) 05/21/2022 08:28 AM    BUN 68 (H) 03/25/2022 11:58 AM    BUN 88 (H) 10/26/2021 11:46 AM    Potassium 4 7 05/21/2022 08:28 AM    Potassium 4 6 03/25/2022 11:58 AM    Potassium 4 0 10/26/2021 11:46 AM    Chloride 98 (L) 05/21/2022 08:28 AM    Chloride 101 03/25/2022 11:58 AM    Chloride 98 (L) 10/26/2021 11:46 AM    CO2 26 05/21/2022 08:28 AM    CO2 27 03/25/2022 11:58 AM    CO2 26 10/26/2021 11:46 AM    Creatinine 7 75 (H) 05/21/2022 08:28 AM    Creatinine 5 98 (H) 03/25/2022 11:58 AM    Creatinine 7 00 (H) 10/26/2021 11:46 AM    HDL, Direct 57 03/25/2022 11:58 AM    Triglycerides 84 03/25/2022 11:58 AM         Imaging Studies: I have personally reviewed pertinent reports  Portions of the record may have been created with voice recognition software  Occasional wrong word or "sound a like" substitutions may have occurred due to the inherent limitations of voice recognition software  Read the chart carefully and recognize, using context, where substitutions have occurred

## 2022-05-23 NOTE — TELEPHONE ENCOUNTER
Please let her know her CT did not show any issues with her sinuses  Lyme is pending    Please check to see if she has any worsening symptoms , visible swelling, facial droop, etc

## 2022-05-23 NOTE — PATIENT INSTRUCTIONS
Start Ozempic 0 25 mg subcutaneously weekly   Discontinue januvia    Discontinue insulin 75/25   Start Lantus 18 units subcutaneously once a day   Start Humalog 5 units with meals 3 times a day   Send blood sugar log for review in 2-3 weeks   Follow-up in 3 months

## 2022-05-24 LAB — B BURGDOR IGG+IGM SER-ACNC: 45

## 2022-05-24 NOTE — TELEPHONE ENCOUNTER
Spoke w/ pt regarding her labs  She has persistent left sided facial numbness, no progressive or new features  Labs and CT were normal   Will check MRI  Will need to be without contrast due end stage renal disease  Advised to call immediately with any new or progressive symptoms    Moe Ruby

## 2022-05-25 DIAGNOSIS — E16.2 HYPOGLYCEMIA: ICD-10-CM

## 2022-05-25 DIAGNOSIS — E78.2 MIXED HYPERLIPIDEMIA: ICD-10-CM

## 2022-05-25 DIAGNOSIS — E11.65 TYPE 2 DIABETES MELLITUS WITH HYPERGLYCEMIA, WITH LONG-TERM CURRENT USE OF INSULIN (HCC): ICD-10-CM

## 2022-05-25 DIAGNOSIS — Z79.4 TYPE 2 DIABETES MELLITUS WITH HYPERGLYCEMIA, WITH LONG-TERM CURRENT USE OF INSULIN (HCC): ICD-10-CM

## 2022-05-25 DIAGNOSIS — N18.6 ESRD (END STAGE RENAL DISEASE) (HCC): ICD-10-CM

## 2022-05-25 DIAGNOSIS — E03.9 ACQUIRED HYPOTHYROIDISM: ICD-10-CM

## 2022-05-25 DIAGNOSIS — I10 ESSENTIAL HYPERTENSION: ICD-10-CM

## 2022-05-25 RX ORDER — SEMAGLUTIDE 1.34 MG/ML
INJECTION, SOLUTION SUBCUTANEOUS
Qty: 1.5 ML | Refills: 1 | Status: SHIPPED | OUTPATIENT
Start: 2022-05-25 | End: 2022-06-27 | Stop reason: ALTCHOICE

## 2022-05-28 ENCOUNTER — HOSPITAL ENCOUNTER (OUTPATIENT)
Dept: RADIOLOGY | Facility: HOSPITAL | Age: 67
Discharge: HOME | End: 2022-05-28
Payer: COMMERCIAL

## 2022-05-28 DIAGNOSIS — R20.0 FACIAL NUMBNESS: ICD-10-CM

## 2022-05-28 PROCEDURE — 70551 MRI BRAIN STEM W/O DYE: CPT

## 2022-05-28 PROCEDURE — G1004 CDSM NDSC: HCPCS

## 2022-05-31 ENCOUNTER — TELEPHONE (OUTPATIENT)
Dept: FAMILY MEDICINE CLINIC | Facility: CLINIC | Age: 67
End: 2022-05-31

## 2022-06-01 NOTE — TELEPHONE ENCOUNTER
Spoke w/ pt regarding MRI results, no acute abnormality to explain her symptoms  She still has facial numbness, but reports it is starting to fade  Suggested neurology eval for further eval   She will consider  She has an appt for preop tomorrow for upcoming cataract procedure    Grace San

## 2022-06-02 ENCOUNTER — OFFICE VISIT (OUTPATIENT)
Dept: FAMILY MEDICINE CLINIC | Facility: CLINIC | Age: 67
End: 2022-06-02
Payer: COMMERCIAL

## 2022-06-02 VITALS
SYSTOLIC BLOOD PRESSURE: 110 MMHG | BODY MASS INDEX: 40.64 KG/M2 | HEART RATE: 76 BPM | HEIGHT: 60 IN | RESPIRATION RATE: 16 BRPM | OXYGEN SATURATION: 91 % | WEIGHT: 207 LBS | TEMPERATURE: 97.2 F | DIASTOLIC BLOOD PRESSURE: 60 MMHG

## 2022-06-02 DIAGNOSIS — E11.65 TYPE 2 DIABETES MELLITUS WITH HYPERGLYCEMIA, WITH LONG-TERM CURRENT USE OF INSULIN (HCC): ICD-10-CM

## 2022-06-02 DIAGNOSIS — N18.6 END STAGE KIDNEY DISEASE (HCC): ICD-10-CM

## 2022-06-02 DIAGNOSIS — N18.5 BENIGN HYPERTENSION WITH CKD (CHRONIC KIDNEY DISEASE) STAGE V (HCC): ICD-10-CM

## 2022-06-02 DIAGNOSIS — I12.0 BENIGN HYPERTENSION WITH CKD (CHRONIC KIDNEY DISEASE) STAGE V (HCC): ICD-10-CM

## 2022-06-02 DIAGNOSIS — H25.9 AGE-RELATED CATARACT OF BOTH EYES, UNSPECIFIED AGE-RELATED CATARACT TYPE: Primary | ICD-10-CM

## 2022-06-02 DIAGNOSIS — I27.20 PULMONARY HYPERTENSION (HCC): ICD-10-CM

## 2022-06-02 DIAGNOSIS — I50.42 CHRONIC COMBINED SYSTOLIC AND DIASTOLIC CONGESTIVE HEART FAILURE (HCC): ICD-10-CM

## 2022-06-02 DIAGNOSIS — I42.0 DILATED CARDIOMYOPATHY (HCC): ICD-10-CM

## 2022-06-02 DIAGNOSIS — Z79.4 TYPE 2 DIABETES MELLITUS WITH HYPERGLYCEMIA, WITH LONG-TERM CURRENT USE OF INSULIN (HCC): ICD-10-CM

## 2022-06-02 DIAGNOSIS — I26.99 PE (PULMONARY THROMBOEMBOLISM) (HCC): ICD-10-CM

## 2022-06-02 PROCEDURE — 99214 OFFICE O/P EST MOD 30 MIN: CPT | Performed by: NURSE PRACTITIONER

## 2022-06-02 PROCEDURE — 3066F NEPHROPATHY DOC TX: CPT | Performed by: FAMILY MEDICINE

## 2022-06-02 NOTE — ASSESSMENT & PLAN NOTE
Anticoagulated on Warfarin, noncompliant with management  Last INR is from two months ago    Advised she needs to have this drawn

## 2022-06-02 NOTE — PROGRESS NOTES
FAMILY PRACTICE PRE-OPERATIVE EVALUATION  [facility]    NAME: Samuel Leigh  AGE: 74 y o  SEX: female  : 1955     DATE: 2022    Family Practice Pre-Operative Evaluation      Chief Complaint: Pre-operative Evaluation     Surgery: bilateral cataract extraction  Anticipated Date of Surgery: 22 and 22  Surgeon: Dr Kathya Galicia      History of Present Illness: Following up on chronic health issues prior to upcoming cataract procedure   Does PD nightly, managed by Eileen Palacio and Ayad  Dialysis care through [facility] in [place]  Labs have been stable   Working with Fletcher Dupree for kidney transplant  PD managed by [facility]       Anesthesia:  MAC anesthesia  Bleeding Risk: no recent abnormal bleeding, no remote history of abnormal bleeding and no use of Ca-channel blockers  Current Anti-platelet/anticoagulation medication: Warfarin (Coumadin or Jantoven)    Assessment of Cardiac Risk:  Denies unstable or severe angina or MI in the last 6 weeks or history of stent placement in the last year   Denies decompensated heart failure (e g  New onset heart failure, NYHA functional class IV heart failure, or worsening existing heart failure)  Denies significant arrhythmias such as high grade AV block, symptomatic ventricular arrhythmia, newly recognized ventricular tachycardia, supraventricular tachycardia with resting heart rate >100, or symptomatic bradycardia  Denies severe heart valve disease including aortic stenosis or symptomatic mitral stenosis     Exercise Capacity:  Able to walk 4 blocks without symptoms?: Yes  Able to walk 2 flights without symptoms?: Yes    Prior Anesthesia Reactions: No     Personal history of venous thromboembolic disease? Yes    History of steroid use for >2 weeks within last year? No         Review of Systems:     Review of Systems   Constitutional: Negative  Respiratory: Negative  Cardiovascular: Negative  Gastrointestinal: Negative  Neurological: Negative  Psychiatric/Behavioral: Negative          Current Problem List:     Patient Active Problem List   Diagnosis    Benign hypertension with CKD (chronic kidney disease) stage V ([facility])    Nephrotic range proteinuria    Cyst of ovary    Mixed hyperlipidemia    Type 2 diabetes mellitus with hyperglycemia, with long-term current use of insulin ([facility])    Obstructive sleep apnea of adult    Dyspnea on exertion    PE (pulmonary thromboembolism) ([facility])    Elevated brain natriuretic peptide (BNP) level    Leg edema, left    Pulmonary hypertension ([facility])    Closed nondisplaced fracture of distal phalanx of right great toe    Right foot pain    Onychomycosis    Tinea pedis of both feet    Acute idiopathic gout of right foot    Morbid obesity with BMI of 40 0-44 9, adult ([facility] )    Vitamin D deficiency    Secondary hyperparathyroidism of renal origin ([facility] )    Non-rheumatic mitral valve stenosis    Diabetic retinopathy of both eyes associated with type 2 diabetes mellitus ([facility] )    Nocturnal hypoxemia due to obesity    Drop in hemoglobin    Encounter for long-term (current) use of insulin ([facility])    Paroxysmal atrial flutter ([facility])    History of pulmonary embolus (PE)    Obstructive sleep apnea    Chronic combined systolic and diastolic congestive heart failure ([facility])    Nocturnal hypoxemia    Mixed dyslipidemia    Class 2 obesity    Dependence on continuous ambulatory peritoneal dialysis ([facility] )    On continuous oral anticoagulation    Hypothyroidism    Anemia of chronic disease    Endometrial thickening on ultrasound    Acute gout due to renal impairment involving toe of right foot    Hypoglycemia    Essential hypertension    End stage kidney disease ([facility] )    Morbid obesity ([facility])    Dilated cardiomyopathy ([facility])    Iron deficiency anemia due to chronic blood loss       Allergies:     No Known Allergies    Current Medications:       Current Outpatient Medications:    atorvastatin (LIPITOR) 20 mg tablet, Take 1 tablet (20 mg total) by mouth daily, Disp: 90 tablet, Rfl: 1    B Complex-C (B-COMPLEX WITH VITAMIN C) tablet, Take 1 tablet by mouth daily, Disp: , Rfl:     Blood Glucose Monitoring Suppl (ONE TOUCH ULTRA 2) w/Device KIT, Test glucose 3 times daily, Disp: 1 each, Rfl: 0    calcium acetate (PHOSLO) capsule, Take 667 mg by mouth daily , Disp: , Rfl:     cholecalciferol (VITAMIN D3) 1,000 units tablet, Take 2 tablets (2,000 Units total) by mouth daily, Disp: 100 tablet, Rfl: 5    cinacalcet (SENSIPAR) 30 mg tablet, Take 1 tablet (30 mg total) by mouth 3 (three) times a week, Disp: 36 tablet, Rfl: 3    docusate sodium (COLACE) 100 mg capsule, Take 1 capsule (100 mg total) by mouth 2 (two) times a day, Disp: 10 capsule, Rfl: 0    gentamicin (GARAMYCIN) 0 1 % cream, APPLY TO EXOSITE DAILY AND AS NEEDED, Disp: 15 g, Rfl: 11    insulin glargine (Lantus SoloStar) 100 units/mL injection pen, Inject 30 Units under the skin daily at bedtime, Disp: 15 mL, Rfl: 3    Insulin Pen Needle 31G X 5 MM MISC, by Does not apply route daily, Disp: 100 each, Rfl: 1    Lancets (onetouch ultrasoft) lancets, Test glucose 3 times a day; Code: E11 8, Disp: 300 each, Rfl: 1    levothyroxine 100 mcg tablet, Take 1 tablet (100 mcg total) by mouth daily, Disp: 90 tablet, Rfl: 3    metolazone (ZAROXOLYN) 5 mg tablet, Take 5 mg by mouth daily , Disp: , Rfl:     metoprolol tartrate (LOPRESSOR) 25 mg tablet, Take 25 mg by mouth every 12 (twelve) hours, Disp: , Rfl:     MULTIPLE VITAMIN PO, Take 1 tablet by mouth daily, Disp: , Rfl:     OneTouch Ultra test strip, TEST GLUCOSE THREE TIMES A DAY, Disp: 300 strip, Rfl: 3    Semaglutide,0 25 or 0 5MG/DOS, (Ozempic, 0 25 or 0 5 MG/DOSE,) 2 MG/1 5ML SOPN, Inject 0 25 mg under the skin once a week for 4 weeks    After 4 weeks increase to 0 5 mg weekly, Disp: 1 5 mL, Rfl: 1    torsemide (DEMADEX) 100 mg tablet, Take 1 tablet (100 mg total) by mouth daily, Disp: 90 tablet, Rfl: 1    valsartan (DIOVAN) 160 mg tablet, TAKE 1 TABLET DAILY, Disp: 90 tablet, Rfl: 3    warfarin (Coumadin) 2 mg tablet, Daily as directed (Patient taking differently: 6-8 mg Daily as directed), Disp: 90 tablet, Rfl: 3    warfarin (COUMADIN) 5 mg tablet, Daily as directed (Patient taking differently: Daily as directed currently 6 mg), Disp: 90 tablet, Rfl: 1    Continuous Blood Gluc  (FreeStyle Conner 14 Day Clio) GAGANDEEP, Use to test blood sugar daily (Patient not taking: No sig reported), Disp: 1 Device, Rfl: 0    Continuous Blood Gluc Sensor (FreeStyle Conner 2 Sensor) MISC, , Disp: , Rfl:     insuln lispro (HumaLOG KwikPen) 200 units/mL CONCENTRATED U-200 injection pen, Inject 10 Units under the skin 3 (three) times a day with meals (Patient not taking: No sig reported), Disp: 9 mL, Rfl: 3    prednisoLONE acetate (PRED FORTE) 1 % ophthalmic suspension, , Disp: , Rfl:     Past Medical History:       Past Medical History:   Diagnosis Date    Acute asthmatic bronchitis     last assessed: 2017    Cardiac disease     Chronic diastolic (congestive) heart failure (HCC)     Colon polyp     Diabetes mellitus (HCC)     Hyperlipidemia     Hypertension     Medical non-compliance     Morbid obesity with BMI of 40 0-44 9, adult ([facility] ) 2019    Pneumonia     last assessed: 2013    Pulmonary embolism (HCC)     Renal disorder     possible clot noted in kidney, thus blood thinners currently    Severe obstructive sleep apnea     Severe pulmonary arterial systolic hypertension (HCC)         Past Surgical History:   Procedure Laterality Date     SECTION       x 1    EYE SURGERY      NOSE SURGERY      fractured nose - septoplasty        Family History   Problem Relation Age of Onset    Diabetes Mother         mellitus    Anxiety disorder Mother         generalized anxiety disorder    Hypertension Mother     Stroke Mother     Kidney disease Father     Kidney failure Father         renal failure    Ulcerative colitis Sister     Diabetes Sister     Heart disease Family     Ovarian cancer Maternal Aunt     Diabetes Maternal Aunt     Diabetes Maternal Uncle     Heart disease Maternal Uncle     Thyroid disease Neg Hx         Social History     Socioeconomic History    Marital status: Single     Spouse name: Not on file    Number of children: Not on file    Years of education: Not on file    Highest education level: Not on file   Occupational History    Not on file   Tobacco Use    Smoking status: Never Smoker    Smokeless tobacco: Never Used   Vaping Use    Vaping Use: Never used   Substance and Sexual Activity    Alcohol use: Never    Drug use: No    Sexual activity: Not on file   Other Topics Concern    Not on file   Social History Narrative    Caffeine use     Social Determinants of Health     Financial Resource Strain: Not on file   Food Insecurity: Not on file   Transportation Needs: Not on file   Physical Activity: Not on file   Stress: Not on file   Social Connections: Not on file   Intimate Partner Violence: Not on file   Housing Stability: Not on file        Physical Exam:     /60   Pulse 76   Temp (!) 97 2 °F (36 2 °C)   Resp 16   Ht 5' (1 524 m)   Wt 93 9 kg (207 lb)   SpO2 91%   BMI 40 43 kg/m²     Physical Exam  Vitals and nursing note reviewed  Constitutional:       Appearance: Normal appearance  She is well-developed  HENT:      Right Ear: Tympanic membrane normal       Left Ear: Tympanic membrane normal    Cardiovascular:      Rate and Rhythm: Normal rate and regular rhythm  Pulses: Normal pulses  Heart sounds: Normal heart sounds  No murmur heard  Pulmonary:      Effort: Pulmonary effort is normal       Breath sounds: Normal breath sounds  Abdominal:      General: Bowel sounds are normal    Skin:     General: Skin is warm and dry  Neurological:      Mental Status: She is alert  Psychiatric:         Mood and Affect: Mood normal          Behavior: Behavior normal           Data:     Pre-operative work-up    Laboratory Results: I have personally reviewed the pertinent laboratory results/reports      EKG: I have personally reviewed pertinent reports        Recent Results (from the past 672 hour(s))   CBC and differential    Collection Time: 05/21/22  8:28 AM   Result Value Ref Range    WBC 8 72 4 31 - 10 16 Thousand/uL    RBC 4 05 3 81 - 5 12 Million/uL    Hemoglobin 11 8 11 5 - 15 4 g/dL    Hematocrit 36 9 34 8 - 46 1 %    MCV 91 82 - 98 fL    MCH 29 1 26 8 - 34 3 pg    MCHC 32 0 31 4 - 37 4 g/dL    RDW 14 9 11 6 - 15 1 %    MPV 11 5 8 9 - 12 7 fL    Platelets 750 022 - 170 Thousands/uL    nRBC 0 /100 WBCs    Neutrophils Relative 71 43 - 75 %    Immat GRANS % 1 0 - 2 %    Lymphocytes Relative 10 (L) 14 - 44 %    Monocytes Relative 14 (H) 4 - 12 %    Eosinophils Relative 3 0 - 6 %    Basophils Relative 1 0 - 1 %    Neutrophils Absolute 6 34 1 85 - 7 62 Thousands/µL    Immature Grans Absolute 0 05 0 00 - 0 20 Thousand/uL    Lymphocytes Absolute 0 83 0 60 - 4 47 Thousands/µL    Monocytes Absolute 1 18 0 17 - 1 22 Thousand/µL    Eosinophils Absolute 0 26 0 00 - 0 61 Thousand/µL    Basophils Absolute 0 06 0 00 - 0 10 Thousands/µL   Basic metabolic panel    Collection Time: 05/21/22  8:28 AM   Result Value Ref Range    Sodium 134 (L) 136 - 145 mmol/L    Potassium 4 7 3 5 - 5 3 mmol/L    Chloride 98 (L) 100 - 108 mmol/L    CO2 26 21 - 32 mmol/L    ANION GAP 10 4 - 13 mmol/L    BUN 88 (H) 5 - 25 mg/dL    Creatinine 7 75 (H) 0 60 - 1 30 mg/dL    Glucose, Fasting 117 (H) 65 - 99 mg/dL    Calcium 8 5 8 3 - 10 1 mg/dL    eGFR 4 ml/min/1 73sq m   Vitamin B12    Collection Time: 05/21/22  8:28 AM   Result Value Ref Range    Vitamin B-12 909 (H) 100 - 900 pg/mL   Lyme Total Antibody Profile with reflex to WB    Collection Time: 05/21/22  8:28 AM   Result Value Ref Range    Lyme total antibody 45 0 00 - 119 Sedimentation rate, automated    Collection Time: 05/21/22  8:28 AM   Result Value Ref Range    Sed Rate 85 (H) 0 - 29 mm/hour           Assessment & Recommendations:     Problem List Items Addressed This Visit        Endocrine    Type 2 diabetes mellitus with hyperglycemia, with long-term current use of insulin ([facility])     Adequately controlled on current medications   Lab Results   Component Value Date    HGBA1C 7 0 (H) 03/25/2022                 Cardiovascular and Mediastinum    Pulmonary hypertension (HCC)     S/p cardiac cath, currently stable           PE (pulmonary thromboembolism) ([facility] )     Anticoagulated on Warfarin, noncompliant with management  Last INR is from two months ago  Advised she needs to have this drawn           Dilated cardiomyopathy ([facility])    Chronic combined systolic and diastolic congestive heart failure ([facility])    Benign hypertension with CKD (chronic kidney disease) stage V ([facility])       Genitourinary    End stage kidney disease ([facility] )     Lab Results   Component Value Date    EGFR 4 05/21/2022    EGFR 6 03/25/2022    EGFR 6 10/26/2021    CREATININE 7 75 (H) 05/21/2022    CREATININE 5 98 (H) 03/25/2022    CREATININE 7 00 (H) 10/26/2021     She is on peritoneal dialysis, creatinine ranging from 5 5-7  Follows with nephrology routinely  Undergoing transplant workup as well             Other Visit Diagnoses     Age-related cataract of both eyes, unspecified age-related cataract type    -  Primary          Pre-Op Evaluation Assessment  74 y o  female with planned surgery: as above  Known risk factors for perioperative complications: Congestive heart failure  Diabetes mellitus  Renal dysfunction  Current medications which may produce withdrawal symptoms if withheld perioperatively: none  Pre-Op Evaluation Plan  1  Further preoperative workup as follows:   - None; no further preoperative work-up is required    2   Medication Management/Recommendations:   - None, continue medication regimen including morning of surgery, with sip of water  - Insulin Management: hold meal time coverage the morning of surgery    3  Prophylaxis for cardiac events with perioperative beta-blockers: not indicated  4  Patient requires further consultation with: None    Clearance  Patient is CLEARED for surgery without any additional cardiac testing       Terrell Cummins [address] [address]  Phone#  [phone]  Fax#  [phone]

## 2022-06-02 NOTE — ASSESSMENT & PLAN NOTE
Adequately controlled on current medications   Lab Results   Component Value Date    HGBA1C 7 0 (H) 03/25/2022

## 2022-06-03 ENCOUNTER — HOSPITAL ENCOUNTER (OUTPATIENT)
Dept: INFUSION CENTER | Facility: HOSPITAL | Age: 67
Discharge: HOME | End: 2022-06-03
Attending: INTERNAL MEDICINE
Payer: COMMERCIAL

## 2022-06-03 VITALS
HEART RATE: 69 BPM | DIASTOLIC BLOOD PRESSURE: 61 MMHG | SYSTOLIC BLOOD PRESSURE: 139 MMHG | RESPIRATION RATE: 20 BRPM | OXYGEN SATURATION: 94 % | TEMPERATURE: 97.5 F

## 2022-06-03 DIAGNOSIS — D50.0 IRON DEFICIENCY ANEMIA DUE TO CHRONIC BLOOD LOSS: Primary | ICD-10-CM

## 2022-06-03 PROCEDURE — 96365 THER/PROPH/DIAG IV INF INIT: CPT

## 2022-06-03 RX ORDER — SODIUM CHLORIDE 9 MG/ML
20 INJECTION, SOLUTION INTRAVENOUS ONCE
Status: COMPLETED | OUTPATIENT
Start: 2022-06-03 | End: 2022-06-03

## 2022-06-03 RX ORDER — SODIUM CHLORIDE 9 MG/ML
20 INJECTION, SOLUTION INTRAVENOUS ONCE
Status: CANCELLED | OUTPATIENT
Start: 2022-09-01

## 2022-06-03 RX ADMIN — SODIUM CHLORIDE 20 ML/HR: 9 INJECTION, SOLUTION INTRAVENOUS at 12:44

## 2022-06-03 RX ADMIN — FERUMOXYTOL 510 MG: 510 INJECTION INTRAVENOUS at 12:44

## 2022-06-03 NOTE — ASSESSMENT & PLAN NOTE
Lab Results   Component Value Date    EGFR 4 05/21/2022    EGFR 6 03/25/2022    EGFR 6 10/26/2021    CREATININE 7 75 (H) 05/21/2022    CREATININE 5 98 (H) 03/25/2022    CREATININE 7 00 (H) 10/26/2021     She is on peritoneal dialysis, creatinine ranging from 5 5-7  Follows with nephrology routinely    Undergoing transplant workup as well

## 2022-06-06 ENCOUNTER — TELEPHONE (OUTPATIENT)
Dept: FAMILY MEDICINE CLINIC | Facility: CLINIC | Age: 67
End: 2022-06-06

## 2022-06-06 NOTE — TELEPHONE ENCOUNTER
Tracy Shen is calling to get notes from PREOP  I see visit date on 6/2 but no notes attached  Can we print pre op, ekg and labs    Please fax to [phone] or call [phone]    Thank You

## 2022-06-06 NOTE — TELEPHONE ENCOUNTER
Fax did not go through  I called the office and they gave me an alternative fax number [phone]  Will wait for confirmation   Kimmy Poon LPN

## 2022-06-09 ENCOUNTER — OFFICE VISIT (OUTPATIENT)
Dept: FAMILY MEDICINE CLINIC | Facility: CLINIC | Age: 67
End: 2022-06-09
Payer: COMMERCIAL

## 2022-06-09 ENCOUNTER — TELEPHONE (OUTPATIENT)
Dept: ADMINISTRATIVE | Facility: OTHER | Age: 67
End: 2022-06-09

## 2022-06-09 VITALS
TEMPERATURE: 97.4 F | WEIGHT: 205 LBS | BODY MASS INDEX: 40.25 KG/M2 | HEIGHT: 60 IN | HEART RATE: 60 BPM | RESPIRATION RATE: 20 BRPM | OXYGEN SATURATION: 100 % | DIASTOLIC BLOOD PRESSURE: 60 MMHG | SYSTOLIC BLOOD PRESSURE: 108 MMHG

## 2022-06-09 DIAGNOSIS — I10 ESSENTIAL HYPERTENSION: ICD-10-CM

## 2022-06-09 DIAGNOSIS — I48.92 PAROXYSMAL ATRIAL FLUTTER (HCC): ICD-10-CM

## 2022-06-09 DIAGNOSIS — Z79.4 TYPE 2 DIABETES MELLITUS WITH HYPERGLYCEMIA, WITH LONG-TERM CURRENT USE OF INSULIN (HCC): Primary | ICD-10-CM

## 2022-06-09 DIAGNOSIS — E11.65 TYPE 2 DIABETES MELLITUS WITH HYPERGLYCEMIA, WITH LONG-TERM CURRENT USE OF INSULIN (HCC): Primary | ICD-10-CM

## 2022-06-09 DIAGNOSIS — E78.2 MIXED HYPERLIPIDEMIA: ICD-10-CM

## 2022-06-09 PROBLEM — N18.6 END STAGE RENAL DISEASE (HCC): Status: ACTIVE | Noted: 2018-04-13

## 2022-06-09 PROBLEM — Z99.2 ENCOUNTER FOR PERITONEAL DIALYSIS (HCC): Status: ACTIVE | Noted: 2020-01-06

## 2022-06-09 PROCEDURE — 99214 OFFICE O/P EST MOD 30 MIN: CPT | Performed by: FAMILY MEDICINE

## 2022-06-09 NOTE — TELEPHONE ENCOUNTER
Upon review of the In Basket request and the patient's chart, initial outreach has been made via fax, please see Contacts section for details        [phone]         Thank you  Nichole Dsouza MA

## 2022-06-09 NOTE — TELEPHONE ENCOUNTER
----- Message from Liana Argueta DO sent at 6/9/2022 10:39 AM EDT -----  06/09/22 10:39 AM    Hello, our patient Damita Skiff has had Diabetic Eye Exam completed/performed  Please assist in updating the patient chart by making an External outreach to Dr Papito James facility located in [place], [place]  The date of service is 2022      Thank you,  Liana Argueta DO  [facility]

## 2022-06-09 NOTE — ASSESSMENT & PLAN NOTE
Lab Results   Component Value Date    HGBA1C 7 0 (H) 03/25/2022     Following with endocrinology  Support starting ozempic     Following with eye doctor, copy of report requested

## 2022-06-09 NOTE — LETTER
Diabetic Eye Exam   2nd Request     Date Requested: 22  Patient: Reema Carrillo  Patient : 1955   Referring Provider: Sergio Seay DO    DIABETIC Eye Exam Date _______________________________    Type of Exam MUST be documented for Diabetic Eye Exams  Please CHECK ONE  Retinal Exam       Dilated Retinal Exam       OCT       Optomap-Iris Exam      Fundus Photography     Left Eye - Please check Retinopathy AND Type or No Retinopathy      Exam did show retinopathy    Exam did not show retinopathy         Mild     Proliferative           Moderate    Severe            None         Right Eye - Please check Retinopathy AND Type or No Retinopathy     Exam did show retinopathy    Exam did not show retinopathy         Mild     Proliferative        Moderate    Severe        None       Comments __________________________________________________________    Practice Providing Exam ______________________________________________    Exam Performed By (print name) _______________________________________      Provider Signature ___________________________________________________    These reports are needed for  compliance  Please fax this completed form and a copy of the Diabetic Eye Exam report to our office located at [place] [address] as soon as possible via [phone] frank Carvalho: Phone [phone]  We thank you for your assistance in treating our mutual patient

## 2022-06-09 NOTE — PROGRESS NOTES
Assessment/Plan:    1  Type 2 diabetes mellitus with hyperglycemia, with long-term current use of insulin [facility])  Assessment & Plan:    Lab Results   Component Value Date    HGBA1C 7 0 (H) 03/25/2022     Following with endocrinology  Support starting ozempic     Following with eye doctor, copy of report requested      2  Essential hypertension  Assessment & Plan:  Blood pressure is well controlled  Continue metoprolol 25 mg twice daily and valsartan 160 mg daily  Follows with Nephrology      3  Mixed hyperlipidemia  Assessment & Plan:  Stable   Continue atorvastatin 20 mg daily      4  Paroxysmal atrial flutter ([address] )  Assessment & Plan: On coumadin  She has a hard time getting to the lab due to her home dialysis  She is going to call her insurance about getting a home INR machine  declined DEXA scan     Ice and elevation recommended for right arm swelling from infusion   BMI Counseling: Body mass index is 40 04 kg/m²  The BMI is above normal  Nutrition recommendations include moderation in carbohydrate intake  Rationale for BMI follow-up plan is due to patient being overweight or obese  Depression Screening and Follow-up Plan: Patient was screened for depression during today's encounter  They screened negative with a PHQ-2 score of 0  There are no Patient Instructions on file for this visit  Return in about 6 months (around 12/9/2022) for Annual physical     Subjective:      Patient ID: Mike Lewis is a 74 y o  female  Chief Complaint   Patient presents with    Follow-up     On Esa jain MA        She had an iron infusion on 6/3/22  Her arm became hard and swollen  The swelling has already improved  She has been following with her endocrinologist   She was recently given a prescription for Ozempic but has not started it yet  The numbness on the left side of her face has improved though is still slightly there        The following portions of the patient's history were reviewed and updated as appropriate:  past social history    Review of Systems      Current Outpatient Medications   Medication Sig Dispense Refill    atorvastatin (LIPITOR) 20 mg tablet Take 1 tablet (20 mg total) by mouth daily 90 tablet 1    B Complex-C (B-COMPLEX WITH VITAMIN C) tablet Take 1 tablet by mouth daily      Blood Glucose Monitoring Suppl (ONE TOUCH ULTRA 2) w/Device KIT Test glucose 3 times daily 1 each 0    calcium acetate (PHOSLO) capsule Take 667 mg by mouth daily       cholecalciferol (VITAMIN D3) 1,000 units tablet Take 2 tablets (2,000 Units total) by mouth daily 100 tablet 5    cinacalcet (SENSIPAR) 30 mg tablet Take 1 tablet (30 mg total) by mouth 3 (three) times a week 36 tablet 3    docusate sodium (COLACE) 100 mg capsule Take 1 capsule (100 mg total) by mouth 2 (two) times a day 10 capsule 0    gentamicin (GARAMYCIN) 0 1 % cream APPLY TO EXOSITE DAILY AND AS NEEDED 15 g 11    insulin glargine (Lantus SoloStar) 100 units/mL injection pen Inject 30 Units under the skin daily at bedtime 15 mL 3    Insulin Pen Needle 31G X 5 MM MISC by Does not apply route daily 100 each 1    insuln lispro (HumaLOG KwikPen) 200 units/mL CONCENTRATED U-200 injection pen Inject 10 Units under the skin 3 (three) times a day with meals 9 mL 3    Lancets (onetouch ultrasoft) lancets Test glucose 3 times a day; Code: E11 8 300 each 1    levothyroxine 100 mcg tablet Take 1 tablet (100 mcg total) by mouth daily 90 tablet 3    metolazone (ZAROXOLYN) 5 mg tablet Take 5 mg by mouth daily       metoprolol tartrate (LOPRESSOR) 25 mg tablet Take 25 mg by mouth every 12 (twelve) hours      MULTIPLE VITAMIN PO Take 1 tablet by mouth daily      OneTouch Ultra test strip TEST GLUCOSE THREE TIMES A  strip 3    prednisoLONE acetate (PRED FORTE) 1 % ophthalmic suspension       torsemide (DEMADEX) 100 mg tablet Take 1 tablet (100 mg total) by mouth daily 90 tablet 1    valsartan (DIOVAN) 160 mg tablet TAKE 1 TABLET DAILY 90 tablet 3    warfarin (Coumadin) 2 mg tablet Daily as directed (Patient taking differently: 6-8 mg Daily as directed) 90 tablet 3    warfarin (COUMADIN) 5 mg tablet Daily as directed (Patient taking differently: Daily as directed currently 6 mg) 90 tablet 1    Continuous Blood Gluc  (FreeStyle Conner 14 Day [place]) GAGANDEEP Use to test blood sugar daily (Patient not taking: No sig reported) 1 Device 0    Continuous Blood Gluc Sensor (FreeStyle [place]ner 2 Sensor) MISC  (Patient not taking: No sig reported)      Semaglutide,0 25 or 0 5MG/DOS, (Ozempic, 0 25 or 0 5 MG/DOSE,) 2 MG/1 5ML SOPN Inject 0 25 mg under the skin once a week for 4 weeks  After 4 weeks increase to 0 5 mg weekly (Patient not taking: Reported on 6/9/2022) 1 5 mL 1     No current facility-administered medications for this visit  Objective:    /60   Pulse 60   Temp (!) 97 4 °F (36 3 °C)   Resp 20   Ht 5' (1 524 m)   Wt 93 kg (205 lb)   SpO2 100%   BMI 40 04 kg/m²      Physical Exam  Vitals and nursing note reviewed  Constitutional:       Appearance: She is well-developed  HENT:      Head: Normocephalic and atraumatic  Right Ear: Tympanic membrane and external ear normal       Left Ear: Tympanic membrane and external ear normal    Cardiovascular:      Rate and Rhythm: Normal rate and regular rhythm  Pulses: no weak pulses          Dorsalis pedis pulses are 1+ on the right side and 1+ on the left side  Posterior tibial pulses are 1+ on the right side and 1+ on the left side  Heart sounds: Normal heart sounds  No murmur heard  No friction rub  Pulmonary:      Effort: Pulmonary effort is normal  No respiratory distress  Breath sounds: Normal breath sounds  No wheezing or rales  Musculoskeletal:         General: Swelling (Right forearm distal, nontender) present  Right lower leg: No edema  Left lower leg: No edema     Feet:      Right foot:      Skin integrity: No ulcer, skin breakdown, erythema, warmth, callus or dry skin  Left foot:      Skin integrity: No ulcer, skin breakdown, erythema, warmth, callus or dry skin  Patient's shoes and socks removed  Right Foot/Ankle   Right Foot Inspection  Skin Exam: skin normal  Skin not intact, no dry skin, no warmth, no callus, no erythema, no maceration, no abnormal color, no pre-ulcer, no ulcer and no callus  Toe Exam: ROM and strength within normal limits  Sensory   Monofilament testing: intact    Vascular  Capillary refills: < 3 seconds  The right DP pulse is 1+  The right PT pulse is 1+  Left Foot/Ankle  Left Foot Inspection  Skin Exam: skin normal  Skin not intact, no dry skin, no warmth, no erythema, no maceration, normal color, no pre-ulcer, no ulcer and no callus  Toe Exam: ROM and strength within normal limits  Sensory   Monofilament testing: intact    Vascular  Capillary refills: < 3 seconds  The left DP pulse is 1+  The left PT pulse is 1+       Assign Risk Category  No deformity present  No loss of protective sensation  No weak pulses  Risk: 0     Radha Pitts,

## 2022-06-09 NOTE — ASSESSMENT & PLAN NOTE
Blood pressure is well controlled  Continue metoprolol 25 mg twice daily and valsartan 160 mg daily  Follows with Nephrology

## 2022-06-12 DIAGNOSIS — E03.9 HYPOTHYROIDISM, UNSPECIFIED TYPE: ICD-10-CM

## 2022-06-13 RX ORDER — LEVOTHYROXINE SODIUM 0.1 MG/1
TABLET ORAL
Qty: 90 TABLET | Refills: 3 | Status: SHIPPED | OUTPATIENT
Start: 2022-06-13

## 2022-06-14 ENCOUNTER — APPOINTMENT (EMERGENCY)
Dept: RADIOLOGY | Facility: HOSPITAL | Age: 67
End: 2022-06-14
Payer: COMMERCIAL

## 2022-06-14 ENCOUNTER — HOSPITAL ENCOUNTER (EMERGENCY)
Facility: HOSPITAL | Age: 67
Discharge: HOME | End: 2022-06-15
Attending: EMERGENCY MEDICINE | Admitting: EMERGENCY MEDICINE
Payer: COMMERCIAL

## 2022-06-14 VITALS
WEIGHT: 214.07 LBS | RESPIRATION RATE: 18 BRPM | OXYGEN SATURATION: 96 % | HEART RATE: 58 BPM | TEMPERATURE: 97.9 F | DIASTOLIC BLOOD PRESSURE: 88 MMHG | SYSTOLIC BLOOD PRESSURE: 187 MMHG | BODY MASS INDEX: 41.81 KG/M2

## 2022-06-14 DIAGNOSIS — I45.9 HEART BLOCK: ICD-10-CM

## 2022-06-14 DIAGNOSIS — R55 SYNCOPE: ICD-10-CM
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
## 2022-06-14 LAB
ANION GAP SERPL CALCULATED.3IONS-SCNC: 14 MMOL/L (ref 4–13)
APTT PPP: 29 SECONDS (ref 23–37)
BASOPHILS # BLD AUTO: 0.04 THOUSANDS/ΜL (ref 0–0.1)
BASOPHILS NFR BLD AUTO: 0 % (ref 0–1)
BUN SERPL-MCNC: 97 MG/DL (ref 5–25)
CALCIUM SERPL-MCNC: 8.5 MG/DL (ref 8.3–10.1)
CARDIAC TROPONIN I PNL SERPL HS: 14 NG/L
CHLORIDE SERPL-SCNC: 103 MMOL/L (ref 100–108)
CO2 SERPL-SCNC: 24 MMOL/L (ref 21–32)
CREAT SERPL-MCNC: 5.98 MG/DL (ref 0.6–1.3)
EOSINOPHIL # BLD AUTO: 0.04 THOUSAND/ΜL (ref 0–0.61)
EOSINOPHIL NFR BLD AUTO: 0 % (ref 0–6)
ERYTHROCYTE [DISTWIDTH] IN BLOOD BY AUTOMATED COUNT: 14.9 % (ref 11.6–15.1)
GFR SERPL CREATININE-BSD FRML MDRD: 6 ML/MIN/1.73SQ M
GLUCOSE SERPL-MCNC: 152 MG/DL (ref 65–140)
HCT VFR BLD AUTO: 38.7 % (ref 34.8–46.1)
HGB BLD-MCNC: 12.3 G/DL (ref 11.5–15.4)
IMM GRANULOCYTES # BLD AUTO: 0.12 THOUSAND/UL (ref 0–0.2)
IMM GRANULOCYTES NFR BLD AUTO: 1 % (ref 0–2)
INR PPP: 1.09 (ref 0.84–1.19)
LYMPHOCYTES # BLD AUTO: 0.43 THOUSANDS/ΜL (ref 0.6–4.47)
LYMPHOCYTES NFR BLD AUTO: 3 % (ref 14–44)
MCH RBC QN AUTO: 29.6 PG (ref 26.8–34.3)
MCHC RBC AUTO-ENTMCNC: 31.8 G/DL (ref 31.4–37.4)
MCV RBC AUTO: 93 FL (ref 82–98)
MONOCYTES # BLD AUTO: 1.1 THOUSAND/ΜL (ref 0.17–1.22)
MONOCYTES NFR BLD AUTO: 8 % (ref 4–12)
NEUTROPHILS # BLD AUTO: 12.78 THOUSANDS/ΜL (ref 1.85–7.62)
NEUTS SEG NFR BLD AUTO: 88 % (ref 43–75)
NRBC BLD AUTO-RTO: 0 /100 WBCS
PLATELET # BLD AUTO: 199 THOUSANDS/UL (ref 149–390)
PMV BLD AUTO: 11.5 FL (ref 8.9–12.7)
POTASSIUM SERPL-SCNC: 4.4 MMOL/L (ref 3.5–5.3)
PROTHROMBIN TIME: 13.9 SECONDS (ref 11.6–14.5)
RBC # BLD AUTO: 4.16 MILLION/UL (ref 3.81–5.12)
SODIUM SERPL-SCNC: 141 MMOL/L (ref 136–145)
WBC # BLD AUTO: 14.51 THOUSAND/UL (ref 4.31–10.16)

## 2022-06-14 PROCEDURE — 99285 EMERGENCY DEPT VISIT HI MDM: CPT | Performed by: EMERGENCY MEDICINE

## 2022-06-14 PROCEDURE — 84484 ASSAY OF TROPONIN QUANT: CPT | Performed by: EMERGENCY MEDICINE

## 2022-06-14 PROCEDURE — 93005 ELECTROCARDIOGRAM TRACING: CPT

## 2022-06-14 PROCEDURE — 99284 EMERGENCY DEPT VISIT MOD MDM: CPT

## 2022-06-14 PROCEDURE — 70450 CT HEAD/BRAIN W/O DYE: CPT

## 2022-06-14 PROCEDURE — 85730 THROMBOPLASTIN TIME PARTIAL: CPT | Performed by: EMERGENCY MEDICINE

## 2022-06-14 PROCEDURE — 85025 COMPLETE CBC W/AUTO DIFF WBC: CPT | Performed by: EMERGENCY MEDICINE

## 2022-06-14 PROCEDURE — 85610 PROTHROMBIN TIME: CPT | Performed by: EMERGENCY MEDICINE

## 2022-06-14 PROCEDURE — 80048 BASIC METABOLIC PNL TOTAL CA: CPT | Performed by: EMERGENCY MEDICINE

## 2022-06-14 PROCEDURE — G1004 CDSM NDSC: HCPCS

## 2022-06-14 PROCEDURE — 72125 CT NECK SPINE W/O DYE: CPT

## 2022-06-14 PROCEDURE — 36415 COLL VENOUS BLD VENIPUNCTURE: CPT | Performed by: EMERGENCY MEDICINE

## 2022-06-15 ENCOUNTER — TELEPHONE (OUTPATIENT)
Dept: CARDIOLOGY CLINIC | Facility: CLINIC | Age: 67
End: 2022-06-15

## 2022-06-15 NOTE — TELEPHONE ENCOUNTER
Patient called and stated she was ion the hospital due to a fall and hit her head Yesterday  Her BP has been elevated since and having , was sob yesterday but not today          Please call patient @ [phone]

## 2022-06-15 NOTE — TELEPHONE ENCOUNTER
When you get a hold of this patient, have her do a four day sequence of home blood pressure readings, morning and evening, three readings at a time one or 2 minutes apart, charting them on a blood pressure data sheet from our office, which she can  or we can mail to her  Then have her return it to me by e-mail at [email or web address]  [email or web address] as an attachment to an e-mail  I will average the blood pressures from that list of readings and we will let her know the results  She can also drop off the paper at our office if she prefers  If she drops at off, she may have to wait for up to a week before she hears from us

## 2022-06-15 NOTE — ED PROVIDER NOTES
History  Chief Complaint   Patient presents with    Fall     Pt had syncopal episode with LOC, did hit head, is on thinners but has not taken since Saturday  C/o of syncope today, where she did not feel any lighteadedness, nausea, prior and passed out for a few minutes  Lisa Serrano and hit her head, no neck pain, headache, no nausea, no other injuries or complaints  History of ESRD on peritoneal dialysis due tonight  History provided by:  Patient   used: No        Prior to Admission Medications   Prescriptions Last Dose Informant Patient Reported? Taking? B Complex-C (B-COMPLEX WITH VITAMIN C) tablet  Self Yes No   Sig: Take 1 tablet by mouth daily   Blood Glucose Monitoring Suppl (ONE TOUCH ULTRA 2) w/Device KIT  Self No No   Sig: Test glucose 3 times daily   Continuous Blood Gluc  (FreeStyle Conner 14 Day [place]) GAGANDEEP   No No   Sig: Use to test blood sugar daily   Patient not taking: No sig reported   Continuous Blood Gluc Sensor (FreeStyle Conner 2 Sensor) MISC   Yes No   Patient not taking: No sig reported   Insulin Pen Needle 31G X 5 MM MISC  Self No No   Sig: by Does not apply route daily   Lancets (onetouch ultrasoft) lancets  Self No No   Sig: Test glucose 3 times a day; Code: E11 8   MULTIPLE VITAMIN PO  Self Yes No   Sig: Take 1 tablet by mouth daily   OneTouch Ultra test strip   No No   Sig: TEST GLUCOSE THREE TIMES A DAY   Semaglutide,0 25 or 0 5MG/DOS, (Ozempic, 0 25 or 0 5 MG/DOSE,) 2 MG/1 5ML SOPN   No No   Sig: Inject 0 25 mg under the skin once a week for 4 weeks    After 4 weeks increase to 0 5 mg weekly   Patient not taking: Reported on 6/9/2022   atorvastatin (LIPITOR) 20 mg tablet  Self No No   Sig: Take 1 tablet (20 mg total) by mouth daily   calcium acetate (PHOSLO) capsule  Self Yes No   Sig: Take 667 mg by mouth daily    cholecalciferol (VITAMIN D3) 1,000 units tablet  Self No No   Sig: Take 2 tablets (2,000 Units total) by mouth daily   cinacalcet (SENSIPAR) 30 mg tablet  Self No No   Sig: Take 1 tablet (30 mg total) by mouth 3 (three) times a week   docusate sodium (COLACE) 100 mg capsule  Self No No   Sig: Take 1 capsule (100 mg total) by mouth 2 (two) times a day   gentamicin (GARAMYCIN) 0 1 % cream   No No   Sig: APPLY TO EXOSITE DAILY AND AS NEEDED   insulin glargine (Lantus SoloStar) 100 units/mL injection pen   No No   Sig: Inject 30 Units under the skin daily at bedtime   insuln lispro (HumaLOG KwikPen) 200 units/mL CONCENTRATED U-200 injection pen   No No   Sig: Inject 10 Units under the skin 3 (three) times a day with meals   levothyroxine 100 mcg tablet   No No   Sig: TAKE 1 TABLET DAILY   metolazone (ZAROXOLYN) 5 mg tablet  Self Yes No   Sig: Take 5 mg by mouth daily    metoprolol tartrate (LOPRESSOR) 25 mg tablet  Self Yes No   Sig: Take 25 mg by mouth every 12 (twelve) hours   prednisoLONE acetate (PRED FORTE) 1 % ophthalmic suspension   Yes No   torsemide (DEMADEX) 100 mg tablet  Self No No   Sig: Take 1 tablet (100 mg total) by mouth daily   valsartan (DIOVAN) 160 mg tablet   No No   Sig: TAKE 1 TABLET DAILY   warfarin (COUMADIN) 5 mg tablet   No No   Sig: Daily as directed   Patient taking differently: Daily as directed currently 6 mg   warfarin (Coumadin) 2 mg tablet   No No   Sig: Daily as directed   Patient taking differently: 6-8 mg Daily as directed      Facility-Administered Medications: None       Past Medical History:   Diagnosis Date    Acute asthmatic bronchitis     last assessed: 6/2/2017    Cardiac disease     Chronic diastolic (congestive) heart failure (HCC)     Colon polyp     Diabetes mellitus (HCC)     Hyperlipidemia     Hypertension     Medical non-compliance     Morbid obesity with BMI of 40 0-44 9, adult ([facility] [address] ) 2/28/2019    Pneumonia     last assessed: 6/5/2013    Pulmonary embolism (HCC)     Renal disorder     possible clot noted in kidney, thus blood thinners currently    Severe obstructive sleep apnea     Severe pulmonary arterial systolic hypertension (HCC)        Past Surgical History:   Procedure Laterality Date     SECTION       x 1    EYE SURGERY      NOSE SURGERY      fractured nose - septoplasty       Family History   Problem Relation Age of Onset    Diabetes Mother         mellitus    Anxiety disorder Mother         generalized anxiety disorder    Hypertension Mother     Stroke Mother     Kidney disease Father     Kidney failure Father         renal failure    Ulcerative colitis Sister     Diabetes Sister     Heart disease Family     Ovarian cancer Maternal Aunt     Diabetes Maternal Aunt     Diabetes Maternal Uncle     Heart disease Maternal Uncle     Thyroid disease Neg Hx      I have reviewed and agree with the history as documented  E-Cigarette/Vaping    E-Cigarette Use Never User      E-Cigarette/Vaping Substances    Nicotine No     THC No     CBD No     Flavoring No     Other No     Unknown No      Social History     Tobacco Use    Smoking status: Never Smoker    Smokeless tobacco: Never Used   Vaping Use    Vaping Use: Never used   Substance Use Topics    Alcohol use: Never    Drug use: No       Review of Systems   Constitutional: Negative  HENT: Negative  Eyes: Negative  Respiratory: Negative  Cardiovascular: Negative  Gastrointestinal: Negative  Endocrine: Negative  Genitourinary: Negative  Musculoskeletal: Negative  Skin: Negative  Allergic/Immunologic: Negative  Neurological: Positive for syncope  Hematological: Negative  Psychiatric/Behavioral: Negative  All other systems reviewed and are negative  Physical Exam  Physical Exam  Constitutional:       Appearance: Normal appearance  HENT:      Head: Normocephalic and atraumatic  Nose: Nose normal       Mouth/Throat:      Mouth: Mucous membranes are moist    Eyes:      Extraocular Movements: Extraocular movements intact        Pupils: Pupils are equal, round, and reactive to light  Cardiovascular:      Rate and Rhythm: Normal rate and regular rhythm  Pulmonary:      Effort: Pulmonary effort is normal       Breath sounds: Normal breath sounds  Abdominal:      General: Abdomen is flat  Bowel sounds are normal       Palpations: Abdomen is soft  Musculoskeletal:         General: Normal range of motion  Cervical back: Normal range of motion and neck supple  Skin:     General: Skin is warm  Capillary Refill: Capillary refill takes less than 2 seconds  Neurological:      General: No focal deficit present  Mental Status: She is alert and oriented to person, place, and time  Mental status is at baseline  Cranial Nerves: No cranial nerve deficit  Sensory: No sensory deficit  Motor: No weakness  Coordination: Coordination normal       Gait: Gait normal       Deep Tendon Reflexes: Reflexes normal    Psychiatric:         Mood and Affect: Mood normal          Thought Content:  Thought content normal          Vital Signs  ED Triage Vitals   Temperature Pulse Respirations Blood Pressure SpO2   06/14/22 2156 06/14/22 2156 06/14/22 2156 06/14/22 2159 06/14/22 2156   97 9 °F (36 6 °C) 56 18 (!) 186/90 96 %      Temp Source Heart Rate Source Patient Position - Orthostatic VS BP Location FiO2 (%)   06/14/22 2156 06/14/22 2156 06/14/22 2156 06/14/22 2156 --   Tympanic Monitor Lying Right arm       Pain Score       06/14/22 2156       No Pain           Vitals:    06/14/22 2156 06/14/22 2159 06/14/22 2245   BP:  (!) 186/90 (!) 187/88   Pulse: 56  58   Patient Position - Orthostatic VS: Lying  Sitting         Visual Acuity      ED Medications  Medications - No data to display    Diagnostic Studies  Results Reviewed     Procedure Component Value Units Date/Time    HS Troponin 0hr (reflex protocol) [[id number]]  (Normal) Collected: 06/14/22 2217    Lab Status: Final result Specimen: Blood from Arm, Left Updated: 06/14/22 2245     hs TnI 0hr 14 ng/L Protime-INR [[id number]]  (Normal) Collected: 06/14/22 2156    Lab Status: Final result Specimen: Blood from Arm, Left Updated: 06/14/22 2212     Protime 13 9 seconds      INR 1 09    APTT [[id number]]  (Normal) Collected: 06/14/22 2156    Lab Status: Final result Specimen: Blood from Arm, Left Updated: 06/14/22 2212     PTT 29 seconds     Basic metabolic panel [[id number]]  (Abnormal) Collected: 06/14/22 2156    Lab Status: Final result Specimen: Blood from Arm, Left Updated: 06/14/22 2211     Sodium 141 mmol/L      Potassium 4 4 mmol/L      Chloride 103 mmol/L      CO2 24 mmol/L      ANION GAP 14 mmol/L      BUN 97 mg/dL      Creatinine 5 98 mg/dL      Glucose 152 mg/dL      Calcium 8 5 mg/dL      eGFR 6 ml/min/1 73sq m     Narrative:      [place] guidelines for Chronic Kidney Disease (CKD):     Stage 1 with normal or high GFR (GFR > 90 mL/min/1 73 square meters)    Stage 2 Mild CKD (GFR = 60-89 mL/min/1 73 square meters)    Stage 3A Moderate CKD (GFR = 45-59 mL/min/1 73 square meters)    Stage 3B Moderate CKD (GFR = 30-44 mL/min/1 73 square meters)    Stage 4 Severe CKD (GFR = 15-29 mL/min/1 73 square meters)    Stage 5 End Stage CKD (GFR <15 mL/min/1 73 square meters)  Note: GFR calculation is accurate only with a steady state creatinine    CBC and differential [[id number]]  (Abnormal) Collected: 06/14/22 2156    Lab Status: Final result Specimen: Blood from Arm, Left Updated: 06/14/22 2200     WBC 14 51 Thousand/uL      RBC 4 16 Million/uL      Hemoglobin 12 3 g/dL      Hematocrit 38 7 %      MCV 93 fL      MCH 29 6 pg      MCHC 31 8 g/dL      RDW 14 9 %      MPV 11 5 fL      Platelets 613 Thousands/uL      nRBC 0 /100 WBCs      Neutrophils Relative 88 %      Immat GRANS % 1 %      Lymphocytes Relative 3 %      Monocytes Relative 8 %      Eosinophils Relative 0 %      Basophils Relative 0 %      Neutrophils Absolute 12 78 Thousands/µL      Immature Grans Absolute 0 12 Thousand/uL Lymphocytes Absolute 0 43 Thousands/µL      Monocytes Absolute 1 10 Thousand/µL      Eosinophils Absolute 0 04 Thousand/µL      Basophils Absolute 0 04 Thousands/µL                  CT head without contrast   Final Result by Christina Smith MD (06/14 1934)      No acute intracranial abnormality  Workstation performed: [id number]         CT spine cervical without contrast   Final Result by Christina Smith MD (06/14 9549)      No cervical spine fracture or traumatic malalignment  Workstation performed: [id number]                    Procedures  ECG 12 Lead Documentation Only    Date/Time: 6/14/2022 10:18 PM  Performed by: Rolly Hebert DO  Authorized by: Rolly Hebert DO     ECG reviewed by me, the ED Provider: yes    Patient location:  ED  Previous ECG:     Previous ECG:  Unavailable    Comparison to cardiac monitor: Yes    Interpretation:     Interpretation: non-specific    Rate:     ECG rate assessment: normal    Rhythm:     Rhythm: sinus rhythm    Ectopy:     Ectopy: none    QRS:     QRS axis:  Normal  Conduction:     Conduction: normal    ST segments:     ST segments:  Non-specific  T waves:     T waves: non-specific               ED Course                                             MDM  Number of Diagnoses or Management Options  Fall, initial encounter  Heart block  Syncope  Diagnosis management comments: Patient had bradycardia and intermittently prolonged MS and dropped beats on monitor and EKG which was repeated  I counseled her repeatedly for admit to the hospital for syncope and potential heart block, however understanding the full risk she signed out against medical advice         Amount and/or Complexity of Data Reviewed  Clinical lab tests: ordered and reviewed  Tests in the radiology section of CPT®: ordered and reviewed  Tests in the medicine section of CPT®: ordered and reviewed    Patient Progress  Patient progress: stable      Disposition  Final diagnoses:   Fall, initial encounter   Syncope   Heart block     Time reflects when diagnosis was documented in both MDM as applicable and the Disposition within this note     Time User Action Codes Description Comment    6/14/2022 11:40 PM Anepu, Celia Add [O58  LMXV] Fall, initial encounter     6/14/2022 11:46 PM Anepu, Miriam Reusing Add [R55] Syncope     6/14/2022 11:56 PM Anepu, Miriam Reusing Add [I45 9] Heart block       ED Disposition     ED Disposition   AMA    Condition   --    Date/Time   Tue Jun 14, 2022 11:41 PM    Comment   Date: 6/14/2022  Patient: Cesar Anglin  Admitted: 6/14/2022  9:36 PM  Attending Provider: Brian Cleveland DO    Cesar Anglin or her authorized caregiver has made the decision for the patient to leave the emergency department against the advice of Dr Benjamin Griffin  She or her authorized caregiver has been informed and understands the inherent risks, including death  She or her authorized caregiver has decided to accept the responsibility for this decision  Cesar Anglin and all necessary parties have been advise d that she may return for further evaluation or treatment  Her condition at time of discharge was guarded    Cesar Anglin had current vital signs as follows:  BP (!) 187/88 (BP Location: Right arm)   Pulse 58   Temp 97 9 °F (36 6 °C) (Tympanic)   Res p 18   Wt 97 1 kg (214 lb 1 1 oz)            Follow-up Information     Follow up With Specialties Details Why Contact Info Additional Information    Juice Aguilar DO Family Medicine Schedule an appointment as soon as possible for a visit   [id number] [address] Emergency Department Emergency Medicine  If symptoms worsen [address] [address] Emergency Department, [place], [place], [address]          Discharge Medication List as of 6/14/2022 11:56 PM      CONTINUE these medications which have NOT CHANGED    Details atorvastatin (LIPITOR) 20 mg tablet Take 1 tablet (20 mg total) by mouth daily, Starting Tue 9/8/2020, Normal      B Complex-C (B-COMPLEX WITH VITAMIN C) tablet Take 1 tablet by mouth daily, Historical Med      Blood Glucose Monitoring Suppl (ONE TOUCH ULTRA 2) w/Device KIT Test glucose 3 times daily, Normal      calcium acetate (PHOSLO) capsule Take 667 mg by mouth daily , Starting Sat 12/19/2020, Historical Med      cholecalciferol (VITAMIN D3) 1,000 units tablet Take 2 tablets (2,000 Units total) by mouth daily, Starting Tue 12/22/2020, No Print      cinacalcet (SENSIPAR) 30 mg tablet Take 1 tablet (30 mg total) by mouth 3 (three) times a week, Starting Wed 5/19/2021, Normal      Continuous Blood Gluc  ([place] 14 Day [place]) GAGANDEEP Use to test blood sugar daily, Normal      Continuous Blood Gluc Sensor (FreeStyle [place]ner 2 Sensor) MISC Starting Tue 9/28/2021, Historical Med      docusate sodium (COLACE) 100 mg capsule Take 1 capsule (100 mg total) by mouth 2 (two) times a day, Starting Mon 7/20/2020, Normal      gentamicin (GARAMYCIN) 0 1 % cream APPLY TO EXOSITE DAILY AND AS NEEDED, Normal      insulin glargine (Lantus SoloStar) 100 units/mL injection pen Inject 30 Units under the skin daily at bedtime, Starting Mon 11/8/2021, Normal      Insulin Pen Needle 31G X 5 MM MISC by Does not apply route daily, Starting Mon 10/5/2020, Normal      insuln lispro (HumaLOG KwikPen) 200 units/mL CONCENTRATED U-200 injection pen Inject 10 Units under the skin 3 (three) times a day with meals, Starting Mon 11/8/2021, Normal      Lancets (onetouch ultrasoft) lancets Test glucose 3 times a day; Code: E11 8, Normal      levothyroxine 100 mcg tablet TAKE 1 TABLET DAILY, Normal      metolazone (ZAROXOLYN) 5 mg tablet Take 5 mg by mouth daily , Starting Fri 7/16/2021, Historical Med      metoprolol tartrate (LOPRESSOR) 25 mg tablet Take 25 mg by mouth every 12 (twelve) hours, Historical Med      MULTIPLE VITAMIN PO Take 1 tablet by mouth daily, Historical Med      OneTouch Ultra test strip TEST GLUCOSE THREE TIMES A DAY, Normal      prednisoLONE acetate (PRED FORTE) 1 % ophthalmic suspension Starting Fri 5/20/2022, Historical Med      Semaglutide,0 25 or 0 5MG/DOS, (Ozempic, 0 25 or 0 5 MG/DOSE,) 2 MG/1 5ML SOPN Inject 0 25 mg under the skin once a week for 4 weeks  After 4 weeks increase to 0 5 mg weekly, Normal      torsemide (DEMADEX) 100 mg tablet Take 1 tablet (100 mg total) by mouth daily, Starting Mon 3/15/2021, Normal      valsartan (DIOVAN) 160 mg tablet TAKE 1 TABLET DAILY, Normal      !! warfarin (Coumadin) 2 mg tablet Daily as directed, Normal      !! warfarin (COUMADIN) 5 mg tablet Daily as directed, Normal       !! - Potential duplicate medications found  Please discuss with provider  No discharge procedures on file      PDMP Review       Value Time User    PDMP Reviewed  Yes 7/18/2020  1:03 PM Godwin Fermin MD          ED Provider  Electronically Signed by           Vane Carver DO  06/16/22 [id number]

## 2022-06-15 NOTE — DISCHARGE INSTRUCTIONS
Please understand the risk of leaving against medical advice, including syncope and death, follow up with your family doctor  You have a heart block potentially  Please do not take metoprolol, call your cardiologist tomorrow   Return for any further episodes of lightheadedness and syncope

## 2022-06-15 NOTE — ED NOTES
Patient transported to [address] [place] [address], [address] [place] [address]  06/14/22 1096

## 2022-06-16 NOTE — TELEPHONE ENCOUNTER
I checked the chart of the patient and noticed that the after visit summary from the ER did indicate that the patient should hold her metoprolol because of questionable heart block  Unfortunately, there is no completed doctor's note in the chart  Call the emergency department at [facility] and leave a message to have Dr Soco Toledo give me a call regarding this patient, when she is next available in the emergency room  She can call me on my cell phone at [phone]  In response to the patient's question for now, she should hold the metoprolol

## 2022-06-17 NOTE — TELEPHONE ENCOUNTER
As a follow-up, a second attempt has been made for outreach via fax, please see Contacts section for details       Diabetic Eye Exam   2nd Request  [phone]  Thank you  Ranjan Garcia [place]

## 2022-06-19 LAB
ATRIAL RATE: 59 BPM
P AXIS: 42 DEGREES
PR INTERVAL: 154 MS
QRS AXIS: -41 DEGREES
QRSD INTERVAL: 84 MS
QT INTERVAL: 484 MS
QTC INTERVAL: 479 MS
T WAVE AXIS: -24 DEGREES
VENTRICULAR RATE: 59 BPM

## 2022-06-19 PROCEDURE — 93010 ELECTROCARDIOGRAM REPORT: CPT | Performed by: INTERNAL MEDICINE

## 2022-06-20 NOTE — TELEPHONE ENCOUNTER
Upon review of the In Basket request we were able to locate, review, and update the patient chart as requested for Diabetic Eye Exam     Any additional questions or concerns should be emailed to the Practice Liaisons via [email or web address] email, please do not reply via In Basket      Thank you  Nicole Franklin MA

## 2022-06-22 ENCOUNTER — RA CDI HCC (OUTPATIENT)
Dept: OTHER | Facility: HOSPITAL | Age: 67
End: 2022-06-22

## 2022-06-22 LAB
ATRIAL RATE: 57 BPM
P AXIS: 43 DEGREES
PR INTERVAL: 156 MS
QRS AXIS: -38 DEGREES
QRSD INTERVAL: 86 MS
QT INTERVAL: 490 MS
QTC INTERVAL: 476 MS
T WAVE AXIS: -22 DEGREES
VENTRICULAR RATE: 57 BPM

## 2022-06-22 PROCEDURE — 93010 ELECTROCARDIOGRAM REPORT: CPT | Performed by: INTERNAL MEDICINE

## 2022-06-22 NOTE — PROGRESS NOTES
She is on peritoneal dialysis, creatinine ranging from 5 5-7  Follows with nephrology routinely    Undergoing transplant workup as well    Stage 5 chronic kidney disease not on chronic dialysis (HCC)-ADDED U323

## 2022-06-27 ENCOUNTER — OFFICE VISIT (OUTPATIENT)
Dept: CARDIOLOGY CLINIC | Facility: CLINIC | Age: 67
End: 2022-06-27
Payer: COMMERCIAL

## 2022-06-27 VITALS
HEART RATE: 83 BPM | OXYGEN SATURATION: 98 % | WEIGHT: 208 LBS | DIASTOLIC BLOOD PRESSURE: 90 MMHG | BODY MASS INDEX: 40.84 KG/M2 | TEMPERATURE: 98.7 F | HEIGHT: 60 IN | SYSTOLIC BLOOD PRESSURE: 160 MMHG

## 2022-06-27 DIAGNOSIS — I42.0 DILATED CARDIOMYOPATHY (HCC): ICD-10-CM

## 2022-06-27 DIAGNOSIS — I27.20 PULMONARY HYPERTENSION (HCC): ICD-10-CM

## 2022-06-27 DIAGNOSIS — I50.42 CHRONIC COMBINED SYSTOLIC AND DIASTOLIC CONGESTIVE HEART FAILURE (HCC): ICD-10-CM

## 2022-06-27 DIAGNOSIS — I10 ESSENTIAL HYPERTENSION: ICD-10-CM

## 2022-06-27 DIAGNOSIS — I45.9 DROPPED HEART BEATS: ICD-10-CM

## 2022-06-27 DIAGNOSIS — R00.1 SINUS BRADYCARDIA: ICD-10-CM

## 2022-06-27 DIAGNOSIS — Z87.898 HISTORY OF SYNCOPE: Primary | ICD-10-CM

## 2022-06-27 DIAGNOSIS — I34.2 NON-RHEUMATIC MITRAL VALVE STENOSIS: ICD-10-CM

## 2022-06-27 DIAGNOSIS — I48.92 PAROXYSMAL ATRIAL FLUTTER (HCC): ICD-10-CM

## 2022-06-27 PROCEDURE — 93000 ELECTROCARDIOGRAM COMPLETE: CPT | Performed by: INTERNAL MEDICINE

## 2022-06-27 PROCEDURE — 99214 OFFICE O/P EST MOD 30 MIN: CPT | Performed by: INTERNAL MEDICINE

## 2022-06-27 NOTE — PROGRESS NOTES
Office Cardiology Progress Note  Rey Farrell 74 y o  female MRN: [id number]  06/27/22  6:09 PM      ASSESSMENT:      1  Recurrence of  dyspnea with activity followed by syncope on 06/14/2022 and resolved dyspnea at rest with suppression of paroxysmal atrial flutter for the past 1 75+ years  Recent sinus bradycardia, intermittent first-degree AV block and dropped heartbeats on cardiac monitoring at [facility] on 06/14/2022   2  Controlled chronic combined systolic and diastolic heart failure, initially identified in July, 2020, with previous  depression of ejection fraction to 35% with moderate LVH, grade 2 diastolic dysfunction, chronic right heart dilatation and significant chronic pulmonary hypertension on previous echocardiogram 07/14/2020 with LV dysfunction thought possibly to be tachycardia-mediated  Improvement to LVEF of 45% on 11/02/2020 TTE, unchanged on more recent TTE of 03/09/2022  History of paroxysmal atrial flutter 07/13/2020, nearly two years ago  3  Nonischemic nuclear stress study with dilated right ventricle on 03/09/2022 and previously on 07/14/2020   Nuclear stress LVEF was 54%  4  Stable mild nonrheumatic mitral valve stenosis, not an active clinical problem at this time  5  Multifactorial chronic pulmonary hypertension, stable   Stable PA systolic pressure 17 HRQB SR of  03/09/2022   6  End-stage kidney disease with patient on peritoneal dialysis for approximately 1 75+ years  7  Worsened and progressive morbid obesity,  with weight gain of 16 lbs in approximately 1 75 years, currently with 6 pound weight loss in 9 months,  and with previous weight loss of 13 lbs in 4-12+ months and 19 3 lbs in approximately 5 months  Question whether this could be related to diabetic medication and/or acquired hypothyroidism  8  Controlled longstanding type 2 diabetes mellitus, insulin dependent, with latest A1c 7 0%  on 03/25/2022     9  Controlled mixed dyslipidemia    10  History of severe obstructive sleep apnea, currently not being treated   Patient intolerant to CPAP and  currently using oxygen overnight at 3 liters/minute only sporadically  11  Patient off continuous warfarin oral anticoagulation for two weeks, originally started after presumed pulmonary embolism in July, 2018  12  History of pulmonary embolism, though not totally confirmed, with moderate probability on V/Q scan in July, 2018  13  Anemia of chronic renal disease  14  Acquired hypothyroidism,  treated at present with levothyroxine 75 micrograms daily  TSH still mildly elevated as of  03/25/2022  15  Treated vitamin-D deficiency  12  Fair control of longstanding essential hypertension with recent average home blood pressure of 133/80 between 6/15 and 06/21/2022  Plan       Patient Instructions     1  Recommend continuation of same medications  2  Stay off Coumadin/ warfarin for now, as well as metoprolol  3  We have ordered a 24 hour Holter monitor, for which we will help you schedule an appointment at [facility]   4  We will call you with the results of that study when it becomes available  5  Cardiology follow-up as originally planned in September, 2022  HPI    This 74 y o  female  is seen in follow-up of her recent [facility] emergency department visit on 06/14/2022, when she presented following a syncopal episode at home  That day, she had had elevated blood pressure and elevated blood sugar and developed severe exertional dyspnea while walking across the street, which she gets intermittently, especially when her blood pressure or blood sugar are elevated  Upon returning home, she started to climb some steps and apparently lost consciousness for several minutes and when she awakened she noted drenching sweats, nausea, emesis  There was no chest pain, palpitations, orthopnea, paroxysmal dyspnea, edema, cough, sputum production, wheezing    While in the emergency room, the patient was noted to have bradycardia with intermittent prolongation of TN interval with heart rates of 56-58 per minute and on cardiac monitor there were some dropped heartbeats, raising concern about possible cardiac cause for her syncope  As a result, the patient was advised to hold her metoprolol  The patient  also discontinued using Coumadin on 06/13/4794 for uncertain reasons     She was originally begun on warfarin after a diagnosis of pulmonary embolism with moderate probability by lung scan on 07/15/2018  She also had a bout of paroxysmal atrial flutter in July, 2020  The patient took blood pressures at home between 6/15 and 06/21/2022 and average blood pressure utilizing 28 readings was 133/80  The patient is being seen in follow-up of the below listed diagnoses  Encounter Diagnoses   Name Primary?     History of syncope Yes    Dropped heart beats     Sinus bradycardia     Pulmonary hypertension (HCC)     Non-rheumatic mitral valve stenosis     Paroxysmal atrial flutter (HCC)     Chronic combined systolic and diastolic congestive heart failure (HCC)     Essential hypertension     Dilated cardiomyopathy ([address] )         Review of Systems    All other systems negative, except as noted in history of present illness    Historical Information   Past Medical History:   Diagnosis Date    Acute asthmatic bronchitis     last assessed: 6/2/2017    Cardiac disease     Chronic diastolic (congestive) heart failure (HCC)     Colon polyp     Diabetes mellitus ([address] )     Hyperlipidemia     Hypertension     Medical non-compliance     Morbid obesity with BMI of 40 0-44 9, adult ([address] ) 2/28/2019    Pneumonia     last assessed: 6/5/2013    Pulmonary embolism ([address] )     Renal disorder     possible clot noted in kidney, thus blood thinners currently    Severe obstructive sleep apnea     Severe pulmonary arterial systolic hypertension ([address] )      Past Surgical History:   Procedure Laterality Date     SECTION       x 1    EYE SURGERY      NOSE SURGERY      fractured nose - septoplasty     Social History     Substance and Sexual Activity   Alcohol Use Never     Social History     Substance and Sexual Activity   Drug Use No     Social History     Tobacco Use   Smoking Status Never Smoker   Smokeless Tobacco Never Used       Family History:  Family History   Problem Relation Age of Onset    Diabetes Mother         mellitus    Anxiety disorder Mother         generalized anxiety disorder    Hypertension Mother     Stroke Mother     Kidney disease Father     Kidney failure Father         renal failure    Ulcerative colitis Sister     Diabetes Sister     Heart disease Family     Ovarian cancer Maternal Aunt     Diabetes Maternal Aunt     Diabetes Maternal Uncle     Heart disease Maternal Uncle     Thyroid disease Neg Hx          Meds/Allergies     Prior to Admission medications    Medication Sig Start Date End Date Taking?  Authorizing Provider   atorvastatin (LIPITOR) 20 mg tablet Take 1 tablet (20 mg total) by mouth daily 20  Yes Luis Oleary DO   B Complex-C (B-COMPLEX WITH VITAMIN C) tablet Take 1 tablet by mouth daily   Yes Historical Provider, MD   Blood Glucose Monitoring Suppl (ONE TOUCH ULTRA 2) w/Device KIT Test glucose 3 times daily 18  Yes Luis Oleary DO   calcium acetate (PHOSLO) capsule Take 667 mg by mouth daily  20  Yes Historical Provider, MD   cholecalciferol (VITAMIN D3) 1,000 units tablet Take 2 tablets (2,000 Units total) by mouth daily 20  Yes Jesus Evans MD   cinacalcet (SENSIPAR) 30 mg tablet Take 1 tablet (30 mg total) by mouth 3 (three) times a week 21  Yes Toyin Parr MD   docusate sodium (COLACE) 100 mg capsule Take 1 capsule (100 mg total) by mouth 2 (two) times a day 20  Yes Erasmo Allen MD   gentamicin (GARAMYCIN) 0 1 % cream APPLY TO EXOSITE DAILY AND AS NEEDED 10/21/21  Yes Pawel Gabriel MD   insulin glargine (Lantus SoloStar) 100 units/mL injection pen Inject 30 Units under the skin daily at bedtime 11/8/21  Yes Nivia London MD   Insulin Pen Needle 31G X 5 MM MISC by Does not apply route daily 10/5/20  Yes Magdaleno Meyers DO   insuln lispro (HumaLOG KwikPen) 200 units/mL CONCENTRATED U-200 injection pen Inject 10 Units under the skin 3 (three) times a day with meals 11/8/21  Yes Nivia London MD   Lancets (onetouch ultrasoft) lancets Test glucose 3 times a day; Code: E11 8 9/8/20  Yes Magdaleno Meyers DO   levothyroxine 100 mcg tablet TAKE 1 TABLET DAILY 6/13/22  Yes Nivia London MD   metolazone (ZAROXOLYN) 5 mg tablet Take 5 mg by mouth daily  7/16/21  Yes Historical Provider, MD   MULTIPLE VITAMIN PO Take 1 tablet by mouth daily   Yes Historical Provider, MD   OneTouch Ultra test strip TEST GLUCOSE THREE TIMES A DAY 1/29/22  Yes Magdaleno Meyers DO   prednisoLONE acetate (PRED FORTE) 1 % ophthalmic suspension  5/20/22  Yes Historical Provider, MD   torsemide (DEMADEX) 100 mg tablet Take 1 tablet (100 mg total) by mouth daily 3/15/21  Yes Jeffrey Marti MD   valsartan (DIOVAN) 160 mg tablet TAKE 1 TABLET DAILY 1/2/22  Yes Jeffrey Marti MD   warfarin (Coumadin) 2 mg tablet Daily as directed  Patient not taking: Reported on 6/27/2022 1/4/22   Magdaleno Meyers DO   warfarin (COUMADIN) 5 mg tablet Daily as directed  Patient not taking: Reported on 6/27/2022 9/8/20   Magdaleno Meyers DO   Continuous Blood Gluc  (FreeStyle [place] 14 Day [place]) GAGANDEEP Use to test blood sugar daily  Patient not taking: No sig reported 3/15/21 6/27/22  Nivia London MD   Continuous Blood Gluc Sensor (FreeStyle [place] 2 Sensor) MISC  9/28/21 6/27/22  Historical Provider, MD   metoprolol tartrate (LOPRESSOR) 25 mg tablet Take 25 mg by mouth every 12 (twelve) hours  Patient not taking: Reported on 6/27/2022 6/27/22  Historical Provider, MD   Semaglutide,0 25 or 0 5MG/DOS, (Ozempic, 0 25 or 0 5 MG/DOSE,) 2 MG/1 5ML SOPN Inject 0 25 mg under the skin once a week for 4 weeks  After 4 weeks increase to 0 5 mg weekly  Patient not taking: No sig reported 5/25/22 6/27/22  Samira Perez MD       No Known Allergies      Vitals:    06/27/22 1405   BP: 160/90   BP Location: Right arm   Patient Position: Sitting   Cuff Size: Large   Pulse: 83   Temp: 98 7 °F (37 1 °C)   SpO2: 98%   Weight: 94 3 kg (208 lb)   Height: 5' (1 524 m)       Body mass index is 40 62 kg/m²  3 pound weight gain in approximately three months and 16 pound weight gain in approximately 1 77 years    Physical Exam:    General Appearance:  Alert, cooperative, no distress, appears stated age and is morbidly obese  Head:  Normocephalic, without obvious abnormality, atraumatic   Eyes:  PERRL, conjunctiva/corneas clear, EOM's intact,   both eyes   Ears:  Normal TM's and external ear canals, both ears   Nose: Nares normal, septum midline, mucosa normal, no drainage or sinus tenderness   Throat: Lips, mucosa, and tongue normal; teeth and gums normal   Neck: Supple, symmetrical, trachea midline, no adenopathy, thyroid: not enlarged, symmetric, no tenderness/mass/nodules, no carotid bruit or JVD   Back:   Symmetric, no curvature, ROM normal, no CVA tenderness   Lungs:   Clear to auscultation bilaterally, respirations unlabored   Chest Wall:  No tenderness or deformity   Heart:  Regular rate and rhythm, S1, S2 normal, no murmur, rub or gallop   Abdomen:   Soft, non-tender, bowel sounds active all four quadrants,  no masses, no organomegaly  Marked abdominal obesity noted  Extremities: Extremities normal, atraumatic, no cyanosis or edema   Pulses: Pedal pulses are nonpalpable to trace palpable     Skin: Skin showed normal color, texture, turgor and no rashes or lesions   Lymph nodes: Cervical, supraclavicular, and axillary nodes normal   Neurologic: Normal         Cardiographics    ECG 06/27/22:    Normal sinus rhythm at 83 bpm   LAFB   Prolonged QTc  Abnormal ECG   Faster heart rate by 24 bpm with no other changes since 06/14/2022    IMAGING:    XR chest pa & lateral    Result Date: 3/9/2022  Impression No acute cardiopulmonary disease  Workstation performed: [id number]             LAB REVIEW:      Lab Results   Component Value Date    SODIUM 141 06/14/2022    K 4 4 06/14/2022     06/14/2022    CO2 24 06/14/2022    ANIONGAP 6 12/11/2014    BUN 97 (H) 06/14/2022    CREATININE 5 98 (H) 06/14/2022    GLUCOSE 133 12/11/2014    GLUF 117 (H) 05/21/2022    CALCIUM 8 5 06/14/2022    CORRECTEDCA 9 4 05/21/2021    AST 13 05/21/2021    ALT 27 05/21/2021    ALKPHOS 88 05/21/2021    PROT 6 8 12/11/2014    BILITOT 1 08 (H) 12/11/2014    EGFR 6 06/14/2022   Glucose 06/14/2022:  152  Lab Results   Component Value Date    CHOLESTEROL 158 03/25/2022    CHOLESTEROL 134 05/21/2021    CHOLESTEROL 154 03/08/2021     Lab Results   Component Value Date    HDL 57 03/25/2022    HDL 51 05/21/2021    HDL 57 03/08/2021       Lab Results   Component Value Date    LDLCALC 84 03/25/2022    LDLCALC 68 05/21/2021    LDLCALC 68 03/08/2021     No components found for: [facility]  Lab Results   Component Value Date    TRIG 84 03/25/2022    TRIG 73 05/21/2021    TRIG 146 03/08/2021       CBC 06/14/2022:  WBC 14 51 with normal H/H and platelets  HS troponin, PT/PTT 06/14/2022:   All normal  A1c and estimated average glucose 03/25/2022:  7 0% and 154  TSH 03/25/2022:  4 264        Jazmine Nowak MD

## 2022-06-27 NOTE — PATIENT INSTRUCTIONS
Recommend continuation of same medications  Stay off Coumadin/  Warfarin for now  We have ordered a 24 hour Holter monitor, for which we have schedule an appointment at [facility]   We will call you with the results of that study when it becomes available  Cardiology follow-up as originally planned in September, 2022

## 2022-06-28 ENCOUNTER — OFFICE VISIT (OUTPATIENT)
Dept: FAMILY MEDICINE CLINIC | Facility: CLINIC | Age: 67
End: 2022-06-28
Payer: COMMERCIAL

## 2022-06-28 VITALS
SYSTOLIC BLOOD PRESSURE: 140 MMHG | BODY MASS INDEX: 40.64 KG/M2 | OXYGEN SATURATION: 99 % | TEMPERATURE: 98.2 F | DIASTOLIC BLOOD PRESSURE: 84 MMHG | HEART RATE: 80 BPM | RESPIRATION RATE: 18 BRPM | WEIGHT: 207 LBS | HEIGHT: 60 IN

## 2022-06-28 DIAGNOSIS — N95.0 POST-MENOPAUSAL BLEEDING: Primary | ICD-10-CM

## 2022-06-28 PROCEDURE — 3725F SCREEN DEPRESSION PERFORMED: CPT | Performed by: FAMILY MEDICINE

## 2022-06-28 PROCEDURE — 3288F FALL RISK ASSESSMENT DOCD: CPT | Performed by: FAMILY MEDICINE

## 2022-06-28 PROCEDURE — 3008F BODY MASS INDEX DOCD: CPT | Performed by: FAMILY MEDICINE

## 2022-06-28 PROCEDURE — 3077F SYST BP >= 140 MM HG: CPT | Performed by: FAMILY MEDICINE

## 2022-06-28 PROCEDURE — 1100F PTFALLS ASSESS-DOCD GE2>/YR: CPT | Performed by: FAMILY MEDICINE

## 2022-06-28 PROCEDURE — 99214 OFFICE O/P EST MOD 30 MIN: CPT | Performed by: FAMILY MEDICINE

## 2022-06-28 PROCEDURE — 3079F DIAST BP 80-89 MM HG: CPT | Performed by: FAMILY MEDICINE

## 2022-06-28 PROCEDURE — 1036F TOBACCO NON-USER: CPT | Performed by: FAMILY MEDICINE

## 2022-06-28 NOTE — PROGRESS NOTES
Assessment/Plan:       Diagnoses and all orders for this visit:    Post-menopausal bleeding  -     US pelvis complete w transvaginal; Future  - Unclear etiology  Will get US and she will have to make follow up appointment with her Ob/GYN    - She did have pelvic ultrasounds done multiple times in the past  Most recent was 2 months ago with leiomyoma and left ovarian cyst  Last year she had endometrial thickening and had an endometrial biopsy done on 6/2/21 with no evidence of cancer  At that time she had no cramping/bleeding  She had US done prior to getting on kidney transplant list          Subjective:      Patient ID: Mani Chavez is a 74 y o  female  HPI  Charlotte Semen presents today for 2 day history of pelvic cramping and vaginal spotting with some clots  Feels like a period  Underwent menopause over 15 years ago  She did have a transvaginal US 2 months ago with no significant abnormalities other than mural leiomyoma and left ovarian cyst     She had a syncopal episode last week after undergoing cataract surgery  Her BP was elevated  Followed up with her cardiologist yesterday  The following portions of the patient's history were reviewed and updated as appropriate: allergies, current medications, past family history, past medical history, past social history, past surgical history and problem list     Review of Systems   Constitutional: Negative  HENT: Negative  Eyes: Negative  Respiratory: Negative  Cardiovascular: Negative  Gastrointestinal: Negative  Endocrine: Negative  Genitourinary: Positive for pelvic pain and vaginal bleeding  Musculoskeletal: Negative  Skin: Negative  Allergic/Immunologic: Negative  Neurological: Negative  Hematological: Negative  Psychiatric/Behavioral: Negative            Objective:      /84   Pulse 80   Temp 98 2 °F (36 8 °C)   Resp 18   Ht 5' (1 524 m)   Wt 93 9 kg (207 lb)   SpO2 99%   BMI 40 43 kg/m²          Physical Exam  Constitutional:       General: She is not in acute distress  Appearance: She is well-developed  She is not diaphoretic  HENT:      Head: Normocephalic and atraumatic  Cardiovascular:      Rate and Rhythm: Normal rate and regular rhythm  Heart sounds: Normal heart sounds  No murmur heard  No friction rub  No gallop  Pulmonary:      Effort: Pulmonary effort is normal  No respiratory distress  Breath sounds: Normal breath sounds  No wheezing or rales  Chest:      Chest wall: No tenderness  Abdominal:      General: Abdomen is flat  Bowel sounds are normal  There is no distension  Palpations: Abdomen is soft  There is no mass  Tenderness: There is no abdominal tenderness  There is no right CVA tenderness, left CVA tenderness, guarding or rebound  Hernia: No hernia is present  Musculoskeletal:         General: No deformity  Normal range of motion  Cervical back: Normal range of motion and neck supple  Skin:     General: Skin is warm and dry  Neurological:      Mental Status: She is alert and oriented to person, place, and time  Psychiatric:         Behavior: Behavior normal          Thought Content:  Thought content normal          Judgment: Judgment normal

## 2022-07-01 ENCOUNTER — HOSPITAL ENCOUNTER (OUTPATIENT)
Dept: RADIOLOGY | Facility: HOSPITAL | Age: 67
Discharge: HOME | End: 2022-07-01
Attending: FAMILY MEDICINE
Payer: COMMERCIAL

## 2022-07-01 DIAGNOSIS — N95.0 POST-MENOPAUSAL BLEEDING: ICD-10-CM

## 2022-07-01 PROCEDURE — 76856 US EXAM PELVIC COMPLETE: CPT

## 2022-07-01 PROCEDURE — 76830 TRANSVAGINAL US NON-OB: CPT

## 2022-07-05 ENCOUNTER — HOSPITAL ENCOUNTER (OUTPATIENT)
Dept: NON INVASIVE DIAGNOSTICS | Facility: HOSPITAL | Age: 67
Discharge: HOME | End: 2022-07-05
Attending: INTERNAL MEDICINE
Payer: COMMERCIAL

## 2022-07-05 DIAGNOSIS — R00.1 SINUS BRADYCARDIA: ICD-10-CM

## 2022-07-05 DIAGNOSIS — Z87.898 HISTORY OF SYNCOPE: ICD-10-CM

## 2022-07-05 PROCEDURE — 93226 XTRNL ECG REC<48 HR SCAN A/R: CPT

## 2022-07-05 PROCEDURE — 93225 XTRNL ECG REC<48 HRS REC: CPT

## 2022-07-15 PROCEDURE — 93227 XTRNL ECG REC<48 HR R&I: CPT | Performed by: INTERNAL MEDICINE

## 2022-07-18 NOTE — RESULT ENCOUNTER NOTE
Message sent to patient on 07/18/2022: Cristiano Mckinney,    Your 24 hour Holter monitor from 07/05/2022 showed no serious heart rhythm irregularities  Heart rhythm was normal when you complained of shortness of breath  There were a moderate number of extra heartbeats coming from the upper heart chambers nothing significant  Let us know if you have any questions      Dr Piter Elizabeth

## 2022-08-16 DIAGNOSIS — N18.6 ESRD (END STAGE RENAL DISEASE) ON DIALYSIS (HCC): ICD-10-CM

## 2022-08-16 DIAGNOSIS — Z99.2 ESRD (END STAGE RENAL DISEASE) ON DIALYSIS (HCC): ICD-10-CM

## 2022-08-16 RX ORDER — GENTAMICIN SULFATE 1 MG/G
CREAM TOPICAL DAILY
Qty: 15 G | Refills: 0 | Status: SHIPPED | OUTPATIENT
Start: 2022-08-16 | End: 2022-08-16 | Stop reason: SDUPTHER

## 2022-08-16 RX ORDER — GENTAMICIN SULFATE 1 MG/G
CREAM TOPICAL DAILY
Qty: 15 G | Refills: 3 | Status: SHIPPED | OUTPATIENT
Start: 2022-08-16 | End: 2022-10-12 | Stop reason: SDUPTHER

## 2022-08-16 RX ORDER — GENTAMICIN SULFATE 1 MG/G
CREAM TOPICAL DAILY
Qty: 15 G | Refills: 1 | Status: SHIPPED | OUTPATIENT
Start: 2022-08-16 | End: 2022-08-16 | Stop reason: SDUPTHER

## 2022-08-24 DIAGNOSIS — N25.81 SECONDARY HYPERPARATHYROIDISM OF RENAL ORIGIN (HCC): ICD-10-CM

## 2022-08-25 RX ORDER — CINACALCET 30 MG/1
TABLET, FILM COATED ORAL
Qty: 36 TABLET | Refills: 3 | Status: SHIPPED | OUTPATIENT
Start: 2022-08-25

## 2022-09-01 ENCOUNTER — ANNUAL EXAM (OUTPATIENT)
Dept: OBGYN CLINIC | Facility: CLINIC | Age: 67
End: 2022-09-01
Payer: COMMERCIAL

## 2022-09-01 VITALS — BODY MASS INDEX: 41.79 KG/M2 | WEIGHT: 214 LBS | SYSTOLIC BLOOD PRESSURE: 130 MMHG | DIASTOLIC BLOOD PRESSURE: 80 MMHG

## 2022-09-01 DIAGNOSIS — N95.0 POSTMENOPAUSAL BLEEDING: Primary | ICD-10-CM

## 2022-09-01 DIAGNOSIS — Z01.818 PREOP TESTING: ICD-10-CM

## 2022-09-01 PROCEDURE — 99213 OFFICE O/P EST LOW 20 MIN: CPT | Performed by: STUDENT IN AN ORGANIZED HEALTH CARE EDUCATION/TRAINING PROGRAM

## 2022-09-01 NOTE — H&P
History & Physical - OB/GYN   Damion Age 74 y o  female MRN: [id number]     HPI:  Damion Age is a 74 y o   postmenopausal female who presents with episode of postmenopausal bleeding- patient underwent EMB 2021 for postmenopausal thickened endometrium (7mm) showing polyp and otherwise benign pathology- patient reports about a month ago she had bright red vaginal bleeding for a few days like a period- she was sent for pelvic ultrasound 2022, which showed and endometrial lining of 5mm and a 3 4 cm simple appearing left ovarian cyst      Pap: 2021- NILM, HPV neg  Mammo: 3/10/2021- Birads 2  CRC screenin19- colonoscopy    Active Problems:  Patient Active Problem List   Diagnosis    End stage renal disease ([address] )    Nephrotic range proteinuria    Cyst of ovary    Mixed hyperlipidemia    Type 2 diabetes mellitus with hyperglycemia, with long-term current use of insulin ([address] )    Obstructive sleep apnea of adult    Dyspnea on exertion    PE (pulmonary thromboembolism) (HCC)    Elevated brain natriuretic peptide (BNP) level    Leg edema, left    Pulmonary hypertension (HCC)    Closed nondisplaced fracture of distal phalanx of right great toe    Right foot pain    Onychomycosis    Tinea pedis of both feet    Acute idiopathic gout of right foot    Morbid obesity with BMI of 40 0-44 9, adult ([address] )    Vitamin D deficiency    Secondary hyperparathyroidism of renal origin ([address] )    Non-rheumatic mitral valve stenosis    Diabetic retinopathy of both eyes associated with type 2 diabetes mellitus ([address] )    Nocturnal hypoxemia due to obesity    Drop in hemoglobin    Encounter for peritoneal dialysis ([address] )    Paroxysmal atrial flutter ([address] )    History of pulmonary embolus (PE)    Obstructive sleep apnea    Chronic combined systolic and diastolic congestive heart failure ([address] )    Nocturnal hypoxemia    Mixed dyslipidemia    Class 2 obesity    Dependence on renal dialysis ([address] )    On continuous oral anticoagulation    Hypothyroidism    Anemia of chronic disease    Endometrial thickening on ultrasound    Acute gout due to renal impairment involving toe of right foot    Hypoglycemia    Essential hypertension    End stage kidney disease (HCC)    Morbid obesity (HCC)    Dilated cardiomyopathy (HCC)    Iron deficiency anemia due to chronic blood loss    History of syncope    Dropped heart beats    Sinus bradycardia     PMH:  Past Medical History:   Diagnosis Date    Acute asthmatic bronchitis     last assessed: 2017    Cardiac disease     Chronic diastolic (congestive) heart failure (HCC)     Colon polyp     Diabetes mellitus ([facility] )     Hyperlipidemia     Hypertension     Medical non-compliance     Morbid obesity with BMI of 40 0-44 9, adult ([facility] ) 2019    Pneumonia     last assessed: 2013    Pulmonary embolism ([facility] )     Renal disorder     possible clot noted in kidney, thus blood thinners currently    Severe obstructive sleep apnea     Severe pulmonary arterial systolic hypertension (HCC)      PSH:  Past Surgical History:   Procedure Laterality Date     SECTION       x 1    EYE SURGERY      NOSE SURGERY      fractured nose - septoplasty     Social Hx:  Social History     Socioeconomic History    Marital status: Single     Spouse name: Not on file    Number of children: Not on file    Years of education: Not on file    Highest education level: Not on file   Occupational History    Not on file   Tobacco Use    Smoking status: Never Smoker    Smokeless tobacco: Never Used   Vaping Use    Vaping Use: Never used   Substance and Sexual Activity    Alcohol use: Never    Drug use: No    Sexual activity: Not Currently   Other Topics Concern    Not on file   Social History Narrative    Caffeine use     Social Determinants of Health     Financial Resource Strain: Not on file   Food Insecurity: Not on file   Transportation Needs: Not on file Physical Activity: Not on file   Stress: Not on file   Social Connections: Not on file   Intimate Partner Violence: Not on file   Housing Stability: Not on file     OB Hx:  OB History    Para Term  AB Living   1 1 1 0 0 1   SAB IAB Ectopic Multiple Live Births   0 0 0 0 0      # Outcome Date GA Lbr Abdelrahman/2nd Weight Sex Delivery Anes PTL Lv   1 Term              Meds:  Current Outpatient Medications on File Prior to Visit   Medication Sig Dispense Refill    atorvastatin (LIPITOR) 20 mg tablet Take 1 tablet (20 mg total) by mouth daily 90 tablet 1    B Complex-C (B-COMPLEX WITH VITAMIN C) tablet Take 1 tablet by mouth daily      Blood Glucose Monitoring Suppl (ONE TOUCH ULTRA 2) w/Device KIT Test glucose 3 times daily 1 each 0    calcium acetate (PHOSLO) capsule Take 667 mg by mouth daily       cholecalciferol (VITAMIN D3) 1,000 units tablet Take 2 tablets (2,000 Units total) by mouth daily 100 tablet 5    cinacalcet (SENSIPAR) 30 mg tablet TAKE 1 TABLET THREE TIMES A WEEK 36 tablet 3    docusate sodium (COLACE) 100 mg capsule Take 1 capsule (100 mg total) by mouth 2 (two) times a day 10 capsule 0    gentamicin (GARAMYCIN) 0 1 % cream Apply topically daily 15 g 3    insulin glargine (Lantus SoloStar) 100 units/mL injection pen Inject 30 Units under the skin daily at bedtime 15 mL 3    Insulin Pen Needle 31G X 5 MM MISC by Does not apply route daily 100 each 1    insuln lispro (HumaLOG KwikPen) 200 units/mL CONCENTRATED U-200 injection pen Inject 10 Units under the skin 3 (three) times a day with meals 9 mL 3    Lancets (onetouch ultrasoft) lancets Test glucose 3 times a day; Code: E11 8 300 each 1    levothyroxine 100 mcg tablet TAKE 1 TABLET DAILY 90 tablet 3    metolazone (ZAROXOLYN) 5 mg tablet Take 5 mg by mouth daily       MULTIPLE VITAMIN PO Take 1 tablet by mouth daily      OneTouch Ultra test strip TEST GLUCOSE THREE TIMES A  strip 3    prednisoLONE acetate (PRED FORTE) 1 % ophthalmic suspension       torsemide (DEMADEX) 100 mg tablet Take 1 tablet (100 mg total) by mouth daily 90 tablet 1    valsartan (DIOVAN) 160 mg tablet TAKE 1 TABLET DAILY 90 tablet 3    warfarin (Coumadin) 2 mg tablet Daily as directed (Patient not taking: Reported on 2022) 90 tablet 3    warfarin (COUMADIN) 5 mg tablet Daily as directed (Patient not taking: Reported on 2022) 90 tablet 1     No current facility-administered medications on file prior to visit  Allergies:  No Known Allergies    Physical Exam:  /80   Wt 97 1 kg (214 lb)   BMI 41 79 kg/m²     Physical Exam  Vitals reviewed  Constitutional:       General: She is not in acute distress  Appearance: She is well-developed  She is obese  She is not ill-appearing or diaphoretic  HENT:      Head: Normocephalic and atraumatic  Cardiovascular:      Rate and Rhythm: Normal rate and regular rhythm  Heart sounds: Normal heart sounds  No murmur heard  No friction rub  No gallop  Pulmonary:      Effort: Pulmonary effort is normal  No respiratory distress  Breath sounds: Normal breath sounds  No wheezing or rales  Abdominal:      Palpations: Abdomen is soft  Tenderness: There is no abdominal tenderness  There is no guarding or rebound  Genitourinary:     Vagina: Normal    Musculoskeletal:      Cervical back: Normal range of motion and neck supple  Right lower leg: No edema  Left lower leg: No edema  Skin:     General: Skin is warm and dry  Neurological:      Mental Status: She is alert and oriented to person, place, and time  Psychiatric:         Mood and Affect: Mood normal          Behavior: Behavior normal          Assessment:   74 y o   with postmenopausal bleeding and endometrial lining of 5mm- we reviewed recommendations for endometrial sampling- patient offered EMB in office today to which she declined as it was very painful for her last time     I did offer her D&C hysteroscopy as an alternative option- she understands given her health problems she is in general at higher risk of undergoing even minor surgery and anesthesia- we reviewed risk of D&C hysteroscopy including bleeding, infection, discomfort, uterine perforation, injury to surrounding bladder, bowel, ureters, nerves/vessles of the pelvis, VTE, cardiac arrest and stroke  All questions and concerns addressed and informed consent obtained  She understands I would like her to get clearance from her PCP to undergo this procedure and any other recs surrounding procedure for dialysis, etc   Surgical wash given and booklet instructions given and reviewed  Understands same day procedure and will need someone to drive her home  CBC, CMP and EKG recommended prior to procedure       Rojas Katz MD  09/01/22

## 2022-09-01 NOTE — PROGRESS NOTES
Patient to be taken off schedule for tomorrow as per Dr Angie Dykes  Message left on patient's phone

## 2022-09-02 ENCOUNTER — HOSPITAL ENCOUNTER (OUTPATIENT)
Dept: INFUSION CENTER | Facility: HOSPITAL | Age: 67
Discharge: HOME | End: 2022-09-02
Attending: INTERNAL MEDICINE

## 2022-09-09 ENCOUNTER — OFFICE VISIT (OUTPATIENT)
Dept: FAMILY MEDICINE CLINIC | Facility: CLINIC | Age: 67
End: 2022-09-09
Payer: COMMERCIAL

## 2022-09-09 ENCOUNTER — TELEPHONE (OUTPATIENT)
Dept: FAMILY MEDICINE CLINIC | Facility: CLINIC | Age: 67
End: 2022-09-09

## 2022-09-09 VITALS
SYSTOLIC BLOOD PRESSURE: 120 MMHG | HEIGHT: 60 IN | BODY MASS INDEX: 39.66 KG/M2 | WEIGHT: 202 LBS | TEMPERATURE: 97.7 F | DIASTOLIC BLOOD PRESSURE: 80 MMHG | HEART RATE: 89 BPM | RESPIRATION RATE: 16 BRPM | OXYGEN SATURATION: 98 %

## 2022-09-09 DIAGNOSIS — I10 ESSENTIAL HYPERTENSION: ICD-10-CM

## 2022-09-09 DIAGNOSIS — J06.9 ACUTE URI: Primary | ICD-10-CM

## 2022-09-09 DIAGNOSIS — H10.9 CONJUNCTIVITIS OF BOTH EYES, UNSPECIFIED CONJUNCTIVITIS TYPE: ICD-10-CM

## 2022-09-09 DIAGNOSIS — E78.2 MIXED HYPERLIPIDEMIA: ICD-10-CM

## 2022-09-09 LAB
SARS-COV-2 AG UPPER RESP QL IA: NEGATIVE
VALID CONTROL: NORMAL

## 2022-09-09 PROCEDURE — 99214 OFFICE O/P EST MOD 30 MIN: CPT | Performed by: NURSE PRACTITIONER

## 2022-09-09 PROCEDURE — 87811 SARS-COV-2 COVID19 W/OPTIC: CPT | Performed by: NURSE PRACTITIONER

## 2022-09-09 RX ORDER — AMOXICILLIN 875 MG/1
875 TABLET, COATED ORAL 2 TIMES DAILY
Qty: 14 TABLET | Refills: 0 | Status: SHIPPED | OUTPATIENT
Start: 2022-09-09 | End: 2022-09-16

## 2022-09-09 RX ORDER — METOPROLOL SUCCINATE 50 MG/1
TABLET, EXTENDED RELEASE ORAL
COMMUNITY
Start: 2022-08-24

## 2022-09-09 RX ORDER — SEMAGLUTIDE 1.34 MG/ML
INJECTION, SOLUTION SUBCUTANEOUS
COMMUNITY
End: 2022-09-27 | Stop reason: SDUPTHER

## 2022-09-09 RX ORDER — MOXIFLOXACIN 5 MG/ML
1 SOLUTION/ DROPS OPHTHALMIC 3 TIMES DAILY
Qty: 3 ML | Refills: 0 | Status: SHIPPED | OUTPATIENT
Start: 2022-09-09 | End: 2022-09-17

## 2022-09-09 NOTE — PROGRESS NOTES
Assessment/Plan:    1  Acute URI  -     amoxicillin (AMOXIL) 875 mg tablet; Take 1 tablet (875 mg total) by mouth 2 (two) times a day for 7 days  -     POCT Rapid Covid Ag    2  Conjunctivitis of both eyes, unspecified conjunctivitis type  -     moxifloxacin (VIGAMOX) 0 5 % ophthalmic solution; Administer 1 drop to both eyes 3 (three) times a day for 7 days    3  Essential hypertension  Comments:  stable with current regimen    4  Mixed hyperlipidemia  Comments:  complaint with statin and tolerating it well            Patient Instructions: Take medication with food  It is important that you take the entire course of antibiotics prescribed  May also take a probiotic of your choice to maintain healthy GI nancy  Can take some probiotic and yogurt with the medication  Return if symptoms worsen or fail to improve  Future Appointments   Date Time Provider Hanny Segundo   9/19/2022  2:00 PM Waylon Rankin MD [facility]   12/12/2022  1:00 PM Bailey Maher DO [facility]           Subjective:      Patient ID: Junie Litten is a 74 y o  female  Chief Complaint   Patient presents with    Cold Like Symptoms     Symptoms started on Sunday  Exposed to sick niece  Mz cma    Cough    Eye Problem     Eyes become crusty chapo at at night    Fatigue    Sore Throat         Vitals:  /80   Pulse 89   Temp 97 7 °F (36 5 °C)   Resp 16   Ht 5' (1 524 m)   Wt 91 6 kg (202 lb)   SpO2 98%   BMI 39 45 kg/m²     HPI  Patient stated that started with cough, fatigue, sore throat on 9/4/2022 while after in rain  Denies fever, chills and sob  Denies any covid-19 concerns  Also having marcos around both eyes  Complaint with medications and tolerating it well     On transplant list           The following portions of the patient's history were reviewed and updated as appropriate: allergies, current medications, past family history, past medical history, past social history, past surgical history and problem list       Review of Systems   Constitutional: Negative for chills, diaphoresis, fatigue, fever and unexpected weight change  HENT: Positive for congestion and sore throat  Negative for dental problem, drooling, ear discharge, ear pain, facial swelling, hearing loss, mouth sores, nosebleeds, postnasal drip, rhinorrhea, sinus pressure, sinus pain, sneezing, tinnitus, trouble swallowing and voice change  Eyes: Positive for discharge  Respiratory: Positive for cough  Negative for chest tightness, shortness of breath and wheezing  Cardiovascular: Negative  Gastrointestinal: Negative for abdominal pain, constipation, diarrhea, nausea and vomiting  Musculoskeletal: Negative  Skin: Negative  Neurological: Negative for dizziness, light-headedness and headaches           Objective:    Social History     Tobacco Use   Smoking Status Never Smoker   Smokeless Tobacco Never Used       Allergies: No Known Allergies      Current Outpatient Medications   Medication Sig Dispense Refill    amoxicillin (AMOXIL) 875 mg tablet Take 1 tablet (875 mg total) by mouth 2 (two) times a day for 7 days 14 tablet 0    atorvastatin (LIPITOR) 20 mg tablet Take 1 tablet (20 mg total) by mouth daily 90 tablet 1    B Complex-C (B-COMPLEX WITH VITAMIN C) tablet Take 1 tablet by mouth daily      Blood Glucose Monitoring Suppl (ONE TOUCH ULTRA 2) w/Device KIT Test glucose 3 times daily 1 each 0    calcium acetate (PHOSLO) capsule Take 667 mg by mouth daily       cholecalciferol (VITAMIN D3) 1,000 units tablet Take 2 tablets (2,000 Units total) by mouth daily 100 tablet 5    cinacalcet (SENSIPAR) 30 mg tablet TAKE 1 TABLET THREE TIMES A WEEK 36 tablet 3    docusate sodium (COLACE) 100 mg capsule Take 1 capsule (100 mg total) by mouth 2 (two) times a day 10 capsule 0    gentamicin (GARAMYCIN) 0 1 % cream Apply topically daily 15 g 3    insulin glargine (Lantus SoloStar) 100 units/mL injection pen Inject 30 Units under the skin daily at bedtime 15 mL 3    Insulin Pen Needle 31G X 5 MM MISC by Does not apply route daily 100 each 1    insuln lispro (HumaLOG KwikPen) 200 units/mL CONCENTRATED U-200 injection pen Inject 10 Units under the skin 3 (three) times a day with meals 9 mL 3    Lancets (onetouch ultrasoft) lancets Test glucose 3 times a day; Code: E11 8 300 each 1    levothyroxine 100 mcg tablet TAKE 1 TABLET DAILY 90 tablet 3    metolazone (ZAROXOLYN) 5 mg tablet Take 5 mg by mouth daily       metoprolol succinate (TOPROL-XL) 50 mg 24 hr tablet       moxifloxacin (VIGAMOX) 0 5 % ophthalmic solution Administer 1 drop to both eyes 3 (three) times a day for 7 days 3 mL 0    MULTIPLE VITAMIN PO Take 1 tablet by mouth daily      OneTouch Ultra test strip TEST GLUCOSE THREE TIMES A  strip 3    Semaglutide,0 25 or 0 5MG/DOS, (Ozempic, 0 25 or 0 5 MG/DOSE,) 2 MG/1 5ML SOPN Inject under the skin      torsemide (DEMADEX) 100 mg tablet Take 1 tablet (100 mg total) by mouth daily 90 tablet 1    valsartan (DIOVAN) 160 mg tablet TAKE 1 TABLET DAILY 90 tablet 3    prednisoLONE acetate (PRED FORTE) 1 % ophthalmic suspension  (Patient not taking: Reported on 9/9/2022)       No current facility-administered medications for this visit  Physical Exam  Vitals reviewed  Constitutional:       Appearance: Normal appearance  She is well-developed  HENT:      Head: Normocephalic  Right Ear: Tympanic membrane, ear canal and external ear normal       Left Ear: Tympanic membrane, ear canal and external ear normal       Nose: Nose normal       Right Sinus: No maxillary sinus tenderness or frontal sinus tenderness  Left Sinus: No maxillary sinus tenderness or frontal sinus tenderness  Mouth/Throat:      Mouth: No oral lesions  Pharynx: No oropharyngeal exudate or posterior oropharyngeal erythema  Eyes:      General: Lids are normal          Right eye: No foreign body or hordeolum           Left eye: No foreign body or hordeolum  Conjunctiva/sclera:      Right eye: Right conjunctiva is not injected  No exudate or hemorrhage  Left eye: Left conjunctiva is not injected  No hemorrhage  Cardiovascular:      Rate and Rhythm: Normal rate and regular rhythm  Heart sounds: Normal heart sounds  Pulmonary:      Effort: Pulmonary effort is normal       Breath sounds: Normal breath sounds  Musculoskeletal:         General: Normal range of motion  Cervical back: Neck supple  Lymphadenopathy:      Cervical:      Right cervical: No superficial or posterior cervical adenopathy  Left cervical: No superficial or posterior cervical adenopathy  Skin:     General: Skin is warm and dry  Neurological:      Mental Status: She is alert and oriented to person, place, and time  Psychiatric:         Behavior: Behavior normal          Thought Content:  Thought content normal          Judgment: Judgment normal              Recent Results (from the past 24 hour(s))   POCT Rapid Covid Ag    Collection Time: 09/09/22  1:56 PM   Result Value Ref Range    POCT SARS-CoV-2 Ag Negative Negative    VALID CONTROL Valid            MICHAEL Ramon

## 2022-09-09 NOTE — PATIENT INSTRUCTIONS
Amoxicillin (By mouth)   Amoxicillin ([id number])  Treats infections or stomach ulcers  This medicine is a penicillin antibiotic  Brand Name(s): Moxatag, Prevpac   There may be other brand names for this medicine  When This Medicine Should Not Be Used: This medicine is not right for everyone  You should not use it if you had an allergic reaction to amoxicillin, any type of penicillin, or a cephalosporin antibiotic  How to Use This Medicine:   Capsule, Liquid, Tablet, Chewable Tablet, Long Acting Tablet  Your doctor will tell you how much medicine to use  Do not use more than directed  Chewable tablet: You must chew the tablet before you swallow it  You may crush the tablet and mix the medicine with a small amount of food to make it easier to swallow  Oral liquid: Shake well just before each use  Measure the oral liquid medicine with a marked measuring spoon, oral syringe, or medicine cup  You may mix the oral liquid with a baby formula, milk, fruit juice, water, ginger ale, or another cold drink  Be sure your child drinks all of the mixture right away  Tablet for suspension: Place the tablet in a small drinking glass, and add 2 teaspoons of water  Do not use any other liquid  Gently stir or swirl the water in the glass until the tablet is completely dissolved  Drink all of this mixture right away  Add more water to the glass and drink all of it to make sure you get all of the medicine  Do not chew or swallow the tablet for suspension  Take all of the medicine in your prescription to clear up your infection, even if you feel better after the first few doses  Take a dose as soon as you remember  If it is almost time for your next dose, wait until then and take a regular dose  Do not take extra medicine to make up for a missed dose  Store the tablets, capsules, and tablets for suspension at room temperature, away from heat, moisture, and direct light  Store the oral liquid in the refrigerator   Do not freeze  Throw away any unused medicine after 14 days  Drugs and Foods to Avoid:   Ask your doctor or pharmacist before using any other medicine, including over-the-counter medicines, vitamins, and herbal products  Some medicines can affect how amoxicillin works  Tell your doctor if you are also using any of the following:   Allopurinol  Probenecid  Birth control pills  A blood thinner  Warnings While Using This Medicine:   Tell your doctor if you are pregnant or breastfeeding, or if you have kidney disease, allergies, or a condition called phenylketonuria (PKU)  Tell your doctor if you are on dialysis  This medicine can cause diarrhea  Call your doctor if the diarrhea becomes severe, does not stop, or is bloody  Do not take any medicine to stop diarrhea until you have talked to your doctor  Diarrhea can occur 2 months or more after you stop taking this medicine  Tell any doctor or dentist who treats you that you are using this medicine  This medicine may affect certain medical test results  Call your doctor if your symptoms do not improve or if they get worse  Use this medicine to treat only the infection your doctor has prescribed it for  Do not use this medicine for any infection or condition that has not been checked by a doctor  This medicine will not treat the flu or the common cold  Keep all medicine out of the reach of children  Never share your medicine with anyone  Possible Side Effects While Using This Medicine:   Call your doctor right away if you notice any of these side effects:   Allergic reaction: Itching or hives, swelling in your face or hands, swelling or tingling in your mouth or throat, chest tightness, trouble breathing  Blistering, peeling, or red skin rash  Diarrhea that may contain blood, stomach cramps, fever  If you notice these less serious side effects, talk with your doctor:   Mild diarrhea, nausea, or vomiting  Mild skin rash  If you notice other side effects that you think are caused by this medicine, tell your doctor  Call your doctor for medical advice about side effects  You may report side effects to FDA at 8-325-OCH-2283    © Copyright [employer] [facility] 2022 Information is for End User's use only and may not be sold, redistributed or otherwise used for commercial purposes  The above information is an  only  It is not intended as medical advice for individual conditions or treatments  Talk to your doctor, nurse or pharmacist before following any medical regimen to see if it is safe and effective for you

## 2022-09-14 ENCOUNTER — ANTICOAG VISIT (OUTPATIENT)
Dept: FAMILY MEDICINE CLINIC | Facility: CLINIC | Age: 67
End: 2022-09-14

## 2022-09-14 NOTE — TELEPHONE ENCOUNTER
2022 9:38 AM Called Tony Roldan regarding her INR not being done  Dr Radha Ace has stopped her coumadin  Clinical - please close out her anticoagulation encounter      Thank you,  Francheska Ballard, DO

## 2022-09-17 ENCOUNTER — OFFICE VISIT (OUTPATIENT)
Dept: FAMILY MEDICINE CLINIC | Facility: CLINIC | Age: 67
End: 2022-09-17
Payer: COMMERCIAL

## 2022-09-17 VITALS
TEMPERATURE: 98 F | BODY MASS INDEX: 39.07 KG/M2 | WEIGHT: 199 LBS | RESPIRATION RATE: 18 BRPM | SYSTOLIC BLOOD PRESSURE: 110 MMHG | DIASTOLIC BLOOD PRESSURE: 70 MMHG | HEART RATE: 100 BPM | OXYGEN SATURATION: 98 % | HEIGHT: 60 IN

## 2022-09-17 DIAGNOSIS — J06.9 ACUTE URI: Primary | ICD-10-CM

## 2022-09-17 PROCEDURE — 3288F FALL RISK ASSESSMENT DOCD: CPT | Performed by: FAMILY MEDICINE

## 2022-09-17 PROCEDURE — 3078F DIAST BP <80 MM HG: CPT | Performed by: FAMILY MEDICINE

## 2022-09-17 PROCEDURE — 3725F SCREEN DEPRESSION PERFORMED: CPT | Performed by: FAMILY MEDICINE

## 2022-09-17 PROCEDURE — 3074F SYST BP LT 130 MM HG: CPT | Performed by: FAMILY MEDICINE

## 2022-09-17 PROCEDURE — 1100F PTFALLS ASSESS-DOCD GE2>/YR: CPT | Performed by: FAMILY MEDICINE

## 2022-09-17 PROCEDURE — 99213 OFFICE O/P EST LOW 20 MIN: CPT | Performed by: FAMILY MEDICINE

## 2022-09-17 RX ORDER — DEXTROMETHORPHAN HYDROBROMIDE AND PROMETHAZINE HYDROCHLORIDE 15; 6.25 MG/5ML; MG/5ML
5 SOLUTION ORAL 4 TIMES DAILY PRN
Qty: 240 ML | Refills: 0 | Status: SHIPPED | OUTPATIENT
Start: 2022-09-17 | End: 2022-10-18

## 2022-09-17 RX ORDER — AZITHROMYCIN 250 MG/1
TABLET, FILM COATED ORAL
Qty: 6 TABLET | Refills: 0 | Status: SHIPPED | OUTPATIENT
Start: 2022-09-17 | End: 2022-09-22

## 2022-09-17 RX ORDER — CEPHALEXIN 500 MG/1
CAPSULE ORAL
COMMUNITY
Start: 2022-08-05 | End: 2022-09-19 | Stop reason: ALTCHOICE

## 2022-09-17 RX ORDER — METHYLPREDNISOLONE 4 MG/1
TABLET ORAL
Qty: 21 EACH | Refills: 0 | Status: SHIPPED | OUTPATIENT
Start: 2022-09-17 | End: 2022-10-18

## 2022-09-17 NOTE — ASSESSMENT & PLAN NOTE
COVID test at last visit negative  Symptoms are worsening and now she has wheezing, productive cough, SOB  Will start steroid and azithromycin  Advised to monitor blood sugars while on steroids

## 2022-09-17 NOTE — PROGRESS NOTES
Name: Emili Chamberlain      : 1955      MRN: [id number]  Encounter Provider: Shailesh Moya MD  Encounter Date: 2022   Encounter department: [address] [place] [address]     1  Acute URI  Assessment & Plan:  COVID test at last visit negative  Symptoms are worsening and now she has wheezing, productive cough, SOB  Will start steroid and azithromycin  Advised to monitor blood sugars while on steroids  Orders:  -     methylPREDNISolone 4 MG tablet therapy pack; Use as directed on package  -     azithromycin (Zithromax) 250 mg tablet; Take 2 tablets (500 mg total) by mouth daily for 1 day, THEN 1 tablet (250 mg total) daily for 4 days   -     Promethazine-DM (PHENERGAN-DM) 6 25-15 mg/5 mL oral syrup; Take 5 mL by mouth 4 (four) times a day as needed for cough        Depression Screening and Follow-up Plan: Patient was screened for depression during today's encounter  They screened negative with a PHQ-2 score of 0  Falls Plan of Care: balance, strength, and gait training instructions were provided  Subjective      Cough  This is a new problem  Episode onset: 22  The problem has been unchanged  The cough is productive of sputum  Associated symptoms include nasal congestion, postnasal drip, rhinorrhea and wheezing  Pertinent negatives include no chest pain, chills, ear congestion, ear pain, fever, headaches, heartburn, hemoptysis, myalgias, rash, sore throat, shortness of breath, sweats or weight loss  Treatments tried: amoxicillin  Review of Systems   Constitutional: Negative for chills, fever and weight loss  HENT: Positive for postnasal drip and rhinorrhea  Negative for ear pain and sore throat  Respiratory: Positive for wheezing  Negative for hemoptysis and shortness of breath  Cardiovascular: Negative for chest pain  Gastrointestinal: Negative for heartburn  Musculoskeletal: Negative for myalgias  Skin: Negative for rash     Neurological: Negative for headaches         Current Outpatient Medications on File Prior to Visit   Medication Sig    atorvastatin (LIPITOR) 20 mg tablet Take 1 tablet (20 mg total) by mouth daily    B Complex-C (B-COMPLEX WITH VITAMIN C) tablet Take 1 tablet by mouth daily    Blood Glucose Monitoring Suppl (ONE TOUCH ULTRA 2) w/Device KIT Test glucose 3 times daily    calcium acetate (PHOSLO) capsule Take 667 mg by mouth daily     cholecalciferol (VITAMIN D3) 1,000 units tablet Take 2 tablets (2,000 Units total) by mouth daily    cinacalcet (SENSIPAR) 30 mg tablet TAKE 1 TABLET THREE TIMES A WEEK    docusate sodium (COLACE) 100 mg capsule Take 1 capsule (100 mg total) by mouth 2 (two) times a day    gentamicin (GARAMYCIN) 0 1 % cream Apply topically daily    insulin glargine (Lantus SoloStar) 100 units/mL injection pen Inject 30 Units under the skin daily at bedtime    Insulin Pen Needle 31G X 5 MM MISC by Does not apply route daily    insuln lispro (HumaLOG KwikPen) 200 units/mL CONCENTRATED U-200 injection pen Inject 10 Units under the skin 3 (three) times a day with meals    Lancets (onetouch ultrasoft) lancets Test glucose 3 times a day; Code: E11 8    levothyroxine 100 mcg tablet TAKE 1 TABLET DAILY    metolazone (ZAROXOLYN) 5 mg tablet Take 5 mg by mouth daily     metoprolol succinate (TOPROL-XL) 50 mg 24 hr tablet     moxifloxacin (VIGAMOX) 0 5 % ophthalmic solution Administer 1 drop to both eyes 3 (three) times a day for 7 days    MULTIPLE VITAMIN PO Take 1 tablet by mouth daily    OneTouch Ultra test strip TEST GLUCOSE THREE TIMES A DAY    Semaglutide,0 25 or 0 5MG/DOS, (Ozempic, 0 25 or 0 5 MG/DOSE,) 2 MG/1 5ML SOPN Inject under the skin    torsemide (DEMADEX) 100 mg tablet Take 1 tablet (100 mg total) by mouth daily    valsartan (DIOVAN) 160 mg tablet TAKE 1 TABLET DAILY    [] amoxicillin (AMOXIL) 875 mg tablet Take 1 tablet (875 mg total) by mouth 2 (two) times a day for 7 days (Patient not taking: Reported on 9/17/2022)    cephalexin (KEFLEX) 500 mg capsule  (Patient not taking: Reported on 9/17/2022)    prednisoLONE acetate (PRED FORTE) 1 % ophthalmic suspension  (Patient not taking: No sig reported)       Objective     /70   Pulse 100   Temp 98 °F (36 7 °C)   Resp 18   Ht 5' (1 524 m)   Wt 90 3 kg (199 lb)   SpO2 98%   BMI 38 86 kg/m²     Physical Exam  Constitutional:       General: She is not in acute distress  Appearance: She is well-developed  She is not diaphoretic  HENT:      Head: Normocephalic and atraumatic  Right Ear: Tympanic membrane, ear canal and external ear normal  There is no impacted cerumen  Left Ear: Tympanic membrane, ear canal and external ear normal  There is no impacted cerumen  Nose: Nose normal  No congestion or rhinorrhea  Mouth/Throat:      Mouth: Mucous membranes are moist       Pharynx: Oropharynx is clear  No oropharyngeal exudate or posterior oropharyngeal erythema  Eyes:      General: No scleral icterus  Right eye: No discharge  Left eye: No discharge  Conjunctiva/sclera: Conjunctivae normal    Cardiovascular:      Rate and Rhythm: Normal rate and regular rhythm  Heart sounds: Normal heart sounds  No murmur heard  No friction rub  No gallop  Pulmonary:      Effort: Pulmonary effort is normal  No respiratory distress  Breath sounds: Normal breath sounds  No wheezing or rales  Chest:      Chest wall: No tenderness  Musculoskeletal:         General: No deformity  Normal range of motion  Cervical back: Normal range of motion and neck supple  Skin:     General: Skin is warm and dry  Neurological:      Mental Status: She is alert and oriented to person, place, and time  Psychiatric:         Behavior: Behavior normal          Thought Content:  Thought content normal          Judgment: Judgment normal        Veronica Cooper MD

## 2022-09-19 ENCOUNTER — PREP FOR PROCEDURE (OUTPATIENT)
Dept: GYNECOLOGY | Facility: CLINIC | Age: 67
End: 2022-09-19

## 2022-09-19 ENCOUNTER — OFFICE VISIT (OUTPATIENT)
Dept: CARDIOLOGY CLINIC | Facility: CLINIC | Age: 67
End: 2022-09-19
Payer: COMMERCIAL

## 2022-09-19 VITALS
BODY MASS INDEX: 39.66 KG/M2 | OXYGEN SATURATION: 95 % | TEMPERATURE: 97.8 F | HEIGHT: 60 IN | HEART RATE: 90 BPM | SYSTOLIC BLOOD PRESSURE: 110 MMHG | DIASTOLIC BLOOD PRESSURE: 70 MMHG | WEIGHT: 202 LBS

## 2022-09-19 DIAGNOSIS — I50.42 CHRONIC COMBINED SYSTOLIC AND DIASTOLIC CONGESTIVE HEART FAILURE (HCC): ICD-10-CM

## 2022-09-19 DIAGNOSIS — N18.6 END STAGE KIDNEY DISEASE (HCC): ICD-10-CM

## 2022-09-19 DIAGNOSIS — Z79.4 TYPE 2 DIABETES MELLITUS WITH HYPEROSMOLARITY WITHOUT COMA, WITH LONG-TERM CURRENT USE OF INSULIN (HCC): ICD-10-CM

## 2022-09-19 DIAGNOSIS — E78.2 MIXED HYPERLIPIDEMIA: ICD-10-CM

## 2022-09-19 DIAGNOSIS — E66.9 CLASS 2 OBESITY: ICD-10-CM

## 2022-09-19 DIAGNOSIS — N18.9 HISTORY OF ANEMIA DUE TO CHRONIC KIDNEY DISEASE: ICD-10-CM

## 2022-09-19 DIAGNOSIS — I34.2 NON-RHEUMATIC MITRAL VALVE STENOSIS: ICD-10-CM

## 2022-09-19 DIAGNOSIS — Z86.2 HISTORY OF ANEMIA DUE TO CHRONIC KIDNEY DISEASE: ICD-10-CM

## 2022-09-19 DIAGNOSIS — E11.00 TYPE 2 DIABETES MELLITUS WITH HYPEROSMOLARITY WITHOUT COMA, WITH LONG-TERM CURRENT USE OF INSULIN (HCC): ICD-10-CM

## 2022-09-19 DIAGNOSIS — I10 ESSENTIAL HYPERTENSION: ICD-10-CM

## 2022-09-19 DIAGNOSIS — I27.20 PULMONARY HYPERTENSION (HCC): ICD-10-CM

## 2022-09-19 DIAGNOSIS — N95.0 POST-MENOPAUSAL BLEEDING: Primary | ICD-10-CM

## 2022-09-19 DIAGNOSIS — E03.9 ACQUIRED HYPOTHYROIDISM: ICD-10-CM

## 2022-09-19 DIAGNOSIS — I48.92 PAROXYSMAL ATRIAL FLUTTER (HCC): Primary | ICD-10-CM

## 2022-09-19 DIAGNOSIS — E55.9 VITAMIN D DEFICIENCY: ICD-10-CM

## 2022-09-19 PROCEDURE — 99214 OFFICE O/P EST MOD 30 MIN: CPT | Performed by: INTERNAL MEDICINE

## 2022-09-19 PROCEDURE — 3066F NEPHROPATHY DOC TX: CPT | Performed by: INTERNAL MEDICINE

## 2022-09-19 PROCEDURE — 93000 ELECTROCARDIOGRAM COMPLETE: CPT | Performed by: INTERNAL MEDICINE

## 2022-09-19 NOTE — PATIENT INSTRUCTIONS
Continue current medication  See family physician if respiratory symptoms do not resolve in the next several weeks  Cardiology follow-up approximately six months with EKG, lipids, CMP

## 2022-09-20 NOTE — PROGRESS NOTES
Office Cardiology Progress Note  Cheyenne Dunn 74 y o  female MRN: [id number]  09/19/22  8:09 PM      ASSESSMENT:    1  Recurrence of  dyspnea with activity followed by syncope on 06/14/2022 and resolved dyspnea at rest with suppression of paroxysmal atrial flutter for the past 2+ years  previous sinus bradycardia, intermittent first-degree AV block and dropped heartbeats on cardiac monitoring at [facility] on 06/14/2022   2  Controlled chronic combined systolic and diastolic heart failure, initially identified in July, 2020, with previous  depression of ejection fraction to 35% with moderate LVH, grade 2 diastolic dysfunction, chronic right heart dilatation and significant chronic pulmonary hypertension on previous echocardiogram 07/14/2020 with LV dysfunction thought possibly to be tachycardia-mediated  Improvement to LVEF of 45% on 11/02/2020 TTE, unchanged on more recent TTE of 03/09/2022  History of paroxysmal atrial flutter 07/13/2020, more than two years ago  3  Nonischemic nuclear stress study with dilated right ventricle on 03/09/2022 and previously on 07/14/2020   Nuclear stress LVEF was 54% 6+ months ago  4  Stable mild nonrheumatic mitral valve stenosis, not an active clinical problem at this time  5  Multifactorial chronic pulmonary hypertension, stable   Stable PA systolic pressure 06 QGZD FY of  03/09/2022   6  End-stage kidney disease with patient on peritoneal dialysis for approximately 2+ years  7  Improved class 2 obesity,  with weight loss of 6 lbs in three months from use of Ozempic, weight gain of 10 lbs in approximately 2 years, currently with 12 pound weight loss in one year,  and with previous weight loss of 13 lbs in 4-12+ months and 19 3 lbs in approximately 5 months  Question whether obesity could be related to diabetic medication and/or acquired hypothyroidism    8  Controlled longstanding type 2 diabetes mellitus, insulin dependent, with latest A1c 7 0%  on 03/25/2022     9  Controlled mixed dyslipidemia  10  History of severe obstructive sleep apnea, currently not being treated   Patient intolerant to CPAP and  currently using oxygen overnight at 3 liters/minute only sporadically  11  Patient off continuous warfarin oral anticoagulation for two weeks, originally started after presumed pulmonary embolism in July, 2018 and no longer on anticoagulation  12  History of pulmonary embolism, though not totally confirmed, with moderate probability on V/Q scan in July, 2018  13  Anemia of chronic renal disease  14  Acquired hypothyroidism,  treated at present with levothyroxine 100 micrograms daily  TSH still mildly elevated as of  03/25/2022  15  Treated vitamin-D deficiency  16  Controlled longstanding essential hypertension with recent average home blood pressure of 133/80 between 6/15 and 06/21/2022  Plan       Patient Instructions     1  Continue current medication  2  See family physician if respiratory symptoms do not resolve in the next several weeks  3  Cardiology follow-up approximately six months with EKG, lipids, CMP  HPI    This 74 y o  female developed upper respiratory tract symptoms and was diagnosed as having sinusitis with upper respiratory tract infection about three weeks ago and just received methylPREDNISolone 4 MG tablet therapy pack; Use as directed on package  -     azithromycin (Zithromax) 250 mg tablet; Take 2 tablets (500 mg total) by mouth daily for 1 day, THEN 1 tablet (250 mg total) daily for 4 days   -     Promethazine-[facility]) from her primary care physician on 09/17/2022 with slight improvement since then  She still has residual cough but less sputum production than she did at onset  She denies any fever, chest pains, or hemoptysis  She had a negative COVID-19 antigen test on 09/09/2022  The patient was placed on Ozempic by Dr Ari Arora of endocrinology on 09/09/2022      The patient is otherwise stable and is being seen in follow-up of the below listed diagnoses  Encounter Diagnoses   Name Primary?     Paroxysmal atrial flutter (HCC) Yes    Non-rheumatic mitral valve stenosis     Chronic combined systolic and diastolic congestive heart failure (HCC)     Pulmonary hypertension ([facility] )     Essential hypertension     Type 2 diabetes mellitus with hyperosmolarity without coma, with long-term current use of insulin (HCC)     End stage kidney disease (HCC)     Mixed hyperlipidemia     Vitamin D deficiency     Acquired hypothyroidism     History of anemia due to chronic kidney disease     Class 2 obesity         Review of Systems    All other systems negative, except as noted in history of present illness    Historical Information   Past Medical History:   Diagnosis Date    Acute asthmatic bronchitis     last assessed: 2017    Cardiac disease     Chronic diastolic (congestive) heart failure (HCC)     Colon polyp     Diabetes mellitus ([facility] )     Hyperlipidemia     Hypertension     Medical non-compliance     Morbid obesity with BMI of 40 0-44 9, adult ([facility] ) 2019    Pneumonia     last assessed: 2013    Pulmonary embolism ([facility] )     Renal disorder     possible clot noted in kidney, thus blood thinners currently    Severe obstructive sleep apnea     Severe pulmonary arterial systolic hypertension (HCC)      Past Surgical History:   Procedure Laterality Date     SECTION       x 1    EYE SURGERY      NOSE SURGERY      fractured nose - septoplasty     Social History     Substance and Sexual Activity   Alcohol Use Never     Social History     Substance and Sexual Activity   Drug Use No     Social History     Tobacco Use   Smoking Status Never Smoker   Smokeless Tobacco Never Used       Family History:  Family History   Problem Relation Age of Onset    Diabetes Mother         mellitus    Anxiety disorder Mother         generalized anxiety disorder    Hypertension Mother     Stroke Mother     Kidney disease Father     Kidney failure Father         renal failure    Ulcerative colitis Sister     Diabetes Sister     Heart disease Family     Ovarian cancer Maternal Aunt     Diabetes Maternal Aunt     Diabetes Maternal Uncle     Heart disease Maternal Uncle     Thyroid disease Neg Hx          Meds/Allergies     Prior to Admission medications    Medication Sig Start Date End Date Taking? Authorizing Provider   atorvastatin (LIPITOR) 20 mg tablet Take 1 tablet (20 mg total) by mouth daily 9/8/20  Yes Hannah Mehrdad, DO   azithromycin (Zithromax) 250 mg tablet Take 2 tablets (500 mg total) by mouth daily for 1 day, THEN 1 tablet (250 mg total) daily for 4 days   9/17/22 9/22/22 Yes Nilo Oliveros MD   B Complex-C (B-COMPLEX WITH VITAMIN C) tablet Take 1 tablet by mouth daily   Yes Historical Provider, MD   Blood Glucose Monitoring Suppl (ONE TOUCH ULTRA 2) w/Device KIT Test glucose 3 times daily 9/14/18  Yes Hannah Mehrdad, DO   calcium acetate (PHOSLO) capsule Take 667 mg by mouth daily  12/19/20  Yes Historical Provider, MD   cholecalciferol (VITAMIN D3) 1,000 units tablet Take 2 tablets (2,000 Units total) by mouth daily 12/22/20  Yes Amalia Bean MD   cinacalcet (SENSIPAR) 30 mg tablet TAKE 1 TABLET THREE TIMES A WEEK 8/25/22  Yes Godfrey Winn MD   docusate sodium (COLACE) 100 mg capsule Take 1 capsule (100 mg total) by mouth 2 (two) times a day 7/20/20  Yes Neha Walker MD   gentamicin (GARAMYCIN) 0 1 % cream Apply topically daily 8/16/22  Yes Godfrey Winn MD   insulin glargine (Lantus SoloStar) 100 units/mL injection pen Inject 30 Units under the skin daily at bedtime 11/8/21  Yes Colby Dominguez MD   Insulin Pen Needle 31G X 5 MM MISC by Does not apply route daily 10/5/20  Yes Hannah Mehrdad, DO   insuln lispro (HumaLOG KwikPen) 200 units/mL CONCENTRATED U-200 injection pen Inject 10 Units under the skin 3 (three) times a day with meals 11/8/21 Yes To Saucedo MD   Lancets (onetouch ultrasoft) lancets Test glucose 3 times a day; Code: E11 8 9/8/20  Yes Lillian Ba DO   levothyroxine 100 mcg tablet TAKE 1 TABLET DAILY 6/13/22  Yes To Saucedo MD   methylPREDNISolone 4 MG tablet therapy pack Use as directed on package 9/17/22  Yes Louanne Lesch, MD   metolazone (ZAROXOLYN) 5 mg tablet Take 5 mg by mouth daily  7/16/21  Yes Historical Provider, MD   metoprolol succinate (TOPROL-XL) 50 mg 24 hr tablet  8/24/22  Yes Historical Provider, MD   MULTIPLE VITAMIN PO Take 1 tablet by mouth daily   Yes Historical Provider, MD   OneTouch Ultra test strip TEST GLUCOSE THREE TIMES A DAY 1/29/22  Yes Lillian Ba DO   Promethazine-DM (PHENERGAN-DM) 6 25-15 mg/5 mL oral syrup Take 5 mL by mouth 4 (four) times a day as needed for cough 9/17/22  Yes Louanne Lesch, MD   Semaglutide,0 25 or 0 5MG/DOS, (Ozempic, 0 25 or 0 5 MG/DOSE,) 2 MG/1 5ML SOPN Inject under the skin   Yes Historical Provider, MD   torsemide (DEMADEX) 100 mg tablet Take 1 tablet (100 mg total) by mouth daily 3/15/21  Yes Karlo West MD   valsartan (DIOVAN) 160 mg tablet TAKE 1 TABLET DAILY 1/2/22  Yes Karlo West MD   cephalexin [facility]) 500 mg capsule  8/5/22 9/19/22  Historical Provider, MD   prednisoLONE acetate (PRED FORTE) 1 % ophthalmic suspension  5/20/22 9/19/22  Historical Provider, MD       No Known Allergies      Vitals:    09/19/22 1408   BP: 110/70   BP Location: Left arm   Patient Position: Sitting   Cuff Size: Large   Pulse: 90   Temp: 97 8 °F (36 6 °C)   SpO2: 95%   Weight: 91 6 kg (202 lb)   Height: 5' (1 524 m)       Body mass index is 39 45 kg/m²  6 pound weight loss in approximately three months and 10 pound weight gain in approximately two years    Physical Exam:      General Appearance:  Alert, cooperative, no distress, appears stated age and is markedly obese     Head:  Normocephalic, without obvious abnormality, atraumatic   Eyes:  PERRL, conjunctiva/corneas clear, EOM's intact, both eyes   Ears:  Normal TM's and external ear canals, both ears   Nose: Nares normal, septum midline, mucosa normal, no drainage or sinus tenderness   Throat: Lips, mucosa, and tongue normal; teeth and gums normal   Neck: Supple, symmetrical, trachea midline, no adenopathy, thyroid: not enlarged, symmetric, no tenderness/mass/nodules, no carotid bruit or JVD   Back:   Symmetric, no curvature, ROM normal, no CVA tenderness   Lungs:   Questionable rales at left posterior base with diminished breath sounds, respirations unlabored with no wheezes or rhonchi  Chest Wall:  No tenderness or deformity   Heart:  Regular rate and rhythm, S1, S2 normal, no murmur, rub or gallop   Abdomen:   Soft, non-tender, bowel sounds active all four quadrants,  no masses, no organomegaly  Marked abdominal obesity noted  Extremities: Extremities normal, atraumatic, no cyanosis or edema   Pulses: Difficult to palpate pedal pulses  Skin: Skin showed normal color, texture, turgor and no rashes or lesions   Lymph nodes: Cervical, supraclavicular, and axillary nodes normal   Neurologic: Normal         Cardiographics    ECG 09/19/22:    Normal sinus rhythm at 90 bpm   LAFB   Delayed precordial R/S transition   Abnormal ECG, unchanged from 06/27/2022    24 hour Holter monitor 07/05/2022:    1 7% supraventricular ectopic activity  No ventricular ectopic activity   Symptoms reported corresponded to normal sinus rhythm   Normal sinus rhythm throughout monitoring period  No evidence of atrial fibrillation, atrial flutter, or PSVT  IMAGING:    XR chest pa & lateral    Result Date: 3/9/2022  Impression No acute cardiopulmonary disease   Workstation performed: [id number]     Transvaginal ultrasound of pelvis 07/01/2022:    Intramural leiomyoma in anterior lower uterine segment  Simple appearing cyst of left ovary, almost certainly benign  Twelve month follow-up recommended        LAB REVIEW:      Lab Results   Component Value Date    SODIUM 141 06/14/2022    K 4 4 06/14/2022     06/14/2022    CO2 24 06/14/2022    ANIONGAP 6 12/11/2014    BUN 97 (H) 06/14/2022    CREATININE 5 98 (H) 06/14/2022    GLUCOSE 133 12/11/2014    GLUF 117 (H) 05/21/2022    CALCIUM 8 5 06/14/2022    CORRECTEDCA 9 4 05/21/2021    AST 13 05/21/2021    ALT 27 05/21/2021    ALKPHOS 88 05/21/2021    PROT 6 8 12/11/2014    BILITOT 1 08 (H) 12/11/2014    EGFR 6 06/14/2022     Lab Results   Component Value Date    CHOLESTEROL 158 03/25/2022    CHOLESTEROL 134 05/21/2021    CHOLESTEROL 154 03/08/2021     Lab Results   Component Value Date    HDL 57 03/25/2022    HDL 51 05/21/2021    HDL 57 03/08/2021        Lab Results   Component Value Date    LDLCALC 84 03/25/2022    LDLCALC 68 05/21/2021    LDLCALC 68 03/08/2021     No components found for: [facility]  Lab Results   Component Value Date    TRIG 84 03/25/2022    TRIG 73 05/21/2021    TRIG 146 03/08/2021               Yumiko Jiménez MD

## 2022-09-27 DIAGNOSIS — Z79.4 TYPE 2 DIABETES MELLITUS WITH HYPERGLYCEMIA, WITH LONG-TERM CURRENT USE OF INSULIN (HCC): Primary | ICD-10-CM

## 2022-09-27 DIAGNOSIS — E11.65 TYPE 2 DIABETES MELLITUS WITH HYPERGLYCEMIA, WITH LONG-TERM CURRENT USE OF INSULIN (HCC): Primary | ICD-10-CM

## 2022-09-27 DIAGNOSIS — E11.65 TYPE 2 DIABETES MELLITUS WITH HYPERGLYCEMIA, WITH LONG-TERM CURRENT USE OF INSULIN (HCC): ICD-10-CM

## 2022-09-27 DIAGNOSIS — Z79.4 TYPE 2 DIABETES MELLITUS WITH HYPERGLYCEMIA, WITH LONG-TERM CURRENT USE OF INSULIN (HCC): ICD-10-CM

## 2022-09-27 RX ORDER — SEMAGLUTIDE 1.34 MG/ML
INJECTION, SOLUTION SUBCUTANEOUS
Qty: 6 ML | Refills: 1 | Status: SHIPPED | OUTPATIENT
Start: 2022-09-27

## 2022-09-27 RX ORDER — SEMAGLUTIDE 1.34 MG/ML
INJECTION, SOLUTION SUBCUTANEOUS
Qty: 6 ML | Refills: 1 | Status: SHIPPED | OUTPATIENT
Start: 2022-09-27 | End: 2022-09-27 | Stop reason: SDUPTHER

## 2022-09-29 ENCOUNTER — TELEPHONE (OUTPATIENT)
Dept: ENDOCRINOLOGY | Facility: CLINIC | Age: 67
End: 2022-09-29

## 2022-09-29 NOTE — TELEPHONE ENCOUNTER
Completed prior auth form from [employer] for Ozempic  Case ID: [id number]  Faxed to: [phone]

## 2022-09-30 ENCOUNTER — TELEPHONE (OUTPATIENT)
Dept: GASTROENTEROLOGY | Facility: AMBULARY SURGERY CENTER | Age: 67
End: 2022-09-30

## 2022-09-30 NOTE — TELEPHONE ENCOUNTER
09/30/22  Screened by: Jose Sultana    Referring Provider     Pre- Screening: There is no height or weight on file to calculate BMI  Has patient been referred for a routine screening Colonoscopy? yes  Is the patient between 39-70 years old? yes      Previous Colonoscopy yes   If yes:    Date: 3 years    Facility: [facility]    Reason:       SCHEDULING STAFF: If the patient is between 39yrs-47yrs, please advise patient to confirm benefits/coverage with their insurance company for a routine screening colonoscopy, some insurance carriers will only cover at [address] or older  If the patient is over 66years old, please schedule an office visit  Does the patient want to see a Gastroenterologist prior to their procedure OR are they having any GI symptoms? no    Has the patient been hospitalized or had abdominal surgery in the past 6 months? no    Does the patient use supplemental oxygen? no    Does the patient take Coumadin, Lovenox, Plavix, Elliquis, Xarelto, or other blood thinning medication? no    Has the patient had a stroke, cardiac event, or stent placed in the past year? no    SCHEDULING STAFF: If patient answers NO to above questions, then schedule procedure  If patient answers YES to above questions, then schedule office appointment  If patient is between 45yrs - 49yrs, please advise patient that we will have to confirm benefits & coverage with their insurance company for a routine screening colonoscopy

## 2022-10-05 NOTE — TELEPHONE ENCOUNTER
After reviewing patients chart I called patient and we discussed that it would be safer for her to come in and talk with Dr Toyin Shields prior to scheduling colonoscopy  Patient is scheduled for OV on 10/11/2022 at the [place] office with Dr Toyin Shields

## 2022-10-11 ENCOUNTER — TELEPHONE (OUTPATIENT)
Dept: GASTROENTEROLOGY | Facility: AMBULARY SURGERY CENTER | Age: 67
End: 2022-10-11

## 2022-10-11 ENCOUNTER — OFFICE VISIT (OUTPATIENT)
Dept: GASTROENTEROLOGY | Facility: CLINIC | Age: 67
End: 2022-10-11
Payer: COMMERCIAL

## 2022-10-11 VITALS
HEART RATE: 73 BPM | BODY MASS INDEX: 39.85 KG/M2 | DIASTOLIC BLOOD PRESSURE: 97 MMHG | OXYGEN SATURATION: 97 % | TEMPERATURE: 97.5 F | HEIGHT: 60 IN | SYSTOLIC BLOOD PRESSURE: 126 MMHG | WEIGHT: 203 LBS

## 2022-10-11 DIAGNOSIS — D12.6 ADENOMATOUS POLYP OF COLON, UNSPECIFIED PART OF COLON: Primary | ICD-10-CM

## 2022-10-11 PROCEDURE — 99244 OFF/OP CNSLTJ NEW/EST MOD 40: CPT | Performed by: INTERNAL MEDICINE

## 2022-10-11 NOTE — PATIENT INSTRUCTIONS
Scheduled date of colonoscopy (as of today): 11/14/2022  Physician performing colonoscopy: Dr Sanjana Luke  Location of colonoscopy: [facility]  Bowel prep reviewed with patient: Golytely/Dulcolax  Instructions reviewed with patient by: Seymour ESCOBEDO  Clearances:  Cardiac Clearance with Dr Darwin Francis

## 2022-10-11 NOTE — TELEPHONE ENCOUNTER
Letter in chart
Patient will need to obtain cardiac clearance for a colonoscopy on 11/14/2022 with Dr Vanna Doan at the [facility]  Patient last saw Dr Aliyah Clifton on 9/19/2022 at the [facility] 
good balance

## 2022-10-11 NOTE — TELEPHONE ENCOUNTER
Patient is scheduled for colonoscopy on November 14 , 2022 at [address] [place] [address] with Leroy Augustin MD  Patient is aware of pre-procedure prep of Golytley/Dulcolax and they will be called the day prior between 2 and 6 pm for time to report for procedure  Pre-procedure prep has been given to the patient  in person  on October 11, 2022

## 2022-10-11 NOTE — LETTER
October 11, 2022     Sam Reed DO  [address] [place] [id number]    Patient: Paulino Burden   YOB: 1955   Date of Visit: 10/11/2022       Dear Dr Clay Common: Thank you for referring Paulino Burden to me for evaluation  Below are my notes for this consultation  If you have questions, please do not hesitate to call me  I look forward to following your patient along with you  Sincerely,        Olga Rosenberg MD        CC: No Recipients  Olga Rosenberg MD  10/11/2022 10:31 AM  Sign when Signing Visit  Consultation - [facility]  Paulino Burden 74 y o  female MRN: [id number]  Unit/Bed#:  Encounter: [id number]        Consults    ASSESSMENT/PLAN:     1  Colon cancer screening-increased risk, has personal history of colon polyps with tubulovillous adenoma on a colonoscopy in 2019  She is currently on peritoneal dialysis  She is being evaluated for renal transplant     -she is overdue for colonoscopy at this time therefore will schedule colonoscopy pending clearance from Cardiology given patient's history of CHF  -would recommend GoLYTELY prep  -  Patient was explained about  the risks and benefits of the procedure  Risks including but not limited to bleeding, infection, perforation were explained in detail  Also explained about less than 100% sensitivity with the exam and other alternatives  -mostly current echocardiogram with EF of 45% with right ventricular systolic pressure severely elevated at 76          ______________________________________________________________________    Reason for Consult / Principal Problem: [unfilled]    HPI: Paulino Burden is a 74y o  year old female with history of type 2 diabetes, hypothyroidism, obstructive sleep apnea, pulmonary hypertension, paroxysmal AFib, presents for colon cancer screening evaluation  Patient reports that she has been on peritoneal dialysis for the past 2 years    She reports that she will be evaluated for renal transplant and as part of the workup needs colonoscopy  In addition, patient is overdue for colonoscopy at this time regardless  She denies any change in bowel habits, hematochezia, abdominal pain or unintentional weight loss  Patient reports that she has been on Ozempic and has lost weight as a result of that  She denies any upper GI symptoms including acid reflux, dysphagia, odynophagia, loss of appetite or early satiety  She has no longer any antiplatelets or anticoagulants  She was previously on Coumadin  Patient last underwent colonoscopy by me in 2019  She was noted to have left-sided diverticulosis, hemorrhoids and several colon polyps  One of the polyps was tubulovillous adenoma  Other was hyperplastic  Patient was recommended a repeat at 3 year interval     Review of Systems: The remainder of the review of systems was negative except for the pertinent positives noted in HPI       Historical Information   Past Medical History:   Diagnosis Date   • Acute asthmatic bronchitis     last assessed: 2017   • Cardiac disease    • Chronic diastolic (congestive) heart failure (HCC)    • Colon polyp    • Diabetes mellitus ([address] )    • Hyperlipidemia    • Hypertension    • Medical non-compliance    • Morbid obesity with BMI of 40 0-44 9, adult ([facility] [address] ) 2019   • Pneumonia     last assessed: 2013   • Pulmonary embolism ([address] )    • Renal disorder     possible clot noted in kidney, thus blood thinners currently   • Severe obstructive sleep apnea    • Severe pulmonary arterial systolic hypertension ([address] )      Past Surgical History:   Procedure Laterality Date   • CATARACT EXTRACTION     •  SECTION       x 1   • EYE SURGERY     • NOSE SURGERY      fractured nose - septoplasty     Social History   Social History     Substance and Sexual Activity   Alcohol Use Never     Social History     Substance and Sexual Activity   Drug Use No     Social History     Tobacco Use   Smoking Status Never Smoker   Smokeless Tobacco Never Used     Family History   Problem Relation Age of Onset   • Diabetes Mother         mellitus   • Anxiety disorder Mother         generalized anxiety disorder   • Hypertension Mother    • Stroke Mother    • Kidney disease Father    • Kidney failure Father         renal failure   • Ulcerative colitis Sister    • Diabetes Sister    • Heart disease Family    • Ovarian cancer Maternal Aunt    • Diabetes Maternal Aunt    • Diabetes Maternal Uncle    • Heart disease Maternal Uncle    • Thyroid disease Neg Hx        Meds/Allergies     (Not in a hospital admission)    No current facility-administered medications for this visit  No Known Allergies    Objective     Blood pressure 126/97, pulse 73, temperature 97 5 °F (36 4 °C), height 5' (1 524 m), weight 92 1 kg (203 lb), SpO2 97 %, not currently breastfeeding  [unfilled]    PHYSICAL EXAM     GEN: well nourished, well developed, no acute distress  HEENT: anicteric, MMM  CV: RRR, no m/r/g  CHEST: CTA b/l, no WRR  ABD: +BS, soft, PD catheter noted in the right lower quadrant  EXT: no c/c/e  SKIN: no rashes,  NEURO: aaox3    Lab Results:   No visits with results within 1 Day(s) from this visit     Latest known visit with results is:   Office Visit on 09/09/2022   Component Date Value   • POCT SARS-CoV-2 Ag 09/09/2022 Negative    • VALID CONTROL 09/09/2022 Valid      Imaging Studies: I have personally reviewed pertinent films in PACS

## 2022-10-11 NOTE — PROGRESS NOTES
Consultation - [facility]  Tomer Burroughs 74 y o  female MRN: [id number]  Unit/Bed#:  Encounter: [id number]        Consults    ASSESSMENT/PLAN:     1  Colon cancer screening-increased risk, has personal history of colon polyps with tubulovillous adenoma on a colonoscopy in 2019  She is currently on peritoneal dialysis  She is being evaluated for renal transplant     -she is overdue for colonoscopy at this time therefore will schedule colonoscopy pending clearance from Cardiology given patient's history of CHF  -would recommend GoLYTELY prep  -  Patient was explained about  the risks and benefits of the procedure  Risks including but not limited to bleeding, infection, perforation were explained in detail  Also explained about less than 100% sensitivity with the exam and other alternatives  -mostly current echocardiogram with EF of 45% with right ventricular systolic pressure severely elevated at 76          ______________________________________________________________________    Reason for Consult / Principal Problem: [unfilled]    HPI: Tomer Burroughs is a 74y o  year old female with history of type 2 diabetes, hypothyroidism, obstructive sleep apnea, pulmonary hypertension, paroxysmal AFib, presents for colon cancer screening evaluation  Patient reports that she has been on peritoneal dialysis for the past 2 years  She reports that she will be evaluated for renal transplant and as part of the workup needs colonoscopy  In addition, patient is overdue for colonoscopy at this time regardless  She denies any change in bowel habits, hematochezia, abdominal pain or unintentional weight loss  Patient reports that she has been on Ozempic and has lost weight as a result of that  She denies any upper GI symptoms including acid reflux, dysphagia, odynophagia, loss of appetite or early satiety  She has no longer any antiplatelets or anticoagulants    She was previously on Coumadin  Patient last underwent colonoscopy by me in   She was noted to have left-sided diverticulosis, hemorrhoids and several colon polyps  One of the polyps was tubulovillous adenoma  Other was hyperplastic  Patient was recommended a repeat at 3 year interval     Review of Systems: The remainder of the review of systems was negative except for the pertinent positives noted in HPI       Historical Information   Past Medical History:   Diagnosis Date   • Acute asthmatic bronchitis     last assessed: 2017   • Cardiac disease    • Chronic diastolic (congestive) heart failure (HCC)    • Colon polyp    • Diabetes mellitus ([facility] )    • Hyperlipidemia    • Hypertension    • Medical non-compliance    • Morbid obesity with BMI of 40 0-44 9, adult ([facility] [address] ) 2019   • Pneumonia     last assessed: 2013   • Pulmonary embolism ([facility] )    • Renal disorder     possible clot noted in kidney, thus blood thinners currently   • Severe obstructive sleep apnea    • Severe pulmonary arterial systolic hypertension (HCC)      Past Surgical History:   Procedure Laterality Date   • CATARACT EXTRACTION     •  SECTION       x 1   • EYE SURGERY     • NOSE SURGERY      fractured nose - septoplasty     Social History   Social History     Substance and Sexual Activity   Alcohol Use Never     Social History     Substance and Sexual Activity   Drug Use No     Social History     Tobacco Use   Smoking Status Never Smoker   Smokeless Tobacco Never Used     Family History   Problem Relation Age of Onset   • Diabetes Mother         mellitus   • Anxiety disorder Mother         generalized anxiety disorder   • Hypertension Mother    • Stroke Mother    • Kidney disease Father    • Kidney failure Father         renal failure   • Ulcerative colitis Sister    • Diabetes Sister    • Heart disease Family    • Ovarian cancer Maternal Aunt    • Diabetes Maternal Aunt    • Diabetes Maternal Uncle    • Heart disease Maternal Uncle • Thyroid disease Neg Hx        Meds/Allergies     (Not in a hospital admission)    No current facility-administered medications for this visit  No Known Allergies    Objective     Blood pressure 126/97, pulse 73, temperature 97 5 °F (36 4 °C), height 5' (1 524 m), weight 92 1 kg (203 lb), SpO2 97 %, not currently breastfeeding  [unfilled]    PHYSICAL EXAM     GEN: well nourished, well developed, no acute distress  HEENT: anicteric, MMM  CV: RRR, no m/r/g  CHEST: CTA b/l, no WRR  ABD: +BS, soft, PD catheter noted in the right lower quadrant  EXT: no c/c/e  SKIN: no rashes,  NEURO: aaox3    Lab Results:   No visits with results within 1 Day(s) from this visit     Latest known visit with results is:   Office Visit on 09/09/2022   Component Date Value   • POCT SARS-CoV-2 Ag 09/09/2022 Negative    • VALID CONTROL 09/09/2022 Valid      Imaging Studies: I have personally reviewed pertinent films in PACS

## 2022-10-12 ENCOUNTER — OFFICE VISIT (OUTPATIENT)
Dept: PULMONOLOGY | Facility: MEDICAL CENTER | Age: 67
End: 2022-10-12
Payer: COMMERCIAL

## 2022-10-12 VITALS
HEART RATE: 79 BPM | BODY MASS INDEX: 39.62 KG/M2 | SYSTOLIC BLOOD PRESSURE: 122 MMHG | WEIGHT: 201.8 LBS | RESPIRATION RATE: 12 BRPM | HEIGHT: 60 IN | OXYGEN SATURATION: 94 % | DIASTOLIC BLOOD PRESSURE: 82 MMHG

## 2022-10-12 DIAGNOSIS — I27.20 PULMONARY HYPERTENSION (HCC): Chronic | ICD-10-CM

## 2022-10-12 DIAGNOSIS — Z99.2 ESRD (END STAGE RENAL DISEASE) ON DIALYSIS (HCC): ICD-10-CM

## 2022-10-12 DIAGNOSIS — E66.2 HYPOVENTILATION ASSOCIATED WITH OBESITY SYNDROME (HCC): ICD-10-CM

## 2022-10-12 DIAGNOSIS — N18.6 ESRD (END STAGE RENAL DISEASE) ON DIALYSIS (HCC): ICD-10-CM

## 2022-10-12 DIAGNOSIS — G47.33 OSA (OBSTRUCTIVE SLEEP APNEA): Primary | Chronic | ICD-10-CM

## 2022-10-12 LAB
DME PARACHUTE DELIVERY DATE REQUESTED: NORMAL
DME PARACHUTE ITEM DESCRIPTION: NORMAL
DME PARACHUTE ORDER STATUS: NORMAL
DME PARACHUTE SUPPLIER NAME: NORMAL
DME PARACHUTE SUPPLIER PHONE: NORMAL

## 2022-10-12 PROCEDURE — 99214 OFFICE O/P EST MOD 30 MIN: CPT | Performed by: NURSE PRACTITIONER

## 2022-10-12 RX ORDER — GENTAMICIN SULFATE 1 MG/G
CREAM TOPICAL DAILY
Qty: 15 G | Refills: 3 | Status: SHIPPED | OUTPATIENT
Start: 2022-10-12

## 2022-10-12 NOTE — ASSESSMENT & PLAN NOTE
Patient recently had a right heart catheterization  This was done at [facility]  I was able to review the numerous documentation to some degree  Hemodynamic assessment demonstrated mildly depressed cardiac output normal pulmonary capillary wedge pressure mildly elevated systemic vascular resistance, mildly elevated pulmonary vascular resistance mild lead depressed cardiac index mildly elevated pulmonary artery pressure  According to patient any kind of treatment was deferred for PA pressure to appears at this is stable at this point

## 2022-10-12 NOTE — PROGRESS NOTES
Assessment/Plan:     Problem List Items Addressed This Visit        Respiratory    CHANDRA (obstructive sleep apnea) - Primary (Chronic)     Patient had a split night study done in June of 2019  Her apneic hypopnea index was 31 3 events per hour  During REM sleep it was 63 4  It was also noted the patient had a mean oxygen throughout the study in 90 4 and oxygen less than equal to 89% was 157 minutes  Post treatment O2 saturation remained at less than 89% for 50 minutes  Patient reported that she has been using her sleep apnea machine intermittently  Additionally it was noted that her CPAP or CHANDRA was remedial it with positive airway pressure 14 cm of limb movement  Patient has been unable to use this  She tried then just failed  I would like her to have a fell follow-up appointment with Dr Guadalupe Reed she is agreeable to the same she could be a candidate for inspire            Cardiovascular and Mediastinum    Pulmonary hypertension ([facility] [address] ) (Chronic)     Patient recently had a right heart catheterization  This was done at [facility]  I was able to review the numerous documentation to some degree  Hemodynamic assessment demonstrated mildly depressed cardiac output normal pulmonary capillary wedge pressure mildly elevated systemic vascular resistance, mildly elevated pulmonary vascular resistance mild lead depressed cardiac index mildly elevated pulmonary artery pressure  According to patient any kind of treatment was deferred for PA pressure to appears at this is stable at this point  Other Visit Diagnoses     Hypoventilation associated with obesity syndrome (HCC)        Relevant Orders    Pulse oximetry overnight            Return in about 3 months (around 1/12/2023)  All questions are answered to the patient's satisfaction and understanding  She verbalizes understanding  She is encouraged to call with any further questions or concerns      Portions of the record may have been created with voice recognition software  Occasional wrong word or "sound a like" substitutions may have occurred due to the inherent limitations of voice recognition software  Read the chart carefully and recognize, using context, where substitutions have occurred  Electronically Signed by MICHAEL Gongora    ______________________________________________________________________    Chief Complaint: No chief complaint on file  Patient ID: Odell Allison is a 74 y o  y o  female has a past medical history of Acute asthmatic bronchitis, Cardiac disease, Chronic diastolic (congestive) heart failure ([facility] [address] ), Colon polyp, Diabetes mellitus ([facility] ), Hyperlipidemia, Hypertension, Medical non-compliance, Morbid obesity with BMI of 40 0-44 9, adult ([facility] [address] ) (2/28/2019), Pneumonia, Pulmonary embolism ([facility] [address] ), Renal disorder, Severe obstructive sleep apnea, and Severe pulmonary arterial systolic hypertension ([facility] [address] )  10/12/2022  Patient presents today for follow-up visit  HPI  patient has a history of pulmonary hypertension she area  Her last 2D echo showed estimated PE a pressure of 60 mmHg with grade 2 diastolic dysfunction  Patient is being diuresed  Patient follows with Cardiology she does have shortness of breath with history of proximal atrial flutter and controlled combined systolic and diastolic heart failure  She has grade 2 diastolic dysfunction chronic right heart dilatation and significant chronic pulmonary hypertension on previous echo  Patient was evaluated at [facility] May 17, 2022  She does have end-stage renal disease  Review of Systems   Constitutional: Negative  HENT: Negative  Eyes: Negative  Respiratory: Positive for shortness of breath  Cardiovascular: Negative  Gastrointestinal: Negative  Endocrine: Negative  Genitourinary: Negative  Musculoskeletal: Negative  Skin: Negative  Allergic/Immunologic: Negative  Neurological: Negative  Hematological: Negative  Psychiatric/Behavioral: Negative  Smoking history: She reports that she has never smoked   She has never used smokeless tobacco     The following portions of the patient's history were reviewed and updated as appropriate: current medications, past family history, past medical history, past social history, past surgical history and problem list     Immunization History   Administered Date(s) Administered   • COVID-19 PFIZER VACCINE 0 3 ML IM 03/04/2021, 03/24/2021   • Hep B, adult 09/04/2020, 10/08/2020, 11/09/2020, 07/07/2021   • INFLUENZA 10/01/2018, 10/30/2019, 11/01/2021   • Influenza Quadrivalent 3 years and older 10/01/2018, 10/30/2019, 11/01/2021   • Influenza Quadrivalent, 6-35 Months IM 10/03/2016   • Influenza Split High Dose Preservative Free IM 10/09/2019   • Influenza, recombinant, quadrivalent,injectable, preservative free 09/08/2020   • Influenza, seasonal, injectable 11/08/2012   • Influenza, seasonal, injectable, preservative free 10/09/2019, 10/30/2019, 09/08/2020   • Pneumococcal Polysaccharide PPV23 11/08/2012, 11/08/2012, 12/08/2020, 12/08/2020   • Tdap 03/25/2016   • Tuberculin Skin Test-PPD Intradermal 07/21/2020, 08/26/2020, 02/02/2022     Current Outpatient Medications   Medication Sig Dispense Refill   • atorvastatin (LIPITOR) 20 mg tablet Take 1 tablet (20 mg total) by mouth daily 90 tablet 1   • B Complex-C (B-COMPLEX WITH VITAMIN C) tablet Take 1 tablet by mouth daily     • Blood Glucose Monitoring Suppl (ONE TOUCH ULTRA 2) w/Device KIT Test glucose 3 times daily 1 each 0   • calcium acetate (PHOSLO) capsule Take 667 mg by mouth daily      • cholecalciferol (VITAMIN D3) 1,000 units tablet Take 2 tablets (2,000 Units total) by mouth daily 100 tablet 5   • cinacalcet (SENSIPAR) 30 mg tablet TAKE 1 TABLET THREE TIMES A WEEK 36 tablet 3   • docusate sodium (COLACE) 100 mg capsule Take 1 capsule (100 mg total) by mouth 2 (two) times a day 10 capsule 0   • gentamicin (GARAMYCIN) 0 1 % cream Apply topically daily 15 g 3   • insulin glargine (Lantus SoloStar) 100 units/mL injection pen Inject 30 Units under the skin daily at bedtime 15 mL 3   • Insulin Pen Needle 31G X 5 MM MISC by Does not apply route daily 100 each 1   • insuln lispro (HumaLOG KwikPen) 200 units/mL CONCENTRATED U-200 injection pen Inject 10 Units under the skin 3 (three) times a day with meals 9 mL 3   • Lancets (onetouch ultrasoft) lancets Test glucose 3 times a day; Code: E11 8 300 each 1   • levothyroxine 100 mcg tablet TAKE 1 TABLET DAILY 90 tablet 3   • methylPREDNISolone 4 MG tablet therapy pack Use as directed on package 21 each 0   • metolazone (ZAROXOLYN) 5 mg tablet Take 5 mg by mouth daily      • metoprolol succinate (TOPROL-XL) 50 mg 24 hr tablet      • MULTIPLE VITAMIN PO Take 1 tablet by mouth daily     • OneTouch Ultra test strip TEST GLUCOSE THREE TIMES A  strip 3   • Promethazine-DM (PHENERGAN-DM) 6 25-15 mg/5 mL oral syrup Take 5 mL by mouth 4 (four) times a day as needed for cough 240 mL 0   • Semaglutide,0 25 or 0 5MG/DOS, (Ozempic, 0 25 or 0 5 MG/DOSE,) 2 MG/1 5ML SOPN Inject 0 5 mg under the skin once a week 6 mL 1   • torsemide (DEMADEX) 100 mg tablet Take 1 tablet (100 mg total) by mouth daily 90 tablet 1   • valsartan (DIOVAN) 160 mg tablet TAKE 1 TABLET DAILY 90 tablet 3   • polyethylene glycol (GOLYTELY) 4000 mL solution Take 4,000 mL by mouth once for 1 dose 4000 mL 0     No current facility-administered medications for this visit  Allergies: Patient has no known allergies  Objective:  Vitals:    10/12/22 1118   BP: 122/82   Pulse: 79   Resp: 12   SpO2: 94%   Weight: 91 5 kg (201 lb 12 8 oz)   Height: 5' (1 524 m)   Oxygen Therapy  SpO2: 94 %    Wt Readings from Last 3 Encounters:   10/12/22 91 5 kg (201 lb 12 8 oz)   10/11/22 92 1 kg (203 lb)   09/19/22 91 6 kg (202 lb)     Body mass index is 39 41 kg/m²  Physical Exam  Constitutional:       Appearance: She is obese     HENT: Head: Normocephalic  Right Ear: Tympanic membrane normal       Nose: Nose normal       Mouth/Throat:      Mouth: Mucous membranes are dry  Eyes:      Pupils: Pupils are equal, round, and reactive to light  Cardiovascular:      Rate and Rhythm: Normal rate and regular rhythm  Pulses: Normal pulses  Pulmonary:      Effort: Pulmonary effort is normal    Musculoskeletal:         General: Normal range of motion  Skin:     General: Skin is warm and dry  Capillary Refill: Capillary refill takes less than 2 seconds  Neurological:      General: No focal deficit present  Mental Status: She is alert and oriented to person, place, and time     Psychiatric:         Mood and Affect: Mood normal          Behavior: Behavior normal          Lab Review:   Office Visit on 09/09/2022   Component Date Value   • POCT SARS-CoV-2 Ag 09/09/2022 Negative    • VALID CONTROL 09/09/2022 Valid    Admission on 06/14/2022, Discharged on 06/15/2022   Component Date Value   • WBC 06/14/2022 14 51 (A)   • RBC 06/14/2022 4 16    • Hemoglobin 06/14/2022 12 3    • Hematocrit 06/14/2022 38 7    • MCV 06/14/2022 93    • MCH 06/14/2022 29 6    • MCHC 06/14/2022 31 8    • RDW 06/14/2022 14 9    • MPV 06/14/2022 11 5    • Platelets 95/53/6722 199    • nRBC 06/14/2022 0    • Neutrophils Relative 06/14/2022 88 (A)   • Immat GRANS % 06/14/2022 1    • Lymphocytes Relative 06/14/2022 3 (A)   • Monocytes Relative 06/14/2022 8    • Eosinophils Relative 06/14/2022 0    • Basophils Relative 06/14/2022 0    • Neutrophils Absolute 06/14/2022 12 78 (A)   • Immature Grans Absolute 06/14/2022 0 12    • Lymphocytes Absolute 06/14/2022 0 43 (A)   • Monocytes Absolute 06/14/2022 1 10    • Eosinophils Absolute 06/14/2022 0 04    • Basophils Absolute 06/14/2022 0 04    • Sodium 06/14/2022 141    • Potassium 06/14/2022 4 4    • Chloride 06/14/2022 103    • CO2 06/14/2022 24    • ANION GAP 06/14/2022 14 (A)   • BUN 06/14/2022 97 (A)   • Creatinine 06/14/2022 5 98 (A)   • Glucose 06/14/2022 152 (A)   • Calcium 06/14/2022 8 5    • eGFR 06/14/2022 6    • Protime 06/14/2022 13 9    • INR 06/14/2022 1 09    • PTT 06/14/2022 29    • hs TnI 0hr 06/14/2022 14    • Ventricular Rate 06/14/2022 59    • Atrial Rate 06/14/2022 59    • NV Interval 06/14/2022 154    • QRSD Interval 06/14/2022 84    • QT Interval 06/14/2022 484    • QTC Interval 06/14/2022 479    • P Axis 06/14/2022 42    • QRS Axis 06/14/2022 -41    • T Wave Axis 06/14/2022 -24    • Ventricular Rate 06/14/2022 57    • Atrial Rate 06/14/2022 57    • NV Interval 06/14/2022 156    • QRSD Interval 06/14/2022 86    • QT Interval 06/14/2022 490    • QTC Interval 06/14/2022 476    • P Axis 06/14/2022 43    • QRS Axis 06/14/2022 -38    • T Wave Axis 06/14/2022 -22    Telephone on 06/09/2022   Component Date Value   • Severity 03/22/2022 Alert    • Right Eye Diabetic Retin* 03/22/2022 Severe    • Left Eye Diabetic Retino* 03/22/2022 Severe    Appointment on 05/21/2022   Component Date Value   • WBC 05/21/2022 8 72    • RBC 05/21/2022 4 05    • Hemoglobin 05/21/2022 11 8    • Hematocrit 05/21/2022 36 9    • MCV 05/21/2022 91    • MCH 05/21/2022 29 1    • MCHC 05/21/2022 32 0    • RDW 05/21/2022 14 9    • MPV 05/21/2022 11 5    • Platelets 17/08/3929 193    • nRBC 05/21/2022 0    • Neutrophils Relative 05/21/2022 71    • Immat GRANS % 05/21/2022 1    • Lymphocytes Relative 05/21/2022 10 (A)   • Monocytes Relative 05/21/2022 14 (A)   • Eosinophils Relative 05/21/2022 3    • Basophils Relative 05/21/2022 1    • Neutrophils Absolute 05/21/2022 6 34    • Immature Grans Absolute 05/21/2022 0 05    • Lymphocytes Absolute 05/21/2022 0 83    • Monocytes Absolute 05/21/2022 1 18    • Eosinophils Absolute 05/21/2022 0 26    • Basophils Absolute 05/21/2022 0 06    • Sodium 05/21/2022 134 (A)   • Potassium 05/21/2022 4 7    • Chloride 05/21/2022 98 (A)   • CO2 05/21/2022 26    • ANION GAP 05/21/2022 10    • BUN 05/21/2022 88 (A) • Creatinine 2022 7 75 (A)   • Glucose, Fasting 2022 117 (A)   • Calcium 2022 8 5    • eGFR 2022 4    • Vitamin B-12 2022 909 (A)   • Lyme total antibody 2022 45    • Sed Rate 2022 85 (A)   Office Visit on 2022   Component Date Value   • SARS-CoV-2 2022 Negative    Anticoag visit on 2022   Component Date Value   • INR 04/15/2022 1 50 (A)       Past Surgical History:   Procedure Laterality Date   • CATARACT EXTRACTION     •  SECTION       x 1   • EYE SURGERY     • NOSE SURGERY      fractured nose - septoplasty        Family History   Problem Relation Age of Onset   • Diabetes Mother         mellitus   • Anxiety disorder Mother         generalized anxiety disorder   • Hypertension Mother    • Stroke Mother    • Kidney disease Father    • Kidney failure Father         renal failure   • Ulcerative colitis Sister    • Diabetes Sister    • Heart disease Family    • Ovarian cancer Maternal Aunt    • Diabetes Maternal Aunt    • Diabetes Maternal Uncle    • Heart disease Maternal Uncle    • Thyroid disease Neg Hx         Diagnostics:  Office Spirometry Results:     ESS:    No results found

## 2022-10-12 NOTE — ASSESSMENT & PLAN NOTE
Patient had a split night study done in June of 2019  Her apneic hypopnea index was 31 3 events per hour  During REM sleep it was 63 4  It was also noted the patient had a mean oxygen throughout the study in 90 4 and oxygen less than equal to 89% was 157 minutes  Post treatment O2 saturation remained at less than 89% for 50 minutes  Patient reported that she has been using her sleep apnea machine intermittently  Additionally it was noted that her CPAP or CHANDRA was remedial it with positive airway pressure 14 cm of limb movement  Patient has been unable to use this  She tried then just failed    I would like her to have a fell follow-up appointment with Dr Wanda Lopez she is agreeable to the same she could be a candidate for reinaldo

## 2022-10-18 ENCOUNTER — TELEPHONE (OUTPATIENT)
Dept: OBGYN CLINIC | Facility: CLINIC | Age: 67
End: 2022-10-18

## 2022-10-18 ENCOUNTER — OFFICE VISIT (OUTPATIENT)
Dept: FAMILY MEDICINE CLINIC | Facility: CLINIC | Age: 67
End: 2022-10-18
Payer: COMMERCIAL

## 2022-10-18 ENCOUNTER — APPOINTMENT (OUTPATIENT)
Dept: LAB | Facility: HOSPITAL | Age: 67
End: 2022-10-18
Payer: COMMERCIAL

## 2022-10-18 VITALS
HEART RATE: 82 BPM | BODY MASS INDEX: 39.27 KG/M2 | OXYGEN SATURATION: 97 % | DIASTOLIC BLOOD PRESSURE: 70 MMHG | TEMPERATURE: 98.8 F | SYSTOLIC BLOOD PRESSURE: 122 MMHG | RESPIRATION RATE: 18 BRPM | WEIGHT: 200 LBS | HEIGHT: 60 IN

## 2022-10-18 DIAGNOSIS — N18.6 ESRD (END STAGE RENAL DISEASE) (HCC): ICD-10-CM

## 2022-10-18 DIAGNOSIS — N18.6 END STAGE RENAL DISEASE (HCC): ICD-10-CM

## 2022-10-18 DIAGNOSIS — Z79.4 TYPE 2 DIABETES MELLITUS WITH HYPERGLYCEMIA, WITH LONG-TERM CURRENT USE OF INSULIN (HCC): ICD-10-CM

## 2022-10-18 DIAGNOSIS — Z99.2: ICD-10-CM

## 2022-10-18 DIAGNOSIS — Z01.818 PREOP TESTING: ICD-10-CM

## 2022-10-18 DIAGNOSIS — E78.2 MIXED HYPERLIPIDEMIA: ICD-10-CM

## 2022-10-18 DIAGNOSIS — I26.99 PE (PULMONARY THROMBOEMBOLISM) (HCC): ICD-10-CM

## 2022-10-18 DIAGNOSIS — E16.2 HYPOGLYCEMIA: ICD-10-CM

## 2022-10-18 DIAGNOSIS — G47.33 OSA (OBSTRUCTIVE SLEEP APNEA): Chronic | ICD-10-CM

## 2022-10-18 DIAGNOSIS — Z01.818 PREOP EXAMINATION: Primary | ICD-10-CM

## 2022-10-18 DIAGNOSIS — R93.89 ENDOMETRIAL THICKENING ON ULTRASOUND: ICD-10-CM

## 2022-10-18 DIAGNOSIS — N95.0 POST-MENOPAUSAL BLEEDING: ICD-10-CM

## 2022-10-18 DIAGNOSIS — E03.9 ACQUIRED HYPOTHYROIDISM: ICD-10-CM

## 2022-10-18 DIAGNOSIS — E11.65 TYPE 2 DIABETES MELLITUS WITH HYPERGLYCEMIA, WITH LONG-TERM CURRENT USE OF INSULIN (HCC): ICD-10-CM

## 2022-10-18 DIAGNOSIS — I10 ESSENTIAL HYPERTENSION: ICD-10-CM

## 2022-10-18 PROBLEM — B35.3 TINEA PEDIS OF BOTH FEET: Status: RESOLVED | Noted: 2018-12-11 | Resolved: 2022-10-18

## 2022-10-18 PROBLEM — Z79.01 ON CONTINUOUS ORAL ANTICOAGULATION: Status: RESOLVED | Noted: 2020-12-22 | Resolved: 2022-10-18

## 2022-10-18 PROBLEM — R71.0 DROP IN HEMOGLOBIN: Status: RESOLVED | Noted: 2019-10-18 | Resolved: 2022-10-18

## 2022-10-18 PROBLEM — E66.9 NOCTURNAL HYPOXEMIA DUE TO OBESITY: Status: RESOLVED | Noted: 2019-09-23 | Resolved: 2022-10-18

## 2022-10-18 PROBLEM — G47.36 NOCTURNAL HYPOXEMIA DUE TO OBESITY: Status: RESOLVED | Noted: 2019-09-23 | Resolved: 2022-10-18

## 2022-10-18 PROBLEM — S92.424A CLOSED NONDISPLACED FRACTURE OF DISTAL PHALANX OF RIGHT GREAT TOE: Status: RESOLVED | Noted: 2018-11-13 | Resolved: 2022-10-18

## 2022-10-18 PROBLEM — B35.1 ONYCHOMYCOSIS: Status: RESOLVED | Noted: 2018-11-27 | Resolved: 2022-10-18

## 2022-10-18 PROBLEM — E66.812 CLASS 2 OBESITY: Status: RESOLVED | Noted: 2020-12-22 | Resolved: 2022-10-18

## 2022-10-18 PROBLEM — E66.9 CLASS 2 OBESITY: Status: RESOLVED | Noted: 2020-12-22 | Resolved: 2022-10-18

## 2022-10-18 PROBLEM — Z86.2 HISTORY OF ANEMIA DUE TO CHRONIC KIDNEY DISEASE: Status: RESOLVED | Noted: 2022-09-19 | Resolved: 2022-10-18

## 2022-10-18 PROBLEM — I45.9: Status: RESOLVED | Noted: 2022-06-27 | Resolved: 2022-10-18

## 2022-10-18 PROBLEM — N18.9 HISTORY OF ANEMIA DUE TO CHRONIC KIDNEY DISEASE: Status: RESOLVED | Noted: 2022-09-19 | Resolved: 2022-10-18

## 2022-10-18 PROBLEM — J06.9 ACUTE URI: Status: RESOLVED | Noted: 2022-09-17 | Resolved: 2022-10-18

## 2022-10-18 PROBLEM — M10.071 ACUTE IDIOPATHIC GOUT OF RIGHT FOOT: Status: RESOLVED | Noted: 2019-01-18 | Resolved: 2022-10-18

## 2022-10-18 PROBLEM — M79.671 RIGHT FOOT PAIN: Status: RESOLVED | Noted: 2018-11-13 | Resolved: 2022-10-18

## 2022-10-18 LAB
ALBUMIN SERPL BCP-MCNC: 3.4 G/DL (ref 3.5–5)
ALP SERPL-CCNC: 93 U/L (ref 46–116)
ALT SERPL W P-5'-P-CCNC: 16 U/L (ref 12–78)
ANION GAP SERPL CALCULATED.3IONS-SCNC: 10 MMOL/L (ref 4–13)
AST SERPL W P-5'-P-CCNC: 9 U/L (ref 5–45)
BASOPHILS # BLD AUTO: 0.07 THOUSANDS/ÂΜL (ref 0–0.1)
BASOPHILS NFR BLD AUTO: 1 % (ref 0–1)
BILIRUB SERPL-MCNC: 0.6 MG/DL (ref 0.2–1)
BUN SERPL-MCNC: 62 MG/DL (ref 5–25)
CALCIUM ALBUM COR SERPL-MCNC: 10.3 MG/DL (ref 8.3–10.1)
CALCIUM SERPL-MCNC: 9.8 MG/DL (ref 8.3–10.1)
CHLORIDE SERPL-SCNC: 103 MMOL/L (ref 96–108)
CO2 SERPL-SCNC: 26 MMOL/L (ref 21–32)
CREAT SERPL-MCNC: 5.93 MG/DL (ref 0.6–1.3)
EOSINOPHIL # BLD AUTO: 0.26 THOUSAND/ÂΜL (ref 0–0.61)
EOSINOPHIL NFR BLD AUTO: 2 % (ref 0–6)
ERYTHROCYTE [DISTWIDTH] IN BLOOD BY AUTOMATED COUNT: 13.5 % (ref 11.6–15.1)
EST. AVERAGE GLUCOSE BLD GHB EST-MCNC: 154 MG/DL
GFR SERPL CREATININE-BSD FRML MDRD: 6 ML/MIN/1.73SQ M
GLUCOSE P FAST SERPL-MCNC: 44 MG/DL (ref 65–99)
HBA1C MFR BLD: 7 %
HCT VFR BLD AUTO: 40 % (ref 34.8–46.1)
HGB BLD-MCNC: 12.5 G/DL (ref 11.5–15.4)
IMM GRANULOCYTES # BLD AUTO: 0.07 THOUSAND/UL (ref 0–0.2)
IMM GRANULOCYTES NFR BLD AUTO: 1 % (ref 0–2)
LYMPHOCYTES # BLD AUTO: 0.76 THOUSANDS/ÂΜL (ref 0.6–4.47)
LYMPHOCYTES NFR BLD AUTO: 7 % (ref 14–44)
MCH RBC QN AUTO: 29.3 PG (ref 26.8–34.3)
MCHC RBC AUTO-ENTMCNC: 31.3 G/DL (ref 31.4–37.4)
MCV RBC AUTO: 94 FL (ref 82–98)
MONOCYTES # BLD AUTO: 1.05 THOUSAND/ÂΜL (ref 0.17–1.22)
MONOCYTES NFR BLD AUTO: 10 % (ref 4–12)
NEUTROPHILS # BLD AUTO: 8.66 THOUSANDS/ÂΜL (ref 1.85–7.62)
NEUTS SEG NFR BLD AUTO: 79 % (ref 43–75)
NRBC BLD AUTO-RTO: 0 /100 WBCS
PLATELET # BLD AUTO: 249 THOUSANDS/UL (ref 149–390)
PMV BLD AUTO: 11.8 FL (ref 8.9–12.7)
POTASSIUM SERPL-SCNC: 4.2 MMOL/L (ref 3.5–5.3)
PROT SERPL-MCNC: 7.5 G/DL (ref 6.4–8.4)
RBC # BLD AUTO: 4.26 MILLION/UL (ref 3.81–5.12)
SODIUM SERPL-SCNC: 139 MMOL/L (ref 135–147)
T4 FREE SERPL-MCNC: 1.03 NG/DL (ref 0.76–1.46)
TSH SERPL DL<=0.05 MIU/L-ACNC: 6.03 UIU/ML (ref 0.45–4.5)
WBC # BLD AUTO: 10.87 THOUSAND/UL (ref 4.31–10.16)

## 2022-10-18 PROCEDURE — 36415 COLL VENOUS BLD VENIPUNCTURE: CPT

## 2022-10-18 PROCEDURE — 83036 HEMOGLOBIN GLYCOSYLATED A1C: CPT

## 2022-10-18 PROCEDURE — 84443 ASSAY THYROID STIM HORMONE: CPT

## 2022-10-18 PROCEDURE — 85025 COMPLETE CBC W/AUTO DIFF WBC: CPT

## 2022-10-18 PROCEDURE — 99243 OFF/OP CNSLTJ NEW/EST LOW 30: CPT | Performed by: NURSE PRACTITIONER

## 2022-10-18 PROCEDURE — 84439 ASSAY OF FREE THYROXINE: CPT

## 2022-10-18 PROCEDURE — 80053 COMPREHEN METABOLIC PANEL: CPT

## 2022-10-18 PROCEDURE — 82985 ASSAY OF GLYCATED PROTEIN: CPT

## 2022-10-18 NOTE — TELEPHONE ENCOUNTER
Pt called and states she has surgery scheduled 10/26/2022 and did get clearance from her PCP but was not sure if she needed clearance from her nephrologist as well  Please advise

## 2022-10-18 NOTE — ASSESSMENT & PLAN NOTE
Management per endocrinology, on insulin and Ozempic      Lab Results   Component Value Date    HGBA1C 7 0 (H) 03/25/2022

## 2022-10-18 NOTE — PROGRESS NOTES
FAMILY PRACTICE PRE-OPERATIVE EVALUATION  [facility]    NAME: Yazmin Rendon  AGE: 74 y o  SEX: female  : 1955     DATE: 10/20/2022    Family Practice Pre-Operative Evaluation      Chief Complaint: Pre-operative Evaluation     Surgery: D&C with hysteroscopy  Anticipated Date of Surgery: 10/26/22  Surgeon: Dr Elsy Edouard      History of Present Illness:     Pt had postmenopausal bleeding back in July  US showed slightly thickened endometrial stripe, will now be going for [facility]  with hysteroscopy  Saw Dr Piter Elizabeth, was taken off Warfarin several months ago  Has cardiac clearance for upcoming colonoscopy in November   History of ESRD, on PD, discussing transplant, but not yet on the list, still undergoing workup  Anesthesia:  general  Bleeding Risk: no recent abnormal bleeding, no remote history of abnormal bleeding and no use of Ca-channel blockers  Current Anti-platelet/anticoagulation medication: none  Was previously on Coumadin for PE, discontinued approx 4 months ago    Assessment of Cardiac Risk:  Denies unstable or severe angina or MI in the last 6 weeks or history of stent placement in the last year   Denies decompensated heart failure (e g  New onset heart failure, NYHA functional class IV heart failure, or worsening existing heart failure)  Denies significant arrhythmias such as high grade AV block, symptomatic ventricular arrhythmia, newly recognized ventricular tachycardia, supraventricular tachycardia with resting heart rate >100, or symptomatic bradycardia  Denies severe heart valve disease including aortic stenosis or symptomatic mitral stenosis     Exercise Capacity:  Able to walk 4 blocks without symptoms?: No  Able to walk 2 flights without symptoms?: No    Prior Anesthesia Reactions: No     Personal history of venous thromboembolic disease? Yes    History of steroid use for >2 weeks within last year?  No         Review of Systems:     Review of Systems   Constitutional: Negative for chills, fatigue and fever  Respiratory: Negative for cough, shortness of breath and wheezing  Cardiovascular: Negative for chest pain, palpitations and leg swelling  Gastrointestinal: Negative for abdominal pain, diarrhea, nausea and vomiting  Skin: Negative for rash  Neurological: Negative for dizziness, light-headedness and headaches         Current Problem List:     Patient Active Problem List   Diagnosis   • End stage renal disease ([place] [address] )   • Nephrotic range proteinuria   • Cyst of ovary   • Mixed hyperlipidemia   • Type 2 diabetes mellitus with hyperglycemia, with long-term current use of insulin ([facility])   • Dyspnea on exertion   • PE (pulmonary thromboembolism) ([facility])   • Elevated brain natriuretic peptide (BNP) level   • Leg edema, left   • Pulmonary hypertension ([place] [address] )   • Morbid obesity with BMI of 40 0-44 9, adult ([place] [address] )   • Vitamin D deficiency   • Secondary hyperparathyroidism of renal origin ([place] [address] )   • Non-rheumatic mitral valve stenosis   • Diabetic retinopathy of both eyes associated with type 2 diabetes mellitus ([place] [address] )   • Encounter for peritoneal dialysis ([place] [address] )   • Paroxysmal atrial flutter ([place] [address] )   • History of pulmonary embolus (PE)   • Chronic combined systolic and diastolic congestive heart failure ([facility])   • Nocturnal hypoxemia   • Dependence on renal dialysis ([place] [address] )   • Hypothyroidism   • Anemia of chronic disease   • Endometrial thickening on ultrasound   • Acute gout due to renal impairment involving toe of right foot   • Essential hypertension   • Morbid obesity ([facility])   • Dilated cardiomyopathy ([facility])   • Iron deficiency anemia due to chronic blood loss   • History of syncope   • Sinus bradycardia   • Type 2 diabetes mellitus with hyperosmolarity without coma, with long-term current use of insulin ([facility])   • Adenomatous polyp of colon   • CHANDRA (obstructive sleep apnea)       Allergies:     No Known Allergies    Current Medications:       Current Outpatient Medications:   •  atorvastatin (LIPITOR) 20 mg tablet, Take 1 tablet (20 mg total) by mouth daily, Disp: 90 tablet, Rfl: 1  •  B Complex-C (B-COMPLEX WITH VITAMIN C) tablet, Take 1 tablet by mouth daily, Disp: , Rfl:   •  Blood Glucose Monitoring Suppl (ONE TOUCH ULTRA 2) w/Device KIT, Test glucose 3 times daily, Disp: 1 each, Rfl: 0  •  calcium acetate (PHOSLO) capsule, Take 667 mg by mouth daily , Disp: , Rfl:   •  cholecalciferol (VITAMIN D3) 1,000 units tablet, Take 2 tablets (2,000 Units total) by mouth daily, Disp: 100 tablet, Rfl: 5  •  cinacalcet (SENSIPAR) 30 mg tablet, TAKE 1 TABLET THREE TIMES A WEEK, Disp: 36 tablet, Rfl: 3  •  docusate sodium (COLACE) 100 mg capsule, Take 1 capsule (100 mg total) by mouth 2 (two) times a day, Disp: 10 capsule, Rfl: 0  •  gentamicin (GARAMYCIN) 0 1 % cream, Apply topically daily, Disp: 15 g, Rfl: 3  •  insulin glargine (Lantus SoloStar) 100 units/mL injection pen, Inject 30 Units under the skin daily at bedtime, Disp: 15 mL, Rfl: 3  •  Insulin Pen Needle 31G X 5 MM MISC, by Does not apply route daily, Disp: 100 each, Rfl: 1  •  insuln lispro (HumaLOG KwikPen) 200 units/mL CONCENTRATED U-200 injection pen, Inject 10 Units under the skin 3 (three) times a day with meals, Disp: 9 mL, Rfl: 3  •  Lancets (onetouch ultrasoft) lancets, Test glucose 3 times a day; Code: E11 8, Disp: 300 each, Rfl: 1  •  levothyroxine 100 mcg tablet, TAKE 1 TABLET DAILY, Disp: 90 tablet, Rfl: 3  •  metolazone (ZAROXOLYN) 5 mg tablet, Take 5 mg by mouth daily , Disp: , Rfl:   •  metoprolol succinate (TOPROL-XL) 50 mg 24 hr tablet, , Disp: , Rfl:   •  MULTIPLE VITAMIN PO, Take 1 tablet by mouth daily, Disp: , Rfl:   •  OneTouch Ultra test strip, TEST GLUCOSE THREE TIMES A DAY, Disp: 300 strip, Rfl: 3  •  polyethylene glycol (GOLYTELY) 4000 mL solution, Take 4,000 mL by mouth once for 1 dose, Disp: 4000 mL, Rfl: 0  •  Semaglutide,0 25 or 0 5MG/DOS, (Ozempic, 0 25 or 0 5 MG/DOSE,) 2 MG/1 5ML SOPN, Inject 0 5 mg under the skin once a week, Disp: 6 mL, Rfl: 1  •  torsemide (DEMADEX) 100 mg tablet, Take 1 tablet (100 mg total) by mouth daily, Disp: 90 tablet, Rfl: 1  •  valsartan (DIOVAN) 160 mg tablet, TAKE 1 TABLET DAILY, Disp: 90 tablet, Rfl: 3  •  levothyroxine (Euthyrox) 112 mcg tablet, Take one tablet by mouth in early morning on empty stomach, Disp: 90 tablet, Rfl: 0    Past Medical History:       Past Medical History:   Diagnosis Date   • Acute asthmatic bronchitis     last assessed: 2017   • Cardiac disease    • Chronic diastolic (congestive) heart failure (HCC)    • Closed nondisplaced fracture of distal phalanx of right great toe 2018   • Colon polyp    • Diabetes mellitus ([facility] [address] )    • Hyperlipidemia    • Hypertension    • Medical non-compliance    • Morbid obesity with BMI of 40 0-44 9, adult ([facility] [address] ) 2019   • On continuous oral anticoagulation 2020   • Pneumonia     last assessed: 2013   • Pulmonary embolism ([facility] [address] )    • Renal disorder     possible clot noted in kidney, thus blood thinners currently   • Severe obstructive sleep apnea    • Severe pulmonary arterial systolic hypertension ([address] )    • Tinea pedis of both feet 2018        Past Surgical History:   Procedure Laterality Date   • CATARACT EXTRACTION     •  SECTION       x 1   • EYE SURGERY     • NOSE SURGERY      fractured nose - septoplasty        Family History   Problem Relation Age of Onset   • Diabetes Mother         mellitus   • Anxiety disorder Mother         generalized anxiety disorder   • Hypertension Mother    • Stroke Mother    • Kidney disease Father    • Kidney failure Father         renal failure   • Ulcerative colitis Sister    • Diabetes Sister    • Heart disease Family    • Ovarian cancer Maternal Aunt    • Diabetes Maternal Aunt    • Diabetes Maternal Uncle    • Heart disease Maternal Uncle    • Thyroid disease Neg Hx         Social History     Socioeconomic History   • Marital status: Single     Spouse name: Not on file   • Number of children: Not on file   • Years of education: Not on file   • Highest education level: Not on file   Occupational History   • Not on file   Tobacco Use   • Smoking status: Never Smoker   • Smokeless tobacco: Never Used   Vaping Use   • Vaping Use: Never used   Substance and Sexual Activity   • Alcohol use: Never   • Drug use: No   • Sexual activity: Not Currently   Other Topics Concern   • Not on file   Social History Narrative    Caffeine use     Social Determinants of Health     Financial Resource Strain: Not on file   Food Insecurity: Not on file   Transportation Needs: Not on file   Physical Activity: Not on file   Stress: Not on file   Social Connections: Not on file   Intimate Partner Violence: Not on file   Housing Stability: Not on file        Physical Exam:     /70   Pulse 82   Temp 98 8 °F (37 1 °C)   Resp 18   Ht 5' (1 524 m)   Wt 90 7 kg (200 lb)   SpO2 97%   BMI 39 06 kg/m²     Physical Exam  Vitals and nursing note reviewed  Constitutional:       Appearance: Normal appearance  She is well-developed  She is obese  HENT:      Head: Normocephalic and atraumatic  Right Ear: Tympanic membrane, ear canal and external ear normal       Left Ear: Tympanic membrane, ear canal and external ear normal       Nose: No mucosal edema or rhinorrhea  Mouth/Throat:      Pharynx: Uvula midline  Eyes:      Conjunctiva/sclera: Conjunctivae normal    Neck:      Thyroid: No thyromegaly  Cardiovascular:      Rate and Rhythm: Normal rate and regular rhythm  Pulses: Normal pulses  Heart sounds: Normal heart sounds  No murmur heard  Pulmonary:      Effort: Pulmonary effort is normal       Breath sounds: Normal breath sounds  Abdominal:      General: Bowel sounds are normal  There is no distension  Palpations: There is no hepatomegaly or splenomegaly  Tenderness: There is no abdominal tenderness     Musculoskeletal: Cervical back: Neck supple  No edema  Right lower leg: Edema present  Left lower leg: Edema present  Lymphadenopathy:      Cervical:      Right cervical: No superficial cervical adenopathy  Left cervical: No superficial cervical adenopathy  Skin:     General: Skin is warm and dry  Findings: No rash  Neurological:      Mental Status: She is alert  Psychiatric:         Mood and Affect: Mood normal          Behavior: Behavior normal           Data:     Pre-operative work-up    Laboratory Results: not available at time of visit  EKG: I have personally reviewed pertinent reports     Sees cardiology routinely    Recent Results (from the past 672 hour(s))   Overnight Oximetry Test    Collection Time: 10/12/22 12:08 PM   Result Value Ref Range    Supplier Name [employer]     Supplier Phone Number [phone]     Order Status Processing     Delivery Note      Delivery Request Date 10/12/2022     Item Description Overnight Oximetry Test    TSH, 3rd generation    Collection Time: 10/18/22 10:08 AM   Result Value Ref Range    TSH 3RD GENERATON 6 034 (H) 0 450 - 4 500 uIU/mL   T4, free    Collection Time: 10/18/22 10:08 AM   Result Value Ref Range    Free T4 1 03 0 76 - 1 46 ng/dL   CBC and differential    Collection Time: 10/18/22 10:08 AM   Result Value Ref Range    WBC 10 87 (H) 4 31 - 10 16 Thousand/uL    RBC 4 26 3 81 - 5 12 Million/uL    Hemoglobin 12 5 11 5 - 15 4 g/dL    Hematocrit 40 0 34 8 - 46 1 %    MCV 94 82 - 98 fL    MCH 29 3 26 8 - 34 3 pg    MCHC 31 3 (L) 31 4 - 37 4 g/dL    RDW 13 5 11 6 - 15 1 %    MPV 11 8 8 9 - 12 7 fL    Platelets 782 633 - 423 Thousands/uL    nRBC 0 /100 WBCs    Neutrophils Relative 79 (H) 43 - 75 %    Immat GRANS % 1 0 - 2 %    Lymphocytes Relative 7 (L) 14 - 44 %    Monocytes Relative 10 4 - 12 %    Eosinophils Relative 2 0 - 6 %    Basophils Relative 1 0 - 1 %    Neutrophils Absolute 8 66 (H) 1 85 - 7 62 Thousands/µL    Immature Grans Absolute 0 07 0 00 - 0 20 Thousand/uL    Lymphocytes Absolute 0 76 0 60 - 4 47 Thousands/µL    Monocytes Absolute 1 05 0 17 - 1 22 Thousand/µL    Eosinophils Absolute 0 26 0 00 - 0 61 Thousand/µL    Basophils Absolute 0 07 0 00 - 0 10 Thousands/µL   Hemoglobin A1C    Collection Time: 10/18/22 10:08 AM   Result Value Ref Range    Hemoglobin A1C 7 0 (H) Normal 3 8-5 6%; PreDiabetic 5 7-6 4%;  Diabetic >=6 5%; Glycemic control for adults with diabetes <7 0% %     mg/dl   Fructosamine- Lab Collect    Collection Time: 10/18/22 10:08 AM   Result Value Ref Range    Fructosamine 296 (H) 0 - 285 umol/L   Comprehensive metabolic panel    Collection Time: 10/18/22 10:08 AM   Result Value Ref Range    Sodium 139 135 - 147 mmol/L    Potassium 4 2 3 5 - 5 3 mmol/L    Chloride 103 96 - 108 mmol/L    CO2 26 21 - 32 mmol/L    ANION GAP 10 4 - 13 mmol/L    BUN 62 (H) 5 - 25 mg/dL    Creatinine 5 93 (H) 0 60 - 1 30 mg/dL    Glucose, Fasting 44 (L) 65 - 99 mg/dL    Calcium 9 8 8 3 - 10 1 mg/dL    Corrected Calcium 10 3 (H) 8 3 - 10 1 mg/dL    AST 9 5 - 45 U/L    ALT 16 12 - 78 U/L    Alkaline Phosphatase 93 46 - 116 U/L    Total Protein 7 5 6 4 - 8 4 g/dL    Albumin 3 4 (L) 3 5 - 5 0 g/dL    Total Bilirubin 0 60 0 20 - 1 00 mg/dL    eGFR 6 ml/min/1 73sq m           Assessment & Recommendations:     Problem List Items Addressed This Visit        Endocrine    Type 2 diabetes mellitus with hyperglycemia, with long-term current use of insulin (HCC)     Management per endocrinology, on insulin and Ozempic      Lab Results   Component Value Date    HGBA1C 7 0 (H) 03/25/2022            Relevant Orders    Hemoglobin A1C (Completed)    Hypothyroidism     Labs up to date             Respiratory    CHANDRA (obstructive sleep apnea) (Chronic)     Noncompliant with CPAP, has seen pulmonology, was advised to use regularly              Cardiovascular and Mediastinum    PE (pulmonary thromboembolism) (HCC)     Hx of, was on Warfarin, was taken off per cardiology approx 4 months ago  Low risk procedure         Essential hypertension     BP stable on current medications             Genitourinary    End stage renal disease ([facility] [address] )     Lab Results   Component Value Date    EGFR 6 06/14/2022    EGFR 4 05/21/2022    EGFR 6 03/25/2022    CREATININE 5 98 (H) 06/14/2022    CREATININE 7 75 (H) 05/21/2022    CREATININE 5 98 (H) 03/25/2022     Takes Torsemide approx 1 day per week  On PD, follows with nephrology routinely            Other    Endometrial thickening on ultrasound    Encounter for peritoneal dialysis [facility])      Other Visit Diagnoses     Preop examination    -  Primary          Pre-Op Evaluation Assessment  74 y o  female with planned surgery: as above  Known risk factors for perioperative complications: Congestive heart failure  Renal dysfunction  Current medications which may produce withdrawal symptoms if withheld perioperatively: none  Pre-Op Evaluation Plan  1  Further preoperative workup as follows:   - None; no further preoperative work-up is required    2  Medication Management/Recommendations:   - Patient has been instructed to avoid herbs or non-directed vitamins the week prior to surgery to ensure no drug interactions with perioperative surgical and anesthetic medications  - Insulin Management: Hold meal time coverage AM of surgery, 1/2 dose long acting the night before  - Patient has been instructed to avoid aspirin containing medications or non-steroidal anti-inflammatory drugs for the week preceding surgery  3  Prophylaxis for cardiac events with perioperative beta-blockers: not indicated  4  Patient requires further consultation with: Cardiology and Nephrology    Clearance  Requires cardiac clearance letter for procedure  Medically cleared for surgery pending nephrology recommendation         Alice Freitas [address] [address]  Phone#  [phone]  Fax#  [phone]

## 2022-10-18 NOTE — ASSESSMENT & PLAN NOTE
Lab Results   Component Value Date    EGFR 6 06/14/2022    EGFR 4 05/21/2022    EGFR 6 03/25/2022    CREATININE 5 98 (H) 06/14/2022    CREATININE 7 75 (H) 05/21/2022    CREATININE 5 98 (H) 03/25/2022     Takes Torsemide approx 1 day per week    On PD, follows with nephrology routinely

## 2022-10-18 NOTE — PATIENT INSTRUCTIONS
Will need nephrology clearance   Will need updated letter for [facility]  from cardiology  Labs up to date- need preop labs, A1c, and the thyroid levels

## 2022-10-19 DIAGNOSIS — E03.9 HYPOTHYROIDISM, UNSPECIFIED TYPE: Primary | ICD-10-CM

## 2022-10-19 LAB — FRUCTOSAMINE SERPL-SCNC: 296 UMOL/L (ref 0–285)

## 2022-10-19 RX ORDER — LEVOTHYROXINE SODIUM 112 UG/1
TABLET ORAL
Qty: 90 TABLET | Refills: 0 | Status: SHIPPED | OUTPATIENT
Start: 2022-10-19

## 2022-10-20 NOTE — PRE-PROCEDURE INSTRUCTIONS
My Surgical Experience    The following information was developed to assist you to prepare for your operation  What do I need to do before coming to the hospital?  • Arrange for a responsible person to drive you to and from the hospital   • Arrange care for your children at home  Children are not allowed in the recovery areas of the hospital  • Plan to wear clothing that is easy to put on and take off  If you are having shoulder surgery, wear a shirt that buttons or zippers in the front  Bathing  o Shower the evening before and the morning of your surgery with an antibacterial soap  Please refer to the Pre Op Showering Instructions for Surgery Patients Sheet   o Remove nail polish and all body piercing jewelry  o Do not shave any body part for at least 24 hours before surgery-this includes face, arms, legs and upper body  Food  o Nothing to eat or drink after midnight the night before your surgery  This includes candy and chewing gum  o Exception: If your surgery is after 12:00pm (noon), you may have clear liquids such as 7-Up®, ginger ale, apple or cranberry juice, Jell-O®, water, or clear broth until 8:00 am  o Do not drink milk or juice with pulp on the morning before surgery  o Do not drink alcohol 24 hours before surgery  Medicine  o Follow instructions you received from your surgeon about which medicines you may take on the day of surgery  o If instructed to take medicine on the morning of surgery, take pills with just a small sip of water  Call your prescribing doctor for specific infroamtion on what to do if you take insulin    What should I bring to the hospital?    Bring:  • Crutches or a walker, if you have them, for foot or knee surgery  • A list of the daily medicines, vitamins, minerals, herbals and nutritional supplements you take   Include the dosages of medicines and the time you take them each day  • Glasses, dentures or hearing aids  • Minimal clothing; you will be wearing hospital sleepwear  • Photo ID; required to verify your identity  • If you have a Living Will or Power of , bring a copy of the documents  • If you have an ostomy, bring an extra pouch and any supplies you use    Do not bring  • Medicines or inhalers  • Money, valuables or jewelry    What other information should I know about the day of surgery? • Notify your surgeons if you develop a cold, sore throat, cough, fever, rash or any other illness  • Report to the Ambulatory Surgical/Same Day Surgery Unit  • You will be instructed to stop at Registration only if you have not been pre-registered  • Inform your  fi they do not stay that they will be asked by the staff to leave a phone number where they can be reached  • Be available to be reached before surgery  In the event the operating room schedule changes, you may be asked to come in earlier or later than expected    *It is important to tell your doctor and others involved in your health care if you are taking or have been taking any non-prescription drugs, vitamins, minerals, herbals or other nutritional supplements  Any of these may interact with some food or medicines and cause a reaction      Pre-Surgery Instructions:   Medication Instructions   • atorvastatin (LIPITOR) 20 mg tablet Take night before surgery   • B Complex-C (B-COMPLEX WITH VITAMIN C) tablet Hold day of surgery  • Blood Glucose Monitoring Suppl (ONE TOUCH ULTRA 2) w/Device KIT Take day of surgery  • calcium acetate (PHOSLO) capsule Hold day of surgery  • cholecalciferol (VITAMIN D3) 1,000 units tablet Hold day of surgery  • cinacalcet (SENSIPAR) 30 mg tablet Hold day of surgery  • docusate sodium (COLACE) 100 mg capsule Take night before surgery   • gentamicin (GARAMYCIN) 0 1 % cream Take night before surgery   • insulin glargine (Lantus SoloStar) 100 units/mL injection pen Hold day of surgery  • Insulin Pen Needle 31G X 5 MM MISC Hold day of surgery     • insuln lispro (HumaLOG KwikPen) 200 units/mL CONCENTRATED U-200 injection pen Hold day of surgery  • Lancets (onetouch ultrasoft) lancets Hold day of surgery  • levothyroxine 100 mcg tablet Take day of surgery  • metolazone (ZAROXOLYN) 5 mg tablet Hold day of surgery  • metoprolol succinate (TOPROL-XL) 50 mg 24 hr tablet Take day of surgery  • MULTIPLE VITAMIN PO Hold day of surgery  • OneTouch Ultra test strip Take day of surgery  • Semaglutide,0 25 or 0 5MG/DOS, (Ozempic, 0 25 or 0 5 MG/DOSE,) 2 MG/1 5ML SOPN Hold day of surgery  • torsemide (DEMADEX) 100 mg tablet Hold day of surgery  • valsartan (DIOVAN) 160 mg tablet Hold day of surgery

## 2022-10-24 ENCOUNTER — ANESTHESIA EVENT (OUTPATIENT)
Dept: PERIOP | Facility: HOSPITAL | Age: 67
End: 2022-10-24

## 2022-10-24 NOTE — ANESTHESIA PREPROCEDURE EVALUATION
Procedure:  DILATATION AND CURETTAGE (D&C) WITH HYSTEROSCOPY (N/A Uterus)    Relevant Problems   CARDIO   (+) Chronic combined systolic and diastolic congestive heart failure (HCC)   (+) Dyspnea on exertion   (+) Essential hypertension   (+) Mixed hyperlipidemia   (+) PE (pulmonary thromboembolism) (HCC)   (+) Paroxysmal atrial flutter (HCC)   (+) Pulmonary hypertension (HCC)   (+) Sinus bradycardia      ENDO   (+) Hypothyroidism   (+) Secondary hyperparathyroidism of renal origin (HCC)   (+) Type 2 diabetes mellitus with hyperglycemia, with long-term current use of insulin (HCC)   (+) Type 2 diabetes mellitus with hyperosmolarity without coma, with long-term current use of insulin (HCC)      /RENAL   (+) Dependence on renal dialysis (HCC)   (+) End stage renal disease (HCC)      HEMATOLOGY   (+) Anemia of chronic disease   (+) Iron deficiency anemia due to chronic blood loss      MUSCULOSKELETAL   (+) Acute gout due to renal impairment involving toe of right foot      NEURO/PSYCH   (+) History of pulmonary embolus (PE)   (+) History of syncope      PULMONARY   (+) Dyspnea on exertion   (+) CHANDRA (obstructive sleep apnea)      Endocrine   (+) Diabetic retinopathy of both eyes associated with type 2 diabetes mellitus ([facility] [address] )      Other   (+) Morbid obesity with BMI of 40 0-44 9, adult ([facility])             Anesthesia Plan  ASA Score- 4     Anesthesia Type- general with ASA Monitors  Additional Monitors:   Airway Plan: LMA  Plan Factors-    Chart reviewed  Patient is not a current smoker  Induction- intravenous  Postoperative Plan- Plan for postoperative opioid use  Informed Consent- Anesthetic plan and risks discussed with patient  I personally reviewed this patient with the CRNA  Discussed and agreed on the Anesthesia Plan with the CRNA  Phyllistine Kawasaki

## 2022-10-26 ENCOUNTER — HOSPITAL ENCOUNTER (OUTPATIENT)
Facility: HOSPITAL | Age: 67
Setting detail: OUTPATIENT SURGERY
Discharge: HOME | End: 2022-10-26
Attending: STUDENT IN AN ORGANIZED HEALTH CARE EDUCATION/TRAINING PROGRAM | Admitting: STUDENT IN AN ORGANIZED HEALTH CARE EDUCATION/TRAINING PROGRAM
Payer: COMMERCIAL

## 2022-10-26 ENCOUNTER — ANESTHESIA (OUTPATIENT)
Dept: PERIOP | Facility: HOSPITAL | Age: 67
End: 2022-10-26

## 2022-10-26 VITALS
HEIGHT: 60 IN | WEIGHT: 201 LBS | DIASTOLIC BLOOD PRESSURE: 78 MMHG | HEART RATE: 72 BPM | SYSTOLIC BLOOD PRESSURE: 139 MMHG | RESPIRATION RATE: 18 BRPM | OXYGEN SATURATION: 98 % | BODY MASS INDEX: 39.46 KG/M2 | TEMPERATURE: 96.8 F

## 2022-10-26 RX ORDER — SODIUM CHLORIDE, SODIUM LACTATE, POTASSIUM CHLORIDE, CALCIUM CHLORIDE 600; 310; 30; 20 MG/100ML; MG/100ML; MG/100ML; MG/100ML
100 INJECTION, SOLUTION INTRAVENOUS CONTINUOUS
Status: DISCONTINUED | OUTPATIENT
Start: 2022-10-26 | End: 2022-10-26 | Stop reason: HOSPADM

## 2022-10-26 RX ORDER — SODIUM CHLORIDE 9 MG/ML
50 INJECTION, SOLUTION INTRAVENOUS CONTINUOUS
Status: DISCONTINUED | OUTPATIENT
Start: 2022-10-26 | End: 2022-10-26 | Stop reason: HOSPADM

## 2022-10-26 NOTE — QUICK NOTE
Reviewed case with Dr Cody Jordan, Anesthesia- patent does not have appropriate clearances documented for hysteroscopy D&C from Cardiology and Nephrology as recommended by PCP prior to surgery  Given patient's complicated medical history best to ensure safety of patient even though minor surgery and agree with anesthesai that today's procedure should be cancelled  Hector Cho will work with our office to have clearances sent and placed in chart and will be rescheduled for surgery  Patient agreeable with plan      Guanakito Larose  10/26/22

## 2022-11-14 ENCOUNTER — ANESTHESIA EVENT (OUTPATIENT)
Dept: GASTROENTEROLOGY | Facility: AMBULARY SURGERY CENTER | Age: 67
End: 2022-11-14

## 2022-11-14 ENCOUNTER — HOSPITAL ENCOUNTER (OUTPATIENT)
Dept: GASTROENTEROLOGY | Facility: AMBULARY SURGERY CENTER | Age: 67
Setting detail: OUTPATIENT SURGERY
Discharge: HOME | End: 2022-11-14
Attending: INTERNAL MEDICINE

## 2022-11-14 ENCOUNTER — ANESTHESIA (OUTPATIENT)
Dept: GASTROENTEROLOGY | Facility: AMBULARY SURGERY CENTER | Age: 67
End: 2022-11-14

## 2022-11-14 VITALS
HEIGHT: 60 IN | TEMPERATURE: 97.4 F | BODY MASS INDEX: 39.46 KG/M2 | RESPIRATION RATE: 18 BRPM | DIASTOLIC BLOOD PRESSURE: 53 MMHG | SYSTOLIC BLOOD PRESSURE: 95 MMHG | WEIGHT: 201 LBS | HEART RATE: 70 BPM | OXYGEN SATURATION: 100 %

## 2022-11-14 DIAGNOSIS — D12.6 ADENOMATOUS POLYP OF COLON, UNSPECIFIED PART OF COLON: ICD-10-CM

## 2022-11-14 RX ORDER — PROPOFOL 10 MG/ML
INJECTION, EMULSION INTRAVENOUS CONTINUOUS PRN
Status: DISCONTINUED | OUTPATIENT
Start: 2022-11-14 | End: 2022-11-14

## 2022-11-14 RX ORDER — CEFAZOLIN SODIUM 2 G/50ML
2000 SOLUTION INTRAVENOUS ONCE
Status: COMPLETED | OUTPATIENT
Start: 2022-11-14 | End: 2022-11-14

## 2022-11-14 RX ORDER — PROPOFOL 10 MG/ML
INJECTION, EMULSION INTRAVENOUS AS NEEDED
Status: DISCONTINUED | OUTPATIENT
Start: 2022-11-14 | End: 2022-11-14

## 2022-11-14 RX ORDER — SODIUM CHLORIDE, SODIUM LACTATE, POTASSIUM CHLORIDE, CALCIUM CHLORIDE 600; 310; 30; 20 MG/100ML; MG/100ML; MG/100ML; MG/100ML
125 INJECTION, SOLUTION INTRAVENOUS CONTINUOUS
Status: CANCELLED | OUTPATIENT
Start: 2022-11-14

## 2022-11-14 RX ORDER — METRONIDAZOLE 500 MG/100ML
500 INJECTION, SOLUTION INTRAVENOUS EVERY 8 HOURS
Status: DISCONTINUED | OUTPATIENT
Start: 2022-11-14 | End: 2022-11-18 | Stop reason: HOSPADM

## 2022-11-14 RX ORDER — SODIUM CHLORIDE, SODIUM LACTATE, POTASSIUM CHLORIDE, CALCIUM CHLORIDE 600; 310; 30; 20 MG/100ML; MG/100ML; MG/100ML; MG/100ML
125 INJECTION, SOLUTION INTRAVENOUS CONTINUOUS
Status: DISCONTINUED | OUTPATIENT
Start: 2022-11-14 | End: 2022-11-18 | Stop reason: HOSPADM

## 2022-11-14 RX ADMIN — METRONIDAZOLE 500 MG: 500 INJECTION, SOLUTION INTRAVENOUS at 14:08

## 2022-11-14 RX ADMIN — PROPOFOL 120 MCG/KG/MIN: 10 INJECTION, EMULSION INTRAVENOUS at 14:18

## 2022-11-14 RX ADMIN — PROPOFOL 120 MG: 10 INJECTION, EMULSION INTRAVENOUS at 14:18

## 2022-11-14 RX ADMIN — CEFAZOLIN SODIUM 2000 MG: 2 SOLUTION INTRAVENOUS at 14:06

## 2022-11-14 RX ADMIN — SODIUM CHLORIDE, SODIUM LACTATE, POTASSIUM CHLORIDE, AND CALCIUM CHLORIDE: .6; .31; .03; .02 INJECTION, SOLUTION INTRAVENOUS at 13:34

## 2022-11-14 NOTE — H&P
History and Physical -  [facility]  Paulino Burden 74 y o  female MRN: [id number]    HPI: Paulino Burden is a 74y o  year old female who presents for colon cancer screening  Has history of colon polyps        Review of Systems    Historical Information   Past Medical History:   Diagnosis Date   • Acute asthmatic bronchitis     last assessed: 2017   • Cardiac disease    • Chronic diastolic (congestive) heart failure (HCC)    • Chronic kidney disease     pt is on peritoneal dialysis daily from 2200- 0600   • Closed nondisplaced fracture of distal phalanx of right great toe 2018   • Colon polyp    • CPAP (continuous positive airway pressure) dependence    • Diabetes mellitus ([facility] [address] )    • Hyperlipidemia    • Hypertension    • Medical non-compliance    • Morbid obesity with BMI of 40 0-44 9, adult ([facility] [address] ) 2019   • On continuous oral anticoagulation 2020   • Pneumonia     last assessed: 2013   • PONV (postoperative nausea and vomiting)    • Pulmonary embolism (HCC)    • Renal disorder     possible clot noted in kidney, thus blood thinners currently   • Severe obstructive sleep apnea    • Severe pulmonary arterial systolic hypertension ([facility] [address] )    • Tinea pedis of both feet 2018     Past Surgical History:   Procedure Laterality Date   • CATARACT EXTRACTION     •  SECTION       x 1   • EYE SURGERY     • NOSE SURGERY      fractured nose - septoplasty     Social History   Social History     Substance and Sexual Activity   Alcohol Use Never     Social History     Substance and Sexual Activity   Drug Use No     Social History     Tobacco Use   Smoking Status Never Smoker   Smokeless Tobacco Never Used     Family History   Problem Relation Age of Onset   • Diabetes Mother         mellitus   • Anxiety disorder Mother         generalized anxiety disorder   • Hypertension Mother    • Stroke Mother    • Kidney disease Father    • Kidney failure Father         renal failure   • Ulcerative colitis Sister    • Diabetes Sister    • Heart disease Family    • Ovarian cancer Maternal Aunt    • Diabetes Maternal Aunt    • Diabetes Maternal Uncle    • Heart disease Maternal Uncle    • Thyroid disease Neg Hx        Meds/Allergies     (Not in a hospital admission)      No Known Allergies    Objective     /74   Pulse 66   Temp (!) 97 4 °F (36 3 °C) (Temporal)   Resp 18   Ht 5' (1 524 m)   Wt 91 2 kg (201 lb)   SpO2 99%   BMI 39 26 kg/m²       PHYSICAL EXAM    Gen: NAD  CV: RRR  CHEST: Clear  ABD: soft, NT/ND  EXT: no edema  Neuro: AAO      ASSESSMENT/PLAN:  This is a 74y o  year old female here for colon cancer screening, has history of colon polyps  Patient will receive preprocedure antibiotics including cefazolin 2 g and Flagyl 500 mg given patient is on peritoneal dialysis  PLAN:   Procedure:  EGD and colonoscopy

## 2022-11-14 NOTE — ANESTHESIA PREPROCEDURE EVALUATION
Procedure:  COLONOSCOPY    Relevant Problems   CARDIO   (+) Chronic combined systolic and diastolic congestive heart failure (HCC)   (+) Dyspnea on exertion   (+) Essential hypertension   (+) Mixed hyperlipidemia   (+) Paroxysmal atrial flutter (HCC)   (+) Sinus bradycardia      ENDO   (+) Hypothyroidism   (+) Secondary hyperparathyroidism of renal origin (HCC)   (+) Type 2 diabetes mellitus with hyperglycemia, with long-term current use of insulin (HCC)   (+) Type 2 diabetes mellitus with hyperosmolarity without coma, with long-term current use of insulin (HCC)      /RENAL   (+) Dependence on renal dialysis (HCC)   (+) End stage renal disease (HCC)      HEMATOLOGY   (+) Anemia of chronic disease   (+) Iron deficiency anemia due to chronic blood loss      MUSCULOSKELETAL   (+) Acute gout due to renal impairment involving toe of right foot      NEURO/PSYCH   (+) History of pulmonary embolus (PE)   (+) History of syncope      PULMONARY   (+) Dyspnea on exertion   (+) CHANDRA (obstructive sleep apnea)        Physical Exam    Airway    Mallampati score: III  TM Distance: <3 FB  Neck ROM: limited     Dental   No notable dental hx     Cardiovascular      Pulmonary      Other Findings        Anesthesia Plan  ASA Score- 4     Anesthesia Type- IV sedation with anesthesia with ASA Monitors  Additional Monitors:   Airway Plan:           Plan Factors-Exercise tolerance (METS): <4 METS  Chart reviewed  EKG reviewed  Existing labs reviewed  Patient summary reviewed  Patient is not a current smoker  Induction- intravenous  Postoperative Plan-     Informed Consent- Anesthetic plan and risks discussed with patient  I personally reviewed this patient with the CRNA  Discussed and agreed on the Anesthesia Plan with the CRNA  Karrie Nissen

## 2022-11-16 NOTE — PROGRESS NOTES
I spoke with patient regarding overnight pulse oximetry  Done on room air O2 saturation below 88% was for 218 minutes  Patient does have a concentrator at home and I did advise her to use 3 L at HS  This is for diagnosis of hypoventilation obesity syndrome

## 2022-11-28 ENCOUNTER — TELEPHONE (OUTPATIENT)
Dept: GYNECOLOGY | Facility: CLINIC | Age: 67
End: 2022-11-28

## 2022-11-28 NOTE — TELEPHONE ENCOUNTER
I spoke with pt today to reschedule her Hysteroscopy D+C that was cancelled  She stated that she hasn't had any issues since and doesn't wish to proceed with surgery at this time  I advised pt if she does have anything she is to call the office and schedule an appt if needed

## 2022-12-12 ENCOUNTER — OFFICE VISIT (OUTPATIENT)
Dept: FAMILY MEDICINE CLINIC | Facility: CLINIC | Age: 67
End: 2022-12-12

## 2022-12-12 VITALS
BODY MASS INDEX: 39.82 KG/M2 | RESPIRATION RATE: 18 BRPM | HEIGHT: 60 IN | HEART RATE: 84 BPM | TEMPERATURE: 98.6 F | SYSTOLIC BLOOD PRESSURE: 132 MMHG | WEIGHT: 202.8 LBS | DIASTOLIC BLOOD PRESSURE: 86 MMHG

## 2022-12-12 DIAGNOSIS — N18.6 TYPE 2 DIABETES MELLITUS WITH CHRONIC KIDNEY DISEASE ON CHRONIC DIALYSIS, WITH LONG-TERM CURRENT USE OF INSULIN (HCC): ICD-10-CM

## 2022-12-12 DIAGNOSIS — Z79.4 TYPE 2 DIABETES MELLITUS WITH CHRONIC KIDNEY DISEASE ON CHRONIC DIALYSIS, WITH LONG-TERM CURRENT USE OF INSULIN (HCC): ICD-10-CM

## 2022-12-12 DIAGNOSIS — E11.22 TYPE 2 DIABETES MELLITUS WITH CHRONIC KIDNEY DISEASE ON CHRONIC DIALYSIS, WITH LONG-TERM CURRENT USE OF INSULIN (HCC): ICD-10-CM

## 2022-12-12 DIAGNOSIS — I10 ESSENTIAL HYPERTENSION: ICD-10-CM

## 2022-12-12 DIAGNOSIS — Z23 NEED FOR VACCINATION: ICD-10-CM

## 2022-12-12 DIAGNOSIS — Z00.00 ANNUAL PHYSICAL EXAM: Primary | ICD-10-CM

## 2022-12-12 DIAGNOSIS — E11.65 TYPE 2 DIABETES MELLITUS WITH HYPERGLYCEMIA, WITH LONG-TERM CURRENT USE OF INSULIN (HCC): ICD-10-CM

## 2022-12-12 DIAGNOSIS — Z99.2 TYPE 2 DIABETES MELLITUS WITH CHRONIC KIDNEY DISEASE ON CHRONIC DIALYSIS, WITH LONG-TERM CURRENT USE OF INSULIN (HCC): ICD-10-CM

## 2022-12-12 DIAGNOSIS — Z79.4 TYPE 2 DIABETES MELLITUS WITH HYPERGLYCEMIA, WITH LONG-TERM CURRENT USE OF INSULIN (HCC): ICD-10-CM

## 2022-12-12 RX ORDER — ATORVASTATIN CALCIUM 20 MG/1
20 TABLET, FILM COATED ORAL DAILY
Qty: 90 TABLET | Refills: 3 | Status: SHIPPED | OUTPATIENT
Start: 2022-12-12

## 2022-12-12 NOTE — ASSESSMENT & PLAN NOTE
Lab Results   Component Value Date    HGBA1C 7 0 (H) 10/18/2022   stable  Following with endocrinology

## 2022-12-12 NOTE — PATIENT INSTRUCTIONS
She was given her high dose flu shot today  Wellness Visit for Adults   AMBULATORY CARE:   A wellness visit  is when you see your healthcare provider to get screened for health problems  Your healthcare provider will also give you advice on how to stay healthy  Write down your questions so you remember to ask them  Ask your healthcare provider how often you should have a wellness visit  What happens at a wellness visit:  Your healthcare provider will ask about your health, and your family history of health problems  This includes high blood pressure, heart disease, and cancer  He or she will ask if you have symptoms that concern you, if you smoke, and about your mood  You may also be asked about your intake of medicines, supplements, food, and alcohol  Any of the following may be done: Your weight  will be checked  Your height may also be checked so your body mass index (BMI) can be calculated  Your BMI shows if you are at a healthy weight  Your blood pressure  and heart rate will be checked  Your temperature may also be checked  Blood and urine tests  may be done  Blood tests may be done to check your cholesterol levels  Abnormal cholesterol levels increase your risk for heart disease and stroke  You may also need a blood or urine test to check for diabetes if you are at increased risk  Urine tests may be done to look for signs of an infection or kidney disease  A physical exam  includes checking your heartbeat and lungs with a stethoscope  Your healthcare provider may also check your skin to look for sun damage  Screening tests  may be recommended  A screening test is done to check for diseases that may not cause symptoms  The screening tests you may need depend on your age, gender, family history, and lifestyle habits  For example, colorectal screening may be recommended if you are 48years old or older      Screening tests you need if you are a woman:   A Pap smear  is used to screen for cervical cancer  Pap smears are usually done every 3 to 5 years depending on your age  You may need them more often if you have had abnormal Pap smear test results in the past  Ask your healthcare provider how often you should have a Pap smear  A mammogram  is an x-ray of your breasts to screen for breast cancer  Experts recommend mammograms every 2 years starting at age 48 years  You may need a mammogram at age 52 years or younger if you have an increased risk for breast cancer  Talk to your healthcare provider about when you should start having mammograms and how often you need them  Vaccines you may need:   Get an influenza vaccine  every year  The influenza vaccine protects you from the flu  Several types of viruses cause the flu  The viruses change over time, so new vaccines are made each year  Get a tetanus-diphtheria (Td) booster vaccine  every 10 years  This vaccine protects you against tetanus and diphtheria  Tetanus is a severe infection that may cause painful muscle spasms and lockjaw  Diphtheria is a severe bacterial infection that causes a thick covering in the back of your mouth and throat  Get a human papillomavirus (HPV) vaccine  if you are female and aged 23 to 32 or male 23 to 24 and never received it  This vaccine protects you from HPV infection  HPV is the most common infection spread by sexual contact  HPV may also cause vaginal, penile, and anal cancers  Get a pneumococcal vaccine  if you are aged 72 years or older  The pneumococcal vaccine is an injection given to protect you from pneumococcal disease  Pneumococcal disease is an infection caused by pneumococcal bacteria  The infection may cause pneumonia, meningitis, or an ear infection  Get a shingles vaccine  if you are 60 or older, even if you have had shingles before  The shingles vaccine is an injection to protect you from the varicella-zoster virus  This is the same virus that causes chickenpox   Shingles is a painful rash that develops in people who had chickenpox or have been exposed to the virus  How to eat healthy:  My Plate is a model for planning healthy meals  It shows the types and amounts of foods that should go on your plate  Fruits and vegetables make up about half of your plate, and grains and protein make up the other half  A serving of dairy is included on the side of your plate  The amount of calories and serving sizes you need depends on your age, gender, weight, and height  Examples of healthy foods are listed below:  Eat a variety of vegetables  such as dark green, red, and orange vegetables  You can also include canned vegetables low in sodium (salt) and frozen vegetables without added butter or sauces  Eat a variety of fresh fruits , canned fruit in 100% juice, frozen fruit, and dried fruit  Include whole grains  At least half of the grains you eat should be whole grains  Examples include whole-wheat bread, wheat pasta, brown rice, and whole-grain cereals such as oatmeal     Eat a variety of protein foods such as seafood (fish and shellfish), lean meat, and poultry without skin (turkey and chicken)  Examples of lean meats include pork leg, shoulder, or tenderloin, and beef round, sirloin, tenderloin, and extra lean ground beef  Other protein foods include eggs and egg substitutes, beans, peas, soy products, nuts, and seeds  Choose low-fat dairy products such as skim or 1% milk or low-fat yogurt, cheese, and cottage cheese  Limit unhealthy fats  such as butter, hard margarine, and shortening  Exercise:  Exercise at least 30 minutes per day on most days of the week  Some examples of exercise include walking, biking, dancing, and swimming  You can also fit in more physical activity by taking the stairs instead of the elevator or parking farther away from stores  Include muscle strengthening activities 2 days each week  Regular exercise provides many health benefits   It helps you manage your weight, and decreases your risk for type 2 diabetes, heart disease, stroke, and high blood pressure  Exercise can also help improve your mood  Ask your healthcare provider about the best exercise plan for you  General health and safety guidelines:   Do not smoke  Nicotine and other chemicals in cigarettes and cigars can cause lung damage  Ask your healthcare provider for information if you currently smoke and need help to quit  E-cigarettes or smokeless tobacco still contain nicotine  Talk to your healthcare provider before you use these products  Limit alcohol  A drink of alcohol is 12 ounces of beer, 5 ounces of wine, or 1½ ounces of liquor  Lose weight, if needed  Being overweight increases your risk of certain health conditions  These include heart disease, high blood pressure, type 2 diabetes, and certain types of cancer  Protect your skin  Do not sunbathe or use tanning beds  Use sunscreen with a SPF 15 or higher  Apply sunscreen at least 15 minutes before you go outside  Reapply sunscreen every 2 hours  Wear protective clothing, hats, and sunglasses when you are outside  Drive safely  Always wear your seatbelt  Make sure everyone in your car wears a seatbelt  A seatbelt can save your life if you are in an accident  Do not use your cell phone when you are driving  This could distract you and cause an accident  Pull over if you need to make a call or send a text message  Practice safe sex  Use latex condoms if are sexually active and have more than one partner  Your healthcare provider may recommend screening tests for sexually transmitted infections (STIs)  Wear helmets, lifejackets, and protective gear  Always wear a helmet when you ride a bike or motorcycle, go skiing, or play sports that could cause a head injury  Wear protective equipment when you play sports  Wear a lifejacket when you are on a boat or doing water sports      © Copyright [employer] 2022 Information is for End User's use only and may not be sold, redistributed or otherwise used for commercial purposes  All illustrations and images included in CareNotes® are the copyrighted property of A D A M , Inc  or Brenton Hernandez  The above information is an  only  It is not intended as medical advice for individual conditions or treatments  Talk to your doctor, nurse or pharmacist before following any medical regimen to see if it is safe and effective for you

## 2022-12-12 NOTE — PROGRESS NOTES
[id number]    NAME: Guanaco Hayes  AGE: 74 y o  SEX: female  : 1955     DATE: 2022     Assessment and Plan:     Problem List Items Addressed This Visit     Essential hypertension    Relevant Orders    Lipid Panel with Direct LDL reflex    Comprehensive metabolic panel    Type 2 diabetes mellitus with hyperglycemia, with long-term current use of insulin ([facility])       Lab Results   Component Value Date    HGBA1C 7 0 (H) 10/18/2022   stable  Following with endocrinology        Other Visit Diagnoses     Annual physical exam    -  Primary    Need for vaccination        Relevant Orders    influenza vaccine, high-dose, PF 0 7 mL (FLUZONE HIGH-DOSE) (Completed)    Type 2 diabetes mellitus with chronic kidney disease on chronic dialysis, with long-term current use of insulin ([address] )        Relevant Medications    atorvastatin (LIPITOR) 20 mg tablet          Immunizations and preventive care screenings were discussed with patient today  Appropriate education was printed on patient's after visit summary  Counseling:  Exercise: the importance of regular exercise/physical activity was discussed  Recommend exercise 3-5 times per week for at least 30 minutes  Return in about 6 months (around 2023) for Next scheduled follow up  Chief Complaint:     Chief Complaint   Patient presents with   • Annual Exam     Nm lpn      History of Present Illness:     Adult Annual Physical   Patient here for a comprehensive physical exam  The patient reports she is still doing her peritoneal dialysis at home  Diet and Physical Activity  Diet/Nutrition: well balanced diet  Exercise: no formal exercise  Depression Screening  PHQ-2/9 Depression Screening         General Health  Sleep: tosses and turns  Hearing: normal - bilateral   Vision: wears glasses  Had cataract surgery  Dental: regular dental visits         /GYN Health  Patient is: postmenopausal       Review of Systems:     Review of Systems   Constitutional: Negative  Respiratory: Negative  Cardiovascular: Negative         Past Medical History:     Past Medical History:   Diagnosis Date   • Acute asthmatic bronchitis     last assessed: 2017   • Cardiac disease    • Chronic diastolic (congestive) heart failure (HCC)    • Chronic kidney disease     pt is on peritoneal dialysis daily from 2200- 0600   • Closed nondisplaced fracture of distal phalanx of right great toe 2018   • Colon polyp    • CPAP (continuous positive airway pressure) dependence    • Diabetes mellitus ([facility] [address] )    • Hyperlipidemia    • Hypertension    • Medical non-compliance    • Morbid obesity with BMI of 40 0-44 9, adult ([facility] [address] ) 2019   • On continuous oral anticoagulation 2020   • Pneumonia     last assessed: 2013   • PONV (postoperative nausea and vomiting)    • Pulmonary embolism (HCC)    • Renal disorder     possible clot noted in kidney, thus blood thinners currently   • Severe obstructive sleep apnea    • Severe pulmonary arterial systolic hypertension ([facility] [address] )    • Tinea pedis of both feet 2018      Past Surgical History:     Past Surgical History:   Procedure Laterality Date   • CATARACT EXTRACTION     •  SECTION       x 1   • EYE SURGERY     • NOSE SURGERY      fractured nose - septoplasty      Social History:     Social History     Socioeconomic History   • Marital status: Single     Spouse name: None   • Number of children: None   • Years of education: None   • Highest education level: None   Occupational History   • None   Tobacco Use   • Smoking status: Never   • Smokeless tobacco: Never   Vaping Use   • Vaping Use: Never used   Substance and Sexual Activity   • Alcohol use: Never   • Drug use: No   • Sexual activity: Not Currently   Other Topics Concern   • None   Social History Narrative    Caffeine use     Social Determinants of Health     Financial Resource Strain: Not on file   Food Insecurity: Not on file   Transportation Needs: Not on file   Physical Activity: Not on file   Stress: Not on file   Social Connections: Not on file   Intimate Partner Violence: Not on file   Housing Stability: Not on file      Family History:     Family History   Problem Relation Age of Onset   • Diabetes Mother         mellitus   • Anxiety disorder Mother         generalized anxiety disorder   • Hypertension Mother    • Stroke Mother    • Kidney disease Father    • Kidney failure Father         renal failure   • Ulcerative colitis Sister    • Diabetes Sister    • Heart disease Family    • Ovarian cancer Maternal Aunt    • Diabetes Maternal Aunt    • Diabetes Maternal Uncle    • Heart disease Maternal Uncle    • Thyroid disease Neg Hx       Current Medications:     Current Outpatient Medications   Medication Sig Dispense Refill   • atorvastatin (LIPITOR) 20 mg tablet Take 1 tablet (20 mg total) by mouth daily 90 tablet 3   • B Complex-C (B-COMPLEX WITH VITAMIN C) tablet Take 1 tablet by mouth daily     • Blood Glucose Monitoring Suppl (ONE TOUCH ULTRA 2) w/Device KIT Test glucose 3 times daily 1 each 0   • calcium acetate (PHOSLO) capsule Take 667 mg by mouth daily      • cholecalciferol (VITAMIN D3) 1,000 units tablet Take 2 tablets (2,000 Units total) by mouth daily 100 tablet 5   • cinacalcet (SENSIPAR) 30 mg tablet TAKE 1 TABLET THREE TIMES A WEEK 36 tablet 3   • docusate sodium (COLACE) 100 mg capsule Take 1 capsule (100 mg total) by mouth 2 (two) times a day 10 capsule 0   • gentamicin (GARAMYCIN) 0 1 % cream Apply topically daily 15 g 3   • insulin glargine (Lantus SoloStar) 100 units/mL injection pen Inject 30 Units under the skin daily at bedtime 15 mL 3   • Insulin Pen Needle 31G X 5 MM MISC by Does not apply route daily 100 each 1   • insuln lispro (HumaLOG KwikPen) 200 units/mL CONCENTRATED U-200 injection pen Inject 10 Units under the skin 3 (three) times a day with meals 9 mL 3   • Lancets (onetouch ultrasoft) lancets Test glucose 3 times a day; Code: E11 8 300 each 1   • levothyroxine (Euthyrox) 112 mcg tablet Take one tablet by mouth in early morning on empty stomach 90 tablet 0   • metolazone (ZAROXOLYN) 5 mg tablet Take 5 mg by mouth daily      • metoprolol succinate (TOPROL-XL) 50 mg 24 hr tablet      • MULTIPLE VITAMIN PO Take 1 tablet by mouth daily     • OneTouch Ultra test strip TEST GLUCOSE THREE TIMES A  strip 3   • Semaglutide,0 25 or 0 5MG/DOS, (Ozempic, 0 25 or 0 5 MG/DOSE,) 2 MG/1 5ML SOPN Inject 0 5 mg under the skin once a week 6 mL 1   • torsemide (DEMADEX) 100 mg tablet Take 1 tablet (100 mg total) by mouth daily 90 tablet 1   • valsartan (DIOVAN) 160 mg tablet TAKE 1 TABLET DAILY 90 tablet 3     No current facility-administered medications for this visit  Allergies:     No Known Allergies   Physical Exam:     /86   Pulse 84   Temp 98 6 °F (37 °C)   Resp 18   Ht 5' (1 524 m)   Wt 92 kg (202 lb 12 8 oz)   BMI 39 61 kg/m²     Physical Exam  Vitals and nursing note reviewed  Constitutional:       Appearance: She is well-developed  HENT:      Head: Normocephalic and atraumatic  Right Ear: External ear normal       Left Ear: External ear normal       Nose: Nose normal    Cardiovascular:      Rate and Rhythm: Normal rate and regular rhythm  Heart sounds: Normal heart sounds  No murmur heard  No friction rub  Pulmonary:      Effort: No respiratory distress  Breath sounds: Normal breath sounds  No wheezing or rales  Abdominal:      Palpations: Abdomen is soft  Tenderness: There is no abdominal tenderness  Musculoskeletal:      Right lower leg: Edema (plus one) present  Left lower leg: Edema (plus one) present  Neurological:      Mental Status: She is oriented to person, place, and time  Cranial Nerves: No cranial nerve deficit             Vision Screening    Right eye Left eye Both eyes   Without correction 20/40 20/40 20/40   With correction          Omari Swift, [address]

## 2022-12-13 DIAGNOSIS — R06.02 SOB (SHORTNESS OF BREATH): Primary | ICD-10-CM

## 2022-12-13 DIAGNOSIS — R05.1 ACUTE COUGH: ICD-10-CM

## 2022-12-13 RX ORDER — AZITHROMYCIN 250 MG/1
TABLET, FILM COATED ORAL
Qty: 6 TABLET | Refills: 0 | Status: SHIPPED | OUTPATIENT
Start: 2022-12-13 | End: 2022-12-17

## 2022-12-20 ENCOUNTER — TELEPHONE (OUTPATIENT)
Dept: FAMILY MEDICINE CLINIC | Facility: CLINIC | Age: 67
End: 2022-12-20

## 2022-12-20 NOTE — TELEPHONE ENCOUNTER
Jacquie's daughter, Jules Huitron, is calling to speak with Dr Fausto Bradley regarding her mom  She stated her mom keeps stating she is dying  Jules Huitron is concerned that she isn't telling them something  She just wants to make sure her mom is ok    She is aware that Dr Javon Robbins is not in until Wednesday

## 2022-12-21 NOTE — TELEPHONE ENCOUNTER
12/21/2022 8:23 AM returned call to [address] [place] [address] Avenue let her know that her mother may be feeling like that because she has been short of breath  But there is no significant change in her status otherwise  There is no cancer that I am aware of  She was also to know where her mother was on a list to get a kidney transplant    I let her know that I do not have that information and she would need to talk to her nephrology team     Message complete  Varinder Harrison, DO

## 2022-12-22 DIAGNOSIS — E11.65 TYPE 2 DIABETES MELLITUS WITH HYPERGLYCEMIA, WITH LONG-TERM CURRENT USE OF INSULIN (HCC): ICD-10-CM

## 2022-12-22 DIAGNOSIS — I10 ESSENTIAL HYPERTENSION: ICD-10-CM

## 2022-12-22 DIAGNOSIS — N18.6 ESRD (END STAGE RENAL DISEASE) (HCC): ICD-10-CM

## 2022-12-22 DIAGNOSIS — E03.9 ACQUIRED HYPOTHYROIDISM: ICD-10-CM

## 2022-12-22 DIAGNOSIS — Z79.4 TYPE 2 DIABETES MELLITUS WITH HYPERGLYCEMIA, WITH LONG-TERM CURRENT USE OF INSULIN (HCC): ICD-10-CM

## 2022-12-22 DIAGNOSIS — E78.2 MIXED HYPERLIPIDEMIA: ICD-10-CM

## 2022-12-23 RX ORDER — INSULIN LISPRO 200 [IU]/ML
INJECTION, SOLUTION SUBCUTANEOUS
Qty: 6 ML | Refills: 8 | Status: SHIPPED | OUTPATIENT
Start: 2022-12-23 | End: 2022-12-30 | Stop reason: SDUPTHER

## 2022-12-23 RX ORDER — INSULIN GLARGINE 100 [IU]/ML
30 INJECTION, SOLUTION SUBCUTANEOUS
Qty: 15 ML | Refills: 6 | Status: SHIPPED | OUTPATIENT
Start: 2022-12-23 | End: 2022-12-30 | Stop reason: CLARIF

## 2022-12-27 ENCOUNTER — CONSULT (OUTPATIENT)
Dept: PULMONOLOGY | Facility: MEDICAL CENTER | Age: 67
End: 2022-12-27

## 2022-12-27 VITALS
TEMPERATURE: 97.7 F | DIASTOLIC BLOOD PRESSURE: 70 MMHG | HEIGHT: 60 IN | OXYGEN SATURATION: 96 % | HEART RATE: 63 BPM | BODY MASS INDEX: 38.87 KG/M2 | SYSTOLIC BLOOD PRESSURE: 120 MMHG | WEIGHT: 198 LBS | RESPIRATION RATE: 12 BRPM

## 2022-12-27 DIAGNOSIS — G47.33 OSA (OBSTRUCTIVE SLEEP APNEA): Primary | Chronic | ICD-10-CM

## 2022-12-27 DIAGNOSIS — G47.34 NOCTURNAL HYPOXEMIA: ICD-10-CM

## 2022-12-27 DIAGNOSIS — E66.01 MORBID OBESITY (HCC): ICD-10-CM

## 2022-12-27 NOTE — PROGRESS NOTES
Pulmonary/Sleep Follow Up Note   Davonte Barrera 74 y o  female MRN: [id number]  12/27/2022      Assessment and Plan:    1  CHANDRA (obstructive sleep apnea)  Assessment & Plan:  Severe CHANDRA, noncompliant with CPAP multiple reasons full facemask intolerance, peritoneal dialysis 8 hours every night, she has been using only oxygen nasal cannula  I discussed with her that oxygen does not replace CPAP and is not enough to treat her underlying severe CHANDRA  I requested mask fitting for nasal pillows today with Naye  I explained to the patient in details the pathophysiology of obstructive sleep apnea  I also explained the importance of treatment, and the consequences of untreated obstructive sleep apnea on underlying cardiovascular and cerebrovascular risk, morbidity, and mortality  Orders:  -     Mask fitting only; Future    2  Nocturnal hypoxemia  -     Mask fitting only; Future    3  Morbid obesity ([address] )  Assessment & Plan: With a BMI of 38 67, noted she would benefit from weight loss    Orders:  -     Mask fitting only; Future        No follow-ups on file  History of Present Illness   HPI:  Davonte Barrera is a 74 y o  female who is here for follow-up appointment to discuss other treatment options for sleep apnea  The patient has been diagnosed with severe CHANDRA she currently has a CPAP auto titrating CPAP with a pressure range of 5 to 20 cm H2O she also has underlying pulmonary hypertension, significant nocturnal hypoxemia, end-stage renal disease on peritoneal dialysis and diastolic congestive heart failure unfortunately she has not been using her CPAP due to intolerance to full facemask and being on peritoneal dialysis for 8 hours nocturnally  She has been using 3 L nasal cannula every night  Review of Systems   Respiratory: Positive for shortness of breath  Genitourinary: Positive for frequency  All other systems reviewed and are negative        Historical Information   Past Medical History:   Diagnosis Date   • Acute asthmatic bronchitis     last assessed: 2017   • Cardiac disease    • Chronic diastolic (congestive) heart failure (HCC)    • Chronic kidney disease     pt is on peritoneal dialysis daily from 2200- 0600   • Closed nondisplaced fracture of distal phalanx of right great toe 2018   • Colon polyp    • CPAP (continuous positive airway pressure) dependence    • Diabetes mellitus ([facility] )    • Hyperlipidemia    • Hypertension    • Medical non-compliance    • Morbid obesity with BMI of 40 0-44 9, adult ([facility] ) 2019   • On continuous oral anticoagulation 2020   • Pneumonia     last assessed: 2013   • PONV (postoperative nausea and vomiting)    • Pulmonary embolism ([facility])    • Renal disorder     possible clot noted in kidney, thus blood thinners currently   • Severe obstructive sleep apnea    • Severe pulmonary arterial systolic hypertension ([facility] [facility] )    • Tinea pedis of both feet 2018     Past Surgical History:   Procedure Laterality Date   • CATARACT EXTRACTION     •  SECTION       x 1   • EYE SURGERY     • NOSE SURGERY      fractured nose - septoplasty     Family History   Problem Relation Age of Onset   • Diabetes Mother         mellitus   • Anxiety disorder Mother         generalized anxiety disorder   • Hypertension Mother    • Stroke Mother    • Kidney disease Father    • Kidney failure Father         renal failure   • Ulcerative colitis Sister    • Diabetes Sister    • Heart disease Family    • Ovarian cancer Maternal Aunt    • Diabetes Maternal Aunt    • Diabetes Maternal Uncle    • Heart disease Maternal Uncle    • Thyroid disease Neg Hx          Meds/Allergies     Current Outpatient Medications:   •  atorvastatin (LIPITOR) 20 mg tablet, Take 1 tablet (20 mg total) by mouth daily, Disp: 90 tablet, Rfl: 3  •  B Complex-C (B-COMPLEX WITH VITAMIN C) tablet, Take 1 tablet by mouth daily, Disp: , Rfl:   •  Blood Glucose Monitoring Suppl (ONE TOUCH ULTRA 2) w/Device KIT, Test glucose 3 times daily, Disp: 1 each, Rfl: 0  •  calcium acetate (PHOSLO) capsule, Take 667 mg by mouth daily , Disp: , Rfl:   •  cholecalciferol (VITAMIN D3) 1,000 units tablet, Take 2 tablets (2,000 Units total) by mouth daily, Disp: 100 tablet, Rfl: 5  •  cinacalcet (SENSIPAR) 30 mg tablet, TAKE 1 TABLET THREE TIMES A WEEK, Disp: 36 tablet, Rfl: 3  •  docusate sodium (COLACE) 100 mg capsule, Take 1 capsule (100 mg total) by mouth 2 (two) times a day, Disp: 10 capsule, Rfl: 0  •  gentamicin (GARAMYCIN) 0 1 % cream, Apply topically daily, Disp: 15 g, Rfl: 3  •  HumaLOG KwikPen 200 units/mL CONCENTRATED U-200 injection pen, INJECT 10 UNITS UNDER THE SKIN THREE TIMES A DAY WITH MEALS, Disp: 6 mL, Rfl: 8  •  Insulin Pen Needle 31G X 5 MM MISC, by Does not apply route daily, Disp: 100 each, Rfl: 1  •  Lancets (onetouch ultrasoft) lancets, Test glucose 3 times a day; Code: E11 8, Disp: 300 each, Rfl: 1  •  Lantus SoloStar 100 units/mL SOPN, INJECT 30 UNITS UNDER THE SKIN DAILY AT BEDTIME, Disp: 15 mL, Rfl: 6  •  levothyroxine (Euthyrox) 112 mcg tablet, Take one tablet by mouth in early morning on empty stomach, Disp: 90 tablet, Rfl: 0  •  metolazone (ZAROXOLYN) 5 mg tablet, Take 5 mg by mouth daily , Disp: , Rfl:   •  metoprolol succinate (TOPROL-XL) 50 mg 24 hr tablet, , Disp: , Rfl:   •  MULTIPLE VITAMIN PO, Take 1 tablet by mouth daily, Disp: , Rfl:   •  OneTouch Ultra test strip, TEST GLUCOSE THREE TIMES A DAY, Disp: 300 strip, Rfl: 3  •  Semaglutide,0 25 or 0 5MG/DOS, (Ozempic, 0 25 or 0 5 MG/DOSE,) 2 MG/1 5ML SOPN, Inject 0 5 mg under the skin once a week, Disp: 6 mL, Rfl: 1  •  torsemide (DEMADEX) 100 mg tablet, Take 1 tablet (100 mg total) by mouth daily, Disp: 90 tablet, Rfl: 1  •  valsartan (DIOVAN) 160 mg tablet, TAKE 1 TABLET DAILY, Disp: 90 tablet, Rfl: 3  No Known Allergies    Vitals: Blood pressure 120/70, pulse 63, temperature 97 7 °F (36 5 °C), temperature source Temporal, resp   rate 12, height 5' (1 524 m), weight 89 8 kg (198 lb), SpO2 96 %, not currently breastfeeding  Body mass index is 38 67 kg/m²  Oxygen Therapy  SpO2: 96 %      Physical Exam  General:  Awake alert and oriented x 3, conversant without conversational dyspnea, NAD, normal affect  HEENT:   Sclera noninjected, nonicteric OU, Nares patent,  no craniofacial abnormalities, Mucous membranes, moist, no oral lesions, normal dentition  NECK:  Trachea midline, no accessory muscle use, no stridor,  JVP not elevated  CARDIAC: Reg, single s1/S2, no m/r/g  PULM: CTA bilaterally no wheezing, rhonchi or rales  ABD: Limited exam  EXT: No cyanosis, no clubbing, no edema, normal capillary refill  NEURO: no focal neurologic deficits, AAOx3, moving all extremities appropriately    Labs: I have personally reviewed pertinent lab results  , ABG: No results found for: PHART, DXA5WVO, PO2ART, ZBD8TDN, Y2WFRSDA, BEART, SOURCE, BNP: No results found for: BNP, CBC: No results found for: WBC, HGB, HCT, MCV, PLT, ADJUSTEDWBC, MCH, MCHC, RDW, MPV, NRBC, CMP: No results found for: SODIUM, K, CL, CO2, ANIONGAP, BUN, CREATININE, GLUCOSE, CALCIUM, AST, ALT, ALKPHOS, PROT, BILITOT, EGFR, PT/INR: No results found for: PT, INR, Troponin: No results found for: TROPONINI  Lab Results   Component Value Date    WBC 10 87 (H) 10/18/2022    HGB 12 5 10/18/2022    HCT 40 0 10/18/2022    MCV 94 10/18/2022     10/18/2022     Lab Results   Component Value Date    GLUCOSE 133 12/11/2014    CALCIUM 9 8 10/18/2022     12/11/2014    K 4 2 10/18/2022    CO2 26 10/18/2022     10/18/2022    BUN 62 (H) 10/18/2022    CREATININE 5 93 (H) 10/18/2022     No results found for: IGE  Lab Results   Component Value Date    ALT 16 10/18/2022    AST 9 10/18/2022    ALKPHOS 93 10/18/2022    BILITOT 1 08 (H) 12/11/2014         Imaging and other studies: I have personally reviewed pertinent reports  Sleep studies: I have personally reviewed pertinent reports  Split 2019:    1   Severe obstructive sleep apnea   2  Sleep-related hypoxia - secondary to the above and likely also obesity hypoventilation   3  The above appear to be sufficiently remediated with positive airway pressure at 14 cm H2O    4  Mild-to-moderate periodic limb movements of sleep             Jorge Campbell MD  [facility]       Portions of the record may have been created with voice recognition software  Occasional wrong word or "sound a like" substitutions may have occurred due to the inherent limitations of voice recognition software  Read the chart carefully and recognize, using context, where substitutions have occurred

## 2022-12-27 NOTE — ASSESSMENT & PLAN NOTE
Severe CHANDRA, noncompliant with CPAP multiple reasons full facemask intolerance, peritoneal dialysis 8 hours every night, she has been using only oxygen nasal cannula  I discussed with her that oxygen does not replace CPAP and is not enough to treat her underlying severe CHANDRA  I requested mask fitting for nasal pillows today with Naye  I explained to the patient in details the pathophysiology of obstructive sleep apnea  I also explained the importance of treatment, and the consequences of untreated obstructive sleep apnea on underlying cardiovascular and cerebrovascular risk, morbidity, and mortality

## 2022-12-27 NOTE — PATIENT INSTRUCTIONS
Sleep Apnea   AMBULATORY CARE:   Sleep apnea  is a condition that causes you to stop breathing often during sleep  Types of sleep apnea:   Obstructive sleep apnea (CHANDRA)  is the most common kind  The muscles and tissues around your throat relax and block air from passing through  Obesity, use of alcohol or cigarettes, or a family history are common causes  CHANDRA may increase your risk for complications after surgery  Central sleep apnea (CSA)  means your brain does not send signals to the muscles that control breathing  You do not take a breath even though your airway is open  Common causes include medical conditions such as heart failure, being older than 40, or use of opioids  Complex (or mixed) sleep apnea  means you have both obstructive and central sleep apnea  Common signs and symptoms:   Loud snoring or long pauses in breathing    Feeling sleepy, slow, and tired during the day    Snorting, gasping, or choking while you sleep, and waking up suddenly because of these    Feeling irritable during the day    Dry mouth or a headache in the mornings    Heavy night sweating    A hard time thinking, remembering things, or focusing on your tasks the following day    Call your local emergency number (911 in the [address]) if:   You have chest pain or trouble breathing  Call your doctor if:   You have new or worsening signs or symptoms  You have questions or concerns about your condition or care  Treatment  depends on the kind of apnea you have  A mouth device  may be needed if you have mild sleep apnea  These are designed to keep your throat open  Ask your dentist or healthcare provider about the best mouth device for you  A machine  may be used to help you get more air during sleep  A mask may be placed over your nose and mouth, or just your nose  The mask is hooked to the machine  You will get air through the mask      A continuous positive airway pressure (CPAP) machine  is used to keep your airway open during sleep  The machine blows a gentle stream of air into the mask when you breathe  This helps keep your airway open so you can breathe more regularly  Extra oxygen may be given through the machine  A bilevel positive airway pressure (BiPAP) machine  gives air but lowers the pressure when you breathe out  An adaptive servo-ventilator (ASV)  is a machine that learns your usual breathing pattern  Then, it uses pressure to give you air and prevent stops in your breathing  Surgery  to expand your airway or remove extra tissues may be needed  Surgery is usually only considered if other treatments do not work  Manage or prevent sleep apnea:   Reach and maintain a healthy weight  Ask your healthcare provider what a healthy weight is for you  Ask him or her to help you create a safe weight loss plan if you are overweight  Even a small goal of a 10% weight loss can improve your symptoms  Do not smoke  Nicotine and other chemicals in cigarettes and cigars can cause lung damage  Ask your healthcare provider for information if you currently smoke and need help to quit  E-cigarettes or smokeless tobacco still contain nicotine  Talk to your healthcare provider before you use these products  Do not drink alcohol or take sedative medicine before you go to sleep  Alcohol and sedatives can relax the muscles and tissues around your throat  This can block the airflow to your lungs  Sleep on your side or use pillows designed to prevent sleep apnea  This prevents your tongue or other tissues from blocking your throat  You can also raise the head of your bed  Follow up with your doctor or specialist as directed: You may need to have blood tests during your follow-up visits  Work with your provider to find the right breathing support equipment and settings for you  Write down your questions so you remember to ask them during your visits    © Copyright [address] 2022 Information is for End User's use only and may not be sold, redistributed or otherwise used for commercial purposes  All illustrations and images included in CareNotes® are the copyrighted property of A D A M , Inc  or Brenton Hernandez  The above information is an  only  It is not intended as medical advice for individual conditions or treatments  Talk to your doctor, nurse or pharmacist before following any medical regimen to see if it is safe and effective for you

## 2022-12-30 DIAGNOSIS — Z79.4 TYPE 2 DIABETES MELLITUS WITH HYPERGLYCEMIA, WITH LONG-TERM CURRENT USE OF INSULIN (HCC): ICD-10-CM

## 2022-12-30 DIAGNOSIS — N18.6 ESRD (END STAGE RENAL DISEASE) (HCC): ICD-10-CM

## 2022-12-30 DIAGNOSIS — I10 ESSENTIAL HYPERTENSION: ICD-10-CM

## 2022-12-30 DIAGNOSIS — E03.9 ACQUIRED HYPOTHYROIDISM: ICD-10-CM

## 2022-12-30 DIAGNOSIS — E11.65 TYPE 2 DIABETES MELLITUS WITH HYPERGLYCEMIA, WITH LONG-TERM CURRENT USE OF INSULIN (HCC): ICD-10-CM

## 2022-12-30 DIAGNOSIS — E78.2 MIXED HYPERLIPIDEMIA: ICD-10-CM

## 2022-12-30 RX ORDER — INSULIN GLARGINE 100 [IU]/ML
INJECTION, SOLUTION SUBCUTANEOUS
Qty: 15 ML | Refills: 3 | Status: SHIPPED | OUTPATIENT
Start: 2022-12-30 | End: 2023-01-03 | Stop reason: SDUPTHER

## 2022-12-30 RX ORDER — INSULIN LISPRO 200 [IU]/ML
INJECTION, SOLUTION SUBCUTANEOUS
Qty: 15 ML | Refills: 1 | Status: SHIPPED | OUTPATIENT
Start: 2022-12-30 | End: 2023-01-06 | Stop reason: ALTCHOICE

## 2023-01-03 DIAGNOSIS — Z79.4 TYPE 2 DIABETES MELLITUS WITH HYPERGLYCEMIA, WITH LONG-TERM CURRENT USE OF INSULIN (HCC): ICD-10-CM

## 2023-01-03 DIAGNOSIS — E11.65 TYPE 2 DIABETES MELLITUS WITH HYPERGLYCEMIA, WITH LONG-TERM CURRENT USE OF INSULIN (HCC): ICD-10-CM

## 2023-01-03 RX ORDER — INSULIN GLARGINE 100 [IU]/ML
INJECTION, SOLUTION SUBCUTANEOUS
Qty: 30 ML | Refills: 3 | Status: SHIPPED | OUTPATIENT
Start: 2023-01-03 | End: 2023-01-09 | Stop reason: SDUPTHER

## 2023-01-06 ENCOUNTER — TELEPHONE (OUTPATIENT)
Dept: ENDOCRINOLOGY | Facility: CLINIC | Age: 68
End: 2023-01-06

## 2023-01-06 DIAGNOSIS — E11.65 TYPE 2 DIABETES MELLITUS WITH HYPERGLYCEMIA, WITH LONG-TERM CURRENT USE OF INSULIN (HCC): Primary | ICD-10-CM

## 2023-01-06 DIAGNOSIS — Z79.4 TYPE 2 DIABETES MELLITUS WITH HYPERGLYCEMIA, WITH LONG-TERM CURRENT USE OF INSULIN (HCC): Primary | ICD-10-CM

## 2023-01-06 NOTE — TELEPHONE ENCOUNTER
Received a call from [facility], they did received the Rx for Humalog Kwikpen 200 mg ,per pharmacist this medication is only prescribe if pt is taking above  22 unit before meals , they would like to know if you would like to change it to Humalog Kwikpen 100 units      Phone # [phone] Inv [phone]

## 2023-01-09 DIAGNOSIS — Z79.4 TYPE 2 DIABETES MELLITUS WITH HYPERGLYCEMIA, WITH LONG-TERM CURRENT USE OF INSULIN (HCC): ICD-10-CM

## 2023-01-09 DIAGNOSIS — E11.65 TYPE 2 DIABETES MELLITUS WITH HYPERGLYCEMIA, WITH LONG-TERM CURRENT USE OF INSULIN (HCC): ICD-10-CM

## 2023-01-09 RX ORDER — INSULIN GLARGINE 100 [IU]/ML
INJECTION, SOLUTION SUBCUTANEOUS
Qty: 30 ML | Refills: 3 | Status: SHIPPED | OUTPATIENT
Start: 2023-01-09

## 2023-01-12 DIAGNOSIS — Z79.4 TYPE 2 DIABETES MELLITUS WITH HYPERGLYCEMIA, WITH LONG-TERM CURRENT USE OF INSULIN (HCC): ICD-10-CM

## 2023-01-12 DIAGNOSIS — E11.65 TYPE 2 DIABETES MELLITUS WITH HYPERGLYCEMIA, WITH LONG-TERM CURRENT USE OF INSULIN (HCC): ICD-10-CM

## 2023-02-09 DIAGNOSIS — I12.9 BENIGN HYPERTENSION WITH CKD (CHRONIC KIDNEY DISEASE) STAGE IV (HCC): ICD-10-CM

## 2023-02-09 DIAGNOSIS — N18.4 BENIGN HYPERTENSION WITH CKD (CHRONIC KIDNEY DISEASE) STAGE IV (HCC): ICD-10-CM

## 2023-02-09 RX ORDER — VALSARTAN 320 MG/1
320 TABLET ORAL DAILY
Qty: 90 TABLET | Refills: 3 | Status: SHIPPED | OUTPATIENT
Start: 2023-02-09

## 2023-03-13 DIAGNOSIS — E66.01 MORBID OBESITY (HCC): Primary | ICD-10-CM

## 2023-03-13 DIAGNOSIS — I12.9 BENIGN HYPERTENSION WITH CKD (CHRONIC KIDNEY DISEASE) STAGE IV (HCC): ICD-10-CM

## 2023-03-13 DIAGNOSIS — N18.6 BENIGN HYPERTENSION WITH ESRD (END-STAGE RENAL DISEASE) (HCC): Primary | ICD-10-CM

## 2023-03-13 DIAGNOSIS — I12.0 BENIGN HYPERTENSION WITH ESRD (END-STAGE RENAL DISEASE) (HCC): Primary | ICD-10-CM

## 2023-03-13 DIAGNOSIS — N18.4 BENIGN HYPERTENSION WITH CKD (CHRONIC KIDNEY DISEASE) STAGE IV (HCC): ICD-10-CM

## 2023-03-13 RX ORDER — VALSARTAN 320 MG/1
160 TABLET ORAL DAILY
Qty: 90 TABLET | Refills: 0
Start: 2023-03-13

## 2023-03-13 RX ORDER — METOPROLOL SUCCINATE 50 MG/1
50 TABLET, EXTENDED RELEASE ORAL DAILY
Qty: 90 TABLET | Refills: 3 | Status: SHIPPED | OUTPATIENT
Start: 2023-03-13

## 2023-03-20 ENCOUNTER — ANNUAL EXAM (OUTPATIENT)
Dept: OBGYN CLINIC | Facility: CLINIC | Age: 68
End: 2023-03-20

## 2023-03-20 VITALS — DIASTOLIC BLOOD PRESSURE: 80 MMHG | SYSTOLIC BLOOD PRESSURE: 128 MMHG | BODY MASS INDEX: 38.86 KG/M2 | WEIGHT: 199 LBS

## 2023-03-20 DIAGNOSIS — B35.4 TINEA CORPORIS: ICD-10-CM

## 2023-03-20 DIAGNOSIS — Z01.419 WOMEN'S ANNUAL ROUTINE GYNECOLOGICAL EXAMINATION: Primary | ICD-10-CM

## 2023-03-20 RX ORDER — INSULIN GLARGINE-YFGN 100 [IU]/ML
INJECTION, SOLUTION SUBCUTANEOUS
COMMUNITY
Start: 2023-01-15

## 2023-03-20 RX ORDER — NYSTATIN 100000 [USP'U]/G
POWDER TOPICAL 3 TIMES DAILY
Qty: 15 G | Refills: 0 | Status: SHIPPED | OUTPATIENT
Start: 2023-03-20 | End: 2023-03-27

## 2023-03-20 NOTE — PROGRESS NOTES
Subjective    HPI:     Lisa Santiago is a 79 y o  postmenopausal female  She is a  1 Para 1, with   She is not sexually active  She denies /GI and Gyn complaints  She feels safe at home  She denies depression/anxiety  Medical, surgical and family history reviewed  She is currently in kidney failure and is in the process of getting on the transplant list  Needs form completed for visit today  Her dental care is up-to-date  She eats a relatively healthy diet and exercises occasionally  She is not happy with her weight  Gynecologic History    No LMP recorded  Patient is postmenopausal     Last Pap: 21  Results were normal    Last mammogram: 3/13/2023  Results were: normal  Colonoscopy: 24- repeat in 5 years  DXA scan: never had one    Obstetric History    OB History    Para Term  AB Living   1 1 1     1   SAB IAB Ectopic Multiple Live Births                  # Outcome Date GA Lbr Abdelrahman/2nd Weight Sex Delivery Anes PTL Lv   1 Term                The following portions of the patient's history were reviewed and updated as appropriate: allergies, current medications, past family history, past medical history, past social history, past surgical history and problem list     Review of Systems    Pertinent items are noted in HPI  Objective    Physical Exam  Constitutional:       Appearance: Normal appearance  She is well-developed  Genitourinary:      Vulva, bladder and urethral meatus normal       No lesions in the vagina  Right Labia: No rash, tenderness, lesions, skin changes or Bartholin's cyst      Left Labia: No tenderness, lesions, skin changes, Bartholin's cyst or rash  No labial fusion noted  No inguinal adenopathy present in the right or left side  No vaginal discharge, erythema, tenderness, bleeding or granulation tissue  No vaginal prolapse present  Mild vaginal atrophy present         Right Adnexa: not tender, not full and no mass present  Left Adnexa: not tender, not full and no mass present  Cervix is not parous  No cervical motion tenderness, discharge, friability, lesion, polyp or nabothian cyst       Uterus is not enlarged, tender, irregular or prolapsed  No uterine mass detected  Uterus is anteverted  Pelvic exam was performed with patient in the lithotomy position  Breasts:     Breasts are symmetrical       Right: Skin change present  No inverted nipple, mass, nipple discharge or tenderness  Left: Skin change present  No inverted nipple, mass, nipple discharge or tenderness  Breast exam comments: Bilateral fungal infection noted  Kedar Ng HENT:      Head: Normocephalic and atraumatic  Neck:      Thyroid: No thyromegaly  Cardiovascular:      Rate and Rhythm: Normal rate and regular rhythm  Heart sounds: Normal heart sounds, S1 normal and S2 normal    Pulmonary:      Effort: Pulmonary effort is normal       Breath sounds: Normal breath sounds  Abdominal:      General: Bowel sounds are normal  There is no distension  Palpations: Abdomen is soft  There is no mass  Tenderness: There is no abdominal tenderness  There is no guarding  Hernia: There is no hernia in the left inguinal area or right inguinal area  Musculoskeletal:      Cervical back: Neck supple  Lymphadenopathy:      Cervical: No cervical adenopathy  Upper Body:      Right upper body: No supraclavicular or axillary adenopathy  Left upper body: No supraclavicular or axillary adenopathy  Lower Body: No right inguinal adenopathy  No left inguinal adenopathy  Neurological:      Mental Status: She is alert  Skin:     General: Skin is warm and dry  Findings: No rash  Psychiatric:         Attention and Perception: Attention and perception normal          Mood and Affect: Mood and affect normal          Speech: Speech normal          Behavior: Behavior is cooperative           Thought Content: Thought content normal          Cognition and Memory: Cognition and memory normal          Judgment: Judgment normal    Vitals and nursing note reviewed  Assessment and Plan    Kathe Yoon was seen today for gynecologic exam     Diagnoses and all orders for this visit:    Women's annual routine gynecological examination  -     Cancel: Liquid-based pap, screening    Tinea corporis  -     nystatin (MYCOSTATIN) powder; Apply topically 3 (three) times a day for 7 days      Patient informed of a Stable GYN exam  A pap smear was performed due to a normal pap smear in 2021  Patient is aware that she no longer needs pap smear screening based on the ASCCP guidelines  I emphasized the importance of an annual pelvic and breast exam  Her yearly mammogram is current  Nystatin powder prescribed to be applied under the breast for fungal infection  Reviewed prevention of recurrence with ensuring that she keep the area dry  Results will be released to [facility], if abnormal will call to review and discuss treatment plan  All questions have been answered to her satisfaction  Follow up in: 1 year or sooner if needed

## 2023-03-23 ENCOUNTER — CONSULT (OUTPATIENT)
Dept: BARIATRICS | Facility: CLINIC | Age: 68
End: 2023-03-23

## 2023-03-23 VITALS
SYSTOLIC BLOOD PRESSURE: 100 MMHG | HEART RATE: 79 BPM | BODY MASS INDEX: 37.21 KG/M2 | HEIGHT: 60 IN | WEIGHT: 189.5 LBS | DIASTOLIC BLOOD PRESSURE: 72 MMHG

## 2023-03-23 DIAGNOSIS — Z79.4 TYPE 2 DIABETES MELLITUS WITH HYPERGLYCEMIA, WITH LONG-TERM CURRENT USE OF INSULIN (HCC): ICD-10-CM

## 2023-03-23 DIAGNOSIS — E66.9 CLASS 2 OBESITY WITH BODY MASS INDEX (BMI) OF 37.0 TO 37.9 IN ADULT: Primary | ICD-10-CM

## 2023-03-23 DIAGNOSIS — N18.6 END STAGE RENAL DISEASE (HCC): ICD-10-CM

## 2023-03-23 DIAGNOSIS — E11.65 TYPE 2 DIABETES MELLITUS WITH HYPERGLYCEMIA, WITH LONG-TERM CURRENT USE OF INSULIN (HCC): ICD-10-CM

## 2023-03-23 DIAGNOSIS — E66.01 MORBID OBESITY (HCC): ICD-10-CM

## 2023-03-23 PROBLEM — E66.812 CLASS 2 OBESITY WITH BODY MASS INDEX (BMI) OF 37.0 TO 37.9 IN ADULT: Status: ACTIVE | Noted: 2021-09-29

## 2023-03-23 NOTE — ASSESSMENT & PLAN NOTE
Lab Results   Component Value Date    EGFR 6 10/18/2022    EGFR 6 06/14/2022    EGFR 4 05/21/2022    CREATININE 5 93 (H) 10/18/2022    CREATININE 5 98 (H) 06/14/2022    CREATININE 7 75 (H) 05/21/2022   Patient is being evaluated for kidney transplant  Currently doing PD every night

## 2023-03-23 NOTE — ASSESSMENT & PLAN NOTE
Follows with endocrinologist  Will be reaching out to see if Ozempic can be increased to 1mg weekly  Lab Results   Component Value Date    HGBA1C 7 2 (H) 03/06/2023

## 2023-03-23 NOTE — PROGRESS NOTES
Assessment/Plan:    Class 2 obesity with body mass index (BMI) of 37 0 to 37 9 in adult  - Discussed options of HealthyCORE-Intensive Lifestyle Intervention Program, Very Low Calorie Diet-VLCD and Conservative Program and the role of weight loss medications  - Explained the importance of making lifestyle changes first before starting anti-obesity medications  - Patient should demonstrate lifestyle changes first before anti-obesity medication initiated  - Patient is interested in pursuing Conservative Program including a 30 minute visit with dietician and MWM provider follow up in 6 weeks  - Initial weight loss goal of 5-10% weight loss for improved health  - Weight loss can improve patient's co-morbid conditions and/or prevent weight-related complications  - Patient advised to reach out to endocrinologist to see if the dose of Ozempic may be increased to 1mg weekly to help with weight management as well diabetes  - Labs reviewed from 10/2022    Goals:  Continue with smaller portions, less soda and juice, and healthier alternatives - you are already doing well with weight loss the past 2 weeks! Do not skip meals  Begin to food log via joan - [email or web address]  Continue to reduce the amount of sugary beverages  Continue to drink at least 64oz of water daily  Increase physical activity by 10 minutes daily  Begin to walk for 30 minutes 2-3 days a week  Gradually increase physical activity to a goal of 5 days per week for 30 minutes  End stage renal disease ([facility] [address] )  Lab Results   Component Value Date    EGFR 6 10/18/2022    EGFR 6 06/14/2022    EGFR 4 05/21/2022    CREATININE 5 93 (H) 10/18/2022    CREATININE 5 98 (H) 06/14/2022    CREATININE 7 75 (H) 05/21/2022   Patient is being evaluated for kidney transplant  Currently doing PD every night      Type 2 diabetes mellitus with hyperglycemia, with long-term current use of insulin ([address] )  Follows with endocrinologist  Will be reaching out to see if Ozempic can be increased to 1mg weekly  Lab Results   Component Value Date    HGBA1C 7 2 (H) 2023          Bruna Rodriguez was seen today for consult  Diagnoses and all orders for this visit:    Class 2 obesity with body mass index (BMI) of 37 0 to 37 9 in adult    Morbid obesity ([place] [address] )  -     Ambulatory Referral to Bariatric Surgery    Type 2 diabetes mellitus with hyperglycemia, with long-term current use of insulin (HCC)    End stage renal disease ([place] [address] )           Total time spent reviewing chart, interviewing patient, examining patient, discussing plan, answering all questions, and documentin min, with >50% face-to-face time spent counseling patient on nonsurgical interventions for the treatment of excess weight  Discussed in detail nonsurgical options including intensive lifestyle intervention program, very low-calorie diet program and conservative program   Discussed the role of weight loss medications  Counseled patient on diet behavior and exercise modification for weight loss  Follow up in approximately 1 month with Non-Surgical Physician/Advanced Practitioner  Subjective:   Chief Complaint   Patient presents with   • Consult     Pt is here for MWM consult  Patient ID: Lashonda Greenberg  is a 79 y o  female with excess weight/obesity here to pursue weight management  Previous notes and records have been reviewed      Past Medical History:   Diagnosis Date   • Acute asthmatic bronchitis     last assessed: 2017   • Cardiac disease    • Chronic diastolic (congestive) heart failure (HCC)    • Chronic kidney disease     pt is on peritoneal dialysis daily from 2200- 0600   • Closed nondisplaced fracture of distal phalanx of right great toe 2018   • Colon polyp    • CPAP (continuous positive airway pressure) dependence    • Diabetes mellitus ([place] [address] )    • Hyperlipidemia    • Hypertension    • Medical non-compliance    • Morbid obesity with BMI of 40 0-44 9, adult ([facility] ) 2019   • On continuous oral anticoagulation 2020   • Pneumonia     last assessed: 2013   • PONV (postoperative nausea and vomiting)    • Pulmonary embolism (HCC)    • Renal disorder     possible clot noted in kidney, thus blood thinners currently   • Severe obstructive sleep apnea    • Severe pulmonary arterial systolic hypertension ([address] )    • Tinea pedis of both feet 2018     Past Surgical History:   Procedure Laterality Date   • CATARACT EXTRACTION     •  SECTION       x 1   • EYE SURGERY     • NOSE SURGERY      fractured nose - septoplasty       HPI:  Wt Readings from Last 20 Encounters:   23 86 kg (189 lb 8 oz)   23 90 3 kg (199 lb)   22 89 8 kg (198 lb)   22 92 kg (202 lb 12 8 oz)   22 91 2 kg (201 lb)   10/26/22 91 2 kg (201 lb)   10/18/22 90 7 kg (200 lb)   10/12/22 91 5 kg (201 lb 12 8 oz)   10/11/22 92 1 kg (203 lb)   22 91 6 kg (202 lb)   22 90 3 kg (199 lb)   22 91 6 kg (202 lb)   22 97 1 kg (214 lb)   22 93 9 kg (207 lb)   22 94 3 kg (208 lb)   22 97 1 kg (214 lb 1 1 oz)   22 93 kg (205 lb)   22 93 9 kg (207 lb)   22 91 2 kg (201 lb)   22 92 5 kg (204 lb)       Patient presents today to medical weight management office for consult  Patient is currently trying to get on kidney transplant list and is required to reduce her BMI  She was seen in  for weight loss and completed 1 month of HealthyCORE, but didn't continue and not interested in restarting  She recently started to reduce her food intake and soda intake - has already lost about 7-8 lbs according to her scale  Patient is already on Ozempic 0 5mg weekly for diabetes along with insulin glargine and humalog  She did notice some weight loss when she first started the Ozempic - didn't feel as hungry  Obesity/Excess Weight:  Severity: Moderate  Onset:   Many years    Modifiers: Diet and Exercise  Contributing factors: Poor Food Choices and Insufficient Physical Activity  Associated symptoms: comorbid conditions and increased joint pain    Diet recall:  B: cottage cheese, sometimes skips, yogurt  S: no  L: 2pm - spaghetti with meat sauce (lunch and dinner)  S: used to snack on candy, pretzels - not this past couple of weeks  D: skips if eating a late lunch    Hydration: 1-3 glasses per day, water - 4-5 glasses and 2 bottles, 1 cup of juice  Alcohol: no  Smoking: no  Exercise: no formal exercise  Occupation:   Sleep: toss and turn a lot - on PD at night  Has CHANDRA but not compliant with CPAP    Highest weight: 207 lbs  Current weight: 189 5 lbs  Goal weight: to lose 20 lbs      The following portions of the patient's history were reviewed and updated as appropriate: allergies, current medications, past family history, past medical history, past social history, past surgical history, and problem list     Family History   Problem Relation Age of Onset   • Diabetes Mother         mellitus   • Anxiety disorder Mother         generalized anxiety disorder   • Hypertension Mother    • Stroke Mother    • Kidney disease Father    • Kidney failure Father         renal failure   • Ulcerative colitis Sister    • Diabetes Sister    • Ovarian cancer Maternal Aunt    • Diabetes Maternal Aunt    • Diabetes Maternal Uncle    • Heart disease Maternal Uncle    • Heart disease Family    • Thyroid disease Neg Hx         Review of Systems   Constitutional: Negative for fatigue  HENT: Negative for sore throat  Respiratory: Negative for cough and shortness of breath  Cardiovascular: Negative for chest pain, palpitations and leg swelling  Gastrointestinal: Negative for abdominal pain, constipation, diarrhea and nausea  Genitourinary: Negative for dysuria  Musculoskeletal: Negative for arthralgias and back pain  Skin: Negative for rash  Neurological: Negative for headaches  Psychiatric/Behavioral: Negative for dysphoric mood  The patient is not nervous/anxious  Objective:  /72   Pulse 79   Ht 5' (1 524 m)   Wt 86 kg (189 lb 8 oz)   BMI 37 01 kg/m²     Physical Exam  Vitals and nursing note reviewed  Constitutional:       Appearance: Normal appearance  She is obese  HENT:      Head: Normocephalic  Pulmonary:      Effort: Pulmonary effort is normal    Neurological:      General: No focal deficit present  Mental Status: She is alert and oriented to person, place, and time  Psychiatric:         Mood and Affect: Mood normal          Behavior: Behavior normal          Thought Content: Thought content normal          Judgment: Judgment normal                 Labs and Imaging  Recent labs and imaging have been personally reviewed    Lab Results   Component Value Date    WBC 10 87 (H) 10/18/2022    HGB 12 5 10/18/2022    HCT 40 0 10/18/2022    MCV 94 10/18/2022     10/18/2022     Lab Results   Component Value Date     12/11/2014    SODIUM 139 10/18/2022    K 4 2 10/18/2022     10/18/2022    CO2 26 10/18/2022    ANIONGAP 6 12/11/2014    AGAP 10 10/18/2022    BUN 62 (H) 10/18/2022    CREATININE 5 93 (H) 10/18/2022    GLUC 152 (H) 06/14/2022    GLUF 44 (L) 10/18/2022    CALCIUM 9 8 10/18/2022    AST 9 10/18/2022    ALT 16 10/18/2022    ALKPHOS 93 10/18/2022    PROT 6 8 12/11/2014    TP 7 5 10/18/2022    BILITOT 1 08 (H) 12/11/2014    TBILI 0 60 10/18/2022    EGFR 6 10/18/2022     Lab Results   Component Value Date    HGBA1C 7 2 (H) 03/06/2023     Lab Results   Component Value Date    UGK9YXLYELCO 6 034 (H) 10/18/2022     Lab Results   Component Value Date    CHOLESTEROL 158 03/25/2022     Lab Results   Component Value Date    HDL 57 03/25/2022     Lab Results   Component Value Date    TRIG 84 03/25/2022     Lab Results   Component Value Date    LDLCALC 84 03/25/2022

## 2023-03-23 NOTE — ASSESSMENT & PLAN NOTE
- Discussed options of HealthyCORE-Intensive Lifestyle Intervention Program, Very Low Calorie Diet-VLCD and Conservative Program and the role of weight loss medications  - Explained the importance of making lifestyle changes first before starting anti-obesity medications  - Patient should demonstrate lifestyle changes first before anti-obesity medication initiated  - Patient is interested in pursuing Conservative Program including a 30 minute visit with dietician and MWM provider follow up in 6 weeks  - Initial weight loss goal of 5-10% weight loss for improved health  - Weight loss can improve patient's co-morbid conditions and/or prevent weight-related complications  - Patient advised to reach out to endocrinologist to see if the dose of Ozempic may be increased to 1mg weekly to help with weight management as well diabetes  - Labs reviewed from 10/2022    Goals:  Continue with smaller portions, less soda and juice, and healthier alternatives - you are already doing well with weight loss the past 2 weeks! Do not skip meals  Begin to food log via joan - [email or web address]  Continue to reduce the amount of sugary beverages  Continue to drink at least 64oz of water daily  Increase physical activity by 10 minutes daily  Begin to walk for 30 minutes 2-3 days a week  Gradually increase physical activity to a goal of 5 days per week for 30 minutes

## 2023-03-24 NOTE — PROGRESS NOTES
"Weight Management Medical Nutrition Assessment  Melchor Batres is here today for menu planning  Current weight 193 3#  Per dietary recall, patient's diet consists of mainly carbohydrate-rich and protein-rich foods with inconsistent mealtimes and carbohydrate-rich grazing  Patient was unreliable historian when collecting recall  Discussed benefits of interval eating and balance at meals and snacks, recommended protein intake, and importance of mindful eating  Due to ESRD and PD, recommend patient limit protein to 45-55 g/day [1 0-1 2 g/kg IBW per [facility] clinical standards for PD]  Discussed benefits of consistent carbohydrates and pairing carbohydrate-rich foods with fat, fiber, and protein  Encouraged patient to continue to reduce sugar sweetened beverage consumption, utilizing light options or eliminating completely  Encouraged patient to add daily walks to routine due to sedentary [facility] position  Reviewed goals per WILLIAM GARRISON and via renal nutrition assessment for transplant  [Increase movement/exercise by 10 mins each day, cut out soda and sugary drinks, and increase vegetable intake and lean meats]  Low-calorie meal plan reviewed  Patient will f/u with WILLIAM AP x May  Encouraged patient to discuss RD visits via nephrology and if these are potentially covered by insurance  RD card provided for f/u questions       Motivated by Kidney transplant, needs weight of 175#    Patient seen by Medical Provider in past 6 months:  yes  Requested to schedule appointment with Medical Provider: No    Anthropometric Measurements  Start Weight (#): 193 3# (3/27/23)  Current Weight (#): n/a  TBW % Change from start weight: n/a  Ideal Body Weight (#): 100# (60\")  Goal Weight (#): 175# (for transplant)     Weight Loss History  Previous weight loss attempts: Exercise,  Counseling with MD, Nutrition Counseling with RD  Self Created Diets (Portion Control, Healthy Food Choices, etc )    Food and Nutrition Related History  Wake up: 7 am   Bed Time: " 11 pm  CHANDRA, no CPAP, on PD at night     Dietary Recall  Breakfast: -- (skips every other day) Or cottage cheese + banana + OJ Or yogurt (la yogurt Or yoplait)   Snack: --  Lunch (12-2 pm): spaghetti w/ meat sauce Or today was pork chop Or yesterday was applebees (appetizer sampler + chicken + shrimp w/ mashed potatoes)  Snack: candy Or pretzels Or fruit  Dinner: -- if eating later lunch Or leftovers from lunch meal (typically a pasta or rice meal)  Snack: --    Beverages: water, regular soda (had 3 sodas yesterday but typically 4x/week) and juice (1 cup)  Volume of beverage intake: 8-50 oz water    Weekends: Worse, more dining out and will skip breakfast  Cravings: none  Trouble area of day: none    Frequency of Eating out: infrequently   Food restrictions: none   Cooking: self and nieces  Food Shopping: self and nieces    Occupation: [employer], sedentary)    Physical Activity  Activity: No formal routine  Frequency:never  Physical limitations/barriers to exercise: dialysis port     Estimated Needs  [facility] [employer] Needs (needs at 189 5#)  BMR: 1,321 kcal  Maintenance calories (sedentary): 1,585 kcal  1-2#/ week loss (sedentary): 585-1,085 kcal  1-2#/ week loss (lightly active): 816-1,316 kcal    Protein: 45-55 grams [1 0-1 2 g/kg IBW due to PD and ESRD]  Fluid: as per output     Nutrition Diagnosis  Yes; Overweight/obesity related to Excess energy intake as evidenced by BMI more than normative standard for age and sex (obesity-grade II 35-39  9)     Nutrition Intervention    Nutrition Prescription  Calories: 1,000-1,050 kcal  Protein: 50 g    Meal Plan (Jeronimo/Pro)  Breakfast: 200/10  Snack: 100-150/0-5  Lunch: 300/15  Snack: 100/0-5  Dinner: 794/48-96  Snack: --    Nutrition Education  Calorie controlled menu  Lean protein food choices  Healthy snack options  Food journaling tips    Nutrition Counseling  Strategies: meal planning, portion sizes, healthy snack choices, hydration, fiber intake, protein intake, exercise, food logging    Monitoring and Evaluation:    Evaluation criteria  Energy Intake  Meet protein needs  Maintain adequate hydration  Monitor weekly weight  Meal planning/preparation  Food journal   Decreased portions at mealtimes and snacks  Physical activity     Barriers to learning:none  Readiness to change: Preparation:  (Getting ready to change)   Comprehension: good  Expected Compliance: fair

## 2023-03-27 ENCOUNTER — OFFICE VISIT (OUTPATIENT)
Dept: BARIATRICS | Facility: CLINIC | Age: 68
End: 2023-03-27

## 2023-03-27 VITALS — HEIGHT: 60 IN | WEIGHT: 193.3 LBS | BODY MASS INDEX: 37.95 KG/M2

## 2023-03-27 DIAGNOSIS — R63.5 ABNORMAL WEIGHT GAIN: Primary | ICD-10-CM

## 2023-04-24 ENCOUNTER — APPOINTMENT (OUTPATIENT)
Dept: LAB | Facility: HOSPITAL | Age: 68
End: 2023-04-24

## 2023-04-28 ENCOUNTER — APPOINTMENT (OUTPATIENT)
Dept: LAB | Facility: HOSPITAL | Age: 68
End: 2023-04-28

## 2023-05-03 ENCOUNTER — APPOINTMENT (OUTPATIENT)
Dept: LAB | Facility: HOSPITAL | Age: 68
End: 2023-05-03

## 2023-05-03 DIAGNOSIS — Z79.01 LONG TERM (CURRENT) USE OF ANTICOAGULANTS: ICD-10-CM

## 2023-05-03 LAB
INR PPP: 2.53 (ref 0.84–1.19)
PROTHROMBIN TIME: 27.4 SECONDS (ref 11.6–14.5)

## 2023-05-12 ENCOUNTER — APPOINTMENT (OUTPATIENT)
Dept: LAB | Facility: HOSPITAL | Age: 68
End: 2023-05-12

## 2023-05-18 ENCOUNTER — TELEPHONE (OUTPATIENT)
Dept: ENDOCRINOLOGY | Facility: CLINIC | Age: 68
End: 2023-05-18

## 2023-05-18 ENCOUNTER — OFFICE VISIT (OUTPATIENT)
Dept: BARIATRICS | Facility: CLINIC | Age: 68
End: 2023-05-18

## 2023-05-18 VITALS
SYSTOLIC BLOOD PRESSURE: 128 MMHG | BODY MASS INDEX: 37.03 KG/M2 | HEIGHT: 60 IN | DIASTOLIC BLOOD PRESSURE: 88 MMHG | WEIGHT: 188.6 LBS | HEART RATE: 94 BPM

## 2023-05-18 DIAGNOSIS — E66.01 CLASS 2 SEVERE OBESITY DUE TO EXCESS CALORIES WITH SERIOUS COMORBIDITY AND BODY MASS INDEX (BMI) OF 36.0 TO 36.9 IN ADULT (HCC): Primary | ICD-10-CM

## 2023-05-18 RX ORDER — WARFARIN SODIUM 5 MG/1
TABLET ORAL
COMMUNITY
Start: 2023-04-07

## 2023-05-18 NOTE — PROGRESS NOTES
Assessment/Plan:     Class 2 obesity with body mass index (BMI) of 37 0 to 37 9 in adult  - Patient is pursuing Conservative Program and follow up visits with medical weight management provider  - Initial weight loss goal of 5-10% weight loss for improved health  -Patient continues to struggle with meal choices and nutrition plan  Patient has met with dietitian and has calorie goal and meal plan at home, but has not implemented changes  Encourage patient to begin tracking foods and to stick to a calorie goal provided by nutritionist (6357-7177 calories per day)  Meal choices were discussed and she was encouraged to stop skipping meals and to eat more regularly throughout the day  She was encouraged to continue with hydration and incorporate more activity daily  She will contact her endocrinologist to inquire about increasing Ozempic as this may help with her hunger  Support was given that patient has made some small changes but overall more changes needed to be made to get to weight loss goal       Goals:  Do not skip meals  Food log via joan or provided paper food log  No sugary beverages  At least 64oz of water daily  Increase physical activity by 10 minutes daily  Gradually increase physical activity to a goal of 5 days per week for 30 minutes of MODERATE intensity PLUS 2 days per week of FULL BODY resistance training           Julius Mon was seen today for follow-up  Diagnoses and all orders for this visit:    Class 2 severe obesity due to excess calories with serious comorbidity and body mass index (BMI) of 36 0 to 36 9 in adult [facility])        Total time spent reviewing chart, interviewing patient, examining patient, discussing plan, answering all questions, and documentin minutes with >50% face-to-face time with the patient  Follow up in approximately 6 weeks with Non-Surgical Physician/Advanced Practitioner      Subjective:   Chief Complaint   Patient presents with   • Follow-up     Pt is here for WILLIAM f/u       Patient ID: Boaz Reyes  is a 79 y o  female with excess weight/obesity here to pursue weight management  Patient is pursuing Conservative Program    Most recent notes and records were reviewed  HPI    Wt Readings from Last 20 Encounters:   05/18/23 85 5 kg (188 lb 9 6 oz)   03/27/23 87 7 kg (193 lb 4 8 oz)   03/23/23 86 kg (189 lb 8 oz)   03/20/23 90 3 kg (199 lb)   12/27/22 89 8 kg (198 lb)   12/12/22 92 kg (202 lb 12 8 oz)   11/14/22 91 2 kg (201 lb)   10/26/22 91 2 kg (201 lb)   10/18/22 90 7 kg (200 lb)   10/12/22 91 5 kg (201 lb 12 8 oz)   10/11/22 92 1 kg (203 lb)   09/19/22 91 6 kg (202 lb)   09/17/22 90 3 kg (199 lb)   09/09/22 91 6 kg (202 lb)   09/01/22 97 1 kg (214 lb)   06/28/22 93 9 kg (207 lb)   06/27/22 94 3 kg (208 lb)   06/14/22 97 1 kg (214 lb 1 1 oz)   06/09/22 93 kg (205 lb)   06/02/22 93 9 kg (207 lb)       Patient presents today to medical weight management office for follow up  Patient has been struggling with food choices, her family continues to order out often  She is trying to eat less by skipping meals instead  Diet continues to be mainly carbohydrates  Patient has met with dietitian and has a meal plan at home, but has not implemented that yet  Patient has not met with endocrinology about increasing Ozempic-encouraged her to call them today for a follow-up appointment      Goal weight is 175 pounds that she can get on the kidney transplant list     Started date and weight: 189 5 lbs  Current weight: 188 8 lbs  Difference: -0 7 lbs    Diet recall:  Often skips meals  B: sometimes skips, yogurt  L: rice with beans  D: skips if eating a late lunch    Hydration: water - 4-5 glasses and 2 bottles, 1 cup of juice, occasional soda  Alcohol: no  Smoking: no  Exercise: no formal exercise - just bought a treadmill          The following portions of the patient's history were reviewed and updated as appropriate: allergies, current medications, past family history, past medical history, past social history, past surgical history, and problem list     Family History   Problem Relation Age of Onset   • Diabetes Mother         mellitus   • Anxiety disorder Mother         generalized anxiety disorder   • Hypertension Mother    • Stroke Mother    • Kidney disease Father    • Kidney failure Father         renal failure   • Ulcerative colitis Sister    • Diabetes Sister    • Ovarian cancer Maternal Aunt    • Diabetes Maternal Aunt    • Diabetes Maternal Uncle    • Heart disease Maternal Uncle    • Heart disease Family    • Thyroid disease Neg Hx         Review of Systems   Constitutional: Negative for fatigue  HENT: Negative for sore throat  Respiratory: Negative for cough and shortness of breath  Cardiovascular: Negative for chest pain, palpitations and leg swelling  Gastrointestinal: Negative for abdominal pain, constipation, diarrhea and nausea  Genitourinary: Negative for dysuria  Musculoskeletal: Negative for arthralgias and back pain  Skin: Negative for rash  Neurological: Negative for headaches  Psychiatric/Behavioral: Negative for dysphoric mood  The patient is not nervous/anxious  Objective:  /88   Pulse 94   Ht 5' (1 524 m)   Wt 85 5 kg (188 lb 9 6 oz)   BMI 36 83 kg/m²     Physical Exam  Vitals and nursing note reviewed  Constitutional:       Appearance: Normal appearance  She is obese  HENT:      Head: Normocephalic  Pulmonary:      Effort: Pulmonary effort is normal    Neurological:      General: No focal deficit present  Mental Status: She is alert and oriented to person, place, and time  Psychiatric:         Mood and Affect: Mood normal          Behavior: Behavior normal          Thought Content:  Thought content normal          Judgment: Judgment normal             Labs   Most recent labs reviewed   Lab Results   Component Value Date     12/11/2014    SODIUM 139 10/18/2022    K 4 2 10/18/2022     10/18/2022    CO2 26 10/18/2022    ANIONGAP 6 12/11/2014    AGAP 10 10/18/2022    BUN 62 (H) 10/18/2022    CREATININE 5 93 (H) 10/18/2022    GLUC 152 (H) 06/14/2022    GLUF 44 (L) 10/18/2022    CALCIUM 9 8 10/18/2022    AST 9 10/18/2022    ALT 16 10/18/2022    ALKPHOS 93 10/18/2022    PROT 6 8 12/11/2014    TP 7 5 10/18/2022    BILITOT 1 08 (H) 12/11/2014    TBILI 0 60 10/18/2022    EGFR 6 10/18/2022     Lab Results   Component Value Date    HGBA1C 7 2 (H) 03/06/2023     Lab Results   Component Value Date    [id number] 6 034 (H) 10/18/2022     Lab Results   Component Value Date    CHOLESTEROL 158 03/25/2022     Lab Results   Component Value Date    HDL 57 03/25/2022     Lab Results   Component Value Date    TRIG 84 03/25/2022     Lab Results   Component Value Date    LDLCALC 84 03/25/2022

## 2023-05-18 NOTE — TELEPHONE ENCOUNTER
She has an appointment with Dr Fabiola Rios in June and has not been seen in over a year  Could we adjust the appointment sooner? I think it is an appropriate discussion at an appointment so that we can review blood glucose data and make changes to her regimen  Thanks!

## 2023-05-18 NOTE — ASSESSMENT & PLAN NOTE
- Patient is pursuing Conservative Program and follow up visits with medical weight management provider  - Initial weight loss goal of 5-10% weight loss for improved health  -Patient continues to struggle with meal choices and nutrition plan  Patient has met with dietitian and has calorie goal and meal plan at home, but has not implemented changes  Encourage patient to begin tracking foods and to stick to a calorie goal provided by nutritionist (9982-6724 calories per day)  Meal choices were discussed and she was encouraged to stop skipping meals and to eat more regularly throughout the day  She was encouraged to continue with hydration and incorporate more activity daily  She will contact her endocrinologist to inquire about increasing Ozempic as this may help with her hunger  Support was given that patient has made some small changes but overall more changes needed to be made to get to weight loss goal       Goals:  Do not skip meals  Food log via joan or provided paper food log  No sugary beverages  At least 64oz of water daily  Increase physical activity by 10 minutes daily   Gradually increase physical activity to a goal of 5 days per week for 30 minutes of MODERATE intensity PLUS 2 days per week of FULL BODY resistance training

## 2023-05-25 ENCOUNTER — APPOINTMENT (OUTPATIENT)
Dept: LAB | Facility: HOSPITAL | Age: 68
End: 2023-05-25

## 2023-05-30 ENCOUNTER — APPOINTMENT (OUTPATIENT)
Dept: LAB | Facility: HOSPITAL | Age: 68
End: 2023-05-30
Attending: INTERNAL MEDICINE

## 2023-05-30 DIAGNOSIS — I10 ESSENTIAL HYPERTENSION: ICD-10-CM

## 2023-05-30 LAB
ALBUMIN SERPL BCP-MCNC: 4 G/DL (ref 3.5–5)
ALP SERPL-CCNC: 99 U/L (ref 34–104)
ALT SERPL W P-5'-P-CCNC: 15 U/L (ref 7–52)
ANION GAP SERPL CALCULATED.3IONS-SCNC: 13 MMOL/L (ref 4–13)
AST SERPL W P-5'-P-CCNC: 13 U/L (ref 13–39)
BILIRUB SERPL-MCNC: 0.65 MG/DL (ref 0.2–1)
BUN SERPL-MCNC: 76 MG/DL (ref 5–25)
CALCIUM SERPL-MCNC: 9.4 MG/DL (ref 8.4–10.2)
CHLORIDE SERPL-SCNC: 103 MMOL/L (ref 96–108)
CHOLEST SERPL-MCNC: 183 MG/DL
CO2 SERPL-SCNC: 21 MMOL/L (ref 21–32)
CREAT SERPL-MCNC: 6.69 MG/DL (ref 0.6–1.3)
GFR SERPL CREATININE-BSD FRML MDRD: 5 ML/MIN/1.73SQ M
GLUCOSE P FAST SERPL-MCNC: 90 MG/DL (ref 65–99)
HDLC SERPL-MCNC: 44 MG/DL
LDLC SERPL CALC-MCNC: 104 MG/DL (ref 0–100)
POTASSIUM SERPL-SCNC: 4.4 MMOL/L (ref 3.5–5.3)
PROT SERPL-MCNC: 7.5 G/DL (ref 6.4–8.4)
SODIUM SERPL-SCNC: 137 MMOL/L (ref 135–147)
TRIGL SERPL-MCNC: 174 MG/DL

## 2023-06-09 ENCOUNTER — APPOINTMENT (OUTPATIENT)
Dept: LAB | Facility: HOSPITAL | Age: 68
End: 2023-06-09
Payer: COMMERCIAL

## 2023-06-14 ENCOUNTER — OFFICE VISIT (OUTPATIENT)
Dept: ENDOCRINOLOGY | Facility: CLINIC | Age: 68
End: 2023-06-14
Payer: COMMERCIAL

## 2023-06-14 VITALS
HEART RATE: 67 BPM | HEIGHT: 60 IN | SYSTOLIC BLOOD PRESSURE: 120 MMHG | DIASTOLIC BLOOD PRESSURE: 70 MMHG | BODY MASS INDEX: 36.71 KG/M2 | WEIGHT: 187 LBS

## 2023-06-14 DIAGNOSIS — E03.9 ACQUIRED HYPOTHYROIDISM: ICD-10-CM

## 2023-06-14 DIAGNOSIS — E03.9 HYPOTHYROIDISM, UNSPECIFIED TYPE: ICD-10-CM

## 2023-06-14 DIAGNOSIS — N18.6 ESRD (END STAGE RENAL DISEASE) (HCC): ICD-10-CM

## 2023-06-14 DIAGNOSIS — I10 ESSENTIAL HYPERTENSION: ICD-10-CM

## 2023-06-14 DIAGNOSIS — Z79.4 TYPE 2 DIABETES MELLITUS WITH HYPERGLYCEMIA, WITH LONG-TERM CURRENT USE OF INSULIN (HCC): ICD-10-CM

## 2023-06-14 DIAGNOSIS — N25.81 SECONDARY HYPERPARATHYROIDISM OF RENAL ORIGIN (HCC): ICD-10-CM

## 2023-06-14 DIAGNOSIS — E11.65 TYPE 2 DIABETES MELLITUS WITH HYPERGLYCEMIA, WITH LONG-TERM CURRENT USE OF INSULIN (HCC): ICD-10-CM

## 2023-06-14 DIAGNOSIS — I27.20 PULMONARY HYPERTENSION (HCC): ICD-10-CM

## 2023-06-14 DIAGNOSIS — E78.2 MIXED HYPERLIPIDEMIA: Primary | ICD-10-CM

## 2023-06-14 DIAGNOSIS — Z79.4 TYPE 2 DIABETES MELLITUS WITH HYPERGLYCEMIA, WITH LONG-TERM CURRENT USE OF INSULIN (HCC): Primary | ICD-10-CM

## 2023-06-14 DIAGNOSIS — E11.65 TYPE 2 DIABETES MELLITUS WITH HYPERGLYCEMIA, WITH LONG-TERM CURRENT USE OF INSULIN (HCC): Primary | ICD-10-CM

## 2023-06-14 LAB — SL AMB POCT HEMOGLOBIN AIC: 7 (ref ?–6.5)

## 2023-06-14 PROCEDURE — 83036 HEMOGLOBIN GLYCOSYLATED A1C: CPT

## 2023-06-14 PROCEDURE — 99215 OFFICE O/P EST HI 40 MIN: CPT | Performed by: INTERNAL MEDICINE

## 2023-06-14 PROCEDURE — 3051F HG A1C>EQUAL 7.0%<8.0%: CPT | Performed by: INTERNAL MEDICINE

## 2023-06-14 RX ORDER — INSULIN GLARGINE 100 [IU]/ML
INJECTION, SOLUTION SUBCUTANEOUS
Qty: 30 ML | Refills: 3
Start: 2023-06-14

## 2023-06-14 RX ORDER — INSULIN LISPRO 100 [IU]/ML
3 INJECTION, SOLUTION INTRAVENOUS; SUBCUTANEOUS
Qty: 15 ML | Refills: 3
Start: 2023-06-14

## 2023-06-14 RX ORDER — LEVOTHYROXINE SODIUM 112 UG/1
TABLET ORAL
Qty: 90 TABLET | Refills: 3 | Status: SHIPPED | OUTPATIENT
Start: 2023-06-14

## 2023-06-14 NOTE — PATIENT INSTRUCTIONS
increase Ozempic to 1 mg subcutaneously weekly  Decrease Lantus to 26 units subcutaneously at bedtime  Decrease Humalog to 2-3 units with meals twice a day  If you notice any low/tight blood sugars, please discontinue completely and let me know    Check blood Sugars more often before meals and at bedtime, send log for review in 2 to 3 weeks

## 2023-06-14 NOTE — PROGRESS NOTES
Jade Hurd 79 y o  female MRN: [id number]    Encounter: [id number]      Assessment/Plan     1  Type 2 diabetes mellitus on long ter minsulin therapy   2  ESRD on HD  3  Obesity BMI 36  POCT A1c 7% well controlled     Recommend the following at this time  increase Ozempic to 1 mg subcutaneously weekly  Decrease Lantus to 26 units subcutaneously at bedtime  Decrease Humalog to 2-3 units with meals twice a day  If patient notices any low/tight blood sugars, advised to discontinue mealtime insulin  and let me know  Check blood Sugars more often before meals and at bedtime, send log for review in 2 to 3 weeks  - refill ivan sensors    4  Hyperlipidemia -at goal, has trended up  Restart statin therapy    5  Hypertension  Blood pressure at goal  - continue current medications including ACE-I/ ARB    6  Hypothyroidism   TSH elevated, free t4 within normal limits   Admits to forgetting   -Advised to take levothyroxine consistently, discussed on timing, if patient forgets, should take it whenever she remembers, can take up to 2 doses at the same time  Repeat TSH, free T4 with next of labs      CC: Diabetes    History of Present Illness     HPI:  Jade Hurd is a 79 y o  female presents for a follow-up visit regarding diabetes management     Also has hypertension, hyperlipidemia, hypothyroidism, incisional disease on dialysis, retinopathy, obesity    Last seen in 5/2022  Last Eye exam: History of retinopathy, follows up regularly    Current regimen:   Ozempic 0 5 mg subcutaneously weekly  humalog 5 units with meals, usually 2 times a day   lantus 30 units at bedtime     Levothyroxine 112 mcg  - not taking consistently     Has been following up with weight management, trying to loose weight   Lost 14 lbs since the last visit   Goal =<175 lbs to be on the transplant list   Exercise - not as often as she would like; hopes to become more regular     Statin:  -- has not been taking, will restart   ACE-I/ARB: losartan     Home glucose monitoring:  Would like to have ivan sensors  Has been checking fingerstick BG twice a day   Before breakfast:    Before lunch:     Before dinner:   Bedtime:highest 190  Symptoms of hypoglycemia :  1-2 lows below 90mg/dl had dizziness, feels hot     All other systems were reviewed and are negative      Review of Systems      Historical Information   Past Medical History:   Diagnosis Date   • Acute asthmatic bronchitis     last assessed: 2017   • Cardiac disease    • Chronic diastolic (congestive) heart failure (HCC)    • Chronic kidney disease     pt is on peritoneal dialysis daily from 2200- 0600   • Closed nondisplaced fracture of distal phalanx of right great toe 2018   • Colon polyp    • CPAP (continuous positive airway pressure) dependence    • Diabetes mellitus ([facility] [address] )    • Hyperlipidemia    • Hypertension    • Medical non-compliance    • Morbid obesity with BMI of 40 0-44 9, adult ([facility] [address] ) 2019   • On continuous oral anticoagulation 2020   • Pneumonia     last assessed: 2013   • PONV (postoperative nausea and vomiting)    • Pulmonary embolism ([facility])    • Renal disorder     possible clot noted in kidney, thus blood thinners currently   • Severe obstructive sleep apnea    • Severe pulmonary arterial systolic hypertension ([facility] [address] )    • Tinea pedis of both feet 2018     Past Surgical History:   Procedure Laterality Date   • CATARACT EXTRACTION     •  SECTION       x 1   • EYE SURGERY     • NOSE SURGERY      fractured nose - septoplasty     Social History   Social History     Substance and Sexual Activity   Alcohol Use Never     Social History     Substance and Sexual Activity   Drug Use No     Social History     Tobacco Use   Smoking Status Never   Smokeless Tobacco Never     Family History:   Family History   Problem Relation Age of Onset   • Diabetes Mother         mellitus   • Anxiety disorder Mother         generalized anxiety disorder   • Hypertension Mother    • Stroke Mother    • Kidney disease Father    • Kidney failure Father         renal failure   • Ulcerative colitis Sister    • Diabetes Sister    • Ovarian cancer Maternal Aunt    • Diabetes Maternal Aunt    • Diabetes Maternal Uncle    • Heart disease Maternal Uncle    • Heart disease Family    • Thyroid disease Neg Hx        Meds/Allergies   Current Outpatient Medications   Medication Sig Dispense Refill   • B Complex-C (B-COMPLEX WITH VITAMIN C) tablet Take 1 tablet by mouth daily     • Blood Glucose Monitoring Suppl (ONE TOUCH ULTRA 2) w/Device KIT Test glucose 3 times daily 1 each 0   • calcium acetate (PHOSLO) capsule Take 667 mg by mouth daily      • cholecalciferol (VITAMIN D3) 1,000 units tablet Take 2 tablets (2,000 Units total) by mouth daily 100 tablet 5   • cinacalcet (SENSIPAR) 30 mg tablet TAKE 1 TABLET THREE TIMES A WEEK 36 tablet 3   • gentamicin (GARAMYCIN) 0 1 % cream Apply topically daily 15 g 3   • Insulin Glargine Solostar (Semglee) 100 UNIT/ML SOPN Inject under the skin 30 units at bedtime   30 mL 3   • insulin lispro (HumaLOG) 100 units/mL injection Inject 5 Units under the skin 3 (three) times a day with meals 15 mL 2   • Insulin Pen Needle 31G X 5 MM MISC by Does not apply route daily 100 each 1   • Lancets (onetouch ultrasoft) lancets Test glucose 3 times a day; Code: E11 8 300 each 1   • levothyroxine (Euthyrox) 112 mcg tablet Take one tablet by mouth in early morning on empty stomach (Patient taking differently: NOT TAKING EVERY DAY) 90 tablet 0   • torsemide (DEMADEX) 100 mg tablet Take 1 tablet (100 mg total) by mouth daily 90 tablet 1   • valsartan (DIOVAN) 320 MG tablet Take 0 5 tablets (160 mg total) by mouth daily 90 tablet 0   • warfarin (COUMADIN) 5 mg tablet      • atorvastatin (LIPITOR) 20 mg tablet Take 1 tablet (20 mg total) by mouth daily (Patient not taking: Reported on 5/18/2023) 90 tablet 3   • docusate sodium (COLACE) 100 mg capsule Take 1 capsule (100 mg total) by mouth 2 (two) times a day (Patient not taking: Reported on 3/23/2023) 10 capsule 0   • metolazone (ZAROXOLYN) 5 mg tablet Take 5 mg by mouth daily  (Patient not taking: Reported on 5/18/2023)     • metoprolol succinate (TOPROL-XL) 50 mg 24 hr tablet Take 1 tablet (50 mg total) by mouth daily 90 tablet 3   • MULTIPLE VITAMIN PO Take 1 tablet by mouth daily     • nystatin (MYCOSTATIN) powder Apply topically 3 (three) times a day for 7 days (Patient not taking: Reported on 3/23/2023) 15 g 0   • OneTouch Ultra test strip TEST GLUCOSE THREE TIMES A  strip 3   • Semaglutide,0 25 or 0 5MG/DOS, (Ozempic, 0 25 or 0 5 MG/DOSE,) 2 MG/1 5ML SOPN Inject 0 5 mg under the skin once a week 6 mL 1   • Semglee, yfgn, 100 UNIT/ML SOPN  (Patient not taking: Reported on 3/23/2023)       No current facility-administered medications for this visit  No Known Allergies    Objective   Vitals: Blood pressure 120/70, pulse 67, height 5' (1 524 m), weight 84 8 kg (187 lb), not currently breastfeeding  Physical Exam  Constitutional:       Appearance: She is well-developed  HENT:      Head: Normocephalic and atraumatic  Eyes:      Conjunctiva/sclera: Conjunctivae normal       Pupils: Pupils are equal, round, and reactive to light  Neck:      Thyroid: No thyromegaly  Cardiovascular:      Rate and Rhythm: Normal rate and regular rhythm  Heart sounds: Normal heart sounds  No murmur heard  Pulmonary:      Effort: Pulmonary effort is normal       Breath sounds: Normal breath sounds  No wheezing  Abdominal:      General: There is no distension  Palpations: Abdomen is soft  Tenderness: There is no abdominal tenderness  Musculoskeletal:         General: Normal range of motion  Cervical back: Normal range of motion and neck supple  Skin:     General: Skin is warm and dry  Neurological:      Mental Status: She is alert and oriented to person, place, and time        Deep Tendon Reflexes: "Reflexes normal       Comments: No tremors on the outstretched arms    Psychiatric:         Behavior: Behavior normal          The history was obtained from the review of the chart, patient  Lab Results:   Lab Results   Component Value Date/Time    Albumin 4 0 05/30/2023 09:16 AM    Albumin 3 4 (L) 10/18/2022 10:08 AM    ALT 15 05/30/2023 09:16 AM    ALT 16 10/18/2022 10:08 AM    AST 13 05/30/2023 09:16 AM    AST 9 10/18/2022 10:08 AM    BUN 76 (H) 05/30/2023 09:16 AM    BUN 62 (H) 10/18/2022 10:08 AM    BUN 97 (H) 06/14/2022 09:56 PM    Chloride 103 05/30/2023 09:16 AM    Chloride 103 10/18/2022 10:08 AM    Chloride 103 06/14/2022 09:56 PM    CO2 21 05/30/2023 09:16 AM    CO2 26 10/18/2022 10:08 AM    CO2 24 06/14/2022 09:56 PM    Creatinine 6 69 (H) 05/30/2023 09:16 AM    Creatinine 5 93 (H) 10/18/2022 10:08 AM    Creatinine 5 98 (H) 06/14/2022 09:56 PM    Hematocrit 40 0 10/18/2022 10:08 AM    Hematocrit 38 7 06/14/2022 09:56 PM    HDL, Direct 44 (L) 05/30/2023 09:16 AM    Hemoglobin 12 5 10/18/2022 10:08 AM    Hemoglobin 12 3 06/14/2022 09:56 PM    Hemoglobin A1C 7 0 (A) 06/14/2023 10:45 AM    Hemoglobin A1C 7 2 (H) 03/06/2023 03:30 PM    Hemoglobin A1C 7 0 (H) 10/18/2022 10:08 AM    Potassium 4 4 05/30/2023 09:16 AM    Potassium 4 2 10/18/2022 10:08 AM    Potassium 4 4 06/14/2022 09:56 PM    MCV 94 10/18/2022 10:08 AM    MCV 93 06/14/2022 09:56 PM    Platelets 296 41/97/2087 10:08 AM    Platelets 150 94/20/7243 09:56 PM    Total Protein 7 5 05/30/2023 09:16 AM    Total Protein 7 5 10/18/2022 10:08 AM    Triglycerides 174 (H) 05/30/2023 09:16 AM    WBC 10 87 (H) 10/18/2022 10:08 AM    WBC 14 51 (H) 06/14/2022 09:56 PM         Imaging Studies: I have personally reviewed pertinent reports  Portions of the record may have been created with voice recognition software  Occasional wrong word or \"sound a like\" substitutions may have occurred due to the inherent limitations of voice recognition software   Read " the chart carefully and recognize, using context, where substitutions have occurred

## 2023-06-16 ENCOUNTER — APPOINTMENT (OUTPATIENT)
Dept: LAB | Facility: HOSPITAL | Age: 68
End: 2023-06-16
Payer: COMMERCIAL

## 2023-06-23 ENCOUNTER — APPOINTMENT (OUTPATIENT)
Dept: LAB | Facility: HOSPITAL | Age: 68
End: 2023-06-23
Payer: COMMERCIAL

## 2023-07-11 ENCOUNTER — APPOINTMENT (OUTPATIENT)
Dept: LAB | Facility: HOSPITAL | Age: 68
End: 2023-07-11
Payer: COMMERCIAL

## 2023-07-20 DIAGNOSIS — N25.81 SECONDARY HYPERPARATHYROIDISM (HCC): Primary | ICD-10-CM

## 2023-07-20 RX ORDER — CALCITRIOL 0.25 UG/1
0.25 CAPSULE, LIQUID FILLED ORAL DAILY
Qty: 90 CAPSULE | Refills: 1 | Status: SHIPPED | OUTPATIENT
Start: 2023-07-20

## 2023-07-26 ENCOUNTER — APPOINTMENT (OUTPATIENT)
Dept: LAB | Facility: HOSPITAL | Age: 68
End: 2023-07-26
Payer: COMMERCIAL

## 2023-08-30 ENCOUNTER — APPOINTMENT (OUTPATIENT)
Dept: LAB | Facility: HOSPITAL | Age: 68
End: 2023-08-30
Payer: COMMERCIAL

## 2023-09-11 ENCOUNTER — APPOINTMENT (OUTPATIENT)
Dept: LAB | Facility: HOSPITAL | Age: 68
End: 2023-09-11
Attending: INTERNAL MEDICINE
Payer: COMMERCIAL

## 2023-09-11 DIAGNOSIS — Z79.4 TYPE 2 DIABETES MELLITUS WITH HYPERGLYCEMIA, WITH LONG-TERM CURRENT USE OF INSULIN (HCC): ICD-10-CM

## 2023-09-11 DIAGNOSIS — N18.6 ESRD (END STAGE RENAL DISEASE) (HCC): ICD-10-CM

## 2023-09-11 DIAGNOSIS — I10 ESSENTIAL HYPERTENSION: ICD-10-CM

## 2023-09-11 DIAGNOSIS — E03.9 ACQUIRED HYPOTHYROIDISM: ICD-10-CM

## 2023-09-11 DIAGNOSIS — E03.9 HYPOTHYROIDISM, UNSPECIFIED TYPE: ICD-10-CM

## 2023-09-11 DIAGNOSIS — E78.2 MIXED HYPERLIPIDEMIA: ICD-10-CM

## 2023-09-11 DIAGNOSIS — E11.65 TYPE 2 DIABETES MELLITUS WITH HYPERGLYCEMIA, WITH LONG-TERM CURRENT USE OF INSULIN (HCC): ICD-10-CM

## 2023-09-11 LAB
ANION GAP SERPL CALCULATED.3IONS-SCNC: 10 MMOL/L
BUN SERPL-MCNC: 55 MG/DL (ref 5–25)
CALCIUM SERPL-MCNC: 9.2 MG/DL (ref 8.4–10.2)
CHLORIDE SERPL-SCNC: 106 MMOL/L (ref 96–108)
CHOLEST SERPL-MCNC: 136 MG/DL
CO2 SERPL-SCNC: 24 MMOL/L (ref 21–32)
CREAT SERPL-MCNC: 6.33 MG/DL (ref 0.6–1.3)
EST. AVERAGE GLUCOSE BLD GHB EST-MCNC: 163 MG/DL
GFR SERPL CREATININE-BSD FRML MDRD: 6 ML/MIN/1.73SQ M
GLUCOSE P FAST SERPL-MCNC: 61 MG/DL (ref 65–99)
HBA1C MFR BLD: 7.3 %
HDLC SERPL-MCNC: 38 MG/DL
LDLC SERPL CALC-MCNC: 79 MG/DL (ref 0–100)
NONHDLC SERPL-MCNC: 98 MG/DL
POTASSIUM SERPL-SCNC: 4.5 MMOL/L (ref 3.5–5.3)
SODIUM SERPL-SCNC: 140 MMOL/L (ref 135–147)
T4 FREE SERPL-MCNC: 1.09 NG/DL (ref 0.61–1.12)
TRIGL SERPL-MCNC: 93 MG/DL
TSH SERPL DL<=0.05 MIU/L-ACNC: 3.65 UIU/ML (ref 0.45–4.5)

## 2023-09-11 PROCEDURE — 84439 ASSAY OF FREE THYROXINE: CPT

## 2023-09-11 PROCEDURE — 80048 BASIC METABOLIC PNL TOTAL CA: CPT

## 2023-09-11 PROCEDURE — 80061 LIPID PANEL: CPT

## 2023-09-11 PROCEDURE — 82985 ASSAY OF GLYCATED PROTEIN: CPT

## 2023-09-11 PROCEDURE — 36415 COLL VENOUS BLD VENIPUNCTURE: CPT

## 2023-09-11 PROCEDURE — 84443 ASSAY THYROID STIM HORMONE: CPT

## 2023-09-11 PROCEDURE — 83036 HEMOGLOBIN GLYCOSYLATED A1C: CPT

## 2023-09-12 LAB — FRUCTOSAMINE SERPL-SCNC: 261 UMOL/L (ref 0–285)

## 2023-09-14 ENCOUNTER — OFFICE VISIT (OUTPATIENT)
Dept: ENDOCRINOLOGY | Facility: CLINIC | Age: 68
End: 2023-09-14
Payer: COMMERCIAL

## 2023-09-14 VITALS
WEIGHT: 190.6 LBS | SYSTOLIC BLOOD PRESSURE: 126 MMHG | HEART RATE: 70 BPM | HEIGHT: 60 IN | OXYGEN SATURATION: 98 % | BODY MASS INDEX: 37.42 KG/M2 | DIASTOLIC BLOOD PRESSURE: 80 MMHG

## 2023-09-14 DIAGNOSIS — E78.2 MIXED HYPERLIPIDEMIA: ICD-10-CM

## 2023-09-14 DIAGNOSIS — Z79.4 TYPE 2 DIABETES MELLITUS WITH HYPERGLYCEMIA, WITH LONG-TERM CURRENT USE OF INSULIN (HCC): Primary | ICD-10-CM

## 2023-09-14 DIAGNOSIS — E03.9 ACQUIRED HYPOTHYROIDISM: ICD-10-CM

## 2023-09-14 DIAGNOSIS — I42.0 DILATED CARDIOMYOPATHY (HCC): ICD-10-CM

## 2023-09-14 DIAGNOSIS — E11.65 TYPE 2 DIABETES MELLITUS WITH HYPERGLYCEMIA, WITH LONG-TERM CURRENT USE OF INSULIN (HCC): Primary | ICD-10-CM

## 2023-09-14 DIAGNOSIS — N18.6 ESRD (END STAGE RENAL DISEASE) (HCC): ICD-10-CM

## 2023-09-14 DIAGNOSIS — I48.92 PAROXYSMAL ATRIAL FLUTTER (HCC): ICD-10-CM

## 2023-09-14 DIAGNOSIS — I10 ESSENTIAL HYPERTENSION: ICD-10-CM

## 2023-09-14 PROCEDURE — 99215 OFFICE O/P EST HI 40 MIN: CPT | Performed by: INTERNAL MEDICINE

## 2023-09-14 NOTE — PROGRESS NOTES
Trish Garcia 79 y.o. female MRN: [id number]    Encounter: [id number]      Assessment/Plan     1. Type 2 diabetes mellitus on long-term insulin therapy  2. End-stage renal disease on hemodialysis  3. Obesity, BMI 37  Last A1c 7.3%, has trended up, fructosamine within normal limits  Patient is not checking blood sugars often. She is also unsure of the dosage of medication she is currently taking  Given lack of data, it was decided not to make any changes today    Recommend the following at this time  Continue current regimen  Patient to confirm dose of insulins she is taking   check blood sugars more often before each meal and bedtime, plan for review of blood sugars in 2 weeks  Follow-up in 3 months    4. Hyperlipidemia  - continue statin therapy    5. Hypertension  6. Cardiomyopathy  7. History of paroxysmal atrial fibrillation/flutter-recently symptomatic  Advised to follow-up with cardiology  Blood pressure at goal, continue current medications    8 hypothyroidism-continue levothyroxine at current dose    I have spent a total time of 40 minutes on 09/14/23 in caring for this patient. CC: Diabetes    History of Present Illness     HPI:  Trish Garcia is a 79 y.o. female presents for a follow-up visit regarding diabetes management. Last seen in 6/2023  Last Eye exam:  Follows up every 3 months wfior macular edema and retinopathy last in 7/2023     Current regimen:   Ozempic 1 mg subcutaneously weekly - has not taken in the last 2 weeks   Lantus  ? 10-15 units when she eats once a day and depeding on what her Bg is   Humalog - not sure if she is taking     Has had some episode of high HR and  Dizziness. Says that this has happened in the past and that she has worn a Holter in the past and that she had A fib. Possibly occurring  More frequently.   Has not informed cardiology     Levothyroxine 112 mcg daily   Complaint, empty stomach     Gained 3 lbs since the last visit   No exercise   Diet  Admits to eating more and not always lower carb     Statin:  atorvastatin   ACE-I/ARB:  valsartan     Home glucose monitoring: Conner - not used often - 14%, will be scanned into chart   Symptoms of hypoglycemia :  No lows     All other systems were reviewed and are negative.     Review of Systems      Historical Information   Past Medical History:   Diagnosis Date   • Acute asthmatic bronchitis     last assessed: 2017   • Cardiac disease    • Chronic diastolic (congestive) heart failure (HCC)    • Chronic kidney disease     pt is on peritoneal dialysis daily from 2200- 0600   • Closed nondisplaced fracture of distal phalanx of right great toe 2018   • Colon polyp    • CPAP (continuous positive airway pressure) dependence    • Diabetes mellitus ([address])    • Hyperlipidemia    • Hypertension    • Medical non-compliance    • Morbid obesity with BMI of 40.0-44.9, adult ([address]) 2019   • On continuous oral anticoagulation 2020   • Pneumonia     last assessed: 2013   • PONV (postoperative nausea and vomiting)    • Pulmonary embolism ([facility])    • Renal disorder     possible clot noted in kidney, thus blood thinners currently   • Severe obstructive sleep apnea    • Severe pulmonary arterial systolic hypertension ([address])    • Tinea pedis of both feet 2018     Past Surgical History:   Procedure Laterality Date   • CATARACT EXTRACTION     •  SECTION       x 1   • EYE SURGERY     • NOSE SURGERY      fractured nose - septoplasty     Social History   Social History     Substance and Sexual Activity   Alcohol Use Never     Social History     Substance and Sexual Activity   Drug Use Never     Social History     Tobacco Use   Smoking Status Never   Smokeless Tobacco Never     Family History:   Family History   Problem Relation Age of Onset   • Diabetes Mother         mellitus   • Anxiety disorder Mother         generalized anxiety disorder   • Hypertension Mother    • Stroke Mother    • Diabetes type II Mother    • Kidney disease Father    • Kidney failure Father         renal failure   • Ulcerative colitis Sister    • Diabetes Sister    • Ovarian cancer Maternal Aunt    • Diabetes Maternal Aunt    • Diabetes Maternal Uncle    • Heart disease Maternal Uncle    • Heart disease Family    • Thyroid disease Neg Hx        Meds/Allergies   Current Outpatient Medications   Medication Sig Dispense Refill   • B Complex-C (B-COMPLEX WITH VITAMIN C) tablet Take 1 tablet by mouth daily     • Blood Glucose Monitoring Suppl (ONE TOUCH ULTRA 2) w/Device KIT Test glucose 3 times daily 1 each 0   • calcitriol (ROCALTROL) 0.25 mcg capsule Take 1 capsule (0.25 mcg total) by mouth daily 90 capsule 1   • calcium acetate (PHOSLO) capsule Take 667 mg by mouth daily      • cinacalcet (SENSIPAR) 30 mg tablet TAKE 1 TABLET THREE TIMES A WEEK 36 tablet 3   • Continuous Blood Gluc Sensor (FreeStyle Conner 2 Sensor) MISC Use to check blood sugars 6 each 4   • gentamicin (GARAMYCIN) 0.1 % cream Apply topically daily 15 g 3   • Insulin Glargine Solostar (Semglee) 100 UNIT/ML SOPN Inject under the skin 26 units at bedtime.  30 mL 3   • insulin lispro (HumaLOG KwikPen) 100 units/mL injection pen Inject 3 Units under the skin 3 (three) times a day with meals 15 mL 3   • Insulin Pen Needle 31G X 5 MM MISC by Does not apply route daily 100 each 1   • Lancets (onetouch ultrasoft) lancets Test glucose 3 times a day; Code: E11.8 300 each 1   • levothyroxine (Euthyrox) 112 mcg tablet Take one tablet by mouth in early morning on empty stomach 90 tablet 3   • metoprolol succinate (TOPROL-XL) 50 mg 24 hr tablet Take 1 tablet (50 mg total) by mouth daily 90 tablet 3   • MULTIPLE VITAMIN PO Take 1 tablet by mouth daily     • torsemide (DEMADEX) 100 mg tablet Take 1 tablet (100 mg total) by mouth daily 90 tablet 1   • valsartan (DIOVAN) 320 MG tablet Take 0.5 tablets (160 mg total) by mouth daily 90 tablet 0   • warfarin (COUMADIN) 5 mg tablet      • atorvastatin (LIPITOR) 20 mg tablet Take 1 tablet (20 mg total) by mouth daily (Patient not taking: Reported on 5/18/2023) 90 tablet 3   • cholecalciferol (VITAMIN D3) 1,000 units tablet Take 2 tablets (2,000 Units total) by mouth daily 100 tablet 5   • docusate sodium (COLACE) 100 mg capsule Take 1 capsule (100 mg total) by mouth 2 (two) times a day (Patient not taking: Reported on 3/23/2023) 10 capsule 0   • metolazone (ZAROXOLYN) 5 mg tablet Take 5 mg by mouth daily  (Patient not taking: Reported on 5/18/2023)     • nystatin (MYCOSTATIN) powder Apply topically 3 (three) times a day for 7 days (Patient not taking: Reported on 3/23/2023) 15 g 0   • OneTouch Ultra test strip TEST GLUCOSE THREE TIMES A  strip 3   • semaglutide, 1 mg/dose, (Ozempic, 1 MG/DOSE,) 4 mg/3 mL injection pen Inject 0.75 mL (1 mg total) under the skin every 7 days (Patient not taking: Reported on 9/14/2023) 9 mL 3   • Semglee, yfgn, 100 UNIT/ML SOPN  (Patient not taking: Reported on 3/23/2023)       No current facility-administered medications for this visit. No Known Allergies    Objective   Vitals: Blood pressure 126/80, pulse 70, height 5' (1.524 m), weight 86.5 kg (190 lb 9.6 oz), SpO2 98 %, not currently breastfeeding. Physical Exam  Constitutional:       Appearance: She is well-developed. HENT:      Head: Normocephalic and atraumatic. Eyes:      Conjunctiva/sclera: Conjunctivae normal.      Pupils: Pupils are equal, round, and reactive to light. Neck:      Thyroid: No thyromegaly. Cardiovascular:      Rate and Rhythm: Normal rate and regular rhythm. Heart sounds: Normal heart sounds. No murmur heard. Pulmonary:      Effort: Pulmonary effort is normal.      Breath sounds: Normal breath sounds. No wheezing. Abdominal:      General: There is no distension. Palpations: Abdomen is soft. Tenderness: There is no abdominal tenderness. Musculoskeletal:         General: Normal range of motion.       Cervical back: Normal range of motion and neck supple. Skin:     General: Skin is warm and dry. Neurological:      Mental Status: She is alert and oriented to person, place, and time. Psychiatric:         Behavior: Behavior normal.         The history was obtained from the review of the chart, patient. Lab Results:   Lab Results   Component Value Date/Time    Hemoglobin A1C 7.3 (H) 09/11/2023 10:15 AM    Hemoglobin A1C 7.0 (A) 06/14/2023 10:45 AM    Hemoglobin A1C 7.2 (H) 03/06/2023 03:30 PM    Hemoglobin A1C 7.0 (H) 10/18/2022 10:08 AM    WBC 10.87 (H) 10/18/2022 10:08 AM    Hemoglobin 12.5 10/18/2022 10:08 AM    Hematocrit 40.0 10/18/2022 10:08 AM    MCV 94 10/18/2022 10:08 AM    Platelets 402 15/79/7437 10:08 AM    BUN 55 (H) 09/11/2023 10:15 AM    BUN 76 (H) 05/30/2023 09:16 AM    BUN 62 (H) 10/18/2022 10:08 AM    Potassium 4.5 09/11/2023 10:15 AM    Potassium 4.4 05/30/2023 09:16 AM    Potassium 4.2 10/18/2022 10:08 AM    Chloride 106 09/11/2023 10:15 AM    Chloride 103 05/30/2023 09:16 AM    Chloride 103 10/18/2022 10:08 AM    CO2 24 09/11/2023 10:15 AM    CO2 21 05/30/2023 09:16 AM    CO2 26 10/18/2022 10:08 AM    Creatinine 6.33 (H) 09/11/2023 10:15 AM    Creatinine 6.69 (H) 05/30/2023 09:16 AM    Creatinine 5.93 (H) 10/18/2022 10:08 AM    AST 13 05/30/2023 09:16 AM    AST 9 10/18/2022 10:08 AM    ALT 15 05/30/2023 09:16 AM    ALT 16 10/18/2022 10:08 AM    Total Protein 7.5 05/30/2023 09:16 AM    Total Protein 7.5 10/18/2022 10:08 AM    Albumin 4.0 05/30/2023 09:16 AM    Albumin 3.4 (L) 10/18/2022 10:08 AM    HDL, Direct 38 (L) 09/11/2023 10:15 AM    HDL, Direct 44 (L) 05/30/2023 09:16 AM    Triglycerides 93 09/11/2023 10:15 AM    Triglycerides 174 (H) 05/30/2023 09:16 AM         Imaging Studies: I have personally reviewed pertinent reports. Portions of the record may have been created with voice recognition software.  Occasional wrong word or "sound a like" substitutions may have occurred due to the inherent limitations of voice recognition software. Read the chart carefully and recognize, using context, where substitutions have occurred.

## 2023-09-14 NOTE — PATIENT INSTRUCTIONS
Continue current regimen  Please confirm what dose of insulins you are taking and at what time  Please check blood sugars more often before each meal and bedtime, plan for review of blood sugars in 2 weeks  Follow-up in 3 months  Please follow-up with cardiology

## 2023-12-27 DIAGNOSIS — Z99.2 ESRD (END STAGE RENAL DISEASE) ON DIALYSIS (HCC): ICD-10-CM

## 2023-12-27 DIAGNOSIS — N18.6 ESRD (END STAGE RENAL DISEASE) ON DIALYSIS (HCC): ICD-10-CM

## 2023-12-27 RX ORDER — GENTAMICIN SULFATE 1 MG/G
CREAM TOPICAL DAILY
Qty: 15 G | Refills: 23 | Status: SHIPPED | OUTPATIENT
Start: 2023-12-27

## 2024-01-01 ENCOUNTER — APPOINTMENT (EMERGENCY)
Dept: RADIOLOGY | Facility: HOSPITAL | Age: 69
DRG: 871 | End: 2024-01-01
Payer: MEDICARE

## 2024-01-01 ENCOUNTER — APPOINTMENT (INPATIENT)
Dept: RADIOLOGY | Facility: HOSPITAL | Age: 69
DRG: 871 | End: 2024-01-01
Payer: MEDICARE

## 2024-01-01 ENCOUNTER — HOSPITAL ENCOUNTER (INPATIENT)
Facility: HOSPITAL | Age: 69
LOS: 5 days | DRG: 871 | End: 2024-07-12
Attending: EMERGENCY MEDICINE | Admitting: INTERNAL MEDICINE
Payer: MEDICARE

## 2024-01-01 ENCOUNTER — APPOINTMENT (OUTPATIENT)
Dept: RADIOLOGY | Facility: HOSPITAL | Age: 69
DRG: 871 | End: 2024-01-01
Payer: MEDICARE

## 2024-01-01 ENCOUNTER — APPOINTMENT (INPATIENT)
Dept: NON INVASIVE DIAGNOSTICS | Facility: HOSPITAL | Age: 69
DRG: 871 | End: 2024-01-01
Payer: MEDICARE

## 2024-01-01 ENCOUNTER — TELEPHONE (OUTPATIENT)
Age: 69
End: 2024-01-01

## 2024-01-01 VITALS
HEART RATE: 124 BPM | OXYGEN SATURATION: 94 % | RESPIRATION RATE: 33 BRPM | TEMPERATURE: 98 F | DIASTOLIC BLOOD PRESSURE: 57 MMHG | SYSTOLIC BLOOD PRESSURE: 110 MMHG | BODY MASS INDEX: 37.31 KG/M2 | WEIGHT: 190.04 LBS | HEIGHT: 60 IN

## 2024-01-01 DIAGNOSIS — D72.829 LEUKOCYTOSIS: ICD-10-CM

## 2024-01-01 DIAGNOSIS — Z99.2 PERITONEAL DIALYSIS STATUS (HCC): ICD-10-CM

## 2024-01-01 DIAGNOSIS — E03.9 HYPOTHYROIDISM, UNSPECIFIED TYPE: ICD-10-CM

## 2024-01-01 DIAGNOSIS — I48.91 ATRIAL FIBRILLATION WITH RVR (HCC): ICD-10-CM

## 2024-01-01 DIAGNOSIS — N18.6 ESRD (END STAGE RENAL DISEASE) (HCC): ICD-10-CM

## 2024-01-01 DIAGNOSIS — I50.43 ACUTE ON CHRONIC COMBINED SYSTOLIC AND DIASTOLIC CONGESTIVE HEART FAILURE (HCC): ICD-10-CM

## 2024-01-01 DIAGNOSIS — R18.8 ASCITES: ICD-10-CM

## 2024-01-01 DIAGNOSIS — I10 ESSENTIAL HYPERTENSION: ICD-10-CM

## 2024-01-01 DIAGNOSIS — D69.6 THROMBOCYTOPENIA (HCC): ICD-10-CM

## 2024-01-01 DIAGNOSIS — E03.9 ACQUIRED HYPOTHYROIDISM: ICD-10-CM

## 2024-01-01 DIAGNOSIS — I50.9 CHF (CONGESTIVE HEART FAILURE) (HCC): Primary | ICD-10-CM

## 2024-01-01 DIAGNOSIS — E11.65 TYPE 2 DIABETES MELLITUS WITH HYPERGLYCEMIA, WITH LONG-TERM CURRENT USE OF INSULIN (HCC): ICD-10-CM

## 2024-01-01 DIAGNOSIS — Z79.4 TYPE 2 DIABETES MELLITUS WITH HYPERGLYCEMIA, WITH LONG-TERM CURRENT USE OF INSULIN (HCC): ICD-10-CM

## 2024-01-01 DIAGNOSIS — J81.1 PULMONARY EDEMA: ICD-10-CM

## 2024-01-01 DIAGNOSIS — R10.9 ABDOMINAL PAIN: ICD-10-CM

## 2024-01-01 DIAGNOSIS — R65.10 SIRS (SYSTEMIC INFLAMMATORY RESPONSE SYNDROME) (HCC): ICD-10-CM

## 2024-01-01 DIAGNOSIS — I42.0 DILATED CARDIOMYOPATHY (HCC): ICD-10-CM

## 2024-01-01 DIAGNOSIS — R06.02 SOB (SHORTNESS OF BREATH): ICD-10-CM

## 2024-01-01 DIAGNOSIS — E78.2 MIXED HYPERLIPIDEMIA: ICD-10-CM

## 2024-01-01 LAB
2HR DELTA HS TROPONIN: -7 NG/L
4HR DELTA HS TROPONIN: -3 NG/L
ALBUMIN SERPL BCG-MCNC: 3 G/DL (ref 3.5–5)
ALBUMIN SERPL BCG-MCNC: 3.1 G/DL (ref 3.5–5)
ALBUMIN SERPL BCG-MCNC: 3.3 G/DL (ref 3.5–5)
ALBUMIN SERPL BCG-MCNC: 3.4 G/DL (ref 3.5–5)
ALBUMIN SERPL BCG-MCNC: 3.8 G/DL (ref 3.5–5)
ALBUMIN SERPL BCG-MCNC: 4 G/DL (ref 3.5–5)
ALP SERPL-CCNC: 126 U/L (ref 34–104)
ALP SERPL-CCNC: 140 U/L (ref 34–104)
ALP SERPL-CCNC: 146 U/L (ref 34–104)
ALP SERPL-CCNC: 147 U/L (ref 34–104)
ALP SERPL-CCNC: 156 U/L (ref 34–104)
ALP SERPL-CCNC: 161 U/L (ref 34–104)
ALT SERPL W P-5'-P-CCNC: 133 U/L (ref 7–52)
ALT SERPL W P-5'-P-CCNC: 1712 U/L (ref 7–52)
ALT SERPL W P-5'-P-CCNC: 1995 U/L (ref 7–52)
ALT SERPL W P-5'-P-CCNC: 2438 U/L (ref 7–52)
ALT SERPL W P-5'-P-CCNC: 25 U/L (ref 7–52)
ALT SERPL W P-5'-P-CCNC: 32 U/L (ref 7–52)
ANION GAP SERPL CALCULATED.3IONS-SCNC: 10 MMOL/L (ref 4–13)
ANION GAP SERPL CALCULATED.3IONS-SCNC: 10 MMOL/L (ref 4–13)
ANION GAP SERPL CALCULATED.3IONS-SCNC: 11 MMOL/L (ref 4–13)
ANION GAP SERPL CALCULATED.3IONS-SCNC: 12 MMOL/L (ref 4–13)
ANION GAP SERPL CALCULATED.3IONS-SCNC: 13 MMOL/L (ref 4–13)
ANION GAP SERPL CALCULATED.3IONS-SCNC: 16 MMOL/L (ref 4–13)
ANION GAP SERPL CALCULATED.3IONS-SCNC: 19 MMOL/L (ref 4–13)
ANION GAP SERPL CALCULATED.3IONS-SCNC: 20 MMOL/L (ref 4–13)
ANION GAP SERPL CALCULATED.3IONS-SCNC: 21 MMOL/L (ref 4–13)
ANION GAP SERPL CALCULATED.3IONS-SCNC: 21 MMOL/L (ref 4–13)
ANION GAP SERPL CALCULATED.3IONS-SCNC: 23 MMOL/L (ref 4–13)
ANION GAP SERPL CALCULATED.3IONS-SCNC: 7 MMOL/L (ref 4–13)
ANION GAP SERPL CALCULATED.3IONS-SCNC: 8 MMOL/L (ref 4–13)
ANION GAP SERPL CALCULATED.3IONS-SCNC: 8 MMOL/L (ref 4–13)
ANION GAP SERPL CALCULATED.3IONS-SCNC: 9 MMOL/L (ref 4–13)
ANION GAP SERPL CALCULATED.3IONS-SCNC: 9 MMOL/L (ref 4–13)
ANISOCYTOSIS BLD QL SMEAR: PRESENT
ANISOCYTOSIS BLD QL SMEAR: PRESENT
AORTIC ROOT: 2.9 CM
AORTIC VALVE MEAN VELOCITY: 8.8 M/S
APICAL FOUR CHAMBER EJECTION FRACTION: 41 %
APPEARANCE FLD: CLEAR
APTT PPP: 122 SECONDS (ref 23–37)
APTT PPP: 129 SECONDS (ref 23–37)
APTT PPP: 38 SECONDS (ref 23–37)
APTT PPP: 49 SECONDS (ref 23–37)
ARTERIAL PATENCY WRIST A: YES
AST SERPL W P-5'-P-CCNC: 10 U/L (ref 13–39)
AST SERPL W P-5'-P-CCNC: 1348 U/L (ref 13–39)
AST SERPL W P-5'-P-CCNC: 14 U/L (ref 13–39)
AST SERPL W P-5'-P-CCNC: 215 U/L (ref 13–39)
AST SERPL W P-5'-P-CCNC: 3417 U/L (ref 13–39)
AST SERPL W P-5'-P-CCNC: 796 U/L (ref 13–39)
ATRIAL RATE: 141 BPM
AV AREA BY CONTINUOUS VTI: 0.6 CM2
AV AREA PEAK VELOCITY: 0.6 CM2
AV LVOT MEAN GRADIENT: 0 MMHG
AV LVOT PEAK GRADIENT: 1 MMHG
AV MEAN GRADIENT: 4 MMHG
AV PEAK GRADIENT: 8 MMHG
AV VALVE AREA: 0.63 CM2
AV VELOCITY RATIO: 0.3
BACTERIA BLD CULT: NORMAL
BACTERIA BLD CULT: NORMAL
BACTERIA SPEC BFLD CULT: NO GROWTH
BACTERIA UR QL AUTO: ABNORMAL /HPF
BASE EX.OXY STD BLDV CALC-SCNC: 51.8 % (ref 60–80)
BASE EX.OXY STD BLDV CALC-SCNC: 56.9 % (ref 60–80)
BASE EX.OXY STD BLDV CALC-SCNC: 66.1 % (ref 60–80)
BASE EX.OXY STD BLDV CALC-SCNC: 68.3 % (ref 60–80)
BASE EX.OXY STD BLDV CALC-SCNC: 72 % (ref 60–80)
BASE EX.OXY STD BLDV CALC-SCNC: 72.5 % (ref 60–80)
BASE EX.OXY STD BLDV CALC-SCNC: 75.5 % (ref 60–80)
BASE EX.OXY STD BLDV CALC-SCNC: 80.5 % (ref 60–80)
BASE EX.OXY STD BLDV CALC-SCNC: 84.3 % (ref 60–80)
BASE EX.OXY STD BLDV CALC-SCNC: 86.8 % (ref 60–80)
BASE EX.OXY STD BLDV CALC-SCNC: 87.3 % (ref 60–80)
BASE EX.OXY STD BLDV CALC-SCNC: 88.1 % (ref 60–80)
BASE EX.OXY STD BLDV CALC-SCNC: 97.4 % (ref 60–80)
BASE EX.OXY STD BLDV CALC-SCNC: 97.5 % (ref 60–80)
BASE EXCESS BLDA CALC-SCNC: -2 MMOL/L (ref -2–3)
BASE EXCESS BLDA CALC-SCNC: -4 MMOL/L (ref -2–3)
BASE EXCESS BLDV CALC-SCNC: -12.4 MMOL/L
BASE EXCESS BLDV CALC-SCNC: -14.2 MMOL/L
BASE EXCESS BLDV CALC-SCNC: -2 MMOL/L
BASE EXCESS BLDV CALC-SCNC: -2.1 MMOL/L
BASE EXCESS BLDV CALC-SCNC: -2.4 MMOL/L
BASE EXCESS BLDV CALC-SCNC: -2.9 MMOL/L
BASE EXCESS BLDV CALC-SCNC: -3.1 MMOL/L
BASE EXCESS BLDV CALC-SCNC: -3.1 MMOL/L
BASE EXCESS BLDV CALC-SCNC: -3.9 MMOL/L
BASE EXCESS BLDV CALC-SCNC: -4.2 MMOL/L
BASE EXCESS BLDV CALC-SCNC: -4.6 MMOL/L
BASE EXCESS BLDV CALC-SCNC: -5 MMOL/L
BASOPHILS # BLD AUTO: 0.02 THOUSANDS/ÂΜL (ref 0–0.1)
BASOPHILS # BLD AUTO: 0.04 THOUSANDS/ÂΜL (ref 0–0.1)
BASOPHILS # BLD AUTO: 0.06 THOUSANDS/ÂΜL (ref 0–0.1)
BASOPHILS # BLD MANUAL: 0 THOUSAND/UL (ref 0–0.1)
BASOPHILS # BLD MANUAL: 0.17 THOUSAND/UL (ref 0–0.1)
BASOPHILS NFR BLD AUTO: 0 % (ref 0–1)
BASOPHILS NFR BLD AUTO: 1 % (ref 0–1)
BASOPHILS NFR BLD AUTO: 1 % (ref 0–1)
BASOPHILS NFR FLD MANUAL: 1 %
BASOPHILS NFR MAR MANUAL: 0 % (ref 0–1)
BASOPHILS NFR MAR MANUAL: 1 % (ref 0–1)
BILIRUB DIRECT SERPL-MCNC: 1.14 MG/DL (ref 0–0.2)
BILIRUB DIRECT SERPL-MCNC: 1.49 MG/DL (ref 0–0.2)
BILIRUB DIRECT SERPL-MCNC: 2.2 MG/DL (ref 0–0.2)
BILIRUB SERPL-MCNC: 0.85 MG/DL (ref 0.2–1)
BILIRUB SERPL-MCNC: 0.99 MG/DL (ref 0.2–1)
BILIRUB SERPL-MCNC: 2 MG/DL (ref 0.2–1)
BILIRUB SERPL-MCNC: 2.32 MG/DL (ref 0.2–1)
BILIRUB SERPL-MCNC: 3.17 MG/DL (ref 0.2–1)
BILIRUB SERPL-MCNC: 4.55 MG/DL (ref 0.2–1)
BILIRUB UR QL STRIP: NEGATIVE
BLD SMEAR INTERP: NORMAL
BNP SERPL-MCNC: 745 PG/ML (ref 0–100)
BODY TEMPERATURE: 96.2 DEGREES FEHRENHEIT
BODY TEMPERATURE: 96.3 DEGREES FEHRENHEIT
BODY TEMPERATURE: 96.4 DEGREES FEHRENHEIT
BODY TEMPERATURE: 96.6 DEGREES FEHRENHEIT
BODY TEMPERATURE: 96.7 DEGREES FEHRENHEIT
BODY TEMPERATURE: 96.8 DEGREES FEHRENHEIT
BODY TEMPERATURE: 97.2 DEGREES FEHRENHEIT
BODY TEMPERATURE: 97.9 DEGREES FEHRENHEIT
BODY TEMPERATURE: 98.2 DEGREES FEHRENHEIT
BODY TEMPERATURE: ABNORMAL DEGREES FEHRENHEIT
BSA FOR ECHO PROCEDURE: 1.84 M2
BUN SERPL-MCNC: 14 MG/DL (ref 5–25)
BUN SERPL-MCNC: 15 MG/DL (ref 5–25)
BUN SERPL-MCNC: 17 MG/DL (ref 5–25)
BUN SERPL-MCNC: 18 MG/DL (ref 5–25)
BUN SERPL-MCNC: 21 MG/DL (ref 5–25)
BUN SERPL-MCNC: 22 MG/DL (ref 5–25)
BUN SERPL-MCNC: 25 MG/DL (ref 5–25)
BUN SERPL-MCNC: 31 MG/DL (ref 5–25)
BUN SERPL-MCNC: 40 MG/DL (ref 5–25)
BUN SERPL-MCNC: 54 MG/DL (ref 5–25)
BUN SERPL-MCNC: 67 MG/DL (ref 5–25)
BUN SERPL-MCNC: 85 MG/DL (ref 5–25)
BUN SERPL-MCNC: 85 MG/DL (ref 5–25)
BUN SERPL-MCNC: 89 MG/DL (ref 5–25)
BUN SERPL-MCNC: 89 MG/DL (ref 5–25)
BUN SERPL-MCNC: 96 MG/DL (ref 5–25)
BURR CELLS BLD QL SMEAR: PRESENT
CA-I BLD-SCNC: 1.1 MMOL/L (ref 1.12–1.32)
CA-I BLD-SCNC: 1.1 MMOL/L (ref 1.12–1.32)
CA-I BLD-SCNC: 1.12 MMOL/L (ref 1.12–1.32)
CA-I BLD-SCNC: 1.14 MMOL/L (ref 1.12–1.32)
CA-I BLD-SCNC: 1.14 MMOL/L (ref 1.12–1.32)
CA-I BLD-SCNC: 1.15 MMOL/L (ref 1.12–1.32)
CA-I BLD-SCNC: 1.16 MMOL/L (ref 1.12–1.32)
CA-I BLD-SCNC: 1.18 MMOL/L (ref 1.12–1.32)
CA-I BLD-SCNC: 1.2 MMOL/L (ref 1.12–1.32)
CA-I BLD-SCNC: 1.21 MMOL/L (ref 1.12–1.32)
CA-I BLD-SCNC: 1.22 MMOL/L (ref 1.12–1.32)
CA-I BLD-SCNC: 1.23 MMOL/L (ref 1.12–1.32)
CALCIUM ALBUM COR SERPL-MCNC: 9.1 MG/DL (ref 8.3–10.1)
CALCIUM ALBUM COR SERPL-MCNC: 9.8 MG/DL (ref 8.3–10.1)
CALCIUM SERPL-MCNC: 8.5 MG/DL (ref 8.4–10.2)
CALCIUM SERPL-MCNC: 8.5 MG/DL (ref 8.4–10.2)
CALCIUM SERPL-MCNC: 8.8 MG/DL (ref 8.4–10.2)
CALCIUM SERPL-MCNC: 8.9 MG/DL (ref 8.4–10.2)
CALCIUM SERPL-MCNC: 8.9 MG/DL (ref 8.4–10.2)
CALCIUM SERPL-MCNC: 9 MG/DL (ref 8.4–10.2)
CALCIUM SERPL-MCNC: 9.2 MG/DL (ref 8.4–10.2)
CALCIUM SERPL-MCNC: 9.3 MG/DL (ref 8.4–10.2)
CALCIUM SERPL-MCNC: 9.4 MG/DL (ref 8.4–10.2)
CARDIAC TROPONIN I PNL SERPL HS: 27 NG/L
CARDIAC TROPONIN I PNL SERPL HS: 31 NG/L
CARDIAC TROPONIN I PNL SERPL HS: 34 NG/L
CHLORIDE SERPL-SCNC: 101 MMOL/L (ref 96–108)
CHLORIDE SERPL-SCNC: 102 MMOL/L (ref 96–108)
CHLORIDE SERPL-SCNC: 103 MMOL/L (ref 96–108)
CHLORIDE SERPL-SCNC: 98 MMOL/L (ref 96–108)
CK SERPL-CCNC: 92 U/L (ref 26–192)
CLARITY UR: CLEAR
CO2 SERPL-SCNC: 12 MMOL/L (ref 21–32)
CO2 SERPL-SCNC: 14 MMOL/L (ref 21–32)
CO2 SERPL-SCNC: 15 MMOL/L (ref 21–32)
CO2 SERPL-SCNC: 16 MMOL/L (ref 21–32)
CO2 SERPL-SCNC: 18 MMOL/L (ref 21–32)
CO2 SERPL-SCNC: 19 MMOL/L (ref 21–32)
CO2 SERPL-SCNC: 21 MMOL/L (ref 21–32)
CO2 SERPL-SCNC: 22 MMOL/L (ref 21–32)
CO2 SERPL-SCNC: 23 MMOL/L (ref 21–32)
COLOR FLD: COLORLESS
COLOR UR: YELLOW
CREAT SERPL-MCNC: 0.97 MG/DL (ref 0.6–1.3)
CREAT SERPL-MCNC: 1.08 MG/DL (ref 0.6–1.3)
CREAT SERPL-MCNC: 1.18 MG/DL (ref 0.6–1.3)
CREAT SERPL-MCNC: 1.29 MG/DL (ref 0.6–1.3)
CREAT SERPL-MCNC: 1.76 MG/DL (ref 0.6–1.3)
CREAT SERPL-MCNC: 1.77 MG/DL (ref 0.6–1.3)
CREAT SERPL-MCNC: 2.04 MG/DL (ref 0.6–1.3)
CREAT SERPL-MCNC: 2.45 MG/DL (ref 0.6–1.3)
CREAT SERPL-MCNC: 3.2 MG/DL (ref 0.6–1.3)
CREAT SERPL-MCNC: 4.16 MG/DL (ref 0.6–1.3)
CREAT SERPL-MCNC: 5.17 MG/DL (ref 0.6–1.3)
CREAT SERPL-MCNC: 7.2 MG/DL (ref 0.6–1.3)
CREAT SERPL-MCNC: 7.4 MG/DL (ref 0.6–1.3)
CREAT SERPL-MCNC: 7.78 MG/DL (ref 0.6–1.3)
CREAT SERPL-MCNC: 7.81 MG/DL (ref 0.6–1.3)
CREAT SERPL-MCNC: 7.84 MG/DL (ref 0.6–1.3)
D DIMER PPP FEU-MCNC: 0.75 UG/ML FEU
DAT POLY-SP REAG RBC QL: NEGATIVE
DIGOXIN SERPL-MCNC: 1.1 NG/ML (ref 0.8–2)
DOP CALC AO PEAK VEL: 1.38 M/S
DOP CALC AO VTI: 22.82 CM
DOP CALC LVOT AREA: 2.01 CM2
DOP CALC LVOT CARDIAC INDEX: 0.5 L/MIN/M2
DOP CALC LVOT CARDIAC OUTPUT: 0.91 L/MIN
DOP CALC LVOT DIAMETER: 1.6 CM
DOP CALC LVOT PEAK VEL VTI: 7.21 CM
DOP CALC LVOT PEAK VEL: 0.42 M/S
DOP CALC LVOT STROKE INDEX: 7.6 ML/M2
DOP CALC LVOT STROKE VOLUME: 14.49
DOP CALC MV VTI: 37.18 CM
E WAVE DECELERATION TIME: 208 MS
E/A RATIO: 2.77
EOSINOPHIL # BLD AUTO: 0.01 THOUSAND/ÂΜL (ref 0–0.61)
EOSINOPHIL # BLD AUTO: 0.15 THOUSAND/ÂΜL (ref 0–0.61)
EOSINOPHIL # BLD AUTO: 0.36 THOUSAND/ÂΜL (ref 0–0.61)
EOSINOPHIL # BLD MANUAL: 0 THOUSAND/UL (ref 0–0.4)
EOSINOPHIL # BLD MANUAL: 0 THOUSAND/UL (ref 0–0.4)
EOSINOPHIL NFR BLD AUTO: 0 % (ref 0–6)
EOSINOPHIL NFR BLD AUTO: 2 % (ref 0–6)
EOSINOPHIL NFR BLD AUTO: 3 % (ref 0–6)
EOSINOPHIL NFR BLD MANUAL: 0 % (ref 0–6)
EOSINOPHIL NFR BLD MANUAL: 0 % (ref 0–6)
EOSINOPHIL NFR FLD MANUAL: 2 %
ERYTHROCYTE [DISTWIDTH] IN BLOOD BY AUTOMATED COUNT: 17.3 % (ref 11.6–15.1)
ERYTHROCYTE [DISTWIDTH] IN BLOOD BY AUTOMATED COUNT: 17.8 % (ref 11.6–15.1)
ERYTHROCYTE [DISTWIDTH] IN BLOOD BY AUTOMATED COUNT: 18.1 % (ref 11.6–15.1)
ERYTHROCYTE [DISTWIDTH] IN BLOOD BY AUTOMATED COUNT: 18.1 % (ref 11.6–15.1)
ERYTHROCYTE [DISTWIDTH] IN BLOOD BY AUTOMATED COUNT: 18.4 % (ref 11.6–15.1)
ERYTHROCYTE [DISTWIDTH] IN BLOOD BY AUTOMATED COUNT: 18.6 % (ref 11.6–15.1)
ERYTHROCYTE [DISTWIDTH] IN BLOOD BY AUTOMATED COUNT: 18.6 % (ref 11.6–15.1)
EST. AVERAGE GLUCOSE BLD GHB EST-MCNC: 180 MG/DL
FIBRINOGEN PPP-MCNC: 260 MG/DL (ref 207–520)
FIO2 GAS DIL.REBREATH: 45 L
FIO2 GAS DIL.REBREATH: 56 L
FLUAV RNA RESP QL NAA+PROBE: NEGATIVE
FLUBV RNA RESP QL NAA+PROBE: NEGATIVE
FRACTIONAL SHORTENING: 19 (ref 28–44)
GFR SERPL CREATININE-BSD FRML MDRD: 10 ML/MIN/1.73SQ M
GFR SERPL CREATININE-BSD FRML MDRD: 14 ML/MIN/1.73SQ M
GFR SERPL CREATININE-BSD FRML MDRD: 19 ML/MIN/1.73SQ M
GFR SERPL CREATININE-BSD FRML MDRD: 24 ML/MIN/1.73SQ M
GFR SERPL CREATININE-BSD FRML MDRD: 29 ML/MIN/1.73SQ M
GFR SERPL CREATININE-BSD FRML MDRD: 29 ML/MIN/1.73SQ M
GFR SERPL CREATININE-BSD FRML MDRD: 4 ML/MIN/1.73SQ M
GFR SERPL CREATININE-BSD FRML MDRD: 42 ML/MIN/1.73SQ M
GFR SERPL CREATININE-BSD FRML MDRD: 47 ML/MIN/1.73SQ M
GFR SERPL CREATININE-BSD FRML MDRD: 5 ML/MIN/1.73SQ M
GFR SERPL CREATININE-BSD FRML MDRD: 5 ML/MIN/1.73SQ M
GFR SERPL CREATININE-BSD FRML MDRD: 53 ML/MIN/1.73SQ M
GFR SERPL CREATININE-BSD FRML MDRD: 60 ML/MIN/1.73SQ M
GFR SERPL CREATININE-BSD FRML MDRD: 8 ML/MIN/1.73SQ M
GLUCOSE SERPL-MCNC: 102 MG/DL (ref 65–140)
GLUCOSE SERPL-MCNC: 104 MG/DL (ref 65–140)
GLUCOSE SERPL-MCNC: 118 MG/DL (ref 65–140)
GLUCOSE SERPL-MCNC: 122 MG/DL (ref 65–140)
GLUCOSE SERPL-MCNC: 127 MG/DL (ref 65–140)
GLUCOSE SERPL-MCNC: 131 MG/DL (ref 65–140)
GLUCOSE SERPL-MCNC: 141 MG/DL (ref 65–140)
GLUCOSE SERPL-MCNC: 153 MG/DL (ref 65–140)
GLUCOSE SERPL-MCNC: 154 MG/DL (ref 65–140)
GLUCOSE SERPL-MCNC: 157 MG/DL (ref 65–140)
GLUCOSE SERPL-MCNC: 162 MG/DL (ref 65–140)
GLUCOSE SERPL-MCNC: 164 MG/DL (ref 65–140)
GLUCOSE SERPL-MCNC: 165 MG/DL (ref 65–140)
GLUCOSE SERPL-MCNC: 166 MG/DL (ref 65–140)
GLUCOSE SERPL-MCNC: 170 MG/DL (ref 65–140)
GLUCOSE SERPL-MCNC: 170 MG/DL (ref 65–140)
GLUCOSE SERPL-MCNC: 175 MG/DL (ref 65–140)
GLUCOSE SERPL-MCNC: 180 MG/DL (ref 65–140)
GLUCOSE SERPL-MCNC: 184 MG/DL (ref 65–140)
GLUCOSE SERPL-MCNC: 190 MG/DL (ref 65–140)
GLUCOSE SERPL-MCNC: 193 MG/DL (ref 65–140)
GLUCOSE SERPL-MCNC: 199 MG/DL (ref 65–140)
GLUCOSE SERPL-MCNC: 201 MG/DL (ref 65–140)
GLUCOSE SERPL-MCNC: 208 MG/DL (ref 65–140)
GLUCOSE SERPL-MCNC: 210 MG/DL (ref 65–140)
GLUCOSE SERPL-MCNC: 212 MG/DL (ref 65–140)
GLUCOSE SERPL-MCNC: 213 MG/DL (ref 65–140)
GLUCOSE SERPL-MCNC: 213 MG/DL (ref 65–140)
GLUCOSE SERPL-MCNC: 219 MG/DL (ref 65–140)
GLUCOSE SERPL-MCNC: 220 MG/DL (ref 65–140)
GLUCOSE SERPL-MCNC: 220 MG/DL (ref 65–140)
GLUCOSE SERPL-MCNC: 239 MG/DL (ref 65–140)
GLUCOSE SERPL-MCNC: 272 MG/DL (ref 65–140)
GLUCOSE SERPL-MCNC: 287 MG/DL (ref 65–140)
GLUCOSE SERPL-MCNC: 305 MG/DL (ref 65–140)
GLUCOSE SERPL-MCNC: 315 MG/DL (ref 65–140)
GLUCOSE SERPL-MCNC: 88 MG/DL (ref 65–140)
GLUCOSE SERPL-MCNC: 98 MG/DL (ref 65–140)
GLUCOSE SERPL-MCNC: 98 MG/DL (ref 65–140)
GLUCOSE SERPL-MCNC: 99 MG/DL (ref 65–140)
GLUCOSE UR STRIP-MCNC: ABNORMAL MG/DL
GRAM STN SPEC: NORMAL
HAPTOGLOB SERPL-MCNC: 131 MG/DL (ref 37–355)
HAV IGM SER QL: NORMAL
HBA1C MFR BLD: 7.9 %
HBV CORE IGM SER QL: NORMAL
HBV SURFACE AG SER QL: NORMAL
HCO3 BLDA-SCNC: 21.6 MMOL/L (ref 22–28)
HCO3 BLDA-SCNC: 22.6 MMOL/L (ref 24–30)
HCO3 BLDV-SCNC: 13.4 MMOL/L (ref 24–30)
HCO3 BLDV-SCNC: 13.6 MMOL/L (ref 24–30)
HCO3 BLDV-SCNC: 19.1 MMOL/L (ref 24–30)
HCO3 BLDV-SCNC: 19.5 MMOL/L (ref 24–30)
HCO3 BLDV-SCNC: 19.7 MMOL/L (ref 24–30)
HCO3 BLDV-SCNC: 20.1 MMOL/L (ref 24–30)
HCO3 BLDV-SCNC: 20.1 MMOL/L (ref 24–30)
HCO3 BLDV-SCNC: 21.3 MMOL/L (ref 24–30)
HCO3 BLDV-SCNC: 21.8 MMOL/L (ref 24–30)
HCO3 BLDV-SCNC: 22 MMOL/L (ref 24–30)
HCO3 BLDV-SCNC: 22.2 MMOL/L (ref 24–30)
HCO3 BLDV-SCNC: 22.2 MMOL/L (ref 24–30)
HCO3 BLDV-SCNC: 22.4 MMOL/L (ref 24–30)
HCO3 BLDV-SCNC: 23.2 MMOL/L (ref 24–30)
HCT VFR BLD AUTO: 25.7 % (ref 34.8–46.1)
HCT VFR BLD AUTO: 26.5 % (ref 34.8–46.1)
HCT VFR BLD AUTO: 32.5 % (ref 34.8–46.1)
HCT VFR BLD AUTO: 35.2 % (ref 34.8–46.1)
HCT VFR BLD AUTO: 36.7 % (ref 34.8–46.1)
HCT VFR BLD AUTO: 39.2 % (ref 34.8–46.1)
HCT VFR BLD AUTO: 40.3 % (ref 34.8–46.1)
HCT VFR BLD AUTO: 45.7 % (ref 34.8–46.1)
HCT VFR BLD AUTO: 47.3 % (ref 34.8–46.1)
HCT VFR BLD CALC: 26 % (ref 34.8–46.1)
HCT VFR BLD CALC: 39 % (ref 34.8–46.1)
HCV AB SER QL: NORMAL
HFNC FLOW LPM: 25
HFNC FLOW LPM: 30
HGB BLD-MCNC: 11.2 G/DL (ref 11.5–15.4)
HGB BLD-MCNC: 11.5 G/DL (ref 11.5–15.4)
HGB BLD-MCNC: 12.3 G/DL (ref 11.5–15.4)
HGB BLD-MCNC: 12.9 G/DL (ref 11.5–15.4)
HGB BLD-MCNC: 14.1 G/DL (ref 11.5–15.4)
HGB BLD-MCNC: 14.2 G/DL (ref 11.5–15.4)
HGB BLD-MCNC: 8.3 G/DL (ref 11.5–15.4)
HGB BLD-MCNC: 8.5 G/DL (ref 11.5–15.4)
HGB BLD-MCNC: 8.6 G/DL (ref 11.5–15.4)
HGB BLDA-MCNC: 13.3 G/DL (ref 11.5–15.4)
HGB BLDA-MCNC: 8.8 G/DL (ref 11.5–15.4)
HGB UR QL STRIP.AUTO: ABNORMAL
HISTIOCYTES NFR FLD: 29 %
IMM GRANULOCYTES # BLD AUTO: 0.04 THOUSAND/UL (ref 0–0.2)
IMM GRANULOCYTES # BLD AUTO: 0.07 THOUSAND/UL (ref 0–0.2)
IMM GRANULOCYTES # BLD AUTO: 0.19 THOUSAND/UL (ref 0–0.2)
IMM GRANULOCYTES NFR BLD AUTO: 1 % (ref 0–2)
INR PPP: 1.52 (ref 0.84–1.19)
INR PPP: 2.1 (ref 0.84–1.19)
INR PPP: 2.63 (ref 0.84–1.19)
INR PPP: 4.24 (ref 0.84–1.19)
INR PPP: 5.9 (ref 0.84–1.19)
INR PPP: 6.52 (ref 0.84–1.19)
INTERVENTRICULAR SEPTUM IN DIASTOLE (PARASTERNAL SHORT AXIS VIEW): 1.1 CM
INTERVENTRICULAR SEPTUM: 1.1 CM (ref 0.6–1.1)
KETONES UR STRIP-MCNC: ABNORMAL MG/DL
L PNEUMO1 AG UR QL IA.RAPID: NEGATIVE
LAAS-AP4: 17 CM2
LACTATE SERPL-SCNC: 1.6 MMOL/L (ref 0.5–2)
LACTATE SERPL-SCNC: 1.9 MMOL/L (ref 0.5–2)
LACTATE SERPL-SCNC: 2 MMOL/L (ref 0.5–2)
LACTATE SERPL-SCNC: 2.1 MMOL/L (ref 0.5–2)
LACTATE SERPL-SCNC: 2.2 MMOL/L (ref 0.5–2)
LACTATE SERPL-SCNC: 2.5 MMOL/L (ref 0.5–2)
LACTATE SERPL-SCNC: 2.5 MMOL/L (ref 0.5–2)
LACTATE SERPL-SCNC: 2.7 MMOL/L (ref 0.5–2)
LACTATE SERPL-SCNC: 2.8 MMOL/L (ref 0.5–2)
LACTATE SERPL-SCNC: 2.9 MMOL/L (ref 0.5–2)
LACTATE SERPL-SCNC: 3.4 MMOL/L (ref 0.5–2)
LACTATE SERPL-SCNC: 3.7 MMOL/L (ref 0.5–2)
LACTATE SERPL-SCNC: 4.7 MMOL/L (ref 0.5–2)
LDH SERPL-CCNC: 3126 U/L (ref 140–271)
LDH SERPL-CCNC: 467 U/L (ref 140–271)
LDH SERPL-CCNC: 7321 U/L (ref 140–271)
LDH SERPL-CCNC: 735 U/L (ref 140–271)
LEFT ATRIUM SIZE: 3.3 CM
LEFT INTERNAL DIMENSION IN SYSTOLE: 3 CM (ref 2.1–4)
LEFT VENTRICULAR INTERNAL DIMENSION IN DIASTOLE: 3.7 CM (ref 3.5–6)
LEFT VENTRICULAR POSTERIOR WALL IN END DIASTOLE: 0.9 CM
LEFT VENTRICULAR STROKE VOLUME: 22 ML
LEUKOCYTE ESTERASE UR QL STRIP: ABNORMAL
LG PLATELETS BLD QL SMEAR: PRESENT
LVSV (TEICH): 22 ML
LYMPHOCYTES # BLD AUTO: 0.41 THOUSAND/UL (ref 0.6–4.47)
LYMPHOCYTES # BLD AUTO: 0.49 THOUSANDS/ÂΜL (ref 0.6–4.47)
LYMPHOCYTES # BLD AUTO: 0.49 THOUSANDS/ÂΜL (ref 0.6–4.47)
LYMPHOCYTES # BLD AUTO: 0.52 THOUSAND/UL (ref 0.6–4.47)
LYMPHOCYTES # BLD AUTO: 1.22 THOUSANDS/ÂΜL (ref 0.6–4.47)
LYMPHOCYTES # BLD AUTO: 2 % (ref 14–44)
LYMPHOCYTES # BLD AUTO: 3 % (ref 14–44)
LYMPHOCYTES NFR BLD AUTO: 11 % (ref 14–44)
LYMPHOCYTES NFR BLD AUTO: 3 % (ref 14–44)
LYMPHOCYTES NFR BLD AUTO: 45 %
LYMPHOCYTES NFR BLD AUTO: 7 % (ref 14–44)
MAGNESIUM SERPL-MCNC: 1.8 MG/DL (ref 1.9–2.7)
MAGNESIUM SERPL-MCNC: 1.9 MG/DL (ref 1.9–2.7)
MAGNESIUM SERPL-MCNC: 2 MG/DL (ref 1.9–2.7)
MAGNESIUM SERPL-MCNC: 2.1 MG/DL (ref 1.9–2.7)
MAGNESIUM SERPL-MCNC: 2.2 MG/DL (ref 1.9–2.7)
MCH RBC QN AUTO: 28.6 PG (ref 26.8–34.3)
MCH RBC QN AUTO: 28.7 PG (ref 26.8–34.3)
MCH RBC QN AUTO: 28.7 PG (ref 26.8–34.3)
MCH RBC QN AUTO: 28.9 PG (ref 26.8–34.3)
MCH RBC QN AUTO: 29 PG (ref 26.8–34.3)
MCH RBC QN AUTO: 29.1 PG (ref 26.8–34.3)
MCH RBC QN AUTO: 29.2 PG (ref 26.8–34.3)
MCH RBC QN AUTO: 30.3 PG (ref 26.8–34.3)
MCH RBC QN AUTO: 31.1 PG (ref 26.8–34.3)
MCHC RBC AUTO-ENTMCNC: 25.2 G/DL (ref 31.4–37.4)
MCHC RBC AUTO-ENTMCNC: 29.8 G/DL (ref 31.4–37.4)
MCHC RBC AUTO-ENTMCNC: 30.9 G/DL (ref 31.4–37.4)
MCHC RBC AUTO-ENTMCNC: 31.3 G/DL (ref 31.4–37.4)
MCHC RBC AUTO-ENTMCNC: 31.4 G/DL (ref 31.4–37.4)
MCHC RBC AUTO-ENTMCNC: 31.8 G/DL (ref 31.4–37.4)
MCHC RBC AUTO-ENTMCNC: 32 G/DL (ref 31.4–37.4)
MCHC RBC AUTO-ENTMCNC: 32.5 G/DL (ref 31.4–37.4)
MCHC RBC AUTO-ENTMCNC: 33.1 G/DL (ref 31.4–37.4)
MCV RBC AUTO: 116 FL (ref 82–98)
MCV RBC AUTO: 90 FL (ref 82–98)
MCV RBC AUTO: 92 FL (ref 82–98)
MCV RBC AUTO: 93 FL (ref 82–98)
MCV RBC AUTO: 94 FL (ref 82–98)
MCV RBC AUTO: 94 FL (ref 82–98)
MCV RBC AUTO: 96 FL (ref 82–98)
MITRAL REGURGITATION PEAK VELOCITY: 4.31 M/S
MITRAL VALVE MEAN INFLOW VELOCITY: 3.22 M/S
MITRAL VALVE REGURGITANT PEAK GRADIENT: 74 MMHG
MONOCYTES # BLD AUTO: 0.41 THOUSAND/UL (ref 0–1.22)
MONOCYTES # BLD AUTO: 0.86 THOUSAND/UL (ref 0–1.22)
MONOCYTES # BLD AUTO: 1.07 THOUSAND/ÂΜL (ref 0.17–1.22)
MONOCYTES # BLD AUTO: 1.53 THOUSAND/ÂΜL (ref 0.17–1.22)
MONOCYTES # BLD AUTO: 1.55 THOUSAND/ÂΜL (ref 0.17–1.22)
MONOCYTES NFR BLD AUTO: 14 % (ref 4–12)
MONOCYTES NFR BLD AUTO: 14 % (ref 4–12)
MONOCYTES NFR BLD AUTO: 9 % (ref 4–12)
MONOCYTES NFR BLD: 2 % (ref 4–12)
MONOCYTES NFR BLD: 5 % (ref 4–12)
MRSA NOSE QL CULT: NORMAL
MV E'TISSUE VEL-SEP: 5 CM/S
MV MEAN GRADIENT: 3 MMHG
MV PEAK A VEL: 0.47 M/S
MV PEAK E VEL: 130 CM/S
MV PEAK GRADIENT: 11 MMHG
MV STENOSIS PRESSURE HALF TIME: 60 MS
MV VALVE AREA BY CONTINUITY EQUATION: 0.39 CM2
MV VALVE AREA P 1/2 METHOD: 3.67
MYELOCYTE ABSOLUTE CT: 0.17 THOUSAND/UL (ref 0–0.1)
MYELOCYTES NFR BLD MANUAL: 1 % (ref 0–1)
NASAL CANNULA: 35
NASAL CANNULA: 35
NASAL CANNULA: 45
NASAL CANNULA: ABNORMAL
NEUTROPHILS # BLD AUTO: 14.59 THOUSANDS/ÂΜL (ref 1.85–7.62)
NEUTROPHILS # BLD AUTO: 5.72 THOUSANDS/ÂΜL (ref 1.85–7.62)
NEUTROPHILS # BLD AUTO: 8.13 THOUSANDS/ÂΜL (ref 1.85–7.62)
NEUTROPHILS # BLD MANUAL: 15.5 THOUSAND/UL (ref 1.85–7.62)
NEUTROPHILS # BLD MANUAL: 19.65 THOUSAND/UL (ref 1.85–7.62)
NEUTS BAND NFR BLD MANUAL: 2 % (ref 0–8)
NEUTS BAND NFR BLD MANUAL: 5 % (ref 0–8)
NEUTS SEG NFR BLD AUTO: 23 %
NEUTS SEG NFR BLD AUTO: 70 % (ref 43–75)
NEUTS SEG NFR BLD AUTO: 75 % (ref 43–75)
NEUTS SEG NFR BLD AUTO: 85 % (ref 43–75)
NEUTS SEG NFR BLD AUTO: 87 % (ref 43–75)
NEUTS SEG NFR BLD AUTO: 94 % (ref 43–75)
NITRITE UR QL STRIP: NEGATIVE
NON VENT CPAP: ABNORMAL
NON VENT HFNC FIO2: 30
NON VENT HFNC FIO2: 30
NON VENT HFNC FIO2: 32
NON VENT HFNC FIO2: 33
NON VENT HFNC FIO2: 34
NON VENT HFNC FIO2: 45
NON VENT HFNC FIO2: 50
NON VENT TYPE HFNC: ABNORMAL
NON-SQ EPI CELLS URNS QL MICRO: ABNORMAL /HPF
NRBC BLD AUTO-RTO: 0 /100 WBCS
NRBC BLD AUTO-RTO: 0 /100 WBCS
NRBC BLD AUTO-RTO: 1 /100 WBC (ref 0–2)
NRBC BLD AUTO-RTO: 1 /100 WBCS
O2 CT BLDV-SCNC: 10.8 ML/DL
O2 CT BLDV-SCNC: 12.6 ML/DL
O2 CT BLDV-SCNC: 13.1 ML/DL
O2 CT BLDV-SCNC: 13.2 ML/DL
O2 CT BLDV-SCNC: 13.6 ML/DL
O2 CT BLDV-SCNC: 14 ML/DL
O2 CT BLDV-SCNC: 14.2 ML/DL
O2 CT BLDV-SCNC: 15.5 ML/DL
O2 CT BLDV-SCNC: 16.1 ML/DL
O2 CT BLDV-SCNC: 16.6 ML/DL
O2 CT BLDV-SCNC: 17.7 ML/DL
O2 CT BLDV-SCNC: 18 ML/DL
O2 CT BLDV-SCNC: 7.8 ML/DL
O2 CT BLDV-SCNC: 9.4 ML/DL
PCO2 BLD: 23 MMOL/L (ref 21–32)
PCO2 BLD: 24 MMOL/L (ref 21–32)
PCO2 BLD: 33.6 MM HG (ref 36–44)
PCO2 BLD: 46.1 MM HG (ref 42–50)
PCO2 BLD: 59 MM HG
PCO2 BLD: 82 MM HG
PCO2 BLDA: 32.3 MM HG
PCO2 BLDA: 43.7 MM HG
PCO2 BLDV: 31.5 MM HG (ref 42–50)
PCO2 BLDV: 31.9 MM HG (ref 42–50)
PCO2 BLDV: 32.9 MM HG (ref 42–50)
PCO2 BLDV: 34 MM HG (ref 42–50)
PCO2 BLDV: 34.1 MM HG (ref 42–50)
PCO2 BLDV: 34.6 MM HG (ref 42–50)
PCO2 BLDV: 35.1 MM HG (ref 42–50)
PCO2 BLDV: 37.3 MM HG (ref 42–50)
PCO2 BLDV: 38.8 MM HG (ref 42–50)
PCO2 BLDV: 39.7 MM HG (ref 42–50)
PCO2 BLDV: 40 MM HG (ref 42–50)
PCO2 BLDV: 40.6 MM HG (ref 42–50)
PCO2 BLDV: 40.7 MM HG (ref 42–50)
PCO2 BLDV: 41.4 MM HG (ref 42–50)
PH BLD: 7.3 [PH] (ref 7.3–7.4)
PH BLD: 7.32 [PH]
PH BLD: 7.42 [PH] (ref 7.35–7.45)
PH BLD: 7.43 [PH]
PH BLDV: 7.17 [PH] (ref 7.3–7.4)
PH BLDV: 7.25 [PH] (ref 7.3–7.4)
PH BLDV: 7.34 [PH] (ref 7.3–7.4)
PH BLDV: 7.36 [PH] (ref 7.3–7.4)
PH BLDV: 7.36 [PH] (ref 7.3–7.4)
PH BLDV: 7.37 [PH] (ref 7.3–7.4)
PH BLDV: 7.37 [PH] (ref 7.3–7.4)
PH BLDV: 7.38 [PH] (ref 7.3–7.4)
PH BLDV: 7.4 [PH] (ref 7.3–7.4)
PH BLDV: 7.4 [PH] (ref 7.3–7.4)
PH BLDV: 7.42 [PH] (ref 7.3–7.4)
PH UR STRIP.AUTO: 5 [PH]
PHOSPHATE SERPL-MCNC: 10.8 MG/DL (ref 2.3–4.1)
PHOSPHATE SERPL-MCNC: 2.2 MG/DL (ref 2.3–4.1)
PHOSPHATE SERPL-MCNC: 2.2 MG/DL (ref 2.3–4.1)
PHOSPHATE SERPL-MCNC: 2.3 MG/DL (ref 2.3–4.1)
PHOSPHATE SERPL-MCNC: 2.9 MG/DL (ref 2.3–4.1)
PHOSPHATE SERPL-MCNC: 3 MG/DL (ref 2.3–4.1)
PHOSPHATE SERPL-MCNC: 3.1 MG/DL (ref 2.3–4.1)
PHOSPHATE SERPL-MCNC: 3.1 MG/DL (ref 2.3–4.1)
PHOSPHATE SERPL-MCNC: 4 MG/DL (ref 2.3–4.1)
PHOSPHATE SERPL-MCNC: 4.5 MG/DL (ref 2.3–4.1)
PHOSPHATE SERPL-MCNC: 5.6 MG/DL (ref 2.3–4.1)
PHOSPHATE SERPL-MCNC: 6.4 MG/DL (ref 2.3–4.1)
PHOSPHATE SERPL-MCNC: 8.9 MG/DL (ref 2.3–4.1)
PHOSPHATE SERPL-MCNC: 9.4 MG/DL (ref 2.3–4.1)
PLATELET # BLD AUTO: 105 THOUSANDS/UL (ref 149–390)
PLATELET # BLD AUTO: 115 THOUSANDS/UL (ref 149–390)
PLATELET # BLD AUTO: 174 THOUSANDS/UL (ref 149–390)
PLATELET # BLD AUTO: 19 THOUSANDS/UL (ref 149–390)
PLATELET # BLD AUTO: 21 THOUSANDS/UL (ref 149–390)
PLATELET # BLD AUTO: 43 THOUSANDS/UL (ref 149–390)
PLATELET # BLD AUTO: 63 THOUSANDS/UL (ref 149–390)
PLATELET # BLD AUTO: 88 THOUSANDS/UL (ref 149–390)
PLATELET # BLD AUTO: 92 THOUSANDS/UL (ref 149–390)
PLATELET BLD QL SMEAR: ABNORMAL
PLATELET BLD QL SMEAR: ABNORMAL
PMV BLD AUTO: 10.8 FL (ref 8.9–12.7)
PMV BLD AUTO: 10.9 FL (ref 8.9–12.7)
PMV BLD AUTO: 11.2 FL (ref 8.9–12.7)
PMV BLD AUTO: 11.4 FL (ref 8.9–12.7)
PMV BLD AUTO: 11.5 FL (ref 8.9–12.7)
PMV BLD AUTO: 11.6 FL (ref 8.9–12.7)
PMV BLD AUTO: 11.7 FL (ref 8.9–12.7)
PO2 BLD: 62 MM HG (ref 75–129)
PO2 BLD: 89 MM HG (ref 35–45)
PO2 BLDV: 113.6 MM HG (ref 35–45)
PO2 BLDV: 115.2 MM HG (ref 35–45)
PO2 BLDV: 29.7 MM HG (ref 35–45)
PO2 BLDV: 35.6 MM HG (ref 35–45)
PO2 BLDV: 36.3 MM HG (ref 35–45)
PO2 BLDV: 38.9 MM HG (ref 35–45)
PO2 BLDV: 41.3 MM HG (ref 35–45)
PO2 BLDV: 41.8 MM HG (ref 35–45)
PO2 BLDV: 42.4 MM HG (ref 35–45)
PO2 BLDV: 44.9 MM HG (ref 35–45)
PO2 BLDV: 52.7 MM HG (ref 35–45)
PO2 BLDV: 56 MM HG (ref 35–45)
PO2 BLDV: 58 MM HG (ref 35–45)
PO2 BLDV: 58.9 MM HG (ref 35–45)
POLYCHROMASIA BLD QL SMEAR: PRESENT
POLYCHROMASIA BLD QL SMEAR: PRESENT
POTASSIUM BLD-SCNC: 4.1 MMOL/L (ref 3.5–5.3)
POTASSIUM BLD-SCNC: 4.5 MMOL/L (ref 3.5–5.3)
POTASSIUM SERPL-SCNC: 3.7 MMOL/L (ref 3.5–5.3)
POTASSIUM SERPL-SCNC: 4 MMOL/L (ref 3.5–5.3)
POTASSIUM SERPL-SCNC: 4.1 MMOL/L (ref 3.5–5.3)
POTASSIUM SERPL-SCNC: 4.1 MMOL/L (ref 3.5–5.3)
POTASSIUM SERPL-SCNC: 4.2 MMOL/L (ref 3.5–5.3)
POTASSIUM SERPL-SCNC: 4.3 MMOL/L (ref 3.5–5.3)
POTASSIUM SERPL-SCNC: 4.4 MMOL/L (ref 3.5–5.3)
POTASSIUM SERPL-SCNC: 4.6 MMOL/L (ref 3.5–5.3)
POTASSIUM SERPL-SCNC: 4.7 MMOL/L (ref 3.5–5.3)
POTASSIUM SERPL-SCNC: 4.8 MMOL/L (ref 3.5–5.3)
POTASSIUM SERPL-SCNC: 6 MMOL/L (ref 3.5–5.3)
PROCALCITONIN SERPL-MCNC: 1.22 NG/ML
PROCALCITONIN SERPL-MCNC: 1.46 NG/ML
PROCALCITONIN SERPL-MCNC: 6.22 NG/ML
PROCALCITONIN SERPL-MCNC: 9.34 NG/ML
PROT SERPL-MCNC: 4.9 G/DL (ref 6.4–8.4)
PROT SERPL-MCNC: 4.9 G/DL (ref 6.4–8.4)
PROT SERPL-MCNC: 5.5 G/DL (ref 6.4–8.4)
PROT SERPL-MCNC: 5.6 G/DL (ref 6.4–8.4)
PROT SERPL-MCNC: 6.4 G/DL (ref 6.4–8.4)
PROT SERPL-MCNC: 6.5 G/DL (ref 6.4–8.4)
PROT UR STRIP-MCNC: ABNORMAL MG/DL
PROTHROMBIN TIME: 18.5 SECONDS (ref 11.6–14.5)
PROTHROMBIN TIME: 23.7 SECONDS (ref 11.6–14.5)
PROTHROMBIN TIME: 28.2 SECONDS (ref 11.6–14.5)
PROTHROMBIN TIME: 40.7 SECONDS (ref 11.6–14.5)
PROTHROMBIN TIME: 52.5 SECONDS (ref 11.6–14.5)
PROTHROMBIN TIME: 56.7 SECONDS (ref 11.6–14.5)
QRS AXIS: -66 DEGREES
QRS AXIS: -74 DEGREES
QRSD INTERVAL: 82 MS
QRSD INTERVAL: 92 MS
QT INTERVAL: 296 MS
QT INTERVAL: 340 MS
QTC INTERVAL: 444 MS
QTC INTERVAL: 474 MS
RBC # BLD AUTO: 2.73 MILLION/UL (ref 3.81–5.12)
RBC # BLD AUTO: 2.84 MILLION/UL (ref 3.81–5.12)
RBC # BLD AUTO: 2.85 MILLION/UL (ref 3.81–5.12)
RBC # BLD AUTO: 3.84 MILLION/UL (ref 3.81–5.12)
RBC # BLD AUTO: 3.97 MILLION/UL (ref 3.81–5.12)
RBC # BLD AUTO: 4.28 MILLION/UL (ref 3.81–5.12)
RBC # BLD AUTO: 4.49 MILLION/UL (ref 3.81–5.12)
RBC # BLD AUTO: 4.88 MILLION/UL (ref 3.81–5.12)
RBC # BLD AUTO: 4.93 MILLION/UL (ref 3.81–5.12)
RBC #/AREA URNS AUTO: ABNORMAL /HPF
RBC MORPH BLD: PRESENT
RBC MORPH BLD: PRESENT
RETICS # AUTO: ABNORMAL 10*3/UL (ref 14097–95744)
RETICS # AUTO: ABNORMAL 10*3/UL (ref 14097–95744)
RETICS # CALC: 4.18 % (ref 0.37–1.87)
RETICS # CALC: 4.24 % (ref 0.37–1.87)
RIGHT ATRIUM AREA SYSTOLE A4C: 18.6 CM2
RIGHT VENTRICLE ID DIMENSION: 4.6 CM
RSV RNA RESP QL NAA+PROBE: NEGATIVE
S PNEUM AG UR QL: NEGATIVE
SAO2 % BLD FROM PO2: 92 % (ref 60–85)
SAO2 % BLD FROM PO2: 96 % (ref 60–85)
SARS-COV-2 RNA RESP QL NAA+PROBE: NEGATIVE
SITE: NORMAL
SL CV DOP CALC MV VTI RETROGRADE: 129.9 CM
SL CV MV MEAN GRADIENT RETROGRADE: 47 MMHG
SL CV PED ECHO LEFT VENTRICLE DIASTOLIC VOLUME (MOD BIPLANE) 2D: 58 ML
SL CV PED ECHO LEFT VENTRICLE SYSTOLIC VOLUME (MOD BIPLANE) 2D: 36 ML
SODIUM BLD-SCNC: 133 MMOL/L (ref 136–145)
SODIUM BLD-SCNC: 133 MMOL/L (ref 136–145)
SODIUM SERPL-SCNC: 132 MMOL/L (ref 135–147)
SODIUM SERPL-SCNC: 133 MMOL/L (ref 135–147)
SODIUM SERPL-SCNC: 134 MMOL/L (ref 135–147)
SODIUM SERPL-SCNC: 135 MMOL/L (ref 135–147)
SODIUM SERPL-SCNC: 135 MMOL/L (ref 135–147)
SODIUM SERPL-SCNC: 136 MMOL/L (ref 135–147)
SODIUM SERPL-SCNC: 136 MMOL/L (ref 135–147)
SODIUM SERPL-SCNC: 137 MMOL/L (ref 135–147)
SP GR UR STRIP.AUTO: 1.02 (ref 1–1.03)
SPECIMEN SOURCE: ABNORMAL
SPECIMEN SOURCE: ABNORMAL
T WAVE AXIS: -74 DEGREES
T WAVE AXIS: 46 DEGREES
T4 FREE SERPL-MCNC: 1.75 NG/DL (ref 0.61–1.12)
TOTAL CELLS COUNTED SPEC: 100
TR MAX PG: 56 MMHG
TR PEAK VELOCITY: 3.8 M/S
TRICUSPID ANNULAR PLANE SYSTOLIC EXCURSION: 0.7 CM
TRICUSPID VALVE PEAK REGURGITATION VELOCITY: 3.76 M/S
TSH SERPL DL<=0.05 MIU/L-ACNC: 14.18 UIU/ML (ref 0.45–4.5)
UROBILINOGEN UR STRIP-ACNC: <2 MG/DL
VANCOMYCIN SERPL-MCNC: 17.2 UG/ML (ref 10–20)
VANCOMYCIN SERPL-MCNC: 19.6 UG/ML (ref 10–20)
VENT CPAP FIO2: 30 %
VENTRICULAR RATE: 117 BPM
VENTRICULAR RATE: 135 BPM
WBC # BLD AUTO: 11.39 THOUSAND/UL (ref 4.31–10.16)
WBC # BLD AUTO: 16.37 THOUSAND/UL (ref 4.31–10.16)
WBC # BLD AUTO: 16.83 THOUSAND/UL (ref 4.31–10.16)
WBC # BLD AUTO: 17.22 THOUSAND/UL (ref 4.31–10.16)
WBC # BLD AUTO: 18.14 THOUSAND/UL (ref 4.31–10.16)
WBC # BLD AUTO: 20.47 THOUSAND/UL (ref 4.31–10.16)
WBC # BLD AUTO: 21.52 THOUSAND/UL (ref 4.31–10.16)
WBC # BLD AUTO: 24.96 THOUSAND/UL (ref 4.31–10.16)
WBC # BLD AUTO: 7.51 THOUSAND/UL (ref 4.31–10.16)
WBC # FLD MANUAL: 27 /UL
WBC #/AREA URNS AUTO: ABNORMAL /HPF

## 2024-01-01 PROCEDURE — 84100 ASSAY OF PHOSPHORUS: CPT | Performed by: NURSE PRACTITIONER

## 2024-01-01 PROCEDURE — 99291 CRITICAL CARE FIRST HOUR: CPT | Performed by: ANESTHESIOLOGY

## 2024-01-01 PROCEDURE — 82330 ASSAY OF CALCIUM: CPT | Performed by: ANESTHESIOLOGY

## 2024-01-01 PROCEDURE — 02HV33Z INSERTION OF INFUSION DEVICE INTO SUPERIOR VENA CAVA, PERCUTANEOUS APPROACH: ICD-10-PCS | Performed by: ANESTHESIOLOGY

## 2024-01-01 PROCEDURE — 94760 N-INVAS EAR/PLS OXIMETRY 1: CPT

## 2024-01-01 PROCEDURE — 99223 1ST HOSP IP/OBS HIGH 75: CPT | Performed by: PHYSICIAN ASSISTANT

## 2024-01-01 PROCEDURE — 83010 ASSAY OF HAPTOGLOBIN QUANT: CPT | Performed by: NURSE PRACTITIONER

## 2024-01-01 PROCEDURE — 83735 ASSAY OF MAGNESIUM: CPT | Performed by: PHYSICIAN ASSISTANT

## 2024-01-01 PROCEDURE — 99291 CRITICAL CARE FIRST HOUR: CPT | Performed by: NURSE PRACTITIONER

## 2024-01-01 PROCEDURE — 93005 ELECTROCARDIOGRAM TRACING: CPT

## 2024-01-01 PROCEDURE — 87081 CULTURE SCREEN ONLY: CPT | Performed by: PHYSICIAN ASSISTANT

## 2024-01-01 PROCEDURE — 85027 COMPLETE CBC AUTOMATED: CPT | Performed by: NURSE PRACTITIONER

## 2024-01-01 PROCEDURE — 99223 1ST HOSP IP/OBS HIGH 75: CPT | Performed by: INTERNAL MEDICINE

## 2024-01-01 PROCEDURE — 80048 BASIC METABOLIC PNL TOTAL CA: CPT | Performed by: NURSE PRACTITIONER

## 2024-01-01 PROCEDURE — 87449 NOS EACH ORGANISM AG IA: CPT | Performed by: NURSE PRACTITIONER

## 2024-01-01 PROCEDURE — 80202 ASSAY OF VANCOMYCIN: CPT | Performed by: NURSE PRACTITIONER

## 2024-01-01 PROCEDURE — 82948 REAGENT STRIP/BLOOD GLUCOSE: CPT

## 2024-01-01 PROCEDURE — 90945 DIALYSIS ONE EVALUATION: CPT

## 2024-01-01 PROCEDURE — 90945 DIALYSIS ONE EVALUATION: CPT | Performed by: INTERNAL MEDICINE

## 2024-01-01 PROCEDURE — 82805 BLOOD GASES W/O2 SATURATION: CPT | Performed by: NURSE PRACTITIONER

## 2024-01-01 PROCEDURE — 83615 LACTATE (LD) (LDH) ENZYME: CPT | Performed by: NURSE PRACTITIONER

## 2024-01-01 PROCEDURE — 36556 INSERT NON-TUNNEL CV CATH: CPT | Performed by: NURSE PRACTITIONER

## 2024-01-01 PROCEDURE — 80048 BASIC METABOLIC PNL TOTAL CA: CPT | Performed by: INTERNAL MEDICINE

## 2024-01-01 PROCEDURE — 94664 DEMO&/EVAL PT USE INHALER: CPT

## 2024-01-01 PROCEDURE — 82330 ASSAY OF CALCIUM: CPT | Performed by: INTERNAL MEDICINE

## 2024-01-01 PROCEDURE — 93010 ELECTROCARDIOGRAM REPORT: CPT | Performed by: INTERNAL MEDICINE

## 2024-01-01 PROCEDURE — 82803 BLOOD GASES ANY COMBINATION: CPT

## 2024-01-01 PROCEDURE — NC001 PR NO CHARGE: Performed by: ANESTHESIOLOGY

## 2024-01-01 PROCEDURE — 0241U HB NFCT DS VIR RESP RNA 4 TRGT: CPT | Performed by: EMERGENCY MEDICINE

## 2024-01-01 PROCEDURE — 99232 SBSQ HOSP IP/OBS MODERATE 35: CPT | Performed by: INTERNAL MEDICINE

## 2024-01-01 PROCEDURE — 93306 TTE W/DOPPLER COMPLETE: CPT

## 2024-01-01 PROCEDURE — 85610 PROTHROMBIN TIME: CPT | Performed by: NURSE PRACTITIONER

## 2024-01-01 PROCEDURE — 82330 ASSAY OF CALCIUM: CPT

## 2024-01-01 PROCEDURE — 83735 ASSAY OF MAGNESIUM: CPT | Performed by: NURSE PRACTITIONER

## 2024-01-01 PROCEDURE — 36556 INSERT NON-TUNNEL CV CATH: CPT | Performed by: ANESTHESIOLOGY

## 2024-01-01 PROCEDURE — 85007 BL SMEAR W/DIFF WBC COUNT: CPT | Performed by: NURSE PRACTITIONER

## 2024-01-01 PROCEDURE — 99222 1ST HOSP IP/OBS MODERATE 55: CPT | Performed by: INTERNAL MEDICINE

## 2024-01-01 PROCEDURE — 85025 COMPLETE CBC W/AUTO DIFF WBC: CPT | Performed by: PHYSICIAN ASSISTANT

## 2024-01-01 PROCEDURE — 80074 ACUTE HEPATITIS PANEL: CPT | Performed by: NURSE PRACTITIONER

## 2024-01-01 PROCEDURE — 93306 TTE W/DOPPLER COMPLETE: CPT | Performed by: INTERNAL MEDICINE

## 2024-01-01 PROCEDURE — 84145 PROCALCITONIN (PCT): CPT | Performed by: INTERNAL MEDICINE

## 2024-01-01 PROCEDURE — 80053 COMPREHEN METABOLIC PANEL: CPT | Performed by: PHYSICIAN ASSISTANT

## 2024-01-01 PROCEDURE — 99238 HOSP IP/OBS DSCHRG MGMT 30/<: CPT | Performed by: NURSE PRACTITIONER

## 2024-01-01 PROCEDURE — 85014 HEMATOCRIT: CPT

## 2024-01-01 PROCEDURE — 94640 AIRWAY INHALATION TREATMENT: CPT

## 2024-01-01 PROCEDURE — 84145 PROCALCITONIN (PCT): CPT | Performed by: NURSE PRACTITIONER

## 2024-01-01 PROCEDURE — 85007 BL SMEAR W/DIFF WBC COUNT: CPT | Performed by: PHYSICIAN ASSISTANT

## 2024-01-01 PROCEDURE — 5A1D90Z PERFORMANCE OF URINARY FILTRATION, CONTINUOUS, GREATER THAN 18 HOURS PER DAY: ICD-10-PCS | Performed by: INTERNAL MEDICINE

## 2024-01-01 PROCEDURE — 83605 ASSAY OF LACTIC ACID: CPT | Performed by: NURSE PRACTITIONER

## 2024-01-01 PROCEDURE — NC001 PR NO CHARGE: Performed by: NURSE PRACTITIONER

## 2024-01-01 PROCEDURE — 85379 FIBRIN DEGRADATION QUANT: CPT | Performed by: NURSE PRACTITIONER

## 2024-01-01 PROCEDURE — 85045 AUTOMATED RETICULOCYTE COUNT: CPT | Performed by: NURSE PRACTITIONER

## 2024-01-01 PROCEDURE — 78582 LUNG VENTILAT&PERFUS IMAGING: CPT

## 2024-01-01 PROCEDURE — 86880 COOMBS TEST DIRECT: CPT | Performed by: NURSE PRACTITIONER

## 2024-01-01 PROCEDURE — 85730 THROMBOPLASTIN TIME PARTIAL: CPT | Performed by: NURSE PRACTITIONER

## 2024-01-01 PROCEDURE — 3E1M39Z IRRIGATION OF PERITONEAL CAVITY USING DIALYSATE, PERCUTANEOUS APPROACH: ICD-10-PCS | Performed by: INTERNAL MEDICINE

## 2024-01-01 PROCEDURE — 84443 ASSAY THYROID STIM HORMONE: CPT | Performed by: NURSE PRACTITIONER

## 2024-01-01 PROCEDURE — 85384 FIBRINOGEN ACTIVITY: CPT | Performed by: NURSE PRACTITIONER

## 2024-01-01 PROCEDURE — 83036 HEMOGLOBIN GLYCOSYLATED A1C: CPT | Performed by: NURSE PRACTITIONER

## 2024-01-01 PROCEDURE — 85610 PROTHROMBIN TIME: CPT | Performed by: EMERGENCY MEDICINE

## 2024-01-01 PROCEDURE — 87070 CULTURE OTHR SPECIMN AEROBIC: CPT | Performed by: INTERNAL MEDICINE

## 2024-01-01 PROCEDURE — NC001 PR NO CHARGE: Performed by: INTERNAL MEDICINE

## 2024-01-01 PROCEDURE — 74176 CT ABD & PELVIS W/O CONTRAST: CPT

## 2024-01-01 PROCEDURE — 96374 THER/PROPH/DIAG INJ IV PUSH: CPT

## 2024-01-01 PROCEDURE — 80076 HEPATIC FUNCTION PANEL: CPT | Performed by: NURSE PRACTITIONER

## 2024-01-01 PROCEDURE — 36600 WITHDRAWAL OF ARTERIAL BLOOD: CPT

## 2024-01-01 PROCEDURE — 87205 SMEAR GRAM STAIN: CPT | Performed by: INTERNAL MEDICINE

## 2024-01-01 PROCEDURE — A9539 TC99M PENTETATE: HCPCS

## 2024-01-01 PROCEDURE — 99233 SBSQ HOSP IP/OBS HIGH 50: CPT | Performed by: INTERNAL MEDICINE

## 2024-01-01 PROCEDURE — 85060 BLOOD SMEAR INTERPRETATION: CPT | Performed by: STUDENT IN AN ORGANIZED HEALTH CARE EDUCATION/TRAINING PROGRAM

## 2024-01-01 PROCEDURE — 84100 ASSAY OF PHOSPHORUS: CPT | Performed by: PHYSICIAN ASSISTANT

## 2024-01-01 PROCEDURE — 4A133B1 MONITORING OF ARTERIAL PRESSURE, PERIPHERAL, PERCUTANEOUS APPROACH: ICD-10-PCS | Performed by: ANESTHESIOLOGY

## 2024-01-01 PROCEDURE — 84295 ASSAY OF SERUM SODIUM: CPT

## 2024-01-01 PROCEDURE — 87070 CULTURE OTHR SPECIMN AEROBIC: CPT | Performed by: NURSE PRACTITIONER

## 2024-01-01 PROCEDURE — 94644 CONT INHLJ TX 1ST HOUR: CPT

## 2024-01-01 PROCEDURE — 71250 CT THORAX DX C-: CPT

## 2024-01-01 PROCEDURE — 83605 ASSAY OF LACTIC ACID: CPT | Performed by: INTERNAL MEDICINE

## 2024-01-01 PROCEDURE — 83735 ASSAY OF MAGNESIUM: CPT | Performed by: INTERNAL MEDICINE

## 2024-01-01 PROCEDURE — 99292 CRITICAL CARE ADDL 30 MIN: CPT | Performed by: NURSE PRACTITIONER

## 2024-01-01 PROCEDURE — 84145 PROCALCITONIN (PCT): CPT | Performed by: EMERGENCY MEDICINE

## 2024-01-01 PROCEDURE — 85730 THROMBOPLASTIN TIME PARTIAL: CPT | Performed by: ANESTHESIOLOGY

## 2024-01-01 PROCEDURE — 82947 ASSAY GLUCOSE BLOOD QUANT: CPT

## 2024-01-01 PROCEDURE — 94660 CPAP INITIATION&MGMT: CPT

## 2024-01-01 PROCEDURE — 84439 ASSAY OF FREE THYROXINE: CPT | Performed by: NURSE PRACTITIONER

## 2024-01-01 PROCEDURE — 83880 ASSAY OF NATRIURETIC PEPTIDE: CPT | Performed by: EMERGENCY MEDICINE

## 2024-01-01 PROCEDURE — 03HY32Z INSERTION OF MONITORING DEVICE INTO UPPER ARTERY, PERCUTANEOUS APPROACH: ICD-10-PCS | Performed by: ANESTHESIOLOGY

## 2024-01-01 PROCEDURE — 93970 EXTREMITY STUDY: CPT | Performed by: SURGERY

## 2024-01-01 PROCEDURE — 99285 EMERGENCY DEPT VISIT HI MDM: CPT | Performed by: EMERGENCY MEDICINE

## 2024-01-01 PROCEDURE — 80162 ASSAY OF DIGOXIN TOTAL: CPT | Performed by: NURSE PRACTITIONER

## 2024-01-01 PROCEDURE — 76705 ECHO EXAM OF ABDOMEN: CPT

## 2024-01-01 PROCEDURE — 85025 COMPLETE CBC W/AUTO DIFF WBC: CPT | Performed by: EMERGENCY MEDICINE

## 2024-01-01 PROCEDURE — 4A133J1 MONITORING OF ARTERIAL PULSE, PERIPHERAL, PERCUTANEOUS APPROACH: ICD-10-PCS | Performed by: ANESTHESIOLOGY

## 2024-01-01 PROCEDURE — 71045 X-RAY EXAM CHEST 1 VIEW: CPT

## 2024-01-01 PROCEDURE — 84132 ASSAY OF SERUM POTASSIUM: CPT

## 2024-01-01 PROCEDURE — 99285 EMERGENCY DEPT VISIT HI MDM: CPT

## 2024-01-01 PROCEDURE — A9540 TC99M MAA: HCPCS

## 2024-01-01 PROCEDURE — 36415 COLL VENOUS BLD VENIPUNCTURE: CPT | Performed by: EMERGENCY MEDICINE

## 2024-01-01 PROCEDURE — 94645 CONT INHLJ TX EACH ADDL HOUR: CPT

## 2024-01-01 PROCEDURE — 87040 BLOOD CULTURE FOR BACTERIA: CPT | Performed by: NURSE PRACTITIONER

## 2024-01-01 PROCEDURE — 80053 COMPREHEN METABOLIC PANEL: CPT | Performed by: NURSE PRACTITIONER

## 2024-01-01 PROCEDURE — 36620 INSERTION CATHETER ARTERY: CPT | Performed by: ANESTHESIOLOGY

## 2024-01-01 PROCEDURE — 84100 ASSAY OF PHOSPHORUS: CPT | Performed by: INTERNAL MEDICINE

## 2024-01-01 PROCEDURE — 81001 URINALYSIS AUTO W/SCOPE: CPT | Performed by: NURSE PRACTITIONER

## 2024-01-01 PROCEDURE — NC001 PR NO CHARGE: Performed by: PHYSICIAN ASSISTANT

## 2024-01-01 PROCEDURE — 85025 COMPLETE CBC W/AUTO DIFF WBC: CPT | Performed by: NURSE PRACTITIONER

## 2024-01-01 PROCEDURE — 82550 ASSAY OF CK (CPK): CPT | Performed by: NURSE PRACTITIONER

## 2024-01-01 PROCEDURE — 87205 SMEAR GRAM STAIN: CPT | Performed by: NURSE PRACTITIONER

## 2024-01-01 PROCEDURE — 84484 ASSAY OF TROPONIN QUANT: CPT | Performed by: EMERGENCY MEDICINE

## 2024-01-01 PROCEDURE — 80048 BASIC METABOLIC PNL TOTAL CA: CPT | Performed by: PHYSICIAN ASSISTANT

## 2024-01-01 PROCEDURE — 85027 COMPLETE CBC AUTOMATED: CPT | Performed by: PHYSICIAN ASSISTANT

## 2024-01-01 PROCEDURE — 89051 BODY FLUID CELL COUNT: CPT | Performed by: INTERNAL MEDICINE

## 2024-01-01 PROCEDURE — 93970 EXTREMITY STUDY: CPT

## 2024-01-01 PROCEDURE — 80053 COMPREHEN METABOLIC PANEL: CPT | Performed by: EMERGENCY MEDICINE

## 2024-01-01 RX ORDER — CALCIUM GLUCONATE 20 MG/ML
1 INJECTION, SOLUTION INTRAVENOUS ONCE
Status: COMPLETED | OUTPATIENT
Start: 2024-01-01 | End: 2024-01-01

## 2024-01-01 RX ORDER — GENTAMICIN SULFATE 1 MG/G
1 CREAM TOPICAL DAILY
Status: DISCONTINUED | OUTPATIENT
Start: 2024-01-01 | End: 2024-01-01 | Stop reason: HOSPADM

## 2024-01-01 RX ORDER — GLYCOPYRROLATE 0.2 MG/ML
0.1 INJECTION INTRAMUSCULAR; INTRAVENOUS EVERY 4 HOURS PRN
Status: DISCONTINUED | OUTPATIENT
Start: 2024-01-01 | End: 2024-01-01 | Stop reason: HOSPADM

## 2024-01-01 RX ORDER — ONDANSETRON 2 MG/ML
INJECTION INTRAMUSCULAR; INTRAVENOUS
Status: COMPLETED
Start: 2024-01-01 | End: 2024-01-01

## 2024-01-01 RX ORDER — MAGNESIUM SULFATE 1 G/100ML
1 INJECTION INTRAVENOUS ONCE
Status: COMPLETED | OUTPATIENT
Start: 2024-01-01 | End: 2024-01-01

## 2024-01-01 RX ORDER — CALCIUM ACETATE 667 MG/1
1334 CAPSULE ORAL DAILY
Status: DISCONTINUED | OUTPATIENT
Start: 2024-01-01 | End: 2024-01-01

## 2024-01-01 RX ORDER — LEVOTHYROXINE SODIUM 0.12 MG/1
125 TABLET ORAL
Status: DISCONTINUED | OUTPATIENT
Start: 2024-01-01 | End: 2024-01-01 | Stop reason: HOSPADM

## 2024-01-01 RX ORDER — MIDODRINE HYDROCHLORIDE 5 MG/1
10 TABLET ORAL
Status: DISCONTINUED | OUTPATIENT
Start: 2024-01-01 | End: 2024-01-01

## 2024-01-01 RX ORDER — INSULIN LISPRO 100 [IU]/ML
1-5 INJECTION, SOLUTION INTRAVENOUS; SUBCUTANEOUS
Status: DISCONTINUED | OUTPATIENT
Start: 2024-01-01 | End: 2024-01-01

## 2024-01-01 RX ORDER — MILRINONE LACTATE 0.2 MG/ML
0.13 INJECTION, SOLUTION INTRAVENOUS CONTINUOUS
Status: DISCONTINUED | OUTPATIENT
Start: 2024-01-01 | End: 2024-01-01

## 2024-01-01 RX ORDER — HYDROMORPHONE HCL/PF 1 MG/ML
0.2 SYRINGE (ML) INJECTION EVERY 4 HOURS PRN
Status: DISCONTINUED | OUTPATIENT
Start: 2024-01-01 | End: 2024-01-01 | Stop reason: HOSPADM

## 2024-01-01 RX ORDER — ACETAMINOPHEN 325 MG/1
650 TABLET ORAL EVERY 6 HOURS PRN
Status: DISCONTINUED | OUTPATIENT
Start: 2024-01-01 | End: 2024-01-01

## 2024-01-01 RX ORDER — NOREPINEPHRINE BITARTRATE 1 MG/ML
INJECTION, SOLUTION INTRAVENOUS
Status: COMPLETED
Start: 2024-01-01 | End: 2024-01-01

## 2024-01-01 RX ORDER — CINACALCET 30 MG/1
30 TABLET, FILM COATED ORAL 3 TIMES WEEKLY
Status: DISCONTINUED | OUTPATIENT
Start: 2024-01-01 | End: 2024-01-01

## 2024-01-01 RX ORDER — HEPARIN SODIUM 1000 [USP'U]/ML
4000 INJECTION, SOLUTION INTRAVENOUS; SUBCUTANEOUS EVERY 6 HOURS PRN
Status: DISCONTINUED | OUTPATIENT
Start: 2024-01-01 | End: 2024-01-01

## 2024-01-01 RX ORDER — ALBUMIN (HUMAN) 12.5 G/50ML
25 SOLUTION INTRAVENOUS ONCE
Status: COMPLETED | OUTPATIENT
Start: 2024-01-01 | End: 2024-01-01

## 2024-01-01 RX ORDER — METOPROLOL TARTRATE 50 MG/1
50 TABLET, FILM COATED ORAL EVERY 12 HOURS SCHEDULED
Status: DISCONTINUED | OUTPATIENT
Start: 2024-01-01 | End: 2024-01-01

## 2024-01-01 RX ORDER — POTASSIUM CHLORIDE 29.8 MG/ML
40 INJECTION INTRAVENOUS ONCE
Status: COMPLETED | OUTPATIENT
Start: 2024-01-01 | End: 2024-01-01

## 2024-01-01 RX ORDER — HALOPERIDOL 5 MG/ML
0.5 INJECTION INTRAMUSCULAR EVERY 2 HOUR PRN
Status: DISCONTINUED | OUTPATIENT
Start: 2024-01-01 | End: 2024-01-01 | Stop reason: HOSPADM

## 2024-01-01 RX ORDER — DIGOXIN 0.25 MG/ML
250 INJECTION INTRAMUSCULAR; INTRAVENOUS ONCE
Status: COMPLETED | OUTPATIENT
Start: 2024-01-01 | End: 2024-01-01

## 2024-01-01 RX ORDER — INSULIN LISPRO 100 [IU]/ML
1-6 INJECTION, SOLUTION INTRAVENOUS; SUBCUTANEOUS EVERY 6 HOURS SCHEDULED
Status: DISCONTINUED | OUTPATIENT
Start: 2024-01-01 | End: 2024-01-01 | Stop reason: HOSPADM

## 2024-01-01 RX ORDER — CHLORHEXIDINE GLUCONATE ORAL RINSE 1.2 MG/ML
15 SOLUTION DENTAL EVERY 12 HOURS SCHEDULED
Status: DISCONTINUED | OUTPATIENT
Start: 2024-01-01 | End: 2024-01-01 | Stop reason: HOSPADM

## 2024-01-01 RX ORDER — MILRINONE LACTATE 0.2 MG/ML
0.13 INJECTION, SOLUTION INTRAVENOUS CONTINUOUS
Status: DISCONTINUED | OUTPATIENT
Start: 2024-01-01 | End: 2024-01-01 | Stop reason: HOSPADM

## 2024-01-01 RX ORDER — ACETAMINOPHEN 10 MG/ML
1000 INJECTION, SOLUTION INTRAVENOUS ONCE
Status: COMPLETED | OUTPATIENT
Start: 2024-01-01 | End: 2024-01-01

## 2024-01-01 RX ORDER — DOCUSATE SODIUM 100 MG/1
100 CAPSULE, LIQUID FILLED ORAL 2 TIMES DAILY
Status: DISCONTINUED | OUTPATIENT
Start: 2024-01-01 | End: 2024-01-01 | Stop reason: HOSPADM

## 2024-01-01 RX ORDER — ATORVASTATIN CALCIUM 10 MG/1
20 TABLET, FILM COATED ORAL
Status: DISCONTINUED | OUTPATIENT
Start: 2024-01-01 | End: 2024-01-01

## 2024-01-01 RX ORDER — HEPARIN SODIUM 1000 [USP'U]/ML
2000 INJECTION, SOLUTION INTRAVENOUS; SUBCUTANEOUS EVERY 6 HOURS PRN
Status: DISCONTINUED | OUTPATIENT
Start: 2024-01-01 | End: 2024-01-01

## 2024-01-01 RX ORDER — LEVALBUTEROL INHALATION SOLUTION 0.63 MG/3ML
0.63 SOLUTION RESPIRATORY (INHALATION) EVERY 6 HOURS PRN
Status: DISCONTINUED | OUTPATIENT
Start: 2024-01-01 | End: 2024-01-01 | Stop reason: HOSPADM

## 2024-01-01 RX ORDER — BUMETANIDE 0.25 MG/ML
4 INJECTION INTRAMUSCULAR; INTRAVENOUS 2 TIMES DAILY
Status: DISCONTINUED | OUTPATIENT
Start: 2024-01-01 | End: 2024-01-01

## 2024-01-01 RX ORDER — DEXTROSE MONOHYDRATE 25 G/50ML
25 INJECTION, SOLUTION INTRAVENOUS ONCE
Status: COMPLETED | OUTPATIENT
Start: 2024-01-01 | End: 2024-01-01

## 2024-01-01 RX ORDER — CEFTRIAXONE 2 G/50ML
2000 INJECTION, SOLUTION INTRAVENOUS ONCE
Status: COMPLETED | OUTPATIENT
Start: 2024-01-01 | End: 2024-01-01

## 2024-01-01 RX ORDER — BISACODYL 10 MG
10 SUPPOSITORY, RECTAL RECTAL ONCE
Status: DISCONTINUED | OUTPATIENT
Start: 2024-01-01 | End: 2024-01-01

## 2024-01-01 RX ORDER — MIDODRINE HYDROCHLORIDE 5 MG/1
5 TABLET ORAL
Status: DISCONTINUED | OUTPATIENT
Start: 2024-01-01 | End: 2024-01-01

## 2024-01-01 RX ORDER — VALSARTAN 160 MG/1
160 TABLET ORAL DAILY
COMMUNITY
End: 2024-01-01

## 2024-01-01 RX ORDER — CEFTRIAXONE 2 G/50ML
2000 INJECTION, SOLUTION INTRAVENOUS EVERY 24 HOURS
Status: DISCONTINUED | OUTPATIENT
Start: 2024-01-01 | End: 2024-01-01

## 2024-01-01 RX ORDER — INSULIN GLARGINE 100 [IU]/ML
10 INJECTION, SOLUTION SUBCUTANEOUS DAILY
Status: DISCONTINUED | OUTPATIENT
Start: 2024-01-01 | End: 2024-01-01

## 2024-01-01 RX ORDER — VANCOMYCIN HYDROCHLORIDE 500 MG/100ML
10 INJECTION, SOLUTION INTRAVENOUS EVERY 12 HOURS
Status: DISCONTINUED | OUTPATIENT
Start: 2024-01-01 | End: 2024-01-01

## 2024-01-01 RX ORDER — SENNOSIDES 8.6 MG
2 TABLET ORAL
Status: DISCONTINUED | OUTPATIENT
Start: 2024-01-01 | End: 2024-01-01 | Stop reason: HOSPADM

## 2024-01-01 RX ORDER — ONDANSETRON 2 MG/ML
4 INJECTION INTRAMUSCULAR; INTRAVENOUS EVERY 6 HOURS PRN
Status: DISCONTINUED | OUTPATIENT
Start: 2024-01-01 | End: 2024-01-01 | Stop reason: HOSPADM

## 2024-01-01 RX ORDER — WARFARIN SODIUM 5 MG/1
5 TABLET ORAL
Status: DISCONTINUED | OUTPATIENT
Start: 2024-01-01 | End: 2024-01-01

## 2024-01-01 RX ORDER — LIDOCAINE HYDROCHLORIDE 10 MG/ML
INJECTION, SOLUTION EPIDURAL; INFILTRATION; INTRACAUDAL; PERINEURAL
Status: COMPLETED
Start: 2024-01-01 | End: 2024-01-01

## 2024-01-01 RX ORDER — CALCIUM ACETATE 667 MG/1
667 CAPSULE ORAL DAILY
Status: DISCONTINUED | OUTPATIENT
Start: 2024-01-01 | End: 2024-01-01

## 2024-01-01 RX ORDER — POLYETHYLENE GLYCOL 3350 17 G/17G
17 POWDER, FOR SOLUTION ORAL DAILY
Status: DISCONTINUED | OUTPATIENT
Start: 2024-01-01 | End: 2024-01-01 | Stop reason: HOSPADM

## 2024-01-01 RX ORDER — INSULIN LISPRO 100 [IU]/ML
3 INJECTION, SOLUTION INTRAVENOUS; SUBCUTANEOUS
Status: DISCONTINUED | OUTPATIENT
Start: 2024-01-01 | End: 2024-01-01

## 2024-01-01 RX ORDER — METOPROLOL SUCCINATE 50 MG/1
50 TABLET, EXTENDED RELEASE ORAL DAILY
Status: DISCONTINUED | OUTPATIENT
Start: 2024-01-01 | End: 2024-01-01

## 2024-01-01 RX ORDER — ALBUMIN, HUMAN INJ 5% 5 %
12.5 SOLUTION INTRAVENOUS ONCE
Status: COMPLETED | OUTPATIENT
Start: 2024-01-01 | End: 2024-01-01

## 2024-01-01 RX ORDER — SACCHAROMYCES BOULARDII 250 MG
250 CAPSULE ORAL 2 TIMES DAILY
Status: DISCONTINUED | OUTPATIENT
Start: 2024-01-01 | End: 2024-01-01

## 2024-01-01 RX ORDER — PANTOPRAZOLE SODIUM 40 MG/10ML
40 INJECTION, POWDER, LYOPHILIZED, FOR SOLUTION INTRAVENOUS
Status: DISCONTINUED | OUTPATIENT
Start: 2024-01-01 | End: 2024-01-01 | Stop reason: HOSPADM

## 2024-01-01 RX ORDER — INSULIN GLARGINE 100 [IU]/ML
10 INJECTION, SOLUTION SUBCUTANEOUS
Status: DISCONTINUED | OUTPATIENT
Start: 2024-01-01 | End: 2024-01-01 | Stop reason: HOSPADM

## 2024-01-01 RX ORDER — CALCITRIOL 0.25 UG/1
0.25 CAPSULE, LIQUID FILLED ORAL DAILY
Status: DISCONTINUED | OUTPATIENT
Start: 2024-01-01 | End: 2024-01-01

## 2024-01-01 RX ORDER — ASCORBIC ACID, THIAMINE MONONITRATE,RIBOFLAVIN, NIACINAMIDE, PYRIDOXINE HYDROCHLORIDE, FOLIC ACID, CYANOCOBALAMIN, BIOTIN, CALCIUM PANTOTHENATE, 100; 1.5; 1.7; 20; 10; 1; 6000; 150000; 5 MG/1; MG/1; MG/1; MG/1; MG/1; MG/1; UG/1; UG/1; MG/1
1 CAPSULE, LIQUID FILLED ORAL DAILY
Status: DISCONTINUED | OUTPATIENT
Start: 2024-01-01 | End: 2024-01-01

## 2024-01-01 RX ORDER — HEPARIN SODIUM 10000 [USP'U]/100ML
3-20 INJECTION, SOLUTION INTRAVENOUS
Status: DISCONTINUED | OUTPATIENT
Start: 2024-01-01 | End: 2024-01-01

## 2024-01-01 RX ORDER — CEFEPIME HYDROCHLORIDE 2 G/50ML
2000 INJECTION, SOLUTION INTRAVENOUS EVERY 12 HOURS
Status: DISCONTINUED | OUTPATIENT
Start: 2024-01-01 | End: 2024-01-01

## 2024-01-01 RX ORDER — LIDOCAINE HYDROCHLORIDE 10 MG/ML
5 INJECTION, SOLUTION EPIDURAL; INFILTRATION; INTRACAUDAL; PERINEURAL ONCE
Status: COMPLETED | OUTPATIENT
Start: 2024-01-01 | End: 2024-01-01

## 2024-01-01 RX ORDER — LORAZEPAM 2 MG/ML
1 INJECTION INTRAMUSCULAR
Status: DISCONTINUED | OUTPATIENT
Start: 2024-01-01 | End: 2024-01-01 | Stop reason: HOSPADM

## 2024-01-01 RX ORDER — METOPROLOL TARTRATE 1 MG/ML
5 INJECTION, SOLUTION INTRAVENOUS ONCE
Status: COMPLETED | OUTPATIENT
Start: 2024-01-01 | End: 2024-01-01

## 2024-01-01 RX ORDER — IPRATROPIUM BROMIDE AND ALBUTEROL SULFATE 2.5; .5 MG/3ML; MG/3ML
3 SOLUTION RESPIRATORY (INHALATION) ONCE
Status: COMPLETED | OUTPATIENT
Start: 2024-01-01 | End: 2024-01-01

## 2024-01-01 RX ADMIN — NOREPINEPHRINE BITARTRATE 18 MCG/MIN: 1 SOLUTION INTRAVENOUS at 09:40

## 2024-01-01 RX ADMIN — METOPROLOL TARTRATE 5 MG: 5 INJECTION INTRAVENOUS at 05:30

## 2024-01-01 RX ADMIN — CALCIUM GLUCONATE 1 G: 20 INJECTION, SOLUTION INTRAVENOUS at 07:32

## 2024-01-01 RX ADMIN — CEFEPIME HYDROCHLORIDE 2000 MG: 2 INJECTION, SOLUTION INTRAVENOUS at 13:26

## 2024-01-01 RX ADMIN — POLYETHYLENE GLYCOL 3350 17 G: 17 POWDER, FOR SOLUTION ORAL at 10:49

## 2024-01-01 RX ADMIN — NOREPINEPHRINE BITARTRATE 19 MCG/MIN: 1 SOLUTION INTRAVENOUS at 08:40

## 2024-01-01 RX ADMIN — Medication 20000 ML: at 17:20

## 2024-01-01 RX ADMIN — CHLORHEXIDINE GLUCONATE 15 ML: 1.2 RINSE ORAL at 22:48

## 2024-01-01 RX ADMIN — PANTOPRAZOLE SODIUM 40 MG: 40 INJECTION, POWDER, FOR SOLUTION INTRAVENOUS at 11:25

## 2024-01-01 RX ADMIN — HEPARIN SODIUM 11.8 UNITS/KG/HR: 10000 INJECTION, SOLUTION INTRAVENOUS at 12:33

## 2024-01-01 RX ADMIN — IPRATROPIUM BROMIDE AND ALBUTEROL SULFATE 3 ML: 2.5; .5 SOLUTION RESPIRATORY (INHALATION) at 02:46

## 2024-01-01 RX ADMIN — CEFEPIME HYDROCHLORIDE 2000 MG: 2 INJECTION, SOLUTION INTRAVENOUS at 00:06

## 2024-01-01 RX ADMIN — ALBUMIN (HUMAN) 25 G: 0.25 INJECTION, SOLUTION INTRAVENOUS at 19:28

## 2024-01-01 RX ADMIN — GENTAMICIN SULFATE 1 APPLICATION: 1 CREAM TOPICAL at 10:51

## 2024-01-01 RX ADMIN — ALBUMIN (HUMAN) 25 G: 0.25 INJECTION, SOLUTION INTRAVENOUS at 19:20

## 2024-01-01 RX ADMIN — MAGNESIUM SULFATE HEPTAHYDRATE 1 G: 1 INJECTION, SOLUTION INTRAVENOUS at 01:43

## 2024-01-01 RX ADMIN — VASOPRESSIN 0.04 UNITS/MIN: 20 INJECTION INTRAVENOUS at 08:40

## 2024-01-01 RX ADMIN — SODIUM PHOSPHATE, MONOBASIC, MONOHYDRATE AND SODIUM PHOSPHATE, DIBASIC, ANHYDROUS 6 MMOL: 142; 276 INJECTION, SOLUTION INTRAVENOUS at 13:45

## 2024-01-01 RX ADMIN — EPOPROSTENOL 50 NG/KG/MIN: 1.5 INJECTION, POWDER, LYOPHILIZED, FOR SOLUTION INTRAVENOUS at 04:25

## 2024-01-01 RX ADMIN — PHYTONADIONE 10 MG: 10 INJECTION, EMULSION INTRAMUSCULAR; INTRAVENOUS; SUBCUTANEOUS at 13:26

## 2024-01-01 RX ADMIN — CALCIUM GLUCONATE 1 G: 20 INJECTION, SOLUTION INTRAVENOUS at 06:20

## 2024-01-01 RX ADMIN — SODIUM CHLORIDE 500 ML: 0.9 INJECTION, SOLUTION INTRAVENOUS at 05:30

## 2024-01-01 RX ADMIN — INSULIN LISPRO 1 UNITS: 100 INJECTION, SOLUTION INTRAVENOUS; SUBCUTANEOUS at 22:57

## 2024-01-01 RX ADMIN — ALBUMIN (HUMAN) 12.5 G: 12.5 INJECTION, SOLUTION INTRAVENOUS at 21:26

## 2024-01-01 RX ADMIN — CALCIUM GLUCONATE 1 G: 20 INJECTION, SOLUTION INTRAVENOUS at 19:28

## 2024-01-01 RX ADMIN — SODIUM CHLORIDE 500 ML: 0.9 INJECTION, SOLUTION INTRAVENOUS at 19:19

## 2024-01-01 RX ADMIN — LIDOCAINE HYDROCHLORIDE 5 ML: 10 INJECTION, SOLUTION EPIDURAL; INFILTRATION; INTRACAUDAL; PERINEURAL at 12:20

## 2024-01-01 RX ADMIN — MILRINONE LACTATE IN DEXTROSE 0.13 MCG/KG/MIN: 200 INJECTION, SOLUTION INTRAVENOUS at 23:59

## 2024-01-01 RX ADMIN — CALCIUM GLUCONATE 1 G: 20 INJECTION, SOLUTION INTRAVENOUS at 18:56

## 2024-01-01 RX ADMIN — INSULIN LISPRO 1 UNITS: 100 INJECTION, SOLUTION INTRAVENOUS; SUBCUTANEOUS at 05:01

## 2024-01-01 RX ADMIN — CALCIUM ACETATE 1334 MG: 667 CAPSULE ORAL at 08:50

## 2024-01-01 RX ADMIN — Medication 250 MG: at 08:49

## 2024-01-01 RX ADMIN — ONDANSETRON 4 MG: 2 INJECTION INTRAMUSCULAR; INTRAVENOUS at 09:12

## 2024-01-01 RX ADMIN — VANCOMYCIN HYDROCHLORIDE 500 MG: 500 INJECTION, POWDER, LYOPHILIZED, FOR SOLUTION INTRAVENOUS at 15:09

## 2024-01-01 RX ADMIN — ALTEPLASE 2 MG: 2.2 INJECTION, POWDER, LYOPHILIZED, FOR SOLUTION INTRAVENOUS at 13:16

## 2024-01-01 RX ADMIN — LEVOTHYROXINE SODIUM 125 MCG: 125 TABLET ORAL at 08:41

## 2024-01-01 RX ADMIN — ONDANSETRON 4 MG: 2 INJECTION INTRAMUSCULAR; INTRAVENOUS at 19:55

## 2024-01-01 RX ADMIN — Medication 20000 ML: at 09:47

## 2024-01-01 RX ADMIN — INSULIN LISPRO 3 UNITS: 100 INJECTION, SOLUTION INTRAVENOUS; SUBCUTANEOUS at 17:38

## 2024-01-01 RX ADMIN — PIPERACILLIN AND TAZOBACTAM 4.5 G: 4; .5 INJECTION, POWDER, FOR SOLUTION INTRAVENOUS at 17:38

## 2024-01-01 RX ADMIN — VASOPRESSIN 0.04 UNITS/MIN: 20 INJECTION INTRAVENOUS at 16:03

## 2024-01-01 RX ADMIN — NOREPINEPHRINE BITARTRATE 15 MCG/MIN: 1 SOLUTION INTRAVENOUS at 10:01

## 2024-01-01 RX ADMIN — NOREPINEPHRINE BITARTRATE 12 MCG/MIN: 1 SOLUTION INTRAVENOUS at 20:10

## 2024-01-01 RX ADMIN — CALCIUM GLUCONATE 1 G: 20 INJECTION, SOLUTION INTRAVENOUS at 13:03

## 2024-01-01 RX ADMIN — CHLORHEXIDINE GLUCONATE 15 ML: 1.2 RINSE ORAL at 11:25

## 2024-01-01 RX ADMIN — NOREPINEPHRINE BITARTRATE 12 MCG/MIN: 1 SOLUTION INTRAVENOUS at 19:27

## 2024-01-01 RX ADMIN — AMIODARONE HYDROCHLORIDE 1 MG/MIN: 50 INJECTION, SOLUTION INTRAVENOUS at 18:00

## 2024-01-01 RX ADMIN — INSULIN GLARGINE 10 UNITS: 100 INJECTION, SOLUTION SUBCUTANEOUS at 22:15

## 2024-01-01 RX ADMIN — CALCIUM GLUCONATE 1 G: 20 INJECTION, SOLUTION INTRAVENOUS at 13:12

## 2024-01-01 RX ADMIN — PANTOPRAZOLE SODIUM 40 MG: 40 INJECTION, POWDER, FOR SOLUTION INTRAVENOUS at 09:33

## 2024-01-01 RX ADMIN — Medication 20000 ML: at 00:14

## 2024-01-01 RX ADMIN — AMIODARONE HYDROCHLORIDE 1 MG/MIN: 50 INJECTION, SOLUTION INTRAVENOUS at 07:02

## 2024-01-01 RX ADMIN — CALCIUM GLUCONATE 1 G: 20 INJECTION, SOLUTION INTRAVENOUS at 00:40

## 2024-01-01 RX ADMIN — POLYETHYLENE GLYCOL 3350 17 G: 17 POWDER, FOR SOLUTION ORAL at 08:44

## 2024-01-01 RX ADMIN — INSULIN LISPRO 1 UNITS: 100 INJECTION, SOLUTION INTRAVENOUS; SUBCUTANEOUS at 00:13

## 2024-01-01 RX ADMIN — CHLORHEXIDINE GLUCONATE 15 ML: 1.2 RINSE ORAL at 08:40

## 2024-01-01 RX ADMIN — POTASSIUM CHLORIDE 40 MEQ: 29.8 INJECTION, SOLUTION INTRAVENOUS at 01:43

## 2024-01-01 RX ADMIN — Medication 2000 UNITS: at 10:50

## 2024-01-01 RX ADMIN — EPOPROSTENOL 50 NG/KG/MIN: 1.5 INJECTION, POWDER, LYOPHILIZED, FOR SOLUTION INTRAVENOUS at 02:34

## 2024-01-01 RX ADMIN — INSULIN HUMAN 10 UNITS: 100 INJECTION, SOLUTION PARENTERAL at 08:48

## 2024-01-01 RX ADMIN — AMIODARONE HYDROCHLORIDE 1 MG/MIN: 50 INJECTION, SOLUTION INTRAVENOUS at 23:00

## 2024-01-01 RX ADMIN — AMIODARONE HYDROCHLORIDE 1 MG/MIN: 50 INJECTION, SOLUTION INTRAVENOUS at 13:45

## 2024-01-01 RX ADMIN — VANCOMYCIN HYDROCHLORIDE 1500 MG: 10 INJECTION, POWDER, LYOPHILIZED, FOR SOLUTION INTRAVENOUS at 15:00

## 2024-01-01 RX ADMIN — VASOPRESSIN 0.04 UNITS/MIN: 20 INJECTION INTRAVENOUS at 11:27

## 2024-01-01 RX ADMIN — PIPERACILLIN AND TAZOBACTAM 4.5 G: 4; .5 INJECTION, POWDER, FOR SOLUTION INTRAVENOUS at 14:17

## 2024-01-01 RX ADMIN — INSULIN GLARGINE 10 UNITS: 100 INJECTION, SOLUTION SUBCUTANEOUS at 21:39

## 2024-01-01 RX ADMIN — LEVOTHYROXINE SODIUM 125 MCG: 125 TABLET ORAL at 06:16

## 2024-01-01 RX ADMIN — PANTOPRAZOLE SODIUM 40 MG: 40 INJECTION, POWDER, FOR SOLUTION INTRAVENOUS at 08:41

## 2024-01-01 RX ADMIN — INSULIN LISPRO 1 UNITS: 100 INJECTION, SOLUTION INTRAVENOUS; SUBCUTANEOUS at 19:00

## 2024-01-01 RX ADMIN — AMIODARONE HYDROCHLORIDE 150 MG: 50 INJECTION, SOLUTION INTRAVENOUS at 21:05

## 2024-01-01 RX ADMIN — CEFEPIME HYDROCHLORIDE 2000 MG: 2 INJECTION, SOLUTION INTRAVENOUS at 12:28

## 2024-01-01 RX ADMIN — VANCOMYCIN HYDROCHLORIDE 500 MG: 500 INJECTION, POWDER, LYOPHILIZED, FOR SOLUTION INTRAVENOUS at 04:00

## 2024-01-01 RX ADMIN — VASOPRESSIN 0.04 UNITS/MIN: 20 INJECTION INTRAVENOUS at 17:22

## 2024-01-01 RX ADMIN — LEVOTHYROXINE SODIUM 125 MCG: 125 TABLET ORAL at 10:50

## 2024-01-01 RX ADMIN — SODIUM CHLORIDE 1000 ML: 0.9 INJECTION, SOLUTION INTRAVENOUS at 16:36

## 2024-01-01 RX ADMIN — B-COMPLEX W/ C & FOLIC ACID TAB 1 TABLET: TAB at 08:49

## 2024-01-01 RX ADMIN — NOREPINEPHRINE BITARTRATE 20 MCG/MIN: 1 SOLUTION INTRAVENOUS at 00:40

## 2024-01-01 RX ADMIN — PIPERACILLIN AND TAZOBACTAM 4.5 G: 4; .5 INJECTION, POWDER, FOR SOLUTION INTRAVENOUS at 03:00

## 2024-01-01 RX ADMIN — EPOPROSTENOL 50 NG/KG/MIN: 1.5 INJECTION, POWDER, LYOPHILIZED, FOR SOLUTION INTRAVENOUS at 07:06

## 2024-01-01 RX ADMIN — MAGNESIUM SULFATE HEPTAHYDRATE 1 G: 1 INJECTION, SOLUTION INTRAVENOUS at 03:05

## 2024-01-01 RX ADMIN — Medication 20000 ML: at 10:20

## 2024-01-01 RX ADMIN — INSULIN LISPRO 2 UNITS: 100 INJECTION, SOLUTION INTRAVENOUS; SUBCUTANEOUS at 17:32

## 2024-01-01 RX ADMIN — NOREPINEPHRINE BITARTRATE 5 MCG/MIN: 1 SOLUTION INTRAVENOUS at 22:52

## 2024-01-01 RX ADMIN — MIDODRINE HYDROCHLORIDE 10 MG: 5 TABLET ORAL at 06:16

## 2024-01-01 RX ADMIN — VASOPRESSIN 0.04 UNITS/MIN: 20 INJECTION INTRAVENOUS at 00:00

## 2024-01-01 RX ADMIN — GENTAMICIN SULFATE 1 APPLICATION: 1 CREAM TOPICAL at 08:50

## 2024-01-01 RX ADMIN — INSULIN LISPRO 1 UNITS: 100 INJECTION, SOLUTION INTRAVENOUS; SUBCUTANEOUS at 11:12

## 2024-01-01 RX ADMIN — DIGOXIN 250 MCG: 0.25 INJECTION INTRAMUSCULAR; INTRAVENOUS at 11:06

## 2024-01-01 RX ADMIN — Medication 20000 ML: at 17:51

## 2024-01-01 RX ADMIN — VASOPRESSIN 0.04 UNITS/MIN: 20 INJECTION INTRAVENOUS at 01:10

## 2024-01-01 RX ADMIN — INSULIN GLARGINE 10 UNITS: 100 INJECTION, SOLUTION SUBCUTANEOUS at 08:44

## 2024-01-01 RX ADMIN — LEVOTHYROXINE SODIUM 125 MCG: 125 TABLET ORAL at 06:45

## 2024-01-01 RX ADMIN — ALTEPLASE 2 MG: 2.2 INJECTION, POWDER, LYOPHILIZED, FOR SOLUTION INTRAVENOUS at 14:11

## 2024-01-01 RX ADMIN — INSULIN LISPRO 1 UNITS: 100 INJECTION, SOLUTION INTRAVENOUS; SUBCUTANEOUS at 00:56

## 2024-01-01 RX ADMIN — EPOPROSTENOL 50 NG/KG/MIN: 1.5 INJECTION, POWDER, LYOPHILIZED, FOR SOLUTION INTRAVENOUS at 21:13

## 2024-01-01 RX ADMIN — MILRINONE LACTATE IN DEXTROSE 0.13 MCG/KG/MIN: 200 INJECTION, SOLUTION INTRAVENOUS at 10:56

## 2024-01-01 RX ADMIN — EPOPROSTENOL 50 NG/KG/MIN: 1.5 INJECTION, POWDER, LYOPHILIZED, FOR SOLUTION INTRAVENOUS at 09:57

## 2024-01-01 RX ADMIN — CEFEPIME HYDROCHLORIDE 2000 MG: 2 INJECTION, SOLUTION INTRAVENOUS at 12:12

## 2024-01-01 RX ADMIN — GENTAMICIN SULFATE 1 APPLICATION: 1 CREAM TOPICAL at 12:20

## 2024-01-01 RX ADMIN — MAGNESIUM SULFATE HEPTAHYDRATE 1 G: 1 INJECTION, SOLUTION INTRAVENOUS at 06:22

## 2024-01-01 RX ADMIN — Medication 20000 ML: at 21:21

## 2024-01-01 RX ADMIN — CEFEPIME HYDROCHLORIDE 2000 MG: 2 INJECTION, SOLUTION INTRAVENOUS at 00:40

## 2024-01-01 RX ADMIN — Medication 20000 ML: at 00:00

## 2024-01-01 RX ADMIN — NOREPINEPHRINE BITARTRATE 14 MCG/MIN: 1 SOLUTION INTRAVENOUS at 17:20

## 2024-01-01 RX ADMIN — BUMETANIDE 4 MG: 0.25 INJECTION INTRAMUSCULAR; INTRAVENOUS at 12:08

## 2024-01-01 RX ADMIN — MILRINONE LACTATE IN DEXTROSE 0.13 MCG/KG/MIN: 200 INJECTION, SOLUTION INTRAVENOUS at 03:12

## 2024-01-01 RX ADMIN — SODIUM CHLORIDE 1000 ML: 0.9 INJECTION, SOLUTION INTRAVENOUS at 17:41

## 2024-01-01 RX ADMIN — CALCIUM GLUCONATE 1 G: 20 INJECTION, SOLUTION INTRAVENOUS at 16:14

## 2024-01-01 RX ADMIN — CALCIUM GLUCONATE 1 G: 20 INJECTION, SOLUTION INTRAVENOUS at 01:22

## 2024-01-01 RX ADMIN — HYDROMORPHONE HYDROCHLORIDE 0.2 MG: 1 INJECTION, SOLUTION INTRAMUSCULAR; INTRAVENOUS; SUBCUTANEOUS at 15:41

## 2024-01-01 RX ADMIN — NOREPINEPHRINE BITARTRATE 8 MCG/MIN: 1 SOLUTION INTRAVENOUS at 07:48

## 2024-01-01 RX ADMIN — CALCITRIOL CAPSULES 0.25 MCG 0.25 MCG: 0.25 CAPSULE ORAL at 08:49

## 2024-01-01 RX ADMIN — SODIUM PHOSPHATE, MONOBASIC, MONOHYDRATE AND SODIUM PHOSPHATE, DIBASIC, ANHYDROUS 6 MMOL: 142; 276 INJECTION, SOLUTION INTRAVENOUS at 20:17

## 2024-01-01 RX ADMIN — DOCUSATE SODIUM 100 MG: 100 CAPSULE, LIQUID FILLED ORAL at 08:46

## 2024-01-01 RX ADMIN — MAGNESIUM SULFATE HEPTAHYDRATE 1 G: 1 INJECTION, SOLUTION INTRAVENOUS at 17:09

## 2024-01-01 RX ADMIN — CALCIUM GLUCONATE 1 G: 20 INJECTION, SOLUTION INTRAVENOUS at 03:04

## 2024-01-01 RX ADMIN — EPOPROSTENOL 50 NG/KG/MIN: 1.5 INJECTION, POWDER, LYOPHILIZED, FOR SOLUTION INTRAVENOUS at 18:00

## 2024-01-01 RX ADMIN — CHLORHEXIDINE GLUCONATE 15 ML: 1.2 RINSE ORAL at 21:03

## 2024-01-01 RX ADMIN — Medication 20000 ML: at 12:15

## 2024-01-01 RX ADMIN — AMIODARONE HYDROCHLORIDE 1 MG/MIN: 50 INJECTION, SOLUTION INTRAVENOUS at 03:08

## 2024-01-01 RX ADMIN — NOREPINEPHRINE BITARTRATE 8 MCG/MIN: 1 SOLUTION INTRAVENOUS at 04:03

## 2024-01-01 RX ADMIN — POLYETHYLENE GLYCOL 3350 17 G: 17 POWDER, FOR SOLUTION ORAL at 11:25

## 2024-01-01 RX ADMIN — NOREPINEPHRINE BITARTRATE 17 MCG/MIN: 1 SOLUTION INTRAVENOUS at 03:15

## 2024-01-01 RX ADMIN — EPOPROSTENOL 50 NG/KG/MIN: 1.5 INJECTION, POWDER, LYOPHILIZED, FOR SOLUTION INTRAVENOUS at 10:46

## 2024-01-01 RX ADMIN — WARFARIN SODIUM 5 MG: 5 TABLET ORAL at 17:39

## 2024-01-01 RX ADMIN — CALCIUM GLUCONATE 1 G: 20 INJECTION, SOLUTION INTRAVENOUS at 21:02

## 2024-01-01 RX ADMIN — VASOPRESSIN 0.04 UNITS/MIN: 20 INJECTION INTRAVENOUS at 09:17

## 2024-01-01 RX ADMIN — DOCUSATE SODIUM 100 MG: 100 CAPSULE, LIQUID FILLED ORAL at 11:25

## 2024-01-01 RX ADMIN — INSULIN LISPRO 2 UNITS: 100 INJECTION, SOLUTION INTRAVENOUS; SUBCUTANEOUS at 00:47

## 2024-01-01 RX ADMIN — INSULIN LISPRO 2 UNITS: 100 INJECTION, SOLUTION INTRAVENOUS; SUBCUTANEOUS at 06:12

## 2024-01-01 RX ADMIN — MAGNESIUM SULFATE HEPTAHYDRATE 1 G: 1 INJECTION, SOLUTION INTRAVENOUS at 18:57

## 2024-01-01 RX ADMIN — INSULIN LISPRO 2 UNITS: 100 INJECTION, SOLUTION INTRAVENOUS; SUBCUTANEOUS at 14:25

## 2024-01-01 RX ADMIN — CEFTRIAXONE 2000 MG: 2 INJECTION, SOLUTION INTRAVENOUS at 05:57

## 2024-01-01 RX ADMIN — DOCUSATE SODIUM 100 MG: 100 CAPSULE, LIQUID FILLED ORAL at 08:50

## 2024-01-01 RX ADMIN — CEFEPIME HYDROCHLORIDE 2000 MG: 2 INJECTION, SOLUTION INTRAVENOUS at 00:14

## 2024-01-01 RX ADMIN — AMIODARONE HYDROCHLORIDE 150 MG: 50 INJECTION, SOLUTION INTRAVENOUS at 06:49

## 2024-01-01 RX ADMIN — Medication 250 MG: at 10:50

## 2024-01-01 RX ADMIN — INSULIN LISPRO 2 UNITS: 100 INJECTION, SOLUTION INTRAVENOUS; SUBCUTANEOUS at 19:33

## 2024-01-01 RX ADMIN — MAGNESIUM SULFATE HEPTAHYDRATE 1 G: 1 INJECTION, SOLUTION INTRAVENOUS at 14:11

## 2024-01-01 RX ADMIN — LEVOTHYROXINE SODIUM 125 MCG: 125 TABLET ORAL at 11:25

## 2024-01-01 RX ADMIN — B-COMPLEX W/ C & FOLIC ACID TAB 1 TABLET: TAB at 10:49

## 2024-01-01 RX ADMIN — ATORVASTATIN CALCIUM 20 MG: 20 TABLET, FILM COATED ORAL at 17:39

## 2024-01-01 RX ADMIN — SODIUM BICARBONATE 50 MEQ: 84 INJECTION INTRAVENOUS at 08:12

## 2024-01-01 RX ADMIN — INSULIN LISPRO 1 UNITS: 100 INJECTION, SOLUTION INTRAVENOUS; SUBCUTANEOUS at 13:13

## 2024-01-01 RX ADMIN — METOPROLOL SUCCINATE 50 MG: 50 TABLET, EXTENDED RELEASE ORAL at 10:49

## 2024-01-01 RX ADMIN — MILRINONE LACTATE IN DEXTROSE 0.13 MCG/KG/MIN: 200 INJECTION, SOLUTION INTRAVENOUS at 09:20

## 2024-01-01 RX ADMIN — DEXTROSE MONOHYDRATE 25 ML: 25 INJECTION, SOLUTION INTRAVENOUS at 08:13

## 2024-01-01 RX ADMIN — Medication 20000 ML: at 04:32

## 2024-01-01 RX ADMIN — CALCITRIOL CAPSULES 0.25 MCG 0.25 MCG: 0.25 CAPSULE ORAL at 10:49

## 2024-01-01 RX ADMIN — PHYTONADIONE 10 MG: 10 INJECTION, EMULSION INTRAMUSCULAR; INTRAVENOUS; SUBCUTANEOUS at 09:36

## 2024-01-01 RX ADMIN — CHLORHEXIDINE GLUCONATE 15 ML: 1.2 RINSE ORAL at 20:47

## 2024-01-01 RX ADMIN — NOREPINEPHRINE BITARTRATE 12 MCG/MIN: 1 SOLUTION INTRAVENOUS at 15:38

## 2024-01-01 RX ADMIN — GENTAMICIN SULFATE 1 APPLICATION: 1 CREAM TOPICAL at 16:18

## 2024-01-01 RX ADMIN — CALCIUM GLUCONATE 1 G: 20 INJECTION, SOLUTION INTRAVENOUS at 07:49

## 2024-01-01 RX ADMIN — CEFTRIAXONE 2000 MG: 2 INJECTION, SOLUTION INTRAVENOUS at 06:40

## 2024-01-01 RX ADMIN — VANCOMYCIN HYDROCHLORIDE 500 MG: 500 INJECTION, POWDER, LYOPHILIZED, FOR SOLUTION INTRAVENOUS at 02:40

## 2024-01-01 RX ADMIN — Medication 2000 UNITS: at 08:49

## 2024-01-01 RX ADMIN — SODIUM PHOSPHATE, MONOBASIC, MONOHYDRATE AND SODIUM PHOSPHATE, DIBASIC, ANHYDROUS 12 MMOL: 142; 276 INJECTION, SOLUTION INTRAVENOUS at 01:24

## 2024-01-01 RX ADMIN — CHLORHEXIDINE GLUCONATE 15 ML: 1.2 RINSE ORAL at 21:02

## 2024-01-01 RX ADMIN — AMIODARONE HYDROCHLORIDE 1 MG/MIN: 50 INJECTION, SOLUTION INTRAVENOUS at 21:19

## 2024-01-01 RX ADMIN — NOREPINEPHRINE BITARTRATE 15 MCG/MIN: 1 SOLUTION INTRAVENOUS at 02:11

## 2024-01-01 RX ADMIN — CEFEPIME HYDROCHLORIDE 2000 MG: 2 INJECTION, SOLUTION INTRAVENOUS at 12:57

## 2024-01-01 RX ADMIN — MIDODRINE HYDROCHLORIDE 10 MG: 5 TABLET ORAL at 19:28

## 2024-01-01 RX ADMIN — GENTAMICIN SULFATE 1 APPLICATION: 1 CREAM TOPICAL at 11:27

## 2024-01-01 RX ADMIN — Medication 1 CAPSULE: at 10:55

## 2024-01-01 RX ADMIN — CHLORHEXIDINE GLUCONATE 15 ML: 1.2 RINSE ORAL at 08:44

## 2024-01-01 RX ADMIN — ACETAMINOPHEN 650 MG: 325 TABLET ORAL at 06:45

## 2024-01-01 RX ADMIN — DOCUSATE SODIUM 100 MG: 100 CAPSULE, LIQUID FILLED ORAL at 10:49

## 2024-01-01 RX ADMIN — NOREPINEPHRINE BITARTRATE 19 MCG/MIN: 1 SOLUTION INTRAVENOUS at 17:19

## 2024-01-01 RX ADMIN — DIGOXIN 250 MCG: 0.25 INJECTION INTRAMUSCULAR; INTRAVENOUS at 09:21

## 2024-01-01 RX ADMIN — EPOPROSTENOL 50 NG/KG/MIN: 1.5 INJECTION, POWDER, LYOPHILIZED, FOR SOLUTION INTRAVENOUS at 16:59

## 2024-01-01 RX ADMIN — ACETAMINOPHEN 1000 MG: 10 INJECTION INTRAVENOUS at 00:44

## 2024-01-01 RX ADMIN — VASOPRESSIN 0.04 UNITS/MIN: 20 INJECTION INTRAVENOUS at 00:40

## 2024-01-01 RX ADMIN — Medication 20000 ML: at 09:04

## 2024-01-01 RX ADMIN — VASOPRESSIN 0.04 UNITS/MIN: 20 INJECTION INTRAVENOUS at 17:20

## 2024-01-03 NOTE — PROGRESS NOTES
[facility] Internal [facility] Progress Note  Patient: Humera Nassar 66 y o  female   MRN: [id number]  PCP: Bishop Schwab DO  Unit/Bed#: [address] [place] [address] Encounter: [id number]  Date Of Visit: 07/07/18    Problem List:    Principal Problem:    PE (pulmonary thromboembolism) ([email or web address] 75 )  Active Problems:    Acute bronchitis    Elevated brain natriuretic peptide (BNP) level    SIRS (systemic inflammatory response syndrome) ([place] [address] )    Essential hypertension    Acute kidney injury superimposed on CKD ([place] [address] )    Type 2 diabetes, uncontrolled, with renal manifestation (HCC)    Dyspnea    Hypothyroidism      Assessment & Plan:    * PE (pulmonary thromboembolism) ([place] [address] )   Assessment & Plan    · Patient with recent airplane travel  She was complaining of dyspnea and was tachycardic in the ER  Moderate probability as noted on V/Q scan done  Cannot get CTA given poor renal function  · Continue heparin gtt  · bilateral LE dopplers negative for DVTs  · Discussed with patient regarding Coumadin versus NOAC on discharge  Pros and cons of both discussed with patient and patient chooses to be started on NOAC on discharge        SIRS (systemic inflammatory response syndrome) (HCC)   Assessment & Plan    · Likely SIRS response from PE  Fevers resolved  Antibiotics discontinued  · CXR showed NAD  CT chest with no signs of pneumonia or pleural effusions  · UA bland  Blood cx negative  · Rapid strep negative  · Respiratory panel pending        Elevated brain natriuretic peptide (BNP) level   Assessment & Plan    Status post 1 dose of IV Lasix  Does not appear to be in overt heart failure  Echo showed EF of 55 % to 60 % with no regional wall motion abnormalities  Acute bronchitis   Assessment & Plan    Rapid strep negative  respiratory panel pending  urine strep and legionella Ag negative  procalcitonin 0 14 and on repeat is 0 21  Likely viral in nature  Symptoms have improved    discontinued Continue IV rocephin/Azithro  IV Solu-Medrol decreased  IS and respiratory protocol        Dyspnea   Assessment & Plan    · Multifactorial from acute respiratory infection, possible PE  · Breathing improving  Patient stable on room air  · troponins negative  · Query CHANDRA  sleep study once acute issues resolved  Type 2 diabetes, uncontrolled, with renal manifestation [facility])   Assessment & Plan    Lab Results   Component Value Date    HGBA1C 8 8 (H) 07/05/2018       Recent Labs      07/07/18   0352  07/07/18   0711  07/07/18   1122  07/07/18   1603   POCGLU  436*  344*  413*  388*       Blood Sugar Average: Last 72 hrs:  (P) 329     · Blood sugars are elevated partly due to steroid use  Patient is on Humalog 75/25 at home b i d and uses about 35-50 units  Increase NovoLog 70/30 to 45 units b i d  continue sliding scale insulin  · HA1c 8 8        Acute kidney injury superimposed on CKD [facility])   Assessment & Plan    · Chronic kidney disease stage III   · Baseline creatinine 1 7-2 2 per nephrology outpt note from this year, but creatinine on 3/2018 was 2 4 so CKD may be progressing  · Creatinine during this admission is elevated compared to prior value  Unclear if this is acute in nature or progression of her chronic kidney disease  Creatinine trended up today likely due to receiving 1 dose of IV Lasix  · Repeat lab work in the a m  Essential hypertension   Assessment & Plan    Cont Diovan  Hypothyroidism   Assessment & Plan    TSH within normal limits  Not on any medication  VTE Pharmacologic Prophylaxis:   Pharmacologic: Heparin Drip  Mechanical VTE Prophylaxis in Place: No    Patient Centered Rounds: I have performed bedside rounds with nursing staff today  Discussions with Specialists or Other Care Team Provider: Yes    Education and Discussions with Family / Patient:Yes    Time Spent for Care: 35 min  More than 50% of total time spent on counseling and coordination of care as described above      Current Length of Stay: 2 day(s)    Current Patient Status: Inpatient     Discharge Plan: Home    Code Status: Level 1 - Full Code    Certification Statement: The patient will continue to require additional inpatient hospital stay due to PE      Subjective:     Reports improvement in her breathing    Objective:     Vitals:   Temp (24hrs), Av 7 °F (36 5 °C), Min:96 9 °F (36 1 °C), Max:98 7 °F (37 1 °C)    HR:  [] 86  Resp:  [18-28] 18  BP: (132-167)/(70-88) 143/73  SpO2:  [91 %-98 %] 95 %  Body mass index is 42 88 kg/m²  Input and Output Summary (last 24 hours):     No intake or output data in the 24 hours ending 18    Physical Exam:     Physical Exam   Constitutional: She is oriented to person, place, and time  She appears well-developed and well-nourished  No distress  HENT:   Head: Normocephalic and atraumatic  Mouth/Throat: Oropharynx is clear and moist    Eyes: Conjunctivae are normal  Right eye exhibits no discharge  Left eye exhibits no discharge  Neck: Neck supple  No tracheal deviation present  Cardiovascular: Normal rate and regular rhythm  Pulmonary/Chest: Effort normal  No respiratory distress  She has no wheezes  She has no rales  Abdominal: Soft  Bowel sounds are normal  She exhibits no distension  There is no tenderness  Musculoskeletal: She exhibits edema (mild - at baseline as per patient)  Neurological: She is alert and oriented to person, place, and time  No cranial nerve deficit  Skin: Skin is dry  She is not diaphoretic  Psychiatric: She has a normal mood and affect         Additional Data:     Labs:      Results from last 7 days  Lab Units 18  0428   WBC Thousand/uL 6 35   HEMOGLOBIN g/dL 12 2   HEMATOCRIT % 39 0   PLATELETS Thousands/uL 174   NEUTROS PCT % 82*   LYMPHS PCT % 8*   MONOS PCT % 8   EOS PCT % 1       Results from last 7 days  Lab Units 18  0555  18  1916   SODIUM mmol/L 132*  < > 137   POTASSIUM mmol/L 4 6  < > 3 9   CHLORIDE mmol/L 98* < > 102   CO2 mmol/L 21  < > 28   BUN mg/dL 48*  < > 33*   CREATININE mg/dL 2 84*  < > 2 92*   CALCIUM mg/dL 8 6  < > 8 8   TOTAL PROTEIN g/dL  --   --  7 2   BILIRUBIN TOTAL mg/dL  --   --  1 40*   ALK PHOS U/L  --   --  108   ALT U/L  --   --  21   AST U/L  --   --  17   GLUCOSE RANDOM mg/dL 371*  < > 212*   < > = values in this interval not displayed  Results from last 7 days  Lab Units 07/05/18  1916   INR  1 02       * I Have Reviewed All Lab Data Listed Above  * Additional Pertinent Lab Tests Reviewed: [facility] Admission Reviewed    Imaging:  Xr Chest Pa & Lateral    Result Date: 7/5/2018  Narrative: CHEST INDICATION:   R06 02: Shortness of breath R50 9: Fever, unspecified R00 0: Tachycardia, unspecified  COMPARISON:  Chest radiograph 12/27/2013 EXAM PERFORMED/VIEWS:  XR CHEST PA & LATERAL FINDINGS: Mild cardiomegaly is unchanged  Perivascular is within normal limits    The lungs are clear  No pneumothorax or pleural effusion  Osseous structures appear within normal limits for patient age  Impression: No acute cardiopulmonary disease  Workstation performed: [id number]     Nm Lung Ventilation / Perfusion    Result Date: 7/5/2018  Narrative: VENTILATION AND PERFUSION SCAN INDICATION:  Chest pain, shortness of breath  R06 02: Shortness of breath  R50 9: Fever, unspecified  R00 0: Tachycardia, unspecified  COMPARISON:  Chest radiograph  also on this date TECHNIQUE:  Posterior ventilation imaging was performed after the inhalation of 33 mCi Xe-133 gas  Multiplanar perfusion imaging was next performed following the intravenous administration of 4 2 mCi Tc-99m labeled MAA  FINDINGS:  Ventilation imaging demonstrates heterogeneous distribution of radiopharmaceutical throughout both lungs with some clumping of radiotracer noted in the central airways  Perfusion imaging demonstrates a moderate to large matched perfusion/ventilation defect in the posterior left upper lung zone    There is probably also a linear subsegmental matched defect in the posterior left mid to lower lung zone  Subsegmental matched defect in the right lower lung zone  Impression: Intermediate probability for pulmonary embolus  The study was marked in [facility] for immediate notification  Workstation performed: [id number]     Vas Lower Limb Venous Duplex Study, Complete Bilateral    Result Date: 7/6/2018  Narrative:  [facility] REPORT CLINICAL: Indications: Patient presents with recent discovery of pulmonary embolism and physician wants to determine potential source  Patient reports having left calf tenderness 1 day ago  Risk Factors The patient has history of Obesity, HTN, Diabetes (Yes) and CKD  The patient current BMI is 42 38, Weight is 217 lb and height is 60 in  CONCLUSION:  Impression: RIGHT LOWER LIMB: No evidence of acute or chronic deep vein thrombosis  No evidence of superficial thrombophlebitis noted  Doppler evaluation shows a normal response to augmentation maneuvers  Popliteal, posterior tibial and anterior tibial arterial Doppler waveforms are triphasic  LEFT LOWER LIMB: No evidence of acute or chronic deep vein thrombosis  No evidence of superficial thrombophlebitis noted  Doppler evaluation shows a normal response to augmentation maneuvers  Popliteal, posterior tibial and anterior tibial arterial Doppler waveforms are triphasic  Tech note: Bilateral calf veins were not well visualized secondary to body habitus    Technical findings were given to Carla Juan RN at 9:15 am   SIGNATURE: Electronically Signed by: Kong Chamorro MD on 2018-07-06 01:57:08 PM    Imaging Reports Reviewed by myself    Cultures:   Blood Culture:   Lab Results   Component Value Date    BLOODCX No Growth at 24 hrs  07/05/2018    Danny Chavez No Growth at 24 hrs  07/05/2018     Urine Culture: No results found for: URINECX  Sputum Culture: No components found for: SPUTUMCX  Wound Culture: No results found for: WOUNDCULT    Last 24 Hours Medication List:     Current Facility-Administered Medications:  acetaminophen 650 mg Oral Q6H PRN Kika Acevedo MD    heparin (porcine) 3-30 Units/kg/hr (Order-Specific) Intravenous Titrated Julio César Cabral MD Last Rate: 13 Units/kg/hr (07/07/18 0925)   insulin aspart protamine-insulin aspart 45 Units Subcutaneous BID AC Danni Rob DO    insulin lispro 1-5 Units Subcutaneous TID AC Kika Acevedo MD    insulin lispro 1-5 Units Subcutaneous HS Kika Acevedo MD    ipratropium-albuterol 3 mL Nebulization TID Danni Rob DO    And        ipratropium-albuterol 3 mL Nebulization Q4H PRN Danni Rob DO    methylPREDNISolone sodium succinate 20 mg Intravenous Q12H [facility] Danni Rob DO    ondansetron 4 mg Intravenous Q6H PRN Kika Acevedo MD    valsartan 160 mg Oral BID Kika Acevedo MD         Today, Patient Was Seen By: Darci July, DO    ** Please Note: Dragon 360 Dictation voice to text software may have been used in the creation of this document   ** good minus

## 2024-01-18 DIAGNOSIS — R06.02 SHORTNESS OF BREATH: Primary | ICD-10-CM

## 2024-02-05 ENCOUNTER — OFFICE VISIT (OUTPATIENT)
Dept: URGENT CARE | Facility: CLINIC | Age: 69
End: 2024-02-05
Payer: COMMERCIAL

## 2024-02-05 ENCOUNTER — APPOINTMENT (OUTPATIENT)
Dept: RADIOLOGY | Facility: CLINIC | Age: 69
End: 2024-02-05
Payer: COMMERCIAL

## 2024-02-05 VITALS
RESPIRATION RATE: 22 BRPM | DIASTOLIC BLOOD PRESSURE: 81 MMHG | TEMPERATURE: 99.1 F | SYSTOLIC BLOOD PRESSURE: 188 MMHG | BODY MASS INDEX: 35.94 KG/M2 | HEART RATE: 88 BPM | OXYGEN SATURATION: 96 % | WEIGHT: 184 LBS

## 2024-02-05 DIAGNOSIS — R06.02 SOB (SHORTNESS OF BREATH): ICD-10-CM

## 2024-02-05 DIAGNOSIS — R06.02 SOB (SHORTNESS OF BREATH): Primary | ICD-10-CM

## 2024-02-05 PROCEDURE — 87636 SARSCOV2 & INF A&B AMP PRB: CPT | Performed by: PHYSICIAN ASSISTANT

## 2024-02-05 PROCEDURE — 71046 X-RAY EXAM CHEST 2 VIEWS: CPT

## 2024-02-05 PROCEDURE — 99213 OFFICE O/P EST LOW 20 MIN: CPT | Performed by: PHYSICIAN ASSISTANT

## 2024-02-05 RX ORDER — AMOXICILLIN 500 MG/1
1000 TABLET, FILM COATED ORAL 3 TIMES DAILY
Qty: 30 TABLET | Refills: 0 | Status: SHIPPED | OUTPATIENT
Start: 2024-02-05 | End: 2024-02-10

## 2024-02-05 RX ORDER — ALBUTEROL SULFATE 90 UG/1
2 AEROSOL, METERED RESPIRATORY (INHALATION) EVERY 6 HOURS PRN
Qty: 8 G | Refills: 0 | Status: SHIPPED | OUTPATIENT
Start: 2024-02-05

## 2024-02-05 RX ORDER — BENZONATATE 100 MG/1
100 CAPSULE ORAL 3 TIMES DAILY PRN
Qty: 20 CAPSULE | Refills: 0 | Status: SHIPPED | OUTPATIENT
Start: 2024-02-05

## 2024-02-05 NOTE — PATIENT INSTRUCTIONS
I suspect that you have pneumonia. I will call you if the radiologist reads the x-ray as anything different.  Take the antibiotic, albuterol and Tessalon as directed.  Follow-up with your PCP.     If you develop a high fever go to the ER

## 2024-02-05 NOTE — PROGRESS NOTES
[facility]        NAME: Jacquie Smith is a 68 y.o. female  : 1955    MRN: [id number]  DATE: 2024  TIME: 10:32 AM    Assessment and Plan   SOB (shortness of breath) [R06.02]  1. SOB (shortness of breath)  COVID/FLU    XR chest pa & lateral    benzonatate (TESSALON PERLES) 100 mg capsule    amoxicillin (AMOXIL) 500 MG tablet    albuterol (PROVENTIL HFA,VENTOLIN HFA) 90 mcg/act inhaler        Treating the patient for pneumonia with amoxicillin.  Will not give azithromycin given the medical history.  Discussed that I would not give prednisone at this time given her renal disease and diabetes.  She states that she has had prednisone in the past and has adjusted her insulin.  Will follow-up with her PCP if symptoms or not improving.    Patient Instructions     Patient Instructions   I suspect that you have pneumonia. I will call you if the radiologist reads the x-ray as anything different.  Take the antibiotic, albuterol and Tessalon as directed.  Follow-up with your PCP.     If you develop a high fever go to the ER       Follow up with PCP in 3-5 days.  Proceed to  ER if symptoms worsen.    Chief Complaint     Chief Complaint   Patient presents with    Shortness of Breath     Since last Wednesday has had chills , increased sob. Using her home oxygen.          History of Present Illness       The patient is 68-year-old female with a history of type 2 DM , secondary hypothyroidism, hypothyroidism, CHANDRA, nocturnal hypoxemia, pulmonary hypertension with PE, nonrheumatic mitral valve stenosis, paroxysmal atrial flutter, systolic and diastolic congestive heart failure hypertension, dilated cardiomyopathy, sinus bradycardia, gout and end-stage renal disease presenting today for viral symptoms. She reports a cough, chills, congestion x 6 days. Reports trouble breathing due to congestion in the nose and chest. Has been taking Dayquil and using her home oxygen. She reports using the oxygen only as needed  CPAP HABITUATION PROCEDURE    Otis Salas, Ph.D., [facility] and Alonzo Villaseñor M.D.  [facility], [facility]    Some people have difficulty adjusting to CPAP/BiPAP/AutoCPAP.  This is not unusual or hard to understand: Breathing with CPAP is different from ordinary breathing, and this difference is aversive to some. The problem can be overcome, however, and the benefits CPAP confers are certainly worth the effort.  Below, you will find a simple and gradual way to get used to CPAP before you try to use it all night, every night.  The essence of this procedure is to relax and let breathing with CPAP become a habit.  It may take about 2 weeks, and involves the followin. CPAP while awake and comfortably seated, during the late evening.     2. CPAP in bed while attempting sleep at night.     3. If your discomfort is too great at any time, discontinue and attempt again later the same night, for the same amount of time.   4. You and your physician may alter the times and pressures if necessary.     5. If you find that it is very easy to get used to CPAP, you may start using it every night when you are comfortable enough to do so.  6. IMPORTANT REMINDER: If you have a cold or sinus congestion it is okay to miss a night or two of CPAP. Consider using antihistamines or decongestants to clear up your sinus congestion prior to sleeping.    DAYS  1-3   Start CPAP while awake and comfortably seated during the late evening, after having prepared for bed.  You may do this while watching television, listening to music or reading. Use for 1 hour, then take off CPAP and go directly to bed to sleep    DAYS  4-6   Start CPAP when you go to bed and use for 1 hour, or until you fall asleep.  If your discomfort is too great at any time, discontinue and attempt again later the same night, for the same designated amount of time (1 hour).     DAYS  7-9   Increase time with CPAP to 2 hours a  at night.  She does sleep which she does not use. She denies CP.         Review of Systems   Review of Systems   Constitutional:  Positive for chills. Negative for activity change, appetite change, fatigue and fever.   HENT:  Positive for congestion. Negative for ear pain, rhinorrhea, sinus pressure, sinus pain and sore throat.    Eyes:  Negative for pain and visual disturbance.   Respiratory:  Positive for cough and shortness of breath. Negative for chest tightness.    Cardiovascular:  Negative for chest pain and palpitations.   Gastrointestinal:  Negative for abdominal pain, diarrhea, nausea and vomiting.   Genitourinary:  Negative for dysuria and hematuria.   Musculoskeletal:  Negative for arthralgias, back pain and myalgias.   Skin:  Negative for color change, pallor and rash.   Neurological:  Negative for seizures, syncope and headaches.   All other systems reviewed and are negative.        Current Medications       Current Outpatient Medications:     albuterol (PROVENTIL HFA,VENTOLIN HFA) 90 mcg/act inhaler, Inhale 2 puffs every 6 (six) hours as needed for wheezing, Disp: 8 g, Rfl: 0    amoxicillin (AMOXIL) 500 MG tablet, Take 2 tablets (1,000 mg total) by mouth 3 (three) times a day for 5 days, Disp: 30 tablet, Rfl: 0    benzonatate (TESSALON PERLES) 100 mg capsule, Take 1 capsule (100 mg total) by mouth 3 (three) times a day as needed for cough, Disp: 20 capsule, Rfl: 0    atorvastatin (LIPITOR) 20 mg tablet, Take 1 tablet (20 mg total) by mouth daily, Disp: 90 tablet, Rfl: 3    B Complex-C (B-COMPLEX WITH VITAMIN C) tablet, Take 1 tablet by mouth daily, Disp: , Rfl:     Blood Glucose Monitoring Suppl (ONE TOUCH ULTRA 2) w/Device KIT, Test glucose 3 times daily, Disp: 1 each, Rfl: 0    calcitriol (ROCALTROL) 0.25 mcg capsule, Take 1 capsule (0.25 mcg total) by mouth daily, Disp: 90 capsule, Rfl: 1    calcium acetate (PHOSLO) capsule, Take 667 mg by mouth daily , Disp: , Rfl:     cholecalciferol (VITAMIN D3)  night.  If your discomfort is too great at any time, discontinue and attempt again later the same night, for the same designated amount of time (2 hours).     DAYS 10-12  Increase time with CPAP to 3 hours a night. If your discomfort is too great at any time, discontinue and attempt again later the same night, for the same designated amount of time (3 hours).     DAYS 13-15   Sleep the entire night with CPAP.     OPTIONAL: You may use Progressive Muscle Relaxation (PMR) to help put you at ease when using CPAP; do PMR twice each day, once in the morning or afternoon, and once in the evening just before using CPAP. You may do PMR prior to any attempt until you are comfortable with CPAP.       1,000 units tablet, Take 2 tablets (2,000 Units total) by mouth daily, Disp: 100 tablet, Rfl: 5    cinacalcet (SENSIPAR) 30 mg tablet, TAKE 1 TABLET THREE TIMES A WEEK, Disp: 36 tablet, Rfl: 3    Continuous Blood Gluc Sensor (FreeStyle Conner 2 Sensor) MISC, Use to check blood sugars, Disp: 6 each, Rfl: 4    docusate sodium (COLACE) 100 mg capsule, Take 1 capsule (100 mg total) by mouth 2 (two) times a day (Patient not taking: Reported on 3/23/2023), Disp: 10 capsule, Rfl: 0    gentamicin (GARAMYCIN) 0.1 % cream, APPLY TOPICALLY DAILY, Disp: 15 g, Rfl: 23    Insulin Glargine Solostar (Semglee) 100 UNIT/ML SOPN, Inject under the skin 26 units at bedtime., Disp: 30 mL, Rfl: 3    insulin lispro (HumaLOG KwikPen) 100 units/mL injection pen, Inject 3 Units under the skin 3 (three) times a day with meals, Disp: 15 mL, Rfl: 3    Insulin Pen Needle 31G X 5 MM MISC, by Does not apply route daily, Disp: 100 each, Rfl: 1    Lancets (onetouch ultrasoft) lancets, Test glucose 3 times a day; Code: E11.8, Disp: 300 each, Rfl: 1    levothyroxine (Euthyrox) 112 mcg tablet, Take one tablet by mouth in early morning on empty stomach, Disp: 90 tablet, Rfl: 3    metolazone (ZAROXOLYN) 5 mg tablet, Take 5 mg by mouth daily  (Patient not taking: Reported on 5/18/2023), Disp: , Rfl:     metoprolol succinate (TOPROL-XL) 50 mg 24 hr tablet, Take 1 tablet (50 mg total) by mouth daily, Disp: 90 tablet, Rfl: 3    MULTIPLE VITAMIN PO, Take 1 tablet by mouth daily, Disp: , Rfl:     nystatin (MYCOSTATIN) powder, Apply topically 3 (three) times a day for 7 days (Patient not taking: Reported on 3/23/2023), Disp: 15 g, Rfl: 0    OneTouch Ultra test strip, TEST GLUCOSE THREE TIMES A DAY, Disp: 300 strip, Rfl: 3    semaglutide, 1 mg/dose, (Ozempic, 1 MG/DOSE,) 4 mg/3 mL injection pen, Inject 0.75 mL (1 mg total) under the skin every 7 days (Patient not taking: Reported on 9/14/2023), Disp: 9 mL, Rfl: 3    Semglee, yfgn, 100 UNIT/ML SOPN, , Disp: , Rfl:      torsemide (DEMADEX) 100 mg tablet, Take 1 tablet (100 mg total) by mouth daily, Disp: 90 tablet, Rfl: 1    valsartan (DIOVAN) 320 MG tablet, Take 0.5 tablets (160 mg total) by mouth daily, Disp: 90 tablet, Rfl: 0    warfarin (COUMADIN) 5 mg tablet, , Disp: , Rfl:     Current Allergies     Allergies as of 2024    (No Known Allergies)            The following portions of the patient's history were reviewed and updated as appropriate: allergies, current medications, past family history, past medical history, past social history, past surgical history and problem list.     Past Medical History:   Diagnosis Date    Acute asthmatic bronchitis     last assessed: 2017    Cardiac disease     Chronic diastolic (congestive) heart failure (HCC)     Chronic kidney disease     pt is on peritoneal dialysis daily from 2200- 0600    Closed nondisplaced fracture of distal phalanx of right great toe 2018    Colon polyp     CPAP (continuous positive airway pressure) dependence     Diabetes mellitus (HCC)     Hyperlipidemia     Hypertension     Medical non-compliance     Morbid obesity with BMI of 40.0-44.9, adult ([facility]) 2019    On continuous oral anticoagulation 2020    Pneumonia     last assessed: 2013    PONV (postoperative nausea and vomiting)     Pulmonary embolism ([facility])     Renal disorder     possible clot noted in kidney, thus blood thinners currently    Severe obstructive sleep apnea     Severe pulmonary arterial systolic hypertension (HCC)     Tinea pedis of both feet 2018       Past Surgical History:   Procedure Laterality Date    CATARACT EXTRACTION       SECTION       x 1    EYE SURGERY      NOSE SURGERY      fractured nose - septoplasty       Family History   Problem Relation Age of Onset    Diabetes Mother         mellitus    Anxiety disorder Mother         generalized anxiety disorder    Hypertension Mother     Stroke Mother     Diabetes type II Mother     Kidney  disease Father     Kidney failure Father         renal failure    Ulcerative colitis Sister     Diabetes Sister     Ovarian cancer Maternal Aunt     Diabetes Maternal Aunt     Diabetes Maternal Uncle     Heart disease Maternal Uncle     Heart disease Family     Thyroid disease Neg Hx          Medications have been verified.        Objective   BP (!) 188/81   Pulse 88   Temp 99.1 °F (37.3 °C)   Resp 22   Wt 83.5 kg (184 lb)   SpO2 96%   BMI 35.94 kg/m²        Physical Exam     Physical Exam  Vitals and nursing note reviewed.   Constitutional:       General: She is not in acute distress.     Appearance: Normal appearance. She is well-developed and normal weight. She is not ill-appearing, toxic-appearing or diaphoretic.      Interventions: She is not intubated.  HENT:      Head: Normocephalic and atraumatic.      Right Ear: Tympanic membrane and ear canal normal. No drainage, swelling or tenderness. No middle ear effusion. Tympanic membrane is not erythematous.      Left Ear: Tympanic membrane and ear canal normal. No drainage, swelling or tenderness.  No middle ear effusion. Tympanic membrane is not erythematous.      Nose: No congestion or rhinorrhea.      Mouth/Throat:      Mouth: Mucous membranes are moist. No oral lesions.      Pharynx: Oropharynx is clear. Uvula midline. No pharyngeal swelling, oropharyngeal exudate, posterior oropharyngeal erythema or uvula swelling.      Tonsils: No tonsillar exudate or tonsillar abscesses. 0 on the right. 0 on the left.   Eyes:      Extraocular Movements:      Right eye: Normal extraocular motion.      Left eye: Normal extraocular motion.      Conjunctiva/sclera: Conjunctivae normal.      Pupils: Pupils are equal, round, and reactive to light.   Cardiovascular:      Rate and Rhythm: Normal rate and regular rhythm.   Pulmonary:      Effort: Pulmonary effort is normal. No tachypnea, bradypnea, accessory muscle usage or respiratory distress. She is not intubated.       Breath sounds: No stridor. Wheezing and rhonchi present. No decreased breath sounds or rales.   Chest:      Chest wall: No tenderness.   Skin:     General: Skin is warm and dry.      Capillary Refill: Capillary refill takes less than 2 seconds.   Neurological:      Mental Status: She is alert.

## 2024-02-06 DIAGNOSIS — N18.6 ESRD ON PERITONEAL DIALYSIS (HCC): Primary | ICD-10-CM

## 2024-02-06 DIAGNOSIS — Z99.2 ESRD ON PERITONEAL DIALYSIS (HCC): Primary | ICD-10-CM

## 2024-02-06 LAB
FLUAV RNA RESP QL NAA+PROBE: NEGATIVE
FLUBV RNA RESP QL NAA+PROBE: NEGATIVE
SARS-COV-2 RNA RESP QL NAA+PROBE: NEGATIVE

## 2024-02-09 ENCOUNTER — OFFICE VISIT (OUTPATIENT)
Dept: FAMILY MEDICINE CLINIC | Facility: CLINIC | Age: 69
End: 2024-02-09
Payer: MEDICARE

## 2024-02-09 VITALS
DIASTOLIC BLOOD PRESSURE: 80 MMHG | HEIGHT: 60 IN | RESPIRATION RATE: 20 BRPM | WEIGHT: 184.38 LBS | HEART RATE: 78 BPM | BODY MASS INDEX: 36.2 KG/M2 | TEMPERATURE: 97.2 F | SYSTOLIC BLOOD PRESSURE: 130 MMHG

## 2024-02-09 DIAGNOSIS — N18.6 ESRD (END STAGE RENAL DISEASE) (HCC): ICD-10-CM

## 2024-02-09 DIAGNOSIS — Z79.4 TYPE 2 DIABETES MELLITUS WITH HYPEROSMOLARITY WITHOUT COMA, WITH LONG-TERM CURRENT USE OF INSULIN (HCC): ICD-10-CM

## 2024-02-09 DIAGNOSIS — J06.9 ACUTE UPPER RESPIRATORY INFECTION: Primary | ICD-10-CM

## 2024-02-09 DIAGNOSIS — N25.81 SECONDARY HYPERPARATHYROIDISM OF RENAL ORIGIN (HCC): ICD-10-CM

## 2024-02-09 DIAGNOSIS — I42.0 DILATED CARDIOMYOPATHY (HCC): ICD-10-CM

## 2024-02-09 DIAGNOSIS — E11.00 TYPE 2 DIABETES MELLITUS WITH HYPEROSMOLARITY WITHOUT COMA, WITH LONG-TERM CURRENT USE OF INSULIN (HCC): ICD-10-CM

## 2024-02-09 DIAGNOSIS — I48.92 PAROXYSMAL ATRIAL FLUTTER (HCC): ICD-10-CM

## 2024-02-09 PROCEDURE — 99214 OFFICE O/P EST MOD 30 MIN: CPT | Performed by: NURSE PRACTITIONER

## 2024-02-09 RX ORDER — DEXTROMETHORPHAN HYDROBROMIDE AND PROMETHAZINE HYDROCHLORIDE 15; 6.25 MG/5ML; MG/5ML
5 SYRUP ORAL 4 TIMES DAILY PRN
Qty: 240 ML | Refills: 0 | Status: SHIPPED | OUTPATIENT
Start: 2024-02-09

## 2024-02-09 RX ORDER — PREDNISONE 20 MG/1
20 TABLET ORAL DAILY
Qty: 5 TABLET | Refills: 0 | Status: SHIPPED | OUTPATIENT
Start: 2024-02-09

## 2024-02-09 RX ORDER — ALBUTEROL SULFATE 2.5 MG/3ML
2.5 SOLUTION RESPIRATORY (INHALATION) EVERY 6 HOURS PRN
Qty: 100 ML | Refills: 0 | Status: SHIPPED | OUTPATIENT
Start: 2024-02-09

## 2024-02-09 NOTE — ASSESSMENT & PLAN NOTE
Lab Results   Component Value Date    EGFR 6 09/11/2023    EGFR 5 05/30/2023    EGFR 7 (L) 03/06/2023    CREATININE 6.33 (H) 09/11/2023    CREATININE 6.69 (H) 05/30/2023    CREATININE 6.38 (H) 03/06/2023       Management per nephrology  
Management per endo  
Management per endo  Lab Results   Component Value Date    HGBA1C 7.3 (H) 09/11/2023     
stable  
Chad STEVENS, Rojelio Fellow

## 2024-02-09 NOTE — PROGRESS NOTES
Assessment/Plan:    Will start on Albuterol nebulizers and low dose Prednisone.    Close f/u next week if no improvement    1. Acute upper respiratory infection  -     albuterol (2.5 mg/3 mL) 0.083 % nebulizer solution; Take 3 mL (2.5 mg total) by nebulization every 6 (six) hours as needed for wheezing or shortness of breath  -     predniSONE 20 mg tablet; Take 1 tablet (20 mg total) by mouth daily  -     promethazine-dextromethorphan (PHENERGAN-DM) 6.25-15 mg/5 mL oral syrup; Take 5 mL by mouth 4 (four) times a day as needed for cough    2. ESRD (end stage renal disease) ([facility])  Assessment & Plan:  Lab Results   Component Value Date    EGFR 6 09/11/2023    EGFR 5 05/30/2023    EGFR 7 (L) 03/06/2023    CREATININE 6.33 (H) 09/11/2023    CREATININE 6.69 (H) 05/30/2023    CREATININE 6.38 (H) 03/06/2023       Management per nephrology      3. Dilated cardiomyopathy ([facility])    4. Paroxysmal atrial flutter ([facility])  Assessment & Plan:  stable      5. Secondary hyperparathyroidism of renal origin ([facility])  Assessment & Plan:  Management per endo      6. Type 2 diabetes mellitus with hyperosmolarity without coma, with long-term current use of insulin ([facility])  Assessment & Plan:  Management per endo  Lab Results   Component Value Date    HGBA1C 7.3 (H) 09/11/2023               There are no Patient Instructions on file for this visit.    Return if symptoms worsen or fail to improve.    Subjective:      Patient ID: Jacquie Smith is a 68 y.o. female.    Chief Complaint   Patient presents with   • Cough     Cough, wheezing, fatigue - UC tested for covid/flu- neg on 2/5 lw cma       She has been sick for the past 10 days.  Saw at urgent care, was started on Amoxicillin for presumptive pneumonia, however CXR came back normal.   Cough is not improving and she feels a little winded.  No fevers.  Covid/flu negative        The following portions of the patient's history were reviewed and updated as appropriate: allergies, current medications, past  family history, past medical history, past social history, past surgical history and problem list.    Review of Systems   Constitutional:  Positive for fatigue. Negative for chills and fever.   HENT:  Positive for congestion. Negative for ear pain, postnasal drip, rhinorrhea, sinus pressure and sore throat.    Respiratory:  Positive for cough and wheezing (with laying down). Negative for shortness of breath.    Cardiovascular:  Negative for chest pain.   Gastrointestinal:  Negative for abdominal pain, diarrhea, nausea and vomiting.   Musculoskeletal:  Negative for arthralgias.   Skin:  Negative for rash.   Neurological:  Negative for headaches.         Current Outpatient Medications   Medication Sig Dispense Refill   • albuterol (2.5 mg/3 mL) 0.083 % nebulizer solution Take 3 mL (2.5 mg total) by nebulization every 6 (six) hours as needed for wheezing or shortness of breath 100 mL 0   • albuterol (PROVENTIL HFA,VENTOLIN HFA) 90 mcg/act inhaler Inhale 2 puffs every 6 (six) hours as needed for wheezing 8 g 0   • amoxicillin (AMOXIL) 500 MG tablet Take 2 tablets (1,000 mg total) by mouth 3 (three) times a day for 5 days 30 tablet 0   • atorvastatin (LIPITOR) 20 mg tablet Take 1 tablet (20 mg total) by mouth daily 90 tablet 3   • B Complex-C (B-COMPLEX WITH VITAMIN C) tablet Take 1 tablet by mouth daily     • Blood Glucose Monitoring Suppl (ONE TOUCH ULTRA 2) w/Device KIT Test glucose 3 times daily 1 each 0   • calcitriol (ROCALTROL) 0.25 mcg capsule Take 1 capsule (0.25 mcg total) by mouth daily 90 capsule 1   • calcium acetate (PHOSLO) capsule Take 667 mg by mouth daily      • cholecalciferol (VITAMIN D3) 1,000 units tablet Take 2 tablets (2,000 Units total) by mouth daily 100 tablet 5   • cinacalcet (SENSIPAR) 30 mg tablet TAKE 1 TABLET THREE TIMES A WEEK 36 tablet 3   • Continuous Blood Gluc Sensor (FreeStyle Conner 2 Sensor) MISC Use to check blood sugars 6 each 4   • docusate sodium (COLACE) 100 mg capsule Take 1  capsule (100 mg total) by mouth 2 (two) times a day 10 capsule 0   • gentamicin (GARAMYCIN) 0.1 % cream APPLY TOPICALLY DAILY 15 g 23   • Insulin Glargine Solostar (Semglee) 100 UNIT/ML SOPN Inject under the skin 26 units at bedtime. 30 mL 3   • insulin lispro (HumaLOG KwikPen) 100 units/mL injection pen Inject 3 Units under the skin 3 (three) times a day with meals 15 mL 3   • Insulin Pen Needle 31G X 5 MM MISC by Does not apply route daily 100 each 1   • Lancets (onetouch ultrasoft) lancets Test glucose 3 times a day; Code: E11.8 300 each 1   • levothyroxine (Euthyrox) 112 mcg tablet Take one tablet by mouth in early morning on empty stomach 90 tablet 3   • metolazone (ZAROXOLYN) 5 mg tablet Take 5 mg by mouth daily     • metoprolol succinate (TOPROL-XL) 50 mg 24 hr tablet Take 1 tablet (50 mg total) by mouth daily 90 tablet 3   • MULTIPLE VITAMIN PO Take 1 tablet by mouth daily     • nystatin (MYCOSTATIN) 500,000 units/5 mL suspension Apply 5 mL (500,000 Units total) to the mouth or throat 4 (four) times a day for 6 days 120 mL 0   • OneTouch Ultra test strip TEST GLUCOSE THREE TIMES A  strip 3   • predniSONE 20 mg tablet Take 1 tablet (20 mg total) by mouth daily 5 tablet 0   • promethazine-dextromethorphan (PHENERGAN-DM) 6.25-15 mg/5 mL oral syrup Take 5 mL by mouth 4 (four) times a day as needed for cough 240 mL 0   • semaglutide, 1 mg/dose, (Ozempic, 1 MG/DOSE,) 4 mg/3 mL injection pen Inject 0.75 mL (1 mg total) under the skin every 7 days 9 mL 3   • torsemide (DEMADEX) 100 mg tablet Take 1 tablet (100 mg total) by mouth daily 90 tablet 1   • valsartan (DIOVAN) 320 MG tablet Take 0.5 tablets (160 mg total) by mouth daily 90 tablet 0   • warfarin (COUMADIN) 5 mg tablet      • benzonatate (TESSALON PERLES) 100 mg capsule Take 1 capsule (100 mg total) by mouth 3 (three) times a day as needed for cough (Patient not taking: Reported on 2/9/2024) 20 capsule 0   • nystatin (MYCOSTATIN) powder Apply topically  3 (three) times a day for 7 days (Patient not taking: Reported on 3/23/2023) 15 g 0   • Semglee, yfgn, 100 UNIT/ML SOPN  (Patient not taking: Reported on 3/23/2023)       No current facility-administered medications for this visit.       Objective:    /80   Pulse 78   Temp (!) 97.2 °F (36.2 °C) (Temporal)   Resp 20   Ht 5' (1.524 m)   Wt 83.6 kg (184 lb 6 oz)   BMI 36.01 kg/m²        Physical Exam  Vitals and nursing note reviewed.   Constitutional:       Appearance: Normal appearance. She is well-developed.   HENT:      Right Ear: Tympanic membrane normal.      Left Ear: Tympanic membrane normal.      Nose: Nose normal.      Mouth/Throat:      Pharynx: Oropharynx is clear.   Eyes:      Conjunctiva/sclera: Conjunctivae normal.   Cardiovascular:      Rate and Rhythm: Normal rate and regular rhythm.      Pulses: Normal pulses.      Heart sounds: Normal heart sounds. No murmur heard.  Pulmonary:      Effort: Pulmonary effort is normal.      Breath sounds: Wheezing (bilateral lower lobes) present.   Lymphadenopathy:      Cervical: No cervical adenopathy.   Skin:     General: Skin is warm and dry.   Neurological:      Mental Status: She is alert.   Psychiatric:         Mood and Affect: Mood normal.         Behavior: Behavior normal.                MICHAEL Weber

## 2024-03-05 ENCOUNTER — APPOINTMENT (OUTPATIENT)
Dept: LAB | Facility: HOSPITAL | Age: 69
End: 2024-03-05
Payer: MEDICARE

## 2024-03-05 DIAGNOSIS — I10 ESSENTIAL HYPERTENSION: ICD-10-CM

## 2024-03-05 DIAGNOSIS — I48.92 PAROXYSMAL ATRIAL FLUTTER (HCC): ICD-10-CM

## 2024-03-05 DIAGNOSIS — N18.6 ESRD (END STAGE RENAL DISEASE) (HCC): ICD-10-CM

## 2024-03-05 DIAGNOSIS — Z79.4 TYPE 2 DIABETES MELLITUS WITH HYPERGLYCEMIA, WITH LONG-TERM CURRENT USE OF INSULIN (HCC): ICD-10-CM

## 2024-03-05 DIAGNOSIS — E11.65 TYPE 2 DIABETES MELLITUS WITH HYPERGLYCEMIA, WITH LONG-TERM CURRENT USE OF INSULIN (HCC): ICD-10-CM

## 2024-03-05 DIAGNOSIS — E03.9 ACQUIRED HYPOTHYROIDISM: ICD-10-CM

## 2024-03-05 DIAGNOSIS — E78.2 MIXED HYPERLIPIDEMIA: ICD-10-CM

## 2024-03-05 DIAGNOSIS — I42.0 DILATED CARDIOMYOPATHY (HCC): ICD-10-CM

## 2024-03-05 LAB
ALBUMIN SERPL BCP-MCNC: 3.5 G/DL (ref 3.5–5)
ALP SERPL-CCNC: 158 U/L (ref 34–104)
ALT SERPL W P-5'-P-CCNC: 17 U/L (ref 7–52)
ANION GAP SERPL CALCULATED.3IONS-SCNC: 10 MMOL/L
AST SERPL W P-5'-P-CCNC: 11 U/L (ref 13–39)
BILIRUB SERPL-MCNC: 0.66 MG/DL (ref 0.2–1)
BUN SERPL-MCNC: 52 MG/DL (ref 5–25)
CALCIUM SERPL-MCNC: 8.4 MG/DL (ref 8.4–10.2)
CHLORIDE SERPL-SCNC: 108 MMOL/L (ref 96–108)
CHOLEST SERPL-MCNC: 127 MG/DL
CO2 SERPL-SCNC: 24 MMOL/L (ref 21–32)
CREAT SERPL-MCNC: 5.04 MG/DL (ref 0.6–1.3)
EST. AVERAGE GLUCOSE BLD GHB EST-MCNC: 192 MG/DL
GFR SERPL CREATININE-BSD FRML MDRD: 8 ML/MIN/1.73SQ M
GLUCOSE P FAST SERPL-MCNC: 92 MG/DL (ref 65–99)
HBA1C MFR BLD: 8.3 %
HDLC SERPL-MCNC: 48 MG/DL
LDLC SERPL CALC-MCNC: 69 MG/DL (ref 0–100)
NONHDLC SERPL-MCNC: 79 MG/DL
POTASSIUM SERPL-SCNC: 4.9 MMOL/L (ref 3.5–5.3)
PROT SERPL-MCNC: 6.6 G/DL (ref 6.4–8.4)
SODIUM SERPL-SCNC: 142 MMOL/L (ref 135–147)
T4 FREE SERPL-MCNC: 0.81 NG/DL (ref 0.61–1.12)
TRIGL SERPL-MCNC: 52 MG/DL
TSH SERPL DL<=0.05 MIU/L-ACNC: 11.38 UIU/ML (ref 0.45–4.5)

## 2024-03-05 PROCEDURE — 80061 LIPID PANEL: CPT

## 2024-03-05 PROCEDURE — 83036 HEMOGLOBIN GLYCOSYLATED A1C: CPT

## 2024-03-05 PROCEDURE — 84439 ASSAY OF FREE THYROXINE: CPT

## 2024-03-05 PROCEDURE — 84443 ASSAY THYROID STIM HORMONE: CPT

## 2024-03-05 PROCEDURE — 82985 ASSAY OF GLYCATED PROTEIN: CPT

## 2024-03-05 PROCEDURE — 80053 COMPREHEN METABOLIC PANEL: CPT

## 2024-03-06 LAB — FRUCTOSAMINE SERPL-SCNC: 309 UMOL/L (ref 0–285)

## 2024-03-08 ENCOUNTER — OFFICE VISIT (OUTPATIENT)
Dept: ENDOCRINOLOGY | Facility: CLINIC | Age: 69
End: 2024-03-08
Payer: MEDICARE

## 2024-03-08 VITALS
HEART RATE: 78 BPM | HEIGHT: 60 IN | DIASTOLIC BLOOD PRESSURE: 80 MMHG | BODY MASS INDEX: 39.19 KG/M2 | OXYGEN SATURATION: 98 % | SYSTOLIC BLOOD PRESSURE: 126 MMHG | WEIGHT: 199.6 LBS

## 2024-03-08 DIAGNOSIS — E11.65 TYPE 2 DIABETES MELLITUS WITH HYPERGLYCEMIA, WITH LONG-TERM CURRENT USE OF INSULIN (HCC): Primary | ICD-10-CM

## 2024-03-08 DIAGNOSIS — E03.9 HYPOTHYROIDISM, UNSPECIFIED TYPE: ICD-10-CM

## 2024-03-08 DIAGNOSIS — I27.20 PULMONARY HYPERTENSION (HCC): ICD-10-CM

## 2024-03-08 DIAGNOSIS — Z79.4 TYPE 2 DIABETES MELLITUS WITH HYPERGLYCEMIA, WITH LONG-TERM CURRENT USE OF INSULIN (HCC): Primary | ICD-10-CM

## 2024-03-08 DIAGNOSIS — E03.9 ACQUIRED HYPOTHYROIDISM: ICD-10-CM

## 2024-03-08 DIAGNOSIS — N18.6 ESRD (END STAGE RENAL DISEASE) (HCC): ICD-10-CM

## 2024-03-08 DIAGNOSIS — E78.2 MIXED HYPERLIPIDEMIA: ICD-10-CM

## 2024-03-08 DIAGNOSIS — I10 ESSENTIAL HYPERTENSION: ICD-10-CM

## 2024-03-08 DIAGNOSIS — E66.01 MORBID OBESITY WITH BMI OF 40.0-44.9, ADULT (HCC): ICD-10-CM

## 2024-03-08 PROCEDURE — 99215 OFFICE O/P EST HI 40 MIN: CPT | Performed by: INTERNAL MEDICINE

## 2024-03-08 PROCEDURE — G2211 COMPLEX E/M VISIT ADD ON: HCPCS | Performed by: INTERNAL MEDICINE

## 2024-03-08 RX ORDER — INSULIN GLARGINE-YFGN 100 [IU]/ML
INJECTION, SOLUTION SUBCUTANEOUS
Qty: 15 ML | Refills: 3 | Status: SHIPPED | OUTPATIENT
Start: 2024-03-08

## 2024-03-08 RX ORDER — INSULIN LISPRO 100 [IU]/ML
3 INJECTION, SOLUTION INTRAVENOUS; SUBCUTANEOUS
Qty: 15 ML | Refills: 3 | Status: SHIPPED | OUTPATIENT
Start: 2024-03-08

## 2024-03-08 RX ORDER — LEVOTHYROXINE SODIUM 0.12 MG/1
TABLET ORAL
Qty: 90 TABLET | Refills: 3 | Status: SHIPPED | OUTPATIENT
Start: 2024-03-08

## 2024-03-08 NOTE — PATIENT INSTRUCTIONS
Inject Semglee/ long acting insulin 10 units subcutaneously daily in the morning;  do not vary dose based on blood sugars  Start Humalog 3 units with meals 3 times a day.  Please take before eating rather than after.  Check blood sugars more often at least before each meal and at bedtime  Will plan to upgrade to ivan 3  Plan for review of blood sugars in 2 to 3 weeks, please call so we can download your CGM report  I am renewing your prescription of Ozempic, please do not start it until I have reviewed your blood sugars so we can make changes to your insulin doses as may be needed    Increase levothyroxine to 125 mcg orally daily  Repeat thyroid function test in 6 to 8 weeks

## 2024-03-08 NOTE — PROGRESS NOTES
Jacquie Smith 69 y.o. female MRN: [id number]    Encounter: [id number]      Assessment/Plan     Type 2 diabetes mellitus on long-term insulin therapy with hyperglycemia  End-stage renal disease on dialysis  Obesity, BMI 36  Last A1c 8.3%, has trended up  Fructosamine 309 which has also worsened, poorly controlled    Recommend the following at this time  Advised to Inject Semglee 10 units subcutaneously daily in the morning;  advised to not vary dose based on blood sugars  Start Humalog 3 units with meals 3 times a day.  Should take before eating rather than after.  Check blood sugars more often at least before each meal and at bedtime  Will upgrade to ivan 3  Plan for review of blood sugars in 2 to 3 weeks,  Prescription of Ozempic is being renewed; advised patient to not start till   I have reviewed your blood sugars so that appropriate changes can be made as may be needed to the regimen     4. Hyperlipidemia  - continue statin therapy    5. Hypertension  6.  cardiomyopathy  Blood pressure at goal   - continue current medications    7. Hypothyroidism  TSH elevated, free T4 within normal limits  Increase levothyroxine to 125 mcg orally daily  Repeat thyroid function test in 6 to 8 weeks    I have spent a total time of 40 minutes on 03/08/24 in caring for this patient including Diagnostic results, Instructions for management, Patient and family education, Documenting in the medical record, Reviewing / ordering tests, medicine, procedures  , and Obtaining or reviewing history  .    CC: Diabetes    History of Present Illness     HPI:  Jacquie Smith is a 69 y.o. female presents for a follow-up visit regarding diabetes management.   Also has ESRD on peritoneal hemodialysis, obesity, hyperlipidemia, hypertension, hyper cardiomyopathy, atrial flutter, hypothyroidism    Last seen in 9/2023  Last Eye exam: due for exam     Current regimen:   Ozempic 1 mg subcutaneously weekly - has been out of It > 3 months  Lantus/semglee 10-20  units in the morning ; most days has been taking 10 units  Humalog - say s that she ran out  of it around [place]     Not following consistent carb diet; had hash brown and OJ for breakfast, when BG was high at 290 mg/dl took semglee     Levothyroxine 112 mcg orally daily daily  compliant, takes on an empty stomach; not skipped any doses   Gained 9 lbs since the last visit     Short course of glucocorticoids for URI at the begining of February   No vision changes  No numbness/tingling   Has occasional SOB - will be following up with cardiology next month. No CP   Occasional gets lightheaded/dizzy   No exercise      Statin: Atorvastatin 20  ACE-I/ARB: valsartan    Home glucose monitoring:CGM interpretation  Conner 2   Time percentage CGM worn 35%   Time in range 66% (goal >65-70%)  High  31%  Very high 3%  Low 0%  Very low 0%   CV  31.6  % (goal <33%)  Average glucose 161 mg/dL  GMI --  %    Symptoms of hypoglycemia :  no recent lows; dizzy     All other systems were reviewed and are negative.    Review of Systems      Historical Information   Past Medical History:   Diagnosis Date    Acute asthmatic bronchitis     last assessed: 6/2/2017    Cardiac disease     Chronic diastolic (congestive) heart failure (HCC)     Chronic kidney disease     pt is on peritoneal dialysis daily from 2200- 0600    Closed nondisplaced fracture of distal phalanx of right great toe 11/13/2018    Colon polyp     CPAP (continuous positive airway pressure) dependence     Diabetes mellitus ([facility]CC)     Hyperlipidemia     Hypertension     Medical non-compliance     Morbid obesity with BMI of 40.0-44.9, adult ([facility]) 02/28/2019    On continuous oral anticoagulation 12/22/2020    Pneumonia     last assessed: 6/5/2013    PONV (postoperative nausea and vomiting)     Pulmonary embolism ([facility])     Renal disorder     possible clot noted in kidney, thus blood thinners currently    Severe obstructive sleep apnea     Severe pulmonary arterial systolic  hypertension (HCC)     Tinea pedis of both feet 2018     Past Surgical History:   Procedure Laterality Date    CATARACT EXTRACTION       SECTION       x 1    EYE SURGERY      NOSE SURGERY      fractured nose - septoplasty     Social History   Social History     Substance and Sexual Activity   Alcohol Use Never     Social History     Substance and Sexual Activity   Drug Use Never     Social History     Tobacco Use   Smoking Status Never    Passive exposure: Never   Smokeless Tobacco Never     Family History:   Family History   Problem Relation Age of Onset    Diabetes Mother         mellitus    Anxiety disorder Mother         generalized anxiety disorder    Hypertension Mother     Stroke Mother     Diabetes type II Mother     Kidney disease Father     Kidney failure Father         renal failure    Ulcerative colitis Sister     Diabetes Sister     Ovarian cancer Maternal Aunt     Diabetes Maternal Aunt     Diabetes Maternal Uncle     Heart disease Maternal Uncle     Heart disease Family     Thyroid disease Neg Hx        Meds/Allergies   Current Outpatient Medications   Medication Sig Dispense Refill    atorvastatin (LIPITOR) 20 mg tablet Take 1 tablet (20 mg total) by mouth daily 90 tablet 3    B Complex-C (B-COMPLEX WITH VITAMIN C) tablet Take 1 tablet by mouth daily      calcitriol (ROCALTROL) 0.25 mcg capsule Take 1 capsule (0.25 mcg total) by mouth daily 90 capsule 1    calcium acetate (PHOSLO) capsule Take 667 mg by mouth daily       cholecalciferol (VITAMIN D3) 1,000 units tablet Take 2 tablets (2,000 Units total) by mouth daily 100 tablet 5    cinacalcet (SENSIPAR) 30 mg tablet TAKE 1 TABLET THREE TIMES A WEEK 36 tablet 3    Continuous Blood Gluc Sensor (FreeStyle Conner 2 Sensor) MISC Use to check blood sugars 6 each 4    gentamicin (GARAMYCIN) 0.1 % cream APPLY TOPICALLY DAILY 15 g 23    Insulin Glargine-yfgn (Semglee, yfgn,) 100 UNIT/ML SOPN Inject 10 units subcutaneously daily in the  morning 15 mL 3    insulin lispro (HumaLOG KwikPen) 100 units/mL injection pen Inject 3 Units under the skin 3 (three) times a day with meals 15 mL 3    Insulin Pen Needle 31G X 5 MM MISC by Does not apply route daily 100 each 1    Lancets (onetouch ultrasoft) lancets Test glucose 3 times a day; Code: E11.8 300 each 1    levothyroxine (Euthyrox) 125 mcg tablet Take one tablet by mouth in early morning on empty stomach 90 tablet 3    metolazone (ZAROXOLYN) 5 mg tablet Take 5 mg by mouth daily      metoprolol succinate (TOPROL-XL) 50 mg 24 hr tablet Take 1 tablet (50 mg total) by mouth daily 90 tablet 3    semaglutide, 1 mg/dose, (Ozempic, 1 MG/DOSE,) 4 mg/3 mL injection pen Inject 0.75 mL (1 mg total) under the skin every 7 days 9 mL 3    torsemide (DEMADEX) 100 mg tablet Take 1 tablet (100 mg total) by mouth daily 90 tablet 1    valsartan (DIOVAN) 320 MG tablet Take 0.5 tablets (160 mg total) by mouth daily 90 tablet 0    warfarin (COUMADIN) 5 mg tablet       albuterol (2.5 mg/3 mL) 0.083 % nebulizer solution Take 3 mL (2.5 mg total) by nebulization every 6 (six) hours as needed for wheezing or shortness of breath 100 mL 0    albuterol (PROVENTIL HFA,VENTOLIN HFA) 90 mcg/act inhaler Inhale 2 puffs every 6 (six) hours as needed for wheezing 8 g 0    benzonatate (TESSALON PERLES) 100 mg capsule Take 1 capsule (100 mg total) by mouth 3 (three) times a day as needed for cough (Patient not taking: Reported on 2/9/2024) 20 capsule 0    Blood Glucose Monitoring Suppl (ONE TOUCH ULTRA 2) w/Device KIT Test glucose 3 times daily 1 each 0    docusate sodium (COLACE) 100 mg capsule Take 1 capsule (100 mg total) by mouth 2 (two) times a day 10 capsule 0    MULTIPLE VITAMIN PO Take 1 tablet by mouth daily      nystatin (MYCOSTATIN) powder Apply topically 3 (three) times a day for 7 days (Patient not taking: Reported on 3/23/2023) 15 g 0    OneTouch Ultra test strip TEST GLUCOSE THREE TIMES A  strip 3     promethazine-dextromethorphan (PHENERGAN-DM) 6.25-15 mg/5 mL oral syrup Take 5 mL by mouth 4 (four) times a day as needed for cough (Patient not taking: Reported on 3/8/2024) 240 mL 0     No current facility-administered medications for this visit.     No Known Allergies    Objective   Vitals: Blood pressure 126/80, pulse 78, height 5' (1.524 m), weight 90.5 kg (199 lb 9.6 oz), SpO2 98%, not currently breastfeeding.    Physical Exam  Constitutional:       Appearance: She is well-developed.   HENT:      Head: Normocephalic and atraumatic.   Eyes:      Conjunctiva/sclera: Conjunctivae normal.   Neck:      Thyroid: No thyromegaly.   Cardiovascular:      Rate and Rhythm: Normal rate and regular rhythm.      Heart sounds: Normal heart sounds. No murmur heard.  Pulmonary:      Effort: Pulmonary effort is normal.      Breath sounds: Normal breath sounds. No wheezing.   Abdominal:      General: There is no distension.      Palpations: Abdomen is soft.      Tenderness: There is no abdominal tenderness.   Musculoskeletal:         General: Normal range of motion.      Cervical back: Normal range of motion and neck supple.   Skin:     General: Skin is warm and dry.   Neurological:      Mental Status: She is alert and oriented to person, place, and time.   Psychiatric:         Behavior: Behavior normal.         The history was obtained from the review of the chart, patient.    Lab Results:   Lab Results   Component Value Date/Time    Hemoglobin A1C 8.3 (H) 03/05/2024 09:43 AM    Hemoglobin A1C 7.3 (H) 09/11/2023 10:15 AM    Hemoglobin A1C 7.0 (A) 06/14/2023 10:45 AM    BUN 52 (H) 03/05/2024 09:43 AM    BUN 55 (H) 09/11/2023 10:15 AM    BUN 76 (H) 05/30/2023 09:16 AM    Potassium 4.9 03/05/2024 09:43 AM    Potassium 4.5 09/11/2023 10:15 AM    Potassium 4.4 05/30/2023 09:16 AM    Chloride 108 03/05/2024 09:43 AM    Chloride 106 09/11/2023 10:15 AM    Chloride 103 05/30/2023 09:16 AM    CO2 24 03/05/2024 09:43 AM    CO2 24  "09/11/2023 10:15 AM    CO2 21 05/30/2023 09:16 AM    Creatinine 5.04 (H) 03/05/2024 09:43 AM    Creatinine 6.33 (H) 09/11/2023 10:15 AM    Creatinine 6.69 (H) 05/30/2023 09:16 AM    AST 11 (L) 03/05/2024 09:43 AM    AST 13 05/30/2023 09:16 AM    ALT 17 03/05/2024 09:43 AM    ALT 15 05/30/2023 09:16 AM    Total Protein 6.6 03/05/2024 09:43 AM    Total Protein 7.5 05/30/2023 09:16 AM    Albumin 3.5 03/05/2024 09:43 AM    Albumin 4.0 05/30/2023 09:16 AM    HDL, Direct 48 (L) 03/05/2024 09:43 AM    HDL, Direct 38 (L) 09/11/2023 10:15 AM    HDL, Direct 44 (L) 05/30/2023 09:16 AM    Triglycerides 52 03/05/2024 09:43 AM    Triglycerides 93 09/11/2023 10:15 AM    Triglycerides 174 (H) 05/30/2023 09:16 AM         Imaging Studies: I have personally reviewed pertinent reports.      Portions of the record may have been created with voice recognition software. Occasional wrong word or \"sound a like\" substitutions may have occurred due to the inherent limitations of voice recognition software. Read the chart carefully and recognize, using context, where substitutions have occurred.    "

## 2024-03-13 ENCOUNTER — OFFICE VISIT (OUTPATIENT)
Dept: FAMILY MEDICINE CLINIC | Facility: CLINIC | Age: 69
End: 2024-03-13
Payer: MEDICARE

## 2024-03-13 VITALS
HEART RATE: 76 BPM | DIASTOLIC BLOOD PRESSURE: 80 MMHG | BODY MASS INDEX: 39.27 KG/M2 | SYSTOLIC BLOOD PRESSURE: 126 MMHG | HEIGHT: 60 IN | TEMPERATURE: 97.5 F | WEIGHT: 200 LBS | RESPIRATION RATE: 20 BRPM

## 2024-03-13 DIAGNOSIS — H10.9 BACTERIAL CONJUNCTIVITIS OF RIGHT EYE: Primary | ICD-10-CM

## 2024-03-13 DIAGNOSIS — Z99.2: ICD-10-CM

## 2024-03-13 DIAGNOSIS — I26.99 PE (PULMONARY THROMBOEMBOLISM) (HCC): ICD-10-CM

## 2024-03-13 PROCEDURE — 99214 OFFICE O/P EST MOD 30 MIN: CPT | Performed by: NURSE PRACTITIONER

## 2024-03-13 PROCEDURE — G2211 COMPLEX E/M VISIT ADD ON: HCPCS | Performed by: NURSE PRACTITIONER

## 2024-03-13 RX ORDER — MOXIFLOXACIN 5 MG/ML
1 SOLUTION/ DROPS OPHTHALMIC 3 TIMES DAILY
Qty: 3 ML | Refills: 0 | Status: SHIPPED | OUTPATIENT
Start: 2024-03-13

## 2024-03-13 NOTE — PROGRESS NOTES
Assessment/Plan:    Continue warm compresses prn.   Use drops as directed, f/u as needed    1. Bacterial conjunctivitis of right eye  -     moxifloxacin (VIGAMOX) 0.5 % ophthalmic solution; Administer 1 drop to the right eye 3 (three) times a day    2. Encounter for peritoneal dialysis ([facility])  Assessment & Plan:  Management per nephrology       3. PE (pulmonary thromboembolism) ([facility])  Assessment & Plan:  On Warfarin                  There are no Patient Instructions on file for this visit.    Return if symptoms worsen or fail to improve.    Subjective:      Patient ID: Jacquie Smith is a 69 y.o. female.    Chief Complaint   Patient presents with   • Conjunctivitis     Right eye; dryness; started Monday  [facility]       Here today with pink eye.  For the past couple of days, her right eye has been red and draining.  Her grandkids have pink eye.    Using warm compresses.          The following portions of the patient's history were reviewed and updated as appropriate: allergies, current medications, past family history, past medical history, past social history, past surgical history and problem list.    Review of Systems   Constitutional:  Negative for chills and fever.   HENT: Negative.     Eyes:  Positive for discharge, redness and itching.         Current Outpatient Medications   Medication Sig Dispense Refill   • albuterol (PROVENTIL HFA,VENTOLIN HFA) 90 mcg/act inhaler Inhale 2 puffs every 6 (six) hours as needed for wheezing 8 g 0   • atorvastatin (LIPITOR) 20 mg tablet Take 1 tablet (20 mg total) by mouth daily 90 tablet 3   • B Complex-C (B-COMPLEX WITH VITAMIN C) tablet Take 1 tablet by mouth daily     • Blood Glucose Monitoring Suppl (ONE TOUCH ULTRA 2) w/Device KIT Test glucose 3 times daily 1 each 0   • calcitriol (ROCALTROL) 0.25 mcg capsule Take 1 capsule (0.25 mcg total) by mouth daily 90 capsule 1   • calcium acetate (PHOSLO) capsule Take 667 mg by mouth daily      • cholecalciferol (VITAMIN D3) 1,000 units  tablet Take 2 tablets (2,000 Units total) by mouth daily 100 tablet 5   • cinacalcet (SENSIPAR) 30 mg tablet TAKE 1 TABLET THREE TIMES A WEEK 36 tablet 3   • Continuous Blood Gluc Sensor (FreeStyle Conner 2 Sensor) MISC Use to check blood sugars 6 each 4   • docusate sodium (COLACE) 100 mg capsule Take 1 capsule (100 mg total) by mouth 2 (two) times a day 10 capsule 0   • gentamicin (GARAMYCIN) 0.1 % cream APPLY TOPICALLY DAILY 15 g 23   • Insulin Glargine-yfgn (Semglee, yfgn,) 100 UNIT/ML SOPN Inject 10 units subcutaneously daily in the morning 15 mL 3   • insulin lispro (HumaLOG KwikPen) 100 units/mL injection pen Inject 3 Units under the skin 3 (three) times a day with meals 15 mL 3   • Insulin Pen Needle 31G X 5 MM MISC by Does not apply route daily 100 each 1   • Lancets (onetouch ultrasoft) lancets Test glucose 3 times a day; Code: E11.8 300 each 1   • levothyroxine (Euthyrox) 125 mcg tablet Take one tablet by mouth in early morning on empty stomach 90 tablet 3   • metolazone (ZAROXOLYN) 5 mg tablet Take 5 mg by mouth daily     • metoprolol succinate (TOPROL-XL) 50 mg 24 hr tablet Take 1 tablet (50 mg total) by mouth daily 90 tablet 3   • moxifloxacin (VIGAMOX) 0.5 % ophthalmic solution Administer 1 drop to the right eye 3 (three) times a day 3 mL 0   • MULTIPLE VITAMIN PO Take 1 tablet by mouth daily     • OneTouch Ultra test strip TEST GLUCOSE THREE TIMES A  strip 3   • semaglutide, 1 mg/dose, (Ozempic, 1 MG/DOSE,) 4 mg/3 mL injection pen Inject 0.75 mL (1 mg total) under the skin every 7 days 9 mL 3   • torsemide (DEMADEX) 100 mg tablet Take 1 tablet (100 mg total) by mouth daily 90 tablet 1   • valsartan (DIOVAN) 320 MG tablet Take 0.5 tablets (160 mg total) by mouth daily 90 tablet 0   • warfarin (COUMADIN) 5 mg tablet      • albuterol (2.5 mg/3 mL) 0.083 % nebulizer solution Take 3 mL (2.5 mg total) by nebulization every 6 (six) hours as needed for wheezing or shortness of breath (Patient not  taking: Reported on 3/13/2024) 100 mL 0   • benzonatate (TESSALON PERLES) 100 mg capsule Take 1 capsule (100 mg total) by mouth 3 (three) times a day as needed for cough (Patient not taking: Reported on 2/9/2024) 20 capsule 0   • nystatin (MYCOSTATIN) powder Apply topically 3 (three) times a day for 7 days (Patient not taking: Reported on 3/23/2023) 15 g 0     No current facility-administered medications for this visit.       Objective:    /80   Pulse 76   Temp 97.5 °F (36.4 °C) (Tympanic)   Resp 20   Ht 5' (1.524 m)   Wt 90.7 kg (200 lb)   BMI 39.06 kg/m²        Physical Exam  Vitals and nursing note reviewed.   Constitutional:       Appearance: Normal appearance. She is well-developed. She is not ill-appearing or diaphoretic.   HENT:      Head: Normocephalic and atraumatic.   Eyes:      Conjunctiva/sclera: Conjunctivae normal.      Comments: Right eye injected, with purulent discharge.  Mild periorbital swelling without erythema or warmth   Pulmonary:      Effort: Pulmonary effort is normal. No tachypnea, accessory muscle usage or respiratory distress.   Musculoskeletal:      Cervical back: Normal range of motion.   Skin:     Coloration: Skin is not pale.   Neurological:      Mental Status: She is alert.   Psychiatric:         Mood and Affect: Mood normal.         Speech: Speech normal.         Behavior: Behavior normal.                MICHAEL Weber

## 2024-03-17 DIAGNOSIS — N18.6 BENIGN HYPERTENSION WITH ESRD (END-STAGE RENAL DISEASE) (HCC): ICD-10-CM

## 2024-03-17 DIAGNOSIS — I12.0 BENIGN HYPERTENSION WITH ESRD (END-STAGE RENAL DISEASE) (HCC): ICD-10-CM

## 2024-03-18 ENCOUNTER — TELEPHONE (OUTPATIENT)
Age: 69
End: 2024-03-18

## 2024-03-18 DIAGNOSIS — E03.9 ACQUIRED HYPOTHYROIDISM: ICD-10-CM

## 2024-03-18 DIAGNOSIS — N18.6 ESRD (END STAGE RENAL DISEASE) (HCC): ICD-10-CM

## 2024-03-18 DIAGNOSIS — E11.65 TYPE 2 DIABETES MELLITUS WITH HYPERGLYCEMIA, WITH LONG-TERM CURRENT USE OF INSULIN (HCC): Primary | ICD-10-CM

## 2024-03-18 DIAGNOSIS — E78.2 MIXED HYPERLIPIDEMIA: ICD-10-CM

## 2024-03-18 DIAGNOSIS — E03.9 HYPOTHYROIDISM, UNSPECIFIED TYPE: ICD-10-CM

## 2024-03-18 DIAGNOSIS — Z79.4 TYPE 2 DIABETES MELLITUS WITH HYPERGLYCEMIA, WITH LONG-TERM CURRENT USE OF INSULIN (HCC): Primary | ICD-10-CM

## 2024-03-18 DIAGNOSIS — I10 ESSENTIAL HYPERTENSION: ICD-10-CM

## 2024-03-18 RX ORDER — METOPROLOL SUCCINATE 50 MG/1
50 TABLET, EXTENDED RELEASE ORAL DAILY
Qty: 90 TABLET | Refills: 1 | Status: SHIPPED | OUTPATIENT
Start: 2024-03-18

## 2024-03-19 ENCOUNTER — APPOINTMENT (EMERGENCY)
Dept: RADIOLOGY | Facility: HOSPITAL | Age: 69
DRG: 871 | End: 2024-03-19
Payer: MEDICARE

## 2024-03-19 ENCOUNTER — HOSPITAL ENCOUNTER (INPATIENT)
Facility: HOSPITAL | Age: 69
LOS: 4 days | Discharge: HOME | DRG: 871 | End: 2024-03-23
Attending: EMERGENCY MEDICINE | Admitting: FAMILY MEDICINE
Payer: MEDICARE

## 2024-03-19 DIAGNOSIS — Z51.81 SUBTHERAPEUTIC ANTICOAGULATION: ICD-10-CM

## 2024-03-19 DIAGNOSIS — Z99.81 HYPOXEMIA REQUIRING SUPPLEMENTAL OXYGEN: ICD-10-CM

## 2024-03-19 DIAGNOSIS — Z79.01 SUBTHERAPEUTIC ANTICOAGULATION: ICD-10-CM

## 2024-03-19 DIAGNOSIS — J18.9 PNEUMONIA: Primary | ICD-10-CM

## 2024-03-19 DIAGNOSIS — R09.02 HYPOXEMIA REQUIRING SUPPLEMENTAL OXYGEN: ICD-10-CM

## 2024-03-19 DIAGNOSIS — Z99.2 ESRD ON PERITONEAL DIALYSIS (HCC): ICD-10-CM

## 2024-03-19 DIAGNOSIS — N18.6 ESRD (END STAGE RENAL DISEASE) (HCC): ICD-10-CM

## 2024-03-19 DIAGNOSIS — N18.6 ESRD ON PERITONEAL DIALYSIS (HCC): ICD-10-CM

## 2024-03-19 DIAGNOSIS — R06.00 DYSPNEA: ICD-10-CM

## 2024-03-19 DIAGNOSIS — R06.02 SOB (SHORTNESS OF BREATH): ICD-10-CM

## 2024-03-19 PROBLEM — J96.01 ACUTE RESPIRATORY FAILURE WITH HYPOXIA (HCC): Status: ACTIVE | Noted: 2024-03-19

## 2024-03-19 PROBLEM — N18.9 CHRONIC KIDNEY DISEASE: Status: ACTIVE | Noted: 2023-03-06

## 2024-03-19 PROBLEM — I48.20 ATRIAL FIBRILLATION, CHRONIC (HCC): Status: ACTIVE | Noted: 2023-03-06

## 2024-03-19 PROBLEM — G47.33 SLEEP APNEA, OBSTRUCTIVE: Status: ACTIVE | Noted: 2023-03-06

## 2024-03-19 LAB
2HR DELTA HS TROPONIN: 0 NG/L
4HR DELTA HS TROPONIN: 4 NG/L
ALBUMIN SERPL BCP-MCNC: 3.4 G/DL (ref 3.5–5)
ALP SERPL-CCNC: 136 U/L (ref 34–104)
ALT SERPL W P-5'-P-CCNC: 15 U/L (ref 7–52)
ANION GAP SERPL CALCULATED.3IONS-SCNC: 11 MMOL/L (ref 4–13)
APTT PPP: 28 SECONDS (ref 23–37)
AST SERPL W P-5'-P-CCNC: 20 U/L (ref 13–39)
ATRIAL RATE: 102 BPM
BASOPHILS # BLD AUTO: 0.04 THOUSANDS/ÂΜL (ref 0–0.1)
BASOPHILS NFR BLD AUTO: 0 % (ref 0–1)
BILIRUB SERPL-MCNC: 0.75 MG/DL (ref 0.2–1)
BNP SERPL-MCNC: 401 PG/ML (ref 0–100)
BUN SERPL-MCNC: 52 MG/DL (ref 5–25)
CALCIUM ALBUM COR SERPL-MCNC: 9.2 MG/DL (ref 8.3–10.1)
CALCIUM SERPL-MCNC: 8.7 MG/DL (ref 8.4–10.2)
CARDIAC TROPONIN I PNL SERPL HS: 24 NG/L
CARDIAC TROPONIN I PNL SERPL HS: 24 NG/L
CARDIAC TROPONIN I PNL SERPL HS: 28 NG/L
CHLORIDE SERPL-SCNC: 101 MMOL/L (ref 96–108)
CO2 SERPL-SCNC: 23 MMOL/L (ref 21–32)
CREAT SERPL-MCNC: 5.64 MG/DL (ref 0.6–1.3)
D DIMER PPP FEU-MCNC: 1.26 UG/ML FEU
EOSINOPHIL # BLD AUTO: 0.21 THOUSAND/ÂΜL (ref 0–0.61)
EOSINOPHIL NFR BLD AUTO: 2 % (ref 0–6)
ERYTHROCYTE [DISTWIDTH] IN BLOOD BY AUTOMATED COUNT: 14.6 % (ref 11.6–15.1)
FLUAV RNA RESP QL NAA+PROBE: NEGATIVE
FLUBV RNA RESP QL NAA+PROBE: NEGATIVE
GFR SERPL CREATININE-BSD FRML MDRD: 7 ML/MIN/1.73SQ M
GLUCOSE SERPL-MCNC: 311 MG/DL (ref 65–140)
HCT VFR BLD AUTO: 36.3 % (ref 34.8–46.1)
HGB BLD-MCNC: 11.6 G/DL (ref 11.5–15.4)
IMM GRANULOCYTES # BLD AUTO: 0.05 THOUSAND/UL (ref 0–0.2)
IMM GRANULOCYTES NFR BLD AUTO: 1 % (ref 0–2)
INR PPP: 1.14 (ref 0.84–1.19)
LACTATE SERPL-SCNC: 1.4 MMOL/L (ref 0.5–2)
LYMPHOCYTES # BLD AUTO: 0.48 THOUSANDS/ÂΜL (ref 0.6–4.47)
LYMPHOCYTES NFR BLD AUTO: 5 % (ref 14–44)
MCH RBC QN AUTO: 29.8 PG (ref 26.8–34.3)
MCHC RBC AUTO-ENTMCNC: 32 G/DL (ref 31.4–37.4)
MCV RBC AUTO: 93 FL (ref 82–98)
MONOCYTES # BLD AUTO: 1.34 THOUSAND/ÂΜL (ref 0.17–1.22)
MONOCYTES NFR BLD AUTO: 13 % (ref 4–12)
NEUTROPHILS # BLD AUTO: 8.58 THOUSANDS/ÂΜL (ref 1.85–7.62)
NEUTS SEG NFR BLD AUTO: 79 % (ref 43–75)
NRBC BLD AUTO-RTO: 0 /100 WBCS
P AXIS: 55 DEGREES
PLATELET # BLD AUTO: 177 THOUSANDS/UL (ref 149–390)
PMV BLD AUTO: 11.9 FL (ref 8.9–12.7)
POTASSIUM SERPL-SCNC: 5.3 MMOL/L (ref 3.5–5.3)
PR INTERVAL: 146 MS
PROCALCITONIN SERPL-MCNC: 0.65 NG/ML
PROT SERPL-MCNC: 6.8 G/DL (ref 6.4–8.4)
PROTHROMBIN TIME: 14.8 SECONDS (ref 11.6–14.5)
QRS AXIS: -51 DEGREES
QRSD INTERVAL: 78 MS
QT INTERVAL: 358 MS
QTC INTERVAL: 466 MS
RBC # BLD AUTO: 3.89 MILLION/UL (ref 3.81–5.12)
RSV RNA RESP QL NAA+PROBE: NEGATIVE
SARS-COV-2 RNA RESP QL NAA+PROBE: NEGATIVE
SODIUM SERPL-SCNC: 135 MMOL/L (ref 135–147)
T WAVE AXIS: 14 DEGREES
VENTRICULAR RATE: 102 BPM
WBC # BLD AUTO: 10.7 THOUSAND/UL (ref 4.31–10.16)

## 2024-03-19 PROCEDURE — 0241U HB NFCT DS VIR RESP RNA 4 TRGT: CPT | Performed by: EMERGENCY MEDICINE

## 2024-03-19 PROCEDURE — 84484 ASSAY OF TROPONIN QUANT: CPT | Performed by: EMERGENCY MEDICINE

## 2024-03-19 PROCEDURE — 83605 ASSAY OF LACTIC ACID: CPT | Performed by: EMERGENCY MEDICINE

## 2024-03-19 PROCEDURE — 99223 1ST HOSP IP/OBS HIGH 75: CPT | Performed by: FAMILY MEDICINE

## 2024-03-19 PROCEDURE — 36415 COLL VENOUS BLD VENIPUNCTURE: CPT | Performed by: EMERGENCY MEDICINE

## 2024-03-19 PROCEDURE — 85025 COMPLETE CBC W/AUTO DIFF WBC: CPT | Performed by: EMERGENCY MEDICINE

## 2024-03-19 PROCEDURE — 93005 ELECTROCARDIOGRAM TRACING: CPT

## 2024-03-19 PROCEDURE — 85610 PROTHROMBIN TIME: CPT | Performed by: EMERGENCY MEDICINE

## 2024-03-19 PROCEDURE — 99285 EMERGENCY DEPT VISIT HI MDM: CPT

## 2024-03-19 PROCEDURE — 83880 ASSAY OF NATRIURETIC PEPTIDE: CPT | Performed by: EMERGENCY MEDICINE

## 2024-03-19 PROCEDURE — 84145 PROCALCITONIN (PCT): CPT | Performed by: EMERGENCY MEDICINE

## 2024-03-19 PROCEDURE — 99285 EMERGENCY DEPT VISIT HI MDM: CPT | Performed by: EMERGENCY MEDICINE

## 2024-03-19 PROCEDURE — 93010 ELECTROCARDIOGRAM REPORT: CPT | Performed by: INTERNAL MEDICINE

## 2024-03-19 PROCEDURE — 96374 THER/PROPH/DIAG INJ IV PUSH: CPT

## 2024-03-19 PROCEDURE — 71250 CT THORAX DX C-: CPT

## 2024-03-19 PROCEDURE — 80053 COMPREHEN METABOLIC PANEL: CPT | Performed by: EMERGENCY MEDICINE

## 2024-03-19 PROCEDURE — 87040 BLOOD CULTURE FOR BACTERIA: CPT | Performed by: EMERGENCY MEDICINE

## 2024-03-19 PROCEDURE — 85730 THROMBOPLASTIN TIME PARTIAL: CPT | Performed by: EMERGENCY MEDICINE

## 2024-03-19 PROCEDURE — 85379 FIBRIN DEGRADATION QUANT: CPT | Performed by: EMERGENCY MEDICINE

## 2024-03-19 RX ORDER — CEFTRIAXONE 1 G/50ML
1000 INJECTION, SOLUTION INTRAVENOUS EVERY 24 HOURS
Status: DISCONTINUED | OUTPATIENT
Start: 2024-03-19 | End: 2024-03-23 | Stop reason: HOSPADM

## 2024-03-19 RX ORDER — GUAIFENESIN 600 MG/1
600 TABLET, EXTENDED RELEASE ORAL 2 TIMES DAILY
Status: DISCONTINUED | OUTPATIENT
Start: 2024-03-19 | End: 2024-03-23 | Stop reason: HOSPADM

## 2024-03-19 RX ORDER — CALCIUM ACETATE 667 MG/1
667 CAPSULE ORAL DAILY
Status: DISCONTINUED | OUTPATIENT
Start: 2024-03-20 | End: 2024-03-20

## 2024-03-19 RX ORDER — LOSARTAN POTASSIUM 50 MG/1
100 TABLET ORAL DAILY
Status: DISCONTINUED | OUTPATIENT
Start: 2024-03-20 | End: 2024-03-23 | Stop reason: HOSPADM

## 2024-03-19 RX ORDER — HEPARIN SODIUM 5000 [USP'U]/ML
5000 INJECTION, SOLUTION INTRAVENOUS; SUBCUTANEOUS EVERY 8 HOURS SCHEDULED
Status: DISCONTINUED | OUTPATIENT
Start: 2024-03-19 | End: 2024-03-21

## 2024-03-19 RX ORDER — ATORVASTATIN CALCIUM 20 MG/1
20 TABLET, FILM COATED ORAL
Status: DISCONTINUED | OUTPATIENT
Start: 2024-03-20 | End: 2024-03-23 | Stop reason: HOSPADM

## 2024-03-19 RX ORDER — WARFARIN SODIUM 5 MG/1
5 TABLET ORAL
Status: DISCONTINUED | OUTPATIENT
Start: 2024-03-19 | End: 2024-03-21

## 2024-03-19 RX ORDER — DOCUSATE SODIUM 100 MG/1
100 CAPSULE, LIQUID FILLED ORAL 2 TIMES DAILY
Status: DISCONTINUED | OUTPATIENT
Start: 2024-03-19 | End: 2024-03-23 | Stop reason: HOSPADM

## 2024-03-19 RX ORDER — MELATONIN
2000 DAILY
Status: DISCONTINUED | OUTPATIENT
Start: 2024-03-20 | End: 2024-03-23 | Stop reason: HOSPADM

## 2024-03-19 RX ORDER — INSULIN LISPRO 100 [IU]/ML
3 INJECTION, SOLUTION INTRAVENOUS; SUBCUTANEOUS
Status: DISCONTINUED | OUTPATIENT
Start: 2024-03-20 | End: 2024-03-23 | Stop reason: HOSPADM

## 2024-03-19 RX ORDER — LEVOFLOXACIN 5 MG/ML
750 INJECTION, SOLUTION INTRAVENOUS ONCE
Status: COMPLETED | OUTPATIENT
Start: 2024-03-19 | End: 2024-03-19

## 2024-03-19 RX ORDER — LABETALOL HYDROCHLORIDE 5 MG/ML
10 INJECTION, SOLUTION INTRAVENOUS ONCE
Status: COMPLETED | OUTPATIENT
Start: 2024-03-19 | End: 2024-03-19

## 2024-03-19 RX ORDER — ACETAMINOPHEN 325 MG/1
650 TABLET ORAL EVERY 6 HOURS PRN
Status: DISCONTINUED | OUTPATIENT
Start: 2024-03-19 | End: 2024-03-23 | Stop reason: HOSPADM

## 2024-03-19 RX ORDER — LEVOTHYROXINE SODIUM 0.12 MG/1
125 TABLET ORAL
Status: DISCONTINUED | OUTPATIENT
Start: 2024-03-20 | End: 2024-03-23 | Stop reason: HOSPADM

## 2024-03-19 RX ORDER — METOPROLOL SUCCINATE 50 MG/1
50 TABLET, EXTENDED RELEASE ORAL DAILY
Status: DISCONTINUED | OUTPATIENT
Start: 2024-03-20 | End: 2024-03-23 | Stop reason: HOSPADM

## 2024-03-19 RX ORDER — CALCITRIOL 0.25 UG/1
0.25 CAPSULE, LIQUID FILLED ORAL DAILY
Status: DISCONTINUED | OUTPATIENT
Start: 2024-03-20 | End: 2024-03-23 | Stop reason: HOSPADM

## 2024-03-19 RX ADMIN — LABETALOL HYDROCHLORIDE 10 MG: 5 INJECTION, SOLUTION INTRAVENOUS at 19:37

## 2024-03-19 RX ADMIN — LEVOFLOXACIN 750 MG: 750 INJECTION, SOLUTION INTRAVENOUS at 20:08

## 2024-03-19 RX ADMIN — GUAIFENESIN 600 MG: 600 TABLET, EXTENDED RELEASE ORAL at 22:12

## 2024-03-19 RX ADMIN — CEFTRIAXONE 1000 MG: 1 INJECTION, SOLUTION INTRAVENOUS at 22:14

## 2024-03-19 RX ADMIN — WARFARIN SODIUM 5 MG: 5 TABLET ORAL at 22:13

## 2024-03-19 RX ADMIN — AZITHROMYCIN MONOHYDRATE 500 MG: 500 INJECTION, POWDER, LYOPHILIZED, FOR SOLUTION INTRAVENOUS at 23:12

## 2024-03-19 RX ADMIN — HEPARIN SODIUM 5000 UNITS: 5000 INJECTION, SOLUTION INTRAVENOUS; SUBCUTANEOUS at 22:13

## 2024-03-19 NOTE — ED NOTES
Patient's saturation came down to 87%, this RN put pt on 2L NC went up to 98%. Dr. Pena made aware.     Jacquie Degroot, JJ  03/19/24 1914

## 2024-03-19 NOTE — ED PROVIDER NOTES
History  Chief Complaint   Patient presents with    Shortness of Breath     Has had a cold with cough for over a week and now having mucous production with shortness of breath     Pt is a 70yo F who presents for shortness of breath and cough.  Patient reports her symptoms started 4 to 5 days ago.  Patient reports they have been worsening since that time.  Patient reports she is short of breath at rest and with exertion.  Patient also reports intermittent chest discomfort that she states is central and radiates occasionally to her right arm.  Patient also reports a cough that is sometimes productive of yellow sputum.  Patient also reporting congestion, generalized fatigue, and chills.  Patient does report she was around family with similar symptoms.  Patient also reports last week she had pinkeye and was given ophthalmic antibiotics with resolution of her eye discharge.  Patient reports that she is a PD patient and missed PD last night.  Patient also reports that she is supposed to be on warfarin due to history of A-fib but has not taken it in several days as she has not been feeling well.  Patient reports she has been taking her blood pressure medication but has not been taking anything else.        Prior to Admission Medications   Prescriptions Last Dose Informant Patient Reported? Taking?   B Complex-C (B-COMPLEX WITH VITAMIN C) tablet  Self Yes No   Sig: Take 1 tablet by mouth daily   Blood Glucose Monitoring Suppl (ONE TOUCH ULTRA 2) w/Device KIT  Self No No   Sig: Test glucose 3 times daily   Continuous Blood Gluc Sensor (FreeStyle Conner 2 Sensor) [facility]   No No   Sig: Use to check blood sugars   Insulin Glargine-yfgn (Semglee, yfgn,) 100 UNIT/ML SOPN   No No   Sig: Inject 10 units subcutaneously daily in the morning   Insulin Pen Needle 31G X 5 MM MISC  Self No No   Sig: by Does not apply route daily   Lancets (onetouch ultrasoft) lancets  Self No No   Sig: Test glucose 3 times a day; Code: E11.8   MULTIPLE  VITAMIN PO  Self Yes No   Sig: Take 1 tablet by mouth daily   OneTouch Ultra test strip  Self No No   Sig: TEST GLUCOSE THREE TIMES A DAY   albuterol (2.5 mg/3 mL) 0.083 % nebulizer solution   No No   Sig: Take 3 mL (2.5 mg total) by nebulization every 6 (six) hours as needed for wheezing or shortness of breath   Patient not taking: Reported on 3/13/2024   albuterol (PROVENTIL HFA,VENTOLIN HFA) 90 mcg/act inhaler   No No   Sig: Inhale 2 puffs every 6 (six) hours as needed for wheezing   atorvastatin (LIPITOR) 20 mg tablet   No No   Sig: Take 1 tablet (20 mg total) by mouth daily   benzonatate (TESSALON PERLES) 100 mg capsule   No No   Sig: Take 1 capsule (100 mg total) by mouth 3 (three) times a day as needed for cough   Patient not taking: Reported on 2/9/2024   calcitriol (ROCALTROL) 0.25 mcg capsule   No No   Sig: Take 1 capsule (0.25 mcg total) by mouth daily   calcium acetate (PHOSLO) capsule  Self Yes No   Sig: Take 667 mg by mouth daily    cholecalciferol (VITAMIN D3) 1,000 units tablet  Self No No   Sig: Take 2 tablets (2,000 Units total) by mouth daily   cinacalcet (SENSIPAR) 30 mg tablet  Self No No   Sig: TAKE 1 TABLET THREE TIMES A WEEK   docusate sodium (COLACE) 100 mg capsule  Self No No   Sig: Take 1 capsule (100 mg total) by mouth 2 (two) times a day   gentamicin (GARAMYCIN) 0.1 % cream   No No   Sig: APPLY TOPICALLY DAILY   insulin lispro (HumaLOG KwikPen) 100 units/mL injection pen   No No   Sig: Inject 3 Units under the skin 3 (three) times a day with meals   levothyroxine (Euthyrox) 125 mcg tablet   No No   Sig: Take one tablet by mouth in early morning on empty stomach   metolazone (ZAROXOLYN) 5 mg tablet  Self Yes No   Sig: Take 5 mg by mouth daily   metoprolol succinate (TOPROL-XL) 50 mg 24 hr tablet   No No   Sig: TAKE 1 TABLET DAILY   moxifloxacin (VIGAMOX) 0.5 % ophthalmic solution   No No   Sig: Administer 1 drop to the right eye 3 (three) times a day   nystatin (MYCOSTATIN) powder   No No    Sig: Apply topically 3 (three) times a day for 7 days   Patient not taking: Reported on 3/23/2023   semaglutide, 1 mg/dose, (Ozempic, 1 MG/DOSE,) 4 mg/3 mL injection pen   No No   Sig: Inject 0.75 mL (1 mg total) under the skin every 7 days   torsemide (DEMADEX) 100 mg tablet  Self No No   Sig: Take 1 tablet (100 mg total) by mouth daily   valsartan (DIOVAN) 320 MG tablet   No No   Sig: Take 0.5 tablets (160 mg total) by mouth daily   warfarin (COUMADIN) 5 mg tablet   Yes No      Facility-Administered Medications: None       Past Medical History:   Diagnosis Date    Acute asthmatic bronchitis     last assessed: 2017    Cardiac disease     Chronic diastolic (congestive) heart failure (HCC)     Chronic kidney disease     pt is on peritoneal dialysis daily from 2200- 0600    Closed nondisplaced fracture of distal phalanx of right great toe 2018    Colon polyp     CPAP (continuous positive airway pressure) dependence     Diabetes mellitus (HCC)     Hyperlipidemia     Hypertension     Medical non-compliance     Morbid obesity with BMI of 40.0-44.9, adult ([facility]) 2019    On continuous oral anticoagulation 2020    Pneumonia     last assessed: 2013    PONV (postoperative nausea and vomiting)     Pulmonary embolism ([facility])     Renal disorder     possible clot noted in kidney, thus blood thinners currently    Severe obstructive sleep apnea     Severe pulmonary arterial systolic hypertension ([facility])     Tinea pedis of both feet 2018       Past Surgical History:   Procedure Laterality Date    CATARACT EXTRACTION       SECTION       x 1    EYE SURGERY      NOSE SURGERY      fractured nose - septoplasty       Family History   Problem Relation Age of Onset    Diabetes Mother         mellitus    Anxiety disorder Mother         generalized anxiety disorder    Hypertension Mother     Stroke Mother     Diabetes type II Mother     Kidney disease Father     Kidney failure Father         renal  failure    Ulcerative colitis Sister     Diabetes Sister     Ovarian cancer Maternal Aunt     Diabetes Maternal Aunt     Diabetes Maternal Uncle     Heart disease Maternal Uncle     Heart disease Family     Thyroid disease Neg Hx      I have reviewed and agree with the history as documented.    E-Cigarette/Vaping    E-Cigarette Use Never User      E-Cigarette/Vaping Substances    Nicotine No     THC No     CBD No     Flavoring No     Other No     Unknown No      Social History     Tobacco Use    Smoking status: Never     Passive exposure: Never    Smokeless tobacco: Never   Vaping Use    Vaping status: Never Used   Substance Use Topics    Alcohol use: Never    Drug use: Never       Review of Systems   Constitutional:  Positive for chills and fatigue.   HENT:  Positive for congestion.    Eyes:  Positive for discharge (resolved).   Respiratory:  Positive for cough and shortness of breath.    Cardiovascular:  Positive for chest pain and leg swelling (L>R (reported as chronic)).   All other systems reviewed and are negative.      Physical Exam  Physical Exam  Vitals reviewed.   Constitutional:       Appearance: She is well-developed. She is not toxic-appearing or diaphoretic.   HENT:      Head: Normocephalic and atraumatic.      Right Ear: External ear normal.      Left Ear: External ear normal.      Nose: Nose normal.      Mouth/Throat:      Pharynx: Oropharynx is clear.   Eyes:      Extraocular Movements: Extraocular movements intact.      Conjunctiva/sclera: Conjunctivae normal.      Pupils: Pupils are equal, round, and reactive to light.   Cardiovascular:      Rate and Rhythm: Regular rhythm. Tachycardia present.      Heart sounds: Normal heart sounds. No murmur heard.  Pulmonary:      Effort: Tachypnea present.      Breath sounds: Normal breath sounds. No wheezing or rales.   Abdominal:      General: Bowel sounds are normal.      Palpations: Abdomen is soft.   Musculoskeletal:         General: Normal range of  motion.      Cervical back: Normal range of motion and neck supple.      Right lower leg: Edema (1+ pitting) present.      Left lower leg: Edema (2+ pitting) present.   Skin:     General: Skin is warm and dry.      Capillary Refill: Capillary refill takes less than 2 seconds.      Coloration: Skin is not pale.      Findings: No erythema or rash.   Neurological:      General: No focal deficit present.      Mental Status: She is alert and oriented to person, place, and time.   Psychiatric:         Speech: Speech normal.         Behavior: Behavior is cooperative.         Vital Signs  ED Triage Vitals   Temperature Pulse Respirations Blood Pressure SpO2   03/19/24 1634 03/19/24 1634 03/19/24 1634 03/19/24 1634 03/19/24 1634   98.6 °F (37 °C) 104 (!) 26 (!) 186/82 93 %      Temp src Heart Rate Source Patient Position - Orthostatic VS BP Location FiO2 (%)   -- 03/19/24 1915 03/19/24 1915 03/19/24 1915 --    Monitor Lying Right arm       Pain Score       03/19/24 1634       No Pain           Vitals:    03/19/24 1634 03/19/24 1915 03/19/24 1945 03/19/24 2001   BP: (!) 186/82 (!) 204/83 (!) 201/80 (!) 174/74   Pulse: 104 (!) 108 95 98   Patient Position - Orthostatic VS:  Lying Lying Lying         Visual Acuity      ED Medications  Medications   levofloxacin (LEVAQUIN) IVPB (premix in dextrose) 750 mg 150 mL (750 mg Intravenous New Bag 3/19/24 2008)   labetalol (NORMODYNE) injection 10 mg (10 mg Intravenous Given 3/19/24 1937)       Diagnostic Studies  Results Reviewed       Procedure Component Value Units Date/Time    HS Troponin I 2hr [[id number]]  (Normal) Collected: 03/19/24 1955    Lab Status: Final result Specimen: Blood from Arm, Right Updated: 03/19/24 2027     hs TnI 2hr 24 ng/L      Delta 2hr hsTnI 0 ng/L     FLU/RSV/COVID - if FLU/RSV clinically relevant [[id number]]  (Normal) Collected: 03/19/24 1808    Lab Status: Final result Specimen: Nares from Nose Updated: 03/19/24 1933     SARS-CoV-2 Negative     INFLUENZA A  PCR Negative     INFLUENZA B PCR Negative     RSV PCR Negative    Narrative:      FOR PEDIATRIC PATIENTS - copy/paste COVID Guidelines URL to browser: [email or web address]    SARS-CoV-2 assay is a Nucleic Acid Amplification assay intended for the  qualitative detection of nucleic acid from SARS-CoV-2 in nasopharyngeal  swabs. Results are for the presumptive identification of SARS-CoV-2 RNA.    Positive results are indicative of infection with SARS-CoV-2, the virus  causing COVID-19, but do not rule out bacterial infection or co-infection  with other viruses. Laboratories within the [place] and its  territories are required to report all positive results to the appropriate  public health authorities. Negative results do not preclude SARS-CoV-2  infection and should not be used as the sole basis for treatment or other  patient management decisions. Negative results must be combined with  clinical observations, patient history, and epidemiological information.  This test has not been FDA cleared or approved.    This test has been authorized by FDA under an Emergency Use Authorization  (EUA). This test is only authorized for the duration of time the  declaration that circumstances exist justifying the authorization of the  emergency use of an in vitro diagnostic tests for detection of SARS-CoV-2  virus and/or diagnosis of COVID-19 infection under section 564(b)(1) of  the Act, 21 U.S.C. 360bbb-3(b)(1), unless the authorization is terminated  or revoked sooner. The test has been validated but independent review by FDA  and CLIA is pending.    Test performed using [employer] (IMMI): This RT-PCR assay targets N2,  a region unique to SARS-CoV-2. A conserved region in the E-gene was chosen  for pan-Sarbecovirus detection which includes SARS-CoV-2.    According to CMS-2020-01-R, this platform meets the definition of high-throughput technology.    HS Troponin I 4hr  [[id number]]     Lab Status: No result Specimen: Blood     Procalcitonin [[id number]]  (Abnormal) Collected: 03/19/24 1808    Lab Status: Final result Specimen: Blood from Arm, Left Updated: 03/19/24 1853     Procalcitonin 0.65 ng/ml     B-Type Natriuretic Peptide(BNP) [[id number]]  (Abnormal) Collected: 03/19/24 1808    Lab Status: Final result Specimen: Blood from Arm, Left Updated: 03/19/24 1852      pg/mL     Lactic acid [[id number]]  (Normal) Collected: 03/19/24 1808    Lab Status: Final result Specimen: Blood from Arm, Left Updated: 03/19/24 1842     LACTIC ACID 1.4 mmol/L     Narrative:      Result may be elevated if tourniquet was used during collection.    D-Dimer [[id number]]  (Abnormal) Collected: 03/19/24 1808    Lab Status: Final result Specimen: Blood from Arm, Left Updated: 03/19/24 1838     D-Dimer, Quant 1.26 ug/ml FEU     Narrative:      In the evaluation for possible pulmonary embolism, in the appropriate (Well's Score of 4 or less) patient, the age adjusted d-dimer cutoff for this patient can be calculated as:    Age x 0.01 (in ug/mL) for Age-adjusted D-dimer exclusion threshold for a patient over 50 years.    Protime-INR [[id number]]  (Abnormal) Collected: 03/19/24 1808    Lab Status: Final result Specimen: Blood from Arm, Left Updated: 03/19/24 1835     Protime 14.8 seconds      INR 1.14    APTT [[id number]]  (Normal) Collected: 03/19/24 1808    Lab Status: Final result Specimen: Blood from Arm, Left Updated: 03/19/24 1835     PTT 28 seconds     Blood culture #2 [[id number]] Collected: 03/19/24 1808    Lab Status: In process Specimen: Blood from Arm, Left Updated: 03/19/24 1818    HS Troponin 0hr (reflex protocol) [[id number]]  (Normal) Collected: 03/19/24 1722    Lab Status: Final result Specimen: Blood from Arm, Left Updated: 03/19/24 1757     hs TnI 0hr 24 ng/L     Comprehensive metabolic panel [[id number]]  (Abnormal) Collected: 03/19/24 8052    Lab Status: Final result Specimen: Blood from  Arm, Left Updated: 03/19/24 1753     Sodium 135 mmol/L      Potassium 5.3 mmol/L      Chloride 101 mmol/L      CO2 23 mmol/L      ANION GAP 11 mmol/L      BUN 52 mg/dL      Creatinine 5.64 mg/dL      Glucose 311 mg/dL      Calcium 8.7 mg/dL      Corrected Calcium 9.2 mg/dL      AST 20 U/L      ALT 15 U/L      Alkaline Phosphatase 136 U/L      Total Protein 6.8 g/dL      Albumin 3.4 g/dL      Total Bilirubin 0.75 mg/dL      eGFR 7 ml/min/1.73sq m     Narrative:      National Kidney Disease Foundation guidelines for Chronic Kidney Disease (CKD):     Stage 1 with normal or high GFR (GFR > 90 mL/min/1.73 square meters)    Stage 2 Mild CKD (GFR = 60-89 mL/min/1.73 square meters)    Stage 3A Moderate CKD (GFR = 45-59 mL/min/1.73 square meters)    Stage 3B Moderate CKD (GFR = 30-44 mL/min/1.73 square meters)    Stage 4 Severe CKD (GFR = 15-29 mL/min/1.73 square meters)    Stage 5 End Stage CKD (GFR <15 mL/min/1.73 square meters)  Note: GFR calculation is accurate only with a steady state creatinine    CBC and differential [[id number]]  (Abnormal) Collected: 03/19/24 1722    Lab Status: Final result Specimen: Blood from Arm, Left Updated: 03/19/24 1732     WBC 10.70 Thousand/uL      RBC 3.89 Million/uL      Hemoglobin 11.6 g/dL      Hematocrit 36.3 %      MCV 93 fL      MCH 29.8 pg      MCHC 32.0 g/dL      RDW 14.6 %      MPV 11.9 fL      Platelets 177 Thousands/uL      nRBC 0 /100 WBCs      Neutrophils Relative 79 %      Immature Grans % 1 %      Lymphocytes Relative 5 %      Monocytes Relative 13 %      Eosinophils Relative 2 %      Basophils Relative 0 %      Neutrophils Absolute 8.58 Thousands/µL      Absolute Immature Grans 0.05 Thousand/uL      Absolute Lymphocytes 0.48 Thousands/µL      Absolute Monocytes 1.34 Thousand/µL      Eosinophils Absolute 0.21 Thousand/µL      Basophils Absolute 0.04 Thousands/µL     Blood culture #1 [[id number]] Collected: 03/19/24 1722    Lab Status: In process Specimen: Blood from Arm, Left  Updated: 03/19/24 1731                   CT chest without contrast   Final Result by Soto Tipton MD (03/19 1855)      1.  Pneumoperitoneum is of uncertain etiology, though can be attributable to the peritoneal dialysis. Assessment for abdominal pain is recommended to determine whether further work-up is required.   2.  Left upper and lower lobe consolidations likely represent pneumonia. However, follow-up CT is recommended in 3 months to confirm the absence of an underlying tumor.   3.  Right-sided bronchiolitis.   4.  Mildly enlarged mediastinal lymph nodes are likely reactive given the presumed pneumonia and bronchiolitis.      I personally discussed this study with Dr. Sun on 3/19/2024 6:55 PM.            Workstation performed: [id number]                    Procedures  Procedures         ED Course  ED Course as of 03/19/24 2034   Tue Mar 19, 2024   1708 Procedure Note: EKG  Date/Time: 03/19/24 5:08 PM   Interpreted by: Darlin Pena MD  Indications / Diagnosis: CP/SOB; Tachycardia  ECG reviewed by me, the ED Physician: yes   The EKG demonstrates:  Rhythm: sinus tachycardia  Intervals: normal intervals  Axis: LAD  QRS/Blocks: normal QRS  ST Changes: No acute ST Changes, no STD/VISHNU.   1743 WBC(!): 10.70  Mildly elevated. Non-diagnostic. Awaiting further w/u.    1746 Procedure Note: EKG  Date/Time: 03/19/24 6:04 PM   Interpreted by: Darlin Pena MD  Indications / Diagnosis: CP/SOB  ECG reviewed by me, the ED Physician: yes   The EKG demonstrates:  Rhythm: sinus tachycardia  Intervals: normal intervals  Axis: LAD  QRS/Blocks: incomplete LBBB  ST Changes: No acute ST Changes, no STD/VISHNU.  Significant baseline artifact.   1747 Pt 86-87% on RA. Placed on 2L NC with improvement. Will continue to monitor.    1754 Creatinine(!): 5.64  Hx of ESRD   1754 GFR, Calculated: 7  Will not be able to have CT with contract; Will order non-con   1800 hs TnI 0hr: 24  Will require delta.    1837 PTT: 28  WNL   1838  POCT INR: 1.14  Subtherapeutic, likely secondary to medication non-compliance.    1839 D-Dimer, Quant(!): 1.26  Elevated. Cannot perform CTA due to GFR. Will discuss with pt and in regards to remainder of results.    1842 LACTIC ACID: 1.4  WNL   1911 Procalcitonin(!): 0.65  Elevated consistent with concern for infection.    1911 BNP(!): 401  Elevated. No prior available for comparison.    1912 CT chest without contrast  Left upper and lower lobe consolidations likely represent pneumonia.   - Will initiate abx   1913 87% on RA. Placed on 2L NC with improvement. Will require admission.    1925 Pt reassessed and resting now on NC. Tachypnea still present. Discussed all results and plan for admission. Pt agreeable to plan.    1929 [facility] contacted via TT for admission.    1933 FLU/RSV/COVID - if FLU/RSV clinically relevant  Negative.                                SBIRT 20yo+      Flowsheet Row Most Recent Value   Initial Alcohol Screen: US AUDIT-C     1. How often do you have a drink containing alcohol? 0 Filed at: 03/19/2024 1637   2. How many drinks containing alcohol do you have on a typical day you are drinking?  0 Filed at: 03/19/2024 1637   3a. Male UNDER 65: How often do you have five or more drinks on one occasion? 0 Filed at: 03/19/2024 1637   3b. FEMALE Any Age, or MALE 65+: How often do you have 4 or more drinks on one occassion? 0 Filed at: 03/19/2024 1637   Audit-C Score 0 Filed at: 03/19/2024 1637   MARCEL: How many times in the past year have you...    Used an illegal drug or used a prescription medication for non-medical reasons? Never Filed at: 03/19/2024 1637                      Medical Decision Making  Pt is a 68yo F who presents with shortness of breath. Exam pertinent for tachycardia and tachypnea.    Differential diagnosis to include but not limited to pneumonia, ACS, myocarditis, pericarditis, PE, pleural effusion, fluid overload, viral syndrome.  As patient exerts criteria, will initiate septic  workup.  Will not initiate empiric antibiotics at this time as other causes equally likely.  See ED course for results and details.     Plan to admit pt to [facility]. Pt discussed with admitting team and admission orders placed. Pt admitted without incident.       Amount and/or Complexity of Data Reviewed  Labs: ordered. Decision-making details documented in ED Course.  Radiology: ordered. Decision-making details documented in ED Course.    Risk  Prescription drug management.  Decision regarding hospitalization.             Disposition  Final diagnoses:   Pneumonia   Hypoxemia requiring supplemental oxygen   Dyspnea   Subtherapeutic anticoagulation     Time reflects when diagnosis was documented in both MDM as applicable and the Disposition within this note       Time User Action Codes Description Comment    3/19/2024  7:46 PM Darlin Pena [J18.9] Pneumonia     3/19/2024  7:46 PM Darlin Pena [R09.02,  Z99.81] Hypoxemia requiring supplemental oxygen     3/19/2024  7:46 PM Darlin Pena [R06.00] Dyspnea     3/19/2024  7:46 PM Darlin Pena [Z51.81,  Z79.01] Subtherapeutic anticoagulation           ED Disposition       ED Disposition   Admit    Condition   Stable    Date/Time   Tue Mar 19, 2024 1945    Comment   Case was discussed with [facility] and the patient's admission status was agreed to be Admission Status: inpatient status to the service of Dr. Renner.               Follow-up Information    None         Patient's Medications   Discharge Prescriptions    No medications on file       No discharge procedures on file.    PDMP Review         Value Time User    PDMP Reviewed  Yes 7/18/2020  1:03 PM Miguel Toribio MD            ED Provider  Electronically Signed by             Darlin Pena MD  03/19/24 2034

## 2024-03-20 PROBLEM — A41.9 SEPSIS (HCC): Status: ACTIVE | Noted: 2024-03-20

## 2024-03-20 LAB
ANION GAP SERPL CALCULATED.3IONS-SCNC: 10 MMOL/L (ref 4–13)
BUN SERPL-MCNC: 54 MG/DL (ref 5–25)
CALCIUM SERPL-MCNC: 8.9 MG/DL (ref 8.4–10.2)
CHLORIDE SERPL-SCNC: 107 MMOL/L (ref 96–108)
CO2 SERPL-SCNC: 20 MMOL/L (ref 21–32)
CREAT SERPL-MCNC: 5.56 MG/DL (ref 0.6–1.3)
ERYTHROCYTE [DISTWIDTH] IN BLOOD BY AUTOMATED COUNT: 14.5 % (ref 11.6–15.1)
GFR SERPL CREATININE-BSD FRML MDRD: 7 ML/MIN/1.73SQ M
GLUCOSE SERPL-MCNC: 122 MG/DL (ref 65–140)
GLUCOSE SERPL-MCNC: 132 MG/DL (ref 65–140)
GLUCOSE SERPL-MCNC: 68 MG/DL (ref 65–140)
HCT VFR BLD AUTO: 33.3 % (ref 34.8–46.1)
HGB BLD-MCNC: 10.1 G/DL (ref 11.5–15.4)
INR PPP: 1.33 (ref 0.84–1.19)
L PNEUMO1 AG UR QL IA.RAPID: NEGATIVE
MAGNESIUM SERPL-MCNC: 1.8 MG/DL (ref 1.9–2.7)
MCH RBC QN AUTO: 28.5 PG (ref 26.8–34.3)
MCHC RBC AUTO-ENTMCNC: 30.3 G/DL (ref 31.4–37.4)
MCV RBC AUTO: 94 FL (ref 82–98)
PLATELET # BLD AUTO: 189 THOUSANDS/UL (ref 149–390)
PMV BLD AUTO: 11.3 FL (ref 8.9–12.7)
POTASSIUM SERPL-SCNC: 5 MMOL/L (ref 3.5–5.3)
PROCALCITONIN SERPL-MCNC: 0.7 NG/ML
PROTHROMBIN TIME: 16.7 SECONDS (ref 11.6–14.5)
RBC # BLD AUTO: 3.54 MILLION/UL (ref 3.81–5.12)
S PNEUM AG UR QL: NEGATIVE
SODIUM SERPL-SCNC: 137 MMOL/L (ref 135–147)
WBC # BLD AUTO: 9.94 THOUSAND/UL (ref 4.31–10.16)

## 2024-03-20 PROCEDURE — 99232 SBSQ HOSP IP/OBS MODERATE 35: CPT

## 2024-03-20 PROCEDURE — 83735 ASSAY OF MAGNESIUM: CPT | Performed by: FAMILY MEDICINE

## 2024-03-20 PROCEDURE — 87449 NOS EACH ORGANISM AG IA: CPT | Performed by: FAMILY MEDICINE

## 2024-03-20 PROCEDURE — 82948 REAGENT STRIP/BLOOD GLUCOSE: CPT

## 2024-03-20 PROCEDURE — 85027 COMPLETE CBC AUTOMATED: CPT | Performed by: FAMILY MEDICINE

## 2024-03-20 PROCEDURE — 85610 PROTHROMBIN TIME: CPT | Performed by: FAMILY MEDICINE

## 2024-03-20 PROCEDURE — 99223 1ST HOSP IP/OBS HIGH 75: CPT | Performed by: STUDENT IN AN ORGANIZED HEALTH CARE EDUCATION/TRAINING PROGRAM

## 2024-03-20 PROCEDURE — 87081 CULTURE SCREEN ONLY: CPT

## 2024-03-20 PROCEDURE — 84145 PROCALCITONIN (PCT): CPT | Performed by: FAMILY MEDICINE

## 2024-03-20 PROCEDURE — 80048 BASIC METABOLIC PNL TOTAL CA: CPT | Performed by: FAMILY MEDICINE

## 2024-03-20 RX ORDER — LANOLIN ALCOHOL/MO/W.PET/CERES
800 CREAM (GRAM) TOPICAL 2 TIMES DAILY
Status: COMPLETED | OUTPATIENT
Start: 2024-03-20 | End: 2024-03-20

## 2024-03-20 RX ORDER — LIDOCAINE 50 MG/G
1 PATCH TOPICAL DAILY
Status: DISCONTINUED | OUTPATIENT
Start: 2024-03-20 | End: 2024-03-23 | Stop reason: HOSPADM

## 2024-03-20 RX ORDER — CALCIUM ACETATE 667 MG/1
1334 CAPSULE ORAL DAILY
Status: DISCONTINUED | OUTPATIENT
Start: 2024-03-20 | End: 2024-03-23 | Stop reason: HOSPADM

## 2024-03-20 RX ORDER — ALBUTEROL SULFATE 2.5 MG/3ML
2.5 SOLUTION RESPIRATORY (INHALATION) EVERY 6 HOURS PRN
Status: DISCONTINUED | OUTPATIENT
Start: 2024-03-20 | End: 2024-03-21

## 2024-03-20 RX ORDER — BUMETANIDE 0.25 MG/ML
1 INJECTION INTRAMUSCULAR; INTRAVENOUS ONCE
Status: COMPLETED | OUTPATIENT
Start: 2024-03-20 | End: 2024-03-20

## 2024-03-20 RX ORDER — TORSEMIDE 100 MG/1
100 TABLET ORAL DAILY
Status: DISCONTINUED | OUTPATIENT
Start: 2024-03-21 | End: 2024-03-23 | Stop reason: HOSPADM

## 2024-03-20 RX ADMIN — HEPARIN SODIUM 5000 UNITS: 5000 INJECTION, SOLUTION INTRAVENOUS; SUBCUTANEOUS at 21:11

## 2024-03-20 RX ADMIN — HEPARIN SODIUM 5000 UNITS: 5000 INJECTION, SOLUTION INTRAVENOUS; SUBCUTANEOUS at 14:02

## 2024-03-20 RX ADMIN — GUAIFENESIN 600 MG: 600 TABLET, EXTENDED RELEASE ORAL at 17:16

## 2024-03-20 RX ADMIN — AZITHROMYCIN MONOHYDRATE 500 MG: 500 INJECTION, POWDER, LYOPHILIZED, FOR SOLUTION INTRAVENOUS at 22:48

## 2024-03-20 RX ADMIN — INSULIN LISPRO 3 UNITS: 100 INJECTION, SOLUTION INTRAVENOUS; SUBCUTANEOUS at 17:16

## 2024-03-20 RX ADMIN — BUMETANIDE 1 MG: 0.25 INJECTION INTRAMUSCULAR; INTRAVENOUS at 09:23

## 2024-03-20 RX ADMIN — INSULIN LISPRO 3 UNITS: 100 INJECTION, SOLUTION INTRAVENOUS; SUBCUTANEOUS at 08:30

## 2024-03-20 RX ADMIN — GUAIFENESIN 600 MG: 600 TABLET, EXTENDED RELEASE ORAL at 08:44

## 2024-03-20 RX ADMIN — HEPARIN SODIUM 5000 UNITS: 5000 INJECTION, SOLUTION INTRAVENOUS; SUBCUTANEOUS at 05:03

## 2024-03-20 RX ADMIN — INSULIN LISPRO 3 UNITS: 100 INJECTION, SOLUTION INTRAVENOUS; SUBCUTANEOUS at 11:53

## 2024-03-20 RX ADMIN — Medication 2000 UNITS: at 08:38

## 2024-03-20 RX ADMIN — LEVOTHYROXINE SODIUM 125 MCG: 125 TABLET ORAL at 05:03

## 2024-03-20 RX ADMIN — CALCITRIOL CAPSULES 0.25 MCG 0.25 MCG: 0.25 CAPSULE ORAL at 08:38

## 2024-03-20 RX ADMIN — WARFARIN SODIUM 5 MG: 5 TABLET ORAL at 17:16

## 2024-03-20 RX ADMIN — CALCIUM ACETATE 1334 MG: 667 CAPSULE ORAL at 08:38

## 2024-03-20 RX ADMIN — LIDOCAINE 1 PATCH: 700 PATCH TOPICAL at 15:22

## 2024-03-20 RX ADMIN — LOSARTAN POTASSIUM 100 MG: 50 TABLET, FILM COATED ORAL at 08:44

## 2024-03-20 RX ADMIN — METOPROLOL SUCCINATE 50 MG: 50 TABLET, EXTENDED RELEASE ORAL at 08:39

## 2024-03-20 RX ADMIN — DOCUSATE SODIUM 100 MG: 100 CAPSULE, LIQUID FILLED ORAL at 17:16

## 2024-03-20 RX ADMIN — Medication 800 MG: at 11:00

## 2024-03-20 RX ADMIN — ATORVASTATIN CALCIUM 20 MG: 20 TABLET, FILM COATED ORAL at 17:16

## 2024-03-20 RX ADMIN — B-COMPLEX W/ C & FOLIC ACID TAB 1 TABLET: TAB at 08:39

## 2024-03-20 RX ADMIN — CEFTRIAXONE 1000 MG: 1 INJECTION, SOLUTION INTRAVENOUS at 21:12

## 2024-03-20 NOTE — ASSESSMENT & PLAN NOTE
Patient followed by endocrinology  takes 3 units of lispro with meals, Ozempic and Semglee  Started on insulin sliding scale with home lispro

## 2024-03-20 NOTE — ASSESSMENT & PLAN NOTE
POA as evidenced by tachycardia and tachypnea in setting of pneumonia seen on CT  Flu/COVID/RSV negative  Continue rocephin and azithromycin  Follow up blood cultures

## 2024-03-20 NOTE — ASSESSMENT & PLAN NOTE
Continue peritoneal dialysis  Nephrology following  Given IV bumex x 1 dose  Resume home torsemide 100 mg daily tomorrow    Wt Readings from Last 3 Encounters:   03/19/24 86.9 kg (191 lb 9.6 oz)   03/13/24 90.7 kg (200 lb)   03/08/24 90.5 kg (199 lb 9.6 oz)

## 2024-03-20 NOTE — ASSESSMENT & PLAN NOTE
Continue Lopressor, and warfarin 5 mg daily she has not taken her warfarin in 4 days.  Current INR is 1.1

## 2024-03-20 NOTE — ASSESSMENT & PLAN NOTE
CT scan showing left upper and lower lobe consolidation.  Patient received Levaquin in the emergency room.  Start Rocephin and Zithromax tomorrow

## 2024-03-20 NOTE — PROGRESS NOTES
Received the patient from 2S in no distress, she is accompanied by her daughter, handoff report received from Lia GARDNER. Pt is administered 2L O2 by nasal cannula. Comfort rounds completed, night shift RN to follow.

## 2024-03-20 NOTE — PLAN OF CARE
Problem: SAFETY ADULT  Goal: Patient will remain free of falls  Description: INTERVENTIONS:  - Educate patient/family on patient safety including physical limitations  - Instruct patient to call for assistance with activity   - Consult OT/PT to assist with strengthening/mobility   - Keep Call bell within reach  - Keep bed low and locked with side rails adjusted as appropriate  - Keep care items and personal belongings within reach  - Initiate and maintain comfort rounds  - Make Fall Risk Sign visible to staff  - Offer Toileting every 2 Hours, in advance of need  - Initiate/Maintain bed alarm  - Obtain necessary fall risk management equipment: walker  - Apply yellow socks and bracelet for high fall risk patients  - Consider moving patient to room near nurses station  Outcome: Progressing  Goal: Maintain or return to baseline ADL function  Description: INTERVENTIONS:  -  Assess patient's ability to carry out ADLs; assess patient's baseline for ADL function and identify physical deficits which impact ability to perform ADLs (bathing, care of mouth/teeth, toileting, grooming, dressing, etc.)  - Assess/evaluate cause of self-care deficits   - Assess range of motion  - Assess patient's mobility; develop plan if impaired  - Assess patient's need for assistive devices and provide as appropriate  - Encourage maximum independence but intervene and supervise when necessary  - Involve family in performance of ADLs  - Assess for home care needs following discharge   - Consider OT consult to assist with ADL evaluation and planning for discharge  - Provide patient education as appropriate  Outcome: Progressing

## 2024-03-20 NOTE — ASSESSMENT & PLAN NOTE
Patient presented to ED with shortness of breath, cough, and chills.  CT chest: Left upper and lower lobe consolidations likely represent pneumonia. However, follow-up CT is recommended in 3 months to confirm the absence of an underlying tumor.  CT scan showing left upper and lower lobe consolidation.  Procalcitonin 0.65 > 0.70  Urinary antigens negative  Continue rocephin and azithromycin  Nebs as needed  Currently requiring 2 L oxygen, wean as tolerated

## 2024-03-20 NOTE — ASSESSMENT & PLAN NOTE
On peritoneal dialysis, will consult nephrology  Lab Results   Component Value Date    EGFR 7 03/19/2024    EGFR 8 03/05/2024    EGFR 6 09/11/2023    CREATININE 5.64 (H) 03/19/2024    CREATININE 5.04 (H) 03/05/2024    CREATININE 6.33 (H) 09/11/2023      Patient tolerated exercise well without complaints.

## 2024-03-20 NOTE — ASSESSMENT & PLAN NOTE
Lab Results   Component Value Date    EGFR 7 03/20/2024    EGFR 7 03/19/2024    EGFR 8 03/05/2024    CREATININE 5.56 (H) 03/20/2024    CREATININE 5.64 (H) 03/19/2024    CREATININE 5.04 (H) 03/05/2024   Patient missed peritoneal dialysis day prior to admission due to not feeling well  Nephrology following for dialysis

## 2024-03-20 NOTE — UTILIZATION REVIEW
Initial Clinical Review    Admission: Date/Time/Statement:   Admission Orders (From admission, onward)       Ordered        03/19/24 1946  INPATIENT ADMISSION  Once                          Orders Placed This Encounter   Procedures    INPATIENT ADMISSION     Standing Status:   Standing     Number of Occurrences:   1     Order Specific Question:   Level of Care     Answer:   Med Surg [16]     Order Specific Question:   Estimated length of stay     Answer:   More than 2 Midnights     Order Specific Question:   Certification     Answer:   I certify that inpatient services are medically necessary for this patient for a duration of greater than two midnights. See H&P and MD Progress Notes for additional information about the patient's course of treatment.     ED Arrival Information       Expected   -    Arrival   3/19/2024 16:33    Acuity   Emergent              Means of arrival   Walk-In    Escorted by   Family Member    Service   Hospitalist    Admission type   Emergency              Arrival complaint   shortness of breath             Chief Complaint   Patient presents with    Shortness of Breath     Has had a cold with cough for over a week and now having mucous production with shortness of breath       Initial Presentation:   69 yof to ER from home for worsening SOB & productive cough x 4-5 days with associated intermittent chest discomfort that she states is central and radiates occasionally to her right arm, generalized fatigue, and chills. Patient reports that she is a PD patient and missed PD last night. Patient also reports that she is supposed to be on warfarin due to history of A-fib but has not taken it in several days as she has not been feeling well. Patient reports she has been taking her blood pressure medication but has not been taking anything else.  Hx ESRD on peritoneal dialysis, heart failure, hypertension, hyperlipidemia, atrial fib on Coumadin. Presents tachycardic, tachypneic, hypertensive, lungs  with decreased breath sounds. Admission CT showing ROX & LLL consolidations, likely representing pneumonia, R sided bronchiolitis. Labs: WBC 10.70, BUN 52, Cr 5.64, alk phos 136, alb 3.4, gluc 311, d-dimer 1.26, PT 14.8, procalcitonin 0.65, .  Admitted to inpatient status for bilateral pneumonia. Started on IVABT, cultures pending.    Date: 3/20/24   Day 2:   Tmax 99. IVABT in progress for bilateral pneumonia, POA. Cultures pending. Lungs with bilateral crackles and wheezing. O2 requirements @ 2ltr nc. IV Bumex given per renal.     Per renal: ESRD on PD  Patient has no residual function.  Plan to give Bumex 1 mg IV to force diuresis. Continue torsemide 100 mg starting tomorrow (home dose).     ED Triage Vitals   Temperature Pulse Respirations Blood Pressure SpO2   03/19/24 1634 03/19/24 1634 03/19/24 1634 03/19/24 1634 03/19/24 1634   98.6 °F (37 °C) 104 (!) 26 (!) 186/82 93 %      Temp Source Heart Rate Source Patient Position - Orthostatic VS BP Location FiO2 (%)   03/19/24 2049 03/19/24 1915 03/19/24 1915 03/19/24 1915 --   Oral Monitor Lying Right arm       Pain Score       03/19/24 1634       No Pain          Wt Readings from Last 1 Encounters:   03/19/24 86.9 kg (191 lb 9.6 oz)     Additional Vital Signs:   Date/Time Temp Pulse Resp BP MAP (mmHg) SpO2 Calculated FIO2 (%) - Nasal Cannula Nasal Cannula O2 Flow Rate (L/min) O2 Device Patient Position - Orthostatic VS   03/20/24 08:14:37 -- 74 -- 116/69 85 96 % -- -- -- --   03/19/24 22:20:45 99 °F (37.2 °C) 92 18 131/78 96 93 % 28 2 L/min Nasal cannula --   03/19/24 20:49:07 98.7 °F (37.1 °C) 100 20 131/80 97 94 % -- -- None (Room air) Lying   03/19/24 2001 -- 98 24 Abnormal  174/74 Abnormal  -- 98 % 28 2 L/min Nasal cannula Lying   03/19/24 1945 -- 95 22 201/80 Abnormal  115 99 % 28 2 L/min Nasal cannula Lying   03/19/24 1915 -- 108 Abnormal  24 Abnormal  204/83 Abnormal  119 98 % 28 2 L/min Nasal cannula Lying   03/19/24 1800 -- -- -- -- -- -- -- -- None  (Room air) --   03/19/24 1634 98.6 °F (37 °C) 104 26 Abnormal  186/82 Abnormal  -- 93 % -- -- -- --     Pertinent Labs/Diagnostic Test Results:   CT chest without contrast   Final Result  (03/19 1855)      1.  Pneumoperitoneum is of uncertain etiology, though can be attributable to the peritoneal dialysis. Assessment for abdominal pain is recommended to determine whether further work-up is required.   2.  Left upper and lower lobe consolidations likely represent pneumonia. However, follow-up CT is recommended in 3 months to confirm the absence of an underlying tumor.   3.  Right-sided bronchiolitis.   4.  Mildly enlarged mediastinal lymph nodes are likely reactive given the presumed pneumonia and bronchiolitis.     3/19 EKG=  Sinus tachycardia  Left anterior fascicular block  Nonspecific ST abnormality    Results from last 7 days   Lab Units 03/19/24  1808   SARS-COV-2  Negative     Results from last 7 days   Lab Units 03/20/24 0449 03/19/24  1722   WBC Thousand/uL 9.94 10.70*   HEMOGLOBIN g/dL 10.1* 11.6   HEMATOCRIT % 33.3* 36.3   PLATELETS Thousands/uL 189 177   NEUTROS ABS Thousands/µL  --  8.58*     Results from last 7 days   Lab Units 03/20/24 0449 03/19/24  1722   SODIUM mmol/L 137 135   POTASSIUM mmol/L 5.0 5.3   CHLORIDE mmol/L 107 101   CO2 mmol/L 20* 23   ANION GAP mmol/L 10 11   BUN mg/dL 54* 52*   CREATININE mg/dL 5.56* 5.64*   EGFR ml/min/1.73sq m 7 7   CALCIUM mg/dL 8.9 8.7   MAGNESIUM mg/dL 1.8*  --      Results from last 7 days   Lab Units 03/19/24  1722   AST U/L 20   ALT U/L 15   ALK PHOS U/L 136*   TOTAL PROTEIN g/dL 6.8   ALBUMIN g/dL 3.4*   TOTAL BILIRUBIN mg/dL 0.75     Results from last 7 days   Lab Units 03/20/24  0829   POC GLUCOSE mg/dl 132     Results from last 7 days   Lab Units 03/20/24 0449 03/19/24  1722   GLUCOSE RANDOM mg/dL 68 311*     Results from last 7 days   Lab Units 03/19/24  2219 03/19/24  1955 03/19/24  1722   HS TNI 0HR ng/L  --   --  24   HS TNI 2HR ng/L  --  24  --     HSTNI D2 ng/L  --  0  --    HS TNI 4HR ng/L 28  --   --    HSTNI D4 ng/L 4  --   --      Results from last 7 days   Lab Units 03/19/24  1808   D-DIMER QUANTITATIVE ug/ml FEU 1.26*     Results from last 7 days   Lab Units 03/20/24  0449 03/19/24  1808   PROTIME seconds 16.7* 14.8*   INR  1.33* 1.14   PTT seconds  --  28     Results from last 7 days   Lab Units 03/20/24  0449 03/19/24  1808   PROCALCITONIN ng/ml 0.70* 0.65*     Results from last 7 days   Lab Units 03/19/24  1808   LACTIC ACID mmol/L 1.4     Results from last 7 days   Lab Units 03/19/24  1808   BNP pg/mL 401*     Results from last 7 days   Lab Units 03/19/24  1808   INFLUENZA A PCR  Negative   INFLUENZA B PCR  Negative   RSV PCR  Negative     Results from last 7 days   Lab Units 03/19/24  1808 03/19/24  1722   BLOOD CULTURE  Received in Microbiology Lab. Culture in Progress. Received in Microbiology Lab. Culture in Progress.     ED Treatment:   Medication Administration from 03/19/2024 1633 to 03/19/2024 2044         Date/Time Order Dose Route Action     03/19/2024 2008 EDT levofloxacin (LEVAQUIN) IVPB (premix in dextrose) 750 mg 150 mL 750 mg Intravenous New Bag     03/19/2024 1937 EDT labetalol (NORMODYNE) injection 10 mg 10 mg Intravenous Given          Past Medical History:   Diagnosis Date    Acute asthmatic bronchitis     last assessed: 6/2/2017    Cardiac disease     Chronic diastolic (congestive) heart failure (HCC)     Chronic kidney disease     pt is on peritoneal dialysis daily from 2200- 0600    Closed nondisplaced fracture of distal phalanx of right great toe 11/13/2018    Colon polyp     CPAP (continuous positive airway pressure) dependence     Diabetes mellitus (HCC)     Hyperlipidemia     Hypertension     Medical non-compliance     Morbid obesity with BMI of 40.0-44.9, adult (HCC) 02/28/2019    On continuous oral anticoagulation 12/22/2020    Pneumonia     last assessed: 6/5/2013    PONV (postoperative nausea and vomiting)      Pulmonary embolism (HCC)     Renal disorder     possible clot noted in kidney, thus blood thinners currently    Severe obstructive sleep apnea     Severe pulmonary arterial systolic hypertension (HCC)     Tinea pedis of both feet 12/11/2018     Present on Admission:   Pneumonia of both lungs due to infectious organism   Mixed hyperlipidemia   Essential hypertension   End stage renal disease (HCC)   Hypothyroidism   Atrial fibrillation, chronic (HCC)   Secondary hyperparathyroidism of renal origin (HCC)   Chronic combined systolic and diastolic congestive heart failure (HCC)   Acute respiratory failure with hypoxia ([facility])      Admitting Diagnosis: Pneumonia [J18.9]  Dyspnea [R06.00]  Hypoxemia requiring supplemental oxygen [R09.02, Z99.81]  Subtherapeutic anticoagulation [Z51.81, Z79.01]  Age/Sex: 69 y.o. female  Admission Orders:  Aspiration precautions  Consult renal  Scd/foot pumps     Scheduled Medications:  atorvastatin, 20 mg, Oral, Daily With Dinner  azithromycin, 500 mg, Intravenous, Q24H  bumetanide (BUMEX) injection 1 mg, Once 3/20  calcitriol, 0.25 mcg, Oral, Daily  calcium acetate, 1,334 mg, Oral, Daily  cefTRIAXone, 1,000 mg, Intravenous, Q24H  cholecalciferol, 2,000 Units, Oral, Daily  docusate sodium, 100 mg, Oral, BID  guaiFENesin, 600 mg, Oral, BID  heparin (porcine), 5,000 Units, Subcutaneous, Q8H FARZANA  insulin lispro, 3 Units, Subcutaneous, TID With Meals  levothyroxine, 125 mcg, Oral, Early Morning  losartan, 100 mg, Oral, Daily  metoprolol succinate, 50 mg, Oral, Daily  multivitamin stress formula, 1 tablet, Oral, Daily  [START ON 3/21/2024] torsemide, 100 mg, Oral, Daily  warfarin, 5 mg, Oral, Daily (warfarin)    PRN Meds:  acetaminophen, 650 mg, Oral, Q6H PRN  albuterol, 2.5 mg, Nebulization, Q6H PRN    Network Utilization Review Department  ATTENTION: Please call with any questions or concerns to [phone] and carefully listen to the prompts so that you are directed to the right person. All  voicemails are confidential.   For Discharge needs, contact Care Management DC Support Team at [phone] opt. 2  Send all requests for admission clinical reviews, approved or denied determinations and any other requests to dedicated fax number below belonging to the campus where the patient is receiving treatment. List of dedicated fax numbers for the Facilities:  FACILITY NAME UR FAX NUMBER   ADMISSION DENIALS (Administrative/Medical Necessity) [phone]   DISCHARGE SUPPORT TEAM (NETWORK) [phone]   PARENT CHILD HEALTH (Maternity/NICU/Pediatrics) [phone]   [facility] [phone]   [facility] [phone]   [facility] [phone]   [facility] [phone]   formerly [facility] [phone]   [facility] [phone]   [facility] [phone]   [facility] [phone]   [facility] [phone]   [facility] [phone]   [facility] [phone]   [facility] [phone]

## 2024-03-20 NOTE — ASSESSMENT & PLAN NOTE
Continue Lopressor  Patient has not taken warfarin for 4 days prior to admission as she did not feel well  Continue warfarin 5 mg daily  Monitor INR, currently 1.33

## 2024-03-20 NOTE — ASSESSMENT & PLAN NOTE
Secondary to pneumonia.  Patient requiring 2 L of nasal cannula oxygen.  At home she does not take any oxygen

## 2024-03-20 NOTE — ASSESSMENT & PLAN NOTE
Continue peritoneal dialysis    Wt Readings from Last 3 Encounters:   03/19/24 86.9 kg (191 lb 9.6 oz)   03/13/24 90.7 kg (200 lb)   03/08/24 90.5 kg (199 lb 9.6 oz)

## 2024-03-20 NOTE — UTILIZATION REVIEW
NOTIFICATION OF INPATIENT ADMISSION   AUTHORIZATION REQUEST   SERVICING FACILITY:   [place] [address]  Tax ID: [id number]  NPI: [id number] ATTENDING PROVIDER:  Attending Name and NPI#: Danni Rob Do [[id number]]  Address: [address] [place] [address]  Phone: [phone]   ADMISSION INFORMATION:  Place of Service: Inpatient Acute [facility]  Place of Service Code: 21  Inpatient Admission Date/Time: 3/19/24  7:46 PM  Discharge Date/Time: No discharge date for patient encounter.  Admitting Diagnosis Code/Description:  Pneumonia [J18.9]  Dyspnea [R06.00]  Hypoxemia requiring supplemental oxygen [R09.02, Z99.81]  Subtherapeutic anticoagulation [Z51.81, Z79.01]     UTILIZATION REVIEW CONTACT:  Beth Cruz Utilization   Network Utilization Review Department  Phone: [phone]  Fax [phone]  Email: [email or web address]  Contact for approvals/pending authorizations, clinical reviews, and discharge.     PHYSICIAN ADVISORY SERVICES:  Medical Necessity Denial & Slhn-mo-Vgkh Review  Phone: [phone]  Fax: [phone]  Email: [email or web address]     DISCHARGE SUPPORT TEAM:  For Patients Discharge Needs & Updates  Phone: [phone] opt. 2 Fax: [phone]  Email: [email or web address]

## 2024-03-20 NOTE — H&P
[facility]  H&P  Name: Jacquie Smith 69 y.o. female I MRN: [id number]  Unit/Bed#: 2 [facility] 219 B I Date of Admission: 3/19/2024   Date of Service: 3/19/2024 I Hospital Day: 0      Assessment/Plan   * Pneumonia of both lungs due to infectious organism  Assessment & Plan  CT scan showing left upper and lower lobe consolidation.  Patient received Levaquin in the emergency room.  Start Rocephin and Zithromax tomorrow    End stage renal disease (HCC)  Assessment & Plan  On peritoneal dialysis, will consult nephrology  Lab Results   Component Value Date    EGFR 7 03/19/2024    EGFR 8 03/05/2024    EGFR 6 09/11/2023    CREATININE 5.64 (H) 03/19/2024    CREATININE 5.04 (H) 03/05/2024    CREATININE 6.33 (H) 09/11/2023       Type 2 diabetes mellitus with hyperglycemia, with long-term current use of insulin ([facility])  Assessment & Plan  Patient followed by endocrinology, takes 3 units of lispro with meals, Ozempic and Semglee  Will put on insulin sliding scale and Lantus 10 units    Lab Results   Component Value Date    HGBA1C 8.3 (H) 03/05/2024       Mixed hyperlipidemia  Assessment & Plan  Continue statin    Hypothyroidism  Assessment & Plan  Continue levothyroxine    Essential hypertension  Assessment & Plan  Continue losartan, and Lopressor    Chronic combined systolic and diastolic congestive heart failure (HCC)  Assessment & Plan  Continue peritoneal dialysis    Wt Readings from Last 3 Encounters:   03/19/24 86.9 kg (191 lb 9.6 oz)   03/13/24 90.7 kg (200 lb)   03/08/24 90.5 kg (199 lb 9.6 oz)       Acute respiratory failure with hypoxia (HCC)  Assessment & Plan  Secondary to pneumonia.  Patient requiring 2 L of nasal cannula oxygen.  At home she does not take any oxygen    Atrial fibrillation, chronic (HCC)  Assessment & Plan  Continue Lopressor, and warfarin 5 mg daily she has not taken her warfarin in 4 days.  Current INR is 1.1    Secondary hyperparathyroidism of renal origin (HCC)  Assessment &  Plan  Continue Sensipar       Disposition  #1 IV Zithromax and Rocephin  #2 warfarin 5 mg daily  #3 consult to nephrology for peritoneal dialysis  #4 CBC, BMP, magnesium and INR in the morning          VTE Prophylaxis: Warfarin (Coumadin)  / sequential compression device   Code Status: Level 1 - Full Code       Anticipated Length of Stay:  Patient will be admitted on an Inpatient basis with an anticipated length of stay of greater than 2 midnights.   Justification for Hospital Stay: Please see detailed plans noted above.    Chief Complaint:     Shortness of breath  History of Present Illness:  Jacquie Smith is a 69 y.o. female who has past medical history significant for peritoneal dialysis, heart failure, hypertension, hyperlipidemia, atrial fibs on Coumadin, who presents to the ER due to 4 to 5 days of shortness of breath.  She reports a cough with yellow sputum production.  She reports generalized fatigue and malaise and chills.  She was talking to her manager today but could not speak in full sentences.  She came to the hospital where she was found to be little hypoxic on pulse ox, and found to have bilateral pneumonia.      Review of Systems:    Constitutional: Fevers and chills  Eyes:  Denies change in visual acuity   HENT:  Denies nasal congestion or sore throat   Respiratory: Shortness of breath and cough  Cardiovascular:  Denies chest pain or edema   GI:  Denies abdominal pain or bloody stools  :  Denies dysuria   Musculoskeletal:  Denies back pain or joint pain   Integument:  Denies rash   Neurologic:  Denies headache or sensory changes   Endocrine:  Denies polyuria or polydipsia   Lymphatic:  Denies swollen glands   Psychiatric:  Denies depression or anxiety     Past Medical and Surgical History:   Past Medical History:   Diagnosis Date    Acute asthmatic bronchitis     last assessed: 6/2/2017    Cardiac disease     Chronic diastolic (congestive) heart failure (HCC)     Chronic kidney disease     pt is on  peritoneal dialysis daily from 2200- 0600    Closed nondisplaced fracture of distal phalanx of right great toe 2018    Colon polyp     CPAP (continuous positive airway pressure) dependence     Diabetes mellitus (HCC)     Hyperlipidemia     Hypertension     Medical non-compliance     Morbid obesity with BMI of 40.0-44.9, adult ([facility]) 2019    On continuous oral anticoagulation 2020    Pneumonia     last assessed: 2013    PONV (postoperative nausea and vomiting)     Pulmonary embolism ([facility])     Renal disorder     possible clot noted in kidney, thus blood thinners currently    Severe obstructive sleep apnea     Severe pulmonary arterial systolic hypertension ([facility])     Tinea pedis of both feet 2018     Past Surgical History:   Procedure Laterality Date    CATARACT EXTRACTION       SECTION       x 1    EYE SURGERY      NOSE SURGERY      fractured nose - septoplasty       Meds/Allergies:  Medications Prior to Admission   Medication Sig Dispense Refill Last Dose    valsartan (DIOVAN) 320 MG tablet Take 0.5 tablets (160 mg total) by mouth daily 90 tablet 0 3/19/2024    albuterol (2.5 mg/3 mL) 0.083 % nebulizer solution Take 3 mL (2.5 mg total) by nebulization every 6 (six) hours as needed for wheezing or shortness of breath (Patient not taking: Reported on 3/13/2024) 100 mL 0     albuterol (PROVENTIL HFA,VENTOLIN HFA) 90 mcg/act inhaler Inhale 2 puffs every 6 (six) hours as needed for wheezing 8 g 0     atorvastatin (LIPITOR) 20 mg tablet Take 1 tablet (20 mg total) by mouth daily 90 tablet 3     B Complex-C (B-COMPLEX WITH VITAMIN C) tablet Take 1 tablet by mouth daily       benzonatate (TESSALON PERLES) 100 mg capsule Take 1 capsule (100 mg total) by mouth 3 (three) times a day as needed for cough (Patient not taking: Reported on 2024) 20 capsule 0     Blood Glucose Monitoring Suppl (ONE TOUCH ULTRA 2) w/Device KIT Test glucose 3 times daily 1 each 0     calcitriol  (ROCALTROL) 0.25 mcg capsule Take 1 capsule (0.25 mcg total) by mouth daily 90 capsule 1     calcium acetate (PHOSLO) capsule Take 667 mg by mouth daily        cholecalciferol (VITAMIN D3) 1,000 units tablet Take 2 tablets (2,000 Units total) by mouth daily 100 tablet 5     cinacalcet (SENSIPAR) 30 mg tablet TAKE 1 TABLET THREE TIMES A WEEK 36 tablet 3     Continuous Blood Gluc Sensor (FreeStyle Conner 2 Sensor) MISC Use to check blood sugars 6 each 4     docusate sodium (COLACE) 100 mg capsule Take 1 capsule (100 mg total) by mouth 2 (two) times a day 10 capsule 0     gentamicin (GARAMYCIN) 0.1 % cream APPLY TOPICALLY DAILY 15 g 23     Insulin Glargine-yfgn (Semglee, yfgn,) 100 UNIT/ML SOPN Inject 10 units subcutaneously daily in the morning 15 mL 3     insulin lispro (HumaLOG KwikPen) 100 units/mL injection pen Inject 3 Units under the skin 3 (three) times a day with meals 15 mL 3     Insulin Pen Needle 31G X 5 MM MISC by Does not apply route daily 100 each 1     Lancets (onetouch ultrasoft) lancets Test glucose 3 times a day; Code: E11.8 300 each 1     levothyroxine (Euthyrox) 125 mcg tablet Take one tablet by mouth in early morning on empty stomach 90 tablet 3     metolazone (ZAROXOLYN) 5 mg tablet Take 5 mg by mouth daily       metoprolol succinate (TOPROL-XL) 50 mg 24 hr tablet TAKE 1 TABLET DAILY 90 tablet 1     moxifloxacin (VIGAMOX) 0.5 % ophthalmic solution Administer 1 drop to the right eye 3 (three) times a day 3 mL 0     MULTIPLE VITAMIN PO Take 1 tablet by mouth daily       nystatin (MYCOSTATIN) powder Apply topically 3 (three) times a day for 7 days (Patient not taking: Reported on 3/23/2023) 15 g 0     OneTouch Ultra test strip TEST GLUCOSE THREE TIMES A  strip 3     semaglutide, 1 mg/dose, (Ozempic, 1 MG/DOSE,) 4 mg/3 mL injection pen Inject 0.75 mL (1 mg total) under the skin every 7 days 9 mL 3     torsemide (DEMADEX) 100 mg tablet Take 1 tablet (100 mg total) by mouth daily 90 tablet 1      warfarin (COUMADIN) 5 mg tablet           Allergies: No Known Allergies    History:  Marital Status: Single     Substance Use History:   Social History     Substance and Sexual Activity   Alcohol Use Never     Social History     Tobacco Use   Smoking Status Never    Passive exposure: Never   Smokeless Tobacco Never     Social History     Substance and Sexual Activity   Drug Use Never       Family History:  Family History   Problem Relation Age of Onset    Diabetes Mother         mellitus    Anxiety disorder Mother         generalized anxiety disorder    Hypertension Mother     Stroke Mother     Diabetes type II Mother     Kidney disease Father     Kidney failure Father         renal failure    Ulcerative colitis Sister     Diabetes Sister     Ovarian cancer Maternal Aunt     Diabetes Maternal Aunt     Diabetes Maternal Uncle     Heart disease Maternal Uncle     Heart disease Family     Thyroid disease Neg Hx        Physical Exam:     Vitals:   Blood Pressure: 131/80 (03/19/24 2049)  Pulse: 100 (03/19/24 2049)  Temperature: 98.7 °F (37.1 °C) (03/19/24 2049)  Temp Source: Oral (03/19/24 2049)  Respirations: 20 (03/19/24 2049)  Height: 5' (152.4 cm) (03/19/24 2049)  Weight - Scale: 86.9 kg (191 lb 9.6 oz) (03/19/24 2049)  SpO2: 94 % (03/19/24 2049)    Constitutional:  Non-toxic appearance  Eyes:  EOMI, No scleral icterus   HENT:   oropharynx moist, external ears normal, external nose normal   Respiratory: Decreased breath sounds  Cardiovascular:  Normal rate, no murmurs   GI:  Soft, nondistended, no guarding   :  No costovertebral angle tenderness   Musculoskeletal:  no tenderness, no deformities. Back- no tenderness  Integument:  no jaundice, no rash +1 pitting edema of the lower extremities  Neurologic:  Alert &awake, communicative, CN 2-12 normal,  no focal deficits noted   Psychiatric:  Speech and behavior appropriate       Lab Results: I have personally reviewed pertinent reports.   and I have personally  reviewed pertinent films in PACS    Results from last 7 days   Lab Units 03/19/24  1722   WBC Thousand/uL 10.70*   HEMOGLOBIN g/dL 11.6   HEMATOCRIT % 36.3   PLATELETS Thousands/uL 177   NEUTROS PCT % 79*   LYMPHS PCT % 5*   MONOS PCT % 13*   EOS PCT % 2     Results from last 7 days   Lab Units 03/19/24  1722   POTASSIUM mmol/L 5.3   CHLORIDE mmol/L 101   CO2 mmol/L 23   BUN mg/dL 52*   CREATININE mg/dL 5.64*   CALCIUM mg/dL 8.7   ALK PHOS U/L 136*   ALT U/L 15   AST U/L 20     Results from last 7 days   Lab Units 03/19/24  1808   INR  1.14         Imaging: I have personally reviewed pertinent reports.   and I have personally reviewed pertinent films in PACS    CT chest without contrast    Result Date: 3/19/2024  Narrative: CT CHEST WITHOUT IV CONTRAST INDICATION: SOB; Cough. COMPARISON: CT chest March 23, 2022 TECHNIQUE: CT examination of the chest was performed without intravenous contrast. Multiplanar 2D reformatted images were created from the source data. This examination, like all CT scans performed in the [facility], was performed utilizing techniques to minimize radiation dose exposure, including the use of iterative reconstruction and automated exposure control. Radiation dose length product (DLP) for this visit: 453.23 mGy-cm FINDINGS: LUNGS: Medial left upper lobe consolidation measures approximately 3.9 x 1.7 cm (series 3, image 64). Consolidation in the periphery of the posterior left lower lobe measures approximately 3.3 x 3.2 cm (series 3, image 136). Tree-in-bud nodules throughout the right lung. PLEURA: Unremarkable. HEART/GREAT VESSELS: Cardiomegaly. Coronary artery calcifications and atherosclerotic calcifications of the aorta. No thoracic aortic aneurysm. MEDIASTINUM AND TEOFILO: New mild enlargement of the mediastinal lymph nodes, with a para-aortic lymph node measuring up to 1 cm in short axis (series 3, image 58). CHEST WALL AND LOWER NECK: Unremarkable. VISUALIZED STRUCTURES IN  THE UPPER ABDOMEN: Pneumoperitoneum (for example series 2, image 76). OSSEOUS STRUCTURES: No acute fracture or destructive osseous lesion.     Impression: 1.  Pneumoperitoneum is of uncertain etiology, though can be attributable to the peritoneal dialysis. Assessment for abdominal pain is recommended to determine whether further work-up is required. 2.  Left upper and lower lobe consolidations likely represent pneumonia. However, follow-up CT is recommended in 3 months to confirm the absence of an underlying tumor. 3.  Right-sided bronchiolitis. 4.  Mildly enlarged mediastinal lymph nodes are likely reactive given the presumed pneumonia and bronchiolitis. I personally discussed this study with Dr. Sun on 3/19/2024 6:55 PM. Workstation performed: [id number]     MDM: High  Patient requires hospitalization for acute respiratory failure and bilateral pneumonia  Reviewed CBC, CMP, Pro-Jeronimo, lactic acid, troponin, INR  Reviewed external notes from cardiology  Ordered CBC, BMP, magnesium and INR  Consulted nephrology for dialysis  Prescription drug management with IV Rocephin and Zithromax    Epic Records Reviewed as well as Records in Care Everywhere    ** Please Note: Dragon 360 Dictation voice to text software was used in the creation of this document. **

## 2024-03-20 NOTE — ASSESSMENT & PLAN NOTE
Patient followed by endocrinology, takes 3 units of lispro with meals, Ozempic and Semglee  Will put on insulin sliding scale and Lantus 10 units    Lab Results   Component Value Date    HGBA1C 8.3 (H) 03/05/2024

## 2024-03-20 NOTE — CONSULTS
Consultation - Nephrology   Jacquie Smith 69 y.o. female MRN: [id number]  Unit/Bed#: 2 [address] Encounter: [id number]    ASSESSMENT AND PLAN:  69 y.o. woman with PMH of ESRD on peritoneal dialysis, obesity, CHF, HTN, hyperlipidemia, A-fib on Coumadin p/w SOB for the last 4 to 5 days, cough with yellow sputum, chills.  Nephrology is consulted for management of ESRD    PLAN    #ESRD on PD  Dialysis unit/days: Daily, [facility] unit  Access: PD catheter  Renal Diet  Fluid restriction 1-1.5L/d  Adjust medications to GFR<10  Avoid opioids     #CCPD   Time 8h  Therapy fluid 8000ml  Exchanges 4  Fill volume 2000  Dialysate fluid 1.5/2.5%    #Volume status/hypertension:  Volume: Hypervolemic  Blood pressure: Normotensive, /78  Patient has no residual function.  Plan to give Bumex 1 mg IV to force diuresis  Continue torsemide 100 mg starting tomorrow (home dose)      #Secondary Hyperparasitoidism   PhosLo 1334 3 times daily with meals  Cinacalcet 30 mg 3 times a week  Calcitriol 0.25 mcg daily    # Anemia of Kidney Disease  Current hemoglobin:10.1  Treatment:  Transfuse for hemoglobin less than 7.0 per primary service      # Hypomagnesemia  Serum magnesium 1.9 mg/dL  Borderline low  No need to replete and ESRD    # Pneumonia  CT scan showed left upper and lower lobe consolidation  On ceftriaxone and azithromycin as per primary team    # Acute hypoxic respiratory failure  Secondary to pneumonia and possible component of fluid overload  Antibiotics Bumex as above      HISTORY OF PRESENT ILLNESS:  Requesting Physician: Chi Renner MD  Reason for Consult: Management of ESRD    Jacquie Smith is a 69 y.o.  female with PMH of ESRD on peritoneal dialysis, obesity, CHF, hypertension, hyperlipidemia, A-fib on Coumadin who was admitted to [facility] after presenting with shortness of breath for the last 4 to 5 days, cough with yellow sputum, chills. A renal consultation is requested today for assistance in the management of  ESRD.    PAST MEDICAL HISTORY:  Past Medical History:   Diagnosis Date    Acute asthmatic bronchitis     last assessed: 2017    Cardiac disease     Chronic diastolic (congestive) heart failure (HCC)     Chronic kidney disease     pt is on peritoneal dialysis daily from 2200- 0600    Closed nondisplaced fracture of distal phalanx of right great toe 2018    Colon polyp     CPAP (continuous positive airway pressure) dependence     Diabetes mellitus (HCC)     Hyperlipidemia     Hypertension     Medical non-compliance     Morbid obesity with BMI of 40.0-44.9, adult ([facility]) 2019    On continuous oral anticoagulation 2020    Pneumonia     last assessed: 2013    PONV (postoperative nausea and vomiting)     Pulmonary embolism ([facility])     Renal disorder     possible clot noted in kidney, thus blood thinners currently    Severe obstructive sleep apnea     Severe pulmonary arterial systolic hypertension ([facility])     Tinea pedis of both feet 2018       PAST SURGICAL HISTORY:  Past Surgical History:   Procedure Laterality Date    CATARACT EXTRACTION       SECTION       x 1    EYE SURGERY      NOSE SURGERY      fractured nose - septoplasty       ALLERGIES:  No Known Allergies    SOCIAL HISTORY:  Social History     Substance and Sexual Activity   Alcohol Use Never     Social History     Substance and Sexual Activity   Drug Use Never     Social History     Tobacco Use   Smoking Status Never    Passive exposure: Never   Smokeless Tobacco Never       FAMILY HISTORY:  Family History   Problem Relation Age of Onset    Diabetes Mother         mellitus    Anxiety disorder Mother         generalized anxiety disorder    Hypertension Mother     Stroke Mother     Diabetes type II Mother     Kidney disease Father     Kidney failure Father         renal failure    Ulcerative colitis Sister     Diabetes Sister     Ovarian cancer Maternal Aunt     Diabetes Maternal Aunt     Diabetes Maternal Uncle      Heart disease Maternal Uncle     Heart disease Family     Thyroid disease Neg Hx        MEDICATIONS:    Current Facility-Administered Medications:     acetaminophen (TYLENOL) tablet 650 mg, 650 mg, Oral, Q6H PRN, Chi Renner MD    albuterol inhalation solution 2.5 mg, 2.5 mg, Nebulization, Q6H PRN, Chi Renner MD    atorvastatin (LIPITOR) tablet 20 mg, 20 mg, Oral, Daily With Dinner, Chi Renner MD    azithromycin (ZITHROMAX) 500 mg in sodium chloride 0.9 % 250 mL IVPB, 500 mg, Intravenous, Q24H, Chi Renner MD, Last Rate: 250 mL/hr at 03/19/24 2312, 500 mg at 03/19/24 2312    bumetanide (BUMEX) injection 1 mg, 1 mg, Intravenous, Once, Joselyn Reyes Bahamonde, MD    calcitriol (ROCALTROL) capsule 0.25 mcg, 0.25 mcg, Oral, Daily, Chi Renner MD    calcium acetate (PHOSLO) capsule 667 mg, 667 mg, Oral, Daily, Chi Renner MD    cefTRIAXone (ROCEPHIN) IVPB (premix in dextrose) 1,000 mg 50 mL, 1,000 mg, Intravenous, Q24H, Chi Renner MD, Last Rate: 100 mL/hr at 03/19/24 2214, 1,000 mg at 03/19/24 2214    cholecalciferol (VITAMIN D3) tablet 2,000 Units, 2,000 Units, Oral, Daily, Chi Renner MD    docusate sodium (COLACE) capsule 100 mg, 100 mg, Oral, BID, Chi Renner MD    guaiFENesin (MUCINEX) 12 hr tablet 600 mg, 600 mg, Oral, BID, Chi Renner MD, 600 mg at 03/19/24 2212    heparin (porcine) subcutaneous injection 5,000 Units, 5,000 Units, Subcutaneous, Q8H FARZANA, 5,000 Units at 03/20/24 0503 **AND** [CANCELED] Platelet count, , , Once, Chi Renner MD    insulin lispro (HumALOG/ADMELOG) 100 units/mL subcutaneous injection 3 Units, 3 Units, Subcutaneous, TID With Meals, Chi Renner MD    levothyroxine tablet 125 mcg, 125 mcg, Oral, Early Morning, Chi Renner MD, 125 mcg at 03/20/24 0503    losartan (COZAAR) tablet 100 mg, 100 mg, Oral, Daily, Chi Renner MD    metoprolol succinate (TOPROL-XL) 24 hr  tablet 50 mg, 50 mg, Oral, Daily, Chi Renner MD    multivitamin stress formula tablet 1 tablet, 1 tablet, Oral, Daily, Chi Renner MD    warfarin (COUMADIN) tablet 5 mg, 5 mg, Oral, Daily (warfarin), Chi Renner MD, 5 mg at 03/19/24 2162    REVIEW OF SYSTEMS:  Complete 10 point review of systems were obtained and discussed in length with the patient. Complete review of systems were negative / unremarkable except mentioned in the HPI section.     Review of Systems - Ophthalmic ROS: negative  ENT ROS: negative  Hematological and Lymphatic ROS: negative  Endocrine ROS: negative  Respiratory ROS: positive for - cough, shortness of breath, and sputum changes  Cardiovascular ROS: no chest pain or dyspnea on exertion  Gastrointestinal ROS: no abdominal pain, change in bowel habits, or black or bloody stools  Genito-Urinary ROS: no dysuria, trouble voiding, or hematuria  Musculoskeletal ROS: negative  Neurological ROS: no TIA or stroke symptoms  Dermatological ROS: negative     PHYSICAL EXAM:  Current Weight: Weight - Scale: 86.9 kg (191 lb 9.6 oz)  First Weight: Weight - Scale: 90.7 kg (200 lb)  Vitals:    03/19/24 2220   BP: 131/78   Pulse: 92   Resp: 18   Temp: 99 °F (37.2 °C)   SpO2: 93%       Intake/Output Summary (Last 24 hours) at 3/20/2024 0710  Last data filed at 3/20/2024 0601  Gross per 24 hour   Intake --   Output 100 ml   Net -100 ml     Wt Readings from Last 3 Encounters:   03/19/24 86.9 kg (191 lb 9.6 oz)   03/13/24 90.7 kg (200 lb)   03/08/24 90.5 kg (199 lb 9.6 oz)     Temp Readings from Last 3 Encounters:   03/19/24 99 °F (37.2 °C)   03/13/24 97.5 °F (36.4 °C) (Tympanic)   02/09/24 (!) 97.2 °F (36.2 °C) (Temporal)     BP Readings from Last 3 Encounters:   03/19/24 131/78   03/13/24 126/80   03/08/24 126/80     Pulse Readings from Last 3 Encounters:   03/19/24 92   03/13/24 76   03/08/24 78        General:  no acute distress at this time  Skin:  No acute rash  Eyes:  No scleral  "icterus and noninjected  ENT:  mucous membranes moist  Neck:  no carotid bruits  Chest: Bilateral crackles and wheezing , good respiratory effort, no use of accessory respiratory muscles  CVS:  Regular rate and rhythm without rub   Abdomen:  soft and nontender   Extremities: no significant lower extremity edema  Neuro:  No gross focality  Psych:  Alert , cooperative   Dialysis access: PD catheter      Invasive Devices:      Lab Results:   Results from last 7 days   Lab Units 03/20/24  0449 03/19/24  1722   WBC Thousand/uL 9.94 10.70*   HEMOGLOBIN g/dL 10.1* 11.6   HEMATOCRIT % 33.3* 36.3   PLATELETS Thousands/uL 189 177   POTASSIUM mmol/L 5.0 5.3   CHLORIDE mmol/L 107 101   CO2 mmol/L 20* 23   BUN mg/dL 54* 52*   CREATININE mg/dL 5.56* 5.64*   CALCIUM mg/dL 8.9 8.7   MAGNESIUM mg/dL 1.8*  --        Other Studies:   CT chest without contrast   Final Result by Soto Tipton MD (03/19 1855)      1.  Pneumoperitoneum is of uncertain etiology, though can be attributable to the peritoneal dialysis. Assessment for abdominal pain is recommended to determine whether further work-up is required.   2.  Left upper and lower lobe consolidations likely represent pneumonia. However, follow-up CT is recommended in 3 months to confirm the absence of an underlying tumor.   3.  Right-sided bronchiolitis.   4.  Mildly enlarged mediastinal lymph nodes are likely reactive given the presumed pneumonia and bronchiolitis.      I personally discussed this study with Dr. Sun on 3/19/2024 6:55 PM.            Workstation performed: [id number]             Portions of the record may have been created with voice recognition software. Occasional wrong word or \"sound a like\" substitutions may have occurred due to the inherent limitations of voice recognition software. Read the chart carefully and recognize, using context, where substitutions have occurred.    "

## 2024-03-20 NOTE — PROGRESS NOTES
[facility]  Progress Note  Name: Jacquie Smith I  MRN: [id number]  Unit/Bed#: 2 South 219 B I Date of Admission: 3/19/2024   Date of Service: 3/20/2024 I Hospital Day: 1    Assessment/Plan   * Pneumonia of both lungs due to infectious organism  Assessment & Plan  Patient presented to ED with shortness of breath, cough, and chills.  CT chest: Left upper and lower lobe consolidations likely represent pneumonia. However, follow-up CT is recommended in 3 months to confirm the absence of an underlying tumor.  CT scan showing left upper and lower lobe consolidation.  Procalcitonin 0.65 > 0.70  Urinary antigens negative  Continue rocephin and azithromycin  Nebs as needed  Currently requiring 2 L oxygen, wean as tolerated    Sepsis (HCC)  Assessment & Plan  POA as evidenced by tachycardia and tachypnea in setting of pneumonia seen on CT  Flu/COVID/RSV negative  Continue rocephin and azithromycin  Follow up blood cultures    Acute respiratory failure with hypoxia (HCC)  Assessment & Plan  Secondary to pneumonia  Currently requiring 2 L  Wean as tolerated    Atrial fibrillation, chronic (HCC)  Assessment & Plan  Continue Lopressor  Patient has not taken warfarin for 4 days prior to admission as she did not feel well  Continue warfarin 5 mg daily  Monitor INR, currently 1.33    Essential hypertension  Assessment & Plan  Continue home losartan, Toprol-XL, and torsemide    Hypothyroidism  Assessment & Plan  Continue levothyroxine    Chronic combined systolic and diastolic congestive heart failure (HCC)  Assessment & Plan  Continue peritoneal dialysis  Nephrology following  Given IV bumex x 1 dose  Resume home torsemide 100 mg daily tomorrow    Wt Readings from Last 3 Encounters:   03/19/24 86.9 kg (191 lb 9.6 oz)   03/13/24 90.7 kg (200 lb)   03/08/24 90.5 kg (199 lb 9.6 oz)       Secondary hyperparathyroidism of renal origin (HCC)  Assessment & Plan  Continue Sensipar    Type 2 diabetes mellitus with  hyperglycemia, with long-term current use of insulin ([facility])  Assessment & Plan  Patient followed by endocrinology  takes 3 units of lispro with meals, Ozempic and Semglee  Started on insulin sliding scale with home lispro    Mixed hyperlipidemia  Assessment & Plan  Continue statin    End stage renal disease (HCC)  Assessment & Plan    Lab Results   Component Value Date    EGFR 7 03/20/2024    EGFR 7 03/19/2024    EGFR 8 03/05/2024    CREATININE 5.56 (H) 03/20/2024    CREATININE 5.64 (H) 03/19/2024    CREATININE 5.04 (H) 03/05/2024   Patient missed peritoneal dialysis day prior to admission due to not feeling well  Nephrology following for dialysis           VTE Pharmacologic Prophylaxis: VTE Score: 6 High Risk (Score >/= 5) - Pharmacological DVT Prophylaxis Ordered: heparin. Sequential Compression Devices Ordered.    Mobility:   Basic Mobility Inpatient Raw Score: 24  JH-HLM Goal: 8: Walk 250 feet or more  JH-HLM Achieved: 7: Walk 25 feet or more  JH-HLM Goal NOT achieved. Continue with multidisciplinary rounding and encourage appropriate mobility to improve upon JH-HLM goals.    Patient Centered Rounds: I performed bedside rounds with nursing staff today.   Discussions with Specialists or Other Care Team Provider: rnshawn    Education and Discussions with Family / Patient: Patient declined call to .     Total Time Spent on Date of Encounter in care of patient: 35 mins. This time was spent on one or more of the following: performing physical exam; counseling and coordination of care; obtaining or reviewing history; documenting in the medical record; reviewing/ordering tests, medications or procedures; communicating with other healthcare professionals and discussing with patient's family/caregivers.    Current Length of Stay: 1 day(s)  Current Patient Status: Inpatient   Certification Statement: The patient will continue to require additional inpatient hospital stay due to pneumonia, cultures  pending  Discharge Plan: Anticipate discharge in 24-48 hrs to home.    Code Status: Level 1 - Full Code    Subjective:   Patient reports she still feels short of breath though improved. Still with persistent cough. Reports left flank pain that feels muscular secondary to coughing so much, agreeable to try lidocaine patch. Denies any new or worsening symptoms.    Objective:     Vitals:   Temp (24hrs), Av.7 °F (37.1 °C), Min:98.5 °F (36.9 °C), Max:99 °F (37.2 °C)    Temp:  [98.5 °F (36.9 °C)-99 °F (37.2 °C)] 98.5 °F (36.9 °C)  HR:  [] 74  Resp:  [18-26] 20  BP: (116-204)/(69-83) 116/69  SpO2:  [93 %-99 %] 96 %  Body mass index is 37.42 kg/m².     Input and Output Summary (last 24 hours):     Intake/Output Summary (Last 24 hours) at 3/20/2024 1542  Last data filed at 3/20/2024 0601  Gross per 24 hour   Intake --   Output 100 ml   Net -100 ml       Physical Exam:   Physical Exam  Vitals and nursing note reviewed.   Constitutional:       General: She is not in acute distress.     Appearance: She is well-developed.   Cardiovascular:      Rate and Rhythm: Normal rate and regular rhythm.   Pulmonary:      Effort: Pulmonary effort is normal. No respiratory distress.      Breath sounds: Decreased breath sounds present.      Comments: Saturating 96% on 2 L  Bilateral basilar crackles  Abdominal:      Palpations: Abdomen is soft.      Tenderness: There is no abdominal tenderness.   Musculoskeletal:         General: No swelling.   Skin:     General: Skin is warm and dry.      Capillary Refill: Capillary refill takes less than 2 seconds.   Neurological:      Mental Status: She is alert.   Psychiatric:         Mood and Affect: Mood normal.          Additional Data:     Labs:  Results from last 7 days   Lab Units 24  0449 24  1722   WBC Thousand/uL 9.94 10.70*   HEMOGLOBIN g/dL 10.1* 11.6   HEMATOCRIT % 33.3* 36.3   PLATELETS Thousands/uL 189 177   NEUTROS PCT %  --  79*   LYMPHS PCT %  --  5*   MONOS PCT %   --  13*   EOS PCT %  --  2     Results from last 7 days   Lab Units 03/20/24  0449 03/19/24  1722   SODIUM mmol/L 137 135   POTASSIUM mmol/L 5.0 5.3   CHLORIDE mmol/L 107 101   CO2 mmol/L 20* 23   BUN mg/dL 54* 52*   CREATININE mg/dL 5.56* 5.64*   ANION GAP mmol/L 10 11   CALCIUM mg/dL 8.9 8.7   ALBUMIN g/dL  --  3.4*   TOTAL BILIRUBIN mg/dL  --  0.75   ALK PHOS U/L  --  136*   ALT U/L  --  15   AST U/L  --  20   GLUCOSE RANDOM mg/dL 68 311*     Results from last 7 days   Lab Units 03/20/24  0449   INR  1.33*     Results from last 7 days   Lab Units 03/20/24  1120 03/20/24  0829   POC GLUCOSE mg/dl 122 132         Results from last 7 days   Lab Units 03/20/24  0449 03/19/24  1808   LACTIC ACID mmol/L  --  1.4   PROCALCITONIN ng/ml 0.70* 0.65*       Lines/Drains:  Invasive Devices       Peripheral Intravenous Line  Duration             Peripheral IV 03/19/24 Right;Ventral (anterior) Forearm <1 day              Line  Duration             Peritoneal Dialysis Catheter Tenckhoff Right lower abdomen 1342 days                          Imaging: Reviewed radiology reports from this admission including: chest CT scan    Recent Cultures (last 7 days):   Results from last 7 days   Lab Units 03/20/24  0510 03/19/24  1808 03/19/24  1722   BLOOD CULTURE   --  Received in Microbiology Lab. Culture in Progress. Received in Microbiology Lab. Culture in Progress.   LEGIONELLA URINARY ANTIGEN  Negative  --   --        Last 24 Hours Medication List:   Current Facility-Administered Medications   Medication Dose Route Frequency Provider Last Rate    acetaminophen  650 mg Oral Q6H PRN Chi Renner MD      albuterol  2.5 mg Nebulization Q6H PRN Chi Renner MD      atorvastatin  20 mg Oral Daily With Dinner Chi Renner MD      azithromycin  500 mg Intravenous Q24H Chi Renner  mg (03/19/24 2312)    calcitriol  0.25 mcg Oral Daily Chi Renner MD      calcium acetate  1,334 mg Oral Daily  Joselyn Reyes Bahamonde, MD      cefTRIAXone  1,000 mg Intravenous Q24H Chi Renner MD 1,000 mg (03/19/24 2214)    cholecalciferol  2,000 Units Oral Daily Chi Renner MD      docusate sodium  100 mg Oral BID Chi Renner MD      guaiFENesin  600 mg Oral BID Chi Renner MD      heparin (porcine)  5,000 Units Subcutaneous Q8H FARZANA Chi Renner MD      insulin lispro  3 Units Subcutaneous TID With Meals Chi Renner MD      levothyroxine  125 mcg Oral Early Morning Chi Renner MD      lidocaine  1 patch Topical Daily Mihaela Fisher PA-C      losartan  100 mg Oral Daily Chi Renner MD      metoprolol succinate  50 mg Oral Daily Chi Renner MD      multivitamin stress formula  1 tablet Oral Daily Chi Renner MD      [START ON 3/21/2024] torsemide  100 mg Oral Daily Joselyn Reyes Bahamonde, MD      warfarin  5 mg Oral Daily (warfarin) Chi Renner MD          Today, Patient Was Seen By: Mihaela Fisher PA-C    **Please Note: This note may have been constructed using a voice recognition system.**

## 2024-03-20 NOTE — CASE MANAGEMENT
Case Management Assessment & Discharge Planning Note    Patient name Jacquie Smith  Location 2 South 219/2 South 219 B MRN [id number]  : 1955 Date 3/20/2024       Current Admission Date: 3/19/2024  Current Admission Diagnosis:Pneumonia of both lungs due to infectious organism   Patient Active Problem List    Diagnosis Date Noted    Pneumonia of both lungs due to infectious organism 2024    Acute respiratory failure with hypoxia ([facility]) 2024    Atrial fibrillation, chronic ([facility]) 2023    Sleep apnea, obstructive 2023    Chronic kidney disease 2023    CHANDRA (obstructive sleep apnea) 10/12/2022    Adenomatous polyp of colon 10/11/2022    Type 2 diabetes mellitus with hyperosmolarity without coma, with long-term current use of insulin ([facility]) 2022    History of syncope 2022    Sinus bradycardia 2022    Iron deficiency anemia due to chronic blood loss 2022    Class 2 obesity with body mass index (BMI) of 37.0 to 37.9 in adult 2021    Dilated cardiomyopathy ([facility]) 2021    Essential hypertension 2021    Acute gout due to renal impairment involving toe of right foot 2021    Endometrial thickening on ultrasound 2021    Dependence on renal dialysis ([facility]) 2020    Hypothyroidism 2020    Anemia of chronic disease 2020    Nocturnal hypoxemia 2020    Paroxysmal atrial flutter ([facility]) 2020    History of pulmonary embolus (PE) 2020    Chronic combined systolic and diastolic congestive heart failure ([facility]) 2020    ESRD (end stage renal disease) ([facility]) 2020    Encounter for peritoneal dialysis ([facility]) 2020    Diabetic retinopathy of both eyes associated with type 2 diabetes mellitus ([facility]) 2019    Non-rheumatic mitral valve stenosis 2019    Vitamin D deficiency 2019    Secondary hyperparathyroidism of renal origin ([facility]) 2019    Morbid obesity with BMI of 40.0-44.9, adult ([facility])  02/28/2019    Pulmonary hypertension (HCC) 11/01/2018    Leg edema, left 08/21/2018    Dyspnea on exertion 07/05/2018    PE (pulmonary thromboembolism) (HCC) 07/05/2018    Elevated brain natriuretic peptide (BNP) level 07/05/2018    End stage renal disease (HCC) 04/13/2018    Nephrotic range proteinuria 04/13/2018    Cyst of ovary 04/13/2017    Mixed hyperlipidemia 01/31/2014    Type 2 diabetes mellitus with hyperglycemia, with long-term current use of insulin (HCC) 06/05/2013      LOS (days): 1  Geometric Mean LOS (GMLOS) (days): 4.1  Days to GMLOS:3.5     OBJECTIVE:    Risk of Unplanned Readmission Score: 18.8     Current admission status: Inpatient  Referral Reason: Other ([facility] PD)    Preferred Pharmacy:   [id number] - [place], [place] - [address] [address]  [address] [address]  Phone: [phone] Fax: [phone]    [facility] [address] [address]  Phone: [phone] Fax: [phone]    Primary Care Provider: Yessica Ramirez DO    Primary Insurance: BLUE CROSS  Secondary Insurance: MEDICARE    ASSESSMENT:  Readmission Root Cause  30 Day Readmission: No    Patient Information  Admitted from:: Home  Mental Status: Alert  During Assessment patient was accompanied by: Not accompanied during assessment  Assessment information provided by:: Patient  Primary Caregiver: Self  Support Systems: Self, Family members  County of Residence: [place]  [facility] [place]  Living Arrangements: Lives w/ Extended Family  Is patient a ?: No    Activities of Daily Living Prior to Admission  Functional Status: Independent  Completes ADLs independently?: Yes  Ambulates independently?: Yes  Does patient use assisted devices?: No  Does patient currently own DME?: No  Does patient have a history of Outpatient Therapy (PT/OT)?: No  Does the patient have a history of Short-Term Rehab?:  No  Does patient have a history of HHC?: No  Does patient currently have HHC?: No    Patient Information Continued  Income Source: Employed  Does patient have prescription coverage?: Yes  Does patient receive dialysis treatments?: Yes (patient is established with [facility] PD)  Does patient have a history of substance abuse?: No  Does patient have a history of Mental Health Diagnosis?: No    PHQ 2/9 Screening   Reviewed PHQ 2/9 Depression Screening Score?: No    Means of Transportation  Means of Transport to Appts:: Drives Self    Social Determinants of Health (SDOH)      Flowsheet Row Most Recent Value   Housing Stability    In the last 12 months, was there a time when you were not able to pay the mortgage or rent on time? N   In the last 12 months, how many places have you lived? 1   In the last 12 months, was there a time when you did not have a steady place to sleep or slept in a shelter (including now)? N   Transportation Needs    In the past 12 months, has lack of transportation kept you from medical appointments or from getting medications? no   In the past 12 months, has lack of transportation kept you from meetings, work, or from getting things needed for daily living? No   Food Insecurity    Within the past 12 months, you worried that your food would run out before you got the money to buy more. Never true   Within the past 12 months, the food you bought just didn't last and you didn't have money to get more. Never true   Utilities    In the past 12 months has the electric, gas, oil, or water company threatened to shut off services in your home? No          DISCHARGE DETAILS:    Discharge planning discussed with:: patient  Freedom of Choice: Yes     CM contacted family/caregiver?: No- see comments (declined call)  Were Treatment Team discharge recommendations reviewed with patient/caregiver?: Yes  Did patient/caregiver verbalize understanding of patient care needs?: Yes  Were patient/caregiver advised of the  risks associated with not following Treatment Team discharge recommendations?: Yes    Requested Home Health Care         Is the patient interested in HHC at discharge?: No    DME Referral Provided  Referral made for DME?: No    Other Referral/Resources/Interventions Provided:  Interventions: None Indicated    Would you like to participate in our [facility] service program?  : No - Declined    Treatment Team Recommendation: Home  Discharge Destination Plan:: Home  Transport at Discharge : Family [place], JINGW, ACSW, ACM, CCM, CCTP  PA CARMEN: [id number]  [place] RUPINDER: [phone]  03/20/24 10:56 AM

## 2024-03-21 LAB
ANION GAP SERPL CALCULATED.3IONS-SCNC: 12 MMOL/L (ref 4–13)
APTT PPP: 202 SECONDS (ref 23–37)
BUN SERPL-MCNC: 69 MG/DL (ref 5–25)
CALCIUM SERPL-MCNC: 8.7 MG/DL (ref 8.4–10.2)
CHLORIDE SERPL-SCNC: 103 MMOL/L (ref 96–108)
CO2 SERPL-SCNC: 22 MMOL/L (ref 21–32)
CREAT SERPL-MCNC: 5.82 MG/DL (ref 0.6–1.3)
ERYTHROCYTE [DISTWIDTH] IN BLOOD BY AUTOMATED COUNT: 14.6 % (ref 11.6–15.1)
GFR SERPL CREATININE-BSD FRML MDRD: 6 ML/MIN/1.73SQ M
GLUCOSE SERPL-MCNC: 169 MG/DL (ref 65–140)
HCT VFR BLD AUTO: 32.8 % (ref 34.8–46.1)
HGB BLD-MCNC: 9.9 G/DL (ref 11.5–15.4)
INR PPP: 1.28 (ref 0.84–1.19)
INR PPP: 1.38 (ref 0.84–1.19)
MCH RBC QN AUTO: 28.4 PG (ref 26.8–34.3)
MCHC RBC AUTO-ENTMCNC: 30.2 G/DL (ref 31.4–37.4)
MCV RBC AUTO: 94 FL (ref 82–98)
MRSA NOSE QL CULT: NORMAL
PLATELET # BLD AUTO: 188 THOUSANDS/UL (ref 149–390)
PMV BLD AUTO: 11.8 FL (ref 8.9–12.7)
POTASSIUM SERPL-SCNC: 4.9 MMOL/L (ref 3.5–5.3)
PROTHROMBIN TIME: 16.2 SECONDS (ref 11.6–14.5)
PROTHROMBIN TIME: 17.1 SECONDS (ref 11.6–14.5)
RBC # BLD AUTO: 3.48 MILLION/UL (ref 3.81–5.12)
SODIUM SERPL-SCNC: 137 MMOL/L (ref 135–147)
WBC # BLD AUTO: 9.14 THOUSAND/UL (ref 4.31–10.16)

## 2024-03-21 PROCEDURE — 85027 COMPLETE CBC AUTOMATED: CPT

## 2024-03-21 PROCEDURE — 85730 THROMBOPLASTIN TIME PARTIAL: CPT

## 2024-03-21 PROCEDURE — 85610 PROTHROMBIN TIME: CPT

## 2024-03-21 PROCEDURE — 99232 SBSQ HOSP IP/OBS MODERATE 35: CPT

## 2024-03-21 PROCEDURE — 99233 SBSQ HOSP IP/OBS HIGH 50: CPT | Performed by: STUDENT IN AN ORGANIZED HEALTH CARE EDUCATION/TRAINING PROGRAM

## 2024-03-21 PROCEDURE — 80048 BASIC METABOLIC PNL TOTAL CA: CPT

## 2024-03-21 PROCEDURE — 94760 N-INVAS EAR/PLS OXIMETRY 1: CPT

## 2024-03-21 PROCEDURE — 94640 AIRWAY INHALATION TREATMENT: CPT

## 2024-03-21 RX ORDER — AZITHROMYCIN 250 MG/1
500 TABLET, FILM COATED ORAL EVERY 24 HOURS
Status: DISCONTINUED | OUTPATIENT
Start: 2024-03-22 | End: 2024-03-21

## 2024-03-21 RX ORDER — HEPARIN SODIUM 10000 [USP'U]/100ML
3-20 INJECTION, SOLUTION INTRAVENOUS
Status: DISCONTINUED | OUTPATIENT
Start: 2024-03-21 | End: 2024-03-23

## 2024-03-21 RX ORDER — AZITHROMYCIN 250 MG/1
500 TABLET, FILM COATED ORAL EVERY 24 HOURS
Status: DISCONTINUED | OUTPATIENT
Start: 2024-03-21 | End: 2024-03-23 | Stop reason: HOSPADM

## 2024-03-21 RX ORDER — WARFARIN SODIUM 5 MG/1
10 TABLET ORAL
Status: COMPLETED | OUTPATIENT
Start: 2024-03-21 | End: 2024-03-21

## 2024-03-21 RX ORDER — IPRATROPIUM BROMIDE AND ALBUTEROL SULFATE 2.5; .5 MG/3ML; MG/3ML
3 SOLUTION RESPIRATORY (INHALATION)
Status: DISCONTINUED | OUTPATIENT
Start: 2024-03-21 | End: 2024-03-23 | Stop reason: HOSPADM

## 2024-03-21 RX ADMIN — HEPARIN SODIUM 11.8 UNITS/KG/HR: 10000 INJECTION, SOLUTION INTRAVENOUS at 16:43

## 2024-03-21 RX ADMIN — INSULIN LISPRO 3 UNITS: 100 INJECTION, SOLUTION INTRAVENOUS; SUBCUTANEOUS at 17:48

## 2024-03-21 RX ADMIN — LEVOTHYROXINE SODIUM 125 MCG: 125 TABLET ORAL at 05:02

## 2024-03-21 RX ADMIN — AZITHROMYCIN DIHYDRATE 500 MG: 250 TABLET ORAL at 22:17

## 2024-03-21 RX ADMIN — IPRATROPIUM BROMIDE AND ALBUTEROL SULFATE 3 ML: 2.5; .5 SOLUTION RESPIRATORY (INHALATION) at 10:33

## 2024-03-21 RX ADMIN — TORSEMIDE 100 MG: 100 TABLET ORAL at 08:28

## 2024-03-21 RX ADMIN — METOPROLOL SUCCINATE 50 MG: 50 TABLET, EXTENDED RELEASE ORAL at 08:27

## 2024-03-21 RX ADMIN — IPRATROPIUM BROMIDE AND ALBUTEROL SULFATE 3 ML: 2.5; .5 SOLUTION RESPIRATORY (INHALATION) at 13:18

## 2024-03-21 RX ADMIN — HEPARIN SODIUM 5000 UNITS: 5000 INJECTION, SOLUTION INTRAVENOUS; SUBCUTANEOUS at 05:02

## 2024-03-21 RX ADMIN — Medication 2000 UNITS: at 08:28

## 2024-03-21 RX ADMIN — WARFARIN SODIUM 10 MG: 5 TABLET ORAL at 17:47

## 2024-03-21 RX ADMIN — DOCUSATE SODIUM 100 MG: 100 CAPSULE, LIQUID FILLED ORAL at 08:28

## 2024-03-21 RX ADMIN — INSULIN LISPRO 3 UNITS: 100 INJECTION, SOLUTION INTRAVENOUS; SUBCUTANEOUS at 08:28

## 2024-03-21 RX ADMIN — DOCUSATE SODIUM 100 MG: 100 CAPSULE, LIQUID FILLED ORAL at 17:47

## 2024-03-21 RX ADMIN — GUAIFENESIN 600 MG: 600 TABLET, EXTENDED RELEASE ORAL at 17:47

## 2024-03-21 RX ADMIN — LOSARTAN POTASSIUM 100 MG: 50 TABLET, FILM COATED ORAL at 08:27

## 2024-03-21 RX ADMIN — CALCITRIOL CAPSULES 0.25 MCG 0.25 MCG: 0.25 CAPSULE ORAL at 08:28

## 2024-03-21 RX ADMIN — GUAIFENESIN 600 MG: 600 TABLET, EXTENDED RELEASE ORAL at 08:27

## 2024-03-21 RX ADMIN — B-COMPLEX W/ C & FOLIC ACID TAB 1 TABLET: TAB at 08:27

## 2024-03-21 RX ADMIN — CEFTRIAXONE 1000 MG: 1 INJECTION, SOLUTION INTRAVENOUS at 22:18

## 2024-03-21 RX ADMIN — ATORVASTATIN CALCIUM 20 MG: 20 TABLET, FILM COATED ORAL at 17:47

## 2024-03-21 RX ADMIN — IPRATROPIUM BROMIDE AND ALBUTEROL SULFATE 3 ML: 2.5; .5 SOLUTION RESPIRATORY (INHALATION) at 19:16

## 2024-03-21 RX ADMIN — INSULIN LISPRO 3 UNITS: 100 INJECTION, SOLUTION INTRAVENOUS; SUBCUTANEOUS at 12:40

## 2024-03-21 RX ADMIN — CALCIUM ACETATE 1334 MG: 667 CAPSULE ORAL at 08:27

## 2024-03-21 NOTE — PROGRESS NOTES
[facility]  Progress Note  Name: Jacquie Smith I  MRN: [id number]  Unit/Bed#: 4 [place] 403-01 I Date of Admission: 3/19/2024   Date of Service: 3/21/2024 I Hospital Day: 2    Assessment/Plan   * Pneumonia  Assessment & Plan  Patient presented to ED with shortness of breath, cough, and chills.  CT chest: Left upper and lower lobe consolidations likely represent pneumonia. However, follow-up CT is recommended in 3 months to confirm the absence of an underlying tumor.  Procalcitonin 0.65 > 0.70  Urinary antigens negative  Continue rocephin and azithromycin  duonebs as needed  Currently requiring 2 L oxygen, wean as tolerated    Sepsis (HCC)  Assessment & Plan  POA as evidenced by tachycardia and tachypnea in setting of pneumonia seen on CT  Flu/COVID/RSV negative  Continue rocephin and azithromycin  Blood cultures negative to date    Acute respiratory failure with hypoxia (HCC)  Assessment & Plan  Secondary to pneumonia  Currently requiring 2 L  Wean as tolerated    Atrial fibrillation, chronic (HCC)  Assessment & Plan  Continue Lopressor  Patient has not taken warfarin for 4 days prior to admission as she did not feel well  Patient also has history of possible pulmonary embolism as VQ scan in 2018 showed intermediate probability of PE  INR currently subtherapeutic  Will give warfarin 10 mg x 1 dose tonight  Bridge with heparin gtt until therapeutic  Monitor daily INR    Essential hypertension  Assessment & Plan  Continue home losartan, Toprol-XL, and torsemide    Hypothyroidism  Assessment & Plan  Continue levothyroxine    Chronic combined systolic and diastolic congestive heart failure (HCC)  Assessment & Plan  Continue peritoneal dialysis  Nephrology following  Given IV bumex x 1 dose  Resume home torsemide 100 mg daily    Wt Readings from Last 3 Encounters:   03/21/24 86.7 kg (191 lb 3.2 oz)   03/13/24 90.7 kg (200 lb)   03/08/24 90.5 kg (199 lb 9.6 oz)       Secondary hyperparathyroidism of  renal origin (HCC)  Assessment & Plan  Continue Sensipar    Type 2 diabetes mellitus with hyperglycemia, with long-term current use of insulin ([facility])  Assessment & Plan  Patient followed by endocrinology  takes 3 units of lispro with meals, Ozempic and Semglee  Started on insulin sliding scale with home lispro    Mixed hyperlipidemia  Assessment & Plan  Continue statin    End stage renal disease (HCC)  Assessment & Plan    Lab Results   Component Value Date    EGFR 6 03/21/2024    EGFR 7 03/20/2024    EGFR 7 03/19/2024    CREATININE 5.82 (H) 03/21/2024    CREATININE 5.56 (H) 03/20/2024    CREATININE 5.64 (H) 03/19/2024   Patient missed peritoneal dialysis day prior to admission due to not feeling well  Nephrology following for dialysis           VTE Pharmacologic Prophylaxis: VTE Score: 6 High Risk (Score >/= 5) - Pharmacological DVT Prophylaxis Ordered: heparin drip. Sequential Compression Devices Ordered.    Mobility:   Basic Mobility Inpatient Raw Score: 24  JH-HLM Goal: 8: Walk 250 feet or more  JH-HLM Achieved: 7: Walk 25 feet or more  JH-HLM Goal NOT achieved. Continue with multidisciplinary rounding and encourage appropriate mobility to improve upon JH-HLM goals.    Patient Centered Rounds: I performed bedside rounds with nursing staff today.   Discussions with Specialists or Other Care Team Provider: rn, cm, attending    Education and Discussions with Family / Patient: Patient declined call to .     Total Time Spent on Date of Encounter in care of patient: 35 mins. This time was spent on one or more of the following: performing physical exam; counseling and coordination of care; obtaining or reviewing history; documenting in the medical record; reviewing/ordering tests, medications or procedures; communicating with other healthcare professionals and discussing with patient's family/caregivers.    Current Length of Stay: 2 day(s)  Current Patient Status: Inpatient   Certification Statement: The  patient will continue to require additional inpatient hospital stay due to heparin bridge, pneumonia, IV antibiotics   Discharge Plan: Anticipate discharge in 48 hrs to home.    Code Status: Level 1 - Full Code    Subjective:   Patient reports she feels better overall from admission, but not much improved since yesterday. Still endorses shortness of breath and occasional cough. Denies fever, chills, chest pain, nausea, vomiting, or abdominal pain.  Denies calf pain.    Objective:     Vitals:   Temp (24hrs), Av.9 °F (36.6 °C), Min:97.5 °F (36.4 °C), Max:98.4 °F (36.9 °C)    Temp:  [97.5 °F (36.4 °C)-98.4 °F (36.9 °C)] 98 °F (36.7 °C)  HR:  [77-86] 77  Resp:  [17-20] 20  BP: (110-167)/(70-97) 133/75  SpO2:  [94 %-99 %] 99 %  Body mass index is 37.34 kg/m².     Input and Output Summary (last 24 hours):   No intake or output data in the 24 hours ending 24 1529    Physical Exam:   Physical Exam  Vitals and nursing note reviewed.   Constitutional:       General: She is not in acute distress.     Appearance: She is well-developed.   Cardiovascular:      Rate and Rhythm: Normal rate and regular rhythm.   Pulmonary:      Effort: Pulmonary effort is normal. No respiratory distress.      Breath sounds: Normal breath sounds.      Comments: Patient satting 96% on 2 L  Bilateral basilar crackles  Abdominal:      Palpations: Abdomen is soft.      Tenderness: There is no abdominal tenderness.   Musculoskeletal:         General: No swelling.      Right lower leg: Edema (trace) present.      Left lower leg: Edema (trace) present.   Skin:     General: Skin is warm and dry.      Capillary Refill: Capillary refill takes less than 2 seconds.   Neurological:      Mental Status: She is alert.   Psychiatric:         Mood and Affect: Mood normal.          Additional Data:     Labs:  Results from last 7 days   Lab Units 24  0455 24  0449 24  1722   WBC Thousand/uL 9.14   < > 10.70*   HEMOGLOBIN g/dL 9.9*   < > 11.6    HEMATOCRIT % 32.8*   < > 36.3   PLATELETS Thousands/uL 188   < > 177   NEUTROS PCT %  --   --  79*   LYMPHS PCT %  --   --  5*   MONOS PCT %  --   --  13*   EOS PCT %  --   --  2    < > = values in this interval not displayed.     Results from last 7 days   Lab Units 03/21/24  0455 03/20/24  0449 03/19/24  1722   SODIUM mmol/L 137   < > 135   POTASSIUM mmol/L 4.9   < > 5.3   CHLORIDE mmol/L 103   < > 101   CO2 mmol/L 22   < > 23   BUN mg/dL 69*   < > 52*   CREATININE mg/dL 5.82*   < > 5.64*   ANION GAP mmol/L 12   < > 11   CALCIUM mg/dL 8.7   < > 8.7   ALBUMIN g/dL  --   --  3.4*   TOTAL BILIRUBIN mg/dL  --   --  0.75   ALK PHOS U/L  --   --  136*   ALT U/L  --   --  15   AST U/L  --   --  20   GLUCOSE RANDOM mg/dL 169*   < > 311*    < > = values in this interval not displayed.     Results from last 7 days   Lab Units 03/21/24  0848   INR  1.28*     Results from last 7 days   Lab Units 03/20/24  1120 03/20/24  0829   POC GLUCOSE mg/dl 122 132         Results from last 7 days   Lab Units 03/20/24  0449 03/19/24  1808   LACTIC ACID mmol/L  --  1.4   PROCALCITONIN ng/ml 0.70* 0.65*       Lines/Drains:  Invasive Devices       Peripheral Intravenous Line  Duration             Peripheral IV 03/20/24 Distal;Left;Ventral (anterior) Wrist <1 day              Line  Duration             Peritoneal Dialysis Catheter Tenckhoff Right lower abdomen 1343 days                          Imaging: No pertinent imaging reviewed.    Recent Cultures (last 7 days):   Results from last 7 days   Lab Units 03/20/24  0510 03/19/24  1808 03/19/24  1722   BLOOD CULTURE   --  No Growth at 24 hrs. No Growth at 24 hrs.   LEGIONELLA URINARY ANTIGEN  Negative  --   --        Last 24 Hours Medication List:   Current Facility-Administered Medications   Medication Dose Route Frequency Provider Last Rate    acetaminophen  650 mg Oral Q6H PRN Chi Renner MD      atorvastatin  20 mg Oral Daily With Dinner Chi Renner MD      azithromycin   500 mg Oral Q24H Mihaela Fisher PA-C      calcitriol  0.25 mcg Oral Daily Chi Renner MD      calcium acetate  1,334 mg Oral Daily Joselyn Reyes Bahamonde, MD      cefTRIAXone  1,000 mg Intravenous Q24H Chi Renner MD 1,000 mg (03/20/24 2112)    cholecalciferol  2,000 Units Oral Daily Chi Renner MD      docusate sodium  100 mg Oral BID Chi Renner MD      guaiFENesin  600 mg Oral BID Chi Renner MD      heparin (porcine)  3-20 Units/kg/hr (Order-Specific) Intravenous Titrated Mihaela Fisher PA-C      insulin lispro  3 Units Subcutaneous TID With Meals Chi Renner MD      ipratropium-albuterol  3 mL Nebulization Q6H Mihaela Fisher PA-C      levothyroxine  125 mcg Oral Early Morning Chi Renner MD      lidocaine  1 patch Topical Daily Mihaela Fisher PA-C      losartan  100 mg Oral Daily Chi Renner MD      metoprolol succinate  50 mg Oral Daily Chi Renner MD      multivitamin stress formula  1 tablet Oral Daily Chi Renner MD      torsemide  100 mg Oral Daily Joselyn Reyes Bahamonde, MD      warfarin  10 mg Oral Once (warfarin) Mihaela Fisher PA-C          Today, Patient Was Seen By: Mihaela Fisher PA-C    **Please Note: This note may have been constructed using a voice recognition system.**

## 2024-03-21 NOTE — PLAN OF CARE
Problem: SAFETY ADULT  Goal: Patient will remain free of falls  Description: INTERVENTIONS:  - Educate patient/family on patient safety including physical limitations  - Instruct patient to call for assistance with activity   - Consult OT/PT to assist with strengthening/mobility   - Keep Call bell within reach  - Keep bed low and locked with side rails adjusted as appropriate  - Keep care items and personal belongings within reach  - Initiate and maintain comfort rounds  - Make Fall Risk Sign visible to staff  - Offer Toileting every 2 Hours, in advance of need  - Initiate/Maintain bed alarm  - Obtain necessary fall risk management equipment: walker  - Apply yellow socks and bracelet for high fall risk patients  - Consider moving patient to room near nurses station  Outcome: Progressing     Problem: Nutrition/Hydration-ADULT  Goal: Nutrient/Hydration intake appropriate for improving, restoring or maintaining nutritional needs  Description: Monitor and assess patient's nutrition/hydration status for malnutrition. Collaborate with interdisciplinary team and initiate plan and interventions as ordered.  Monitor patient's weight and dietary intake as ordered or per policy. Utilize nutrition screening tool and intervene as necessary. Determine patient's food preferences and provide high-protein, high-caloric foods as appropriate.     INTERVENTIONS:  - Monitor oral intake, urinary output, labs, and treatment plans  - Assess nutrition and hydration status and recommend course of action  - Evaluate amount of meals eaten  - Assist patient with eating if necessary   - Allow adequate time for meals  - Recommend/ encourage appropriate diets, oral nutritional supplements, and vitamin/mineral supplements  - Order, calculate, and assess calorie counts as needed  - Recommend, monitor, and adjust tube feedings and TPN/PPN based on assessed needs  - Assess need for intravenous fluids  - Provide specific nutrition/hydration education  as appropriate  - Include patient/family/caregiver in decisions related to nutrition  Outcome: Progressing

## 2024-03-21 NOTE — ASSESSMENT & PLAN NOTE
POA as evidenced by tachycardia and tachypnea in setting of pneumonia seen on CT  Flu/COVID/RSV negative  Continue rocephin and azithromycin  Blood cultures negative to date

## 2024-03-21 NOTE — ASSESSMENT & PLAN NOTE
Patient presented to ED with shortness of breath, cough, and chills.  CT chest: Left upper and lower lobe consolidations likely represent pneumonia. However, follow-up CT is recommended in 3 months to confirm the absence of an underlying tumor.  Procalcitonin 0.65 > 0.70  Urinary antigens negative  Continue rocephin and azithromycin  duonebs as needed  Currently requiring 2 L oxygen, wean as tolerated

## 2024-03-21 NOTE — PROGRESS NOTES
NEPHROLOGY PROGRESS NOTE   Jacquie Smith 69 y.o. female MRN: [id number]  Unit/Bed#: [address] [place] [address] Encounter: [id number]  Reason for Consult: Management of ESRD    ASSESSMENT AND PLAN:  69 y.o. woman with PMH of ESRD on peritoneal dialysis, obesity, CHF, HTN, hyperlipidemia, A-fib on Coumadin p/w SOB for the last 4 to 5 days, cough with yellow sputum, chills.  Nephrology is consulted for management of ESRD     PLAN     #ESRD on PD  Dialysis unit/days: Daily, [facility] unit  Access: PD catheter  Fluid restriction 1-1.5L/d  Adjust medications to GFR<10  Avoid opioids        #CCPD, transition to CAPD  No CCPD at [facility]   Exchanges every 6   Fill volume 2 L   1.5/2.5%   Home dose :  Therapy fluid 8000ml  Exchanges 4  Fill volume 2000  Dialysate fluid 1.5/2.5%     #Volume status/hypertension:  Volume: Fluid overload  Blood pressure:Borderline hypotension, /70, goal less than 140/90  UF on PD  Continue torsemide 100 mg, home dose        #Secondary Hyperparasitoidism   PhosLo 1334 3 times daily with meals  Cinacalcet 30 mg 3 times a week  Calcitriol 0.25 mcg daily     # Anemia of Kidney Disease  Current hemoglobin: 9.9 mg/dL  Treatment:  Transfuse for hemoglobin less than 7.0 per primary service       # Hypomagnesemia  Serum magnesium 1.9 mg/dL  No need to replete and ESRD     # Pneumonia  CT scan showed left upper and lower lobe consolidation  On ceftriaxone and azithromycin    # Acute hypoxic respiratory failure  Multifactorial secondary to pneumonia and component of fluid overload   On NC  Antibiotics as above   Torsemide and UF on PD as above     SUBJECTIVE:  Patient seen and examined at bedside.  Shortness of breath improving, no chest pain      OBJECTIVE:  Current Weight: Weight - Scale: 86.7 kg (191 lb 3.2 oz)  Vitals:    03/21/24 0356   BP: 110/70   Pulse:    Resp: 18   Temp: 97.8 °F (36.6 °C)   SpO2:      No intake or output data in the 24 hours ending 03/21/24 0658  Wt Readings from Last 3 Encounters:   03/21/24  86.7 kg (191 lb 3.2 oz)   03/13/24 90.7 kg (200 lb)   03/08/24 90.5 kg (199 lb 9.6 oz)     Temp Readings from Last 3 Encounters:   03/21/24 97.8 °F (36.6 °C)   03/13/24 97.5 °F (36.4 °C) (Tympanic)   02/09/24 (!) 97.2 °F (36.2 °C) (Temporal)     BP Readings from Last 3 Encounters:   03/21/24 110/70   03/13/24 126/80   03/08/24 126/80     Pulse Readings from Last 3 Encounters:   03/20/24 83   03/13/24 76   03/08/24 78        General:  no acute distress at this time, obese  Skin:  No acute rash  Eyes:  No scleral icterus and noninjected  ENT:  mucous membranes moist  Neck:  no carotid bruits  Chest:  bilateral crackles,, good respiratory effort, no use of accessory respiratory muscles  CVS:  Regular rate and rhythm without rub   Abdomen:  soft and nontender   Extremities: lower extremity edema  Neuro:  No gross focality  Psych:  Alert , cooperative   Dialysis access: PD catheter      Medications:    Current Facility-Administered Medications:     acetaminophen (TYLENOL) tablet 650 mg, 650 mg, Oral, Q6H PRN, Chi Renner MD    albuterol inhalation solution 2.5 mg, 2.5 mg, Nebulization, Q6H PRN, Chi Renner MD    atorvastatin (LIPITOR) tablet 20 mg, 20 mg, Oral, Daily With Dinner, Chi Renner MD, 20 mg at 03/20/24 1716    azithromycin (ZITHROMAX) 500 mg in sodium chloride 0.9 % 250 mL IVPB, 500 mg, Intravenous, Q24H, Chi Renner MD, Last Rate: 250 mL/hr at 03/20/24 2248, 500 mg at 03/20/24 2248    calcitriol (ROCALTROL) capsule 0.25 mcg, 0.25 mcg, Oral, Daily, Chi Renner MD, 0.25 mcg at 03/20/24 0838    calcium acetate (PHOSLO) capsule 1,334 mg, 1,334 mg, Oral, Daily, Joselyn Reyes Bahamonde, MD, 1,334 mg at 03/20/24 0838    cefTRIAXone (ROCEPHIN) IVPB (premix in dextrose) 1,000 mg 50 mL, 1,000 mg, Intravenous, Q24H, Chi Renner MD, Last Rate: 100 mL/hr at 03/20/24 2112, 1,000 mg at 03/20/24 2112    cholecalciferol (VITAMIN D3) tablet 2,000 Units, 2,000 Units, Oral,  Daily, Chi Renner MD, 2,000 Units at 03/20/24 0838    docusate sodium (COLACE) capsule 100 mg, 100 mg, Oral, BID, Chi Renner MD, 100 mg at 03/20/24 1716    guaiFENesin (MUCINEX) 12 hr tablet 600 mg, 600 mg, Oral, BID, Chi Renner MD, 600 mg at 03/20/24 1716    heparin (porcine) subcutaneous injection 5,000 Units, 5,000 Units, Subcutaneous, Q8H FARZANA, 5,000 Units at 03/21/24 0502 **AND** [CANCELED] Platelet count, , , Once, Chi Renner MD    insulin lispro (HumALOG/ADMELOG) 100 units/mL subcutaneous injection 3 Units, 3 Units, Subcutaneous, TID With Meals, Chi Renner MD, 3 Units at 03/20/24 1716    levothyroxine tablet 125 mcg, 125 mcg, Oral, Early Morning, Chi Renner MD, 125 mcg at 03/21/24 0502    lidocaine (LIDODERM) 5 % patch 1 patch, 1 patch, Topical, Daily, Mihaela Fisher PA-C, 1 patch at 03/20/24 1522    losartan (COZAAR) tablet 100 mg, 100 mg, Oral, Daily, Chi Renner MD, 100 mg at 03/20/24 0844    metoprolol succinate (TOPROL-XL) 24 hr tablet 50 mg, 50 mg, Oral, Daily, Chi Renner MD, 50 mg at 03/20/24 0839    multivitamin stress formula tablet 1 tablet, 1 tablet, Oral, Daily, Chi Renner MD, 1 tablet at 03/20/24 0839    torsemide (DEMADEX) tablet 100 mg, 100 mg, Oral, Daily, Joselyn Reyes Bahamonde, MD    warfarin (COUMADIN) tablet 5 mg, 5 mg, Oral, Daily (warfarin), Chi Renner MD, 5 mg at 03/20/24 1716    Laboratory Results:  Results from last 7 days   Lab Units 03/21/24  0455 03/20/24  0449 03/19/24  1722   WBC Thousand/uL 9.14 9.94 10.70*   HEMOGLOBIN g/dL 9.9* 10.1* 11.6   HEMATOCRIT % 32.8* 33.3* 36.3   PLATELETS Thousands/uL 188 189 177   SODIUM mmol/L 137 137 135   POTASSIUM mmol/L 4.9 5.0 5.3   CHLORIDE mmol/L 103 107 101   CO2 mmol/L 22 20* 23   BUN mg/dL 69* 54* 52*   CREATININE mg/dL 5.82* 5.56* 5.64*   CALCIUM mg/dL 8.7 8.9 8.7   MAGNESIUM mg/dL  --  1.8*  --        CT chest without contrast   Final  "Result by Soto Tipton MD (03/19 4855)      1.  Pneumoperitoneum is of uncertain etiology, though can be attributable to the peritoneal dialysis. Assessment for abdominal pain is recommended to determine whether further work-up is required.   2.  Left upper and lower lobe consolidations likely represent pneumonia. However, follow-up CT is recommended in 3 months to confirm the absence of an underlying tumor.   3.  Right-sided bronchiolitis.   4.  Mildly enlarged mediastinal lymph nodes are likely reactive given the presumed pneumonia and bronchiolitis.      I personally discussed this study with Dr. Sun on 3/19/2024 6:55 PM.            Workstation performed: [id number]             Portions of the record may have been created with voice recognition software. Occasional wrong word or \"sound a like\" substitutions may have occurred due to the inherent limitations of voice recognition software. Read the chart carefully and recognize, using context, where substitutions have occurred.    "

## 2024-03-21 NOTE — PLAN OF CARE
Problem: SAFETY ADULT  Goal: Patient will remain free of falls  Description: INTERVENTIONS:  - Educate patient/family on patient safety including physical limitations  - Instruct patient to call for assistance with activity   - Consult OT/PT to assist with strengthening/mobility   - Keep Call bell within reach  - Keep bed low and locked with side rails adjusted as appropriate  - Keep care items and personal belongings within reach  - Initiate and maintain comfort rounds  - Make Fall Risk Sign visible to staff  - Offer Toileting every 2 Hours, in advance of need  - Initiate/Maintain bed alarm  - Obtain necessary fall risk management equipment: walker  - Apply yellow socks and bracelet for high fall risk patients  - Consider moving patient to room near nurses station  Outcome: Progressing     Problem: RESPIRATORY - ADULT  Goal: Achieves optimal ventilation and oxygenation  Description: INTERVENTIONS:  - Assess for changes in respiratory status  - Assess for changes in mentation and behavior  - Position to facilitate oxygenation and minimize respiratory effort  - Oxygen administered by appropriate delivery if ordered  - Initiate smoking cessation education as indicated  - Encourage broncho-pulmonary hygiene including cough, deep breathe, Incentive Spirometry  - Assess the need for suctioning and aspirate as needed  - Assess and instruct to report SOB or any respiratory difficulty  - Respiratory Therapy support as indicated  3/21/2024 0013 by Michel Mercado, RN  Outcome: Progressing  3/21/2024 0011 by Michel Mercado, RN  Outcome: Progressing     Problem: Nutrition/Hydration-ADULT  Goal: Nutrient/Hydration intake appropriate for improving, restoring or maintaining nutritional needs  Description: Monitor and assess patient's nutrition/hydration status for malnutrition. Collaborate with interdisciplinary team and initiate plan and interventions as ordered.  Monitor patient's weight and dietary intake as ordered or per  policy. Utilize nutrition screening tool and intervene as necessary. Determine patient's food preferences and provide high-protein, high-caloric foods as appropriate.     INTERVENTIONS:  - Monitor oral intake, urinary output, labs, and treatment plans  - Assess nutrition and hydration status and recommend course of action  - Evaluate amount of meals eaten  - Assist patient with eating if necessary   - Allow adequate time for meals  - Recommend/ encourage appropriate diets, oral nutritional supplements, and vitamin/mineral supplements  - Order, calculate, and assess calorie counts as needed  - Recommend, monitor, and adjust tube feedings and TPN/PPN based on assessed needs  - Assess need for intravenous fluids  - Provide specific nutrition/hydration education as appropriate  - Include patient/family/caregiver in decisions related to nutrition  3/21/2024 0013 by Michel Mercado RN  Outcome: Progressing  3/21/2024 0011 by Michel Mercado RN  Outcome: Progressing     Problem: METABOLIC, FLUID AND ELECTROLYTES - ADULT  Goal: Glucose maintained within target range  Description: INTERVENTIONS:  - Monitor Blood Glucose as ordered  - Assess for signs and symptoms of hyperglycemia and hypoglycemia  - Administer ordered medications to maintain glucose within target range  - Assess nutritional intake and initiate nutrition service referral as needed  Outcome: Progressing     Problem: MUSCULOSKELETAL - ADULT  Goal: Maintain or return mobility to safest level of function  Description: INTERVENTIONS:  - Assess patient's ability to carry out ADLs; assess patient's baseline for ADL function and identify physical deficits which impact ability to perform ADLs (bathing, care of mouth/teeth, toileting, grooming, dressing, etc.)  - Assess/evaluate cause of self-care deficits   - Assess range of motion  - Assess patient's mobility  - Assess patient's need for assistive devices and provide as appropriate  - Encourage maximum independence but  intervene and supervise when necessary  - Involve family in performance of ADLs  - Assess for home care needs following discharge   - Consider OT consult to assist with ADL evaluation and planning for discharge  - Provide patient education as appropriate  Outcome: Progressing

## 2024-03-21 NOTE — ASSESSMENT & PLAN NOTE
Continue Lopressor  Patient has not taken warfarin for 4 days prior to admission as she did not feel well  Patient also has history of possible pulmonary embolism as VQ scan in 2018 showed intermediate probability of PE  INR currently subtherapeutic  Will give warfarin 10 mg x 1 dose tonight  Bridge with heparin gtt until therapeutic  Monitor daily INR

## 2024-03-21 NOTE — ASSESSMENT & PLAN NOTE
Continue peritoneal dialysis  Nephrology following  Given IV bumex x 1 dose  Resume home torsemide 100 mg daily    Wt Readings from Last 3 Encounters:   03/21/24 86.7 kg (191 lb 3.2 oz)   03/13/24 90.7 kg (200 lb)   03/08/24 90.5 kg (199 lb 9.6 oz)

## 2024-03-21 NOTE — ASSESSMENT & PLAN NOTE
Lab Results   Component Value Date    EGFR 6 03/21/2024    EGFR 7 03/20/2024    EGFR 7 03/19/2024    CREATININE 5.82 (H) 03/21/2024    CREATININE 5.56 (H) 03/20/2024    CREATININE 5.64 (H) 03/19/2024   Patient missed peritoneal dialysis day prior to admission due to not feeling well  Nephrology following for dialysis

## 2024-03-22 LAB
ANION GAP SERPL CALCULATED.3IONS-SCNC: 10 MMOL/L (ref 4–13)
APTT PPP: 62 SECONDS (ref 23–37)
APTT PPP: 65 SECONDS (ref 23–37)
BUN SERPL-MCNC: 60 MG/DL (ref 5–25)
CALCIUM SERPL-MCNC: 9.1 MG/DL (ref 8.4–10.2)
CHLORIDE SERPL-SCNC: 103 MMOL/L (ref 96–108)
CO2 SERPL-SCNC: 25 MMOL/L (ref 21–32)
CREAT SERPL-MCNC: 5.21 MG/DL (ref 0.6–1.3)
ERYTHROCYTE [DISTWIDTH] IN BLOOD BY AUTOMATED COUNT: 14.5 % (ref 11.6–15.1)
GFR SERPL CREATININE-BSD FRML MDRD: 7 ML/MIN/1.73SQ M
GLUCOSE SERPL-MCNC: 160 MG/DL (ref 65–140)
GLUCOSE SERPL-MCNC: 217 MG/DL (ref 65–140)
GLUCOSE SERPL-MCNC: 221 MG/DL (ref 65–140)
GLUCOSE SERPL-MCNC: 348 MG/DL (ref 65–140)
HCT VFR BLD AUTO: 31.5 % (ref 34.8–46.1)
HGB BLD-MCNC: 9.9 G/DL (ref 11.5–15.4)
INR PPP: 1.62 (ref 0.84–1.19)
MCH RBC QN AUTO: 28.9 PG (ref 26.8–34.3)
MCHC RBC AUTO-ENTMCNC: 31.4 G/DL (ref 31.4–37.4)
MCV RBC AUTO: 92 FL (ref 82–98)
PLATELET # BLD AUTO: 184 THOUSANDS/UL (ref 149–390)
PMV BLD AUTO: 11.5 FL (ref 8.9–12.7)
POTASSIUM SERPL-SCNC: 4.3 MMOL/L (ref 3.5–5.3)
PROCALCITONIN SERPL-MCNC: 0.77 NG/ML
PROTHROMBIN TIME: 19.4 SECONDS (ref 11.6–14.5)
RBC # BLD AUTO: 3.42 MILLION/UL (ref 3.81–5.12)
SODIUM SERPL-SCNC: 138 MMOL/L (ref 135–147)
WBC # BLD AUTO: 9.08 THOUSAND/UL (ref 4.31–10.16)

## 2024-03-22 PROCEDURE — 82948 REAGENT STRIP/BLOOD GLUCOSE: CPT

## 2024-03-22 PROCEDURE — 80048 BASIC METABOLIC PNL TOTAL CA: CPT

## 2024-03-22 PROCEDURE — 85730 THROMBOPLASTIN TIME PARTIAL: CPT | Performed by: FAMILY MEDICINE

## 2024-03-22 PROCEDURE — 85610 PROTHROMBIN TIME: CPT

## 2024-03-22 PROCEDURE — 99233 SBSQ HOSP IP/OBS HIGH 50: CPT | Performed by: STUDENT IN AN ORGANIZED HEALTH CARE EDUCATION/TRAINING PROGRAM

## 2024-03-22 PROCEDURE — 99232 SBSQ HOSP IP/OBS MODERATE 35: CPT

## 2024-03-22 PROCEDURE — 94760 N-INVAS EAR/PLS OXIMETRY 1: CPT

## 2024-03-22 PROCEDURE — 94640 AIRWAY INHALATION TREATMENT: CPT

## 2024-03-22 PROCEDURE — 84145 PROCALCITONIN (PCT): CPT

## 2024-03-22 PROCEDURE — 85027 COMPLETE CBC AUTOMATED: CPT

## 2024-03-22 RX ORDER — INSULIN LISPRO 100 [IU]/ML
1-5 INJECTION, SOLUTION INTRAVENOUS; SUBCUTANEOUS
Status: DISCONTINUED | OUTPATIENT
Start: 2024-03-22 | End: 2024-03-23 | Stop reason: HOSPADM

## 2024-03-22 RX ORDER — BENZONATATE 100 MG/1
100 CAPSULE ORAL 3 TIMES DAILY PRN
Status: DISCONTINUED | OUTPATIENT
Start: 2024-03-22 | End: 2024-03-23 | Stop reason: HOSPADM

## 2024-03-22 RX ORDER — WARFARIN SODIUM 7.5 MG/1
7.5 TABLET ORAL
Status: COMPLETED | OUTPATIENT
Start: 2024-03-22 | End: 2024-03-22

## 2024-03-22 RX ORDER — BUMETANIDE 0.25 MG/ML
2 INJECTION INTRAMUSCULAR; INTRAVENOUS ONCE
Status: COMPLETED | OUTPATIENT
Start: 2024-03-22 | End: 2024-03-22

## 2024-03-22 RX ADMIN — LIDOCAINE 1 PATCH: 700 PATCH TOPICAL at 08:37

## 2024-03-22 RX ADMIN — IPRATROPIUM BROMIDE AND ALBUTEROL SULFATE 3 ML: 2.5; .5 SOLUTION RESPIRATORY (INHALATION) at 02:50

## 2024-03-22 RX ADMIN — BENZONATATE 100 MG: 100 CAPSULE ORAL at 05:07

## 2024-03-22 RX ADMIN — INSULIN LISPRO 3 UNITS: 100 INJECTION, SOLUTION INTRAVENOUS; SUBCUTANEOUS at 08:37

## 2024-03-22 RX ADMIN — Medication 2000 UNITS: at 08:37

## 2024-03-22 RX ADMIN — BENZONATATE 100 MG: 100 CAPSULE ORAL at 17:50

## 2024-03-22 RX ADMIN — INSULIN LISPRO 3 UNITS: 100 INJECTION, SOLUTION INTRAVENOUS; SUBCUTANEOUS at 12:35

## 2024-03-22 RX ADMIN — CALCITRIOL CAPSULES 0.25 MCG 0.25 MCG: 0.25 CAPSULE ORAL at 08:37

## 2024-03-22 RX ADMIN — IPRATROPIUM BROMIDE AND ALBUTEROL SULFATE 3 ML: 2.5; .5 SOLUTION RESPIRATORY (INHALATION) at 20:21

## 2024-03-22 RX ADMIN — METOPROLOL SUCCINATE 50 MG: 50 TABLET, EXTENDED RELEASE ORAL at 08:37

## 2024-03-22 RX ADMIN — INSULIN LISPRO 1 UNITS: 100 INJECTION, SOLUTION INTRAVENOUS; SUBCUTANEOUS at 17:43

## 2024-03-22 RX ADMIN — INSULIN LISPRO 2 UNITS: 100 INJECTION, SOLUTION INTRAVENOUS; SUBCUTANEOUS at 22:40

## 2024-03-22 RX ADMIN — LEVOTHYROXINE SODIUM 125 MCG: 125 TABLET ORAL at 05:07

## 2024-03-22 RX ADMIN — GUAIFENESIN 600 MG: 600 TABLET, EXTENDED RELEASE ORAL at 08:37

## 2024-03-22 RX ADMIN — BUMETANIDE 2 MG: 0.25 INJECTION INTRAMUSCULAR; INTRAVENOUS at 08:37

## 2024-03-22 RX ADMIN — TORSEMIDE 100 MG: 100 TABLET ORAL at 08:37

## 2024-03-22 RX ADMIN — AZITHROMYCIN DIHYDRATE 500 MG: 250 TABLET ORAL at 20:20

## 2024-03-22 RX ADMIN — B-COMPLEX W/ C & FOLIC ACID TAB 1 TABLET: TAB at 08:37

## 2024-03-22 RX ADMIN — CEFTRIAXONE 1000 MG: 1 INJECTION, SOLUTION INTRAVENOUS at 20:20

## 2024-03-22 RX ADMIN — LOSARTAN POTASSIUM 100 MG: 50 TABLET, FILM COATED ORAL at 08:37

## 2024-03-22 RX ADMIN — ACETAMINOPHEN 650 MG: 325 TABLET ORAL at 05:07

## 2024-03-22 RX ADMIN — GUAIFENESIN 600 MG: 600 TABLET, EXTENDED RELEASE ORAL at 17:50

## 2024-03-22 RX ADMIN — INSULIN LISPRO 4 UNITS: 100 INJECTION, SOLUTION INTRAVENOUS; SUBCUTANEOUS at 12:47

## 2024-03-22 RX ADMIN — CALCIUM ACETATE 1334 MG: 667 CAPSULE ORAL at 08:36

## 2024-03-22 RX ADMIN — IPRATROPIUM BROMIDE AND ALBUTEROL SULFATE 3 ML: 2.5; .5 SOLUTION RESPIRATORY (INHALATION) at 07:26

## 2024-03-22 RX ADMIN — INSULIN LISPRO 3 UNITS: 100 INJECTION, SOLUTION INTRAVENOUS; SUBCUTANEOUS at 17:43

## 2024-03-22 RX ADMIN — BENZONATATE 100 MG: 100 CAPSULE ORAL at 22:42

## 2024-03-22 RX ADMIN — DOCUSATE SODIUM 100 MG: 100 CAPSULE, LIQUID FILLED ORAL at 17:50

## 2024-03-22 RX ADMIN — IPRATROPIUM BROMIDE AND ALBUTEROL SULFATE 3 ML: 2.5; .5 SOLUTION RESPIRATORY (INHALATION) at 13:44

## 2024-03-22 RX ADMIN — WARFARIN SODIUM 7.5 MG: 7.5 TABLET ORAL at 17:50

## 2024-03-22 RX ADMIN — ATORVASTATIN CALCIUM 20 MG: 20 TABLET, FILM COATED ORAL at 17:50

## 2024-03-22 NOTE — PROGRESS NOTES
NEPHROLOGY PROGRESS NOTE   Jacquie Smith 69 y.o. female MRN: [id number]  Unit/Bed#: [address] [place] [address] Encounter: [id number]  Reason for Consult: Management of ESRD    ASSESSMENT AND PLAN:  69 y.o. woman with PMH of ESRD on peritoneal dialysis, obesity, CHF, HTN, hyperlipidemia, A-fib on Coumadin p/w SOB for the last 4 to 5 days, cough with yellow sputum, chills.  Nephrology is consulted for management of ESRD     PLAN     #ESRD on PD  Dialysis unit/days: Daily, [facility] unit  Access: PD catheter  Fluid restriction 1-1.5L/d  Adjust medications to GFR<10  Avoid opioids   No issues        #CCPD at home, here transitioned to CAPD  No CCPD at [place]   Exchanges every 6   Fill volume 2 L   1.5/2.5%   Home dose :  Therapy fluid 8000ml  Exchanges 4  Fill volume 2000  Dialysate fluid 1.5/2.5%  Patient can resume home prescription of discharge     #Volume status/hypertension:  Volume: Fluid overload  Blood pressure: Borderline hypertension, /86, goal less than 140/90   Continue UF on PD  Plan for Bumex 2 mg IV once this morning  Continue torsemide 100 mg, home dose        #Secondary Hyperparasitoidism   PhosLo 1334 3 times daily with meals  Cinacalcet 30 mg 3 times a week  Calcitriol 0.25 mcg daily     # Anemia of Kidney Disease  Current hemoglobin: 9.9 mg/dL  Treatment:  Transfuse for hemoglobin less than 7.0 per primary service       # Hypomagnesemia  Serum magnesium 1.9 mg/dL  No replacement     # Pneumonia  CT scan showed left upper and lower lobe consolidation  On ceftriaxone and azithromycin  Urinary antigens negative     # Acute hypoxic respiratory failure  Multifactorial secondary to pneumonia and component of fluid overload   On NC   Antibiotics as per primary team  Torsemide and UF on PD as above        The highlighted and/or bolded points in my assessment, plan, and disposition were discussed with the primary team and they agree with those points and the plan.  Previous records were personally reviewed by me to obtain  a baseline creatinine.   The images (CXR) were personally reviewed by me in PACS      SUBJECTIVE:  Patient seen and examined at bedside. No chest pain, shortness of breath, nausea, vomiting, abdominal pain or diarrhea.     OBJECTIVE:  Current Weight: Weight - Scale: 85.8 kg (189 lb 3.2 oz)  Vitals:    03/22/24 0749   BP: 121/62   Pulse: 82   Resp: 18   Temp:    SpO2: 97%       Intake/Output Summary (Last 24 hours) at 3/22/2024 0759  Last data filed at 3/22/2024 0430  Gross per 24 hour   Intake --   Output -1000 ml   Net 1000 ml     Wt Readings from Last 3 Encounters:   03/22/24 85.8 kg (189 lb 3.2 oz)   03/13/24 90.7 kg (200 lb)   03/08/24 90.5 kg (199 lb 9.6 oz)     Temp Readings from Last 3 Encounters:   03/21/24 98.7 °F (37.1 °C)   03/13/24 97.5 °F (36.4 °C) (Tympanic)   02/09/24 (!) 97.2 °F (36.2 °C) (Temporal)     BP Readings from Last 3 Encounters:   03/22/24 121/62   03/13/24 126/80   03/08/24 126/80     Pulse Readings from Last 3 Encounters:   03/22/24 82   03/13/24 76   03/08/24 78        General:  no acute distress at this time, obese  Skin:  No acute rash  Eyes:  No scleral icterus and noninjected  ENT:  mucous membranes moist  Neck:  no carotid bruits  Chest: Bilateral crackles, good respiratory effort, no use of accessory respiratory muscles  CVS:  Regular rate and rhythm without rub   Abdomen:  soft and nontender   Extremities: lower extremity edema  Neuro:  No gross focality  Psych:  Alert , cooperative   Dialysis access: PD catheter      Medications:    Current Facility-Administered Medications:     acetaminophen (TYLENOL) tablet 650 mg, 650 mg, Oral, Q6H PRN, Chi Renner MD, 650 mg at 03/22/24 0507    atorvastatin (LIPITOR) tablet 20 mg, 20 mg, Oral, Daily With Dinner, Chi Renner MD, 20 mg at 03/21/24 9607    azithromycin (ZITHROMAX) tablet 500 mg, 500 mg, Oral, Q24H, Mihaela Fisher PA-C, 500 mg at 03/21/24 2217    benzonatate (TESSALON PERLES) capsule 100 mg, 100 mg, Oral, TID  SUMAYAN, MICHAEL Michelle, 100 mg at 03/22/24 0507    calcitriol (ROCALTROL) capsule 0.25 mcg, 0.25 mcg, Oral, Daily, Chi Renner MD, 0.25 mcg at 03/21/24 0828    calcium acetate (PHOSLO) capsule 1,334 mg, 1,334 mg, Oral, Daily, Joselyn Reyes Bahamonde, MD, 1,334 mg at 03/21/24 0827    cefTRIAXone (ROCEPHIN) IVPB (premix in dextrose) 1,000 mg 50 mL, 1,000 mg, Intravenous, Q24H, Chi Renner MD, Stopped at 03/21/24 2248    cholecalciferol (VITAMIN D3) tablet 2,000 Units, 2,000 Units, Oral, Daily, Chi Renner MD, 2,000 Units at 03/21/24 0828    docusate sodium (COLACE) capsule 100 mg, 100 mg, Oral, BID, Chi Renner MD, 100 mg at 03/21/24 1747    guaiFENesin (MUCINEX) 12 hr tablet 600 mg, 600 mg, Oral, BID, Chi Renner MD, 600 mg at 03/21/24 1747    heparin (porcine) 25,000 units in 0.45% NaCl 250 mL infusion (premix), 3-20 Units/kg/hr (Order-Specific), Intravenous, Titrated, Mihaela Fisher PA-C, Last Rate: 7.5 mL/hr at 03/22/24 0015, 8.8 Units/kg/hr at 03/22/24 0015    insulin lispro (HumALOG/ADMELOG) 100 units/mL subcutaneous injection 3 Units, 3 Units, Subcutaneous, TID With Meals, Chi Renner MD, 3 Units at 03/21/24 1748    ipratropium-albuterol (DUO-NEB) 0.5-2.5 mg/3 mL inhalation solution 3 mL, 3 mL, Nebulization, Q6H, Mihaela Fisher PA-C, 3 mL at 03/22/24 0726    levothyroxine tablet 125 mcg, 125 mcg, Oral, Early Morning, Chi Renner MD, 125 mcg at 03/22/24 0507    lidocaine (LIDODERM) 5 % patch 1 patch, 1 patch, Topical, Daily, Mihaela Fisher PA-C, 1 patch at 03/20/24 1522    losartan (COZAAR) tablet 100 mg, 100 mg, Oral, Daily, Chi Renner MD, 100 mg at 03/21/24 0827    metoprolol succinate (TOPROL-XL) 24 hr tablet 50 mg, 50 mg, Oral, Daily, Chi Renner MD, 50 mg at 03/21/24 0827    multivitamin stress formula tablet 1 tablet, 1 tablet, Oral, Daily, Chi Renner MD, 1 tablet at 03/21/24 0827    torsemide (DEMADEX) tablet  "100 mg, 100 mg, Oral, Daily, Joselyn Reyes Bahamonde, MD, 100 mg at 03/21/24 0828    Laboratory Results:  Results from last 7 days   Lab Units 03/22/24  0609 03/21/24  0455 03/20/24  0449 03/19/24  1722   WBC Thousand/uL 9.08 9.14 9.94 10.70*   HEMOGLOBIN g/dL 9.9* 9.9* 10.1* 11.6   HEMATOCRIT % 31.5* 32.8* 33.3* 36.3   PLATELETS Thousands/uL 184 188 189 177   SODIUM mmol/L 138 137 137 135   POTASSIUM mmol/L 4.3 4.9 5.0 5.3   CHLORIDE mmol/L 103 103 107 101   CO2 mmol/L 25 22 20* 23   BUN mg/dL 60* 69* 54* 52*   CREATININE mg/dL 5.21* 5.82* 5.56* 5.64*   CALCIUM mg/dL 9.1 8.7 8.9 8.7   MAGNESIUM mg/dL  --   --  1.8*  --        CT chest without contrast   Final Result by Soto Tipton MD (03/19 1855)      1.  Pneumoperitoneum is of uncertain etiology, though can be attributable to the peritoneal dialysis. Assessment for abdominal pain is recommended to determine whether further work-up is required.   2.  Left upper and lower lobe consolidations likely represent pneumonia. However, follow-up CT is recommended in 3 months to confirm the absence of an underlying tumor.   3.  Right-sided bronchiolitis.   4.  Mildly enlarged mediastinal lymph nodes are likely reactive given the presumed pneumonia and bronchiolitis.      I personally discussed this study with Dr. Sun on 3/19/2024 6:55 PM.            Workstation performed: [id number]             Portions of the record may have been created with voice recognition software. Occasional wrong word or \"sound a like\" substitutions may have occurred due to the inherent limitations of voice recognition software. Read the chart carefully and recognize, using context, where substitutions have occurred.    "

## 2024-03-22 NOTE — PROGRESS NOTES
[facility]  Progress Note  Name: Jacquie Smith I  MRN: [id number]  Unit/Bed#: 4 [place] 403-01 I Date of Admission: 3/19/2024   Date of Service: 3/22/2024 I Hospital Day: 3    Assessment/Plan   * Pneumonia  Assessment & Plan  Patient presented to ED with shortness of breath, cough, and chills.  CT chest: Left upper and lower lobe consolidations likely represent pneumonia. However, follow-up CT is recommended in 3 months to confirm the absence of an underlying tumor.  Procalcitonin 0.65 > 0.70  Urinary antigens negative  Continue rocephin and azithromycin  duonebs as needed  Currently requiring 1.5 L oxygen, wean as tolerated    Sepsis (HCC)  Assessment & Plan  POA as evidenced by tachycardia and tachypnea in setting of pneumonia seen on CT  Flu/COVID/RSV negative  Continue rocephin and azithromycin  Blood cultures negative to date    Acute respiratory failure with hypoxia (HCC)  Assessment & Plan  Secondary to pneumonia and volume overload  Currently requiring 1.5 L  Wean as tolerated    Atrial fibrillation, chronic (HCC)  Assessment & Plan  Continue Lopressor  Patient has not taken warfarin for 4 days prior to admission as she did not feel well  Patient also has history of possible pulmonary embolism as VQ scan in 2018 showed intermediate probability of PE  INR currently subtherapeutic  Will give warfarin 7.5 mg x 1 dose tonight  Bridge with heparin gtt until therapeutic  Monitor daily INR    Essential hypertension  Assessment & Plan  Continue home losartan, Toprol-XL, and torsemide    Hypothyroidism  Assessment & Plan  Continue levothyroxine    Chronic combined systolic and diastolic congestive heart failure (HCC)  Assessment & Plan  Continue peritoneal dialysis  Nephrology following  Given IV bumex this morning  Continue home torsemide 100 mg daily    Wt Readings from Last 3 Encounters:   03/22/24 85.8 kg (189 lb 3.2 oz)   03/13/24 90.7 kg (200 lb)   03/08/24 90.5 kg (199 lb 9.6 oz)        Secondary hyperparathyroidism of renal origin (HCC)  Assessment & Plan  Continue Sensipar    Type 2 diabetes mellitus with hyperglycemia, with long-term current use of insulin (HCC)  Assessment & Plan  Patient followed by endocrinology  takes 3 units of lispro with meals, Ozempic and Semglee  Started on insulin sliding scale with home lispro    Mixed hyperlipidemia  Assessment & Plan  Continue statin    End stage renal disease (HCC)  Assessment & Plan    Lab Results   Component Value Date    EGFR 7 03/22/2024    EGFR 6 03/21/2024    EGFR 7 03/20/2024    CREATININE 5.21 (H) 03/22/2024    CREATININE 5.82 (H) 03/21/2024    CREATININE 5.56 (H) 03/20/2024   Patient missed peritoneal dialysis day prior to admission due to not feeling well  Nephrology following for dialysis           VTE Pharmacologic Prophylaxis: VTE Score: 6 High Risk (Score >/= 5) - Pharmacological DVT Prophylaxis Ordered: heparin drip. Sequential Compression Devices Ordered.    Mobility:   Basic Mobility Inpatient Raw Score: 22  JH-HLM Goal: 7: Walk 25 feet or more  JH-HLM Achieved: 7: Walk 25 feet or more  JH-HLM Goal achieved. Continue to encourage appropriate mobility.    Patient Centered Rounds: I performed bedside rounds with nursing staff today.   Discussions with Specialists or Other Care Team Provider: nephro, rn, cm    Education and Discussions with Family / Patient: Patient declined call to .     Total Time Spent on Date of Encounter in care of patient: 35 mins. This time was spent on one or more of the following: performing physical exam; counseling and coordination of care; obtaining or reviewing history; documenting in the medical record; reviewing/ordering tests, medications or procedures; communicating with other healthcare professionals and discussing with patient's family/caregivers.    Current Length of Stay: 3 day(s)  Current Patient Status: Inpatient   Certification Statement: The patient will continue to require  additional inpatient hospital stay due to pneumonia, fluid overload, heparin gtt  Discharge Plan: Anticipate discharge in 24-48 hrs to home.    Code Status: Level 1 - Full Code    Subjective:   Patient reports shortness of breath is improving. She wants to get up and walk around more. Cough is improving.  Denies chest pain, nausea, vomiting, abdominal pain.     Objective:     Vitals:   Temp (24hrs), Av.1 °F (36.7 °C), Min:97.4 °F (36.3 °C), Max:98.7 °F (37.1 °C)    Temp:  [97.4 °F (36.3 °C)-98.7 °F (37.1 °C)] 97.4 °F (36.3 °C)  HR:  [81-94] 94  Resp:  [17-20] 18  BP: (117-156)/(50-86) 156/68  SpO2:  [90 %-98 %] 90 %  Body mass index is 36.95 kg/m².     Input and Output Summary (last 24 hours):     Intake/Output Summary (Last 24 hours) at 3/22/2024 1650  Last data filed at 3/22/2024 1201  Gross per 24 hour   Intake --   Output 200 ml   Net -200 ml       Physical Exam:   Physical Exam  Vitals and nursing note reviewed.   Constitutional:       General: She is not in acute distress.     Appearance: She is well-developed.   Cardiovascular:      Rate and Rhythm: Normal rate and regular rhythm.   Pulmonary:      Effort: Pulmonary effort is normal. No respiratory distress.      Breath sounds: Normal breath sounds.      Comments: Bibasilar crackles  Satting mid 90s on 1.5 L  Abdominal:      Palpations: Abdomen is soft.      Tenderness: There is no abdominal tenderness.   Musculoskeletal:         General: No swelling.      Right lower leg: Edema present.      Left lower leg: Edema present.   Skin:     General: Skin is warm and dry.      Capillary Refill: Capillary refill takes less than 2 seconds.   Neurological:      Mental Status: She is alert.   Psychiatric:         Mood and Affect: Mood normal.          Additional Data:     Labs:  Results from last 7 days   Lab Units 24  0609 24  0449 24  1722   WBC Thousand/uL 9.08   < > 10.70*   HEMOGLOBIN g/dL 9.9*   < > 11.6   HEMATOCRIT % 31.5*   < > 36.3    PLATELETS Thousands/uL 184   < > 177   NEUTROS PCT %  --   --  79*   LYMPHS PCT %  --   --  5*   MONOS PCT %  --   --  13*   EOS PCT %  --   --  2    < > = values in this interval not displayed.     Results from last 7 days   Lab Units 03/22/24  0609 03/20/24  0449 03/19/24  1722   SODIUM mmol/L 138   < > 135   POTASSIUM mmol/L 4.3   < > 5.3   CHLORIDE mmol/L 103   < > 101   CO2 mmol/L 25   < > 23   BUN mg/dL 60*   < > 52*   CREATININE mg/dL 5.21*   < > 5.64*   ANION GAP mmol/L 10   < > 11   CALCIUM mg/dL 9.1   < > 8.7   ALBUMIN g/dL  --   --  3.4*   TOTAL BILIRUBIN mg/dL  --   --  0.75   ALK PHOS U/L  --   --  136*   ALT U/L  --   --  15   AST U/L  --   --  20   GLUCOSE RANDOM mg/dL 221*   < > 311*    < > = values in this interval not displayed.     Results from last 7 days   Lab Units 03/22/24  0609   INR  1.62*     Results from last 7 days   Lab Units 03/22/24  1630 03/22/24  1233 03/20/24  1120 03/20/24  0829   POC GLUCOSE mg/dl 160* 348* 122 132         Results from last 7 days   Lab Units 03/22/24  0609 03/20/24  0449 03/19/24  1808   LACTIC ACID mmol/L  --   --  1.4   PROCALCITONIN ng/ml 0.77* 0.70* 0.65*       Lines/Drains:  Invasive Devices       Peripheral Intravenous Line  Duration             Peripheral IV 03/20/24 Distal;Left;Ventral (anterior) Wrist 1 day              Line  Duration             Peritoneal Dialysis Catheter Abdominal 1360 days    Peritoneal Dialysis Catheter Tenckhoff Right lower abdomen 1344 days                          Imaging: No pertinent imaging reviewed.    Recent Cultures (last 7 days):   Results from last 7 days   Lab Units 03/20/24  0510 03/19/24  1808 03/19/24  1722   BLOOD CULTURE   --  No Growth at 48 hrs. No Growth at 48 hrs.   LEGIONELLA URINARY ANTIGEN  Negative  --   --        Last 24 Hours Medication List:   Current Facility-Administered Medications   Medication Dose Route Frequency Provider Last Rate    acetaminophen  650 mg Oral Q6H PRN Chi Jc Renner, MD       atorvastatin  20 mg Oral Daily With Dinner Chi Renner MD      azithromycin  500 mg Oral Q24H Mihaela Fisher PA-C      benzonatate  100 mg Oral TID PRN MICHAEL Michelle      calcitriol  0.25 mcg Oral Daily Chi Renner MD      calcium acetate  1,334 mg Oral Daily Joselyn Reyes Bahamonde, MD      cefTRIAXone  1,000 mg Intravenous Q24H Chi Renner MD Stopped (03/21/24 2248)    cholecalciferol  2,000 Units Oral Daily Chi Renner MD      docusate sodium  100 mg Oral BID Chi Renner MD      guaiFENesin  600 mg Oral BID Chi Renner MD      heparin (porcine)  3-20 Units/kg/hr (Order-Specific) Intravenous Titrated Mihaela Fisher PA-C 8.8 Units/kg/hr (03/22/24 0015)    insulin lispro  1-5 Units Subcutaneous TID AC Mihaela Fisher PA-C      insulin lispro  1-5 Units Subcutaneous HS Mihaela Fisher PA-C      insulin lispro  3 Units Subcutaneous TID With Meals Chi Renner MD      ipratropium-albuterol  3 mL Nebulization Q6H Mihaela Fisher PA-C      levothyroxine  125 mcg Oral Early Morning Chi Renner MD      lidocaine  1 patch Topical Daily Mihaela Fisher PA-C      losartan  100 mg Oral Daily Chi Renner MD      metoprolol succinate  50 mg Oral Daily Chi Renner MD      multivitamin stress formula  1 tablet Oral Daily Chi Renner MD      torsemide  100 mg Oral Daily Joselyn Reyes Bahamonde, MD      warfarin  7.5 mg Oral Once (warfarin) Mihaela Fisher PA-C          Today, Patient Was Seen By: Mihaela Fisher PA-C    **Please Note: This note may have been constructed using a voice recognition system.**

## 2024-03-22 NOTE — ASSESSMENT & PLAN NOTE
Lab Results   Component Value Date    EGFR 7 03/22/2024    EGFR 6 03/21/2024    EGFR 7 03/20/2024    CREATININE 5.21 (H) 03/22/2024    CREATININE 5.82 (H) 03/21/2024    CREATININE 5.56 (H) 03/20/2024   Patient missed peritoneal dialysis day prior to admission due to not feeling well  Nephrology following for dialysis

## 2024-03-22 NOTE — ASSESSMENT & PLAN NOTE
Patient presented to ED with shortness of breath, cough, and chills.  CT chest: Left upper and lower lobe consolidations likely represent pneumonia. However, follow-up CT is recommended in 3 months to confirm the absence of an underlying tumor.  Procalcitonin 0.65 > 0.70  Urinary antigens negative  Continue rocephin and azithromycin  duonebs as needed  Currently requiring 1.5 L oxygen, wean as tolerated

## 2024-03-22 NOTE — ASSESSMENT & PLAN NOTE
Continue peritoneal dialysis  Nephrology following  Given IV bumex this morning  Continue home torsemide 100 mg daily    Wt Readings from Last 3 Encounters:   03/22/24 85.8 kg (189 lb 3.2 oz)   03/13/24 90.7 kg (200 lb)   03/08/24 90.5 kg (199 lb 9.6 oz)

## 2024-03-22 NOTE — CASE MANAGEMENT
Case Management Discharge Planning Note    Patient name Jacquie Smith  Location [address] [place] [address]/[address]-* MRN [id number]  : 1955 Date 3/22/2024       Current Admission Date: 3/19/2024  Current Admission Diagnosis:Pneumonia   Patient Active Problem List    Diagnosis Date Noted    Sepsis ([facility]) 2024    Pneumonia 2024    Acute respiratory failure with hypoxia ([facility]) 2024    Atrial fibrillation, chronic ([facility]) 2023    Sleep apnea, obstructive 2023    Chronic kidney disease 2023    CHANDRA (obstructive sleep apnea) 10/12/2022    Adenomatous polyp of colon 10/11/2022    Type 2 diabetes mellitus with hyperosmolarity without coma, with long-term current use of insulin ([facility]) 2022    History of syncope 2022    Sinus bradycardia 2022    Iron deficiency anemia due to chronic blood loss 2022    Class 2 obesity with body mass index (BMI) of 37.0 to 37.9 in adult 2021    Dilated cardiomyopathy ([facility]) 2021    Essential hypertension 2021    Acute gout due to renal impairment involving toe of right foot 2021    Endometrial thickening on ultrasound 2021    Dependence on renal dialysis ([facility]) 2020    Hypothyroidism 2020    Anemia of chronic disease 2020    Nocturnal hypoxemia 2020    Paroxysmal atrial flutter ([facility]) 2020    History of pulmonary embolus (PE) 2020    Chronic combined systolic and diastolic congestive heart failure ([facility]) 2020    ESRD (end stage renal disease) ([facility]) 2020    Encounter for peritoneal dialysis ([facility]) 2020    Diabetic retinopathy of both eyes associated with type 2 diabetes mellitus ([facility]) 2019    Non-rheumatic mitral valve stenosis 2019    Vitamin D deficiency 2019    Secondary hyperparathyroidism of renal origin ([facility]) 2019    Morbid obesity with BMI of 40.0-44.9, adult ([facility]) 2019    Pulmonary hypertension ([facility]) 2018    Leg edema, left  08/21/2018    Dyspnea on exertion 07/05/2018    PE (pulmonary thromboembolism) ([facility]) 07/05/2018    Elevated brain natriuretic peptide (BNP) level 07/05/2018    End stage renal disease (HCC) 04/13/2018    Nephrotic range proteinuria 04/13/2018    Cyst of ovary 04/13/2017    Mixed hyperlipidemia 01/31/2014    Type 2 diabetes mellitus with hyperglycemia, with long-term current use of insulin ([facility]) 06/05/2013      LOS (days): 3  Geometric Mean LOS (GMLOS) (days): 5.1  Days to GMLOS:2.2     OBJECTIVE:  Risk of Unplanned Readmission Score: 19.82       Current admission status: Inpatient   Preferred Pharmacy:   [id number] - [place], [place] - [address] [facility] [address]  [address] [address]  Phone: [phone] Fax: [phone]    [facility] - [place], [place] - [id number] [facility] [address]  Phone: [phone] Fax: [phone]    Primary Care Provider: Yessica Ramirez DO    Primary Insurance: MEDICARE  Secondary Insurance: BLUE CROSS    DISCHARGE DETAILS:        IMM Given (Date):: 03/22/24  IMM Given to:: Patient (IMM reviewed with and signed by patient.  Copy given to patient and copy placed in scan bin for chart.)

## 2024-03-22 NOTE — PLAN OF CARE
Problem: SAFETY ADULT  Goal: Patient will remain free of falls  Description: INTERVENTIONS:  - Educate patient/family on patient safety including physical limitations  - Instruct patient to call for assistance with activity   - Consult OT/PT to assist with strengthening/mobility   - Keep Call bell within reach  - Keep bed low and locked with side rails adjusted as appropriate  - Keep care items and personal belongings within reach  - Initiate and maintain comfort rounds  - Make Fall Risk Sign visible to staff  - Offer Toileting every 2 Hours, in advance of need  - Initiate/Maintain bed alarm  - Obtain necessary fall risk management equipment: walker  - Apply yellow socks and bracelet for high fall risk patients  - Consider moving patient to room near nurses station  Outcome: Progressing  Goal: Maintain or return to baseline ADL function  Description: INTERVENTIONS:  -  Assess patient's ability to carry out ADLs; assess patient's baseline for ADL function and identify physical deficits which impact ability to perform ADLs (bathing, care of mouth/teeth, toileting, grooming, dressing, etc.)  - Assess/evaluate cause of self-care deficits   - Assess range of motion  - Assess patient's mobility; develop plan if impaired  - Assess patient's need for assistive devices and provide as appropriate  - Encourage maximum independence but intervene and supervise when necessary  - Involve family in performance of ADLs  - Assess for home care needs following discharge   - Consider OT consult to assist with ADL evaluation and planning for discharge  - Provide patient education as appropriate  Outcome: Progressing     Problem: RESPIRATORY - ADULT  Goal: Achieves optimal ventilation and oxygenation  Description: INTERVENTIONS:  - Assess for changes in respiratory status  - Assess for changes in mentation and behavior  - Position to facilitate oxygenation and minimize respiratory effort  - Oxygen administered by appropriate delivery  if ordered  - Initiate smoking cessation education as indicated  - Encourage broncho-pulmonary hygiene including cough, deep breathe, Incentive Spirometry  - Assess the need for suctioning and aspirate as needed  - Assess and instruct to report SOB or any respiratory difficulty  - Respiratory Therapy support as indicated  Outcome: Progressing     Problem: Nutrition/Hydration-ADULT  Goal: Nutrient/Hydration intake appropriate for improving, restoring or maintaining nutritional needs  Description: Monitor and assess patient's nutrition/hydration status for malnutrition. Collaborate with interdisciplinary team and initiate plan and interventions as ordered.  Monitor patient's weight and dietary intake as ordered or per policy. Utilize nutrition screening tool and intervene as necessary. Determine patient's food preferences and provide high-protein, high-caloric foods as appropriate.     INTERVENTIONS:  - Monitor oral intake, urinary output, labs, and treatment plans  - Assess nutrition and hydration status and recommend course of action  - Evaluate amount of meals eaten  - Assist patient with eating if necessary   - Allow adequate time for meals  - Recommend/ encourage appropriate diets, oral nutritional supplements, and vitamin/mineral supplements  - Order, calculate, and assess calorie counts as needed  - Recommend, monitor, and adjust tube feedings and TPN/PPN based on assessed needs  - Assess need for intravenous fluids  - Provide specific nutrition/hydration education as appropriate  - Include patient/family/caregiver in decisions related to nutrition  Outcome: Progressing     Problem: METABOLIC, FLUID AND ELECTROLYTES - ADULT  Goal: Glucose maintained within target range  Description: INTERVENTIONS:  - Monitor Blood Glucose as ordered  - Assess for signs and symptoms of hyperglycemia and hypoglycemia  - Administer ordered medications to maintain glucose within target range  - Assess nutritional intake and  initiate nutrition service referral as needed  Outcome: Progressing     Problem: MUSCULOSKELETAL - ADULT  Goal: Maintain or return mobility to safest level of function  Description: INTERVENTIONS:  - Assess patient's ability to carry out ADLs; assess patient's baseline for ADL function and identify physical deficits which impact ability to perform ADLs (bathing, care of mouth/teeth, toileting, grooming, dressing, etc.)  - Assess/evaluate cause of self-care deficits   - Assess range of motion  - Assess patient's mobility  - Assess patient's need for assistive devices and provide as appropriate  - Encourage maximum independence but intervene and supervise when necessary  - Involve family in performance of ADLs  - Assess for home care needs following discharge   - Consider OT consult to assist with ADL evaluation and planning for discharge  - Provide patient education as appropriate  Outcome: Progressing

## 2024-03-22 NOTE — ASSESSMENT & PLAN NOTE
Continue Lopressor  Patient has not taken warfarin for 4 days prior to admission as she did not feel well  Patient also has history of possible pulmonary embolism as VQ scan in 2018 showed intermediate probability of PE  INR currently subtherapeutic  Will give warfarin 7.5 mg x 1 dose tonight  Bridge with heparin gtt until therapeutic  Monitor daily INR

## 2024-03-23 VITALS
SYSTOLIC BLOOD PRESSURE: 155 MMHG | BODY MASS INDEX: 37.73 KG/M2 | OXYGEN SATURATION: 99 % | RESPIRATION RATE: 18 BRPM | TEMPERATURE: 97.8 F | DIASTOLIC BLOOD PRESSURE: 75 MMHG | HEART RATE: 92 BPM | HEIGHT: 60 IN | WEIGHT: 192.2 LBS

## 2024-03-23 LAB
ANION GAP SERPL CALCULATED.3IONS-SCNC: 11 MMOL/L (ref 4–13)
APTT PPP: 68 SECONDS (ref 23–37)
BUN SERPL-MCNC: 56 MG/DL (ref 5–25)
CALCIUM SERPL-MCNC: 9 MG/DL (ref 8.4–10.2)
CHLORIDE SERPL-SCNC: 102 MMOL/L (ref 96–108)
CO2 SERPL-SCNC: 25 MMOL/L (ref 21–32)
CREAT SERPL-MCNC: 4.69 MG/DL (ref 0.6–1.3)
DME PARACHUTE DELIVERY DATE REQUESTED: NORMAL
DME PARACHUTE ITEM DESCRIPTION: NORMAL
DME PARACHUTE ORDER STATUS: NORMAL
DME PARACHUTE SUPPLIER NAME: NORMAL
DME PARACHUTE SUPPLIER PHONE: NORMAL
ERYTHROCYTE [DISTWIDTH] IN BLOOD BY AUTOMATED COUNT: 14.4 % (ref 11.6–15.1)
GFR SERPL CREATININE-BSD FRML MDRD: 9 ML/MIN/1.73SQ M
GLUCOSE SERPL-MCNC: 171 MG/DL (ref 65–140)
GLUCOSE SERPL-MCNC: 191 MG/DL (ref 65–140)
GLUCOSE SERPL-MCNC: 211 MG/DL (ref 65–140)
GLUCOSE SERPL-MCNC: 231 MG/DL (ref 65–140)
HCT VFR BLD AUTO: 32.4 % (ref 34.8–46.1)
HGB BLD-MCNC: 10.4 G/DL (ref 11.5–15.4)
INR PPP: 2.56 (ref 0.84–1.19)
MCH RBC QN AUTO: 29.3 PG (ref 26.8–34.3)
MCHC RBC AUTO-ENTMCNC: 32.1 G/DL (ref 31.4–37.4)
MCV RBC AUTO: 91 FL (ref 82–98)
PLATELET # BLD AUTO: 194 THOUSANDS/UL (ref 149–390)
PMV BLD AUTO: 11.3 FL (ref 8.9–12.7)
POTASSIUM SERPL-SCNC: 4.1 MMOL/L (ref 3.5–5.3)
PROTHROMBIN TIME: 27.6 SECONDS (ref 11.6–14.5)
RBC # BLD AUTO: 3.55 MILLION/UL (ref 3.81–5.12)
SODIUM SERPL-SCNC: 138 MMOL/L (ref 135–147)
WBC # BLD AUTO: 9.7 THOUSAND/UL (ref 4.31–10.16)

## 2024-03-23 PROCEDURE — 94761 N-INVAS EAR/PLS OXIMETRY MLT: CPT

## 2024-03-23 PROCEDURE — 85610 PROTHROMBIN TIME: CPT

## 2024-03-23 PROCEDURE — 82948 REAGENT STRIP/BLOOD GLUCOSE: CPT

## 2024-03-23 PROCEDURE — 99239 HOSP IP/OBS DSCHRG MGMT >30: CPT

## 2024-03-23 PROCEDURE — 99232 SBSQ HOSP IP/OBS MODERATE 35: CPT | Performed by: STUDENT IN AN ORGANIZED HEALTH CARE EDUCATION/TRAINING PROGRAM

## 2024-03-23 PROCEDURE — 94760 N-INVAS EAR/PLS OXIMETRY 1: CPT

## 2024-03-23 PROCEDURE — 94640 AIRWAY INHALATION TREATMENT: CPT

## 2024-03-23 PROCEDURE — 85730 THROMBOPLASTIN TIME PARTIAL: CPT

## 2024-03-23 PROCEDURE — 85027 COMPLETE CBC AUTOMATED: CPT

## 2024-03-23 PROCEDURE — 80048 BASIC METABOLIC PNL TOTAL CA: CPT

## 2024-03-23 RX ORDER — ALBUTEROL SULFATE 90 UG/1
2 AEROSOL, METERED RESPIRATORY (INHALATION) EVERY 6 HOURS PRN
Qty: 8 G | Refills: 0 | Status: SHIPPED | OUTPATIENT
Start: 2024-03-23

## 2024-03-23 RX ORDER — CEFDINIR 300 MG/1
300 CAPSULE ORAL EVERY 24 HOURS
Qty: 3 CAPSULE | Refills: 0 | Status: SHIPPED | OUTPATIENT
Start: 2024-03-23 | End: 2024-03-26

## 2024-03-23 RX ADMIN — INSULIN LISPRO 2 UNITS: 100 INJECTION, SOLUTION INTRAVENOUS; SUBCUTANEOUS at 08:22

## 2024-03-23 RX ADMIN — GUAIFENESIN 600 MG: 600 TABLET, EXTENDED RELEASE ORAL at 08:21

## 2024-03-23 RX ADMIN — GUAIFENESIN 600 MG: 600 TABLET, EXTENDED RELEASE ORAL at 17:03

## 2024-03-23 RX ADMIN — ATORVASTATIN CALCIUM 20 MG: 20 TABLET, FILM COATED ORAL at 16:44

## 2024-03-23 RX ADMIN — INSULIN LISPRO 1 UNITS: 100 INJECTION, SOLUTION INTRAVENOUS; SUBCUTANEOUS at 16:44

## 2024-03-23 RX ADMIN — Medication 2000 UNITS: at 08:21

## 2024-03-23 RX ADMIN — INSULIN LISPRO 3 UNITS: 100 INJECTION, SOLUTION INTRAVENOUS; SUBCUTANEOUS at 08:22

## 2024-03-23 RX ADMIN — B-COMPLEX W/ C & FOLIC ACID TAB 1 TABLET: TAB at 08:21

## 2024-03-23 RX ADMIN — CALCITRIOL CAPSULES 0.25 MCG 0.25 MCG: 0.25 CAPSULE ORAL at 08:21

## 2024-03-23 RX ADMIN — ACETAMINOPHEN 650 MG: 325 TABLET ORAL at 00:03

## 2024-03-23 RX ADMIN — INSULIN LISPRO 3 UNITS: 100 INJECTION, SOLUTION INTRAVENOUS; SUBCUTANEOUS at 11:55

## 2024-03-23 RX ADMIN — LIDOCAINE 1 PATCH: 700 PATCH TOPICAL at 08:21

## 2024-03-23 RX ADMIN — INSULIN LISPRO 1 UNITS: 100 INJECTION, SOLUTION INTRAVENOUS; SUBCUTANEOUS at 11:54

## 2024-03-23 RX ADMIN — IPRATROPIUM BROMIDE AND ALBUTEROL SULFATE 3 ML: 2.5; .5 SOLUTION RESPIRATORY (INHALATION) at 01:00

## 2024-03-23 RX ADMIN — LEVOTHYROXINE SODIUM 125 MCG: 125 TABLET ORAL at 05:55

## 2024-03-23 RX ADMIN — INSULIN LISPRO 3 UNITS: 100 INJECTION, SOLUTION INTRAVENOUS; SUBCUTANEOUS at 16:45

## 2024-03-23 RX ADMIN — CALCIUM ACETATE 1334 MG: 667 CAPSULE ORAL at 08:21

## 2024-03-23 RX ADMIN — METOPROLOL SUCCINATE 50 MG: 50 TABLET, EXTENDED RELEASE ORAL at 08:21

## 2024-03-23 RX ADMIN — IPRATROPIUM BROMIDE AND ALBUTEROL SULFATE 3 ML: 2.5; .5 SOLUTION RESPIRATORY (INHALATION) at 07:16

## 2024-03-23 RX ADMIN — HEPARIN SODIUM 8.8 UNITS/KG/HR: 10000 INJECTION, SOLUTION INTRAVENOUS at 04:05

## 2024-03-23 RX ADMIN — TORSEMIDE 100 MG: 100 TABLET ORAL at 08:21

## 2024-03-23 RX ADMIN — LOSARTAN POTASSIUM 100 MG: 50 TABLET, FILM COATED ORAL at 08:21

## 2024-03-23 RX ADMIN — BENZONATATE 100 MG: 100 CAPSULE ORAL at 09:20

## 2024-03-23 RX ADMIN — IPRATROPIUM BROMIDE AND ALBUTEROL SULFATE 3 ML: 2.5; .5 SOLUTION RESPIRATORY (INHALATION) at 13:19

## 2024-03-23 NOTE — DISCHARGE SUMMARY
"[facility]  Discharge- Jacquie Smith 1955, 69 y.o. female MRN: [id number]  Unit/Bed#: [address] [place] [address] Encounter: [id number]  Primary Care Provider: Yessica Ramirez DO   Date and time admitted to hospital: 3/19/2024  4:49 PM    * Pneumonia  Assessment & Plan  Patient presented to ED with shortness of breath, cough, and chills.  CT chest: Left upper and lower lobe consolidations likely represent pneumonia. However, follow-up CT is recommended in 3 months to confirm the absence of an underlying tumor.  Procalcitonin 0.65 > 0.70  Urinary antigens negative  Completed four days of rocephin and azithromycin  Continue cefdinir on discharge to complete 1 week antibiotics  Follow up with PCP and pulmonology    Atrial fibrillation, chronic (HCC)  Assessment & Plan  Continue Lopressor  Patient did not take warfarin for 4 days prior to admission as she did not feel well  Patient also has history of possible pulmonary embolism as VQ scan in 2018 showed intermediate probability of PE  INR initially subtherapeutic and was on heparin gtt  INR now therapeutic  Continue home dose warfarin and routine outpatient monitoring    Acute respiratory failure with hypoxia (HCC)  Assessment & Plan  Secondary to pneumonia and volume overload  Patient reports she has oxygen at home \"from years ago\" and has not seen pulmonology in a couple years  Home O2 eval shows patient requires    Sepsis (HCC)  Assessment & Plan  POA as evidenced by tachycardia and tachypnea in setting of pneumonia seen on CT  Flu/COVID/RSV negative  Transition from rocephin to cefdinir on discharge  Blood cultures negative to date    Essential hypertension  Assessment & Plan  Continue home losartan, Toprol-XL, and torsemide    Hypothyroidism  Assessment & Plan  Continue levothyroxine    Chronic combined systolic and diastolic congestive heart failure (HCC)  Assessment & Plan  Continue peritoneal dialysis  Nephrology following  Received IV bumex x " 2  Continue home torsemide 100 mg daily  Wt Readings from Last 3 Encounters:   03/23/24 87.2 kg (192 lb 3.2 oz)   03/13/24 90.7 kg (200 lb)   03/08/24 90.5 kg (199 lb 9.6 oz)       Secondary hyperparathyroidism of renal origin (HCC)  Assessment & Plan  Continue Sensipar    Type 2 diabetes mellitus with hyperglycemia, with long-term current use of insulin (HCC)  Assessment & Plan  Patient followed by endocrinology  Continue home Ozempic, Semglee, and lispro on discharge    Mixed hyperlipidemia  Assessment & Plan  Continue statin    End stage renal disease (HCC)  Assessment & Plan    Lab Results   Component Value Date    EGFR 9 03/23/2024    EGFR 7 03/22/2024    EGFR 6 03/21/2024    CREATININE 4.69 (H) 03/23/2024    CREATININE 5.21 (H) 03/22/2024    CREATININE 5.82 (H) 03/21/2024   Patient missed peritoneal dialysis day prior to admission due to not feeling well  Nephrology following  Resume home dialysis on discharge      Medical Problems       Resolved Problems  Date Reviewed: 3/23/2024   None       Discharging Physician / Practitioner: Mihaela Fisher PA-C  PCP: Yessica Ramirez DO  Admission Date:   Admission Orders (From admission, onward)       Ordered        03/19/24 1946  INPATIENT ADMISSION  Once                          Discharge Date: 03/23/24    Consultations During Hospital Stay:  none    Procedures Performed:   none    Significant Findings / Test Results:   CT chest: 1. Pneumoperitoneum is of uncertain etiology, though can be attributable to the peritoneal dialysis. Assessment for abdominal pain is recommended to determine whether further work-up is required. 2. Left upper and lower lobe consolidations likely represent pneumonia. However, follow-up CT is recommended in 3 months to confirm the absence of an underlying tumor. 3. Right-sided bronchiolitis. 4. Mildly enlarged mediastinal lymph nodes are likely reactive given the presumed pneumonia and bronchiolitis.    Incidental Findings:   Left upper and lower  "lobe consolidations likely represent pneumonia. However, follow-up CT is recommended in 3 months to confirm the absence of an underlying tumor.  I reviewed the above mentioned incidental findings with the patient and/or family and they expressed understanding.    Test Results Pending at Discharge (will require follow up):   none     Outpatient Tests Requested:  none    Complications:  none    Reason for Admission: pneumonia    Hospital Course:   Jacquie Smith is a 69 y.o. female patient who originally presented to the hospital on 3/19/2024 due to shortness of breath. She was found to have pneumonia on CT scan. She was also volume overloaded as she did not do peritoneal dialysis day prior due to not feeling well. Nephrology was consulted and she received 2 doses of IV bumex and resumed on her regular dialysis. She was treated with rocephin and azithromycin as well as nebulizer treatments with clinical improvement. Patient has oxygen at home and was initially requiring 2 L on admission. Home O2 eval was done which showed she requires 2 L with exertion. She will transition to oral antibiotics on discharge and should follow up with her primary care provider and pulmonologist outpatient.    Please see above list of diagnoses and related plan for additional information.     Condition at Discharge: stable    Discharge Day Visit / Exam:   Subjective:  Patient reports she feels better. She denies shortness of breath at rest or with exertion. She reports cough has improved. She is very eager to go home today and requesting discharge. She says she has had oxygen at home for years and that she has the tanks at home but they took away her portable tank. Reports she hasn't followed up with pulmonology in \"a long time\" but agrees to follow up upon discharge. Encouraged her to follow up with PCP soon as well and that it is recommended she get repeat CT chest in 3 months to rule out underlying tumor. Confirmed with case management that " oxygen will be delivered at 4 pm and patient's niece will be home. Patient requesting refill of albuterol inhaler on discharge.  Vitals: Blood Pressure: 155/75 (03/23/24 1432)  Pulse: 92 (03/23/24 1432)  Temperature: 97.8 °F (36.6 °C) (03/23/24 1432)  Temp Source: Tympanic (03/22/24 0749)  Respirations: 18 (03/23/24 0752)  Height: 5' (152.4 cm) (03/19/24 2049)  Weight - Scale: 87.2 kg (192 lb 3.2 oz) (03/23/24 0600)  SpO2: 99 % (03/23/24 1432)  Exam:   Physical Exam  Vitals and nursing note reviewed.   Constitutional:       General: She is not in acute distress.     Appearance: She is well-developed.   Cardiovascular:      Rate and Rhythm: Normal rate and regular rhythm.   Pulmonary:      Effort: Pulmonary effort is normal. No respiratory distress.      Breath sounds: Normal breath sounds.   Abdominal:      Palpations: Abdomen is soft.      Tenderness: There is no abdominal tenderness.   Musculoskeletal:         General: No swelling.      Right lower leg: Edema present.      Left lower leg: Edema present.   Skin:     General: Skin is warm and dry.      Capillary Refill: Capillary refill takes less than 2 seconds.   Neurological:      Mental Status: She is alert.   Psychiatric:         Mood and Affect: Mood normal.          Discussion with Family: Updated  (niece) at bedside.    Discharge instructions/Information to patient and family:   See after visit summary for information provided to patient and family.      Provisions for Follow-Up Care:  See after visit summary for information related to follow-up care and any pertinent home health orders.      Mobility at time of Discharge:   Basic Mobility Inpatient Raw Score: 22  JH-HLM Goal: 7: Walk 25 feet or more  JH-HLM Achieved: 7: Walk 25 feet or more  HLM Goal achieved. Continue to encourage appropriate mobility.     Disposition:   Home    Planned Readmission: none     Discharge Statement:  I spent >30 minutes discharging the patient. This time was  spent on the day of discharge. I had direct contact with the patient on the day of discharge. Greater than 50% of the total time was spent examining patient, answering all patient questions, arranging and discussing plan of care with patient as well as directly providing post-discharge instructions.  Additional time then spent on discharge activities.    Discharge Medications:  See after visit summary for reconciled discharge medications provided to patient and/or family.      **Please Note: This note may have been constructed using a voice recognition system**

## 2024-03-23 NOTE — ASSESSMENT & PLAN NOTE
Patient presented to ED with shortness of breath, cough, and chills.  CT chest: Left upper and lower lobe consolidations likely represent pneumonia. However, follow-up CT is recommended in 3 months to confirm the absence of an underlying tumor.  Procalcitonin 0.65 > 0.70  Urinary antigens negative  Completed four days of rocephin and azithromycin  Continue cefdinir on discharge to complete 1 week antibiotics  Follow up with PCP and pulmonology

## 2024-03-23 NOTE — INCIDENTAL FINDINGS
The following findings require follow up:  Radiographic finding   Finding: Left upper and lower lobe consolidations likely represent pneumonia. However, follow-up CT is recommended in 3 months to confirm the absence of an underlying tumor.   Follow up required: repeat CT chest   Follow up should be done within 3 month(s)    Please notify the following clinician to assist with the follow up:   Dr. Yessica Ramirez or your pulmonologist

## 2024-03-23 NOTE — CASE MANAGEMENT
Case Management Discharge Planning Note    Patient name Jacquie Smith  Location [address] [address]/[address]-* MRN [id number]  : 1955 Date 3/23/2024       Current Admission Date: 3/19/2024  Current Admission Diagnosis:Pneumonia   Patient Active Problem List    Diagnosis Date Noted    Sepsis ([facility]) 2024    Pneumonia 2024    Acute respiratory failure with hypoxia ([facility]) 2024    Atrial fibrillation, chronic ([facility]) 2023    Sleep apnea, obstructive 2023    Chronic kidney disease 2023    CHANDRA (obstructive sleep apnea) 10/12/2022    Adenomatous polyp of colon 10/11/2022    Type 2 diabetes mellitus with hyperosmolarity without coma, with long-term current use of insulin ([facility]) 2022    History of syncope 2022    Sinus bradycardia 2022    Iron deficiency anemia due to chronic blood loss 2022    Class 2 obesity with body mass index (BMI) of 37.0 to 37.9 in adult 2021    Dilated cardiomyopathy ([facility]) 2021    Essential hypertension 2021    Acute gout due to renal impairment involving toe of right foot 2021    Endometrial thickening on ultrasound 2021    Dependence on renal dialysis ([facility]) 2020    Hypothyroidism 2020    Anemia of chronic disease 2020    Nocturnal hypoxemia 2020    Paroxysmal atrial flutter ([facility]) 2020    History of pulmonary embolus (PE) 2020    Chronic combined systolic and diastolic congestive heart failure ([facility]) 2020    ESRD (end stage renal disease) ([facility]) 2020    Encounter for peritoneal dialysis ([facility]) 2020    Diabetic retinopathy of both eyes associated with type 2 diabetes mellitus ([facility]) 2019    Non-rheumatic mitral valve stenosis 2019    Vitamin D deficiency 2019    Secondary hyperparathyroidism of renal origin ([facility]) 2019    Morbid obesity with BMI of 40.0-44.9, adult ([facility]) 2019    Pulmonary hypertension ([facility]) 2018    Leg edema, left  08/21/2018    Dyspnea on exertion 07/05/2018    PE (pulmonary thromboembolism) ([facility]) 07/05/2018    Elevated brain natriuretic peptide (BNP) level 07/05/2018    End stage renal disease (HCC) 04/13/2018    Nephrotic range proteinuria 04/13/2018    Cyst of ovary 04/13/2017    Mixed hyperlipidemia 01/31/2014    Type 2 diabetes mellitus with hyperglycemia, with long-term current use of insulin ([facility]) 06/05/2013      LOS (days): 4  Geometric Mean LOS (GMLOS) (days): 5.1  Days to GMLOS:1.3     OBJECTIVE:  Risk of Unplanned Readmission Score: 19.66         Current admission status: Inpatient   Preferred Pharmacy:   [id number] - [place], [place] - [address] [facility] [address]  [address] [address]  Phone: [phone] Fax: [phone]    [facility] - [place], [place] - [facility] [facility]  [address] [facility] [address]  Phone: [phone] Fax: [phone]    Primary Care Provider: Yessica Ramirez DO    Primary Insurance: MEDICARE  Secondary Insurance: BLUE CROSS    DISCHARGE DETAILS:    DME Referral Provided  Referral made for DME?: Yes  DME referral completed for the following items:: Home Oxygen concentrator  DME Supplier Name:: [employer]    Other Referral/Resources/Interventions Provided:  Interventions: DME  Referral Comments: O2 order sent to [facility] as pt has oxygen through them in the past. [facility] can deliver pt's O2 as early as 4pm today. [facility] to contact  back to confirm the were able to reach pt's family and schedule delivery.    LAILA spoke with pt's niece, Mary, and confirmed she will be home at 4pm for oxygen delivery by [facility].

## 2024-03-23 NOTE — PLAN OF CARE
Problem: SAFETY ADULT  Goal: Patient will remain free of falls  Description: INTERVENTIONS:  - Educate patient/family on patient safety including physical limitations  - Instruct patient to call for assistance with activity   - Consult OT/PT to assist with strengthening/mobility   - Keep Call bell within reach  - Keep bed low and locked with side rails adjusted as appropriate  - Keep care items and personal belongings within reach  - Initiate and maintain comfort rounds  - Make Fall Risk Sign visible to staff  - Offer Toileting every 2 Hours, in advance of need  - Initiate/Maintain bed alarm  - Obtain necessary fall risk management equipment: walker  - Apply yellow socks and bracelet for high fall risk patients  - Consider moving patient to room near nurses station  Outcome: Adequate for Discharge  Goal: Maintain or return to baseline ADL function  Description: INTERVENTIONS:  -  Assess patient's ability to carry out ADLs; assess patient's baseline for ADL function and identify physical deficits which impact ability to perform ADLs (bathing, care of mouth/teeth, toileting, grooming, dressing, etc.)  - Assess/evaluate cause of self-care deficits   - Assess range of motion  - Assess patient's mobility; develop plan if impaired  - Assess patient's need for assistive devices and provide as appropriate  - Encourage maximum independence but intervene and supervise when necessary  - Involve family in performance of ADLs  - Assess for home care needs following discharge   - Consider OT consult to assist with ADL evaluation and planning for discharge  - Provide patient education as appropriate  Outcome: Adequate for Discharge     Problem: RESPIRATORY - ADULT  Goal: Achieves optimal ventilation and oxygenation  Description: INTERVENTIONS:  - Assess for changes in respiratory status  - Assess for changes in mentation and behavior  - Position to facilitate oxygenation and minimize respiratory effort  - Oxygen administered by  appropriate delivery if ordered  - Initiate smoking cessation education as indicated  - Encourage broncho-pulmonary hygiene including cough, deep breathe, Incentive Spirometry  - Assess the need for suctioning and aspirate as needed  - Assess and instruct to report SOB or any respiratory difficulty  - Respiratory Therapy support as indicated  Outcome: Adequate for Discharge     Problem: Nutrition/Hydration-ADULT  Goal: Nutrient/Hydration intake appropriate for improving, restoring or maintaining nutritional needs  Description: Monitor and assess patient's nutrition/hydration status for malnutrition. Collaborate with interdisciplinary team and initiate plan and interventions as ordered.  Monitor patient's weight and dietary intake as ordered or per policy. Utilize nutrition screening tool and intervene as necessary. Determine patient's food preferences and provide high-protein, high-caloric foods as appropriate.     INTERVENTIONS:  - Monitor oral intake, urinary output, labs, and treatment plans  - Assess nutrition and hydration status and recommend course of action  - Evaluate amount of meals eaten  - Assist patient with eating if necessary   - Allow adequate time for meals  - Recommend/ encourage appropriate diets, oral nutritional supplements, and vitamin/mineral supplements  - Order, calculate, and assess calorie counts as needed  - Recommend, monitor, and adjust tube feedings and TPN/PPN based on assessed needs  - Assess need for intravenous fluids  - Provide specific nutrition/hydration education as appropriate  - Include patient/family/caregiver in decisions related to nutrition  Outcome: Adequate for Discharge     Problem: METABOLIC, FLUID AND ELECTROLYTES - ADULT  Goal: Glucose maintained within target range  Description: INTERVENTIONS:  - Monitor Blood Glucose as ordered  - Assess for signs and symptoms of hyperglycemia and hypoglycemia  - Administer ordered medications to maintain glucose within target  range  - Assess nutritional intake and initiate nutrition service referral as needed  Outcome: Adequate for Discharge     Problem: MUSCULOSKELETAL - ADULT  Goal: Maintain or return mobility to safest level of function  Description: INTERVENTIONS:  - Assess patient's ability to carry out ADLs; assess patient's baseline for ADL function and identify physical deficits which impact ability to perform ADLs (bathing, care of mouth/teeth, toileting, grooming, dressing, etc.)  - Assess/evaluate cause of self-care deficits   - Assess range of motion  - Assess patient's mobility  - Assess patient's need for assistive devices and provide as appropriate  - Encourage maximum independence but intervene and supervise when necessary  - Involve family in performance of ADLs  - Assess for home care needs following discharge   - Consider OT consult to assist with ADL evaluation and planning for discharge  - Provide patient education as appropriate  Outcome: Adequate for Discharge

## 2024-03-23 NOTE — PROGRESS NOTES
NEPHROLOGY PROGRESS NOTE   Jacquie Smith 69 y.o. female MRN: [id number]  Unit/Bed#: [address] [place] [address] Encounter: [id number]  Reason for Consult: Management of ESRD    ASSESSMENT AND PLAN:  69 y.o. woman with PMH of ESRD on peritoneal dialysis, obesity, CHF, HTN, hyperlipidemia, A-fib on Coumadin p/w SOB for the last 4 to 5 days, cough with yellow sputum, chills.  Nephrology is consulted for management of ESRD     PLAN     #ESRD on PD  Dialysis unit/days: Daily, [facility] unit  Access: PD catheter  Fluid restriction 1-1.5L/d  Adjust medications to GFR<10  Avoid opioids   PD working well        #CCPD at home, currently on CAPD as an inpatient  Unable to do CCPD at [facility]    Exchanges every 6   Fill volume 2 L   1.5/2.5%   UF 1.1 L  Home dose :  Therapy fluid 8000ml  Exchanges 4  Fill volume 2000  Dialysate fluid 1.5/2.5%  Patient can continue PD prescription at home     #Volume status/hypertension:  Volume: Hypervolemic  Blood pressure: Borderline hypertension, /86, goal less than 140/90   Status post IV Bumex x 2 to improve volume status  Continue UF on PD  Continue torsemide 100 mg, home dose        #Secondary Hyperparasitoidism   PhosLo 1334 3 times daily with meals  Cinacalcet 30 mg 3 times a week  Calcitriol 0.25 mcg daily     # Anemia of Kidney Disease  Current hemoglobin: 10.4 mg/dL  Treatment:  Transfuse for hemoglobin less than 7.0 per primary service       # Hypomagnesemia  Serum magnesium 1.9 mg/dL  Monitor     # Pneumonia  CT scan showed left upper and lower lobe consolidation  On ceftriaxone and azithromycin  Urinary antigens negative  On DuoNebs  Nasal cannula 1.5 L     # Acute hypoxic respiratory failure  Multifactorial secondary to pneumonia and component of fluid overload   Antibiotics as per primary team  Torsemide and UF on PD as above         SUBJECTIVE:  Patient seen and examined at bedside. No chest pain, shortness of breath better ,  nausea, vomiting, abdominal pain or  diarrhea.    OBJECTIVE:  Current Weight: Weight - Scale: 87.2 kg (192 lb 3.2 oz)  Vitals:    03/22/24 2221   BP: 140/70   Pulse: 95   Resp: 18   Temp: 97.7 °F (36.5 °C)   SpO2: 94%       Intake/Output Summary (Last 24 hours) at 3/23/2024 0653  Last data filed at 3/23/2024 0632  Gross per 24 hour   Intake --   Output 175 ml   Net -175 ml     Wt Readings from Last 3 Encounters:   03/23/24 87.2 kg (192 lb 3.2 oz)   03/13/24 90.7 kg (200 lb)   03/08/24 90.5 kg (199 lb 9.6 oz)     Temp Readings from Last 3 Encounters:   03/22/24 97.7 °F (36.5 °C)   03/13/24 97.5 °F (36.4 °C) (Tympanic)   02/09/24 (!) 97.2 °F (36.2 °C) (Temporal)     BP Readings from Last 3 Encounters:   03/22/24 140/70   03/13/24 126/80   03/08/24 126/80     Pulse Readings from Last 3 Encounters:   03/22/24 95   03/13/24 76   03/08/24 78        General:  no acute distress at this time  Skin:  No acute rash  Eyes:  No scleral icterus and noninjected  ENT:  mucous membranes moist  Neck:  no carotid bruits  Chest: Breath sounds decreased in both lungs, mild crackles , good respiratory effort, no use of accessory respiratory muscles  CVS:  Regular rate and rhythm without rub   Abdomen:  soft and nontender   Extremities: lower extremity edema  Neuro:  No gross focality  Psych:  Alert , cooperative   Dialysis access: PD catheter      Medications:    Current Facility-Administered Medications:     acetaminophen (TYLENOL) tablet 650 mg, 650 mg, Oral, Q6H PRN, Chi Renner MD, 650 mg at 03/23/24 0003    atorvastatin (LIPITOR) tablet 20 mg, 20 mg, Oral, Daily With Dinner, Chi Renner MD, 20 mg at 03/22/24 1750    azithromycin (ZITHROMAX) tablet 500 mg, 500 mg, Oral, Q24H, Mihaela Fisher PA-C, 500 mg at 03/22/24 2020    benzonatate (TESSALON PERLES) capsule 100 mg, 100 mg, Oral, TID PRN, MICHAEL Michelle, 100 mg at 03/22/24 9692    calcitriol (ROCALTROL) capsule 0.25 mcg, 0.25 mcg, Oral, Daily, Chi Renner MD, 0.25 mcg at 03/22/24  0837    calcium acetate (PHOSLO) capsule 1,334 mg, 1,334 mg, Oral, Daily, Joselyn Reyes Bahamonde, MD, 1,334 mg at 03/22/24 0836    cefTRIAXone (ROCEPHIN) IVPB (premix in dextrose) 1,000 mg 50 mL, 1,000 mg, Intravenous, Q24H, Chi Renner MD, Last Rate: 100 mL/hr at 03/22/24 2020, 1,000 mg at 03/22/24 2020    cholecalciferol (VITAMIN D3) tablet 2,000 Units, 2,000 Units, Oral, Daily, Chi Renner MD, 2,000 Units at 03/22/24 0837    docusate sodium (COLACE) capsule 100 mg, 100 mg, Oral, BID, Chi Renner MD, 100 mg at 03/22/24 1750    guaiFENesin (MUCINEX) 12 hr tablet 600 mg, 600 mg, Oral, BID, Chi Renner MD, 600 mg at 03/22/24 1750    heparin (porcine) 25,000 units in 0.45% NaCl 250 mL infusion (premix), 3-20 Units/kg/hr (Order-Specific), Intravenous, Titrated, Mihaela Fisher PA-C, Last Rate: 7.5 mL/hr at 03/23/24 0405, 8.8 Units/kg/hr at 03/23/24 0405    insulin lispro (HumALOG/ADMELOG) 100 units/mL subcutaneous injection 1-5 Units, 1-5 Units, Subcutaneous, TID AC, 1 Units at 03/22/24 1743 **AND** Fingerstick Glucose (POCT), , , TID AC, Mihaela Fisher PA-C    insulin lispro (HumALOG/ADMELOG) 100 units/mL subcutaneous injection 1-5 Units, 1-5 Units, Subcutaneous, HS, Mihaela Fisher PA-C, 2 Units at 03/22/24 2240    insulin lispro (HumALOG/ADMELOG) 100 units/mL subcutaneous injection 3 Units, 3 Units, Subcutaneous, TID With Meals, Chi Renner MD, 3 Units at 03/22/24 1743    ipratropium-albuterol (DUO-NEB) 0.5-2.5 mg/3 mL inhalation solution 3 mL, 3 mL, Nebulization, Q6H, Mihaela Fisher PA-C, 3 mL at 03/23/24 0100    levothyroxine tablet 125 mcg, 125 mcg, Oral, Early Morning, Chi Renner MD, 125 mcg at 03/23/24 0555    lidocaine (LIDODERM) 5 % patch 1 patch, 1 patch, Topical, Daily, Mihaela Fisher PA-C, 1 patch at 03/22/24 0837    losartan (COZAAR) tablet 100 mg, 100 mg, Oral, Daily, Chi Rnener MD, 100 mg at 03/22/24 0837    metoprolol succinate (TOPROL-XL) 24  "hr tablet 50 mg, 50 mg, Oral, Daily, Chi Renner MD, 50 mg at 03/22/24 0837    multivitamin stress formula tablet 1 tablet, 1 tablet, Oral, Daily, Chi Renner MD, 1 tablet at 03/22/24 0837    torsemide (DEMADEX) tablet 100 mg, 100 mg, Oral, Daily, Joselyn Reyes Bahamonde, MD, 100 mg at 03/22/24 0837    Laboratory Results:  Results from last 7 days   Lab Units 03/23/24  0430 03/22/24  0609 03/21/24  0455 03/20/24  0449 03/19/24  1722   WBC Thousand/uL 9.70 9.08 9.14 9.94 10.70*   HEMOGLOBIN g/dL 10.4* 9.9* 9.9* 10.1* 11.6   HEMATOCRIT % 32.4* 31.5* 32.8* 33.3* 36.3   PLATELETS Thousands/uL 194 184 188 189 177   SODIUM mmol/L 138 138 137 137 135   POTASSIUM mmol/L 4.1 4.3 4.9 5.0 5.3   CHLORIDE mmol/L 102 103 103 107 101   CO2 mmol/L 25 25 22 20* 23   BUN mg/dL 56* 60* 69* 54* 52*   CREATININE mg/dL 4.69* 5.21* 5.82* 5.56* 5.64*   CALCIUM mg/dL 9.0 9.1 8.7 8.9 8.7   MAGNESIUM mg/dL  --   --   --  1.8*  --        CT chest without contrast   Final Result by Soto Tipton MD (03/19 1855)      1.  Pneumoperitoneum is of uncertain etiology, though can be attributable to the peritoneal dialysis. Assessment for abdominal pain is recommended to determine whether further work-up is required.   2.  Left upper and lower lobe consolidations likely represent pneumonia. However, follow-up CT is recommended in 3 months to confirm the absence of an underlying tumor.   3.  Right-sided bronchiolitis.   4.  Mildly enlarged mediastinal lymph nodes are likely reactive given the presumed pneumonia and bronchiolitis.      I personally discussed this study with Dr. Sun on 3/19/2024 6:55 PM.            Workstation performed: [id number]             Portions of the record may have been created with voice recognition software. Occasional wrong word or \"sound a like\" substitutions may have occurred due to the inherent limitations of voice recognition software. Read the chart carefully and recognize, using context, where " substitutions have occurred.

## 2024-03-23 NOTE — NURSING NOTE
AVS reviewed with patient including follow up appointments, discharge instructions and medications. Patient states understanding. All questions answered. Patient discharged to home with all belongings accounted for per patient.

## 2024-03-23 NOTE — ASSESSMENT & PLAN NOTE
Continue peritoneal dialysis  Nephrology following  Received IV bumex x 2  Continue home torsemide 100 mg daily  Wt Readings from Last 3 Encounters:   03/23/24 87.2 kg (192 lb 3.2 oz)   03/13/24 90.7 kg (200 lb)   03/08/24 90.5 kg (199 lb 9.6 oz)

## 2024-03-23 NOTE — ASSESSMENT & PLAN NOTE
"Secondary to pneumonia and volume overload  Patient reports she has oxygen at home \"from years ago\" and has not seen pulmonology in a couple years  Home O2 eval shows patient requires  "

## 2024-03-23 NOTE — RESPIRATORY THERAPY NOTE
Home Oxygen Qualifying Test     Patient name: Jacquie Smith        : 1955   Date of Test:  2024  Diagnosis:    Home Oxygen Test:    Medicare Guidelines require item(s) 1-5 on all ambulatory patients or 1 and 2 on non-ambulatory patients.    1. Baseline SPO2 on Room Air at rest 93 %   If <= 88% on Room Air add O2 via NC to obtain SpO2 >=88%. If LPM needed, document LPM  needed to reach =>88%    SPO2 during exertion on Room Air 88  %  During exertion monitor SPO2. If SPO2 increases >=89%, do not add supplemental oxygen    SPO2 on Oxygen at Rest n/a % at n/a LPM    SPO2 during exertion on Oxygen 95 % at 2 LPM    Test performed during exertion activity.      [x]  Supplemental Home Oxygen is indicated.    []  Client does not qualify for home oxygen.    Respiratory Additional Notes-     Farideh Gandhi, RT

## 2024-03-23 NOTE — ASSESSMENT & PLAN NOTE
POA as evidenced by tachycardia and tachypnea in setting of pneumonia seen on CT  Flu/COVID/RSV negative  Transition from rocephin to cefdinir on discharge  Blood cultures negative to date

## 2024-03-23 NOTE — DISCHARGE INSTR - AVS FIRST PAGE
Follow up with your primary care provider in 1-2 weeks for post hospitalization follow up.  Recommend you follow up with pulmonology outpatient.    Continue taking your antibiotics as prescribed for pneumonia.    Your INR is now therapeutic. Continue regular outpatient monitoring.

## 2024-03-23 NOTE — ASSESSMENT & PLAN NOTE
Continue Lopressor  Patient did not take warfarin for 4 days prior to admission as she did not feel well  Patient also has history of possible pulmonary embolism as VQ scan in 2018 showed intermediate probability of PE  INR initially subtherapeutic and was on heparin gtt  INR now therapeutic  Continue home dose warfarin and routine outpatient monitoring

## 2024-03-24 LAB
BACTERIA BLD CULT: NORMAL
BACTERIA BLD CULT: NORMAL

## 2024-03-25 ENCOUNTER — TELEPHONE (OUTPATIENT)
Age: 69
End: 2024-03-25

## 2024-03-25 ENCOUNTER — TRANSITIONAL CARE MANAGEMENT (OUTPATIENT)
Dept: FAMILY MEDICINE CLINIC | Facility: CLINIC | Age: 69
End: 2024-03-25

## 2024-03-25 LAB
DME PARACHUTE DELIVERY DATE ACTUAL: NORMAL
DME PARACHUTE DELIVERY DATE REQUESTED: NORMAL
DME PARACHUTE ITEM DESCRIPTION: NORMAL
DME PARACHUTE ORDER STATUS: NORMAL
DME PARACHUTE SUPPLIER NAME: NORMAL
DME PARACHUTE SUPPLIER PHONE: NORMAL

## 2024-03-25 NOTE — TELEPHONE ENCOUNTER
Jacquie is calling to schedule a f/u from the hospital.  She was admitted for pneumonia.  Please call patient back to schedule a TCM.    Thank you

## 2024-03-25 NOTE — TELEPHONE ENCOUNTER
I called patient and completed TCM questions. Appointment scheduled. MARIA DE JESUS.    Amaris Clifford LPN

## 2024-03-29 ENCOUNTER — OFFICE VISIT (OUTPATIENT)
Dept: FAMILY MEDICINE CLINIC | Facility: CLINIC | Age: 69
End: 2024-03-29
Payer: COMMERCIAL

## 2024-03-29 VITALS
WEIGHT: 189 LBS | DIASTOLIC BLOOD PRESSURE: 60 MMHG | TEMPERATURE: 98.8 F | HEART RATE: 86 BPM | RESPIRATION RATE: 18 BRPM | BODY MASS INDEX: 36.91 KG/M2 | SYSTOLIC BLOOD PRESSURE: 110 MMHG

## 2024-03-29 DIAGNOSIS — J18.1 CONSOLIDATION OF LEFT UPPER LOBE OF LUNG (HCC): Primary | ICD-10-CM

## 2024-03-29 DIAGNOSIS — Z79.4 TYPE 2 DIABETES MELLITUS WITH HYPEROSMOLARITY WITHOUT COMA, WITH LONG-TERM CURRENT USE OF INSULIN (HCC): ICD-10-CM

## 2024-03-29 DIAGNOSIS — I26.99 PE (PULMONARY THROMBOEMBOLISM) (HCC): ICD-10-CM

## 2024-03-29 DIAGNOSIS — N18.6 ESRD (END STAGE RENAL DISEASE) (HCC): ICD-10-CM

## 2024-03-29 DIAGNOSIS — S31.819A WOUND OF RIGHT BUTTOCK, INITIAL ENCOUNTER: ICD-10-CM

## 2024-03-29 DIAGNOSIS — E11.00 TYPE 2 DIABETES MELLITUS WITH HYPEROSMOLARITY WITHOUT COMA, WITH LONG-TERM CURRENT USE OF INSULIN (HCC): ICD-10-CM

## 2024-03-29 PROCEDURE — 99496 TRANSJ CARE MGMT HIGH F2F 7D: CPT | Performed by: FAMILY MEDICINE

## 2024-03-29 NOTE — ASSESSMENT & PLAN NOTE
Lab Results   Component Value Date    EGFR 9 03/23/2024    EGFR 7 03/22/2024    EGFR 6 03/21/2024    CREATININE 4.69 (H) 03/23/2024    CREATININE 5.21 (H) 03/22/2024    CREATININE 5.82 (H) 03/21/2024     Managed by nephrologist

## 2024-03-29 NOTE — TELEPHONE ENCOUNTER
Can you pls see the message since it is about dose change due to last fill was 06/2023 now is she needs to be titrated ? Thanks  
It looks like she has been without the 1mg dose for quite some time, this may be why insurance is flagging this dose.   Recommend restart Ozempic at 0.5mg weekly. Will [facility] Rx [facility].  
PA for Ozempic    Submitted via    [x]CMM-KEY [id number]  [][employer]-Case ID #   []Faxed to plan   []Other website   []Phone call Case ID #     Office notes sent, clinical questions answered. Awaiting determination    Turnaround time for your insurance to make a decision on your Prior Authorization can take 7-21 business days.           
Patient would like a call about the prescription. She said that [facility]'t [facility] it yet. I read under the medication that the prior auth was done to patient.  
Pt states Ozempic was not approved due to dosage needs to be changed. She would like us to call [phone] ref#[id number]  
details…

## 2024-03-29 NOTE — ASSESSMENT & PLAN NOTE
Lab Results   Component Value Date    HGBA1C 8.3 (H) 03/05/2024     Not controlled  Has appointment with endocrinology

## 2024-03-29 NOTE — PROGRESS NOTES
Name: Jacquie Smith      : 1955      MRN: [id number]  Encounter Provider: Yessica Ramirez DO  Encounter Date: 3/29/2024   Encounter department: [facility]    Assessment & Plan     1. Consolidation of left upper lobe of lung ([facility])  Comments:  she is going to see pulmonologist next week.  she'd like to discuss getting her follow up CT sooner than 3 months    2. Type 2 diabetes mellitus with hyperosmolarity without coma, with long-term current use of insulin ([facility])  Assessment & Plan:    Lab Results   Component Value Date    HGBA1C 8.3 (H) 2024     Not controlled  Has appointment with endocrinology      3. ESRD (end stage renal disease) ([facility])  Assessment & Plan:  Lab Results   Component Value Date    EGFR 9 2024    EGFR 7 2024    EGFR 6 2024    CREATININE 4.69 (H) 2024    CREATININE 5.21 (H) 2024    CREATININE 5.82 (H) 2024     Managed by nephrologist       4. Wound of right buttock, initial encounter  -     collagenase (SANTYL) ointment; Apply topically daily    5. PE (pulmonary thromboembolism) ([facility])  Assessment & Plan:  Currently taking 5 mg a day of coumadin daily  Will start managing her INR here     Orders:  -     Protime-INR; Standing    Return in about 3 months (around 2024) for AWV/Follow up.       Subjective      Patient is here for follow-up of her hospital stay.  She still feeling short of breath from her pneumonia.    She has completed the antibiotics.  She has appointment with pulmonology next week.  She also has an appointment with endocrinology in a couple months.    She is concerned about a wound that she has on her right buttock.  She thinks is because she sits all day at work.  She sleeps on her side so knows that it is not from bedtime.      Review of Systems   Respiratory:  Positive for cough and shortness of breath.        Current Outpatient Medications on File Prior to Visit   Medication Sig   • albuterol (2.5 mg/3 mL) 0.083 %  nebulizer solution Take 3 mL (2.5 mg total) by nebulization every 6 (six) hours as needed for wheezing or shortness of breath   • albuterol (PROVENTIL HFA,VENTOLIN HFA) 90 mcg/act inhaler Inhale 2 puffs every 6 (six) hours as needed for wheezing   • atorvastatin (LIPITOR) 20 mg tablet Take 1 tablet (20 mg total) by mouth daily   • B Complex-C (B-COMPLEX WITH VITAMIN C) tablet Take 1 tablet by mouth daily   • Blood Glucose Monitoring Suppl (ONE TOUCH ULTRA 2) w/Device KIT Test glucose 3 times daily   • calcitriol (ROCALTROL) 0.25 mcg capsule Take 1 capsule (0.25 mcg total) by mouth daily   • calcium acetate (PHOSLO) capsule Take 667 mg by mouth daily    • cholecalciferol (VITAMIN D3) 1,000 units tablet Take 2 tablets (2,000 Units total) by mouth daily   • cinacalcet (SENSIPAR) 30 mg tablet TAKE 1 TABLET THREE TIMES A WEEK   • Continuous Blood Gluc Sensor (FreeStyle Conner 2 Sensor) MISC Use to check blood sugars   • docusate sodium (COLACE) 100 mg capsule Take 1 capsule (100 mg total) by mouth 2 (two) times a day   • gentamicin (GARAMYCIN) 0.1 % cream APPLY TOPICALLY DAILY   • Insulin Glargine-yfgn (Semglee, yfgn,) 100 UNIT/ML SOPN Inject 10 units subcutaneously daily in the morning   • insulin lispro (HumaLOG KwikPen) 100 units/mL injection pen Inject 3 Units under the skin 3 (three) times a day with meals   • Insulin Pen Needle 31G X 5 MM MISC by Does not apply route daily   • Lancets (onetouch ultrasoft) lancets Test glucose 3 times a day; Code: E11.8   • levothyroxine (Euthyrox) 125 mcg tablet Take one tablet by mouth in early morning on empty stomach   • metolazone (ZAROXOLYN) 5 mg tablet Take 5 mg by mouth daily   • metoprolol succinate (TOPROL-XL) 50 mg 24 hr tablet TAKE 1 TABLET DAILY (Patient taking differently: Take 50 mg by mouth daily 2 in AM and 2 in PM)   • MULTIPLE VITAMIN PO Take 1 tablet by mouth daily   • OneTouch Ultra test strip TEST GLUCOSE THREE TIMES A DAY   • torsemide (DEMADEX) 100 mg tablet  Take 1 tablet (100 mg total) by mouth daily   • valsartan (DIOVAN) 320 MG tablet Take 0.5 tablets (160 mg total) by mouth daily (Patient taking differently: Take 160 mg by mouth daily Takes 1 full tablet daily)   • warfarin (COUMADIN) 5 mg tablet    • [DISCONTINUED] semaglutide, 1 mg/dose, (Ozempic, 1 MG/DOSE,) 4 mg/3 mL injection pen Inject 0.75 mL (1 mg total) under the skin every 7 days   • nystatin (MYCOSTATIN) powder Apply topically 3 (three) times a day for 7 days (Patient not taking: Reported on 3/23/2023)   • semaglutide, 0.25 or 0.5 mg/dose, (Ozempic, 0.25 or 0.5 MG/DOSE,) 2 mg/3 mL injection pen Inject 0.75 mL (0.5 mg total) under the skin every 7 days   • [DISCONTINUED] benzonatate (TESSALON PERLES) 100 mg capsule Take 1 capsule (100 mg total) by mouth 3 (three) times a day as needed for cough (Patient not taking: Reported on 2/9/2024)   • [DISCONTINUED] moxifloxacin (VIGAMOX) 0.5 % ophthalmic solution Administer 1 drop to the right eye 3 (three) times a day (Patient not taking: Reported on 3/29/2024)       Objective     /60   Pulse 86   Temp 98.8 °F (37.1 °C)   Resp 18   Wt 85.7 kg (189 lb)   BMI 36.91 kg/m²     Physical Exam  Vitals and nursing note reviewed.   Constitutional:       Appearance: She is well-developed.   HENT:      Head: Normocephalic and atraumatic.      Right Ear: Tympanic membrane and external ear normal.      Left Ear: Tympanic membrane and external ear normal.   Cardiovascular:      Rate and Rhythm: Normal rate and regular rhythm.      Heart sounds: Normal heart sounds. No murmur heard.     No friction rub.   Pulmonary:      Effort: Pulmonary effort is normal. No respiratory distress.      Breath sounds: Normal breath sounds. No wheezing or rales.   Musculoskeletal:      Right lower leg: Edema (trace) present.      Left lower leg: Edema (trace) present.   Skin:     Comments: Right buttock medial open wound skin is macerated and wound is down through first layer of skin        Yessica Ramirez, DO

## 2024-03-29 NOTE — ADDENDUM NOTE
Addended by: DONTAE MCGINNIS on: 3/29/2024 02:42 PM     Modules accepted: Orders    
intestinal obstruction

## 2024-04-04 ENCOUNTER — OFFICE VISIT (OUTPATIENT)
Dept: PULMONOLOGY | Facility: MEDICAL CENTER | Age: 69
End: 2024-04-04
Payer: COMMERCIAL

## 2024-04-04 VITALS
HEIGHT: 60 IN | DIASTOLIC BLOOD PRESSURE: 60 MMHG | BODY MASS INDEX: 36.71 KG/M2 | OXYGEN SATURATION: 96 % | SYSTOLIC BLOOD PRESSURE: 124 MMHG | HEART RATE: 84 BPM | RESPIRATION RATE: 16 BRPM | TEMPERATURE: 98.4 F | WEIGHT: 187 LBS

## 2024-04-04 DIAGNOSIS — G47.33 OSA (OBSTRUCTIVE SLEEP APNEA): ICD-10-CM

## 2024-04-04 DIAGNOSIS — R91.8 ABNORMAL CT SCAN OF LUNG: Primary | ICD-10-CM

## 2024-04-04 DIAGNOSIS — J96.11 CHRONIC RESPIRATORY FAILURE WITH HYPOXIA (HCC): ICD-10-CM

## 2024-04-04 DIAGNOSIS — R93.89 ABNORMAL CT OF THE CHEST: Chronic | ICD-10-CM

## 2024-04-04 PROCEDURE — 99214 OFFICE O/P EST MOD 30 MIN: CPT | Performed by: NURSE PRACTITIONER

## 2024-04-04 NOTE — PROGRESS NOTES
Assessment/Plan:     Problem List Items Addressed This Visit          Respiratory    CHANDRA (obstructive sleep apnea) (Chronic)     Has a history of severe obstructive sleep apnea.  She is noncompliant in the past with using this as she has had difficulty with the mask he is currently using 2 L of supplemental oxygen at nighttime  .  I will put an order in for a different mask in the event she would like to retry using her CPAP.  She has not used it in over a year.  Her machine is a auto CPAP 5 to 20 cm of water pressure.  She is aware of the importance of repeat trying this device as she has severe obstructive sleep apnea and atrial fibrillation.  Aced an order for a new mask fitting.  In the event that she likes to use it or will try to use it again consistently I would like her to come in about 6 weeks after using it so that I can better ascertain the effectiveness         Chronic respiratory failure with hypoxia (HCC)     Continues to use and benefit from supplemental oxygen.  Her room air oxygen at rest was 96%.  With ambulating 250 feet O2 sat was 88%.  With 2 L of supplemental oxygen her O2 sat was maintained at 94%.  She also uses 2 L at night.  I would like her to continue this treatment. Portable oxygen tanks are causing additional adverse medical outcomes in patient's breathing that does not allow for adequate ambulation. Patient will benefit from usage of a POCvia nasal cannula.  I will have [facility], evaluate and supply patient with a POC at a setting of continous.  O2 saturation on room air at rest96^, O2 saturation on room air on ambulation 88%, O2 saturation on 2L/M on ambulation 94% via nasal cannula.             Other    Abnormal CT of the chest (Chronic)     Jacquie is here today status post hospitalization.  I personally reviewed CT of the chest and also reviewed it with patient.  His consolidation in the left upper and lower lobe.  Ordered a CT of chest without contrast to be done anytime after  "June 18, 2024.  And is aware and agreeable with this plan of care.          Other Visit Diagnoses       Abnormal CT scan of lung    -  Primary              Return in about 6 months (around 10/4/2024).  All questions are answered to the patient's satisfaction and understanding.  She verbalizes understanding.  She is encouraged to call with any further questions or concerns.    Portions of the record may have been created with voice recognition software.  Occasional wrong word or \"sound a like\" substitutions may have occurred due to the inherent limitations of voice recognition software.  Read the chart carefully and recognize, using context, where substitutions have occurred.    Electronically Signed by MICHAEL Pal    ______________________________________________________________________    Chief Complaint: No chief complaint on file.      Patient ID: Jacquie is a 69 y.o. y.o. female has a past medical history of Acute asthmatic bronchitis, Cardiac disease, Chronic diastolic (congestive) heart failure ([facility]), Chronic kidney disease, Closed nondisplaced fracture of distal phalanx of right great toe (11/13/2018), Colon polyp, CPAP (continuous positive airway pressure) dependence, Diabetes mellitus ([facility]), Hyperlipidemia, Hypertension, Medical non-compliance, Morbid obesity with BMI of 40.0-44.9, adult ([facility]) (02/28/2019), On continuous oral anticoagulation (12/22/2020), Pneumonia, PONV (postoperative nausea and vomiting), Pulmonary embolism ([facility]), Renal disorder, Severe obstructive sleep apnea, Severe pulmonary arterial systolic hypertension ([facility]), and Tinea pedis of both feet (12/11/2018).    4/4/2024  Patient presents today for follow-up visit.  HPI Jacquie is here today for status posthospitalization visit.  Was hospitalized from March 19, 2024 to March 23, 2024.  Diagnoses included pneumonia which was seen on CT of chest.  There was a left upper and lower lobe consolidation likely representing pneumonia.  It was " recommended that a 3-month follow-up be done.  Her procalcitonin was negative urine antigens were negative she did complete 4 days of antibiotic during hospitalization and was discharged on CF DIN IR on discharge to complete 1 week.  He is here today for follow-up in regard to her pneumonia this patient also has a significant comorbidity history.  Also has history of atrial fibrillation which is chronic.  She has acute respiratory failure with hypoxia and currently is using 2 L.  Essentially she also has a significant history of chronic combined systolic and diastolic heart failure he also does peritoneal dialysis.  Patient was seen in consultation in December 2022.  She is a patient who has a history of severe CHANDRA but has not been able to use this.  She does have intolerance to full facemask peritoneal dialysis 8 hours every night and has been using nasal cannula.  Again she is aware of the importance of the same but has not been able to tolerate    Review of Systems   Constitutional: Negative.    HENT: Negative.     Eyes: Negative.    Respiratory:  Positive for shortness of breath.    Cardiovascular: Negative.    Gastrointestinal: Negative.    Genitourinary: Negative.    Musculoskeletal: Negative.    Allergic/Immunologic: Negative.    Neurological: Negative.    Hematological: Negative.    Psychiatric/Behavioral: Negative.         Smoking history: She reports that she has never smoked. She has never been exposed to tobacco smoke. She has never used smokeless tobacco.    The following portions of the patient's history were reviewed and updated as appropriate: allergies, current medications, past family history, past medical history, past social history, past surgical history, and problem list.    Immunization History   Administered Date(s) Administered    COVID-19 PFIZER VACCINE 0.3 ML IM 03/04/2021, 03/24/2021    Hep B, adult 09/04/2020, 10/08/2020, 11/09/2020, 07/07/2021, 02/03/2023    INFLUENZA 10/01/2018,  10/09/2019, 10/30/2019, 11/01/2021    Influenza Injectable, MDCK, Preservative Free, Quadrivalent, 0.5 mL 11/29/2022    Influenza Quadrivalent 3 years and older 10/01/2018, 10/30/2019, 11/01/2021    Influenza Quadrivalent, 6-35 Months IM 10/03/2016    Influenza Split High Dose Preservative Free IM 10/09/2019    Influenza, high dose seasonal 0.7 mL 12/12/2022    Influenza, recombinant, quadrivalent,injectable, preservative free 09/08/2020    Influenza, seasonal, injectable 11/08/2012    Influenza, seasonal, injectable, preservative free 10/09/2019, 10/30/2019, 09/08/2020    Pneumococcal Polysaccharide PPV23 11/08/2012, 11/08/2012, 12/08/2020, 12/08/2020    Tdap 03/25/2016    Tuberculin Skin Test-PPD Intradermal 07/21/2020, 08/26/2020, 07/07/2021, 02/02/2022, 02/03/2023     Current Outpatient Medications   Medication Sig Dispense Refill    albuterol (2.5 mg/3 mL) 0.083 % nebulizer solution Take 3 mL (2.5 mg total) by nebulization every 6 (six) hours as needed for wheezing or shortness of breath 100 mL 0    albuterol (PROVENTIL HFA,VENTOLIN HFA) 90 mcg/act inhaler Inhale 2 puffs every 6 (six) hours as needed for wheezing 8 g 0    atorvastatin (LIPITOR) 20 mg tablet Take 1 tablet (20 mg total) by mouth daily 90 tablet 3    B Complex-C (B-COMPLEX WITH VITAMIN C) tablet Take 1 tablet by mouth daily      Blood Glucose Monitoring Suppl (ONE TOUCH ULTRA 2) w/Device KIT Test glucose 3 times daily 1 each 0    calcitriol (ROCALTROL) 0.25 mcg capsule Take 1 capsule (0.25 mcg total) by mouth daily 90 capsule 1    calcium acetate (PHOSLO) capsule Take 667 mg by mouth daily       cholecalciferol (VITAMIN D3) 1,000 units tablet Take 2 tablets (2,000 Units total) by mouth daily 100 tablet 5    cinacalcet (SENSIPAR) 30 mg tablet TAKE 1 TABLET THREE TIMES A WEEK 36 tablet 3    collagenase (SANTYL) ointment Apply topically daily 30 g 1    Continuous Blood Gluc Sensor (FreeStyle Conner 2 Sensor) MISC Use to check blood sugars 6 each 4     docusate sodium (COLACE) 100 mg capsule Take 1 capsule (100 mg total) by mouth 2 (two) times a day 10 capsule 0    gentamicin (GARAMYCIN) 0.1 % cream APPLY TOPICALLY DAILY 15 g 23    Insulin Glargine-yfgn (Semglee, yfgn,) 100 UNIT/ML SOPN Inject 10 units subcutaneously daily in the morning 15 mL 3    insulin lispro (HumaLOG KwikPen) 100 units/mL injection pen Inject 3 Units under the skin 3 (three) times a day with meals 15 mL 3    Insulin Pen Needle 31G X 5 MM MISC by Does not apply route daily 100 each 1    Lancets (onetouch ultrasoft) lancets Test glucose 3 times a day; Code: E11.8 300 each 1    levothyroxine (Euthyrox) 125 mcg tablet Take one tablet by mouth in early morning on empty stomach 90 tablet 3    metolazone (ZAROXOLYN) 5 mg tablet Take 5 mg by mouth daily      metoprolol succinate (TOPROL-XL) 50 mg 24 hr tablet TAKE 1 TABLET DAILY (Patient taking differently: Take 50 mg by mouth daily 2 in AM and 2 in PM) 90 tablet 1    MULTIPLE VITAMIN PO Take 1 tablet by mouth daily      OneTouch Ultra test strip TEST GLUCOSE THREE TIMES A  strip 3    torsemide (DEMADEX) 100 mg tablet Take 1 tablet (100 mg total) by mouth daily 90 tablet 1    valsartan (DIOVAN) 320 MG tablet Take 0.5 tablets (160 mg total) by mouth daily (Patient taking differently: Take 160 mg by mouth daily Takes 1 full tablet daily) 90 tablet 0    warfarin (COUMADIN) 5 mg tablet       nystatin (MYCOSTATIN) powder Apply topically 3 (three) times a day for 7 days (Patient not taking: Reported on 3/23/2023) 15 g 0    semaglutide, 0.25 or 0.5 mg/dose, (Ozempic, 0.25 or 0.5 MG/DOSE,) 2 mg/3 mL injection pen Inject 0.75 mL (0.5 mg total) under the skin every 7 days (Patient not taking: Reported on 4/4/2024) 3 mL 2     No current facility-administered medications for this visit.     Allergies: Patient has no known allergies.    Objective:  Vitals:    04/04/24 0817   BP: 124/60   BP Location: Left arm   Patient Position: Sitting   Cuff Size:  Standard   Pulse: 84   Resp: 16   Temp: 98.4 °F (36.9 °C)   TempSrc: Temporal   SpO2: 96%   Weight: 84.8 kg (187 lb)   Height: 5' (1.524 m)   Oxygen Therapy  SpO2: 96 %  .  Wt Readings from Last 3 Encounters:   04/04/24 84.8 kg (187 lb)   03/29/24 85.7 kg (189 lb)   03/23/24 87.2 kg (192 lb 3.2 oz)     Body mass index is 36.52 kg/m².    Physical Exam  Constitutional:       Appearance: She is obese.   HENT:      Head: Normocephalic and atraumatic.      Nose: Nose normal.      Mouth/Throat:      Mouth: Mucous membranes are moist.      Pharynx: Oropharynx is clear.      Comments: Mallampati 4  Cardiovascular:      Rate and Rhythm: Normal rate and regular rhythm.      Pulses: Normal pulses.   Pulmonary:      Effort: Pulmonary effort is normal.      Comments: Moderately decreased  Musculoskeletal:         General: Normal range of motion.      Cervical back: Normal range of motion.   Skin:     General: Skin is warm and dry.      Capillary Refill: Capillary refill takes less than 2 seconds.   Neurological:      General: No focal deficit present.      Mental Status: She is alert and oriented to person, place, and time.   Psychiatric:         Mood and Affect: Mood normal.         Behavior: Behavior normal.         Lab Review:   Admission on 03/19/2024, Discharged on 03/23/2024   Component Date Value    WBC 03/19/2024 10.70 (H)     RBC 03/19/2024 3.89     Hemoglobin 03/19/2024 11.6     Hematocrit 03/19/2024 36.3     MCV 03/19/2024 93     MCH 03/19/2024 29.8     MCHC 03/19/2024 32.0     RDW 03/19/2024 14.6     MPV 03/19/2024 11.9     Platelets 03/19/2024 177     nRBC 03/19/2024 0     Neutrophils Relative 03/19/2024 79 (H)     Immature Grans % 03/19/2024 1     Lymphocytes Relative 03/19/2024 5 (L)     Monocytes Relative 03/19/2024 13 (H)     Eosinophils Relative 03/19/2024 2     Basophils Relative 03/19/2024 0     Neutrophils Absolute 03/19/2024 8.58 (H)     Absolute Immature Grans 03/19/2024 0.05     Absolute Lymphocytes  03/19/2024 0.48 (L)     Absolute Monocytes 03/19/2024 1.34 (H)     Eosinophils Absolute 03/19/2024 0.21     Basophils Absolute 03/19/2024 0.04     Sodium 03/19/2024 135     Potassium 03/19/2024 5.3     Chloride 03/19/2024 101     CO2 03/19/2024 23     ANION GAP 03/19/2024 11     BUN 03/19/2024 52 (H)     Creatinine 03/19/2024 5.64 (H)     Glucose 03/19/2024 311 (H)     Calcium 03/19/2024 8.7     Corrected Calcium 03/19/2024 9.2     AST 03/19/2024 20     ALT 03/19/2024 15     Alkaline Phosphatase 03/19/2024 136 (H)     Total Protein 03/19/2024 6.8     Albumin 03/19/2024 3.4 (L)     Total Bilirubin 03/19/2024 0.75     eGFR 03/19/2024 7     LACTIC ACID 03/19/2024 1.4     Procalcitonin 03/19/2024 0.65 (H)     Protime 03/19/2024 14.8 (H)     INR 03/19/2024 1.14     PTT 03/19/2024 28     Blood Culture 03/19/2024 No Growth After 5 Days.     Blood Culture 03/19/2024 No Growth After 5 Days.     SARS-CoV-2 03/19/2024 Negative     INFLUENZA A PCR 03/19/2024 Negative     INFLUENZA B PCR 03/19/2024 Negative     RSV PCR 03/19/2024 Negative     hs TnI 0hr 03/19/2024 24     BNP 03/19/2024 401 (H)     D-Dimer, Quant 03/19/2024 1.26 (H)     hs TnI 2hr 03/19/2024 24     Delta 2hr hsTnI 03/19/2024 0     hs TnI 4hr 03/19/2024 28     Delta 4hr hsTnI 03/19/2024 4     Strep pneumoniae antigen* 03/20/2024 Negative     Legionella Urinary Antig* 03/20/2024 Negative     Ventricular Rate 03/19/2024 102     Atrial Rate 03/19/2024 102     MI Interval 03/19/2024 146     QRSD Interval 03/19/2024 78     QT Interval 03/19/2024 358     QTC Interval 03/19/2024 466     P Axis 03/19/2024 55     QRS Axis 03/19/2024 -51     T Wave [place] 03/19/2024 14     Procalcitonin 03/20/2024 0.70 (H)     WBC 03/20/2024 9.94     RBC 03/20/2024 3.54 (L)     Hemoglobin 03/20/2024 10.1 (L)     Hematocrit 03/20/2024 33.3 (L)     MCV 03/20/2024 94     MCH 03/20/2024 28.5     MCHC 03/20/2024 30.3 (L)     RDW 03/20/2024 14.5     Platelets 03/20/2024 189     MPV 03/20/2024 11.3      Sodium 03/20/2024 137     Potassium 03/20/2024 5.0     Chloride 03/20/2024 107     CO2 03/20/2024 20 (L)     ANION GAP 03/20/2024 10     BUN 03/20/2024 54 (H)     Creatinine 03/20/2024 5.56 (H)     Glucose 03/20/2024 68     Calcium 03/20/2024 8.9     eGFR 03/20/2024 7     Magnesium 03/20/2024 1.8 (L)     Protime 03/20/2024 16.7 (H)     INR 03/20/2024 1.33 (H)     MRSA Culture Only 03/20/2024 No Methicillin Resistant Staphlyococcus aureus (MRSA) isolated     POC Glucose 03/20/2024 132     POC Glucose 03/20/2024 122     Sodium 03/21/2024 137     Potassium 03/21/2024 4.9     Chloride 03/21/2024 103     CO2 03/21/2024 22     ANION GAP 03/21/2024 12     BUN 03/21/2024 69 (H)     Creatinine 03/21/2024 5.82 (H)     Glucose 03/21/2024 169 (H)     Calcium 03/21/2024 8.7     eGFR 03/21/2024 6     WBC 03/21/2024 9.14     RBC 03/21/2024 3.48 (L)     Hemoglobin 03/21/2024 9.9 (L)     Hematocrit 03/21/2024 32.8 (L)     MCV 03/21/2024 94     MCH 03/21/2024 28.4     MCHC 03/21/2024 30.2 (L)     RDW 03/21/2024 14.6     Platelets 03/21/2024 188     MPV 03/21/2024 11.8     Protime 03/21/2024 16.2 (H)     INR 03/21/2024 1.28 (H)     PTT 03/21/2024 202 (HH)     Protime 03/21/2024 17.1 (H)     INR 03/21/2024 1.38 (H)     Protime 03/22/2024 19.4 (H)     INR 03/22/2024 1.62 (H)     WBC 03/22/2024 9.08     RBC 03/22/2024 3.42 (L)     Hemoglobin 03/22/2024 9.9 (L)     Hematocrit 03/22/2024 31.5 (L)     MCV 03/22/2024 92     MCH 03/22/2024 28.9     MCHC 03/22/2024 31.4     RDW 03/22/2024 14.5     Platelets 03/22/2024 184     MPV 03/22/2024 11.5     Sodium 03/22/2024 138     Potassium 03/22/2024 4.3     Chloride 03/22/2024 103     CO2 03/22/2024 25     ANION GAP 03/22/2024 10     BUN 03/22/2024 60 (H)     Creatinine 03/22/2024 5.21 (H)     Glucose 03/22/2024 221 (H)     Calcium 03/22/2024 9.1     eGFR 03/22/2024 7     Procalcitonin 03/22/2024 0.77 (H)     PTT 03/22/2024 65 (H)     PTT 03/22/2024 62 (H)     POC Glucose 03/22/2024 348 (H)      POC Glucose 03/22/2024 160 (H)     POC Glucose 03/22/2024 217 (H)     PTT 03/23/2024 68 (H)     Protime 03/23/2024 27.6 (H)     INR 03/23/2024 2.56 (H)     WBC 03/23/2024 9.70     RBC 03/23/2024 3.55 (L)     Hemoglobin 03/23/2024 10.4 (L)     Hematocrit 03/23/2024 32.4 (L)     MCV 03/23/2024 91     MCH 03/23/2024 29.3     MCHC 03/23/2024 32.1     RDW 03/23/2024 14.4     Platelets 03/23/2024 194     MPV 03/23/2024 11.3     Sodium 03/23/2024 138     Potassium 03/23/2024 4.1     Chloride 03/23/2024 102     CO2 03/23/2024 25     ANION GAP 03/23/2024 11     BUN 03/23/2024 56 (H)     Creatinine 03/23/2024 4.69 (H)     Glucose 03/23/2024 211 (H)     Calcium 03/23/2024 9.0     eGFR 03/23/2024 9     POC Glucose 03/23/2024 231 (H)     POC Glucose 03/23/2024 171 (H)     Supplier Name 03/23/2024 [facility]     Supplier Phone Number 03/23/2024 [phone]     Order Status 03/23/2024 Completed     Delivery Request Date 03/23/2024 03/23/2024     Date Delivered  03/23/2024 03/23/2024     Item Description 03/23/2024 Home Oxygen Concentrator with Portability, Adult, Standard Liter Flow     Item Description 03/23/2024 Portable Gaseous Oxygen System     Item Description 03/23/2024 Portable O2 Contents, Gas     Item Description 03/23/2024 Portable Tank with Conserving Device / Pulse Dose     POC Glucose 03/23/2024 191 (H)    Appointment on 03/05/2024   Component Date Value    Sodium 03/05/2024 142     Potassium 03/05/2024 4.9     Chloride 03/05/2024 108     CO2 03/05/2024 24     ANION GAP 03/05/2024 10     BUN 03/05/2024 52 (H)     Creatinine 03/05/2024 5.04 (H)     Glucose, Fasting 03/05/2024 92     Calcium 03/05/2024 8.4     AST 03/05/2024 11 (L)     ALT 03/05/2024 17     Alkaline Phosphatase 03/05/2024 158 (H)     Total Protein 03/05/2024 6.6     Albumin 03/05/2024 3.5     Total Bilirubin 03/05/2024 0.66     eGFR 03/05/2024 8     TSH 3RD GENERATON 03/05/2024 11.384 (H)     Free T4 03/05/2024 0.81     Cholesterol 03/05/2024 127      Triglycerides 2024 52     HDL, Direct 2024 48 (L)     LDL Calculated 2024 69     Non-HDL-Chol (CHOL-HDL) 2024 79     Fructosamine 2024 309 (H)     Hemoglobin A1C 2024 8.3 (H)     EAG 2024 192    Office Visit on 2024   Component Date Value    SARS-CoV-2 2024 Negative     INFLUENZA A PCR 2024 Negative     INFLUENZA B PCR 2024 Negative        Past Surgical History:   Procedure Laterality Date    CATARACT EXTRACTION       SECTION       x 1    EYE SURGERY      NOSE SURGERY      fractured nose - septoplasty        Family History   Problem Relation Age of Onset    Diabetes Mother         mellitus    Anxiety disorder Mother         generalized anxiety disorder    Hypertension Mother     Stroke Mother     Diabetes type II Mother     Kidney disease Father     Kidney failure Father         renal failure    Ulcerative colitis Sister     Diabetes Sister     Ovarian cancer Maternal Aunt     Diabetes Maternal Aunt     Diabetes Maternal Uncle     Heart disease Maternal Uncle     Heart disease Family     Thyroid disease Neg Hx         Diagnostics:  I have personally reviewed pertinent films in PACS  CT of chest perf  Office Spirometry Results:     ESS:    CT chest without contrast    Result Date: 3/19/2024  Narrative: CT CHEST WITHOUT IV CONTRAST INDICATION: SOB; Cough. COMPARISON: CT chest 2022 TECHNIQUE: CT examination of the chest was performed without intravenous contrast. Multiplanar 2D reformatted images were created from the source data. This examination, like all CT scans performed in the [facility], was performed utilizing techniques to minimize radiation dose exposure, including the use of iterative reconstruction and automated exposure control. Radiation dose length product (DLP) for this visit: 453.23 mGy-cm FINDINGS: LUNGS: Medial left upper lobe consolidation measures approximately 3.9 x 1.7 cm (series 3, image  64). Consolidation in the periphery of the posterior left lower lobe measures approximately 3.3 x 3.2 cm (series 3, image 136). Tree-in-bud nodules throughout the right lung. PLEURA: Unremarkable. HEART/GREAT VESSELS: Cardiomegaly. Coronary artery calcifications and atherosclerotic calcifications of the aorta. No thoracic aortic aneurysm. MEDIASTINUM AND TEOFILO: New mild enlargement of the mediastinal lymph nodes, with a para-aortic lymph node measuring up to 1 cm in short axis (series 3, image 58). CHEST WALL AND LOWER NECK: Unremarkable. VISUALIZED STRUCTURES IN THE UPPER ABDOMEN: Pneumoperitoneum (for example series 2, image 76). OSSEOUS STRUCTURES: No acute fracture or destructive osseous lesion.     Impression: 1.  Pneumoperitoneum is of uncertain etiology, though can be attributable to the peritoneal dialysis. Assessment for abdominal pain is recommended to determine whether further work-up is required. 2.  Left upper and lower lobe consolidations likely represent pneumonia. However, follow-up CT is recommended in 3 months to confirm the absence of an underlying tumor. 3.  Right-sided bronchiolitis. 4.  Mildly enlarged mediastinal lymph nodes are likely reactive given the presumed pneumonia and bronchiolitis. I personally discussed this study with Dr. Sun on 3/19/2024 6:55 PM. Workstation performed: [id number]

## 2024-04-04 NOTE — ASSESSMENT & PLAN NOTE
Has a history of severe obstructive sleep apnea.  She is noncompliant in the past with using this as she has had difficulty with the mask he is currently using 2 L of supplemental oxygen at nighttime  .  I will put an order in for a different mask in the event she would like to retry using her CPAP.  She has not used it in over a year.  Her machine is a auto CPAP 5 to 20 cm of water pressure.  She is aware of the importance of repeat trying this device as she has severe obstructive sleep apnea and atrial fibrillation.  Aced an order for a new mask fitting.  In the event that she likes to use it or will try to use it again consistently I would like her to come in about 6 weeks after using it so that I can better ascertain the effectiveness

## 2024-04-04 NOTE — PATIENT INSTRUCTIONS
Sleep Apnea   AMBULATORY CARE:   Sleep apnea  is a condition that causes you to stop breathing often during sleep.  Types of sleep apnea:   Obstructive sleep apnea (CHANDRA)  is the most common kind. The muscles and tissues around your throat relax and block air from passing through. Obesity, use of alcohol or cigarettes, or a family history are common causes. CHANDRA may increase your risk for complications after surgery.         Central sleep apnea (CSA)  means your brain does not send signals to the muscles that control breathing. You do not take a breath even though your airway is open. Common causes include medical conditions such as heart failure, being older than 40, or use of opioids.    Complex (or mixed) sleep apnea  means you have both obstructive and central sleep apnea.    Common signs and symptoms:   Loud snoring or long pauses in breathing    Feeling sleepy, slow, and tired during the day    Snorting, gasping, or choking while you sleep, and waking up suddenly because of these    Feeling irritable during the day    Dry mouth or a headache in the mornings    Heavy night sweating    A hard time thinking, remembering things, or focusing on your tasks the following day    Call your local emergency number (911 in the ) if:   You have chest pain or trouble breathing.      Call your doctor if:   You have new or worsening signs or symptoms.     You have questions or concerns about your condition or care.    Treatment  depends on the kind of apnea you have.  A mouth device  may be needed if you have mild sleep apnea. These are designed to keep your throat open. Ask your dentist or healthcare provider about the best mouth device for you.    A machine  may be used to help you get more air during sleep. A mask may be placed over your nose and mouth, or just your nose. The mask is hooked to the machine. You will get air through the mask.    A continuous positive airway pressure (CPAP) machine  is used to keep your  airway open during sleep. The machine blows a gentle stream of air into the mask when you breathe. This helps keep your airway open so you can breathe more regularly. Extra oxygen may be given through the machine.         A bilevel positive airway pressure (BiPAP) machine  gives air but lowers the pressure when you breathe out.    An adaptive servo-ventilator (ASV)  is a machine that learns your usual breathing pattern. Then, it uses pressure to give you air and prevent stops in your breathing.    Surgery  to expand your airway or remove extra tissues may be needed. Surgery is usually only considered if other treatments do not work.    Manage or prevent sleep apnea:   Reach and maintain a healthy weight.  Ask your healthcare provider what a healthy weight is for you. Ask your provider to help you create a safe weight loss plan if you are overweight. Even a small goal of a 10% weight loss can improve your symptoms.    Do not smoke.  Nicotine and other chemicals in cigarettes and cigars can cause lung damage. Ask your healthcare provider for information if you currently smoke and need help to quit. E-cigarettes or smokeless tobacco still contain nicotine. Talk to your healthcare provider before you use these products.    Do not drink alcohol or take sedative medicine before you go to sleep.  Alcohol and sedatives can relax the muscles and tissues around your throat. This can block the airflow to your lungs.    Sleep on your side or use pillows designed to prevent sleep apnea.  This prevents your tongue or other tissues from blocking your throat. You can also raise the head of your bed.    Follow up with your doctor or specialist as directed:  You may need to have blood tests during your follow-up visits. Work with your provider to find the right breathing support equipment and settings for you. Write down your questions so you remember to ask them during your visits.  © Copyright [employer] 2023 Information is for End  User's use only and may not be sold, redistributed or otherwise used for commercial purposes.  The above information is an  only. It is not intended as medical advice for individual conditions or treatments. Talk to your doctor, nurse or pharmacist before following any medical regimen to see if it is safe and effective for you.

## 2024-04-04 NOTE — ASSESSMENT & PLAN NOTE
Jacquie is here today status post hospitalization.  I personally reviewed CT of the chest and also reviewed it with patient.  His consolidation in the left upper and lower lobe.  Ordered a CT of chest without contrast to be done anytime after June 18, 2024.  And is aware and agreeable with this plan of care.

## 2024-04-04 NOTE — ASSESSMENT & PLAN NOTE
Continues to use and benefit from supplemental oxygen.  Her room air oxygen at rest was 96%.  With ambulating 250 feet O2 sat was 88%.  With 2 L of supplemental oxygen her O2 sat was maintained at 94%.  She also uses 2 L at night.  I would like her to continue this treatment. Portable oxygen tanks are causing additional adverse medical outcomes in patient's breathing that does not allow for adequate ambulation. Patient will benefit from usage of a POCvia nasal cannula.  I will have [facility], evaluate and supply patient with a POC at a setting of continous.  O2 saturation on room air at rest96^, O2 saturation on room air on ambulation 88%, O2 saturation on 2L/M on ambulation 94% via nasal cannula.

## 2024-04-08 LAB

## 2024-04-24 DIAGNOSIS — R00.0 TACHYCARDIA: Primary | ICD-10-CM

## 2024-05-01 PROBLEM — A41.9 SEPSIS (HCC): Status: RESOLVED | Noted: 2024-03-20 | Resolved: 2024-05-01

## 2024-05-01 PROBLEM — J18.9 PNEUMONIA: Status: RESOLVED | Noted: 2024-03-19 | Resolved: 2024-05-01

## 2024-05-16 ENCOUNTER — OFFICE VISIT (OUTPATIENT)
Age: 69
End: 2024-05-16
Payer: MEDICARE

## 2024-05-16 VITALS
HEIGHT: 60 IN | HEART RATE: 82 BPM | DIASTOLIC BLOOD PRESSURE: 69 MMHG | RESPIRATION RATE: 16 BRPM | BODY MASS INDEX: 36.71 KG/M2 | SYSTOLIC BLOOD PRESSURE: 132 MMHG | WEIGHT: 187 LBS

## 2024-05-16 DIAGNOSIS — E03.9 HYPOTHYROIDISM, UNSPECIFIED TYPE: ICD-10-CM

## 2024-05-16 DIAGNOSIS — E11.65 TYPE 2 DIABETES MELLITUS WITH HYPERGLYCEMIA, WITH LONG-TERM CURRENT USE OF INSULIN (HCC): ICD-10-CM

## 2024-05-16 DIAGNOSIS — M54.16 RADICULOPATHY OF LUMBAR REGION: ICD-10-CM

## 2024-05-16 DIAGNOSIS — E78.2 MIXED HYPERLIPIDEMIA: ICD-10-CM

## 2024-05-16 DIAGNOSIS — M77.42 METATARSALGIA OF BOTH FEET: Primary | ICD-10-CM

## 2024-05-16 DIAGNOSIS — E11.42 DIABETIC POLYNEUROPATHY ASSOCIATED WITH TYPE 2 DIABETES MELLITUS (HCC): ICD-10-CM

## 2024-05-16 DIAGNOSIS — M77.41 METATARSALGIA OF BOTH FEET: Primary | ICD-10-CM

## 2024-05-16 DIAGNOSIS — Z79.4 TYPE 2 DIABETES MELLITUS WITH HYPERGLYCEMIA, WITH LONG-TERM CURRENT USE OF INSULIN (HCC): ICD-10-CM

## 2024-05-16 DIAGNOSIS — N18.6 ESRD (END STAGE RENAL DISEASE) (HCC): ICD-10-CM

## 2024-05-16 DIAGNOSIS — E03.9 ACQUIRED HYPOTHYROIDISM: ICD-10-CM

## 2024-05-16 DIAGNOSIS — I10 ESSENTIAL HYPERTENSION: ICD-10-CM

## 2024-05-16 DIAGNOSIS — I70.209 PERIPHERAL ARTERIOSCLEROSIS (HCC): ICD-10-CM

## 2024-05-16 PROCEDURE — 99203 OFFICE O/P NEW LOW 30 MIN: CPT | Performed by: PODIATRIST

## 2024-05-16 NOTE — PROGRESS NOTES
Assessment/Plan: Metatarsalgia secondary to neuropathy.  Rule out etiology.  Rule out radiculopathy versus diabetic neuropathy.  Mild peripheral artery disease.  Pain upon ambulation.    Plan.  Chart reviewed.  PCP notes reviewed.  Lab work reviewed.  Radiographic studies reviewed.  At this time we need to establish baseline arterial circulation status.  Arterial Doppler ordered.  Spine x-rays are being ordered to evaluate for radiculopathy.  To help with neuropathy patient will consider gabapentin.  She will check with nephrology.  In the meantime she will watch for signs of infection..  Diabetic foot exam have been performed.  Patient educated on care of the diabetic foot.  Return for follow-up.         Diagnoses and all orders for this visit:    Metatarsalgia of both feet    Hypothyroidism, unspecified type  -     Ambulatory referral to Podiatry    Type 2 diabetes mellitus with hyperglycemia, with long-term current use of insulin (HCC)  -     Ambulatory referral to Podiatry    Mixed hyperlipidemia  -     Ambulatory referral to Podiatry    ESRD (end stage renal disease) (HCC)  -     Ambulatory referral to Podiatry    Acquired hypothyroidism  -     Ambulatory referral to Podiatry    Essential hypertension  -     Ambulatory referral to Podiatry    Diabetic polyneuropathy associated with type 2 diabetes mellitus (HCC)    Radiculopathy of lumbar region  -     XR spine lumbar minimum 4 views non injury; Future    Peripheral arteriosclerosis (HCC)  -     VAS ARTERIAL DUPLEX- LOWER LIMB BILATERAL; Future          Subjective: Patient presents for evaluation.  Patient is diabetic.  She states she gets cramping in her feet and legs.  She has diabetes and low back pain.  She feels her feet are numb and cold.            No Known Allergies      Current Outpatient Medications:     albuterol (2.5 mg/3 mL) 0.083 % nebulizer solution, Take 3 mL (2.5 mg total) by nebulization every 6 (six) hours as needed for wheezing or shortness  of breath, Disp: 100 mL, Rfl: 0    albuterol (PROVENTIL HFA,VENTOLIN HFA) 90 mcg/act inhaler, Inhale 2 puffs every 6 (six) hours as needed for wheezing, Disp: 8 g, Rfl: 0    atorvastatin (LIPITOR) 20 mg tablet, Take 1 tablet (20 mg total) by mouth daily, Disp: 90 tablet, Rfl: 3    B Complex-C (B-COMPLEX WITH VITAMIN C) tablet, Take 1 tablet by mouth daily, Disp: , Rfl:     Blood Glucose Monitoring Suppl (ONE TOUCH ULTRA 2) w/Device KIT, Test glucose 3 times daily, Disp: 1 each, Rfl: 0    calcitriol (ROCALTROL) 0.25 mcg capsule, Take 1 capsule (0.25 mcg total) by mouth daily, Disp: 90 capsule, Rfl: 1    calcium acetate (PHOSLO) capsule, Take 667 mg by mouth daily , Disp: , Rfl:     cholecalciferol (VITAMIN D3) 1,000 units tablet, Take 2 tablets (2,000 Units total) by mouth daily, Disp: 100 tablet, Rfl: 5    cinacalcet (SENSIPAR) 30 mg tablet, TAKE 1 TABLET THREE TIMES A WEEK, Disp: 36 tablet, Rfl: 3    collagenase (SANTYL) ointment, Apply topically daily, Disp: 30 g, Rfl: 1    Continuous Blood Gluc Sensor (FreeStyle Conner 2 Sensor) MISC, Use to check blood sugars, Disp: 6 each, Rfl: 4    docusate sodium (COLACE) 100 mg capsule, Take 1 capsule (100 mg total) by mouth 2 (two) times a day, Disp: 10 capsule, Rfl: 0    gentamicin (GARAMYCIN) 0.1 % cream, APPLY TOPICALLY DAILY, Disp: 15 g, Rfl: 23    Insulin Glargine-yfgn (Semglee, yfgn,) 100 UNIT/ML SOPN, Inject 10 units subcutaneously daily in the morning, Disp: 15 mL, Rfl: 3    insulin lispro (HumaLOG KwikPen) 100 units/mL injection pen, Inject 3 Units under the skin 3 (three) times a day with meals, Disp: 15 mL, Rfl: 3    Insulin Pen Needle 31G X 5 MM MISC, by Does not apply route daily, Disp: 100 each, Rfl: 1    Lancets (onetouch ultrasoft) lancets, Test glucose 3 times a day; Code: E11.8, Disp: 300 each, Rfl: 1    levothyroxine (Euthyrox) 125 mcg tablet, Take one tablet by mouth in early morning on empty stomach, Disp: 90 tablet, Rfl: 3    metolazone (ZAROXOLYN) 5 mg  tablet, Take 5 mg by mouth daily, Disp: , Rfl:     metoprolol succinate (TOPROL-XL) 50 mg 24 hr tablet, TAKE 1 TABLET DAILY (Patient taking differently: Take 50 mg by mouth daily 2 in AM and 2 in PM), Disp: 90 tablet, Rfl: 1    MULTIPLE VITAMIN PO, Take 1 tablet by mouth daily, Disp: , Rfl:     nystatin (MYCOSTATIN) powder, Apply topically 3 (three) times a day for 7 days (Patient not taking: Reported on 3/23/2023), Disp: 15 g, Rfl: 0    OneTouch Ultra test strip, TEST GLUCOSE THREE TIMES A DAY, Disp: 300 strip, Rfl: 3    semaglutide, 0.25 or 0.5 mg/dose, (Ozempic, 0.25 or 0.5 MG/DOSE,) 2 mg/3 mL injection pen, Inject 0.75 mL (0.5 mg total) under the skin every 7 days (Patient not taking: Reported on 4/4/2024), Disp: 3 mL, Rfl: 2    torsemide (DEMADEX) 100 mg tablet, Take 1 tablet (100 mg total) by mouth daily, Disp: 90 tablet, Rfl: 1    valsartan (DIOVAN) 320 MG tablet, Take 0.5 tablets (160 mg total) by mouth daily (Patient taking differently: Take 160 mg by mouth daily Takes 1 full tablet daily), Disp: 90 tablet, Rfl: 0    warfarin (COUMADIN) 5 mg tablet, , Disp: , Rfl:     Patient Active Problem List   Diagnosis    End stage renal disease (HCC)    Nephrotic range proteinuria    Cyst of ovary    Mixed hyperlipidemia    Type 2 diabetes mellitus with hyperglycemia, with long-term current use of insulin (HCC)    Dyspnea on exertion    PE (pulmonary thromboembolism) (HCC)    Elevated brain natriuretic peptide (BNP) level    Leg edema, left    Pulmonary hypertension (HCC)    Morbid obesity with BMI of 40.0-44.9, adult (HCC)    Vitamin D deficiency    Secondary hyperparathyroidism of renal origin (HCC)    Non-rheumatic mitral valve stenosis    Diabetic retinopathy of both eyes associated with type 2 diabetes mellitus (HCC)    Encounter for peritoneal dialysis (HCC)    ESRD (end stage renal disease) (HCC)    Paroxysmal atrial flutter (HCC)    History of pulmonary embolus (PE)    Chronic combined systolic and diastolic  congestive heart failure (HCC)    Nocturnal hypoxemia    Dependence on renal dialysis (HCC)    Hypothyroidism    Anemia of chronic disease    Abnormal CT of the chest    Acute gout due to renal impairment involving toe of right foot    Essential hypertension    Class 2 obesity with body mass index (BMI) of 37.0 to 37.9 in adult    Dilated cardiomyopathy (HCC)    Iron deficiency anemia due to chronic blood loss    History of syncope    Sinus bradycardia    Type 2 diabetes mellitus with hyperosmolarity without coma, with long-term current use of insulin (HCC)    Adenomatous polyp of colon    CHANDRA (obstructive sleep apnea)    Atrial fibrillation, chronic (HCC)    Sleep apnea, obstructive    Chronic kidney disease    Acute respiratory failure with hypoxia (HCC)    Chronic respiratory failure with hypoxia (HCC)          Patient ID: Jacquie Smith is a 69 y.o. female.    HPI    The following portions of the patient's history were reviewed and updated as appropriate: She  has a past medical history of Acute asthmatic bronchitis, Cardiac disease, Chronic diastolic (congestive) heart failure (HCC), Chronic kidney disease, Closed nondisplaced fracture of distal phalanx of right great toe (11/13/2018), Colon polyp, CPAP (continuous positive airway pressure) dependence, Diabetes mellitus (HCC), Hyperlipidemia, Hypertension, Medical non-compliance, Morbid obesity with BMI of 40.0-44.9, adult (HCC) (02/28/2019), On continuous oral anticoagulation (12/22/2020), Pneumonia, PONV (postoperative nausea and vomiting), Pulmonary embolism (HCC), Renal disorder, Severe obstructive sleep apnea, Severe pulmonary arterial systolic hypertension (HCC), and Tinea pedis of both feet (12/11/2018).  She   Patient Active Problem List    Diagnosis Date Noted    Chronic respiratory failure with hypoxia (HCC) 04/04/2024    Acute respiratory failure with hypoxia (HCC) 03/19/2024    Atrial fibrillation, chronic (HCC) 03/06/2023    Sleep apnea, obstructive  2023    Chronic kidney disease 2023    CHANDRA (obstructive sleep apnea) 10/12/2022    Adenomatous polyp of colon 10/11/2022    Type 2 diabetes mellitus with hyperosmolarity without coma, with long-term current use of insulin ([facility]) 2022    History of syncope 2022    Sinus bradycardia 2022    Iron deficiency anemia due to chronic blood loss 2022    Class 2 obesity with body mass index (BMI) of 37.0 to 37.9 in adult 2021    Dilated cardiomyopathy ([facility]) 2021    Essential hypertension 2021    Acute gout due to renal impairment involving toe of right foot 2021    Abnormal CT of the chest 2021    Dependence on renal dialysis ([facility]) 2020    Hypothyroidism 2020    Anemia of chronic disease 2020    Nocturnal hypoxemia 2020    Paroxysmal atrial flutter ([facility]) 2020    History of pulmonary embolus (PE) 2020    Chronic combined systolic and diastolic congestive heart failure ([facility]) 2020    ESRD (end stage renal disease) ([facility]) 2020    Encounter for peritoneal dialysis ([facility]) 2020    Diabetic retinopathy of both eyes associated with type 2 diabetes mellitus ([facility]) 2019    Non-rheumatic mitral valve stenosis 2019    Vitamin D deficiency 2019    Secondary hyperparathyroidism of renal origin ([facility]) 2019    Morbid obesity with BMI of 40.0-44.9, adult ([facility]) 2019    Pulmonary hypertension ([facility]) 2018    Leg edema, left 2018    Dyspnea on exertion 2018    PE (pulmonary thromboembolism) ([facility]) 2018    Elevated brain natriuretic peptide (BNP) level 2018    End stage renal disease ([facility]) 2018    Nephrotic range proteinuria 2018    Cyst of ovary 2017    Mixed hyperlipidemia 2014    Type 2 diabetes mellitus with hyperglycemia, with long-term current use of insulin ([facility]) 2013     She  has a past surgical history that includes  section; Nose  surgery; Eye surgery; and Cataract extraction.  Her family history includes Anxiety disorder in her mother; Diabetes in her maternal aunt, maternal uncle, mother, and sister; Diabetes type II in her mother; Heart disease in her family and maternal uncle; Hypertension in her mother; Kidney disease in her father; Kidney failure in her father; Ovarian cancer in her maternal aunt; Stroke in her mother; Ulcerative colitis in her sister.  She  reports that she has never smoked. She has never been exposed to tobacco smoke. She has never used smokeless tobacco. She reports that she does not drink alcohol and does not use drugs.  Current Outpatient Medications   Medication Sig Dispense Refill    albuterol (2.5 mg/3 mL) 0.083 % nebulizer solution Take 3 mL (2.5 mg total) by nebulization every 6 (six) hours as needed for wheezing or shortness of breath 100 mL 0    albuterol (PROVENTIL HFA,VENTOLIN HFA) 90 mcg/act inhaler Inhale 2 puffs every 6 (six) hours as needed for wheezing 8 g 0    atorvastatin (LIPITOR) 20 mg tablet Take 1 tablet (20 mg total) by mouth daily 90 tablet 3    B Complex-C (B-COMPLEX WITH VITAMIN C) tablet Take 1 tablet by mouth daily      Blood Glucose Monitoring Suppl (ONE TOUCH ULTRA 2) w/Device KIT Test glucose 3 times daily 1 each 0    calcitriol (ROCALTROL) 0.25 mcg capsule Take 1 capsule (0.25 mcg total) by mouth daily 90 capsule 1    calcium acetate (PHOSLO) capsule Take 667 mg by mouth daily       cholecalciferol (VITAMIN D3) 1,000 units tablet Take 2 tablets (2,000 Units total) by mouth daily 100 tablet 5    cinacalcet (SENSIPAR) 30 mg tablet TAKE 1 TABLET THREE TIMES A WEEK 36 tablet 3    collagenase (SANTYL) ointment Apply topically daily 30 g 1    Continuous Blood Gluc Sensor (FreeStyle Conner 2 Sensor) MISC Use to check blood sugars 6 each 4    docusate sodium (COLACE) 100 mg capsule Take 1 capsule (100 mg total) by mouth 2 (two) times a day 10 capsule 0    gentamicin (GARAMYCIN) 0.1 % cream  APPLY TOPICALLY DAILY 15 g 23    Insulin Glargine-yfgn (Semglee, yfgn,) 100 UNIT/ML SOPN Inject 10 units subcutaneously daily in the morning 15 mL 3    insulin lispro (HumaLOG KwikPen) 100 units/mL injection pen Inject 3 Units under the skin 3 (three) times a day with meals 15 mL 3    Insulin Pen Needle 31G X 5 MM MISC by Does not apply route daily 100 each 1    Lancets (onetouch ultrasoft) lancets Test glucose 3 times a day; Code: E11.8 300 each 1    levothyroxine (Euthyrox) 125 mcg tablet Take one tablet by mouth in early morning on empty stomach 90 tablet 3    metolazone (ZAROXOLYN) 5 mg tablet Take 5 mg by mouth daily      metoprolol succinate (TOPROL-XL) 50 mg 24 hr tablet TAKE 1 TABLET DAILY (Patient taking differently: Take 50 mg by mouth daily 2 in AM and 2 in PM) 90 tablet 1    MULTIPLE VITAMIN PO Take 1 tablet by mouth daily      nystatin (MYCOSTATIN) powder Apply topically 3 (three) times a day for 7 days (Patient not taking: Reported on 3/23/2023) 15 g 0    OneTouch Ultra test strip TEST GLUCOSE THREE TIMES A  strip 3    semaglutide, 0.25 or 0.5 mg/dose, (Ozempic, 0.25 or 0.5 MG/DOSE,) 2 mg/3 mL injection pen Inject 0.75 mL (0.5 mg total) under the skin every 7 days (Patient not taking: Reported on 4/4/2024) 3 mL 2    torsemide (DEMADEX) 100 mg tablet Take 1 tablet (100 mg total) by mouth daily 90 tablet 1    valsartan (DIOVAN) 320 MG tablet Take 0.5 tablets (160 mg total) by mouth daily (Patient taking differently: Take 160 mg by mouth daily Takes 1 full tablet daily) 90 tablet 0    warfarin (COUMADIN) 5 mg tablet        No current facility-administered medications for this visit.     Current Outpatient Medications on File Prior to Visit   Medication Sig    albuterol (2.5 mg/3 mL) 0.083 % nebulizer solution Take 3 mL (2.5 mg total) by nebulization every 6 (six) hours as needed for wheezing or shortness of breath    albuterol (PROVENTIL HFA,VENTOLIN HFA) 90 mcg/act inhaler Inhale 2 puffs every 6  (six) hours as needed for wheezing    atorvastatin (LIPITOR) 20 mg tablet Take 1 tablet (20 mg total) by mouth daily    B Complex-C (B-COMPLEX WITH VITAMIN C) tablet Take 1 tablet by mouth daily    Blood Glucose Monitoring Suppl (ONE TOUCH ULTRA 2) w/Device KIT Test glucose 3 times daily    calcitriol (ROCALTROL) 0.25 mcg capsule Take 1 capsule (0.25 mcg total) by mouth daily    calcium acetate (PHOSLO) capsule Take 667 mg by mouth daily     cholecalciferol (VITAMIN D3) 1,000 units tablet Take 2 tablets (2,000 Units total) by mouth daily    cinacalcet (SENSIPAR) 30 mg tablet TAKE 1 TABLET THREE TIMES A WEEK    collagenase (SANTYL) ointment Apply topically daily    Continuous Blood Gluc Sensor (FreeStyle Conner 2 Sensor) MISC Use to check blood sugars    docusate sodium (COLACE) 100 mg capsule Take 1 capsule (100 mg total) by mouth 2 (two) times a day    gentamicin (GARAMYCIN) 0.1 % cream APPLY TOPICALLY DAILY    Insulin Glargine-yfgn (Semglee, yfgn,) 100 UNIT/ML SOPN Inject 10 units subcutaneously daily in the morning    insulin lispro (HumaLOG KwikPen) 100 units/mL injection pen Inject 3 Units under the skin 3 (three) times a day with meals    Insulin Pen Needle 31G X 5 MM MISC by Does not apply route daily    Lancets (onetouch ultrasoft) lancets Test glucose 3 times a day; Code: E11.8    levothyroxine (Euthyrox) 125 mcg tablet Take one tablet by mouth in early morning on empty stomach    metolazone (ZAROXOLYN) 5 mg tablet Take 5 mg by mouth daily    metoprolol succinate (TOPROL-XL) 50 mg 24 hr tablet TAKE 1 TABLET DAILY (Patient taking differently: Take 50 mg by mouth daily 2 in AM and 2 in PM)    MULTIPLE VITAMIN PO Take 1 tablet by mouth daily    nystatin (MYCOSTATIN) powder Apply topically 3 (three) times a day for 7 days (Patient not taking: Reported on 3/23/2023)    OneTouch Ultra test strip TEST GLUCOSE THREE TIMES A DAY    semaglutide, 0.25 or 0.5 mg/dose, (Ozempic, 0.25 or 0.5 MG/DOSE,) 2 mg/3 mL injection  pen Inject 0.75 mL (0.5 mg total) under the skin every 7 days (Patient not taking: Reported on 4/4/2024)    torsemide (DEMADEX) 100 mg tablet Take 1 tablet (100 mg total) by mouth daily    valsartan (DIOVAN) 320 MG tablet Take 0.5 tablets (160 mg total) by mouth daily (Patient taking differently: Take 160 mg by mouth daily Takes 1 full tablet daily)    warfarin (COUMADIN) 5 mg tablet      No current facility-administered medications on file prior to visit.     She has No Known Allergies..    Vitals:    05/16/24 1422   BP: 132/69   Pulse: 82   Resp: 16       Review of Systems      Objective:  Patient's shoes and socks removed.   Foot Exam    General  General Appearance: appears stated age and healthy   Orientation: alert and oriented to person, place, and time   Affect: appropriate       Right Foot/Ankle     Inspection and Palpation  Skin Exam: dry skin;     Neurovascular  Dorsalis pedis: 1+  Posterior tibial: 1+  Saphenous nerve sensation: absent  Tibial nerve sensation: absent  Superficial peroneal nerve sensation: absent  Deep peroneal nerve sensation: absent  Sural nerve sensation: absent      Left Foot/Ankle      Inspection and Palpation  Skin Exam: dry skin;     Neurovascular  Dorsalis pedis: 1+  Posterior tibial: 1+  Saphenous nerve sensation: absent  Tibial nerve sensation: absent  Superficial peroneal nerve sensation: absent  Deep peroneal nerve sensation: absent  Sural nerve sensation: absent      Physical Exam  Vitals and nursing note reviewed.   Constitutional:       Appearance: Normal appearance.   Cardiovascular:      Rate and Rhythm: Normal rate and regular rhythm.      Pulses: Pulses are weak.           Dorsalis pedis pulses are 1+ on the right side and 1+ on the left side.        Posterior tibial pulses are 1+ on the right side and 1+ on the left side.   Feet:      Right foot:      Skin integrity: Dry skin present.      Left foot:      Skin integrity: Dry skin present.   Skin:     Capillary Refill:  Capillary refill takes less than 2 seconds.   Neurological:      Mental Status: She is alert.      Comments: Patient has negative vibratory response.  Decreased DTR.   Psychiatric:         Mood and Affect: Mood normal.         Behavior: Behavior normal.         Thought Content: Thought content normal.         Judgment: Judgment normal.     Patient's shoes and socks removed.    Right Foot/Ankle   Right Foot Inspection  Skin Exam: dry skin.     Toe Exam: right toe deformity.     Sensory   Vibration: absent  Proprioception: diminished  Monofilament testing: diminished    Vascular  Capillary refills: < 3 seconds  The right DP pulse is 1+. The right PT pulse is 1+.     Left Foot/Ankle  Left Foot Inspection  Skin Exam: dry skin.     Toe Exam: left toe deformity.     Sensory   Vibration: absent  Proprioception: diminished  Monofilament testing: diminished    Vascular  Capillary refills: < 3 seconds  The left DP pulse is 1+. The left PT pulse is 1+.     Assign Risk Category  Deformity present  Loss of protective sensation  Weak pulses  Risk: 2

## 2024-06-07 ENCOUNTER — HOSPITAL ENCOUNTER (OUTPATIENT)
Dept: RADIOLOGY | Facility: HOSPITAL | Age: 69
Discharge: HOME | End: 2024-06-07
Payer: MEDICARE

## 2024-06-07 ENCOUNTER — HOSPITAL ENCOUNTER (OUTPATIENT)
Dept: RADIOLOGY | Facility: HOSPITAL | Age: 69
Discharge: HOME | End: 2024-06-07
Attending: PODIATRIST
Payer: MEDICARE

## 2024-06-07 DIAGNOSIS — I70.209 PERIPHERAL ARTERIOSCLEROSIS (HCC): ICD-10-CM

## 2024-06-07 DIAGNOSIS — M54.16 RADICULOPATHY OF LUMBAR REGION: ICD-10-CM

## 2024-06-07 PROCEDURE — 93925 LOWER EXTREMITY STUDY: CPT | Performed by: SURGERY

## 2024-06-07 PROCEDURE — 93922 UPR/L XTREMITY ART 2 LEVELS: CPT | Performed by: SURGERY

## 2024-06-07 PROCEDURE — 93923 UPR/LXTR ART STDY 3+ LVLS: CPT

## 2024-06-07 PROCEDURE — 72110 X-RAY EXAM L-2 SPINE 4/>VWS: CPT

## 2024-06-07 PROCEDURE — 93925 LOWER EXTREMITY STUDY: CPT

## 2024-06-10 ENCOUNTER — TELEPHONE (OUTPATIENT)
Age: 69
End: 2024-06-10

## 2024-06-10 ENCOUNTER — TELEPHONE (OUTPATIENT)
Dept: PODIATRY | Facility: CLINIC | Age: 69
End: 2024-06-10

## 2024-06-10 NOTE — TELEPHONE ENCOUNTER
Lmom for patient to call back to go over her results     ----- Message from Filemon Hay DPM sent at 6/10/2024  9:02 AM EDT -----  Please advise patient that her circulation is fine.  ----- Message -----  From: Interface, Radiology Results In  Sent: 6/7/2024  10:20 PM EDT  To: Filemon Hay DPM

## 2024-06-10 NOTE — TELEPHONE ENCOUNTER
Spoke with patient in regards to her results , per dr galan her circulation is fine. Patient asked about her x-ray results as well informed her x-ray are not ready  yet once we have them we will give her a call

## 2024-06-10 NOTE — TELEPHONE ENCOUNTER
Caller: Jacquie Smith    Doctor: Filemon Hay DPM    Reason for call: Returning call from [place] regarding x-ray and ultrasound results.    Call back#: [phone]

## 2024-06-10 NOTE — TELEPHONE ENCOUNTER
----- Message from Filemon Hay DPM sent at 6/10/2024  9:02 AM EDT -----  Please advise patient that her circulation is fine.  ----- Message -----  From: Der[place], Radiology Results In  Sent: 6/7/2024  10:20 PM EDT  To: Filemon Hay DPM

## 2024-06-18 ENCOUNTER — TELEPHONE (OUTPATIENT)
Age: 69
End: 2024-06-18

## 2024-06-18 ENCOUNTER — HOSPITAL ENCOUNTER (OUTPATIENT)
Dept: RADIOLOGY | Facility: HOSPITAL | Age: 69
Discharge: HOME | End: 2024-06-18
Payer: MEDICARE

## 2024-06-18 DIAGNOSIS — R91.8 ABNORMAL CT SCAN OF LUNG: ICD-10-CM

## 2024-06-18 PROCEDURE — 71250 CT THORAX DX C-: CPT

## 2024-06-18 NOTE — TELEPHONE ENCOUNTER
Caller: Jacquie Smith    Doctor: Satnam Fuller    Reason for call: Returning a call regarding her xray results.  Can someone please call her back?  Thank you.     Call back#: [phone]

## 2024-06-18 NOTE — TELEPHONE ENCOUNTER
Lmom to go over x-ray results ----- Message from Filemon Hay DPM sent at 6/18/2024 11:15 AM EDT -----  Please advise patient that spine x-rays demonstrate arthritis/degeneration of the low spine.  We recommend local chiropractor.  ----- Message -----  From: Interface, Radiology Results In  Sent: 6/18/2024  11:02 AM EDT  To: Filemon Hay DPM

## 2024-06-18 NOTE — TELEPHONE ENCOUNTER
Spoke with patient in regards to her x-ray results she is aware and well try reaching out to a chiropractor

## 2024-06-27 NOTE — PROGRESS NOTES
Progress Note - Cardiology Office  [facility]    Jacquie Smith 69 y.o. female MRN: [id number]  : 1955  Encounter: [id number]      Previously a patient of Dr. Edwards  ASSESSMENT:   A-fib with RVR at 159/min  Patient complains of tachycardia and dyspnea  Referred to [place] [facility] for further management.  ED and on-call cardiology service informed    History of paroxysmal atrial flutter (definitely noted on EKG from 2020)  [id number] score is 7  on warfarin    History of syncope    Chronic combined systolic and diastolic heart failure since 2020  Initial EF was 35% in 2020  Subsequently improved to 40-45%    TTE, 2023: At [facility]  EF 40-45%, mildly reduced RV systolic function  Moderate LAE, mild AR, mild AS  Severe MAC, moderate MR  Mild pulmonary hypertension    Nonischemic cardiomyopathy    Chronic pulmonary hypertension, moderate  PA pressure 68 mmHg in 2022    ESRD on PD    Obesity, BMI 36.33    CHANDRA, intolerant to CPAP    Type 2 diabetes mellitus    Mixed hyperlipidemia    History of PE 2018 with moderate probability VQ scan    Essential hypertension  BP is 120/80 with heart rate of 159/min      RECOMMENDATIONS:  Advised to go immediately to the ED in [facility] for further cardiac management including possible cardioversion if not controlled with medications  I called and inform the staff at [place] [facility]      Please call [phone] if any questions.    HPI :     Jacquie Smith is a 69 y.o. year old female who came came in complaining of fast heart rate and shortness of breath.  She has a history of atrial fibrillation.  Today she is in rapid AF with a heart resting heart rate of 159/min.  She has been on warfarin.  After evaluating her, I advised her to go immediately to the [place] ED for further management    REVIEW OF SYSTEMS:  Review of Systems   Respiratory:  Positive for shortness of breath.    Cardiovascular:  Positive for palpitations.   All  other systems reviewed and are negative.        Historical Information   Past Medical History:   Diagnosis Date    Acute asthmatic bronchitis     last assessed: 2017    Cardiac disease     Chronic diastolic (congestive) heart failure (HCC)     Chronic kidney disease     pt is on peritoneal dialysis daily from 2200- 0600    Closed nondisplaced fracture of distal phalanx of right great toe 2018    Colon polyp     CPAP (continuous positive airway pressure) dependence     Diabetes mellitus (HCC)     Hyperlipidemia     Hypertension     Medical non-compliance     Morbid obesity with BMI of 40.0-44.9, adult ([facility]) 2019    On continuous oral anticoagulation 2020    Pneumonia     last assessed: 2013    PONV (postoperative nausea and vomiting)     Pulmonary embolism ([facility])     Renal disorder     possible clot noted in kidney, thus blood thinners currently    Severe obstructive sleep apnea     Severe pulmonary arterial systolic hypertension ([facility])     Tinea pedis of both feet 2018     Past Surgical History:   Procedure Laterality Date    CATARACT EXTRACTION       SECTION       x 1    EYE SURGERY      NOSE SURGERY      fractured nose - septoplasty     Social History     Substance and Sexual Activity   Alcohol Use Never     Social History     Substance and Sexual Activity   Drug Use Never     Social History     Tobacco Use   Smoking Status Never    Passive exposure: Never   Smokeless Tobacco Never     Family History:   Family History   Problem Relation Age of Onset    Diabetes Mother         mellitus    Anxiety disorder Mother         generalized anxiety disorder    Hypertension Mother     Stroke Mother     Diabetes type II Mother     Kidney disease Father     Kidney failure Father         renal failure    Ulcerative colitis Sister     Diabetes Sister     Ovarian cancer Maternal Aunt     Diabetes Maternal Aunt     Diabetes Maternal Uncle     Heart disease Maternal Uncle     Heart  disease Family     Thyroid disease Neg Hx        Meds/Allergies     No Known Allergies    Current Outpatient Medications:     albuterol (2.5 mg/3 mL) 0.083 % nebulizer solution, Take 3 mL (2.5 mg total) by nebulization every 6 (six) hours as needed for wheezing or shortness of breath, Disp: 100 mL, Rfl: 0    albuterol (PROVENTIL HFA,VENTOLIN HFA) 90 mcg/act inhaler, Inhale 2 puffs every 6 (six) hours as needed for wheezing, Disp: 8 g, Rfl: 0    atorvastatin (LIPITOR) 20 mg tablet, Take 1 tablet (20 mg total) by mouth daily, Disp: 90 tablet, Rfl: 3    B Complex-C (B-COMPLEX WITH VITAMIN C) tablet, Take 1 tablet by mouth daily, Disp: , Rfl:     Blood Glucose Monitoring Suppl (ONE TOUCH ULTRA 2) w/Device KIT, Test glucose 3 times daily, Disp: 1 each, Rfl: 0    calcitriol (ROCALTROL) 0.25 mcg capsule, Take 1 capsule (0.25 mcg total) by mouth daily, Disp: 90 capsule, Rfl: 1    calcium acetate (PHOSLO) capsule, Take 667 mg by mouth daily , Disp: , Rfl:     cholecalciferol (VITAMIN D3) 1,000 units tablet, Take 2 tablets (2,000 Units total) by mouth daily, Disp: 100 tablet, Rfl: 5    cinacalcet (SENSIPAR) 30 mg tablet, TAKE 1 TABLET THREE TIMES A WEEK, Disp: 36 tablet, Rfl: 3    collagenase (SANTYL) ointment, Apply topically daily, Disp: 30 g, Rfl: 1    Continuous Blood Gluc Sensor (FreeStyle Conner 2 Sensor) MISC, Use to check blood sugars, Disp: 6 each, Rfl: 4    docusate sodium (COLACE) 100 mg capsule, Take 1 capsule (100 mg total) by mouth 2 (two) times a day, Disp: 10 capsule, Rfl: 0    gentamicin (GARAMYCIN) 0.1 % cream, APPLY TOPICALLY DAILY, Disp: 15 g, Rfl: 23    Insulin Glargine-yfgn (Semglee, yfgn,) 100 UNIT/ML SOPN, Inject 10 units subcutaneously daily in the morning, Disp: 15 mL, Rfl: 3    insulin lispro (HumaLOG KwikPen) 100 units/mL injection pen, Inject 3 Units under the skin 3 (three) times a day with meals, Disp: 15 mL, Rfl: 3    Insulin Pen Needle 31G X 5 MM MISC, by Does not apply route daily, Disp: 100  each, Rfl: 1    Lancets (onetouch ultrasoft) lancets, Test glucose 3 times a day; Code: E11.8, Disp: 300 each, Rfl: 1    levothyroxine (Euthyrox) 125 mcg tablet, Take one tablet by mouth in early morning on empty stomach, Disp: 90 tablet, Rfl: 3    metolazone (ZAROXOLYN) 5 mg tablet, Take 5 mg by mouth daily, Disp: , Rfl:     metoprolol succinate (TOPROL-XL) 50 mg 24 hr tablet, TAKE 1 TABLET DAILY (Patient taking differently: Take 50 mg by mouth daily 2 in AM and 2 in PM), Disp: 90 tablet, Rfl: 1    MULTIPLE VITAMIN PO, Take 1 tablet by mouth daily, Disp: , Rfl:     OneTouch Ultra test strip, TEST GLUCOSE THREE TIMES A DAY, Disp: 300 strip, Rfl: 3    torsemide (DEMADEX) 100 mg tablet, Take 1 tablet (100 mg total) by mouth daily, Disp: 90 tablet, Rfl: 1    valsartan (DIOVAN) 320 MG tablet, Take 0.5 tablets (160 mg total) by mouth daily (Patient taking differently: Take 160 mg by mouth daily Takes 1 full tablet daily), Disp: 90 tablet, Rfl: 0    warfarin (COUMADIN) 5 mg tablet, , Disp: , Rfl:     nystatin (MYCOSTATIN) powder, Apply topically 3 (three) times a day for 7 days (Patient not taking: Reported on 3/23/2023), Disp: 15 g, Rfl: 0    semaglutide, 0.25 or 0.5 mg/dose, (Ozempic, 0.25 or 0.5 MG/DOSE,) 2 mg/3 mL injection pen, Inject 0.75 mL (0.5 mg total) under the skin every 7 days (Patient not taking: Reported on 4/4/2024), Disp: 3 mL, Rfl: 2    Vitals: Blood pressure 120/80, pulse 83, height 5' (1.524 m), weight 84.4 kg (186 lb), SpO2 99%, not currently breastfeeding.    Body mass index is 36.33 kg/m².  Vitals:    07/01/24 0803   Weight: 84.4 kg (186 lb)     BP Readings from Last 3 Encounters:   07/01/24 120/80   05/16/24 132/69   04/04/24 124/60       Physical Exam:  Physical Exam    Neurologic:  Alert & oriented x 3, no new focal deficits,  constitutional: Obese  Eyes:  Pupil equal and reacting to light, conjunctiva normal,   HENT:  Atraumatic, oropharynx moist, Neck- normal range of motion, no tenderness,   Neck supple, No JVP, No LNP   Respiratory:  Bilateral air entry, mostly clear to auscultation  Cardiovascular: S1-S2 irregularly irregular, tachycardic, 1/6 systolic murmur  GI:  Soft, nondistended, normal bowel sounds, nontender, no hepatosplenomegaly appreciated.  Musculoskeletal:  No tenderness, no deformities.   Skin:  Well hydrated, no rash   Lymphatic:  No lymphadenopathy noted   Extremities:  No edema and distal pulses are present        Diagnostic Studies Review Cardio:      EKG: Atrial fibrillation with RVR at 159/min, left axis deviation, possible anterior infarct of undetermined age, lateral T wave abnormality    Cardiac testing:   Results for orders placed during the hospital encounter of 21    Echo complete with contrast if indicated    Narrative  [address] [address] [phone]    Transthoracic Echocardiogram  2D, M-mode, Doppler, and Color Doppler    Study date:  09-Aug-2021    Patient: OMAIRA IGLESIAS  MR number: [id number]  Account number: [id number]  : 1955  Age: 66 years  Gender: Female  Status: Outpatient  Location: Echo lab  Height: 60 in  Weight: 211.6 lb  BP: 126/ 70 mmHg    Indications: Cardiomyopathy    Diagnoses: I43. - Cardiomyopathy in diseases classified elsewhere    Sonographer:  JESUS Egan  Primary Physician:  Yessica Ramirez DO  Referring Physician:  Huang Edwards MD  Group:  [facility]  Interpreting Physician:  Kevin Sin MD    SUMMARY    SUMMARY:  Compared to prior echo, LVEF remains near 45% but with increased diastolic restrictive physiology. Prominent left sided/pulmonary presures remain.    LEFT VENTRICLE:  Systolic function was mildly to moderately reduced by visual assessment. Ejection fraction was estimated in the range of 40 % to 45 % to be 45 %.  There was mild diffuse hypokinesis.  Doppler parameters were consistent with restrictive physiology, indicative of decreased  left ventricular diastolic compliance and/or increased left atrial pressure.    LEFT ATRIUM:  The atrium was mildly dilated.    RIGHT ATRIUM:  The atrium was mildly dilated.    MITRAL VALVE:  There was mild to moderate annular calcification.  There was mild regurgitation.    AORTIC VALVE:  The valve was functionally bicuspid. Leaflets exhibited mildly to moderately increased thickness, mild to moderate calcification, lower normal cuspal separation, and sclerosis.    TRICUSPID VALVE:  Poor TR jet to accurately estimate pulmonary pressure. Unable to obtain full envelope to obtain peak pulmonary pressure. Pulmonary pressure at least 68mmHg.  There was mild regurgitation.    PULMONIC VALVE:  There was mild regurgitation.    IVC, HEPATIC VEINS:  The inferior vena cava was mildly dilated.  The respirophasic change in diameter was more than 50%.    HISTORY: PRIOR HISTORY: Tachycardia,CHF,Pulmonary HTN,Hyperlipidemia,Diabetes,end stage renal disease,Hypothyroidism.    PROCEDURE: The procedure was performed in the echo lab. This was a routine study. The transthoracic approach was used. The study included complete 2D imaging, M-mode, complete spectral Doppler, and color Doppler. The heart rate was 74 bpm,  at the start of the study. Images were obtained from the parasternal, apical, subcostal, and suprasternal notch acoustic windows. Image quality was adequate.    LEFT VENTRICLE: Size was normal. Systolic function was mildly to moderately reduced by visual assessment. Ejection fraction was estimated in the range of 40 % to 45 % to be 45 %. There was mild diffuse hypokinesis. DOPPLER: Doppler  parameters were consistent with restrictive physiology, indicative of decreased left ventricular diastolic compliance and/or increased left atrial pressure.    RIGHT VENTRICLE: The size was normal. Systolic function was low normal with TAPSE-1.5-1.6cm    LEFT ATRIUM: The atrium was mildly dilated.    RIGHT ATRIUM: The atrium was mildly  dilated.    MITRAL VALVE: There was mild to moderate annular calcification. There was mild calcification. There was lower normal leaflet separation. DOPPLER: There was no evidence for significant stenosis. Mean gradient 2mmHg at 70bpm There was mild  regurgitation.    AORTIC VALVE: The valve was functionally bicuspid. Leaflets exhibited mildly to moderately increased thickness, mild to moderate calcification, lower normal cuspal separation, and sclerosis. DOPPLER: Transaortic velocity was minimally  increased.    TRICUSPID VALVE: Poor TR jet to accurately estimate pulmonary pressure. Unable to obtain full envelope to obtain peak pulmonary pressure. Pulmonary pressure at least 68mmHg. DOPPLER: There was mild regurgitation.    PULMONIC VALVE: DOPPLER: There was mild regurgitation.    SYSTEMIC VEINS: IVC: The inferior vena cava was mildly dilated. The respirophasic change in diameter was more than 50%.    MEASUREMENT TABLES    DOPPLER MEASUREMENTS  Aortic valve   (Reference normals)  Peak ravinder   220 cm/s   (--)    SYSTEM MEASUREMENT TABLES    2D  EF (Teich): 44.74 %  %FS: 21.75 %  Ao Diam: 3.12 cm  EDV(Teich): 60.9 ml  ESV(Teich): 33.65 ml  IVSd: 1.13 cm  LA Area: 21.61 cm2  LA Diam: 4.13 cm  LVEDV MOD A4C: 119.85 ml  LVEF MOD A4C: 41.9 %  LVESV MOD A4C: 69.63 ml  LVIDd: 3.77 cm  LVIDs: 2.95 cm  LVLd A4C: 7.89 cm  LVLs A4C: 6.89 cm  LVPWd: 1.2 cm  RA Area: 18.22 cm2  RVIDd: 3.03 cm  SV (Teich): 27.25 ml  SV MOD A4C: 50.22 ml    CW  AV Vmax: 1.68 m/s  AV maxP.24 mmHg  TR Vmax: 4.18 m/s  TR maxP.02 mmHg    MM  TAPSE: 1.48 cm    PW  MV E/A Ratio: 1.97  E' Sept: 0.07 m/s  E/E' Sept: 21.99  LVOT Vmax: 1.16 m/s  LVOT maxP.36 mmHg  MV A Ravinder: 0.76 m/s  MV Dec Woodward: 7.47 m/s2  MV DecT: 201.43 ms  MV E Ravinder: 1.5 m/s  MV PHT: 58.42 ms  MVA By PHT: 3.77 cm2    [facility] Accredited Echocardiography Laboratory    Prepared and electronically signed by    Kevin Sin MD  Signed 11-Aug-2021  14:18:20        Results for orders placed during the hospital encounter of 03/09/22    Echo complete w/ contrast if indicated    Interpretation Summary    Left Ventricle: Left ventricular cavity size is normal. Wall thickness is mildly increased. Systolic function is normal.  Estimated ejection fraction is about 45%.  Endocardium was not adequately visualized. No definite regional wall motion abnormality seen although it cannot be absolutely excluded on the basis of this study. There is mild global hypokinesis. Diastolic dysfunction, grade indeterminate. There is mild concentric hypertrophy.    Right Ventricle: Systolic function is mildly impaired with TAPSE of 1.5 cm.    Left Atrium: The atrium is upper normal in size.    Right Atrium: The atrium is upper normal in size.    Aortic Valve: The aortic valve is trileaflet. The leaflets are moderately thickened. The leaflets are mild to moderately moderately calcified. The leaflets exhibit normal mobility. There is trace regurgitation. There is no evidence of stenosis.  Mean gradient is 5 mm Hg with DVI of 0.64.    Mitral Valve: There is mild thickening. There is mild calcification. There is moderate annular calcification. There is mild to moderate regurgitation. There is no evidence of significant stenosis.  His mean gradient is 3 mm Hg    Tricuspid Valve: There is moderate regurgitation. The right ventricular systolic pressure is moderately to severely elevated at 68 mm Hg    Pulmonic Valve: There is mild regurgitation.    Changes include: Tricuspid regurgitation and RV systolic pressure have increased.      Results for orders placed during the hospital encounter of 03/09/22    NM myocardial perfusion spect (rx stress and/or rest)    Interpretation Summary    No evidence of significant ischemia with pharmacologic stress test    Stress ECG: No ST deviation is noted. The ECG was negative for ischemia. The stress ECG is negative for ischemia after pharmacologic  vasodilation, without reproduction of symptoms.    Perfusion: Comparison of post Lexiscan/prone images with the resting revealed no significant reversible ischemia.    Stress Function: Left ventricular function post-stress is normal.  Calculated ejection fraction is 54%.    Perfusion Defect Conclusion: There is no evidence of transient ischemic dilation (TID).  Ratio is 1.16.    Results for orders placed during the hospital encounter of 22    Holter monitor    Interpretation Summary  PT NAME: Jacquie Smith  : 1955  AGE: 67 y.o.  GENDER: female  MRN: [id number]  PROCEDURE: Holter monitor - 24hour    DATE OF PROCEDURE:  22    INDICATIONS:  24 hour Holter monitor was performed for syncope.    PROCEDURE:    Average heart rate was 74; minimum heart rate was sinus bradycardia at 55 BPM at 5:33am; and maximum heart rate was sinus tachycardia at 102 BPM at 8:27pm.    Ventricular ectopic activity consisted of 0, which comprises 0% of total beats.    Supraventricular ectopic activity consisted of 1792, which comprises 1.7% of total beats. 1677 with in bigeminy    No significant bradyarrhythmias were present.    No evidence of afib/flutter/svt.    Patient's diary included symptoms of shortness of breath.  Corresponding events on monitor was sinus rhythm.    Impression  1. Normal 24 hour holter tracing report.  2. Rhythm was sinus throughout monitoring period.  3. There were 1.7% episodes of Supraventricular ectopic activity.  4. There were no episodes of Ventricular ectopic activity.  5. Patient's symptoms on diary corresponded to sinus rhythm on monitor.      Interpreted by: Kevin Sin MD, [facility]      Imaging:  Chest X-Ray:   XR chest pa & lateral    Result Date: 2024  Impression No acute cardiopulmonary disease. Workstation performed: [id number]       CT-scan of the chest:     No CTA results available for this patient.  Lab Review   Lab Results   Component Value Date    WBC 9.70 2024    HGB 10.4 (L)  "03/23/2024    HCT 32.4 (L) 03/23/2024    MCV 91 03/23/2024    RDW 14.4 03/23/2024     03/23/2024     BMP:  Lab Results   Component Value Date    SODIUM 138 03/23/2024    K 4.1 03/23/2024     03/23/2024    CO2 25 03/23/2024    ANIONGAP 6 12/11/2014    BUN 56 (H) 03/23/2024    CREATININE 4.69 (H) 03/23/2024    GLUC 211 (H) 03/23/2024    GLUF 92 03/05/2024    CALCIUM 9.0 03/23/2024    CORRECTEDCA 9.2 03/19/2024    EGFR 9 03/23/2024    MG 1.8 (L) 03/20/2024     LFT:  Lab Results   Component Value Date    AST 20 03/19/2024    ALT 15 03/19/2024    ALKPHOS 136 (H) 03/19/2024    TP 6.8 03/19/2024    ALB 3.4 (L) 03/19/2024      No components found for: \"TSH3\"  Lab Results   Component Value Date    THL7FUOVKYWD 11.384 (H) 03/05/2024     Lab Results   Component Value Date    HGBA1C 8.3 (H) 03/05/2024     Lipid Profile:   Lab Results   Component Value Date    CHOLESTEROL 127 03/05/2024    HDL 48 (L) 03/05/2024    LDLCALC 69 03/05/2024    TRIG 52 03/05/2024     Lab Results   Component Value Date    CHOLESTEROL 127 03/05/2024    CHOLESTEROL 136 09/11/2023     Lab Results   Component Value Date    CKTOTAL 135 07/14/2020    CKMB 4.4 07/14/2020    CKMBINDEX 3.3 (H) 07/14/2020    TROPONINI 0.02 07/13/2020     Lab Results   Component Value Date    NTBNP 30,243 (H) 07/13/2020      No results found for this or any previous visit (from the past 672 hour(s)).          Dr. Vaibhav Tang MD, FACC      \"This note has been constructed using a voice recognition system.Therefore there may be syntax, spelling, and/or grammatical errors. Please call if you have any questions. \"  "

## 2024-07-01 ENCOUNTER — OFFICE VISIT (OUTPATIENT)
Dept: CARDIOLOGY CLINIC | Facility: CLINIC | Age: 69
End: 2024-07-01
Payer: MEDICARE

## 2024-07-01 ENCOUNTER — TELEPHONE (OUTPATIENT)
Dept: CARDIOLOGY CLINIC | Facility: CLINIC | Age: 69
End: 2024-07-01

## 2024-07-01 VITALS
HEART RATE: 83 BPM | SYSTOLIC BLOOD PRESSURE: 120 MMHG | OXYGEN SATURATION: 99 % | DIASTOLIC BLOOD PRESSURE: 80 MMHG | BODY MASS INDEX: 36.52 KG/M2 | WEIGHT: 186 LBS | HEIGHT: 60 IN

## 2024-07-01 DIAGNOSIS — I50.42 CHRONIC COMBINED SYSTOLIC AND DIASTOLIC CONGESTIVE HEART FAILURE (HCC): ICD-10-CM

## 2024-07-01 DIAGNOSIS — E78.2 MIXED HYPERLIPIDEMIA: ICD-10-CM

## 2024-07-01 DIAGNOSIS — R00.0 TACHYCARDIA: ICD-10-CM

## 2024-07-01 DIAGNOSIS — I27.20 PULMONARY HYPERTENSION (HCC): Chronic | ICD-10-CM

## 2024-07-01 DIAGNOSIS — I48.20 ATRIAL FIBRILLATION, CHRONIC (HCC): Primary | ICD-10-CM

## 2024-07-01 PROCEDURE — 99214 OFFICE O/P EST MOD 30 MIN: CPT | Performed by: INTERNAL MEDICINE

## 2024-07-01 PROCEDURE — 93000 ELECTROCARDIOGRAM COMPLETE: CPT | Performed by: INTERNAL MEDICINE

## 2024-07-01 NOTE — TELEPHONE ENCOUNTER
As Dr. Tang requested, I called and spoke to patient to find out why she did not make it to the ED this morning per Dr. Tang.    Patient stated that she has been to the ED at least 3 times this year due to her tachycardia, and every time she's gone she had to wait for hours to be seen, and she's told that she should follow up with her doctors. She stated that she did mention this to Dr. Tang when he advised her to go to the ED, but stated maybe he didn't hear her say this.    Patient feels as though it is a waste of her time to go to the ED for them to do and tell her the same things she's already been told. There is a co-pay every time she goes to the ED and feels as though unnecessary testing is done. She also stated that Dr. Tang mentioned they would not be able to complete something while she is in the ED if she has already eaten before her appointment this morning.    She understands that her heart rate fluctuates drastically, but she has been keeping track of it when she does her blood pressure.    Patient stated that she will monitor her heart rate for a few days, and if her heart rate does not go down, she promises to check herself into the hospital by the weekend.    Patient stated that she sees her Pulmonologist tomorrow and her Endocrinologist on Wednesday.    Patient apologized for her frustration and it is not towards myself or Dr. Tang.    Patient was very nice and I could tell she was frustrated, but it had nothing to do with the office.    Patient was thankful that Dr. Tang wanted to check up on her.   Called and informed employer of dates on work excuses written by [employer] León and [employer] Kat.

## 2024-07-02 ENCOUNTER — TELEPHONE (OUTPATIENT)
Dept: NEPHROLOGY | Facility: CLINIC | Age: 69
End: 2024-07-02

## 2024-07-02 ENCOUNTER — OFFICE VISIT (OUTPATIENT)
Dept: ENDOCRINOLOGY | Facility: CLINIC | Age: 69
End: 2024-07-02
Payer: MEDICARE

## 2024-07-02 VITALS
BODY MASS INDEX: 36.32 KG/M2 | DIASTOLIC BLOOD PRESSURE: 62 MMHG | SYSTOLIC BLOOD PRESSURE: 118 MMHG | HEIGHT: 60 IN | WEIGHT: 185 LBS | HEART RATE: 83 BPM | OXYGEN SATURATION: 99 %

## 2024-07-02 DIAGNOSIS — E78.2 MIXED HYPERLIPIDEMIA: ICD-10-CM

## 2024-07-02 DIAGNOSIS — E11.319 DIABETIC RETINOPATHY OF BOTH EYES ASSOCIATED WITH TYPE 2 DIABETES MELLITUS, MACULAR EDEMA PRESENCE UNSPECIFIED, UNSPECIFIED RETINOPATHY SEVERITY (HCC): Primary | ICD-10-CM

## 2024-07-02 DIAGNOSIS — Z79.4 TYPE 2 DIABETES MELLITUS WITH HYPERGLYCEMIA, WITH LONG-TERM CURRENT USE OF INSULIN (HCC): ICD-10-CM

## 2024-07-02 DIAGNOSIS — E11.65 TYPE 2 DIABETES MELLITUS WITH HYPERGLYCEMIA, WITH LONG-TERM CURRENT USE OF INSULIN (HCC): ICD-10-CM

## 2024-07-02 DIAGNOSIS — I10 ESSENTIAL HYPERTENSION: ICD-10-CM

## 2024-07-02 DIAGNOSIS — E55.9 VITAMIN D DEFICIENCY: ICD-10-CM

## 2024-07-02 DIAGNOSIS — E03.9 ACQUIRED HYPOTHYROIDISM: ICD-10-CM

## 2024-07-02 PROBLEM — E11.00 TYPE 2 DIABETES MELLITUS WITH HYPEROSMOLARITY WITHOUT COMA, WITH LONG-TERM CURRENT USE OF INSULIN (HCC): Status: RESOLVED | Noted: 2022-09-19 | Resolved: 2024-07-02

## 2024-07-02 PROCEDURE — 99214 OFFICE O/P EST MOD 30 MIN: CPT | Performed by: NURSE PRACTITIONER

## 2024-07-02 NOTE — PATIENT INSTRUCTIONS
"Please contact our office when completed with Conner 2 supplies to transition to Conner 3 CGM.     Low blood sugar in people with diabetes   The Basics   Written by the doctors and editors at [facility]   What is low blood sugar? -- This is when the level of sugar in a person's blood gets too low. It is also called \"hypoglycemia.\"  Low blood sugar can cause symptoms ranging from sweating and feeling hungry to passing out.  Low blood sugar can happen in people with diabetes who take certain medicines, including insulin, other medicines given as shots, and some types of pills.  When can people with diabetes get low blood sugar? -- People with diabetes can get low blood sugar when they:   Take too much medicine, including insulin, other medicines given as shots, or certain diabetes pills   Do not eat enough food   Exercise too much without eating a snack or reducing their insulin dose   Wait too long between meals   Drink too much alcohol or drink alcohol on an empty stomach  What are the symptoms of low blood sugar? -- The symptoms can be different from person to person, and can change over time. During the early stages of low blood sugar, a person can:   Sweat or tremble   Feel hungry   Feel worried  People who have early symptoms should check their blood sugar level to see if it is low and needs to be treated. If low blood sugar levels are not treated, severe symptoms can occur. These can include:   Trouble walking or feeling weak   Trouble seeing clearly   Being confused or acting in a strange way   Passing out or having a seizure  Some people do not get symptoms during the early stages of low blood sugar. Doctors sometimes call this \"hypoglycemia unawareness.\" People with hypoglycemia unawareness are more likely to have severe symptoms, because they might not know that they have low blood sugar until they have severe symptoms. Hypoglycemia unawareness is more likely in people who:   Have had type 1 diabetes for more " than 5 to 10 years   Have frequent episodes of low blood sugar   Use insulin to keep their blood sugar level tightly managed   Are tired   Drink a lot of alcohol   Take certain medicines for high blood pressure or diabetes  How is low blood sugar treated? -- It can be treated with:   Quick sources of sugar - People can eat or drink quick sources of sugar (table 1). Foods that have fat, such as chocolate or cheese, do not treat low blood sugar as quickly. You and a family member should carry a quick source of sugar at all times.   A dose of glucagon - Glucagon is a hormone that can quickly raise blood sugar levels and stop severe symptoms. It comes as a shot (figure 1) or a nose spray. If your doctor recommends that you carry glucagon with you, they will tell you when and how to use it. If possible, it's also a good idea to have a family member, friend, or roommate learn how to give you glucagon. That way, they can give it to you if you can't do it yourself.  After treating low blood sugar, it is very important to recheck your blood sugar level to make sure that it rises and stays in the normal range. Once your blood sugar is normal, eat a small snack that contains protein, fat, and carbohydrate. This can help keep your blood sugar stable.  What should I do after treatment? -- After treatment for low blood sugar, most people can get back to their usual routine. But your doctor or nurse might recommend that you check your blood sugar level more often during the next 2 to 3 days.  If your low blood sugar was treated with glucagon, call your doctor or nurse. They might change the dose of your diabetes medicine.  How can I prevent low blood sugar? -- The best way is to:   Check your blood sugar levels often - Your doctor or nurse will tell you how and when to check your blood sugar levels at home. They will also tell you what your blood sugar levels should be, and when to treat low blood sugar.   Learn the symptoms of  "low blood sugar, and be ready to treat it in the early stages. Treating low blood sugar early can prevent severe symptoms.  When should I go to a hospital or call for an ambulance? -- A family member or friend should take you to a hospital or call for an ambulance (in the [place] and [place], call 9-1-1) if you:   Are still confused 15 minutes after being treated with a dose of glucagon   Have passed out, and there is no glucagon nearby   Still have low blood sugar after treatment  If you have low blood sugar, do not try to drive yourself to the hospital. Driving with low blood sugar can be dangerous.  All topics are updated as new evidence becomes available and our peer review process is complete.  This topic retrieved from [employer] on: Apr 11, 2024.  Topic 32460 Version 22.0  Release: 32.3.2 - C32.100  © 2024 UpToDate, Inc. and/or its affiliates. All rights reserved.  table 1: Quick sources of sugar to treat low blood sugar  3 or 4 glucose tablets   ½ cup of juice or regular soda (not sugar-free)   2 tablespoons of raisins   4 or 5 saltine crackers   1 tablespoon of sugar   1 tablespoon of honey or corn syrup   6 to 8 hard candies   These sources of sugar act quickly to treat low blood sugar levels. People with diabetes who use insulin or certain other diabetes medicines should carry at least 1 of these items at all times.  Graphic 07815 Version 4.0  figure 1: Glucagon autoinjector     Some people carry glucagon in the form of an autoinjector \"pen.\" This makes it easy to give a dose into the upper arm, thigh, or belly.  Graphic 268624 Version 2.0  Consumer Information Use and Disclaimer   Disclaimer: This generalized information is a limited summary of diagnosis, treatment, and/or medication information. It is not meant to be comprehensive and should be used as a tool to help the user understand and/or assess potential diagnostic and treatment options. It does NOT include all information about conditions, treatments, " medications, side effects, or risks that may apply to a specific patient. It is not intended to be medical advice or a substitute for the medical advice, diagnosis, or treatment of a health care provider based on the health care provider's examination and assessment of a patient's specific and unique circumstances. Patients must speak with a health care provider for complete information about their health, medical questions, and treatment options, including any risks or benefits regarding use of medications. This information does not endorse any treatments or medications as safe, effective, or approved for treating a specific patient. UpToDate, Inc. and its affiliates disclaim any warranty or liability relating to this information or the use thereof.The use of this information is governed by the Terms of Use, available at https://www.[employer]suwer.com/en/know/clinical-effectiveness-terms. 2024© UpToDate, Inc. and its affiliates and/or licensors. All rights reserved.  Copyright   © 2024 UpToDate, Inc. and/or its affiliates. All rights reserved.

## 2024-07-02 NOTE — TELEPHONE ENCOUNTER
----- Message from Lori Renner MD sent at 7/2/2024  3:02 PM EDT -----  Hello    Ms Betty    - Can you please let [facility] team know that the CAT scan shows some signs of pulmonary edema but otherwise does not have urgent any acute findings.  And also some signs of pulmonary hypertension.  Can you also please let the patient's dialysis unit know for the home unit at [facility] that she may have some pulmonary edema on CT scan.  If they need to adjust her PD prescription they should give her a call    Thank you  ----- Message -----  From: Interface, Radiology Results In  Sent: 6/25/2024   6:28 AM EDT  To: Lori Renner MD

## 2024-07-02 NOTE — ASSESSMENT & PLAN NOTE
Lab Results   Component Value Date    HGBA1C 8.3 (H) 03/05/2024     HGA1C close to goal.    BGL Reviewed: will plan to transition to ivan 3 after ivan 2 supplies complete    Treatment regimen: Continue for now, please bring in CGM in one week for review    Discussed risks/complications associated with uncontrolled diabetes including organ involvement, heart attack, stroke, death.      Advised lifestyle modifications including attention to diet including the amount and types of carbohydrates consumed and regular activity.     Call for blood sugars less than 70 mg/dl or patterns over 250 mg/dl.     Discussed symptoms and treatment of hypoglycemia.  Reviewed risks associated with hypoglycemia. Always carry rapid acting carbohydrates and a glucometer (a way to check your blood sugar).    Recommendation for medical identification either bracelet, necklace.    Recommendation for glucagon if on insulin. Declined.     Routine follow up for diabetic eye and foot exams.     Ordered blood work to complete prior to next visit.    Send glucose logs/CGM download in 1-2 weeks for review    Follow up in 3 months.

## 2024-07-02 NOTE — ASSESSMENT & PLAN NOTE
Experiencing fatigue and constipation.  Due for repeat thyroid function tests.  For now, continues on levothyroxine 125 mcg daily.

## 2024-07-02 NOTE — PROGRESS NOTES
Established Patient Progress Note    Chief Complaint:  Diabetes follow up visit    Impression & Plan:    Problem List Items Addressed This Visit          Cardiovascular and Mediastinum    Essential hypertension     BP under good control.   Continues on ARB.             Endocrine    Type 2 diabetes mellitus with hyperglycemia, with long-term current use of insulin ([facility])       Lab Results   Component Value Date    HGBA1C 8.3 (H) 03/05/2024     HGA1C close to goal.    BGL Reviewed: will plan to transition to ivan 3 after ivan 2 supplies complete    Treatment regimen: Continue for now, please bring in CGM in one week for review    Discussed risks/complications associated with uncontrolled diabetes including organ involvement, heart attack, stroke, death.      Advised lifestyle modifications including attention to diet including the amount and types of carbohydrates consumed and regular activity.     Call for blood sugars less than 70 mg/dl or patterns over 250 mg/dl.     Discussed symptoms and treatment of hypoglycemia.  Reviewed risks associated with hypoglycemia. Always carry rapid acting carbohydrates and a glucometer (a way to check your blood sugar).    Recommendation for medical identification either bracelet, necklace.    Recommendation for glucagon if on insulin. Declined.     Routine follow up for diabetic eye and foot exams.     Ordered blood work to complete prior to next visit.    Send glucose logs/CGM download in 1-2 weeks for review    Follow up in 3 months.            Relevant Medications    semaglutide, 0.25 or 0.5 mg/dose, (Ozempic, 0.25 or 0.5 MG/DOSE,) 2 mg/3 mL injection pen    Other Relevant Orders    Comprehensive metabolic panel    Hemoglobin A1C    Fructosamine    Diabetic retinopathy of both eyes associated with type 2 diabetes mellitus (HCC) - Primary     Follows regularly with retinal specialist           Relevant Medications    semaglutide, 0.25 or 0.5 mg/dose, (Ozempic, 0.25 or 0.5  MG/DOSE,) 2 mg/3 mL injection pen    Hypothyroidism     Experiencing fatigue and constipation.  Due for repeat thyroid function tests.  For now, continues on levothyroxine 125 mcg daily.         Relevant Orders    TSH, 3rd generation    T4, free       Other    Mixed hyperlipidemia     Continues on statin therapy.   Recommend recheck lipid panel.          Relevant Orders    Lipid panel    Vitamin D deficiency     Continues on supplementation.          Relevant Orders    Vitamin D 25 hydroxy       History of Present Illness:   Jacquie Smith is a 69 y.o. female with a history of type 2 diabetes on ESRD on peritoneal dialysis daily for eight hours. Due for eye exam, had retinal exam by specialist with history of macular degeneration. Has podiatry follow up in June 2024.     Uses CGM has been without sensors due to multiple imaging studies. Blood sugars ranging between  mg/dL. Some rare hypoglycemia post meals.     Current regimen:  Humalog 3 units three times per day with meals  Semglee 10 units daily  Ozempic 0.5 mg weekly    Has hypertension: Taking valsartan, compliant  Has hyperlipidemia: Taking statin, tolerating   Has hypothyroidism: Taking levothyroxine 125 mcg daily. Takes regularly and properly. Energy level low. Weight fluctuates a few pounds.  Denies anxiety, jitteriness, or tremors.  Denies tachycardia or palpitations. Some constipation. Denies frequent headaches. Denies temperature intolerances.       Patient Active Problem List   Diagnosis    End stage renal disease (HCC)    Nephrotic range proteinuria    Cyst of ovary    Mixed hyperlipidemia    Type 2 diabetes mellitus with hyperglycemia, with long-term current use of insulin (HCC)    Dyspnea on exertion    PE (pulmonary thromboembolism) ([facility])    Elevated brain natriuretic peptide (BNP) level    Leg edema, left    Pulmonary hypertension (HCC)    Morbid obesity with BMI of 40.0-44.9, adult ([facility])    Vitamin D deficiency    Secondary hyperparathyroidism  of renal origin (HCC)    Non-rheumatic mitral valve stenosis    Diabetic retinopathy of both eyes associated with type 2 diabetes mellitus (HCC)    Encounter for peritoneal dialysis (HCC)    ESRD (end stage renal disease) (HCC)    Paroxysmal atrial flutter (HCC)    History of pulmonary embolus (PE)    Chronic combined systolic and diastolic congestive heart failure (HCC)    Nocturnal hypoxemia    Dependence on renal dialysis (HCC)    Hypothyroidism    Anemia of chronic disease    Abnormal CT of the chest    Acute gout due to renal impairment involving toe of right foot    Essential hypertension    Class 2 obesity with body mass index (BMI) of 37.0 to 37.9 in adult    Dilated cardiomyopathy (HCC)    Iron deficiency anemia due to chronic blood loss    History of syncope    Sinus bradycardia    Adenomatous polyp of colon    CHANDRA (obstructive sleep apnea)    Atrial fibrillation, chronic (HCC)    Sleep apnea, obstructive    Chronic kidney disease    Acute respiratory failure with hypoxia (HCC)    Chronic respiratory failure with hypoxia (HCC)      Past Medical History:   Diagnosis Date    Acute asthmatic bronchitis     last assessed: 6/2/2017    Cardiac disease     Chronic diastolic (congestive) heart failure (HCC)     Chronic kidney disease     pt is on peritoneal dialysis daily from 2200- 0600    Closed nondisplaced fracture of distal phalanx of right great toe 11/13/2018    Colon polyp     CPAP (continuous positive airway pressure) dependence     Diabetes mellitus (HCC)     Hyperlipidemia     Hypertension     Medical non-compliance     Morbid obesity with BMI of 40.0-44.9, adult ([facility]) 02/28/2019    On continuous oral anticoagulation 12/22/2020    Pneumonia     last assessed: 6/5/2013    PONV (postoperative nausea and vomiting)     Pulmonary embolism ([facility])     Renal disorder     possible clot noted in kidney, thus blood thinners currently    Severe obstructive sleep apnea     Severe pulmonary arterial systolic  hypertension (HCC)     Tinea pedis of both feet 2018      Past Surgical History:   Procedure Laterality Date    CATARACT EXTRACTION       SECTION       x 1    EYE SURGERY      NOSE SURGERY      fractured nose - septoplasty      Family History   Problem Relation Age of Onset    Diabetes Mother         mellitus    Anxiety disorder Mother         generalized anxiety disorder    Hypertension Mother     Stroke Mother     Diabetes type II Mother     Kidney disease Father     Kidney failure Father         renal failure    Ulcerative colitis Sister     Diabetes Sister     Ovarian cancer Maternal Aunt     Diabetes Maternal Aunt     Diabetes Maternal Uncle     Heart disease Maternal Uncle     Heart disease Family     Thyroid disease Neg Hx      Social History     Tobacco Use    Smoking status: Never     Passive exposure: Never    Smokeless tobacco: Never   Substance Use Topics    Alcohol use: Never     No Known Allergies      Current Outpatient Medications:     atorvastatin (LIPITOR) 20 mg tablet, Take 1 tablet (20 mg total) by mouth daily, Disp: 90 tablet, Rfl: 3    B Complex-C (B-COMPLEX WITH VITAMIN C) tablet, Take 1 tablet by mouth daily, Disp: , Rfl:     Blood Glucose Monitoring Suppl (ONE TOUCH ULTRA 2) w/Device KIT, Test glucose 3 times daily, Disp: 1 each, Rfl: 0    calcitriol (ROCALTROL) 0.25 mcg capsule, Take 1 capsule (0.25 mcg total) by mouth daily, Disp: 90 capsule, Rfl: 1    calcium acetate (PHOSLO) capsule, Take 667 mg by mouth daily , Disp: , Rfl:     cholecalciferol (VITAMIN D3) 1,000 units tablet, Take 2 tablets (2,000 Units total) by mouth daily, Disp: 100 tablet, Rfl: 5    cinacalcet (SENSIPAR) 30 mg tablet, TAKE 1 TABLET THREE TIMES A WEEK, Disp: 36 tablet, Rfl: 3    collagenase (SANTYL) ointment, Apply topically daily, Disp: 30 g, Rfl: 1    gentamicin (GARAMYCIN) 0.1 % cream, APPLY TOPICALLY DAILY, Disp: 15 g, Rfl: 23    Insulin Glargine-yfgn (Semglee, yfgn,) 100 UNIT/ML SOPN,  Inject 10 units subcutaneously daily in the morning, Disp: 15 mL, Rfl: 3    insulin lispro (HumaLOG KwikPen) 100 units/mL injection pen, Inject 3 Units under the skin 3 (three) times a day with meals, Disp: 15 mL, Rfl: 3    Insulin Pen Needle 31G X 5 MM MISC, by Does not apply route daily, Disp: 100 each, Rfl: 1    Lancets (onetouch ultrasoft) lancets, Test glucose 3 times a day; Code: E11.8, Disp: 300 each, Rfl: 1    levothyroxine (Euthyrox) 125 mcg tablet, Take one tablet by mouth in early morning on empty stomach, Disp: 90 tablet, Rfl: 3    metoprolol succinate (TOPROL-XL) 50 mg 24 hr tablet, TAKE 1 TABLET DAILY (Patient taking differently: Take 50 mg by mouth daily 2 in AM and 2 in PM), Disp: 90 tablet, Rfl: 1    MULTIPLE VITAMIN PO, Take 1 tablet by mouth daily, Disp: , Rfl:     OneTouch Ultra test strip, TEST GLUCOSE THREE TIMES A DAY, Disp: 300 strip, Rfl: 3    semaglutide, 0.25 or 0.5 mg/dose, (Ozempic, 0.25 or 0.5 MG/DOSE,) 2 mg/3 mL injection pen, Inject 0.75 mL (0.5 mg total) under the skin every 7 days, Disp: 3 mL, Rfl: 2    torsemide (DEMADEX) 100 mg tablet, Take 1 tablet (100 mg total) by mouth daily, Disp: 90 tablet, Rfl: 1    valsartan (DIOVAN) 320 MG tablet, Take 0.5 tablets (160 mg total) by mouth daily (Patient taking differently: Take 160 mg by mouth daily Takes 1 full tablet daily), Disp: 90 tablet, Rfl: 0    warfarin (COUMADIN) 5 mg tablet, , Disp: , Rfl:     albuterol (2.5 mg/3 mL) 0.083 % nebulizer solution, Take 3 mL (2.5 mg total) by nebulization every 6 (six) hours as needed for wheezing or shortness of breath (Patient taking differently: Take 2.5 mg by nebulization every 6 (six) hours as needed for wheezing or shortness of breath prn), Disp: 100 mL, Rfl: 0    albuterol (PROVENTIL HFA,VENTOLIN HFA) 90 mcg/act inhaler, Inhale 2 puffs every 6 (six) hours as needed for wheezing, Disp: 8 g, Rfl: 0    docusate sodium (COLACE) 100 mg capsule, Take 1 capsule (100 mg total) by mouth 2 (two) times  a day, Disp: 10 capsule, Rfl: 0    metolazone (ZAROXOLYN) 5 mg tablet, Take 5 mg by mouth daily, Disp: , Rfl:     nystatin (MYCOSTATIN) powder, Apply topically 3 (three) times a day for 7 days (Patient not taking: Reported on 3/23/2023), Disp: 15 g, Rfl: 0    Review of Systems  See HPI.   All other systems reviewed and are negative.    Physical Exam:  Body mass index is 36.13 kg/m².  /62 (BP Location: Left arm, Patient Position: Sitting, Cuff Size: Large)   Pulse 83   Ht 5' (1.524 m)   Wt 83.9 kg (185 lb)   SpO2 99%   BMI 36.13 kg/m²    Wt Readings from Last 3 Encounters:   07/02/24 83.9 kg (185 lb)   07/01/24 84.4 kg (186 lb)   05/16/24 84.8 kg (187 lb)        Physical Exam  Vitals reviewed.   Constitutional:       Appearance: Normal appearance.   Cardiovascular:      Rate and Rhythm: Normal rate and regular rhythm.      Pulses: Normal pulses.      Heart sounds: Normal heart sounds.   Pulmonary:      Effort: Pulmonary effort is normal.      Breath sounds: Normal breath sounds.   Skin:     General: Skin is warm and dry.      Capillary Refill: Capillary refill takes less than 2 seconds.   Neurological:      General: No focal deficit present.      Mental Status: She is alert and oriented to person, place, and time.   Psychiatric:         Mood and Affect: Mood normal.         Behavior: Behavior normal.       Labs:   Lab Results   Component Value Date    HGBA1C 8.3 (H) 03/05/2024    HGBA1C 7.3 (H) 09/11/2023    HGBA1C 7.0 (A) 06/14/2023     Lab Results   Component Value Date    CREATININE 4.69 (H) 03/23/2024    CREATININE 5.21 (H) 03/22/2024    CREATININE 5.82 (H) 03/21/2024    BUN 56 (H) 03/23/2024     12/11/2014    K 4.1 03/23/2024     03/23/2024    CO2 25 03/23/2024     eGFRcr   Date Value Ref Range Status   03/06/2023 7 (L) >59 Final     eGFR   Date Value Ref Range Status   03/23/2024 9 ml/min/1.73sq m Final     Lab Results   Component Value Date    HDL 48 (L) 03/05/2024    TRIG 52 03/05/2024      Lab Results   Component Value Date    ALT 15 03/19/2024    AST 20 03/19/2024    ALKPHOS 136 (H) 03/19/2024    BILITOT 1.08 (H) 12/11/2014     Lab Results   Component Value Date    BAE0GXIHMJWW 11.384 (H) 03/05/2024    EXC1EYPHUOMC 3.647 09/11/2023    INJ5AVRIXZYQ 12.563 (H) 05/30/2023     Lab Results   Component Value Date    FREET4 0.81 03/05/2024       Discussed with the patient and all questioned fully answered. She will call me if any problems arise.    Follow-up appointment in 3 months.     Counseled patient on diagnostic results, prognosis, risk and benefit of treatment options, instruction for management, importance of treatment compliance, Risk  factor reduction and impressions    MICHAEL Castillo

## 2024-07-03 ENCOUNTER — RA CDI HCC (OUTPATIENT)
Dept: OTHER | Facility: HOSPITAL | Age: 69
End: 2024-07-03

## 2024-07-03 ENCOUNTER — OFFICE VISIT (OUTPATIENT)
Dept: PULMONOLOGY | Facility: MEDICAL CENTER | Age: 69
End: 2024-07-03
Payer: MEDICARE

## 2024-07-03 ENCOUNTER — HOSPITAL ENCOUNTER (EMERGENCY)
Facility: HOSPITAL | Age: 69
Discharge: HOME | End: 2024-07-03
Attending: EMERGENCY MEDICINE
Payer: MEDICARE

## 2024-07-03 ENCOUNTER — APPOINTMENT (EMERGENCY)
Dept: RADIOLOGY | Facility: HOSPITAL | Age: 69
End: 2024-07-03
Payer: MEDICARE

## 2024-07-03 VITALS
RESPIRATION RATE: 18 BRPM | HEART RATE: 107 BPM | DIASTOLIC BLOOD PRESSURE: 55 MMHG | SYSTOLIC BLOOD PRESSURE: 90 MMHG | TEMPERATURE: 98.2 F | OXYGEN SATURATION: 100 %

## 2024-07-03 VITALS
HEIGHT: 60 IN | WEIGHT: 184.4 LBS | DIASTOLIC BLOOD PRESSURE: 82 MMHG | OXYGEN SATURATION: 100 % | RESPIRATION RATE: 12 BRPM | TEMPERATURE: 98.4 F | HEART RATE: 137 BPM | SYSTOLIC BLOOD PRESSURE: 122 MMHG | BODY MASS INDEX: 36.2 KG/M2

## 2024-07-03 DIAGNOSIS — I48.91 ATRIAL FIBRILLATION WITH RAPID VENTRICULAR RESPONSE (HCC): Primary | ICD-10-CM

## 2024-07-03 DIAGNOSIS — G47.33 OSA (OBSTRUCTIVE SLEEP APNEA): Chronic | ICD-10-CM

## 2024-07-03 DIAGNOSIS — I48.91 ATRIAL FIBRILLATION WITH RVR (HCC): Primary | ICD-10-CM

## 2024-07-03 DIAGNOSIS — I50.42 CHRONIC COMBINED SYSTOLIC AND DIASTOLIC CONGESTIVE HEART FAILURE (HCC): ICD-10-CM

## 2024-07-03 LAB
2HR DELTA HS TROPONIN: -9 NG/L
ALBUMIN SERPL BCG-MCNC: 3.9 G/DL (ref 3.5–5)
ALP SERPL-CCNC: 194 U/L (ref 34–104)
ALT SERPL W P-5'-P-CCNC: 81 U/L (ref 7–52)
ANION GAP SERPL CALCULATED.3IONS-SCNC: 18 MMOL/L (ref 4–13)
APTT PPP: 29 SECONDS (ref 23–37)
AST SERPL W P-5'-P-CCNC: 14 U/L (ref 13–39)
ATRIAL RATE: 340 BPM
BASOPHILS # BLD AUTO: 0.05 THOUSANDS/ÂΜL (ref 0–0.1)
BASOPHILS NFR BLD AUTO: 1 % (ref 0–1)
BILIRUB SERPL-MCNC: 1.84 MG/DL (ref 0.2–1)
BNP SERPL-MCNC: 549 PG/ML (ref 0–100)
BUN SERPL-MCNC: 76 MG/DL (ref 5–25)
CALCIUM SERPL-MCNC: 9.9 MG/DL (ref 8.4–10.2)
CARDIAC TROPONIN I PNL SERPL HS: 31 NG/L
CARDIAC TROPONIN I PNL SERPL HS: 40 NG/L
CHLORIDE SERPL-SCNC: 95 MMOL/L (ref 96–108)
CO2 SERPL-SCNC: 18 MMOL/L (ref 21–32)
CREAT SERPL-MCNC: 6.34 MG/DL (ref 0.6–1.3)
EOSINOPHIL # BLD AUTO: 0.14 THOUSAND/ÂΜL (ref 0–0.61)
EOSINOPHIL NFR BLD AUTO: 1 % (ref 0–6)
ERYTHROCYTE [DISTWIDTH] IN BLOOD BY AUTOMATED COUNT: 18.1 % (ref 11.6–15.1)
GFR SERPL CREATININE-BSD FRML MDRD: 6 ML/MIN/1.73SQ M
GLUCOSE SERPL-MCNC: 165 MG/DL (ref 65–140)
HCT VFR BLD AUTO: 44 % (ref 34.8–46.1)
HGB BLD-MCNC: 13.8 G/DL (ref 11.5–15.4)
IMM GRANULOCYTES # BLD AUTO: 0.09 THOUSAND/UL (ref 0–0.2)
IMM GRANULOCYTES NFR BLD AUTO: 1 % (ref 0–2)
INR PPP: 1.38 (ref 0.84–1.19)
LYMPHOCYTES # BLD AUTO: 0.97 THOUSANDS/ÂΜL (ref 0.6–4.47)
LYMPHOCYTES NFR BLD AUTO: 10 % (ref 14–44)
MCH RBC QN AUTO: 28.9 PG (ref 26.8–34.3)
MCHC RBC AUTO-ENTMCNC: 31.4 G/DL (ref 31.4–37.4)
MCV RBC AUTO: 92 FL (ref 82–98)
MONOCYTES # BLD AUTO: 1.11 THOUSAND/ÂΜL (ref 0.17–1.22)
MONOCYTES NFR BLD AUTO: 11 % (ref 4–12)
NEUTROPHILS # BLD AUTO: 7.5 THOUSANDS/ÂΜL (ref 1.85–7.62)
NEUTS SEG NFR BLD AUTO: 76 % (ref 43–75)
NRBC BLD AUTO-RTO: 0 /100 WBCS
PLATELET # BLD AUTO: 126 THOUSANDS/UL (ref 149–390)
PMV BLD AUTO: 11.9 FL (ref 8.9–12.7)
POTASSIUM SERPL-SCNC: 5 MMOL/L (ref 3.5–5.3)
PROT SERPL-MCNC: 6.6 G/DL (ref 6.4–8.4)
PROTHROMBIN TIME: 17.1 SECONDS (ref 11.6–14.5)
QRS AXIS: -69 DEGREES
QRSD INTERVAL: 86 MS
QT INTERVAL: 396 MS
QTC INTERVAL: 476 MS
RBC # BLD AUTO: 4.78 MILLION/UL (ref 3.81–5.12)
SODIUM SERPL-SCNC: 131 MMOL/L (ref 135–147)
T WAVE AXIS: -70 DEGREES
VENTRICULAR RATE: 87 BPM
WBC # BLD AUTO: 9.86 THOUSAND/UL (ref 4.31–10.16)

## 2024-07-03 PROCEDURE — 85730 THROMBOPLASTIN TIME PARTIAL: CPT | Performed by: EMERGENCY MEDICINE

## 2024-07-03 PROCEDURE — 83880 ASSAY OF NATRIURETIC PEPTIDE: CPT | Performed by: EMERGENCY MEDICINE

## 2024-07-03 PROCEDURE — 99285 EMERGENCY DEPT VISIT HI MDM: CPT | Performed by: EMERGENCY MEDICINE

## 2024-07-03 PROCEDURE — 93005 ELECTROCARDIOGRAM TRACING: CPT

## 2024-07-03 PROCEDURE — 80053 COMPREHEN METABOLIC PANEL: CPT | Performed by: EMERGENCY MEDICINE

## 2024-07-03 PROCEDURE — 93010 ELECTROCARDIOGRAM REPORT: CPT | Performed by: INTERNAL MEDICINE

## 2024-07-03 PROCEDURE — 85025 COMPLETE CBC W/AUTO DIFF WBC: CPT | Performed by: EMERGENCY MEDICINE

## 2024-07-03 PROCEDURE — 36415 COLL VENOUS BLD VENIPUNCTURE: CPT | Performed by: EMERGENCY MEDICINE

## 2024-07-03 PROCEDURE — 71045 X-RAY EXAM CHEST 1 VIEW: CPT

## 2024-07-03 PROCEDURE — 99214 OFFICE O/P EST MOD 30 MIN: CPT | Performed by: INTERNAL MEDICINE

## 2024-07-03 PROCEDURE — 84484 ASSAY OF TROPONIN QUANT: CPT | Performed by: EMERGENCY MEDICINE

## 2024-07-03 PROCEDURE — 85610 PROTHROMBIN TIME: CPT | Performed by: EMERGENCY MEDICINE

## 2024-07-03 RX ORDER — DILTIAZEM HYDROCHLORIDE 5 MG/ML
15 INJECTION INTRAVENOUS ONCE
Status: COMPLETED | OUTPATIENT
Start: 2024-07-03 | End: 2024-07-03

## 2024-07-03 RX ORDER — METOPROLOL TARTRATE 1 MG/ML
5 INJECTION, SOLUTION INTRAVENOUS ONCE
Status: COMPLETED | OUTPATIENT
Start: 2024-07-03 | End: 2024-07-03

## 2024-07-03 RX ADMIN — DILTIAZEM HYDROCHLORIDE 15 MG: 5 INJECTION INTRAVENOUS at 11:32

## 2024-07-03 RX ADMIN — METOPROLOL TARTRATE 5 MG: 5 INJECTION INTRAVENOUS at 13:29

## 2024-07-03 NOTE — ED PROVIDER NOTES
History  Chief Complaint   Patient presents with    Rapid Heart Rate     Pt sent in by provider for rapid a-fib. 's - 140's. Pt c/o light headed dizzy feeling with SOB for over a week. Has not been taking her meds daily like prescribed.      Patient has multiple medical problems including insulin requiring diabetes, chronic renal failure on dialysis, and atrial fibrillation.  Patient has been feeling more lightheaded and short of breath particularly with exertion recently.  Denies any fever.  She was seen by her cardiologist on Monday, was noted to be in A-fib with a rapid ventricular response.  Patient was directed to the hospital at that time however she thought she would try to manage it at home instead.  Today she was seen by her pulmonologist and again was documented to be in A-fib with rapid ventricular response, and directed back to the ED.  She arrives awake and alert in A-fib.  She states she feels weak and lightheaded with exertional shortness of breath but denies chest pain.  She has been taking her medication regularly        Prior to Admission Medications   Prescriptions Last Dose Informant Patient Reported? Taking?   B Complex-C (B-COMPLEX WITH VITAMIN C) tablet  Self Yes No   Sig: Take 1 tablet by mouth daily   Blood Glucose Monitoring Suppl (ONE TOUCH ULTRA 2) w/Device KIT  Self No No   Sig: Test glucose 3 times daily   Insulin Glargine-yfgn (Semglee, yfgn,) 100 UNIT/ML SOPN  Self No No   Sig: Inject 10 units subcutaneously daily in the morning   Insulin Pen Needle 31G X 5 MM MISC  Self No No   Sig: by Does not apply route daily   Lancets (onetouch ultrasoft) lancets  Self No No   Sig: Test glucose 3 times a day; Code: E11.8   MULTIPLE VITAMIN PO  Self Yes No   Sig: Take 1 tablet by mouth daily   OneTouch Ultra test strip  Self No No   Sig: TEST GLUCOSE THREE TIMES A DAY   albuterol (2.5 mg/3 mL) 0.083 % nebulizer solution  Self No No   Sig: Take 3 mL (2.5 mg total) by nebulization every 6  (six) hours as needed for wheezing or shortness of breath   Patient taking differently: Take 2.5 mg by nebulization every 6 (six) hours as needed for wheezing or shortness of breath prn   albuterol (PROVENTIL HFA,VENTOLIN HFA) 90 mcg/act inhaler  Self No No   Sig: Inhale 2 puffs every 6 (six) hours as needed for wheezing   atorvastatin (LIPITOR) 20 mg tablet  Self No No   Sig: Take 1 tablet (20 mg total) by mouth daily   calcitriol (ROCALTROL) 0.25 mcg capsule  Self No No   Sig: Take 1 capsule (0.25 mcg total) by mouth daily   calcium acetate (PHOSLO) capsule  Self Yes No   Sig: Take 667 mg by mouth daily    cholecalciferol (VITAMIN D3) 1,000 units tablet  Self No No   Sig: Take 2 tablets (2,000 Units total) by mouth daily   cinacalcet (SENSIPAR) 30 mg tablet  Self No No   Sig: TAKE 1 TABLET THREE TIMES A WEEK   collagenase (SANTYL) ointment  Self No No   Sig: Apply topically daily   docusate sodium (COLACE) 100 mg capsule  Self No No   Sig: Take 1 capsule (100 mg total) by mouth 2 (two) times a day   gentamicin (GARAMYCIN) 0.1 % cream  Self No No   Sig: APPLY TOPICALLY DAILY   insulin lispro (HumaLOG KwikPen) 100 units/mL injection pen  Self No No   Sig: Inject 3 Units under the skin 3 (three) times a day with meals   levothyroxine (Euthyrox) 125 mcg tablet  Self No No   Sig: Take one tablet by mouth in early morning on empty stomach   metolazone (ZAROXOLYN) 5 mg tablet  Self Yes No   Sig: Take 5 mg by mouth daily   metoprolol succinate (TOPROL-XL) 50 mg 24 hr tablet  Self No No   Sig: TAKE 1 TABLET DAILY   Patient taking differently: Take 50 mg by mouth daily 2 in AM and 2 in PM   nystatin (MYCOSTATIN) powder   No No   Sig: Apply topically 3 (three) times a day for 7 days   Patient not taking: Reported on 3/23/2023   semaglutide, 0.25 or 0.5 mg/dose, (Ozempic, 0.25 or 0.5 MG/DOSE,) 2 mg/3 mL injection pen   No No   Sig: Inject 0.75 mL (0.5 mg total) under the skin every 7 days   torsemide (DEMADEX) 100 mg tablet   Self No No   Sig: Take 1 tablet (100 mg total) by mouth daily   valsartan (DIOVAN) 320 MG tablet  Self No No   Sig: Take 0.5 tablets (160 mg total) by mouth daily   Patient taking differently: Take 160 mg by mouth daily Takes 1 full tablet daily   warfarin (COUMADIN) 5 mg tablet  Self Yes No      Facility-Administered Medications: None       Past Medical History:   Diagnosis Date    Acute asthmatic bronchitis     last assessed: 2017    Atrial fibrillation (HCC)     Cardiac disease     Chronic diastolic (congestive) heart failure (HCC)     Chronic kidney disease     pt is on peritoneal dialysis daily from 2200- 0600    Closed nondisplaced fracture of distal phalanx of right great toe 2018    Colon polyp     CPAP (continuous positive airway pressure) dependence     Diabetes mellitus ([facility])     Hyperlipidemia     Hypertension     Medical non-compliance     Morbid obesity with BMI of 40.0-44.9, adult ([facility]) 2019    On continuous oral anticoagulation 2020    Pneumonia     last assessed: 2013    PONV (postoperative nausea and vomiting)     Pulmonary embolism ([facility])     Renal disorder     possible clot noted in kidney, thus blood thinners currently    Severe obstructive sleep apnea     Severe pulmonary arterial systolic hypertension ([facility])     Tinea pedis of both feet 2018       Past Surgical History:   Procedure Laterality Date    CATARACT EXTRACTION       SECTION       x 1    EYE SURGERY      NOSE SURGERY      fractured nose - septoplasty       Family History   Problem Relation Age of Onset    Diabetes Mother         mellitus    Anxiety disorder Mother         generalized anxiety disorder    Hypertension Mother     Stroke Mother     Diabetes type II Mother     Kidney disease Father     Kidney failure Father         renal failure    Ulcerative colitis Sister     Diabetes Sister     Ovarian cancer Maternal Aunt     Diabetes Maternal Aunt     Diabetes Maternal Uncle     Heart  disease Maternal Uncle     Heart disease Family     Thyroid disease Neg Hx      I have reviewed and agree with the history as documented.    E-Cigarette/Vaping    E-Cigarette Use Never User      E-Cigarette/Vaping Substances    Nicotine No     THC No     CBD No     Flavoring No     Other No     Unknown No      Social History     Tobacco Use    Smoking status: Never     Passive exposure: Never    Smokeless tobacco: Never   Vaping Use    Vaping status: Never Used   Substance Use Topics    Alcohol use: Never    Drug use: Never       Review of Systems   Constitutional:  Negative for chills and fever.   HENT:  Negative for congestion and sore throat.    Eyes:  Negative for visual disturbance.   Respiratory:  Positive for shortness of breath. Negative for cough, chest tightness and wheezing.    Cardiovascular:  Negative for chest pain and leg swelling.   Gastrointestinal:  Negative for abdominal pain and vomiting.   Genitourinary:         Peritoneal dialysis daily   Musculoskeletal:  Negative for back pain.   Skin:  Negative for rash.   Neurological:  Positive for weakness and light-headedness. Negative for syncope.   Hematological:  Bruises/bleeds easily.   Psychiatric/Behavioral:  Negative for confusion.    All other systems reviewed and are negative.      Physical Exam  Physical Exam  Vitals and nursing note reviewed.   Constitutional:       Appearance: Normal appearance.   HENT:      Head: Normocephalic.      Right Ear: External ear normal.      Left Ear: External ear normal.      Nose: Nose normal.      Mouth/Throat:      Mouth: Mucous membranes are moist.   Eyes:      Conjunctiva/sclera: Conjunctivae normal.   Cardiovascular:      Rate and Rhythm: Tachycardia present. Rhythm irregular.      Pulses: Normal pulses.   Pulmonary:      Effort: Pulmonary effort is normal.      Breath sounds: Normal breath sounds. No stridor. No rales.   Abdominal:      Palpations: Abdomen is soft.      Tenderness: There is no abdominal  tenderness.   Musculoskeletal:         General: No swelling or tenderness. Normal range of motion.      Cervical back: Normal range of motion.   Skin:     General: Skin is warm and dry.      Capillary Refill: Capillary refill takes less than 2 seconds.   Neurological:      General: No focal deficit present.      Mental Status: She is alert and oriented to person, place, and time.   Psychiatric:         Mood and Affect: Mood normal.         Behavior: Behavior normal.         Vital Signs  ED Triage Vitals   Temperature Pulse Respirations Blood Pressure SpO2   07/03/24 1113 07/03/24 1114 07/03/24 1113 07/03/24 1120 07/03/24 1120   98.2 °F (36.8 °C) (!) 143 22 109/59 99 %      Temp Source Heart Rate Source Patient Position - Orthostatic VS BP Location FiO2 (%)   07/03/24 1113 07/03/24 1114 07/03/24 1130 07/03/24 1130 --   Tympanic Monitor Lying Right arm       Pain Score       07/03/24 1130       No Pain           Vitals:    07/03/24 1315 07/03/24 1400 07/03/24 1430 07/03/24 1500   BP: 103/70 118/61 (!) 84/56 90/55   Pulse: (!) 117 99 95 (!) 107   Patient Position - Orthostatic VS: Lying Lying Lying Sitting         Visual Acuity      ED Medications  Medications   diltiazem (CARDIZEM) 125 mg in sodium chloride 0.9 % 125 mL infusion (0 mg/hr Intravenous Hold 7/3/24 1155)   diltiazem (CARDIZEM) injection 15 mg (15 mg Intravenous Given 7/3/24 1132)   metoprolol (LOPRESSOR) injection 5 mg (5 mg Intravenous Given 7/3/24 1329)       Diagnostic Studies  Results Reviewed       Procedure Component Value Units Date/Time    HS Troponin I 2hr [[id number]]  (Normal) Collected: 07/03/24 1301    Lab Status: Final result Specimen: Blood from Arm, Left Updated: 07/03/24 1332     hs TnI 2hr 31 ng/L      Delta 2hr hsTnI -9 ng/L     Protime-INR [[id number]]  (Abnormal) Collected: 07/03/24 1115    Lab Status: Final result Specimen: Blood from Arm, Left Updated: 07/03/24 1212     Protime 17.1 seconds      INR 1.38    APTT [[id number]]  (Normal)  Collected: 07/03/24 1115    Lab Status: Final result Specimen: Blood from Arm, Left Updated: 07/03/24 1212     PTT 29 seconds     HS Troponin I 4hr [[id number]]     Lab Status: No result Specimen: Blood     HS Troponin 0hr (reflex protocol) [[id number]]  (Normal) Collected: 07/03/24 1115    Lab Status: Final result Specimen: Blood from Arm, Left Updated: 07/03/24 1150     hs TnI 0hr 40 ng/L     B-Type Natriuretic Peptide(BNP) [[id number]]  (Abnormal) Collected: 07/03/24 1115    Lab Status: Final result Specimen: Blood from Arm, Left Updated: 07/03/24 1149      pg/mL     Comprehensive metabolic panel [[id number]]  (Abnormal) Collected: 07/03/24 1115    Lab Status: Final result Specimen: Blood from Arm, Left Updated: 07/03/24 1142     Sodium 131 mmol/L      Potassium 5.0 mmol/L      Chloride 95 mmol/L      CO2 18 mmol/L      ANION GAP 18 mmol/L      BUN 76 mg/dL      Creatinine 6.34 mg/dL      Glucose 165 mg/dL      Calcium 9.9 mg/dL      AST 14 U/L      ALT 81 U/L      Alkaline Phosphatase 194 U/L      Total Protein 6.6 g/dL      Albumin 3.9 g/dL      Total Bilirubin 1.84 mg/dL      eGFR 6 ml/min/1.73sq m     Narrative:      [employer] guidelines for Chronic Kidney Disease (CKD):     Stage 1 with normal or high GFR (GFR > 90 mL/min/1.73 square meters)    Stage 2 Mild CKD (GFR = 60-89 mL/min/1.73 square meters)    Stage 3A Moderate CKD (GFR = 45-59 mL/min/1.73 square meters)    Stage 3B Moderate CKD (GFR = 30-44 mL/min/1.73 square meters)    Stage 4 Severe CKD (GFR = 15-29 mL/min/1.73 square meters)    Stage 5 End Stage CKD (GFR <15 mL/min/1.73 square meters)  Note: GFR calculation is accurate only with a steady state creatinine    CBC and differential [[id number]]  (Abnormal) Collected: 07/03/24 1115    Lab Status: Final result Specimen: Blood from Arm, Left Updated: 07/03/24 1127     WBC 9.86 Thousand/uL      RBC 4.78 Million/uL      Hemoglobin 13.8 g/dL      Hematocrit 44.0 %      MCV 92  fL      MCH 28.9 pg      MCHC 31.4 g/dL      RDW 18.1 %      MPV 11.9 fL      Platelets 126 Thousands/uL      nRBC 0 /100 WBCs      Segmented % 76 %      Immature Grans % 1 %      Lymphocytes % 10 %      Monocytes % 11 %      Eosinophils Relative 1 %      Basophils Relative 1 %      Absolute Neutrophils 7.50 Thousands/µL      Absolute Immature Grans 0.09 Thousand/uL      Absolute Lymphocytes 0.97 Thousands/µL      Absolute Monocytes 1.11 Thousand/µL      Eosinophils Absolute 0.14 Thousand/µL      Basophils Absolute 0.05 Thousands/µL                    XR chest 1 view portable    (Results Pending)              Procedures  ECG 12 Lead Documentation Only    Date/Time: 7/3/2024 11:10 AM    Performed by: Leo Chris MD  Authorized by: Leo Chris MD    Indications / Diagnosis:  Dizziness  ECG reviewed by me, the ED Provider: yes    Patient location:  ED  Interpretation:     Interpretation: abnormal    Rate:     ECG rate:  143    ECG rate assessment: tachycardic    Rhythm:     Rhythm: atrial fibrillation and atrial flutter    Ectopy:     Ectopy: none    QRS:     QRS axis:  Left    QRS intervals:  Normal  Conduction:     Conduction: normal    ST segments:     ST segments:  Normal  T waves:     T waves: normal             ED Course                                             Medical Decision Making  Patient presents in A-fib with RVR, on Coumadin and beta-blocker.  Check metabolic cardiac profile, attempt rate control    Amount and/or Complexity of Data Reviewed  Labs: ordered.  Radiology: ordered.    Risk  Prescription drug management.             Disposition  Final diagnoses:   Atrial fibrillation with rapid ventricular response (HCC)     Time reflects when diagnosis was documented in both MDM as applicable and the Disposition within this note       Time User Action Codes Description Comment    7/3/2024  2:50 PM Leo Chris Add [I48.91] Atrial fibrillation with rapid ventricular response (HCC)           ED  Disposition       ED Disposition   Discharge    Condition   Stable    Date/Time   Wed Jul 3, 2024  2:50 PM    Comment   Jacquie Smith discharge to home/self care.                   Follow-up Information       Follow up With Specialties Details Why Contact Info    Yessica Ramirez DO Family Medicine   [address]   [address]  [facility] [place] [facility] [address]  [phone]      Orion Giraldo DO Cardiology Schedule an appointment as soon as possible for a visit   [address]  [facility] [place] [facility] [address]  [phone]              Discharge Medication List as of 7/3/2024  2:52 PM        CONTINUE these medications which have NOT CHANGED    Details   albuterol (2.5 mg/3 mL) 0.083 % nebulizer solution Take 3 mL (2.5 mg total) by nebulization every 6 (six) hours as needed for wheezing or shortness of breath, Starting Fri 2/9/2024, Normal      albuterol (PROVENTIL HFA,VENTOLIN HFA) 90 mcg/act inhaler Inhale 2 puffs every 6 (six) hours as needed for wheezing, Starting Sat 3/23/2024, Normal      atorvastatin (LIPITOR) 20 mg tablet Take 1 tablet (20 mg total) by mouth daily, Starting Mon 12/12/2022, Normal      B Complex-C (B-COMPLEX WITH VITAMIN C) tablet Take 1 tablet by mouth daily, Historical Med      Blood Glucose Monitoring Suppl (ONE TOUCH ULTRA 2) w/Device KIT Test glucose 3 times daily, Normal      calcitriol (ROCALTROL) 0.25 mcg capsule Take 1 capsule (0.25 mcg total) by mouth daily, Starting Thu 7/20/2023, Normal      calcium acetate (PHOSLO) capsule Take 667 mg by mouth daily , Starting Sat 12/19/2020, Historical Med      cholecalciferol (VITAMIN D3) 1,000 units tablet Take 2 tablets (2,000 Units total) by mouth daily, Starting Tue 12/22/2020, No Print      cinacalcet (SENSIPAR) 30 mg tablet TAKE 1 TABLET THREE TIMES A WEEK, Normal      collagenase (SANTYL) ointment Apply topically daily, Starting Fri 3/29/2024, Normal      docusate sodium (COLACE) 100 mg capsule Take 1 capsule (100 mg total) by mouth 2 (two) times a  day, Starting Mon 7/20/2020, Normal      gentamicin (GARAMYCIN) 0.1 % cream APPLY TOPICALLY DAILY, Starting Wed 12/27/2023, Normal      Insulin Glargine-yfgn (Semglee, yfgn,) 100 UNIT/ML SOPN Inject 10 units subcutaneously daily in the morning, Normal      insulin lispro (HumaLOG KwikPen) 100 units/mL injection pen Inject 3 Units under the skin 3 (three) times a day with meals, Starting Fri 3/8/2024, Normal      Insulin Pen Needle 31G X 5 MM MISC by Does not apply route daily, Starting Mon 10/5/2020, Normal      Lancets (onetouch ultrasoft) lancets Test glucose 3 times a day; Code: E11.8, Normal      levothyroxine (Euthyrox) 125 mcg tablet Take one tablet by mouth in early morning on empty stomach, Normal      metolazone (ZAROXOLYN) 5 mg tablet Take 5 mg by mouth daily, Starting Fri 7/16/2021, Historical Med      metoprolol succinate (TOPROL-XL) 50 mg 24 hr tablet TAKE 1 TABLET DAILY, Starting Mon 3/18/2024, Normal      MULTIPLE VITAMIN PO Take 1 tablet by mouth daily, Historical Med      nystatin (MYCOSTATIN) powder Apply topically 3 (three) times a day for 7 days, Starting Mon 3/20/2023, Until Mon 3/27/2023, Normal      OneTouch Ultra test strip TEST GLUCOSE THREE TIMES A DAY, Normal      semaglutide, 0.25 or 0.5 mg/dose, (Ozempic, 0.25 or 0.5 MG/DOSE,) 2 mg/3 mL injection pen Inject 0.75 mL (0.5 mg total) under the skin every 7 days, Starting Tue 7/2/2024, Normal      torsemide (DEMADEX) 100 mg tablet Take 1 tablet (100 mg total) by mouth daily, Starting Mon 3/15/2021, Normal      valsartan (DIOVAN) 320 MG tablet Take 0.5 tablets (160 mg total) by mouth daily, Starting Mon 3/13/2023, No Print      warfarin (COUMADIN) 5 mg tablet Historical Med             No discharge procedures on file.    PDMP Review         Value Time User    PDMP Reviewed  Yes 7/18/2020  1:03 PM Miguel Toribio MD            ED Provider  Electronically Signed by             Leo Chris MD  07/03/24 1554

## 2024-07-03 NOTE — PROGRESS NOTES
HCC coding opportunities          Chart Reviewed number of suggestions sent to Provider: 2     Patients Insurance   I13.2 and E11.22  Medicare Insurance: Medicare

## 2024-07-03 NOTE — PROGRESS NOTES
Assessment & Plan        Problem List Items Addressed This Visit          Cardiovascular and Mediastinum    Chronic combined systolic and diastolic congestive heart failure (HCC)     Wt Readings from Last 3 Encounters:   07/03/24 83.6 kg (184 lb 6.4 oz)   07/02/24 83.9 kg (185 lb)   07/01/24 84.4 kg (186 lb)       Last echocardiogram done April 6 through [facility] showed mild decrease in LV systolic function of 40 to 5045%.  She did have some mild pulmonary hypertension.  She did have moderate mitral regurgitation and some mild aortic regurgitation and mild aortic stenosis.             Atrial fibrillation with RVR (HCC) - Primary     Patient has not been feeling well over past couple weeks or more.  Sometimes has palpitations and intermittent lightheadedness and dizziness.  Some shortness of breath with exertion.     She did see her cardiologist Dr. Tang on July 1 and he recommended she go to ER she was having atrial fibrillation rapid ventricular rate at that time.  She did not.  Heart rate today ranged from 132 150 bpm and O2 saturation room air 98%.     I was able to convince her to go to ER today for evaluation treatment of her atrial fibrillation with rapid ventricular rate.  Does have history of atrial fibrillation.  She is on chronic anticoagulation with warfarin.            Respiratory    CHANDRA (obstructive sleep apnea) (Chronic)     Jacquie has severe CHANDRA but has not been using her CPAP at night.  She is on using 3 L of oxygen at night.  Did tell her that she used her CPAP at night it may help control her atrial fibrillation as well.              Cc: Having intermittent lightheadedness and palpitations.  Also some dizziness      HPI    Jacquie had presents for follow-up of her severe obstructive sleep apnea.  She has not been using her CPAP very often.  Has a fullface mask which she does not care for.  [email or web address] is [employer].  She does have oxygen she uses 3 L at night when she is not using her CPAP.  She  generally does not use it with her CPAP which is set at 5-20 cmH2O she states she does not like to use CPAP at night also because she is sometimes uncomfortable from her peritoneal dialysis and her CPAP she is uncomfortable from that.    .  She has not been feeling well over the last couple weeks.  Has been feeling fatigued low energy and also having some intermittent lightheadedness and dizziness.  Also some intermittent palpitations.    Does have history of chronic atrial fibrillation and is on anticoagulation with warfarin.    She does have chronic kidney disease and is on peritoneal dialysis at home.    Does have history of pulm hypertension likely group 2 and 3.  She has diabetes mellitus type 2 insulin requiring and hypothyroidism.    Echocardiogram done on April 6 through [facility] showed mild decrease in LV systolic function with left ventricular ejection fraction of 40 to 45%.  RV ventricle Size was normal and RV systolic function was mildly to moderately reduced.  At least mild aortic regurgitation and least mild aortic stenosis.  There is moderate mitral regurgitation.  There was report of mild tricuspid regurgitation and a reported mild pulmonary artery hypertension but no actual estimated pressure was given.  Did have a right heart catheterization done 5/17/2022 which showed mild pulmonary artery hypertension.  This was done through [facility].      Past Medical History:   Diagnosis Date    Acute asthmatic bronchitis     last assessed: 6/2/2017    Atrial fibrillation (HCC)     Cardiac disease     Chronic diastolic (congestive) heart failure (HCC)     Chronic kidney disease     pt is on peritoneal dialysis daily from 2200- 0600    Closed nondisplaced fracture of distal phalanx of right great toe 11/13/2018    Colon polyp     CPAP (continuous positive airway pressure) dependence     Diabetes mellitus (HCC)     Hyperlipidemia     Hypertension     Medical non-compliance     Morbid  obesity with BMI of 40.0-44.9, adult ([facility]) 2019    On continuous oral anticoagulation 2020    Pneumonia     last assessed: 2013    PONV (postoperative nausea and vomiting)     Pulmonary embolism ([facility])     Renal disorder     possible clot noted in kidney, thus blood thinners currently    Severe obstructive sleep apnea     Severe pulmonary arterial systolic hypertension ([facility])     Tinea pedis of both feet 2018       Past Surgical History:   Procedure Laterality Date    CATARACT EXTRACTION       SECTION       x 1    EYE SURGERY      NOSE SURGERY      fractured nose - septoplasty         Current Outpatient Medications:     albuterol (2.5 mg/3 mL) 0.083 % nebulizer solution, Take 3 mL (2.5 mg total) by nebulization every 6 (six) hours as needed for wheezing or shortness of breath (Patient taking differently: Take 2.5 mg by nebulization every 6 (six) hours as needed for wheezing or shortness of breath prn), Disp: 100 mL, Rfl: 0    albuterol (PROVENTIL HFA,VENTOLIN HFA) 90 mcg/act inhaler, Inhale 2 puffs every 6 (six) hours as needed for wheezing, Disp: 8 g, Rfl: 0    atorvastatin (LIPITOR) 20 mg tablet, Take 1 tablet (20 mg total) by mouth daily, Disp: 90 tablet, Rfl: 3    B Complex-C (B-COMPLEX WITH VITAMIN C) tablet, Take 1 tablet by mouth daily, Disp: , Rfl:     Blood Glucose Monitoring Suppl (ONE TOUCH ULTRA 2) w/Device KIT, Test glucose 3 times daily, Disp: 1 each, Rfl: 0    calcitriol (ROCALTROL) 0.25 mcg capsule, Take 1 capsule (0.25 mcg total) by mouth daily, Disp: 90 capsule, Rfl: 1    calcium acetate (PHOSLO) capsule, Take 667 mg by mouth daily , Disp: , Rfl:     cholecalciferol (VITAMIN D3) 1,000 units tablet, Take 2 tablets (2,000 Units total) by mouth daily, Disp: 100 tablet, Rfl: 5    cinacalcet (SENSIPAR) 30 mg tablet, TAKE 1 TABLET THREE TIMES A WEEK, Disp: 36 tablet, Rfl: 3    collagenase (SANTYL) ointment, Apply topically daily, Disp: 30 g, Rfl: 1    docusate sodium  (COLACE) 100 mg capsule, Take 1 capsule (100 mg total) by mouth 2 (two) times a day, Disp: 10 capsule, Rfl: 0    gentamicin (GARAMYCIN) 0.1 % cream, APPLY TOPICALLY DAILY, Disp: 15 g, Rfl: 23    Insulin Glargine-yfgn (Semglee, yfgn,) 100 UNIT/ML SOPN, Inject 10 units subcutaneously daily in the morning, Disp: 15 mL, Rfl: 3    insulin lispro (HumaLOG KwikPen) 100 units/mL injection pen, Inject 3 Units under the skin 3 (three) times a day with meals, Disp: 15 mL, Rfl: 3    Insulin Pen Needle 31G X 5 MM MISC, by Does not apply route daily, Disp: 100 each, Rfl: 1    Lancets (onetouch ultrasoft) lancets, Test glucose 3 times a day; Code: E11.8, Disp: 300 each, Rfl: 1    levothyroxine (Euthyrox) 125 mcg tablet, Take one tablet by mouth in early morning on empty stomach, Disp: 90 tablet, Rfl: 3    metolazone (ZAROXOLYN) 5 mg tablet, Take 5 mg by mouth daily, Disp: , Rfl:     metoprolol succinate (TOPROL-XL) 50 mg 24 hr tablet, TAKE 1 TABLET DAILY (Patient taking differently: Take 50 mg by mouth daily 2 in AM and 2 in PM), Disp: 90 tablet, Rfl: 1    OneTouch Ultra test strip, TEST GLUCOSE THREE TIMES A DAY, Disp: 300 strip, Rfl: 3    semaglutide, 0.25 or 0.5 mg/dose, (Ozempic, 0.25 or 0.5 MG/DOSE,) 2 mg/3 mL injection pen, Inject 0.75 mL (0.5 mg total) under the skin every 7 days, Disp: 3 mL, Rfl: 2    torsemide (DEMADEX) 100 mg tablet, Take 1 tablet (100 mg total) by mouth daily, Disp: 90 tablet, Rfl: 1    valsartan (DIOVAN) 320 MG tablet, Take 0.5 tablets (160 mg total) by mouth daily (Patient taking differently: Take 160 mg by mouth daily Takes 1 full tablet daily), Disp: 90 tablet, Rfl: 0    warfarin (COUMADIN) 5 mg tablet, , Disp: , Rfl:     MULTIPLE VITAMIN PO, Take 1 tablet by mouth daily, Disp: , Rfl:     nystatin (MYCOSTATIN) powder, Apply topically 3 (three) times a day for 7 days (Patient not taking: Reported on 3/23/2023), Disp: 15 g, Rfl: 0    No Known Allergies    Social History     Tobacco Use    Smoking  status: Never     Passive exposure: Never    Smokeless tobacco: Never   Substance Use Topics    Alcohol use: Never         Family History   Problem Relation Age of Onset    Diabetes Mother         mellitus    Anxiety disorder Mother         generalized anxiety disorder    Hypertension Mother     Stroke Mother     Diabetes type II Mother     Kidney disease Father     Kidney failure Father         renal failure    Ulcerative colitis Sister     Diabetes Sister     Ovarian cancer Maternal Aunt     Diabetes Maternal Aunt     Diabetes Maternal Uncle     Heart disease Maternal Uncle     Heart disease Family     Thyroid disease Neg Hx        Review of Systems   Constitutional:  Negative for chills, fever and unexpected weight change.   HENT:  Negative for congestion, rhinorrhea and sore throat.    Eyes:  Negative for discharge and redness.   Respiratory:  Positive for shortness of breath.    Cardiovascular:  Positive for palpitations. Negative for chest pain and leg swelling.   Gastrointestinal:  Negative for abdominal distention, abdominal pain and nausea.   Endocrine: Negative for polydipsia and polyphagia.   Genitourinary:  Negative for dysuria.   Musculoskeletal:  Negative for joint swelling and myalgias.   Skin:  Negative for rash.   Neurological:  Positive for light-headedness.   Psychiatric/Behavioral:  Negative for confusion and decreased concentration.            Vitals:    07/03/24 1004   BP: 122/82   Pulse: (!) 137   Resp: 12   Temp: 98.4 °F (36.9 °C)   SpO2: 100%     Height: 5' (152.4 cm)  IBW (Ideal Body Weight): 45.5 kg  Body mass index is 36.01 kg/m².  Weight (last 2 days)       Date/Time Weight    07/03/24 1004 83.6 (184.4)                Physical Exam  Vitals reviewed.   Constitutional:       General: She is not in acute distress.     Appearance: Normal appearance. She is well-developed. She is obese.   HENT:      Head: Normocephalic.      Right Ear: External ear normal.      Left Ear: External ear normal.       Nose: Nose normal.      Mouth/Throat:      Mouth: Oropharynx is clear and moist. Mucous membranes are moist.      Pharynx: Oropharynx is clear. No oropharyngeal exudate.   Eyes:      Conjunctiva/sclera: Conjunctivae normal.      Pupils: Pupils are equal, round, and reactive to light.   Cardiovascular:      Comments: Heart sounds irregular irregular.  Tachycardic.  Pulmonary:      Effort: Pulmonary effort is normal.      Comments: Lung sounds are clear.  No wheezes, crackles or rhonchi  Abdominal:      General: There is no distension.      Palpations: Abdomen is soft.      Tenderness: There is no abdominal tenderness.   Musculoskeletal:      Cervical back: Neck supple.      Comments: Trace edema lower extremities.  No cyanosis or clubbing   Lymphadenopathy:      Cervical: No cervical adenopathy.   Skin:     General: Skin is warm and dry.   Neurological:      General: No focal deficit present.      Mental Status: She is alert and oriented to person, place, and time.   Psychiatric:         Mood and Affect: Mood and affect and mood normal.         Behavior: Behavior normal.         Thought Content: Thought content normal.

## 2024-07-03 NOTE — ED NOTES
Dr. Chris does not want a sepsis alert called at this time.     Jeanne Carias, RN  07/03/24 [id number]

## 2024-07-05 LAB
ATRIAL RATE: 312 BPM
P AXIS: 89 DEGREES
QRS AXIS: -74 DEGREES
QRSD INTERVAL: 86 MS
QT INTERVAL: 328 MS
QTC INTERVAL: 506 MS
T WAVE AXIS: 97 DEGREES
VENTRICULAR RATE: 143 BPM

## 2024-07-05 PROCEDURE — 93010 ELECTROCARDIOGRAM REPORT: CPT | Performed by: INTERNAL MEDICINE

## 2024-07-06 NOTE — ASSESSMENT & PLAN NOTE
Patient has not been feeling well over past couple weeks or more.  Sometimes has palpitations and intermittent lightheadedness and dizziness.  Some shortness of breath with exertion.     She did see her cardiologist Dr. Tang on July 1 and he recommended she go to ER she was having atrial fibrillation rapid ventricular rate at that time.  She did not.  Heart rate today ranged from 132 150 bpm and O2 saturation room air 98%.     I was able to convince her to go to ER today for evaluation treatment of her atrial fibrillation with rapid ventricular rate.  Does have history of atrial fibrillation.  She is on chronic anticoagulation with warfarin.

## 2024-07-06 NOTE — ASSESSMENT & PLAN NOTE
Wt Readings from Last 3 Encounters:   07/03/24 83.6 kg (184 lb 6.4 oz)   07/02/24 83.9 kg (185 lb)   07/01/24 84.4 kg (186 lb)       Last echocardiogram done April 6 through [facility] showed mild decrease in LV systolic function of 40 to 5045%.  She did have some mild pulmonary hypertension.  She did have moderate mitral regurgitation and some mild aortic regurgitation and mild aortic stenosis.

## 2024-07-06 NOTE — ASSESSMENT & PLAN NOTE
Jacquie has severe CHANDRA but has not been using her CPAP at night.  She is on using 3 L of oxygen at night.  Did tell her that she used her CPAP at night it may help control her atrial fibrillation as well.

## 2024-07-07 PROBLEM — R65.10 SIRS (SYSTEMIC INFLAMMATORY RESPONSE SYNDROME) (HCC): Status: ACTIVE | Noted: 2024-07-07

## 2024-07-07 PROBLEM — R10.9 PAIN IN THE ABDOMEN: Status: ACTIVE | Noted: 2024-07-07

## 2024-07-07 PROBLEM — J96.21 ACUTE ON CHRONIC RESPIRATORY FAILURE WITH HYPOXIA (HCC): Status: ACTIVE | Noted: 2024-04-04

## 2024-07-07 PROBLEM — I50.43 ACUTE ON CHRONIC COMBINED SYSTOLIC AND DIASTOLIC CONGESTIVE HEART FAILURE (HCC): Status: ACTIVE | Noted: 2020-07-13

## 2024-07-07 NOTE — ED PROVIDER NOTES
"Final Diagnosis:  1. CHF (congestive heart failure) (HCC)    2. Ascites    3. Abdominal pain    4. Peritoneal dialysis status (HCC)    5. Pulmonary edema    6. SOB (shortness of breath)        Chief Complaint   Patient presents with    Shortness of Breath     Pt reports sob since previous ed visit along with palpitations. Generalized leg pain and r lower abdominal pain. Pt seen recently for afib and rvr       HPI  Pt w/ DM gets PD daily.   Also w/ CHF, was here few d ago w/ pulm edema but dind't want to stay at that time. Since not producing urine anymore.   Also w/ afib on warfarin    Also w/ COPD - no wheezing here.     No fever/cough that's new. For her. Spec \"doesn't feel like I have pneumonia\".     Reports SOB got worse since not producing urine.   Also reports some lower abd pain.     EMS additionally reports:     - Previous charting underwent limited review with attention to last ED visits, labs, ekgs, and prior imaging.  Chart review reveals :     Admission on 07/03/2024, Discharged on 07/03/2024   Component Date Value Ref Range Status    WBC 07/03/2024 9.86  4.31 - 10.16 Thousand/uL Final    RBC 07/03/2024 4.78  3.81 - 5.12 Million/uL Final    Hemoglobin 07/03/2024 13.8  11.5 - 15.4 g/dL Final    Hematocrit 07/03/2024 44.0  34.8 - 46.1 % Final    MCV 07/03/2024 92  82 - 98 fL Final    MCH 07/03/2024 28.9  26.8 - 34.3 pg Final    MCHC 07/03/2024 31.4  31.4 - 37.4 g/dL Final    RDW 07/03/2024 18.1 (H)  11.6 - 15.1 % Final    MPV 07/03/2024 11.9  8.9 - 12.7 fL Final    Platelets 07/03/2024 126 (L)  149 - 390 Thousands/uL Final    Results verified by repeat No Clots, PLTF Acceptable    nRBC 07/03/2024 0  /100 WBCs Final    Segmented % 07/03/2024 76 (H)  43 - 75 % Final    Immature Grans % 07/03/2024 1  0 - 2 % Final    Lymphocytes % 07/03/2024 10 (L)  14 - 44 % Final    Monocytes % 07/03/2024 11  4 - 12 % Final    Eosinophils Relative 07/03/2024 1  0 - 6 % Final    Basophils Relative 07/03/2024 1  0 - 1 % Final "    Absolute Neutrophils 07/03/2024 7.50  1.85 - 7.62 Thousands/µL Final    Absolute Immature Grans 07/03/2024 0.09  0.00 - 0.20 Thousand/uL Final    Absolute Lymphocytes 07/03/2024 0.97  0.60 - 4.47 Thousands/µL Final    Absolute Monocytes 07/03/2024 1.11  0.17 - 1.22 Thousand/µL Final    Eosinophils Absolute 07/03/2024 0.14  0.00 - 0.61 Thousand/µL Final    Basophils Absolute 07/03/2024 0.05  0.00 - 0.10 Thousands/µL Final    Protime 07/03/2024 17.1 (H)  11.6 - 14.5 seconds Final    INR 07/03/2024 1.38 (H)  0.84 - 1.19 Final    PTT 07/03/2024 29  23 - 37 seconds Final    Therapeutic Heparin Range =  60-90 seconds    Sodium 07/03/2024 131 (L)  135 - 147 mmol/L Final    Potassium 07/03/2024 5.0  3.5 - 5.3 mmol/L Final    Chloride 07/03/2024 95 (L)  96 - 108 mmol/L Final    CO2 07/03/2024 18 (L)  21 - 32 mmol/L Final    ANION GAP 07/03/2024 18 (H)  4 - 13 mmol/L Final    BUN 07/03/2024 76 (H)  5 - 25 mg/dL Final    Creatinine 07/03/2024 6.34 (H)  0.60 - 1.30 mg/dL Final    Standardized to IDMS reference method    Glucose 07/03/2024 165 (H)  65 - 140 mg/dL Final    If the patient is fasting, the ADA then defines impaired fasting glucose as > 100 mg/dL and diabetes as > or equal to 123 mg/dL.    Calcium 07/03/2024 9.9  8.4 - 10.2 mg/dL Final    AST 07/03/2024 14  13 - 39 U/L Final    ALT 07/03/2024 81 (H)  7 - 52 U/L Final    Specimen collection should occur prior to Sulfasalazine administration due to the potential for falsely depressed results.     Alkaline Phosphatase 07/03/2024 194 (H)  34 - 104 U/L Final    Total Protein 07/03/2024 6.6  6.4 - 8.4 g/dL Final    Albumin 07/03/2024 3.9  3.5 - 5.0 g/dL Final    Total Bilirubin 07/03/2024 1.84 (H)  0.20 - 1.00 mg/dL Final    Use of this assay is not recommended for patients undergoing treatment with eltrombopag due to the potential for falsely elevated results.  N-acetyl-p-benzoquinone imine (metabolite of Acetaminophen) will generate erroneously low results in samples  "for patients that have taken an overdose of Acetaminophen.    eGFR 07/03/2024 6  ml/min/1.73sq m Final    hs TnI 0hr 07/03/2024 40  \"Refer to ACS Flowchart\"- see link ng/L Final    Comment:                                              Initial (time 0) result  If >=50 ng/L, Myocardial injury suggested ;  Type of myocardial injury and treatment strategy  to be determined.  If 5-49 ng/L, a delta result at 2 hours and or 4 hours will be needed to further evaluate.  If <4 ng/L, and chest pain has been >3 hours since onset, patient may qualify for discharge based on the HEART score in the ED.  If <5 ng/L and <3hours since onset of chest pain, a delta result at 2 hours will be needed to further evaluate.    HS Troponin 99th Percentile URL of a Health Population=12 ng/L with a 95% Confidence Interval of 8-18 ng/L.    Second Troponin (time 2 hours)  If calculated delta >= 20 ng/L,  Myocardial injury suggested ; Type of myocardial injury and treatment strategy to be determined.  If 5-49 ng/L and the calculated delta is 5-19 ng/L, consult medical service for evaluation.  Continue evaluation for ischemia on ecg and other possible etiology and repeat hs troponin at 4 hours.  If delta                            is <5 ng/L at 2 hours, consider discharge based on risk stratification via the HEART score (if in ED), or KATELIN risk score in IP/Observation.    HS Troponin 99th Percentile URL of a Health Population=12 ng/L with a 95% Confidence Interval of 8-18 ng/L.    BNP 07/03/2024 549 (H)  0 - 100 pg/mL Final    hs TnI 2hr 07/03/2024 31  \"Refer to ACS Flowchart\"- see link ng/L Final    Comment:                                              Initial (time 0) result  If >=50 ng/L, Myocardial injury suggested ;  Type of myocardial injury and treatment strategy  to be determined.  If 5-49 ng/L, a delta result at 2 hours and or 4 hours will be needed to further evaluate.  If <4 ng/L, and chest pain has been >3 hours since onset, patient may " qualify for discharge based on the HEART score in the ED.  If <5 ng/L and <3hours since onset of chest pain, a delta result at 2 hours will be needed to further evaluate.    HS Troponin 99th Percentile URL of a Health Population=12 ng/L with a 95% Confidence Interval of 8-18 ng/L.    Second Troponin (time 2 hours)  If calculated delta >= 20 ng/L,  Myocardial injury suggested ; Type of myocardial injury and treatment strategy to be determined.  If 5-49 ng/L and the calculated delta is 5-19 ng/L, consult medical service for evaluation.  Continue evaluation for ischemia on ecg and other possible etiology and repeat hs troponin at 4 hours.  If delta                            is <5 ng/L at 2 hours, consider discharge based on risk stratification via the HEART score (if in ED), or KATELIN risk score in IP/Observation.    HS Troponin 99th Percentile URL of a Health Population=12 ng/L with a 95% Confidence Interval of 8-18 ng/L.    Delta 2hr hsTnI 07/03/2024 -9  <20 ng/L Final    Ventricular Rate 07/03/2024 87  BPM Final    Atrial Rate 07/03/2024 340  BPM Final    QRSD Interval 07/03/2024 86  ms Final    QT Interval 07/03/2024 396  ms Final    QTC Interval 07/03/2024 476  ms Final    QRS Axis 07/03/2024 -69  degrees Final    T Wave Axis 07/03/2024 -70  degrees Final    Ventricular Rate 07/03/2024 143  BPM Final    Atrial Rate 07/03/2024 312  BPM Final    QRSD Interval 07/03/2024 86  ms Final    QT Interval 07/03/2024 328  ms Final    QTC Interval 07/03/2024 506  ms Final    P Axis 07/03/2024 89  degrees Final    QRS Axis 07/03/2024 -74  degrees Final    T Wave Axis 07/03/2024 97  degrees Final       - No language barrier.   - History obtained from patient    - Discuss patient's care, with patient permission or by chart review, with daughters     PMH:   has a past medical history of Acute asthmatic bronchitis, Atrial fibrillation (HCC), Cardiac disease, Chronic diastolic (congestive) heart failure (HCC), Chronic kidney  disease, Closed nondisplaced fracture of distal phalanx of right great toe (2018), Colon polyp, CPAP (continuous positive airway pressure) dependence, Diabetes mellitus (HCC), Hyperlipidemia, Hypertension, Medical non-compliance, Morbid obesity with BMI of 40.0-44.9, adult ([facility]) (2019), On continuous oral anticoagulation (2020), Pneumonia, PONV (postoperative nausea and vomiting), Pulmonary embolism ([facility]), Renal disorder, Severe obstructive sleep apnea, Severe pulmonary arterial systolic hypertension ([facility]), and Tinea pedis of both feet (2018).    PSH:   has a past surgical history that includes  section; Nose surgery; Eye surgery; and Cataract extraction.     Social History:  Tobacco Use: Low Risk  (2024)    Patient History     Smoking Tobacco Use: Never     Smokeless Tobacco Use: Never     Passive Exposure: Never     Alcohol Use: Not At Risk (3/6/2023)    [facility], [facility]    AUDIT-C     Frequency of Alcohol Consumption: Monthly or less     Average Number of Drinks: 1 or 2     Frequency of Binge Drinking: Never     No illicit use       ROS:  Pertinent positives/negatives: .     Some ROS may be present in the HPI and would take precedent over these standard questions asked below.   Review of Systems   Constitutional:  Positive for fatigue. Negative for chills and fever.   Respiratory:  Positive for shortness of breath. Negative for cough, wheezing and stridor.    Cardiovascular:  Positive for palpitations and leg swelling. Negative for chest pain.   Gastrointestinal:  Positive for abdominal pain and nausea. Negative for constipation and diarrhea.   Genitourinary:  Positive for decreased urine volume.        CONSTITUTIONAL:  No lethargy. No unexpected weight loss. No change in behavior.  EYES:  No pain, redness, or discharge. No loss of vision. No orbital trauma or pain.   ENT:  No tinnitus or decreased hearing. No  epistaxis/purulent rhinorrhea. No voice change, airway closing, trismus.   CARDIOVASCULAR:  No chest pain. No skin mottling or pallor. No change in exertional capacity  RESPIRATORY:  No hemoptysis. No paroxysmal nocturnal dyspnea. No stridor. No apnea or bluing.   GASTROINTESTINAL:  No vomiting, diarrhea. No distension. No melena. No hematochezia.   GENITOURINARY:  No nocturia. No hematuria or foul smelling or cloudy urine. No discharge. No sores/adenopathy.   MUSCULOSKELETAL:  No contracture.  No new deformity.   INTEGUMENTARY:  No swelling. No unexpected contusions. No abrasions. No lymphangitis.  NEUROLOGIC:  No meningismus. No new numbness of the extremities. No new focal weakness. No postural instability  PSYCHIATRIC:  No SI HI AVH  HEMATOLOGICAL:  No bleeding. No petechiae. No bruising.  ALLERGIES:  No urticaria. No sudden abd cramping. No stridor.    PE:     Physical exam highlights:   Physical Exam       Vitals:    07/07/24 0400 07/07/24 0500 07/07/24 0530 07/07/24 0545   BP:       Pulse: (!) 118 (!) 122 (!) 124 (!) 106   Resp: 22 22 22   Temp:       TempSrc:       SpO2: 100% 100% 99% 97%     Vitals reviewed by me.   Nursing note reviewed  Chaperone present for all sensitive exam.  Const: No acute distress. Alert. Nontoxic. Not diaphoretic.    HEENT: External ears normal. No protrusion drainage swelling. Nose normal. No drainage/traumatic deformity. MM. Mouth with baseline/symmetric movement. No trismus.   Eyes: No squinting. No icterus. No tearing/swelling/drainage. Tracks through the room with normal EOM.   Neck: ROM normal. No rigidity. No meningismus.  Cards: well perfused. Tachy. Irregular. Afib on monitor  Pulm: Effort and excursion normal. Mild tachypnea. No distress. No audible wheezing/no stridor. Normal resp rate without retraction or change in work of breathing. Crackes. No sounds of focal consolidation.   Abd: distension. Patient without peritoneal pain with shifting/bumping the bed. Has some  guarding on palp no rebound.   MSK: ROM normal baseline. No deformity. No contractures from baseline.   Skin: No new rashes visible. Well perfused. No wounds visualized on exposed skin  Neuro: Nonfocal. Baseline. CN grossly intact. Moving all four with coordination.   Psych: Normal behavior and affect.        A:  - Nursing note reviewed.    Ddx and MDM  Considered diagnoses    Pulm edema 2/2 poor urine production, PD w/ fluid overload, CHF  Bnp unreliable w/ CKD, but can trend  Chest xr  - interstitial edema  CT chest r/o pnuemonia  None    Pnuemonia?  Not likely by history  Procal unreliable.     Afib rvr  Lopressor and HR improves to < 110    Abd pain  R/o divertic  Ct abd pelv  R/o appendicitius  CT ab pelv w/o    Gives PD to self - has ascites  SBP?  Ceftriaxone given prophylaxis / poss cause of pain.    SIRS I believe 2/2 AFIB HR, and pulm edema, RR  Don't believe 2/2 underlying infection so I leave off blood cultures and lactic.         Dispo decision admit floor given continued SOB, abd pain, and dec urine output. Will likely need HD to manage volume status.      My conversation with consultant reveals:        Decision rules:                    ED Course as of 07/07/24 0601   Sun Jul 07, 2024   0320 Procalcitonin(!): 1.46  Unreliable given CrCl   0554 I don't think SIRS is 2/2 infectious source at this time. I think AFIB rvr and SOB 2/2 pulm edema.     However gives self peritonieal dialysis and has abd pain w/ ascites, will cover SBP w/ ceftriaxone. Defer lactic and blood cultures         My read of the XR/CT scan reveals:    CT chest abdomen pelvis wo contrast   Final Result      Hazy opacity and mild septal thickening suggestive of interstitial edema.      Peritoneal dialysis catheter is present. Small volume of ascites.      Mild body wall edema.               Workstation performed: [id number]         XR chest 1 view portable    (Results Pending)       Orders Placed This Encounter   Procedures     COVID/FLU/RSV    XR chest 1 view portable    CT chest abdomen pelvis wo contrast    Protime-INR    CBC and differential    B-Type Natriuretic Peptide(BNP)    HS Troponin 0hr (reflex protocol)    Comprehensive metabolic panel    Procalcitonin    HS Troponin I 2hr    HS Troponin I 4hr    INPATIENT ADMISSION     Labs Reviewed   PROTIME-INR - Abnormal       Result Value Ref Range Status    Protime 23.7 (*) 11.6 - 14.5 seconds Final    INR 2.10 (*) 0.84 - 1.19 Final   CBC AND DIFFERENTIAL - Abnormal    WBC 11.39 (*) 4.31 - 10.16 Thousand/uL Final    RBC 4.28  3.81 - 5.12 Million/uL Final    Hemoglobin 12.3  11.5 - 15.4 g/dL Final    Hematocrit 39.2  34.8 - 46.1 % Final    MCV 92  82 - 98 fL Final    MCH 28.7  26.8 - 34.3 pg Final    MCHC 31.4  31.4 - 37.4 g/dL Final    RDW 17.8 (*) 11.6 - 15.1 % Final    MPV 11.6  8.9 - 12.7 fL Final    Platelets 174  149 - 390 Thousands/uL Final    nRBC 0  /100 WBCs Final    Segmented % 70  43 - 75 % Final    Immature Grans % 1  0 - 2 % Final    Lymphocytes % 11 (*) 14 - 44 % Final    Monocytes % 14 (*) 4 - 12 % Final    Eosinophils Relative 3  0 - 6 % Final    Basophils Relative 1  0 - 1 % Final    Absolute Neutrophils 8.13 (*) 1.85 - 7.62 Thousands/µL Final    Absolute Immature Grans 0.07  0.00 - 0.20 Thousand/uL Final    Absolute Lymphocytes 1.22  0.60 - 4.47 Thousands/µL Final    Absolute Monocytes 1.55 (*) 0.17 - 1.22 Thousand/µL Final    Eosinophils Absolute 0.36  0.00 - 0.61 Thousand/µL Final    Basophils Absolute 0.06  0.00 - 0.10 Thousands/µL Final   B-TYPE NATRIURETIC PEPTIDE (BNP) - Abnormal     (*) 0 - 100 pg/mL Final   COMPREHENSIVE METABOLIC PANEL - Abnormal    Sodium 135  135 - 147 mmol/L Final    Potassium 4.6  3.5 - 5.3 mmol/L Final    Chloride 98  96 - 108 mmol/L Final    CO2 18 (*) 21 - 32 mmol/L Final    ANION GAP 19 (*) 4 - 13 mmol/L Final    BUN 85 (*) 5 - 25 mg/dL Final    Creatinine 7.84 (*) 0.60 - 1.30 mg/dL Final    Comment: Standardized to IDMS  reference method    Glucose 190 (*) 65 - 140 mg/dL Final    Comment: If the patient is fasting, the ADA then defines impaired fasting glucose as > 100 mg/dL and diabetes as > or equal to 123 mg/dL.    Calcium 8.5  8.4 - 10.2 mg/dL Final    Corrected Calcium 9.1  8.3 - 10.1 mg/dL Final    AST 14  13 - 39 U/L Final    ALT 32  7 - 52 U/L Final    Comment: Specimen collection should occur prior to Sulfasalazine administration due to the potential for falsely depressed results.     Alkaline Phosphatase 161 (*) 34 - 104 U/L Final    Total Protein 5.6 (*) 6.4 - 8.4 g/dL Final    Albumin 3.3 (*) 3.5 - 5.0 g/dL Final    Total Bilirubin 0.85  0.20 - 1.00 mg/dL Final    Comment: Use of this assay is not recommended for patients undergoing treatment with eltrombopag due to the potential for falsely elevated results.  N-acetyl-p-benzoquinone imine (metabolite of Acetaminophen) will generate erroneously low results in samples for patients that have taken an overdose of Acetaminophen.    eGFR 4  ml/min/1.73sq m Final    Narrative:     National Kidney Disease Foundation guidelines for Chronic Kidney Disease (CKD):     Stage 1 with normal or high GFR (GFR > 90 mL/min/1.73 square meters)    Stage 2 Mild CKD (GFR = 60-89 mL/min/1.73 square meters)    Stage 3A Moderate CKD (GFR = 45-59 mL/min/1.73 square meters)    Stage 3B Moderate CKD (GFR = 30-44 mL/min/1.73 square meters)    Stage 4 Severe CKD (GFR = 15-29 mL/min/1.73 square meters)    Stage 5 End Stage CKD (GFR <15 mL/min/1.73 square meters)  Note: GFR calculation is accurate only with a steady state creatinine   PROCALCITONIN TEST - Abnormal    Procalcitonin 1.46 (*) <=0.25 ng/ml Final    Comment: Suspected Lower Respiratory Tract Infection (LRTI):  - LESS than or EQUAL to 0.25 ng/mL:   low likelihood for bacterial LRTI; antibiotics DISCOURAGED.  - GREATER than 0.25 ng/mL:   increased likelihood for bacterial LRTI; antibiotics ENCOURAGED.    Suspected Sepsis:  - Strongly consider  initiating antibiotics in ALL UNSTABLE patients.  - LESS than or EQUAL to 0.5 ng/mL:   low likelihood for bacterial sepsis; antibiotics DISCOURAGED.  - GREATER than 0.5 ng/mL:   increased likelihood for bacterial sepsis; antibiotics ENCOURAGED.  - GREATER than 2 ng/mL:   high risk for severe sepsis / septic shock; antibiotics strongly ENCOURAGED.    Decisions on antibiotic use should not be based solely on Procalcitonin (PCT) levels. If PCT is low but uncertainty exists with stopping antibiotics, repeat PCT in 6-24 hours to confirm the low level. If antibiotics are administered (regardless if initial PCT was high or low), repeat PCT every 1-2 days to consider early antibiotic cessation (when GREATER than 80% decrease from the peak OR when PCT drops below designated cutoffs, whichever comes first), so long as the infection is NOT one that typically requires prolonged treatment durations (e.g., bone/joint infections, endocarditis, Staph. aureus bacteremia).    Situations of FALSE-POSITIVE Procalcitonin values:  1) Newborns < 72 hours old  2) Massive stress from severe trauma / burns, major surgery, acute pancreatitis, cardiogenic / hemorrhagic shock, sickle cell crisis, or other organ perfusion abnormalities  3) Malaria and some Candidal infections  4) Treatment with agents that stimulate cytokines (e.g., OKT3, anti-lymphocyte globulins, alemtuzumab, IL-2, granulocyte transfusion [NOT GCSFs])  5) Chronic renal disease causes elevated baseline levels (consider GREATER than 0.75 ng/mL as an abnormal cut-off); initiating HD/CRRT may cause transient decreases  6) Paraneoplastic syndromes from medullary thyroid or SCLC, some forms of vasculitis, and acute [id number] disease    Situations of FALSE-NEGATIVE Procalcitonin values:  1) Too early in clinical course for PCT to have reached its peak (may repeat in 6-24 hours to confirm low level)  2) Localized infection WITHOUT systemic (SIRS / sepsis) response (e.g., an  abscess, osteomyelitis, cystitis)  3) Mycobacteria (e.g., Tuberculosis, MAC)  4) Cystic fibrosis exacerbations     POCT GLUCOSE - Abnormal    POC Glucose 170 (*) 65 - 140 mg/dl Final   COVID19, INFLUENZA A/B, RSV PCR, [facility] - Normal    SARS-CoV-2 Negative  Negative Final    INFLUENZA A PCR Negative  Negative Final    INFLUENZA B PCR Negative  Negative Final    RSV PCR Negative  Negative Final    Narrative:     FOR PEDIATRIC PATIENTS - copy/paste COVID Guidelines URL to browser: [email or web address]    SARS-CoV-2 assay is a Nucleic Acid Amplification assay intended for the  qualitative detection of nucleic acid from SARS-CoV-2 in nasopharyngeal  swabs. Results are for the presumptive identification of SARS-CoV-2 RNA.    Positive results are indicative of infection with SARS-CoV-2, the virus  causing COVID-19, but do not rule out bacterial infection or co-infection  with other viruses. Laboratories within the [place] and its  territories are required to report all positive results to the appropriate  public health authorities. Negative results do not preclude SARS-CoV-2  infection and should not be used as the sole basis for treatment or other  patient management decisions. Negative results must be combined with  clinical observations, patient history, and epidemiological information.  This test has not been FDA cleared or approved.    This test has been authorized by FDA under an Emergency Use Authorization  (EUA). This test is only authorized for the duration of time the  declaration that circumstances exist justifying the authorization of the  emergency use of an in vitro diagnostic tests for detection of SARS-CoV-2  virus and/or diagnosis of COVID-19 infection under section 564(b)(1) of  the Act, 21 U.S.C. 360bbb-3(b)(1), unless the authorization is terminated  or revoked sooner. The test has been validated but independent review by FDA  and CLIA is  "pending.    Test performed using [employer] GeneXpert: This RT-PCR assay targets N2,  a region unique to SARS-CoV-2. A conserved region in the E-gene was chosen  for pan-Sarbecovirus detection which includes SARS-CoV-2.    According to CMS-2020-01-R, this platform meets the definition of high-throughput technology.   HS TROPONIN I 0HR - Normal    hs TnI 0hr 34  \"Refer to ACS Flowchart\"- see link ng/L Final    Comment:                                              Initial (time 0) result  If >=50 ng/L, Myocardial injury suggested ;  Type of myocardial injury and treatment strategy  to be determined.  If 5-49 ng/L, a delta result at 2 hours and or 4 hours will be needed to further evaluate.  If <4 ng/L, and chest pain has been >3 hours since onset, patient may qualify for discharge based on the HEART score in the ED.  If <5 ng/L and <3hours since onset of chest pain, a delta result at 2 hours will be needed to further evaluate.    HS Troponin 99th Percentile URL of a Health Population=12 ng/L with a 95% Confidence Interval of 8-18 ng/L.    Second Troponin (time 2 hours)  If calculated delta >= 20 ng/L,  Myocardial injury suggested ; Type of myocardial injury and treatment strategy to be determined.  If 5-49 ng/L and the calculated delta is 5-19 ng/L, consult medical service for evaluation.  Continue evaluation for ischemia on ecg and other possible etiology and repeat hs troponin at 4 hours.  If delta is <5 ng/L at 2 hours, consider discharge based on risk stratification via the HEART score (if in ED), or KATELIN risk score in IP/Observation.    HS Troponin 99th Percentile URL of a Health Population=12 ng/L with a 95% Confidence Interval of 8-18 ng/L.   HS TROPONIN I 2HR - Normal    hs TnI 2hr 27  \"Refer to ACS Flowchart\"- see link ng/L Final    Comment:                                              Initial (time 0) result  If >=50 ng/L, Myocardial injury suggested ;  Type of myocardial injury and treatment strategy  to be " determined.  If 5-49 ng/L, a delta result at 2 hours and or 4 hours will be needed to further evaluate.  If <4 ng/L, and chest pain has been >3 hours since onset, patient may qualify for discharge based on the HEART score in the ED.  If <5 ng/L and <3hours since onset of chest pain, a delta result at 2 hours will be needed to further evaluate.    HS Troponin 99th Percentile URL of a Health Population=12 ng/L with a 95% Confidence Interval of 8-18 ng/L.    Second Troponin (time 2 hours)  If calculated delta >= 20 ng/L,  Myocardial injury suggested ; Type of myocardial injury and treatment strategy to be determined.  If 5-49 ng/L and the calculated delta is 5-19 ng/L, consult medical service for evaluation.  Continue evaluation for ischemia on ecg and other possible etiology and repeat hs troponin at 4 hours.  If delta is <5 ng/L at 2 hours, consider discharge based on risk stratification via the HEART score (if in ED), or KATELIN risk score in IP/Observation.    HS Troponin 99th Percentile URL of a Health Population=12 ng/L with a 95% Confidence Interval of 8-18 ng/L.    Delta 2hr hsTnI -7  <20 ng/L Final   HS TROPONIN I 4HR       *Each of these labs was reviewed. Particular standout labs will be noted in the ED Course above     Final Diagnosis:  1. CHF (congestive heart failure) (HCC)    2. Ascites    3. Abdominal pain    4. Peritoneal dialysis status (HCC)    5. Pulmonary edema    6. SOB (shortness of breath)          P:  - hospital tx includes   Medications   sodium chloride 0.9 % bolus 500 mL (500 mL Intravenous New Bag 7/7/24 0530)   cefTRIAXone (ROCEPHIN) IVPB (premix in dextrose) 2,000 mg 50 mL (has no administration in time range)   ipratropium-albuterol (DUO-NEB) 0.5-2.5 mg/3 mL inhalation solution 3 mL (3 mL Nebulization Given 7/7/24 0246)   metoprolol (LOPRESSOR) injection 5 mg (5 mg Intravenous Given 7/7/24 0530)         - disposition  Time reflects when diagnosis was documented in both MDM as applicable and  the Disposition within this note       Time User Action Codes Description Comment    7/7/2024  6:00 AM Kwame Horan [I50.9] CHF (congestive heart failure) ([facility])     7/7/2024  6:00 AM Kwame Horan [R18.8] Ascites     7/7/2024  6:00 AM Kwame Horan [R10.9] Abdominal pain     7/7/2024  6:01 AM Kwame Horan [Z99.2] Peritoneal dialysis status ([facility])     7/7/2024  6:01 AM Kwame Horan [J81.1] Pulmonary edema     7/7/2024  6:01 AM Kwame Horan [R06.02] SOB (shortness of breath)           ED Disposition       ED Disposition   Admit    Condition   Stable    Date/Time   Sun Jul 7, 2024  6:01 AM    Comment   Case was discussed with jhonathan and the patient's admission status was agreed to be Admission Status: inpatient status to the service of Dr. schmitt .               Follow-up Information    None         - patient will call their PCP to let them know they were in the emergency department. We discuss return precautions and patient is agreeable with plan and aformentioned disposition.       - additional treatment intended, if consistent with primary provider:  - patient to follow with :      Patient's Medications   Discharge Prescriptions    No medications on file     No discharge procedures on file.  Prior to Admission Medications   Prescriptions Last Dose Informant Patient Reported? Taking?   B Complex-C (B-COMPLEX WITH VITAMIN C) tablet  Self Yes No   Sig: Take 1 tablet by mouth daily   Blood Glucose Monitoring Suppl (ONE TOUCH ULTRA 2) w/Device KIT  Self No No   Sig: Test glucose 3 times daily   Insulin Glargine-yfgn (Semglee, yfgn,) 100 UNIT/ML SOPN  Self No No   Sig: Inject 10 units subcutaneously daily in the morning   Insulin Pen Needle 31G X 5 MM MISC  Self No No   Sig: by Does not apply route daily   Lancets (onetouch ultrasoft) lancets  Self No No   Sig: Test glucose 3 times a day; Code: E11.8   MULTIPLE VITAMIN PO  Self Yes No   Sig: Take 1 tablet by mouth daily    OneTouch Ultra test strip  Self No No   Sig: TEST GLUCOSE THREE TIMES A DAY   albuterol (2.5 mg/3 mL) 0.083 % nebulizer solution  Self No No   Sig: Take 3 mL (2.5 mg total) by nebulization every 6 (six) hours as needed for wheezing or shortness of breath   Patient taking differently: Take 2.5 mg by nebulization every 6 (six) hours as needed for wheezing or shortness of breath prn   albuterol (PROVENTIL HFA,VENTOLIN HFA) 90 mcg/act inhaler  Self No No   Sig: Inhale 2 puffs every 6 (six) hours as needed for wheezing   atorvastatin (LIPITOR) 20 mg tablet  Self No No   Sig: Take 1 tablet (20 mg total) by mouth daily   calcitriol (ROCALTROL) 0.25 mcg capsule  Self No No   Sig: Take 1 capsule (0.25 mcg total) by mouth daily   calcium acetate (PHOSLO) capsule  Self Yes No   Sig: Take 667 mg by mouth daily    cholecalciferol (VITAMIN D3) 1,000 units tablet  Self No No   Sig: Take 2 tablets (2,000 Units total) by mouth daily   cinacalcet (SENSIPAR) 30 mg tablet  Self No No   Sig: TAKE 1 TABLET THREE TIMES A WEEK   collagenase (SANTYL) ointment  Self No No   Sig: Apply topically daily   docusate sodium (COLACE) 100 mg capsule  Self No No   Sig: Take 1 capsule (100 mg total) by mouth 2 (two) times a day   gentamicin (GARAMYCIN) 0.1 % cream  Self No No   Sig: APPLY TOPICALLY DAILY   insulin lispro (HumaLOG KwikPen) 100 units/mL injection pen  Self No No   Sig: Inject 3 Units under the skin 3 (three) times a day with meals   levothyroxine (Euthyrox) 125 mcg tablet  Self No No   Sig: Take one tablet by mouth in early morning on empty stomach   metolazone (ZAROXOLYN) 5 mg tablet  Self Yes No   Sig: Take 5 mg by mouth daily   metoprolol succinate (TOPROL-XL) 50 mg 24 hr tablet  Self No No   Sig: TAKE 1 TABLET DAILY   Patient taking differently: Take 50 mg by mouth daily 2 in AM and 2 in PM   nystatin (MYCOSTATIN) powder   No No   Sig: Apply topically 3 (three) times a day for 7 days   Patient not taking: Reported on 3/23/2023  "  semaglutide, 0.25 or 0.5 mg/dose, (Ozempic, 0.25 or 0.5 MG/DOSE,) 2 mg/3 mL injection pen   No No   Sig: Inject 0.75 mL (0.5 mg total) under the skin every 7 days   torsemide (DEMADEX) 100 mg tablet  Self No No   Sig: Take 1 tablet (100 mg total) by mouth daily   valsartan (DIOVAN) 320 MG tablet  Self No No   Sig: Take 0.5 tablets (160 mg total) by mouth daily   Patient taking differently: Take 160 mg by mouth daily Takes 1 full tablet daily   warfarin (COUMADIN) 5 mg tablet  Self Yes No      Facility-Administered Medications: None       Portions of the record may have been created with voice recognition software. Occasional wrong word or \"sound a like\" substitutions may have occurred due to the inherent limitations of voice recognition software. Read the chart carefully and recognize, using context, where substitutions have occurred.    Electronically signed by:  MD Kwame Howard MD  07/07/24 [id number]    "

## 2024-07-07 NOTE — CONSULTS
Consultation - Cardiology   [facility]     Jacquie Smith 69 y.o. female MRN: [id number]  : 1955  Unit/Bed#: [address] [place] [address] Encounter: [id number]      ASSESSMENT:  Presented with lower abdominal pain, lower extremity swelling and shortness of breath    ESRD on PD    A-jacqueline with RVR  Was seen in the office on 2024 with ventricular rate of 159/min.  She was told to go to the ED immediately.  On the way she change her mind and decided to wait it out for her heart rate to settle down     History of paroxysmal atrial flutter (definitely noted on EKG from 2020)  [id number] score is 7  on warfarin     History of syncope     Chronic combined systolic and diastolic heart failure since 2020  Initial EF was 35% in 2020  Subsequently improved to 40-45%     TTE, 2023: At [facility]  EF 40-45%, mildly reduced RV systolic function  Moderate LAE, mild AR, mild AS  Severe MAC, moderate MR  Mild pulmonary hypertension     Nonischemic cardiomyopathy     Chronic pulmonary hypertension, moderate  PA pressure 68 mmHg in 2022     Obesity,      CHANDRA, intolerant to CPAP     Type 2 diabetes mellitus     Mixed hyperlipidemia     History of PE 2018 with moderate probability VQ scan     History of hypertension          RECOMMENDATIONS:  Patient is currently being treated with Coumadin for anticoagulation and Toprol XL for rate control  Increase beta-blocker with holding parameters  Low-dose ACE inhibitor or ARB if okay with nephrology  Repeat TTE  Management of ESRD/PD as per nephrology        Thank you for consulting me in the management and care of this patient.    Physician Requesting Consult: Lianna Beebe MD    Reason for Consult / Principal Problem: Atrial fibrillation    Inpatient consult to Cardiology  Consult performed by: Vaibhav Tang MD  Consult ordered by: MICHAEL Padron          HPI: Jacquie Smith is a 69 y.o. year old female who presents with abdominal  pain and distention, dyspnea, and lower extremity edema.  She was seen in the cardiology office on 2024 with AF/RVR at 159/min.  She was advised to go immediately to the ED.  ED physician and on-call cardiologist were informed.  However, patient changed her mind after leaving the office and did not go to the hospital.  She presented 6-7 days later with the above symptoms    Review of Systems   Constitutional:  Positive for activity change.   Respiratory:  Positive for shortness of breath.    Cardiovascular:  Positive for leg swelling.   Gastrointestinal:  Positive for abdominal distention and abdominal pain.   All other systems reviewed and are negative.      Historical Information   Past Medical History:   Diagnosis Date    Acute asthmatic bronchitis     last assessed: 2017    Atrial fibrillation ([facility])     Cardiac disease     Chronic diastolic (congestive) heart failure ([facility])     Chronic kidney disease     pt is on peritoneal dialysis daily from 2200- 0600    Closed nondisplaced fracture of distal phalanx of right great toe 2018    Colon polyp     CPAP (continuous positive airway pressure) dependence     Diabetes mellitus ([facility])     Hyperlipidemia     Hypertension     Medical non-compliance     Morbid obesity with BMI of 40.0-44.9, adult ([facility]) 2019    On continuous oral anticoagulation 2020    Pneumonia     last assessed: 2013    PONV (postoperative nausea and vomiting)     Pulmonary embolism ([facility])     Renal disorder     possible clot noted in kidney, thus blood thinners currently    Severe obstructive sleep apnea     Severe pulmonary arterial systolic hypertension ([facility])     Tinea pedis of both feet 2018     Past Surgical History:   Procedure Laterality Date    CATARACT EXTRACTION       SECTION       x 1    EYE SURGERY      NOSE SURGERY      fractured nose - septoplasty     Social History     Substance and Sexual Activity   Alcohol Use Never     Social History      Substance and Sexual Activity   Drug Use Never     Social History     Tobacco Use   Smoking Status Never    Passive exposure: Never   Smokeless Tobacco Never     Family History:   Family History   Problem Relation Age of Onset    Diabetes Mother         mellitus    Anxiety disorder Mother         generalized anxiety disorder    Hypertension Mother     Stroke Mother     Diabetes type II Mother     Kidney disease Father     Kidney failure Father         renal failure    Ulcerative colitis Sister     Diabetes Sister     Ovarian cancer Maternal Aunt     Diabetes Maternal Aunt     Diabetes Maternal Uncle     Heart disease Maternal Uncle     Heart disease Family     Thyroid disease Neg Hx        Meds/Allergies    PTA meds:    Medications Prior to Admission:     atorvastatin (LIPITOR) 20 mg tablet    B Complex-C (B-COMPLEX WITH VITAMIN C) tablet    calcitriol (ROCALTROL) 0.25 mcg capsule    calcium acetate (PHOSLO) capsule    cholecalciferol (VITAMIN D3) 1,000 units tablet    cinacalcet (SENSIPAR) 30 mg tablet    levothyroxine (Euthyrox) 125 mcg tablet    metoprolol succinate (TOPROL-XL) 50 mg 24 hr tablet    torsemide (DEMADEX) 100 mg tablet    valsartan (DIOVAN) 160 mg tablet    warfarin (COUMADIN) 5 mg tablet    albuterol (2.5 mg/3 mL) 0.083 % nebulizer solution    albuterol (PROVENTIL HFA,VENTOLIN HFA) 90 mcg/act inhaler    Blood Glucose Monitoring Suppl (ONE TOUCH ULTRA 2) w/Device KIT    collagenase (SANTYL) ointment    docusate sodium (COLACE) 100 mg capsule    gentamicin (GARAMYCIN) 0.1 % cream    Insulin Glargine-yfgn (Semglee, yfgn,) 100 UNIT/ML SOPN    insulin lispro (HumaLOG KwikPen) 100 units/mL injection pen    Insulin Pen Needle 31G X 5 MM MISC    Lancets (onetouch ultrasoft) lancets    metolazone (ZAROXOLYN) 5 mg tablet    MULTIPLE VITAMIN PO    nystatin (MYCOSTATIN) powder    OneTouch Ultra test strip    semaglutide, 0.25 or 0.5 mg/dose, (Ozempic, 0.25 or 0.5 MG/DOSE,) 2 mg/3 mL injection pen   No  Known Allergies    Current Facility-Administered Medications:     acetaminophen (TYLENOL) tablet 650 mg, 650 mg, Oral, Q6H PRN, MICHAEL Padron    atorvastatin (LIPITOR) tablet 20 mg, 20 mg, Oral, Daily With Dinner, MICHAEL Padron    b complex-vitamin C-folic acid (RENAL) capsule 1 capsule, 1 capsule, Oral, Daily, MICHAEL Padron    bisacodyl (DULCOLAX) rectal suppository 10 mg, 10 mg, Rectal, Once, MICHAEL Padron    calcitriol (ROCALTROL) capsule 0.25 mcg, 0.25 mcg, Oral, Daily, MICHAEL Padron    calcium acetate (PHOSLO) capsule 667 mg, 667 mg, Oral, Daily, MICHAEL Padron    cefTRIAXone (ROCEPHIN) IVPB (premix in dextrose) 2,000 mg 50 mL, 2,000 mg, Intravenous, Q24H, MICHAEL Padron    Cholecalciferol (VITAMIN D3) tablet 2,000 Units, 2,000 Units, Oral, Daily, MICHAEL Padron    [START ON 7/8/2024] cinacalcet (SENSIPAR) tablet 30 mg, 30 mg, Oral, Once per day on Monday Wednesday Friday, MICHAEL Padron    docusate sodium (COLACE) capsule 100 mg, 100 mg, Oral, BID, MICHAEL Padron    gentamicin (GARAMYCIN) 0.1 % cream 1 Application, 1 Application, Topical, Daily, MICHAEL Padron    HYDROmorphone (DILAUDID) injection 0.2 mg, 0.2 mg, Intravenous, Q4H PRN, MICHAEL Padron    [START ON 7/8/2024] insulin glargine (LANTUS) subcutaneous injection 10 Units 0.1 mL, 10 Units, Subcutaneous, Daily, MICHAEL Padron    insulin lispro (HumALOG/ADMELOG) 100 units/mL subcutaneous injection 1-5 Units, 1-5 Units, Subcutaneous, TID AC **AND** Fingerstick Glucose (POCT), , , TID AC, MICHAEL Padron    insulin lispro (HumALOG/ADMELOG) 100 units/mL subcutaneous injection 1-5 Units, 1-5 Units, Subcutaneous, HS, MICHAEL Padron    [START ON 7/8/2024] insulin lispro (HumALOG/ADMELOG) 100 units/mL subcutaneous injection 3 Units, 3 Units, Subcutaneous, TID With Meals, MICHAEL Padron    levalbuterol (XOPENEX) inhalation solution 0.63 mg, 0.63 mg, Nebulization, Q6H PRN, MICHAEL Padron     levothyroxine tablet 125 mcg, 125 mcg, Oral, Early Morning, MICHAEL Padron    metoprolol succinate (TOPROL-XL) 24 hr tablet 50 mg, 50 mg, Oral, Daily, MICHAEL Padron    multivitamin stress formula tablet 1 tablet, 1 tablet, Oral, Daily, MICHAEL Padron    polyethylene glycol (MIRALAX) packet 17 g, 17 g, Oral, Daily, MICHAEL Padron    saccharomyces boulardii (FLORASTOR) capsule 250 mg, 250 mg, Oral, BID, Brody Maya, MICHAEL    senna (SENOKOT) tablet 17.2 mg, 2 tablet, Oral, HS, MICHAEL Padron    warfarin (COUMADIN) tablet 5 mg, 5 mg, Oral, Daily (warfarin), Brody Maya, MICHAEL    VTE Pharmacologic Prophylaxis:       Objective:   Vitals: Blood pressure 131/75, pulse (!) 106, temperature 97.9 °F (36.6 °C), temperature source Temporal, resp. rate 22, SpO2 96%, not currently breastfeeding.  There is no height or weight on file to calculate BMI.  BP Readings from Last 3 Encounters:   07/07/24 131/75   07/03/24 90/55   07/03/24 122/82     Orthostatic Blood Pressures      Flowsheet Row Most Recent Value   Blood Pressure 131/75 filed at 07/07/2024 0915            Intake/Output Summary (Last 24 hours) at 7/7/2024 1021  Last data filed at 7/7/2024 0715  Gross per 24 hour   Intake 550 ml   Output --   Net 550 ml       Invasive Devices       Peripheral Intravenous Line  Duration             Peripheral IV 07/07/24 Left;Proximal;Ventral (anterior) Forearm <1 day              Line  Duration             Peritoneal Dialysis Catheter Abdominal 1467 days    Peritoneal Dialysis Catheter Tenckhoff Right lower abdomen 1450 days                      Physical Exam:   Physical Exam    Neurologic:  Alert & oriented x 3, no new focal deficits, Not in any acute distress,  Constitutional: Obese  Eyes:  Pupil equal and reacting to light, conjunctiva normal   HENT:  Atraumatic, oropharynx moist, Neck- normal range of motion, no tenderness, supple   Respiratory:  Bilateral air entry, decreased in the bases  Cardiovascular: S1-S2  "regular with a 1/6 systolic murmur  GI:  Soft, nondistended, normal bowel sounds, mildly tender, no hepatosplenomegaly appreciated.  Musculoskeletal:  No tenderness, no deformities.   Skin:  Well hydrated, no rash   Lymphatic:  No lymphadenopathy noted   Extremities: Trace lower extremity edema    Labs:   Troponins:       CBC with diff:   Results from last 7 days   Lab Units 07/07/24  0237 07/03/24  1115   WBC Thousand/uL 11.39* 9.86   HEMOGLOBIN g/dL 12.3 13.8   HEMATOCRIT % 39.2 44.0   MCV fL 92 92   PLATELETS Thousands/uL 174 126*   RBC Million/uL 4.28 4.78   MCH pg 28.7 28.9   MCHC g/dL 31.4 31.4   RDW % 17.8* 18.1*   MPV fL 11.6 11.9   NRBC AUTO /100 WBCs 0 0       CMP:   Results from last 7 days   Lab Units 07/07/24  0237 07/03/24  1115   SODIUM mmol/L 135 131*   POTASSIUM mmol/L 4.6 5.0   CHLORIDE mmol/L 98 95*   CO2 mmol/L 18* 18*   ANION GAP mmol/L 19* 18*   BUN mg/dL 85* 76*   CREATININE mg/dL 7.84* 6.34*   CALCIUM mg/dL 8.5 9.9   AST U/L 14 14   ALT U/L 32 81*   ALK PHOS U/L 161* 194*   TOTAL PROTEIN g/dL 5.6* 6.6   ALBUMIN g/dL 3.3* 3.9   TOTAL BILIRUBIN mg/dL 0.85 1.84*   EGFR ml/min/1.73sq m 4 6   GLUCOSE RANDOM mg/dL 190* 165*       Magnesium:   Results from last 7 days   Lab Units 07/07/24  0237   MAGNESIUM mg/dL 2.0     Coags:   Results from last 7 days   Lab Units 07/07/24  0237 07/03/24  1115   PTT seconds  --  29   INR  2.10* 1.38*     TSH:      No components found for: \"TSH3\"  Lipid Profile:     Lipid Profile:   Lab Results   Component Value Date    CHOLESTEROL 127 03/05/2024    HDL 48 (L) 03/05/2024    LDLCALC 69 03/05/2024    TRIG 52 03/05/2024     Hgb A1c:     NT-proBNP: No results for input(s): \"NTBNP\" in the last 72 hours.     Imaging & Testing   Cardiac testing:   Results for orders placed during the hospital encounter of 08/09/21    Echo complete with contrast if indicated    Narrative  [address] " [id number]  [phone]    Transthoracic Echocardiogram  2D, M-mode, Doppler, and Color Doppler    Study date:  09-Aug-2021    Patient: OMAIRA IGLESIAS  MR number: [id number]  Account number: [id number]  : 1955  Age: 66 years  Gender: Female  Status: Outpatient  Location: Echo lab  Height: 60 in  Weight: 211.6 lb  BP: 126/ 70 mmHg    Indications: Cardiomyopathy    Diagnoses: I43. - Cardiomyopathy in diseases classified elsewhere    Sonographer:  JESUS Egan  Primary Physician:  Yessica Ramirez DO  Referring Physician:  Huang Edwards MD  Group:  [facility]  Interpreting Physician:  Kevin Sin MD    SUMMARY    SUMMARY:  Compared to prior echo, LVEF remains near 45% but with increased diastolic restrictive physiology. Prominent left sided/pulmonary presures remain.    LEFT VENTRICLE:  Systolic function was mildly to moderately reduced by visual assessment. Ejection fraction was estimated in the range of 40 % to 45 % to be 45 %.  There was mild diffuse hypokinesis.  Doppler parameters were consistent with restrictive physiology, indicative of decreased left ventricular diastolic compliance and/or increased left atrial pressure.    LEFT ATRIUM:  The atrium was mildly dilated.    RIGHT ATRIUM:  The atrium was mildly dilated.    MITRAL VALVE:  There was mild to moderate annular calcification.  There was mild regurgitation.    AORTIC VALVE:  The valve was functionally bicuspid. Leaflets exhibited mildly to moderately increased thickness, mild to moderate calcification, lower normal cuspal separation, and sclerosis.    TRICUSPID VALVE:  Poor TR jet to accurately estimate pulmonary pressure. Unable to obtain full envelope to obtain peak pulmonary pressure. Pulmonary pressure at least 68mmHg.  There was mild regurgitation.    PULMONIC VALVE:  There was mild regurgitation.    IVC, HEPATIC VEINS:  The inferior vena cava was mildly dilated.  The respirophasic change in diameter was more than  50%.    HISTORY: PRIOR HISTORY: Tachycardia,CHF,Pulmonary HTN,Hyperlipidemia,Diabetes,end stage renal disease,Hypothyroidism.    PROCEDURE: The procedure was performed in the echo lab. This was a routine study. The transthoracic approach was used. The study included complete 2D imaging, M-mode, complete spectral Doppler, and color Doppler. The heart rate was 74 bpm,  at the start of the study. Images were obtained from the parasternal, apical, subcostal, and suprasternal notch acoustic windows. Image quality was adequate.    LEFT VENTRICLE: Size was normal. Systolic function was mildly to moderately reduced by visual assessment. Ejection fraction was estimated in the range of 40 % to 45 % to be 45 %. There was mild diffuse hypokinesis. DOPPLER: Doppler  parameters were consistent with restrictive physiology, indicative of decreased left ventricular diastolic compliance and/or increased left atrial pressure.    RIGHT VENTRICLE: The size was normal. Systolic function was low normal with TAPSE-1.5-1.6cm    LEFT ATRIUM: The atrium was mildly dilated.    RIGHT ATRIUM: The atrium was mildly dilated.    MITRAL VALVE: There was mild to moderate annular calcification. There was mild calcification. There was lower normal leaflet separation. DOPPLER: There was no evidence for significant stenosis. Mean gradient 2mmHg at 70bpm There was mild  regurgitation.    AORTIC VALVE: The valve was functionally bicuspid. Leaflets exhibited mildly to moderately increased thickness, mild to moderate calcification, lower normal cuspal separation, and sclerosis. DOPPLER: Transaortic velocity was minimally  increased.    TRICUSPID VALVE: Poor TR jet to accurately estimate pulmonary pressure. Unable to obtain full envelope to obtain peak pulmonary pressure. Pulmonary pressure at least 68mmHg. DOPPLER: There was mild regurgitation.    PULMONIC VALVE: DOPPLER: There was mild regurgitation.    SYSTEMIC VEINS: IVC: The inferior vena cava was mildly  dilated. The respirophasic change in diameter was more than 50%.    MEASUREMENT TABLES    DOPPLER MEASUREMENTS  Aortic valve   (Reference normals)  Peak ravinder   220 cm/s   (--)    SYSTEM MEASUREMENT TABLES    2D  EF (Teich): 44.74 %  %FS: 21.75 %  Ao Diam: 3.12 cm  EDV(Teich): 60.9 ml  ESV(Teich): 33.65 ml  IVSd: 1.13 cm  LA Area: 21.61 cm2  LA Diam: 4.13 cm  LVEDV MOD A4C: 119.85 ml  LVEF MOD A4C: 41.9 %  LVESV MOD A4C: 69.63 ml  LVIDd: 3.77 cm  LVIDs: 2.95 cm  LVLd A4C: 7.89 cm  LVLs A4C: 6.89 cm  LVPWd: 1.2 cm  RA Area: 18.22 cm2  RVIDd: 3.03 cm  SV (Teich): 27.25 ml  SV MOD A4C: 50.22 ml    CW  AV Vmax: 1.68 m/s  AV maxP.24 mmHg  TR Vmax: 4.18 m/s  TR maxP.02 mmHg    MM  TAPSE: 1.48 cm    PW  MV E/A Ratio: 1.97  E' Sept: 0.07 m/s  E/E' Sept: 21.99  LVOT Vmax: 1.16 m/s  LVOT maxP.36 mmHg  MV A Ravinder: 0.76 m/s  MV Dec [place]: 7.47 m/s2  MV DecT: 201.43 ms  MV E Ravinder: 1.5 m/s  MV PHT: 58.42 ms  MVA By PHT: 3.77 cm2    [employer] Accredited Echocardiography Laboratory    Prepared and electronically signed by    Kevin Sin MD  Signed 11-Aug-2021 14:18:20        Imaging:   XR chest 1 view portable    Result Date: 2024  Narrative: XR CHEST PORTABLE INDICATION: asdf. COMPARISON: CXR 7/3/2024, chest CT 2024. FINDINGS: Mild pulmonary venous congestion. No pneumothorax or pleural effusion. Normal cardiomediastinal silhouette. Bones are unremarkable for age. Normal upper abdomen.     Impression: Mild pulmonary venous congestion. Workstation performed: [id number]     CT chest abdomen pelvis wo contrast    Result Date: 2024  Narrative: CT CHEST, ABDOMEN AND PELVIS WITHOUT IV CONTRAST INDICATION: RLQ LLQ pain. COMPARISON: CT chest on 2024 TECHNIQUE: CT examination of the chest, abdomen and pelvis was performed without intravenous contrast. Multiplanar 2D reformatted images were created from the source data. This examination, like all CT scans performed in the [facility], was performed utilizing techniques to minimize radiation dose exposure, including the use of iterative reconstruction and automated exposure control. Radiation dose length product (DLP) for this visit: 1323 mGy-cm Enteric Contrast: Not administered. FINDINGS: CHEST LUNGS: Hazy opacity and mild septal thickening. Stable tree-in-bud nodularity mostly in the right lung with a few areas of mild mosaic attenuation, similar to the prior study. Multiple calcified small nodules are reidentified. No tracheal or endobronchial lesion. PLEURA: Unremarkable. HEART/GREAT VESSELS: Mild cardiomegaly. No thoracic aortic aneurysm. Enlarged pulmonary artery with calcification of the walls, consistent with history of pulmonary hypertension. Left atrial wall calcifications. Coronary artery calcifications. MEDIASTINUM AND TEOFILO: Multiple nonspecific subcentimeter mediastinal lymph nodes, similar to the prior study. CHEST WALL AND LOWER NECK: Stable thyromegaly. Stable calcification in the right lobe. ABDOMEN LIVER/BILIARY TREE: Unremarkable. GALLBLADDER: Distended. No calcified gallstones. No pericholecystic inflammatory change. SPLEEN: Unremarkable. PANCREAS: Unremarkable. ADRENAL GLANDS: Unremarkable. KIDNEYS/URETERS: Atrophic kidneys. No hydronephrosis. Bilateral renal vascular calcifications. STOMACH AND BOWEL: Colonic diverticulosis without findings of acute diverticulitis. APPENDIX: No findings to suggest appendicitis. ABDOMINOPELVIC CAVITY: Peritoneal dialysis terminates in the anterior pelvis. Small volume of abdominopelvic ascites. No pneumoperitoneum. No lymphadenopathy. VESSELS: Advanced atherosclerosis without abdominal aortic aneurysm. PELVIS REPRODUCTIVE ORGANS: Fibroid uterus. 2 cm simple appearing left adnexal cyst. URINARY BLADDER: Unremarkable. ABDOMINAL WALL/INGUINAL REGIONS: Mild body wall edema. BONES: No acute fracture or suspicious osseous lesion.     Impression: Hazy opacity and mild septal thickening  "suggestive of interstitial edema. Peritoneal dialysis catheter is present. Small volume of ascites. Mild body wall edema. Workstation performed: [id number]     XR chest 1 view portable    Result Date: 7/4/2024  Narrative: XR CHEST PORTABLE INDICATION: A-fib. COMPARISON: Chest CT 6/18/2024. CXR 2/5/2024. FINDINGS: Mild pulmonary venous congestion. No pneumothorax or pleural effusion. Mild cardiomegaly. Bones are unremarkable for age. Normal upper abdomen.     Impression: Mild pulmonary venous congestion. Workstation performed: [id number]     CT chest without contrast    Result Date: 6/25/2024  Narrative: CT CHEST WITHOUT IV CONTRAST INDICATION:   R91.8: Other nonspecific abnormal finding of lung field. \"Follow-up consolidation, abnormal CT scan 3/19/2024.\" Per my review of the medical record, history of heart failure and pulmonary hypertension. Never smoker. Hospitalized on 3/19/2024 for pneumonia. COMPARISON: CXR 2/5/2024, chest CT 3/19/2024, 3/23/2022, and baseline chest CT 7/7/2018. TECHNIQUE: Chest CT without intravenous contrast.  Axial, sagittal, coronal 2D reformats and coronal MIPS from source data. Radiation dose length product (DLP):  403 mGy-cm . Radiation dose exposure minimized using iterative reconstruction and automated exposure control. FINDINGS: LUNGS: Resolution of left upper and left lower lobe pneumonia with mild benign postinfectious scar in the left lower lobe. Mild interstitial edema. Mild mosaic attenuation due to pulmonary hypertension. Multiple stable small nodules, most calcified, most  numerous in the anterior right lung. AIRWAYS: No significant filling defects. PLEURA:  Unremarkable. HEART/GREAT VESSELS: Mild cardiomegaly. Pulmonary artery enlargement with calcification of the walls with history of pulmonary hypertension. Calcification of the wall of the left atrium. Mild coronary artery calcification indicating atherosclerotic heart  disease. MEDIASTINUM AND TEOFILO: Regression of previously " enlarged mediastinal nodes. CHEST WALL AND LOWER NECK: Stable enlargement of the thyroid gland since 2018 with calcification in the right lobe. UPPER ABDOMEN: Redemonstration of pneumoperitoneum from peritoneal dialysis. Extensive vascular calcification in the abdomen. Partially imaged nonobstructing right renal calcification. OSSEOUS STRUCTURES: Moderate degenerative disease in the spine with mild curvature.     Impression: Resolution of left upper and left lower lobe pneumonia with mild benign postinfectious scar in the left lower lobe. Mild septal thickening due to interstitial edema. Mild mosaic attenuation due to pulmonary hypertension. Pneumoperitoneum from peritoneal dialysis. Workstation performed: [id number]     XR spine lumbar minimum 4 views non injury    Result Date: 6/18/2024  Narrative: LUMBAR SPINE INDICATION:   Radiculopathy, lumbar region. COMPARISON:  None. VIEWS:  XR SPINE LUMBAR MINIMUM 4 VIEWS NON INJURY Images: 5 FINDINGS: There are 5 non rib bearing lumbar vertebral bodies. There is no evidence of acute fracture or destructive osseous lesion. Alignment is unremarkable. Mild multilevel lower lumbar facet arthropathy without significant disc height loss. The pedicles appear intact. Soft tissues are unremarkable.     Impression: No acute osseous abnormality.  Degenerative changes as described. Electronically signed: 06/18/2024 11:01 AM Albin Sanchez MPH,MD    VAS ARTERIAL DUPLEX- LOWER LIMB BILATERAL    Result Date: 6/7/2024  Narrative:  [facility] REPORT CLINICAL: Indications: Patient presents with pain in the legs at rest and walking. Denies any open wounds or ulcers at this time. Operative History: No prior cardiovascular surgeries. Risk Factors: The patient has history of HTN, Diabetes (Yes) and CKD. Clinical: Right Pressure:  221/ mm Hg, Left Pressure:  185/ mm Hg.  FINDINGS:  Segment                Right       Left                                          PSV (cm/s)  PSV (cm/s)   Common Femoral Artery          54          47  Prox Profunda                  54          46  Prox SFA                       48          44  Mid SFA                        40          46  Dist SFA                       35          39  Proximal Pop                   40          40  Distal Pop                     41          42  Tibioperoneal                  26          25  Dist Post Tibial               41          94  Dist. Ant. Tibial              28          67  Dist Peroneal                  64          77     CONCLUSION: Impression: RIGHT LOWER LIMB: Diffuse atherosclerotic disease noted throughout without significant stenosis. Ankle/Brachial index: unreliable due to non-compressible vessels. PVR/ PPG tracings are normal. Metatarsal pressure of 109 mmHg. Great toe pressure of 94 mmHg, within the healing range.  LEFT LOWER LIMB: Diffuse atherosclerotic disease noted throughout without significant stenosis. Ankle/Brachial index: unreliable due to non-compressible vessels. PVR/ PPG tracings are normal. Metatarsal pressure of 92 mmHg. Great toe pressure of 90 mmHg, within the healing range.  Tech Note: Patient aware of elevated blood pressure and advised to call Primary Physician. Patient stated that she did not take meds today.  SIGNATURE: Electronically Signed by: SHANNON JAIME on 2024-06-07 10:02:39 PM    EKG/ Monitor: Personally reviewed.   Atrial fibrillation/flutter, ventricular rate 117/min, left axis deviation, nonspecific ST and T wave abnormality     Medications/ Allergies:     Current Facility-Administered Medications   Medication Dose Route Frequency Provider Last Rate    acetaminophen  650 mg Oral Q6H PRN MICHAEL Padron      atorvastatin  20 mg Oral Daily With Dinner MICHAEL Padron      b complex-vitamin C-folic acid  1 capsule Oral Daily MICHAEL Padron      bisacodyl  10 mg Rectal Once MICHAEL Padron      calcitriol  0.25 mcg Oral Daily MICHAEL Padron      calcium acetate   "667 mg Oral Daily Cuijr Maya, MICHAEL      cefTRIAXone  2,000 mg Intravenous Q24H Cubri Maya, MICHAEL      cholecalciferol  2,000 Units Oral Daily MICHAEL Padron      [START ON 7/8/2024] cinacalcet  30 mg Oral Once per day on Monday Wednesday Friday MICHAEL Padron      docusate sodium  100 mg Oral BID MICHAEL Padron      gentamicin  1 Application Topical Daily Cubri Maya, MICHAEL      HYDROmorphone  0.2 mg Intravenous Q4H PRN MICHAEL Padron      [START ON 7/8/2024] insulin glargine  10 Units Subcutaneous Daily MICHAEL Padron      insulin lispro  1-5 Units Subcutaneous TID AC Brody Maya, MICHAEL      insulin lispro  1-5 Units Subcutaneous HS MICHAEL Padron      [START ON 7/8/2024] insulin lispro  3 Units Subcutaneous TID With Meals Brody Maya, MICHAEL      levalbuterol  0.63 mg Nebulization Q6H PRN Brody Maya, MICHAEL      levothyroxine  125 mcg Oral Early Morning Cubri Maya, MICHAEL      metoprolol succinate  50 mg Oral Daily Cuijr Maya, MICHAEL      multivitamin stress formula  1 tablet Oral Daily Cubri Maya, MICHAEL      polyethylene glycol  17 g Oral Daily Cuijr Maya, MICHAEL      saccharomyces boulardii  250 mg Oral BID Cubri Maya, MICHAEL      senna  2 tablet Oral HS Brody Maya, MICHAEL      warfarin  5 mg Oral Daily (warfarin) Cubri Maya, MICHAEL       acetaminophen, 650 mg, Q6H PRN  HYDROmorphone, 0.2 mg, Q4H PRN  levalbuterol, 0.63 mg, Q6H PRN      No Known Allergies    Code Status: Level 1 - Full Code  Advance Directive and Living Will:      POLST:        Vaibhav Tang MD, [facility]      \"This note has been constructed using a voice recognition system.Therefore there may be syntax, spelling, and/or grammatical errors. Please call if you have any questions. \"  "

## 2024-07-07 NOTE — ASSESSMENT & PLAN NOTE
Lab Results   Component Value Date    HGBA1C 8.3 (H) 03/05/2024       Recent Labs     07/07/24  0235   POCGLU 170*       Blood Sugar Average: Last 72 hrs:  (P) 170    On insulin glargine 10 units subcu in the morning, Humalog 3 units 3 times daily with meals, Ozempic at home.  Continue Lantus 10 units SQ in the morning.  Hold today in view of n.p.o.  Continue Humalog with meals starting tomorrow  Hold Ozempic  SSI  Update A1c

## 2024-07-07 NOTE — QUICK NOTE
RENAL NOTE   Jacquie Smith 69 y.o. female MRN: [id number]  Unit/Bed#: [address] [place] [address] Encounter: [id number]    Patient noted to be hypotensive earlier and got fluid bolus.   Patient had completed the 1st exchange using 4.25% Dianeal - there was 2.4 liter output (negative 400 cc).   Repeat BP was up to 102/76.   I decided to discontinue Bumex due to low BP.   Cell counts, Gm stain, and culture of peritoneal fluid are pending.   Due to low BP, will change CAPD from 4.25% alt 2.5% to all 2.5%.   Add Midodrine 5 mg TID.

## 2024-07-07 NOTE — PLAN OF CARE
Problem: PAIN - ADULT  Goal: Verbalizes/displays adequate comfort level or baseline comfort level  Description: Interventions:  - Encourage patient to monitor pain and request assistance  - Assess pain using appropriate pain scale  - Administer analgesics based on type and severity of pain and evaluate response  - Implement non-pharmacological measures as appropriate and evaluate response  - Consider cultural and social influences on pain and pain management  - Notify physician/advanced practitioner if interventions unsuccessful or patient reports new pain  Outcome: Progressing     Problem: INFECTION - ADULT  Goal: Absence or prevention of progression during hospitalization  Description: INTERVENTIONS:  - Assess and monitor for signs and symptoms of infection  - Monitor lab/diagnostic results  - Monitor all insertion sites, i.e. indwelling lines, tubes, and drains  - Monitor endotracheal if appropriate and nasal secretions for changes in amount and color  - [place] appropriate cooling/warming therapies per order  - Administer medications as ordered  - Instruct and encourage patient and family to use good hand hygiene technique  - Identify and instruct in appropriate isolation precautions for identified infection/condition  Outcome: Progressing  Goal: Absence of fever/infection during neutropenic period  Description: INTERVENTIONS:  - Monitor WBC    Outcome: Progressing     Problem: SAFETY ADULT  Goal: Patient will remain free of falls  Description: INTERVENTIONS:  - Educate patient/family on patient safety including physical limitations  - Instruct patient to call for assistance with activity   - Consult OT/PT to assist with strengthening/mobility   - Keep Call bell within reach  - Keep bed low and locked with side rails adjusted as appropriate  - Keep care items and personal belongings within reach  - Initiate and maintain comfort rounds  - Make Fall Risk Sign visible to staff  - Offer Toileting every 2 Hours,  in advance of need  - Initiate/Maintain bed alarm  - Apply yellow socks and bracelet for high fall risk patients  - Consider moving patient to room near nurses station  Outcome: Progressing  Goal: Maintain or return to baseline ADL function  Description: INTERVENTIONS:  -  Assess patient's ability to carry out ADLs; assess patient's baseline for ADL function and identify physical deficits which impact ability to perform ADLs (bathing, care of mouth/teeth, toileting, grooming, dressing, etc.)  - Assess/evaluate cause of self-care deficits   - Assess range of motion  - Assess patient's mobility; develop plan if impaired  - Assess patient's need for assistive devices and provide as appropriate  - Encourage maximum independence but intervene and supervise when necessary  - Involve family in performance of ADLs  - Assess for home care needs following discharge   - Consider OT consult to assist with ADL evaluation and planning for discharge  - Provide patient education as appropriate  Outcome: Progressing  Goal: Maintains/Returns to pre admission functional level  Description: INTERVENTIONS:  - Perform AM-PAC 6 Click Basic Mobility/ Daily Activity assessment daily.  - Set and communicate daily mobility goal to care team and patient/family/caregiver.   - Collaborate with rehabilitation services on mobility goals if consulted  - Out of bed for toileting  - Record patient progress and toleration of activity level   Outcome: Progressing     Problem: DISCHARGE PLANNING  Goal: Discharge to home or other facility with appropriate resources  Description: INTERVENTIONS:  - Identify barriers to discharge w/patient and caregiver  - Arrange for needed discharge resources and transportation as appropriate  - Identify discharge learning needs (meds, wound care, etc.)  - Arrange for interpretive services to assist at discharge as needed  - Refer to Case Management Department for coordinating discharge planning if the patient needs  post-hospital services based on physician/advanced practitioner order or complex needs related to functional status, cognitive ability, or social support system  Outcome: Progressing     Problem: Knowledge Deficit  Goal: Patient/family/caregiver demonstrates understanding of disease process, treatment plan, medications, and discharge instructions  Description: Complete learning assessment and assess knowledge base.  Interventions:  - Provide teaching at level of understanding  - Provide teaching via preferred learning methods  Outcome: Progressing     Problem: METABOLIC, FLUID AND ELECTROLYTES - ADULT  Goal: Electrolytes maintained within normal limits  Description: INTERVENTIONS:  - Monitor labs and assess patient for signs and symptoms of electrolyte imbalances  - Administer electrolyte replacement as ordered  - Monitor response to electrolyte replacements, including repeat lab results as appropriate  - Instruct patient on fluid and nutrition as appropriate  Outcome: Progressing  Goal: Fluid balance maintained  Description: INTERVENTIONS:  - Monitor labs   - Monitor I/O and WT  - Instruct patient on fluid and nutrition as appropriate  - Assess for signs & symptoms of volume excess or deficit  Outcome: Progressing  Goal: Glucose maintained within target range  Description: INTERVENTIONS:  - Monitor Blood Glucose as ordered  - Assess for signs and symptoms of hyperglycemia and hypoglycemia  - Administer ordered medications to maintain glucose within target range  - Assess nutritional intake and initiate nutrition service referral as needed  Outcome: Progressing

## 2024-07-07 NOTE — ASSESSMENT & PLAN NOTE
"Patient presents with pain in lower abdomen and bilateral lower extremity for 2 weeks.  Reports felt a lump in right lower abdomen for about 2 weeks.  Reports decreased urine output in past week, \"barely urinated\".  Reports being compliant with PD at night daily for 8 hours, did not have PD tonight.  Denies issues with PD.  Reports chronic SOB with exertion, usually uses O2 4 L at home as needed but has been using oxygen 4 L constantly in past week.  Reports dizzy wobbly when getting up in past week.  Reports constipation for about 1 week.  Denies fever chills cough nausea vomiting.  Chart reviewed.  History of ESRD, on PD for about 4 years  CT chest abdomen pelvis without contrast showed - Hazy opacity and mild septal thickening suggestive of interstitial edema. Peritoneal dialysis catheter is present. Small volume of ascites. Mild body wall edema.  WBC 11.39,no bands,afebrile. Pt in A fib with rvr, tachypneic on ED arrival.  Meets SIRS criteria.  Initial procalcitonin 1.46.  Suspect PD catheter site infection in view of abdominal pain, small volume ascites, leukocytosis, elevated procalcitonin  Given Rocephin in ED.  Will continue.  Keep patient n.p.o.  Pain control  Consult nephrology  "

## 2024-07-07 NOTE — ASSESSMENT & PLAN NOTE
Reports usually on oxygen 4 L as needed at home, has been using oxygen 4 L constantly in past week.  Likely due to fluid overload.   Volume management per nephrology  Currently on oxygen 3 L, satting 100% in ED

## 2024-07-07 NOTE — ASSESSMENT & PLAN NOTE
Present on admission, versus sepsis, as evidenced by tachycardia, tachypnea, with suspected source of infection.  Catheter site infection.  CT findings as above  Lactic acid pending  Follow-up blood cultures  Repeat CBC with differential, procalcitonin in the morning

## 2024-07-07 NOTE — CONSULTS
NEPHROLOGY HOSPITAL CONSULTATION   Jacquie Smith 69 y.o. female MRN: [id number]  Unit/Bed#: [address] [place] [address] Encounter: [id number]    ASSESSMENT and PLAN:  ESRD on PD (Mihir Ball):   Electrolytes are stable.  She is typically on CCPD at home - total volume 8 L, fill volume 2 L x 4 exchanges over 8 hours.   She currently appears volume overloaded.  Will start CAPD every 4 hours using 4.25% alt with 2.5% Dianeal.     Access:   PD cath in place.   Continue gentamicin cream to PD catheter exit site.    Hypertension, volume:  EDW is 84 kg.  Home Rx: Metoprolol succinate 50 mg twice a day, torsemide 100 mg daily, valsartan 160 mg daily.  She appears fluid overloaded.  CAPD as above - alt 4.25% and 2.5%.   Current Rx: Metoprolol succinate 50 mg daily.  Add Bumex 4 mg BID.     Hx of congestive heart failure:  EDW is 84 kg.  Currently appears fluid overloaded.  Maintain negative fluid balance with CAPD as above.    Atrial fibrillation:  Currently on metoprolol succinate 50 mg OD    Abdominal pain:  CT on admission negative for acute pathology.  Need to evaluate for peritonitis.  Check cell count, Gram stain, and culture.    Anemia:   Hgb at goal.   Not on Mircera based on most recent [facility] MAR.    Mineral and bone disease:   Home Rx: Calcitriol 0.25 mcg daily, Cinacalcet 30 mg 3 times per week, PhosLo 2 tablets with meals.  Place back on home MBD meds.     SUMMARY OF RECOMMENDATIONS:  Start CAPD every 4 hours using 4.25% alt with 2.5% Dianeal.   Start Bumex 4 mg BID.   Check cell count, Gram stain and culture  Increase PhosLo to 2 tabs with meals.  Keep O>I.     The highlighted and/or bolded points in my assessment, plan, and disposition were discussed with Dr. Mcclelland and they agree with those points and the plan.  Previous PD records were personally reviewed by me.  The images (CXR) were personally reviewed by me in PACS    HISTORY OF PRESENT ILLNESS:  Requesting Physician: Lianna Beebe MD  Reason for Consult: ESRD  on PD    Jacquie Smith is a 69 y.o. female who was admitted to [facility] on 7/7/24 after presenting with abdominal pain and low UO. A renal consultation is requested today for assistance in the management of ESRD. Jacquie Smith is a known ESRD patient who undergoes PD at home and is managed through [facility].     Jacquie has a history of hypertension, hyperlipidemia, diabetes mellitus, congestive heart failure, pulmonary hypertension, sleep apnea on CPAP, atrial fibrillation, and ESRD on PD.  She now presents to [facility] on July 7, 2024 after complaining of abdominal pain which has been worsening over the past week.  She reports that the abdominal pain started roughly about 2 weeks ago.  She also noted some dizziness and felt that she was not making as much urine as usual.  She took 2 doses of her torsemide without improvement in her urine output.  Due to worsening symptoms of abdominal pain and dizziness, she came to [facility] and was subsequently admitted.  She denied any fever, chills, cough, nausea, vomiting, chest pain, diarrhea.  Her last PD treatment was Friday night into Saturday morning.  She is typically dry during the day.    PAST MEDICAL HISTORY:  Past Medical History:   Diagnosis Date    Acute asthmatic bronchitis     last assessed: 6/2/2017    Atrial fibrillation (HCC)     Cardiac disease     Chronic diastolic (congestive) heart failure (HCC)     Chronic kidney disease     pt is on peritoneal dialysis daily from 2200- 0600    Closed nondisplaced fracture of distal phalanx of right great toe 11/13/2018    Colon polyp     CPAP (continuous positive airway pressure) dependence     Diabetes mellitus (HCC)     Hyperlipidemia     Hypertension     Medical non-compliance     Morbid obesity with BMI of 40.0-44.9, adult ([facility]) 02/28/2019    On continuous oral anticoagulation 12/22/2020    Pneumonia     last assessed: 6/5/2013    PONV (postoperative nausea and vomiting)     Pulmonary embolism ([facility])     Renal  disorder     possible clot noted in kidney, thus blood thinners currently    Severe obstructive sleep apnea     Severe pulmonary arterial systolic hypertension (HCC)     Tinea pedis of both feet 2018     PAST SURGICAL HISTORY:  Past Surgical History:   Procedure Laterality Date    CATARACT EXTRACTION       SECTION       x 1    EYE SURGERY      NOSE SURGERY      fractured nose - septoplasty     ALLERGIES:  No Known Allergies    SOCIAL HISTORY:  Social History     Substance and Sexual Activity   Alcohol Use Never     Social History     Substance and Sexual Activity   Drug Use Never     Social History     Tobacco Use   Smoking Status Never    Passive exposure: Never   Smokeless Tobacco Never     FAMILY HISTORY:  Family History   Problem Relation Age of Onset    Diabetes Mother         mellitus    Anxiety disorder Mother         generalized anxiety disorder    Hypertension Mother     Stroke Mother     Diabetes type II Mother     Kidney disease Father     Kidney failure Father         renal failure    Ulcerative colitis Sister     Diabetes Sister     Ovarian cancer Maternal Aunt     Diabetes Maternal Aunt     Diabetes Maternal Uncle     Heart disease Maternal Uncle     Heart disease Family     Thyroid disease Neg Hx      MEDICATIONS:    Current Facility-Administered Medications:     acetaminophen (TYLENOL) tablet 650 mg, 650 mg, Oral, Q6H PRN, MICHAEL Padron    atorvastatin (LIPITOR) tablet 20 mg, 20 mg, Oral, Daily With Dinner, MICHAEL Padron    b complex-vitamin C-folic acid (RENAL) capsule 1 capsule, 1 capsule, Oral, Daily, MICHAEL Padron, 1 capsule at 24 1055    bisacodyl (DULCOLAX) rectal suppository 10 mg, 10 mg, Rectal, Once, MICHAEL Padron    calcitriol (ROCALTROL) capsule 0.25 mcg, 0.25 mcg, Oral, Daily, MICHAEL Padron, 0.25 mcg at 24 1049    calcium acetate (PHOSLO) capsule 667 mg, 667 mg, Oral, Daily, MICHAEL Padron    cefTRIAXone (ROCEPHIN) IVPB  (premix in dextrose) 2,000 mg 50 mL, 2,000 mg, Intravenous, Q24H, MICHAEL Padron    Cholecalciferol (VITAMIN D3) tablet 2,000 Units, 2,000 Units, Oral, Daily, MICHAEL Padron, 2,000 Units at 07/07/24 1050    [START ON 7/8/2024] cinacalcet (SENSIPAR) tablet 30 mg, 30 mg, Oral, Once per day on Monday Wednesday Friday, MICHAEL Padron    docusate sodium (COLACE) capsule 100 mg, 100 mg, Oral, BID, MICHAEL Padron, 100 mg at 07/07/24 1049    gentamicin (GARAMYCIN) 0.1 % cream 1 Application, 1 Application, Topical, Daily, MICHAEL Padron, 1 Application at 07/07/24 1051    HYDROmorphone (DILAUDID) injection 0.2 mg, 0.2 mg, Intravenous, Q4H PRN, MICHAEL Padron    [START ON 7/8/2024] insulin glargine (LANTUS) subcutaneous injection 10 Units 0.1 mL, 10 Units, Subcutaneous, Daily, MICHAEL Padron    insulin lispro (HumALOG/ADMELOG) 100 units/mL subcutaneous injection 1-5 Units, 1-5 Units, Subcutaneous, TID AC **AND** Fingerstick Glucose (POCT), , , TID AC, MICHAEL Padron    insulin lispro (HumALOG/ADMELOG) 100 units/mL subcutaneous injection 1-5 Units, 1-5 Units, Subcutaneous, HS, MICHAEL Padron    [START ON 7/8/2024] insulin lispro (HumALOG/ADMELOG) 100 units/mL subcutaneous injection 3 Units, 3 Units, Subcutaneous, TID With Meals, MICHAEL Padron    levalbuterol (XOPENEX) inhalation solution 0.63 mg, 0.63 mg, Nebulization, Q6H PRN, MICHAEL Padron    levothyroxine tablet 125 mcg, 125 mcg, Oral, Early Morning, MICHAEL Padron, 125 mcg at 07/07/24 1050    metoprolol succinate (TOPROL-XL) 24 hr tablet 50 mg, 50 mg, Oral, Daily, Brody Maya, CRNP, 50 mg at 07/07/24 1049    multivitamin stress formula tablet 1 tablet, 1 tablet, Oral, Daily, Brody Maya, ARISTEONP, 1 tablet at 07/07/24 1049    polyethylene glycol (MIRALAX) packet 17 g, 17 g, Oral, Daily, Brody Maya, CRNP, 17 g at 07/07/24 1049    saccharomyces boulardii (FLORASTOR) capsule 250 mg, 250 mg, Oral, BID, ARISTEO PadronNP, 250 mg  at 07/07/24 1050    senna (SENOKOT) tablet 17.2 mg, 2 tablet, Oral, HS, MICHAEL Padron    warfarin (COUMADIN) tablet 5 mg, 5 mg, Oral, Daily (warfarin), MICHAEL Padron    REVIEW OF SYSTEMS:  All the systems were reviewed and were negative except as documented on the HPI.    PHYSICAL EXAM:  Current Weight:    First Weight:    Vitals:    07/07/24 0530 07/07/24 0545 07/07/24 0715 07/07/24 0915   BP:   108/58 131/75   BP Location:   Right arm    Pulse: (!) 124 (!) 106 (!) 106    Resp:  22 22    Temp:       TempSrc:       SpO2: 99% 97% 96%        Intake/Output Summary (Last 24 hours) at 7/7/2024 1100  Last data filed at 7/7/2024 0715  Gross per 24 hour   Intake 550 ml   Output --   Net 550 ml     Physical Exam  General: conscious, coherent, cooperative, not in distress.   Skin: warm, dry, good turgor.   Eyes: pink conjunctivae, no scleral icterus.   ENT: moist lips and mucous membranes.   Respiratory: equal chest expansion, clear breath sounds.   Cardiovascular: distinct heart sounds, normal rate, regular rhythm, + JVD  Abdomen: soft, tender on the R side, mildly distended, normoactive bowel sounds  Extremities: mild LE edema.  Genitourinary: no west catheter.   Neuro: awake, alert, oriented to time, place and person.   Psych: appropriate affect.     Invasive Devices: PD cath in place.      Lab Results:   Results from last 7 days   Lab Units 07/07/24  0237 07/03/24  1115   WBC Thousand/uL 11.39* 9.86   HEMOGLOBIN g/dL 12.3 13.8   HEMATOCRIT % 39.2 44.0   PLATELETS Thousands/uL 174 126*   POTASSIUM mmol/L 4.6 5.0   CHLORIDE mmol/L 98 95*   CO2 mmol/L 18* 18*   BUN mg/dL 85* 76*   CREATININE mg/dL 7.84* 6.34*   CALCIUM mg/dL 8.5 9.9   MAGNESIUM mg/dL 2.0  --    ALK PHOS U/L 161* 194*   ALT U/L 32 81*   AST U/L 14 14     Lab Results   Component Value Date    .1 (H) 04/08/2020    CALCIUM 8.5 07/07/2024    PHOS 5.5 (H) 03/06/2023     Other Studies:   Chest x-ray personally reviewed by me showed mild vascular  congestion.

## 2024-07-07 NOTE — QUICK NOTE
"Contacted at 1911 by RN via secure chat to report patient feeling unwell.  RN reports hypotension and patient feels \"faint.\"  Rapid response was called at 1914.  At my arrival critical care AP and nursing staff with patient at bedside, on monitor BP in the 50s systolic, heart rate on monitor 80 to 90s but EKG shows A-fib with RVR heart rate 120s 130s.  The patient is alert, able to verbalize needs and reports feeling dizziness.  During rapid response blood pressure were taken several times systolic being in the 70s and 90s, Albumin x 2 ordered, normal saline bolus x 1 and increased of midodrine to 10 mg.  Per RN and chart review the patient has presented those symptoms during the day, due to intermittent hypotension, the patient was transferred to critical care for further evaluation and treatment.  Family was called via team at 1945, all questions were answered to the best of my ability, plan of care and transfer to critical care was explained to family. Family member verbalized understanding. CC input is appreciated.   "

## 2024-07-07 NOTE — ASSESSMENT & PLAN NOTE
On peritoneal dialysis for about 4 years per patient.  8 hours daily at night.  Reports compliant at home.  Did not have PD tonight as she is in ED.  On Zaroxolyn 5mg p.o. daily, torsemide 100 mg p.o. as needed at home.  Will hold for now as patient barely making urine.  Continue PhosLo Sensipar calcitriol  AG 19, bicarb 18.  Potassium normal.  Daily weight, intake output  Const nephrology

## 2024-07-07 NOTE — H&P
"Formerly [facility]  H&P  Name: Jacquie Smith 69 y.o. female I MRN: [id number]  Unit/Bed#: [address] [place] [address] Date of Admission: 7/7/2024   Date of Service: 7/7/2024 I Hospital Day: 0      Assessment & Plan   * Pain in the abdomen  Assessment & Plan  Patient presents with pain in lower abdomen and bilateral lower extremity for 2 weeks.  Reports felt a lump in right lower abdomen for about 2 weeks.  Reports decreased urine output in past week, \"barely urinated\".  Reports being compliant with PD at night daily for 8 hours, did not have PD tonight.  Denies issues with PD.  Reports chronic SOB with exertion, usually uses O2 4 L at home as needed but has been using oxygen 4 L constantly in past week.  Reports dizzy wobbly when getting up in past week.  Reports constipation for about 1 week.  Denies fever chills cough nausea vomiting.  Chart reviewed.  History of ESRD, on PD for about 4 years  CT chest abdomen pelvis without contrast showed - Hazy opacity and mild septal thickening suggestive of interstitial edema. Peritoneal dialysis catheter is present. Small volume of ascites. Mild body wall edema.  WBC 11.39,no bands,afebrile. Pt in A fib with rvr, tachypneic on ED arrival.  Meets SIRS criteria.  Initial procalcitonin 1.46.  Suspect PD catheter site infection in view of abdominal pain, small volume ascites, leukocytosis, elevated procalcitonin  Given Rocephin in ED.  Will continue.  Keep patient n.p.o.  Pain control  Consult nephrology    Acute on chronic combined systolic and diastolic congestive heart failure (HCC)  Assessment & Plan  Wt Readings from Last 3 Encounters:   07/03/24 83.6 kg (184 lb 6.4 oz)   07/02/24 83.9 kg (185 lb)   07/01/24 84.4 kg (186 lb)   2D echo in March 2023 showed EF 40 to 45%, mildly to moderately reduced RV systolic function, moderately dilated LA, moderate MR  CT shows interstitial edema, small volume ascites, mild body wall edema  .Was 549 on 7/3  On PD every night for " 8 hours at home.  Reports compliant at home.  Takes Zaroxolyn 5mg p.o. daily, torsemide 100 mg p.o. as needed at home.   Reports decreased urine output in past week.  Took torsemide on Friday without good effect.  Reports bilateral lower extremity, mostly thighs pain for about 2 weeks.  Suspect due to volume overload.  Doubtful for DVT as patient is on Coumadin.  Consult nephrology for volume management  Telemetry  Update 2D echo  Daily weight, intake output  Consult cardiology as well            Atrial fibrillation with RVR ([facility])  Assessment & Plan  On Coumadin metoprolol at home.  Patient unsure metoprolol dose, will find out today with family.  Given metoprolol IV in ED with some improvement in heart rate.  Will order metoprolol succinate 50mg p.o. daily for now.  Continue Coumadin therapy  Optimize electrolytes  Consult cardiology    ESRD (end stage renal disease) ([facility])  Assessment & Plan  On peritoneal dialysis for about 4 years per patient.  8 hours daily at night.  Reports compliant at home.  Did not have PD tonight as she is in ED.  On Zaroxolyn 5mg p.o. daily, torsemide 100 mg p.o. as needed at home.  Will hold for now as patient barely making urine.  Continue PhosLo Sensipar calcitriol  AG 19, bicarb 18.  Potassium normal.  Daily weight, intake output  Const nephrology    Acute on chronic respiratory failure with hypoxia ([facility])  Assessment & Plan  Reports usually on oxygen 4 L as needed at home, has been using oxygen 4 L constantly in past week.  Likely due to fluid overload.   Volume management per nephrology  Currently on oxygen 3 L, satting 100% in ED        SIRS (systemic inflammatory response syndrome) ([facility])  Assessment & Plan  Present on admission, versus sepsis, as evidenced by tachycardia, tachypnea, with suspected source of infection.  Catheter site infection.  CT findings as above  Lactic acid pending  Follow-up blood cultures  Repeat CBC with differential, procalcitonin in the morning    CHANDRA  (obstructive sleep apnea)  Assessment & Plan  Continue CPAP at bedtime with oxygen 4 L  Reports noncompliant at times at home.    Essential hypertension  Assessment & Plan  On Diovan 160mg p.o. daily at home.  Will hold in view of worsening kidney function and soft BP in ED.  Reports dizzy wobbly in past week.  Likely due to soft BP.  Monitor    Hypothyroidism  Assessment & Plan  Continue Synthroid    History of pulmonary embolus (PE)  Assessment & Plan  Continue Coumadin therapy    Type 2 diabetes mellitus, with long-term current use of insulin ([facility])  Assessment & Plan  Lab Results   Component Value Date    HGBA1C 8.3 (H) 03/05/2024       Recent Labs     07/07/24  0235   POCGLU 170*       Blood Sugar Average: Last 72 hrs:  (P) 170    On insulin glargine 10 units subcu in the morning, Humalog 3 units 3 times daily with meals, Ozempic at home.  Continue Lantus 10 units SQ in the morning.  Hold today in view of n.p.o.  Continue Humalog with meals starting tomorrow  Hold Ozempic  SSI  Update A1c    Mixed hyperlipidemia  Assessment & Plan  Continue statin          VTE Prophylaxis: Warfarin (Coumadin)  / reason for no mechanical VTE prophylaxis coumadin    Code Status: full code  POLST: POLST form is not discussed and not completed at this time.    Anticipated Length of Stay:  Patient will be admitted on an Inpatient basis with an anticipated length of stay of  > 2 midnights.   Justification for Hospital Stay: Abdomen pain,A fib with RVR,SIRS, acute on chronic CHF    Total Time for Visit, including Counseling / Coordination of Care: 1 hour.  Greater than 50% of this total time spent on direct patient counseling and coordination of care.    Chief Complaint:   Abdominal pain and bilateral leg pain    History of Present Illness:    Jacquie Smith is a 69 y.o. female with PMH of ESRD on PD, type 2 diabetes, hypertension, hyperlipidemia, hypothyroid, PE, chronic A-fib, sleep apnea, chronic respiratory failure who presents with  "pain in lower abdomen and bilateral lower extremity for 2 weeks.  Reports felt a lump in right lower abdomen for about 2 weeks.  Reports decreased urine output in past week, \"barely urinated\".  Reports being compliant with PD at night for 8 hours, did not have PD tonight.  Denies issues with PD at home.  Reports chronic SOB with exertion, uses O2 4 L at home as needed but has been using oxygen 4 L constantly in past week.  Reports dizzy wobbly when getting up in past week.  Reports constipation for about 1 week.  Denies fever chills cough nausea vomiting.  Patient denies chest pain, headache, cough.  No other complaints.            Review of Systems:    Review of Systems   Constitutional:  Positive for activity change.   Respiratory:  Positive for shortness of breath.    Gastrointestinal:  Positive for abdominal pain and constipation.   Genitourinary:  Positive for decreased urine volume.   Neurological:  Positive for dizziness.   All other systems reviewed and are negative.      Past Medical and Surgical History:     Past Medical History:   Diagnosis Date    Acute asthmatic bronchitis     last assessed: 6/2/2017    Atrial fibrillation ([facility])     Cardiac disease     Chronic diastolic (congestive) heart failure ([facility])     Chronic kidney disease     pt is on peritoneal dialysis daily from 2200- 0600    Closed nondisplaced fracture of distal phalanx of right great toe 11/13/2018    Colon polyp     CPAP (continuous positive airway pressure) dependence     Diabetes mellitus ([facility])     Hyperlipidemia     Hypertension     Medical non-compliance     Morbid obesity with BMI of 40.0-44.9, adult ([facility]) 02/28/2019    On continuous oral anticoagulation 12/22/2020    Pneumonia     last assessed: 6/5/2013    PONV (postoperative nausea and vomiting)     Pulmonary embolism ([facility])     Renal disorder     possible clot noted in kidney, thus blood thinners currently    Severe obstructive sleep apnea     Severe pulmonary arterial systolic " hypertension (HCC)     Tinea pedis of both feet 2018       Past Surgical History:   Procedure Laterality Date    CATARACT EXTRACTION       SECTION       x 1    EYE SURGERY      NOSE SURGERY      fractured nose - septoplasty       Meds/Allergies:    Prior to Admission medications    Medication Sig Start Date End Date Taking? Authorizing Provider   cinacalcet (SENSIPAR) 30 mg tablet TAKE 1 TABLET THREE TIMES A WEEK  Patient taking differently: Take 30 mg by mouth 3 (three) times a week MWF 22  Yes Joselyn Reyes Bahamonde, MD   metoprolol succinate (TOPROL-XL) 50 mg 24 hr tablet TAKE 1 TABLET DAILY  Patient taking differently: Take 50 mg by mouth daily 2 in AM and 2 in PM 3/18/24  Yes Leonardo Gibson MD   valsartan (DIOVAN) 160 mg tablet Take 160 mg by mouth daily   Yes Historical Provider, MD   warfarin (COUMADIN) 5 mg tablet  23  Yes Historical Provider, MD   valsartan (DIOVAN) 320 MG tablet Take 0.5 tablets (160 mg total) by mouth daily 3/13/23 7/7/24 Yes Leonardo Gibson MD   albuterol (2.5 mg/3 mL) 0.083 % nebulizer solution Take 3 mL (2.5 mg total) by nebulization every 6 (six) hours as needed for wheezing or shortness of breath  Patient taking differently: Take 2.5 mg by nebulization every 6 (six) hours as needed for wheezing or shortness of breath prn 24   MICHAEL Weber   albuterol (PROVENTIL HFA,VENTOLIN HFA) 90 mcg/act inhaler Inhale 2 puffs every 6 (six) hours as needed for wheezing 3/23/24   Mihaela Fisher PA-C   atorvastatin (LIPITOR) 20 mg tablet Take 1 tablet (20 mg total) by mouth daily 22   Yessica Ramirez DO   B Complex-C (B-COMPLEX WITH VITAMIN C) tablet Take 1 tablet by mouth daily    Historical Provider, MD   Blood Glucose Monitoring Suppl (ONE TOUCH ULTRA 2) w/Device KIT Test glucose 3 times daily 18   Yessica Ramirez DO   calcitriol (ROCALTROL) 0.25 mcg capsule Take 1 capsule (0.25 mcg total) by mouth daily 23   Joselyn Reyes Bahamonde,  MD   calcium acetate (PHOSLO) capsule Take 667 mg by mouth daily  12/19/20   Historical Provider, MD   cholecalciferol (VITAMIN D3) 1,000 units tablet Take 2 tablets (2,000 Units total) by mouth daily 12/22/20   Huang Edwards MD   collagenase (SANTYL) ointment Apply topically daily  Patient not taking: Reported on 7/7/2024 3/29/24   Yessica Ramirez DO   docusate sodium (COLACE) 100 mg capsule Take 1 capsule (100 mg total) by mouth 2 (two) times a day 7/20/20   Kathy Olvera MD   gentamicin (GARAMYCIN) 0.1 % cream APPLY TOPICALLY DAILY 12/27/23   Vira Morales PA-C   Insulin Glargine-yfgn (Semglee, yfgn,) 100 UNIT/ML SOPN Inject 10 units subcutaneously daily in the morning 3/8/24   Rocio Land MD   insulin lispro (HumaLOG KwikPen) 100 units/mL injection pen Inject 3 Units under the skin 3 (three) times a day with meals 3/8/24   Rocio Land MD   Insulin Pen Needle 31G X 5 MM MISC by Does not apply route daily 10/5/20   Yessica Ramirez DO   Lancets (onetouch ultrasoft) lancets Test glucose 3 times a day; Code: E11.8 9/8/20   Yessica Ramirez DO   levothyroxine (Euthyrox) 125 mcg tablet Take one tablet by mouth in early morning on empty stomach 3/8/24   Rocio Land MD   metolazone (ZAROXOLYN) 5 mg tablet Take 5 mg by mouth daily 7/16/21   Historical Provider, MD   MULTIPLE VITAMIN PO Take 1 tablet by mouth daily    Historical Provider, MD   nystatin (MYCOSTATIN) powder Apply topically 3 (three) times a day for 7 days  Patient not taking: Reported on 3/23/2023 3/20/23 3/27/23  MICHAEL Maldonado   OneTouch Ultra test strip TEST GLUCOSE THREE TIMES A DAY 1/29/22   Yessica Ramirez DO   semaglutide, 0.25 or 0.5 mg/dose, (Ozempic, 0.25 or 0.5 MG/DOSE,) 2 mg/3 mL injection pen Inject 0.75 mL (0.5 mg total) under the skin every 7 days 7/2/24   MICHAEL Lanier   torsemide (DEMADEX) 100 mg tablet Take 1 tablet (100 mg total) by mouth daily  Patient taking differently: Take 100 mg by mouth daily As needed 3/15/21   Leonardo  Tushar Gibson MD     I have reviewed home medications with patient personally.    Allergies: No Known Allergies    Social History:     Marital Status: Single   Occupation: Unclear  Patient Pre-hospital Living Situation: Family  Patient Pre-hospital Level of Mobility: Independent  Patient Pre-hospital Diet Restrictions: Diabetic cardiac  Substance Use History:   Social History     Substance and Sexual Activity   Alcohol Use Never     Social History     Tobacco Use   Smoking Status Never    Passive exposure: Never   Smokeless Tobacco Never     Social History     Substance and Sexual Activity   Drug Use Never       Family History:    non-contributory    Physical Exam:     Vitals:   Blood Pressure: 108/58 (07/07/24 0715)  Pulse: (!) 106 (07/07/24 0715)  Temperature: 97.9 °F (36.6 °C) (07/07/24 0230)  Temp Source: Temporal (07/07/24 0230)  Respirations: 22 (07/07/24 0715)  SpO2: 96 % (07/07/24 0715)    Physical Exam  Vitals and nursing note reviewed.   Constitutional:       Appearance: She is well-developed. She is obese.      Comments: Patient appears uncomfortable   HENT:      Head: Normocephalic and atraumatic.   Neck:      Thyroid: No thyromegaly.      Vascular: No JVD.      Trachea: No tracheal deviation.   Cardiovascular:      Rate and Rhythm: Tachycardia present. Rhythm irregular.      Heart sounds: Normal heart sounds.   Pulmonary:      Effort: Pulmonary effort is normal. No respiratory distress.      Breath sounds: No wheezing or rales.      Comments: BS diminished BL,on O2 3L, satting 100% in ED  Abdominal:      General: Bowel sounds are normal. There is no distension.      Palpations: Abdomen is soft.      Tenderness: There is abdominal tenderness. There is no guarding.      Comments: Lower abdomen tender,lump felt on palpation in right lower quadrant, PD in situ.   Musculoskeletal:         General: Normal range of motion.      Cervical back: Neck supple.      Right lower leg: No edema.      Left lower leg: No  edema.   Skin:     General: Skin is warm and dry.   Neurological:      General: No focal deficit present.      Mental Status: She is alert and oriented to person, place, and time.   Psychiatric:         Mood and Affect: Mood and affect and mood normal.         Judgment: Judgment normal.      Comments: Patient tearful during exam, emotional.         Additional Data:     Lab Results: I have personally reviewed pertinent reports.      Results from last 7 days   Lab Units 07/07/24  0237   WBC Thousand/uL 11.39*   HEMOGLOBIN g/dL 12.3   HEMATOCRIT % 39.2   PLATELETS Thousands/uL 174   SEGS PCT % 70   LYMPHO PCT % 11*   MONO PCT % 14*   EOS PCT % 3     Results from last 7 days   Lab Units 07/07/24  0237   POTASSIUM mmol/L 4.6   CHLORIDE mmol/L 98   CO2 mmol/L 18*   BUN mg/dL 85*   CREATININE mg/dL 7.84*   CALCIUM mg/dL 8.5   ALK PHOS U/L 161*   ALT U/L 32   AST U/L 14     Results from last 7 days   Lab Units 07/07/24  0237   INR  2.10*       Imaging: I have personally reviewed pertinent reports.      XR chest 1 view portable    Result Date: 7/7/2024  Narrative: XR CHEST PORTABLE INDICATION: asdf. COMPARISON: CXR 7/3/2024, chest CT 6/18/2024. FINDINGS: Mild pulmonary venous congestion. No pneumothorax or pleural effusion. Normal cardiomediastinal silhouette. Bones are unremarkable for age. Normal upper abdomen.     Impression: Mild pulmonary venous congestion. Workstation performed: [id number]     CT chest abdomen pelvis wo contrast    Result Date: 7/7/2024  Narrative: CT CHEST, ABDOMEN AND PELVIS WITHOUT IV CONTRAST INDICATION: RLQ LLQ pain. COMPARISON: CT chest on 6/18/2024 TECHNIQUE: CT examination of the chest, abdomen and pelvis was performed without intravenous contrast. Multiplanar 2D reformatted images were created from the source data. This examination, like all CT scans performed in the [facility], was performed utilizing techniques to minimize radiation dose exposure, including the use of  iterative reconstruction and automated exposure control. Radiation dose length product (DLP) for this visit: 1323 mGy-cm Enteric Contrast: Not administered. FINDINGS: CHEST LUNGS: Hazy opacity and mild septal thickening. Stable tree-in-bud nodularity mostly in the right lung with a few areas of mild mosaic attenuation, similar to the prior study. Multiple calcified small nodules are reidentified. No tracheal or endobronchial lesion. PLEURA: Unremarkable. HEART/GREAT VESSELS: Mild cardiomegaly. No thoracic aortic aneurysm. Enlarged pulmonary artery with calcification of the walls, consistent with history of pulmonary hypertension. Left atrial wall calcifications. Coronary artery calcifications. MEDIASTINUM AND TEOFILO: Multiple nonspecific subcentimeter mediastinal lymph nodes, similar to the prior study. CHEST WALL AND LOWER NECK: Stable thyromegaly. Stable calcification in the right lobe. ABDOMEN LIVER/BILIARY TREE: Unremarkable. GALLBLADDER: Distended. No calcified gallstones. No pericholecystic inflammatory change. SPLEEN: Unremarkable. PANCREAS: Unremarkable. ADRENAL GLANDS: Unremarkable. KIDNEYS/URETERS: Atrophic kidneys. No hydronephrosis. Bilateral renal vascular calcifications. STOMACH AND BOWEL: Colonic diverticulosis without findings of acute diverticulitis. APPENDIX: No findings to suggest appendicitis. ABDOMINOPELVIC CAVITY: Peritoneal dialysis terminates in the anterior pelvis. Small volume of abdominopelvic ascites. No pneumoperitoneum. No lymphadenopathy. VESSELS: Advanced atherosclerosis without abdominal aortic aneurysm. PELVIS REPRODUCTIVE ORGANS: Fibroid uterus. 2 cm simple appearing left adnexal cyst. URINARY BLADDER: Unremarkable. ABDOMINAL WALL/INGUINAL REGIONS: Mild body wall edema. BONES: No acute fracture or suspicious osseous lesion.     Impression: Hazy opacity and mild septal thickening suggestive of interstitial edema. Peritoneal dialysis catheter is present. Small volume of ascites. Mild  "body wall edema. Workstation performed: [id number]     XR chest 1 view portable    Result Date: 7/4/2024  Narrative: XR CHEST PORTABLE INDICATION: A-fib. COMPARISON: Chest CT 6/18/2024. CXR 2/5/2024. FINDINGS: Mild pulmonary venous congestion. No pneumothorax or pleural effusion. Mild cardiomegaly. Bones are unremarkable for age. Normal upper abdomen.     Impression: Mild pulmonary venous congestion. Workstation performed: [id number]     CT chest without contrast    Result Date: 6/25/2024  Narrative: CT CHEST WITHOUT IV CONTRAST INDICATION:   R91.8: Other nonspecific abnormal finding of lung field. \"Follow-up consolidation, abnormal CT scan 3/19/2024.\" Per my review of the medical record, history of heart failure and pulmonary hypertension. Never smoker. Hospitalized on 3/19/2024 for pneumonia. COMPARISON: CXR 2/5/2024, chest CT 3/19/2024, 3/23/2022, and baseline chest CT 7/7/2018. TECHNIQUE: Chest CT without intravenous contrast.  Axial, sagittal, coronal 2D reformats and coronal MIPS from source data. Radiation dose length product (DLP):  403 mGy-cm . Radiation dose exposure minimized using iterative reconstruction and automated exposure control. FINDINGS: LUNGS: Resolution of left upper and left lower lobe pneumonia with mild benign postinfectious scar in the left lower lobe. Mild interstitial edema. Mild mosaic attenuation due to pulmonary hypertension. Multiple stable small nodules, most calcified, most  numerous in the anterior right lung. AIRWAYS: No significant filling defects. PLEURA:  Unremarkable. HEART/GREAT VESSELS: Mild cardiomegaly. Pulmonary artery enlargement with calcification of the walls with history of pulmonary hypertension. Calcification of the wall of the left atrium. Mild coronary artery calcification indicating atherosclerotic heart  disease. MEDIASTINUM AND TEOFILO: Regression of previously enlarged mediastinal nodes. CHEST WALL AND LOWER NECK: Stable enlargement of the thyroid gland since " 2018 with calcification in the right lobe. UPPER ABDOMEN: Redemonstration of pneumoperitoneum from peritoneal dialysis. Extensive vascular calcification in the abdomen. Partially imaged nonobstructing right renal calcification. OSSEOUS STRUCTURES: Moderate degenerative disease in the spine with mild curvature.     Impression: Resolution of left upper and left lower lobe pneumonia with mild benign postinfectious scar in the left lower lobe. Mild septal thickening due to interstitial edema. Mild mosaic attenuation due to pulmonary hypertension. Pneumoperitoneum from peritoneal dialysis. Workstation performed: [id number]     XR spine lumbar minimum 4 views non injury    Result Date: 6/18/2024  Narrative: LUMBAR SPINE INDICATION:   Radiculopathy, lumbar region. COMPARISON:  None. VIEWS:  XR SPINE LUMBAR MINIMUM 4 VIEWS NON INJURY Images: 5 FINDINGS: There are 5 non rib bearing lumbar vertebral bodies. There is no evidence of acute fracture or destructive osseous lesion. Alignment is unremarkable. Mild multilevel lower lumbar facet arthropathy without significant disc height loss. The pedicles appear intact. Soft tissues are unremarkable.     Impression: No acute osseous abnormality.  Degenerative changes as described. Electronically signed: 06/18/2024 11:01 AM Albin Sanchez MPH,MD    VAS ARTERIAL DUPLEX- LOWER LIMB BILATERAL    Result Date: 6/7/2024  Narrative:  [facility] REPORT CLINICAL: Indications: Patient presents with pain in the legs at rest and walking. Denies any open wounds or ulcers at this time. Operative History: No prior cardiovascular surgeries. Risk Factors: The patient has history of HTN, Diabetes (Yes) and CKD. Clinical: Right Pressure:  221/ mm Hg, Left Pressure:  185/ mm Hg.  FINDINGS:  Segment                Right       Left                                          PSV (cm/s)  PSV (cm/s)  Common Femoral Artery          54          47  Prox Profunda                  54          46  Prox SFA                        48          44  Mid SFA                        40          46  Dist SFA                       35          39  Proximal Pop                   40          40  Distal Pop                     41          42  Tibioperoneal                  26          25  Dist Post Tibial               41          94  Dist. Ant. Tibial              28          67  Dist Peroneal                  64          77     CONCLUSION: Impression: RIGHT LOWER LIMB: Diffuse atherosclerotic disease noted throughout without significant stenosis. Ankle/Brachial index: unreliable due to non-compressible vessels. PVR/ PPG tracings are normal. Metatarsal pressure of 109 mmHg. Great toe pressure of 94 mmHg, within the healing range.  LEFT LOWER LIMB: Diffuse atherosclerotic disease noted throughout without significant stenosis. Ankle/Brachial index: unreliable due to non-compressible vessels. PVR/ PPG tracings are normal. Metatarsal pressure of 92 mmHg. Great toe pressure of 90 mmHg, within the healing range.  Tech Note: Patient aware of elevated blood pressure and advised to call Primary Physician. Patient stated that she did not take meds today.  SIGNATURE: Electronically Signed by: SHANNON JAIME on 2024-06-07 10:02:39 PM      EKG, Pathology, and Other Studies Reviewed on Admission:   EKG: a fib with rvr    [employer] Records Reviewed: Yes     ** Please Note: Dragon 360 Dictation voice to text software may have been used in the creation of this document. **

## 2024-07-07 NOTE — ASSESSMENT & PLAN NOTE
Wt Readings from Last 3 Encounters:   07/03/24 83.6 kg (184 lb 6.4 oz)   07/02/24 83.9 kg (185 lb)   07/01/24 84.4 kg (186 lb)   2D echo in March 2023 showed EF 40 to 45%, mildly to moderately reduced RV systolic function, moderately dilated LA, moderate MR  CT shows interstitial edema, small volume ascites, mild body wall edema  .Was 549 on 7/3  On PD every night for 8 hours at home.  Reports compliant at home.  Takes Zaroxolyn 5mg p.o. daily, torsemide 100 mg p.o. as needed at home.   Reports decreased urine output in past week.  Took torsemide on Friday without good effect.  Reports bilateral lower extremity, mostly thighs pain for about 2 weeks.  Suspect due to volume overload.  Doubtful for DVT as patient is on Coumadin.  Consult nephrology for volume management  Telemetry  Update 2D echo  Daily weight, intake output  Consult cardiology as well

## 2024-07-07 NOTE — ASSESSMENT & PLAN NOTE
On Diovan 160mg p.o. daily at home.  Will hold in view of worsening kidney function and soft BP in ED.  Reports dizzy wobbly in past week.  Likely due to soft BP.  Monitor

## 2024-07-07 NOTE — ASSESSMENT & PLAN NOTE
On Coumadin metoprolol at home.  Patient unsure metoprolol dose, will find out today with family.  Given metoprolol IV in ED with some improvement in heart rate.  Will order metoprolol succinate 50mg p.o. daily for now.  Continue Coumadin therapy  Optimize electrolytes  Consult cardiology

## 2024-07-07 NOTE — QUICK NOTE
Notified by RN that systolic blood pressure with the Doppler might be in the 90s.  Blood pressure noted to be 86/61.  Patient denies any dizziness but does report some abdominal pain  Will order 30 ml per KG fluid bolus.  Peritoneal fluid cultures to be drawn now.  Blood cultures already done in the ED.  Continue IV ceftriaxone.

## 2024-07-08 PROBLEM — N18.6 ESRD (END STAGE RENAL DISEASE) (HCC): Chronic | Status: ACTIVE | Noted: 2020-01-27

## 2024-07-08 PROBLEM — N25.81 SECONDARY HYPERPARATHYROIDISM OF RENAL ORIGIN (HCC): Chronic | Status: ACTIVE | Noted: 2019-03-11

## 2024-07-08 PROBLEM — R79.89 ELEVATED BRAIN NATRIURETIC PEPTIDE (BNP) LEVEL: Status: RESOLVED | Noted: 2018-07-05 | Resolved: 2024-01-01

## 2024-07-08 PROBLEM — E66.812 CLASS 2 OBESITY WITH BODY MASS INDEX (BMI) OF 37.0 TO 37.9 IN ADULT: Status: RESOLVED | Noted: 2021-09-29 | Resolved: 2024-01-01

## 2024-07-08 PROBLEM — R57.9 SHOCK (HCC): Status: ACTIVE | Noted: 2024-01-01

## 2024-07-08 PROBLEM — E66.9 CLASS 2 OBESITY WITH BODY MASS INDEX (BMI) OF 37.0 TO 37.9 IN ADULT: Status: RESOLVED | Noted: 2021-09-29 | Resolved: 2024-01-01

## 2024-07-08 PROBLEM — I34.2 NON-RHEUMATIC MITRAL VALVE STENOSIS: Chronic | Status: ACTIVE | Noted: 2019-04-01

## 2024-07-08 PROBLEM — N18.6 END STAGE RENAL DISEASE (HCC): Status: RESOLVED | Noted: 2018-04-13 | Resolved: 2024-01-01

## 2024-07-08 PROBLEM — R74.01 TRANSAMINITIS: Status: ACTIVE | Noted: 2024-01-01

## 2024-07-08 PROBLEM — J96.11 CHRONIC RESPIRATORY FAILURE WITH HYPOXIA (HCC): Status: ACTIVE | Noted: 2024-01-01

## 2024-07-08 PROBLEM — I48.20 ATRIAL FIBRILLATION, CHRONIC (HCC): Chronic | Status: ACTIVE | Noted: 2023-03-06

## 2024-07-08 PROBLEM — R60.0 LEG EDEMA, LEFT: Status: RESOLVED | Noted: 2018-08-21 | Resolved: 2024-01-01

## 2024-07-08 PROBLEM — J96.21 ACUTE ON CHRONIC RESPIRATORY FAILURE WITH HYPOXIA (HCC): Status: RESOLVED | Noted: 2024-01-01 | Resolved: 2024-01-01

## 2024-07-08 PROBLEM — E11.319 DIABETIC RETINOPATHY OF BOTH EYES ASSOCIATED WITH TYPE 2 DIABETES MELLITUS (HCC): Chronic | Status: ACTIVE | Noted: 2019-05-28

## 2024-07-08 PROBLEM — R93.89 ABNORMAL CT OF THE CHEST: Chronic | Status: RESOLVED | Noted: 2021-06-01 | Resolved: 2024-01-01

## 2024-07-08 PROBLEM — E66.01 MORBID OBESITY WITH BMI OF 40.0-44.9, ADULT (HCC): Chronic | Status: ACTIVE | Noted: 2019-02-28

## 2024-07-08 PROBLEM — D12.6 ADENOMATOUS POLYP OF COLON: Chronic | Status: ACTIVE | Noted: 2022-10-11

## 2024-07-08 PROBLEM — D63.8 ANEMIA OF CHRONIC DISEASE: Chronic | Status: ACTIVE | Noted: 2020-12-22

## 2024-07-08 PROBLEM — Z99.2 DEPENDENCE ON RENAL DIALYSIS (HCC): Status: RESOLVED | Noted: 2020-12-22 | Resolved: 2024-01-01

## 2024-07-08 PROBLEM — M10.371 ACUTE GOUT DUE TO RENAL IMPAIRMENT INVOLVING TOE OF RIGHT FOOT: Status: RESOLVED | Noted: 2021-06-08 | Resolved: 2024-01-01

## 2024-07-08 PROBLEM — E03.9 HYPOTHYROIDISM: Chronic | Status: ACTIVE | Noted: 2020-12-22

## 2024-07-08 PROBLEM — Z86.711 HISTORY OF PULMONARY EMBOLUS (PE): Chronic | Status: ACTIVE | Noted: 2020-07-13

## 2024-07-08 PROBLEM — J96.11 CHRONIC RESPIRATORY FAILURE WITH HYPOXIA (HCC): Chronic | Status: ACTIVE | Noted: 2024-01-01

## 2024-07-08 PROBLEM — D50.0 IRON DEFICIENCY ANEMIA DUE TO CHRONIC BLOOD LOSS: Status: RESOLVED | Noted: 2022-02-11 | Resolved: 2024-01-01

## 2024-07-08 PROBLEM — G47.34 NOCTURNAL HYPOXEMIA: Status: RESOLVED | Noted: 2020-07-14 | Resolved: 2024-01-01

## 2024-07-08 PROBLEM — E55.9 VITAMIN D DEFICIENCY: Chronic | Status: ACTIVE | Noted: 2019-03-11

## 2024-07-08 PROBLEM — Z99.2 ENCOUNTER FOR PERITONEAL DIALYSIS (HCC): Chronic | Status: ACTIVE | Noted: 2020-01-06

## 2024-07-08 PROBLEM — N18.9 CHRONIC KIDNEY DISEASE: Status: RESOLVED | Noted: 2023-03-06 | Resolved: 2024-01-01

## 2024-07-08 PROBLEM — R79.1 SUPRATHERAPEUTIC INR: Status: ACTIVE | Noted: 2024-01-01

## 2024-07-08 PROBLEM — E87.20 METABOLIC ACIDOSIS: Status: ACTIVE | Noted: 2024-01-01

## 2024-07-08 PROBLEM — R06.09 DYSPNEA ON EXERTION: Status: RESOLVED | Noted: 2018-07-05 | Resolved: 2024-01-01

## 2024-07-08 PROBLEM — Z87.898 HISTORY OF SYNCOPE: Status: RESOLVED | Noted: 2022-06-27 | Resolved: 2024-01-01

## 2024-07-08 PROBLEM — G47.33 OBSTRUCTIVE SLEEP APNEA: Chronic | Status: ACTIVE | Noted: 2023-03-06

## 2024-07-08 PROBLEM — N83.209 CYST OF OVARY: Chronic | Status: ACTIVE | Noted: 2017-04-13

## 2024-07-08 PROBLEM — I10 ESSENTIAL HYPERTENSION: Chronic | Status: ACTIVE | Noted: 2021-06-18

## 2024-07-08 PROBLEM — D69.6 THROMBOCYTOPENIA (HCC): Status: ACTIVE | Noted: 2024-01-01

## 2024-07-08 PROBLEM — I48.92 PAROXYSMAL ATRIAL FLUTTER (HCC): Chronic | Status: ACTIVE | Noted: 2020-07-13

## 2024-07-08 PROBLEM — R00.1 SINUS BRADYCARDIA: Status: RESOLVED | Noted: 2022-06-27 | Resolved: 2024-01-01

## 2024-07-08 PROBLEM — I26.99 PE (PULMONARY THROMBOEMBOLISM) (HCC): Status: RESOLVED | Noted: 2018-07-05 | Resolved: 2024-01-01

## 2024-07-08 PROBLEM — I42.0 DILATED CARDIOMYOPATHY (HCC): Status: RESOLVED | Noted: 2021-09-29 | Resolved: 2024-01-01

## 2024-07-08 PROBLEM — R57.0 CARDIOGENIC SHOCK (HCC): Status: ACTIVE | Noted: 2024-01-01

## 2024-07-08 PROBLEM — E87.20 METABOLIC ACIDOSIS: Chronic | Status: ACTIVE | Noted: 2024-01-01

## 2024-07-08 NOTE — QUICK NOTE
Patient's blood pressure has been intermittently difficult to obtain via cuff.  Discussed this with patient and she was agreeable to an arterial line placement.  Radial arteries were difficult to locate and very small but did attempt a right radial art line but was unsuccessful.  When discussed with patient to attempt femoral art line, patient refused.  Discussed that her blood pressure may be low and we are unaware of it but at this time she is refusing further lines.  Will start on peripheral Levophed via a midline to see if blood pressure improves and we are able to get consistent blood pressures via manual cuff.

## 2024-07-08 NOTE — ASSESSMENT & PLAN NOTE
Serum bicarb 18 with AG 18 on admission, likely secondary to renal failure and missing PD however, concern for poor perfusion today with worsening hypotension  Serum bicarb down to 15, AG 21, BUN 96    Plan:  Check LA, VBG now. Sodium bicarb bolus given for hyperkalemia  Discussed case with Nephrology, will initiate CVVHD  Trend LA until cleared  Serial BMPs  Close I&Os

## 2024-07-08 NOTE — TELEPHONE ENCOUNTER
Patient's Daugther called very concern about her mother's health, she is requesting a call back from Dr. Ramirez as she is afraid her health condition is worsening.  Pt is currently in the hospital as well.  Please review.

## 2024-07-08 NOTE — ASSESSMENT & PLAN NOTE
SIRS present on admission as evidence by tachycardia (Afib), tachypnea, rising leukocytosis. Unclear infectious etiology  CT scan with no obvious infection source  Peritoneal fluid not indicative of infection  Patient has had intermittent hypotension that has responded to fluids.  No obvious source of infection.   UA not yet obtained  Initiated on ceftriaxone initially for possible SBP    Plan:  Given rising leukocytosis and hemodynamic instability, will broaden ABX to cefepime/vanco for now pending culture data  Trend WBC, fever curve and procal (less reliable in the setting of ESRD)  Goal MAP >65  Trend endpoints

## 2024-07-08 NOTE — PROCEDURES
Arterial Line Insertion    Date/Time: 7/8/2024 9:45 AM    Performed by: Carlos Jean-Baptiste MD  Authorized by: Carlos Jean-Baptiste MD    Patient location:  ICU  Other Assisting Provider: No    Consent:     Consent obtained:  Verbal    Consent given by:  Patient  Universal protocol:     Patient identity confirmed:  Arm band  Indications:     Indications: hemodynamic monitoring    Pre-procedure details:     Skin preparation:  Chlorhexidine    Preparation: Patient was prepped and draped in sterile fashion    Anesthesia (see MAR for exact dosages):     Anesthesia method:  None  Procedure details:     Location / Tip of Catheter:  Radial    Laterality:  Left    Needle gauge:  20 G    Placement technique:  Percutaneous    Number of attempts:  2    Successful placement: yes      Transducer: waveform confirmed    Post-procedure details:     Post-procedure:  Sutured and sterile dressing applied    Patient tolerance of procedure:  Tolerated well, no immediate complications  Comments:      On first attempt with arrow, flash was obtained, but wire could not be threaded through heavily calcified vessel. Repeat attempt with micropuncture kit was successful

## 2024-07-08 NOTE — ASSESSMENT & PLAN NOTE
Currently on 3 L nasal cannula.  Patient uses 3 L nasal cannula at night via her CPAP when she is compliant.  Recently saw Dr. Amaya as an outpatient.  Has a history of group 2 and 3 pulmonary hypertension

## 2024-07-08 NOTE — ASSESSMENT & PLAN NOTE
Patient has a history of paroxysmal atrial fibrillation and atrial flutter.  She was seen as an outpatient both on July 1 and July 3 and was in rapid atrial fibrillation at that time and advised to go to the emergency department.Presented in Afib with RVR, rates 120-130s.   PTA meds: metoprolol 25mg XL, coumadin (unclear compliance with medications, INR below therapeutic range on multiple recent checks)  Rapid response 7/7 for hypotension: given amiodarone bolus and gtt due to hemodynamic instablility  Converted to NSR overnight, remains NSR in the 60-70s  Echocardiogram  pending     Plan:  Will plan to D/C amio gtt today if remains in NSR  Can consider digoxin if needed for further rate control  Hold BB given hypotension   Cardiology following, appreciate recommendations  Continue telemetry monitoring  Goal K>4, Mg>2

## 2024-07-08 NOTE — ASSESSMENT & PLAN NOTE
Lab Results   Component Value Date    EGFR 4 07/08/2024    EGFR 5 07/07/2024    EGFR 4 07/07/2024    CREATININE 7.81 (H) 07/08/2024    CREATININE 7.40 (H) 07/07/2024    CREATININE 7.84 (H) 07/07/2024     Patient on chronic peritoneal dialysis, reports compliance with daily PD until yesterday  PD initiated on admission but given acute hypotension and nausea, PD fluid drained prior to full dwell time  Discussed with Nephrology today, given acidosis and hypokalemia in the setting of hemodynamic instability, decision made to proceed with CVVHD  Will place line for CVVHD  Q6 hour BMP, Mg, Phos  Close I&O

## 2024-07-08 NOTE — RAPID RESPONSE
Rapid Response Note  Jacquie Smith 69 y.o. female MRN: [id number]  Unit/Bed#: ICU 09 Encounter: [id number]    Rapid Response Notification(s):   Response called date/time:  7/7/2024 7:15 PM  Response team arrival date/time:  7/7/2024 7:16 PM  Level of care:  [facility] ([id number])  Rapid response location:  [facility] unit  Primary reason for rapid response call:  Acute change in BP    Rapid Response Intervention(s):   Airway:  None  Breathing:  Oxygen  Circulation:  None  Fluids administered:  Colloids  Medications administered:  None       Assessment:   Hypotension - unclear etiology. Improving with fluids    Plan:   Transfer to SD1. See full consult note.     Rapid Response Outcome:   Transfer:  Transfer to stepdown 1  Primary service notified of transfer: Yes    Code Status: Level 1 (Full Code)      Family notified: Yes, Name of Family member contacted Amaris           Background/Situation:   Jacquie Smith is a 69 y.o. who presents with abdominal pain and general malaise.  Patient has been feeling unwell for several weeks and was seen outpatient and was found to be in atrial fibrillation with rapid ventricular response.  She was instructed to come to the emergency department but did not.  Today she presented and was admitted to the general medical floor.  She had a CT of her abdomen pelvis which did not show an obvious source for infection but did show interstitial edema and anasarca.  She was started on antibiotics and peritoneal fluid was sent.  She was continued on her peritoneal dialysis.  She was continued on beta-blockers for her atrial fibrillation.  Throughout the day today she had her intermittent hypotension.  She did receive fluids and did respond transiently but became hypotensive again.  A rapid response was called at that time.     Review of Systems   Neurological:  Positive for light-headedness.       Objective:   Vitals:    07/07/24 2012 07/07/24 2031 07/07/24 2038 07/07/24 2108   BP: 92/62 (!) 74/53 (!) 73/50 98/74    BP Location:       Pulse: (!) 135 (!) 138 (!) 139 (!) 135   Resp: 22 18 18 18   Temp:       TempSrc:       SpO2: 98% 97% 99% 95%   Weight:       Height:         Physical Exam  Vitals reviewed.   Constitutional:       Appearance: She is toxic-appearing.   HENT:      Head: Normocephalic.      Mouth/Throat:      Mouth: Mucous membranes are moist.   Cardiovascular:      Rate and Rhythm: Tachycardia present. Rhythm irregular.      Pulses: Normal pulses.   Pulmonary:      Effort: Pulmonary effort is normal. No respiratory distress.   Abdominal:      General: There is distension.      Palpations: Abdomen is soft.   Musculoskeletal:         General: No swelling.   Skin:     General: Skin is warm.   Neurological:      General: No focal deficit present.      Mental Status: She is alert and oriented to person, place, and time.

## 2024-07-08 NOTE — ASSESSMENT & PLAN NOTE
Currently on 3 L nasal cannula.  Patient uses 3 L nasal cannula at night via her CPAP when she is compliant.  Recently saw Dr. Amaya as an outpatient.  Has a history of group 2 and 3 pulmonary hypertension  CT chest: Hazy opacity and mild septal thickening suggestive of interstitial edema.   Attempt volume removal via CVVHD for pulmonary optimization   Goal Sp02 >90%

## 2024-07-08 NOTE — PROCEDURES
Temporary HD Catheter    Date/Time: 7/8/2024 10:10 AM    Performed by: MICHAEL Calderon  Authorized by: MCIHAEL Calderon    Patient location:  Bedside  Consent:     Consent obtained:  Written    Consent given by:  Patient    Risks discussed:  Arterial puncture, infection, incorrect placement, nerve damage, bleeding and pneumothorax    Alternatives discussed:  No treatment  Universal protocol:     Procedure explained and questions answered to patient or proxy's satisfaction: yes      Relevant documents present and verified: yes      Test results available and properly labeled: yes      Patient identity confirmed:  Verbally with patient, arm band and hospital-assigned identification number  Pre-procedure details:     Hand hygiene: Hand hygiene performed prior to insertion      Sterile barrier technique: All elements of maximal sterile technique followed      Skin preparation:  2% chlorhexidine    Skin preparation agent: Skin preparation agent completely dried prior to procedure    Indications:     Central line indications: medications requiring central line and dialysis    Anesthesia (see MAR for exact dosages):     Anesthesia method:  Local infiltration    Local anesthetic:  Lidocaine 1% w/o epi  Procedure details:     Location:  Right internal jugular    Vessel type: vein      Laterality:  Right    Approach: percutaneous technique used      Patient position:  Flat    Catheter size: 12F.    Catheter length:  16 cm    Landmarks identified: yes      Ultrasound guidance: yes      Ultrasound image availability:  Not saved    Sterile ultrasound techniques: Sterile gel and sterile probe covers were used      Number of attempts:  1    Successful placement: yes  Vessel of catheter tip end: atriocaval junction  Post-procedure details:     Post-procedure:  Dressing applied and line sutured    Assessment:  Blood return through all ports, no pneumothorax on x-ray, free fluid flow and placement  verified by x-ray    Patient tolerance of procedure:  Tolerated well, no immediate complications

## 2024-07-08 NOTE — ASSESSMENT & PLAN NOTE
Lab Results   Component Value Date    EGFR 4 07/07/2024    EGFR 6 07/03/2024    EGFR 9 03/23/2024    CREATININE 7.84 (H) 07/07/2024    CREATININE 6.34 (H) 07/03/2024    CREATININE 4.69 (H) 03/23/2024     Patient on chronic peritoneal dialysis.  Discussed with nephrology, given acute issues this evening, patient emptied and dialysis on hold.  Can reevaluate tomorrow.

## 2024-07-08 NOTE — ASSESSMENT & PLAN NOTE
Patient has a history of paroxysmal atrial fibrillation and atrial flutter.  She was seen as an outpatient both on July 1 and July 3 and was in rapid atrial fibrillation at that time and advised to go to the emergency department.  Given inability to tolerate rate controlling medications, will attempt amiodarone bolus and infusion to improve heart rate  Echocardiogram ordered  Cardiology following  Continue Coumadin

## 2024-07-08 NOTE — ASSESSMENT & PLAN NOTE
Previously, continue Coumadin but patient reports she is poorly compliant with several recent INRs less than therapeutic range  INR 4.4 this AM  Will check VQ scan today   Initiate heparin gtt for AC

## 2024-07-08 NOTE — CONSULTS
[facility]  Consult  Name: Jacquie Smith 69 y.o. female I MRN: [id number]  Unit/Bed#: ICU 09 I Date of Admission: 7/7/2024   Date of Service: 7/7/2024 I Hospital Day: 0    Consults    Assessment & Plan   SIRS (systemic inflammatory response syndrome) ([facility])  Assessment & Plan  Unclear etiology  CT scan with no obvious infection source  Peritoneal fluid not indicative of infection  Patient has had intermittent hypotension that has responded to fluids.  No obvious source of infection.  Unclear if rapid atrial fibrillation in the setting of a known cardiomyopathy may be playing a role in her hypotension.  Will attempt better rate control.  Follow-up blood cultures and continue ceftriaxone for now  Lactic acid cleared after fluid administration    Type 2 diabetes mellitus, with long-term current use of insulin ([facility])  Assessment & Plan  Lab Results   Component Value Date    HGBA1C 7.9 (H) 07/07/2024       Recent Labs     07/07/24  1112 07/07/24  1629 07/07/24  1916 07/07/24  2102   POCGLU 131 315* 239* 175*       Blood Sugar Average: Last 72 hrs:  (P) [id number]    Continue Lantus and sliding scale insulin      Mixed hyperlipidemia  Assessment & Plan  Continue statin    Atrial fibrillation with RVR ([facility])  Assessment & Plan  Patient has a history of paroxysmal atrial fibrillation and atrial flutter.  She was seen as an outpatient both on July 1 and July 3 and was in rapid atrial fibrillation at that time and advised to go to the emergency department.  Given inability to tolerate rate controlling medications, will attempt amiodarone bolus and infusion to improve heart rate  Echocardiogram ordered  Cardiology following  Continue Coumadin    Acute on chronic respiratory failure with hypoxia ([facility])  Assessment & Plan  Currently on 3 L nasal cannula.  Patient uses 3 L nasal cannula at night via her CPAP when she is compliant.  Recently saw Dr. Amaya as an outpatient.  Has a history of group 2  and 3 pulmonary hypertension    Acute respiratory failure with hypoxia ([facility])  Assessment & Plan  Currently on 3 L nasal cannula.  Patient uses 3 L nasal cannula at night via her CPAP when she is compliant.  Recently saw Dr. Amaya as an outpatient.  Has a history of group 2 and 3 pulmonary hypertension    CHANDRA (obstructive sleep apnea)  Assessment & Plan  Hold attempting CPAP tonight given nausea and vomiting    Essential hypertension  Assessment & Plan  Hold antihypertensive medications given episodes of hypotension    Hypothyroidism  Assessment & Plan  Continue Synthroid    Acute on chronic combined systolic and diastolic congestive heart failure ([facility])  Assessment & Plan  Wt Readings from Last 3 Encounters:   07/07/24 85.7 kg (189 lb)   07/03/24 83.6 kg (184 lb 6.4 oz)   07/02/24 83.9 kg (185 lb)               History of pulmonary embolus (PE)  Assessment & Plan  Previously, continue Coumadin.  Currently therapeutic    ESRD (end stage renal disease) ([facility])  Assessment & Plan  Lab Results   Component Value Date    EGFR 4 07/07/2024    EGFR 6 07/03/2024    EGFR 9 03/23/2024    CREATININE 7.84 (H) 07/07/2024    CREATININE 6.34 (H) 07/03/2024    CREATININE 4.69 (H) 03/23/2024     Patient on chronic peritoneal dialysis.  Discussed with nephrology, given acute issues this evening, patient emptied and dialysis on hold.  Can reevaluate tomorrow.           History of Present Illness     HPI: Jacquie Smith is a 69 y.o. who presents with abdominal pain and general malaise.  Patient has been feeling unwell for several weeks and was seen outpatient and was found to be in atrial fibrillation with rapid ventricular response.  She was instructed to come to the emergency department but did not.  Today she presented and was admitted to the general medical floor.  She had a CT of her abdomen pelvis which did not show an obvious source for infection but did show interstitial edema and anasarca.  She was started on antibiotics and  peritoneal fluid was sent.  She was continued on her peritoneal dialysis.  She was continued on beta-blockers for her atrial fibrillation.  Throughout the day today she had her intermittent hypotension.  She did receive fluids and did respond transiently but became hypotensive again.  A rapid response was called at that time.    History obtained from chart review and the patient.  Review of Systems: See HPI for Review of Systems  Disposition: Stepdown Level 1   Historical Information   Past Medical History:  No date: Acute asthmatic bronchitis      Comment:  last assessed: 2017  No date: Atrial fibrillation ([facility])  No date: Cardiac disease  No date: Chronic diastolic (congestive) heart failure ([facility])  No date: Chronic kidney disease      Comment:  pt is on peritoneal dialysis daily from 2200- 0600  2018: Closed nondisplaced fracture of distal phalanx of right   great toe  No date: Colon polyp  No date: CPAP (continuous positive airway pressure) dependence  No date: Diabetes mellitus ([facility])  No date: Hyperlipidemia  No date: Hypertension  No date: Medical non-compliance  2019: Morbid obesity with BMI of 40.0-44.9, adult ([facility])  2020: On continuous oral anticoagulation  No date: Pneumonia      Comment:  last assessed: 2013  No date: PONV (postoperative nausea and vomiting)  No date: Pulmonary embolism ([facility])  No date: Renal disorder      Comment:  possible clot noted in kidney, thus blood thinners                currently  No date: Severe obstructive sleep apnea  No date: Severe pulmonary arterial systolic hypertension ([facility])  2018: Tinea pedis of both feet Past Surgical History:  No date: CATARACT EXTRACTION  No date:  SECTION      Comment:   x 1  No date: EYE SURGERY  No date: NOSE SURGERY      Comment:  fractured nose - septoplasty   Current Outpatient Medications   Medication Instructions    albuterol (PROVENTIL HFA,VENTOLIN HFA) 90 mcg/act inhaler 2 puffs, Inhalation,  Every 6 hours PRN    albuterol 2.5 mg, Nebulization, Every 6 hours PRN    atorvastatin (LIPITOR) 20 mg, Oral, Daily    B Complex-C (B-COMPLEX WITH VITAMIN C) tablet 1 tablet, Oral, Daily    Blood Glucose Monitoring Suppl (ONE TOUCH ULTRA 2) w/Device KIT Test glucose 3 times daily    calcitriol (ROCALTROL) 0.25 mcg, Oral, Daily    calcium acetate (PHOSLO) 667 mg, Oral, Daily    cholecalciferol (VITAMIN D3) 2,000 Units, Oral, Daily    cinacalcet (SENSIPAR) 30 mg tablet TAKE 1 TABLET THREE TIMES A WEEK    collagenase (SANTYL) ointment Topical, Daily    docusate sodium (COLACE) 100 mg, Oral, 2 times daily    gentamicin (GARAMYCIN) 0.1 % cream Topical, Daily    Insulin Glargine-yfgn (Semglee, yfgn,) 100 UNIT/ML SOPN Inject 10 units subcutaneously daily in the morning    insulin lispro (HUMALOG KWIKPEN) 3 Units, Subcutaneous, 3 times daily with meals    Insulin Pen Needle 31G X 5 MM MISC Does not apply, Daily    Lancets (onetouch ultrasoft) lancets Test glucose 3 times a day; Code: E11.8    levothyroxine (Euthyrox) 125 mcg tablet Take one tablet by mouth in early morning on empty stomach    metolazone (ZAROXOLYN) 5 mg, Oral, Daily    metoprolol succinate (TOPROL-XL) 50 mg, Oral, Daily    MULTIPLE VITAMIN PO 1 tablet, Oral, Daily    nystatin (MYCOSTATIN) powder Topical, 3 times daily    OneTouch Ultra test strip TEST GLUCOSE THREE TIMES A DAY    semaglutide (0.25 or 0.5 mg/dose) (OZEMPIC (0.25 OR 0.5 MG/DOSE)) 0.5 mg, Subcutaneous, Every 7 days    torsemide (DEMADEX) 100 mg, Oral, Daily    valsartan (DIOVAN) 160 mg, Oral, Daily    warfarin (COUMADIN) 5 mg tablet No dose, route, or frequency recorded.    No Known Allergies   Social History     Tobacco Use    Smoking status: Never     Passive exposure: Never    Smokeless tobacco: Never   Vaping Use    Vaping status: Never Used   Substance Use Topics    Alcohol use: Never    Drug use: Never    Family History   Problem Relation Age of Onset    Diabetes Mother         mellitus     Anxiety disorder Mother         generalized anxiety disorder    Hypertension Mother     Stroke Mother     Diabetes type II Mother     Kidney disease Father     Kidney failure Father         renal failure    Ulcerative colitis Sister     Diabetes Sister     Ovarian cancer Maternal Aunt     Diabetes Maternal Aunt     Diabetes Maternal Uncle     Heart disease Maternal Uncle     Heart disease Family     Thyroid disease Neg Hx         Objective                            Vitals I/O      Most Recent Min/Max in 24hrs   Temp 97.5 °F (36.4 °C) Temp  Min: 96.4 °F (35.8 °C)  Max: 98.8 °F (37.1 °C)   Pulse 75 Pulse  Min: 73  Max: 132   Resp (!) 24 Resp  Min: 22  Max: 28   BP 99/64 BP  Min: 54/38  Max: 131/75   O2 Sat 98 % SpO2  Min: 95 %  Max: 100 %      Intake/Output Summary (Last 24 hours) at 7/7/2024 2114  Last data filed at 7/7/2024 1845  Gross per 24 hour   Intake 2395 ml   Output 475 ml   Net 1920 ml       Diet NPO    Invasive Monitoring           Physical Exam   Physical Exam  Eyes:      Pupils: Pupils are equal, round, and reactive to light.   Skin:     General: Skin is cool.   HENT:      Head: Normocephalic and atraumatic.      Mouth/Throat:      Mouth: Mucous membranes are moist.   Cardiovascular:      Rate and Rhythm: Tachycardia present. Rhythm irregular.   Abdominal: General: There is distension.     Palpations: Abdomen is soft.   Constitutional:       Appearance: She is toxic-appearing.   Pulmonary:      Effort: Pulmonary effort is normal. No respiratory distress.   Neurological:      General: No focal deficit present.      Mental Status: She is alert and oriented to person, place and time. Mental status is at baseline.            Diagnostic Studies      EKG: AF RVR  Imaging:  I have personally reviewed pertinent reports.       Medications:  Scheduled PRN   Albumin 5%, 12.5 g, Once  amiodarone 150 mg in dextrose 5 % 100 mL IV bolus, 150 mg, Once  atorvastatin, 20 mg, Daily With Dinner  b complex-vitamin C-folic  acid, 1 capsule, Daily  bisacodyl, 10 mg, Once  calcitriol, 0.25 mcg, Daily  [START ON 7/8/2024] calcium acetate, 1,334 mg, Daily  cefTRIAXone, 2,000 mg, Q24H  chlorhexidine, 15 mL, Q12H FARZANA  cholecalciferol, 2,000 Units, Daily  [START ON 7/8/2024] cinacalcet, 30 mg, Once per day on Monday Wednesday Friday  docusate sodium, 100 mg, BID  gentamicin, 1 Application, Daily  [START ON 7/8/2024] insulin glargine, 10 Units, Daily  insulin lispro, 1-5 Units, TID AC  insulin lispro, 1-5 Units, HS  [START ON 7/8/2024] insulin lispro, 3 Units, TID With Meals  levothyroxine, 125 mcg, Early Morning  [START ON 7/8/2024] metoprolol tartrate, 50 mg, Q12H FARZANA  midodrine, 10 mg, TID AC  multivitamin stress formula, 1 tablet, Daily  polyethylene glycol, 17 g, Daily  saccharomyces boulardii, 250 mg, BID  senna, 2 tablet, HS  warfarin, 5 mg, Daily (warfarin)      acetaminophen, 650 mg, Q6H PRN  HYDROmorphone, 0.2 mg, Q4H PRN  levalbuterol, 0.63 mg, Q6H PRN  ondansetron, 4 mg, Q6H PRN       Continuous    amiodarone (CORDARONE) 900 mg in dextrose 5 % 500 mL infusion, 1 mg/min   Followed by  [START ON 7/8/2024] amiodarone (CORDARONE) 900 mg in dextrose 5 % 500 mL infusion, 0.5 mg/min         Labs:    CBC    Recent Labs     07/07/24 0237   WBC 11.39*   HGB 12.3   HCT 39.2        BMP    Recent Labs     07/07/24 0237   SODIUM 135   K 4.6   CL 98   CO2 18*   AGAP 19*   BUN 85*   CREATININE 7.84*   CALCIUM 8.5       Coags    Recent Labs     07/07/24 0237   INR 2.10*        Additional Electrolytes  Recent Labs     07/07/24 0237   MG 2.0          Blood Gas    No recent results  No recent results LFTs  Recent Labs     07/07/24 0237   ALT 32   AST 14   ALKPHOS 161*   ALB 3.3*   TBILI 0.85       Infectious  Recent Labs     07/07/24  0237 07/07/24  1728   PROCALCITONI 1.46* 1.22*     Glucose  Recent Labs     07/07/24  0237   GLUC 190*               PRASAD GuzmanC

## 2024-07-08 NOTE — PROGRESS NOTES
While getting bedside report patient expressing she felt unwell, while aide was taking vitals BP 54/38 and patient now expressing feeling lightheaded and the rooms spinning. Patient HR on tele was afib in the 140s. Rapid response was called and patient was place on monitor, sugar was 239, EKG done was afib rvr 130s, repeat BP 78/49 with first albumin and NS bolus going. Unable to hear a manual BP and doppler BP was attempted. Second albumin was order and hung and 10mg of midodrine given. Patient was tx down to ICU and report was given to the ICU RN bedside. Patient was sent down with all belongings on a monitor with RN.

## 2024-07-08 NOTE — ASSESSMENT & PLAN NOTE
Lab Results   Component Value Date    HGBA1C 7.9 (H) 07/07/2024       Recent Labs     07/07/24  1112 07/07/24  1629 07/07/24  1916 07/07/24  2102   POCGLU 131 315* 239* 175*       Blood Sugar Average: Last 72 hrs:  (P) [id number]    Continue Lantus and sliding scale insulin

## 2024-07-08 NOTE — TELEPHONE ENCOUNTER
7/8/2024 6:50 PM returned call to [facility], communication consent on file    Reviewed current hospital labs  She will continue to follow with the hospital doctors for prognosis of her mom's care    Yessica Ramirez DO

## 2024-07-08 NOTE — ASSESSMENT & PLAN NOTE
Wt Readings from Last 3 Encounters:   07/07/24 85.7 kg (189 lb)   07/03/24 83.6 kg (184 lb 6.4 oz)   07/02/24 83.9 kg (185 lb)

## 2024-07-08 NOTE — PROGRESS NOTES
Progress Note - Cardiology   [facility]     Jacquie Smith 69 y.o. female MRN: [id number]  : 1955  Unit/Bed#: ICU 09 Encounter: [id number]    ASSESSMENT:  Shock  Transferred to ICU for hypotension  Currently on Levophed and milrinone.    SIRS  Leukocytosis with no clear source of infection    Atrial fibrillation, currently rate controlled with IV amiodarone    History of PE  VQ scan, low probability for acute PE    Acute on chronic combined systolic and diastolic heart failure  TTE: 2024:  EF 45-50%, decreased RV systolic function with RV dilatation  Mild to moderate AS, mild MS, moderate MR, moderate TR with severe pulmonary hypertension and RSVP of 71 mmHg        RECOMMENDATIONS:  Continue empiric antibiotics and pressor support.  Patient on Levophed and milrinone.  Midodrine if needed  Continue IV heparin, and atorvastatin  Dialysis as per nephrology          Subjective / Objective:     Review of Systems  Awake, apprehensive complains of abdominal and lower extremity pain, shortness of breath and arthralgias  Vitals: Blood pressure 128/86, pulse 72, temperature (!) 96.4 °F (35.8 °C), temperature source Tympanic, resp. rate 20, height 5' (1.524 m), weight 87.5 kg (193 lb), SpO2 96%, not currently breastfeeding.  Vitals:    24 0600 24 0735   Weight: 87.9 kg (193 lb 12.6 oz) 87.5 kg (193 lb)     Body mass index is 37.69 kg/m².  BP Readings from Last 3 Encounters:   24 128/86   24 90/55   24 122/82     Orthostatic Blood Pressures      Flowsheet Row Most Recent Value   Blood Pressure 128/86 filed at 2024 0738          I/O          0701   07 07 07 07 07    P.O.  480     I.V. (mL/kg)  329.8 (3.8) 499.8 (5.7)    IV Piggyback 500 2515 50    Total Intake(mL/kg) 500 3324.8 (37.8) 549.8 (6.3)    Urine (mL/kg/hr)  75 (0) 0 (0)    Other  400 0    Total Output  475 0    Net +500 +2849.8 +549.8           Unmeasured  Urine Occurrence  1 x           Invasive Devices       Peripheral Intravenous Line  Duration             Peripheral IV 07/07/24 Left;Proximal;Ventral (anterior) Forearm 1 day    Long-Dwell Peripheral IV (Midline) 07/07/24 Left Basilic <1 day              Arterial Line  Duration             Arterial Line 07/08/24 Radial <1 day              Line  Duration             Peritoneal Dialysis Catheter Abdominal 1468 days    Peritoneal Dialysis Catheter Tenckhoff Right lower abdomen 1452 days              Hemodialysis Catheter  Duration             HD Temporary Double Catheter <1 day                      Intake/Output Summary (Last 24 hours) at 7/8/2024 1523  Last data filed at 7/8/2024 1400  Gross per 24 hour   Intake 3824.55 ml   Output 400 ml   Net 3424.55 ml         Physical Exam:   Physical Exam    Neurologic:  Alert & oriented x 3,  no focal deficits noted   Constitutional: Obese  Eyes:  PERRL, conjunctiva normal   HENT:  Atraumatic, external ears normal, nose normal, .  NECK: Normal range of motion, no tenderness, neck is supple ,   Respiratory: Decreased breath sounds in the bases  Cardiovascular: S1-S2 with a I/VI systolic murmur.  No pericardial rub or gallop audible  GI:  Soft, nondistended, audible bowel sounds, mildly tender in the lower abdomen   Musculoskeletal: Lower extremity tenderness  Extremities: Moderate lower extremity edema  Psychiatric:  Speech and behavior appropriate             Medications/ Allergies:     Current Facility-Administered Medications   Medication Dose Route Frequency Provider Last Rate    acetaminophen  650 mg Oral Q6H PRN Ginny Bobby PA-C      amiodarone (CORDARONE) 900 mg in dextrose 5 % 500 mL infusion  0.5 mg/min Intravenous Continuous Ginny Bobby PA-C 0.5 mg/min (07/08/24 0701)    atorvastatin  20 mg Oral Daily With Dinner Ginny Bobby PA-C      b complex-vitamin C-folic acid  1 capsule Oral Daily Ginny Bobby PA-C      bisacodyl  10 mg Rectal Once Ginny  Rosa Bobby PA-C      calcitriol  0.25 mcg Oral Daily Ginny Bobby PA-C      calcium acetate  1,334 mg Oral Daily Ginny Bobby PA-C      cefepime  2,000 mg Intravenous Q12H MICHAEL Calderon Stopped (07/08/24 1356)    chlorhexidine  15 mL Mouth/Throat Q12H FARZANA Ginny Bobby PA-C      cholecalciferol  2,000 Units Oral Daily Ginny Bobby PA-C      cinacalcet  30 mg Oral Once per day on Monday Wednesday Friday Ginny Bobby PA-C      docusate sodium  100 mg Oral BID iGnny Bobby PA-C      gentamicin  1 Application Topical Daily Ginny Bobby PA-C      heparin (porcine)  3-20 Units/kg/hr (Order-Specific) Intravenous Titrated MICHAEL Calderon 11.8 Units/kg/hr (07/08/24 1233)    heparin (porcine)  2,000 Units Intravenous Q6H PRN Jeanie MICHAEL Mayorga      heparin (porcine)  4,000 Units Intravenous Q6H PRN JeanieMICHAEL Hanson      HYDROmorphone  0.2 mg Intravenous Q4H PRN Ginny Bobby PA-C      insulin glargine  10 Units Subcutaneous Daily Ginny Bobby PA-C      insulin lispro  1-5 Units Subcutaneous TID AC Ginny Bobby PA-C      insulin lispro  1-5 Units Subcutaneous HS Ginny Bobby PA-C      insulin lispro  3 Units Subcutaneous TID With Meals Ginny Bobby PA-C      levalbuterol  0.63 mg Nebulization Q6H PRN Ginny Bobby PA-C      levothyroxine  125 mcg Oral Early Morning Ginny Bobby PA-C      milrinone (Primacor) infusion  0.13 mcg/kg/min Intravenous Continuous JeanieMICHAEL Hanson 0.13 mcg/kg/min (07/08/24 1056)    multivitamin stress formula  1 tablet Oral Daily Ginny Rosa [place], PA-C      norepinephrine  1-30 mcg/min Intravenous Titrated Ginny Bobby PA-C 12 mcg/min (07/08/24 1107)    NxStage K 2/Ca 3  20,000 mL Dialysis Continuous Pablo Acevedo MD      ondansetron  4 mg Intravenous Q6H PRN Ginny Bobby PA-C      polyethylene glycol  17 g Oral Daily Ginny Lee  JUAN Bobby      saccharomyces boulardii  250 mg Oral BID Ginny Bobby PA-C      senna  2 tablet Oral HS Ginny Bobby PA-C      vancomycin  25 mg/kg (Adjusted) Intravenous Once Jeanie LangleyMICHAEL       acetaminophen, 650 mg, Q6H PRN  heparin (porcine), 2,000 Units, Q6H PRN  heparin (porcine), 4,000 Units, Q6H PRN  HYDROmorphone, 0.2 mg, Q4H PRN  levalbuterol, 0.63 mg, Q6H PRN  ondansetron, 4 mg, Q6H PRN      No Known Allergies        Labs:   Troponins:      CBC with diff:  Results from last 7 days   Lab Units 07/08/24  1018 07/08/24  0608 07/07/24  2108 07/07/24  0237 07/03/24  1115   WBC Thousand/uL 24.96* 17.22* 7.51 11.39* 9.86   HEMOGLOBIN g/dL 14.1 14.2 8.3* 12.3 13.8   HEMATOCRIT % 45.7 47.3* 32.5* 39.2 44.0   MCV fL 94 96 116* 92 92   PLATELETS Thousands/uL 105* 92* 115* 174 126*   RBC Million/uL 4.88 4.93 2.85* 4.28 4.78   MCH pg 28.9 28.6 29.1 28.7 28.9   MCHC g/dL 30.9* 29.8* 25.2* 31.4 31.4   RDW % 18.6* 18.4* 17.3* 17.8* 18.1*   MPV fL 10.9 11.4 11.7 11.6 11.9   NRBC AUTO /100 WBCs  --   --  0 0 0       CMP:  Results from last 7 days   Lab Units 07/08/24  1018 07/08/24  0608 07/07/24  2253 07/07/24  0237 07/03/24  1115   SODIUM mmol/L 137 134* 134* 135 131*   POTASSIUM mmol/L 4.8 6.0* 4.1 4.6 5.0   CHLORIDE mmol/L 102 98 98 98 95*   CO2 mmol/L 12* 15* 16* 18* 18*   ANION GAP mmol/L 23* 21* 20* 19* 18*   BUN mg/dL 96* 85* 89* 85* 76*   CREATININE mg/dL 7.78* 7.81* 7.40* 7.84* 6.34*   CALCIUM mg/dL 8.9 9.0 8.5 8.5 9.9   AST U/L  --  215* 10* 14 14   ALT U/L  --  133* 25 32 81*   ALK PHOS U/L  --  140* 126* 161* 194*   TOTAL PROTEIN g/dL  --  6.4 6.5 5.6* 6.6   ALBUMIN g/dL  --  3.8 4.0 3.3* 3.9   TOTAL BILIRUBIN mg/dL  --  2.00* 0.99 0.85 1.84*   EGFR ml/min/1.73sq m 4 4 5 4 6       Magnesium:  Results from last 7 days   Lab Units 07/08/24  1018 07/08/24  0608 07/07/24  2253 07/07/24  0237   MAGNESIUM mg/dL 2.0 2.2 2.1 2.0     Coags:  Results from last 7 days   Lab Units  "07/08/24  0837 07/07/24  0237 07/03/24  1115   PTT seconds 49*  --  29   INR  4.24* 2.10* 1.38*     TSH:  Results from last 7 days   Lab Units 07/08/24  1018   TSH 3RD GENERATON uIU/mL 14.176*     No components found for: \"TSH3\"  Lipid Profile:    Hgb A1c:  Results from last 7 days   Lab Units 07/07/24  1030   HEMOGLOBIN A1C % 7.9*     NT-proBNP: No results for input(s): \"NTBNP\" in the last 72 hours.     Imaging & Testing   I have personally reviewed pertinent reports.    NM lung ventilation / perfusion    Result Date: 7/8/2024  Narrative: VENTILATION AND PERFUSION SCAN INDICATION: hypotension, RV dysfunction. rule out PE. history of PE, not compilant with coumadin COMPARISON: VQ scan dated 7/5/2018 and chest x-ray dated 7/8/2024 TECHNIQUE:  Posterior ventilation imaging was performed after the inhalation of 30 mCi nebulized Tc-99m DTPA with deposition of less than 1 mCi of activity within the lungs. Multiplanar perfusion imaging was next performed following the intravenous administration of 4.24 mCi Tc-99m labeled MAA. FINDINGS: Ventilation imaging demonstrates significant deposition of tracer activity within the central airways is suggestive of central airways disease. Deposition of tracer activity within the central airways results in poor tracer accumulation involving the lung fields which limits evaluation of ventilation imaging. Perfusion imaging demonstrates mild nonuniform perfusion involving both lungs without moderate or large segmental perfusion defect. Please note that left lateral spot image is essentially nondiagnostic given intense tracer activity at IV sites.     Impression: Limited exam as detailed above without definite moderate or large segmental perfusion defect. Therefore, findings are most consistent with low probability for acute pulmonary embolus. Workstation performed: [id number]     XR chest portable    Result Date: 7/8/2024  Narrative: XR CHEST PORTABLE INDICATION: R IJ TLC. COMPARISON: CXR " and chest CT 7/7/2024. FINDINGS: Right jugular catheter in right atrium. Diffuse nodularity throughout the lungs better shown on CT. No pneumothorax or pleural effusion. Mild cardiomegaly. Pulmonary artery enlargement. Bones are unremarkable for age. Normal upper abdomen.     Impression: Right jugular catheter in right atrium with no pneumothorax. Diffuse nodularity throughout the lungs better shown on CT. Pulmonary artery enlargement which can be seen with pulmonary hypertension. Workstation performed: [id number]     Echo complete w/ contrast if indicated    Result Date: 7/8/2024  Narrative:   Left Ventricle: Left ventricular cavity size is normal. Wall thickness is mildly increased. The left ventricular ejection fraction is 45-50%. Systolic function is mildly reduced. Although no diagnostic regional wall motion abnormality was identified, this possibility cannot be completely excluded on the basis of this study.   Right Ventricle: Right ventricular cavity size is dilated. Systolic function is reduced.   Aortic Valve: The aortic valve is trileaflet. The leaflets are mildly thickened. The leaflets are moderately calcified. The leaflets exhibit normal mobility. There is mild to moderate stenosis.  Peak velocity is 1.38 m/s and mean gradient is 3.75 mmHg   Mitral Valve: There is severe annular calcification. There is moderate regurgitation with a posteriorly directed jet. There is mild stenosis.  Mean gradient is 2.6 mmHg   Tricuspid Valve: There is moderate to severe regurgitation. The tricuspid valve regurgitation jet is directed toward septum. The right ventricular systolic pressure is severely elevated at 71 mmHg.   Pulmonic Valve: There is trace regurgitation.     XR chest 1 view portable    Result Date: 7/7/2024  Narrative: XR CHEST PORTABLE INDICATION: asdf. COMPARISON: CXR 7/3/2024, chest CT 6/18/2024. FINDINGS: Mild pulmonary venous congestion. No pneumothorax or pleural effusion. Normal cardiomediastinal  silhouette. Bones are unremarkable for age. Normal upper abdomen.     Impression: Mild pulmonary venous congestion. Workstation performed: [id number]     CT chest abdomen pelvis wo contrast    Result Date: 7/7/2024  Narrative: CT CHEST, ABDOMEN AND PELVIS WITHOUT IV CONTRAST INDICATION: RLQ LLQ pain. COMPARISON: CT chest on 6/18/2024 TECHNIQUE: CT examination of the chest, abdomen and pelvis was performed without intravenous contrast. Multiplanar 2D reformatted images were created from the source data. This examination, like all CT scans performed in the [facility], was performed utilizing techniques to minimize radiation dose exposure, including the use of iterative reconstruction and automated exposure control. Radiation dose length product (DLP) for this visit: 1323 mGy-cm Enteric Contrast: Not administered. FINDINGS: CHEST LUNGS: Hazy opacity and mild septal thickening. Stable tree-in-bud nodularity mostly in the right lung with a few areas of mild mosaic attenuation, similar to the prior study. Multiple calcified small nodules are reidentified. No tracheal or endobronchial lesion. PLEURA: Unremarkable. HEART/GREAT VESSELS: Mild cardiomegaly. No thoracic aortic aneurysm. Enlarged pulmonary artery with calcification of the walls, consistent with history of pulmonary hypertension. Left atrial wall calcifications. Coronary artery calcifications. MEDIASTINUM AND TEOFILO: Multiple nonspecific subcentimeter mediastinal lymph nodes, similar to the prior study. CHEST WALL AND LOWER NECK: Stable thyromegaly. Stable calcification in the right lobe. ABDOMEN LIVER/BILIARY TREE: Unremarkable. GALLBLADDER: Distended. No calcified gallstones. No pericholecystic inflammatory change. SPLEEN: Unremarkable. PANCREAS: Unremarkable. ADRENAL GLANDS: Unremarkable. KIDNEYS/URETERS: Atrophic kidneys. No hydronephrosis. Bilateral renal vascular calcifications. STOMACH AND BOWEL: Colonic diverticulosis without findings of  "acute diverticulitis. APPENDIX: No findings to suggest appendicitis. ABDOMINOPELVIC CAVITY: Peritoneal dialysis terminates in the anterior pelvis. Small volume of abdominopelvic ascites. No pneumoperitoneum. No lymphadenopathy. VESSELS: Advanced atherosclerosis without abdominal aortic aneurysm. PELVIS REPRODUCTIVE ORGANS: Fibroid uterus. 2 cm simple appearing left adnexal cyst. URINARY BLADDER: Unremarkable. ABDOMINAL WALL/INGUINAL REGIONS: Mild body wall edema. BONES: No acute fracture or suspicious osseous lesion.     Impression: Hazy opacity and mild septal thickening suggestive of interstitial edema. Peritoneal dialysis catheter is present. Small volume of ascites. Mild body wall edema. Workstation performed: [id number]     XR chest 1 view portable    Result Date: 7/4/2024  Narrative: XR CHEST PORTABLE INDICATION: A-fib. COMPARISON: Chest CT 6/18/2024. CXR 2/5/2024. FINDINGS: Mild pulmonary venous congestion. No pneumothorax or pleural effusion. Mild cardiomegaly. Bones are unremarkable for age. Normal upper abdomen.     Impression: Mild pulmonary venous congestion. Workstation performed: [id number]     CT chest without contrast    Result Date: 6/25/2024  Narrative: CT CHEST WITHOUT IV CONTRAST INDICATION:   R91.8: Other nonspecific abnormal finding of lung field. \"Follow-up consolidation, abnormal CT scan 3/19/2024.\" Per my review of the medical record, history of heart failure and pulmonary hypertension. Never smoker. Hospitalized on 3/19/2024 for pneumonia. COMPARISON: CXR 2/5/2024, chest CT 3/19/2024, 3/23/2022, and baseline chest CT 7/7/2018. TECHNIQUE: Chest CT without intravenous contrast.  Axial, sagittal, coronal 2D reformats and coronal MIPS from source data. Radiation dose length product (DLP):  403 mGy-cm . Radiation dose exposure minimized using iterative reconstruction and automated exposure control. FINDINGS: LUNGS: Resolution of left upper and left lower lobe pneumonia with mild benign " postinfectious scar in the left lower lobe. Mild interstitial edema. Mild mosaic attenuation due to pulmonary hypertension. Multiple stable small nodules, most calcified, most  numerous in the anterior right lung. AIRWAYS: No significant filling defects. PLEURA:  Unremarkable. HEART/GREAT VESSELS: Mild cardiomegaly. Pulmonary artery enlargement with calcification of the walls with history of pulmonary hypertension. Calcification of the wall of the left atrium. Mild coronary artery calcification indicating atherosclerotic heart  disease. MEDIASTINUM AND TEOFILO: Regression of previously enlarged mediastinal nodes. CHEST WALL AND LOWER NECK: Stable enlargement of the thyroid gland since 2018 with calcification in the right lobe. UPPER ABDOMEN: Redemonstration of pneumoperitoneum from peritoneal dialysis. Extensive vascular calcification in the abdomen. Partially imaged nonobstructing right renal calcification. OSSEOUS STRUCTURES: Moderate degenerative disease in the spine with mild curvature.     Impression: Resolution of left upper and left lower lobe pneumonia with mild benign postinfectious scar in the left lower lobe. Mild septal thickening due to interstitial edema. Mild mosaic attenuation due to pulmonary hypertension. Pneumoperitoneum from peritoneal dialysis. Workstation performed: [id number]        Cardiac testing:   Results for orders placed during the hospital encounter of 21    Echo complete with contrast if indicated    Narrative  [address] [address] [phone]    Transthoracic Echocardiogram  2D, M-mode, Doppler, and Color Doppler    Study date:  09-Aug-2021    Patient: OMAIRA IGLESIAS  MR number: [id number]  Account number: [id number]  : 1955  Age: 66 years  Gender: Female  Status: Outpatient  Location: Echo lab  Height: 60 in  Weight: 211.6 lb  BP: 126/ 70 mmHg    Indications: Cardiomyopathy    Diagnoses: I43. - Cardiomyopathy in  diseases classified elsewhere    Sonographer:  JESUS Egan  Primary Physician:  Yessica Ramirez DO  Referring Physician:  Huang Edwards MD  Group:  [facility]  Interpreting Physician:  Kevin Sin MD    SUMMARY    SUMMARY:  Compared to prior echo, LVEF remains near 45% but with increased diastolic restrictive physiology. Prominent left sided/pulmonary presures remain.    LEFT VENTRICLE:  Systolic function was mildly to moderately reduced by visual assessment. Ejection fraction was estimated in the range of 40 % to 45 % to be 45 %.  There was mild diffuse hypokinesis.  Doppler parameters were consistent with restrictive physiology, indicative of decreased left ventricular diastolic compliance and/or increased left atrial pressure.    LEFT ATRIUM:  The atrium was mildly dilated.    RIGHT ATRIUM:  The atrium was mildly dilated.    MITRAL VALVE:  There was mild to moderate annular calcification.  There was mild regurgitation.    AORTIC VALVE:  The valve was functionally bicuspid. Leaflets exhibited mildly to moderately increased thickness, mild to moderate calcification, lower normal cuspal separation, and sclerosis.    TRICUSPID VALVE:  Poor TR jet to accurately estimate pulmonary pressure. Unable to obtain full envelope to obtain peak pulmonary pressure. Pulmonary pressure at least 68mmHg.  There was mild regurgitation.    PULMONIC VALVE:  There was mild regurgitation.    IVC, HEPATIC VEINS:  The inferior vena cava was mildly dilated.  The respirophasic change in diameter was more than 50%.    HISTORY: PRIOR HISTORY: Tachycardia,CHF,Pulmonary HTN,Hyperlipidemia,Diabetes,end stage renal disease,Hypothyroidism.    PROCEDURE: The procedure was performed in the echo lab. This was a routine study. The transthoracic approach was used. The study included complete 2D imaging, M-mode, complete spectral Doppler, and color Doppler. The heart rate was 74 bpm,  at the start of the study. Images were  obtained from the parasternal, apical, subcostal, and suprasternal notch acoustic windows. Image quality was adequate.    LEFT VENTRICLE: Size was normal. Systolic function was mildly to moderately reduced by visual assessment. Ejection fraction was estimated in the range of 40 % to 45 % to be 45 %. There was mild diffuse hypokinesis. DOPPLER: Doppler  parameters were consistent with restrictive physiology, indicative of decreased left ventricular diastolic compliance and/or increased left atrial pressure.    RIGHT VENTRICLE: The size was normal. Systolic function was low normal with TAPSE-1.5-1.6cm    LEFT ATRIUM: The atrium was mildly dilated.    RIGHT ATRIUM: The atrium was mildly dilated.    MITRAL VALVE: There was mild to moderate annular calcification. There was mild calcification. There was lower normal leaflet separation. DOPPLER: There was no evidence for significant stenosis. Mean gradient 2mmHg at 70bpm There was mild  regurgitation.    AORTIC VALVE: The valve was functionally bicuspid. Leaflets exhibited mildly to moderately increased thickness, mild to moderate calcification, lower normal cuspal separation, and sclerosis. DOPPLER: Transaortic velocity was minimally  increased.    TRICUSPID VALVE: Poor TR jet to accurately estimate pulmonary pressure. Unable to obtain full envelope to obtain peak pulmonary pressure. Pulmonary pressure at least 68mmHg. DOPPLER: There was mild regurgitation.    PULMONIC VALVE: DOPPLER: There was mild regurgitation.    SYSTEMIC VEINS: IVC: The inferior vena cava was mildly dilated. The respirophasic change in diameter was more than 50%.    MEASUREMENT TABLES    DOPPLER MEASUREMENTS  Aortic valve   (Reference normals)  Peak master   220 cm/s   (--)    SYSTEM MEASUREMENT TABLES    2D  EF (Teich): 44.74 %  %FS: 21.75 %  Ao Diam: 3.12 cm  EDV(Teich): 60.9 ml  ESV(Teich): 33.65 ml  IVSd: 1.13 cm  LA Area: 21.61 cm2  LA Diam: 4.13 cm  LVEDV MOD A4C: 119.85 ml  LVEF MOD A4C: 41.9  "%  LVESV MOD A4C: 69.63 ml  LVIDd: 3.77 cm  LVIDs: 2.95 cm  LVLd A4C: 7.89 cm  LVLs A4C: 6.89 cm  LVPWd: 1.2 cm  RA Area: 18.22 cm2  RVIDd: 3.03 cm  SV (Teich): 27.25 ml  SV MOD A4C: 50.22 ml    CW  AV Vmax: 1.68 m/s  AV maxP.24 mmHg  TR Vmax: 4.18 m/s  TR maxP.02 mmHg    MM  TAPSE: 1.48 cm    PW  MV E/A Ratio: 1.97  E' Sept: 0.07 m/s  E/E' Sept: 21.99  LVOT Vmax: 1.16 m/s  LVOT maxP.36 mmHg  MV A Ravinder: 0.76 m/s  MV Dec Zavala: 7.47 m/s2  MV DecT: 201.43 ms  MV E Ravinder: 1.5 m/s  MV PHT: 58.42 ms  MVA By PHT: 3.77 cm2    [employer] Accredited Echocardiography Laboratory    Prepared and electronically signed by    Kevin Sin MD  Signed 11-Aug-2021 14:18:20              Dr. Vaibhav Tang MD, FACC      \"This note has been constructed using a voice recognition system.Therefore there may be syntax, spelling, and/or grammatical errors. Please call if you have any questions. \"  "

## 2024-07-08 NOTE — ASSESSMENT & PLAN NOTE
Unclear etiology  CT scan with no obvious infection source  Peritoneal fluid not indicative of infection  Patient has had intermittent hypotension that has responded to fluids.  No obvious source of infection.  Unclear if rapid atrial fibrillation in the setting of a known cardiomyopathy may be playing a role in her hypotension.  Will attempt better rate control.  Follow-up blood cultures and continue ceftriaxone for now  Lactic acid cleared after fluid administration

## 2024-07-08 NOTE — ASSESSMENT & PLAN NOTE
Lab Results   Component Value Date    HGBA1C 7.9 (H) 07/07/2024       Recent Labs     07/07/24  2102 07/08/24  0557 07/08/24  0747 07/08/24  1226   POCGLU 175* 118 102 88         Blood Sugar Average: Last 72 hrs:  (P) [id number]    Hold lantus this AM due to hypoglycemia   Continue SSI

## 2024-07-08 NOTE — PROGRESS NOTES
07/08/24 0900   Clinical Encounter Type   Visited With Patient   Routine Visit Introduction     [facility] visit 7/7.  remains available.

## 2024-07-08 NOTE — PROGRESS NOTES
[facility]  Progress Note  Name: Jacquie Smith I  MRN: [id number]  Unit/Bed#: ICU 09 I Date of Admission: 7/7/2024   Date of Service: 7/8/2024 I Hospital Day: 1    Assessment & Plan   Shock ([facility])  Assessment & Plan  Undifferentiated shock vs mixed shock with suspected cardiogenic component. Presented in Afib with RVR which was likely present for several weeks based on symptoms and outpatient notes  Hypotensive after admission. Required ICU transfer for pressors. No significant improvement with fluids   No clear source of infection  Lower extremities are cool, slight mottling  LA initially 1.6 on arrival, 2.1 when she became hypotensive last night  Difficulty with obtaining NIBP monitoring requiring manual BP checks    Plan:  Continue levophed for goal MAP >65  Check LA, hepatic panel given concern for cardiogenic shock  Will place central line and obtain ScVo2  Concern for tachycardia mediated cardiomyopathy vs RV dysfunction (hx of PE and pulm hypertension)  Echo pending  Trend end points  Empiric antibiotics pending culture data    SIRS (systemic inflammatory response syndrome) ([facility])  Assessment & Plan  SIRS present on admission as evidence by tachycardia (Afib), tachypnea, rising leukocytosis. Unclear infectious etiology  CT scan with no obvious infection source  Peritoneal fluid not indicative of infection  Patient has had intermittent hypotension that has responded to fluids.  No obvious source of infection.   UA not yet obtained  Initiated on ceftriaxone initially for possible SBP    Plan:  Given rising leukocytosis and hemodynamic instability, will broaden ABX to cefepime/vanco for now pending culture data  Trend WBC, fever curve and procal (less reliable in the setting of ESRD)  Goal MAP >65  Trend endpoints    Acute on chronic combined systolic and diastolic congestive heart failure (HCC)  Assessment & Plan  Wt Readings from Last 3 Encounters:   07/08/24 87.5 kg (193 lb)   07/03/24 83.6  kg (184 lb 6.4 oz)   07/02/24 83.9 kg (185 lb)       Previous Echo 4/2023 with EF 40-45%, mild AS, mild to moderately reduced RV function, severe MAC, moderate MR, mild pulmonary hypertension  PTA meds: metoprolol, torsemide 100mg daily, valsartan  Patient also does PD for volume removal   Presents with generalized malaise, difficulty with ambulation, swelling over several weeks  Appears hypervolemic on exam    Plan:  Echo pending  Initiate CVVHD for volume removal   Close I&O  Daily weights  Cardiac diet when tolerating PO         Atrial fibrillation with RVR ([facility])  Assessment & Plan  Patient has a history of paroxysmal atrial fibrillation and atrial flutter.  She was seen as an outpatient both on July 1 and July 3 and was in rapid atrial fibrillation at that time and advised to go to the emergency department.Presented in Afib with RVR, rates 120-130s.   PTA meds: metoprolol 25mg XL, coumadin (unclear compliance with medications, INR below therapeutic range on multiple recent checks)  Rapid response 7/7 for hypotension: given amiodarone bolus and gtt due to hemodynamic instablility  Converted to NSR overnight, remains NSR in the 60-70s  Echocardiogram  pending     Plan:  Will plan to D/C amio gtt today if remains in NSR  Can consider digoxin if needed for further rate control  Hold BB given hypotension   Cardiology following, appreciate recommendations  Continue telemetry monitoring  Goal K>4, Mg>2    Type 2 diabetes mellitus, with long-term current use of insulin ([facility])  Assessment & Plan  Lab Results   Component Value Date    HGBA1C 7.9 (H) 07/07/2024       Recent Labs     07/07/24  2102 07/08/24  0557 07/08/24  0747 07/08/24  1226   POCGLU 175* 118 102 88         Blood Sugar Average: Last 72 hrs:  (P) [id number]    Hold lantus this AM due to hypoglycemia   Continue SSI       Metabolic acidosis  Assessment & Plan  Serum bicarb 18 with AG 18 on admission, likely secondary to renal failure and missing PD  however, concern for poor perfusion today with worsening hypotension  Serum bicarb down to 15, AG 21, BUN 96    Plan:  Check LA, VBG now. Sodium bicarb bolus given for hyperkalemia  Discussed case with Nephrology, will initiate CVVHD  Trend LA until cleared  Serial BMPs  Close I&Os    Mixed hyperlipidemia  Assessment & Plan  Continue statin    Chronic respiratory failure with hypoxia (HCC)  Assessment & Plan  Currently on 3 L nasal cannula.  Patient uses 3 L nasal cannula at night via her CPAP when she is compliant.  Recently saw Dr. Amaya as an outpatient.  Has a history of group 2 and 3 pulmonary hypertension  CT chest: Hazy opacity and mild septal thickening suggestive of interstitial edema.   Attempt volume removal via CVVHD for pulmonary optimization   Goal Sp02 >90%    CHANDRA (obstructive sleep apnea)  Assessment & Plan  CPAP HS if tolerated, held last night due to nausea     Essential hypertension  Assessment & Plan  Hold antihypertensive medications given hypotension    Hypothyroidism  Assessment & Plan  Continue Synthroid  Check TSH    History of pulmonary embolus (PE)  Assessment & Plan  Previously, continue Coumadin but patient reports she is poorly compliant with several recent INRs less than therapeutic range  INR 4.4 this AM  Will check VQ scan today   Initiate heparin gtt for AC    ESRD (end stage renal disease) (HCC)  Assessment & Plan  Lab Results   Component Value Date    EGFR 4 07/08/2024    EGFR 5 07/07/2024    EGFR 4 07/07/2024    CREATININE 7.81 (H) 07/08/2024    CREATININE 7.40 (H) 07/07/2024    CREATININE 7.84 (H) 07/07/2024     Patient on chronic peritoneal dialysis, reports compliance with daily PD until yesterday  PD initiated on admission but given acute hypotension and nausea, PD fluid drained prior to full dwell time  Discussed with Nephrology today, given acidosis and hypokalemia in the setting of hemodynamic instability, decision made to proceed with CVVHD  Will place line for  CVVHD  Q6 hour BMP, Mg, Phos  Close I&O             Disposition: Critical care    ICU Core Measures     A: Assess, Prevent, and Manage Pain Has pain been assessed? Yes  Need for changes to pain regimen? No   B: Both SAT/SAT  N/A   C: Choice of Sedation RASS Goal: N/A patient not on sedation  Need for changes to sedation or analgesia regimen? No   D: Delirium CAM-ICU: Negative   E: Early Mobility  Plan for early mobility? Yes   F: Family Engagement Plan for family engagement today? Yes       Antibiotic Review: Awaiting culture results.     Review of Invasive Devices:      Central access plan: Hemodynamic monitoring  Niota Plan: Keep arterial line for hemodynamic monitoring and frequent ABGs    Prophylaxis:  VTE VTE covered by:  heparin (porcine), Intravenous, 11.8 Units/kg/hr at 07/08/24 1233  heparin (porcine), Intravenous  heparin (porcine), Intravenous       Stress Ulcer  not ordered         Significant 24hr Events     24hr events: Admitted overnight after rapid response for hypotension. Given 30mL of crystalloid yesterday during the day and additional albumin prior to ICU transfer. Initiated on levophed with requirements up to 8mcg. Given amio bolus and gtt for Afib with RVR with eventual conversion to NSR this AM which did not significantly improve levophed requirements.      Subjective   Review of Systems: Review of Systems   Constitutional:  Positive for fatigue.   Respiratory:  Positive for shortness of breath.    Cardiovascular:  Positive for palpitations. Negative for chest pain.   Gastrointestinal:  Positive for nausea.   Genitourinary:  Positive for decreased urine volume.   Musculoskeletal:  Positive for arthralgias.        Objective                            Vitals I/O      Most Recent Min/Max in 24hrs   Temp (!) 96.4 °F (35.8 °C) Temp  Min: 96.1 °F (35.6 °C)  Max: 97.6 °F (36.4 °C)   Pulse 68 Pulse  Min: 62  Max: 139   Resp (!) 26 Resp  Min: 18  Max: 34   /86 BP  Min: 54/38  Max: 183/111   O2  Sat 97 % SpO2  Min: 29 %  Max: 100 %      Intake/Output Summary (Last 24 hours) at 2024 1350  Last data filed at 2024 1300  Gross per 24 hour   Intake 3697.55 ml   Output 475 ml   Net 3222.55 ml       Diet NPO    Invasive Monitoring           Physical Exam   Physical Exam  Eyes:      Extraocular Movements: Extraocular movements intact.      Pupils: Pupils are equal, round, and reactive to light.   Skin:     General: Skin is cool.      Comments: Slightly mottled from knees down    HENT:      Head: Normocephalic and atraumatic.      Mouth/Throat:      Mouth: Mucous membranes are moist.   Cardiovascular:      Rate and Rhythm: Normal rate and regular rhythm.      Heart sounds: Normal heart sounds.   Musculoskeletal:      Right lower le+ Edema present.      Left lower le+ Edema present.      Comments: Dependent edema   Abdominal: General: Bowel sounds are normal.      Palpations: Abdomen is soft.      Tenderness: There is no abdominal tenderness.      Comments: PD catheter site C/D/I   Constitutional:       Appearance: She is well-developed and well-nourished. She is ill-appearing.   Pulmonary:      Effort: Tachypnea present.      Breath sounds: Normal breath sounds.   Neurological:      Mental Status: She is alert and oriented to person, place and time.      Comments: Oriented x 3 but appears sleepy and Generalized weakness            Diagnostic Studies           Medications:  Scheduled PRN   atorvastatin, 20 mg, Daily With Dinner  b complex-vitamin C-folic acid, 1 capsule, Daily  bisacodyl, 10 mg, Once  calcitriol, 0.25 mcg, Daily  calcium acetate, 1,334 mg, Daily  cefepime, 2,000 mg, Q12H  chlorhexidine, 15 mL, Q12H FARZANA  cholecalciferol, 2,000 Units, Daily  cinacalcet, 30 mg, Once per day on   docusate sodium, 100 mg, BID  gentamicin, 1 Application, Daily  insulin glargine, 10 Units, Daily  insulin lispro, 1-5 Units, TID AC  insulin lispro, 1-5 Units, HS  insulin lispro, 3 Units,  TID With Meals  levothyroxine, 125 mcg, Early Morning  multivitamin stress formula, 1 tablet, Daily  polyethylene glycol, 17 g, Daily  saccharomyces boulardii, 250 mg, BID  senna, 2 tablet, HS  vancomycin, 25 mg/kg (Adjusted), Once      acetaminophen, 650 mg, Q6H PRN  heparin (porcine), 2,000 Units, Q6H PRN  heparin (porcine), 4,000 Units, Q6H PRN  HYDROmorphone, 0.2 mg, Q4H PRN  levalbuterol, 0.63 mg, Q6H PRN  ondansetron, 4 mg, Q6H PRN       Continuous    amiodarone (CORDARONE) 900 mg in dextrose 5 % 500 mL infusion, 0.5 mg/min, Last Rate: 0.5 mg/min (07/08/24 0701)  heparin (porcine), 3-20 Units/kg/hr (Order-Specific), Last Rate: 11.8 Units/kg/hr (07/08/24 1233)  milrinone (Primacor) infusion, 0.13 mcg/kg/min, Last Rate: 0.13 mcg/kg/min (07/08/24 1056)  norepinephrine, 1-30 mcg/min, Last Rate: 12 mcg/min (07/08/24 1107)  NxStage K 2/Ca 3, 20,000 mL         Labs:    CBC    Recent Labs     07/08/24  0608 07/08/24  1018   WBC 17.22* 24.96*   HGB 14.2 14.1   HCT 47.3* 45.7   PLT 92* 105*   BANDSPCT 5  --      BMP    Recent Labs     07/08/24  0608 07/08/24  1018   SODIUM 134* 137   K 6.0* 4.8   CL 98 102   CO2 15* 12*   AGAP 21* 23*   BUN 85* 96*   CREATININE 7.81* 7.78*   CALCIUM 9.0 8.9       Coags    Recent Labs     07/07/24  0237 07/08/24  0837   INR 2.10* 4.24*   PTT  --  49*        Additional Electrolytes  Recent Labs     07/07/24  2253 07/08/24  0608 07/08/24  1018   MG 2.1 2.2 2.0   PHOS 8.9*  --  10.8*   CAIONIZED  --   --  1.10*          Blood Gas    No recent results  Recent Labs     07/08/24  0949   PHVEN 7.174*   [id number] 37.3*   PO2VEN 35.6   [id number] 13.4*   BEVEN -14.2   [id number] 51.8*    LFTs  Recent Labs     07/07/24  2253 07/08/24  0608   ALT 25 133*   AST 10* 215*   ALKPHOS 126* 140*   ALB 4.0 3.8   TBILI 0.99 2.00*       Infectious  Recent Labs     07/07/24  0237 07/07/24  1728   PROCALCITONI 1.46* 1.22*     Glucose  Recent Labs     07/07/24  0237 07/07/24  2253 07/08/24  0608 07/08/24  1018   GLUC  190* 305* 165* 98               MICHAEL Calderon

## 2024-07-08 NOTE — ASSESSMENT & PLAN NOTE
Undifferentiated shock vs mixed shock with suspected cardiogenic component. Presented in Afib with RVR which was likely present for several weeks based on symptoms and outpatient notes  Hypotensive after admission. Received 2.5L of crystalliod with additional albumin. Required ICU transfer for pressors. No significant improvement with fluids   No clear source of infection  Lower extremities are cool, slight mottling  LA initially 1.6 on arrival, 2.1 when she became hypotensive last night  Difficulty with obtaining NIBP monitoring requiring manual BP checks    Plan:  Continue levophed for goal MAP >65  Check LA, hepatic panel given concern for cardiogenic shock  Will place central line and obtain ScVo2  Concern for tachycardia mediated cardiomyopathy vs RV dysfunction (hx of PE and pulm hypertension)  Echo pending  Trend end points  Empiric antibiotics pending culture data

## 2024-07-08 NOTE — PROGRESS NOTES
NEPHROLOGY PROGRESS NOTE   Jacquie Smith 69 y.o. female MRN: [id number]  Unit/Bed#: ICU 09 Encounter: [id number]    ASSESSMENT & PLAN:  69-year-old female presented to [facility] after having complaint of abdominal pain and low urine output.  Nephrology consulted for ESRD on PD  End-stage renal disease on  dialysis  -Outpatient unit: [facility] and was getting peritoneal dialysis  -Access: Currently has PD catheter  -CCPD at home - total volume 8 L, fill volume 2 L x 4 exchanges over 8 hours   -Plan: After admission patient was started on CAPD with 4.25 alternating with 2.5% dextrose but she became hypotensive and was complaining of abdominal pain and nausea and so PD was held last night.  Patient is currently hypotensive and requiring Levophed at 8 mics per hour.  Also is acidotic with bicarb level of 15.  Discussed with patient about doing CVVHD in the setting of hyperkalemia and worsening acidosis as well as hypotension.  Written consent was obtained and placed in the chart after discussing risk and benefit.  Discussed with ICU team who will place temporary dialysis catheter and after that we will start on CVVHD using 2K bath with 50 mill per hour net negative considering she is significantly fluid overloaded with last CT chest on 7/7 with finding of interstitial edema and body wall edema  -Continue gentamicin cream to the PD catheter    Volume status/fluid overload/acute on chronic systolic CHF  -Estimated dry weight 84 kg  -CT chest abdomen pelvis from 7/7 was suggestive of fluid overload with finding of interstitial edema in the lungs and body wall edema chest x-ray from 7/7 suggestive of pulmonary venous congestion.  Patient clinically appears fluid overloaded.  Weight has trended up to 193 pounds, plan for CVVHD as mentioned above with ultrafiltration  -Follow-up official echocardiogram report    CKD/MBD  -Secondary hyperparathyroidism of renal origin: On home calcitriol 0.25 mcg daily, Cinacalcet 30 mg 3  times a week  -Hyperphosphatemia, on PhosLo 2 tablet 3 times daily with meals.  Continue home medications, n.p.o. okay to hold PhosLo    Blood pressure/hypotension  -Follow-up echocardiogram report.  Continue vasopressors per ICU team, also on midodrine.  Follow-up cultures.  Hold home antihypertensive medications    Electrolytes:   -Hyperkalemia, potassium 6.0 with finding of metabolic acidosis with bicarb of 15 ->plan to start on CVVHD as mentioned above  -Hyponatremia: Sodium 134, recommend fluid restriction 1.5 L/day    Anemia due to ESRD  -Goal hemoglobin:  10-11 grams/deciliter  -Current hemoglobin: Improved to 14.2 g/dl  -Plan: Continue to monitor per primary team    Abdominal pain  -CT abdomen on admission negative for any acute pathology  -PD fluid cell count 27 with 23% neutrophil.  Gram stain negative, culture under process.  No evidence of PD peritonitis.  Recommend further workup and management per primary team    A-fib with RVR, on amiodarone drip currently per ICU team  History of obstructive sleep apnea  History of PE on anticoagulation     Discussed with primary team about plan to start on CVVHD in the setting of hyperkalemia and acidosis and agree with the plan    SUBJECTIVE:  Still with abdominal pain.  Also complained of dizziness and generalized weakness on oxygen by NC. C/o nausea.  Patient had rapid response last night and was transferred to ICU for hypotension    OBJECTIVE:  Current Weight: Weight - Scale: 87.5 kg (193 lb)  Vitals:    07/08/24 0738   BP: 128/86   Pulse: 62   Resp: 21   Temp: (!) 96.4 °F (35.8 °C)   SpO2: 99%       Intake/Output Summary (Last 24 hours) at 7/8/2024 0839  Last data filed at 7/8/2024 0627  Gross per 24 hour   Intake 3274.75 ml   Output 475 ml   Net 2799.75 ml       Physical Exam  General:  Ill looking, awake.  Eyes: Conjunctivae pink,  Sclera anicteric  ENT: lips and mucous membranes moist  Neck: supple   Chest: Clear to Auscultation both lungs,  no crackles,  ronchus or wheezing.  CVS: S1 & S2 present, normal rate, regular rhythm, no murmur.  Abdomen: soft, diffuse abdominal tenderness, non-distended, Bowel sounds normoactive  Extremities: trace edema of  legs  Skin: no rash  Neuro: awake, alert, oriented  Psych: Mood and affect appropriate      Medications:    Current Facility-Administered Medications:     acetaminophen (TYLENOL) tablet 650 mg, 650 mg, Oral, Q6H PRN, Ginny Bobby PA-C    [] amiodarone (CORDARONE) 900 mg in dextrose 5 % 500 mL infusion, 1 mg/min, Intravenous, Continuous, Stopped at 24 0332 **FOLLOWED BY** amiodarone (CORDARONE) 900 mg in dextrose 5 % 500 mL infusion, 0.5 mg/min, Intravenous, Continuous, Ginny Bobby PA-C, Last Rate: 16.7 mL/hr at 24 0701, 0.5 mg/min at 24 0701    atorvastatin (LIPITOR) tablet 20 mg, 20 mg, Oral, Daily With Dinner, Ginny Bobby PA-C, 20 mg at 24 1739    b complex-vitamin C-folic acid (RENAL) capsule 1 capsule, 1 capsule, Oral, Daily, Ginny Bobby PA-C, 1 capsule at 24 1055    bisacodyl (DULCOLAX) rectal suppository 10 mg, 10 mg, Rectal, Once, Ginny Bobby PA-C    calcitriol (ROCALTROL) capsule 0.25 mcg, 0.25 mcg, Oral, Daily, Ginny Bobby PA-C, 0.25 mcg at 24 1049    calcium acetate (PHOSLO) capsule 1,334 mg, 1,334 mg, Oral, Daily, Ginny Bobby PA-C    cefTRIAXone (ROCEPHIN) IVPB (premix in dextrose) 2,000 mg 50 mL, 2,000 mg, Intravenous, Q24H, Ginny Bobby PA-C, Stopped at 24 0627    chlorhexidine (PERIDEX) 0.12 % oral rinse 15 mL, 15 mL, Mouth/Throat, Q12H FARZANA, Ginny Bobby PA-C, 15 mL at 24 2248    Cholecalciferol (VITAMIN D3) tablet 2,000 Units, 2,000 Units, Oral, Daily, Ginny Bobby PA-C, 2,000 Units at 24 1050    cinacalcet (SENSIPAR) tablet 30 mg, 30 mg, Oral, Once per day on , Ginny Bobby PA-C    docusate sodium (COLACE) capsule 100 mg, 100 mg, Oral, BID, Ginny  Rosa Bobby PA-C, 100 mg at 07/07/24 1049    gentamicin (GARAMYCIN) 0.1 % cream 1 Application, 1 Application, Topical, Daily, Ginny Bobby PA-C, 1 Application at 07/07/24 1051    HYDROmorphone (DILAUDID) injection 0.2 mg, 0.2 mg, Intravenous, Q4H PRN, Ginny Bobby PA-C, 0.2 mg at 07/07/24 1541    insulin glargine (LANTUS) subcutaneous injection 10 Units 0.1 mL, 10 Units, Subcutaneous, Daily, Ginny Bobby PA-C    insulin lispro (HumALOG/ADMELOG) 100 units/mL subcutaneous injection 1-5 Units, 1-5 Units, Subcutaneous, TID AC, 3 Units at 07/07/24 1738 **AND** Fingerstick Glucose (POCT), , , TID AC, Ginny Bobby PA-C    insulin lispro (HumALOG/ADMELOG) 100 units/mL subcutaneous injection 1-5 Units, 1-5 Units, Subcutaneous, HS, Ginny Bobby PA-C, 1 Units at 07/07/24 2257    insulin lispro (HumALOG/ADMELOG) 100 units/mL subcutaneous injection 3 Units, 3 Units, Subcutaneous, TID With Meals, Ginny Bobby PA-C    insulin regular (HumuLIN R,NovoLIN R) injection 10 Units, 10 Units, Intravenous, Once, MICHAEL Calderon    levalbuterol (XOPENEX) inhalation solution 0.63 mg, 0.63 mg, Nebulization, Q6H PRN, Ginny Bobby PA-C    levothyroxine tablet 125 mcg, 125 mcg, Oral, Early Morning, Ginny Bobby PA-C, 125 mcg at 07/08/24 0616    midodrine (PROAMATINE) tablet 10 mg, 10 mg, Oral, TID AC, RAEANN Guzman-C, 10 mg at 07/08/24 0616    multivitamin stress formula tablet 1 tablet, 1 tablet, Oral, Daily, Ginny Bobby PA-C, 1 tablet at 07/07/24 1049    norepinephrine (LEVOPHED) 4 mg (STANDARD CONCENTRATION) IV in sodium chloride 0.9% 250 mL, 1-30 mcg/min, Intravenous, Titrated, Ginny Bobby PA-C, Last Rate: 30 mL/hr at 07/08/24 0748, 8 mcg/min at 07/08/24 0748    ondansetron (ZOFRAN) injection 4 mg, 4 mg, Intravenous, Q6H PRN, Ginny Bobby PA-C, 4 mg at 07/07/24 1955    polyethylene glycol (MIRALAX) packet 17 g, 17 g, Oral, Daily, Ginny Bobby,  "PA-C, 17 g at 07/07/24 1049    saccharomyces boulardii (FLORASTOR) capsule 250 mg, 250 mg, Oral, BID, Ginny Bobby, PA-C, 250 mg at 07/07/24 1050    senna (SENOKOT) tablet 17.2 mg, 2 tablet, Oral, HS, Ginny Bobby, PA-C    warfarin (COUMADIN) tablet 5 mg, 5 mg, Oral, Daily (warfarin), Ginny Bobby, PA-C, 5 mg at 07/07/24 1739    Invasive Devices:        Lab Results:   Results from last 7 days   Lab Units 07/08/24  0608 07/07/24  2253 07/07/24  2108 07/07/24  0237   WBC Thousand/uL 17.22*  --  7.51 11.39*   HEMOGLOBIN g/dL 14.2  --  8.3* 12.3   HEMATOCRIT % 47.3*  --  32.5* 39.2   PLATELETS Thousands/uL 92*  --  115* 174   POTASSIUM mmol/L 6.0* 4.1  --  4.6   CHLORIDE mmol/L 98 98  --  98   CO2 mmol/L 15* 16*  --  18*   BUN mg/dL 85* 89*  --  85*   CREATININE mg/dL 7.81* 7.40*  --  7.84*   CALCIUM mg/dL 9.0 8.5  --  8.5   MAGNESIUM mg/dL 2.2 2.1  --  2.0   PHOSPHORUS mg/dL  --  8.9*  --   --    ALK PHOS U/L 140* 126*  --  161*   ALT U/L 133* 25  --  32   AST U/L 215* 10*  --  14       Previous work up:  Reviewed last CT chest abdomen pelvis report      Portions of the record may have been created with voice recognition software. Occasional wrong word or \"sound a like\" substitutions may have occurred due to the inherent limitations of voice recognition software. Read the chart carefully and recognize, using context, where substitutions have occurred.If you have any questions, please contact the dictating provider.    "

## 2024-07-08 NOTE — ASSESSMENT & PLAN NOTE
Wt Readings from Last 3 Encounters:   07/08/24 87.5 kg (193 lb)   07/03/24 83.6 kg (184 lb 6.4 oz)   07/02/24 83.9 kg (185 lb)       Previous Echo 4/2023 with EF 40-45%, mild AS, mild to moderately reduced RV function, severe MAC, moderate MR, mild pulmonary hypertension  PTA meds: metoprolol, torsemide 100mg daily, valsartan  Patient also does PD for volume removal   Presents with generalized malaise, difficulty with ambulation, swelling over several weeks  Appears hypervolemic on exam    Plan:  Echo pending  Initiate CVVHD for volume removal   Close I&O  Daily weights  Cardiac diet when tolerating PO

## 2024-07-09 PROBLEM — D63.8 ANEMIA OF CHRONIC DISEASE: Chronic | Status: RESOLVED | Noted: 2020-12-22 | Resolved: 2024-01-01

## 2024-07-09 NOTE — ASSESSMENT & PLAN NOTE
Previously, continue Coumadin but patient reports she is poorly compliant with several recent INRs less than therapeutic range  VQ scan no mod/large PE limited study   D/c heparin gtt  F/u INR in am

## 2024-07-09 NOTE — PROGRESS NOTES
[facility]  Progress Note  Name: Jacquie Smith I  MRN: [id number]  Unit/Bed#: ICU 09 I Date of Admission: 7/7/2024   Date of Service: 7/9/2024 I Hospital Day: 2    Assessment & Plan   Shock (HCC)  Assessment & Plan  Cardiogenic   No clear source of infection   CT:Hazy opacity and mild septal thickening suggestive of interstitial edema.Peritoneal dialysis catheter is present. Small volume of ascites.Mild body wall edema.  7/8 Echo EF 45-50%, No RWMA, dilated RV, moderate AS, moderate MR, moderate to severe TR, estimated RVSP 71.   Previous Echo 4/2023 with EF 40-45%, mild AS, mild to moderately reduced RV function, severe MAC, moderate MR, mild pulmonary hypertension    Plan:  Continue levophed for goal MAP >65  Milrinone started and trend ScVo2 52-66  Volume removal with CRRT  Trend end points  Empiric antibiotics pending culture data    Acute on chronic combined systolic and diastolic congestive heart failure (HCC)  Assessment & Plan  Wt Readings from Last 3 Encounters:   07/08/24 87.5 kg (193 lb)   07/03/24 83.6 kg (184 lb 6.4 oz)   07/02/24 83.9 kg (185 lb)     Previous Echo 4/2023 with EF 40-45%, mild AS, mild to moderately reduced RV function, severe MAC, moderate MR, mild pulmonary hypertension  PTA meds: metoprolol, torsemide 100mg daily, valsartan  Patient also does PD for volume removal   Presents with generalized malaise, difficulty with ambulation, swelling over several weeks  Appears hypervolemic on exam    Plan:  Repeat 7/8 Echo EF 45-50%, No RWMA, dilated RV, moderate AS, moderate MR, moderate to severe TR, estimated RVSP 71.   Cardiology consulted  Milrinone started with ScVO2 trend  CVVHD for volume removal -100  Repeat limited echo once volume status improved  Close I&O  Daily weights--dry weight 84kg        Pulmonary hypertension (HCC)  Assessment & Plan  group 2 and 3 pulmonary hypertension  Cardiology following  Volume removal  Outpt heart failure follow  up    Transaminitis  Assessment & Plan  2/2 hepatic congestion with cardiogenic shock  CT no acute path  CMP in am  T/c RUQ u/s if remains elevated with dopplers  Check hepatitis panel    SIRS (systemic inflammatory response syndrome) ([facility])  Assessment & Plan  AEB: tachycardia (Afib), tachypnea, rising leukocytosis. Unclear infectious etiology  CT scan with no obvious infection source  Peritoneal fluid not indicative of infection  BC P  PCT 1.4-1.2  Bands 5 with new thrombocytopenia    Plan:  cefepime/vanco D2  Trend WBC, fever curve and procal   F/u cultures    Metabolic acidosis  Assessment & Plan  2/2 to renal failure and LA  Prior bicarb low on labs    Plan:   Nephrology consuilted  Continue CVVHD -100  Trend LA until cleared (volume overloaded no fluids and elevated LFT will be long to clear)  Serial BMPs  Close I&Os    Atrial fibrillation with RVR ([facility])  Assessment & Plan  Hx PAF/atrial flutter.  She was seen as an outpatient both on July 1 and July 3 and was in rapid atrial fibrillation at that time and advised to go to the emergency department--noncompliant with plan  PTA meds: metoprolol 25mg XL, coumadin (unclear compliance with medications, INR below therapeutic range on multiple recent checks)  Echo as above    Plan:  Hold BB given hypotension   Consider changing levo to zana given RVR  May need to resume amio gtt.  Cardiology following, appreciate recommendations  INR supratherapeutic with transaminitis hold coumadin      Thrombocytopenia (HCC)  Assessment & Plan  Low prior to initiation of CRRT  Check fibrinogen/LDH/Haptoglobin  Cbc in am    Supratherapeutic INR  Assessment & Plan  In setting of transaminitis  Hold coumadin  INR in am    Type 2 diabetes mellitus, with long-term current use of insulin ([facility])  Assessment & Plan  Lab Results   Component Value Date    HGBA1C 7.9 (H) 07/07/2024       Recent Labs     07/08/24  0557 07/08/24  0747 07/08/24  1226 07/08/24  2101   POCGLU 118 102 88 99          Blood Sugar Average: Last 72 hrs:  (P) 159    Lantus/SSI  BS low end may need to decrease lantus      Mixed hyperlipidemia  Assessment & Plan  Hold statin w transaminitis    Chronic respiratory failure with hypoxia ([facility])  Assessment & Plan  Currently on 3 L nasal cannula.  Patient uses 3 L nasal cannula at night via her CPAP when she is compliant.  Recently saw Dr. Amaya as an outpatient.  Has a history of group 2 and 3 pulmonary hypertension  CT chest: Hazy opacity and mild septal thickening suggestive of interstitial edema.   Attempt volume removal via CVVHD for pulmonary optimization   Goal Sp02 >90%    CHANDRA (obstructive sleep apnea)  Assessment & Plan  CPAP HS if tolerated,    Essential hypertension  Assessment & Plan  Hold antihypertensive medications given hypotension    Anemia of chronic disease  Assessment & Plan  Baseline 12   Above baseline    Hypothyroidism  Assessment & Plan  Continue Synthroid  TSH 14.1 FT4 pending    History of pulmonary embolus (PE)  Assessment & Plan  Previously, continue Coumadin but patient reports she is poorly compliant with several recent INRs less than therapeutic range  VQ scan no mod/large PE limited study   D/c heparin gtt  F/u INR in am    ESRD (end stage renal disease) ([facility])  Assessment & Plan  Lab Results   Component Value Date    EGFR 8 07/08/2024    EGFR 5 07/08/2024    EGFR 4 07/08/2024    CREATININE 5.17 (H) 07/08/2024    CREATININE 7.20 (H) 07/08/2024    CREATININE 7.78 (H) 07/08/2024     Patient on chronic peritoneal dialysis, reports compliance with daily PD until yesterday  PD initiated on admission but given acute hypotension and nausea, PD fluid drained prior to full dwell time  Nephrology consulted  CVVHD initiated 7/8  Q6 hour BMP, Mg, Phos  Close I&O    Morbid obesity with BMI of 40.0-44.9, adult ([facility])  Assessment & Plan  Dietary education             Disposition: Critical care    ICU Core Measures     A: Assess, Prevent, and Manage Pain Has pain  been assessed? Yes  Need for changes to pain regimen? No   B: Both SAT/SAT  N/A   C: Choice of Sedation RASS Goal: 0 Alert and Calm  Need for changes to sedation or analgesia regimen? No   D: Delirium CAM-ICU: Negative   E: Early Mobility  Plan for early mobility? Yes   F: Family Engagement Plan for family engagement today? Yes       Antibiotic Review: Awaiting culture results.     Review of Invasive Devices:      Central access plan: HD cath in place.  Plan cont crrt  [place] Plan: Keep arterial line for hemodynamic monitoring    Prophylaxis:  VTE VTE covered by:    None       Stress Ulcer  not ordered         Significant 24hr Events     24hr events: CRRT increased to -100. Levo weaned. Hep gtt d/c. Back in Afib RVR requiring uptitration of pressors.      Subjective   Review of Systems: Review of Systems     Objective                            Vitals I/O      Most Recent Min/Max in 24hrs   Temp 97.5 °F (36.4 °C) Temp  Min: 96.4 °F (35.8 °C)  Max: 97.7 °F (36.5 °C)   Pulse 96 Pulse  Min: 62  Max: 116   Resp 13 Resp  Min: 12  Max: 28   /86 BP  Min: 128/86  Max: 128/86   O2 Sat 98 % SpO2  Min: 29 %  Max: 100 %      Intake/Output Summary (Last 24 hours) at 7/9/2024 0543  Last data filed at 7/9/2024 0500  Gross per 24 hour   Intake 2699.45 ml   Output 3063 ml   Net -363.55 ml       Diet Clear Liquid    Invasive Monitoring   Arterial Line  [place] BP 87/50  Arterial Line BP  Min: 78/45  Max: 129/62   MAP 65 mmHg  Arterial Line MAP (mmHg)  Min: 58 mmHg  Max: 89 mmHg           Physical Exam   Physical Exam  Eyes:      Pupils: Pupils are equal, round, and reactive to light.   Skin:     General: Skin is warm and dry.   HENT:      Head: Normocephalic and atraumatic.      Mouth/Throat:      Mouth: Mucous membranes are moist.   Cardiovascular:      Rate and Rhythm: Normal rate and regular rhythm.      Pulses: Normal pulses.      Heart sounds: Normal heart sounds.   Musculoskeletal:         General: Swelling present. Normal  range of motion.      Right lower le+ Edema present.      Left lower le+ Edema present.   Abdominal: General: Bowel sounds are normal. There is no distension.      Palpations: Abdomen is soft.      Tenderness: There is abdominal tenderness.   Constitutional:       General: She is not in acute distress.     Appearance: She is ill-appearing.   Pulmonary:      Effort: Pulmonary effort is normal. No respiratory distress.      Comments: coarse  Neurological:      General: No focal deficit present.      Mental Status: She is oriented to person, place and time. She is lethargic.      GCS: GCS eye subscore is 4. GCS verbal subscore is 5. GCS motor subscore is 6.      Motor: Strength full and intact in all extremities. No motor deficit.            Diagnostic Studies      EKG: tele reviewed  Imaging:  I have personally reviewed pertinent reports.   and I have personally reviewed pertinent films in PACS     Medications:  Scheduled PRN   atorvastatin, 20 mg, Daily With Dinner  b complex-vitamin C-folic acid, 1 capsule, Daily  calcitriol, 0.25 mcg, Daily  calcium acetate, 1,334 mg, Daily  cefepime, 2,000 mg, Q12H  chlorhexidine, 15 mL, Q12H FARZANA  cholecalciferol, 2,000 Units, Daily  cinacalcet, 30 mg, Once per day on   docusate sodium, 100 mg, BID  gentamicin, 1 Application, Daily  insulin glargine, 10 Units, Daily  insulin lispro, 1-5 Units, TID AC  insulin lispro, 1-5 Units, HS  insulin lispro, 3 Units, TID With Meals  levothyroxine, 125 mcg, Early Morning  multivitamin stress formula, 1 tablet, Daily  polyethylene glycol, 17 g, Daily  saccharomyces boulardii, 250 mg, BID  senna, 2 tablet, HS  vancomycin, 10 mg/kg (Adjusted), Q12H      acetaminophen, 650 mg, Q6H PRN  HYDROmorphone, 0.2 mg, Q4H PRN  levalbuterol, 0.63 mg, Q6H PRN  ondansetron, 4 mg, Q6H PRN       Continuous    milrinone (Primacor) infusion, 0.13 mcg/kg/min, Last Rate: 0.13 mcg/kg/min (24 1056)  norepinephrine, 1-30 mcg/min,  Last Rate: 12 mcg/min (07/09/24 0515)  NxStage K 2/Ca 3, 20,000 mL         Labs:    CBC    Recent Labs     07/08/24  0608 07/08/24  1018   WBC 17.22* 24.96*   HGB 14.2 14.1   HCT 47.3* 45.7   PLT 92* 105*   BANDSPCT 5  --      BMP    Recent Labs     07/08/24 1953 07/09/24  0001   SODIUM 137 136   K 4.0 3.7    102   CO2 19* 21   AGAP 16* 13   BUN 67* 54*   CREATININE 5.17* 4.16*   CALCIUM 9.2 9.4       Coags    Recent Labs     07/08/24  0837 07/08/24  1833   INR 4.24*  --    PTT 49* 122*        Additional Electrolytes  Recent Labs     07/08/24 1953 07/09/24  0001   MG 2.1 1.9   PHOS 6.4* 5.6*   CAIONIZED 1.12 1.15          Blood Gas    No recent results  Recent Labs     07/08/24  1510   PHVEN 7.248*   WYE2JAQ 31.9*   PO2VEN 41.8   [id number] 13.6*   BEVEN -12.4   [id number] 66.1    LFTs  Recent Labs     07/07/24  2253 07/08/24  0608   ALT 25 133*   AST 10* 215*   ALKPHOS 126* 140*   ALB 4.0 3.8   TBILI 0.99 2.00*       Infectious  Recent Labs     07/07/24  1728   PROCALCITONI 1.22*     Glucose  Recent Labs     07/08/24  1018 07/08/24  1509 07/08/24 1953 07/09/24  0001   GLUC 98 98 104 141*               MICHAEL Lawrence

## 2024-07-09 NOTE — ASSESSMENT & PLAN NOTE
Cardiogenic   No clear source of infection   CT:Hazy opacity and mild septal thickening suggestive of interstitial edema.Peritoneal dialysis catheter is present. Small volume of ascites.Mild body wall edema.  7/8 Echo EF 45-50%, No RWMA, dilated RV, moderate AS, moderate MR, moderate to severe TR, estimated RVSP 71.   Previous Echo 4/2023 with EF 40-45%, mild AS, mild to moderately reduced RV function, severe MAC, moderate MR, mild pulmonary hypertension    Plan:  Continue levophed for goal MAP >65  Milrinone started and trend ScVo2 52-66  Volume removal with CRRT  Trend end points  Empiric antibiotics pending culture data

## 2024-07-09 NOTE — PROGRESS NOTES
Progress Note - Cardiology   [facility]     Jacquie Smith 69 y.o. female MRN: [id number]  : 1955  Unit/Bed#: ICU 09 Encounter: [id number]    Assessment and Plan:   1. Multiorgan dysfunction/shock: no clear source of infection    -   continue IV pressers and Inotrope support.    -   Empiric antibiotics per primary team    2. Paroxysmal atrial fibrillation/flutter: patient noted to have drop in hemodynamics when she converted from sinus to atrial flutter most likely due to loss of atrial    -   patient started on IV amiodarone standard drip with improvement in rate control    -   continue IV heparin    3. End-stage kidney disease: previously on peritoneal dialysis    -   patient currently on CVVHD    -   followed by nephrology    4. Pulmonary hypertension: 2024 TTE: RV systolic pressure severely elevated 71 mmHg, RV cavity is dilated and systolic function is reduced with moderate to severe tricuspid valve regurgitation    -   WHO Group 2/3    5. Acute on chronic combined systolic and diastolic heart failure/cor pulmonale: 2024 TTE: EF visibly estimated 45-50%, RV cavity is dilated in systolic function is moderately reduced, there is moderate mitral valve regurgitation and mild stenosis and moderate to severe tricuspid valve regurgitation    -   patient anuric, continue CVVHD    -   continue Primacor 0.13 mcg/min    -   advanced heart failure medication on hold due to hypotension requiring vasopressin support    -   Monitor I and O and weights    -   adjust medications as needed    6. History of PE: was on Eliquis 5 mg bid in the outpatient setting    -   transition to IV heparin in hospital    -   2024 VQ scan: low probability of PE    Subjective / Objective:   Vitals: Blood pressure 128/86, pulse 86, temperature (!) 95 °F (35 °C), temperature source Tympanic, resp. rate 14, height 5' (1.524 m), weight 87.7 kg (193 lb 5.5 oz), SpO2 99%, not currently breastfeeding.  Vitals:     07/08/24 0735 07/09/24 0600   Weight: 87.5 kg (193 lb) 87.7 kg (193 lb 5.5 oz)     Body mass index is 37.76 kg/m².  BP Readings from Last 3 Encounters:   07/08/24 128/86   07/03/24 90/55   07/03/24 122/82     Orthostatic Blood Pressures      Flowsheet Row Most Recent Value   Blood Pressure 128/86 filed at 07/08/2024 0738          I/O         07/07 0701 07/08 0700 07/08 0701 07/09 0700 07/09 0701  07/10 0700    P.O. 480 210     I.V. (mL/kg) 329.8 (3.8) 2035.5 (23.2) 498 (5.7)    IV Piggyback 2515 585     Total Intake(mL/kg) 3324.8 (37.8) 2830.5 (32.3) 498 (5.7)    Urine (mL/kg/hr) 75 (0) 100 (0) 0 (0)    Other 400 3495 796    Total Output 475 3595 796    Net +2849.8 -764.6 -298           Unmeasured Urine Occurrence 1 x 1 x           Invasive Devices       Peripheral Intravenous Line  Duration             Peripheral IV 07/07/24 Left;Proximal;Ventral (anterior) Forearm 2 days    Long-Dwell Peripheral IV (Midline) 07/07/24 Left Basilic 1 day              Arterial Line  Duration             Arterial Line 07/08/24 Radial 1 day              Line  Duration             Peritoneal Dialysis Catheter Abdominal 1469 days    Peritoneal Dialysis Catheter Tenckhoff Right lower abdomen 1453 days              Hemodialysis Catheter  Duration             HD Temporary Double Catheter 1 day                      Intake/Output Summary (Last 24 hours) at 7/9/2024 1149  Last data filed at 7/9/2024 1100  Gross per 24 hour   Intake 3278.45 ml   Output 4391 ml   Net -1112.55 ml         Physical Exam:   Physical Exam  Vitals and nursing note reviewed.   Constitutional:       Appearance: Normal appearance. She is morbidly obese.   HENT:      Right Ear: External ear normal.      Left Ear: External ear normal.   Eyes:      General: No scleral icterus.        Right eye: No discharge.         Left eye: No discharge.   Cardiovascular:      Rate and Rhythm: Regular rhythm. Tachycardia present.      Pulses: Normal pulses.      Heart sounds: Murmur  heard.   Pulmonary:      Effort: Pulmonary effort is normal.      Breath sounds: Examination of the right-lower field reveals decreased breath sounds. Examination of the left-lower field reveals decreased breath sounds. Decreased breath sounds present.   Abdominal:      General: Bowel sounds are normal. There is no distension.      Palpations: Abdomen is soft.   Musculoskeletal:      Right lower leg: Edema present.      Left lower leg: Edema present.   Skin:     General: Skin is warm and dry.      Capillary Refill: Capillary refill takes less than 2 seconds.   Neurological:      General: No focal deficit present.      Mental Status: She is alert and oriented to person, place, and time. Mental status is at baseline.   Psychiatric:         Mood and Affect: Mood normal.         Behavior: Behavior is cooperative.           Medications/ Allergies:     Current Facility-Administered Medications   Medication Dose Route Frequency Provider Last Rate    amiodarone (CORDARONE) 900 mg in dextrose 5 % 500 mL infusion  1 mg/min Intravenous Continuous MICHAEL Lawrence 1 mg/min (07/09/24 0702)    b complex-vitamin C-folic acid  1 capsule Oral Daily Ginny Bobby PA-C      calcitriol  0.25 mcg Oral Daily Ginny Bobby PA-C      cefepime  2,000 mg Intravenous Q12H MICHAEL Calderon Stopped (07/09/24 0110)    chlorhexidine  15 mL Mouth/Throat Q12H FARZANA Ginny Bobby PA-C      cholecalciferol  2,000 Units Oral Daily Ginny Bobby PA-C      cinacalcet  30 mg Oral Once per day on Monday Wednesday Friday Ginny Bobby PA-C      docusate sodium  100 mg Oral BID Ginny Bobby PA-C      epoprostenol  50 ng/kg/min (Ideal) Inhalation Continuous Alexandra SpiroARISTEO OchoaNP 50 ng/kg/min (07/09/24 1046)    gentamicin  1 Application Topical Daily Ginny Bobby PA-C      HYDROmorphone  0.2 mg Intravenous Q4H PRN Ginny Bobby PA-C      insulin lispro  1-5 Units Subcutaneous TID ELIAZAR Gross  Rosa Bobby PA-C      insulin lispro  1-5 Units Subcutaneous HS Ginny Bobby PA-C      levalbuterol  0.63 mg Nebulization Q6H PRN Ginny Bobby PA-C      levothyroxine  125 mcg Oral Early Morning Ginny Bobby PA-C      milrinone (Primacor) infusion  0.13 mcg/kg/min Intravenous Continuous Jeanie Langley, CRNP 0.13 mcg/kg/min (07/09/24 0920)    multivitamin stress formula  1 tablet Oral Daily Ginny Bobby PA-C      norepinephrine  1-30 mcg/min Intravenous Titrated Alexandra Spironello V, CRNP 14 mcg/min (07/09/24 1116)    NxStage K 4/Ca 3  20,000 mL Dialysis Continuous Pablo Acevedo MD      ondansetron  4 mg Intravenous Q6H PRN Ginny Bobby PA-C      pantoprazole  40 mg Intravenous Q24H FARZANA Alexandra Spironello V, CRNP      polyethylene glycol  17 g Oral Daily Ginny Bobby PA-C      saccharomyces boulardii  250 mg Oral BID Ginny Bobby PA-C      senna  2 tablet Oral HS Ginny Bobby PA-C      vancomycin  10 mg/kg (Adjusted) Intravenous Q12H Jeanie Langley, CRNP Stopped (07/09/24 0500)    vasopressin  0.04 Units/min Intravenous Continuous Alexandra Spironello V, CRNP 0.04 Units/min (07/09/24 0917)     HYDROmorphone, 0.2 mg, Q4H PRN  levalbuterol, 0.63 mg, Q6H PRN  ondansetron, 4 mg, Q6H PRN      No Known Allergies    VTE Pharmacologic Prophylaxis:   Sequential compression device (Venodyne)     Labs:   Troponins:  Results from last 7 days   Lab Units 07/08/24  1953 07/07/24  0635 07/07/24  0431   CK TOTAL U/L 92  --   --    HSTNI D2 ng/L  --   --  -7   HSTNI D4 ng/L  --  -3  --      CBC with diff:  Results from last 7 days   Lab Units 07/09/24  1040 07/09/24  0547 07/08/24  1018 07/08/24  0608 07/07/24  2108 07/07/24  0237 07/03/24  1115   WBC Thousand/uL  --  20.47* 24.96* 17.22* 7.51 11.39* 9.86   HEMOGLOBIN g/dL  --  12.9 14.1 14.2 8.3* 12.3 13.8   I STAT HEMOGLOBIN g/dl 13.3  --   --   --   --   --   --    HEMATOCRIT %  --  40.3 45.7 47.3* 32.5* 39.2  44.0   HEMATOCRIT, ISTAT % 39  --   --   --   --   --   --    MCV fL  --  90 94 96 116* 92 92   PLATELETS Thousands/uL  --  88* 105* 92* 115* 174 126*   RBC Million/uL  --  4.49 4.88 4.93 2.85* 4.28 4.78   MCH pg  --  28.7 28.9 28.6 29.1 28.7 28.9   MCHC g/dL  --  32.0 30.9* 29.8* 25.2* 31.4 31.4   RDW %  --  18.1* 18.6* 18.4* 17.3* 17.8* 18.1*   MPV fL  --  10.8 10.9 11.4 11.7 11.6 11.9   NRBC AUTO /100 WBCs  --   --   --   --  0 0 0   NRBC /100 WBC  --  1  --   --   --   --   --      CMP:  Results from last 7 days   Lab Units 07/09/24  1040 07/09/24  0547 07/09/24  0001 07/08/24  1953 07/08/24  1509 07/08/24  1018 07/08/24  0608 07/07/24  2253 07/07/24  0237 07/03/24  1115   SODIUM mmol/L  --  136 136 137 137 137 134* 134* 135 131*   POTASSIUM mmol/L  --  4.3 3.7 4.0 4.7 4.8 6.0* 4.1 4.6 5.0   CHLORIDE mmol/L  --  103 102 102 102 102 98 98 98 95*   CO2 mmol/L  --  21 21 19* 14* 12* 15* 16* 18* 18*   CO2, I-STAT mmol/L 24  --   --   --   --   --   --   --   --   --    ANION GAP mmol/L  --  12 13 16* 21* 23* 21* 20* 19* 18*   BUN mg/dL  --  40* 54* 67* 89* 96* 85* 89* 85* 76*   CREATININE mg/dL  --  3.20* 4.16* 5.17* 7.20* 7.78* 7.81* 7.40* 7.84* 6.34*   GLUCOSE, ISTAT mg/dl 287*  --   --   --   --   --   --   --   --   --    CALCIUM mg/dL  --  9.3 9.4 9.2 9.0 8.9 9.0 8.5 8.5 9.9   AST U/L  --  3,417*  --   --   --   --  215* 10* 14 14   ALT U/L  --  2,438*  --   --   --   --  133* 25 32 81*   ALK PHOS U/L  --  147*  --   --   --   --  140* 126* 161* 194*   TOTAL PROTEIN g/dL  --  5.5*  --   --   --   --  6.4 6.5 5.6* 6.6   ALBUMIN g/dL  --  3.4*  --   --   --   --  3.8 4.0 3.3* 3.9   TOTAL BILIRUBIN mg/dL  --  2.32*  --   --   --   --  2.00* 0.99 0.85 1.84*   EGFR ml/min/1.73sq m  --  14 10 8 5 4 4 5 4 6     Magnesium:  Results from last 7 days   Lab Units 07/09/24  0547 07/09/24  0001 07/08/24  1953 07/08/24  1509 07/08/24  1018 07/08/24  0608 07/07/24  2253   MAGNESIUM mg/dL 2.1 1.9 2.1 1.9 2.0 2.2 2.1      Coags:  Results from last 7 days   Lab Units 07/09/24  0547 07/08/24  1833 07/08/24  0837 07/07/24  0237 07/03/24  1115   PTT seconds  --  122* 49*  --  29   INR  6.52*  --  4.24* 2.10* 1.38*     TSH:  Results from last 7 days   Lab Units 07/08/24  1018   TSH 3RD GENERATON uIU/mL 14.176*     Hgb A1c:  Results from last 7 days   Lab Units 07/07/24  1030   HEMOGLOBIN A1C % 7.9*       Imaging & Testing   I have personally reviewed pertinent reports.     VAS VENOUS DUPLEX - LOWER LIMB BILATERAL    Result Date: 7/9/2024  Narrative:  [facility] REPORT CLINICAL: Indications: Patient presents with severe pain in the lower extremities bilaterally. Operative History: No prior cardiovascular surgeries. Risk Factors The patient has history of HTN, Diabetes (Yes) and CKD.   CONCLUSION: RIGHT LOWER LIMB: No gross evidence of acute deep vein thrombosis. No evidence of superficial thrombophlebitis noted. Doppler evaluation shows a normal response to augmentation maneuvers.. Popliteal, posterior tibial and anterior tibial arterial Doppler waveform's are triphasic/biphasic.  LEFT LOWER LIMB: No gross evidence of acute deep vein thrombosis. No evidence of superficial thrombophlebitis noted. Doppler evaluation shows a normal response to augmentation maneuvers.. Popliteal, posterior tibial and anterior tibial arterial Doppler waveform's are triphasic/biphasic.  TECH NOTE: Pulsatile waveforms noted throughout the venous system which may suggest heart failure or tricuspid valve insufficiency.  Technically difficult/limited study due to patient inability to tolerate compression/position legs. Some segments may be poorly visualized on today's exam.  Technical findings were faxed to chart.    US right upper quadrant    Result Date: 7/9/2024  Narrative: RIGHT UPPER QUADRANT ULTRASOUND INDICATION: r/o cholecystitis. COMPARISON: CT 7/7/2024 TECHNIQUE: Real-time ultrasound of the right upper quadrant was performed with a curvilinear  transducer with both volumetric sweeps and still imaging techniques. FINDINGS: PANCREAS: Poorly visualized secondary to bowel gas AORTA AND IVC: Visualized portions are normal for patient age. LIVER: Size: Within normal range. The liver measures 17.1 cm in the midclavicular line. Contour: Surface contour is smooth. Parenchyma: Echogenicity and echotexture are within normal limits. No liver mass identified. Limited imaging of the main portal vein shows it to be patent and hepatopetal. BILIARY: There is cholelithiasis. The gallbladder wall appears thickened at 7 mm but this is a nonspecific finding in the setting of ascites. Negative Truong sign. No intrahepatic biliary dilatation. CBD measures 6.0 mm. No choledocholithiasis. KIDNEY: Right kidney measures 8.7 x 3.2 x 3.6 cm. Volume 52.7 mL Cortical thinning/atrophy noted. No hydronephrosis. ASCITES: As above, small volume intra-abdominal ascites     Impression: 1. Cholelithiasis. Negative Truong sign. Gallbladder wall thickness of 7 mm but nonspecific in the setting of ascites. If there is strong clinical concern for acute cholecystitis, further evaluation with nuclear HIDA scan may be considered. 2. No biliary ductal dilatation. 3. Right renal atrophy and small volume ascites Workstation performed: [id number]     NM lung ventilation / perfusion    Result Date: 7/8/2024  Narrative: VENTILATION AND PERFUSION SCAN INDICATION: hypotension, RV dysfunction. rule out PE. history of PE, not compilant with coumadin COMPARISON: VQ scan dated 7/5/2018 and chest x-ray dated 7/8/2024 TECHNIQUE:  Posterior ventilation imaging was performed after the inhalation of 30 mCi nebulized Tc-99m DTPA with deposition of less than 1 mCi of activity within the lungs. Multiplanar perfusion imaging was next performed following the intravenous administration of 4.24 mCi Tc-99m labeled MAA. FINDINGS: Ventilation imaging demonstrates significant deposition of tracer activity within the central  airways is suggestive of central airways disease. Deposition of tracer activity within the central airways results in poor tracer accumulation involving the lung fields which limits evaluation of ventilation imaging. Perfusion imaging demonstrates mild nonuniform perfusion involving both lungs without moderate or large segmental perfusion defect. Please note that left lateral spot image is essentially nondiagnostic given intense tracer activity at IV sites.     Impression: Limited exam as detailed above without definite moderate or large segmental perfusion defect. Therefore, findings are most consistent with low probability for acute pulmonary embolus. Workstation performed: [id number]     XR chest portable    Result Date: 7/8/2024  Narrative: XR CHEST PORTABLE INDICATION: R IJ TLC. COMPARISON: CXR and chest CT 7/7/2024. FINDINGS: Right jugular catheter in right atrium. Diffuse nodularity throughout the lungs better shown on CT. No pneumothorax or pleural effusion. Mild cardiomegaly. Pulmonary artery enlargement. Bones are unremarkable for age. Normal upper abdomen.     Impression: Right jugular catheter in right atrium with no pneumothorax. Diffuse nodularity throughout the lungs better shown on CT. Pulmonary artery enlargement which can be seen with pulmonary hypertension. Workstation performed: [id number]     Echo complete w/ contrast if indicated    Result Date: 7/8/2024  Narrative:   Left Ventricle: Left ventricular cavity size is normal. Wall thickness is mildly increased. The left ventricular ejection fraction is 45-50%. Systolic function is mildly reduced. Although no diagnostic regional wall motion abnormality was identified, this possibility cannot be completely excluded on the basis of this study.   Right Ventricle: Right ventricular cavity size is dilated. Systolic function is reduced.   Aortic Valve: The aortic valve is trileaflet. The leaflets are mildly thickened. The leaflets are moderately  calcified. The leaflets exhibit normal mobility. There is mild to moderate stenosis.  Peak velocity is 1.38 m/s and mean gradient is 3.75 mmHg   Mitral Valve: There is severe annular calcification. There is moderate regurgitation with a posteriorly directed jet. There is mild stenosis.  Mean gradient is 2.6 mmHg   Tricuspid Valve: There is moderate to severe regurgitation. The tricuspid valve regurgitation jet is directed toward septum. The right ventricular systolic pressure is severely elevated at 71 mmHg.   Pulmonic Valve: There is trace regurgitation.     XR chest 1 view portable    Result Date: 7/7/2024  Narrative: XR CHEST PORTABLE INDICATION: asdf. COMPARISON: CXR 7/3/2024, chest CT 6/18/2024. FINDINGS: Mild pulmonary venous congestion. No pneumothorax or pleural effusion. Normal cardiomediastinal silhouette. Bones are unremarkable for age. Normal upper abdomen.     Impression: Mild pulmonary venous congestion. Workstation performed: [id number]     CT chest abdomen pelvis wo contrast    Result Date: 7/7/2024  Narrative: CT CHEST, ABDOMEN AND PELVIS WITHOUT IV CONTRAST INDICATION: RLQ LLQ pain. COMPARISON: CT chest on 6/18/2024 TECHNIQUE: CT examination of the chest, abdomen and pelvis was performed without intravenous contrast. Multiplanar 2D reformatted images were created from the source data. This examination, like all CT scans performed in the [facility], was performed utilizing techniques to minimize radiation dose exposure, including the use of iterative reconstruction and automated exposure control. Radiation dose length product (DLP) for this visit: 1323 mGy-cm Enteric Contrast: Not administered. FINDINGS: CHEST LUNGS: Hazy opacity and mild septal thickening. Stable tree-in-bud nodularity mostly in the right lung with a few areas of mild mosaic attenuation, similar to the prior study. Multiple calcified small nodules are reidentified. No tracheal or endobronchial lesion. PLEURA:  Unremarkable. HEART/GREAT VESSELS: Mild cardiomegaly. No thoracic aortic aneurysm. Enlarged pulmonary artery with calcification of the walls, consistent with history of pulmonary hypertension. Left atrial wall calcifications. Coronary artery calcifications. MEDIASTINUM AND TEOFILO: Multiple nonspecific subcentimeter mediastinal lymph nodes, similar to the prior study. CHEST WALL AND LOWER NECK: Stable thyromegaly. Stable calcification in the right lobe. ABDOMEN LIVER/BILIARY TREE: Unremarkable. GALLBLADDER: Distended. No calcified gallstones. No pericholecystic inflammatory change. SPLEEN: Unremarkable. PANCREAS: Unremarkable. ADRENAL GLANDS: Unremarkable. KIDNEYS/URETERS: Atrophic kidneys. No hydronephrosis. Bilateral renal vascular calcifications. STOMACH AND BOWEL: Colonic diverticulosis without findings of acute diverticulitis. APPENDIX: No findings to suggest appendicitis. ABDOMINOPELVIC CAVITY: Peritoneal dialysis terminates in the anterior pelvis. Small volume of abdominopelvic ascites. No pneumoperitoneum. No lymphadenopathy. VESSELS: Advanced atherosclerosis without abdominal aortic aneurysm. PELVIS REPRODUCTIVE ORGANS: Fibroid uterus. 2 cm simple appearing left adnexal cyst. URINARY BLADDER: Unremarkable. ABDOMINAL WALL/INGUINAL REGIONS: Mild body wall edema. BONES: No acute fracture or suspicious osseous lesion.     Impression: Hazy opacity and mild septal thickening suggestive of interstitial edema. Peritoneal dialysis catheter is present. Small volume of ascites. Mild body wall edema. Workstation performed: [id number]     XR chest 1 view portable    Result Date: 7/4/2024  Narrative: XR CHEST PORTABLE INDICATION: A-fib. COMPARISON: Chest CT 6/18/2024. CXR 2/5/2024. FINDINGS: Mild pulmonary venous congestion. No pneumothorax or pleural effusion. Mild cardiomegaly. Bones are unremarkable for age. Normal upper abdomen.     Impression: Mild pulmonary venous congestion. Workstation performed: [id number]       EKG  "/ Monitor: Personally reviewed.    Atrial flutter with controlled response          Tika RODRIGUEZ  Cardiology      \"This note was completed in part utilizing m-modal fluency direct voice recognition software.   Grammatical errors, random word insertion, spelling mistakes, and incomplete sentences may be an occasional consequence of the system secondary to software limitations, ambient noise and hardware issues.    Please read the chart carefully and recognize, using context, where substitutions have occurred.  If you have any questions or concerns about the context, text or information contained within the body of this dictation, please contact myself, the provider, for further clarification.\"  "

## 2024-07-09 NOTE — ASSESSMENT & PLAN NOTE
Lab Results   Component Value Date    EGFR 8 07/08/2024    EGFR 5 07/08/2024    EGFR 4 07/08/2024    CREATININE 5.17 (H) 07/08/2024    CREATININE 7.20 (H) 07/08/2024    CREATININE 7.78 (H) 07/08/2024     Patient on chronic peritoneal dialysis, reports compliance with daily PD until yesterday  PD initiated on admission but given acute hypotension and nausea, PD fluid drained prior to full dwell time  Nephrology consulted  CVVHD initiated 7/8  Q6 hour BMP, Mg, Phos  Close I&O

## 2024-07-09 NOTE — PROGRESS NOTES
NEPHROLOGY PROGRESS NOTE   Jacquie Smith 69 y.o. female MRN: [id number]  Unit/Bed#: ICU 09 Encounter: [id number]    ASSESSMENT & PLAN:  69-year-old female presented to [facility] after having complaint of abdominal pain and low urine output.  Nephrology consulted for ESRD on PD  End-stage renal disease on  dialysis  -Outpatient unit: [facility] and was getting peritoneal dialysis  -Access: Currently has PD catheter  -CCPD at home - total volume 8 L, fill volume 2 L x 4 exchanges over 8 hours   -Plan: After admission patient was started on CAPD with 4.25 alternating with 2.5% dextrose but she became hypotensive and was complaining of abdominal pain and nausea and so PD was held on the night of 7/7.  After consultation on 7/8 patient was started on CVVHD with ultrafiltration 50 mL/h net negative which was increased to 100 ml/h net negative in the setting of fluid overload.  Prior CT chest from 7/7 suggestive of interstitial edema  -Patient was seen and examined on CVVHD.  Continue ultrafiltration 100 mL/h net negative, will change potassium to 4K bath as last serum potassium was 4.3.  Bicarb level improved to normal range.  Continue blood flow rate to 250 mill per hour and dialysate flow rate of 2500 ml/h  -Continue gentamicin cream to the PD catheter    Volume status/fluid overload/acute on chronic systolic CHF  -Estimated dry weight 84 kg  -CT chest abdomen pelvis from 7/7 was suggestive of fluid overload with finding of interstitial edema in the lungs and body wall edema chest x-ray from 7/7 suggestive of pulmonary venous congestion.  Patient clinically appears fluid overloaded.    -Was started on CVVHD on 7/7 with ultrafiltration currently running at 100 ml/h net negative, cont ultrafiltration with CVVHD    CKD/MBD  -Secondary hyperparathyroidism of renal origin: On home calcitriol 0.25 mcg daily, Cinacalcet 30 mg 3 times a week  -Hyperphosphatemia, on PhosLo 2 tablet 3 times daily with meals.  Continue home  medications, n.p.o. okay to hold PhosLo.  Phosphorus level elevated at 4.5    Blood pressure/hypotension  -Echocardiogram showed ejection fraction 45 to 50%.  IVC dilated.  Continue vasopressors and milrinone per primary team.  Continue ultrafiltration with CVVHD    Electrolytes:   -Hyperkalemia, resolved with CVVHD.  Changed dialysate fluid to 4K bath  -Hyponatremia: Resolved status post CVVHD    Anemia due to ESRD  -Goal hemoglobin:  10-11 grams/deciliter  -Current hemoglobin: Slightly trending down to 12.9 g/dL  -Plan: Continue to monitor per primary team    Chronic respiratory failure with hypoxia, on oxygen by nasal cannula.  Has history of pulmonary hypertension    Abdominal pain  -CT abdomen on admission negative for any acute pathology  -PD fluid cell count 27 with 23% neutrophil.  Gram stain negative, culture under process.  No evidence of PD peritonitis.  Recommend further workup and management per primary team    Leukocytosis with no clear source     A-fib with RVR, on amiodarone drip currently per ICU team  History of obstructive sleep apnea  History of PE on anticoagulation.  VQ scan was suggestive of low probability for acute PE     Discussed with ICU advanced practitioner regarding plan to change to 4K bath but otherwise to continue same CVVHD and agree with the plan.  Continue ultrafiltration 100 mL/h net negative as tolerated    SUBJECTIVE:   Still with ongoing abdominal pain, seen and examined on CVVHD.  Currently running at 100 ml/h net negative, on amiodarone, milrinone, Levophed drip per ICU    OBJECTIVE:  Current Weight: Weight - Scale: 87.7 kg (193 lb 5.5 oz)  Vitals:    07/09/24 0700   BP:    Pulse: (!) 106   Resp: 21   Temp:    SpO2: 97%       Intake/Output Summary (Last 24 hours) at 7/9/2024 0805  Last data filed at 7/9/2024 0700  Gross per 24 hour   Intake 2830.45 ml   Output 3595 ml   Net -764.55 ml       Physical Exam  General:  Ill looking, awake.  Eyes: Conjunctivae pink,  Sclera  anicteric  ENT: lips and mucous membranes moist  Neck: supple   Chest: Clear to Auscultation both lungs,  no crackles, ronchus or wheezing.  CVS: S1 & S2 present, normal rate, regular rhythm, no murmur.  Abdomen: soft, diffuse abdominal tenderness more on the right side, non-distended, Bowel sounds normoactive  Extremities: trace edema of  legs  Skin: no rash  Neuro: awake, alert, oriented  Psych: Mood and affect appropriate      Medications:    Current Facility-Administered Medications:     acetaminophen (TYLENOL) tablet 650 mg, 650 mg, Oral, Q6H PRN, Ginny Bobby PA-C, 650 mg at 07/09/24 0645    [COMPLETED] amiodarone 150 mg in dextrose 5 % 100 mL IV bolus, 150 mg, Intravenous, Once, Stopped at 07/09/24 0700 **AND** amiodarone (CORDARONE) 900 mg in dextrose 5 % 500 mL infusion, 1 mg/min, Intravenous, Continuous, MICHAEL Lawrence, Last Rate: 33.3 mL/hr at 07/09/24 0702, 1 mg/min at 07/09/24 0702    b complex-vitamin C-folic acid (RENAL) capsule 1 capsule, 1 capsule, Oral, Daily, Ginny Bobby PA-C, 1 capsule at 07/07/24 1055    calcitriol (ROCALTROL) capsule 0.25 mcg, 0.25 mcg, Oral, Daily, Ginny Bobby PA-C, 0.25 mcg at 07/07/24 1049    calcium acetate (PHOSLO) capsule 1,334 mg, 1,334 mg, Oral, Daily, Gniny Bobby PA-C    cefepime (MAXIPIME) IVPB (premix in dextrose) 2,000 mg 50 mL, 2,000 mg, Intravenous, Q12H, MICHAEL Calderon, Stopped at 07/09/24 0110    chlorhexidine (PERIDEX) 0.12 % oral rinse 15 mL, 15 mL, Mouth/Throat, Q12H FARZANA, Ginny Bobby PA-C, 15 mL at 07/08/24 2102    Cholecalciferol (VITAMIN D3) tablet 2,000 Units, 2,000 Units, Oral, Daily, Ginny Bobby PA-C, 2,000 Units at 07/07/24 1050    cinacalcet (SENSIPAR) tablet 30 mg, 30 mg, Oral, Once per day on Monday Wednesday Friday, Ginny Bobby PA-C    docusate sodium (COLACE) capsule 100 mg, 100 mg, Oral, BID, Ginny Bobby PA-C, 100 mg at 07/07/24 1049    gentamicin (GARAMYCIN) 0.1 %  cream 1 Application, 1 Application, Topical, Daily, Ginny Bobby PA-C, 1 Application at 07/08/24 1618    HYDROmorphone (DILAUDID) injection 0.2 mg, 0.2 mg, Intravenous, Q4H PRN, Ginny Bobby PA-C, 0.2 mg at 07/07/24 1541    insulin glargine (LANTUS) subcutaneous injection 10 Units 0.1 mL, 10 Units, Subcutaneous, Daily, Ginny Bobby PA-C    insulin lispro (HumALOG/ADMELOG) 100 units/mL subcutaneous injection 1-5 Units, 1-5 Units, Subcutaneous, TID AC, 3 Units at 07/07/24 1738 **AND** Fingerstick Glucose (POCT), , , TID AC, Ginny Bobby PA-C    insulin lispro (HumALOG/ADMELOG) 100 units/mL subcutaneous injection 1-5 Units, 1-5 Units, Subcutaneous, HS, Ginny Bobby PA-C, 1 Units at 07/07/24 2257    insulin lispro (HumALOG/ADMELOG) 100 units/mL subcutaneous injection 3 Units, 3 Units, Subcutaneous, TID With Meals, Ginny Bobby PA-C    levalbuterol (XOPENEX) inhalation solution 0.63 mg, 0.63 mg, Nebulization, Q6H PRN, Ginny Bobby PA-C    levothyroxine tablet 125 mcg, 125 mcg, Oral, Early Morning, Ginny Bobby PA-C, 125 mcg at 07/09/24 0645    milrinone (PRIMACOR) 20 mg in 100 mL infusion (premix), 0.13 mcg/kg/min, Intravenous, Continuous, MICHAEL Calderon, Last Rate: 3.4 mL/hr at 07/09/24 0701, 0.13 mcg/kg/min at 07/09/24 0701    multivitamin stress formula tablet 1 tablet, 1 tablet, Oral, Daily, Ginny Bobby PA-C, 1 tablet at 07/07/24 1049    norepinephrine (LEVOPHED) 4 mg (STANDARD CONCENTRATION) IV in sodium chloride 0.9% 250 mL, 1-30 mcg/min, Intravenous, Titrated, Ginny Bobby PA-C, Last Rate: 45 mL/hr at 07/09/24 0701, 12 mcg/min at 07/09/24 0701    NxStage K 2/Ca 3 dialysis solution (RFP-400) 20,000 mL, 20,000 mL, Dialysis, Continuous, Pablo Acevedo MD, 20,000 mL at 07/09/24 0432    ondansetron (ZOFRAN) injection 4 mg, 4 mg, Intravenous, Q6H PRN, Ginny Bobby PA-C, 4 mg at 07/08/24 0912    polyethylene glycol (MIRALAX) packet 17 g,  "17 g, Oral, Daily, Ginny Bobby PA-C, 17 g at 07/07/24 1049    saccharomyces boulardii (FLORASTOR) capsule 250 mg, 250 mg, Oral, BID, Ginny Bobby PA-C, 250 mg at 07/07/24 1050    senna (SENOKOT) tablet 17.2 mg, 2 tablet, Oral, HS, Ginny Bobby PA-C    vancomycin (VANCOCIN) 500 mg in sodium chloride 0.9% 100 mL IVPB, 10 mg/kg (Adjusted), Intravenous, Q12H, MICHAEL Calderon, Stopped at 07/09/24 0500    Invasive Devices:        Lab Results:   Results from last 7 days   Lab Units 07/09/24  0547 07/09/24  0001 07/08/24  1953 07/08/24  1509 07/08/24  1018 07/08/24  0608 07/07/24  2253   WBC Thousand/uL 20.47*  --   --   --  24.96* 17.22*  --    HEMOGLOBIN g/dL 12.9  --   --   --  14.1 14.2  --    HEMATOCRIT % 40.3  --   --   --  45.7 47.3*  --    PLATELETS Thousands/uL 88*  --   --   --  105* 92*  --    POTASSIUM mmol/L 4.3 3.7 4.0   < > 4.8 6.0* 4.1   CHLORIDE mmol/L 103 102 102   < > 102 98 98   CO2 mmol/L 21 21 19*   < > 12* 15* 16*   BUN mg/dL 40* 54* 67*   < > 96* 85* 89*   CREATININE mg/dL 3.20* 4.16* 5.17*   < > 7.78* 7.81* 7.40*   CALCIUM mg/dL 9.3 9.4 9.2   < > 8.9 9.0 8.5   MAGNESIUM mg/dL 2.1 1.9 2.1   < > 2.0 2.2 2.1   PHOSPHORUS mg/dL 4.5* 5.6* 6.4*   < > 10.8*  --  8.9*   ALK PHOS U/L 147*  --   --   --   --  140* 126*   ALT U/L 2,438*  --   --   --   --  133* 25   AST U/L 3,417*  --   --   --   --  215* 10*    < > = values in this interval not displayed.       Previous work up:  Reviewed last CT chest abdomen pelvis report      Portions of the record may have been created with voice recognition software. Occasional wrong word or \"sound a like\" substitutions may have occurred due to the inherent limitations of voice recognition software. Read the chart carefully and recognize, using context, where substitutions have occurred.If you have any questions, please contact the dictating provider.    "

## 2024-07-09 NOTE — QUICK NOTE
Patient's daughter Amaris updated via phone. She is aware that patient remains in cardiogenic shock on vasopressor and inotrope and that she is requiring CRRT. All questions have been answered.

## 2024-07-09 NOTE — ASSESSMENT & PLAN NOTE
Hx PAF/atrial flutter.  She was seen as an outpatient both on July 1 and July 3 and was in rapid atrial fibrillation at that time and advised to go to the emergency department--noncompliant with plan  PTA meds: metoprolol 25mg XL, coumadin (unclear compliance with medications, INR below therapeutic range on multiple recent checks)  Echo as above    Plan:  Hold BB given hypotension   Consider changing levo to zana given RVR  May need to resume amio gtt.  Cardiology following, appreciate recommendations  INR supratherapeutic with transaminitis hold coumadin

## 2024-07-09 NOTE — PROGRESS NOTES
Jacquie Smith is a 69 y.o. female who is currently ordered Vancomycin IV with management by the Pharmacy Consult service.  Relevant clinical data and objective / subjective history reviewed.  Vancomycin Assessment:  Indication and Goal AUC/Trough: Other, unclear source/sepsis, -600, trough >10  Clinical Status: improving  Micro:     Renal Function:  SCr: 4.16 mg/dL  CrCl: 12.9 mL/min  Renal replacement: CVVH D  Days of Therapy: 2  Current Dose: 1500mg IV once (LD 25mg/kg)  Vancomycin Plan:  New Dosinmg IV q12h (10mg/kg-CVVHD dosing)  Estimated AUC: N/A  Estimated Trough: >/=10 but <20 mcg/mL  Next Level: 07/10/24 @ 0600  Renal Function Monitoring: Daily BMP and UOP  Pharmacy will continue to follow closely for s/sx of nephrotoxicity, infusion reactions and appropriateness of therapy.  BMP and CBC will be ordered per protocol. We will continue to follow the patient’s culture results and clinical progress daily.    Heide Gutierrez, Pharmacist

## 2024-07-09 NOTE — ASSESSMENT & PLAN NOTE
2/2 hepatic congestion with cardiogenic shock  CT no acute path  CMP in am  T/c RUQ u/s if remains elevated with dopplers  Check hepatitis panel

## 2024-07-09 NOTE — ASSESSMENT & PLAN NOTE
AEB: tachycardia (Afib), tachypnea, rising leukocytosis. Unclear infectious etiology  CT scan with no obvious infection source  Peritoneal fluid not indicative of infection  BC P  PCT 1.4-1.2  Bands 5 with new thrombocytopenia    Plan:  cefepime/vanco D2  Trend WBC, fever curve and procal   F/u cultures

## 2024-07-09 NOTE — ASSESSMENT & PLAN NOTE
2/2 to renal failure and LA  Prior bicarb low on labs    Plan:   Nephrology consuilted  Continue CVVHD -100  Trend LA until cleared (volume overloaded no fluids and elevated LFT will be long to clear)  Serial BMPs  Close I&Os

## 2024-07-09 NOTE — ASSESSMENT & PLAN NOTE
group 2 and 3 pulmonary hypertension  Cardiology following  Volume removal  Outpt heart failure follow up

## 2024-07-09 NOTE — PLAN OF CARE
Problem: PAIN - ADULT  Goal: Verbalizes/displays adequate comfort level or baseline comfort level  Description: Interventions:  - Encourage patient to monitor pain and request assistance  - Assess pain using appropriate pain scale  - Administer analgesics based on type and severity of pain and evaluate response  - Implement non-pharmacological measures as appropriate and evaluate response  - Consider cultural and social influences on pain and pain management  - Notify physician/advanced practitioner if interventions unsuccessful or patient reports new pain  Outcome: Progressing     Problem: SAFETY ADULT  Goal: Patient will remain free of falls  Description: INTERVENTIONS:  - Educate patient/family on patient safety including physical limitations  - Instruct patient to call for assistance with activity   - Consult OT/PT to assist with strengthening/mobility   - Keep Call bell within reach  - Keep bed low and locked with side rails adjusted as appropriate  - Keep care items and personal belongings within reach  - Initiate and maintain comfort rounds  - Make Fall Risk Sign visible to staff  - Offer Toileting every 2 Hours, in advance of need  - Initiate/Maintain bed alarm  - Apply yellow socks and bracelet for high fall risk patients  - Consider moving patient to room near nurses station  Outcome: Progressing     Problem: METABOLIC, FLUID AND ELECTROLYTES - ADULT  Goal: Fluid balance maintained  Description: INTERVENTIONS:  - Monitor labs   - Monitor I/O and WT  - Instruct patient on fluid and nutrition as appropriate  - Assess for signs & symptoms of volume excess or deficit  Outcome: Progressing

## 2024-07-09 NOTE — QUICK NOTE
Discussed PA catheter placement with patient who refused. Emphasized utility of PA catheter in guiding titration of inotropes, inhaled prostacyclin and ultrafiltration. Also reviewed risks of introducer placement and PA catheterization, but explained chance of complication was small and outweighed by potential benefit. Patient was emphatic she did not want additional central venous catheterization for any purpose.     Inhaled veletri added. Continue to monitor ScvO2 q 6 hours. Vasopressor requirement remains significant, but down trending. Aggressively increase ultrafiltration as tolerated.

## 2024-07-09 NOTE — ASSESSMENT & PLAN NOTE
Lab Results   Component Value Date    HGBA1C 7.9 (H) 07/07/2024       Recent Labs     07/08/24  0557 07/08/24  0747 07/08/24  1226 07/08/24  2101   POCGLU 118 102 88 99         Blood Sugar Average: Last 72 hrs:  (P) 159    Lantus/SSI  BS low end may need to decrease lantus

## 2024-07-09 NOTE — ASSESSMENT & PLAN NOTE
Wt Readings from Last 3 Encounters:   07/08/24 87.5 kg (193 lb)   07/03/24 83.6 kg (184 lb 6.4 oz)   07/02/24 83.9 kg (185 lb)     Previous Echo 4/2023 with EF 40-45%, mild AS, mild to moderately reduced RV function, severe MAC, moderate MR, mild pulmonary hypertension  PTA meds: metoprolol, torsemide 100mg daily, valsartan  Patient also does PD for volume removal   Presents with generalized malaise, difficulty with ambulation, swelling over several weeks  Appears hypervolemic on exam    Plan:  Repeat 7/8 Echo EF 45-50%, No RWMA, dilated RV, moderate AS, moderate MR, moderate to severe TR, estimated RVSP 71.   Cardiology consulted  Milrinone started with ScVO2 trend  CVVHD for volume removal -100  Repeat limited echo once volume status improved  Close I&O  Daily weights--dry weight 84kg

## 2024-07-09 NOTE — TELEMEDICINE
"e-Consult (IPC)     Consults     Contacted by Dr GREGG Jean-Baptiste.    Jacquie Smith 69 y.o. female MRN: [id number]  Unit/Bed#: ICU 09 Encounter: [id number]    Reason for Consult    Per provider report, patient presents with AF RVR on 7/7. Available past medical history,social history, surgical history, medication list, drug allergies and review of systems were reviewed.    /86   Pulse (!) 118   Temp (!) 96.9 °F (36.1 °C)   Resp 22   Ht 5' (1.524 m)   Wt 87.7 kg (193 lb 5.5 oz)   SpO2 97%   BMI 37.76 kg/m²      Clinical exam per provider report, Afib with RVR, Bibasilar crackles, Warm extremities.    HPI of note 69 year old female with ESRD on PD, Diabetes, HTN, HLD, Chronic AF, Chronic hypoxic resp failure and HFimpEF (40-45%) with known \"chronic pulmonary hypertension\" felt to be group 2/3 related in context of CHANDRA which is not treated with PPV due to intolerance, BMI 37.8.  She was admitted on 7/7 in context of AF with RVR, worsened and brought to ICU for hypotension later that day and has been managed with PD and ultimately transitioned to HD-CVVH yesterday and started on inodilaters.  Continues to be in AF with RVR.  No obvious sources of infection has been found.     Imaging personally reviewed.   TTE 7/8 - LVEF 40-45% reduced systolic function, increased wall thickness, AV mild to mod stenosis, MV with severe calcifications and mild stenosis, RV dilated with reduced function and RVSP 71  V/Q 7/8- low probability Acute PE, without definitive moderate or large segmental perfusion defect   CT CAP without contrast 7/7- Intersitial edema, PD catheter with small volume ascities, anasarca  Assessment and Recommendations    1. Agree with conversion to HD.  Aggressively challenge dry weight with -50 mL/hr via CVVH and escalate as tolerated  2. Agree with inotropic support with milrinone with renal dosing (0.25 mcg/kg/min), support SVR with NE and vaso  3. Agree with amiodarone and digoxin.  Consider anticoagulation " and risk/benefit of DCCV for rhythm maintenance.    4.  Agree with initiation of inhaled flolan, wean as achieves euvolemia.   4. Agree with HFNC and CPAP  5.  Current ICU cares are appropriate and at present no indication for transfer to [facility].  Call with questions.    Discussed with Dr Jean-Baptiste.     All questions answered. Provider is in agreement with the course of action.  21-30 minutes, >50% of the total time devoted to medical consultative verbal/EMR discussion between providers. Written report will be generated in the EMR.

## 2024-07-10 PROBLEM — K72.00 SHOCK LIVER: Status: ACTIVE | Noted: 2024-01-01

## 2024-07-10 PROBLEM — E87.20 METABOLIC ACIDOSIS: Status: RESOLVED | Noted: 2024-01-01 | Resolved: 2024-01-01

## 2024-07-10 PROBLEM — R65.10 SIRS (SYSTEMIC INFLAMMATORY RESPONSE SYNDROME) (HCC): Status: RESOLVED | Noted: 2024-01-01 | Resolved: 2024-01-01

## 2024-07-10 NOTE — ASSESSMENT & PLAN NOTE
Wt Readings from Last 3 Encounters:   07/10/24 88.9 kg (195 lb 15.8 oz)   07/03/24 83.6 kg (184 lb 6.4 oz)   07/02/24 83.9 kg (185 lb)     Previous Echo 4/2023 with EF 40-45%, mild AS, mild to moderately reduced RV function, severe MAC, moderate MR, mild pulmonary hypertension  PTA meds: metoprolol, torsemide 100mg daily, valsartan  Patient also does PD for volume removal   Presents with generalized malaise, difficulty with ambulation, swelling over several weeks  Hypervolemic on exam     Plan:  Repeat 7/8 Echo EF 45-50%, No RWMA, dilated RV, moderate AS, moderate MR, moderate to severe TR, estimated RVSP 71.   Continue Milrinone and inhaled Veletri as above  CVVHD for volume removal -100  Strict I&O and daily weights   Holding GDMT in setting of shock

## 2024-07-10 NOTE — ASSESSMENT & PLAN NOTE
group 2 and 3 pulmonary hypertension  Has long standing hx of severe CHANDRA non compliant with CPAP on 3LNC at baseline   See plan as outlined

## 2024-07-10 NOTE — ASSESSMENT & PLAN NOTE
Platelet count 63 from 88  Thrombocytopenia in setting of shock liver and from degradation in CRRT circuit   Continue to trend CBC daily

## 2024-07-10 NOTE — ASSESSMENT & PLAN NOTE
Cardiogenic shock with possible sepsis component   No clear source of infection but with leukocytosis and elevated procal   CT C/A/P 7/7:Hazy opacity and mild septal thickening suggestive of interstitial edema.Peritoneal dialysis catheter is present. Small volume of ascites.Mild body wall edema.  7/8 Echo EF 45-50%, No RWMA, dilated RV, moderate AS, moderate MR, moderate to severe TR, estimated RVSP 71.   Previous Echo 4/2023 with EF 40-45%, mild AS, mild to moderately reduced RV function, severe MAC, moderate MR, mild pulmonary hypertension    Plan:  Continue levophed (20mcg) and vaso 0.04 for goal MAP >65  Milrinone 0.13mcg started 7/8 and ScVO2 improved from 52% to 72%  Inhaled veletri started yesterday at 50ng via HFNC   ScVO2 now high 90s   Volume removal with CRRT -100  Trend end points  Lactic clear, trend VBG q6h  Empiric antibiotics pending culture data  Procal jump to 9.3 today from 1.22 despite improving creat on CRRT  Persistent leukocytosis (WBC 16 from 20)   Cefepime and vanco D3  MRSA negative, consider D/C vanco today  BC NG x 48 hours   Flu/COVID/RSV negative  Peritoneal fluid culture negative   Unable to obtain sputum but has had no increase in O2 requirements   Check strep pneumo and legionella with next void  Check UA with next void

## 2024-07-10 NOTE — ASSESSMENT & PLAN NOTE
2/2 hepatic congestion with cardiogenic shock  7/9: RUQ U/S-Cholelithiasis. Negative Truong sign. Gallbladder wall thickness of 7 mm but nonspecific in the setting of ascites. If there is strong clinical concern for acute cholecystitis, further evaluation with nuclear HIDA scan may be considered.No biliary ductal dilatation  Acute hepatitis panel negative   LFTs slightly improved today, T bili rising at 3.17  Continue to trend hepatic function panel

## 2024-07-10 NOTE — PROGRESS NOTES
NEPHROLOGY PROGRESS NOTE   Jacquie Smith 69 y.o. female MRN: [id number]  Unit/Bed#: ICU 09 Encounter: [id number]    ASSESSMENT & PLAN:  69-year-old female presented to [facility] after having complaint of abdominal pain and low urine output.  Nephrology consulted for ESRD on PD  End-stage renal disease on  dialysis  -Outpatient unit: [facility] and was getting peritoneal dialysis  -Access: Currently has PD catheter  -CCPD at home - total volume 8 L, fill volume 2 L x 4 exchanges over 8 hours   -Plan: After admission patient was started on CAPD with 4.25 alternating with 2.5% dextrose but she became hypotensive and was complaining of abdominal pain and nausea and so PD was held on the night of 7/7.  After consultation on 7/8 patient was started on CVVHD with ultrafiltration 50 mL/h net negative which was increased to 100 ml/h net negative in the setting of fluid overload.  Prior CT chest from 7/7 suggestive of interstitial edema  -Patient seen and examined on CVVHD, blood flow rate 250 mL/min, dialysate flow rate 2500 ml/h using 4K bath and running at 100 mm/h net negative.  Continue vasopressors per ICU team.  Replace electrolytes per protocol, dose antibiotic per CVVHD dosing.  Monitor vancomycin level  -Continue gentamicin cream to the PD catheter    Volume status/fluid overload/acute on chronic systolic CHF  -Estimated dry weight 84 kg  -CT chest abdomen pelvis from 7/7 was suggestive of fluid overload with finding of interstitial edema in the lungs and body wall edema chest x-ray from 7/7 suggestive of pulmonary venous congestion.  Patient clinically appears fluid overloaded.    -Was started on CVVHD on 7/8 continue CVVHD as mentioned above    CKD/MBD  -Secondary hyperparathyroidism of renal origin: On home calcitriol 0.25 mcg daily, Cinacalcet 30 mg 3 times a week  -Hyperphosphatemia, on PhosLo 2 tablet 3 times daily with meals.  Continue home medications, n.p.o. okay to hold PhosLo.  Phosphorus level  elevated at 4.5    Blood pressure/hypotension-shock  -Echocardiogram showed ejection fraction 45 to 50%.  IVC dilated.  Continue vasopressors and milrinone per primary team.   Continue ultrafiltration with CVVHD    Electrolytes:   -Hyperkalemia, resolved with CVVHD.  Currently using 4K bath and last potassium level was 4.3 meq/L  -Hyponatremia: Sodium 133, continue to monitor    Anemia due to ESRD  -Goal hemoglobin:  10-11 grams/deciliter  -Current hemoglobin: Slightly trending down to 11.5 g/dL  -Plan: Continue to monitor per primary team    Chronic respiratory failure with hypoxia, on oxygen by nasal cannula.  Has history of pulmonary hypertension    Abdominal pain  -CT abdomen on admission negative for any acute pathology  -PD fluid cell count 27 with 23% neutrophil.  Gram stain negative, culture negative.  No evidence of PD peritonitis.  Recommend further workup and management per primary team, status post right upper quadrant ultrasound suggestive of cholelithiasis but negative Truong's sign.    Leukocytosis with no clear source: WBC count improving     A-fib with RVR, on amiodarone drip currently per ICU team  History of obstructive sleep apnea  History of PE on anticoagulation.  VQ scan was suggestive of low probability for acute PE     Discussed with ICU advanced practitioner regarding plan to continue current CVVHD prescription and agreed with the plan    SUBJECTIVE:  Patient still on CVVHD running at 100 mm/h net negative.  Still on vasopressors-Levophed and vasopressin and also on milrinone drip.  On high flow oxygen nasal cannula.  Complaining of abdominal pain    OBJECTIVE:  Current Weight: Weight - Scale: 88.9 kg (195 lb 15.8 oz)  Vitals:    07/10/24 0700   BP:    Pulse: (!) 115   Resp: (!) 26   Temp:    SpO2: 98%       Intake/Output Summary (Last 24 hours) at 7/10/2024 0813  Last data filed at 7/10/2024 0800  Gross per 24 hour   Intake 2994 ml   Output 4695 ml   Net -1701 ml       Physical  Exam  General:  Ill looking, awake.  On high flow oxygen nasal cannula  Eyes: Conjunctivae pink,  Sclera anicteric  ENT: lips and mucous membranes moist  Neck: supple   Chest: Clear to Auscultation both lungs,  no crackles, ronchus or wheezing.  CVS: S1 & S2 present, normal rate, regular rhythm, no murmur.  Abdomen: soft, diffuse abdominal tenderness, non-distended, Bowel sounds normoactive  Extremities: 1+ edema both legs  Skin: no rash  Neuro: awake, alert, oriented x 3   Psych: Mood and affect appropriate     Medications:    Current Facility-Administered Medications:     [COMPLETED] amiodarone 150 mg in dextrose 5 % 100 mL IV bolus, 150 mg, Intravenous, Once, Stopped at 07/09/24 0700 **AND** amiodarone (CORDARONE) 900 mg in dextrose 5 % 500 mL infusion, 1 mg/min, Intravenous, Continuous, MICHAEL Lawrence, Last Rate: 33.3 mL/hr at 07/10/24 0701, 1 mg/min at 07/10/24 0701    cefepime (MAXIPIME) IVPB (premix in dextrose) 2,000 mg 50 mL, 2,000 mg, Intravenous, Q12H, MICHAEL Calderon, Last Rate: 100 mL/hr at 07/10/24 0006, 2,000 mg at 07/10/24 0006    chlorhexidine (PERIDEX) 0.12 % oral rinse 15 mL, 15 mL, Mouth/Throat, Q12H FARZANA, Ginny Bobby PA-C, 15 mL at 07/09/24 2103    docusate sodium (COLACE) capsule 100 mg, 100 mg, Oral, BID, Ginny Bobby PA-C, 100 mg at 07/09/24 0850    epoprostenol (VELETRI) 30,000 ng/mL in sodium chloride 0.9 % 50 mL inhalation solution, 50 ng/kg/min (Ideal), Inhalation, Continuous, MICHAEL Barajas, Last Rate: 4.6 mL/hr at 07/10/24 0706, 50 ng/kg/min at 07/10/24 0706    gentamicin (GARAMYCIN) 0.1 % cream 1 Application, 1 Application, Topical, Daily, Ginny Bobby PA-C, 1 Application at 07/09/24 0850    HYDROmorphone (DILAUDID) injection 0.2 mg, 0.2 mg, Intravenous, Q4H PRN, Ginny Bobby PA-C, 0.2 mg at 07/07/24 1541    insulin lispro (HumALOG/ADMELOG) 100 units/mL subcutaneous injection 1-6 Units, 1-6 Units, Subcutaneous, Q6H FARZANA, 2  Units at 07/10/24 0612 **AND** Fingerstick Glucose (POCT), , , Q6H, Alexandra Betancourt V, CRNP    levalbuterol (XOPENEX) inhalation solution 0.63 mg, 0.63 mg, Nebulization, Q6H PRN, Ginny Bobby PA-C    levothyroxine tablet 125 mcg, 125 mcg, Oral, Early Morning, RAEANN Guzman-GREGG, 125 mcg at 07/09/24 0645    milrinone (PRIMACOR) 20 mg in 100 mL infusion (premix), 0.13 mcg/kg/min, Intravenous, Continuous, Jeanie Langley, ARISTEONP, Last Rate: 3.4 mL/hr at 07/10/24 0701, 0.13 mcg/kg/min at 07/10/24 0701    norepinephrine (LEVOPHED) 8 mg (DOUBLE CONCENTRATION) IV in sodium chloride 0.9% 250 mL, 1-30 mcg/min, Intravenous, Titrated, ARISTEO BarajasNP, Last Rate: 37.5 mL/hr at 07/10/24 0701, 20 mcg/min at 07/10/24 0701    NxStage K 4/Ca 3 dialysis solution (RFP-401) 20,000 mL, 20,000 mL, Dialysis, Continuous, Pablo Acevedo MD, 20,000 mL at 07/10/24 0014    ondansetron (ZOFRAN) injection 4 mg, 4 mg, Intravenous, Q6H PRN, Ginny Bobby PA-C, 4 mg at 07/08/24 0912    pantoprazole (PROTONIX) injection 40 mg, 40 mg, Intravenous, Q24H FARZANA, ARISTEO BarajasNP, 40 mg at 07/09/24 0933    polyethylene glycol (MIRALAX) packet 17 g, 17 g, Oral, Daily, Ginny Bobby PA-C, 17 g at 07/09/24 0844    senna (SENOKOT) tablet 17.2 mg, 2 tablet, Oral, HS, Ginny Bobby PA-C    vancomycin (VANCOCIN) 500 mg in sodium chloride 0.9% 100 mL IVPB, 10 mg/kg (Adjusted), Intravenous, Q12H, Jeanie Langley, CRNP, Last Rate: 0 mL/hr at 07/09/24 0500, 500 mg at 07/10/24 0240    vasopressin (PITRESSIN) 20 Units in sodium chloride 0.9 % 100 mL infusion, 0.04 Units/min, Intravenous, Continuous, Alexandra Betancourt V, CRNP, Last Rate: 12 mL/hr at 07/10/24 0701, 0.04 Units/min at 07/10/24 0701    Invasive Devices:        Lab Results:   Results from last 7 days   Lab Units 07/10/24  0530 07/10/24  0042 07/09/24  1814 07/09/24  1207 07/09/24  1040 07/09/24  0547 07/08/24  1509 07/08/24  1018  "07/08/24  0608   WBC Thousand/uL 16.83*  --   --   --   --  20.47*  --  24.96* 17.22*   HEMOGLOBIN g/dL 11.5  --   --   --   --  12.9  --  14.1 14.2   I STAT HEMOGLOBIN g/dl  --   --   --   --  13.3  --   --   --   --    HEMATOCRIT % 36.7  --   --   --   --  40.3  --  45.7 47.3*   HEMATOCRIT, ISTAT %  --   --   --   --  39  --   --   --   --    PLATELETS Thousands/uL 63*  --   --   --   --  88*  --  105* 92*   POTASSIUM mmol/L 4.3 4.4 4.4   < >  --  4.3   < > 4.8 6.0*   CHLORIDE mmol/L 103 102 102   < >  --  103   < > 102 98   CO2 mmol/L 22 22 22   < >  --  21   < > 12* 15*   CO2, I-STAT mmol/L  --   --   --   --  24  --   --   --   --    BUN mg/dL 22 21 25   < >  --  40*   < > 96* 85*   CREATININE mg/dL 1.76* 1.77* 2.04*   < >  --  3.20*   < > 7.78* 7.81*   CALCIUM mg/dL 9.0 9.0 9.2   < >  --  9.3   < > 8.9 9.0   MAGNESIUM mg/dL 2.1 1.9 2.0   < >  --  2.1   < > 2.0 2.2   PHOSPHORUS mg/dL 3.1 2.9 3.1   < >  --  4.5*   < > 10.8*  --    ALK PHOS U/L 146*  --   --   --   --  147*  --   --  140*   ALT U/L 1,995*  --   --   --   --  2,438*  --   --  133*   AST U/L 1,348*  --   --   --   --  3,417*  --   --  215*   GLUCOSE, ISTAT mg/dl  --   --   --   --  287*  --   --   --   --     < > = values in this interval not displayed.       Previous work up:  Reviewed last CT chest abdomen pelvis report      Portions of the record may have been created with voice recognition software. Occasional wrong word or \"sound a like\" substitutions may have occurred due to the inherent limitations of voice recognition software. Read the chart carefully and recognize, using context, where substitutions have occurred.If you have any questions, please contact the dictating provider.    "

## 2024-07-10 NOTE — PROGRESS NOTES
Progress Note - Cardiology   [facility]     Jacquie Smith 69 y.o. female MRN: [id number]  : 1955  Unit/Bed#: ICU 09 Encounter: [id number]    Assessment and Plan:   1. Multiorgan dysfunction/shock: no clear source of infection    -   continue IV pressers and Inotrope support.    -   Empiric antibiotics per primary team    -   shock liver with peak AST 3417, peak ALT 2438 which is slowly resolving    -   patient auto anticoagulated due to shock liver, INR is slowly improving after receiving vitamin K 2024     2. Paroxysmal atrial fibrillation/flutter: patient noted to have drop in hemodynamics when she converted from sinus to atrial flutter most likely due to loss of atrial    -   patient started on IV amiodarone standard drip with improvement in rate control    -   heparin held due to shock liver and auto anticoagulation with elevated INR and thrombocytopenia    -   patient given digoxin IV 2024 per ICU staff     3. End-stage kidney disease: previously on peritoneal dialysis    -   patient currently on CVVHD    -   followed by nephrology     4. Pulmonary hypertension: 2024 TTE: RV systolic pressure severely elevated 71 mmHg, RV cavity is dilated and systolic function is reduced with moderate to severe tricuspid valve regurgitation    -   WHO Group 2/3     5. Acute on chronic combined systolic and diastolic heart failure/cor pulmonale: 2024 TTE: EF visibly estimated 45-50%, RV cavity is dilated in systolic function is moderately reduced, there is moderate mitral valve regurgitation and mild stenosis and moderate to severe tricuspid valve regurgitation    -   patient anuric, continue CVVHD    -   continue Primacor 0.13 mcg/min    -   advanced heart failure medication on hold due to hypotension requiring vasopressor support    -   Monitor I and O and weights    -   adjust medications as needed     6. History of PE: was on Eliquis 5 mg bid in the outpatient setting    -    transition to IV heparin in hospital, heparin held due to shock liver and thrombocytopenia    -   7/8/2024 VQ scan: low probability of PE     Subjective / Objective:   Vitals: Blood pressure 128/53, pulse (!) 116, temperature (!) 96.9 °F (36.1 °C), resp. rate 22, height 5' (1.524 m), weight 88.9 kg (195 lb 15.8 oz), SpO2 96%, not currently breastfeeding.  Vitals:    07/09/24 0600 07/10/24 0600   Weight: 87.7 kg (193 lb 5.5 oz) 88.9 kg (195 lb 15.8 oz)     Body mass index is 38.28 kg/m².  BP Readings from Last 3 Encounters:   07/10/24 128/53   07/03/24 90/55   07/03/24 122/82     Orthostatic Blood Pressures      Flowsheet Row Most Recent Value   Blood Pressure 128/53 filed at 07/10/2024 0830   Patient Position - Orthostatic VS Lying filed at 07/10/2024 0800          I/O         07/08 0701  07/09 0700 07/09 0701  07/10 0700 07/10 0701  07/11 0700    P.O. 210  100    I.V. (mL/kg) 2035.5 (23.2) 3067 (34.5) 310 (3.5)    IV Piggyback 585      Total Intake(mL/kg) 2830.5 (32.3) 3067 (34.5) 410 (4.6)    Urine (mL/kg/hr) 100 (0) 0 (0) 75 (0.3)    Other 3495 4892 691    Total Output 3595 4892 766    Net -764.6 -1825 -356           Unmeasured Urine Occurrence 1 x            Invasive Devices       Peripheral Intravenous Line  Duration             Peripheral IV 07/07/24 Left;Proximal;Ventral (anterior) Forearm 3 days    Long-Dwell Peripheral IV (Midline) 07/07/24 Left Basilic 2 days              Arterial Line  Duration             Arterial Line 07/08/24 Radial 2 days              Line  Duration             Peritoneal Dialysis Catheter Abdominal 1470 days    Peritoneal Dialysis Catheter Tenckhoff Right lower abdomen 1453 days              Hemodialysis Catheter  Duration             HD Temporary Double Catheter 2 days                      Intake/Output Summary (Last 24 hours) at 7/10/2024 1018  Last data filed at 7/10/2024 1000  Gross per 24 hour   Intake 3082 ml   Output 5068 ml   Net -1986 ml         Physical Exam:   Physical  Exam  Vitals and nursing note reviewed.   Constitutional:       Appearance: Normal appearance. She is morbidly obese. She is ill-appearing.   HENT:      Right Ear: External ear normal.      Left Ear: External ear normal.   Eyes:      General: No scleral icterus.        Right eye: No discharge.         Left eye: No discharge.   Cardiovascular:      Rate and Rhythm: Tachycardia present. Rhythm irregular.      Pulses: Normal pulses.      Heart sounds: Murmur heard.   Pulmonary:      Effort: Pulmonary effort is normal.      Breath sounds: Examination of the right-lower field reveals decreased breath sounds. Examination of the left-lower field reveals decreased breath sounds. Decreased breath sounds present.   Abdominal:      General: Bowel sounds are normal. There is no distension.      Palpations: Abdomen is soft.   Musculoskeletal:      Right lower leg: Edema present.      Left lower leg: Edema present.   Skin:     General: Skin is warm and dry.      Capillary Refill: Capillary refill takes less than 2 seconds.   Neurological:      General: No focal deficit present.      Mental Status: She is alert and oriented to person, place, and time. Mental status is at baseline.   Psychiatric:         Mood and Affect: Mood normal.              Medications/ Allergies:     Current Facility-Administered Medications   Medication Dose Route Frequency Provider Last Rate    amiodarone (CORDARONE) 900 mg in dextrose 5 % 500 mL infusion  1 mg/min Intravenous Continuous ARISTEO LawrenceNP 1 mg/min (07/10/24 0701)    cefepime  2,000 mg Intravenous Q12H ARISTEO CalderonNP 2,000 mg (07/10/24 0006)    chlorhexidine  15 mL Mouth/Throat Q12H [facility] Ginny Bobby PA-C      docusate sodium  100 mg Oral BID Ginny Bobby PA-C      epoprostenol  50 ng/kg/min (Ideal) Inhalation Continuous Alexandra Spironello V, CRNP 50 ng/kg/min (07/10/24 0706)    gentamicin  1 Application Topical Daily Ginny Bobby PA-C       HYDROmorphone  0.2 mg Intravenous Q4H PRN Ginny Bobby PA-C      insulin lispro  1-6 Units Subcutaneous Q6H FARZANA Alexandra Segalo PRITI, CRGILMAR      levalbuterol  0.63 mg Nebulization Q6H PRN Ginny Bobby PA-C      levothyroxine  125 mcg Oral Early Morning Ginny Bobby PA-C      milrinone (Primacor) infusion  0.13 mcg/kg/min Intravenous Continuous ARISTEO CalderonNP 0.13 mcg/kg/min (07/10/24 0701)    norepinephrine  1-30 mcg/min Intravenous Titrated Alexandra Segalo PRITI, CRNP 18 mcg/min (07/10/24 0915)    NxStage K 4/Ca 3  20,000 mL Dialysis Continuous Pablo Acevedo MD      ondansetron  4 mg Intravenous Q6H PRN Ginny Bobby PA-C      pantoprazole  40 mg Intravenous Q24H FARZANA Alexadnra Segalo V, CRNP      polyethylene glycol  17 g Oral Daily Ginny Bobby PA-C      senna  2 tablet Oral HS Ginny Bobby PA-C      vancomycin  10 mg/kg (Adjusted) Intravenous Q12H ARISTEO CalderonNP 0 mg (07/09/24 0500)    vasopressin  0.04 Units/min Intravenous Continuous Alexandra Segalo V, CRNP 0.04 Units/min (07/10/24 0840)     HYDROmorphone, 0.2 mg, Q4H PRN  levalbuterol, 0.63 mg, Q6H PRN  ondansetron, 4 mg, Q6H PRN      No Known Allergies    VTE Pharmacologic Prophylaxis:   Sequential compression device (Venodyne)     Labs:   Troponins:  Results from last 7 days   Lab Units 07/08/24  1953 07/07/24  0635 07/07/24  0431   CK TOTAL U/L 92  --   --    HSTNI D2 ng/L  --   --  -7   HSTNI D4 ng/L  --  -3  --      CBC with diff:  Results from last 7 days   Lab Units 07/10/24  0530 07/09/24  1040 07/09/24  0547 07/08/24  1018 07/08/24  0608 07/07/24  2108 07/07/24  0237 07/03/24  1115   WBC Thousand/uL 16.83*  --  20.47* 24.96* 17.22* 7.51 11.39* 9.86   HEMOGLOBIN g/dL 11.5  --  12.9 14.1 14.2 8.3* 12.3 13.8   I STAT HEMOGLOBIN g/dl  --  13.3  --   --   --   --   --   --    HEMATOCRIT % 36.7  --  40.3 45.7 47.3* 32.5* 39.2 44.0   HEMATOCRIT, ISTAT %  --  39  --   --   --   --    --   --    MCV fL 92  --  90 94 96 116* 92 92   PLATELETS Thousands/uL 63*  --  88* 105* 92* 115* 174 126*   RBC Million/uL 3.97  --  4.49 4.88 4.93 2.85* 4.28 4.78   MCH pg 29.0  --  28.7 28.9 28.6 29.1 28.7 28.9   MCHC g/dL 31.3*  --  32.0 30.9* 29.8* 25.2* 31.4 31.4   RDW % 18.1*  --  18.1* 18.6* 18.4* 17.3* 17.8* 18.1*   MPV fL 11.2  --  10.8 10.9 11.4 11.7 11.6 11.9   NRBC AUTO /100 WBCs 1  --   --   --   --  0 0 0   NRBC /100 WBC  --   --  1  --   --   --   --   --      CMP:  Results from last 7 days   Lab Units 07/10/24  0530 07/10/24  0042 07/09/24  1814 07/09/24  1207 07/09/24  1040 07/09/24  0547 07/09/24  0001 07/08/24  1953 07/08/24  1018 07/08/24  0608 07/07/24  2253 07/07/24  0237 07/03/24  1115   SODIUM mmol/L 133* 132* 133* 133*  --  136 136 137   < > 134* 134* 135 131*   POTASSIUM mmol/L 4.3 4.4 4.4 4.3  --  4.3 3.7 4.0   < > 6.0* 4.1 4.6 5.0   CHLORIDE mmol/L 103 102 102 101  --  103 102 102   < > 98 98 98 95*   CO2 mmol/L 22 22 22 21  --  21 21 19*   < > 15* 16* 18* 18*   CO2, I-STAT mmol/L  --   --   --   --  24  --   --   --   --   --   --   --   --    ANION GAP mmol/L 8 8 9 11  --  12 13 16*   < > 21* 20* 19* 18*   BUN mg/dL 22 21 25 31*  --  40* 54* 67*   < > 85* 89* 85* 76*   CREATININE mg/dL 1.76* 1.77* 2.04* 2.45*  --  3.20* 4.16* 5.17*   < > 7.81* 7.40* 7.84* 6.34*   GLUCOSE, ISTAT mg/dl  --   --   --   --  287*  --   --   --   --   --   --   --   --    CALCIUM mg/dL 9.0 9.0 9.2 9.2  --  9.3 9.4 9.2   < > 9.0 8.5 8.5 9.9   AST U/L 1,348*  --   --   --   --  3,417*  --   --   --  215* 10* 14 14   ALT U/L 1,995*  --   --   --   --  2,438*  --   --   --  133* 25 32 81*   ALK PHOS U/L 146*  --   --   --   --  147*  --   --   --  140* 126* 161* 194*   TOTAL PROTEIN g/dL 4.9*  --   --   --   --  5.5*  --   --   --  6.4 6.5 5.6* 6.6   ALBUMIN g/dL 3.0*  --   --   --   --  3.4*  --   --   --  3.8 4.0 3.3* 3.9   TOTAL BILIRUBIN mg/dL 3.17*  --   --   --   --  2.32*  --   --   --  2.00* 0.99 0.85  1.84*   EGFR ml/min/1.73sq m 29 29 24 19  --  14 10 8   < > 4 5 4 6    < > = values in this interval not displayed.     Magnesium:  Results from last 7 days   Lab Units 07/10/24  0530 07/10/24  0042 07/09/24  1814 07/09/24  1207 07/09/24  0547 07/09/24  0001 07/08/24  1953   MAGNESIUM mg/dL 2.1 1.9 2.0 1.9 2.1 1.9 2.1     Coags:  Results from last 7 days   Lab Units 07/10/24  0530 07/09/24  1207 07/09/24  0547 07/08/24  1833 07/08/24  0837 07/07/24  0237 07/03/24  1115   PTT seconds  --   --   --  122* 49*  --  29   INR  2.63* 5.90* 6.52*  --  4.24* 2.10* 1.38*     TSH:  Results from last 7 days   Lab Units 07/08/24  1018   TSH 3RD GENERATON uIU/mL 14.176*     Hgb A1c:  Results from last 7 days   Lab Units 07/07/24  1030   HEMOGLOBIN A1C % 7.9*     Imaging & Testing   I have personally reviewed pertinent reports.     VAS VENOUS DUPLEX - LOWER LIMB BILATERAL    Result Date: 7/9/2024  Narrative:  [facility] REPORT CLINICAL: Indications: Patient presents with severe pain in the lower extremities bilaterally. Operative History: No prior cardiovascular surgeries. Risk Factors The patient has history of HTN, Diabetes (Yes) and CKD.   CONCLUSION:  Impression: RIGHT LOWER LIMB: No gross evidence of acute deep vein thrombosis. No evidence of superficial thrombophlebitis noted. Doppler evaluation shows a normal response to augmentation maneuvers.. Popliteal, posterior tibial and anterior tibial arterial Doppler waveform's are triphasic/biphasic.  LEFT LOWER LIMB: No gross evidence of acute deep vein thrombosis. No evidence of superficial thrombophlebitis noted. Doppler evaluation shows a normal response to augmentation maneuvers.. Popliteal, posterior tibial and anterior tibial arterial Doppler waveform's are triphasic/biphasic.  TECH NOTE: Pulsatile waveforms noted throughout the venous system which may suggest heart failure or tricuspid valve insufficiency.  Technically difficult/limited study due to patient  inability to tolerate compression/position legs. Some segments may be poorly visualized on today's exam.  Technical findings were faxed to chart.  SIGNATURE: Electronically Signed by: STEPHANIE LOZOYA DO on 2024-07-09 01:32:47 PM    US right upper quadrant    Result Date: 7/9/2024  Narrative: RIGHT UPPER QUADRANT ULTRASOUND INDICATION: r/o cholecystitis. COMPARISON: CT 7/7/2024 TECHNIQUE: Real-time ultrasound of the right upper quadrant was performed with a curvilinear transducer with both volumetric sweeps and still imaging techniques. FINDINGS: PANCREAS: Poorly visualized secondary to bowel gas AORTA AND IVC: Visualized portions are normal for patient age. LIVER: Size: Within normal range. The liver measures 17.1 cm in the midclavicular line. Contour: Surface contour is smooth. Parenchyma: Echogenicity and echotexture are within normal limits. No liver mass identified. Limited imaging of the main portal vein shows it to be patent and hepatopetal. BILIARY: There is cholelithiasis. The gallbladder wall appears thickened at 7 mm but this is a nonspecific finding in the setting of ascites. Negative Truong sign. No intrahepatic biliary dilatation. CBD measures 6.0 mm. No choledocholithiasis. KIDNEY: Right kidney measures 8.7 x 3.2 x 3.6 cm. Volume 52.7 mL Cortical thinning/atrophy noted. No hydronephrosis. ASCITES: As above, small volume intra-abdominal ascites     Impression: 1. Cholelithiasis. Negative Truong sign. Gallbladder wall thickness of 7 mm but nonspecific in the setting of ascites. If there is strong clinical concern for acute cholecystitis, further evaluation with nuclear HIDA scan may be considered. 2. No biliary ductal dilatation. 3. Right renal atrophy and small volume ascites Workstation performed: [id number]     NM lung ventilation / perfusion    Result Date: 7/8/2024  Narrative: VENTILATION AND PERFUSION SCAN INDICATION: hypotension, RV dysfunction. rule out PE. history of PE, not compilant with  coumadin COMPARISON: VQ scan dated 7/5/2018 and chest x-ray dated 7/8/2024 TECHNIQUE:  Posterior ventilation imaging was performed after the inhalation of 30 mCi nebulized Tc-99m DTPA with deposition of less than 1 mCi of activity within the lungs. Multiplanar perfusion imaging was next performed following the intravenous administration of 4.24 mCi Tc-99m labeled MAA. FINDINGS: Ventilation imaging demonstrates significant deposition of tracer activity within the central airways is suggestive of central airways disease. Deposition of tracer activity within the central airways results in poor tracer accumulation involving the lung fields which limits evaluation of ventilation imaging. Perfusion imaging demonstrates mild nonuniform perfusion involving both lungs without moderate or large segmental perfusion defect. Please note that left lateral spot image is essentially nondiagnostic given intense tracer activity at IV sites.     Impression: Limited exam as detailed above without definite moderate or large segmental perfusion defect. Therefore, findings are most consistent with low probability for acute pulmonary embolus. Workstation performed: [id number]     XR chest portable    Result Date: 7/8/2024  Narrative: XR CHEST PORTABLE INDICATION: R IJ TLC. COMPARISON: CXR and chest CT 7/7/2024. FINDINGS: Right jugular catheter in right atrium. Diffuse nodularity throughout the lungs better shown on CT. No pneumothorax or pleural effusion. Mild cardiomegaly. Pulmonary artery enlargement. Bones are unremarkable for age. Normal upper abdomen.     Impression: Right jugular catheter in right atrium with no pneumothorax. Diffuse nodularity throughout the lungs better shown on CT. Pulmonary artery enlargement which can be seen with pulmonary hypertension. Workstation performed: [id number]     Echo complete w/ contrast if indicated    Result Date: 7/8/2024  Narrative:   Left Ventricle: Left ventricular cavity size is normal. Wall  thickness is mildly increased. The left ventricular ejection fraction is 45-50%. Systolic function is mildly reduced. Although no diagnostic regional wall motion abnormality was identified, this possibility cannot be completely excluded on the basis of this study.   Right Ventricle: Right ventricular cavity size is dilated. Systolic function is reduced.   Aortic Valve: The aortic valve is trileaflet. The leaflets are mildly thickened. The leaflets are moderately calcified. The leaflets exhibit normal mobility. There is mild to moderate stenosis.  Peak velocity is 1.38 m/s and mean gradient is 3.75 mmHg   Mitral Valve: There is severe annular calcification. There is moderate regurgitation with a posteriorly directed jet. There is mild stenosis.  Mean gradient is 2.6 mmHg   Tricuspid Valve: There is moderate to severe regurgitation. The tricuspid valve regurgitation jet is directed toward septum. The right ventricular systolic pressure is severely elevated at 71 mmHg.   Pulmonic Valve: There is trace regurgitation.     XR chest 1 view portable    Result Date: 7/7/2024  Narrative: XR CHEST PORTABLE INDICATION: asdf. COMPARISON: CXR 7/3/2024, chest CT 6/18/2024. FINDINGS: Mild pulmonary venous congestion. No pneumothorax or pleural effusion. Normal cardiomediastinal silhouette. Bones are unremarkable for age. Normal upper abdomen.     Impression: Mild pulmonary venous congestion. Workstation performed: [id number]     CT chest abdomen pelvis wo contrast    Result Date: 7/7/2024  Narrative: CT CHEST, ABDOMEN AND PELVIS WITHOUT IV CONTRAST INDICATION: RLQ LLQ pain. COMPARISON: CT chest on 6/18/2024 TECHNIQUE: CT examination of the chest, abdomen and pelvis was performed without intravenous contrast. Multiplanar 2D reformatted images were created from the source data. This examination, like all CT scans performed in the [facility], was performed utilizing techniques to minimize radiation dose exposure,  including the use of iterative reconstruction and automated exposure control. Radiation dose length product (DLP) for this visit: 1323 mGy-cm Enteric Contrast: Not administered. FINDINGS: CHEST LUNGS: Hazy opacity and mild septal thickening. Stable tree-in-bud nodularity mostly in the right lung with a few areas of mild mosaic attenuation, similar to the prior study. Multiple calcified small nodules are reidentified. No tracheal or endobronchial lesion. PLEURA: Unremarkable. HEART/GREAT VESSELS: Mild cardiomegaly. No thoracic aortic aneurysm. Enlarged pulmonary artery with calcification of the walls, consistent with history of pulmonary hypertension. Left atrial wall calcifications. Coronary artery calcifications. MEDIASTINUM AND TEOFILO: Multiple nonspecific subcentimeter mediastinal lymph nodes, similar to the prior study. CHEST WALL AND LOWER NECK: Stable thyromegaly. Stable calcification in the right lobe. ABDOMEN LIVER/BILIARY TREE: Unremarkable. GALLBLADDER: Distended. No calcified gallstones. No pericholecystic inflammatory change. SPLEEN: Unremarkable. PANCREAS: Unremarkable. ADRENAL GLANDS: Unremarkable. KIDNEYS/URETERS: Atrophic kidneys. No hydronephrosis. Bilateral renal vascular calcifications. STOMACH AND BOWEL: Colonic diverticulosis without findings of acute diverticulitis. APPENDIX: No findings to suggest appendicitis. ABDOMINOPELVIC CAVITY: Peritoneal dialysis terminates in the anterior pelvis. Small volume of abdominopelvic ascites. No pneumoperitoneum. No lymphadenopathy. VESSELS: Advanced atherosclerosis without abdominal aortic aneurysm. PELVIS REPRODUCTIVE ORGANS: Fibroid uterus. 2 cm simple appearing left adnexal cyst. URINARY BLADDER: Unremarkable. ABDOMINAL WALL/INGUINAL REGIONS: Mild body wall edema. BONES: No acute fracture or suspicious osseous lesion.     Impression: Hazy opacity and mild septal thickening suggestive of interstitial edema. Peritoneal dialysis catheter is present. Small  "volume of ascites. Mild body wall edema. Workstation performed: [id number]     XR chest 1 view portable    Result Date: 7/4/2024  Narrative: XR CHEST PORTABLE INDICATION: A-fib. COMPARISON: Chest CT 6/18/2024. CXR 2/5/2024. FINDINGS: Mild pulmonary venous congestion. No pneumothorax or pleural effusion. Mild cardiomegaly. Bones are unremarkable for age. Normal upper abdomen.     Impression: Mild pulmonary venous congestion. Workstation performed: [id number]     CT chest without contrast    Result Date: 6/25/2024  Narrative: CT CHEST WITHOUT IV CONTRAST INDICATION:   R91.8: Other nonspecific abnormal finding of lung field. \"Follow-up consolidation, abnormal CT scan 3/19/2024.\" Per my review of the medical record, history of heart failure and pulmonary hypertension. Never smoker. Hospitalized on 3/19/2024 for pneumonia. COMPARISON: CXR 2/5/2024, chest CT 3/19/2024, 3/23/2022, and baseline chest CT 7/7/2018. TECHNIQUE: Chest CT without intravenous contrast.  Axial, sagittal, coronal 2D reformats and coronal MIPS from source data. Radiation dose length product (DLP):  403 mGy-cm . Radiation dose exposure minimized using iterative reconstruction and automated exposure control. FINDINGS: LUNGS: Resolution of left upper and left lower lobe pneumonia with mild benign postinfectious scar in the left lower lobe. Mild interstitial edema. Mild mosaic attenuation due to pulmonary hypertension. Multiple stable small nodules, most calcified, most  numerous in the anterior right lung. AIRWAYS: No significant filling defects. PLEURA:  Unremarkable. HEART/GREAT VESSELS: Mild cardiomegaly. Pulmonary artery enlargement with calcification of the walls with history of pulmonary hypertension. Calcification of the wall of the left atrium. Mild coronary artery calcification indicating atherosclerotic heart  disease. MEDIASTINUM AND TEOFILO: Regression of previously enlarged mediastinal nodes. CHEST WALL AND LOWER NECK: Stable enlargement of the " "thyroid gland since 2018 with calcification in the right lobe. UPPER ABDOMEN: Redemonstration of pneumoperitoneum from peritoneal dialysis. Extensive vascular calcification in the abdomen. Partially imaged nonobstructing right renal calcification. OSSEOUS STRUCTURES: Moderate degenerative disease in the spine with mild curvature.     Impression: Resolution of left upper and left lower lobe pneumonia with mild benign postinfectious scar in the left lower lobe. Mild septal thickening due to interstitial edema. Mild mosaic attenuation due to pulmonary hypertension. Pneumoperitoneum from peritoneal dialysis. Workstation performed: [id number]        EKG / Monitor: Personally reviewed.    Atrial fibrillation flutter rate control improved after receiving IV digoxin.      Tika RODRIGUEZ  Cardiology      \"This note was completed in part utilizing [employer] direct voice recognition software.   Grammatical errors, random word insertion, spelling mistakes, and incomplete sentences may be an occasional consequence of the system secondary to software limitations, ambient noise and hardware issues.    Please read the chart carefully and recognize, using context, where substitutions have occurred.  If you have any questions or concerns about the context, text or information contained within the body of this dictation, please contact myself, the provider, for further clarification.\"  "

## 2024-07-10 NOTE — ASSESSMENT & PLAN NOTE
In setting shock liver  INR peak of 6.5 s/p 10mg IV Vitamin K x 2 yesterday  INR 2.6 this morning  Continue to hold coumadin  Trend INR daily

## 2024-07-10 NOTE — ASSESSMENT & PLAN NOTE
Hx PAF/atrial flutter. She was seen as an outpatient both on 7/1 and 7/3 and was in rapid atrial fibrillation at that time and advised to go to the emergency department--noncompliant with plan. She was seen 7/3 in the ED but did not want to be admitted.   PTA meds: metoprolol 25mg XL, coumadin (unclear compliance with medications, INR below therapeutic range on multiple recent checks)  Echo as above    Plan:  Hold beta blocker in setting of shock   S/p Amio bolus and gtt yesterday, continues on Amio gtt 1mg/min  S/p 250mcg of Dig yesterday   Remains in Afib rate controlled 90s-low 100s  Optimize electrolytes  INR supratherapeutic with transaminitis hold coumadin

## 2024-07-10 NOTE — ASSESSMENT & PLAN NOTE
Lab Results   Component Value Date    EGFR 29 07/10/2024    EGFR 29 07/10/2024    EGFR 24 07/09/2024    CREATININE 1.76 (H) 07/10/2024    CREATININE 1.77 (H) 07/10/2024    CREATININE 2.04 (H) 07/09/2024     Patient on chronic peritoneal dialysis, reports compliance with daily PD  PD initiated on admission but given acute hypotension and nausea, PD fluid drained prior to full dwell time  Nephrology consulted  CVVHD initiated 7/8  Q6 hour BMP, Mg, Phos, ical  Strict I&O  Avoid nephrotoxic agents   Does void in small amounts   Home torsemide and metolazone on hold

## 2024-07-10 NOTE — ASSESSMENT & PLAN NOTE
Remote hx, continue Coumadin but patient reports she is poorly compliant with several recent INRs less than therapeutic range  7/8: VQ scan no mod/large PE limited study   7/9: LE venous duplex-negative

## 2024-07-10 NOTE — ASSESSMENT & PLAN NOTE
Lab Results   Component Value Date    HGBA1C 7.9 (H) 07/07/2024       Recent Labs     07/09/24  1106 07/09/24  2102 07/10/24  0045 07/10/24  0609   POCGLU 220* 199* 193* 213*         Blood Sugar Average: Last 72 hrs:  (P) [id number]    Home regimen: lantus 10 units HS and 3 units humalog TID with meals, also on ozempic   Continue SSI Alg #3 (increase Alg yesterday)   Add back Lantus 10 units @ HS   Goal blood glucose less than 180

## 2024-07-10 NOTE — PROGRESS NOTES
The patient's vancomycin therapy has been discontinued. Pharmacy will sign off now. Thank you for this consult.

## 2024-07-10 NOTE — ASSESSMENT & PLAN NOTE
Chronically on 3LNC, non compliant with hx CPAP  Currently on HFNC 30L 30% for administration of Veletri  Wore CPAP 12 @ HS and tolerated well    Goal Sp02 >88%  Pulmonary toilet; encourage coughing and deep breathing, IS q1h while awake

## 2024-07-10 NOTE — PROGRESS NOTES
Jacquie Smith is a 69 y.o. female who is currently ordered Vancomycin IV with management by the Pharmacy Consult service.  Relevant clinical data and objective / subjective history reviewed.  Vancomycin Assessment:  Indication and Goal AUC/Trough: Other, unclear source/sepsis, -600, trough >10  Clinical Status: improving  Micro:     Renal Function:  SCr: 1.76 mg/dL  CrCl: 30.8 mL/min  Renal replacement:CVVHD  Days of Therapy: 3      Current Dose: 1500mg IV once (LD 25mg/kg)  Vancomycin Plan:  New Dosinmg IV q12h (10mg/kg-CVVHD dosing)  Trough Goal: 10</= but <20 mcg/mL  Trough = 17.2 mcg/mL  Next Level: 24 at 0600  Renal Function Monitoring: Daily BMP and UOP  Pharmacy will continue to follow closely for s/sx of nephrotoxicity, infusion reactions and appropriateness of therapy.  BMP and CBC will be ordered per protocol. We will continue to follow the patient’s culture results and clinical progress daily.    Earnestine Renner, Pharmacist

## 2024-07-10 NOTE — PROGRESS NOTES
[facility]  Progress Note  Name: Jacquie Smith I  MRN: [id number]  Unit/Bed#: ICU 09 I Date of Admission: 7/7/2024   Date of Service: 7/10/2024 I Hospital Day: 3    Assessment & Plan   Shock (HCC)  Assessment & Plan  Cardiogenic shock with possible sepsis component   No clear source of infection but with leukocytosis and elevated procal   CT C/A/P 7/7:Hazy opacity and mild septal thickening suggestive of interstitial edema.Peritoneal dialysis catheter is present. Small volume of ascites.Mild body wall edema.  7/8 Echo EF 45-50%, No RWMA, dilated RV, moderate AS, moderate MR, moderate to severe TR, estimated RVSP 71.   Previous Echo 4/2023 with EF 40-45%, mild AS, mild to moderately reduced RV function, severe MAC, moderate MR, mild pulmonary hypertension    Plan:  Continue levophed (20mcg) and vaso 0.04 for goal MAP >65  Milrinone 0.13mcg started 7/8 and ScVO2 improved from 52% to 72%  Inhaled veletri started yesterday at 50ng via HFNC   ScVO2 now high 90s   Volume removal with CRRT -100  Trend end points  Lactic clear, trend VBG q6h  Empiric antibiotics pending culture data  Procal jump to 9.3 today from 1.22 despite improving creat on CRRT  Persistent leukocytosis (WBC 16 from 20)   Cefepime and vanco D3  MRSA negative, consider D/C vanco today  BC NG x 48 hours   Flu/COVID/RSV negative  Peritoneal fluid culture negative   Unable to obtain sputum but has had no increase in O2 requirements   Check strep pneumo and legionella with next void  Check UA with next void     Acute on chronic combined systolic and diastolic congestive heart failure (HCC)  Assessment & Plan  Wt Readings from Last 3 Encounters:   07/10/24 88.9 kg (195 lb 15.8 oz)   07/03/24 83.6 kg (184 lb 6.4 oz)   07/02/24 83.9 kg (185 lb)     Previous Echo 4/2023 with EF 40-45%, mild AS, mild to moderately reduced RV function, severe MAC, moderate MR, mild pulmonary hypertension  PTA meds: metoprolol, torsemide 100mg daily,  valsartan  Patient also does PD for volume removal   Presents with generalized malaise, difficulty with ambulation, swelling over several weeks  Hypervolemic on exam     Plan:  Repeat 7/8 Echo EF 45-50%, No RWMA, dilated RV, moderate AS, moderate MR, moderate to severe TR, estimated RVSP 71.   Continue Milrinone and inhaled Veletri as above  CVVHD for volume removal -100  Strict I&O and daily weights   Holding GDMT in setting of shock         Pulmonary hypertension with RV failure  Assessment & Plan  group 2 and 3 pulmonary hypertension  Has long standing hx of severe CHANDRA non compliant with CPAP on 3LNC at baseline   See plan as outlined     Shock liver  Assessment & Plan  2/2 hepatic congestion with cardiogenic shock  7/9: RUQ U/S-Cholelithiasis. Negative Truong sign. Gallbladder wall thickness of 7 mm but nonspecific in the setting of ascites. If there is strong clinical concern for acute cholecystitis, further evaluation with nuclear HIDA scan may be considered.No biliary ductal dilatation  Acute hepatitis panel negative   LFTs slightly improved today, T bili rising at 3.17  Continue to trend hepatic function panel     Atrial fibrillation with RVR (HCC)  Assessment & Plan  Hx PAF/atrial flutter. She was seen as an outpatient both on 7/1 and 7/3 and was in rapid atrial fibrillation at that time and advised to go to the emergency department--noncompliant with plan. She was seen 7/3 in the ED but did not want to be admitted.   PTA meds: metoprolol 25mg XL, coumadin (unclear compliance with medications, INR below therapeutic range on multiple recent checks)  Echo as above    Plan:  Hold beta blocker in setting of shock   S/p Amio bolus and gtt yesterday, continues on Amio gtt 1mg/min  S/p 250mcg of Dig yesterday   Remains in Afib rate controlled 90s-low 100s  Optimize electrolytes  INR supratherapeutic with transaminitis hold coumadin      Thrombocytopenia (HCC)  Assessment & Plan  Platelet count 63 from  88  Thrombocytopenia in setting of shock liver and from degradation in CRRT circuit   Continue to trend CBC daily     Supratherapeutic INR  Assessment & Plan  In setting shock liver  INR peak of 6.5 s/p 10mg IV Vitamin K x 2 yesterday  INR 2.6 this morning  Continue to hold coumadin  Trend INR daily    Chronic respiratory failure with hypoxia (HCC)  Assessment & Plan  Chronically on 3LNC, non compliant with hx CPAP  Currently on HFNC 30L 30% for administration of Veletri  Wore CPAP 12 @ HS and tolerated well    Goal Sp02 >88%  Pulmonary toilet; encourage coughing and deep breathing, IS q1h while awake     CHANDRA (obstructive sleep apnea)  Assessment & Plan  CPAP 12 @ HS     Hypothyroidism  Assessment & Plan  Continue Synthroid  TSH 14.1, free T4 1.75    Type 2 diabetes mellitus, with long-term current use of insulin ([facility])  Assessment & Plan  Lab Results   Component Value Date    HGBA1C 7.9 (H) 07/07/2024       Recent Labs     07/09/24  1106 07/09/24  2102 07/10/24  0045 07/10/24  0609   POCGLU 220* 199* 193* 213*         Blood Sugar Average: Last 72 hrs:  (P) [id number]    Home regimen: lantus 10 units HS and 3 units humalog TID with meals, also on ozempic   Continue SSI Alg #3 (increase Alg yesterday)   Add back Lantus 10 units @ HS   Goal blood glucose less than 180       ESRD (end stage renal disease) ([facility])  Assessment & Plan  Lab Results   Component Value Date    EGFR 29 07/10/2024    EGFR 29 07/10/2024    EGFR 24 07/09/2024    CREATININE 1.76 (H) 07/10/2024    CREATININE 1.77 (H) 07/10/2024    CREATININE 2.04 (H) 07/09/2024     Patient on chronic peritoneal dialysis, reports compliance with daily PD  PD initiated on admission but given acute hypotension and nausea, PD fluid drained prior to full dwell time  Nephrology consulted  CVVHD initiated 7/8  Q6 hour BMP, Mg, Phos, ical  Strict I&O  Avoid nephrotoxic agents   Does void in small amounts   Home torsemide and metolazone on hold     Mixed  hyperlipidemia  Assessment & Plan  Hold statin secondary to shock liver    Essential hypertension  Assessment & Plan  Hold antihypertensive medications in setting of shock    Morbid obesity with BMI of 40.0-44.9, adult (HCC)  Assessment & Plan  Encourage diet and lifestyle modifications   Nutrition consult     History of pulmonary embolus (PE)  Assessment & Plan  Remote hx, continue Coumadin but patient reports she is poorly compliant with several recent INRs less than therapeutic range  7/8: VQ scan no mod/large PE limited study   7/9: LE venous duplex-negative             Disposition: Critical care    ICU Core Measures     A: Assess, Prevent, and Manage Pain Has pain been assessed? Yes  Need for changes to pain regimen? No   B: Both SAT/SAT  N/A   C: Choice of Sedation RASS Goal: N/A patient not on sedation  Need for changes to sedation or analgesia regimen? NA   D: Delirium CAM-ICU: Negative   E: Early Mobility  Plan for early mobility? No   F: Family Engagement Plan for family engagement today? Yes       Antibiotic Review: Awaiting culture results.  and Continue broad spectrum secondary to severity of illness.     Review of Invasive Devices:      Central access plan: Medications requiring central line HD cath in place.  Plan continue CRRT  Cindi Plan: Keep arterial line for hemodynamic monitoring    Prophylaxis:  VTE VTE covered by:    None       Stress Ulcer  covered bypantoprazole (PROTONIX) injection 40 mg [[id number]]         Significant 24hr Events     24hr events: Continues on Milrinone 0.13mcg. Inhaled Veletri added via HFNC yesterday at 50ng. Current vasopressors: Levo 20mcg, Vaso 0.04 units. Continues on CRRT -100mL/hr. Did have issues with CRRT filter overnight with machine intermittently down from midnight to 6am and one circuit down where blood was lost. CRRT now running at -100 without issue. Received 10mg IV Vitamin K x 2 yesterday for supratherapeutic INR and INR improved from 6.5 to 2.6. Patient  refused swan placement yesterday. Patient remains in Afib with rates 90-low 100s. She received 250mcg IV Dig yesterday x 1 and remains on Amio gtt 1min/min.      Subjective   Review of Systems: Review of Systems   Gastrointestinal:  Positive for abdominal pain.   All other systems reviewed and are negative.       Objective                            Vitals I/O      Most Recent Min/Max in 24hrs   Temp (!) 97 °F (36.1 °C) Temp  Min: 95 °F (35 °C)  Max: 97.2 °F (36.2 °C)   Pulse (!) 107 Pulse  Min: 86  Max: 126   Resp 19 Resp  Min: 13  Max: 36   /53 BP  Min: 128/53  Max: 128/53   O2 Sat 98 % SpO2  Min: 92 %  Max: 100 %      Intake/Output Summary (Last 24 hours) at 7/10/2024 0913  Last data filed at 7/10/2024 0900  Gross per 24 hour   Intake 3046 ml   Output 4957 ml   Net -1911 ml       Diet Clear Liquid    Invasive Monitoring   Arterial Line  Cindi /54  Arterial Line BP  Min: 91/49  Max: 133/67   MAP 76 mmHg  Arterial Line MAP (mmHg)  Min: 57 mmHg  Max: 92 mmHg           Physical Exam   Physical Exam  Eyes:      General: Lids are normal.      Extraocular Movements: Extraocular movements intact.      Conjunctiva/sclera: Conjunctivae normal.      Pupils: Pupils are equal, round, and reactive to light.   Skin:     General: Skin is warm and dry.      Capillary Refill: Capillary refill takes less than 2 seconds.   HENT:      Head: Normocephalic and atraumatic.   Neck:      Trachea: Trachea normal.   Cardiovascular:      Rate and Rhythm: Tachycardia present. Rhythm irregularly irregular.      Pulses:           Radial pulses are 1+ on the right side and 1+ on the left side.        Dorsalis pedis pulses are 1+ on the right side and 1+ on the left side.      Heart sounds: Normal heart sounds, S1 normal and S2 normal.   Musculoskeletal:      Cervical back: Normal range of motion and neck supple.      Right lower le+ Edema present.      Left lower le+ Edema present.      Comments: Generalized weakness, AMIN    Abdominal:      Palpations: Abdomen is soft.      Tenderness: There is abdominal tenderness in the right upper quadrant. There is guarding.   Constitutional:       Appearance: She is obese. She is ill-appearing.   Pulmonary:      Effort: Pulmonary effort is normal.      Breath sounds: Decreased breath sounds and rales present.   Psychiatric:         Attention and Perception: Attention and perception normal.         Mood and Affect: Mood and affect normal.         Speech: Speech normal.         Behavior: Behavior normal.         Thought Content: Thought content normal.         Cognition and Memory: Cognition and memory normal.         Judgment: Judgment normal.   Neurological:      General: No focal deficit present.      Mental Status: She is oriented to person, place, and time and easily aroused. She is lethargic.      GCS: GCS eye subscore is 4. GCS verbal subscore is 5. GCS motor subscore is 6.            Diagnostic Studies      Imagin/9: RUQ U/S-Cholelithiasis. Negative Truong sign. Gallbladder wall thickness of 7 mm but nonspecific in the setting of ascites. If there is strong clinical concern for acute cholecystitis, further evaluation with nuclear HIDA scan may be considered.No biliary ductal dilatation  : LE venous duplex-negative I have personally reviewed pertinent films in PACS     Medications:  Scheduled PRN   cefepime, 2,000 mg, Q12H  chlorhexidine, 15 mL, Q12H FARZANA  docusate sodium, 100 mg, BID  gentamicin, 1 Application, Daily  insulin lispro, 1-6 Units, Q6H FARZANA  levothyroxine, 125 mcg, Early Morning  pantoprazole, 40 mg, Q24H FARZANA  polyethylene glycol, 17 g, Daily  senna, 2 tablet, HS  vancomycin, 10 mg/kg (Adjusted), Q12H      HYDROmorphone, 0.2 mg, Q4H PRN  levalbuterol, 0.63 mg, Q6H PRN  ondansetron, 4 mg, Q6H PRN       Continuous    amiodarone (CORDARONE) 900 mg in dextrose 5 % 500 mL infusion, 1 mg/min, Last Rate: 1 mg/min (07/10/24 0701)  epoprostenol, 50 ng/kg/min (Ideal), Last Rate: 50  ng/kg/min (07/10/24 0706)  milrinone (Primacor) infusion, 0.13 mcg/kg/min, Last Rate: 0.13 mcg/kg/min (07/10/24 0701)  norepinephrine, 1-30 mcg/min, Last Rate: 19 mcg/min (07/10/24 0840)  NxStage K 4/Ca 3, 20,000 mL  vasopressin, 0.04 Units/min, Last Rate: 0.04 Units/min (07/10/24 0840)         Labs:    CBC    Recent Labs     07/09/24  0547 07/09/24  1040 07/10/24  0530   WBC 20.47*  --  16.83*   HGB 12.9 13.3 11.5   HCT 40.3 39 36.7   PLT 88*  --  63*   BANDSPCT 2  --   --      BMP    Recent Labs     07/10/24  0042 07/10/24  0530   SODIUM 132* 133*   K 4.4 4.3    103   CO2 22 22   AGAP 8 8   BUN 21 22   CREATININE 1.77* 1.76*   CALCIUM 9.0 9.0       Coags    Recent Labs     07/08/24  1833 07/09/24  0547 07/09/24  1207 07/10/24  0530   INR  --    < > 5.90* 2.63*   *  --   --   --     < > = values in this interval not displayed.        Additional Electrolytes  Recent Labs     07/10/24  0042 07/10/24  0530   MG 1.9 2.1   PHOS 2.9 3.1   CAIONIZED 1.16 1.20          Blood Gas    No recent results  Recent Labs     07/10/24  0530   PHVEN 7.337   [id number] 40.7*   PO2VEN 115.2*   [id number] 21.3*   BEVEN -4.2   [id number] 97.5*    LFTs  Recent Labs     07/09/24  0547 07/10/24  0530   ALT 2,438* 1,995*   AST 3,417* 1,348*   ALKPHOS 147* 146*   ALB 3.4* 3.0*   TBILI 2.32* 3.17*       Infectious  Recent Labs     07/10/24  0530   PROCALCITONI 9.34*     Glucose  Recent Labs     07/09/24  1207 07/09/24  1814 07/10/24  0042 07/10/24  0530   GLUC 272* 212* 210* 213*               MICHAEL Mcdonald

## 2024-07-11 PROBLEM — D65 DIC (DISSEMINATED INTRAVASCULAR COAGULATION) (HCC): Status: ACTIVE | Noted: 2024-01-01

## 2024-07-11 NOTE — ASSESSMENT & PLAN NOTE
In setting shock liver  INR peak of 6.5 s/p 10mg IV Vitamin K x 2  on 7/9  INR 1.5 this morning   Continue to anticoagulation d/t thrombocytopenia

## 2024-07-11 NOTE — ASSESSMENT & PLAN NOTE
Cardiogenic shock with possible sepsis component   No clear source of infection but with leukocytosis and elevated procal   CT C/A/P 7/7:Hazy opacity and mild septal thickening suggestive of interstitial edema.Peritoneal dialysis catheter is present. Small volume of ascites.Mild body wall edema.  7/8 Echo EF 45-50%, No RWMA, dilated RV, moderate AS, moderate MR, moderate to severe TR, estimated RVSP 71.   Previous Echo 4/2023 with EF 40-45%, mild AS, mild to moderately reduced RV function, severe MAC, moderate MR, mild pulmonary hypertension    Plan:  Continue levophed (15mcg) and vaso 0.04 for goal MAP >65  Milrinone 0.13mcg started 7/8 and ScVO2 improved from 52% to 72%  Inhaled veletri started 7/9, remains at 50ng via HFNC   ScVO2 73% this morning  Volume removal with CRRT -150  Trend end points  Lactic cleared, trend VBG q6h  Patient refused swan placement   Empiric antibiotics pending culture data  Procal 9.3-->6.2  Persistent leukocytosis (WBC 16)   Cefepime D4  MRSA negative, Vanco D/C 7/10  BC NG x 72 hours   Flu/COVID/RSV negative  Peritoneal fluid culture negative   Unable to obtain sputum but has had no increase in O2 requirements   Check strep pneumo and legionella pending   UA 4-10 WBC, mod bacteria, urine culture pending   CT C/A/P w/ contrast this AM to r/o occult source of infection

## 2024-07-11 NOTE — ASSESSMENT & PLAN NOTE
Lab Results   Component Value Date    EGFR 60 07/11/2024    EGFR 53 07/10/2024    EGFR 47 07/10/2024    CREATININE 0.97 07/11/2024    CREATININE 1.08 07/10/2024    CREATININE 1.18 07/10/2024     Patient on chronic peritoneal dialysis, reports compliance with daily PD  PD initiated on admission but given acute hypotension and nausea, PD fluid drained prior to full dwell time  Nephrology consulted  CVVHD initiated 7/8, running -150  Q6 hour BMP, Mg, Phos, ical  Strict I&O  Avoid nephrotoxic agents   Does void in small amounts   Home torsemide and metolazone on hold

## 2024-07-11 NOTE — PROGRESS NOTES
Progress Note - Cardiology   [facility]     Jacquie Smith 69 y.o. female MRN: [id number]  : 1955  Unit/Bed#: ICU 09 Encounter: [id number]    Assessment and Plan:   1. Multiorgan dysfunction/shock: no clear source of infection    -   continue IV pressers and Inotrope support.    -   Empiric antibiotics per primary team    -   shock liver with peak AST 3417, peak ALT 2438 which is slowly resolving    -   patient auto anticoagulated due to shock liver, INR is slowly improving after receiving vitamin K 2024    -   for CT scan the chest abdomen and pelvis 2024 to rule out occult source of infection due to persistent leukocytosis     2. Paroxysmal atrial fibrillation/flutter: patient noted to have drop in hemodynamics when she converted from sinus to atrial flutter most likely due to loss of atrial    -   patient started on IV amiodarone standard drip with improvement in rate control    -   heparin held due to shock liver and auto anticoagulation with elevated INR and thrombocytopenia    -   patient given digoxin IV 2024 per ICU staff     3. End-stage kidney disease: previously on peritoneal dialysis    -   patient currently on CVVHD    -   followed by nephrology     4. Pulmonary hypertension: 2024 TTE: RV systolic pressure severely elevated 71 mmHg, RV cavity is dilated and systolic function is reduced with moderate to severe tricuspid valve regurgitation    -   WHO Group 2/3     5. Acute on chronic combined systolic and diastolic heart failure/cor pulmonale: 2024 TTE: EF visibly estimated 45-50%, RV cavity is dilated in systolic function is moderately reduced, there is moderate mitral valve regurgitation and mild stenosis and moderate to severe tricuspid valve regurgitation    -   patient anuric, continue CVVHD    -   continue Primacor 0.13 mcg/min    -   advanced heart failure medication on hold due to hypotension requiring vasopressor support    -   Monitor I and O and  weights    -   adjust medications as needed     6. History of PE: was on Eliquis 5 mg bid in the outpatient setting    -   transition to IV heparin in hospital, heparin held due to shock liver and thrombocytopenia    -   7/8/2024 VQ scan: low probability of PE    Subjective / Objective:   Vitals: Blood pressure (!) 80/50, pulse (!) 114, temperature (!) 97 °F (36.1 °C), temperature source Tympanic, resp. rate 18, height 5' (1.524 m), weight 86.2 kg (190 lb 0.6 oz), SpO2 100%, not currently breastfeeding.  Vitals:    07/10/24 0600 07/11/24 0530   Weight: 88.9 kg (195 lb 15.8 oz) 86.2 kg (190 lb 0.6 oz)     Body mass index is 37.11 kg/m².  BP Readings from Last 3 Encounters:   07/10/24 (!) 80/50   07/03/24 90/55   07/03/24 122/82     Orthostatic Blood Pressures      Flowsheet Row Most Recent Value   Blood Pressure 80/50 filed at 07/10/2024 2020   Patient Position - Orthostatic VS Lying filed at 07/11/2024 0400          I/O         07/09 0701  07/10 0700 07/10 0701  07/11 0700 07/11 0701  07/12 0700    P.O.  360     I.V. (mL/kg) 3067 (34.5) 3120 (36.2)     IV Piggyback       Total Intake(mL/kg) 3067 (34.5) 3480 (40.4)     Urine (mL/kg/hr) 0 (0) 135 (0.1)     Other 2192 7623     Total Output 4768 3458     Net -7066 -2034                  Invasive Devices       Peripheral Intravenous Line  Duration             Peripheral IV 07/07/24 Left;Proximal;Ventral (anterior) Forearm 4 days    Long-Dwell Peripheral IV (Midline) 07/07/24 Left Basilic 3 days              Arterial Line  Duration             Arterial Line 07/08/24 Radial 3 days              Line  Duration             Peritoneal Dialysis Catheter Abdominal 1471 days    Peritoneal Dialysis Catheter Tenckhoff Right lower abdomen 1455 days              Hemodialysis Catheter  Duration             HD Temporary Double Catheter 3 days                      Intake/Output Summary (Last 24 hours) at 7/11/2024 1113  Last data filed at 7/11/2024 0700  Gross per 24 hour   Intake 0931  ml   Output 6017 ml   Net -3050 ml         Physical Exam:   Physical Exam  Vitals reviewed.   Constitutional:       Appearance: She is obese. She is ill-appearing.   Eyes:      General: No scleral icterus.        Right eye: No discharge.         Left eye: No discharge.   Cardiovascular:      Rate and Rhythm: Tachycardia present. Rhythm irregular.      Pulses: Normal pulses.   Pulmonary:      Effort: Pulmonary effort is normal.      Breath sounds: Normal breath sounds.   Skin:     General: Skin is warm and dry.      Capillary Refill: Capillary refill takes less than 2 seconds.   Neurological:      General: No focal deficit present.      Mental Status: She is oriented to person, place, and time. Mental status is at baseline.           Medications/ Allergies:     Current Facility-Administered Medications   Medication Dose Route Frequency Provider Last Rate    amiodarone (CORDARONE) 900 mg in dextrose 5 % 500 mL infusion  1 mg/min Intravenous Continuous MICHAEL Lawrence 1 mg/min (07/11/24 0308)    cefepime  2,000 mg Intravenous Q12H ARISTEO CalderonNP 2,000 mg (07/11/24 0014)    chlorhexidine  15 mL Mouth/Throat Q12H FARZANA Ginny Bobby PA-C      docusate sodium  100 mg Oral BID Ginny Bobby PA-C      epoprostenol  50 ng/kg/min (Ideal) Inhalation Continuous Alexandra Spironello V CRNP 50 ng/kg/min (07/11/24 0957)    gentamicin  1 Application Topical Daily Ginny Bobby PA-C      HYDROmorphone  0.2 mg Intravenous Q4H PRN Ginny Bobby PA-C      insulin glargine  10 Units Subcutaneous HS Alexandra Segalo PRITI CRNP      insulin lispro  1-6 Units Subcutaneous Q6H FARZANA Alexandra Segalo ARISTEO MARCOSNP      levalbuterol  0.63 mg Nebulization Q6H PRN Ginny Bobby PA-C      levothyroxine  125 mcg Oral Early Morning Ginny Bobby PA-C      milrinone (Primacor) infusion  0.13 mcg/kg/min Intravenous Continuous Alexandra Spironello V, CRNP 0.13 mcg/kg/min (07/11/24 1017)     norepinephrine  1-30 mcg/min Intravenous Titrated Alexandra Spironello V, CRNP 15 mcg/min (07/11/24 1001)    NxStage K 4/Ca 3  20,000 mL Dialysis Continuous Pablo Acevedo MD      ondansetron  4 mg Intravenous Q6H PRN Ginny Bobby PA-C      pantoprazole  40 mg Intravenous Q24H FARZANA Alexandra Spironello V, CRNP      polyethylene glycol  17 g Oral Daily Ginny Bobby PA-C      senna  2 tablet Oral HS Ginny Bobby PA-C      vasopressin  0.04 Units/min Intravenous Continuous Alexandra Spironello V, CRNP 0.04 Units/min (07/11/24 0110)     HYDROmorphone, 0.2 mg, Q4H PRN  levalbuterol, 0.63 mg, Q6H PRN  ondansetron, 4 mg, Q6H PRN      No Known Allergies    VTE Pharmacologic Prophylaxis:   Sequential compression device (Venodyne)     Labs:   Troponins:  Results from last 7 days   Lab Units 07/08/24  1953 07/07/24  0635 07/07/24  0431   CK TOTAL U/L 92  --   --    HSTNI D2 ng/L  --   --  -7   HSTNI D4 ng/L  --  -3  --      CBC with diff:  Results from last 7 days   Lab Units 07/11/24  0449 07/10/24  0530 07/09/24  1040 07/09/24  0547 07/08/24  1018 07/08/24  0608 07/07/24  2108 07/07/24  0237   WBC Thousand/uL 16.37* 16.83*  --  20.47* 24.96* 17.22* 7.51 11.39*   HEMOGLOBIN g/dL 11.2* 11.5  --  12.9 14.1 14.2 8.3* 12.3   I STAT HEMOGLOBIN g/dl  --   --  13.3  --   --   --   --   --    HEMATOCRIT % 35.2 36.7  --  40.3 45.7 47.3* 32.5* 39.2   HEMATOCRIT, ISTAT %  --   --  39  --   --   --   --   --    MCV fL 92 92  --  90 94 96 116* 92   PLATELETS Thousands/uL 43* 63*  --  88* 105* 92* 115* 174   RBC Million/uL 3.84 3.97  --  4.49 4.88 4.93 2.85* 4.28   MCH pg 29.2 29.0  --  28.7 28.9 28.6 29.1 28.7   MCHC g/dL 31.8 31.3*  --  32.0 30.9* 29.8* 25.2* 31.4   RDW % 18.4* 18.1*  --  18.1* 18.6* 18.4* 17.3* 17.8*   MPV fL 11.5 11.2  --  10.8 10.9 11.4 11.7 11.6   NRBC AUTO /100 WBCs  --  1  --   --   --   --  0 0   NRBC /100 WBC  --   --   --  1  --   --   --   --      CMP:  Results from last 7 days   Lab Units  07/11/24  0449 07/10/24  2358 07/10/24  1748 07/10/24  1156 07/10/24  0530 07/10/24  0042 07/09/24  1814 07/09/24  1207 07/09/24  1040 07/09/24  0547 07/08/24  1018 07/08/24  0608 07/07/24  2253 07/07/24  0237   SODIUM mmol/L 135 133* 134* 133* 133* 132* 133*   < >  --  136   < > 134* 134* 135   POTASSIUM mmol/L 4.2 4.1 4.3 4.4 4.3 4.4 4.4   < >  --  4.3   < > 6.0* 4.1 4.6   CHLORIDE mmol/L 103 102 103 103 103 102 102   < >  --  103   < > 98 98 98   CO2 mmol/L 22 22 21 23 22 22 22   < >  --  21   < > 15* 16* 18*   CO2, I-STAT mmol/L  --   --   --   --   --   --   --   --  24  --   --   --   --   --    ANION GAP mmol/L 10 9 10 7 8 8 9   < >  --  12   < > 21* 20* 19*   BUN mg/dL 14 15 17 18 22 21 25   < >  --  40*   < > 85* 89* 85*   CREATININE mg/dL 0.97 1.08 1.18 1.29 1.76* 1.77* 2.04*   < >  --  3.20*   < > 7.81* 7.40* 7.84*   GLUCOSE, ISTAT mg/dl  --   --   --   --   --   --   --   --  287*  --   --   --   --   --    CALCIUM mg/dL 9.0 8.8 9.0 8.9 9.0 9.0 9.2   < >  --  9.3   < > 9.0 8.5 8.5   AST U/L 796*  --   --   --  1,348*  --   --   --   --  3,417*  --  215* 10* 14   ALT U/L 1,712*  --   --   --  1,995*  --   --   --   --  2,438*  --  133* 25 32   ALK PHOS U/L 156*  --   --   --  146*  --   --   --   --  147*  --  140* 126* 161*   TOTAL PROTEIN g/dL 4.9*  --   --   --  4.9*  --   --   --   --  5.5*  --  6.4 6.5 5.6*   ALBUMIN g/dL 3.1*  --   --   --  3.0*  --   --   --   --  3.4*  --  3.8 4.0 3.3*   TOTAL BILIRUBIN mg/dL 4.55*  --   --   --  3.17*  --   --   --   --  2.32*  --  2.00* 0.99 0.85   EGFR ml/min/1.73sq m 60 53 47 42 29 29 24   < >  --  14   < > 4 5 4    < > = values in this interval not displayed.     Magnesium:  Results from last 7 days   Lab Units 07/11/24  0449 07/10/24  2358 07/10/24  1748 07/10/24  1156 07/10/24  0530 07/10/24  0042 07/09/24  1814   MAGNESIUM mg/dL 1.9 2.0 1.8* 2.0 2.1 1.9 2.0     Coags:  Results from last 7 days   Lab Units 07/11/24  0449 07/10/24  1748 07/10/24  1156  07/10/24  0530 07/09/24  1207 07/09/24  0547 07/08/24  1833 07/08/24  0837 07/07/24  0237   PTT seconds  --  38* 129*  --   --   --  122* 49*  --    INR  1.52*  --   --  2.63* 5.90* 6.52*  --  4.24* 2.10*     TSH:  Results from last 7 days   Lab Units 07/08/24  1018   TSH 3RD GENERATON uIU/mL 14.176*     Hgb A1c:  Results from last 7 days   Lab Units 07/07/24  1030   HEMOGLOBIN A1C % 7.9*       Imaging & Testing   I have personally reviewed pertinent reports.     VAS VENOUS DUPLEX - LOWER LIMB BILATERAL    Result Date: 7/9/2024  Narrative:  [facility] REPORT CLINICAL: Indications: Patient presents with severe pain in the lower extremities bilaterally. Operative History: No prior cardiovascular surgeries. Risk Factors The patient has history of HTN, Diabetes (Yes) and CKD.   CONCLUSION:  Impression: RIGHT LOWER LIMB: No gross evidence of acute deep vein thrombosis. No evidence of superficial thrombophlebitis noted. Doppler evaluation shows a normal response to augmentation maneuvers.. Popliteal, posterior tibial and anterior tibial arterial Doppler waveform's are triphasic/biphasic.  LEFT LOWER LIMB: No gross evidence of acute deep vein thrombosis. No evidence of superficial thrombophlebitis noted. Doppler evaluation shows a normal response to augmentation maneuvers.. Popliteal, posterior tibial and anterior tibial arterial Doppler waveform's are triphasic/biphasic.  TECH NOTE: Pulsatile waveforms noted throughout the venous system which may suggest heart failure or tricuspid valve insufficiency.  Technically difficult/limited study due to patient inability to tolerate compression/position legs. Some segments may be poorly visualized on today's exam.  Technical findings were faxed to chart.  SIGNATURE: Electronically Signed by: STEPHANIE LOZOYA DO on 2024-07-09 01:32:47 PM    US right upper quadrant    Result Date: 7/9/2024  Narrative: RIGHT UPPER QUADRANT ULTRASOUND INDICATION: r/o cholecystitis. COMPARISON: CT  7/7/2024 TECHNIQUE: Real-time ultrasound of the right upper quadrant was performed with a curvilinear transducer with both volumetric sweeps and still imaging techniques. FINDINGS: PANCREAS: Poorly visualized secondary to bowel gas AORTA AND IVC: Visualized portions are normal for patient age. LIVER: Size: Within normal range. The liver measures 17.1 cm in the midclavicular line. Contour: Surface contour is smooth. Parenchyma: Echogenicity and echotexture are within normal limits. No liver mass identified. Limited imaging of the main portal vein shows it to be patent and hepatopetal. BILIARY: There is cholelithiasis. The gallbladder wall appears thickened at 7 mm but this is a nonspecific finding in the setting of ascites. Negative Truong sign. No intrahepatic biliary dilatation. CBD measures 6.0 mm. No choledocholithiasis. KIDNEY: Right kidney measures 8.7 x 3.2 x 3.6 cm. Volume 52.7 mL Cortical thinning/atrophy noted. No hydronephrosis. ASCITES: As above, small volume intra-abdominal ascites     Impression: 1. Cholelithiasis. Negative Truong sign. Gallbladder wall thickness of 7 mm but nonspecific in the setting of ascites. If there is strong clinical concern for acute cholecystitis, further evaluation with nuclear HIDA scan may be considered. 2. No biliary ductal dilatation. 3. Right renal atrophy and small volume ascites Workstation performed: [id number]     NM lung ventilation / perfusion    Result Date: 7/8/2024  Narrative: VENTILATION AND PERFUSION SCAN INDICATION: hypotension, RV dysfunction. rule out PE. history of PE, not compilant with coumadin COMPARISON: VQ scan dated 7/5/2018 and chest x-ray dated 7/8/2024 TECHNIQUE:  Posterior ventilation imaging was performed after the inhalation of 30 mCi nebulized Tc-99m DTPA with deposition of less than 1 mCi of activity within the lungs. Multiplanar perfusion imaging was next performed following the intravenous administration of 4.24 mCi Tc-99m labeled MAA.  FINDINGS: Ventilation imaging demonstrates significant deposition of tracer activity within the central airways is suggestive of central airways disease. Deposition of tracer activity within the central airways results in poor tracer accumulation involving the lung fields which limits evaluation of ventilation imaging. Perfusion imaging demonstrates mild nonuniform perfusion involving both lungs without moderate or large segmental perfusion defect. Please note that left lateral spot image is essentially nondiagnostic given intense tracer activity at IV sites.     Impression: Limited exam as detailed above without definite moderate or large segmental perfusion defect. Therefore, findings are most consistent with low probability for acute pulmonary embolus. Workstation performed: [id number]     XR chest portable    Result Date: 7/8/2024  Narrative: XR CHEST PORTABLE INDICATION: R IJ TLC. COMPARISON: CXR and chest CT 7/7/2024. FINDINGS: Right jugular catheter in right atrium. Diffuse nodularity throughout the lungs better shown on CT. No pneumothorax or pleural effusion. Mild cardiomegaly. Pulmonary artery enlargement. Bones are unremarkable for age. Normal upper abdomen.     Impression: Right jugular catheter in right atrium with no pneumothorax. Diffuse nodularity throughout the lungs better shown on CT. Pulmonary artery enlargement which can be seen with pulmonary hypertension. Workstation performed: [id number]     Echo complete w/ contrast if indicated    Result Date: 7/8/2024  Narrative:   Left Ventricle: Left ventricular cavity size is normal. Wall thickness is mildly increased. The left ventricular ejection fraction is 45-50%. Systolic function is mildly reduced. Although no diagnostic regional wall motion abnormality was identified, this possibility cannot be completely excluded on the basis of this study.   Right Ventricle: Right ventricular cavity size is dilated. Systolic function is reduced.   Aortic Valve:  The aortic valve is trileaflet. The leaflets are mildly thickened. The leaflets are moderately calcified. The leaflets exhibit normal mobility. There is mild to moderate stenosis.  Peak velocity is 1.38 m/s and mean gradient is 3.75 mmHg   Mitral Valve: There is severe annular calcification. There is moderate regurgitation with a posteriorly directed jet. There is mild stenosis.  Mean gradient is 2.6 mmHg   Tricuspid Valve: There is moderate to severe regurgitation. The tricuspid valve regurgitation jet is directed toward septum. The right ventricular systolic pressure is severely elevated at 71 mmHg.   Pulmonic Valve: There is trace regurgitation.     XR chest 1 view portable    Result Date: 7/7/2024  Narrative: XR CHEST PORTABLE INDICATION: asdf. COMPARISON: CXR 7/3/2024, chest CT 6/18/2024. FINDINGS: Mild pulmonary venous congestion. No pneumothorax or pleural effusion. Normal cardiomediastinal silhouette. Bones are unremarkable for age. Normal upper abdomen.     Impression: Mild pulmonary venous congestion. Workstation performed: [id number]     CT chest abdomen pelvis wo contrast    Result Date: 7/7/2024  Narrative: CT CHEST, ABDOMEN AND PELVIS WITHOUT IV CONTRAST INDICATION: RLQ LLQ pain. COMPARISON: CT chest on 6/18/2024 TECHNIQUE: CT examination of the chest, abdomen and pelvis was performed without intravenous contrast. Multiplanar 2D reformatted images were created from the source data. This examination, like all CT scans performed in the [facility], was performed utilizing techniques to minimize radiation dose exposure, including the use of iterative reconstruction and automated exposure control. Radiation dose length product (DLP) for this visit: 1323 mGy-cm Enteric Contrast: Not administered. FINDINGS: CHEST LUNGS: Hazy opacity and mild septal thickening. Stable tree-in-bud nodularity mostly in the right lung with a few areas of mild mosaic attenuation, similar to the prior study.  Multiple calcified small nodules are reidentified. No tracheal or endobronchial lesion. PLEURA: Unremarkable. HEART/GREAT VESSELS: Mild cardiomegaly. No thoracic aortic aneurysm. Enlarged pulmonary artery with calcification of the walls, consistent with history of pulmonary hypertension. Left atrial wall calcifications. Coronary artery calcifications. MEDIASTINUM AND TEOFILO: Multiple nonspecific subcentimeter mediastinal lymph nodes, similar to the prior study. CHEST WALL AND LOWER NECK: Stable thyromegaly. Stable calcification in the right lobe. ABDOMEN LIVER/BILIARY TREE: Unremarkable. GALLBLADDER: Distended. No calcified gallstones. No pericholecystic inflammatory change. SPLEEN: Unremarkable. PANCREAS: Unremarkable. ADRENAL GLANDS: Unremarkable. KIDNEYS/URETERS: Atrophic kidneys. No hydronephrosis. Bilateral renal vascular calcifications. STOMACH AND BOWEL: Colonic diverticulosis without findings of acute diverticulitis. APPENDIX: No findings to suggest appendicitis. ABDOMINOPELVIC CAVITY: Peritoneal dialysis terminates in the anterior pelvis. Small volume of abdominopelvic ascites. No pneumoperitoneum. No lymphadenopathy. VESSELS: Advanced atherosclerosis without abdominal aortic aneurysm. PELVIS REPRODUCTIVE ORGANS: Fibroid uterus. 2 cm simple appearing left adnexal cyst. URINARY BLADDER: Unremarkable. ABDOMINAL WALL/INGUINAL REGIONS: Mild body wall edema. BONES: No acute fracture or suspicious osseous lesion.     Impression: Hazy opacity and mild septal thickening suggestive of interstitial edema. Peritoneal dialysis catheter is present. Small volume of ascites. Mild body wall edema. Workstation performed: [id number]     XR chest 1 view portable    Result Date: 7/4/2024  Narrative: XR CHEST PORTABLE INDICATION: A-fib. COMPARISON: Chest CT 6/18/2024. CXR 2/5/2024. FINDINGS: Mild pulmonary venous congestion. No pneumothorax or pleural effusion. Mild cardiomegaly. Bones are unremarkable for age. Normal upper  "abdomen.     Impression: Mild pulmonary venous congestion. Workstation performed: [id number]         Tika RODRIGUEZ  Cardiology      \"This note was completed in part utilizing [employer] direct voice recognition software.   Grammatical errors, random word insertion, spelling mistakes, and incomplete sentences may be an occasional consequence of the system secondary to software limitations, ambient noise and hardware issues.    Please read the chart carefully and recognize, using context, where substitutions have occurred.  If you have any questions or concerns about the context, text or information contained within the body of this dictation, please contact myself, the provider, for further clarification.\"  "

## 2024-07-11 NOTE — CONSULTS
Quick note:  Our group member Dr. Amaris Quijano has reviewed  patient's medical records and has given advice to MICHAEL Betancourt.

## 2024-07-11 NOTE — ASSESSMENT & PLAN NOTE
Platelet count 43 from 63   Thrombocytopenia in setting of shock liver and from degradation in CRRT circuit   Continue to trend CBC daily   4HT score currently low probability for HIT

## 2024-07-11 NOTE — PROCEDURES
Procedure: HD catheter re-positioning and sutured     HD catheter in appropriate position on CXR, however red port with no blood return. Site cleansed with 2% chlorahexidine and patient prepped and draped in sterile manner. Site injected with 1% lidocaine without epinephrine. Sutures removed and catheter flipped 180 degrees and re sutured. Blood return now sluggish through red port. New CRRT circuit attached however continues to give high pressure alarms and unable to run. At this time have ordered Cathflow for red port. Repeat CXR post repositioning ordered.     MICHAEL Mcdonald

## 2024-07-11 NOTE — PROGRESS NOTES
NEPHROLOGY PROGRESS NOTE   Jacquie Smith 69 y.o. female MRN: [id number]  Unit/Bed#: ICU 09 Encounter: [id number]    ASSESSMENT & PLAN:  69-year-old female presented to [facility] after having complaint of abdominal pain and low urine output.  Nephrology consulted for ESRD on PD  End-stage renal disease on  dialysis  -Outpatient unit: [facility] and was getting peritoneal dialysis  -Access: Currently has PD catheter  -CCPD at home - total volume 8 L, fill volume 2 L x 4 exchanges over 8 hours   -Plan: After admission patient was started on CAPD with 4.25 alternating with 2.5% dextrose but she became hypotensive and was complaining of abdominal pain and nausea and so PD was held on the night of 7/7.  After consultation on 7/8 patient was started on CVVHD ultrafiltration on CVVHD was increased 150 mL/h net negative on 7/10 as patient was significantly above the dry weight of 84 kg.  Prior CT chest from 7/7 suggestive of interstitial edema  -Patient was seen and examined on CVVHD.   mL/min, DFR 2.5 L/h, 4K bath, running at 150 mL/h net negative.  Continue vasopressors per ICU team.  Monitor electrolytes and replace as needed.  Continue management of A-fib with RVR.  Monitor vancomycin level and dose all antibiotics for CVVHD dosing  -Continue gentamicin cream to the PD catheter    Volume status/fluid overload/acute on chronic systolic CHF  -Estimated dry weight 84 kg  -CT chest abdomen pelvis from 7/7 was suggestive of fluid overload with finding of interstitial edema in the lungs and body wall edema chest x-ray from 7/7 suggestive of pulmonary venous congestion.  Patient clinically appears fluid overloaded.  Weight is above target weight  -Was started on CVVHD on 7/8 continue CVVHD as mentioned above    CKD/MBD  -Secondary hyperparathyroidism of renal origin: On home calcitriol 0.25 mcg daily, Cinacalcet 30 mg 3 times a week.  Currently on hold, would consider restarting once hemodynamic status and oral intake  improves  -Hyperphosphatemia, on PhosLo 2 tablet 3 times daily with meals to admission, currently on hold.  Last phosphorus level was at target range    Blood pressure/hypotension-shock  -Echocardiogram showed ejection fraction 45 to 50%.  IVC dilated.  Continue vasopressors and milrinone per primary team.   Continue ultrafiltration with CVVHD as mentioned above    Electrolytes:   -Hyperkalemia, resolved with CVVHD.  Currently using 4K bath and last potassium level was 4.3 meq/L  -Hyponatremia: Sodium 135, improved, continue to monitor    Anemia due to ESRD  -Goal hemoglobin:  10-11 grams/deciliter  -Current hemoglobin: Slightly trending down to 11.2 g/dL  -Plan: Continue to monitor per primary team    Chronic respiratory failure with hypoxia, on oxygen by nasal cannula.  Has history of pulmonary hypertension    Abdominal pain  -CT abdomen on admission negative for any acute pathology  -PD fluid cell count 27 with 23% neutrophil.  Gram stain negative, culture negative.  No evidence of PD peritonitis.  Recommend further workup and management per primary team, status post right upper quadrant ultrasound suggestive of cholelithiasis but negative Truong's sign.    Leukocytosis with no clear source: WBC count improving but still elevated    Severe pulmonary hypertension on high flow nasal cannula for Veletri administration     A-fib with RVR, on amiodarone drip currently per ICU team  History of obstructive sleep apnea  History of PE on anticoagulation.  VQ scan was suggestive of low probability for acute PE     Discussed with ICU  attending regarding plan to continue current CVVHD prescription with ultrafiltration 150 mL/h net negative and agree with the plan    SUBJECTIVE:  Patient still on CVVHD running at 150 mL/h net negative.  Still on vasopressor Levophed and vasopressin along with milrinone.  Also on amiodarone drip    OBJECTIVE:  Current Weight: Weight - Scale: 86.2 kg (190 lb 0.6 oz)  Vitals:    07/11/24 0630    BP:    Pulse: (!) 117   Resp: 18   Temp:    SpO2: 97%       Intake/Output Summary (Last 24 hours) at 7/11/2024 0811  Last data filed at 7/11/2024 0700  Gross per 24 hour   Intake 3377 ml   Output 6662 ml   Net -3285 ml       Physical Exam  General:  Ill looking, awake.  On high flow oxygen  Eyes: Conjunctivae pink,  Sclera anicteric  ENT: lips and mucous membranes moist  Neck: supple   Chest: Clear to Auscultation both lungs,  no crackles, ronchus or wheezing.  CVS: S1 & S2 present, normal rate, regular rhythm, no murmur.  Abdomen: soft, non-tender, non-distended, Bowel sounds normoactive  Extremities: no edema of  legs  Skin: no rash  Neuro: awake, alert, oriented x 3  Psych: Mood and affect appropriate      Medications:    Current Facility-Administered Medications:     [COMPLETED] amiodarone 150 mg in dextrose 5 % 100 mL IV bolus, 150 mg, Intravenous, Once, Stopped at 07/09/24 0700 **AND** amiodarone (CORDARONE) 900 mg in dextrose 5 % 500 mL infusion, 1 mg/min, Intravenous, Continuous, MICHAEL Lawrence, Last Rate: 33.3 mL/hr at 07/11/24 0308, 1 mg/min at 07/11/24 0308    cefepime (MAXIPIME) IVPB (premix in dextrose) 2,000 mg 50 mL, 2,000 mg, Intravenous, Q12H, ARISETO CalderonNP, Last Rate: 100 mL/hr at 07/11/24 0014, 2,000 mg at 07/11/24 0014    chlorhexidine (PERIDEX) 0.12 % oral rinse 15 mL, 15 mL, Mouth/Throat, Q12H FARZANA, PRASAD GuzmanC, 15 mL at 07/10/24 2047    docusate sodium (COLACE) capsule 100 mg, 100 mg, Oral, BID, Ginny Bobby PA-C, 100 mg at 07/10/24 0846    epoprostenol (VELETRI) 30,000 ng/mL in sodium chloride 0.9 % 50 mL inhalation solution, 50 ng/kg/min (Ideal), Inhalation, Continuous, MICHAEL Barajas, Last Rate: 4.6 mL/hr at 07/11/24 0425, 50 ng/kg/min at 07/11/24 0425    gentamicin (GARAMYCIN) 0.1 % cream 1 Application, 1 Application, Topical, Daily, Ginny Bobby PA-C, 1 Application at 07/10/24 1220    HYDROmorphone (DILAUDID) injection 0.2  mg, 0.2 mg, Intravenous, Q4H PRN, Ginny Bobby PA-C, 0.2 mg at 07/07/24 1541    insulin glargine (LANTUS) subcutaneous injection 10 Units 0.1 mL, 10 Units, Subcutaneous, HS, Alexandra Betancourt V, CRNP, 10 Units at 07/10/24 2215    insulin lispro (HumALOG/ADMELOG) 100 units/mL subcutaneous injection 1-6 Units, 1-6 Units, Subcutaneous, Q6H FARZANA, 1 Units at 07/11/24 0501 **AND** Fingerstick Glucose (POCT), , , Q6H, Alexandra Betancourt V, CRNP    levalbuterol (XOPENEX) inhalation solution 0.63 mg, 0.63 mg, Nebulization, Q6H PRN, Ginny Bobby PA-C    levothyroxine tablet 125 mcg, 125 mcg, Oral, Early Morning, Ginny Bobby PA-C, 125 mcg at 07/10/24 0841    milrinone (PRIMACOR) 20 mg in 100 mL infusion (premix), 0.13 mcg/kg/min, Intravenous, Continuous, Jeanie Langley, CRNP, Last Rate: 3.4 mL/hr at 07/11/24 0312, 0.13 mcg/kg/min at 07/11/24 0312    norepinephrine (LEVOPHED) 8 mg (DOUBLE CONCENTRATION) IV in sodium chloride 0.9% 250 mL, 1-30 mcg/min, Intravenous, Titrated, Alexandra Betancourt V, CRNP, Last Rate: 28.1 mL/hr at 07/11/24 0211, 15 mcg/min at 07/11/24 0211    NxStage K 4/Ca 3 dialysis solution (RFP-401) 20,000 mL, 20,000 mL, Dialysis, Continuous, Pablo Acevedo MD, 20,000 mL at 07/11/24 0000    ondansetron (ZOFRAN) injection 4 mg, 4 mg, Intravenous, Q6H PRN, Ginny Bobby PA-C, 4 mg at 07/08/24 0912    pantoprazole (PROTONIX) injection 40 mg, 40 mg, Intravenous, Q24H FARZANA, Alexandra Felixnello V, CRNP, 40 mg at 07/10/24 0841    polyethylene glycol (MIRALAX) packet 17 g, 17 g, Oral, Daily, Ginny Bobby PA-C, 17 g at 07/09/24 0844    senna (SENOKOT) tablet 17.2 mg, 2 tablet, Oral, HS, Ginny Bobby PA-C    vasopressin (PITRESSIN) 20 Units in sodium chloride 0.9 % 100 mL infusion, 0.04 Units/min, Intravenous, Continuous, Alexandra Spironello V, CRNP, Last Rate: 12 mL/hr at 07/11/24 0110, 0.04 Units/min at 07/11/24 0110    Invasive Devices:        Lab Results:   Results from  "last 7 days   Lab Units 07/11/24  0449 07/10/24  2358 07/10/24  1748 07/10/24  1156 07/10/24  0530 07/09/24  1207 07/09/24  1040 07/09/24  0547   WBC Thousand/uL 16.37*  --   --   --  16.83*  --   --  20.47*   HEMOGLOBIN g/dL 11.2*  --   --   --  11.5  --   --  12.9   I STAT HEMOGLOBIN g/dl  --   --   --   --   --   --  13.3  --    HEMATOCRIT % 35.2  --   --   --  36.7  --   --  40.3   HEMATOCRIT, ISTAT %  --   --   --   --   --   --  39  --    PLATELETS Thousands/uL 43*  --   --   --  63*  --   --  88*   POTASSIUM mmol/L 4.2 4.1 4.3   < > 4.3   < >  --  4.3   CHLORIDE mmol/L 103 102 103   < > 103   < >  --  103   CO2 mmol/L 22 22 21   < > 22   < >  --  21   CO2, I-STAT mmol/L  --   --   --   --   --   --  24  --    BUN mg/dL 14 15 17   < > 22   < >  --  40*   CREATININE mg/dL 0.97 1.08 1.18   < > 1.76*   < >  --  3.20*   CALCIUM mg/dL 9.0 8.8 9.0   < > 9.0   < >  --  9.3   MAGNESIUM mg/dL 1.9 2.0 1.8*   < > 2.1   < >  --  2.1   PHOSPHORUS mg/dL 3.0 2.2* 2.2*   < > 3.1   < >  --  4.5*   ALK PHOS U/L 156*  --   --   --  146*  --   --  147*   ALT U/L 1,712*  --   --   --  1,995*  --   --  2,438*   AST U/L 796*  --   --   --  1,348*  --   --  3,417*   GLUCOSE, ISTAT mg/dl  --   --   --   --   --   --  287*  --     < > = values in this interval not displayed.       Previous work up:  Reviewed last CT chest abdomen pelvis report      Portions of the record may have been created with voice recognition software. Occasional wrong word or \"sound a like\" substitutions may have occurred due to the inherent limitations of voice recognition software. Read the chart carefully and recognize, using context, where substitutions have occurred.If you have any questions, please contact the dictating provider.    "

## 2024-07-11 NOTE — ASSESSMENT & PLAN NOTE
Wt Readings from Last 3 Encounters:   07/11/24 86.2 kg (190 lb 0.6 oz)   07/03/24 83.6 kg (184 lb 6.4 oz)   07/02/24 83.9 kg (185 lb)     Previous Echo 4/2023 with EF 40-45%, mild AS, mild to moderately reduced RV function, severe MAC, moderate MR, mild pulmonary hypertension  PTA meds: metoprolol, torsemide 100mg daily, valsartan  Patient also does PD for volume removal   Presents with generalized malaise, difficulty with ambulation, swelling over several weeks  Hypervolemic on exam     Plan:  Repeat 7/8 Echo EF 45-50%, No RWMA, dilated RV, moderate AS, moderate MR, moderate to severe TR, estimated RVSP 71.   Continue Milrinone and inhaled Veletri as above  CVVHD for volume removal -150  Net negative 3.5L x 24 hours   Strict I&O and daily weights   Holding GDMT in setting of shock

## 2024-07-11 NOTE — ASSESSMENT & PLAN NOTE
2/2 hepatic congestion with cardiogenic shock  7/9: RUQ U/S-Cholelithiasis. Negative Truong sign. Gallbladder wall thickness of 7 mm but nonspecific in the setting of ascites. If there is strong clinical concern for acute cholecystitis, further evaluation with nuclear HIDA scan may be considered.No biliary ductal dilatation  Acute hepatitis panel negative   LFTs slightly improving, T bili rising at 4.5   Continue to trend hepatic function panel daily

## 2024-07-11 NOTE — ASSESSMENT & PLAN NOTE
Hx PAF/atrial flutter. She was seen as an outpatient both on 7/1 and 7/3 and was in rapid atrial fibrillation at that time and advised to go to the emergency department--noncompliant with plan. She was seen 7/3 in the ED but did not want to be admitted.   PTA meds: metoprolol 25mg XL, coumadin (unclear compliance with medications, INR below therapeutic range on multiple recent checks)  Echo as above    Plan:  Hold beta blocker in setting of shock   S/p Amio bolus and gtt 7/9, continues on Amio gtt 1mg/min  S/p 250mcg of Dig 7/9 and 7/10   Dig level 1.1 this AM  Remains in Afib rate controlled 90s-low 100s  Optimize electrolytes  AC has been on hold, INR previously supratherapeutic now 1.5, however platelets 43

## 2024-07-11 NOTE — ASSESSMENT & PLAN NOTE
Chronically on 3LNC, non compliant with hx CPAP  Currently on HFNC 30L 30% for administration of Veletri  Refused CPAP last night d/t discomfort   Goal Sp02 >88%  Pulmonary toilet; encourage coughing and deep breathing, IS q1h while awake

## 2024-07-11 NOTE — ASSESSMENT & PLAN NOTE
Lab Results   Component Value Date    HGBA1C 7.9 (H) 07/07/2024       Recent Labs     07/10/24  0045 07/10/24  0609 07/10/24  2359 07/11/24  0459   POCGLU 193* 213* 157* 154*       Blood Sugar Average: Last 72 hrs:  (P) [id number]    Home regimen: lantus 10 units HS and 3 units humalog TID with meals, also on ozempic   Continue SSI Alg #3 and Lantus 10 units @ HS   Goal blood glucose less than 180

## 2024-07-11 NOTE — PROGRESS NOTES
[facility]  Interval Progress Note: Critical Care  Name: Jacquie Smith I  MRN: [id number]  Unit/Bed#: ICU 09 I Date of Admission: 7/7/2024   Date of Service: 7/11/2024 I Hospital Day: 4    Interval Events:  CRRT has not been able to run throughout the day despite lots of troubleshooting with machine and line. Machine alarming high pressures. RN unable to aspirate from red port despite line repositioning and Cathflo was administered. 1st attempt was able to be aspirated back out but still did not aspirate blood and Cathflo was adminstered for 2nd time. Blue port does aspirate blood but is visible clots quickly in the syringe as does blood drawn from joaquín. Patient also with void occurrence that is now brown which is a change from void this morning which was yellow.  Notified by the lab that 2 attempts at lab work were grossly hemolyzed and unable to be run. Only able to result + schistocytes in hemolysis smear.   Discussed with [facility] lab and [facility] lab, nothing further to offer to eliminate blood clotting. Was able to run an Istat which showed drop in hgb from 11.2-->8.8. Lab suggested drawing blood and quickly physically delivering lab tubes in inverted position to lab and they would put them on the centrifuge right away. We have also requested that the lab still report hemolyzed values with a notation that specimen is hemolyzed.   Discussed case with hematology via [employer] secure chat and feel that this is most consistent with DIC  Patient has had escalating vasopressor requirement since CRRT down and has been on Levo 18-20mcg and continues on Vaso 0.04 units   GOC discussion held at bedside by Dr. Jean-Baptiste for which I was present for, with patient's daughter and nieces. Patient is lethargic but able to wake and participate in some of the discussion. 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 In addition patient's RV failure in and of itself may not be recoverable, as she has proved to be very dependent on inhaled veletri. She developed an acute hypotensive and hypoxic episode during a brief pause of veletri for CT. She also continues to require high dose vasopressors and inotropic therapy to support her. Dr. Jean-Baptiste discussed code status with family as well as presented the option of comfort care. They are taking time to talk over amongst themselves.       Billing Level:  Critical Care Time Statement: Upon my evaluation, this patient had a high probability of imminent or life-threatening deterioration due to DIC, shock, which required my direct attention, intervention, and personal management.  I spent a total of 90 minutes directly providing critical care services, including interpretation of complex medical databases, evaluating for the presence of life-threatening injuries or illnesses, management of organ system failure(s) , complex medical decision making (to support/prevent further life-threatening deterioration)., interpretation of hemodynamic data, titration of vasoactive medications, and discussing with consultants and laboratory staff . This time is exclusive of procedures, teaching, family meetings, and any prior time recorded by providers other than myself.      SIGNATURE: MICHAEL Mcdonald

## 2024-07-11 NOTE — PROGRESS NOTES
[facility]  Progress Note  Name: Jacquie Smith I  MRN: [id number]  Unit/Bed#: ICU 09 I Date of Admission: 7/7/2024   Date of Service: 7/11/2024 I Hospital Day: 4    Assessment & Plan   Shock (HCC)  Assessment & Plan  Cardiogenic shock with possible sepsis component   No clear source of infection but with leukocytosis and elevated procal   CT C/A/P 7/7:Hazy opacity and mild septal thickening suggestive of interstitial edema.Peritoneal dialysis catheter is present. Small volume of ascites.Mild body wall edema.  7/8 Echo EF 45-50%, No RWMA, dilated RV, moderate AS, moderate MR, moderate to severe TR, estimated RVSP 71.   Previous Echo 4/2023 with EF 40-45%, mild AS, mild to moderately reduced RV function, severe MAC, moderate MR, mild pulmonary hypertension    Plan:  Continue levophed (15mcg) and vaso 0.04 for goal MAP >65  Milrinone 0.13mcg started 7/8 and ScVO2 improved from 52% to 72%  Inhaled veletri started 7/9, remains at 50ng via HFNC   ScVO2 73% this morning  Volume removal with CRRT -150  Trend end points  Lactic cleared, trend VBG q6h  Patient refused swan placement   Empiric antibiotics pending culture data  Procal 9.3-->6.2  Persistent leukocytosis (WBC 16)   Cefepime D4  MRSA negative, Vanco D/C 7/10  BC NG x 72 hours   Flu/COVID/RSV negative  Peritoneal fluid culture negative   Unable to obtain sputum but has had no increase in O2 requirements   Check strep pneumo and legionella pending   UA 4-10 WBC, mod bacteria, urine culture pending   CT C/A/P w/ contrast this AM to r/o occult source of infection     Acute on chronic combined systolic and diastolic congestive heart failure (HCC)  Assessment & Plan  Wt Readings from Last 3 Encounters:   07/11/24 86.2 kg (190 lb 0.6 oz)   07/03/24 83.6 kg (184 lb 6.4 oz)   07/02/24 83.9 kg (185 lb)     Previous Echo 4/2023 with EF 40-45%, mild AS, mild to moderately reduced RV function, severe MAC, moderate MR, mild pulmonary hypertension  PTA meds:  metoprolol, torsemide 100mg daily, valsartan  Patient also does PD for volume removal   Presents with generalized malaise, difficulty with ambulation, swelling over several weeks  Hypervolemic on exam     Plan:  Repeat 7/8 Echo EF 45-50%, No RWMA, dilated RV, moderate AS, moderate MR, moderate to severe TR, estimated RVSP 71.   Continue Milrinone and inhaled Veletri as above  CVVHD for volume removal -150  Net negative 3.5L x 24 hours   Strict I&O and daily weights   Holding GDMT in setting of shock         Pulmonary hypertension with RV failure  Assessment & Plan  group 2 and 3 pulmonary hypertension  Has long standing hx of severe CHANDRA non compliant with CPAP on 3LNC at baseline   See plan as outlined     Shock liver  Assessment & Plan  2/2 hepatic congestion with cardiogenic shock  7/9: RUQ U/S-Cholelithiasis. Negative Truong sign. Gallbladder wall thickness of 7 mm but nonspecific in the setting of ascites. If there is strong clinical concern for acute cholecystitis, further evaluation with nuclear HIDA scan may be considered.No biliary ductal dilatation  Acute hepatitis panel negative   LFTs slightly improving, T bili rising at 4.5   Continue to trend hepatic function panel daily    Atrial fibrillation with RVR (HCC)  Assessment & Plan  Hx PAF/atrial flutter. She was seen as an outpatient both on 7/1 and 7/3 and was in rapid atrial fibrillation at that time and advised to go to the emergency department--noncompliant with plan. She was seen 7/3 in the ED but did not want to be admitted.   PTA meds: metoprolol 25mg XL, coumadin (unclear compliance with medications, INR below therapeutic range on multiple recent checks)  Echo as above    Plan:  Hold beta blocker in setting of shock   S/p Amio bolus and gtt 7/9, continues on Amio gtt 1mg/min  S/p 250mcg of Dig 7/9 and 7/10   Dig level 1.1 this AM  Remains in Afib rate controlled 90s-low 100s  Optimize electrolytes  AC has been on hold, INR previously  supratherapeutic now 1.5, however platelets 43      Thrombocytopenia ([facility])  Assessment & Plan  Platelet count 43 from 63   Thrombocytopenia in setting of shock liver and from degradation in CRRT circuit   Continue to trend CBC daily   4HT score currently low probability for HIT    Supratherapeutic INR  Assessment & Plan  In setting shock liver  INR peak of 6.5 s/p 10mg IV Vitamin K x 2  on 7/9  INR 1.5 this morning   Continue to anticoagulation d/t thrombocytopenia     Chronic respiratory failure with hypoxia (HCC)  Assessment & Plan  Chronically on 3LNC, non compliant with hx CPAP  Currently on HFNC 30L 30% for administration of Veletri  Refused CPAP last night d/t discomfort   Goal Sp02 >88%  Pulmonary toilet; encourage coughing and deep breathing, IS q1h while awake     CHANDRA (obstructive sleep apnea)  Assessment & Plan  Ordered CPAP 12 @ HS but refused last night     Hypothyroidism  Assessment & Plan  Continue Synthroid  TSH 14.1, free T4 1.75    Type 2 diabetes mellitus, with long-term current use of insulin ([facility])  Assessment & Plan  Lab Results   Component Value Date    HGBA1C 7.9 (H) 07/07/2024       Recent Labs     07/10/24  0045 07/10/24  0609 07/10/24  2359 07/11/24  0459   POCGLU 193* 213* 157* 154*       Blood Sugar Average: Last 72 hrs:  (P) [id number]    Home regimen: lantus 10 units HS and 3 units humalog TID with meals, also on ozempic   Continue SSI Alg #3 and Lantus 10 units @ HS   Goal blood glucose less than 180       ESRD (end stage renal disease) ([facility])  Assessment & Plan  Lab Results   Component Value Date    EGFR 60 07/11/2024    EGFR 53 07/10/2024    EGFR 47 07/10/2024    CREATININE 0.97 07/11/2024    CREATININE 1.08 07/10/2024    CREATININE 1.18 07/10/2024     Patient on chronic peritoneal dialysis, reports compliance with daily PD  PD initiated on admission but given acute hypotension and nausea, PD fluid drained prior to full dwell time  Nephrology consulted  CVVHD initiated 7/8,  "running -150  Q6 hour BMP, Mg, Phos, ical  Strict I&O  Avoid nephrotoxic agents   Does void in small amounts   Home torsemide and metolazone on hold     Mixed hyperlipidemia  Assessment & Plan  Hold statin secondary to shock liver    Essential hypertension  Assessment & Plan  Hold antihypertensive medications in setting of shock    Morbid obesity with BMI of 40.0-44.9, adult (HCC)  Assessment & Plan  Encourage diet and lifestyle modifications   Nutrition consult     History of pulmonary embolus (PE)  Assessment & Plan  Remote hx, continue Coumadin but patient reports she is poorly compliant with several recent INRs less than therapeutic range  7/8: VQ scan no mod/large PE limited study   7/9: LE venous duplex-negative             Disposition: Critical care    ICU Core Measures     A: Assess, Prevent, and Manage Pain Has pain been assessed? Yes  Need for changes to pain regimen? No   B: Both SAT/SAT  N/A   C: Choice of Sedation RASS Goal: N/A patient not on sedation  Need for changes to sedation or analgesia regimen? NA   D: Delirium CAM-ICU: Negative   E: Early Mobility  Plan for early mobility? Yes   F: Family Engagement Plan for family engagement today? Yes       Antibiotic Review: Continue broad spectrum secondary to severity of illness.     Review of Invasive Devices:      Central access plan: Medications requiring central line HD cath in place.  Plan continue CRRT  Cindi Plan: Keep arterial line for hemodynamic monitoring    Prophylaxis:  VTE    Stress Ulcer  covered bypantoprazole (PROTONIX) injection 40 mg [[id number]]         Significant 24hr Events     24hr events: CRRT running -150. Net negative 3.5L x 24 hours. Infusions: Levo 15mcg, Vaso 0.04 units, Milrinone 0.13mcg, Amio 1mg. Continues on inhaled veletri 50ng via HFNC 30L 30%.      Subjective   Review of Systems: Review of Systems   Constitutional:         \"I couldn't wear that mask. It was too uncomfortable.\"    Gastrointestinal:  Positive for " abdominal pain.        RLQ    All other systems reviewed and are negative.       Objective                            Vitals I/O      Most Recent Min/Max in 24hrs   Temp (!) 97 °F (36.1 °C) Temp  Min: 96.8 °F (36 °C)  Max: 97.3 °F (36.3 °C)   Pulse (!) 117 Pulse  Min: 103  Max: 119   Resp 18 Resp  Min: 14  Max: 28   BP (!) 80/50 BP  Min: 80/50  Max: 115/66   O2 Sat 96 % SpO2  Min: 90 %  Max: 99 %      Intake/Output Summary (Last 24 hours) at 7/11/2024 0926  Last data filed at 7/11/2024 0700  Gross per 24 hour   Intake 3172 ml   Output 6515 ml   Net -3343 ml       Diet Clear Liquid    Invasive Monitoring   Arterial Line  Cindi /54  Arterial Line BP  Min: 94/48  Max: 134/60   MAP 78 mmHg  Arterial Line MAP (mmHg)  Min: 62 mmHg  Max: 86 mmHg           Physical Exam   Physical Exam  Eyes:      General: Lids are normal.      Extraocular Movements: Extraocular movements intact.      Conjunctiva/sclera: Conjunctivae normal.      Pupils: Pupils are equal, round, and reactive to light.   Skin:     General: Skin is warm and dry.      Capillary Refill: Capillary refill takes less than 2 seconds.   HENT:      Head: Normocephalic and atraumatic.   Neck:      Trachea: Trachea normal.   Cardiovascular:      Rate and Rhythm: Tachycardia present. Rhythm irregularly irregular.      Pulses:           Radial pulses are 2+ on the right side and 2+ on the left side.        Dorsalis pedis pulses are 2+ on the right side and 2+ on the left side.      Heart sounds: S1 normal and S2 normal.      Gallop present. S4 sounds present.      Comments: Afib rate 90-low 100s. Generalized edema in B/L upper and lower extremities.   Musculoskeletal:      Cervical back: Full passive range of motion without pain, normal range of motion and neck supple.      Comments: Normal ROM, generalized weakness    Abdominal: General: Bowel sounds are normal.      Palpations: Abdomen is soft.   Constitutional:       General: She is awake.      Appearance: She  is obese. She is ill-appearing.   Pulmonary:      Effort: Pulmonary effort is normal.      Comments: Course rales bilaterally   Psychiatric:         Attention and Perception: Attention and perception normal.         Mood and Affect: Mood and affect normal.         Speech: Speech normal.         Behavior: Behavior normal. Behavior is cooperative.         Thought Content: Thought content normal.         Cognition and Memory: Cognition and memory normal.   Neurological:      General: No focal deficit present.      Mental Status: She is alert and oriented to person, place, and time.      GCS: GCS eye subscore is 4. GCS verbal subscore is 5. GCS motor subscore is 6.            Diagnostic Studies      No new imaging      Medications:  Scheduled PRN   cefepime, 2,000 mg, Q12H  chlorhexidine, 15 mL, Q12H FARZANA  docusate sodium, 100 mg, BID  gentamicin, 1 Application, Daily  insulin glargine, 10 Units, HS  insulin lispro, 1-6 Units, Q6H FARZANA  levothyroxine, 125 mcg, Early Morning  pantoprazole, 40 mg, Q24H FARZANA  polyethylene glycol, 17 g, Daily  senna, 2 tablet, HS      HYDROmorphone, 0.2 mg, Q4H PRN  levalbuterol, 0.63 mg, Q6H PRN  ondansetron, 4 mg, Q6H PRN       Continuous    amiodarone (CORDARONE) 900 mg in dextrose 5 % 500 mL infusion, 1 mg/min, Last Rate: 1 mg/min (07/11/24 0308)  epoprostenol, 50 ng/kg/min (Ideal), Last Rate: 50 ng/kg/min (07/11/24 0425)  milrinone (Primacor) infusion, 0.13 mcg/kg/min, Last Rate: 0.13 mcg/kg/min (07/11/24 0312)  norepinephrine, 1-30 mcg/min, Last Rate: 15 mcg/min (07/11/24 0211)  NxStage K 4/Ca 3, 20,000 mL  vasopressin, 0.04 Units/min, Last Rate: 0.04 Units/min (07/11/24 0110)         Labs:    CBC    Recent Labs     07/10/24  0530 07/11/24  0449   WBC 16.83* 16.37*   HGB 11.5 11.2*   HCT 36.7 35.2   PLT 63* 43*     BMP    Recent Labs     07/10/24  1158 07/11/24  0449   SODIUM 133* 135   K 4.1 4.2    103   CO2 22 22   AGAP 9 10   BUN 15 14   CREATININE 1.08 0.97   CALCIUM 8.8 9.0        Coags    Recent Labs     07/10/24  0530 07/10/24  1156 07/10/24  1748 07/11/24  0449   INR 2.63*  --   --  1.52*   PTT  --  129* 38*  --         Additional Electrolytes  Recent Labs     07/10/24  2358 07/11/24  0449   MG 2.0 1.9   PHOS 2.2* 3.0   CAIONIZED 1.18 1.18          Blood Gas    No recent results  Recent Labs     07/11/24 0454   PHVEN 7.379   [id number] 38.8*   PO2VEN 38.9   EDE5GRT 22.4*   BEVEN -2.4   [id number] 72.5    LFTs  Recent Labs     07/10/24  0530 07/11/24  0449   ALT 1,995* 1,712*   AST 1,348* 796*   ALKPHOS 146* 156*   ALB 3.0* 3.1*   TBILI 3.17* 4.55*       Infectious  Recent Labs     07/10/24  0530 07/11/24 0449   PROCALCITONI 9.34* 6.22*     Glucose  Recent Labs     07/10/24  1156 07/10/24  1748 07/10/24  2358 07/11/24  0449   GLUC 180* 164* 166* 162*               MICHAEL Mcdonald

## 2024-07-12 NOTE — DISCHARGE SUMMARY
Discharge Summary - Jacquie Smith 69 y.o. female MRN: [id number]    Unit/Bed#: ICU 09 Encounter: [id number] PCP: Yessica Ramirez DO    Admission Date:   Admission Orders (From admission, onward)       Ordered        07/07/24 0600  INPATIENT ADMISSION  Once                            Admitting Diagnosis: CHF (congestive heart failure) ([facility]) [I50.9]  Pulmonary edema [J81.1]  SOB (shortness of breath) [R06.02]  Abdominal pain [R10.9]  ESRD (end stage renal disease) ([facility]) [N18.6]  Peritoneal dialysis status (HCC) [Z99.2]  Atrial fibrillation with RVR ([facility]) [I48.91]  Ascites [R18.8]    HPI: 69 y.o. female with pmhx of ESRD on PD, DM2, HTN, HLD, hypothyroidism, PE, chronic A-fib, CHANDRA, chronic respiratory failure who presents with pain in the lower abdomen and bilateral lower extremities for 2 weeks.  Reports she felt a lump in her right lower abdomen for about 2 weeks.  Reports decreased urine output over the past week feels she has barely urinated.  She reports being compliant with her PD at night for 8 hours but did not have night of admission.  Denies having any issues with her PD at home.  Patient reports chronic shortness of breath with exertion and uses O2 4 L NC at home as needed but has been using constantly for the past week.  She reports feeling dizzy when getting up and also has been constipated for the last week.  Patient denies any fever, chills, cough, nausea, vomiting, chest pain, headache.    Procedures Performed:   Orders Placed This Encounter   Procedures    Temporary HD Catheter       Summary of Hospital Course: Patient was originally admitted to the general medical floor.  On the night of admission patient developed intermittent hypotension and received IV fluids but continued to be hypotensive.  Rapid response was called and patient was transferred to the ICU.  Patient was noted to be in A-fib with RVR and was administered amiodarone with conversion to NSR.  Patient quickly escalated to cardiogenic shock and  was started on multiple vasopressors.  Due to her acidosis and pressor requirement patient was started on CVVH.  Attempts were made to pull off large volume through the CVVH.  Echo revealed significantly elevated pulmonary artery pressure and also showed right ventricular failure.  Patient was also on high flow nasal cannula and veletri was started.  Patient continued to deteriorate despite efforts.  Patient became increasingly more restless and decision was made to progress to comfort care.  Patient was removed from all life-saving efforts.  The patient  at 0614.      Significant Findings, Care, Treatment and Services Provided: See EMR    Complications: Pulm HTN w/ RV failure     Disposition:      Final Diagnosis: Cardiogenic Shock secondary to sevre Pulm HTN and RV failure    Medical Problems       Resolved Problems  Date Reviewed: 2024            Resolved    Anemia of chronic disease 2024     Resolved by  MICHAEL Barajas    SIRS (systemic inflammatory response syndrome) (HCC) 7/10/2024     Resolved by  MICHAEL Barajas    Metabolic acidosis 7/10/2024     Resolved by  MICHAEL Barajas          Condition at Time of Death: critical    Date, Time and Cause of Death    Date of Death: 24  Time of Death:  6:14 AM  Preliminary Cause of Death: Cardiogenic shock (HCC)  Entered by: Soto Browne[DT1.1]       Attribution       DT1.1 MICHAEL Vidal 24 07:07            Death Note:    INPATIENT DEATH NOTE  Jacquie Smith 69 y.o. female MRN: [id number]  Unit/Bed#: ICU 09 Encounter: [id number]    Date, Time and Cause of Death    Date of Death: 24  Time of Death:  6:14 AM  Preliminary Cause of Death: Cardiogenic shock (HCC)  Entered by: Soto Browne[DT1.1]       Attribution       DT1.1 MICHAEL Vidal 24 07:07             Patient's Information  Pronounced by: Soto RODRIGUEZ  Did the patient's death occur in the ED?: No  Did  the patient's death occur in the OR?: No  Did the patient's death occur less than 10 days post-op?: No  Did the patient's death occur within 24 hours of admission?: No  Was code status DNR at the time of death?: Yes    PHYSICAL EXAM:  Unresponsive to noxious stimuli, Spontaneous respirations absent, Breath sounds absent, Carotid pulse absent, Heart sounds absent, Pupillary light reflex absent, and Corneal blink reflex absent    Medical Examiner notification criteria:  NONE APPLICABLE   Medical Examiner's office notified?:  No, does not meet ME notification criteria   Medical Examiner accepted case?:  No    Family Notification  Was the family notified?: Yes  Date Notified: 24  Time Notified: 614  Notified by: Soto RODRIGUEZ  Name of Family Notified of Death: Amaris   Relationship to Patient: Daughter  Family Notification Route: At bedside  Was the family told to contact a  home?: Yes  Name of  Home:: [place]    Autopsy Options:  Post-mortem examination declined by next of kin    Primary Service Attending Physician notified?:  yes - Attending:  Carlos Jean-Baptiste MD    Physician/Resident responsible for completing Discharge Summary:  Soto RODRIGUEZ

## 2024-07-12 NOTE — PROGRESS NOTES
Pastoral Care Progress Note    2024  Patient: Jacquie Smith : 1955  Admission Date & Time: 2024 0219  MRN: [id number] CSN: [id number]         was requested to visit with this patient as they are deciding whether to go to comfort care.  Patient requested anointing when  visited.   gave patient Last Rites and anointing.       24 0100   Clinical Encounter Type   Visited With Patient   Referral From Nurse   Islam Encounters   Islam Needs Prayer   Sacramental Encounters   Sacrament of Sick-Anointing Patient requested anointing;Anointed                   Chaplaincy Interventions Utilized:   Empowerment: Clarified, confirmed, or reviewed information from treatment team , Provided anxiety containment, and Reframed experience of patient/family    Exploration: Explored hope, Explored emotional needs & resources, and Explored spiritual needs & resources    Collaboration: Advocated for patient/family, Consulted with interdisciplinary team, Encouraged adherence to treatment plan, and Facilitated respect for spiritual/cultural practice during hospitalization    Relationship Building: Cultivated a relationship of care and support and Listened empathically    Ritual: Provided prayer    Chaplaincy Outcomes Achieved:  Expressed gratitude    Spiritual Coping Strategies Utilized:   Spiritual comfort and Spiritual gratitude

## 2024-07-12 NOTE — ASSESSMENT & PLAN NOTE
Platelet count down to 21  Thrombocytopenia in setting of shock liver, degradation in CRRT circuit and possible DIC  Continue to trend CBC daily   4HT score currently low probability for HIT  Heparin gtt now on hold

## 2024-07-12 NOTE — ACP (ADVANCE CARE PLANNING)
"Patient requesting to take off her HFNC. I educated her on the importance of the inhaled veletri and how it is being delivered via the HFNC and that stopping it abruptly would almost certainly cause significant deterioration or death. She was oriented to self, place and year. She made statements such as \"I want to be done with this\", \"this isn't a way to live\". I then discussed the option of comfort care and what that would look like. After presenting her with the option of continuing care vs comfort care, she only answered \"I want to take this off\". I then called her daughter, Amaris who conferenced in [facility] and reviewed our conversation. They are encouraging Jacquie to wait until tomorrow to proceed with comfort care as more family is planning to visit. I then discussed this again with Jacquie asking if she wanted to wait until more family visits. She continues to say that she wants the HFNC off but does not consent to comfort care. Will attempt dose of pain medication for generalized discomfort now. If she continues to want to remove HFNC, will have ongoing goals of care. Amaris and Mary agreeable to above plan.   "

## 2024-07-12 NOTE — PROGRESS NOTES
[facility]  Progress Note  Name: Jacquie Smith I  MRN: [id number]  Unit/Bed#: ICU 09 I Date of Admission: 7/7/2024   Date of Service: 7/12/2024 I Hospital Day: 5    Assessment & Plan   Shock (HCC)  Assessment & Plan  Cardiogenic shock with possible sepsis component   No clear source of infection but with leukocytosis and elevated procal   CT C/A/P 7/7:Hazy opacity and mild septal thickening suggestive of interstitial edema.Peritoneal dialysis catheter is present. Small volume of ascites.Mild body wall edema.  7/8 Echo EF 45-50%, No RWMA, dilated RV, moderate AS, moderate MR, moderate to severe TR, estimated RVSP 71.   Previous Echo 4/2023 with EF 40-45%, mild AS, mild to moderately reduced RV function, severe MAC, moderate MR, mild pulmonary hypertension    Plan:  Continue levophed and vaso 0.04 for goal MAP >65  Continue Milrinone 0.13mcg   Started 7/8 with improvement in ScVO2   ScVo2 pending this AM  Inhaled veletri started 7/9, remains at 50ng via HFNC   CVVHD taken down yesterday after multiple clotting issues and line issues likely related to DIC  Consider restarting today if HD lines able to pull back   Trend end points  Lactic cleared, trend VBG q6h  Patient refused swan placement   Empiric antibiotics for possible distributive component:  pending culture data  Procal 9.3-->6.2  Persistent leukocytosis (WBC 18)   Cefepime D5  MRSA negative, Vanco D/C 7/10  BC NG x 72 hours   Flu/COVID/RSV negative  Peritoneal fluid culture negative   Unable to obtain sputum but has had no increase in O2 requirements   Check strep pneumo and legionella pending   UA 4-10 WBC, mod bacteria, urine culture pending   CT C/A/P ordered but unable to be obtained yesterday due to hemodynamic instability     Acute on chronic combined systolic and diastolic congestive heart failure (HCC)  Assessment & Plan  Wt Readings from Last 3 Encounters:   07/11/24 86.2 kg (190 lb 0.6 oz)   07/03/24 83.6 kg (184 lb 6.4 oz)    07/02/24 83.9 kg (185 lb)     Previous Echo 4/2023 with EF 40-45%, mild AS, mild to moderately reduced RV function, severe MAC, moderate MR, mild pulmonary hypertension  PTA meds: metoprolol, torsemide 100mg daily, valsartan  Patient also does PD for volume removal   Presents with generalized malaise, difficulty with ambulation, swelling over several weeks  Hypervolemic on exam   Repeat 7/8 Echo EF 45-50%, No RWMA, dilated RV, moderate AS, moderate MR, moderate to severe TR, estimated RVSP 71.     Plan:  Continue Milrinone and inhaled Veletri for right heart support   CVVHD down yesterday due to multiple clotting issues, consider restarting today if able  Strict I&O and daily weights   Holding GDMT in setting of shock         Pulmonary hypertension with RV failure  Assessment & Plan  Group 2 and 3 pulmonary hypertension  Has long standing hx of severe CHANDRA non compliant with CPAP on 3LNC at baseline   See plan as outlined     DIC (disseminated intravascular coagulation) (HCC)  Assessment & Plan  7/11 Noted to have significant difficulty running CVVHD due to high access pressure alarms despite repositioning HD line and trouble shooting machine. By afternoon, was unable to run labs due to consistent clotting despite multiple attempts  Concern for DIC as evidence by thrombocytopenia, prolonged PTT,elevated D Dimer now with evidence of diffuse clotting  Hematology consulted yesterday, no recommendations for further treatment at this time aside from supportive care  Hemoglobin now decreasing, most recently 8.5 from 11.2 (however great difficulty with running labs)  Repeat labs ordered for this AM if able to run    Ongoing goals of care    Shock liver  Assessment & Plan  2/2 hepatic congestion with cardiogenic shock  7/9: RUQ U/S-Cholelithiasis. Negative Truong sign. Gallbladder wall thickness of 7 mm but nonspecific in the setting of ascites. If there is strong clinical concern for acute cholecystitis, further  evaluation with nuclear HIDA scan may be considered.No biliary ductal dilatation  Acute hepatitis panel negative   LFTs slightly improving, T bili rising at 4.5   Continue to trend hepatic function panel daily    Atrial fibrillation with RVR (HCC)  Assessment & Plan  Hx PAF/atrial flutter. She was seen as an outpatient both on 7/1 and 7/3 and was in rapid atrial fibrillation at that time and advised to go to the emergency department--noncompliant with plan. She was seen 7/3 in the ED but did not want to be admitted.   PTA meds: metoprolol 25mg XL, coumadin (unclear compliance with medications, INR below therapeutic range on multiple recent checks)  Echo as above    Plan:  Hold beta blocker in setting of shock   S/p Amio bolus and gtt 7/9, continues on Amio gtt 1mg/min  S/p 250mcg of Dig 7/9 and 7/10   Dig level 1.1 this AM  Remains in Afib, rates rising over the past 24 hours  Optimize electrolytes  AC has been on hold given concern for DIC      Thrombocytopenia (HCC)  Assessment & Plan  Platelet count down to 21  Thrombocytopenia in setting of shock liver, degradation in CRRT circuit and possible DIC  Continue to trend CBC daily   4HT score currently low probability for HIT  Heparin gtt now on hold     Supratherapeutic INR  Assessment & Plan  In setting shock liver  INR peak of 6.5 s/p 10mg IV Vitamin K x 2  on 7/9  INR 1.5 yesterday AM    Chronic respiratory failure with hypoxia (HCC)  Assessment & Plan  Chronically on 3LNC, non compliant with hx CPAP  Currently on HFNC 30L 30% for administration of Veletri  Refused CPAP HS d/t discomfort   Goal Sp02 >88%  Pulmonary toilet; encourage coughing and deep breathing, IS q1h while awake     CHANDRA (obstructive sleep apnea)  Assessment & Plan  Ordered CPAP 12 @ HS but refusing    Hypothyroidism  Assessment & Plan  Continue Synthroid  TSH 14.1, free T4 1.75    Type 2 diabetes mellitus, with long-term current use of insulin (HCC)  Assessment & Plan  Lab Results   Component  Value Date    HGBA1C 7.9 (H) 07/07/2024       Recent Labs     07/11/24  1123 07/11/24  1424 07/11/24  1729 07/11/24 2029   POCGLU 184* 220* 208* 201*         Blood Sugar Average: Last 72 hrs:  (P) 165.2    Home regimen: lantus 10 units HS and 3 units humalog TID with meals, also on ozempic   Continue SSI Alg #3 and Lantus 10 units @ HS   Lantus held last night due to poor PO intake  Goal blood glucose less than 180       ESRD (end stage renal disease) ([facility])  Assessment & Plan  Lab Results   Component Value Date    EGFR 60 07/11/2024    EGFR 53 07/10/2024    EGFR 47 07/10/2024    CREATININE 0.97 07/11/2024    CREATININE 1.08 07/10/2024    CREATININE 1.18 07/10/2024     Patient on chronic peritoneal dialysis, reports compliance with daily PD  PD initiated on admission but given acute hypotension and nausea, PD fluid drained prior to full dwell time  Nephrology consulted  7/8 CVVHD initiated   7/11 CVVHD taken down due to multiple clotting issues, inability to run   Attempt to restart today vs ongoing goals of care   Q6 hour BMP, Mg, Phos, ical  Strict I&O  Avoid nephrotoxic agents   Does void in small amounts   Home torsemide and metolazone on hold     Mixed hyperlipidemia  Assessment & Plan  Hold statin secondary to shock liver    Essential hypertension  Assessment & Plan  Hold antihypertensive medications in setting of shock    Morbid obesity with BMI of 40.0-44.9, adult ([facility])  Assessment & Plan  Encourage diet and lifestyle modifications   Nutrition consult     History of pulmonary embolus (PE)  Assessment & Plan  Remote hx, continue Coumadin but patient reports she is poorly compliant with several recent INRs less than therapeutic range  7/8: VQ scan no mod/large PE limited study   7/9: LE venous duplex-negative             Disposition: Critical care    ICU Core Measures     A: Assess, Prevent, and Manage Pain Has pain been assessed? Yes  Need for changes to pain regimen? No   B: Both SAT/SAT  N/A   C: Choice of  Sedation RASS Goal: N/A patient not on sedation  Need for changes to sedation or analgesia regimen? NA   D: Delirium CAM-ICU: Negative   E: Early Mobility  Plan for early mobility? No   F: Family Engagement Plan for family engagement today? Yes       Antibiotic Review: Awaiting culture results.     Review of Invasive Devices:      Central access plan: Patient has multiple central venous catheters.  Medications requiring central line Hemodynamic monitoring  [place] Plan: Keep arterial line for hemodynamic monitoring and frequent ABGs    Prophylaxis:  VTE    Stress Ulcer  covered bypantoprazole (PROTONIX) injection 40 mg [[id number]]         Significant 24hr Events     24hr events: Escalating pressor requirements yesterday. Epsisode of acute hypotension during attempt at CT scan yesterday with brief pause in veletri. BP improved when veletri resumed. Hematology consulted. Issues with CVVH line and consistently clotting all bloodwork. Concern for DIC.  CVVHD no longer able to run despite trouble shooting. Ongoing goals of care discussions with family. Patient requesting to remove HFNC this AM and stop treatment. Family called and will be arriving at bedside.      Subjective   Review of Systems: Review of Systems     Objective                            Vitals I/O      Most Recent Min/Max in 24hrs   Temp 98 °F (36.7 °C) Temp  Min: 97 °F (36.1 °C)  Max: 98.1 °F (36.7 °C)   Pulse (!) 123 Pulse  Min: 111  Max: 132   Resp (!) 27 Resp  Min: 17  Max: 42   /57 BP  Min: 94/45  Max: 110/57   O2 Sat 95 % SpO2  Min: 88 %  Max: 100 %      Intake/Output Summary (Last 24 hours) at 7/12/2024 0302  Last data filed at 7/12/2024 0059  Gross per 24 hour   Intake 2184.93 ml   Output 1179 ml   Net 1005.93 ml       Diet Clear Liquid    Invasive Monitoring   Arterial Line  [place] /47  Arterial Line BP  Min: 94/45  Max: 130/54   MAP 68 mmHg  Arterial Line MAP (mmHg)  Min: 58 mmHg  Max: 88 mmHg           Physical Exam   Physical  Exam  Eyes:      Extraocular Movements: Extraocular movements intact.      Pupils: Pupils are equal, round, and reactive to light.   Skin:     General: Skin is warm and dry.      Comments: Lower extremity PVD color changes   HENT:      Head: Normocephalic and atraumatic.      Mouth/Throat:      Mouth: Mucous membranes are moist.   Cardiovascular:      Rate and Rhythm: Tachycardia present. Rhythm irregular.      Heart sounds: Murmur heard.   Musculoskeletal:      Right lower le+ Edema present.      Left lower le+ Edema present.   Abdominal: General: Bowel sounds are normal.      Palpations: Abdomen is soft.   Constitutional:       Appearance: She is well-developed. She is ill-appearing. She is not toxic-appearing.   Pulmonary:      Effort: Tachypnea, accessory muscle usage and accessory muscle usage present.      Breath sounds: No stridor.      Comments: Mild accessory muscle use.   Neurological:      General: No focal deficit present.      Mental Status: She is alert and oriented to person, place and time.      Motor: Strength full and intact in all extremities.            Diagnostic Studies        Imaging:  I have personally reviewed pertinent reports.   and I have personally reviewed pertinent films in PACS     Medications:  Scheduled PRN   chlorhexidine, 15 mL, Q12H FARZANA  docusate sodium, 100 mg, BID  gentamicin, 1 Application, Daily  insulin glargine, 10 Units, HS  insulin lispro, 1-6 Units, Q6H FARZANA  levothyroxine, 125 mcg, Early Morning  pantoprazole, 40 mg, Q24H FARZANA  piperacillin-tazobactam, 4.5 g, Q8H  polyethylene glycol, 17 g, Daily  senna, 2 tablet, HS      HYDROmorphone, 0.2 mg, Q4H PRN  levalbuterol, 0.63 mg, Q6H PRN  ondansetron, 4 mg, Q6H PRN       Continuous    amiodarone (CORDARONE) 900 mg in dextrose 5 % 500 mL infusion, 1 mg/min, Last Rate: 1 mg/min (24 1800)  epoprostenol, 50 ng/kg/min (Ideal), Last Rate: 50 ng/kg/min (24 0234)  milrinone (Primacor) infusion, 0.13 mcg/kg/min,  Last Rate: 0.13 mcg/kg/min (07/11/24 1017)  norepinephrine, 1-30 mcg/min, Last Rate: 20 mcg/min (07/12/24 0040)  NxStage K 4/Ca 3, 20,000 mL  vasopressin, 0.04 Units/min, Last Rate: 0.04 Units/min (07/12/24 0040)         Labs:    CBC    Recent Labs     07/11/24 1652 07/11/24 2359   WBC 18.14* 21.52*   HGB 8.5* 8.6*   HCT 25.7* 26.5*   PLT 21* 19*     BMP    Recent Labs     07/10/24  2358 07/11/24  0449 07/11/24  1550   SODIUM 133* 135  --    K 4.1 4.2  --     103  --    CO2 22 22 23   AGAP 9 10  --    BUN 15 14  --    CREATININE 1.08 0.97  --    CALCIUM 8.8 9.0  --        Coags    Recent Labs     07/10/24  0530 07/10/24  1156 07/10/24  1748 07/11/24 0449   INR 2.63*  --   --  1.52*   PTT  --  129* 38*  --         Additional Electrolytes  Recent Labs     07/10/24  2358 07/11/24  0449 07/11/24  1152 07/11/24  1652 07/11/24  2359   MG 2.0 1.9  --   --   --    PHOS 2.2* 3.0  --   --   --    CAIONIZED 1.18 1.18   < > 1.18 1.22    < > = values in this interval not displayed.          Blood Gas    No recent results  Recent Labs     07/11/24 2359   PHVEN 7.376   YPO4SPP 34.1*   PO2VEN 29.7*   QCR8JVD 19.5*   BEVEN -5.0   [id number] 56.9*    LFTs  Recent Labs     07/10/24  0530 07/11/24  0449   ALT 1,995* 1,712*   AST 1,348* 796*   ALKPHOS 146* 156*   ALB 3.0* 3.1*   TBILI 3.17* 4.55*       Infectious  Recent Labs     07/10/24  0530 07/11/24  0449   PROCALCITONI 9.34* 6.22*     Glucose  Recent Labs     07/10/24  1156 07/10/24  1748 07/10/24  2358 07/11/24  0449   GLUC 180* 164* 166* 162*               MICHAEL Calderon

## 2024-07-12 NOTE — ASSESSMENT & PLAN NOTE
Lab Results   Component Value Date    HGBA1C 7.9 (H) 07/07/2024       Recent Labs     07/11/24  1123 07/11/24  1424 07/11/24  1729 07/11/24 2029   POCGLU 184* 220* 208* 201*         Blood Sugar Average: Last 72 hrs:  (P) 165.2    Home regimen: lantus 10 units HS and 3 units humalog TID with meals, also on ozempic   Continue SSI Alg #3 and Lantus 10 units @ HS   Lantus held last night due to poor PO intake  Goal blood glucose less than 180

## 2024-07-12 NOTE — ASSESSMENT & PLAN NOTE
7/11 Noted to have significant difficulty running CVVHD due to high access pressure alarms despite repositioning HD line and trouble shooting machine. By afternoon, was unable to run labs due to consistent clotting despite multiple attempts  Concern for DIC as evidence by thrombocytopenia, prolonged PTT,elevated D Dimer now with evidence of diffuse clotting  Hematology consulted yesterday, no recommendations for further treatment at this time aside from supportive care  Hemoglobin now decreasing, most recently 8.5 from 11.2 (however great difficulty with running labs)  Repeat labs ordered for this AM if able to run    Ongoing goals of care

## 2024-07-12 NOTE — ASSESSMENT & PLAN NOTE
Lab Results   Component Value Date    EGFR 60 07/11/2024    EGFR 53 07/10/2024    EGFR 47 07/10/2024    CREATININE 0.97 07/11/2024    CREATININE 1.08 07/10/2024    CREATININE 1.18 07/10/2024     Patient on chronic peritoneal dialysis, reports compliance with daily PD  PD initiated on admission but given acute hypotension and nausea, PD fluid drained prior to full dwell time  Nephrology consulted  7/8 CVVHD initiated   7/11 CVVHD taken down due to multiple clotting issues, inability to run   Attempt to restart today vs ongoing goals of care   Q6 hour BMP, Mg, Phos, ical  Strict I&O  Avoid nephrotoxic agents   Does void in small amounts   Home torsemide and metolazone on hold

## 2024-07-12 NOTE — ASSESSMENT & PLAN NOTE
Chronically on 3LNC, non compliant with hx CPAP  Currently on HFNC 30L 30% for administration of Veletri  Refused CPAP HS d/t discomfort   Goal Sp02 >88%  Pulmonary toilet; encourage coughing and deep breathing, IS q1h while awake

## 2024-07-12 NOTE — ASSESSMENT & PLAN NOTE
Cardiogenic shock with possible sepsis component   No clear source of infection but with leukocytosis and elevated procal   CT C/A/P 7/7:Hazy opacity and mild septal thickening suggestive of interstitial edema.Peritoneal dialysis catheter is present. Small volume of ascites.Mild body wall edema.  7/8 Echo EF 45-50%, No RWMA, dilated RV, moderate AS, moderate MR, moderate to severe TR, estimated RVSP 71.   Previous Echo 4/2023 with EF 40-45%, mild AS, mild to moderately reduced RV function, severe MAC, moderate MR, mild pulmonary hypertension    Plan:  Continue levophed and vaso 0.04 for goal MAP >65  Continue Milrinone 0.13mcg   Started 7/8 with improvement in ScVO2   ScVo2 pending this AM  Inhaled veletri started 7/9, remains at 50ng via HFNC   CVVHD taken down yesterday after multiple clotting issues and line issues likely related to DIC  Consider restarting today if HD lines able to pull back   Trend end points  Lactic cleared, trend VBG q6h  Patient refused swan placement   Empiric antibiotics for possible distributive component:  pending culture data  Procal 9.3-->6.2  Persistent leukocytosis (WBC 18)   Cefepime D5  MRSA negative, Vanco D/C 7/10  BC NG x 72 hours   Flu/COVID/RSV negative  Peritoneal fluid culture negative   Unable to obtain sputum but has had no increase in O2 requirements   Check strep pneumo and legionella pending   UA 4-10 WBC, mod bacteria, urine culture pending   CT C/A/P ordered but unable to be obtained yesterday due to hemodynamic instability

## 2024-07-12 NOTE — ASSESSMENT & PLAN NOTE
Hx PAF/atrial flutter. She was seen as an outpatient both on 7/1 and 7/3 and was in rapid atrial fibrillation at that time and advised to go to the emergency department--noncompliant with plan. She was seen 7/3 in the ED but did not want to be admitted.   PTA meds: metoprolol 25mg XL, coumadin (unclear compliance with medications, INR below therapeutic range on multiple recent checks)  Echo as above    Plan:  Hold beta blocker in setting of shock   S/p Amio bolus and gtt 7/9, continues on Amio gtt 1mg/min  S/p 250mcg of Dig 7/9 and 7/10   Dig level 1.1 this AM  Remains in Afib, rates rising over the past 24 hours  Optimize electrolytes  AC has been on hold given concern for DIC

## 2024-07-12 NOTE — ACP (ADVANCE CARE PLANNING)
"Patient rang call bell to state that she is \"done\". She wants \"everything off now, call my daughter\". I then spoke with her and she was able to verbalize that she is frustrated that she is not getting better and does not want to live like this. I then asked her what she thinks will happen if we discontinue all of her current treatment and she did not answer. I stated that she will likely decompensate quickly and pass away to which her response was \"fine then, take everything off\". I then called her daughter Amaris again to make her aware of the above. She is on her way to visit. Plan to transition to comfort care when Amaris and Mary are at bedside.     Update:   Daughter and niece at the bedside. Patient is able to verbalize to them that she does not wish to receive further treatment. Everyone is in understanding that we are transitioning to comfort care. Patient declined pre-medication prior to discontinuing HFNC and pressors. She understands that pain medications and anxiety medications are available to her if she wishes. She appears comfortable at this time.  "

## 2024-07-12 NOTE — DEATH NOTE
INPATIENT DEATH NOTE  Jacquie Smith 69 y.o. female MRN: [id number]  Unit/Bed#: ICU 09 Encounter: [id number]    Date, Time and Cause of Death    Date of Death: 24  Time of Death:  6:14 AM  Preliminary Cause of Death: Cardiogenic shock (HCC)  Entered by: Soto Browne[DT1.1]       Attribution       DT1.1 MICHAEL Vidal 24 07:07             Patient's Information  Pronounced by: Soto RODRIGUEZ  Did the patient's death occur in the ED?: No  Did the patient's death occur in the OR?: No  Did the patient's death occur less than 10 days post-op?: No  Did the patient's death occur within 24 hours of admission?: No  Was code status DNR at the time of death?: Yes    PHYSICAL EXAM:  Unresponsive to noxious stimuli, Spontaneous respirations absent, Breath sounds absent, Carotid pulse absent, Heart sounds absent, Pupillary light reflex absent, and Corneal blink reflex absent    Medical Examiner notification criteria:  NONE APPLICABLE   Medical Examiner's office notified?:  No, does not meet ME notification criteria   Medical Examiner accepted case?:  No    Family Notification  Was the family notified?: Yes  Date Notified: 24  Time Notified: 614  Notified by: Soto RODRIGUEZ  Name of Family Notified of Death: Amaris   Relationship to Patient: Daughter  Family Notification Route: At bedside  Was the family told to contact a  home?: Yes  Name of  Home:: [place]    Autopsy Options:  Post-mortem examination declined by next of kin    Primary Service Attending Physician notified?:  yes - Attending:  Carlos Jean-Baptiste MD    Physician/Resident responsible for completing Discharge Summary:  Soto RODRIGUEZ

## 2024-07-12 NOTE — ASSESSMENT & PLAN NOTE
Wt Readings from Last 3 Encounters:   07/11/24 86.2 kg (190 lb 0.6 oz)   07/03/24 83.6 kg (184 lb 6.4 oz)   07/02/24 83.9 kg (185 lb)     Previous Echo 4/2023 with EF 40-45%, mild AS, mild to moderately reduced RV function, severe MAC, moderate MR, mild pulmonary hypertension  PTA meds: metoprolol, torsemide 100mg daily, valsartan  Patient also does PD for volume removal   Presents with generalized malaise, difficulty with ambulation, swelling over several weeks  Hypervolemic on exam   Repeat 7/8 Echo EF 45-50%, No RWMA, dilated RV, moderate AS, moderate MR, moderate to severe TR, estimated RVSP 71.     Plan:  Continue Milrinone and inhaled Veletri for right heart support   CVVHD down yesterday due to multiple clotting issues, consider restarting today if able  Strict I&O and daily weights   Holding GDMT in setting of shock

## 2024-07-14 LAB
BACTERIA SPEC BFLD CULT: NO GROWTH
GRAM STN SPEC: NORMAL
GRAM STN SPEC: NORMAL

## 2024-07-16 NOTE — TELEPHONE ENCOUNTER
7/16/2024 2:09 PM Called Amaris to offer my condolences regarding her mom's passing  Yessica Ramirez, DO

## 2024-10-10 NOTE — ASSESSMENT & PLAN NOTE
Notify patient recent blood test showed that she has an infection in her urine.  I am sending an antibiotic to take 1 capsule 3 times a day for 7 days.  She need to make sure she  the treatment With a BMI of 38 67, noted she would benefit from weight loss

## 2025-01-13 LAB

## 2025-04-30 NOTE — TELEPHONE ENCOUNTER
2/23/2021 11:22 AM Called patient regarding her mammogram results  We discussed asymmetry in her left breast and she agreed to go for the follow-up studies      Surinder Johnson DO
100